# Patient Record
Sex: FEMALE | Race: WHITE | NOT HISPANIC OR LATINO | Employment: OTHER | URBAN - METROPOLITAN AREA
[De-identification: names, ages, dates, MRNs, and addresses within clinical notes are randomized per-mention and may not be internally consistent; named-entity substitution may affect disease eponyms.]

---

## 2017-02-06 ENCOUNTER — HOSPITAL ENCOUNTER (OUTPATIENT)
Dept: OTHER | Facility: HOSPITAL | Age: 67
Discharge: HOME | End: 2017-02-06
Attending: PHYSICIAN ASSISTANT | Admitting: PHYSICIAN ASSISTANT

## 2017-02-06 LAB
ANION GAP SERPL CALC-SCNC: 11.3 MMOL/L (ref 10–20)
BUN SERPL-MCNC: 39 MG/DL (ref 8–20)
BUN/CREAT SERPL: 12.2 (ref 5.4–26.2)
CALCIUM SERPL-MCNC: 7.9 MG/DL (ref 8.9–10.3)
CHLORIDE SERPL-SCNC: 111 MMOL/L (ref 101–111)
CONV CO2: 26 MMOL/L (ref 22–32)
CREAT 24H UR-MCNC: 116.9 MG/DL
CREAT UR-MCNC: 3.2 MG/DL (ref 0.4–1)
ERYTHROCYTE [DISTWIDTH] IN BLOOD BY AUTOMATED COUNT: 16.1 % (ref 11.5–14.5)
GLUCOSE SERPL-MCNC: 174 MG/DL (ref 65–99)
HCT VFR BLD AUTO: 29 % (ref 35–49)
HGB BLD-MCNC: 9.4 G/DL (ref 12–15)
MCH RBC QN AUTO: 29.8 PG (ref 26–32)
MCHC RBC AUTO-ENTMCNC: 32.6 G/DL (ref 32–36)
MCV RBC AUTO: 91.3 FL (ref 80–94)
PHOSPHATE SERPL-MCNC: 5.3 MG/DL (ref 2.4–4.7)
PLATELET # BLD AUTO: 357 10*3/UL (ref 150–450)
PMV BLD AUTO: 7.5 FL (ref 7.4–10.4)
POTASSIUM SERPL-SCNC: 4.3 MMOL/L (ref 3.6–5.1)
PROT UR-MCNC: ABNORMAL MG/DL
PTH-INTACT SERPL-MCNC: 257 PG/ML (ref 11–72)
RBC # BLD AUTO: 3.17 10*6/UL (ref 4–5.4)
SODIUM SERPL-SCNC: 144 MMOL/L (ref 136–144)
WBC # BLD AUTO: 9.8 10*3/UL (ref 4.5–11.5)

## 2017-03-10 ENCOUNTER — HOSPITAL ENCOUNTER (OUTPATIENT)
Dept: CARDIOLOGY | Facility: HOSPITAL | Age: 67
Discharge: HOME | End: 2017-03-10
Attending: SURGERY | Admitting: SURGERY

## 2017-04-03 ENCOUNTER — HOSPITAL ENCOUNTER (OUTPATIENT)
Dept: LAB | Facility: HOSPITAL | Age: 67
Discharge: HOME | End: 2017-04-03
Attending: INTERNAL MEDICINE | Admitting: INTERNAL MEDICINE

## 2017-04-03 LAB
ANION GAP SERPL CALC-SCNC: 14.9 MMOL/L (ref 10–20)
BASOPHILS # BLD AUTO: 0.1 10*3/UL (ref 0–0.2)
BASOPHILS NFR BLD AUTO: 1 % (ref 0–2)
BUN SERPL-MCNC: 37 MG/DL (ref 8–20)
BUN/CREAT SERPL: 10 (ref 5.4–26.2)
CALCIUM SERPL-MCNC: 8.6 MG/DL (ref 8.9–10.3)
CHLORIDE SERPL-SCNC: 106 MMOL/L (ref 101–111)
CONV CO2: 23 MMOL/L (ref 22–32)
CREAT UR-MCNC: 3.7 MG/DL (ref 0.4–1)
DIFFERENTIAL METHOD BLD: (no result)
EOSINOPHIL # BLD AUTO: 0.3 10*3/UL (ref 0–0.3)
EOSINOPHIL # BLD AUTO: 3 % (ref 0–3)
ERYTHROCYTE [DISTWIDTH] IN BLOOD BY AUTOMATED COUNT: 15.6 % (ref 11.5–14.5)
GLUCOSE SERPL-MCNC: 310 MG/DL (ref 65–99)
HCT VFR BLD AUTO: 31.9 % (ref 35–49)
HGB BLD-MCNC: 10.4 G/DL (ref 12–15)
IRON SATN MFR SERPL: 16 % (ref 15–50)
IRON SERPL-MCNC: 38 UG/DL (ref 28–170)
LYMPHOCYTES # BLD AUTO: 1.5 10*3/UL (ref 0.8–4.8)
LYMPHOCYTES NFR BLD AUTO: 15 % (ref 18–42)
MCH RBC QN AUTO: 29.5 PG (ref 26–32)
MCHC RBC AUTO-ENTMCNC: 32.6 G/DL (ref 32–36)
MCV RBC AUTO: 90.7 FL (ref 80–94)
MONOCYTES # BLD AUTO: 0.6 10*3/UL (ref 0.1–1.3)
MONOCYTES NFR BLD AUTO: 6 % (ref 2–11)
NEUTROPHILS # BLD AUTO: 7.4 10*3/UL (ref 2.3–8.6)
NEUTROPHILS NFR BLD AUTO: 75 % (ref 50–75)
NRBC BLD AUTO-RTO: 0 /100{WBCS}
NRBC/RBC NFR BLD MANUAL: 0 10*3/UL
PLATELET # BLD AUTO: 355 10*3/UL (ref 150–450)
PMV BLD AUTO: 8 FL (ref 7.4–10.4)
POTASSIUM SERPL-SCNC: 3.9 MMOL/L (ref 3.6–5.1)
RBC # BLD AUTO: 3.52 10*6/UL (ref 4–5.4)
SODIUM SERPL-SCNC: 140 MMOL/L (ref 136–144)
TIBC SERPL-MCNC: 232 UG/DL (ref 228–428)
WBC # BLD AUTO: 9.9 10*3/UL (ref 4.5–11.5)

## 2017-04-24 ENCOUNTER — INPATIENT HOSPITAL (AMBULATORY)
Dept: URBAN - METROPOLITAN AREA HOSPITAL 84 | Facility: HOSPITAL | Age: 67
End: 2017-04-24
Payer: COMMERCIAL

## 2017-04-24 DIAGNOSIS — R19.7 DIARRHEA, UNSPECIFIED: ICD-10-CM

## 2017-04-24 DIAGNOSIS — R11.2 NAUSEA WITH VOMITING, UNSPECIFIED: ICD-10-CM

## 2017-04-24 DIAGNOSIS — K22.70 BARRETT'S ESOPHAGUS WITHOUT DYSPLASIA: ICD-10-CM

## 2017-04-24 PROCEDURE — 99222 1ST HOSP IP/OBS MODERATE 55: CPT | Performed by: NURSE PRACTITIONER

## 2017-04-25 PROCEDURE — 99231 SBSQ HOSP IP/OBS SF/LOW 25: CPT | Performed by: NURSE PRACTITIONER

## 2017-04-29 ENCOUNTER — INPATIENT HOSPITAL (AMBULATORY)
Dept: URBAN - METROPOLITAN AREA HOSPITAL 84 | Facility: HOSPITAL | Age: 67
End: 2017-04-29
Payer: COMMERCIAL

## 2017-04-29 DIAGNOSIS — R11.2 NAUSEA WITH VOMITING, UNSPECIFIED: ICD-10-CM

## 2017-04-29 DIAGNOSIS — R19.7 DIARRHEA, UNSPECIFIED: ICD-10-CM

## 2017-04-29 PROCEDURE — 99222 1ST HOSP IP/OBS MODERATE 55: CPT | Performed by: INTERNAL MEDICINE

## 2017-04-30 ENCOUNTER — INPATIENT HOSPITAL (AMBULATORY)
Dept: URBAN - METROPOLITAN AREA HOSPITAL 84 | Facility: HOSPITAL | Age: 67
End: 2017-04-30
Payer: COMMERCIAL

## 2017-04-30 DIAGNOSIS — R11.2 NAUSEA WITH VOMITING, UNSPECIFIED: ICD-10-CM

## 2017-04-30 DIAGNOSIS — R19.7 DIARRHEA, UNSPECIFIED: ICD-10-CM

## 2017-04-30 PROCEDURE — 99231 SBSQ HOSP IP/OBS SF/LOW 25: CPT | Performed by: INTERNAL MEDICINE

## 2017-05-01 ENCOUNTER — INPATIENT HOSPITAL (AMBULATORY)
Dept: URBAN - METROPOLITAN AREA HOSPITAL 84 | Facility: HOSPITAL | Age: 67
End: 2017-05-01
Payer: COMMERCIAL

## 2017-05-01 DIAGNOSIS — R19.7 DIARRHEA, UNSPECIFIED: ICD-10-CM

## 2017-05-01 DIAGNOSIS — R11.2 NAUSEA WITH VOMITING, UNSPECIFIED: ICD-10-CM

## 2017-05-01 PROCEDURE — 99231 SBSQ HOSP IP/OBS SF/LOW 25: CPT | Performed by: NURSE PRACTITIONER

## 2017-05-02 ENCOUNTER — INPATIENT HOSPITAL (AMBULATORY)
Dept: URBAN - METROPOLITAN AREA HOSPITAL 84 | Facility: HOSPITAL | Age: 67
End: 2017-05-02
Payer: COMMERCIAL

## 2017-05-02 DIAGNOSIS — R19.7 DIARRHEA, UNSPECIFIED: ICD-10-CM

## 2017-05-02 DIAGNOSIS — R11.2 NAUSEA WITH VOMITING, UNSPECIFIED: ICD-10-CM

## 2017-05-02 PROCEDURE — 99231 SBSQ HOSP IP/OBS SF/LOW 25: CPT | Performed by: INTERNAL MEDICINE

## 2017-05-05 ENCOUNTER — INPATIENT HOSPITAL (AMBULATORY)
Dept: URBAN - METROPOLITAN AREA HOSPITAL 84 | Facility: HOSPITAL | Age: 67
End: 2017-05-05
Payer: COMMERCIAL

## 2017-05-05 DIAGNOSIS — K64.1 SECOND DEGREE HEMORRHOIDS: ICD-10-CM

## 2017-05-05 DIAGNOSIS — K62.5 HEMORRHAGE OF ANUS AND RECTUM: ICD-10-CM

## 2017-05-05 DIAGNOSIS — K63.5 POLYP OF COLON: ICD-10-CM

## 2017-05-05 DIAGNOSIS — R19.7 DIARRHEA, UNSPECIFIED: ICD-10-CM

## 2017-05-05 PROCEDURE — 45380 COLONOSCOPY AND BIOPSY: CPT | Performed by: INTERNAL MEDICINE

## 2017-05-06 ENCOUNTER — INPATIENT HOSPITAL (AMBULATORY)
Dept: URBAN - METROPOLITAN AREA HOSPITAL 84 | Facility: HOSPITAL | Age: 67
End: 2017-05-06
Payer: COMMERCIAL

## 2017-05-06 DIAGNOSIS — R19.7 DIARRHEA, UNSPECIFIED: ICD-10-CM

## 2017-05-06 DIAGNOSIS — K62.5 HEMORRHAGE OF ANUS AND RECTUM: ICD-10-CM

## 2017-05-06 DIAGNOSIS — K64.1 SECOND DEGREE HEMORRHOIDS: ICD-10-CM

## 2017-05-06 PROCEDURE — 99232 SBSQ HOSP IP/OBS MODERATE 35: CPT | Performed by: INTERNAL MEDICINE

## 2017-05-07 PROCEDURE — 99231 SBSQ HOSP IP/OBS SF/LOW 25: CPT | Performed by: INTERNAL MEDICINE

## 2017-05-08 ENCOUNTER — HOSPITAL ENCOUNTER (OUTPATIENT)
Dept: PERIOP | Facility: HOSPITAL | Age: 67
Setting detail: HOSPITAL OUTPATIENT SURGERY
End: 2017-05-08
Attending: SURGERY | Admitting: SURGERY

## 2017-05-15 ENCOUNTER — INPATIENT HOSPITAL (AMBULATORY)
Dept: URBAN - METROPOLITAN AREA HOSPITAL 84 | Facility: HOSPITAL | Age: 67
End: 2017-05-15
Payer: COMMERCIAL

## 2017-05-15 DIAGNOSIS — R93.3 ABNORMAL FINDINGS ON DIAGNOSTIC IMAGING OF OTHER PARTS OF DI: ICD-10-CM

## 2017-05-15 DIAGNOSIS — K62.5 HEMORRHAGE OF ANUS AND RECTUM: ICD-10-CM

## 2017-05-15 DIAGNOSIS — R19.7 DIARRHEA, UNSPECIFIED: ICD-10-CM

## 2017-05-15 DIAGNOSIS — R10.9 UNSPECIFIED ABDOMINAL PAIN: ICD-10-CM

## 2017-05-15 DIAGNOSIS — R11.2 NAUSEA WITH VOMITING, UNSPECIFIED: ICD-10-CM

## 2017-05-15 PROCEDURE — 99222 1ST HOSP IP/OBS MODERATE 55: CPT | Performed by: NURSE PRACTITIONER

## 2017-05-16 ENCOUNTER — INPATIENT HOSPITAL (AMBULATORY)
Dept: URBAN - METROPOLITAN AREA HOSPITAL 84 | Facility: HOSPITAL | Age: 67
End: 2017-05-16
Payer: COMMERCIAL

## 2017-05-16 DIAGNOSIS — R13.10 DYSPHAGIA, UNSPECIFIED: ICD-10-CM

## 2017-05-16 DIAGNOSIS — K20.9 ESOPHAGITIS, UNSPECIFIED: ICD-10-CM

## 2017-05-16 DIAGNOSIS — K29.90 GASTRODUODENITIS, UNSPECIFIED, WITHOUT BLEEDING: ICD-10-CM

## 2017-05-16 DIAGNOSIS — R93.3 ABNORMAL FINDINGS ON DIAGNOSTIC IMAGING OF OTHER PARTS OF DI: ICD-10-CM

## 2017-05-16 DIAGNOSIS — K22.10 ULCER OF ESOPHAGUS WITHOUT BLEEDING: ICD-10-CM

## 2017-05-16 PROCEDURE — 43239 EGD BIOPSY SINGLE/MULTIPLE: CPT | Performed by: INTERNAL MEDICINE

## 2017-05-17 ENCOUNTER — INPATIENT HOSPITAL (AMBULATORY)
Dept: URBAN - METROPOLITAN AREA HOSPITAL 84 | Facility: HOSPITAL | Age: 67
End: 2017-05-17
Payer: COMMERCIAL

## 2017-05-17 DIAGNOSIS — K29.90 GASTRODUODENITIS, UNSPECIFIED, WITHOUT BLEEDING: ICD-10-CM

## 2017-05-17 DIAGNOSIS — R93.3 ABNORMAL FINDINGS ON DIAGNOSTIC IMAGING OF OTHER PARTS OF DI: ICD-10-CM

## 2017-05-17 DIAGNOSIS — K20.9 ESOPHAGITIS, UNSPECIFIED: ICD-10-CM

## 2017-05-17 DIAGNOSIS — R13.10 DYSPHAGIA, UNSPECIFIED: ICD-10-CM

## 2017-05-17 DIAGNOSIS — K22.10 ULCER OF ESOPHAGUS WITHOUT BLEEDING: ICD-10-CM

## 2017-05-17 PROCEDURE — 99231 SBSQ HOSP IP/OBS SF/LOW 25: CPT | Performed by: NURSE PRACTITIONER

## 2017-06-06 ENCOUNTER — INPATIENT HOSPITAL (AMBULATORY)
Dept: URBAN - METROPOLITAN AREA HOSPITAL 84 | Facility: HOSPITAL | Age: 67
End: 2017-06-06
Payer: COMMERCIAL

## 2017-06-06 DIAGNOSIS — R10.9 UNSPECIFIED ABDOMINAL PAIN: ICD-10-CM

## 2017-06-06 DIAGNOSIS — R11.2 NAUSEA WITH VOMITING, UNSPECIFIED: ICD-10-CM

## 2017-06-06 DIAGNOSIS — R19.7 DIARRHEA, UNSPECIFIED: ICD-10-CM

## 2017-06-06 PROCEDURE — 99222 1ST HOSP IP/OBS MODERATE 55: CPT | Performed by: NURSE PRACTITIONER

## 2017-06-20 ENCOUNTER — INPATIENT HOSPITAL (AMBULATORY)
Dept: URBAN - METROPOLITAN AREA HOSPITAL 84 | Facility: HOSPITAL | Age: 67
End: 2017-06-20
Payer: COMMERCIAL

## 2017-06-20 DIAGNOSIS — R93.3 ABNORMAL FINDINGS ON DIAGNOSTIC IMAGING OF OTHER PARTS OF DI: ICD-10-CM

## 2017-06-20 DIAGNOSIS — K22.10 ULCER OF ESOPHAGUS WITHOUT BLEEDING: ICD-10-CM

## 2017-06-20 DIAGNOSIS — K29.80 DUODENITIS WITHOUT BLEEDING: ICD-10-CM

## 2017-06-20 DIAGNOSIS — K20.9 ESOPHAGITIS, UNSPECIFIED: ICD-10-CM

## 2017-06-20 DIAGNOSIS — R11.2 NAUSEA WITH VOMITING, UNSPECIFIED: ICD-10-CM

## 2017-06-20 PROCEDURE — 43239 EGD BIOPSY SINGLE/MULTIPLE: CPT | Performed by: INTERNAL MEDICINE

## 2017-06-21 ENCOUNTER — INPATIENT HOSPITAL (AMBULATORY)
Dept: URBAN - METROPOLITAN AREA HOSPITAL 84 | Facility: HOSPITAL | Age: 67
End: 2017-06-21
Payer: COMMERCIAL

## 2017-06-21 DIAGNOSIS — R93.3 ABNORMAL FINDINGS ON DIAGNOSTIC IMAGING OF OTHER PARTS OF DI: ICD-10-CM

## 2017-06-21 DIAGNOSIS — K22.10 ULCER OF ESOPHAGUS WITHOUT BLEEDING: ICD-10-CM

## 2017-06-21 DIAGNOSIS — R11.2 NAUSEA WITH VOMITING, UNSPECIFIED: ICD-10-CM

## 2017-06-21 DIAGNOSIS — K20.9 ESOPHAGITIS, UNSPECIFIED: ICD-10-CM

## 2017-06-21 DIAGNOSIS — K29.80 DUODENITIS WITHOUT BLEEDING: ICD-10-CM

## 2017-06-21 PROCEDURE — 99231 SBSQ HOSP IP/OBS SF/LOW 25: CPT | Performed by: NURSE PRACTITIONER

## 2017-06-29 ENCOUNTER — INPATIENT HOSPITAL (AMBULATORY)
Dept: URBAN - METROPOLITAN AREA HOSPITAL 84 | Facility: HOSPITAL | Age: 67
End: 2017-06-29
Payer: COMMERCIAL

## 2017-06-29 DIAGNOSIS — K20.8 OTHER ESOPHAGITIS: ICD-10-CM

## 2017-06-29 DIAGNOSIS — R11.2 NAUSEA WITH VOMITING, UNSPECIFIED: ICD-10-CM

## 2017-06-29 DIAGNOSIS — D64.89 OTHER SPECIFIED ANEMIAS: ICD-10-CM

## 2017-06-29 DIAGNOSIS — R19.7 DIARRHEA, UNSPECIFIED: ICD-10-CM

## 2017-06-29 DIAGNOSIS — R10.12 LEFT UPPER QUADRANT PAIN: ICD-10-CM

## 2017-06-29 DIAGNOSIS — D72.829 ELEVATED WHITE BLOOD CELL COUNT, UNSPECIFIED: ICD-10-CM

## 2017-06-29 DIAGNOSIS — R10.11 RIGHT UPPER QUADRANT PAIN: ICD-10-CM

## 2017-06-29 PROCEDURE — 99232 SBSQ HOSP IP/OBS MODERATE 35: CPT | Performed by: NURSE PRACTITIONER

## 2017-06-30 ENCOUNTER — INPATIENT HOSPITAL (AMBULATORY)
Dept: URBAN - METROPOLITAN AREA HOSPITAL 77 | Facility: HOSPITAL | Age: 67
End: 2017-06-30
Payer: COMMERCIAL

## 2017-06-30 DIAGNOSIS — R11.2 NAUSEA WITH VOMITING, UNSPECIFIED: ICD-10-CM

## 2017-06-30 DIAGNOSIS — K31.89 OTHER DISEASES OF STOMACH AND DUODENUM: ICD-10-CM

## 2017-06-30 DIAGNOSIS — K29.50 UNSPECIFIED CHRONIC GASTRITIS WITHOUT BLEEDING: ICD-10-CM

## 2017-06-30 DIAGNOSIS — R79.0 ABNORMAL LEVEL OF BLOOD MINERAL: ICD-10-CM

## 2017-06-30 DIAGNOSIS — K31.7 POLYP OF STOMACH AND DUODENUM: ICD-10-CM

## 2017-06-30 DIAGNOSIS — R19.7 DIARRHEA, UNSPECIFIED: ICD-10-CM

## 2017-06-30 PROCEDURE — 43259 EGD US EXAM DUODENUM/JEJUNUM: CPT | Mod: 59 | Performed by: INTERNAL MEDICINE

## 2017-06-30 PROCEDURE — 43251 EGD REMOVE LESION SNARE: CPT | Performed by: INTERNAL MEDICINE

## 2017-07-06 ENCOUNTER — HOSPITAL ENCOUNTER (OUTPATIENT)
Dept: LAB | Facility: HOSPITAL | Age: 67
Setting detail: SPECIMEN
Discharge: HOME | End: 2017-07-06
Attending: FAMILY MEDICINE | Admitting: FAMILY MEDICINE

## 2017-07-27 ENCOUNTER — HOSPITAL ENCOUNTER (OUTPATIENT)
Dept: PREOP | Facility: HOSPITAL | Age: 67
Setting detail: HOSPITAL OUTPATIENT SURGERY
Discharge: HOME | End: 2017-07-27
Attending: SURGERY | Admitting: SURGERY

## 2017-07-27 LAB
ANION GAP SERPL CALC-SCNC: 12.9 MMOL/L (ref 10–20)
BASOPHILS # BLD AUTO: 0 10*3/UL (ref 0–0.2)
BASOPHILS NFR BLD AUTO: 1 % (ref 0–2)
BUN SERPL-MCNC: 14 MG/DL (ref 8–20)
BUN/CREAT SERPL: 3.7 (ref 5.4–26.2)
CALCIUM SERPL-MCNC: 9.1 MG/DL (ref 8.9–10.3)
CHLORIDE SERPL-SCNC: 104 MMOL/L (ref 101–111)
CONV CO2: 26 MMOL/L (ref 22–32)
CREAT UR-MCNC: 3.8 MG/DL (ref 0.4–1)
DIFFERENTIAL METHOD BLD: (no result)
EOSINOPHIL # BLD AUTO: 0.2 10*3/UL (ref 0–0.3)
EOSINOPHIL # BLD AUTO: 4 % (ref 0–3)
ERYTHROCYTE [DISTWIDTH] IN BLOOD BY AUTOMATED COUNT: 14.4 % (ref 11.5–14.5)
GLUCOSE BLD-MCNC: 89 MG/DL (ref 70–105)
GLUCOSE SERPL-MCNC: 95 MG/DL (ref 65–99)
HCT VFR BLD AUTO: 24.8 % (ref 35–49)
HGB BLD-MCNC: (no result) G/DL (ref 12–15)
LYMPHOCYTES # BLD AUTO: 1.6 10*3/UL (ref 0.8–4.8)
LYMPHOCYTES NFR BLD AUTO: 24 % (ref 18–42)
MCH RBC QN AUTO: 30.5 PG (ref 26–32)
MCHC RBC AUTO-ENTMCNC: 33.1 G/DL (ref 32–36)
MCV RBC AUTO: 92 FL (ref 80–94)
MONOCYTES # BLD AUTO: 0.6 10*3/UL (ref 0.1–1.3)
MONOCYTES NFR BLD AUTO: 9 % (ref 2–11)
NEUTROPHILS # BLD AUTO: 4.1 10*3/UL (ref 2.3–8.6)
NEUTROPHILS NFR BLD AUTO: 62 % (ref 50–75)
NRBC BLD AUTO-RTO: 0 /100{WBCS}
NRBC/RBC NFR BLD MANUAL: 0 10*3/UL
PLATELET # BLD AUTO: 335 10*3/UL (ref 150–450)
PMV BLD AUTO: 7 FL (ref 7.4–10.4)
POTASSIUM SERPL-SCNC: 3.9 MMOL/L (ref 3.6–5.1)
RBC # BLD AUTO: 2.69 10*6/UL (ref 4–5.4)
SODIUM SERPL-SCNC: 139 MMOL/L (ref 136–144)
WBC # BLD AUTO: 6.5 10*3/UL (ref 4.5–11.5)

## 2017-09-05 ENCOUNTER — HOSPITAL ENCOUNTER (OUTPATIENT)
Dept: OTHER | Facility: HOSPITAL | Age: 67
Discharge: HOME | End: 2017-09-05
Attending: INTERNAL MEDICINE | Admitting: INTERNAL MEDICINE

## 2017-11-16 ENCOUNTER — HOSPITAL ENCOUNTER (OUTPATIENT)
Dept: LAB | Facility: HOSPITAL | Age: 67
Discharge: HOME | End: 2017-11-16
Attending: FAMILY MEDICINE | Admitting: FAMILY MEDICINE

## 2017-11-16 LAB
CHOLEST SERPL-MCNC: 185 MG/DL
CHOLEST/HDLC SERPL: 2.9 {RATIO}
CONV LDL CHOLESTEROL DIRECT: 98 MG/DL (ref 0–100)
HDLC SERPL-MCNC: 63 MG/DL
LDLC/HDLC SERPL: 1.6 {RATIO}
LIPID INTERPRETATION: ABNORMAL
TRIGL SERPL-MCNC: 129 MG/DL
VLDLC SERPL CALC-MCNC: 23.6 MG/DL

## 2018-03-20 ENCOUNTER — HOSPITAL ENCOUNTER (OUTPATIENT)
Dept: CT IMAGING | Facility: HOSPITAL | Age: 68
Discharge: HOME | End: 2018-03-20
Attending: SURGERY | Admitting: SURGERY

## 2018-07-10 ENCOUNTER — HOSPITAL ENCOUNTER (OUTPATIENT)
Dept: OTHER | Facility: HOSPITAL | Age: 68
Discharge: HOME | End: 2018-07-10
Attending: FAMILY MEDICINE | Admitting: FAMILY MEDICINE

## 2018-07-10 LAB
CCP IGG ANTIBODIES: <0.4 U/ML
CHOLEST SERPL-MCNC: 194 MG/DL
CHOLEST/HDLC SERPL: 4 {RATIO}
CONV LDL CHOLESTEROL DIRECT: 108 MG/DL (ref 0–100)
HDLC SERPL-MCNC: 49 MG/DL
LDLC/HDLC SERPL: 2.2 {RATIO}
LIPID INTERPRETATION: ABNORMAL
TRIGL SERPL-MCNC: 185 MG/DL
URATE SERPL-MCNC: 4.7 MG/DL (ref 2.6–8)
VLDLC SERPL CALC-MCNC: 36.7 MG/DL

## 2018-07-11 LAB — CHROMATIN AB SERPL-ACNC: <20 [IU]/ML (ref 0–20)

## 2018-08-07 ENCOUNTER — HOSPITAL ENCOUNTER (OUTPATIENT)
Dept: CARDIOLOGY | Facility: HOSPITAL | Age: 68
Discharge: HOME | End: 2018-08-07
Attending: FAMILY MEDICINE | Admitting: FAMILY MEDICINE

## 2018-08-16 ENCOUNTER — HOSPITAL ENCOUNTER (OUTPATIENT)
Dept: MAMMOGRAPHY | Facility: HOSPITAL | Age: 68
Discharge: HOME | End: 2018-08-16
Attending: FAMILY MEDICINE | Admitting: FAMILY MEDICINE

## 2018-09-06 ENCOUNTER — HOSPITAL ENCOUNTER (OUTPATIENT)
Dept: OTHER | Facility: HOSPITAL | Age: 68
Discharge: HOME | End: 2018-09-06
Attending: FAMILY MEDICINE | Admitting: FAMILY MEDICINE

## 2018-09-18 ENCOUNTER — HOSPITAL ENCOUNTER (OUTPATIENT)
Dept: CT IMAGING | Facility: HOSPITAL | Age: 68
Discharge: HOME | End: 2018-09-18
Attending: SURGERY | Admitting: SURGERY

## 2018-10-18 ENCOUNTER — HOSPITAL ENCOUNTER (OUTPATIENT)
Dept: INTERVENTIONAL RADIOLOGY/VASCULAR | Facility: HOSPITAL | Age: 68
Discharge: HOME | End: 2018-10-18
Attending: INTERNAL MEDICINE | Admitting: INTERNAL MEDICINE

## 2019-01-03 ENCOUNTER — HOSPITAL ENCOUNTER (OUTPATIENT)
Dept: CARDIOLOGY | Facility: HOSPITAL | Age: 69
Discharge: HOME | End: 2019-01-03
Attending: PHYSICIAN ASSISTANT | Admitting: PHYSICIAN ASSISTANT

## 2019-01-22 ENCOUNTER — HOSPITAL ENCOUNTER (OUTPATIENT)
Dept: LAB | Facility: HOSPITAL | Age: 69
Discharge: HOME | End: 2019-01-22
Attending: SURGERY | Admitting: SURGERY

## 2019-01-22 LAB
ANION GAP SERPL CALC-SCNC: 16.6 MMOL/L (ref 10–20)
BUN SERPL-MCNC: 15 MG/DL (ref 8–20)
BUN/CREAT SERPL: 3.9 (ref 5.4–26.2)
CALCIUM SERPL-MCNC: 8.6 MG/DL (ref 8.9–10.3)
CHLORIDE SERPL-SCNC: 102 MMOL/L (ref 101–111)
CONV CO2: 25 MMOL/L (ref 22–32)
CREAT UR-MCNC: 3.8 MG/DL (ref 0.4–1)
GLUCOSE SERPL-MCNC: 132 MG/DL (ref 65–99)
INR PPP: 1
POTASSIUM SERPL-SCNC: 3.6 MMOL/L (ref 3.6–5.1)
PROTHROMBIN TIME: 10.3 SEC (ref 9.6–11.7)
SODIUM SERPL-SCNC: 140 MMOL/L (ref 136–144)

## 2019-02-19 ENCOUNTER — HOSPITAL ENCOUNTER (OUTPATIENT)
Dept: OTHER | Facility: HOSPITAL | Age: 69
Discharge: HOME | End: 2019-02-19
Attending: SURGERY | Admitting: SURGERY

## 2019-02-27 ENCOUNTER — OFFICE (AMBULATORY)
Dept: URBAN - METROPOLITAN AREA CLINIC 64 | Facility: CLINIC | Age: 69
End: 2019-02-27
Payer: COMMERCIAL

## 2019-02-27 VITALS
SYSTOLIC BLOOD PRESSURE: 160 MMHG | HEART RATE: 91 BPM | DIASTOLIC BLOOD PRESSURE: 86 MMHG | WEIGHT: 160 LBS | HEIGHT: 63 IN

## 2019-02-27 DIAGNOSIS — R10.84 GENERALIZED ABDOMINAL PAIN: ICD-10-CM

## 2019-02-27 DIAGNOSIS — R93.3 ABNORMAL FINDINGS ON DIAGNOSTIC IMAGING OF OTHER PARTS OF DI: ICD-10-CM

## 2019-02-27 PROCEDURE — 99214 OFFICE O/P EST MOD 30 MIN: CPT | Performed by: INTERNAL MEDICINE

## 2019-02-28 ENCOUNTER — ON CAMPUS - OUTPATIENT (AMBULATORY)
Dept: URBAN - METROPOLITAN AREA HOSPITAL 85 | Facility: HOSPITAL | Age: 69
End: 2019-02-28
Payer: COMMERCIAL

## 2019-02-28 DIAGNOSIS — R10.13 EPIGASTRIC PAIN: ICD-10-CM

## 2019-02-28 DIAGNOSIS — Z90.3 ACQUIRED ABSENCE OF STOMACH [PART OF]: ICD-10-CM

## 2019-02-28 DIAGNOSIS — R93.3 ABNORMAL FINDINGS ON DIAGNOSTIC IMAGING OF OTHER PARTS OF DI: ICD-10-CM

## 2019-02-28 DIAGNOSIS — R11.2 NAUSEA WITH VOMITING, UNSPECIFIED: ICD-10-CM

## 2019-02-28 DIAGNOSIS — K28.9 GASTROJEJUNAL ULCER, UNSPECIFIED AS ACUTE OR CHRONIC, WITHOU: ICD-10-CM

## 2019-02-28 PROCEDURE — 43239 EGD BIOPSY SINGLE/MULTIPLE: CPT | Performed by: INTERNAL MEDICINE

## 2019-03-14 ENCOUNTER — HOSPITAL ENCOUNTER (OUTPATIENT)
Dept: LAB | Facility: HOSPITAL | Age: 69
Discharge: HOME | End: 2019-03-14
Attending: FAMILY MEDICINE | Admitting: FAMILY MEDICINE

## 2019-03-14 LAB
ALBUMIN SERPL-MCNC: 3.1 G/DL (ref 3.5–4.8)
ALBUMIN/GLOB SERPL: 0.8 {RATIO} (ref 1–1.7)
ALP SERPL-CCNC: 110 IU/L (ref 32–91)
ALT SERPL-CCNC: 14 IU/L (ref 14–54)
ANION GAP SERPL CALC-SCNC: 14.4 MMOL/L (ref 10–20)
AST SERPL-CCNC: 21 IU/L (ref 15–41)
BILIRUB SERPL-MCNC: 0.4 MG/DL (ref 0.3–1.2)
BUN SERPL-MCNC: 12 MG/DL (ref 8–20)
BUN/CREAT SERPL: 4 (ref 5.4–26.2)
CALCIUM SERPL-MCNC: 8.8 MG/DL (ref 8.9–10.3)
CHLORIDE SERPL-SCNC: 101 MMOL/L (ref 101–111)
CHOLEST SERPL-MCNC: 220 MG/DL
CHOLEST/HDLC SERPL: 3.5 {RATIO}
CONV CO2: 28 MMOL/L (ref 22–32)
CONV LDL CHOLESTEROL DIRECT: 129 MG/DL (ref 0–100)
CONV TOTAL PROTEIN: 6.8 G/DL (ref 6.1–7.9)
CREAT UR-MCNC: 3 MG/DL (ref 0.4–1)
GLOBULIN UR ELPH-MCNC: 3.7 G/DL (ref 2.5–3.8)
GLUCOSE SERPL-MCNC: 114 MG/DL (ref 65–99)
HDLC SERPL-MCNC: 63 MG/DL
LDLC/HDLC SERPL: 2 {RATIO}
LIPID INTERPRETATION: ABNORMAL
POTASSIUM SERPL-SCNC: 3.4 MMOL/L (ref 3.6–5.1)
SODIUM SERPL-SCNC: 140 MMOL/L (ref 136–144)
TRIGL SERPL-MCNC: 169 MG/DL
VLDLC SERPL CALC-MCNC: 28.6 MG/DL

## 2019-03-28 ENCOUNTER — OFFICE (AMBULATORY)
Dept: URBAN - METROPOLITAN AREA CLINIC 64 | Facility: CLINIC | Age: 69
End: 2019-03-28
Payer: COMMERCIAL

## 2019-03-28 VITALS
SYSTOLIC BLOOD PRESSURE: 128 MMHG | WEIGHT: 165 LBS | HEIGHT: 63 IN | DIASTOLIC BLOOD PRESSURE: 80 MMHG | HEART RATE: 73 BPM

## 2019-03-28 DIAGNOSIS — K25.9 GASTRIC ULCER, UNSPECIFIED AS ACUTE OR CHRONIC, WITHOUT HEMO: ICD-10-CM

## 2019-03-28 DIAGNOSIS — R10.13 EPIGASTRIC PAIN: ICD-10-CM

## 2019-03-28 PROCEDURE — 99213 OFFICE O/P EST LOW 20 MIN: CPT | Performed by: INTERNAL MEDICINE

## 2019-06-04 ENCOUNTER — OFFICE (AMBULATORY)
Dept: URBAN - METROPOLITAN AREA CLINIC 64 | Facility: CLINIC | Age: 69
End: 2019-06-04
Payer: COMMERCIAL

## 2019-06-04 VITALS
WEIGHT: 167 LBS | HEART RATE: 90 BPM | SYSTOLIC BLOOD PRESSURE: 151 MMHG | DIASTOLIC BLOOD PRESSURE: 71 MMHG | HEIGHT: 63 IN

## 2019-06-04 DIAGNOSIS — K43.2 INCISIONAL HERNIA WITHOUT OBSTRUCTION OR GANGRENE: ICD-10-CM

## 2019-06-04 DIAGNOSIS — R19.7 DIARRHEA, UNSPECIFIED: ICD-10-CM

## 2019-06-04 DIAGNOSIS — K25.9 GASTRIC ULCER, UNSPECIFIED AS ACUTE OR CHRONIC, WITHOUT HEMO: ICD-10-CM

## 2019-06-04 PROCEDURE — 99214 OFFICE O/P EST MOD 30 MIN: CPT | Performed by: INTERNAL MEDICINE

## 2019-06-04 RX ORDER — PANTOPRAZOLE SODIUM 40 MG/1
40 TABLET, DELAYED RELEASE ORAL
Qty: 90 | Refills: 3 | Status: ACTIVE

## 2019-06-04 RX ORDER — SUCRALFATE 1 G/1
TABLET ORAL
Qty: 90 | Refills: 0 | Status: COMPLETED
End: 2019-06-04

## 2019-06-11 ENCOUNTER — HOSPITAL ENCOUNTER (OUTPATIENT)
Dept: OTHER | Facility: HOSPITAL | Age: 69
Discharge: HOME | End: 2019-06-11
Attending: SURGERY | Admitting: SURGERY

## 2019-06-14 ENCOUNTER — HOSPITAL ENCOUNTER (OUTPATIENT)
Dept: PREOP | Facility: HOSPITAL | Age: 69
Setting detail: HOSPITAL OUTPATIENT SURGERY
Discharge: HOME | End: 2019-06-14
Attending: SURGERY | Admitting: SURGERY

## 2019-06-14 LAB
ANION GAP SERPL CALC-SCNC: 23 MMOL/L (ref 10–20)
BASOPHILS # BLD AUTO: 0.1 10*3/UL (ref 0–0.2)
BASOPHILS NFR BLD AUTO: 2 % (ref 0–2)
BUN SERPL-MCNC: 43 MG/DL (ref 8–20)
BUN/CREAT SERPL: 6.6 (ref 5.4–26.2)
CALCIUM SERPL-MCNC: 9.2 MG/DL (ref 8.9–10.3)
CHLORIDE SERPL-SCNC: 100 MMOL/L (ref 101–111)
CONV CO2: 20 MMOL/L (ref 22–32)
CREAT UR-MCNC: 6.5 MG/DL (ref 0.4–1)
DIFFERENTIAL METHOD BLD: (no result)
EOSINOPHIL # BLD AUTO: 0.4 10*3/UL (ref 0–0.3)
EOSINOPHIL # BLD AUTO: 4 % (ref 0–3)
ERYTHROCYTE [DISTWIDTH] IN BLOOD BY AUTOMATED COUNT: 16.3 % (ref 11.5–14.5)
GLUCOSE BLD-MCNC: 102 MG/DL (ref 70–105)
GLUCOSE BLD-MCNC: 82 MG/DL (ref 70–105)
GLUCOSE BLD-MCNC: 84 MG/DL (ref 70–105)
GLUCOSE BLD-MCNC: 88 MG/DL (ref 70–105)
GLUCOSE SERPL-MCNC: 106 MG/DL (ref 65–99)
HCT VFR BLD AUTO: 36.4 % (ref 35–49)
HGB BLD-MCNC: 12.2 G/DL (ref 12–15)
LYMPHOCYTES # BLD AUTO: 2.4 10*3/UL (ref 0.8–4.8)
LYMPHOCYTES NFR BLD AUTO: 24 % (ref 18–42)
MCH RBC QN AUTO: 31.5 PG (ref 26–32)
MCHC RBC AUTO-ENTMCNC: 33.6 G/DL (ref 32–36)
MCV RBC AUTO: 93.7 FL (ref 80–94)
MONOCYTES # BLD AUTO: 0.7 10*3/UL (ref 0.1–1.3)
MONOCYTES NFR BLD AUTO: 7 % (ref 2–11)
NEUTROPHILS # BLD AUTO: 6.3 10*3/UL (ref 2.3–8.6)
NEUTROPHILS NFR BLD AUTO: 63 % (ref 50–75)
NRBC BLD AUTO-RTO: 0 /100{WBCS}
NRBC/RBC NFR BLD MANUAL: 0 10*3/UL
PLATELET # BLD AUTO: 395 10*3/UL (ref 150–450)
PMV BLD AUTO: 7.7 FL (ref 7.4–10.4)
POTASSIUM SERPL-SCNC: 5.2 MMOL/L (ref 3.6–5.1)
POTASSIUM SERPL-SCNC: 6.1 MMOL/L (ref 3.6–5.1)
RBC # BLD AUTO: 3.89 10*6/UL (ref 4–5.4)
SODIUM SERPL-SCNC: 137 MMOL/L (ref 136–144)
WBC # BLD AUTO: 10 10*3/UL (ref 4.5–11.5)

## 2019-07-02 ENCOUNTER — OFFICE VISIT (OUTPATIENT)
Dept: SURGERY | Facility: CLINIC | Age: 69
End: 2019-07-02

## 2019-07-02 ENCOUNTER — HOSPITAL ENCOUNTER (OUTPATIENT)
Dept: VASCULAR SURGERY | Facility: HOSPITAL | Age: 69
Discharge: HOME | End: 2019-07-02

## 2019-07-02 VITALS
HEART RATE: 102 BPM | OXYGEN SATURATION: 96 % | SYSTOLIC BLOOD PRESSURE: 143 MMHG | HEIGHT: 63 IN | DIASTOLIC BLOOD PRESSURE: 75 MMHG | WEIGHT: 165 LBS | TEMPERATURE: 98 F | BODY MASS INDEX: 29.23 KG/M2

## 2019-07-02 DIAGNOSIS — K43.9 VENTRAL HERNIA WITHOUT OBSTRUCTION OR GANGRENE: Primary | ICD-10-CM

## 2019-07-02 PROBLEM — J44.1 ACUTE EXACERBATION OF CHRONIC OBSTRUCTIVE PULMONARY DISEASE: Status: ACTIVE | Noted: 2019-02-05

## 2019-07-02 PROBLEM — N18.6 ESRD (END STAGE RENAL DISEASE): Status: ACTIVE | Noted: 2017-08-01

## 2019-07-02 PROBLEM — C7A.8 NEUROENDOCRINE CARCINOMA (HCC): Status: ACTIVE | Noted: 2017-08-15

## 2019-07-02 PROBLEM — D3A.8 BENIGN NEUROENDOCRINE TUMOR: Status: ACTIVE | Noted: 2017-08-01

## 2019-07-02 PROBLEM — M25.9 DISORDER OF WRIST JOINT: Status: ACTIVE | Noted: 2018-07-05

## 2019-07-02 PROBLEM — I65.29 CAROTID ARTERY STENOSIS: Status: ACTIVE | Noted: 2018-07-05

## 2019-07-02 PROBLEM — I95.1 ORTHOSTATIC HYPOTENSION: Status: ACTIVE | Noted: 2017-11-07

## 2019-07-02 PROBLEM — G47.00 INSOMNIA: Status: ACTIVE | Noted: 2017-11-07

## 2019-07-02 PROCEDURE — 99204 OFFICE O/P NEW MOD 45 MIN: CPT | Performed by: SURGERY

## 2019-07-02 RX ORDER — EZETIMIBE 10 MG/1
TABLET ORAL EVERY 24 HOURS
COMMUNITY
Start: 2019-03-15 | End: 2019-07-11

## 2019-07-02 RX ORDER — LANCETS 33 GAUGE
EACH MISCELLANEOUS
Refills: 6 | COMMUNITY
Start: 2019-04-08 | End: 2019-07-27

## 2019-07-02 RX ORDER — HYDROCODONE BITARTRATE AND ACETAMINOPHEN 5; 325 MG/1; MG/1
TABLET ORAL
Refills: 0 | COMMUNITY
Start: 2019-06-14 | End: 2019-07-27

## 2019-07-02 RX ORDER — PANTOPRAZOLE SODIUM 40 MG/1
40 TABLET, DELAYED RELEASE ORAL 2 TIMES DAILY
Refills: 3 | COMMUNITY
Start: 2019-05-01 | End: 2019-07-11

## 2019-07-02 RX ORDER — SODIUM BICARBONATE 650 MG/1
TABLET ORAL
Refills: 1 | COMMUNITY
Start: 2019-03-29 | End: 2019-07-11

## 2019-07-02 RX ORDER — BLOOD-GLUCOSE METER
EACH MISCELLANEOUS
COMMUNITY
Start: 2018-01-02 | End: 2019-07-27

## 2019-07-02 RX ORDER — LANOLIN ALCOHOL/MO/W.PET/CERES
2000 CREAM (GRAM) TOPICAL 3 TIMES WEEKLY
COMMUNITY
End: 2022-05-17

## 2019-07-02 RX ORDER — LANCETS 33 GAUGE
EACH MISCELLANEOUS
COMMUNITY
Start: 2018-12-14 | End: 2019-07-27

## 2019-07-02 RX ORDER — NEPHROCAP 1 MG
CAP ORAL
Refills: 5 | COMMUNITY
Start: 2019-06-22 | End: 2019-07-22 | Stop reason: SDUPTHER

## 2019-07-02 RX ORDER — SIMVASTATIN 20 MG
20 TABLET ORAL NIGHTLY
COMMUNITY
Start: 2017-10-19 | End: 2019-12-15 | Stop reason: SDUPTHER

## 2019-07-02 RX ORDER — CALCITRIOL 0.5 UG/1
0.5 CAPSULE, LIQUID FILLED ORAL DAILY
COMMUNITY
End: 2019-07-11

## 2019-07-02 RX ORDER — SUCRALFATE 1 G/1
TABLET ORAL
COMMUNITY
Start: 2019-06-26 | End: 2019-07-11

## 2019-07-02 NOTE — PROGRESS NOTES
"Jenny Lobato is a 68 y.o. female.   Chief Complaint   Patient presents with   • Hernia     new patient referral from [facility]     /75   Pulse 102   Temp 98 °F (36.7 °C) (Oral)   Ht 160 cm (62.99\")   Wt 74.8 kg (165 lb)   SpO2 96%   BMI 29.24 kg/m²     HISTORY OF PRESENT ILLNESS:  68-year-old lady was referred to me for evaluation of a right mid abdomen ventral hernia.  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  She has end-stage renal disease and is currently being worked up for a kidney transportation.  She tells me this is going to perform by Dr. Zechariah Wilson and she is scheduled for living donor transportation this month.  She had been followed by  enterology for epigastric abdominal pain where endoscopy has been straight with some ulceration just distal to her gastroduodenal anastomosis.  She says the back in February she was having a lot of pain she had several coughing fits which caused a new bulge in her right mid abdomen.  It is moderately stenotic and hurts when the bulges out it is reducible with palpation.  She has a concern that is going to affect her ability to have her living donor transplantation,  so she has been referred to me for evaluation.  Today she denies any significant nausea vomiting fevers chills or severe palpation or diarrhea.  She has about 3 bowel movements in the morning and then goes the rest the day without any bowel movements.       Outpatient Encounter Medications as of 7/2/2019   Medication Sig Dispense Refill   • albuterol (PROVENTIL) (5 MG/ML) 0.5% nebulizer solution Inhale 2.5 mg 5 (Five) Times a Day.     • Blood Glucose Monitoring Suppl (ONE TOUCH ULTRA 2) w/Device kit ONETOUCH ULTRA 2 w/Device KIT     • ezetimibe (ZETIA) 10 MG tablet Daily.     • glucose blood (ONE TOUCH ULTRA TEST) test strip ONETOUCH ULTRA BLUE STRP     • ONETOUCH DELICA " LANCETS 33G misc ONETOUCH DELICA LANCETS 33G     • simvastatin (ZOCOR) 20 MG tablet Daily.     • B Complex-C-Folic Acid (VIRT-CAPS) 1 MG capsule TK ONE C PO QD  5   • calcitriol (ROCALTROL) 0.5 MCG capsule Take 0.5 mcg by mouth Daily.     • HYDROcodone-acetaminophen (NORCO) 5-325 MG per tablet TK 1 T PO Q 4 H PRN P  0   • ONE TOUCH ULTRA TEST test strip USE TO TEST BLOOD SUGAR BID UTD  6   • ONETOUCH DELICA LANCETS 33G misc TEST ONCE D  6   • pantoprazole (PROTONIX) 40 MG EC tablet Take 40 mg by mouth 2 (Two) Times a Day.  3   • sodium bicarbonate 650 MG tablet TK 2 TS PO TID  1   • SODIUM BICARBONATE PO Take 650 mg by mouth 3 (Three) Times a Day.     • sucralfate (CARAFATE) 1 g tablet      • vitamin B-12 (CYANOCOBALAMIN) 1000 MCG tablet Take 1,000 mcg by mouth 3 (Three) Times a Week.       No facility-administered encounter medications on file as of 7/2/2019.          The following portions of the patient's history were reviewed and updated as appropriate: allergies, current medications, past family history, past medical history, past social history, past surgical history and problem list.    Review of Systems   Complete review systems is obtained positive for right lower quadrant, pain with remainder is negative    Objective   Physical Exam   Constitutional: She is oriented to person, place, and time. She appears well-developed and well-nourished.   HENT:   Head: Normocephalic and atraumatic.   Eyes: Conjunctivae and EOM are normal.   Neck: No tracheal deviation present.   Cardiovascular: Normal rate and regular rhythm.   Pulmonary/Chest: Effort normal and breath sounds normal. No stridor.   Abdominal: Soft. She exhibits no distension. There is no tenderness. There is no rebound and no guarding.   In the right mid abdomen there is an approximately 1 to 7 m hernia defect which bulges with Valsalva and is reducible with palpation there is no overlying skin changes.  Seems to be separate from the right subcostal  incision is likely at a laparoscopic port site.    Musculoskeletal: She exhibits no edema or tenderness.   Lymphadenopathy:     She has no cervical adenopathy.   Neurological: She is oriented to person, place, and time. No cranial nerve deficit.   Skin: Skin is warm and dry.   Psychiatric: She has a normal mood and affect. Her behavior is normal.     I have reviewed her CT images from february    Assessment/Plan   Sherry was seen today for hernia.    Diagnoses and all orders for this visit:    Ventral hernia without obstruction or gangrene      I have given her transplant surgeon a call to discuss this case.  For now we will leave this hernia since it is mildly somatic and reducible.  Dr. Wilson will see her prior to her operation for transplantion, and will discuss the risk benefits of primary repair at the time of the operation.  If she had obstructive symptoms he recommended proceeding with a primary repair alone to avoid placement of mesh which would capitate her presentation.  For now we will have her follow-up with me as needed if any need for a definitive lap scopic repair with mesh after her transplantation.     Nilay Stevens MD  7/2/2019  11:45 AM

## 2019-07-05 ENCOUNTER — TRANSCRIBE ORDERS (OUTPATIENT)
Dept: ADMINISTRATIVE | Facility: HOSPITAL | Age: 69
End: 2019-07-05

## 2019-07-08 ENCOUNTER — TRANSCRIBE ORDERS (OUTPATIENT)
Dept: ADMINISTRATIVE | Facility: HOSPITAL | Age: 69
End: 2019-07-08

## 2019-07-08 DIAGNOSIS — Z99.2 CHRONIC KIDNEY DISEASE REQUIRING CHRONIC DIALYSIS (HCC): Primary | ICD-10-CM

## 2019-07-08 DIAGNOSIS — N18.6 END STAGE RENAL DISEASE (HCC): ICD-10-CM

## 2019-07-08 DIAGNOSIS — N18.6 CHRONIC KIDNEY DISEASE REQUIRING CHRONIC DIALYSIS (HCC): Primary | ICD-10-CM

## 2019-07-11 ENCOUNTER — OFFICE VISIT (OUTPATIENT)
Dept: FAMILY MEDICINE CLINIC | Facility: CLINIC | Age: 69
End: 2019-07-11

## 2019-07-11 VITALS
SYSTOLIC BLOOD PRESSURE: 138 MMHG | BODY MASS INDEX: 30.02 KG/M2 | DIASTOLIC BLOOD PRESSURE: 71 MMHG | RESPIRATION RATE: 12 BRPM | WEIGHT: 169.4 LBS | HEART RATE: 114 BPM

## 2019-07-11 DIAGNOSIS — E78.00 PURE HYPERCHOLESTEROLEMIA: Primary | ICD-10-CM

## 2019-07-11 DIAGNOSIS — H00.012 HORDEOLUM EXTERNUM OF RIGHT LOWER EYELID: ICD-10-CM

## 2019-07-11 DIAGNOSIS — E11.9 TYPE 2 DIABETES MELLITUS WITHOUT COMPLICATION, WITHOUT LONG-TERM CURRENT USE OF INSULIN (HCC): ICD-10-CM

## 2019-07-11 DIAGNOSIS — Z12.39 SCREENING FOR BREAST CANCER: ICD-10-CM

## 2019-07-11 DIAGNOSIS — I10 ESSENTIAL HYPERTENSION: ICD-10-CM

## 2019-07-11 DIAGNOSIS — E55.9 VITAMIN D DEFICIENCY: ICD-10-CM

## 2019-07-11 PROCEDURE — 99213 OFFICE O/P EST LOW 20 MIN: CPT | Performed by: FAMILY MEDICINE

## 2019-07-11 RX ORDER — PANTOPRAZOLE SODIUM 40 MG/1
40 TABLET, DELAYED RELEASE ORAL DAILY
COMMUNITY
End: 2020-05-05

## 2019-07-11 RX ORDER — DOXYCYCLINE HYCLATE 100 MG/1
100 TABLET, DELAYED RELEASE ORAL 2 TIMES DAILY
Qty: 20 TABLET | Refills: 0 | Status: SHIPPED | OUTPATIENT
Start: 2019-07-11 | End: 2019-07-27

## 2019-07-11 NOTE — PROGRESS NOTES
Rooming Tab(CC,VS,Pt Hx,Fall Screen)  Chief Complaint   Patient presents with   • Hypertension   • Hyperlipidemia       Subjective 68-year-old here for follow-up of her diabetes.  Her blood sugars she checks daily runs from 89-1 16.  She has no low sugars.  She tries to eat a low-carb diet.  She is not exercising.  She does check her feet daily.  She sees a podiatrist as well.  She has had no complaints of chest pain dizziness or syncope.  Patient has a history of carotid artery disease and her last carotid Doppler in January of this year showed 50 to 74% on the left.  Patient has a hernia in her right groin which she is going to have surgically repaired when she has her renal transplant.  Patient has a stye in her right eye it is been going on since last Friday.  It is getting bigger and hurting.  Patient has hyperlipidemia and she never did start Zetia because she did not know she was supposed to take.    I have reviewed and updated her medications, medical history and problem list during today's office visit.     Patient Care Team:  Jordan Calixto MD as PCP - General  Jordan Calixto MD as PCP - Claims Attributed  Jordan Calixto MD as PCP - Family Medicine    Problem List Tab  Medications Tab  Synopsis Tab  Chart Review Tab  Care Everywhere Tab  Immunizations Tab  Patient History Tab    Social History     Tobacco Use   • Smoking status: Former Smoker   • Smokeless tobacco: Never Used   Substance Use Topics   • Alcohol use: Not on file       Review of Systems   Constitutional: Positive for fatigue. Negative for chills and fever.   HENT:        Stye in her right lower lid   Eyes: Positive for pain.   Respiratory: Negative for chest tightness and shortness of breath.    Cardiovascular: Negative for chest pain.   Gastrointestinal: Negative for abdominal pain and blood in stool.   Genitourinary: Negative for dysuria and hematuria.   Musculoskeletal: Positive for arthralgias, back pain and joint swelling.    Neurological: Negative for dizziness and syncope.   Psychiatric/Behavioral: Negative for depressed mood.       Objective     Rooming Tab(CC,VS,Pt Hx,Fall Screen)  /71 (BP Location: Left arm, Patient Position: Sitting, Cuff Size: Adult)   Pulse 114   Resp 12   Wt 76.8 kg (169 lb 6.4 oz)   BMI 30.02 kg/m²     Body mass index is 30.02 kg/m².    Physical Exam   Constitutional: She is oriented to person, place, and time. She appears well-developed and well-nourished. No distress.   Eyes: Lids are normal.   She has a large stye on her lower lid that is tender   Neck: Trachea normal and normal range of motion. Neck supple. Carotid bruit is not present. No thyroid mass and no thyromegaly present.   Cardiovascular: Normal rate, regular rhythm, normal heart sounds and intact distal pulses.   Pulmonary/Chest: Effort normal and breath sounds normal.   Abdominal: Soft. Bowel sounds are normal. There is no tenderness.   Neurological: She is alert and oriented to person, place, and time.   Skin: Skin is warm and dry.   Psychiatric: She has a normal mood and affect. Her speech is normal and behavior is normal. She is attentive.   Nursing note and vitals reviewed.    Incision & Drainage  Date/Time: 7/11/2019 8:47 PM  Performed by: Jordan Calixto MD  Authorized by: Jordan Calixto MD   Type: abscess  Location: Lower eyelid.  Anesthesia: local infiltration    Anesthesia:  Local Anesthetic: lidocaine 2% without epinephrine  Anesthetic total (ml): 0.5 mL.    Sedation:  Patient sedated: no    Scalpel size: 11  Incision type: single straight  Complexity: simple  Drainage: purulent  Drainage amount: moderate           Statin Choice Calculator  Data Reviewed:               Lab Results   Component Value Date    BUN 43 (H) 06/14/2019    CREATININE 6.5 (H) 06/14/2019     06/14/2019    K 5.2 (H) 06/14/2019     (L) 06/14/2019    CALCIUM 9.2 06/14/2019    WBC 10.0 06/14/2019    RBC 3.89 (L) 06/14/2019    HCT 36.4  06/14/2019    MCV 93.7 06/14/2019    MCH 31.5 06/14/2019      Assessment/Plan   Order Review Tab  Health Maintenance Tab  Patient Plan/Order Tab  Diagnoses and all orders for this visit:    1. Pure hypercholesterolemia (Primary)  Assessment & Plan:  Lipid abnormalities are no change  Nutritional counseling was provided.  Lipids will be reassessed Next scheduled appointment.  She did not get her Zetia filled    Orders:  -     Lipid Panel; Future    2. Vitamin D deficiency  Assessment & Plan:  Improved    Orders:  -     Vitamin D 25 Hydroxy; Future    3. Type 2 diabetes mellitus without complication, without long-term current use of insulin (CMS/[facility])  Assessment & Plan:  Diabetes is improving with lifestyle modifications.   Continue current treatment regimen.  Reminded to bring in blood sugar diary at next visit.  Dietary recommendations for ADA diet.  Regular aerobic exercise.  Discussed ways to avoid symptomatic hypoglycemia.  Discussed foot care.  Diabetes will be reassessed Next scheduled.    Orders:  -     Hemoglobin A1c; Future    4. Hordeolum externum of right lower eyelid  Assessment & Plan:  Discussed with patient options on her stye.  Needs to be lanced and drained.  1/2 cc of 2% lidocaine, I&D under sterile procedure with significant amount of purulence.  Doxycycline 100 mg twice daily for 10 days  Saline drops washout blood purulence  Ophthalmologist if persist    Orders:  -     Incision & Drainage    5. Essential hypertension  Assessment & Plan:  Hypertension is improving with treatment.  Continue current treatment regimen.  Weight loss.  Regular aerobic exercise.  Continue current medications.  Blood pressure will be reassessed at the next regular appointment.      6. Screening for breast cancer  -     Mammo Screening Digital Tomosynthesis Bilateral With CAD; Future    Other orders  -     doxycycline (DORYX) 100 MG enteric coated tablet; Take 1 tablet by mouth 2 (Two) Times a Day.  Dispense: 20 tablet;  Refill: 0  Patient was given instructions and counseling regarding her condition or for health maintenance advice. Please see specific information pulled into the AVS by anjelica Garg Tab  Return in about 4 months (around 11/11/2019) for Annual physical.

## 2019-07-11 NOTE — PATIENT INSTRUCTIONS
Fall Prevention in the Home, Adult  Falls can cause injuries. They can happen to people of all ages. There are many things you can do to make your home safe and to help prevent falls. Ask for help when making these changes, if needed.  What actions can I take to prevent falls?  General Instructions  · Use good lighting in all rooms. Replace any light bulbs that burn out.  · Turn on the lights when you go into a dark area. Use night-lights.  · Keep items that you use often in easy-to-reach places. Lower the shelves around your home if necessary.  · Set up your furniture so you have a clear path. Avoid moving your furniture around.  · Do not have throw rugs and other things on the floor that can make you trip.  · Avoid walking on wet floors.  · If any of your floors are uneven, fix them.  · Add color or contrast paint or tape to clearly miles and help you see:  ? Any grab bars or handrails.  ? First and last steps of stairways.  ? Where the edge of each step is.  · If you use a stepladder:  ? Make sure that it is fully opened. Do not climb a closed stepladder.  ? Make sure that both sides of the stepladder are locked into place.  ? Ask someone to hold the stepladder for you while you use it.  · If there are any pets around you, be aware of where they are.  What can I do in the bathroom?  · Keep the floor dry. Clean up any water that spills onto the floor as soon as it happens.  · Remove soap buildup in the tub or shower regularly.  · Use non-skid mats or decals on the floor of the tub or shower.  · Attach bath mats securely with double-sided, non-slip rug tape.  · If you need to sit down in the shower, use a plastic, non-slip stool.  · Install grab bars by the toilet and in the tub and shower. Do not use towel bars as grab bars.  What can I do in the bedroom?  · Make sure that you have a light by your bed that is easy to reach.  · Do not use any sheets or blankets that are too big for your bed. They should not hang  down onto the floor.  · Have a firm chair that has side arms. You can use this for support while you get dressed.  What can I do in the kitchen?  · Clean up any spills right away.  · If you need to reach something above you, use a strong step stool that has a grab bar.  · Keep electrical cords out of the way.  · Do not use floor polish or wax that makes floors slippery. If you must use wax, use non-skid floor wax.  What can I do with my stairs?  · Do not leave any items on the stairs.  · Make sure that you have a light switch at the top of the stairs and the bottom of the stairs. If you do not have them, ask someone to add them for you.  · Make sure that there are handrails on both sides of the stairs, and use them. Fix handrails that are broken or loose. Make sure that handrails are as long as the stairways.  · Install non-slip stair treads on all stairs in your home.  · Avoid having throw rugs at the top or bottom of the stairs. If you do have throw rugs, attach them to the floor with carpet tape.  · Choose a carpet that does not hide the edge of the steps on the stairway.  · Check any carpeting to make sure that it is firmly attached to the stairs. Fix any carpet that is loose or worn.  What can I do on the outside of my home?  · Use bright outdoor lighting.  · Regularly fix the edges of walkways and driveways and fix any cracks.  · Remove anything that might make you trip as you walk through a door, such as a raised step or threshold.  · Trim any bushes or trees on the path to your home.  · Regularly check to see if handrails are loose or broken. Make sure that both sides of any steps have handrails.  · Install guardrails along the edges of any raised decks and porches.  · Clear walking paths of anything that might make someone trip, such as tools or rocks.  · Have any leaves, snow, or ice cleared regularly.  · Use sand or salt on walking paths during winter.  · Clean up any spills in your garage right away.  This includes grease or oil spills.  What other actions can I take?  · Wear shoes that:  ? Have a low heel. Do not wear high heels.  ? Have rubber bottoms.  ? Are comfortable and fit you well.  ? Are closed at the toe. Do not wear open-toe sandals.  · Use tools that help you move around (mobility aids) if they are needed. These include:  ? Canes.  ? Walkers.  ? Scooters.  ? Crutches.  · Review your medicines with your doctor. Some medicines can make you feel dizzy. This can increase your chance of falling.  Ask your doctor what other things you can do to help prevent falls.  Where to find more information  · Centers for Disease Control and Prevention, [place]: https://cdc.gov  · National [place] on Aging: https://hd7iqis.dick.nih.gov  Contact a doctor if:  · You are afraid of falling at home.  · You feel weak, drowsy, or dizzy at home.  · You fall at home.  Summary  · There are many simple things that you can do to make your home safe and to help prevent falls.  · Ways to make your home safe include removing tripping hazards and installing grab bars in the bathroom.  · Ask for help when making these changes in your home.  This information is not intended to replace advice given to you by your health care provider. Make sure you discuss any questions you have with your health care provider.  Document Released: 10/14/2010 Document Revised: 08/02/2018 Document Reviewed: 08/02/2018  [employer] Interactive Patient Education © 2019 [employer]    Exercising to Stay Healthy  Exercising regularly is important. It has many health benefits, such as:  · Improving your overall fitness, flexibility, and endurance.  · Increasing your bone density.  · Helping with weight control.  · Decreasing your body fat.  · Increasing your muscle strength.  · Reducing stress and tension.  · Improving your overall health.    In order to become healthy and stay healthy, it is recommended that you do moderate-intensity and vigorous-intensity  exercise. You can tell that you are exercising at a moderate intensity if you have a higher heart rate and faster breathing, but you are still able to hold a conversation. You can tell that you are exercising at a vigorous intensity if you are breathing much harder and faster and cannot hold a conversation while exercising.  How often should I exercise?  Choose an activity that you enjoy and set realistic goals. Your health care provider can help you to make an activity plan that works for you. Exercise regularly as directed by your health care provider. This may include:  · Doing resistance training twice each week, such as:  ? Push-ups.  ? Sit-ups.  ? Lifting weights.  ? Using resistance bands.  · Doing a given intensity of exercise for a given amount of time. Choose from these options:  ? 150 minutes of moderate-intensity exercise every week.  ? 75 minutes of vigorous-intensity exercise every week.  ? A mix of moderate-intensity and vigorous-intensity exercise every week.    Children, pregnant women, people who are out of shape, people who are overweight, and older adults may need to consult a health care provider for individual recommendations. If you have any sort of medical condition, be sure to consult your health care provider before starting a new exercise program.  What are some exercise ideas?  Some moderate-intensity exercise ideas include:  · Walking at a rate of 1 mile in 15 minutes.  · Biking.  · Hiking.  · Golfing.  · Dancing.    Some vigorous-intensity exercise ideas include:  · Walking at a rate of at least 4.5 miles per hour.  · Jogging or running at a rate of 5 miles per hour.  · Biking at a rate of at least 10 miles per hour.  · Lap swimming.  · Roller-skating or in-line skating.  · Cross-country skiing.  · Vigorous competitive sports, such as football, basketball, and soccer.  · Jumping rope.  · Aerobic dancing.    What are some everyday activities that can help me to get exercise?  · Yard  work, such as:  ? Pushing a .  ? Raking and bagging leaves.  · Washing and waxing your car.  · Pushing a stroller.  · Shoveling snow.  · Gardening.  · Washing windows or floors.  How can I be more active in my day-to-day activities?  · Use the stairs instead of the elevator.  · Take a walk during your lunch break.  · If you drive, park your car farther away from work or school.  · If you take public transportation, get off one stop early and walk the rest of the way.  · Make all of your phone calls while standing up and walking around.  · Get up, stretch, and walk around every 30 minutes throughout the day.  What guidelines should I follow while exercising?  · Do not exercise so much that you hurt yourself, feel dizzy, or get very short of breath.  · Consult your health care provider before starting a new exercise program.  · Wear comfortable clothes and shoes with good support.  · Drink plenty of water while you exercise to prevent dehydration or heat stroke. Body water is lost during exercise and must be replaced.  · Work out until you breathe faster and your heart beats faster.  This information is not intended to replace advice given to you by your health care provider. Make sure you discuss any questions you have with your health care provider.  Document Released: 01/20/2012 Document Revised: 05/25/2017 Document Reviewed: 05/21/2015  [employer] Interactive Patient Education © 2018 [employer]      Diabetes Mellitus and Nutrition, Adult  When you have diabetes (diabetes mellitus), it is very important to have healthy eating habits because your blood sugar (glucose) levels are greatly affected by what you eat and drink. Eating healthy foods in the appropriate amounts, at about the same times every day, can help you:  · Control your blood glucose.  · Lower your risk of heart disease.  · Improve your blood pressure.  · Reach or maintain a healthy weight.    Every person with diabetes is different, and  each person has different needs for a meal plan. Your health care provider may recommend that you work with a diet and nutrition specialist (dietitian) to make a meal plan that is best for you. Your meal plan may vary depending on factors such as:  · The calories you need.  · The medicines you take.  · Your weight.  · Your blood glucose, blood pressure, and cholesterol levels.  · Your activity level.  · Other health conditions you have, such as heart or kidney disease.    How do carbohydrates affect me?  Carbohydrates, also called carbs, affect your blood glucose level more than any other type of food. Eating carbs naturally raises the amount of glucose in your blood. Carb counting is a method for keeping track of how many carbs you eat. Counting carbs is important to keep your blood glucose at a healthy level, especially if you use insulin or take certain oral diabetes medicines.  It is important to know how many carbs you can safely have in each meal. This is different for every person. Your dietitian can help you calculate how many carbs you should have at each meal and for each snack.  Foods that contain carbs include:  · Bread, cereal, rice, pasta, and crackers.  · Potatoes and corn.  · Peas, beans, and lentils.  · Milk and yogurt.  · Fruit and juice.  · Desserts, such as cakes, cookies, ice cream, and candy.    How does alcohol affect me?  Alcohol can cause a sudden decrease in blood glucose (hypoglycemia), especially if you use insulin or take certain oral diabetes medicines. Hypoglycemia can be a life-threatening condition. Symptoms of hypoglycemia (sleepiness, dizziness, and confusion) are similar to symptoms of having too much alcohol.  If your health care provider says that alcohol is safe for you, follow these guidelines:  · Limit alcohol intake to no more than 1 drink per day for nonpregnant women and 2 drinks per day for men. One drink equals 12 oz of beer, 5 oz of wine, or 1½ oz of hard liquor.  · Do  "not drink on an empty stomach.  · Keep yourself hydrated with water, diet soda, or unsweetened iced tea.  · Keep in mind that regular soda, juice, and other mixers may contain a lot of sugar and must be counted as carbs.    What are tips for following this plan?  Reading food labels  · Start by checking the serving size on the \"Nutrition Facts\" label of packaged foods and drinks. The amount of calories, carbs, fats, and other nutrients listed on the label is based on one serving of the item. Many items contain more than one serving per package.  · Check the total grams (g) of carbs in one serving. You can calculate the number of servings of carbs in one serving by dividing the total carbs by 15. For example, if a food has 30 g of total carbs, it would be equal to 2 servings of carbs.  · Check the number of grams (g) of saturated and trans fats in one serving. Choose foods that have low or no amount of these fats.  · Check the number of milligrams (mg) of salt (sodium) in one serving. Most people should limit total sodium intake to less than 2,300 mg per day.  · Always check the nutrition information of foods labeled as \"low-fat\" or \"nonfat\". These foods may be higher in added sugar or refined carbs and should be avoided.  · Talk to your dietitian to identify your daily goals for nutrients listed on the label.  Shopping    · Avoid buying canned, premade, or processed foods. These foods tend to be high in fat, sodium, and added sugar.  · Shop around the outside edge of the grocery store. This includes fresh fruits and vegetables, bulk grains, fresh meats, and fresh dairy.  Cooking  · Use low-heat cooking methods, such as baking, instead of high-heat cooking methods like deep frying.  · Cook using healthy oils, such as olive, canola, or sunflower oil.  · Avoid cooking with butter, cream, or high-fat meats.  Meal planning  · Eat meals and snacks regularly, preferably at the same times every day. Avoid going long periods " of time without eating.  · Eat foods high in fiber, such as fresh fruits, vegetables, beans, and whole grains. Talk to your dietitian about how many servings of carbs you can eat at each meal.  · Eat 4-6 ounces (oz) of lean protein each day, such as lean meat, chicken, fish, eggs, or tofu. One oz of lean protein is equal to:  ? 1 oz of meat, chicken, or fish.  ? 1 egg.  ? ¼ cup of tofu.  · Eat some foods each day that contain healthy fats, such as avocado, nuts, seeds, and fish.  Lifestyle  · Check your blood glucose regularly.  · Exercise regularly as told by your health care provider. This may include:  ? 150 minutes of moderate-intensity or vigorous-intensity exercise each week. This could be brisk walking, biking, or water aerobics.  ? Stretching and doing strength exercises, such as yoga or weightlifting, at least 2 times a week.  · Take medicines as told by your health care provider.  · Do not use any products that contain nicotine or tobacco, such as cigarettes and e-cigarettes. If you need help quitting, ask your health care provider.  · Work with a counselor or diabetes educator to identify strategies to manage stress and any emotional and social challenges.  Questions to ask a health care provider  · Do I need to meet with a diabetes educator?  · Do I need to meet with a dietitian?  · What number can I call if I have questions?  · When are the best times to check my blood glucose?  Where to find more information:  · American Diabetes Association: diabetes.org  · Academy of Nutrition and Dietetics: www.eatright.org  · National [place] of Diabetes and Digestive and Kidney Diseases (NIH): www.niddk.nih.gov  Summary  · A healthy meal plan will help you control your blood glucose and maintain a healthy lifestyle.  · Working with a diet and nutrition specialist (dietitian) can help you make a meal plan that is best for you.  · Keep in mind that carbohydrates (carbs) and alcohol have immediate effects on your  blood glucose levels. It is important to count carbs and to use alcohol carefully.  This information is not intended to replace advice given to you by your health care provider. Make sure you discuss any questions you have with your health care provider.  Document Released: 09/14/2006 Document Revised: 07/18/2018 Document Reviewed: 01/22/2018  [employer] Interactive Patient Education © 2019 [employer]

## 2019-07-12 NOTE — ASSESSMENT & PLAN NOTE
Lipid abnormalities are no change  Nutritional counseling was provided.  Lipids will be reassessed Next scheduled appointment.  She did not get her Zetia filled

## 2019-07-12 NOTE — ASSESSMENT & PLAN NOTE
Discussed with patient options on her stye.  Needs to be lanced and drained.  1/2 cc of 2% lidocaine, I&D under sterile procedure with significant amount of purulence.  Doxycycline 100 mg twice daily for 10 days  Saline drops washout blood purulence  Ophthalmologist if persist

## 2019-07-12 NOTE — ASSESSMENT & PLAN NOTE
Diabetes is improving with lifestyle modifications.   Continue current treatment regimen.  Reminded to bring in blood sugar diary at next visit.  Dietary recommendations for ADA diet.  Regular aerobic exercise.  Discussed ways to avoid symptomatic hypoglycemia.  Discussed foot care.  Diabetes will be reassessed Next scheduled.

## 2019-07-16 ENCOUNTER — LAB (OUTPATIENT)
Dept: LAB | Facility: HOSPITAL | Age: 69
End: 2019-07-16

## 2019-07-16 DIAGNOSIS — E78.00 PURE HYPERCHOLESTEROLEMIA: ICD-10-CM

## 2019-07-16 DIAGNOSIS — E55.9 VITAMIN D DEFICIENCY: ICD-10-CM

## 2019-07-16 DIAGNOSIS — E11.9 TYPE 2 DIABETES MELLITUS WITHOUT COMPLICATION, WITHOUT LONG-TERM CURRENT USE OF INSULIN (HCC): ICD-10-CM

## 2019-07-16 LAB
25(OH)D3 SERPL-MCNC: 29.8 NG/ML (ref 30–100)
ARTICHOKE IGE QN: 102 MG/DL (ref 0–100)
CHOLEST SERPL-MCNC: 212 MG/DL
HBA1C MFR BLD: 7.1 % (ref 3.5–5.6)
HDLC SERPL QL: 4.33
HDLC SERPL-MCNC: 49 MG/DL
LDLC/HDLC SERPL: 2.16 {RATIO}
TRIGL SERPL-MCNC: 287 MG/DL
VLDLC SERPL-MCNC: 57.4 MG/DL

## 2019-07-16 PROCEDURE — 36415 COLL VENOUS BLD VENIPUNCTURE: CPT

## 2019-07-16 PROCEDURE — 82306 VITAMIN D 25 HYDROXY: CPT

## 2019-07-16 PROCEDURE — 80061 LIPID PANEL: CPT

## 2019-07-16 PROCEDURE — 83036 HEMOGLOBIN GLYCOSYLATED A1C: CPT

## 2019-07-18 ENCOUNTER — HOSPITAL ENCOUNTER (OUTPATIENT)
Dept: MAMMOGRAPHY | Facility: HOSPITAL | Age: 69
Discharge: HOME | End: 2019-07-18
Admitting: FAMILY MEDICINE

## 2019-07-18 DIAGNOSIS — Z12.39 SCREENING FOR BREAST CANCER: ICD-10-CM

## 2019-07-18 PROCEDURE — 77067 SCR MAMMO BI INCL CAD: CPT

## 2019-07-18 PROCEDURE — 77063 BREAST TOMOSYNTHESIS BI: CPT

## 2019-07-19 ENCOUNTER — APPOINTMENT (OUTPATIENT)
Dept: MAMMOGRAPHY | Facility: HOSPITAL | Age: 69
End: 2019-07-19

## 2019-07-22 RX ORDER — NEPHROCAP 1 MG
CAP ORAL
Qty: 100 CAPSULE | Refills: 0 | Status: SHIPPED | OUTPATIENT
Start: 2019-07-22 | End: 2019-07-27

## 2019-07-27 ENCOUNTER — APPOINTMENT (OUTPATIENT)
Dept: CT IMAGING | Facility: HOSPITAL | Age: 69
End: 2019-07-27

## 2019-07-27 ENCOUNTER — HOSPITAL ENCOUNTER (INPATIENT)
Facility: HOSPITAL | Age: 69
LOS: 1 days | Discharge: HOME | End: 2019-07-28
Attending: EMERGENCY MEDICINE | Admitting: INTERNAL MEDICINE

## 2019-07-27 DIAGNOSIS — N18.9 CHRONIC RENAL FAILURE, UNSPECIFIED CKD STAGE: ICD-10-CM

## 2019-07-27 DIAGNOSIS — N30.00 ACUTE CYSTITIS WITHOUT HEMATURIA: ICD-10-CM

## 2019-07-27 DIAGNOSIS — R10.84 GENERALIZED ABDOMINAL PAIN: Primary | ICD-10-CM

## 2019-07-27 LAB
ANION GAP SERPL CALCULATED.3IONS-SCNC: 20.4 MMOL/L (ref 5–15)
BACTERIA UR QL AUTO: ABNORMAL /HPF
BASOPHILS # BLD AUTO: 0.1 10*3/MM3 (ref 0–0.2)
BASOPHILS NFR BLD AUTO: 1.2 % (ref 0–1.5)
BILIRUB UR QL STRIP: NEGATIVE
BUN BLD-MCNC: 25 MG/DL (ref 8–20)
BUN/CREAT SERPL: 4.8 (ref 5.4–26.2)
CALCIUM SPEC-SCNC: 9 MG/DL (ref 8.9–10.3)
CHLORIDE SERPL-SCNC: 103 MMOL/L (ref 101–111)
CLARITY UR: ABNORMAL
CO2 SERPL-SCNC: 21 MMOL/L (ref 22–32)
COLOR UR: YELLOW
CREAT BLD-MCNC: 5.2 MG/DL (ref 0.4–1)
DEPRECATED RDW RBC AUTO: 58.2 FL (ref 37–54)
EOSINOPHIL # BLD AUTO: 0.3 10*3/MM3 (ref 0–0.4)
EOSINOPHIL NFR BLD AUTO: 2.2 % (ref 0.3–6.2)
ERYTHROCYTE [DISTWIDTH] IN BLOOD BY AUTOMATED COUNT: 17.1 % (ref 12.3–15.4)
GFR SERPL CREATININE-BSD FRML MDRD: 8 ML/MIN/1.73
GFR SERPL CREATININE-BSD FRML MDRD: ABNORMAL ML/MIN/1.73
GLUCOSE BLD-MCNC: 116 MG/DL (ref 65–99)
GLUCOSE BLDC GLUCOMTR-MCNC: 98 MG/DL (ref 70–105)
GLUCOSE UR STRIP-MCNC: NEGATIVE MG/DL
HCT VFR BLD AUTO: 29.9 % (ref 34–46.6)
HGB BLD-MCNC: 10 G/DL (ref 12–15.9)
HGB UR QL STRIP.AUTO: ABNORMAL
HYALINE CASTS UR QL AUTO: ABNORMAL /LPF
KETONES UR QL STRIP: ABNORMAL
LEUKOCYTE ESTERASE UR QL STRIP.AUTO: ABNORMAL
LIPASE SERPL-CCNC: 38 U/L (ref 22–51)
LYMPHOCYTES # BLD AUTO: 2.2 10*3/MM3 (ref 0.7–3.1)
LYMPHOCYTES NFR BLD AUTO: 18.6 % (ref 19.6–45.3)
MCH RBC QN AUTO: 32.1 PG (ref 26.6–33)
MCHC RBC AUTO-ENTMCNC: 33.3 G/DL (ref 31.5–35.7)
MCV RBC AUTO: 96.5 FL (ref 79–97)
MONOCYTES # BLD AUTO: 0.8 10*3/MM3 (ref 0.1–0.9)
MONOCYTES NFR BLD AUTO: 7 % (ref 5–12)
NEUTROPHILS # BLD AUTO: 8.2 10*3/MM3 (ref 1.7–7)
NEUTROPHILS NFR BLD AUTO: 71 % (ref 42.7–76)
NITRITE UR QL STRIP: NEGATIVE
NRBC BLD AUTO-RTO: 0.1 /100 WBC (ref 0–0.2)
PH UR STRIP.AUTO: 6.5 [PH] (ref 5–8)
PLATELET # BLD AUTO: 347 10*3/MM3 (ref 140–450)
PMV BLD AUTO: 7.4 FL (ref 6–12)
POTASSIUM BLD-SCNC: 4.4 MMOL/L (ref 3.6–5.1)
PROT UR QL STRIP: ABNORMAL
RBC # BLD AUTO: 3.1 10*6/MM3 (ref 3.77–5.28)
RBC # UR: ABNORMAL /HPF
REF LAB TEST METHOD: ABNORMAL
SODIUM BLD-SCNC: 140 MMOL/L (ref 136–144)
SP GR UR STRIP: 1.01 (ref 1–1.03)
SQUAMOUS #/AREA URNS HPF: ABNORMAL /HPF
UROBILINOGEN UR QL STRIP: ABNORMAL
WBC NRBC COR # BLD: 11.6 10*3/MM3 (ref 3.4–10.8)
WBC UR QL AUTO: ABNORMAL /HPF

## 2019-07-27 PROCEDURE — 87077 CULTURE AEROBIC IDENTIFY: CPT | Performed by: PHYSICIAN ASSISTANT

## 2019-07-27 PROCEDURE — 87086 URINE CULTURE/COLONY COUNT: CPT | Performed by: PHYSICIAN ASSISTANT

## 2019-07-27 PROCEDURE — 83690 ASSAY OF LIPASE: CPT | Performed by: PHYSICIAN ASSISTANT

## 2019-07-27 PROCEDURE — 99222 1ST HOSP IP/OBS MODERATE 55: CPT | Performed by: PHYSICIAN ASSISTANT

## 2019-07-27 PROCEDURE — 81001 URINALYSIS AUTO W/SCOPE: CPT | Performed by: PHYSICIAN ASSISTANT

## 2019-07-27 PROCEDURE — 25010000002 PROMETHAZINE PER 50 MG: Performed by: PHYSICIAN ASSISTANT

## 2019-07-27 PROCEDURE — 80048 BASIC METABOLIC PNL TOTAL CA: CPT | Performed by: PHYSICIAN ASSISTANT

## 2019-07-27 PROCEDURE — 25010000002 CEFTRIAXONE PER 250 MG: Performed by: EMERGENCY MEDICINE

## 2019-07-27 PROCEDURE — 74176 CT ABD & PELVIS W/O CONTRAST: CPT

## 2019-07-27 PROCEDURE — 82962 GLUCOSE BLOOD TEST: CPT

## 2019-07-27 PROCEDURE — 25010000002 MORPHINE PER 10 MG: Performed by: EMERGENCY MEDICINE

## 2019-07-27 PROCEDURE — 99284 EMERGENCY DEPT VISIT MOD MDM: CPT

## 2019-07-27 PROCEDURE — 87186 SC STD MICRODIL/AGAR DIL: CPT | Performed by: PHYSICIAN ASSISTANT

## 2019-07-27 PROCEDURE — 85025 COMPLETE CBC W/AUTO DIFF WBC: CPT | Performed by: PHYSICIAN ASSISTANT

## 2019-07-27 RX ORDER — ACETAMINOPHEN 650 MG/1
650 SUPPOSITORY RECTAL EVERY 4 HOURS PRN
Status: DISCONTINUED | OUTPATIENT
Start: 2019-07-27 | End: 2019-07-28 | Stop reason: HOSPADM

## 2019-07-27 RX ORDER — SODIUM CHLORIDE 0.9 % (FLUSH) 0.9 %
3-10 SYRINGE (ML) INJECTION AS NEEDED
Status: DISCONTINUED | OUTPATIENT
Start: 2019-07-27 | End: 2019-07-28 | Stop reason: HOSPADM

## 2019-07-27 RX ORDER — CALCIUM ACETATE 667 MG/1
2001 CAPSULE ORAL 3 TIMES DAILY
Status: DISCONTINUED | OUTPATIENT
Start: 2019-07-27 | End: 2019-07-28 | Stop reason: HOSPADM

## 2019-07-27 RX ORDER — MULTIPLE VITAMINS W/ MINERALS TAB 9MG-400MCG
1 TAB ORAL DAILY
COMMUNITY
End: 2019-09-30

## 2019-07-27 RX ORDER — ONDANSETRON 4 MG/1
4 TABLET, FILM COATED ORAL EVERY 6 HOURS PRN
Status: DISCONTINUED | OUTPATIENT
Start: 2019-07-27 | End: 2019-07-28 | Stop reason: HOSPADM

## 2019-07-27 RX ORDER — SODIUM CHLORIDE 9 MG/ML
100 INJECTION, SOLUTION INTRAVENOUS CONTINUOUS
Status: DISCONTINUED | OUTPATIENT
Start: 2019-07-27 | End: 2019-07-28 | Stop reason: HOSPADM

## 2019-07-27 RX ORDER — ONDANSETRON 2 MG/ML
4 INJECTION INTRAMUSCULAR; INTRAVENOUS EVERY 6 HOURS PRN
Status: DISCONTINUED | OUTPATIENT
Start: 2019-07-27 | End: 2019-07-28 | Stop reason: HOSPADM

## 2019-07-27 RX ORDER — MORPHINE SULFATE 4 MG/ML
4 INJECTION, SOLUTION INTRAMUSCULAR; INTRAVENOUS ONCE
Status: COMPLETED | OUTPATIENT
Start: 2019-07-27 | End: 2019-07-27

## 2019-07-27 RX ORDER — CHOLECALCIFEROL (VITAMIN D3) 125 MCG
5 CAPSULE ORAL NIGHTLY PRN
Status: DISCONTINUED | OUTPATIENT
Start: 2019-07-27 | End: 2019-07-28 | Stop reason: HOSPADM

## 2019-07-27 RX ORDER — ATORVASTATIN CALCIUM 10 MG/1
10 TABLET, FILM COATED ORAL DAILY
Status: DISCONTINUED | OUTPATIENT
Start: 2019-07-28 | End: 2019-07-28 | Stop reason: HOSPADM

## 2019-07-27 RX ORDER — SODIUM BICARBONATE 650 MG/1
1300 TABLET ORAL 3 TIMES DAILY
Status: DISCONTINUED | OUTPATIENT
Start: 2019-07-27 | End: 2019-07-28 | Stop reason: HOSPADM

## 2019-07-27 RX ORDER — SODIUM CHLORIDE 0.9 % (FLUSH) 0.9 %
3 SYRINGE (ML) INJECTION EVERY 12 HOURS SCHEDULED
Status: DISCONTINUED | OUTPATIENT
Start: 2019-07-27 | End: 2019-07-28 | Stop reason: HOSPADM

## 2019-07-27 RX ORDER — BISACODYL 10 MG
10 SUPPOSITORY, RECTAL RECTAL DAILY PRN
Status: DISCONTINUED | OUTPATIENT
Start: 2019-07-27 | End: 2019-07-28 | Stop reason: HOSPADM

## 2019-07-27 RX ORDER — SODIUM CHLORIDE 0.9 % (FLUSH) 0.9 %
10 SYRINGE (ML) INJECTION AS NEEDED
Status: DISCONTINUED | OUTPATIENT
Start: 2019-07-27 | End: 2019-07-28 | Stop reason: HOSPADM

## 2019-07-27 RX ORDER — ALUMINA, MAGNESIA, AND SIMETHICONE 2400; 2400; 240 MG/30ML; MG/30ML; MG/30ML
15 SUSPENSION ORAL EVERY 6 HOURS PRN
Status: DISCONTINUED | OUTPATIENT
Start: 2019-07-27 | End: 2019-07-28 | Stop reason: HOSPADM

## 2019-07-27 RX ORDER — ACETAMINOPHEN 325 MG/1
650 TABLET ORAL EVERY 4 HOURS PRN
Status: DISCONTINUED | OUTPATIENT
Start: 2019-07-27 | End: 2019-07-28 | Stop reason: HOSPADM

## 2019-07-27 RX ORDER — MULTIPLE VITAMINS W/ MINERALS TAB 2148-113
1 TAB ORAL DAILY
Status: DISCONTINUED | OUTPATIENT
Start: 2019-07-28 | End: 2019-07-28 | Stop reason: HOSPADM

## 2019-07-27 RX ORDER — DOCUSATE SODIUM 100 MG/1
100 CAPSULE, LIQUID FILLED ORAL 2 TIMES DAILY PRN
Status: DISCONTINUED | OUTPATIENT
Start: 2019-07-27 | End: 2019-07-28 | Stop reason: HOSPADM

## 2019-07-27 RX ORDER — PANTOPRAZOLE SODIUM 40 MG/1
40 TABLET, DELAYED RELEASE ORAL
Status: DISCONTINUED | OUTPATIENT
Start: 2019-07-28 | End: 2019-07-28 | Stop reason: HOSPADM

## 2019-07-27 RX ORDER — CALCIUM CARBONATE 200(500)MG
2 TABLET,CHEWABLE ORAL 2 TIMES DAILY PRN
Status: DISCONTINUED | OUTPATIENT
Start: 2019-07-27 | End: 2019-07-28 | Stop reason: HOSPADM

## 2019-07-27 RX ADMIN — MORPHINE SULFATE 4 MG: 4 INJECTION INTRAVENOUS at 16:06

## 2019-07-27 RX ADMIN — SODIUM CHLORIDE 1000 ML: 900 INJECTION, SOLUTION INTRAVENOUS at 16:08

## 2019-07-27 RX ADMIN — SODIUM CHLORIDE 100 ML/HR: 900 INJECTION, SOLUTION INTRAVENOUS at 22:16

## 2019-07-27 RX ADMIN — CEFTRIAXONE SODIUM 2 G: 10 INJECTION, POWDER, FOR SOLUTION INTRAVENOUS at 19:04

## 2019-07-27 RX ADMIN — PROMETHAZINE HYDROCHLORIDE 12.5 MG: 25 INJECTION INTRAMUSCULAR; INTRAVENOUS at 16:06

## 2019-07-27 RX ADMIN — CALCIUM ACETATE 2001 MG: 667 CAPSULE ORAL at 21:47

## 2019-07-27 RX ADMIN — Medication 3 ML: at 21:48

## 2019-07-27 RX ADMIN — SODIUM BICARBONATE 1300 MG: 650 TABLET ORAL at 21:47

## 2019-07-28 VITALS
OXYGEN SATURATION: 100 % | HEIGHT: 60 IN | RESPIRATION RATE: 16 BRPM | HEART RATE: 83 BPM | SYSTOLIC BLOOD PRESSURE: 153 MMHG | BODY MASS INDEX: 33.33 KG/M2 | TEMPERATURE: 97.2 F | WEIGHT: 169.75 LBS | DIASTOLIC BLOOD PRESSURE: 67 MMHG

## 2019-07-28 LAB
ANION GAP SERPL CALCULATED.3IONS-SCNC: 15.1 MMOL/L (ref 5–15)
BASOPHILS # BLD AUTO: 0.1 10*3/MM3 (ref 0–0.2)
BASOPHILS NFR BLD AUTO: 0.9 % (ref 0–1.5)
BUN BLD-MCNC: 24 MG/DL (ref 8–20)
BUN/CREAT SERPL: 5 (ref 5.4–26.2)
CALCIUM SPEC-SCNC: 7.7 MG/DL (ref 8.9–10.3)
CHLORIDE SERPL-SCNC: 108 MMOL/L (ref 101–111)
CO2 SERPL-SCNC: 22 MMOL/L (ref 22–32)
CREAT BLD-MCNC: 4.8 MG/DL (ref 0.4–1)
DEPRECATED RDW RBC AUTO: 60.8 FL (ref 37–54)
EOSINOPHIL # BLD AUTO: 0.2 10*3/MM3 (ref 0–0.4)
EOSINOPHIL NFR BLD AUTO: 3.2 % (ref 0.3–6.2)
ERYTHROCYTE [DISTWIDTH] IN BLOOD BY AUTOMATED COUNT: 17.4 % (ref 12.3–15.4)
GFR SERPL CREATININE-BSD FRML MDRD: 9 ML/MIN/1.73
GFR SERPL CREATININE-BSD FRML MDRD: ABNORMAL ML/MIN/1.73
GLUCOSE BLD-MCNC: 92 MG/DL (ref 65–99)
GLUCOSE BLDC GLUCOMTR-MCNC: 101 MG/DL (ref 70–105)
GLUCOSE BLDC GLUCOMTR-MCNC: 178 MG/DL (ref 70–105)
HCT VFR BLD AUTO: 26.6 % (ref 34–46.6)
HGB BLD-MCNC: 8.9 G/DL (ref 12–15.9)
LYMPHOCYTES # BLD AUTO: 1.6 10*3/MM3 (ref 0.7–3.1)
LYMPHOCYTES NFR BLD AUTO: 21.3 % (ref 19.6–45.3)
MCH RBC QN AUTO: 32.9 PG (ref 26.6–33)
MCHC RBC AUTO-ENTMCNC: 33.5 G/DL (ref 31.5–35.7)
MCV RBC AUTO: 98.3 FL (ref 79–97)
MONOCYTES # BLD AUTO: 0.5 10*3/MM3 (ref 0.1–0.9)
MONOCYTES NFR BLD AUTO: 7.3 % (ref 5–12)
NEUTROPHILS # BLD AUTO: 5.1 10*3/MM3 (ref 1.7–7)
NEUTROPHILS NFR BLD AUTO: 67.3 % (ref 42.7–76)
NRBC BLD AUTO-RTO: 0.1 /100 WBC (ref 0–0.2)
PLATELET # BLD AUTO: 293 10*3/MM3 (ref 140–450)
PMV BLD AUTO: 7.1 FL (ref 6–12)
POTASSIUM BLD-SCNC: 4.1 MMOL/L (ref 3.6–5.1)
RBC # BLD AUTO: 2.71 10*6/MM3 (ref 3.77–5.28)
SODIUM BLD-SCNC: 141 MMOL/L (ref 136–144)
WBC NRBC COR # BLD: 7.5 10*3/MM3 (ref 3.4–10.8)

## 2019-07-28 PROCEDURE — 82962 GLUCOSE BLOOD TEST: CPT

## 2019-07-28 PROCEDURE — 85025 COMPLETE CBC W/AUTO DIFF WBC: CPT | Performed by: PHYSICIAN ASSISTANT

## 2019-07-28 PROCEDURE — 80048 BASIC METABOLIC PNL TOTAL CA: CPT | Performed by: PHYSICIAN ASSISTANT

## 2019-07-28 PROCEDURE — 99239 HOSP IP/OBS DSCHRG MGMT >30: CPT | Performed by: INTERNAL MEDICINE

## 2019-07-28 RX ORDER — NICOTINE POLACRILEX 4 MG
15 LOZENGE BUCCAL
Status: DISCONTINUED | OUTPATIENT
Start: 2019-07-28 | End: 2019-07-28 | Stop reason: HOSPADM

## 2019-07-28 RX ORDER — HYDRALAZINE HYDROCHLORIDE 20 MG/ML
10 INJECTION INTRAMUSCULAR; INTRAVENOUS EVERY 6 HOURS PRN
Status: DISCONTINUED | OUTPATIENT
Start: 2019-07-28 | End: 2019-07-28 | Stop reason: HOSPADM

## 2019-07-28 RX ORDER — DEXTROSE MONOHYDRATE 25 G/50ML
25 INJECTION, SOLUTION INTRAVENOUS
Status: DISCONTINUED | OUTPATIENT
Start: 2019-07-28 | End: 2019-07-28 | Stop reason: HOSPADM

## 2019-07-28 RX ORDER — CEPHALEXIN 250 MG/1
250 CAPSULE ORAL 2 TIMES DAILY
Qty: 14 CAPSULE | Refills: 0 | Status: SHIPPED | OUTPATIENT
Start: 2019-07-28 | End: 2019-09-30

## 2019-07-28 RX ADMIN — Medication 10 ML: at 08:17

## 2019-07-28 RX ADMIN — MULTIPLE VITAMINS W/ MINERALS TAB 1 TABLET: TAB at 08:17

## 2019-07-28 RX ADMIN — CALCIUM ACETATE 2001 MG: 667 CAPSULE ORAL at 08:16

## 2019-07-28 RX ADMIN — SODIUM BICARBONATE 1300 MG: 650 TABLET ORAL at 08:17

## 2019-07-28 RX ADMIN — SODIUM CHLORIDE 100 ML/HR: 900 INJECTION, SOLUTION INTRAVENOUS at 08:28

## 2019-07-28 RX ADMIN — PANTOPRAZOLE SODIUM 40 MG: 40 TABLET, DELAYED RELEASE ORAL at 08:16

## 2019-07-29 ENCOUNTER — READMISSION MANAGEMENT (OUTPATIENT)
Dept: CALL CENTER | Facility: HOSPITAL | Age: 69
End: 2019-07-29

## 2019-07-29 LAB — BACTERIA SPEC AEROBE CULT: ABNORMAL

## 2019-07-30 NOTE — OUTREACH NOTE
Prep Survey      Responses   Facility patient discharged from?  [facility]   Is patient eligible?  Yes   Discharge diagnosis  Generalized abd pain   Does the patient have one of the following disease processes/diagnoses(primary or secondary)?  Other   Does the patient have Home health ordered?  No   Is there a DME ordered?  No   Comments regarding appointments  Dialysis patient   Prep survey completed?  Yes          Lubna Ballesteros RN

## 2019-07-31 ENCOUNTER — READMISSION MANAGEMENT (OUTPATIENT)
Dept: CALL CENTER | Facility: HOSPITAL | Age: 69
End: 2019-07-31

## 2019-07-31 NOTE — OUTREACH NOTE
Medical Week 1 Survey      Responses   Facility patient discharged from?  [facility]   Does the patient have one of the following disease processes/diagnoses(primary or secondary)?  Other   Is there a successful TCM telephone encounter documented?  No   Week 1 attempt successful?  Yes   Call start time  1523   Call end time  1525   Discharge diagnosis  Generalized abd pain   Meds reviewed with patient/caregiver?  Yes   Is the patient having any side effects they believe may be caused by any medication additions or changes?  No   Does the patient have all medications ordered at discharge?  Yes   Is the patient taking all medications as directed (includes completed medication regime)?  Yes   Comments regarding appointments  Dialysis patient   Does the patient have a primary care provider?   Yes   Does the patient have an appointment with their PCP within 7 days of discharge?  Greater than 7 days   What is preventing the patient from scheduling follow up appointments within 7 days of discharge?  Earlier appointment not available   Has home health visited the patient within 72 hours of discharge?  N/A   Did the patient receive a copy of their discharge instructions?  Yes   What is the patient's perception of their health status since discharge?  Improving   Is the patient/caregiver able to teach back signs and symptoms related to disease process for when to call PCP?  Yes   Is the patient/caregiver able to teach back signs and symptoms related to disease process for when to call 911?  Yes   Is the patient/caregiver able to teach back the hierarchy of who to call/visit for symptoms/problems? PCP, Specialist, Home health nurse, Urgent Care, ED, 911  Yes   Week 1 call completed?  Yes   Wrap up additional comments  No questions or needs at this time.  Patient states she is doing well          Tawanna Lara LPN

## 2019-08-05 RX ORDER — SODIUM BICARBONATE 650 MG/1
TABLET ORAL
Qty: 180 TABLET | Refills: 0 | Status: SHIPPED | OUTPATIENT
Start: 2019-08-05 | End: 2019-09-30

## 2019-08-07 ENCOUNTER — READMISSION MANAGEMENT (OUTPATIENT)
Dept: CALL CENTER | Facility: HOSPITAL | Age: 69
End: 2019-08-07

## 2019-08-07 NOTE — OUTREACH NOTE
Medical Week 2 Survey      Responses   Facility patient discharged from?  [facility]   Does the patient have one of the following disease processes/diagnoses(primary or secondary)?  Other   Week 2 attempt successful?  No   Rescheduled  Revoked   Revoke  Decline to participate [No answer/no voicemail]          Tawanna Lara LPN

## 2019-08-15 ENCOUNTER — OFFICE VISIT (OUTPATIENT)
Dept: SURGERY | Facility: CLINIC | Age: 69
End: 2019-08-15

## 2019-08-15 VITALS
OXYGEN SATURATION: 97 % | BODY MASS INDEX: 31.62 KG/M2 | DIASTOLIC BLOOD PRESSURE: 78 MMHG | TEMPERATURE: 97.7 F | HEART RATE: 101 BPM | SYSTOLIC BLOOD PRESSURE: 151 MMHG | HEIGHT: 62 IN | WEIGHT: 171.8 LBS

## 2019-08-15 DIAGNOSIS — K43.9 VENTRAL HERNIA WITHOUT OBSTRUCTION OR GANGRENE: Primary | ICD-10-CM

## 2019-08-15 PROCEDURE — 99214 OFFICE O/P EST MOD 30 MIN: CPT | Performed by: SURGERY

## 2019-08-15 RX ORDER — SODIUM CHLORIDE 9 MG/ML
50 INJECTION, SOLUTION INTRAVENOUS CONTINUOUS
Status: CANCELLED | OUTPATIENT
Start: 2019-08-15

## 2019-08-15 NOTE — PROGRESS NOTES
"Jenny Lobato is a 69 y.o. female.   Chief Complaint   Patient presents with   • Follow-up     Hernia     /78 (BP Location: Left arm, Patient Position: Sitting, Cuff Size: Adult)   Pulse 101   Temp 97.7 °F (36.5 °C) (Oral)   Ht 157.5 cm (62\")   Wt 77.9 kg (171 lb 12.8 oz)   SpO2 97%   BMI 31.42 kg/m²     HISTORY OF PRESENT ILLNESS:  69 yof who I previously met for a symptomatic right lower quadrant hernia. This was a previous nephrectomy site. She has ERSD on dialysis and was being worked up for a living donor transplant. The plan at the last visit after discussion with the transplant team was for repair at the time of her implantation. Unfortunately she was not able to have the transplant due to donor issues. She says the right lower quadrant hernia causes her intermittent sharp pain and nausea. She denies significant swelling or skin changes at the location. It is reducible but does an pain to reduce it. She is still hoping to undergo transplant in the future and continues to undergo the workup.       Outpatient Encounter Medications as of 8/15/2019   Medication Sig Dispense Refill   • calcium acetate (PHOSLO) 667 MG capsule Take 2,001 mg by mouth 3 (Three) Times a Day.     • cephalexin (KEFLEX) 250 MG capsule Take 1 capsule by mouth 2 (Two) Times a Day. 14 capsule 0   • Multiple Vitamins-Minerals (MULTIVITAMIN WITH MINERALS) tablet tablet Take 1 tablet by mouth Daily.     • pantoprazole (PROTONIX) 40 MG EC tablet Take 40 mg by mouth 2 (Two) Times a Day.     • simvastatin (ZOCOR) 20 MG tablet Take 20 mg by mouth Every Night.     • sodium bicarbonate 650 MG tablet TAKE 2 TABLETS BY MOUTH THREE TIMES DAILY 180 tablet 0   • SODIUM BICARBONATE PO Take 1,300 mg by mouth 3 (Three) Times a Day.     • vitamin B-12 (CYANOCOBALAMIN) 1000 MCG tablet Take 1,000 mcg by mouth 3 (Three) Times a Week. Mon, Wed, Fri       No facility-administered encounter medications on file as of 8/15/2019.  "         The following portions of the patient's history were reviewed and updated as appropriate: allergies, current medications, past family history, past medical history, past social history, past surgical history and problem list.    Review of Systems   Constitutional: Negative for chills.   Respiratory: Negative for chest tightness and shortness of breath.    Cardiovascular: Negative for chest pain and leg swelling.   Gastrointestinal: Positive for abdominal distention, abdominal pain and nausea. Negative for constipation, diarrhea and vomiting.       Objective   Physical Exam   Constitutional: She appears well-developed and well-nourished.   HENT:   Head: Normocephalic and atraumatic.   Eyes: Conjunctivae and EOM are normal.   Cardiovascular:   Mild tachycardia   Pulmonary/Chest: Effort normal. No stridor. No respiratory distress.   Abdominal: Soft.   Healed subcostal incision and right lower quadrant incisions. There is a reducible incisional hernia in the right lower quadrant without skin changes.    Musculoskeletal: She exhibits no edema or deformity.   Neurological: She is alert. No cranial nerve deficit.   Skin: Skin is warm and dry.   Psychiatric: She has a normal mood and affect. Her behavior is normal.         Assessment/Plan   Sherry was seen today for follow-up.    Diagnoses and all orders for this visit:    Ventral hernia without obstruction or gangrene  -     Case Request; Standing  -     ceFAZolin (ANCEF) 2 g in sodium chloride 0.9 % 100 mL IVPB  -     sodium chloride 0.9 % infusion  -     Case Request    Other orders  -     Follow Anesthesia Guidelines / Standing Orders; Future  -     Provide NPO Instructions to Patient; Future  -     Follow Anesthesia Guidelines / Standing Orders; Standing  -     Verify NPO Status; Standing  -     Obtain Informed Consent; Standing  -     SCD (Sequential Compression Device) - Place on Patient in Pre-Op; Standing  -     Have Patient Void Prior to Procedure;  Standing  -     Instructions on Coughing, Deep Breathing & Incentive Spirometry; Standing  -     Oxygen Therapy- Nasal Cannula; Titrate for SPO2: 90% - 95%; Standing  -     Notify Physician - Standard; Standing  -     Verify/Perform Chlorhexidine Skin Prep; Standing  -     Chlorhexidine Skin Prep; Future    I let her know that she was previously discussed with the transplant team. At that conversation they recommended repair at the time of her transplant, or if she developed worsening symptoms before transplant, to have the repair performed without mesh. I have drawn a diagram and discussed the risks and benefits of incisional hernia. Based on tranplants input and the discussion, I have offered her an open primary suture repair. I talked about the risks, benefits, expected recovery and that she will have a higher risk of recurrence without mesh. She understands and is willing to proceed. Will need to coordinate with dialysis.     Nilay Stevens MD  8/15/2019  1:57 PM

## 2019-08-20 ENCOUNTER — APPOINTMENT (OUTPATIENT)
Dept: PREADMISSION TESTING | Facility: HOSPITAL | Age: 69
End: 2019-08-20

## 2019-08-20 VITALS
HEART RATE: 97 BPM | SYSTOLIC BLOOD PRESSURE: 136 MMHG | DIASTOLIC BLOOD PRESSURE: 74 MMHG | WEIGHT: 169.75 LBS | OXYGEN SATURATION: 98 % | BODY MASS INDEX: 33.33 KG/M2 | HEIGHT: 60 IN

## 2019-08-21 ENCOUNTER — ANESTHESIA EVENT (OUTPATIENT)
Dept: PERIOP | Facility: HOSPITAL | Age: 69
End: 2019-08-21

## 2019-08-22 ENCOUNTER — ANESTHESIA (OUTPATIENT)
Dept: PERIOP | Facility: HOSPITAL | Age: 69
End: 2019-08-22

## 2019-08-22 ENCOUNTER — TRANSCRIBE ORDERS (OUTPATIENT)
Dept: ADMINISTRATIVE | Facility: HOSPITAL | Age: 69
End: 2019-08-22

## 2019-08-22 DIAGNOSIS — C7A.8 NEURO-ENDOCRINE CARCINOMA (HCC): Primary | ICD-10-CM

## 2019-08-24 ENCOUNTER — HOSPITAL ENCOUNTER (OUTPATIENT)
Dept: CT IMAGING | Facility: HOSPITAL | Age: 69
Discharge: HOME | End: 2019-08-24
Admitting: SURGERY

## 2019-08-24 DIAGNOSIS — C7A.8 NEURO-ENDOCRINE CARCINOMA (HCC): ICD-10-CM

## 2019-08-24 PROCEDURE — 74176 CT ABD & PELVIS W/O CONTRAST: CPT

## 2019-08-28 ENCOUNTER — HOSPITAL ENCOUNTER (OUTPATIENT)
Facility: HOSPITAL | Age: 69
Discharge: HOME | End: 2019-08-29
Attending: SURGERY | Admitting: SURGERY

## 2019-08-28 DIAGNOSIS — K43.9 VENTRAL HERNIA WITHOUT OBSTRUCTION OR GANGRENE: ICD-10-CM

## 2019-08-28 LAB
ANION GAP SERPL CALCULATED.3IONS-SCNC: 20.8 MMOL/L (ref 5–15)
BUN BLD-MCNC: 33 MG/DL (ref 8–20)
BUN/CREAT SERPL: 5.2 (ref 5.4–26.2)
CALCIUM SPEC-SCNC: 8.8 MG/DL (ref 8.9–10.3)
CHLORIDE SERPL-SCNC: 102 MMOL/L (ref 101–111)
CO2 SERPL-SCNC: 23 MMOL/L (ref 22–32)
CREAT BLD-MCNC: 6.3 MG/DL (ref 0.4–1)
DEPRECATED RDW RBC AUTO: 59.5 FL (ref 37–54)
ERYTHROCYTE [DISTWIDTH] IN BLOOD BY AUTOMATED COUNT: 17.2 % (ref 12.3–15.4)
GFR SERPL CREATININE-BSD FRML MDRD: 7 ML/MIN/1.73
GFR SERPL CREATININE-BSD FRML MDRD: ABNORMAL ML/MIN/{1.73_M2}
GLUCOSE BLD-MCNC: 122 MG/DL (ref 65–99)
GLUCOSE BLDC GLUCOMTR-MCNC: 108 MG/DL (ref 70–105)
GLUCOSE BLDC GLUCOMTR-MCNC: 136 MG/DL (ref 70–105)
GLUCOSE BLDC GLUCOMTR-MCNC: 335 MG/DL (ref 70–105)
GLUCOSE BLDC GLUCOMTR-MCNC: 355 MG/DL (ref 70–105)
HCT VFR BLD AUTO: 32.8 % (ref 34–46.6)
HGB BLD-MCNC: 10.9 G/DL (ref 12–15.9)
MCH RBC QN AUTO: 33.5 PG (ref 26.6–33)
MCHC RBC AUTO-ENTMCNC: 33.3 G/DL (ref 31.5–35.7)
MCV RBC AUTO: 100.6 FL (ref 79–97)
PLATELET # BLD AUTO: 364 10*3/MM3 (ref 140–450)
PMV BLD AUTO: 7.8 FL (ref 6–12)
POTASSIUM BLD-SCNC: 4.8 MMOL/L (ref 3.6–5.1)
RBC # BLD AUTO: 3.25 10*6/MM3 (ref 3.77–5.28)
SODIUM BLD-SCNC: 141 MMOL/L (ref 136–144)
WBC NRBC COR # BLD: 10.7 10*3/MM3 (ref 3.4–10.8)

## 2019-08-28 PROCEDURE — A9270 NON-COVERED ITEM OR SERVICE: HCPCS | Performed by: SURGERY

## 2019-08-28 PROCEDURE — G0378 HOSPITAL OBSERVATION PER HR: HCPCS

## 2019-08-28 PROCEDURE — 25010000002 ONDANSETRON PER 1 MG: Performed by: ANESTHESIOLOGIST ASSISTANT

## 2019-08-28 PROCEDURE — 25010000002 FENTANYL CITRATE (PF) 100 MCG/2ML SOLUTION: Performed by: ANESTHESIOLOGIST ASSISTANT

## 2019-08-28 PROCEDURE — 49560 PR REPAIR INCISIONAL HERNIA,REDUCIBLE: CPT | Performed by: NURSE PRACTITIONER

## 2019-08-28 PROCEDURE — 25010000002 DEXAMETHASONE PER 1 MG: Performed by: ANESTHESIOLOGIST ASSISTANT

## 2019-08-28 PROCEDURE — 80048 BASIC METABOLIC PNL TOTAL CA: CPT | Performed by: SURGERY

## 2019-08-28 PROCEDURE — 63710000001 OXYCODONE 5 MG TABLET: Performed by: SURGERY

## 2019-08-28 PROCEDURE — 49560 PR REPAIR INCISIONAL HERNIA,REDUCIBLE: CPT | Performed by: SURGERY

## 2019-08-28 PROCEDURE — 63710000001 CALCIUM ACETATE 667 MG CAPSULE: Performed by: SURGERY

## 2019-08-28 PROCEDURE — 63710000001 ACETAMINOPHEN 500 MG TABLET: Performed by: SURGERY

## 2019-08-28 PROCEDURE — 85027 COMPLETE CBC AUTOMATED: CPT | Performed by: SURGERY

## 2019-08-28 PROCEDURE — 82962 GLUCOSE BLOOD TEST: CPT

## 2019-08-28 PROCEDURE — 63710000001 DOCUSATE SODIUM 100 MG CAPSULE: Performed by: SURGERY

## 2019-08-28 PROCEDURE — 63710000001 PANTOPRAZOLE 40 MG TABLET DELAYED-RELEASE: Performed by: SURGERY

## 2019-08-28 PROCEDURE — 63710000001 SODIUM BICARBONATE 650 MG TABLET: Performed by: SURGERY

## 2019-08-28 PROCEDURE — 25010000002 PROPOFOL 10 MG/ML EMULSION: Performed by: ANESTHESIOLOGIST ASSISTANT

## 2019-08-28 PROCEDURE — 63710000001 INSULIN LISPRO (HUMAN) PER 5 UNITS: Performed by: SURGERY

## 2019-08-28 RX ORDER — GLYCOPYRROLATE 0.2 MG/ML
INJECTION INTRAMUSCULAR; INTRAVENOUS AS NEEDED
Status: DISCONTINUED | OUTPATIENT
Start: 2019-08-28 | End: 2019-08-28 | Stop reason: SURG

## 2019-08-28 RX ORDER — PROMETHAZINE HYDROCHLORIDE 25 MG/1
25 SUPPOSITORY RECTAL ONCE AS NEEDED
Status: DISCONTINUED | OUTPATIENT
Start: 2019-08-28 | End: 2019-08-28 | Stop reason: HOSPADM

## 2019-08-28 RX ORDER — PHENYLEPHRINE HCL IN 0.9% NACL 0.5 MG/5ML
SYRINGE (ML) INTRAVENOUS AS NEEDED
Status: DISCONTINUED | OUTPATIENT
Start: 2019-08-28 | End: 2019-08-28 | Stop reason: SURG

## 2019-08-28 RX ORDER — MEPERIDINE HYDROCHLORIDE 25 MG/ML
12.5 INJECTION INTRAMUSCULAR; INTRAVENOUS; SUBCUTANEOUS
Status: DISCONTINUED | OUTPATIENT
Start: 2019-08-28 | End: 2019-08-28 | Stop reason: HOSPADM

## 2019-08-28 RX ORDER — SODIUM CHLORIDE 9 MG/ML
9 INJECTION, SOLUTION INTRAVENOUS CONTINUOUS PRN
Status: DISCONTINUED | OUTPATIENT
Start: 2019-08-28 | End: 2019-08-28 | Stop reason: HOSPADM

## 2019-08-28 RX ORDER — PROMETHAZINE HYDROCHLORIDE 25 MG/ML
6.25 INJECTION, SOLUTION INTRAMUSCULAR; INTRAVENOUS ONCE AS NEEDED
Status: DISCONTINUED | OUTPATIENT
Start: 2019-08-28 | End: 2019-08-28 | Stop reason: HOSPADM

## 2019-08-28 RX ORDER — DEXTROSE MONOHYDRATE 25 G/50ML
25 INJECTION, SOLUTION INTRAVENOUS
Status: DISCONTINUED | OUTPATIENT
Start: 2019-08-28 | End: 2019-08-29 | Stop reason: HOSPADM

## 2019-08-28 RX ORDER — SODIUM CHLORIDE 0.9 % (FLUSH) 0.9 %
3 SYRINGE (ML) INJECTION EVERY 12 HOURS SCHEDULED
Status: DISCONTINUED | OUTPATIENT
Start: 2019-08-28 | End: 2019-08-28 | Stop reason: HOSPADM

## 2019-08-28 RX ORDER — ONDANSETRON 2 MG/ML
4 INJECTION INTRAMUSCULAR; INTRAVENOUS ONCE AS NEEDED
Status: DISCONTINUED | OUTPATIENT
Start: 2019-08-28 | End: 2019-08-28 | Stop reason: HOSPADM

## 2019-08-28 RX ORDER — MORPHINE SULFATE 4 MG/ML
2 INJECTION, SOLUTION INTRAMUSCULAR; INTRAVENOUS EVERY 4 HOURS PRN
Status: DISCONTINUED | OUTPATIENT
Start: 2019-08-28 | End: 2019-08-29 | Stop reason: HOSPADM

## 2019-08-28 RX ORDER — OXYCODONE HYDROCHLORIDE 5 MG/1
10 TABLET ORAL EVERY 4 HOURS PRN
Status: DISCONTINUED | OUTPATIENT
Start: 2019-08-28 | End: 2019-08-28 | Stop reason: HOSPADM

## 2019-08-28 RX ORDER — MORPHINE SULFATE 4 MG/ML
2 INJECTION, SOLUTION INTRAMUSCULAR; INTRAVENOUS
Status: DISCONTINUED | OUTPATIENT
Start: 2019-08-28 | End: 2019-08-28 | Stop reason: HOSPADM

## 2019-08-28 RX ORDER — PROMETHAZINE HYDROCHLORIDE 25 MG/1
25 TABLET ORAL ONCE AS NEEDED
Status: DISCONTINUED | OUTPATIENT
Start: 2019-08-28 | End: 2019-08-28 | Stop reason: HOSPADM

## 2019-08-28 RX ORDER — SODIUM CHLORIDE 0.9 % (FLUSH) 0.9 %
3-10 SYRINGE (ML) INJECTION AS NEEDED
Status: DISCONTINUED | OUTPATIENT
Start: 2019-08-28 | End: 2019-08-28 | Stop reason: HOSPADM

## 2019-08-28 RX ORDER — CALCIUM ACETATE 667 MG/1
1334 CAPSULE ORAL 3 TIMES DAILY
Status: DISCONTINUED | OUTPATIENT
Start: 2019-08-28 | End: 2019-08-29 | Stop reason: HOSPADM

## 2019-08-28 RX ORDER — EPHEDRINE SULFATE 50 MG/ML
5 INJECTION, SOLUTION INTRAVENOUS ONCE AS NEEDED
Status: DISCONTINUED | OUTPATIENT
Start: 2019-08-28 | End: 2019-08-28 | Stop reason: HOSPADM

## 2019-08-28 RX ORDER — ACETAMINOPHEN 500 MG
1000 TABLET ORAL EVERY 8 HOURS
Status: DISCONTINUED | OUTPATIENT
Start: 2019-08-28 | End: 2019-08-29 | Stop reason: HOSPADM

## 2019-08-28 RX ORDER — ROCURONIUM BROMIDE 10 MG/ML
INJECTION, SOLUTION INTRAVENOUS AS NEEDED
Status: DISCONTINUED | OUTPATIENT
Start: 2019-08-28 | End: 2019-08-28 | Stop reason: SURG

## 2019-08-28 RX ORDER — LABETALOL HYDROCHLORIDE 5 MG/ML
5 INJECTION, SOLUTION INTRAVENOUS
Status: DISCONTINUED | OUTPATIENT
Start: 2019-08-28 | End: 2019-08-28 | Stop reason: HOSPADM

## 2019-08-28 RX ORDER — HYDROCODONE BITARTRATE AND ACETAMINOPHEN 7.5; 325 MG/1; MG/1
1 TABLET ORAL ONCE AS NEEDED
Status: DISCONTINUED | OUTPATIENT
Start: 2019-08-28 | End: 2019-08-28 | Stop reason: HOSPADM

## 2019-08-28 RX ORDER — HYDROMORPHONE HCL 110MG/55ML
0.5 PATIENT CONTROLLED ANALGESIA SYRINGE INTRAVENOUS
Status: DISCONTINUED | OUTPATIENT
Start: 2019-08-28 | End: 2019-08-28 | Stop reason: HOSPADM

## 2019-08-28 RX ORDER — SODIUM CHLORIDE 9 MG/ML
50 INJECTION, SOLUTION INTRAVENOUS CONTINUOUS
Status: DISCONTINUED | OUTPATIENT
Start: 2019-08-28 | End: 2019-08-28

## 2019-08-28 RX ORDER — FENTANYL CITRATE 50 UG/ML
INJECTION, SOLUTION INTRAMUSCULAR; INTRAVENOUS AS NEEDED
Status: DISCONTINUED | OUTPATIENT
Start: 2019-08-28 | End: 2019-08-28 | Stop reason: SURG

## 2019-08-28 RX ORDER — SODIUM CHLORIDE, SODIUM LACTATE, POTASSIUM CHLORIDE, CALCIUM CHLORIDE 600; 310; 30; 20 MG/100ML; MG/100ML; MG/100ML; MG/100ML
9 INJECTION, SOLUTION INTRAVENOUS CONTINUOUS PRN
Status: DISCONTINUED | OUTPATIENT
Start: 2019-08-28 | End: 2019-08-28 | Stop reason: HOSPADM

## 2019-08-28 RX ORDER — DEXAMETHASONE SODIUM PHOSPHATE 4 MG/ML
INJECTION, SOLUTION INTRA-ARTICULAR; INTRALESIONAL; INTRAMUSCULAR; INTRAVENOUS; SOFT TISSUE AS NEEDED
Status: DISCONTINUED | OUTPATIENT
Start: 2019-08-28 | End: 2019-08-28 | Stop reason: SURG

## 2019-08-28 RX ORDER — DOCUSATE SODIUM 100 MG/1
100 CAPSULE, LIQUID FILLED ORAL 2 TIMES DAILY
Status: DISCONTINUED | OUTPATIENT
Start: 2019-08-28 | End: 2019-08-29 | Stop reason: HOSPADM

## 2019-08-28 RX ORDER — ONDANSETRON 2 MG/ML
4 INJECTION INTRAMUSCULAR; INTRAVENOUS EVERY 6 HOURS PRN
Status: DISCONTINUED | OUTPATIENT
Start: 2019-08-28 | End: 2019-08-29 | Stop reason: HOSPADM

## 2019-08-28 RX ORDER — PANTOPRAZOLE SODIUM 40 MG/1
40 TABLET, DELAYED RELEASE ORAL 2 TIMES DAILY
Status: DISCONTINUED | OUTPATIENT
Start: 2019-08-28 | End: 2019-08-29 | Stop reason: HOSPADM

## 2019-08-28 RX ORDER — ONDANSETRON 4 MG/1
4 TABLET, FILM COATED ORAL EVERY 6 HOURS PRN
Status: DISCONTINUED | OUTPATIENT
Start: 2019-08-28 | End: 2019-08-29 | Stop reason: HOSPADM

## 2019-08-28 RX ORDER — PROPOFOL 10 MG/ML
VIAL (ML) INTRAVENOUS AS NEEDED
Status: DISCONTINUED | OUTPATIENT
Start: 2019-08-28 | End: 2019-08-28 | Stop reason: SURG

## 2019-08-28 RX ORDER — ONDANSETRON 2 MG/ML
INJECTION INTRAMUSCULAR; INTRAVENOUS AS NEEDED
Status: DISCONTINUED | OUTPATIENT
Start: 2019-08-28 | End: 2019-08-28 | Stop reason: SURG

## 2019-08-28 RX ORDER — DIPHENHYDRAMINE HYDROCHLORIDE 50 MG/ML
12.5 INJECTION INTRAMUSCULAR; INTRAVENOUS
Status: DISCONTINUED | OUTPATIENT
Start: 2019-08-28 | End: 2019-08-28 | Stop reason: HOSPADM

## 2019-08-28 RX ORDER — NICOTINE POLACRILEX 4 MG
15 LOZENGE BUCCAL
Status: DISCONTINUED | OUTPATIENT
Start: 2019-08-28 | End: 2019-08-29 | Stop reason: HOSPADM

## 2019-08-28 RX ORDER — BUPIVACAINE HYDROCHLORIDE AND EPINEPHRINE 2.5; 5 MG/ML; UG/ML
INJECTION, SOLUTION EPIDURAL; INFILTRATION; INTRACAUDAL; PERINEURAL AS NEEDED
Status: DISCONTINUED | OUTPATIENT
Start: 2019-08-28 | End: 2019-08-28 | Stop reason: HOSPADM

## 2019-08-28 RX ORDER — SODIUM BICARBONATE 650 MG/1
1300 TABLET ORAL 3 TIMES DAILY
Status: DISCONTINUED | OUTPATIENT
Start: 2019-08-28 | End: 2019-08-29 | Stop reason: HOSPADM

## 2019-08-28 RX ORDER — OXYCODONE HYDROCHLORIDE 5 MG/1
5 TABLET ORAL EVERY 4 HOURS PRN
Status: DISCONTINUED | OUTPATIENT
Start: 2019-08-28 | End: 2019-08-29 | Stop reason: HOSPADM

## 2019-08-28 RX ADMIN — ONDANSETRON 4 MG: 2 INJECTION INTRAMUSCULAR; INTRAVENOUS at 10:51

## 2019-08-28 RX ADMIN — OXYCODONE HYDROCHLORIDE 5 MG: 5 TABLET ORAL at 20:22

## 2019-08-28 RX ADMIN — CEFAZOLIN SODIUM 2 G: 1 INJECTION, POWDER, FOR SOLUTION INTRAMUSCULAR; INTRAVENOUS at 10:28

## 2019-08-28 RX ADMIN — FENTANYL CITRATE 50 MCG: 50 INJECTION, SOLUTION INTRAMUSCULAR; INTRAVENOUS at 10:51

## 2019-08-28 RX ADMIN — PANTOPRAZOLE SODIUM 40 MG: 40 TABLET, DELAYED RELEASE ORAL at 20:22

## 2019-08-28 RX ADMIN — SODIUM CHLORIDE 9 ML/HR: 9 INJECTION, SOLUTION INTRAVENOUS at 07:04

## 2019-08-28 RX ADMIN — INSULIN LISPRO 8 UNITS: 100 INJECTION, SOLUTION INTRAVENOUS; SUBCUTANEOUS at 21:47

## 2019-08-28 RX ADMIN — GLYCOPYRROLATE 0.2 MG: 0.2 INJECTION, SOLUTION INTRAMUSCULAR; INTRAVENOUS at 10:20

## 2019-08-28 RX ADMIN — PHENYLEPHRINE HYDROCHLORIDE 200 MCG: 10 INJECTION INTRAVENOUS at 10:40

## 2019-08-28 RX ADMIN — DOCUSATE SODIUM 100 MG: 100 CAPSULE, LIQUID FILLED ORAL at 20:22

## 2019-08-28 RX ADMIN — CALCIUM ACETATE 1334 MG: 667 CAPSULE ORAL at 20:22

## 2019-08-28 RX ADMIN — ROCURONIUM BROMIDE 50 MG: 10 INJECTION, SOLUTION INTRAVENOUS at 10:20

## 2019-08-28 RX ADMIN — SODIUM BICARBONATE 650 MG TABLET 1300 MG: at 20:22

## 2019-08-28 RX ADMIN — DEXAMETHASONE SODIUM PHOSPHATE 4 MG: 4 INJECTION, SOLUTION INTRAMUSCULAR; INTRAVENOUS at 10:43

## 2019-08-28 RX ADMIN — PROPOFOL 120 MG: 10 INJECTION, EMULSION INTRAVENOUS at 10:20

## 2019-08-28 RX ADMIN — FENTANYL CITRATE 50 MCG: 50 INJECTION, SOLUTION INTRAMUSCULAR; INTRAVENOUS at 10:18

## 2019-08-28 RX ADMIN — PHENYLEPHRINE HYDROCHLORIDE 100 MCG: 10 INJECTION INTRAVENOUS at 10:46

## 2019-08-28 RX ADMIN — ACETAMINOPHEN 1000 MG: 500 TABLET, FILM COATED ORAL at 23:51

## 2019-08-28 RX ADMIN — SUGAMMADEX 200 MG: 100 INJECTION, SOLUTION INTRAVENOUS at 11:16

## 2019-08-28 NOTE — ANESTHESIA PROCEDURE NOTES
Airway  Urgency: elective    Airway not difficult    General Information and Staff    Patient location during procedure: OR  Anesthesiologist: Delmar Garcia MD  CRNA: Elmo Cam AA    Indications and Patient Condition  Indications for airway management: airway protection    Preoxygenated: yes  MILS maintained throughout  Mask difficulty assessment: 2 - vent by mask + OA or adjuvant +/- NMBA    Final Airway Details  Final airway type: endotracheal airway      Successful airway: ETT  Cuffed: yes   Successful intubation technique: direct laryngoscopy  Endotracheal tube insertion site: oral  Blade: Lisa  Blade size: 3  ETT size (mm): 7.0  Cormack-Lehane Classification: grade I - full view of glottis  Placement verified by: chest auscultation   Measured from: lips  ETT to lips (cm): 21  Number of attempts at approach: 1

## 2019-08-28 NOTE — ANESTHESIA POSTPROCEDURE EVALUATION
Patient: Sherry Lobato    Procedure Summary     Date:  08/28/19 Room / Location:  [facility] OR 09 / [facility] MAIN OR    Anesthesia Start:  1015 Anesthesia Stop:  1127    Procedure:  VENTRAL/INCISIONAL HERNIA REPAIR (Right Abdomen) Diagnosis:       Ventral hernia without obstruction or gangrene      (Ventral hernia without obstruction or gangrene [K43.9])    Surgeon:  Nilay Stevens MD Provider:  Delmar Garcia MD    Anesthesia Type:  general ASA Status:  4          Anesthesia Type: general  Last vitals  BP   125/44 (08/28/19 1221)   Temp   97.5 °F (36.4 °C) (08/28/19 1127)   Pulse   83 (08/28/19 1239)   Resp   14 (08/28/19 1239)     SpO2   97 % (08/28/19 1221)     Post Anesthesia Care and Evaluation    Patient location during evaluation: PACU  Patient participation: complete - patient participated  Level of consciousness: awake  Pain scale: See nurse's notes for pain score.  Pain management: adequate  Airway patency: patent  Anesthetic complications: No anesthetic complications  PONV Status: none  Cardiovascular status: acceptable  Respiratory status: acceptable  Hydration status: acceptable    Comments: Patient seen and examined postoperatively; vital signs stable; SpO2 greater than or equal to 90%; cardiopulmonary status stable; nausea/vomiting adequately controlled; pain adequately controlled; no apparent anesthesia complications; patient discharged from anesthesia care when discharge criteria were met

## 2019-08-28 NOTE — ANESTHESIA POSTPROCEDURE EVALUATION
Patient: Sherry Lobato    Procedure Summary     Date:  08/28/19 Room / Location:  [facility] OR 09 / [facility] MAIN OR    Anesthesia Start:  1015 Anesthesia Stop:  1127    Procedure:  VENTRAL/INCISIONAL HERNIA REPAIR (Right Abdomen) Diagnosis:       Ventral hernia without obstruction or gangrene      (Ventral hernia without obstruction or gangrene [K43.9])    Surgeon:  Nilay Stevens MD Provider:  Delmar Garcia MD    Anesthesia Type:  general ASA Status:  4          Anesthesia Type: general  Last vitals  BP   164/65 (08/28/19 0648)   Temp   97.4 °F (36.3 °C) (08/28/19 0648)   Pulse   79 (08/28/19 0648)   Resp   10 (08/28/19 0648)     SpO2   99 % (08/28/19 0648)     Post Anesthesia Care and Evaluation    Patient location during evaluation: PACU  Patient participation: complete - patient participated  Level of consciousness: lethargic  Pain score: 0  Pain management: adequate  Airway patency: patent  Anesthetic complications: No anesthetic complications  PONV Status: none  Cardiovascular status: acceptable  Respiratory status: acceptable  Hydration status: acceptable

## 2019-08-28 NOTE — ANESTHESIA PREPROCEDURE EVALUATION
Anesthesia Evaluation     Patient summary reviewed and Nursing notes reviewed   NPO Solid Status: > 8 hours  NPO Liquid Status: > 8 hours           Airway   Mallampati: II  TM distance: >3 FB  Neck ROM: full  No difficulty expected  Dental      Pulmonary - normal exam   (+) COPD, sleep apnea,   Cardiovascular - normal exam    (+) hypertension, hyperlipidemia,       Neuro/Psych  GI/Hepatic/Renal/Endo    (+)   renal disease dialysis, diabetes mellitus well controlled,     Musculoskeletal     Abdominal   (+) obese,    Substance History      OB/GYN          Other                        Anesthesia Plan    ASA 4     general     intravenous induction   Anesthetic plan, all risks, benefits, and alternatives have been provided, discussed and informed consent has been obtained with: patient.

## 2019-08-29 VITALS
HEART RATE: 92 BPM | WEIGHT: 170.64 LBS | DIASTOLIC BLOOD PRESSURE: 63 MMHG | BODY MASS INDEX: 33.5 KG/M2 | SYSTOLIC BLOOD PRESSURE: 161 MMHG | TEMPERATURE: 97.5 F | OXYGEN SATURATION: 100 % | HEIGHT: 60 IN | RESPIRATION RATE: 16 BRPM

## 2019-08-29 LAB
ALBUMIN SERPL-MCNC: 3.2 G/DL (ref 3.5–4.8)
ANION GAP SERPL CALCULATED.3IONS-SCNC: 24.3 MMOL/L (ref 5–15)
BUN BLD-MCNC: 43 MG/DL (ref 8–20)
BUN/CREAT SERPL: 6.1 (ref 5.4–26.2)
CALCIUM SPEC-SCNC: 8.6 MG/DL (ref 8.9–10.3)
CHLORIDE SERPL-SCNC: 102 MMOL/L (ref 101–111)
CO2 SERPL-SCNC: 18 MMOL/L (ref 22–32)
CREAT BLD-MCNC: 7 MG/DL (ref 0.4–1)
DEPRECATED RDW RBC AUTO: 63.4 FL (ref 37–54)
ERYTHROCYTE [DISTWIDTH] IN BLOOD BY AUTOMATED COUNT: 17.9 % (ref 12.3–15.4)
GFR SERPL CREATININE-BSD FRML MDRD: 6 ML/MIN/1.73
GFR SERPL CREATININE-BSD FRML MDRD: ABNORMAL ML/MIN/{1.73_M2}
GLUCOSE BLD-MCNC: 171 MG/DL (ref 65–99)
GLUCOSE BLDC GLUCOMTR-MCNC: 143 MG/DL (ref 70–105)
HCT VFR BLD AUTO: 29.7 % (ref 34–46.6)
HGB BLD-MCNC: 9.8 G/DL (ref 12–15.9)
MCH RBC QN AUTO: 33.5 PG (ref 26.6–33)
MCHC RBC AUTO-ENTMCNC: 33 G/DL (ref 31.5–35.7)
MCV RBC AUTO: 101.7 FL (ref 79–97)
PHOSPHATE SERPL-MCNC: 5.9 MG/DL (ref 2.4–4.7)
PLATELET # BLD AUTO: 309 10*3/MM3 (ref 140–450)
PMV BLD AUTO: 7.2 FL (ref 6–12)
POTASSIUM BLD-SCNC: 5.3 MMOL/L (ref 3.6–5.1)
RBC # BLD AUTO: 2.92 10*6/MM3 (ref 3.77–5.28)
SODIUM BLD-SCNC: 139 MMOL/L (ref 136–144)
WBC NRBC COR # BLD: 13.3 10*3/MM3 (ref 3.4–10.8)

## 2019-08-29 PROCEDURE — A9270 NON-COVERED ITEM OR SERVICE: HCPCS | Performed by: SURGERY

## 2019-08-29 PROCEDURE — 82962 GLUCOSE BLOOD TEST: CPT

## 2019-08-29 PROCEDURE — 80069 RENAL FUNCTION PANEL: CPT | Performed by: INTERNAL MEDICINE

## 2019-08-29 PROCEDURE — 63710000001 OXYCODONE 5 MG TABLET: Performed by: SURGERY

## 2019-08-29 PROCEDURE — 99024 POSTOP FOLLOW-UP VISIT: CPT | Performed by: SURGERY

## 2019-08-29 PROCEDURE — 63710000001 ONDANSETRON PER 8 MG: Performed by: SURGERY

## 2019-08-29 PROCEDURE — G0378 HOSPITAL OBSERVATION PER HR: HCPCS

## 2019-08-29 PROCEDURE — G0257 UNSCHED DIALYSIS ESRD PT HOS: HCPCS

## 2019-08-29 PROCEDURE — 85027 COMPLETE CBC AUTOMATED: CPT | Performed by: INTERNAL MEDICINE

## 2019-08-29 RX ORDER — OXYCODONE HYDROCHLORIDE 5 MG/1
5 TABLET ORAL EVERY 4 HOURS PRN
Qty: 10 TABLET | Refills: 0 | Status: SHIPPED | OUTPATIENT
Start: 2019-08-29 | End: 2019-09-07

## 2019-08-29 RX ADMIN — OXYCODONE HYDROCHLORIDE 5 MG: 5 TABLET ORAL at 11:36

## 2019-08-29 RX ADMIN — OXYCODONE HYDROCHLORIDE 5 MG: 5 TABLET ORAL at 05:52

## 2019-08-29 RX ADMIN — ONDANSETRON 4 MG: 4 TABLET, FILM COATED ORAL at 11:38

## 2019-08-30 ENCOUNTER — READMISSION MANAGEMENT (OUTPATIENT)
Dept: CALL CENTER | Facility: HOSPITAL | Age: 69
End: 2019-08-30

## 2019-08-30 NOTE — OUTREACH NOTE
Prep Survey      Responses   Facility patient discharged from?  [facility]   Is patient eligible?  Yes   Discharge diagnosis  VENTRAL HERNIA WITHOUT OBSTRUCTION   Does the patient have one of the following disease processes/diagnoses(primary or secondary)?  General Surgery   Does the patient have Home health ordered?  No   Is there a DME ordered?  No   Medication alerts for this patient  OXYCODONE   General alerts for this patient  DIALYSIS PATIENT   Prep survey completed?  Yes          Trinidad Lund LPN

## 2019-09-04 ENCOUNTER — READMISSION MANAGEMENT (OUTPATIENT)
Dept: CALL CENTER | Facility: HOSPITAL | Age: 69
End: 2019-09-04

## 2019-09-04 NOTE — OUTREACH NOTE
General Surgery Week 1 Survey      Responses   Facility patient discharged from?  [facility]   Does the patient have one of the following disease processes/diagnoses(primary or secondary)?  General Surgery   Is there a successful TCM telephone encounter documented?  No   Week 1 attempt successful?  No   Revoke  Decline to participate [Left message patient currently at dialysis]          Tawanna Lara LPN

## 2019-09-10 ENCOUNTER — OFFICE VISIT (OUTPATIENT)
Dept: SURGERY | Facility: CLINIC | Age: 69
End: 2019-09-10

## 2019-09-10 VITALS
BODY MASS INDEX: 33.34 KG/M2 | HEIGHT: 60 IN | TEMPERATURE: 98.4 F | SYSTOLIC BLOOD PRESSURE: 155 MMHG | DIASTOLIC BLOOD PRESSURE: 70 MMHG | WEIGHT: 169.8 LBS | OXYGEN SATURATION: 94 % | HEART RATE: 106 BPM

## 2019-09-10 DIAGNOSIS — K43.9 VENTRAL HERNIA WITHOUT OBSTRUCTION OR GANGRENE: Primary | ICD-10-CM

## 2019-09-10 PROCEDURE — 99024 POSTOP FOLLOW-UP VISIT: CPT | Performed by: SURGERY

## 2019-09-11 PROBLEM — K43.9 VENTRAL HERNIA WITHOUT OBSTRUCTION OR GANGRENE: Status: RESOLVED | Noted: 2019-08-15 | Resolved: 2019-09-11

## 2019-09-11 NOTE — PROGRESS NOTES
"Subjective   Sherry Lobato is a 69 y.o. female.   Patient is about 2 weeks status post open incisional hernia repair of her right abdominal incisional hernia.  This was completed as a primary repair only.  She says that she has had minimal abdominal pain but has had some abdominal fullness associated with some nausea.  She has been fairly constipated since her operation and has been taking some laxatives intermittently with some relief.  She denies any incisional issues.  She has been tolerating dialysis fine.    Objective   /70 (BP Location: Right arm, Patient Position: Sitting, Cuff Size: Adult)   Pulse 106   Temp 98.4 °F (36.9 °C) (Oral)   Ht 152.4 cm (60\")   Wt 77 kg (169 lb 12.8 oz)   SpO2 94%   BMI 33.16 kg/m²   Physical Exam   Constitutional: She appears well-developed and well-nourished.   HENT:   Head: Normocephalic and atraumatic.   Abdominal:   Soft nontender mild distention incision healing without any erythema there is mild appropriate induration there is no evidence of infection or hernia.   Skin: Skin is warm and dry.   Psychiatric: She has a normal mood and affect. Her behavior is normal.       Assessment/Plan   Sherry was seen today for post-op hernia.    Diagnoses and all orders for this visit:    Ventral hernia without obstruction or gangrene    About 2 weeks status post open primary repair of an incisional abdominal hernia.  She seems to be doing reasonably well and is going to take stool softeners as needed in the future to help with bowel function.  She does not perform any strenuous activity so her lifting restrictions are minimal.  She is to follow-up with me if she has any recurrent or ongoing issues in the near future.  For now she can follow-up as needed.    Nilay Stevens MD  9/11/2019  11:39 AM    "

## 2019-09-30 ENCOUNTER — HOSPITAL ENCOUNTER (INPATIENT)
Facility: HOSPITAL | Age: 69
LOS: 1 days | Discharge: HOME | End: 2019-10-02
Attending: FAMILY MEDICINE | Admitting: FAMILY MEDICINE

## 2019-09-30 ENCOUNTER — APPOINTMENT (OUTPATIENT)
Dept: CT IMAGING | Facility: HOSPITAL | Age: 69
End: 2019-09-30

## 2019-09-30 DIAGNOSIS — L03.311 ABDOMINAL WALL CELLULITIS: Primary | ICD-10-CM

## 2019-09-30 DIAGNOSIS — K43.9 ABDOMINAL WALL HERNIA: ICD-10-CM

## 2019-09-30 DIAGNOSIS — N18.6 ESRD (END STAGE RENAL DISEASE) (HCC): ICD-10-CM

## 2019-09-30 DIAGNOSIS — R10.31 RIGHT LOWER QUADRANT ABDOMINAL PAIN: ICD-10-CM

## 2019-09-30 PROBLEM — K43.2 INCISIONAL HERNIA OF ANTERIOR ABDOMINAL WALL WITHOUT OBSTRUCTION OR GANGRENE: Status: ACTIVE | Noted: 2019-09-30

## 2019-09-30 PROBLEM — E66.9 OBESITY (BMI 30-39.9): Chronic | Status: ACTIVE | Noted: 2019-09-30

## 2019-09-30 LAB
ALBUMIN SERPL-MCNC: 3.5 G/DL (ref 3.5–4.8)
ALBUMIN/GLOB SERPL: 1 G/DL (ref 1–1.7)
ALP SERPL-CCNC: 70 U/L (ref 32–91)
ALT SERPL W P-5'-P-CCNC: 19 U/L (ref 14–54)
ANION GAP SERPL CALCULATED.3IONS-SCNC: 15 MMOL/L (ref 5–15)
ANION GAP SERPL CALCULATED.3IONS-SCNC: 17.5 MMOL/L (ref 5–15)
AST SERPL-CCNC: 26 U/L (ref 15–41)
BASOPHILS # BLD AUTO: 0.1 10*3/MM3 (ref 0–0.2)
BASOPHILS NFR BLD AUTO: 1.3 % (ref 0–1.5)
BILIRUB SERPL-MCNC: 0.5 MG/DL (ref 0.3–1.2)
BUN BLD-MCNC: 18 MG/DL (ref 8–20)
BUN BLD-MCNC: 30 MG/DL (ref 8–20)
BUN/CREAT SERPL: 4.6 (ref 5.4–26.2)
BUN/CREAT SERPL: 5.2 (ref 5.4–26.2)
CALCIUM SPEC-SCNC: 8.6 MG/DL (ref 8.9–10.3)
CALCIUM SPEC-SCNC: 8.7 MG/DL (ref 8.9–10.3)
CHLORIDE SERPL-SCNC: 101 MMOL/L (ref 101–111)
CHLORIDE SERPL-SCNC: 105 MMOL/L (ref 101–111)
CO2 SERPL-SCNC: 23 MMOL/L (ref 22–32)
CO2 SERPL-SCNC: 24 MMOL/L (ref 22–32)
CREAT BLD-MCNC: 3.9 MG/DL (ref 0.4–1)
CREAT BLD-MCNC: 5.8 MG/DL (ref 0.4–1)
D-LACTATE SERPL-SCNC: 1.2 MMOL/L (ref 0.5–2)
DEPRECATED RDW RBC AUTO: 60.8 FL (ref 37–54)
EOSINOPHIL # BLD AUTO: 0.5 10*3/MM3 (ref 0–0.4)
EOSINOPHIL NFR BLD AUTO: 5.3 % (ref 0.3–6.2)
ERYTHROCYTE [DISTWIDTH] IN BLOOD BY AUTOMATED COUNT: 17.1 % (ref 12.3–15.4)
GFR SERPL CREATININE-BSD FRML MDRD: 11 ML/MIN/1.73
GFR SERPL CREATININE-BSD FRML MDRD: 7 ML/MIN/1.73
GFR SERPL CREATININE-BSD FRML MDRD: ABNORMAL ML/MIN/{1.73_M2}
GFR SERPL CREATININE-BSD FRML MDRD: ABNORMAL ML/MIN/{1.73_M2}
GLOBULIN UR ELPH-MCNC: 3.5 GM/DL (ref 2.5–3.8)
GLUCOSE BLD-MCNC: 114 MG/DL (ref 65–99)
GLUCOSE BLD-MCNC: 92 MG/DL (ref 65–99)
GLUCOSE BLDC GLUCOMTR-MCNC: 153 MG/DL (ref 70–105)
GLUCOSE BLDC GLUCOMTR-MCNC: 88 MG/DL (ref 70–105)
HBA1C MFR BLD: 6.5 % (ref 3.5–5.6)
HCT VFR BLD AUTO: 33.8 % (ref 34–46.6)
HGB BLD-MCNC: 11.2 G/DL (ref 12–15.9)
HOLD SPECIMEN: NORMAL
LYMPHOCYTES # BLD AUTO: 1.5 10*3/MM3 (ref 0.7–3.1)
LYMPHOCYTES NFR BLD AUTO: 15 % (ref 19.6–45.3)
MCH RBC QN AUTO: 33.3 PG (ref 26.6–33)
MCHC RBC AUTO-ENTMCNC: 33.1 G/DL (ref 31.5–35.7)
MCV RBC AUTO: 100.5 FL (ref 79–97)
MONOCYTES # BLD AUTO: 0.6 10*3/MM3 (ref 0.1–0.9)
MONOCYTES NFR BLD AUTO: 6.1 % (ref 5–12)
NEUTROPHILS # BLD AUTO: 7 10*3/MM3 (ref 1.7–7)
NEUTROPHILS NFR BLD AUTO: 72.3 % (ref 42.7–76)
NRBC BLD AUTO-RTO: 0 /100 WBC (ref 0–0.2)
PLATELET # BLD AUTO: 344 10*3/MM3 (ref 140–450)
PMV BLD AUTO: 8.3 FL (ref 6–12)
POTASSIUM BLD-SCNC: 4 MMOL/L (ref 3.6–5.1)
POTASSIUM BLD-SCNC: 4.5 MMOL/L (ref 3.6–5.1)
PROT SERPL-MCNC: 7 G/DL (ref 6.1–7.9)
RBC # BLD AUTO: 3.37 10*6/MM3 (ref 3.77–5.28)
SODIUM BLD-SCNC: 136 MMOL/L (ref 136–144)
SODIUM BLD-SCNC: 141 MMOL/L (ref 136–144)
WBC NRBC COR # BLD: 9.7 10*3/MM3 (ref 3.4–10.8)
WHOLE BLOOD HOLD SPECIMEN: NORMAL

## 2019-09-30 PROCEDURE — 36415 COLL VENOUS BLD VENIPUNCTURE: CPT

## 2019-09-30 PROCEDURE — 0 IOPAMIDOL PER 1 ML: Performed by: NURSE PRACTITIONER

## 2019-09-30 PROCEDURE — G0378 HOSPITAL OBSERVATION PER HR: HCPCS

## 2019-09-30 PROCEDURE — 74177 CT ABD & PELVIS W/CONTRAST: CPT

## 2019-09-30 PROCEDURE — 25010000002 CEFAZOLIN PER 500 MG: Performed by: SURGERY

## 2019-09-30 PROCEDURE — 80053 COMPREHEN METABOLIC PANEL: CPT | Performed by: NURSE PRACTITIONER

## 2019-09-30 PROCEDURE — 99222 1ST HOSP IP/OBS MODERATE 55: CPT | Performed by: FAMILY MEDICINE

## 2019-09-30 PROCEDURE — 83605 ASSAY OF LACTIC ACID: CPT

## 2019-09-30 PROCEDURE — 99222 1ST HOSP IP/OBS MODERATE 55: CPT | Performed by: SURGERY

## 2019-09-30 PROCEDURE — 82962 GLUCOSE BLOOD TEST: CPT

## 2019-09-30 PROCEDURE — 87040 BLOOD CULTURE FOR BACTERIA: CPT | Performed by: NURSE PRACTITIONER

## 2019-09-30 PROCEDURE — 85025 COMPLETE CBC W/AUTO DIFF WBC: CPT | Performed by: NURSE PRACTITIONER

## 2019-09-30 PROCEDURE — 5A1D70Z PERFORMANCE OF URINARY FILTRATION, INTERMITTENT, LESS THAN 6 HOURS PER DAY: ICD-10-PCS | Performed by: FAMILY MEDICINE

## 2019-09-30 PROCEDURE — 83036 HEMOGLOBIN GLYCOSYLATED A1C: CPT | Performed by: NURSE PRACTITIONER

## 2019-09-30 PROCEDURE — 99283 EMERGENCY DEPT VISIT LOW MDM: CPT

## 2019-09-30 RX ORDER — ACETAMINOPHEN 160 MG/5ML
650 SOLUTION ORAL EVERY 4 HOURS PRN
Status: DISCONTINUED | OUTPATIENT
Start: 2019-09-30 | End: 2019-10-01

## 2019-09-30 RX ORDER — DEXTROSE MONOHYDRATE 25 G/50ML
25 INJECTION, SOLUTION INTRAVENOUS
Status: DISCONTINUED | OUTPATIENT
Start: 2019-09-30 | End: 2019-10-02 | Stop reason: HOSPADM

## 2019-09-30 RX ORDER — ONDANSETRON 2 MG/ML
4 INJECTION INTRAMUSCULAR; INTRAVENOUS EVERY 6 HOURS PRN
Status: DISCONTINUED | OUTPATIENT
Start: 2019-09-30 | End: 2019-10-02 | Stop reason: HOSPADM

## 2019-09-30 RX ORDER — LOSARTAN POTASSIUM 50 MG/1
50 TABLET ORAL DAILY
Status: DISCONTINUED | OUTPATIENT
Start: 2019-09-30 | End: 2019-10-02 | Stop reason: HOSPADM

## 2019-09-30 RX ORDER — SODIUM CHLORIDE 0.9 % (FLUSH) 0.9 %
10 SYRINGE (ML) INJECTION AS NEEDED
Status: DISCONTINUED | OUTPATIENT
Start: 2019-09-30 | End: 2019-10-02 | Stop reason: HOSPADM

## 2019-09-30 RX ORDER — LANOLIN ALCOHOL/MO/W.PET/CERES
1000 CREAM (GRAM) TOPICAL 3 TIMES WEEKLY
Status: DISCONTINUED | OUTPATIENT
Start: 2019-09-30 | End: 2019-10-02 | Stop reason: HOSPADM

## 2019-09-30 RX ORDER — SIMVASTATIN 20 MG
20 TABLET ORAL NIGHTLY
Status: DISCONTINUED | OUTPATIENT
Start: 2019-09-30 | End: 2019-10-02 | Stop reason: HOSPADM

## 2019-09-30 RX ORDER — DOCUSATE SODIUM 100 MG/1
100 CAPSULE, LIQUID FILLED ORAL 2 TIMES DAILY
Status: DISCONTINUED | OUTPATIENT
Start: 2019-09-30 | End: 2019-10-02 | Stop reason: HOSPADM

## 2019-09-30 RX ORDER — NITROGLYCERIN 0.4 MG/1
0.4 TABLET SUBLINGUAL
Status: DISCONTINUED | OUTPATIENT
Start: 2019-09-30 | End: 2019-10-02 | Stop reason: HOSPADM

## 2019-09-30 RX ORDER — SODIUM CHLORIDE 0.9 % (FLUSH) 0.9 %
10 SYRINGE (ML) INJECTION AS NEEDED
Status: DISCONTINUED | OUTPATIENT
Start: 2019-09-30 | End: 2019-10-01

## 2019-09-30 RX ORDER — SODIUM BICARBONATE 650 MG/1
1300 TABLET ORAL 3 TIMES DAILY
Status: DISCONTINUED | OUTPATIENT
Start: 2019-09-30 | End: 2019-10-02 | Stop reason: HOSPADM

## 2019-09-30 RX ORDER — ONDANSETRON 4 MG/1
4 TABLET, FILM COATED ORAL EVERY 6 HOURS PRN
Status: DISCONTINUED | OUTPATIENT
Start: 2019-09-30 | End: 2019-10-02 | Stop reason: HOSPADM

## 2019-09-30 RX ORDER — ACETAMINOPHEN 325 MG/1
650 TABLET ORAL EVERY 4 HOURS PRN
Status: DISCONTINUED | OUTPATIENT
Start: 2019-09-30 | End: 2019-10-01

## 2019-09-30 RX ORDER — PANTOPRAZOLE SODIUM 40 MG/1
40 TABLET, DELAYED RELEASE ORAL
Status: DISCONTINUED | OUTPATIENT
Start: 2019-10-01 | End: 2019-10-02 | Stop reason: HOSPADM

## 2019-09-30 RX ORDER — ACETAMINOPHEN 650 MG/1
650 SUPPOSITORY RECTAL EVERY 4 HOURS PRN
Status: DISCONTINUED | OUTPATIENT
Start: 2019-09-30 | End: 2019-10-01

## 2019-09-30 RX ORDER — SODIUM BICARBONATE 650 MG/1
1300 TABLET ORAL 3 TIMES DAILY
COMMUNITY
End: 2020-03-24 | Stop reason: SDUPTHER

## 2019-09-30 RX ORDER — BISACODYL 10 MG
10 SUPPOSITORY, RECTAL RECTAL DAILY PRN
Status: DISCONTINUED | OUTPATIENT
Start: 2019-09-30 | End: 2019-10-02 | Stop reason: HOSPADM

## 2019-09-30 RX ORDER — LOSARTAN POTASSIUM 50 MG/1
50 TABLET ORAL DAILY
COMMUNITY
End: 2020-03-24

## 2019-09-30 RX ORDER — NICOTINE POLACRILEX 4 MG
15 LOZENGE BUCCAL
Status: DISCONTINUED | OUTPATIENT
Start: 2019-09-30 | End: 2019-10-02 | Stop reason: HOSPADM

## 2019-09-30 RX ORDER — SODIUM CHLORIDE 0.9 % (FLUSH) 0.9 %
10 SYRINGE (ML) INJECTION EVERY 12 HOURS SCHEDULED
Status: DISCONTINUED | OUTPATIENT
Start: 2019-09-30 | End: 2019-10-02 | Stop reason: HOSPADM

## 2019-09-30 RX ORDER — CALCIUM ACETATE 667 MG/1
1334 CAPSULE ORAL 3 TIMES DAILY
Status: DISCONTINUED | OUTPATIENT
Start: 2019-09-30 | End: 2019-10-02 | Stop reason: HOSPADM

## 2019-09-30 RX ORDER — CHOLECALCIFEROL (VITAMIN D3) 125 MCG
5 CAPSULE ORAL NIGHTLY PRN
Status: DISCONTINUED | OUTPATIENT
Start: 2019-09-30 | End: 2019-10-02 | Stop reason: HOSPADM

## 2019-09-30 RX ORDER — B,C/FOLIC/ZINC/SELENOMETH/D3/E 0.8-12.5MG
1 TABLET ORAL DAILY
COMMUNITY

## 2019-09-30 RX ADMIN — MELATONIN TAB 5 MG 5 MG: 5 TAB at 20:15

## 2019-09-30 RX ADMIN — CEFAZOLIN SODIUM 2 G: 10 INJECTION, POWDER, FOR SOLUTION INTRAVENOUS at 21:00

## 2019-09-30 RX ADMIN — CALCIUM ACETATE 1334 MG: 667 CAPSULE ORAL at 20:14

## 2019-09-30 RX ADMIN — CYANOCOBALAMIN TAB 1000 MCG 1000 MCG: 1000 TAB at 20:14

## 2019-09-30 RX ADMIN — Medication 10 ML: at 20:15

## 2019-09-30 RX ADMIN — SODIUM BICARBONATE 650 MG TABLET 1300 MG: at 20:14

## 2019-09-30 RX ADMIN — LOSARTAN POTASSIUM 50 MG: 50 TABLET ORAL at 20:14

## 2019-09-30 RX ADMIN — DOCUSATE SODIUM 100 MG: 100 CAPSULE, LIQUID FILLED ORAL at 21:00

## 2019-09-30 RX ADMIN — IOPAMIDOL 100 ML: 755 INJECTION, SOLUTION INTRAVENOUS at 09:54

## 2019-10-01 ENCOUNTER — ANESTHESIA (OUTPATIENT)
Dept: PERIOP | Facility: HOSPITAL | Age: 69
End: 2019-10-01

## 2019-10-01 ENCOUNTER — ANESTHESIA EVENT (OUTPATIENT)
Dept: PERIOP | Facility: HOSPITAL | Age: 69
End: 2019-10-01

## 2019-10-01 LAB
ABO GROUP BLD: NORMAL
ANION GAP SERPL CALCULATED.3IONS-SCNC: 15 MMOL/L (ref 5–15)
APTT PPP: 26.5 SECONDS (ref 24–31)
BASOPHILS # BLD AUTO: 0.1 10*3/MM3 (ref 0–0.2)
BASOPHILS NFR BLD AUTO: 0.9 % (ref 0–1.5)
BLD GP AB SCN SERPL QL: NEGATIVE
BUN BLD-MCNC: 22 MG/DL (ref 8–20)
BUN/CREAT SERPL: 4.9 (ref 5.4–26.2)
CALCIUM SPEC-SCNC: 8.5 MG/DL (ref 8.9–10.3)
CHLORIDE SERPL-SCNC: 103 MMOL/L (ref 101–111)
CO2 SERPL-SCNC: 24 MMOL/L (ref 22–32)
CREAT BLD-MCNC: 4.5 MG/DL (ref 0.4–1)
DEPRECATED RDW RBC AUTO: 62.1 FL (ref 37–54)
EOSINOPHIL # BLD AUTO: 0.2 10*3/MM3 (ref 0–0.4)
EOSINOPHIL NFR BLD AUTO: 2.3 % (ref 0.3–6.2)
ERYTHROCYTE [DISTWIDTH] IN BLOOD BY AUTOMATED COUNT: 17.5 % (ref 12.3–15.4)
GFR SERPL CREATININE-BSD FRML MDRD: 10 ML/MIN/1.73
GFR SERPL CREATININE-BSD FRML MDRD: ABNORMAL ML/MIN/{1.73_M2}
GLUCOSE BLD-MCNC: 144 MG/DL (ref 65–99)
GLUCOSE BLDC GLUCOMTR-MCNC: 105 MG/DL (ref 70–105)
GLUCOSE BLDC GLUCOMTR-MCNC: 133 MG/DL (ref 70–105)
GLUCOSE BLDC GLUCOMTR-MCNC: 138 MG/DL (ref 70–105)
GLUCOSE BLDC GLUCOMTR-MCNC: 150 MG/DL (ref 70–105)
GLUCOSE BLDC GLUCOMTR-MCNC: 177 MG/DL (ref 70–105)
HCT VFR BLD AUTO: 33.2 % (ref 34–46.6)
HGB BLD-MCNC: 11.1 G/DL (ref 12–15.9)
INR PPP: 0.95 (ref 0.9–1.1)
LYMPHOCYTES # BLD AUTO: 1.1 10*3/MM3 (ref 0.7–3.1)
LYMPHOCYTES NFR BLD AUTO: 10.9 % (ref 19.6–45.3)
MCH RBC QN AUTO: 34 PG (ref 26.6–33)
MCHC RBC AUTO-ENTMCNC: 33.5 G/DL (ref 31.5–35.7)
MCV RBC AUTO: 101.4 FL (ref 79–97)
MONOCYTES # BLD AUTO: 0.5 10*3/MM3 (ref 0.1–0.9)
MONOCYTES NFR BLD AUTO: 4.8 % (ref 5–12)
NEUTROPHILS # BLD AUTO: 8.1 10*3/MM3 (ref 1.7–7)
NEUTROPHILS NFR BLD AUTO: 81.1 % (ref 42.7–76)
NRBC BLD AUTO-RTO: 0.1 /100 WBC (ref 0–0.2)
PLATELET # BLD AUTO: 302 10*3/MM3 (ref 140–450)
PMV BLD AUTO: 7.8 FL (ref 6–12)
POTASSIUM BLD-SCNC: 5 MMOL/L (ref 3.6–5.1)
PROTHROMBIN TIME: 10 SECONDS (ref 9.6–11.7)
RBC # BLD AUTO: 3.28 10*6/MM3 (ref 3.77–5.28)
RH BLD: NEGATIVE
SODIUM BLD-SCNC: 137 MMOL/L (ref 136–144)
T&S EXPIRATION DATE: NORMAL
WBC NRBC COR # BLD: 10 10*3/MM3 (ref 3.4–10.8)

## 2019-10-01 PROCEDURE — 86901 BLOOD TYPING SEROLOGIC RH(D): CPT | Performed by: SURGERY

## 2019-10-01 PROCEDURE — 86900 BLOOD TYPING SEROLOGIC ABO: CPT

## 2019-10-01 PROCEDURE — 85610 PROTHROMBIN TIME: CPT | Performed by: SURGERY

## 2019-10-01 PROCEDURE — 0WQF0ZZ REPAIR ABDOMINAL WALL, OPEN APPROACH: ICD-10-PCS | Performed by: SURGERY

## 2019-10-01 PROCEDURE — 85730 THROMBOPLASTIN TIME PARTIAL: CPT | Performed by: SURGERY

## 2019-10-01 PROCEDURE — 49565 PR REPAIR RECURR INCIS HERNIA,REDUC: CPT | Performed by: NURSE PRACTITIONER

## 2019-10-01 PROCEDURE — 25010000002 HYDROMORPHONE PER 4 MG: Performed by: ANESTHESIOLOGIST ASSISTANT

## 2019-10-01 PROCEDURE — 99024 POSTOP FOLLOW-UP VISIT: CPT | Performed by: SURGERY

## 2019-10-01 PROCEDURE — 82962 GLUCOSE BLOOD TEST: CPT

## 2019-10-01 PROCEDURE — 86900 BLOOD TYPING SEROLOGIC ABO: CPT | Performed by: SURGERY

## 2019-10-01 PROCEDURE — 25010000002 ONDANSETRON PER 1 MG: Performed by: NURSE PRACTITIONER

## 2019-10-01 PROCEDURE — 25010000002 MORPHINE PER 10 MG: Performed by: SURGERY

## 2019-10-01 PROCEDURE — 93005 ELECTROCARDIOGRAM TRACING: CPT | Performed by: SURGERY

## 2019-10-01 PROCEDURE — 25010000002 FENTANYL CITRATE (PF) 100 MCG/2ML SOLUTION: Performed by: ANESTHESIOLOGIST ASSISTANT

## 2019-10-01 PROCEDURE — 86850 RBC ANTIBODY SCREEN: CPT | Performed by: SURGERY

## 2019-10-01 PROCEDURE — 85025 COMPLETE CBC W/AUTO DIFF WBC: CPT | Performed by: NURSE PRACTITIONER

## 2019-10-01 PROCEDURE — 49565 PR REPAIR RECURR INCIS HERNIA,REDUC: CPT | Performed by: SURGERY

## 2019-10-01 PROCEDURE — 87081 CULTURE SCREEN ONLY: CPT | Performed by: FAMILY MEDICINE

## 2019-10-01 PROCEDURE — 0DN80ZZ RELEASE SMALL INTESTINE, OPEN APPROACH: ICD-10-PCS | Performed by: SURGERY

## 2019-10-01 PROCEDURE — 25010000002 PROPOFOL 10 MG/ML EMULSION: Performed by: ANESTHESIOLOGIST ASSISTANT

## 2019-10-01 PROCEDURE — 86901 BLOOD TYPING SEROLOGIC RH(D): CPT

## 2019-10-01 PROCEDURE — 99232 SBSQ HOSP IP/OBS MODERATE 35: CPT | Performed by: FAMILY MEDICINE

## 2019-10-01 PROCEDURE — 25010000002 DEXAMETHASONE PER 1 MG: Performed by: ANESTHESIOLOGIST ASSISTANT

## 2019-10-01 PROCEDURE — 80048 BASIC METABOLIC PNL TOTAL CA: CPT | Performed by: NURSE PRACTITIONER

## 2019-10-01 RX ORDER — GLYCOPYRROLATE 1 MG/5 ML
SYRINGE (ML) INTRAVENOUS AS NEEDED
Status: DISCONTINUED | OUTPATIENT
Start: 2019-10-01 | End: 2019-10-01 | Stop reason: SURG

## 2019-10-01 RX ORDER — DEXAMETHASONE SODIUM PHOSPHATE 4 MG/ML
INJECTION, SOLUTION INTRA-ARTICULAR; INTRALESIONAL; INTRAMUSCULAR; INTRAVENOUS; SOFT TISSUE AS NEEDED
Status: DISCONTINUED | OUTPATIENT
Start: 2019-10-01 | End: 2019-10-01 | Stop reason: SURG

## 2019-10-01 RX ORDER — PHENYLEPHRINE HCL IN 0.9% NACL 0.5 MG/5ML
SYRINGE (ML) INTRAVENOUS AS NEEDED
Status: DISCONTINUED | OUTPATIENT
Start: 2019-10-01 | End: 2019-10-01 | Stop reason: SURG

## 2019-10-01 RX ORDER — PROMETHAZINE HYDROCHLORIDE 25 MG/ML
6.25 INJECTION, SOLUTION INTRAMUSCULAR; INTRAVENOUS ONCE AS NEEDED
Status: DISCONTINUED | OUTPATIENT
Start: 2019-10-01 | End: 2019-10-01 | Stop reason: HOSPADM

## 2019-10-01 RX ORDER — HYDROMORPHONE HCL 110MG/55ML
0.5 PATIENT CONTROLLED ANALGESIA SYRINGE INTRAVENOUS
Status: DISCONTINUED | OUTPATIENT
Start: 2019-10-01 | End: 2019-10-01 | Stop reason: HOSPADM

## 2019-10-01 RX ORDER — PROMETHAZINE HYDROCHLORIDE 25 MG/1
25 TABLET ORAL ONCE AS NEEDED
Status: DISCONTINUED | OUTPATIENT
Start: 2019-10-01 | End: 2019-10-01 | Stop reason: HOSPADM

## 2019-10-01 RX ORDER — BUPIVACAINE HYDROCHLORIDE AND EPINEPHRINE 2.5; 5 MG/ML; UG/ML
INJECTION, SOLUTION EPIDURAL; INFILTRATION; INTRACAUDAL; PERINEURAL AS NEEDED
Status: DISCONTINUED | OUTPATIENT
Start: 2019-10-01 | End: 2019-10-01 | Stop reason: HOSPADM

## 2019-10-01 RX ORDER — LABETALOL HYDROCHLORIDE 5 MG/ML
5 INJECTION, SOLUTION INTRAVENOUS
Status: DISCONTINUED | OUTPATIENT
Start: 2019-10-01 | End: 2019-10-01 | Stop reason: HOSPADM

## 2019-10-01 RX ORDER — MORPHINE SULFATE 4 MG/ML
2 INJECTION, SOLUTION INTRAMUSCULAR; INTRAVENOUS
Status: DISCONTINUED | OUTPATIENT
Start: 2019-10-01 | End: 2019-10-02 | Stop reason: HOSPADM

## 2019-10-01 RX ORDER — FENTANYL CITRATE 50 UG/ML
50 INJECTION, SOLUTION INTRAMUSCULAR; INTRAVENOUS
Status: DISCONTINUED | OUTPATIENT
Start: 2019-10-01 | End: 2019-10-01 | Stop reason: HOSPADM

## 2019-10-01 RX ORDER — ROCURONIUM BROMIDE 10 MG/ML
INJECTION, SOLUTION INTRAVENOUS AS NEEDED
Status: DISCONTINUED | OUTPATIENT
Start: 2019-10-01 | End: 2019-10-01 | Stop reason: SURG

## 2019-10-01 RX ORDER — NEOSTIGMINE METHYLSULFATE 5 MG/5 ML
SYRINGE (ML) INTRAVENOUS AS NEEDED
Status: DISCONTINUED | OUTPATIENT
Start: 2019-10-01 | End: 2019-10-01 | Stop reason: SURG

## 2019-10-01 RX ORDER — ONDANSETRON 2 MG/ML
4 INJECTION INTRAMUSCULAR; INTRAVENOUS ONCE AS NEEDED
Status: DISCONTINUED | OUTPATIENT
Start: 2019-10-01 | End: 2019-10-01 | Stop reason: HOSPADM

## 2019-10-01 RX ORDER — HYDROCODONE BITARTRATE AND ACETAMINOPHEN 5; 325 MG/1; MG/1
1 TABLET ORAL EVERY 4 HOURS PRN
Status: DISCONTINUED | OUTPATIENT
Start: 2019-10-01 | End: 2019-10-02 | Stop reason: HOSPADM

## 2019-10-01 RX ORDER — FENTANYL CITRATE 50 UG/ML
INJECTION, SOLUTION INTRAMUSCULAR; INTRAVENOUS AS NEEDED
Status: DISCONTINUED | OUTPATIENT
Start: 2019-10-01 | End: 2019-10-01 | Stop reason: SURG

## 2019-10-01 RX ORDER — PROMETHAZINE HYDROCHLORIDE 25 MG/1
25 SUPPOSITORY RECTAL ONCE AS NEEDED
Status: DISCONTINUED | OUTPATIENT
Start: 2019-10-01 | End: 2019-10-01 | Stop reason: HOSPADM

## 2019-10-01 RX ORDER — LIDOCAINE HYDROCHLORIDE 20 MG/ML
INJECTION, SOLUTION EPIDURAL; INFILTRATION; INTRACAUDAL; PERINEURAL AS NEEDED
Status: DISCONTINUED | OUTPATIENT
Start: 2019-10-01 | End: 2019-10-01 | Stop reason: SURG

## 2019-10-01 RX ORDER — HYDRALAZINE HYDROCHLORIDE 20 MG/ML
5 INJECTION INTRAMUSCULAR; INTRAVENOUS
Status: DISCONTINUED | OUTPATIENT
Start: 2019-10-01 | End: 2019-10-01 | Stop reason: HOSPADM

## 2019-10-01 RX ORDER — PROPOFOL 10 MG/ML
VIAL (ML) INTRAVENOUS AS NEEDED
Status: DISCONTINUED | OUTPATIENT
Start: 2019-10-01 | End: 2019-10-01 | Stop reason: SURG

## 2019-10-01 RX ORDER — SODIUM CHLORIDE 9 MG/ML
INJECTION, SOLUTION INTRAVENOUS CONTINUOUS PRN
Status: DISCONTINUED | OUTPATIENT
Start: 2019-10-01 | End: 2019-10-01 | Stop reason: SURG

## 2019-10-01 RX ADMIN — LOSARTAN POTASSIUM 50 MG: 50 TABLET ORAL at 08:00

## 2019-10-01 RX ADMIN — Medication 10 ML: at 20:49

## 2019-10-01 RX ADMIN — PROPOFOL 160 MG: 10 INJECTION, EMULSION INTRAVENOUS at 13:29

## 2019-10-01 RX ADMIN — FENTANYL CITRATE 50 MCG: 50 INJECTION, SOLUTION INTRAMUSCULAR; INTRAVENOUS at 14:18

## 2019-10-01 RX ADMIN — CEFAZOLIN SODIUM 2 G: 1 INJECTION, POWDER, FOR SOLUTION INTRAMUSCULAR; INTRAVENOUS at 13:33

## 2019-10-01 RX ADMIN — PHENYLEPHRINE HYDROCHLORIDE 150 MCG: 10 INJECTION INTRAVENOUS at 14:44

## 2019-10-01 RX ADMIN — ONDANSETRON 4 MG: 2 INJECTION INTRAMUSCULAR; INTRAVENOUS at 03:20

## 2019-10-01 RX ADMIN — PHENYLEPHRINE HYDROCHLORIDE 200 MCG: 10 INJECTION INTRAVENOUS at 14:38

## 2019-10-01 RX ADMIN — DEXAMETHASONE SODIUM PHOSPHATE 4 MG: 4 INJECTION, SOLUTION INTRAMUSCULAR; INTRAVENOUS at 13:33

## 2019-10-01 RX ADMIN — HYDROMORPHONE HYDROCHLORIDE 0.5 MG: 2 INJECTION, SOLUTION INTRAMUSCULAR; INTRAVENOUS; SUBCUTANEOUS at 15:20

## 2019-10-01 RX ADMIN — Medication 10 ML: at 08:02

## 2019-10-01 RX ADMIN — CALCIUM ACETATE 1334 MG: 667 CAPSULE ORAL at 20:49

## 2019-10-01 RX ADMIN — SODIUM BICARBONATE 650 MG TABLET 1300 MG: at 17:36

## 2019-10-01 RX ADMIN — PHENYLEPHRINE HYDROCHLORIDE 100 MCG: 10 INJECTION INTRAVENOUS at 13:27

## 2019-10-01 RX ADMIN — DOCUSATE SODIUM 100 MG: 100 CAPSULE, LIQUID FILLED ORAL at 08:00

## 2019-10-01 RX ADMIN — CALCIUM ACETATE 1334 MG: 667 CAPSULE ORAL at 08:00

## 2019-10-01 RX ADMIN — SODIUM BICARBONATE 650 MG TABLET 1300 MG: at 20:49

## 2019-10-01 RX ADMIN — SODIUM CHLORIDE: 0.9 INJECTION, SOLUTION INTRAVENOUS at 13:24

## 2019-10-01 RX ADMIN — MORPHINE SULFATE 2 MG: 4 INJECTION INTRAVENOUS at 16:29

## 2019-10-01 RX ADMIN — PANTOPRAZOLE SODIUM 40 MG: 40 TABLET, DELAYED RELEASE ORAL at 03:20

## 2019-10-01 RX ADMIN — Medication 0.6 MG: at 14:45

## 2019-10-01 RX ADMIN — LIDOCAINE HYDROCHLORIDE 80 MG: 20 INJECTION, SOLUTION EPIDURAL; INFILTRATION; INTRACAUDAL; PERINEURAL at 13:29

## 2019-10-01 RX ADMIN — SODIUM BICARBONATE 650 MG TABLET 1300 MG: at 08:00

## 2019-10-01 RX ADMIN — PHENYLEPHRINE HYDROCHLORIDE 100 MCG: 10 INJECTION INTRAVENOUS at 13:50

## 2019-10-01 RX ADMIN — Medication 3 MG: at 14:45

## 2019-10-01 RX ADMIN — PHENYLEPHRINE HYDROCHLORIDE 200 MCG: 10 INJECTION INTRAVENOUS at 14:32

## 2019-10-01 RX ADMIN — HYDROCODONE BITARTRATE AND ACETAMINOPHEN 1 TABLET: 5; 325 TABLET ORAL at 20:49

## 2019-10-01 RX ADMIN — Medication 20 MG: at 21:05

## 2019-10-01 RX ADMIN — PHENYLEPHRINE HYDROCHLORIDE 100 MCG: 10 INJECTION INTRAVENOUS at 14:07

## 2019-10-01 RX ADMIN — CALCIUM ACETATE 1334 MG: 667 CAPSULE ORAL at 17:36

## 2019-10-01 RX ADMIN — DOCUSATE SODIUM 100 MG: 100 CAPSULE, LIQUID FILLED ORAL at 20:49

## 2019-10-01 RX ADMIN — PHENYLEPHRINE HYDROCHLORIDE 200 MCG: 10 INJECTION INTRAVENOUS at 14:49

## 2019-10-01 RX ADMIN — ROCURONIUM BROMIDE 50 MG: 10 INJECTION, SOLUTION INTRAVENOUS at 13:30

## 2019-10-01 RX ADMIN — ONDANSETRON 4 MG: 2 INJECTION INTRAMUSCULAR; INTRAVENOUS at 14:52

## 2019-10-01 RX ADMIN — PHENYLEPHRINE HYDROCHLORIDE 150 MCG: 10 INJECTION INTRAVENOUS at 14:25

## 2019-10-01 NOTE — ANESTHESIA POSTPROCEDURE EVALUATION
Patient: Sherry Lobato    Procedure Summary     Date:  10/01/19 Room / Location:  [facility] OR 06 / [facility] MAIN OR    Anesthesia Start:  1324 Anesthesia Stop:  1506    Procedure:  OPEN VENTRAL/INCISIONAL HERNIA REPAIR (N/A Abdomen) Diagnosis:       Abdominal wall hernia      (Abdominal wall hernia [K43.9])    Surgeon:  Nilay Stevens MD Provider:  Jan Marinelli MD    Anesthesia Type:  general ASA Status:  4          Anesthesia Type: general  Last vitals  BP   153/68 (10/01/19 1631)   Temp   97.3 °F (36.3 °C) (10/01/19 1631)   Pulse   95 (10/01/19 1631)   Resp   18 (10/01/19 1631)     SpO2   91 % (10/01/19 1631)     Post Anesthesia Care and Evaluation    Patient location during evaluation: PACU  Patient participation: complete - patient participated  Level of consciousness: awake  Pain scale: See nurse's notes for pain score.  Pain management: adequate  Airway patency: patent  Anesthetic complications: No anesthetic complications  PONV Status: none  Cardiovascular status: acceptable  Respiratory status: acceptable  Hydration status: acceptable    Comments: Patient seen and examined postoperatively; vital signs stable; SpO2 greater than or equal to 90%; cardiopulmonary status stable; nausea/vomiting adequately controlled; pain adequately controlled; no apparent anesthesia complications; patient discharged from anesthesia care when discharge criteria were met

## 2019-10-01 NOTE — ANESTHESIA PROCEDURE NOTES
Airway  Date/Time: 10/1/2019 1:31 PM    General Information and Staff    Patient location during procedure: OR    Indications and Patient Condition  Indications for airway management: airway protection    Preoxygenated: yes  Mask difficulty assessment: 2 - vent by mask + OA or adjuvant +/- NMBA    Final Airway Details  Final airway type: endotracheal airway      Successful airway: ETT  Cuffed: yes   Successful intubation technique: direct laryngoscopy  Endotracheal tube insertion site: oral  Blade: Lisa  Blade size: 3  ETT size (mm): 7.0  Cormack-Lehane Classification: grade I - full view of glottis  Placement verified by: chest auscultation, capnometry and palpation of cuff   Measured from: gums  ETT/EBT to gums (cm): 20  Number of attempts at approach: 1  Assessment: lips, teeth, and gum same as pre-op and atraumatic intubation

## 2019-10-01 NOTE — ANESTHESIA PREPROCEDURE EVALUATION
Anesthesia Evaluation     Patient summary reviewed and Nursing notes reviewed   no history of anesthetic complications:  NPO Solid Status: > 8 hours  NPO Liquid Status: > 8 hours           Airway   Mallampati: II  TM distance: >3 FB  Neck ROM: full  No difficulty expected  Dental - normal exam   (+) edentulous    Pulmonary    (+) a smoker Former, COPD, sleep apnea,   Cardiovascular     (+) hypertension, hyperlipidemia,  carotid artery disease      Neuro/Psych  GI/Hepatic/Renal/Endo    (+) obesity,  GERD, PUD,  renal disease ESRD and dialysis, diabetes mellitus,     Musculoskeletal     Abdominal    Substance History      OB/GYN          Other   (+) arthritis   history of cancer    ROS/Med Hx Other: Anemia, B12 deficiency, Lucas's, neuroendrocrine tumor, gout, insomnia, nephotic syndrome, h/o gastric cancer, peritoneal dialysis, h/o uterine cancer, DDD, h/o renal cancer                Anesthesia Plan    ASA 4     general   (Patient identified; pre-operative vital signs, all relevant labs/studies, complete medical/surgical/anesthetic history, full medication list, full allergy list, and NPO status obtained/reviewed; physical assessment performed; anesthetic options, side effects, potential complications, risks, and benefits discussed; questions answered; written anesthesia consent obtained; patient cleared for procedure; anesthesia machine and equipment checked and functioning)  intravenous induction   Anesthetic plan, all risks, benefits, and alternatives have been provided, discussed and informed consent has been obtained with: patient.    Plan discussed with CAA.

## 2019-10-02 VITALS
HEIGHT: 60 IN | RESPIRATION RATE: 17 BRPM | TEMPERATURE: 98.1 F | WEIGHT: 168.6 LBS | SYSTOLIC BLOOD PRESSURE: 147 MMHG | OXYGEN SATURATION: 95 % | BODY MASS INDEX: 33.1 KG/M2 | DIASTOLIC BLOOD PRESSURE: 55 MMHG | HEART RATE: 77 BPM

## 2019-10-02 LAB
ANION GAP SERPL CALCULATED.3IONS-SCNC: 21.4 MMOL/L (ref 5–15)
BUN BLD-MCNC: 31 MG/DL (ref 8–20)
BUN/CREAT SERPL: 5.3 (ref 5.4–26.2)
CALCIUM SPEC-SCNC: 9.5 MG/DL (ref 8.9–10.3)
CHLORIDE SERPL-SCNC: 101 MMOL/L (ref 101–111)
CO2 SERPL-SCNC: 22 MMOL/L (ref 22–32)
CREAT BLD-MCNC: 5.8 MG/DL (ref 0.4–1)
DEPRECATED RDW RBC AUTO: 62.6 FL (ref 37–54)
ERYTHROCYTE [DISTWIDTH] IN BLOOD BY AUTOMATED COUNT: 17.3 % (ref 12.3–15.4)
GFR SERPL CREATININE-BSD FRML MDRD: 7 ML/MIN/1.73
GFR SERPL CREATININE-BSD FRML MDRD: ABNORMAL ML/MIN/{1.73_M2}
GLUCOSE BLD-MCNC: 97 MG/DL (ref 65–99)
GLUCOSE BLDC GLUCOMTR-MCNC: 104 MG/DL (ref 70–105)
GLUCOSE BLDC GLUCOMTR-MCNC: 88 MG/DL (ref 70–105)
HCT VFR BLD AUTO: 35.6 % (ref 34–46.6)
HGB BLD-MCNC: 11.3 G/DL (ref 12–15.9)
MCH RBC QN AUTO: 32.5 PG (ref 26.6–33)
MCHC RBC AUTO-ENTMCNC: 31.8 G/DL (ref 31.5–35.7)
MCV RBC AUTO: 102.1 FL (ref 79–97)
MRSA SPEC QL CULT: NORMAL
PLATELET # BLD AUTO: 331 10*3/MM3 (ref 140–450)
PMV BLD AUTO: 8.1 FL (ref 6–12)
POTASSIUM BLD-SCNC: 4.9 MMOL/L (ref 3.6–5.1)
POTASSIUM BLD-SCNC: 6.4 MMOL/L (ref 3.6–5.1)
POTASSIUM BLD-SCNC: 6.4 MMOL/L (ref 3.6–5.1)
RBC # BLD AUTO: 3.48 10*6/MM3 (ref 3.77–5.28)
SODIUM BLD-SCNC: 138 MMOL/L (ref 136–144)
WBC NRBC COR # BLD: 13.5 10*3/MM3 (ref 3.4–10.8)

## 2019-10-02 PROCEDURE — 84132 ASSAY OF SERUM POTASSIUM: CPT | Performed by: INTERNAL MEDICINE

## 2019-10-02 PROCEDURE — 99238 HOSP IP/OBS DSCHRG MGMT 30/<: CPT | Performed by: FAMILY MEDICINE

## 2019-10-02 PROCEDURE — 99024 POSTOP FOLLOW-UP VISIT: CPT | Performed by: SURGERY

## 2019-10-02 PROCEDURE — 85027 COMPLETE CBC AUTOMATED: CPT | Performed by: INTERNAL MEDICINE

## 2019-10-02 PROCEDURE — 80048 BASIC METABOLIC PNL TOTAL CA: CPT | Performed by: NURSE PRACTITIONER

## 2019-10-02 PROCEDURE — 84132 ASSAY OF SERUM POTASSIUM: CPT | Performed by: FAMILY MEDICINE

## 2019-10-02 PROCEDURE — 82962 GLUCOSE BLOOD TEST: CPT

## 2019-10-02 PROCEDURE — 5A1D70Z PERFORMANCE OF URINARY FILTRATION, INTERMITTENT, LESS THAN 6 HOURS PER DAY: ICD-10-PCS | Performed by: FAMILY MEDICINE

## 2019-10-02 RX ORDER — HYDROCODONE BITARTRATE AND ACETAMINOPHEN 5; 325 MG/1; MG/1
1 TABLET ORAL EVERY 4 HOURS PRN
Qty: 15 TABLET | Refills: 0 | Status: SHIPPED | OUTPATIENT
Start: 2019-10-02 | End: 2019-10-11

## 2019-10-02 RX ORDER — POLYETHYLENE GLYCOL 3350 17 G/17G
17 POWDER, FOR SOLUTION ORAL DAILY
Qty: 30 PACKET | Refills: 0 | Status: SHIPPED | OUTPATIENT
Start: 2019-10-02 | End: 2020-12-27

## 2019-10-02 RX ADMIN — Medication 10 ML: at 08:57

## 2019-10-02 RX ADMIN — PANTOPRAZOLE SODIUM 40 MG: 40 TABLET, DELAYED RELEASE ORAL at 07:47

## 2019-10-02 RX ADMIN — POLYETHYLENE GLYCOL 3350 17 G: 17 POWDER, FOR SOLUTION ORAL at 08:57

## 2019-10-02 RX ADMIN — HYDROCODONE BITARTRATE AND ACETAMINOPHEN 1 TABLET: 5; 325 TABLET ORAL at 08:57

## 2019-10-02 RX ADMIN — HYDROCODONE BITARTRATE AND ACETAMINOPHEN 1 TABLET: 5; 325 TABLET ORAL at 17:15

## 2019-10-03 ENCOUNTER — READMISSION MANAGEMENT (OUTPATIENT)
Dept: CALL CENTER | Facility: HOSPITAL | Age: 69
End: 2019-10-03

## 2019-10-04 ENCOUNTER — READMISSION MANAGEMENT (OUTPATIENT)
Dept: CALL CENTER | Facility: HOSPITAL | Age: 69
End: 2019-10-04

## 2019-10-04 NOTE — OUTREACH NOTE
General Surgery Week 1 Survey      Responses   Facility patient discharged from?  [facility]   Does the patient have one of the following disease processes/diagnoses(primary or secondary)?  General Surgery   Is there a successful TCM telephone encounter documented?  No   Week 1 attempt successful?  Yes   Call start time  1052   Call end time  1100   General alerts for this patient  DIALYSIS PATIENT   Discharge diagnosis  Open ventral/incisional hernia repair   Medication alerts for this patient  OXYCODONE   Meds reviewed with patient/caregiver?  Yes   Is the patient having any side effects they believe may be caused by any medication additions or changes?  No   Does the patient have all medications related to this admission filled (includes all antibiotics, pain medications, etc.)  Yes   Is the patient taking all medications as directed (includes completed medication regime)?  Yes   Does the patient have a follow up appointment scheduled with their surgeon?  Yes   Has the patient kept scheduled appointments due by today?  N/A   Has home health visited the patient within 72 hours of discharge?  N/A   Did the patient receive a copy of their discharge instructions?  Yes   Nursing interventions  Reviewed instructions with patient   What is the patient's perception of their health status since discharge?  Improving   Is the patient /caregiver able to teach back basic post-op care?  Lifting as instructed by MD in discharge instructions, No tub bath, swimming, or hot tub until instructed by MD, Take showers only when approved by MD-sponge bathe until then, Drive as instructed by MD in discharge instructions, Practice 'cough and deep breath'   Is the patient/caregiver able to teach back signs and symptoms of incisional infection?  Fever, Pus or odor from incision, Incisional warmth, Increased drainage or bleeding, Increased redness, swelling or pain at the incisonal site   Is the patient/caregiver able to teach back steps to  recovery at home?  Set small, achievable goals for return to baseline health, Rest and rebuild strength, gradually increase activity, Practice good oral hygiene, Eat a well-balance diet, Make a list of questions for surgeon's appointment   Is the patient/caregiver able to teach back the hierarchy of who to call/visit for symptoms/problems? PCP, Specialist, Home health nurse, Urgent Care, ED, 911  Yes   Additional teach back comments  States she has some pain but is doing well.   Week 1 call completed?  Yes   Wrap up additional comments  No questions or needs at this time          Tawanna Lara LPN

## 2019-10-04 NOTE — OUTREACH NOTE
Prep Survey      Responses   Facility patient discharged from?  [facility]   Is patient eligible?  Yes   Discharge diagnosis  Open ventral/incisional hernia repair   Does the patient have one of the following disease processes/diagnoses(primary or secondary)?  General Surgery   Does the patient have Home health ordered?  No   Is there a DME ordered?  No   Prep survey completed?  Yes          Lubna Ballesteros RN

## 2019-10-05 LAB
BACTERIA SPEC AEROBE CULT: NORMAL
BACTERIA SPEC AEROBE CULT: NORMAL

## 2019-10-07 ENCOUNTER — TELEPHONE (OUTPATIENT)
Dept: SURGERY | Facility: CLINIC | Age: 69
End: 2019-10-07

## 2019-10-07 NOTE — TELEPHONE ENCOUNTER
Pt calling stating that she was told to come in this week to have staples removed since Dr. Stevens would be on vacation next week. Pt had an Open incisional hernia repair and lysis of adhesions on 10/1/19,     Do you want the patient to come in this week, or wait until you return to the office. Please advise?

## 2019-10-09 NOTE — TELEPHONE ENCOUNTER
Per Dr. Stevens , pt is to have staples removed once he returns to the office. If she has any PO complications she is to see  this week. Pt voiced understanding .

## 2019-10-10 PROCEDURE — 93010 ELECTROCARDIOGRAM REPORT: CPT | Performed by: INTERNAL MEDICINE

## 2019-10-11 ENCOUNTER — READMISSION MANAGEMENT (OUTPATIENT)
Dept: CALL CENTER | Facility: HOSPITAL | Age: 69
End: 2019-10-11

## 2019-10-11 NOTE — OUTREACH NOTE
General Surgery Week 2 Survey      Responses   Facility patient discharged from?  [facility]   Does the patient have one of the following disease processes/diagnoses(primary or secondary)?  General Surgery   Week 2 attempt successful?  No   Rescheduled  Revoked   Revoke  Decline to participate [NO ANSWER, LEFT VM]          Trinidad Lund LPN

## 2019-10-15 ENCOUNTER — APPOINTMENT (OUTPATIENT)
Dept: CARDIOLOGY | Facility: HOSPITAL | Age: 69
End: 2019-10-15

## 2019-10-15 ENCOUNTER — APPOINTMENT (OUTPATIENT)
Dept: VASCULAR SURGERY | Facility: HOSPITAL | Age: 69
End: 2019-10-15

## 2019-10-22 ENCOUNTER — OFFICE VISIT (OUTPATIENT)
Dept: SURGERY | Facility: CLINIC | Age: 69
End: 2019-10-22

## 2019-10-22 VITALS
WEIGHT: 174 LBS | BODY MASS INDEX: 34.16 KG/M2 | OXYGEN SATURATION: 99 % | HEIGHT: 60 IN | HEART RATE: 89 BPM | SYSTOLIC BLOOD PRESSURE: 166 MMHG | TEMPERATURE: 98 F | DIASTOLIC BLOOD PRESSURE: 72 MMHG

## 2019-10-22 DIAGNOSIS — K43.9 VENTRAL HERNIA WITHOUT OBSTRUCTION OR GANGRENE: Primary | ICD-10-CM

## 2019-10-22 PROCEDURE — 99024 POSTOP FOLLOW-UP VISIT: CPT | Performed by: SURGERY

## 2019-10-22 RX ORDER — LIDOCAINE AND PRILOCAINE 25; 25 MG/G; MG/G
1 CREAM TOPICAL DAILY PRN
Refills: 4 | COMMUNITY
Start: 2019-09-27

## 2019-10-22 NOTE — PROGRESS NOTES
"Subjective   Sherry Lobato is a 69 y.o. female.   3 weeks out from open incisional hernia repair, appendectomy she had an intermediate recurrence with a primary probing repair about a month prior, is here for follow-up.  She says she is done very well in her recovery this time she had minimal pain no nausea or vomiting her bowels are moving and she has had no significant incisional bulging.  She has had some irritation from staples out and that is doing well.    Objective   /72   Pulse 89   Temp 98 °F (36.7 °C) (Oral)   Ht 152.4 cm (60\")   Wt 78.9 kg (174 lb)   SpO2 99%   BMI 33.98 kg/m²   Physical Exam   Constitutional: She appears well-developed and well-nourished.   HENT:   Head: Normocephalic and atraumatic.   Abdominal:   Soft nondistended nontender palpation the right lower quadrant incisions healing without erythema or drainage.  Staples are in place.   Skin: Skin is warm and dry.       Assessment/Plan   Sherry was seen today for post-op.    Diagnoses and all orders for this visit:    Ventral hernia without obstruction or gangrene    Status post ventral hernia repair with suture only.  So far things are looking good no evidence of recurrence.  We will have her follow-up in 1 month.  Continue with her lifting restrictions.     Nilay Stevens MD  10/22/2019  9:30 AM    "

## 2019-11-25 RX ORDER — NEPHROCAP 1 MG
CAP ORAL
Qty: 100 CAPSULE | Refills: 0 | Status: SHIPPED | OUTPATIENT
Start: 2019-11-25 | End: 2020-03-17

## 2019-11-25 RX ORDER — SODIUM BICARBONATE 650 MG/1
TABLET ORAL
Qty: 180 TABLET | Refills: 0 | Status: SHIPPED | OUTPATIENT
Start: 2019-11-25 | End: 2020-03-17

## 2019-11-26 ENCOUNTER — OFFICE VISIT (OUTPATIENT)
Dept: SURGERY | Facility: CLINIC | Age: 69
End: 2019-11-26

## 2019-11-26 VITALS
HEIGHT: 60 IN | TEMPERATURE: 97.7 F | OXYGEN SATURATION: 99 % | SYSTOLIC BLOOD PRESSURE: 170 MMHG | BODY MASS INDEX: 33.18 KG/M2 | WEIGHT: 169 LBS | HEART RATE: 79 BPM | DIASTOLIC BLOOD PRESSURE: 65 MMHG

## 2019-11-26 DIAGNOSIS — K43.9 VENTRAL HERNIA WITHOUT OBSTRUCTION OR GANGRENE: Primary | ICD-10-CM

## 2019-11-26 PROBLEM — Z96.1 PSEUDOPHAKIA OF BOTH EYES: Status: ACTIVE | Noted: 2019-11-26

## 2019-11-26 PROCEDURE — 99024 POSTOP FOLLOW-UP VISIT: CPT | Performed by: SURGERY

## 2019-11-26 RX ORDER — LANCETS 33 GAUGE
EACH MISCELLANEOUS
Refills: 6 | COMMUNITY
Start: 2019-11-02 | End: 2020-02-13

## 2019-11-26 NOTE — PROGRESS NOTES
"Subjective   Sherry Lobato is a 69 y.o. female.   6-year-old lady who is about 6 weeks out from a open incisional hernia repair in the right mid abdomen.  Her original operation was complicated by early recurrence for which she required a reoperation about a month after her suture repair alone.  Her second repair was done in a similar fashion with multiple interrupted Ethibond sutures.  Once again mesh was avoided due to her hopes to have a kidney transplant.  Since I last saw her she has done very well.  She says she is having no issues with nausea vomiting constipation or abdominal pain along the incision.  She denies any abdominal bulging.    Objective   /65   Pulse 79   Temp 97.7 °F (36.5 °C) (Oral)   Ht 152.4 cm (60\")   Wt 76.7 kg (169 lb)   SpO2 99%   BMI 33.01 kg/m²   Physical Exam   Constitutional: She appears well-developed and well-nourished.   HENT:   Head: Normocephalic and atraumatic.   Eyes: Conjunctivae are normal. No scleral icterus.   Pulmonary/Chest: Effort normal. No respiratory distress.   Abdominal:   Abdomen is soft and obese is nontender to palpation the right mid abdominal incision is healed well without any evidence of infection.  There is no palpable evidence of hernia.   Neurological: She is alert. No cranial nerve deficit.   Skin: Skin is warm and dry.   Psychiatric: She has a normal mood and affect. Her behavior is normal.       Assessment/Plan   Sherry was seen today for post-op.    Diagnoses and all orders for this visit:    Ventral hernia without obstruction or gangrene    About 6 weeks out from open ventral hernia repair.  We will have her follow-up with me as needed.  She has ongoing hopes to have her kidney transplantation but is starting to get discouraged by the waiting process.    Nilay Stevens MD  11/26/2019  8:50 AM    "

## 2019-12-16 RX ORDER — SIMVASTATIN 20 MG
TABLET ORAL
Qty: 90 TABLET | Refills: 0 | Status: SHIPPED | OUTPATIENT
Start: 2019-12-16 | End: 2020-03-17

## 2020-02-13 RX ORDER — LANCETS 33 GAUGE
EACH MISCELLANEOUS
Qty: 100 EACH | Refills: 11 | Status: SHIPPED | OUTPATIENT
Start: 2020-02-13 | End: 2021-05-06 | Stop reason: SDUPTHER

## 2020-03-17 RX ORDER — SODIUM BICARBONATE 650 MG/1
TABLET ORAL
Qty: 180 TABLET | Refills: 0 | Status: SHIPPED | OUTPATIENT
Start: 2020-03-17 | End: 2020-06-15

## 2020-03-17 RX ORDER — SIMVASTATIN 20 MG
TABLET ORAL
Qty: 90 TABLET | Refills: 0 | Status: SHIPPED | OUTPATIENT
Start: 2020-03-17 | End: 2020-03-30

## 2020-03-17 RX ORDER — NEPHROCAP 1 MG
CAP ORAL
Qty: 100 CAPSULE | Refills: 0 | Status: SHIPPED | OUTPATIENT
Start: 2020-03-17 | End: 2020-03-24

## 2020-03-24 ENCOUNTER — OFFICE VISIT (OUTPATIENT)
Dept: FAMILY MEDICINE CLINIC | Facility: CLINIC | Age: 70
End: 2020-03-24

## 2020-03-24 VITALS
BODY MASS INDEX: 32.46 KG/M2 | WEIGHT: 166.2 LBS | RESPIRATION RATE: 12 BRPM | SYSTOLIC BLOOD PRESSURE: 167 MMHG | HEART RATE: 105 BPM | DIASTOLIC BLOOD PRESSURE: 80 MMHG

## 2020-03-24 DIAGNOSIS — E78.00 PURE HYPERCHOLESTEROLEMIA: Chronic | ICD-10-CM

## 2020-03-24 DIAGNOSIS — E11.9 TYPE 2 DIABETES MELLITUS WITHOUT COMPLICATION, WITHOUT LONG-TERM CURRENT USE OF INSULIN (HCC): Primary | Chronic | ICD-10-CM

## 2020-03-24 DIAGNOSIS — E55.9 VITAMIN D DEFICIENCY: ICD-10-CM

## 2020-03-24 DIAGNOSIS — I10 ESSENTIAL HYPERTENSION: Chronic | ICD-10-CM

## 2020-03-24 DIAGNOSIS — E11.42 DIABETIC PERIPHERAL NEUROPATHY (HCC): ICD-10-CM

## 2020-03-24 PROCEDURE — 99214 OFFICE O/P EST MOD 30 MIN: CPT | Performed by: FAMILY MEDICINE

## 2020-03-24 RX ORDER — AMLODIPINE BESYLATE 5 MG/1
TABLET ORAL DAILY
COMMUNITY
Start: 2020-03-18 | End: 2020-12-27

## 2020-03-24 RX ORDER — GABAPENTIN 100 MG/1
100 CAPSULE ORAL NIGHTLY
Qty: 30 CAPSULE | Refills: 5 | Status: SHIPPED | OUTPATIENT
Start: 2020-03-24 | End: 2020-03-30

## 2020-03-24 RX ORDER — LOSARTAN POTASSIUM 100 MG/1
100 TABLET ORAL DAILY
Qty: 90 TABLET | Refills: 3 | COMMUNITY
Start: 2020-03-24 | End: 2021-07-25

## 2020-03-24 NOTE — ASSESSMENT & PLAN NOTE
Worsening  We will start gabapentin 100 mg nightly.  She can call me in 2 weeks to increase the dose if not helping

## 2020-03-24 NOTE — ASSESSMENT & PLAN NOTE
Lipid abnormalities are improving with treatment.  Nutritional counseling was provided. and Pharmacotherapy as ordered.  Lipids will be reassessed 4 months.

## 2020-03-24 NOTE — ASSESSMENT & PLAN NOTE
Hypertension is worsening.  Dietary sodium restriction.  Regular aerobic exercise.  Medication changes per orders.  Blood pressure will be reassessed 4 months.    Norvasc 5 mg daily, may consider increasing the dose if her blood pressure remains high

## 2020-03-24 NOTE — PROGRESS NOTES
Rooming Tab(CC,VS,Pt Hx,Fall Screen)  Chief Complaint   Patient presents with   • Diabetes   • Hypertension       Subjective Patient is here for follow-up of her diabetes.  Her blood sugar averages about 130.  On occasion will be a little bit higher when she is not eating what she is supposed to.  She does not exercise.  She checks her feet daily and she is having burning in her feet especially at night that is bothering her quite a bit.  It causes her a lot of pain.  She sees an ophthalmologist yearly.  She has no low sugars.  Patient has hypertension her blood pressures been high more recently.  Sometimes systolic it will get to a 200, 2 days ago Dr. Buckley put her on Norvasc 5 mg daily after he had already increased her losartan to 100 mg daily from 50 mg daily.  She denies any dizziness syncope or chest pain.  She is not complaining of a lot of headaches.  Patient has hyperlipidemia and we need to recheck her cholesterol.  Patient has vitamin D deficiency and is due for a vitamin D level.    I have reviewed and updated her medications, medical history and problem list during today's office visit.     Patient Care Team:  Jordan Calixto MD as PCP - General  Jordan Calixto MD as PCP - Family Medicine  [facility] [place], Franki RICH MD as PCP - Claims Attributed    Problem List Tab  Medications Tab  Synopsis Tab  Chart Review Tab  Care Everywhere Tab  Immunizations Tab  Patient History Tab    Social History     Tobacco Use   • Smoking status: Former Smoker     Packs/day: 0.50     Years: 67.00     Pack years: 33.50     Types: Cigarettes   • Smokeless tobacco: Never Used   Substance Use Topics   • Alcohol use: No     Frequency: Never       Review of Systems   Constitutional: Positive for fatigue. Negative for chills and fever.   HENT: Negative for nosebleeds.    Eyes: Negative for double vision.   Respiratory: Negative for chest tightness and shortness of breath.    Cardiovascular: Negative for chest pain and palpitations.    Gastrointestinal: Negative for blood in stool.   Genitourinary: Negative for dysuria and hematuria.   Neurological: Negative for dizziness and syncope.   Psychiatric/Behavioral: Negative for depressed mood.       Objective     Rooming Tab(CC,VS,Pt Hx,Fall Screen)  /80 (BP Location: Left arm, Patient Position: Sitting, Cuff Size: Adult)   Pulse 105   Resp 12   Wt 75.4 kg (166 lb 3.2 oz)   BMI 32.46 kg/m²     Body mass index is 32.46 kg/m².    Physical Exam   Constitutional: She is oriented to person, place, and time. She appears well-developed and well-nourished. No distress.   HENT:   Head: Normocephalic and atraumatic.   Nose: Nose normal.   Mouth/Throat: Oropharynx is clear and moist.   Eyes: Pupils are equal, round, and reactive to light. Conjunctivae, EOM and lids are normal.   Neck: Trachea normal and normal range of motion. Neck supple. No JVD present. Carotid bruit is not present. No thyroid mass and no thyromegaly present.   No carotid bruits   Cardiovascular: Normal rate, regular rhythm, normal heart sounds and intact distal pulses.   Pulmonary/Chest: Effort normal and breath sounds normal.   Musculoskeletal:   No c/c/e  Sensation is intact, DP pulses palpable, no diabetic foot ulcers or skin breakdown   Neurological: She is alert and oriented to person, place, and time. No cranial nerve deficit.   Skin: Skin is warm and dry.   Psychiatric: She has a normal mood and affect. Her speech is normal and behavior is normal. She is attentive.   Nursing note and vitals reviewed.       Statin Choice Calculator  Data Reviewed:                   Assessment/Plan   Order Review Tab  Health Maintenance Tab  Patient Plan/Order Tab  Diagnoses and all orders for this visit:    1. Type 2 diabetes mellitus without complication, without long-term current use of insulin (CMS/[facility]) (Primary)  Assessment & Plan:  Diabetes is improving with treatment.   Continue current treatment regimen.  Reminded to bring in blood  sugar diary at next visit.  Dietary recommendations for ADA diet.  Regular aerobic exercise.  Discussed ways to avoid symptomatic hypoglycemia.  Discussed foot care.  Diabetes will be reassessed 4 months.    Orders:  -     Comprehensive Metabolic Panel; Future  -     Hemoglobin A1c; Future    2. Essential hypertension  Assessment & Plan:  Hypertension is worsening.  Dietary sodium restriction.  Regular aerobic exercise.  Medication changes per orders.  Blood pressure will be reassessed 4 months.    Norvasc 5 mg daily, may consider increasing the dose if her blood pressure remains high      3. Pure hypercholesterolemia  Assessment & Plan:  Lipid abnormalities are improving with treatment.  Nutritional counseling was provided. and Pharmacotherapy as ordered.  Lipids will be reassessed 4 months.    Orders:  -     Lipid Panel; Future    4. Vitamin D deficiency  Assessment & Plan:  Improved, recheck vitamin D level    Orders:  -     Vitamin D 25 Hydroxy; Future    5. Diabetic peripheral neuropathy (CMS/HCC)  Assessment & Plan:  Worsening  We will start gabapentin 100 mg nightly.  She can call me in 2 weeks to increase the dose if not helping      Other orders  -     losartan (COZAAR) 100 MG tablet; Take 1 tablet by mouth Daily.  Dispense: 90 tablet; Refill: 3  -     gabapentin (NEURONTIN) 100 MG capsule; Take 1 capsule by mouth Every Night.  Dispense: 30 capsule; Refill: 5      Wrapup Tab  Return in about 4 months (around 7/24/2020) for Medicare Wellness.     Stop smoking    Advance Care Planning   ACP discussion was held with the patient during this visit. Patient has an advance directive (not in EMR), copy requested. Patient does not have an advance directive, information provided.

## 2020-03-24 NOTE — ASSESSMENT & PLAN NOTE
Diabetes is improving with treatment.   Continue current treatment regimen.  Reminded to bring in blood sugar diary at next visit.  Dietary recommendations for ADA diet.  Regular aerobic exercise.  Discussed ways to avoid symptomatic hypoglycemia.  Discussed foot care.  Diabetes will be reassessed 4 months.

## 2020-03-28 ENCOUNTER — LAB (OUTPATIENT)
Dept: LAB | Facility: HOSPITAL | Age: 70
End: 2020-03-28

## 2020-03-28 DIAGNOSIS — E55.9 VITAMIN D DEFICIENCY: ICD-10-CM

## 2020-03-28 DIAGNOSIS — E78.00 PURE HYPERCHOLESTEROLEMIA: Chronic | ICD-10-CM

## 2020-03-28 DIAGNOSIS — E11.9 TYPE 2 DIABETES MELLITUS WITHOUT COMPLICATION, WITHOUT LONG-TERM CURRENT USE OF INSULIN (HCC): Chronic | ICD-10-CM

## 2020-03-28 LAB
25(OH)D3 SERPL-MCNC: 44.9 NG/ML (ref 30–100)
ALBUMIN SERPL-MCNC: 4.4 G/DL (ref 3.5–5.2)
ALBUMIN/GLOB SERPL: 1.2 G/DL
ALP SERPL-CCNC: 109 U/L (ref 39–117)
ALT SERPL W P-5'-P-CCNC: 26 U/L (ref 1–33)
ANION GAP SERPL CALCULATED.3IONS-SCNC: 16 MMOL/L (ref 5–15)
AST SERPL-CCNC: 29 U/L (ref 1–32)
BILIRUB SERPL-MCNC: 0.3 MG/DL (ref 0.2–1.2)
BUN BLD-MCNC: 18 MG/DL (ref 8–23)
BUN/CREAT SERPL: 4.9 (ref 7–25)
CALCIUM SPEC-SCNC: 9.7 MG/DL (ref 8.6–10.5)
CHLORIDE SERPL-SCNC: 96 MMOL/L (ref 98–107)
CHOLEST SERPL-MCNC: 174 MG/DL (ref 0–200)
CO2 SERPL-SCNC: 29 MMOL/L (ref 22–29)
CREAT BLD-MCNC: 3.66 MG/DL (ref 0.57–1)
GFR SERPL CREATININE-BSD FRML MDRD: 12 ML/MIN/1.73
GFR SERPL CREATININE-BSD FRML MDRD: ABNORMAL ML/MIN/{1.73_M2}
GLOBULIN UR ELPH-MCNC: 3.7 GM/DL
GLUCOSE BLD-MCNC: 87 MG/DL (ref 65–99)
HDLC SERPL-MCNC: 50 MG/DL (ref 40–60)
LDLC SERPL CALC-MCNC: 92 MG/DL (ref 0–100)
LDLC/HDLC SERPL: 1.85 {RATIO}
POTASSIUM BLD-SCNC: 4.2 MMOL/L (ref 3.5–5.2)
PROT SERPL-MCNC: 8.1 G/DL (ref 6–8.5)
SODIUM BLD-SCNC: 141 MMOL/L (ref 136–145)
TRIGL SERPL-MCNC: 158 MG/DL (ref 0–150)
VLDLC SERPL-MCNC: 31.6 MG/DL (ref 5–40)

## 2020-03-28 PROCEDURE — 83036 HEMOGLOBIN GLYCOSYLATED A1C: CPT

## 2020-03-28 PROCEDURE — 80061 LIPID PANEL: CPT

## 2020-03-28 PROCEDURE — 36415 COLL VENOUS BLD VENIPUNCTURE: CPT

## 2020-03-28 PROCEDURE — 82306 VITAMIN D 25 HYDROXY: CPT

## 2020-03-28 PROCEDURE — 80053 COMPREHEN METABOLIC PANEL: CPT

## 2020-03-30 LAB — HBA1C MFR BLD: 6.2 % (ref 3.5–5.6)

## 2020-03-30 RX ORDER — SIMVASTATIN 40 MG
40 TABLET ORAL NIGHTLY
Qty: 90 TABLET | Refills: 3 | Status: SHIPPED | OUTPATIENT
Start: 2020-03-30 | End: 2021-05-06 | Stop reason: SDUPTHER

## 2020-03-30 RX ORDER — AMITRIPTYLINE HYDROCHLORIDE 10 MG/1
10 TABLET, FILM COATED ORAL NIGHTLY
Qty: 30 TABLET | Refills: 5 | Status: SHIPPED | OUTPATIENT
Start: 2020-03-30 | End: 2020-12-27

## 2020-05-05 RX ORDER — PANTOPRAZOLE SODIUM 40 MG/1
TABLET, DELAYED RELEASE ORAL
Qty: 180 TABLET | Refills: 2 | Status: SHIPPED | OUTPATIENT
Start: 2020-05-05 | End: 2023-01-26

## 2020-06-15 RX ORDER — NEPHROCAP 1 MG
CAP ORAL
Qty: 100 CAPSULE | Refills: 0 | OUTPATIENT
Start: 2020-06-15

## 2020-06-15 RX ORDER — SODIUM BICARBONATE 650 MG/1
TABLET ORAL
Qty: 180 TABLET | Refills: 0 | Status: SHIPPED | OUTPATIENT
Start: 2020-06-15 | End: 2020-09-16

## 2020-06-22 RX ORDER — NEPHROCAP 1 MG
CAP ORAL
Qty: 100 CAPSULE | Refills: 0 | Status: SHIPPED | OUTPATIENT
Start: 2020-06-22 | End: 2020-10-05

## 2020-07-24 ENCOUNTER — OFFICE VISIT (OUTPATIENT)
Dept: FAMILY MEDICINE CLINIC | Facility: CLINIC | Age: 70
End: 2020-07-24

## 2020-07-24 VITALS
RESPIRATION RATE: 8 BRPM | SYSTOLIC BLOOD PRESSURE: 137 MMHG | TEMPERATURE: 98 F | WEIGHT: 160 LBS | HEIGHT: 60 IN | DIASTOLIC BLOOD PRESSURE: 55 MMHG | BODY MASS INDEX: 31.41 KG/M2 | HEART RATE: 95 BPM | OXYGEN SATURATION: 96 %

## 2020-07-24 DIAGNOSIS — I10 ESSENTIAL HYPERTENSION: Primary | Chronic | ICD-10-CM

## 2020-07-24 DIAGNOSIS — Z60.9 HIGH RISK SOCIAL SITUATION: ICD-10-CM

## 2020-07-24 DIAGNOSIS — Z72.0 TOBACCO ABUSE: ICD-10-CM

## 2020-07-24 DIAGNOSIS — E11.42 DIABETIC PERIPHERAL NEUROPATHY (HCC): ICD-10-CM

## 2020-07-24 DIAGNOSIS — E78.00 PURE HYPERCHOLESTEROLEMIA: Chronic | ICD-10-CM

## 2020-07-24 DIAGNOSIS — E11.9 TYPE 2 DIABETES MELLITUS WITHOUT COMPLICATION, WITHOUT LONG-TERM CURRENT USE OF INSULIN (HCC): Chronic | ICD-10-CM

## 2020-07-24 PROCEDURE — 99213 OFFICE O/P EST LOW 20 MIN: CPT | Performed by: FAMILY MEDICINE

## 2020-07-24 RX ORDER — GABAPENTIN 300 MG/1
300 CAPSULE ORAL NIGHTLY
Qty: 30 CAPSULE | Refills: 11 | Status: SHIPPED | OUTPATIENT
Start: 2020-07-24 | End: 2020-12-27

## 2020-07-24 SDOH — SOCIAL STABILITY - SOCIAL INSECURITY: PROBLEM RELATED TO SOCIAL ENVIRONMENT, UNSPECIFIED: Z60.9

## 2020-07-24 NOTE — PATIENT INSTRUCTIONS
Please check with your insurance and get the new shingrix vaccine series to prevent shingles.  Please check with insurance and get your adacel Tdap immunization    Exercising to Stay Healthy  To become healthy and stay healthy, it is recommended that you do moderate-intensity and vigorous-intensity exercise. You can tell that you are exercising at a moderate intensity if your heart starts beating faster and you start breathing faster but can still hold a conversation. You can tell that you are exercising at a vigorous intensity if you are breathing much harder and faster and cannot hold a conversation while exercising.  Exercising regularly is important. It has many health benefits, such as:  · Improving overall fitness, flexibility, and endurance.  · Increasing bone density.  · Helping with weight control.  · Decreasing body fat.  · Increasing muscle strength.  · Reducing stress and tension.  · Improving overall health.  How often should I exercise?  Choose an activity that you enjoy, and set realistic goals. Your health care provider can help you make an activity plan that works for you.  Exercise regularly as told by your health care provider. This may include:  · Doing strength training two times a week, such as:  ? Lifting weights.  ? Using resistance bands.  ? Push-ups.  ? Sit-ups.  ? Yoga.  · Doing a certain intensity of exercise for a given amount of time. Choose from these options:  ? A total of 150 minutes of moderate-intensity exercise every week.  ? A total of 75 minutes of vigorous-intensity exercise every week.  ? A mix of moderate-intensity and vigorous-intensity exercise every week.  Children, pregnant women, people who have not exercised regularly, people who are overweight, and older adults may need to talk with a health care provider about what activities are safe to do. If you have a medical condition, be sure to talk with your health care provider before you start a new exercise program.  What  are some exercise ideas?  Moderate-intensity exercise ideas include:  · Walking 1 mile (1.6 km) in about 15 minutes.  · Biking.  · Hiking.  · Golfing.  · Dancing.  · Water aerobics.  Vigorous-intensity exercise ideas include:  · Walking 4.5 miles (7.2 km) or more in about 1 hour.  · Jogging or running 5 miles (8 km) in about 1 hour.  · Biking 10 miles (16.1 km) or more in about 1 hour.  · Lap swimming.  · Roller-skating or in-line skating.  · Cross-country skiing.  · Vigorous competitive sports, such as football, basketball, and soccer.  · Jumping rope.  · Aerobic dancing.  What are some everyday activities that can help me to get exercise?  · Yard work, such as:  ? Pushing a .  ? Raking and bagging leaves.  · Washing your car.  · Pushing a stroller.  · Shoveling snow.  · Gardening.  · Washing windows or floors.  How can I be more active in my day-to-day activities?  · Use stairs instead of an elevator.  · Take a walk during your lunch break.  · If you drive, park your car farther away from your work or school.  · If you take public transportation, get off one stop early and walk the rest of the way.  · Stand up or walk around during all of your indoor phone calls.  · Get up, stretch, and walk around every 30 minutes throughout the day.  · Enjoy exercise with a friend. Support to continue exercising will help you keep a regular routine of activity.  What guidelines can I follow while exercising?  · Before you start a new exercise program, talk with your health care provider.  · Do not exercise so much that you hurt yourself, feel dizzy, or get very short of breath.  · Wear comfortable clothes and wear shoes with good support.  · Drink plenty of water while you exercise to prevent dehydration or heat stroke.  · Work out until your breathing and your heartbeat get faster.  Where to find more information  · [place] Department of Health and Human Services: www.hhs.gov  · Centers for Disease Control and Prevention  (CDC): www.cdc.gov  Summary  · Exercising regularly is important. It will improve your overall fitness, flexibility, and endurance.  · Regular exercise also will improve your overall health. It can help you control your weight, reduce stress, and improve your bone density.  · Do not exercise so much that you hurt yourself, feel dizzy, or get very short of breath.  · Before you start a new exercise program, talk with your health care provider.  This information is not intended to replace advice given to you by your health care provider. Make sure you discuss any questions you have with your health care provider.  Document Released: 01/20/2012 Document Revised: 11/30/2018 Document Reviewed: 11/08/2018  [employer] Patient Education © 2020 [employer]      Smoking Tobacco Information, Adult  Smoking tobacco can be harmful to your health. Tobacco contains a poisonous (toxic), colorless chemical called nicotine. Nicotine is addictive. It changes the brain and can make it hard to stop smoking. Tobacco also has other toxic chemicals that can hurt your body and raise your risk of many cancers.  How can smoking tobacco affect me?  Smoking tobacco puts you at risk for:  · Cancer. Smoking is most commonly associated with lung cancer, but can also lead to cancer in other parts of the body.  · Chronic obstructive pulmonary disease (COPD). This is a long-term lung condition that makes it hard to breathe. It also gets worse over time.  · High blood pressure (hypertension), heart disease, stroke, or heart attack.  · Lung infections, such as pneumonia.  · Cataracts. This is when the lenses in the eyes become clouded.  · Digestive problems. This may include peptic ulcers, heartburn, and gastroesophageal reflux disease (GERD).  · Oral health problems, such as gum disease and tooth loss.  · Loss of taste and smell.  Smoking can affect your appearance by causing:  · Wrinkles.  · Yellow or stained teeth, fingers, and fingernails.  Smoking  tobacco can also affect your social life, because:  · It may be challenging to find places to smoke when away from home. Many workplaces, restaurants, hotels, and public places are tobacco-free.  · Smoking is expensive. This is due to the cost of tobacco and the long-term costs of treating health problems from smoking.  · Secondhand smoke may affect those around you. Secondhand smoke can cause lung cancer, breathing problems, and heart disease. Children of smokers have a higher risk for:  ? Sudden infant death syndrome (SIDS).  ? Ear infections.  ? Lung infections.  If you currently smoke tobacco, quitting now can help you:  · Lead a longer and healthier life.  · Look, smell, breathe, and feel better over time.  · Save money.  · Protect others from the harms of secondhand smoke.  What actions can I take to prevent health problems?  Quit smoking    · Do not start smoking. Quit if you already do.  · Make a plan to quit smoking and commit to it. Look for programs to help you and ask your health care provider for recommendations and ideas.  · Set a date and write down all the reasons you want to quit.  · Let your friends and family know you are quitting so they can help and support you. Consider finding friends who also want to quit. It can be easier to quit with someone else, so that you can support each other.  · Talk with your health care provider about using nicotine replacement medicines to help you quit, such as gum, lozenges, patches, sprays, or pills.  · Do not replace cigarette smoking with electronic cigarettes, which are commonly called e-cigarettes. The safety of e-cigarettes is not known, and some may contain harmful chemicals.  · If you try to quit but return to smoking, stay positive. It is common to slip up when you first quit, so take it one day at a time.  · Be prepared for cravings. When you feel the urge to smoke, chew gum or suck on hard candy.  Lifestyle  · Stay busy and take care of your  body.  · Drink enough fluid to keep your urine pale yellow.  · Get plenty of exercise and eat a healthy diet. This can help prevent weight gain after quitting.  · Monitor your eating habits. Quitting smoking can cause you to have a larger appetite than when you smoke.  · Find ways to relax. Go out with friends or family to a movie or a restaurant where people do not smoke.  · Ask your health care provider about having regular tests (screenings) to check for cancer. This may include blood tests, imaging tests, and other tests.  · Find ways to manage your stress, such as meditation, yoga, or exercise.  Where to find support  To get support to quit smoking, consider:  · Asking your health care provider for more information and resources.  · Taking classes to learn more about quitting smoking.  · Looking for local organizations that offer resources about quitting smoking.  · Joining a support group for people who want to quit smoking in your local community.  · Calling the smokefree.gov counselor helpline: 1-800-Quit-Now (1-832.100.6392)  Where to find more information  You may find more information about quitting smoking from:  · HelpGuide.org: www.helpguide.org  · Smokefree.gov: smokefree.gov  · American Lung Association: www.lung.org  Contact a health care provider if you:  · Have problems breathing.  · Notice that your lips, nose, or fingers turn blue.  · Have chest pain.  · Are coughing up blood.  · Feel faint or you pass out.  · Have other health changes that cause you to worry.  Summary  · Smoking tobacco can negatively affect your health, the health of those around you, your finances, and your social life.  · Do not start smoking. Quit if you already do. If you need help quitting, ask your health care provider.  · Think about joining a support group for people who want to quit smoking in your local community. There are many effective programs that will help you to quit this behavior.  This information is not  intended to replace advice given to you by your health care provider. Make sure you discuss any questions you have with your health care provider.  Document Released: 01/02/2018 Document Revised: 02/06/2019 Document Reviewed: 01/02/2018  [employer] Patient Education © 2020 [employer]

## 2020-07-24 NOTE — ASSESSMENT & PLAN NOTE
I recommend stop smoking to the detrimental effects on her health.  She needs a chest x-ray but she refuses to get 1.

## 2020-07-24 NOTE — PROGRESS NOTES
Rooming Tab(CC,VS,Pt Hx,Fall Screen)  Chief Complaint   Patient presents with   • Med Refill     4 month       Subjective 70-year-old here for follow-up of her diabetes.  The patient's blood sugars are running in the normal range, .  She is had no low sugars.  She is on no medications for diabetes anymore.  Her appetite is been poor and she is lost weight since she has been on hemodialysis.  She sees the ophthalmologist frequently and checks her feet daily.  Her legs burn and her feet burn and gabapentin did not help so she just stopped it.  She stopped it 100 mg 3 times a day and did not call like she was supposed to to increase the dose over time.  Patient has hypertension as well and her blood pressure at home was normal.  Her blood pressure at dialysis sometimes is high but once she completes it is back to normal.  She has no dizziness or syncope and she has some whitecoat hypertension.  Patient has hyperlipidemia and we increased her Zocor last time from 20 to 40 mg and she is tolerating this without difficulty.    I have reviewed and updated her medications, medical history and problem list during today's office visit.     Patient Care Team:  Jordan Calixto MD as PCP - General  Jordan Calixto MD as PCP - Family Medicine  [facility] [place] [facility] - Claims Attributed    Problem List Tab  Medications Tab  Synopsis Tab  Chart Review Tab  Care Everywhere Tab  Immunizations Tab  Patient History Tab    Social History     Tobacco Use   • Smoking status: Former Smoker     Packs/day: 0.50     Years: 67.00     Pack years: 33.50     Types: Cigarettes   • Smokeless tobacco: Never Used   Substance Use Topics   • Alcohol use: No     Frequency: Never       Review of Systems   Constitutional: Negative for chills, fatigue and fever.   HENT: Negative for nosebleeds.    Eyes: Negative for double vision.   Respiratory: Negative for chest tightness and shortness of breath.    Cardiovascular: Negative for chest  "pain and palpitations.   Gastrointestinal: Negative for blood in stool.   Genitourinary: Negative for dysuria and hematuria.   Musculoskeletal:        Burning pain in her legs and feet   Neurological: Negative for dizziness and syncope.   Psychiatric/Behavioral: Negative for depressed mood.       Objective     Rooming Tab(CC,VS,Pt Hx,Fall Screen)  /55   Pulse 95   Temp 98 °F (36.7 °C) (Temporal)   Resp 8   Ht 152.4 cm (60\")   Wt 72.6 kg (160 lb)   SpO2 96%   BMI 31.25 kg/m²     Body mass index is 31.25 kg/m².    Physical Exam   Constitutional: She is oriented to person, place, and time. She appears well-developed and well-nourished. No distress.   HENT:   Head: Normocephalic and atraumatic.   Nose: Nose normal.   Mouth/Throat: Oropharynx is clear and moist.   Eyes: Pupils are equal, round, and reactive to light. Conjunctivae, EOM and lids are normal.   Neck: Trachea normal and normal range of motion. Neck supple. No JVD present. Carotid bruit is not present. No thyroid mass and no thyromegaly present.   No carotid bruits   Cardiovascular: Normal rate, regular rhythm, normal heart sounds and intact distal pulses.   Pulmonary/Chest: Effort normal and breath sounds normal.   Musculoskeletal:   No c/c/e sensation is intact, no diabetic foot ulcers or skin breakdown   Neurological: She is alert and oriented to person, place, and time. No cranial nerve deficit.   Skin: Skin is warm and dry.   Psychiatric: She has a normal mood and affect. Her speech is normal and behavior is normal. She is attentive.   Nursing note and vitals reviewed.       Statin Choice Calculator  Data Reviewed:                   Assessment/Plan   Order Review Tab  Health Maintenance Tab  Patient Plan/Order Tab  Diagnoses and all orders for this visit:    1. Essential hypertension (Primary)  Assessment & Plan:  Hypertension is improving with treatment.  Continue current treatment regimen.  Dietary sodium restriction.  Regular aerobic " exercise.  Stop smoking.  Continue current medications.  Blood pressure will be reassessed at the next regular appointment.  Patient has some whitecoat hypertension, I want her to continue to monitor blood pressure at home      2. Pure hypercholesterolemia  Assessment & Plan:  Lipid abnormalities are improving with treatment.  Pharmacotherapy as ordered.  Lipids will be reassessed 4 months.    Orders:  -     Comprehensive Metabolic Panel  -     Lipid Panel    3. Diabetic peripheral neuropathy (CMS/HCC)  Assessment & Plan:  Persistent.  I recommend she start gabapentin 300 mg 3 times daily.  If the medicine is not working do not stop it but call and we will increase the medication.  Follow-up at her next scheduled appointment    Orders:  -     Hemoglobin A1c    4. Type 2 diabetes mellitus without complication, without long-term current use of insulin (CMS/HCC)  Assessment & Plan:  Diabetes is improving with lifestyle modifications.   Reminded to bring in blood sugar diary at next visit.  Dietary recommendations for ADA diet.  Regular aerobic exercise.  Discussed foot care.  Diabetes will be reassessed 4 months.      5. High risk social situation  -     Hepatitis C Antibody    6. Tobacco abuse  Assessment & Plan:  I recommend stop smoking to the detrimental effects on her health.  She needs a chest x-ray but she refuses to get 1.      Other orders  -     gabapentin (NEURONTIN) 300 MG capsule; Take 1 capsule by mouth Every Night.  Dispense: 30 capsule; Refill: 11      Wrapup Tab  Return in about 4 months (around 11/24/2020) for Medicare Wellness.

## 2020-07-24 NOTE — ASSESSMENT & PLAN NOTE
Hypertension is improving with treatment.  Continue current treatment regimen.  Dietary sodium restriction.  Regular aerobic exercise.  Stop smoking.  Continue current medications.  Blood pressure will be reassessed at the next regular appointment.  Patient has some whitecoat hypertension, I want her to continue to monitor blood pressure at home

## 2020-07-24 NOTE — ASSESSMENT & PLAN NOTE
Persistent.  I recommend she start gabapentin 300 mg 3 times daily.  If the medicine is not working do not stop it but call and we will increase the medication.  Follow-up at her next scheduled appointment

## 2020-08-21 ENCOUNTER — TRANSCRIBE ORDERS (OUTPATIENT)
Dept: ADMINISTRATIVE | Facility: HOSPITAL | Age: 70
End: 2020-08-21

## 2020-08-21 DIAGNOSIS — C7A.8 MALIGNANT NEUROENDOCRINE NEOPLASM (HCC): Primary | ICD-10-CM

## 2020-09-03 ENCOUNTER — HOSPITAL ENCOUNTER (OUTPATIENT)
Dept: CT IMAGING | Facility: HOSPITAL | Age: 70
Discharge: HOME | End: 2020-09-03
Admitting: SURGERY

## 2020-09-03 DIAGNOSIS — C7A.8 MALIGNANT NEUROENDOCRINE NEOPLASM (HCC): ICD-10-CM

## 2020-09-03 PROCEDURE — 74176 CT ABD & PELVIS W/O CONTRAST: CPT

## 2020-09-16 RX ORDER — SODIUM BICARBONATE 650 MG/1
TABLET ORAL
Qty: 180 TABLET | Refills: 0 | Status: SHIPPED | OUTPATIENT
Start: 2020-09-16 | End: 2020-12-11

## 2020-10-05 RX ORDER — NEPHROCAP 1 MG
CAP ORAL
Qty: 100 CAPSULE | Refills: 0 | Status: SHIPPED | OUTPATIENT
Start: 2020-10-05 | End: 2021-01-25

## 2020-11-05 ENCOUNTER — OFFICE VISIT (OUTPATIENT)
Dept: FAMILY MEDICINE CLINIC | Facility: CLINIC | Age: 70
End: 2020-11-05

## 2020-11-05 VITALS
HEIGHT: 60 IN | RESPIRATION RATE: 18 BRPM | DIASTOLIC BLOOD PRESSURE: 68 MMHG | WEIGHT: 156 LBS | HEART RATE: 93 BPM | TEMPERATURE: 97.1 F | BODY MASS INDEX: 30.63 KG/M2 | SYSTOLIC BLOOD PRESSURE: 140 MMHG | OXYGEN SATURATION: 93 %

## 2020-11-05 DIAGNOSIS — Z00.00 MEDICARE ANNUAL WELLNESS VISIT, SUBSEQUENT: Primary | ICD-10-CM

## 2020-11-05 DIAGNOSIS — E78.00 PURE HYPERCHOLESTEROLEMIA: Chronic | ICD-10-CM

## 2020-11-05 DIAGNOSIS — E11.9 TYPE 2 DIABETES MELLITUS WITHOUT COMPLICATION, WITHOUT LONG-TERM CURRENT USE OF INSULIN (HCC): Chronic | ICD-10-CM

## 2020-11-05 DIAGNOSIS — Z12.31 BREAST CANCER SCREENING BY MAMMOGRAM: ICD-10-CM

## 2020-11-05 DIAGNOSIS — I65.23 BILATERAL CAROTID ARTERY STENOSIS: ICD-10-CM

## 2020-11-05 PROCEDURE — G0439 PPPS, SUBSEQ VISIT: HCPCS | Performed by: FAMILY MEDICINE

## 2020-11-05 RX ORDER — AMLODIPINE BESYLATE 10 MG/1
10 TABLET ORAL DAILY
COMMUNITY
Start: 2020-11-01 | End: 2021-07-27 | Stop reason: HOSPADM

## 2020-11-05 NOTE — PROGRESS NOTES
The ABCs of the Annual Wellness Visit  Subsequent Medicare Wellness Visit    Chief Complaint   Patient presents with   • Medicare Wellness-subsequent       Subjective   History of Present Illness:  Sherry Lobato is a 70 y.o. female who presents for a Subsequent Medicare Wellness Visit.  Patient does not exercise.  She has been eating relatively poorly.  He does not really eat a whole lot because she is not hungry.  She eats 1 meal a day.  She has no associated nausea or vomiting.  She denies any significant fast food or soft drinks.  She is going to hemodialysis 3 times a week.  She denies any chest pain or syncope or dizziness or shortness of breath.  She still smokes and smokes about half a pack a day now is smoked for 55 years.  She denies any issues with her bowel or bladder function.  Denies any blood in her stool or urine.  She is supposed to have a catheter at cessation later this month.  She is on the transplant list.  Her memory is good she has no issues with taking care of her own transportation ADLs and financials.    HEALTH RISK ASSESSMENT    Recent Hospitalizations:  No hospitalization(s) within the last year.    Current Medical Providers:  Patient Care Team:  Jordan Calixto MD as PCP - General  Jordan Calixto MD as PCP - Family Medicine    Smoking Status:  Social History     Tobacco Use   Smoking Status Current Every Day Smoker   • Packs/day: 0.50   • Years: 57.00   • Pack years: 28.50   • Types: Cigarettes   Smokeless Tobacco Never Used   Tobacco Comment    quit smoking       Alcohol Consumption:  Social History     Substance and Sexual Activity   Alcohol Use No   • Frequency: Never       Depression Screen:   PHQ-2/PHQ-9 Depression Screening 11/5/2020   Little interest or pleasure in doing things 0   Feeling down, depressed, or hopeless 0   Total Score 0       Fall Risk Screen:  STEADI Fall Risk Assessment was completed, and patient is at LOW risk for falls.Assessment completed  on:11/5/2020    Health Habits and Functional and Cognitive Screening:  Functional & Cognitive Status 11/5/2020   Do you have difficulty preparing food and eating? No   Do you have difficulty bathing yourself, getting dressed or grooming yourself? No   Do you have difficulty using the toilet? No   Do you have difficulty moving around from place to place? No   Do you have trouble with steps or getting out of a bed or a chair? No   Current Diet Unhealthy Diet   Dental Exam Up to date   Eye Exam Up to date   Do you need help using the phone?  No   Are you deaf or do you have serious difficulty hearing?  No   Do you need help with transportation? No   Do you need help shopping? No   Do you need help preparing meals?  No   Do you need help with housework?  No   Do you need help with laundry? No   Do you need help taking your medications? No   Do you need help managing money? No   Do you ever drive or ride in a car without wearing a seat belt? No   Have you felt unusual stress, anger or loneliness in the last month? No   Who do you live with? Other   If you need help, do you have trouble finding someone available to you? No   Do you have difficulty concentrating, remembering or making decisions? No         Does the patient have evidence of cognitive impairment? No    Asprin use counseling:Does not need ASA (and currently is not on it)    Age-appropriate Screening Schedule:  Refer to the list below for future screening recommendations based on patient's age, sex and/or medical conditions. Orders for these recommended tests are listed in the plan section. The patient has been provided with a written plan.    Health Maintenance   Topic Date Due   • TDAP/TD VACCINES (1 - Tdap) 07/22/1968   • HEMOGLOBIN A1C  09/28/2020   • ZOSTER VACCINE (1 of 2) 08/02/2021 (Originally 7/22/1999)   • DIABETIC EYE EXAM  01/09/2021   • LIPID PANEL  03/28/2021   • MAMMOGRAM  07/18/2021   • DIABETIC FOOT EXAM  07/24/2021   • COLONOSCOPY  05/05/2027    • INFLUENZA VACCINE  Completed   • URINE MICROALBUMIN  Discontinued          The following portions of the patient's history were reviewed and updated as appropriate: allergies, current medications, past family history, past medical history, past social history, past surgical history and problem list.    Outpatient Medications Prior to Visit   Medication Sig Dispense Refill   • amitriptyline (ELAVIL) 10 MG tablet Take 1 tablet by mouth Every Night. 30 tablet 5   • amLODIPine (NORVASC) 10 MG tablet TK 1 T PO DAILY     • B Complex-C-Folic Acid (Virt-Caps) 1 MG capsule TAKE 1 CAPSULE BY MOUTH EVERY  capsule 0   • calcium acetate (PHOSLO) 667 MG capsule Take 1,334 mg by mouth 3 (Three) Times a Day.     • glucose blood (ONE TOUCH ULTRA TEST) test strip DX E11.9 100 each 8   • lidocaine-prilocaine (EMLA) 2.5-2.5 % cream APPLY SMALL AMOUNT TO ACCESS SITE (AVF) 1 TO 2 HOURS BEFORE DIALYSIS. COVER WITH OCCLUSIVE DRESSING (SARAN WRAP)  4   • losartan (COZAAR) 100 MG tablet Take 1 tablet by mouth Daily. 90 tablet 3   • Multiple Vitamins-Minerals (RENAPLEX-D) tablet Take 1 tablet by mouth Daily.     • ONETOUCH DELICA LANCETS 33G misc Test one time daily   DX: E11.9 100 each 11   • pantoprazole (PROTONIX) 40 MG EC tablet TAKE 1 TABLET BY MOUTH TWICE DAILY 180 tablet 2   • simvastatin (Zocor) 40 MG tablet Take 1 tablet by mouth Every Night. 90 tablet 3   • sodium bicarbonate 650 MG tablet TAKE 2 TABLETS BY MOUTH THREE TIMES DAILY 180 tablet 0   • vitamin B-12 (CYANOCOBALAMIN) 1000 MCG tablet Take 1,000 mcg by mouth 3 (Three) Times a Week. Mon, Wed, Fri     • amLODIPine (NORVASC) 5 MG tablet Take  by mouth Daily.     • gabapentin (NEURONTIN) 300 MG capsule Take 1 capsule by mouth Every Night. 30 capsule 11   • polyethylene glycol (MIRALAX) packet Take 17 g by mouth Daily. 30 packet 0     No facility-administered medications prior to visit.        Patient Active Problem List   Diagnosis   • Anemia   • Absolute anemia   •  Acute exacerbation of chronic obstructive pulmonary disease (CMS/HCC)   • B12 deficiency   • Barretts esophagus   • Gastroesophageal reflux disease   • Benign neuroendocrine tumor   • Stage 4 chronic kidney disease (CMS/HCC)   • Carcinoma, renal cell (CMS/HCC)   • ESRD (end stage renal disease) (CMS/HCC)   • Clostridium difficile colitis   • Degeneration of intervertebral disc of lumbar region   • Type 2 diabetes mellitus, without long-term current use of insulin (CMS/HCC)   • Disorder of wrist joint   • Diverticulitis of colon   • Gout   • Hyperlipidemia   • Hypertension   • Insomnia   • Nephrotic syndrome   • Neuroendocrine carcinoma (CMS/HCC)   • Orthostatic hypotension   • Sleep apnea, obstructive   • Carotid artery stenosis   • Vitamin D deficiency   • Hordeolum externum of right lower eyelid   • Right lower quadrant abdominal pain   • Incisional hernia of anterior abdominal wall without obstruction or gangrene   • Obesity (BMI 30-39.9)   • Abdominal wall hernia   • Pseudophakia of both eyes   • Diabetic peripheral neuropathy (CMS/HCC)   • Tobacco abuse   • Medicare annual wellness visit, subsequent       Advanced Care Planning:  ACP discussion was held with the patient during this visit. Patient does not have an advance directive, information provided.    Review of Systems   Constitutional: Negative for chills and fever.   HENT: Negative for congestion and nosebleeds.    Eyes: Negative for photophobia and discharge.   Respiratory: Negative for chest tightness and shortness of breath.    Cardiovascular: Negative for chest pain and palpitations.   Gastrointestinal: Negative for abdominal pain and blood in stool.   Endocrine: Negative for polydipsia and polyuria.   Genitourinary: Negative for dysuria and hematuria.   Musculoskeletal: Positive for arthralgias and back pain.   Skin: Negative for rash and wound.   Neurological: Negative for dizziness and syncope.   Hematological: Negative for adenopathy. Does not  "bruise/bleed easily.   Psychiatric/Behavioral: Negative for confusion, dysphoric mood and self-injury. The patient is not nervous/anxious.        Compared to one year ago, the patient feels her physical health is the same.  Compared to one year ago, the patient feels her mental health is the same.    Reviewed chart for potential of high risk medication in the elderly: yes  Reviewed chart for potential of harmful drug interactions in the elderly:yes    Objective         Vitals:    11/05/20 0910   BP: 140/68   BP Location: Left arm   Pulse: 93   Resp: 18   Temp: 97.1 °F (36.2 °C)   SpO2: 93%   Weight: 70.8 kg (156 lb)   Height: 152.4 cm (60\")       Body mass index is 30.47 kg/m².  Discussed the patient's BMI with her. The BMI is in the acceptable range.    Physical Exam  Vitals signs and nursing note reviewed.   Constitutional:       General: She is not in acute distress.     Appearance: Normal appearance. She is well-developed.      Comments: Pleasant female no acute distress   HENT:      Head: Normocephalic and atraumatic.      Right Ear: Tympanic membrane and ear canal normal.      Left Ear: Tympanic membrane and ear canal normal.      Nose: Nose normal.      Mouth/Throat:      Mouth: Mucous membranes are moist.      Pharynx: Oropharynx is clear.   Eyes:      General: Lids are normal.      Extraocular Movements: Extraocular movements intact.      Conjunctiva/sclera: Conjunctivae normal.      Pupils: Pupils are equal, round, and reactive to light.   Neck:      Musculoskeletal: Normal range of motion and neck supple.      Thyroid: No thyroid mass or thyromegaly.      Vascular: No carotid bruit or JVD.      Trachea: Trachea normal.      Comments: No carotid bruits  Cardiovascular:      Rate and Rhythm: Normal rate and regular rhythm.      Pulses: Normal pulses.      Heart sounds: Normal heart sounds.   Pulmonary:      Effort: Pulmonary effort is normal.      Breath sounds: Normal breath sounds.   Abdominal:      " General: Abdomen is flat. Bowel sounds are normal. There is no distension.      Palpations: Abdomen is soft. There is no mass.      Tenderness: There is no abdominal tenderness.   Musculoskeletal:      Comments: No c/c/e  DP pulses are palpable.  No diabetic foot ulcers or skin breakdown.  Sensation is intact   Skin:     General: Skin is warm and dry.   Neurological:      General: No focal deficit present.      Mental Status: She is alert and oriented to person, place, and time.      Cranial Nerves: No cranial nerve deficit.   Psychiatric:         Attention and Perception: She is attentive.         Mood and Affect: Mood normal.         Speech: Speech normal.         Behavior: Behavior normal.         Thought Content: Thought content normal.         Judgment: Judgment normal.         Lab Results   Component Value Date    TRIG 150 11/07/2020    HDL 51 11/07/2020    LDL 94 11/07/2020    VLDL 26 11/07/2020        Assessment/Plan   Medicare Risks and Personalized Health Plan  CMS Preventative Services Quick Reference  Abdominal Aortic Aneurysm Screening  Advance Directive Discussion  Breast Cancer/Mammogram Screening  Cardiovascular risk  Chronic Pain   Colon Cancer Screening  Dementia/Memory   Depression/Dysphoria  Diabetic Lab Screening   Fall Risk  Glaucoma Risk  Hearing Problem  Immunizations Discussed/Encouraged (specific immunizations; adacel Tdap, Influenza, Pneumococcal 23, Prevnar and Shingrix )  Inadequate Social Support, Isolation, Loneliness, Lack of Transportation, Financial Difficulties, or Caregiver Stress   Inactivity/Sedentary  Lung Cancer Risk  Osteoprorosis Risk    The above risks/problems have been discussed with the patient.  Pertinent information has been shared with the patient in the After Visit Summary.  Follow up plans and orders are seen below in the Assessment/Plan Section.    Diagnoses and all orders for this visit:    1. Medicare annual wellness visit, subsequent (Primary)  Assessment &  Plan:  I recommend the patient try to get some type of aerobic exercise for cardiovascular health.  Weightbearing exercise would be beneficial for bone health.  Nutritious diet high in fiber and vegetable, low in carbohydrate, eating leaner cuts of meat higher amounts fish.  Immunizations discussed with patient.      2. Breast cancer screening by mammogram  -     Mammo Screening Digital Tomosynthesis Bilateral With CAD; Future    3. Pure hypercholesterolemia  -     Comprehensive Metabolic Panel; Future  -     Lipid Panel; Future    4. Type 2 diabetes mellitus without complication, without long-term current use of insulin (CMS/[facility])  -     Hemoglobin A1c; Future    5. Bilateral carotid artery stenosis  Assessment & Plan:  Patient she thinks she will have this for her work-up of her transplant.  She is going to call me if she is not getting this and we will need to order this.  She is overdue to have done    Orders:  -     Duplex Carotid Ultrasound CAR; Future    Other orders  -     Fluticasone-Umeclidin-Vilant 100-62.5-25 MCG/INH aerosol powder ; Inhale 1 puff Daily.  Dispense: 1 each; Refill: 11    Follow Up:  Return in about 6 months (around 5/5/2021) for Recheck.     An After Visit Summary and PPPS were given to the patient.

## 2020-11-07 ENCOUNTER — LAB (OUTPATIENT)
Dept: LAB | Facility: HOSPITAL | Age: 70
End: 2020-11-07

## 2020-11-07 DIAGNOSIS — E78.00 PURE HYPERCHOLESTEROLEMIA: Chronic | ICD-10-CM

## 2020-11-07 DIAGNOSIS — E11.9 TYPE 2 DIABETES MELLITUS WITHOUT COMPLICATION, WITHOUT LONG-TERM CURRENT USE OF INSULIN (HCC): Chronic | ICD-10-CM

## 2020-11-07 PROBLEM — Z00.00 MEDICARE ANNUAL WELLNESS VISIT, SUBSEQUENT: Status: ACTIVE | Noted: 2020-11-07

## 2020-11-07 LAB
ALBUMIN SERPL-MCNC: 4.2 G/DL (ref 3.5–5.2)
ALBUMIN/GLOB SERPL: 1.3 G/DL
ALP SERPL-CCNC: 68 U/L (ref 39–117)
ALT SERPL W P-5'-P-CCNC: 20 U/L (ref 1–33)
ANION GAP SERPL CALCULATED.3IONS-SCNC: 14.3 MMOL/L (ref 5–15)
AST SERPL-CCNC: 34 U/L (ref 1–32)
BILIRUB SERPL-MCNC: 0.2 MG/DL (ref 0–1.2)
BUN SERPL-MCNC: 27 MG/DL (ref 8–23)
BUN/CREAT SERPL: 5.4 (ref 7–25)
CALCIUM SPEC-SCNC: 9.4 MG/DL (ref 8.6–10.5)
CHLORIDE SERPL-SCNC: 97 MMOL/L (ref 98–107)
CHOLEST SERPL-MCNC: 171 MG/DL (ref 0–200)
CO2 SERPL-SCNC: 26.7 MMOL/L (ref 22–29)
CREAT SERPL-MCNC: 4.97 MG/DL (ref 0.57–1)
GFR SERPL CREATININE-BSD FRML MDRD: 9 ML/MIN/1.73
GFR SERPL CREATININE-BSD FRML MDRD: ABNORMAL ML/MIN/{1.73_M2}
GLOBULIN UR ELPH-MCNC: 3.3 GM/DL
GLUCOSE SERPL-MCNC: 86 MG/DL (ref 65–99)
HBA1C MFR BLD: 5.5 % (ref 3.5–5.6)
HCV AB SER DONR QL: NORMAL
HDLC SERPL-MCNC: 51 MG/DL (ref 40–60)
LDLC SERPL CALC-MCNC: 94 MG/DL (ref 0–100)
LDLC/HDLC SERPL: 1.76 {RATIO}
POTASSIUM SERPL-SCNC: 4.9 MMOL/L (ref 3.5–5.2)
PROT SERPL-MCNC: 7.5 G/DL (ref 6–8.5)
SODIUM SERPL-SCNC: 138 MMOL/L (ref 136–145)
TRIGL SERPL-MCNC: 150 MG/DL (ref 0–150)
VLDLC SERPL-MCNC: 26 MG/DL (ref 5–40)

## 2020-11-07 PROCEDURE — 83036 HEMOGLOBIN GLYCOSYLATED A1C: CPT | Performed by: FAMILY MEDICINE

## 2020-11-07 PROCEDURE — 80061 LIPID PANEL: CPT | Performed by: FAMILY MEDICINE

## 2020-11-07 PROCEDURE — 86803 HEPATITIS C AB TEST: CPT | Performed by: FAMILY MEDICINE

## 2020-11-07 PROCEDURE — 80053 COMPREHEN METABOLIC PANEL: CPT | Performed by: FAMILY MEDICINE

## 2020-11-07 NOTE — ASSESSMENT & PLAN NOTE
I recommend the patient try to get some type of aerobic exercise for cardiovascular health.  Weightbearing exercise would be beneficial for bone health.  Nutritious diet high in fiber and vegetable, low in carbohydrate, eating leaner cuts of meat higher amounts fish.  Immunizations discussed with patient.

## 2020-11-07 NOTE — ASSESSMENT & PLAN NOTE
Patient she thinks she will have this for her work-up of her transplant.  She is going to call me if she is not getting this and we will need to order this.  She is overdue to have done

## 2020-11-20 ENCOUNTER — APPOINTMENT (OUTPATIENT)
Dept: CARDIOLOGY | Facility: HOSPITAL | Age: 70
End: 2020-11-20

## 2020-11-20 ENCOUNTER — APPOINTMENT (OUTPATIENT)
Dept: MAMMOGRAPHY | Facility: HOSPITAL | Age: 70
End: 2020-11-20

## 2020-11-21 ENCOUNTER — LAB (OUTPATIENT)
Dept: LAB | Facility: HOSPITAL | Age: 70
End: 2020-11-21

## 2020-11-21 PROCEDURE — C9803 HOPD COVID-19 SPEC COLLECT: HCPCS

## 2020-11-21 PROCEDURE — U0004 COV-19 TEST NON-CDC HGH THRU: HCPCS

## 2020-11-23 LAB — SARS-COV-2 RNA RESP QL NAA+PROBE: NOT DETECTED

## 2020-11-24 ENCOUNTER — HOSPITAL ENCOUNTER (OUTPATIENT)
Facility: HOSPITAL | Age: 70
Setting detail: HOSPITAL OUTPATIENT SURGERY
Discharge: HOME | End: 2020-11-24
Attending: INTERNAL MEDICINE | Admitting: INTERNAL MEDICINE

## 2020-11-24 ENCOUNTER — ANESTHESIA (OUTPATIENT)
Dept: GASTROENTEROLOGY | Facility: HOSPITAL | Age: 70
End: 2020-11-24

## 2020-11-24 ENCOUNTER — ANESTHESIA EVENT (OUTPATIENT)
Dept: GASTROENTEROLOGY | Facility: HOSPITAL | Age: 70
End: 2020-11-24

## 2020-11-24 ENCOUNTER — ON CAMPUS - OUTPATIENT (AMBULATORY)
Dept: URBAN - METROPOLITAN AREA HOSPITAL 85 | Facility: HOSPITAL | Age: 70
End: 2020-11-24
Payer: COMMERCIAL

## 2020-11-24 VITALS
DIASTOLIC BLOOD PRESSURE: 43 MMHG | OXYGEN SATURATION: 94 % | BODY MASS INDEX: 30.56 KG/M2 | WEIGHT: 155.65 LBS | TEMPERATURE: 97.7 F | RESPIRATION RATE: 17 BRPM | HEART RATE: 69 BPM | SYSTOLIC BLOOD PRESSURE: 107 MMHG | HEIGHT: 60 IN

## 2020-11-24 DIAGNOSIS — D12.2 BENIGN NEOPLASM OF ASCENDING COLON: ICD-10-CM

## 2020-11-24 DIAGNOSIS — D12.5 BENIGN NEOPLASM OF SIGMOID COLON: ICD-10-CM

## 2020-11-24 DIAGNOSIS — D12.3 BENIGN NEOPLASM OF TRANSVERSE COLON: ICD-10-CM

## 2020-11-24 DIAGNOSIS — K57.30 DIVERTICULOSIS OF LARGE INTESTINE WITHOUT PERFORATION OR ABS: ICD-10-CM

## 2020-11-24 DIAGNOSIS — Z86.010 PERSONAL HISTORY OF COLONIC POLYPS: ICD-10-CM

## 2020-11-24 DIAGNOSIS — D12.4 BENIGN NEOPLASM OF DESCENDING COLON: ICD-10-CM

## 2020-11-24 LAB — GLUCOSE BLDC GLUCOMTR-MCNC: 84 MG/DL (ref 70–105)

## 2020-11-24 PROCEDURE — 45385 COLONOSCOPY W/LESION REMOVAL: CPT | Mod: PT | Performed by: INTERNAL MEDICINE

## 2020-11-24 PROCEDURE — 88305 TISSUE EXAM BY PATHOLOGIST: CPT | Performed by: INTERNAL MEDICINE

## 2020-11-24 PROCEDURE — 82962 GLUCOSE BLOOD TEST: CPT

## 2020-11-24 PROCEDURE — 25010000002 PROPOFOL 10 MG/ML EMULSION: Performed by: ANESTHESIOLOGY

## 2020-11-24 RX ORDER — SODIUM CHLORIDE 0.9 % (FLUSH) 0.9 %
10 SYRINGE (ML) INJECTION AS NEEDED
Status: DISCONTINUED | OUTPATIENT
Start: 2020-11-24 | End: 2020-11-24 | Stop reason: HOSPADM

## 2020-11-24 RX ORDER — MIDAZOLAM HYDROCHLORIDE 1 MG/ML
1 INJECTION INTRAMUSCULAR; INTRAVENOUS
Status: DISCONTINUED | OUTPATIENT
Start: 2020-11-24 | End: 2020-11-24 | Stop reason: HOSPADM

## 2020-11-24 RX ORDER — PROPOFOL 10 MG/ML
VIAL (ML) INTRAVENOUS AS NEEDED
Status: DISCONTINUED | OUTPATIENT
Start: 2020-11-24 | End: 2020-11-24 | Stop reason: SURG

## 2020-11-24 RX ORDER — SODIUM CHLORIDE 9 MG/ML
9 INJECTION, SOLUTION INTRAVENOUS CONTINUOUS PRN
Status: DISCONTINUED | OUTPATIENT
Start: 2020-11-24 | End: 2020-11-24 | Stop reason: HOSPADM

## 2020-11-24 RX ORDER — MAGNESIUM CARB/ALUMINUM HYDROX 105-160MG
296 TABLET,CHEWABLE ORAL ONCE
Status: DISCONTINUED | OUTPATIENT
Start: 2020-11-24 | End: 2020-11-24 | Stop reason: HOSPADM

## 2020-11-24 RX ORDER — SODIUM CHLORIDE 0.9 % (FLUSH) 0.9 %
3 SYRINGE (ML) INJECTION EVERY 12 HOURS SCHEDULED
Status: DISCONTINUED | OUTPATIENT
Start: 2020-11-24 | End: 2020-11-24 | Stop reason: HOSPADM

## 2020-11-24 RX ORDER — SODIUM CHLORIDE 0.9 % (FLUSH) 0.9 %
3-10 SYRINGE (ML) INJECTION AS NEEDED
Status: DISCONTINUED | OUTPATIENT
Start: 2020-11-24 | End: 2020-11-24 | Stop reason: HOSPADM

## 2020-11-24 RX ORDER — SODIUM CHLORIDE 0.9 % (FLUSH) 0.9 %
10 SYRINGE (ML) INJECTION EVERY 12 HOURS SCHEDULED
Status: DISCONTINUED | OUTPATIENT
Start: 2020-11-24 | End: 2020-11-24 | Stop reason: HOSPADM

## 2020-11-24 RX ORDER — LIDOCAINE HYDROCHLORIDE 10 MG/ML
INJECTION, SOLUTION EPIDURAL; INFILTRATION; INTRACAUDAL; PERINEURAL AS NEEDED
Status: DISCONTINUED | OUTPATIENT
Start: 2020-11-24 | End: 2020-11-24 | Stop reason: SURG

## 2020-11-24 RX ORDER — ONDANSETRON 2 MG/ML
4 INJECTION INTRAMUSCULAR; INTRAVENOUS ONCE AS NEEDED
Status: DISCONTINUED | OUTPATIENT
Start: 2020-11-24 | End: 2020-11-24 | Stop reason: HOSPADM

## 2020-11-24 RX ADMIN — PROPOFOL 50 MG: 10 INJECTION, EMULSION INTRAVENOUS at 10:50

## 2020-11-24 RX ADMIN — PROPOFOL 50 MG: 10 INJECTION, EMULSION INTRAVENOUS at 10:45

## 2020-11-24 RX ADMIN — LIDOCAINE HYDROCHLORIDE 50 MG: 10 INJECTION, SOLUTION EPIDURAL; INFILTRATION; INTRACAUDAL; PERINEURAL at 10:37

## 2020-11-24 RX ADMIN — PROPOFOL 50 MG: 10 INJECTION, EMULSION INTRAVENOUS at 10:42

## 2020-11-24 RX ADMIN — PROPOFOL 150 MG: 10 INJECTION, EMULSION INTRAVENOUS at 10:37

## 2020-11-24 RX ADMIN — PROPOFOL 50 MG: 10 INJECTION, EMULSION INTRAVENOUS at 10:41

## 2020-11-24 RX ADMIN — SODIUM CHLORIDE 9 ML/HR: 9 INJECTION, SOLUTION INTRAVENOUS at 09:57

## 2020-11-24 NOTE — H&P
GI CONSULT  NOTE:    Referring Provider:    Jordan Calixto MD  [unfilled]    Chief complaint: <principal problem not specified>    Subjective .  Personal history of colon polyps    History of present illness:      Sherry Lobato is a 70 y.o. female who presents today for Procedure(s):  COLONOSCOPY for the indications listed below.     The updated Patient Profile was reviewed prior to the procedure, in conjunction with the Physical Exam, including medical conditions, surgical procedures, medications, allergies, family history and social history.     Pre-operatively, I reviewed the indication(s) for the procedure, the risks of the procedure [including but not limited to: unexpected bleeding possibly requiring hospitalization and/or unplanned repeat procedures, perforation possibly requiring surgical treatment, missed lesions and complications of sedation/MAC (also explained by anesthesia staff)].     I have evaluated the patient for risks associated with the planned anesthesia and the procedure to be performed and find the patient an acceptable candidate for IV sedation.    Multiple opportunities were provided for any questions or concerns, and all questions were answered satisfactorily before any anesthesia was administered. We will proceed with the planned procedure.    Past Medical History:  Past Medical History:   Diagnosis Date   • Lucas's esophagus    • C. difficile colitis    • Carotid artery disease (CMS/HCC)     -50-74% left, 16-49% right (1/19)   • DM2 (diabetes mellitus, type 2) (CMS/[facility])    • ESRD (end stage renal disease) (CMS/[facility])     on HD   • Gastric ulcer     at anastomatic site   • Gastroparesis    • Gout    • Hemodialysis patient (CMS/[facility])    • Hernia, incisional     abdomen   • History of colonoscopy     UTD   • History of degenerative disc disease    • Hyperlipidemia    • Hypertension    • Microscopic colitis    • Nephrotic syndrome    • Neuroendocrine tumor     of stomach   • FABY  (obstructive sleep apnea)     not treating   • Osteoarthritis    • Osteoarthritis, knee     Left   • Proteinuria    • Renal cell cancer, right (CMS/HCC)    • Rib pain on right side     chronic   • Rotator cuff tear     bilateral   • Uterine cancer (CMS/HCC)    • Vitamin B 12 deficiency    • Vitamin D deficiency        Past Surgical History:  Past Surgical History:   Procedure Laterality Date   • APPENDECTOMY     • ARTERIOVENOUS FISTULA Right    • ARTERIOVENOUS FISTULA REPAIR      Clotted off and insert graft   • BREAST BIOPSY      right nipple 1981   • GASTRIC RESECTION      cancer   • HYSTERECTOMY     • LAPAROSCOPIC CHOLECYSTECTOMY     • NEPHRECTOMY      right kidney removed    • REDUCTION MAMMAPLASTY     • TUMOR REMOVAL      Multiple removed from abdominal    • VENTRAL/INCISIONAL HERNIA REPAIR Right 8/28/2019    Procedure: VENTRAL/INCISIONAL HERNIA REPAIR;  Surgeon: Nilay Stevens MD;  Location: [facility] MAIN OR;  Service: General   • VENTRAL/INCISIONAL HERNIA REPAIR N/A 10/1/2019    Procedure: OPEN VENTRAL/INCISIONAL HERNIA REPAIR;  Surgeon: Nilay Stevens MD;  Location: [facility] MAIN OR;  Service: General       Social History:  Social History     Tobacco Use   • Smoking status: Current Every Day Smoker     Packs/day: 0.50     Years: 57.00     Pack years: 28.50     Types: Cigarettes   • Smokeless tobacco: Never Used   • Tobacco comment: quit smoking   Substance Use Topics   • Alcohol use: No     Frequency: Never   • Drug use: No       Family History:  Family History   Problem Relation Age of Onset   • Heart disease Father        Medications:  No medications prior to admission.       Scheduled Meds:  Continuous Infusions:No current facility-administered medications for this encounter.     PRN Meds:.    ALLERGIES:  Atorvastatin calcium and Niacin    ROS:  The following systems were reviewed and negative;   Constitution:  No fevers, chills, no unintentional weight loss  Skin: no rash, no jaundice  Eyes:  No blurry  "vision, no eye pain  HENT:  No change in hearing or smell  Resp:  No dyspnea or cough  CV:  No chest pain or palpitations  :  No dysuria, hematuria  Musculoskeletal:  No leg cramps or arthralgias  Neuro:  No tremor, no numbness  Psych:  No depression or confsuion    Objective     Vital Signs:   Vitals:    11/17/20 1447   Weight: 69.9 kg (154 lb)   Height: 152.4 cm (60\")       Physical Exam:       General Appearance:    Awake and alert, in no acute distress   Head:    Normocephalic, without obvious abnormality, atraumatic   Throat:   No oral lesions, no thrush, oral mucosa moist   Lungs:     respirations regular, even and unlabored   Skin:   No rash, no jaundice       Results Review:  Lab Results (last 24 hours)     ** No results found for the last 24 hours. **          Imaging Results (Last 24 Hours)     ** No results found for the last 24 hours. **           I reviewed the patient's labs and imaging.    ASSESSMENT AND PLAN:  Personal history of colon polyps    Active Problems:    * No active hospital problems. *       Procedure(s):  COLONOSCOPY      I discussed the patients findings and my recommendations with the patient.    Jeffery White MD  11/24/20  08:28 EST  "

## 2020-11-24 NOTE — ANESTHESIA POSTPROCEDURE EVALUATION
Patient: Sherry Lobato    Procedure Summary     Date: 11/24/20 Room / Location: [facility] ENDOSCOPY 1 / [facility] ENDOSCOPY    Anesthesia Start: 1030 Anesthesia Stop: 1104    Procedure: COLONOSCOPY with polypectomy x 7 (N/A ) Diagnosis:       Personal history of colonic polyps      (3 YEAR RECALL)    Surgeon: Jeffery White MD Provider: Estefany Flowers DO    Anesthesia Type: MAC ASA Status: 4          Anesthesia Type: MAC    Vitals  Vitals Value Taken Time   BP 82/55 11/24/20 1102   Temp     Pulse 74 11/24/20 1104   Resp     SpO2 98 % 11/24/20 1104   Vitals shown include unvalidated device data.        Post Anesthesia Care and Evaluation    Patient location during evaluation: PACU  Patient participation: complete - patient participated  Level of consciousness: awake  Pain score: 0  Pain management: adequate  Airway patency: patent  Anesthetic complications: No anesthetic complications  PONV Status: none  Cardiovascular status: acceptable  Respiratory status: acceptable  Hydration status: acceptable

## 2020-11-24 NOTE — OP NOTE
COLONOSCOPY Procedure Report    Patient Name:  Sherry Lobato  YOB: 1950    Date of Surgery:  11/24/2020     Pre-Op Diagnosis:  3 YEAR RECALL       Post-Op Diagnosis Codes:     * Personal history of colonic polyps [Z86.010]   1 ascending, 3 transverse, one descending and 2 sigmoid polyps removed via hot and cold snare polypectomy the largest was 8 mm  Moderate left-sided diverticulosis      Procedure/CPT® Codes:      Procedure(s):  COLONOSCOPY with polypectomy x 7    Staff:  Surgeon(s):  Jeffery White MD      Anesthesia: Monitored Anesthesia Care    Description of Procedure:  A description of the procedure as well as risks, benefits and alternative methods were explained to the patient who voiced understanding and signed the corresponding consent form. A physical exam was performed and vital signs were monitored throughout the procedure.    A rectal exam was performed which was normal. An Olympus colonoscope was placed into the rectum and proceeded under direct visualization through the colon until the cecum and appendiceal orifice were identified. Careful visualization occurred upon slow withdraw of the scope. The scope was then retroflexed and the distal rectum was visualized. The quality of the prep was good. The procedure was not difficult and there were no immediate complications.    Specimen:        See Below    Estimated blood loss: none    Complications:  None    Findings:  1 ascending, 3 transverse, one descending and 2 sigmoid polyps removed via hot and cold snare polypectomy the largest was 8 mm  Moderate left-sided diverticulosis      Impression:  Personal history of colon polyps with 7 polyps removed today    Recommendations:  Follow-up biopsy results  Repeat colonoscopy in 3 years      Jeffery White MD     Date: 11/24/2020    Time: 11:00 EST

## 2020-11-24 NOTE — ANESTHESIA PREPROCEDURE EVALUATION
Anesthesia Evaluation     Patient summary reviewed and Nursing notes reviewed   NPO Solid Status: > 6 hours  NPO Liquid Status: > 2 hours           Airway   Mallampati: III  TM distance: <3 FB  Neck ROM: full  Possible difficult intubation  Dental    (+) edentulous    Pulmonary    (+) a smoker Current Smoked day of surgery, COPD, sleep apnea, decreased breath sounds,     PE comment: Cough noted  Cardiovascular - normal exam    ECG reviewed    (+) hypertension 2 medications or greater, hyperlipidemia,  carotid artery disease    ROS comment: 10/1/19 EKG SR, 79. Low voltage, no change,    Neuro/Psych  (+) numbness,     GI/Hepatic/Renal/Endo    (+) obesity,  GERD, PUD,  renal disease ESRD and dialysis, diabetes mellitus type 2,     Musculoskeletal     Abdominal   (+) obese,    Substance History      OB/GYN          Other   arthritis, blood dyscrasia anemia,   history of cancer                  Anesthesia Plan    ASA 4     MAC     intravenous induction     Anesthetic plan, all risks, benefits, and alternatives have been provided, discussed and informed consent has been obtained with: patient.

## 2020-11-25 LAB
LAB AP CASE REPORT: NORMAL
PATH REPORT.FINAL DX SPEC: NORMAL
PATH REPORT.GROSS SPEC: NORMAL

## 2020-12-03 ENCOUNTER — HOSPITAL ENCOUNTER (OUTPATIENT)
Dept: MAMMOGRAPHY | Facility: HOSPITAL | Age: 70
Discharge: HOME | End: 2020-12-03

## 2020-12-03 ENCOUNTER — HOSPITAL ENCOUNTER (OUTPATIENT)
Dept: CARDIOLOGY | Facility: HOSPITAL | Age: 70
Discharge: HOME | End: 2020-12-03

## 2020-12-03 DIAGNOSIS — I65.23 BILATERAL CAROTID ARTERY STENOSIS: ICD-10-CM

## 2020-12-03 DIAGNOSIS — Z12.31 BREAST CANCER SCREENING BY MAMMOGRAM: ICD-10-CM

## 2020-12-03 LAB
BH CV XLRA MEAS LEFT DIST CCA EDV: -25.9 CM/SEC
BH CV XLRA MEAS LEFT DIST CCA PSV: -132 CM/SEC
BH CV XLRA MEAS LEFT DIST ICA EDV: -22 CM/SEC
BH CV XLRA MEAS LEFT DIST ICA PSV: -76.8 CM/SEC
BH CV XLRA MEAS LEFT ICA/CCA RATIO: 3.1
BH CV XLRA MEAS LEFT PROX CCA EDV: 18 CM/SEC
BH CV XLRA MEAS LEFT PROX CCA PSV: 107 CM/SEC
BH CV XLRA MEAS LEFT PROX ECA PSV: -377 CM/SEC
BH CV XLRA MEAS LEFT PROX ICA EDV: 63.7 CM/SEC
BH CV XLRA MEAS LEFT PROX ICA PSV: 406 CM/SEC
BH CV XLRA MEAS LEFT PROX SCLA PSV: -125 CM/SEC
BH CV XLRA MEAS LEFT VERTEBRAL A PSV: -59 CM/SEC
BH CV XLRA MEAS RIGHT DIST CCA EDV: -18.3 CM/SEC
BH CV XLRA MEAS RIGHT DIST CCA PSV: -89 CM/SEC
BH CV XLRA MEAS RIGHT DIST ICA EDV: -18.7 CM/SEC
BH CV XLRA MEAS RIGHT DIST ICA PSV: -99.9 CM/SEC
BH CV XLRA MEAS RIGHT ICA/CCA RATIO: 0.53
BH CV XLRA MEAS RIGHT PROX CCA EDV: 25.6 CM/SEC
BH CV XLRA MEAS RIGHT PROX CCA PSV: 215 CM/SEC
BH CV XLRA MEAS RIGHT PROX ECA PSV: -191 CM/SEC
BH CV XLRA MEAS RIGHT PROX ICA EDV: 28.8 CM/SEC
BH CV XLRA MEAS RIGHT PROX ICA PSV: 115 CM/SEC
BH CV XLRA MEAS RIGHT PROX SCLA PSV: -199 CM/SEC
BH CV XLRA MEAS RIGHT VERTEBRAL A PSV: -37.9 CM/SEC

## 2020-12-03 PROCEDURE — 77067 SCR MAMMO BI INCL CAD: CPT

## 2020-12-03 PROCEDURE — 93880 EXTRACRANIAL BILAT STUDY: CPT

## 2020-12-03 PROCEDURE — 77063 BREAST TOMOSYNTHESIS BI: CPT

## 2020-12-08 ENCOUNTER — TELEPHONE (OUTPATIENT)
Dept: FAMILY MEDICINE CLINIC | Facility: CLINIC | Age: 70
End: 2020-12-08

## 2020-12-08 NOTE — TELEPHONE ENCOUNTER
Fax mammogram and carotid to the [facility] transplant number I listed earlier

## 2020-12-11 RX ORDER — SODIUM BICARBONATE 650 MG/1
TABLET ORAL
Qty: 180 TABLET | Refills: 0 | Status: SHIPPED | OUTPATIENT
Start: 2020-12-11 | End: 2021-03-26 | Stop reason: SDUPTHER

## 2020-12-26 ENCOUNTER — APPOINTMENT (OUTPATIENT)
Dept: GENERAL RADIOLOGY | Facility: HOSPITAL | Age: 70
End: 2020-12-26

## 2020-12-26 ENCOUNTER — HOSPITAL ENCOUNTER (INPATIENT)
Facility: HOSPITAL | Age: 70
LOS: 3 days | Discharge: HOME | End: 2020-12-31
Attending: EMERGENCY MEDICINE | Admitting: INTERNAL MEDICINE

## 2020-12-26 DIAGNOSIS — N18.6 ESRD (END STAGE RENAL DISEASE) (HCC): Chronic | ICD-10-CM

## 2020-12-26 DIAGNOSIS — R06.00 DYSPNEA, UNSPECIFIED TYPE: Primary | ICD-10-CM

## 2020-12-26 DIAGNOSIS — I77.0 A-V FISTULA (HCC): ICD-10-CM

## 2020-12-26 DIAGNOSIS — N18.9 CHRONIC RENAL FAILURE, UNSPECIFIED CKD STAGE: ICD-10-CM

## 2020-12-26 LAB
ANION GAP SERPL CALCULATED.3IONS-SCNC: 16 MMOL/L (ref 5–15)
BASOPHILS # BLD AUTO: 0.2 10*3/MM3 (ref 0–0.2)
BASOPHILS NFR BLD AUTO: 1.5 % (ref 0–1.5)
BUN SERPL-MCNC: 70 MG/DL (ref 8–23)
BUN/CREAT SERPL: 8.2 (ref 7–25)
CALCIUM SPEC-SCNC: 8.5 MG/DL (ref 8.6–10.5)
CHLORIDE SERPL-SCNC: 103 MMOL/L (ref 98–107)
CO2 SERPL-SCNC: 21 MMOL/L (ref 22–29)
CREAT SERPL-MCNC: 8.52 MG/DL (ref 0.57–1)
DEPRECATED RDW RBC AUTO: 63.4 FL (ref 37–54)
EOSINOPHIL # BLD AUTO: 0.5 10*3/MM3 (ref 0–0.4)
EOSINOPHIL NFR BLD AUTO: 3.8 % (ref 0.3–6.2)
ERYTHROCYTE [DISTWIDTH] IN BLOOD BY AUTOMATED COUNT: 19.7 % (ref 12.3–15.4)
GFR SERPL CREATININE-BSD FRML MDRD: 5 ML/MIN/1.73
GFR SERPL CREATININE-BSD FRML MDRD: ABNORMAL ML/MIN/{1.73_M2}
GLUCOSE SERPL-MCNC: 101 MG/DL (ref 65–99)
HCT VFR BLD AUTO: 35 % (ref 34–46.6)
HGB BLD-MCNC: 11.3 G/DL (ref 12–15.9)
LYMPHOCYTES # BLD AUTO: 2 10*3/MM3 (ref 0.7–3.1)
LYMPHOCYTES NFR BLD AUTO: 15.1 % (ref 19.6–45.3)
MCH RBC QN AUTO: 29.7 PG (ref 26.6–33)
MCHC RBC AUTO-ENTMCNC: 32.4 G/DL (ref 31.5–35.7)
MCV RBC AUTO: 91.4 FL (ref 79–97)
MONOCYTES # BLD AUTO: 0.8 10*3/MM3 (ref 0.1–0.9)
MONOCYTES NFR BLD AUTO: 6.1 % (ref 5–12)
NEUTROPHILS NFR BLD AUTO: 73.5 % (ref 42.7–76)
NEUTROPHILS NFR BLD AUTO: 9.6 10*3/MM3 (ref 1.7–7)
NRBC BLD AUTO-RTO: 0.2 /100 WBC (ref 0–0.2)
PLATELET # BLD AUTO: 482 10*3/MM3 (ref 140–450)
PMV BLD AUTO: 7.3 FL (ref 6–12)
POTASSIUM SERPL-SCNC: 5.1 MMOL/L (ref 3.5–5.2)
RBC # BLD AUTO: 3.83 10*6/MM3 (ref 3.77–5.28)
SARS-COV-2 RNA PNL SPEC NAA+PROBE: NORMAL
SODIUM SERPL-SCNC: 140 MMOL/L (ref 136–145)
WBC # BLD AUTO: 13.1 10*3/MM3 (ref 3.4–10.8)
WHOLE BLOOD HOLD SPECIMEN: NORMAL

## 2020-12-26 PROCEDURE — 71045 X-RAY EXAM CHEST 1 VIEW: CPT

## 2020-12-26 PROCEDURE — 87635 SARS-COV-2 COVID-19 AMP PRB: CPT | Performed by: EMERGENCY MEDICINE

## 2020-12-26 PROCEDURE — 84484 ASSAY OF TROPONIN QUANT: CPT | Performed by: PHYSICIAN ASSISTANT

## 2020-12-26 PROCEDURE — 80048 BASIC METABOLIC PNL TOTAL CA: CPT | Performed by: EMERGENCY MEDICINE

## 2020-12-26 PROCEDURE — 99222 1ST HOSP IP/OBS MODERATE 55: CPT | Performed by: PHYSICIAN ASSISTANT

## 2020-12-26 PROCEDURE — 93005 ELECTROCARDIOGRAM TRACING: CPT

## 2020-12-26 PROCEDURE — 85025 COMPLETE CBC W/AUTO DIFF WBC: CPT | Performed by: EMERGENCY MEDICINE

## 2020-12-26 PROCEDURE — 99284 EMERGENCY DEPT VISIT MOD MDM: CPT

## 2020-12-26 PROCEDURE — 93005 ELECTROCARDIOGRAM TRACING: CPT | Performed by: EMERGENCY MEDICINE

## 2020-12-26 RX ORDER — SODIUM CHLORIDE 0.9 % (FLUSH) 0.9 %
10 SYRINGE (ML) INJECTION AS NEEDED
Status: DISCONTINUED | OUTPATIENT
Start: 2020-12-26 | End: 2020-12-31 | Stop reason: HOSPADM

## 2020-12-27 PROBLEM — I65.22 CAROTID STENOSIS, ASYMPTOMATIC, LEFT: Status: ACTIVE | Noted: 2018-07-05

## 2020-12-27 PROBLEM — D50.9 IRON DEFICIENCY ANEMIA: Status: ACTIVE | Noted: 2017-05-03

## 2020-12-27 PROBLEM — R06.02 SHORTNESS OF BREATH: Status: ACTIVE | Noted: 2020-12-27

## 2020-12-27 PROBLEM — D63.1 ANEMIA IN CHRONIC KIDNEY DISEASE: Status: ACTIVE | Noted: 2017-05-03

## 2020-12-27 PROBLEM — Z72.0 TOBACCO ABUSE: Chronic | Status: ACTIVE | Noted: 2020-12-27

## 2020-12-27 PROBLEM — R06.00 DYSPNEA: Status: ACTIVE | Noted: 2020-12-27

## 2020-12-27 PROBLEM — D68.9 COAGULATION DEFECT, UNSPECIFIED: Status: ACTIVE | Noted: 2017-05-03

## 2020-12-27 PROBLEM — N25.81 SECONDARY HYPERPARATHYROIDISM OF RENAL ORIGIN (HCC): Status: ACTIVE | Noted: 2017-05-03

## 2020-12-27 PROBLEM — D75.839 THROMBOCYTOSIS: Status: ACTIVE | Noted: 2020-12-27

## 2020-12-27 PROBLEM — N18.9 ANEMIA IN CHRONIC KIDNEY DISEASE: Status: ACTIVE | Noted: 2017-05-03

## 2020-12-27 PROBLEM — T82.868A AV GRAFT THROMBOSIS, INITIAL ENCOUNTER: Status: ACTIVE | Noted: 2020-12-27

## 2020-12-27 LAB
ANION GAP SERPL CALCULATED.3IONS-SCNC: 17 MMOL/L (ref 5–15)
BASOPHILS # BLD AUTO: 0.1 10*3/MM3 (ref 0–0.2)
BASOPHILS NFR BLD AUTO: 1 % (ref 0–1.5)
BUN SERPL-MCNC: 75 MG/DL (ref 8–23)
BUN/CREAT SERPL: 8.7 (ref 7–25)
CALCIUM SPEC-SCNC: 8.5 MG/DL (ref 8.6–10.5)
CHLORIDE SERPL-SCNC: 102 MMOL/L (ref 98–107)
CO2 SERPL-SCNC: 20 MMOL/L (ref 22–29)
CREAT SERPL-MCNC: 8.63 MG/DL (ref 0.57–1)
DEPRECATED RDW RBC AUTO: 63 FL (ref 37–54)
EOSINOPHIL # BLD AUTO: 0.4 10*3/MM3 (ref 0–0.4)
EOSINOPHIL NFR BLD AUTO: 3.6 % (ref 0.3–6.2)
ERYTHROCYTE [DISTWIDTH] IN BLOOD BY AUTOMATED COUNT: 19.5 % (ref 12.3–15.4)
GFR SERPL CREATININE-BSD FRML MDRD: 5 ML/MIN/1.73
GFR SERPL CREATININE-BSD FRML MDRD: ABNORMAL ML/MIN/{1.73_M2}
GLUCOSE SERPL-MCNC: 173 MG/DL (ref 65–99)
HCT VFR BLD AUTO: 34.1 % (ref 34–46.6)
HGB BLD-MCNC: 11.2 G/DL (ref 12–15.9)
LYMPHOCYTES # BLD AUTO: 1.4 10*3/MM3 (ref 0.7–3.1)
LYMPHOCYTES NFR BLD AUTO: 11.6 % (ref 19.6–45.3)
MCH RBC QN AUTO: 30.3 PG (ref 26.6–33)
MCHC RBC AUTO-ENTMCNC: 32.7 G/DL (ref 31.5–35.7)
MCV RBC AUTO: 92.5 FL (ref 79–97)
MONOCYTES # BLD AUTO: 0.7 10*3/MM3 (ref 0.1–0.9)
MONOCYTES NFR BLD AUTO: 6 % (ref 5–12)
NEUTROPHILS NFR BLD AUTO: 77.8 % (ref 42.7–76)
NEUTROPHILS NFR BLD AUTO: 9.2 10*3/MM3 (ref 1.7–7)
NRBC BLD AUTO-RTO: 0 /100 WBC (ref 0–0.2)
PLATELET # BLD AUTO: 466 10*3/MM3 (ref 140–450)
PMV BLD AUTO: 7.3 FL (ref 6–12)
POTASSIUM SERPL-SCNC: 5 MMOL/L (ref 3.5–5.2)
POTASSIUM SERPL-SCNC: 5.5 MMOL/L (ref 3.5–5.2)
RBC # BLD AUTO: 3.68 10*6/MM3 (ref 3.77–5.28)
SODIUM SERPL-SCNC: 139 MMOL/L (ref 136–145)
TROPONIN T SERPL-MCNC: <0.01 NG/ML (ref 0–0.03)
WBC # BLD AUTO: 11.8 10*3/MM3 (ref 3.4–10.8)

## 2020-12-27 PROCEDURE — 99232 SBSQ HOSP IP/OBS MODERATE 35: CPT | Performed by: INTERNAL MEDICINE

## 2020-12-27 PROCEDURE — G0378 HOSPITAL OBSERVATION PER HR: HCPCS

## 2020-12-27 PROCEDURE — 03C73ZZ EXTIRPATION OF MATTER FROM RIGHT BRACHIAL ARTERY, PERCUTANEOUS APPROACH: ICD-10-PCS | Performed by: RADIOLOGY

## 2020-12-27 PROCEDURE — 84132 ASSAY OF SERUM POTASSIUM: CPT | Performed by: INTERNAL MEDICINE

## 2020-12-27 PROCEDURE — 80048 BASIC METABOLIC PNL TOTAL CA: CPT | Performed by: PHYSICIAN ASSISTANT

## 2020-12-27 PROCEDURE — B5131ZA FLUOROSCOPY OF RIGHT JUGULAR VEINS USING LOW OSMOLAR CONTRAST, GUIDANCE: ICD-10-PCS | Performed by: RADIOLOGY

## 2020-12-27 PROCEDURE — 85025 COMPLETE CBC W/AUTO DIFF WBC: CPT | Performed by: PHYSICIAN ASSISTANT

## 2020-12-27 RX ORDER — ALUMINA, MAGNESIA, AND SIMETHICONE 2400; 2400; 240 MG/30ML; MG/30ML; MG/30ML
15 SUSPENSION ORAL EVERY 6 HOURS PRN
Status: DISCONTINUED | OUTPATIENT
Start: 2020-12-27 | End: 2020-12-31 | Stop reason: HOSPADM

## 2020-12-27 RX ORDER — SODIUM BICARBONATE 650 MG/1
1300 TABLET ORAL 3 TIMES DAILY
Status: DISCONTINUED | OUTPATIENT
Start: 2020-12-27 | End: 2020-12-31 | Stop reason: HOSPADM

## 2020-12-27 RX ORDER — MULTIPLE VITAMINS W/ MINERALS TAB 9MG-400MCG
1 TAB ORAL DAILY
Status: DISCONTINUED | OUTPATIENT
Start: 2020-12-27 | End: 2020-12-31 | Stop reason: HOSPADM

## 2020-12-27 RX ORDER — BENZONATATE 100 MG/1
200 CAPSULE ORAL 3 TIMES DAILY PRN
Status: DISCONTINUED | OUTPATIENT
Start: 2020-12-27 | End: 2020-12-31 | Stop reason: HOSPADM

## 2020-12-27 RX ORDER — CHOLECALCIFEROL (VITAMIN D3) 125 MCG
5 CAPSULE ORAL NIGHTLY PRN
Status: DISCONTINUED | OUTPATIENT
Start: 2020-12-27 | End: 2020-12-31 | Stop reason: HOSPADM

## 2020-12-27 RX ORDER — ONDANSETRON 2 MG/ML
4 INJECTION INTRAMUSCULAR; INTRAVENOUS EVERY 6 HOURS PRN
Status: DISCONTINUED | OUTPATIENT
Start: 2020-12-27 | End: 2020-12-31 | Stop reason: HOSPADM

## 2020-12-27 RX ORDER — SIMVASTATIN 40 MG
40 TABLET ORAL NIGHTLY
Status: DISCONTINUED | OUTPATIENT
Start: 2020-12-27 | End: 2020-12-31 | Stop reason: HOSPADM

## 2020-12-27 RX ORDER — PANTOPRAZOLE SODIUM 40 MG/1
40 TABLET, DELAYED RELEASE ORAL 2 TIMES DAILY
Status: DISCONTINUED | OUTPATIENT
Start: 2020-12-27 | End: 2020-12-31 | Stop reason: HOSPADM

## 2020-12-27 RX ORDER — AMLODIPINE BESYLATE 5 MG/1
10 TABLET ORAL DAILY
Status: DISCONTINUED | OUTPATIENT
Start: 2020-12-27 | End: 2020-12-31 | Stop reason: HOSPADM

## 2020-12-27 RX ORDER — ATORVASTATIN CALCIUM 20 MG/1
20 TABLET, FILM COATED ORAL DAILY
Status: DISCONTINUED | OUTPATIENT
Start: 2020-12-27 | End: 2020-12-27

## 2020-12-27 RX ORDER — CALCIUM ACETATE 667 MG/1
2001 CAPSULE ORAL 3 TIMES DAILY
Status: DISCONTINUED | OUTPATIENT
Start: 2020-12-27 | End: 2020-12-31 | Stop reason: HOSPADM

## 2020-12-27 RX ORDER — SODIUM CHLORIDE 0.9 % (FLUSH) 0.9 %
10 SYRINGE (ML) INJECTION AS NEEDED
Status: DISCONTINUED | OUTPATIENT
Start: 2020-12-27 | End: 2020-12-31 | Stop reason: HOSPADM

## 2020-12-27 RX ORDER — LIDOCAINE AND PRILOCAINE 25; 25 MG/G; MG/G
CREAM TOPICAL AS NEEDED
Status: DISCONTINUED | OUTPATIENT
Start: 2020-12-27 | End: 2020-12-31 | Stop reason: HOSPADM

## 2020-12-27 RX ORDER — ACETAMINOPHEN 160 MG/5ML
650 SOLUTION ORAL EVERY 4 HOURS PRN
Status: DISCONTINUED | OUTPATIENT
Start: 2020-12-27 | End: 2020-12-31 | Stop reason: HOSPADM

## 2020-12-27 RX ORDER — NITROGLYCERIN 0.4 MG/1
0.4 TABLET SUBLINGUAL
Status: DISCONTINUED | OUTPATIENT
Start: 2020-12-27 | End: 2020-12-31 | Stop reason: HOSPADM

## 2020-12-27 RX ORDER — ACETAMINOPHEN 325 MG/1
650 TABLET ORAL EVERY 4 HOURS PRN
Status: DISCONTINUED | OUTPATIENT
Start: 2020-12-27 | End: 2020-12-31 | Stop reason: HOSPADM

## 2020-12-27 RX ORDER — SODIUM CHLORIDE 0.9 % (FLUSH) 0.9 %
10 SYRINGE (ML) INJECTION EVERY 12 HOURS SCHEDULED
Status: DISCONTINUED | OUTPATIENT
Start: 2020-12-27 | End: 2020-12-31 | Stop reason: HOSPADM

## 2020-12-27 RX ORDER — NICOTINE 21 MG/24HR
1 PATCH, TRANSDERMAL 24 HOURS TRANSDERMAL
Status: DISCONTINUED | OUTPATIENT
Start: 2020-12-27 | End: 2020-12-31 | Stop reason: HOSPADM

## 2020-12-27 RX ORDER — ONDANSETRON 4 MG/1
4 TABLET, FILM COATED ORAL EVERY 6 HOURS PRN
Status: DISCONTINUED | OUTPATIENT
Start: 2020-12-27 | End: 2020-12-31 | Stop reason: HOSPADM

## 2020-12-27 RX ORDER — NEPHROCAP 1 MG
1 CAP ORAL DAILY
Status: DISCONTINUED | OUTPATIENT
Start: 2020-12-27 | End: 2020-12-31 | Stop reason: HOSPADM

## 2020-12-27 RX ORDER — SEVELAMER CARBONATE 800 MG/1
1600 TABLET, FILM COATED ORAL
Status: ON HOLD | COMMUNITY
End: 2021-10-11

## 2020-12-27 RX ORDER — LOSARTAN POTASSIUM 50 MG/1
100 TABLET ORAL DAILY
Status: DISCONTINUED | OUTPATIENT
Start: 2020-12-27 | End: 2020-12-31 | Stop reason: HOSPADM

## 2020-12-27 RX ORDER — ETHYL ALCOHOL 100 %
LIQUID (ML) MISCELLANEOUS 3 TIMES WEEKLY
COMMUNITY
End: 2021-07-25

## 2020-12-27 RX ORDER — BUMETANIDE 0.25 MG/ML
4 INJECTION INTRAMUSCULAR; INTRAVENOUS ONCE
Status: COMPLETED | OUTPATIENT
Start: 2020-12-27 | End: 2020-12-27

## 2020-12-27 RX ORDER — MAGNESIUM SULFATE 1 G/100ML
1 INJECTION INTRAVENOUS AS NEEDED
Status: DISCONTINUED | OUTPATIENT
Start: 2020-12-27 | End: 2020-12-31 | Stop reason: HOSPADM

## 2020-12-27 RX ORDER — ACETAMINOPHEN 650 MG/1
650 SUPPOSITORY RECTAL EVERY 4 HOURS PRN
Status: DISCONTINUED | OUTPATIENT
Start: 2020-12-27 | End: 2020-12-31 | Stop reason: HOSPADM

## 2020-12-27 RX ORDER — SEVELAMER CARBONATE 800 MG/1
1600 TABLET, FILM COATED ORAL
Status: DISCONTINUED | OUTPATIENT
Start: 2020-12-27 | End: 2020-12-31 | Stop reason: HOSPADM

## 2020-12-27 RX ORDER — SODIUM POLYSTYRENE SULFONATE 15 G/60ML
30 SUSPENSION ORAL; RECTAL ONCE
Status: COMPLETED | OUTPATIENT
Start: 2020-12-27 | End: 2020-12-27

## 2020-12-27 RX ORDER — LANOLIN ALCOHOL/MO/W.PET/CERES
2000 CREAM (GRAM) TOPICAL 3 TIMES WEEKLY
Status: DISCONTINUED | OUTPATIENT
Start: 2020-12-28 | End: 2020-12-31 | Stop reason: HOSPADM

## 2020-12-27 RX ORDER — MAGNESIUM SULFATE HEPTAHYDRATE 40 MG/ML
2 INJECTION, SOLUTION INTRAVENOUS AS NEEDED
Status: DISCONTINUED | OUTPATIENT
Start: 2020-12-27 | End: 2020-12-31 | Stop reason: HOSPADM

## 2020-12-27 RX ADMIN — MULTIPLE VITAMINS W/ MINERALS TAB 1 TABLET: TAB at 09:30

## 2020-12-27 RX ADMIN — BENZONATATE 200 MG: 100 CAPSULE ORAL at 03:57

## 2020-12-27 RX ADMIN — BENZONATATE 200 MG: 100 CAPSULE ORAL at 22:04

## 2020-12-27 RX ADMIN — CALCIUM ACETATE 2001 MG: 667 CAPSULE ORAL at 16:59

## 2020-12-27 RX ADMIN — SEVELAMER CARBONATE 1600 MG: 800 TABLET, FILM COATED ORAL at 09:30

## 2020-12-27 RX ADMIN — SIMVASTATIN 40 MG: 40 TABLET, FILM COATED ORAL at 22:52

## 2020-12-27 RX ADMIN — PANTOPRAZOLE SODIUM 40 MG: 40 TABLET, DELAYED RELEASE ORAL at 09:30

## 2020-12-27 RX ADMIN — CALCIUM ACETATE 2001 MG: 667 CAPSULE ORAL at 22:04

## 2020-12-27 RX ADMIN — SODIUM POLYSTYRENE SULFONATE 30 G: 15 SUSPENSION ORAL; RECTAL at 11:25

## 2020-12-27 RX ADMIN — CALCIUM ACETATE 2001 MG: 667 CAPSULE ORAL at 09:29

## 2020-12-27 RX ADMIN — NEPHROCAP 1 MG: 1 CAP ORAL at 09:30

## 2020-12-27 RX ADMIN — SODIUM BICARBONATE 650 MG TABLET 1300 MG: at 09:30

## 2020-12-27 RX ADMIN — Medication 10 ML: at 09:30

## 2020-12-27 RX ADMIN — SODIUM BICARBONATE 650 MG TABLET 1300 MG: at 16:59

## 2020-12-27 RX ADMIN — LOSARTAN POTASSIUM 100 MG: 50 TABLET, FILM COATED ORAL at 09:30

## 2020-12-27 RX ADMIN — SODIUM BICARBONATE 650 MG TABLET 1300 MG: at 22:04

## 2020-12-27 RX ADMIN — AMLODIPINE BESYLATE 10 MG: 5 TABLET ORAL at 09:33

## 2020-12-27 RX ADMIN — Medication 10 ML: at 22:04

## 2020-12-27 RX ADMIN — PANTOPRAZOLE SODIUM 40 MG: 40 TABLET, DELAYED RELEASE ORAL at 22:05

## 2020-12-27 RX ADMIN — SEVELAMER CARBONATE 1600 MG: 800 TABLET, FILM COATED ORAL at 17:02

## 2020-12-27 RX ADMIN — BUMETANIDE 4 MG: 0.25 INJECTION, SOLUTION INTRAMUSCULAR; INTRAVENOUS at 11:25

## 2020-12-27 RX ADMIN — SEVELAMER CARBONATE 1600 MG: 800 TABLET, FILM COATED ORAL at 11:24

## 2020-12-28 ENCOUNTER — APPOINTMENT (OUTPATIENT)
Dept: INTERVENTIONAL RADIOLOGY/VASCULAR | Facility: HOSPITAL | Age: 70
End: 2020-12-28

## 2020-12-28 LAB
ALBUMIN SERPL-MCNC: 3.6 G/DL (ref 3.5–5.2)
ANION GAP SERPL CALCULATED.3IONS-SCNC: 16 MMOL/L (ref 5–15)
APTT PPP: 28.2 SECONDS (ref 24–31)
BASOPHILS # BLD AUTO: 0.1 10*3/MM3 (ref 0–0.2)
BASOPHILS NFR BLD AUTO: 0.6 % (ref 0–1.5)
BUN SERPL-MCNC: 84 MG/DL (ref 8–23)
BUN/CREAT SERPL: 9 (ref 7–25)
CALCIUM SPEC-SCNC: 9.4 MG/DL (ref 8.6–10.5)
CHLORIDE SERPL-SCNC: 103 MMOL/L (ref 98–107)
CO2 SERPL-SCNC: 23 MMOL/L (ref 22–29)
CREAT SERPL-MCNC: 9.37 MG/DL (ref 0.57–1)
DEPRECATED RDW RBC AUTO: 63 FL (ref 37–54)
EOSINOPHIL # BLD AUTO: 0.3 10*3/MM3 (ref 0–0.4)
EOSINOPHIL NFR BLD AUTO: 2.7 % (ref 0.3–6.2)
ERYTHROCYTE [DISTWIDTH] IN BLOOD BY AUTOMATED COUNT: 19.5 % (ref 12.3–15.4)
GFR SERPL CREATININE-BSD FRML MDRD: 4 ML/MIN/1.73
GFR SERPL CREATININE-BSD FRML MDRD: ABNORMAL ML/MIN/{1.73_M2}
GLUCOSE BLDC GLUCOMTR-MCNC: 81 MG/DL (ref 70–105)
GLUCOSE SERPL-MCNC: 85 MG/DL (ref 65–99)
HBV SURFACE AG SERPL QL IA: NORMAL
HCT VFR BLD AUTO: 36.2 % (ref 34–46.6)
HGB BLD-MCNC: 11.8 G/DL (ref 12–15.9)
INR PPP: 0.95 (ref 0.93–1.1)
LYMPHOCYTES # BLD AUTO: 1.1 10*3/MM3 (ref 0.7–3.1)
LYMPHOCYTES NFR BLD AUTO: 9.3 % (ref 19.6–45.3)
MAGNESIUM SERPL-MCNC: 2.2 MG/DL (ref 1.6–2.4)
MCH RBC QN AUTO: 30.2 PG (ref 26.6–33)
MCHC RBC AUTO-ENTMCNC: 32.6 G/DL (ref 31.5–35.7)
MCV RBC AUTO: 92.6 FL (ref 79–97)
MONOCYTES # BLD AUTO: 0.7 10*3/MM3 (ref 0.1–0.9)
MONOCYTES NFR BLD AUTO: 5.6 % (ref 5–12)
NEUTROPHILS NFR BLD AUTO: 10 10*3/MM3 (ref 1.7–7)
NEUTROPHILS NFR BLD AUTO: 81.8 % (ref 42.7–76)
NRBC BLD AUTO-RTO: 0.1 /100 WBC (ref 0–0.2)
PHOSPHATE SERPL-MCNC: 8.4 MG/DL (ref 2.5–4.5)
PLATELET # BLD AUTO: 485 10*3/MM3 (ref 140–450)
PMV BLD AUTO: 7.3 FL (ref 6–12)
POTASSIUM SERPL-SCNC: 5.4 MMOL/L (ref 3.5–5.2)
PROTHROMBIN TIME: 10.5 SECONDS (ref 9.6–11.7)
QT INTERVAL: 391 MS
RBC # BLD AUTO: 3.91 10*6/MM3 (ref 3.77–5.28)
SODIUM SERPL-SCNC: 142 MMOL/L (ref 136–145)
WBC # BLD AUTO: 12.3 10*3/MM3 (ref 3.4–10.8)

## 2020-12-28 PROCEDURE — 83735 ASSAY OF MAGNESIUM: CPT | Performed by: PHYSICIAN ASSISTANT

## 2020-12-28 PROCEDURE — 25010000002 MIDAZOLAM PER 1 MG: Performed by: RADIOLOGY

## 2020-12-28 PROCEDURE — 80069 RENAL FUNCTION PANEL: CPT | Performed by: INTERNAL MEDICINE

## 2020-12-28 PROCEDURE — C1769 GUIDE WIRE: HCPCS

## 2020-12-28 PROCEDURE — 82962 GLUCOSE BLOOD TEST: CPT

## 2020-12-28 PROCEDURE — 0 IOPAMIDOL PER 1 ML: Performed by: INTERNAL MEDICINE

## 2020-12-28 PROCEDURE — C1887 CATHETER, GUIDING: HCPCS

## 2020-12-28 PROCEDURE — 25010000003 LIDOCAINE 1 % SOLUTION: Performed by: RADIOLOGY

## 2020-12-28 PROCEDURE — C1894 INTRO/SHEATH, NON-LASER: HCPCS

## 2020-12-28 PROCEDURE — 99232 SBSQ HOSP IP/OBS MODERATE 35: CPT | Performed by: INTERNAL MEDICINE

## 2020-12-28 PROCEDURE — 85610 PROTHROMBIN TIME: CPT | Performed by: RADIOLOGY

## 2020-12-28 PROCEDURE — 85025 COMPLETE CBC W/AUTO DIFF WBC: CPT | Performed by: INTERNAL MEDICINE

## 2020-12-28 PROCEDURE — 85730 THROMBOPLASTIN TIME PARTIAL: CPT | Performed by: RADIOLOGY

## 2020-12-28 PROCEDURE — C1725 CATH, TRANSLUMIN NON-LASER: HCPCS

## 2020-12-28 PROCEDURE — 86706 HEP B SURFACE ANTIBODY: CPT | Performed by: INTERNAL MEDICINE

## 2020-12-28 PROCEDURE — 25010000002 ONDANSETRON PER 1 MG: Performed by: RADIOLOGY

## 2020-12-28 PROCEDURE — 25010000002 FENTANYL CITRATE (PF) 100 MCG/2ML SOLUTION: Performed by: RADIOLOGY

## 2020-12-28 PROCEDURE — 87340 HEPATITIS B SURFACE AG IA: CPT | Performed by: INTERNAL MEDICINE

## 2020-12-28 PROCEDURE — 99153 MOD SED SAME PHYS/QHP EA: CPT

## 2020-12-28 PROCEDURE — 99152 MOD SED SAME PHYS/QHP 5/>YRS: CPT

## 2020-12-28 PROCEDURE — C1757 CATH, THROMBECTOMY/EMBOLECT: HCPCS

## 2020-12-28 PROCEDURE — 25010000003 CEFAZOLIN PER 500 MG: Performed by: RADIOLOGY

## 2020-12-28 PROCEDURE — 25010000002 ALTEPLASE PER 1 MG: Performed by: RADIOLOGY

## 2020-12-28 RX ORDER — ONDANSETRON 2 MG/ML
INJECTION INTRAMUSCULAR; INTRAVENOUS
Status: COMPLETED | OUTPATIENT
Start: 2020-12-28 | End: 2020-12-28

## 2020-12-28 RX ORDER — LIDOCAINE HYDROCHLORIDE 10 MG/ML
INJECTION, SOLUTION INFILTRATION; PERINEURAL
Status: COMPLETED | OUTPATIENT
Start: 2020-12-28 | End: 2020-12-28

## 2020-12-28 RX ORDER — MIDAZOLAM HYDROCHLORIDE 1 MG/ML
INJECTION INTRAMUSCULAR; INTRAVENOUS
Status: COMPLETED | OUTPATIENT
Start: 2020-12-28 | End: 2020-12-28

## 2020-12-28 RX ORDER — FENTANYL CITRATE 50 UG/ML
INJECTION, SOLUTION INTRAMUSCULAR; INTRAVENOUS
Status: COMPLETED | OUTPATIENT
Start: 2020-12-28 | End: 2020-12-28

## 2020-12-28 RX ADMIN — ONDANSETRON 4 MG: 2 INJECTION INTRAMUSCULAR; INTRAVENOUS at 10:28

## 2020-12-28 RX ADMIN — LOSARTAN POTASSIUM 100 MG: 50 TABLET, FILM COATED ORAL at 08:18

## 2020-12-28 RX ADMIN — AMLODIPINE BESYLATE 10 MG: 5 TABLET ORAL at 08:18

## 2020-12-28 RX ADMIN — CEFAZOLIN 2 G: 1 INJECTION, POWDER, FOR SOLUTION INTRAMUSCULAR; INTRAVENOUS at 10:06

## 2020-12-28 RX ADMIN — Medication 10 ML: at 21:10

## 2020-12-28 RX ADMIN — Medication 10 ML: at 08:21

## 2020-12-28 RX ADMIN — LIDOCAINE HYDROCHLORIDE 12 ML: 10 INJECTION, SOLUTION INFILTRATION; PERINEURAL at 10:39

## 2020-12-28 RX ADMIN — SEVELAMER CARBONATE 1600 MG: 800 TABLET, FILM COATED ORAL at 17:09

## 2020-12-28 RX ADMIN — FENTANYL CITRATE 100 MCG: 50 INJECTION, SOLUTION INTRAMUSCULAR; INTRAVENOUS at 10:30

## 2020-12-28 RX ADMIN — METOPROLOL TARTRATE 12.5 MG: 25 TABLET, FILM COATED ORAL at 21:16

## 2020-12-28 RX ADMIN — ACETAMINOPHEN 650 MG: 325 TABLET ORAL at 21:33

## 2020-12-28 RX ADMIN — CALCIUM ACETATE 2001 MG: 667 CAPSULE ORAL at 21:17

## 2020-12-28 RX ADMIN — LIDOCAINE HYDROCHLORIDE 8 ML: 10 INJECTION, SOLUTION INFILTRATION; PERINEURAL at 10:52

## 2020-12-28 RX ADMIN — SODIUM BICARBONATE 650 MG TABLET 1300 MG: at 15:07

## 2020-12-28 RX ADMIN — PANTOPRAZOLE SODIUM 40 MG: 40 TABLET, DELAYED RELEASE ORAL at 21:17

## 2020-12-28 RX ADMIN — CALCIUM ACETATE 2001 MG: 667 CAPSULE ORAL at 15:07

## 2020-12-28 RX ADMIN — MIDAZOLAM 0.5 MG: 1 INJECTION INTRAMUSCULAR; INTRAVENOUS at 10:52

## 2020-12-28 RX ADMIN — FENTANYL CITRATE 50 MCG: 50 INJECTION, SOLUTION INTRAMUSCULAR; INTRAVENOUS at 11:17

## 2020-12-28 RX ADMIN — FENTANYL CITRATE 50 MCG: 50 INJECTION, SOLUTION INTRAMUSCULAR; INTRAVENOUS at 11:34

## 2020-12-28 RX ADMIN — METOPROLOL TARTRATE 12.5 MG: 25 TABLET, FILM COATED ORAL at 12:18

## 2020-12-28 RX ADMIN — FENTANYL CITRATE 50 MCG: 50 INJECTION, SOLUTION INTRAMUSCULAR; INTRAVENOUS at 11:42

## 2020-12-28 RX ADMIN — CYANOCOBALAMIN TAB 1000 MCG 2000 MCG: 1000 TAB at 08:18

## 2020-12-28 RX ADMIN — SODIUM BICARBONATE 650 MG TABLET 1300 MG: at 21:16

## 2020-12-28 RX ADMIN — MIDAZOLAM 1 MG: 1 INJECTION INTRAMUSCULAR; INTRAVENOUS at 10:30

## 2020-12-28 RX ADMIN — IOPAMIDOL 45 ML: 755 INJECTION, SOLUTION INTRAVENOUS at 11:58

## 2020-12-28 RX ADMIN — ALTEPLASE 4 MG: KIT at 10:53

## 2020-12-28 RX ADMIN — MIDAZOLAM 0.5 MG: 1 INJECTION INTRAMUSCULAR; INTRAVENOUS at 11:17

## 2020-12-29 ENCOUNTER — APPOINTMENT (OUTPATIENT)
Dept: CARDIOLOGY | Facility: HOSPITAL | Age: 70
End: 2020-12-29

## 2020-12-29 ENCOUNTER — ANESTHESIA EVENT (OUTPATIENT)
Dept: PERIOP | Facility: HOSPITAL | Age: 70
End: 2020-12-29

## 2020-12-29 ENCOUNTER — APPOINTMENT (OUTPATIENT)
Dept: INTERVENTIONAL RADIOLOGY/VASCULAR | Facility: HOSPITAL | Age: 70
End: 2020-12-29

## 2020-12-29 LAB
ABO GROUP BLD: NORMAL
ALBUMIN SERPL-MCNC: 3.6 G/DL (ref 3.5–5.2)
ANION GAP SERPL CALCULATED.3IONS-SCNC: 19 MMOL/L (ref 5–15)
BH CV VAS MEAS BASILIC ANTECUBITAL FOSSA LEFT: 0.22 CM
BH CV VAS MEAS BASILIC FOREARM LEFT - DIST: 0.18 CM
BH CV VAS MEAS BASILIC FOREARM LEFT - MID: 0.15 CM
BH CV VAS MEAS BASILIC FOREARM LEFT - PROX: 0.25 CM
BH CV VAS MEAS BASILIC UPPER ARM LEFT - DIST: 0.24 CM
BH CV VAS MEAS BASILIC UPPER ARM LEFT - MID: 0.38 CM
BH CV VAS MEAS BASILIC UPPER ARM LEFT - PROX: 0.63 CM
BH CV VAS MEAS CEPHALIC ANTECUBITAL FOSSA LEFT: 0.19 CM
BH CV VAS MEAS CEPHALIC FOREARM LEFT - DIST: 0.18 CM
BH CV VAS MEAS CEPHALIC FOREARM LEFT - MID: 0.12 CM
BH CV VAS MEAS CEPHALIC FOREARM LEFT - PROX: 0.12 CM
BH CV VAS MEAS CEPHALIC UPPER ARM LEFT - DIST: 0.11 CM
BH CV VAS MEAS CEPHALIC UPPER ARM LEFT - MID: 0.12 CM
BH CV VAS MEAS CEPHALIC UPPER ARM LEFT - PROX: 0.25 CM
BH CV VAS MEAS RADIAL UPPER ARM LEFT - DIST: 0.16 CM
BH CV VAS MEAS RADIAL UPPER ARM LEFT - MID: 0.15 CM
BH CV VAS MEAS RADIAL UPPER ARM LEFT - PROX: 0.16 CM
BLD GP AB SCN SERPL QL: NEGATIVE
BUN SERPL-MCNC: 85 MG/DL (ref 8–23)
BUN/CREAT SERPL: 8.2 (ref 7–25)
CALCIUM SPEC-SCNC: 8.9 MG/DL (ref 8.6–10.5)
CHLORIDE SERPL-SCNC: 101 MMOL/L (ref 98–107)
CO2 SERPL-SCNC: 20 MMOL/L (ref 22–29)
CREAT SERPL-MCNC: 10.36 MG/DL (ref 0.57–1)
DEPRECATED RDW RBC AUTO: 63.4 FL (ref 37–54)
ERYTHROCYTE [DISTWIDTH] IN BLOOD BY AUTOMATED COUNT: 19.6 % (ref 12.3–15.4)
GFR SERPL CREATININE-BSD FRML MDRD: 4 ML/MIN/1.73
GFR SERPL CREATININE-BSD FRML MDRD: ABNORMAL ML/MIN/{1.73_M2}
GLUCOSE SERPL-MCNC: 75 MG/DL (ref 65–99)
HBV SURFACE AB SER RIA-ACNC: REACTIVE
HCT VFR BLD AUTO: 32.5 % (ref 34–46.6)
HGB BLD-MCNC: 10.7 G/DL (ref 12–15.9)
MAGNESIUM SERPL-MCNC: 2 MG/DL (ref 1.6–2.4)
MCH RBC QN AUTO: 30.3 PG (ref 26.6–33)
MCHC RBC AUTO-ENTMCNC: 33.1 G/DL (ref 31.5–35.7)
MCV RBC AUTO: 91.5 FL (ref 79–97)
PHOSPHATE SERPL-MCNC: 8.2 MG/DL (ref 2.5–4.5)
PLATELET # BLD AUTO: 451 10*3/MM3 (ref 140–450)
PMV BLD AUTO: 6.9 FL (ref 6–12)
POTASSIUM SERPL-SCNC: 5.4 MMOL/L (ref 3.5–5.2)
RBC # BLD AUTO: 3.54 10*6/MM3 (ref 3.77–5.28)
RH BLD: NEGATIVE
SODIUM SERPL-SCNC: 140 MMOL/L (ref 136–145)
T&S EXPIRATION DATE: NORMAL
WBC # BLD AUTO: 11.8 10*3/MM3 (ref 3.4–10.8)

## 2020-12-29 PROCEDURE — 93971 EXTREMITY STUDY: CPT

## 2020-12-29 PROCEDURE — 25010000003 CEFAZOLIN PER 500 MG: Performed by: RADIOLOGY

## 2020-12-29 PROCEDURE — 83735 ASSAY OF MAGNESIUM: CPT | Performed by: PHYSICIAN ASSISTANT

## 2020-12-29 PROCEDURE — 5A1D70Z PERFORMANCE OF URINARY FILTRATION, INTERMITTENT, LESS THAN 6 HOURS PER DAY: ICD-10-PCS | Performed by: INTERNAL MEDICINE

## 2020-12-29 PROCEDURE — C1750 CATH, HEMODIALYSIS,LONG-TERM: HCPCS

## 2020-12-29 PROCEDURE — 25010000002 HEPARIN (PORCINE) PER 1000 UNITS: Performed by: INTERNAL MEDICINE

## 2020-12-29 PROCEDURE — 80069 RENAL FUNCTION PANEL: CPT | Performed by: INTERNAL MEDICINE

## 2020-12-29 PROCEDURE — 85027 COMPLETE CBC AUTOMATED: CPT | Performed by: INTERNAL MEDICINE

## 2020-12-29 PROCEDURE — 25010000002 MIDAZOLAM PER 1 MG: Performed by: RADIOLOGY

## 2020-12-29 PROCEDURE — 02HV33Z INSERTION OF INFUSION DEVICE INTO SUPERIOR VENA CAVA, PERCUTANEOUS APPROACH: ICD-10-PCS | Performed by: RADIOLOGY

## 2020-12-29 PROCEDURE — 25010000002 HEPARIN (PORCINE) PER 1000 UNITS: Performed by: RADIOLOGY

## 2020-12-29 PROCEDURE — 76937 US GUIDE VASCULAR ACCESS: CPT

## 2020-12-29 PROCEDURE — 25010000002 FENTANYL CITRATE (PF) 100 MCG/2ML SOLUTION: Performed by: RADIOLOGY

## 2020-12-29 PROCEDURE — 86900 BLOOD TYPING SEROLOGIC ABO: CPT | Performed by: SURGERY

## 2020-12-29 PROCEDURE — 25010000003 LIDOCAINE 1 % SOLUTION: Performed by: RADIOLOGY

## 2020-12-29 PROCEDURE — C1894 INTRO/SHEATH, NON-LASER: HCPCS

## 2020-12-29 PROCEDURE — 77001 FLUOROGUIDE FOR VEIN DEVICE: CPT

## 2020-12-29 PROCEDURE — 86901 BLOOD TYPING SEROLOGIC RH(D): CPT | Performed by: SURGERY

## 2020-12-29 PROCEDURE — 36558 INSERT TUNNELED CV CATH: CPT

## 2020-12-29 PROCEDURE — 0JH63XZ INSERTION OF TUNNELED VASCULAR ACCESS DEVICE INTO CHEST SUBCUTANEOUS TISSUE AND FASCIA, PERCUTANEOUS APPROACH: ICD-10-PCS | Performed by: RADIOLOGY

## 2020-12-29 PROCEDURE — 99152 MOD SED SAME PHYS/QHP 5/>YRS: CPT

## 2020-12-29 PROCEDURE — 99232 SBSQ HOSP IP/OBS MODERATE 35: CPT | Performed by: INTERNAL MEDICINE

## 2020-12-29 PROCEDURE — 86850 RBC ANTIBODY SCREEN: CPT | Performed by: SURGERY

## 2020-12-29 RX ORDER — HEPARIN SODIUM 1000 [USP'U]/ML
INJECTION, SOLUTION INTRAVENOUS; SUBCUTANEOUS
Status: COMPLETED | OUTPATIENT
Start: 2020-12-29 | End: 2020-12-29

## 2020-12-29 RX ORDER — LIDOCAINE HYDROCHLORIDE 10 MG/ML
INJECTION, SOLUTION INFILTRATION; PERINEURAL
Status: COMPLETED | OUTPATIENT
Start: 2020-12-29 | End: 2020-12-29

## 2020-12-29 RX ORDER — HYDROCODONE BITARTRATE AND ACETAMINOPHEN 5; 325 MG/1; MG/1
1 TABLET ORAL EVERY 6 HOURS PRN
Status: DISCONTINUED | OUTPATIENT
Start: 2020-12-29 | End: 2020-12-31 | Stop reason: HOSPADM

## 2020-12-29 RX ORDER — MIDAZOLAM HYDROCHLORIDE 1 MG/ML
INJECTION INTRAMUSCULAR; INTRAVENOUS
Status: COMPLETED | OUTPATIENT
Start: 2020-12-29 | End: 2020-12-29

## 2020-12-29 RX ORDER — HEPARIN SODIUM 1000 [USP'U]/ML
5000 INJECTION, SOLUTION INTRAVENOUS; SUBCUTANEOUS AS NEEDED
Status: DISCONTINUED | OUTPATIENT
Start: 2020-12-29 | End: 2020-12-31 | Stop reason: HOSPADM

## 2020-12-29 RX ORDER — FENTANYL CITRATE 50 UG/ML
INJECTION, SOLUTION INTRAMUSCULAR; INTRAVENOUS
Status: COMPLETED | OUTPATIENT
Start: 2020-12-29 | End: 2020-12-29

## 2020-12-29 RX ADMIN — SODIUM BICARBONATE 650 MG TABLET 1300 MG: at 15:42

## 2020-12-29 RX ADMIN — AMLODIPINE BESYLATE 10 MG: 5 TABLET ORAL at 08:25

## 2020-12-29 RX ADMIN — METOPROLOL TARTRATE 12.5 MG: 25 TABLET, FILM COATED ORAL at 21:37

## 2020-12-29 RX ADMIN — HEPARIN SODIUM 5000 UNITS: 1000 INJECTION, SOLUTION INTRAVENOUS; SUBCUTANEOUS at 14:55

## 2020-12-29 RX ADMIN — MIDAZOLAM 0.5 MG: 1 INJECTION INTRAMUSCULAR; INTRAVENOUS at 09:46

## 2020-12-29 RX ADMIN — PANTOPRAZOLE SODIUM 40 MG: 40 TABLET, DELAYED RELEASE ORAL at 21:37

## 2020-12-29 RX ADMIN — SEVELAMER CARBONATE 1600 MG: 800 TABLET, FILM COATED ORAL at 11:43

## 2020-12-29 RX ADMIN — MIDAZOLAM 0.5 MG: 1 INJECTION INTRAMUSCULAR; INTRAVENOUS at 09:42

## 2020-12-29 RX ADMIN — FENTANYL CITRATE 50 MCG: 50 INJECTION, SOLUTION INTRAMUSCULAR; INTRAVENOUS at 09:42

## 2020-12-29 RX ADMIN — SEVELAMER CARBONATE 1600 MG: 800 TABLET, FILM COATED ORAL at 17:24

## 2020-12-29 RX ADMIN — LIDOCAINE HYDROCHLORIDE 2 ML: 10 INJECTION, SOLUTION INFILTRATION; PERINEURAL at 10:00

## 2020-12-29 RX ADMIN — Medication 10 ML: at 08:27

## 2020-12-29 RX ADMIN — HYDROCODONE BITARTRATE AND ACETAMINOPHEN 1 TABLET: 5; 325 TABLET ORAL at 21:36

## 2020-12-29 RX ADMIN — CALCIUM ACETATE 2001 MG: 667 CAPSULE ORAL at 21:38

## 2020-12-29 RX ADMIN — SODIUM BICARBONATE 650 MG TABLET 1300 MG: at 21:36

## 2020-12-29 RX ADMIN — CEFAZOLIN 2 G: 1 INJECTION, POWDER, FOR SOLUTION INTRAMUSCULAR; INTRAVENOUS at 09:39

## 2020-12-29 RX ADMIN — HYDROCODONE BITARTRATE AND ACETAMINOPHEN 1 TABLET: 5; 325 TABLET ORAL at 15:42

## 2020-12-29 RX ADMIN — CALCIUM ACETATE 2001 MG: 667 CAPSULE ORAL at 15:42

## 2020-12-29 RX ADMIN — HEPARIN SODIUM 4100 UNITS: 1000 INJECTION, SOLUTION INTRAVENOUS; SUBCUTANEOUS at 10:15

## 2020-12-29 RX ADMIN — FENTANYL CITRATE 50 MCG: 50 INJECTION, SOLUTION INTRAMUSCULAR; INTRAVENOUS at 09:46

## 2020-12-29 RX ADMIN — LIDOCAINE HYDROCHLORIDE 4 ML: 10 INJECTION, SOLUTION INFILTRATION; PERINEURAL at 09:52

## 2020-12-29 RX ADMIN — LOSARTAN POTASSIUM 100 MG: 50 TABLET, FILM COATED ORAL at 08:25

## 2020-12-29 RX ADMIN — Medication 10 ML: at 21:35

## 2020-12-29 RX ADMIN — LIDOCAINE HYDROCHLORIDE 7 ML: 10 INJECTION, SOLUTION INFILTRATION; PERINEURAL at 09:48

## 2020-12-29 RX ADMIN — MIDAZOLAM 1 MG: 1 INJECTION INTRAMUSCULAR; INTRAVENOUS at 09:50

## 2020-12-29 RX ADMIN — METOPROLOL TARTRATE 12.5 MG: 25 TABLET, FILM COATED ORAL at 08:25

## 2020-12-30 ENCOUNTER — APPOINTMENT (OUTPATIENT)
Dept: CT IMAGING | Facility: HOSPITAL | Age: 70
End: 2020-12-30

## 2020-12-30 ENCOUNTER — ANESTHESIA (OUTPATIENT)
Dept: PERIOP | Facility: HOSPITAL | Age: 70
End: 2020-12-30

## 2020-12-30 LAB
ANION GAP SERPL CALCULATED.3IONS-SCNC: 16 MMOL/L (ref 5–15)
BUN SERPL-MCNC: 50 MG/DL (ref 8–23)
BUN/CREAT SERPL: 6.5 (ref 7–25)
CALCIUM SPEC-SCNC: 9.4 MG/DL (ref 8.6–10.5)
CHLORIDE SERPL-SCNC: 100 MMOL/L (ref 98–107)
CO2 SERPL-SCNC: 23 MMOL/L (ref 22–29)
CREAT SERPL-MCNC: 7.64 MG/DL (ref 0.57–1)
DEPRECATED RDW RBC AUTO: 61.7 FL (ref 37–54)
ERYTHROCYTE [DISTWIDTH] IN BLOOD BY AUTOMATED COUNT: 19.2 % (ref 12.3–15.4)
GFR SERPL CREATININE-BSD FRML MDRD: 5 ML/MIN/1.73
GFR SERPL CREATININE-BSD FRML MDRD: ABNORMAL ML/MIN/{1.73_M2}
GLUCOSE BLDC GLUCOMTR-MCNC: 66 MG/DL (ref 70–105)
GLUCOSE SERPL-MCNC: 84 MG/DL (ref 65–99)
HCT VFR BLD AUTO: 33.6 % (ref 34–46.6)
HGB BLD-MCNC: 10.9 G/DL (ref 12–15.9)
MAGNESIUM SERPL-MCNC: 1.7 MG/DL (ref 1.6–2.4)
MCH RBC QN AUTO: 30.1 PG (ref 26.6–33)
MCHC RBC AUTO-ENTMCNC: 32.5 G/DL (ref 31.5–35.7)
MCV RBC AUTO: 92.7 FL (ref 79–97)
PLATELET # BLD AUTO: 400 10*3/MM3 (ref 140–450)
PMV BLD AUTO: 6.9 FL (ref 6–12)
POTASSIUM SERPL-SCNC: 5.2 MMOL/L (ref 3.5–5.2)
RBC # BLD AUTO: 3.63 10*6/MM3 (ref 3.77–5.28)
SODIUM SERPL-SCNC: 139 MMOL/L (ref 136–145)
WBC # BLD AUTO: 10.4 10*3/MM3 (ref 3.4–10.8)

## 2020-12-30 PROCEDURE — 03180ZD BYPASS LEFT BRACHIAL ARTERY TO UPPER ARM VEIN, OPEN APPROACH: ICD-10-PCS | Performed by: SURGERY

## 2020-12-30 PROCEDURE — 25010000002 MEPIVACAINE PF 1 % SOLUTION: Performed by: ANESTHESIOLOGY

## 2020-12-30 PROCEDURE — 70498 CT ANGIOGRAPHY NECK: CPT

## 2020-12-30 PROCEDURE — 25010000002 HEPARIN (PORCINE) PER 1000 UNITS: Performed by: SURGERY

## 2020-12-30 PROCEDURE — 25010000002 PROTAMINE SULFATE PER 10 MG: Performed by: ANESTHESIOLOGY

## 2020-12-30 PROCEDURE — 85027 COMPLETE CBC AUTOMATED: CPT | Performed by: INTERNAL MEDICINE

## 2020-12-30 PROCEDURE — 0 IOPAMIDOL PER 1 ML: Performed by: INTERNAL MEDICINE

## 2020-12-30 PROCEDURE — 25010000003 CEFAZOLIN PER 500 MG: Performed by: SURGERY

## 2020-12-30 PROCEDURE — 76942 ECHO GUIDE FOR BIOPSY: CPT | Performed by: SURGERY

## 2020-12-30 PROCEDURE — 82962 GLUCOSE BLOOD TEST: CPT

## 2020-12-30 PROCEDURE — 25010000002 HEPARIN (PORCINE) PER 1000 UNITS: Performed by: ANESTHESIOLOGY

## 2020-12-30 PROCEDURE — 25010000002 FENTANYL CITRATE (PF) 100 MCG/2ML SOLUTION: Performed by: ANESTHESIOLOGY

## 2020-12-30 PROCEDURE — 99232 SBSQ HOSP IP/OBS MODERATE 35: CPT | Performed by: INTERNAL MEDICINE

## 2020-12-30 PROCEDURE — 25010000002 ROPIVACAINE PER 1 MG: Performed by: ANESTHESIOLOGY

## 2020-12-30 PROCEDURE — 83735 ASSAY OF MAGNESIUM: CPT | Performed by: PHYSICIAN ASSISTANT

## 2020-12-30 PROCEDURE — 25010000002 PROPOFOL 10 MG/ML EMULSION: Performed by: ANESTHESIOLOGY

## 2020-12-30 PROCEDURE — 80048 BASIC METABOLIC PNL TOTAL CA: CPT | Performed by: INTERNAL MEDICINE

## 2020-12-30 DEVICE — LIGACLIP MCA MULTIPLE CLIP APPLIERS, 20 SMALL CLIPS
Type: IMPLANTABLE DEVICE | Site: ARM | Status: FUNCTIONAL
Brand: LIGACLIP

## 2020-12-30 RX ORDER — PHENYLEPHRINE HCL IN 0.9% NACL 0.5 MG/5ML
SYRINGE (ML) INTRAVENOUS AS NEEDED
Status: DISCONTINUED | OUTPATIENT
Start: 2020-12-30 | End: 2020-12-30 | Stop reason: SURG

## 2020-12-30 RX ORDER — HYDROMORPHONE HCL 110MG/55ML
0.5 PATIENT CONTROLLED ANALGESIA SYRINGE INTRAVENOUS EVERY 4 HOURS PRN
Status: DISCONTINUED | OUTPATIENT
Start: 2020-12-30 | End: 2020-12-31 | Stop reason: HOSPADM

## 2020-12-30 RX ORDER — HEPARIN SODIUM 1000 [USP'U]/ML
INJECTION, SOLUTION INTRAVENOUS; SUBCUTANEOUS AS NEEDED
Status: DISCONTINUED | OUTPATIENT
Start: 2020-12-30 | End: 2020-12-30 | Stop reason: SURG

## 2020-12-30 RX ORDER — SODIUM CHLORIDE 9 MG/ML
1000 INJECTION, SOLUTION INTRAVENOUS ONCE
Status: DISCONTINUED | OUTPATIENT
Start: 2020-12-30 | End: 2020-12-30 | Stop reason: HOSPADM

## 2020-12-30 RX ORDER — FENTANYL CITRATE 50 UG/ML
25 INJECTION, SOLUTION INTRAMUSCULAR; INTRAVENOUS
Status: DISCONTINUED | OUTPATIENT
Start: 2020-12-30 | End: 2020-12-30

## 2020-12-30 RX ORDER — PROTAMINE SULFATE 10 MG/ML
INJECTION, SOLUTION INTRAVENOUS AS NEEDED
Status: DISCONTINUED | OUTPATIENT
Start: 2020-12-30 | End: 2020-12-30 | Stop reason: SURG

## 2020-12-30 RX ORDER — ONDANSETRON 2 MG/ML
4 INJECTION INTRAMUSCULAR; INTRAVENOUS ONCE AS NEEDED
Status: DISCONTINUED | OUTPATIENT
Start: 2020-12-30 | End: 2020-12-30

## 2020-12-30 RX ORDER — SODIUM CHLORIDE 9 MG/ML
9 INJECTION, SOLUTION INTRAVENOUS CONTINUOUS PRN
Status: DISCONTINUED | OUTPATIENT
Start: 2020-12-30 | End: 2020-12-31 | Stop reason: HOSPADM

## 2020-12-30 RX ORDER — SODIUM CHLORIDE 0.9 % (FLUSH) 0.9 %
10 SYRINGE (ML) INJECTION EVERY 12 HOURS SCHEDULED
Status: DISCONTINUED | OUTPATIENT
Start: 2020-12-30 | End: 2020-12-30 | Stop reason: HOSPADM

## 2020-12-30 RX ORDER — FENTANYL CITRATE 50 UG/ML
INJECTION, SOLUTION INTRAMUSCULAR; INTRAVENOUS AS NEEDED
Status: DISCONTINUED | OUTPATIENT
Start: 2020-12-30 | End: 2020-12-30 | Stop reason: SURG

## 2020-12-30 RX ORDER — SODIUM CHLORIDE 0.9 % (FLUSH) 0.9 %
10 SYRINGE (ML) INJECTION AS NEEDED
Status: DISCONTINUED | OUTPATIENT
Start: 2020-12-30 | End: 2020-12-30 | Stop reason: HOSPADM

## 2020-12-30 RX ORDER — ROPIVACAINE HYDROCHLORIDE 5 MG/ML
INJECTION, SOLUTION EPIDURAL; INFILTRATION; PERINEURAL
Status: COMPLETED | OUTPATIENT
Start: 2020-12-30 | End: 2020-12-30

## 2020-12-30 RX ORDER — LIDOCAINE HYDROCHLORIDE 10 MG/ML
0.5 INJECTION, SOLUTION INFILTRATION; PERINEURAL ONCE AS NEEDED
Status: DISCONTINUED | OUTPATIENT
Start: 2020-12-30 | End: 2020-12-30 | Stop reason: HOSPADM

## 2020-12-30 RX ADMIN — FENTANYL CITRATE 50 MCG: 50 INJECTION, SOLUTION INTRAMUSCULAR; INTRAVENOUS at 09:28

## 2020-12-30 RX ADMIN — HYDROCODONE BITARTRATE AND ACETAMINOPHEN 1 TABLET: 5; 325 TABLET ORAL at 21:44

## 2020-12-30 RX ADMIN — FENTANYL CITRATE 50 MCG: 50 INJECTION, SOLUTION INTRAMUSCULAR; INTRAVENOUS at 08:03

## 2020-12-30 RX ADMIN — SEVELAMER CARBONATE 1600 MG: 800 TABLET, FILM COATED ORAL at 11:14

## 2020-12-30 RX ADMIN — IOPAMIDOL 100 ML: 755 INJECTION, SOLUTION INTRAVENOUS at 16:20

## 2020-12-30 RX ADMIN — METOPROLOL TARTRATE 12.5 MG: 25 TABLET, FILM COATED ORAL at 21:45

## 2020-12-30 RX ADMIN — PANTOPRAZOLE SODIUM 40 MG: 40 TABLET, DELAYED RELEASE ORAL at 21:46

## 2020-12-30 RX ADMIN — HEPARIN SODIUM 5000 UNITS: 1000 INJECTION INTRAVENOUS; SUBCUTANEOUS at 08:46

## 2020-12-30 RX ADMIN — CALCIUM ACETATE 2001 MG: 667 CAPSULE ORAL at 15:44

## 2020-12-30 RX ADMIN — PROPOFOL 70 MCG/KG/MIN: 10 INJECTION, EMULSION INTRAVENOUS at 07:46

## 2020-12-30 RX ADMIN — HYDROCODONE BITARTRATE AND ACETAMINOPHEN 1 TABLET: 5; 325 TABLET ORAL at 15:44

## 2020-12-30 RX ADMIN — MEPIVACAINE HYDROCHLORIDE 15 ML: 10 INJECTION, SOLUTION EPIDURAL; INFILTRATION at 07:37

## 2020-12-30 RX ADMIN — MULTIPLE VITAMINS W/ MINERALS TAB 1 TABLET: TAB at 11:14

## 2020-12-30 RX ADMIN — LOSARTAN POTASSIUM 100 MG: 50 TABLET, FILM COATED ORAL at 11:15

## 2020-12-30 RX ADMIN — SEVELAMER CARBONATE 1600 MG: 800 TABLET, FILM COATED ORAL at 17:14

## 2020-12-30 RX ADMIN — PROTAMINE SULFATE 40 MG: 10 INJECTION, SOLUTION INTRAVENOUS at 09:21

## 2020-12-30 RX ADMIN — CALCIUM ACETATE 2001 MG: 667 CAPSULE ORAL at 21:46

## 2020-12-30 RX ADMIN — PHENYLEPHRINE HYDROCHLORIDE 100 MCG: 10 INJECTION INTRAVENOUS at 08:39

## 2020-12-30 RX ADMIN — CEFAZOLIN SODIUM 2 G: 1 INJECTION, POWDER, FOR SOLUTION INTRAMUSCULAR; INTRAVENOUS at 08:05

## 2020-12-30 RX ADMIN — PHENYLEPHRINE HYDROCHLORIDE 100 MCG: 10 INJECTION INTRAVENOUS at 08:31

## 2020-12-30 RX ADMIN — METOPROLOL TARTRATE 12.5 MG: 25 TABLET, FILM COATED ORAL at 11:13

## 2020-12-30 RX ADMIN — Medication 10 ML: at 11:15

## 2020-12-30 RX ADMIN — CYANOCOBALAMIN TAB 1000 MCG 2000 MCG: 1000 TAB at 11:12

## 2020-12-30 RX ADMIN — Medication 10 ML: at 21:44

## 2020-12-30 RX ADMIN — SODIUM BICARBONATE 650 MG TABLET 1300 MG: at 15:44

## 2020-12-30 RX ADMIN — AMLODIPINE BESYLATE 10 MG: 5 TABLET ORAL at 11:14

## 2020-12-30 RX ADMIN — SODIUM BICARBONATE 650 MG TABLET 1300 MG: at 21:45

## 2020-12-30 RX ADMIN — ROPIVACAINE HYDROCHLORIDE 15 ML: 5 INJECTION, SOLUTION EPIDURAL; INFILTRATION; PERINEURAL at 07:37

## 2020-12-30 RX ADMIN — PHENYLEPHRINE HYDROCHLORIDE 100 MCG: 10 INJECTION INTRAVENOUS at 08:22

## 2020-12-30 RX ADMIN — NEPHROCAP 1 MG: 1 CAP ORAL at 11:12

## 2020-12-30 NOTE — ANESTHESIA POSTPROCEDURE EVALUATION
Patient: Sherry Lobato    Procedure Summary     Date: 12/30/20 Room / Location: [facility] OR 09 / [facility] MAIN OR    Anesthesia Start: 0743 Anesthesia Stop: 0944    Procedure: ARTERIOVENOUS FISTULA FORMATION (Left Arm Upper) Diagnosis:       ESRD (end stage renal disease) (CMS/[facility])      (ESRD (end stage renal disease) (CMS/[facility]) [N18.6])    Surgeon: Jan Caicedo MD Provider: Al Hu MD    Anesthesia Type: regional, MAC ASA Status: 4          Anesthesia Type: regional, MAC    Vitals  Vitals Value Taken Time   /58 12/30/20 1027   Temp 97.1 °F (36.2 °C) 12/30/20 0944   Pulse 77 12/30/20 1031   Resp 12 12/30/20 1025   SpO2 96 % 12/30/20 1030   Vitals shown include unvalidated device data.        Post Anesthesia Care and Evaluation    Patient location during evaluation: PACU  Patient participation: complete - patient participated  Level of consciousness: awake  Pain scale: See nurse's notes for pain score.  Pain management: adequate  Airway patency: patent  Anesthetic complications: No anesthetic complications  PONV Status: none  Cardiovascular status: acceptable  Respiratory status: acceptable  Hydration status: acceptable    Comments: Patient seen and examined postoperatively; vital signs stable; SpO2 greater than or equal to 90%; cardiopulmonary status stable; nausea/vomiting adequately controlled; pain adequately controlled; no apparent anesthesia complications; patient discharged from anesthesia care when discharge criteria were met

## 2020-12-30 NOTE — ANESTHESIA PROCEDURE NOTES
Peripheral Block    Pre-sedation assessment completed: 12/30/2020 7:00 AM    Patient reassessed immediately prior to procedure    Patient location during procedure: pre-op  Reason for block: procedure for pain, at surgeon's request, post-op pain management and primary anesthetic  Performed by  Anesthesiologist: Jan Marinelli MD  Preanesthetic Checklist  Completed: patient identified, site marked, surgical consent, pre-op evaluation, timeout performed, IV checked, risks and benefits discussed and monitors and equipment checked  Prep:  Pt Position: sitting  Sterile barriers:cap, gloves, mask and washed/disinfected hands  Prep: ChloraPrep  Patient monitoring: blood pressure monitoring, continuous pulse oximetry and EKG  Procedure  Sedation:no  Performed under: local infiltration  Guidance:ultrasound guided and landmark technique  ULTRASOUND INTERPRETATION.  Using ultrasound guidance a 22 G gauge needle was placed in close proximity to the brachial plexus nerve, at which point, under ultrasound guidance anesthetic was injected in the area of the nerve and spread of the anesthesia was seen on ultrasound in close proximity thereto.  There were no abnormalities seen on ultrasound; a digital image was taken; and the patient tolerated the procedure with no complications. Images:still images obtained, printed/placed on chart    Laterality:left  Block Type:supraclavicular  Injection Technique:single-shot  Needle Type:echogenic  Needle Gauge:22 G  Resistance on Injection: less than 15 psi    Medications Used: mepivacaine PF (CARBOCAINE) 1 % injection, 15 mL  ropivacaine (NAROPIN) 0.5 % injection, 15 mL  Med admintered at 12/30/2020 7:37 AM      Post Assessment  Injection Assessment: negative aspiration for heme, no paresthesia on injection and incremental injection  Patient Tolerance:comfortable throughout block  Complications:no  Additional Notes  Pre-procedure:  Peripheral nerve block performed preoperatively prior to  the start of anesthesia time at the request of the patient and the surgeon as the primary intraoperative anesthetic as well as for postoperative management of acute surgical pain (regional anesthesia is the primary intraoperative anesthetic); patient identified; pre-procedure vital signs, all relevant labs/studies, complete medical/surgical/anesthetic history, full medication list, full allergy list, and NPO status obtained/reviewed; physical assessment performed; anesthetic options, side effects, potential complications, risks, and benefits discussed; questions answered; patient wishes to proceed with the procedure; written anesthesia procedure consent obtained; patient cleared for procedure; time out performed; IV access in situ    Procedure:  ASA monitor placed; supplemental oxygen provided; patient positioned; hand hygiene performed; sterile technique maintained throughout the procedure; sterile prep applied; insertion site determined by anatomical landmarks, palpation, and ultrasound imaging; live ultrasound guidance throughout the procedure; target nerves/landmarks identified on live ultrasound; skin and subcutaneous tissues numbed by injection of 1% lidocaine; 2 inch 22G [employer] Ultra 360 Insulated Echogenic Needle used; realtime needle advancement and placement near the target nerves witnessed on live ultrasound; negative aspiration prior to injection; correct needle placement confirmed on live ultrasound by local anesthetic spread around the target nerves; local anesthetic mixture injected with negative aspiration prior to each injection and after each 1-5 mL injected; needle withdrawn; dressing applied; ultrasound image printed and placed in the patient's permanent medical record    Post-procedure:  Peripheral nerve block placed successfully; good block; no apparent complications; minimal estimated blood loss; vital signs stable throughout; see nurse's notes for vitals; transported to the operating room,  sedation provided, and surgery started

## 2020-12-30 NOTE — ANESTHESIA PREPROCEDURE EVALUATION
Anesthesia Evaluation     Patient summary reviewed and Nursing notes reviewed   NPO Solid Status: > 8 hours  NPO Liquid Status: > 8 hours           Airway   Mallampati: II  TM distance: >3 FB  Neck ROM: full  No difficulty expected  Dental    (+) edentulous    Pulmonary    (+) COPD, sleep apnea,   Cardiovascular     (+) hypertension, hyperlipidemia,  carotid artery disease      Neuro/Psych  (+) numbness,     GI/Hepatic/Renal/Endo    (+) obesity,  GERD, PUD,  renal disease, diabetes mellitus,     Musculoskeletal     Abdominal    Substance History      OB/GYN          Other      history of cancer                  Anesthesia Plan    ASA 4     regional and MAC     intravenous induction     Anesthetic plan, all risks, benefits, and alternatives have been provided, discussed and informed consent has been obtained with: patient.

## 2020-12-31 ENCOUNTER — READMISSION MANAGEMENT (OUTPATIENT)
Dept: CALL CENTER | Facility: HOSPITAL | Age: 70
End: 2020-12-31

## 2020-12-31 VITALS
SYSTOLIC BLOOD PRESSURE: 134 MMHG | TEMPERATURE: 98.4 F | BODY MASS INDEX: 30.9 KG/M2 | WEIGHT: 157.41 LBS | HEIGHT: 60 IN | OXYGEN SATURATION: 97 % | RESPIRATION RATE: 18 BRPM | HEART RATE: 76 BPM | DIASTOLIC BLOOD PRESSURE: 90 MMHG

## 2020-12-31 LAB
ALBUMIN SERPL-MCNC: 3.5 G/DL (ref 3.5–5.2)
ANION GAP SERPL CALCULATED.3IONS-SCNC: 18 MMOL/L (ref 5–15)
BUN SERPL-MCNC: 60 MG/DL (ref 8–23)
BUN/CREAT SERPL: 6.5 (ref 7–25)
CALCIUM SPEC-SCNC: 8.9 MG/DL (ref 8.6–10.5)
CHLORIDE SERPL-SCNC: 97 MMOL/L (ref 98–107)
CO2 SERPL-SCNC: 21 MMOL/L (ref 22–29)
CREAT SERPL-MCNC: 9.28 MG/DL (ref 0.57–1)
DEPRECATED RDW RBC AUTO: 61.3 FL (ref 37–54)
ERYTHROCYTE [DISTWIDTH] IN BLOOD BY AUTOMATED COUNT: 19.2 % (ref 12.3–15.4)
GFR SERPL CREATININE-BSD FRML MDRD: 4 ML/MIN/1.73
GFR SERPL CREATININE-BSD FRML MDRD: ABNORMAL ML/MIN/{1.73_M2}
GLUCOSE SERPL-MCNC: 194 MG/DL (ref 65–99)
HCT VFR BLD AUTO: 29.8 % (ref 34–46.6)
HGB BLD-MCNC: 10 G/DL (ref 12–15.9)
MAGNESIUM SERPL-MCNC: 1.6 MG/DL (ref 1.6–2.4)
MCH RBC QN AUTO: 30.7 PG (ref 26.6–33)
MCHC RBC AUTO-ENTMCNC: 33.5 G/DL (ref 31.5–35.7)
MCV RBC AUTO: 91.6 FL (ref 79–97)
PHOSPHATE SERPL-MCNC: 6.8 MG/DL (ref 2.5–4.5)
PLATELET # BLD AUTO: 375 10*3/MM3 (ref 140–450)
PMV BLD AUTO: 7.3 FL (ref 6–12)
POTASSIUM SERPL-SCNC: 4.8 MMOL/L (ref 3.5–5.2)
RBC # BLD AUTO: 3.25 10*6/MM3 (ref 3.77–5.28)
SODIUM SERPL-SCNC: 136 MMOL/L (ref 136–145)
WBC # BLD AUTO: 9.2 10*3/MM3 (ref 3.4–10.8)

## 2020-12-31 PROCEDURE — 99239 HOSP IP/OBS DSCHRG MGMT >30: CPT | Performed by: INTERNAL MEDICINE

## 2020-12-31 PROCEDURE — 80069 RENAL FUNCTION PANEL: CPT | Performed by: SURGERY

## 2020-12-31 PROCEDURE — 83735 ASSAY OF MAGNESIUM: CPT | Performed by: SURGERY

## 2020-12-31 PROCEDURE — 25010000003 CEFAZOLIN PER 500 MG: Performed by: SURGERY

## 2020-12-31 PROCEDURE — 85027 COMPLETE CBC AUTOMATED: CPT | Performed by: SURGERY

## 2020-12-31 RX ORDER — HYDROCODONE BITARTRATE AND ACETAMINOPHEN 5; 325 MG/1; MG/1
1 TABLET ORAL EVERY 6 HOURS PRN
Qty: 15 TABLET | Refills: 0 | Status: SHIPPED | OUTPATIENT
Start: 2020-12-31 | End: 2021-01-05

## 2020-12-31 RX ORDER — METOPROLOL SUCCINATE 25 MG/1
25 TABLET, EXTENDED RELEASE ORAL DAILY
Qty: 30 TABLET | Refills: 0 | Status: SHIPPED | OUTPATIENT
Start: 2020-12-31 | End: 2021-07-25

## 2020-12-31 RX ORDER — ASPIRIN 81 MG/1
81 TABLET ORAL 3 TIMES WEEKLY
Start: 2021-01-01 | End: 2022-05-17

## 2020-12-31 RX ADMIN — METOPROLOL TARTRATE 12.5 MG: 25 TABLET, FILM COATED ORAL at 08:04

## 2020-12-31 RX ADMIN — SEVELAMER CARBONATE 1600 MG: 800 TABLET, FILM COATED ORAL at 12:39

## 2020-12-31 RX ADMIN — CEFAZOLIN 1 G: 1 INJECTION, POWDER, FOR SOLUTION INTRAMUSCULAR; INTRAVENOUS at 06:08

## 2020-12-31 RX ADMIN — PANTOPRAZOLE SODIUM 40 MG: 40 TABLET, DELAYED RELEASE ORAL at 08:01

## 2020-12-31 RX ADMIN — HYDROCODONE BITARTRATE AND ACETAMINOPHEN 1 TABLET: 5; 325 TABLET ORAL at 14:02

## 2020-12-31 RX ADMIN — NEPHROCAP 1 MG: 1 CAP ORAL at 08:01

## 2020-12-31 RX ADMIN — SODIUM BICARBONATE 650 MG TABLET 1300 MG: at 08:01

## 2020-12-31 RX ADMIN — AMLODIPINE BESYLATE 10 MG: 5 TABLET ORAL at 08:03

## 2020-12-31 RX ADMIN — LOSARTAN POTASSIUM 100 MG: 50 TABLET, FILM COATED ORAL at 08:04

## 2020-12-31 RX ADMIN — MULTIPLE VITAMINS W/ MINERALS TAB 1 TABLET: TAB at 08:01

## 2020-12-31 RX ADMIN — SEVELAMER CARBONATE 1600 MG: 800 TABLET, FILM COATED ORAL at 08:01

## 2020-12-31 RX ADMIN — Medication 10 ML: at 08:03

## 2020-12-31 RX ADMIN — ACETAMINOPHEN 650 MG: 325 TABLET ORAL at 09:22

## 2020-12-31 RX ADMIN — CALCIUM ACETATE 2001 MG: 667 CAPSULE ORAL at 08:01

## 2021-01-01 NOTE — PROGRESS NOTES
Discharge Planning Assessment   [facility]     Patient Name: Sherry Lobato  MRN: [id number]  Today's Date: 1/1/2021    Admit Date: 12/26/2020          Plan    Final Discharge Disposition Code  01 - home or self-care    Final Note  retun home   .    Carol naegele rn  Case management  Office number [phone]  Cell phone [phone]

## 2021-01-04 ENCOUNTER — TRANSITIONAL CARE MANAGEMENT TELEPHONE ENCOUNTER (OUTPATIENT)
Dept: CALL CENTER | Facility: HOSPITAL | Age: 71
End: 2021-01-04

## 2021-01-04 NOTE — OUTREACH NOTE
Call Center TCM Note      Responses   [facility] patient discharged from?  [facility]   Does the patient have one of the following disease processes/diagnoses(primary or secondary)?  General Surgery   TCM attempt successful?  Yes   Call start time  1253   Call end time  1254   Discharge diagnosis  ARTERIOVENOUS FISTULA/SHUNT SURGERY   Meds reviewed with patient/caregiver?  Yes   Is the patient having any side effects they believe may be caused by any medication additions or changes?  No   Does the patient have all medications related to this admission filled (includes all antibiotics, pain medications, etc.)  Yes   Is the patient taking all medications as directed (includes completed medication regime)?  Yes   Does the patient have a follow up appointment scheduled with their surgeon?  No   What is preventing the patient from scheduling follow up appointments?  Waiting on return call   Nursing Interventions  Advised patient to make appointment   Has the patient kept scheduled appointments due by today?  N/A   Psychosocial issues?  No   Did the patient receive a copy of their discharge instructions?  Yes   Nursing interventions  Reviewed instructions with patient   What is the patient's perception of their health status since discharge?  Improving   Nursing interventions  Nurse provided patient education   Is the patient /caregiver able to teach back basic post-op care?  Continue use of incentive spirometry at least 1 week post discharge, Practice 'cough and deep breath', Lifting as instructed by MD in discharge instructions, Do not remove steri-strips, Keep incision areas clean,dry and protected, No tub bath, swimming, or hot tub until instructed by MD, Take showers only when approved by MD-sponge bathe until then, Drive as instructed by MD in discharge instructions   Is the patient/caregiver able to teach back signs and symptoms of incisional infection?  Fever   Is the patient/caregiver able to teach back steps to  recovery at home?  Set small, achievable goals for return to baseline health, Rest and rebuild strength, gradually increase activity   Is the patient/caregiver able to teach back the hierarchy of who to call/visit for symptoms/problems? PCP, Specialist, Home health nurse, Urgent Care, ED, 911  Yes   TCM call completed?  Yes   Wrap up additional comments  States she is doing well, she is monitoring her incision sites closely, she will call to make her follow up appts, no questions or concerns at this time.          Nenita Wilson RN    1/4/2021, 12:55 EST

## 2021-01-11 ENCOUNTER — READMISSION MANAGEMENT (OUTPATIENT)
Dept: CALL CENTER | Facility: HOSPITAL | Age: 71
End: 2021-01-11

## 2021-01-11 NOTE — OUTREACH NOTE
General Surgery Week 2 Survey      Responses   [facility] patient discharged from?  [facility]   Does the patient have one of the following disease processes/diagnoses(primary or secondary)?  General Surgery   Week 2 attempt successful?  Yes   Call start time  1001   Call end time  1004   Discharge diagnosis  ARTERIOVENOUS FISTULA/SHUNT SURGERY   Meds reviewed with patient/caregiver?  Yes   Is the patient taking all medications as directed (includes completed medication regime)?  Yes   Has the patient kept scheduled appointments due by today?  Yes   Psychosocial issues?  No   What is the patient's perception of their health status since discharge?  Improving   Week 2 call completed?  Yes   Wrap up additional comments  Pt sutures to be removed next week, incision looking good. Pt has no questions at this time.          Jami Branham RN

## 2021-01-19 ENCOUNTER — APPOINTMENT (OUTPATIENT)
Dept: VASCULAR SURGERY | Facility: HOSPITAL | Age: 71
End: 2021-01-19

## 2021-01-19 ENCOUNTER — READMISSION MANAGEMENT (OUTPATIENT)
Dept: CALL CENTER | Facility: HOSPITAL | Age: 71
End: 2021-01-19

## 2021-01-19 PROCEDURE — G0463 HOSPITAL OUTPT CLINIC VISIT: HCPCS

## 2021-01-19 NOTE — OUTREACH NOTE
General Surgery Week 3 Survey      Responses   [facility] patient discharged from?  [facility]   Does the patient have one of the following disease processes/diagnoses(primary or secondary)?  General Surgery   Week 3 attempt successful?  Yes   Call start time  1152   Call end time  1156   Discharge diagnosis  ARTERIOVENOUS FISTULA/SHUNT SURGERY   Meds reviewed with patient/caregiver?  Yes   Is the patient taking all medications as directed (includes completed medication regime)?  Yes   Does the patient have a follow up appointment scheduled with their surgeon?  Yes [1/19/21 and again on 2/2/21]   Has the patient kept scheduled appointments due by today?  Yes   What is the patient's perception of their health status since discharge?  Improving   Nursing interventions  Nurse provided patient education   Is the patient/caregiver able to teach back signs and symptoms of incisional infection?  Fever, Pus or odor from incision   Is the patient/caregiver able to teach back steps to recovery at home?  Rest and rebuild strength, gradually increase activity, Eat a well-balance diet   If the patient is a current smoker, are they able to teach back resources for cessation?  [id number], Smoking cessation medications   Week 3 call completed?  Yes   Revoked  No further contact(revokes)-requires comment   Is the patient interested in additional calls from an ambulatory ?  NOTE:  applies to high risk patients requiring additional follow-up.  No   Graduated/Revoked comments  Pt reports a call next week is not necessary.           Lina Bosch RN

## 2021-01-25 RX ORDER — NEPHROCAP 1 MG
CAP ORAL
Qty: 100 CAPSULE | Refills: 0 | Status: SHIPPED | OUTPATIENT
Start: 2021-01-25 | End: 2021-01-26

## 2021-01-26 RX ORDER — NEPHROCAP 1 MG
CAP ORAL
Qty: 100 CAPSULE | Refills: 0 | Status: SHIPPED | OUTPATIENT
Start: 2021-01-26 | End: 2021-07-25

## 2021-02-02 ENCOUNTER — APPOINTMENT (OUTPATIENT)
Dept: VASCULAR SURGERY | Facility: HOSPITAL | Age: 71
End: 2021-02-02

## 2021-02-02 PROCEDURE — G0463 HOSPITAL OUTPT CLINIC VISIT: HCPCS

## 2021-02-09 ENCOUNTER — APPOINTMENT (OUTPATIENT)
Dept: VASCULAR SURGERY | Facility: HOSPITAL | Age: 71
End: 2021-02-09

## 2021-02-09 PROCEDURE — G0463 HOSPITAL OUTPT CLINIC VISIT: HCPCS

## 2021-02-15 ENCOUNTER — TRANSCRIBE ORDERS (OUTPATIENT)
Dept: ADMINISTRATIVE | Facility: HOSPITAL | Age: 71
End: 2021-02-15

## 2021-02-15 DIAGNOSIS — Z99.2 ENCOUNTER FOR EXTRACORPOREAL DIALYSIS (HCC): Primary | ICD-10-CM

## 2021-02-15 DIAGNOSIS — N18.6 END STAGE RENAL DISEASE (HCC): ICD-10-CM

## 2021-03-11 ENCOUNTER — APPOINTMENT (OUTPATIENT)
Dept: VASCULAR SURGERY | Facility: HOSPITAL | Age: 71
End: 2021-03-11

## 2021-03-11 ENCOUNTER — HOSPITAL ENCOUNTER (OUTPATIENT)
Dept: CARDIOLOGY | Facility: HOSPITAL | Age: 71
Discharge: HOME | End: 2021-03-11

## 2021-03-11 DIAGNOSIS — Z99.2 ENCOUNTER FOR EXTRACORPOREAL DIALYSIS (HCC): ICD-10-CM

## 2021-03-11 DIAGNOSIS — N18.6 END STAGE RENAL DISEASE (HCC): ICD-10-CM

## 2021-03-11 LAB
BH CV VAS DIALYSIS ARTERIAL ANASTOMOSIS EDV: 248 CM/SEC
BH CV VAS DIALYSIS ARTERIAL ANASTOMOSIS PSV: 411 CM/SEC
BH CV VAS DIALYSIS CONDUIT DIST DEPTH: 0.46 CM
BH CV VAS DIALYSIS CONDUIT DIST DIAMETER: 0.67 CM
BH CV VAS DIALYSIS CONDUIT DIST EDV: 41 CM/SEC
BH CV VAS DIALYSIS CONDUIT DIST FLOW VOL: 581 ML/MIN
BH CV VAS DIALYSIS CONDUIT DIST PSV: 82 CM/SEC
BH CV VAS DIALYSIS CONDUIT MID DEPTH: 0.57 CM
BH CV VAS DIALYSIS CONDUIT MID DIAMETER: 0.47 CM
BH CV VAS DIALYSIS CONDUIT MID EDV: 151 CM/SEC
BH CV VAS DIALYSIS CONDUIT MID FLOW VOL: 898 ML/MIN
BH CV VAS DIALYSIS CONDUIT MID PSV: 298 CM/SEC
BH CV VAS DIALYSIS CONDUIT MID/DIST DEPTH: 0.29 CM
BH CV VAS DIALYSIS CONDUIT MID/DIST DIAMETER: 0.57 CM
BH CV VAS DIALYSIS CONDUIT MID/DIST EDV: 54 CM/SEC
BH CV VAS DIALYSIS CONDUIT MID/DIST FLOW VOL: 554 ML/MIN
BH CV VAS DIALYSIS CONDUIT MID/DIST PSV: 112 CM/SEC
BH CV VAS DIALYSIS CONDUIT PROX DEPTH: 0.4 CM
BH CV VAS DIALYSIS CONDUIT PROX DIAMETER: 0.48 CM
BH CV VAS DIALYSIS CONDUIT PROX EDV: 194 CM/SEC
BH CV VAS DIALYSIS CONDUIT PROX FLOW VOL: 849 ML/MIN
BH CV VAS DIALYSIS CONDUIT PROX PSV: 327 CM/SEC
BH CV VAS DIALYSIS PRE-INFLOW BRACHIAL DIAMETER: 0.43 CM
BH CV VAS DIALYSIS PRE-INFLOW BRACHIAL EDV: 71 CM/SEC
BH CV VAS DIALYSIS PRE-INFLOW BRACHIAL FLOW VOL: 614 ML/MIN
BH CV VAS DIALYSIS PRE-INFLOW BRACHIAL PSV: 180 CM/SEC
BH CV VAS DIALYSIS VENOUS OUTFLOW AXILLARY DIAMETER: 0.71 CM
BH CV VAS DIALYSIS VENOUS OUTFLOW AXILLARY EDV: 46 CM/SEC
BH CV VAS DIALYSIS VENOUS OUTFLOW AXILLARY PSV: 92 CM/SEC
BH CV VAS DIALYSIS VENOUS OUTFLOW SUBCLAVIAN DIAMETER: 1.11 CM

## 2021-03-11 PROCEDURE — 93990 DOPPLER FLOW TESTING: CPT

## 2021-03-11 PROCEDURE — G0463 HOSPITAL OUTPT CLINIC VISIT: HCPCS

## 2021-03-19 ENCOUNTER — TRANSCRIBE ORDERS (OUTPATIENT)
Dept: ADMINISTRATIVE | Facility: HOSPITAL | Age: 71
End: 2021-03-19

## 2021-03-19 DIAGNOSIS — Z99.2 ENCOUNTER FOR EXTRACORPOREAL DIALYSIS (HCC): Primary | ICD-10-CM

## 2021-03-19 DIAGNOSIS — N18.6 END STAGE RENAL DISEASE (HCC): ICD-10-CM

## 2021-03-27 RX ORDER — SODIUM BICARBONATE 650 MG/1
1300 TABLET ORAL 3 TIMES DAILY
Qty: 180 TABLET | Refills: 0 | Status: SHIPPED | OUTPATIENT
Start: 2021-03-27

## 2021-04-06 ENCOUNTER — HOSPITAL ENCOUNTER (OUTPATIENT)
Dept: GENERAL RADIOLOGY | Facility: HOSPITAL | Age: 71
Discharge: HOME | End: 2021-04-06

## 2021-04-06 ENCOUNTER — TRANSCRIBE ORDERS (OUTPATIENT)
Dept: ADMINISTRATIVE | Facility: HOSPITAL | Age: 71
End: 2021-04-06
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
## 2021-04-06 PROCEDURE — 73060 X-RAY EXAM OF HUMERUS: CPT

## 2021-04-06 PROCEDURE — 73070 X-RAY EXAM OF ELBOW: CPT

## 2021-04-06 PROCEDURE — 73030 X-RAY EXAM OF SHOULDER: CPT

## 2021-04-07 ENCOUNTER — HOSPITAL ENCOUNTER (EMERGENCY)
Facility: HOSPITAL | Age: 71
Discharge: HOME | End: 2021-04-07
Admitting: EMERGENCY MEDICINE

## 2021-04-07 VITALS
HEART RATE: 88 BPM | SYSTOLIC BLOOD PRESSURE: 138 MMHG | DIASTOLIC BLOOD PRESSURE: 71 MMHG | OXYGEN SATURATION: 97 % | WEIGHT: 165.34 LBS | BODY MASS INDEX: 32.46 KG/M2 | TEMPERATURE: 98.6 F | RESPIRATION RATE: 17 BRPM | HEIGHT: 60 IN

## 2021-04-07 DIAGNOSIS — S52.125A CLOSED NONDISPLACED FRACTURE OF HEAD OF LEFT RADIUS, INITIAL ENCOUNTER: Primary | ICD-10-CM

## 2021-04-07 PROCEDURE — 99283 EMERGENCY DEPT VISIT LOW MDM: CPT

## 2021-04-07 NOTE — DISCHARGE INSTRUCTIONS
Please follow-up with orthopedics, call tomorrow to schedule an appointment for follow-up in 7 to 10 days  Please help with your primary care provider, call for appointment  Keep your sling on until follow-up  Return to ed for new or worsening symptoms

## 2021-04-07 NOTE — ED PROVIDER NOTES
"Subjective   Patient is a 70-year-old female presents emergency department with a complaint of an elbow fracture on her x-ray.  Patient reports she fell last Tuesday, and has been having elbow pain since that time.  She had x-rays ordered per Dr. Buckley.  She does not have any further complaints today, with exception of pain in her left elbow.    Onset: \"last Tuesday\"  Location:left elbow  Duration:consistent*  Aggravating/Alleviating Factors:worse with movement  Radiation: none              Review of Systems   Constitutional: Negative for fever.   Musculoskeletal:        Left elbow pain   Skin: Negative for color change, rash and wound.   Neurological: Negative for headaches.       Past Medical History:   Diagnosis Date   • Lucas's esophagus    • C. difficile colitis    • Carotid artery disease (CMS/[facility])     -50-74% left, 16-49% right (1/19)   • DM2 (diabetes mellitus, type 2) (CMS/[facility])    • ESRD (end stage renal disease) (CMS/[facility])     on HD   • Gastric ulcer     at anastomatic site   • Gastroparesis    • Gout    • Hemodialysis patient (CMS/[facility])    • Hernia, incisional     abdomen   • History of colonoscopy     UTD   • History of degenerative disc disease    • Hyperlipidemia    • Hypertension    • Microscopic colitis    • Nephrotic syndrome    • Neuroendocrine tumor     of stomach   • FABY (obstructive sleep apnea)     not treating   • Osteoarthritis    • Osteoarthritis, knee     Left   • Proteinuria    • Renal cell cancer, right (CMS/[facility])    • Rib pain on right side     chronic   • Rotator cuff tear     bilateral   • Uterine cancer (CMS/[facility])    • Vitamin B 12 deficiency    • Vitamin D deficiency        Allergies   Allergen Reactions   • Atorvastatin Calcium Unknown (See Comments)   • Niacin Unknown (See Comments)     \"FEELS LIKE SHE IS ON FIRE\"         Past Surgical History:   Procedure Laterality Date   • APPENDECTOMY     • ARTERIOVENOUS FISTULA Right    • ARTERIOVENOUS FISTULA REPAIR      Clotted off and insert " graft   • ARTERIOVENOUS FISTULA/SHUNT SURGERY Left 12/30/2020    Procedure: ARTERIOVENOUS FISTULA FORMATION;  Surgeon: Jan Caicedo MD;  Location: [facility] MAIN OR;  Service: Vascular;  Laterality: Left;   • BREAST BIOPSY      right nipple 1981   • COLONOSCOPY N/A 11/24/2020    Procedure: COLONOSCOPY with polypectomy x 7;  Surgeon: Jeffery White MD;  Location: [facility] ENDOSCOPY;  Service: Gastroenterology;  Laterality: N/A;  post op: polyps, diverticulosis, hemorrhoids   • GASTRIC RESECTION      cancer   • HYSTERECTOMY     • LAPAROSCOPIC CHOLECYSTECTOMY     • NEPHRECTOMY      right kidney removed    • REDUCTION MAMMAPLASTY     • TUMOR REMOVAL      Multiple removed from abdominal    • VENTRAL/INCISIONAL HERNIA REPAIR Right 8/28/2019    Procedure: VENTRAL/INCISIONAL HERNIA REPAIR;  Surgeon: Nilay Stevens MD;  Location: [facility] MAIN OR;  Service: General   • VENTRAL/INCISIONAL HERNIA REPAIR N/A 10/1/2019    Procedure: OPEN VENTRAL/INCISIONAL HERNIA REPAIR;  Surgeon: Nilay Stevens MD;  Location: [facility] MAIN OR;  Service: General       Family History   Problem Relation Age of Onset   • Heart disease Father        Social History     Socioeconomic History   • Marital status: Single     Spouse name: Not on file   • Number of children: Not on file   • Years of education: Not on file   • Highest education level: Not on file   Tobacco Use   • Smoking status: Current Every Day Smoker     Packs/day: 0.50     Years: 57.00     Pack years: 28.50     Types: Cigarettes   • Smokeless tobacco: Never Used   • Tobacco comment: quit smoking   Substance and Sexual Activity   • Alcohol use: No   • Drug use: No   • Sexual activity: Not Currently       No current facility-administered medications for this encounter.    Current Outpatient Medications:   •  amLODIPine (NORVASC) 10 MG tablet, Take 10 mg by mouth Daily., Disp: , Rfl:   •  aspirin (aspirin) 81 MG EC tablet, Take 1 tablet by mouth 3 (Three) Times a Week., Disp: , Rfl:    •  B Complex-C-Folic Acid (Virt-Caps) 1 MG capsule, TAKE 1 CAPSULE BY MOUTH EVERY DAY, Disp: 100 capsule, Rfl: 0  •  calcium acetate (PHOSLO) 667 MG capsule, Take 2,001 mg by mouth 3 (Three) Times a Day., Disp: , Rfl:   •  Ethyl Alcohol 100 % solution, 3 (Three) Times a Week., Disp: , Rfl:   •  glucose blood (ONE TOUCH ULTRA TEST) test strip, DX E11.9, Disp: 100 each, Rfl: 8  •  lidocaine-prilocaine (EMLA) 2.5-2.5 % cream, APPLY SMALL AMOUNT TO ACCESS SITE (AVF) 1 TO 2 HOURS BEFORE DIALYSIS. COVER WITH OCCLUSIVE DRESSING (SARAN WRAP), Disp: , Rfl: 4  •  losartan (COZAAR) 100 MG tablet, Take 1 tablet by mouth Daily., Disp: 90 tablet, Rfl: 3  •  metoprolol succinate XL (Toprol XL) 25 MG 24 hr tablet, Take 1 tablet by mouth Daily for 30 days., Disp: 30 tablet, Rfl: 0  •  Multiple Vitamins-Minerals (RENAPLEX-D) tablet, Take 1 tablet by mouth Daily., Disp: , Rfl:   •  ONETOUCH DELICA LANCETS 33G misc, Test one time daily   DX: E11.9, Disp: 100 each, Rfl: 11  •  pantoprazole (PROTONIX) 40 MG EC tablet, TAKE 1 TABLET BY MOUTH TWICE DAILY, Disp: 180 tablet, Rfl: 2  •  sevelamer (RENVELA) 800 MG tablet, Take 1,600 mg by mouth 3 (Three) Times a Day With Meals., Disp: , Rfl:   •  simvastatin (Zocor) 40 MG tablet, Take 1 tablet by mouth Every Night., Disp: 90 tablet, Rfl: 3  •  sodium bicarbonate 650 MG tablet, Take 2 tablets by mouth 3 (Three) Times a Day., Disp: 180 tablet, Rfl: 0  •  vitamin B-12 (CYANOCOBALAMIN) 1000 MCG tablet, Take 2,000 mcg by mouth 3 (Three) Times a Week. Mon, Wed, Fri, Disp: , Rfl:       Objective   Physical Exam  Vitals and nursing note reviewed.   Constitutional:       General: She is not in acute distress.     Appearance: Normal appearance. She is not ill-appearing, toxic-appearing or diaphoretic.      Comments: Resting in chair, NAD   HENT:      Head: Normocephalic and atraumatic.   Cardiovascular:      Heart sounds: Normal heart sounds. No murmur heard.   No friction rub. No gallop.   "  Pulmonary:      Breath sounds: Normal breath sounds.   Musculoskeletal:      Left elbow: No swelling, deformity, effusion or lacerations. Normal range of motion. Tenderness present.      Comments: Cap refill brisk  Distal pulses intact   Skin:     General: Skin is warm and dry.      Capillary Refill: Capillary refill takes less than 2 seconds.      Comments: brusing noted to left medial upper arm.    Neurological:      Mental Status: She is alert and oriented to person, place, and time.         Procedures           ED Course  /73   Pulse 91   Temp 98.9 °F (37.2 °C) (Oral)   Resp 18   Ht 152.4 cm (60\")   Wt 75 kg (165 lb 5.5 oz)   SpO2 98%   BMI 32.29 kg/m²   Labs Reviewed - No data to display  Medications - No data to display  XR Shoulder 2+ View Left    Result Date: 4/6/2021  No acute osseous abnormality. Mild to moderate acromioclavicular and glenohumeral osteoarthritic changes are present. Post surgical changes are seen related to previous rotator cuff repair.  Electronically Signed By-Aruna Fernandez MD On:4/6/2021 8:25 AM This report was finalized on [id number] by  Aruna Fernandez MD.    XR Humerus Left    Result Date: 4/6/2021  No acute osseous abnormality.  Electronically Signed By-Aruna Fernandez MD On:4/6/2021 8:26 AM This report was finalized on [id number] by  Aruna Fernandez MD.    XR Elbow 2 View Left    Result Date: 4/6/2021  Subtle nondisplaced fracture of the radial head.  Electronically Signed By-Aruna Fernandez MD On:4/6/2021 8:25 AM This report was finalized on [id number] by  Aruna Fernandez MD.          Appropriate PPE was worn during the duration of the care for this patient while in the emergency department per [facility] guidelines                                     MDM  Number of Diagnoses or Management Options  Closed nondisplaced fracture of head of left radius, initial encounter  Diagnosis management comments: Differentials: Contusion, strain, fracture  This list is not " all inclusive and does not constitute the entireity of considered causes.     Labs reviewed by me and significant for the following: Not warranted    Imaging, Interpreted per radiologist, independently viewed by myself: Reviewed patient's x-rays were previously ordered.    Treatment and Medication this encounter:     Patient was brought back to the emergency department room for evaluation and  placed on appropriate monitoring. Vital signs have  been reviewed. Patient is afebrile.  She was in emergency department with complaint of left elbow pain, she had had outpatient x-ray studies completed, these are available within our system.  Please see above.  She is a subtle nondisplaced fracture of the radial head.  Patient has no other complaints today.  Orthopedics was consulted, patient will be splinted per recommendation, and follow-up.    Plan and Disposition: I spoke with the patient at the bedside regarding their plan of care, discharge instruction, home care, prescriptions, and importance follow-up.  We discussed test results at the bedside.  Patient was made aware of indications to return to the emergency department.  Patient agrees with the current plan of care for discharge, verbalized understanding of all instructions    Pt is aware that discharge does not mean that nothing is wrong but it indicates no emergency is present and they must continue care with follow-up as given below or physician of their choice           Amount and/or Complexity of Data Reviewed  Discuss the patient with other providers: yes (Orthopedics)  Independent visualization of images, tracings, or specimens: yes        Final diagnoses:   Closed nondisplaced fracture of head of left radius, initial encounter       ED Disposition  ED Disposition     ED Disposition Condition Comment    Discharge Stable           [facility] EMERGENCY DEPARTMENT  [facility] [address] [phone]    As needed, If symptoms  Alen Cooper MD  [address] [place] [address]  [phone]    Schedule an appointment as soon as possible for a visit   Call to schedule your follow up appointment for 7-10 days         Medication List      No changes were made to your prescriptions during this visit.          Jennifer Matias, APRN  04/07/21 1454

## 2021-04-13 ENCOUNTER — OFFICE VISIT (OUTPATIENT)
Dept: ORTHOPEDIC SURGERY | Facility: CLINIC | Age: 71
End: 2021-04-13

## 2021-04-13 VITALS
HEIGHT: 60 IN | BODY MASS INDEX: 32.98 KG/M2 | DIASTOLIC BLOOD PRESSURE: 78 MMHG | SYSTOLIC BLOOD PRESSURE: 169 MMHG | HEART RATE: 97 BPM | WEIGHT: 168 LBS

## 2021-04-13 DIAGNOSIS — S52.125A CLOSED NONDISPLACED FRACTURE OF HEAD OF LEFT RADIUS, INITIAL ENCOUNTER: Primary | ICD-10-CM

## 2021-04-13 PROCEDURE — 24650 CLTX RDL HEAD/NCK FX WO MNPJ: CPT | Performed by: FAMILY MEDICINE

## 2021-04-13 PROCEDURE — 99203 OFFICE O/P NEW LOW 30 MIN: CPT | Performed by: FAMILY MEDICINE

## 2021-04-13 NOTE — PROGRESS NOTES
Primary Care Sports Medicine Office Visit Note     Patient ID: Sherry Lobato is a 70 y.o. female.    Chief Complaint:  Chief Complaint   Patient presents with   • Left Elbow - Pain     Fell off the porch X2 weeks ago.      HPI:    Ms. Sherry Lobato is a 70 y.o. female who presents to the clinic today for L elbow evaluation. She has known CKD-V s/p R nephrectomy due to RCC. She currently dialyzes from tunnel cath, but has recently had AV fistula formation in this LUE. Has not matured yet.     She states that the missed the last step on her porch, and fell on L outstretched arm. She was seen and evaluated in th ER 2 weeks ago.  At that time she was told she has a fracture of her radial head.  She denies any severe or significant pain about the elbow currently.  She has full range of motion without any complaint today.  Elbow is relatively asymptomatic.  Very mild sharp pain/tenderness with extremes of pronation supination only.    Past Medical History:   Diagnosis Date   • Lucas's esophagus    • C. difficile colitis    • Carotid artery disease (CMS/[facility])     -50-74% left, 16-49% right (1/19)   • DM2 (diabetes mellitus, type 2) (CMS/HCC)    • ESRD (end stage renal disease) (CMS/[facility])     on HD   • Gastric ulcer     at anastomatic site   • Gastroparesis    • Gout    • Hemodialysis patient (CMS/[facility])    • Hernia, incisional     abdomen   • History of colonoscopy     UTD   • History of degenerative disc disease    • Hyperlipidemia    • Hypertension    • Microscopic colitis    • Nephrotic syndrome    • Neuroendocrine tumor     of stomach   • FABY (obstructive sleep apnea)     not treating   • Osteoarthritis    • Osteoarthritis, knee     Left   • Proteinuria    • Renal cell cancer, right (CMS/HCC)    • Rib pain on right side     chronic   • Rotator cuff tear     bilateral   • Uterine cancer (CMS/HCC)    • Vitamin B 12 deficiency    • Vitamin D deficiency        Past Surgical History:   Procedure Laterality Date   •  "APPENDECTOMY     • ARTERIOVENOUS FISTULA Right    • ARTERIOVENOUS FISTULA REPAIR      Clotted off and insert graft   • ARTERIOVENOUS FISTULA/SHUNT SURGERY Left 12/30/2020    Procedure: ARTERIOVENOUS FISTULA FORMATION;  Surgeon: Jan Caicedo MD;  Location: [facility] MAIN OR;  Service: Vascular;  Laterality: Left;   • BREAST BIOPSY      right nipple 1981   • COLONOSCOPY N/A 11/24/2020    Procedure: COLONOSCOPY with polypectomy x 7;  Surgeon: Jeffery White MD;  Location: [facility] ENDOSCOPY;  Service: Gastroenterology;  Laterality: N/A;  post op: polyps, diverticulosis, hemorrhoids   • GASTRIC RESECTION      cancer   • HYSTERECTOMY     • LAPAROSCOPIC CHOLECYSTECTOMY     • NEPHRECTOMY      right kidney removed    • REDUCTION MAMMAPLASTY     • TUMOR REMOVAL      Multiple removed from abdominal    • VENTRAL/INCISIONAL HERNIA REPAIR Right 8/28/2019    Procedure: VENTRAL/INCISIONAL HERNIA REPAIR;  Surgeon: Nilay Stevens MD;  Location: [facility] MAIN OR;  Service: General   • VENTRAL/INCISIONAL HERNIA REPAIR N/A 10/1/2019    Procedure: OPEN VENTRAL/INCISIONAL HERNIA REPAIR;  Surgeon: Nilay Stevens MD;  Location: [facility] MAIN OR;  Service: General       Family History   Problem Relation Age of Onset   • Heart disease Father      Social History     Occupational History   • Not on file   Tobacco Use   • Smoking status: Current Every Day Smoker     Packs/day: 0.50     Years: 57.00     Pack years: 28.50     Types: Cigarettes   • Smokeless tobacco: Never Used   • Tobacco comment: quit smoking   Substance and Sexual Activity   • Alcohol use: No   • Drug use: No   • Sexual activity: Not Currently      Review of Systems   Constitutional: Negative for activity change and fever.   Musculoskeletal: Positive for arthralgias.   Skin: Negative for color change and rash.   Neurological: Negative for weakness.       Objective:    /78   Pulse 97   Ht 152.4 cm (60\")   Wt 76.2 kg (168 lb)   BMI 32.81 kg/m²     Physical " "Examination:  Physical Exam  Vitals and nursing note reviewed.   Constitutional:       General: She is not in acute distress.     Appearance: She is well-developed. She is not diaphoretic.   HENT:      Head: Normocephalic and atraumatic.   Eyes:      Conjunctiva/sclera: Conjunctivae normal.   Pulmonary:      Effort: Pulmonary effort is normal. No respiratory distress.   Skin:     General: Skin is warm.      Capillary Refill: Capillary refill takes less than 2 seconds.   Neurological:      Mental Status: She is alert.       Left Elbow Exam     Tenderness   The patient is experiencing tenderness in the radial head.     Range of Motion   Extension: normal   Flexion: normal   Pronation: normal   Supination: normal     Muscle Strength   Pronation:  5/5     Other   Erythema: absent  Sensation: normal  Pulse: present    Comments:  Palpable thrill to the medial aspect of the upper arm        Imaging and other tests:  2 view XR left elbow dated 4/6/2021 reviewed today.  I agree there is an intrarticular nondisplaced vertical fracture lateral aspect of radial head neck.    Assessment and Plan:    1. Closed nondisplaced fracture of head of left radius, initial encounter    I discussed fracture type and treatment options with the patient today.  Nondisplaced fracture, I believe is amenable to immobilization only.  The patient has been intermittently wearing sling for the past 2 weeks since injury.  I recommend she continue sling, but start physical therapy for range of motion of elbow.  The patient would like to attempt this at home first.  She was given range of motion passive exercises today.  Continue sling for 2 weeks, attempt gentle range of motion activity for 2 weeks RTC to 4 weeks for repeat imaging and further evaluation.    Jah CALDERON \"Chance\" Herman TRUJILLO DO, CAQSM  04/13/21  14:23 EDT    Disclaimer: Please note that areas of this note were completed with computer voice recognition software.  Quite often unanticipated " grammatical, syntax, homophones, and other interpretive errors are inadvertently transcribed by the computer software. Please excuse any errors that have escaped final proofreading.

## 2021-05-06 ENCOUNTER — OFFICE VISIT (OUTPATIENT)
Dept: FAMILY MEDICINE CLINIC | Facility: CLINIC | Age: 71
End: 2021-05-06

## 2021-05-06 VITALS
SYSTOLIC BLOOD PRESSURE: 146 MMHG | OXYGEN SATURATION: 98 % | HEIGHT: 60 IN | RESPIRATION RATE: 16 BRPM | BODY MASS INDEX: 32.47 KG/M2 | TEMPERATURE: 96.9 F | WEIGHT: 165.38 LBS | DIASTOLIC BLOOD PRESSURE: 84 MMHG | HEART RATE: 95 BPM

## 2021-05-06 DIAGNOSIS — I10 HYPERTENSION, UNSPECIFIED TYPE: Primary | ICD-10-CM

## 2021-05-06 DIAGNOSIS — S39.92XA INJURY OF COCCYX, INITIAL ENCOUNTER: ICD-10-CM

## 2021-05-06 PROBLEM — E83.30 DISORDER OF PHOSPHORUS METABOLISM, UNSPECIFIED: Status: ACTIVE | Noted: 2020-11-23

## 2021-05-06 PROBLEM — T82.868S: Status: ACTIVE | Noted: 2020-12-26

## 2021-05-06 PROCEDURE — 99213 OFFICE O/P EST LOW 20 MIN: CPT | Performed by: NURSE PRACTITIONER

## 2021-05-06 RX ORDER — SIMVASTATIN 40 MG
40 TABLET ORAL NIGHTLY
Qty: 90 TABLET | Refills: 3 | Status: CANCELLED | OUTPATIENT
Start: 2021-05-06

## 2021-05-06 RX ORDER — SIMVASTATIN 40 MG
40 TABLET ORAL NIGHTLY
Qty: 90 TABLET | Refills: 3 | Status: SHIPPED | OUTPATIENT
Start: 2021-05-06 | End: 2021-07-28

## 2021-05-06 RX ORDER — LANCETS 33 GAUGE
EACH MISCELLANEOUS
Qty: 100 EACH | Refills: 11 | Status: SHIPPED | OUTPATIENT
Start: 2021-05-06 | End: 2021-07-25

## 2021-05-06 RX ORDER — LANCETS 33 GAUGE
EACH MISCELLANEOUS
Qty: 100 EACH | Refills: 11 | Status: CANCELLED | OUTPATIENT
Start: 2021-05-06

## 2021-05-06 NOTE — PROGRESS NOTES
"Chief Complaint  Hypertension  Pain in tail bone  Subjective          Sherry Lobato presents to [facility]  History of Present Illness  Here today for routine follow up HTN, DM  Denies any c/o chest pain, soa, edema, ha, dizziness, weakness  bp is fair today, slightly elevated, she reports checking it regularly and is typically in normal/controlled range  Is past due for diabetic eye exam, uses outside provider, advised to schedule  Has b/l carotid bruits in process of being evaluated and treated  Currently on HD three times weekly  Has c/o tailbone pain, recurrent sore - aggravated by prolonged recumbent position during HD - tells me that she had them discontinue the dialysis yesterday an hour early as she could not sit there any longer    Reviewed last labs - prefers to postpone labs today    Objective   Vital Signs:   /84 (BP Location: Right arm, Patient Position: Sitting, Cuff Size: Adult)   Pulse 95   Temp 96.9 °F (36.1 °C) (Skin)   Resp 16   Ht 152.4 cm (60\")   Wt 75 kg (165 lb 6 oz)   SpO2 98%   BMI 32.30 kg/m²     Physical Exam  Constitutional:       Appearance: Normal appearance. She is well-developed and well-groomed.   HENT:      Head: Normocephalic.   Neck:      Vascular: Carotid bruit present.   Cardiovascular:      Rate and Rhythm: Normal rate and regular rhythm.      Heart sounds: Normal heart sounds.   Pulmonary:      Effort: Pulmonary effort is normal.      Breath sounds: Normal breath sounds.   Abdominal:      General: Bowel sounds are normal.      Palpations: Abdomen is soft.   Musculoskeletal:         General: Normal range of motion.      Cervical back: Normal range of motion and neck supple.      Right lower leg: No edema.      Left lower leg: No edema.        Legs:    Lymphadenopathy:      Cervical: Cervical adenopathy present.   Skin:     General: Skin is warm and dry.             Comments: Small, healing 3mm scab at base of tailbone  No " erythema or edema   Neurological:      Mental Status: She is alert.   Psychiatric:         Mood and Affect: Mood normal.        Result Review :     CMP    CMP 12/29/20 12/30/20 12/31/20   Glucose 75 84 194 (A)   BUN 85 (A) 50 (A) 60 (A)   Creatinine 10.36 (A) 7.64 (A) 9.28 (A)   eGFR Non African Am 4 (A) 5 (A) 4 (A)   eGFR African Am      Sodium 140 139 136   Potassium 5.4 (A) 5.2 4.8   Chloride 101 100 97 (A)   Calcium 8.9 9.4 8.9   Albumin 3.60  3.50   (A) Abnormal value       Comments are available for some flowsheets but are not being displayed.           Most Recent A1C    HGBA1C Most Recent 11/7/20   Hemoglobin A1C 5.5                     Assessment and Plan    Diagnoses and all orders for this visit:    1. Hypertension, unspecified type (Primary)    Other orders  -     DEXA Bone Density Axial        Follow Up   Return in about 6 months (around 11/6/2021), or if symptoms worsen or fail to improve.  Patient was given instructions and counseling regarding her condition or for health maintenance advice. Please see specific information pulled into the AVS if appropriate.

## 2021-05-11 ENCOUNTER — OFFICE VISIT (OUTPATIENT)
Dept: ORTHOPEDIC SURGERY | Facility: CLINIC | Age: 71
End: 2021-05-11

## 2021-05-11 VITALS — BODY MASS INDEX: 32.39 KG/M2 | HEIGHT: 60 IN | WEIGHT: 165 LBS

## 2021-05-11 DIAGNOSIS — S52.125S CLOSED NONDISPLACED FRACTURE OF HEAD OF LEFT RADIUS, SEQUELA: Primary | ICD-10-CM

## 2021-05-11 PROCEDURE — 99024 POSTOP FOLLOW-UP VISIT: CPT | Performed by: FAMILY MEDICINE

## 2021-05-11 NOTE — PROGRESS NOTES
Primary Care Sports Medicine Office Visit Note     Patient ID: Sherry Lobato is a 70 y.o. female.    Chief Complaint:  Chief Complaint   Patient presents with   • Left Elbow - Follow-up     Closed nondisplaced fracture of head of left radius     HPI:    Ms. Sherry Lobato is a 70 y.o. female who returns to the clinic today for 6-week follow-up evaluation of nondisplaced left radial head fracture.  The patient states she has full strength and range of motion about the elbow, has been performing light weighted elbow flexion extension exercises at home on her own.  She denies any further pain to the elbow.  Doing incredibly well today without complaint.    Past Medical History:   Diagnosis Date   • Lucas's esophagus    • C. difficile colitis    • Carotid artery disease (CMS/[facility])     -50-74% left, 16-49% right (1/19)   • DM2 (diabetes mellitus, type 2) (CMS/[facility])    • ESRD (end stage renal disease) (CMS/[facility])     on HD   • Gastric ulcer     at anastomatic site   • Gastroparesis    • Gout    • Hemodialysis patient (CMS/[facility])    • Hernia, incisional     abdomen   • History of colonoscopy     UTD   • History of degenerative disc disease    • Hyperlipidemia    • Hypertension    • Microscopic colitis    • Nephrotic syndrome    • Neuroendocrine tumor     of stomach   • FABY (obstructive sleep apnea)     not treating   • Osteoarthritis    • Osteoarthritis, knee     Left   • Proteinuria    • Renal cell cancer, right (CMS/[facility])    • Rib pain on right side     chronic   • Rotator cuff tear     bilateral   • Uterine cancer (CMS/[facility])    • Vitamin B 12 deficiency    • Vitamin D deficiency        Past Surgical History:   Procedure Laterality Date   • APPENDECTOMY     • ARTERIOVENOUS FISTULA Right    • ARTERIOVENOUS FISTULA REPAIR      Clotted off and insert graft   • ARTERIOVENOUS FISTULA/SHUNT SURGERY Left 12/30/2020    Procedure: ARTERIOVENOUS FISTULA FORMATION;  Surgeon: Jan Caicedo MD;  Location: [facility] OR;   "Service: Vascular;  Laterality: Left;   • BREAST BIOPSY      right nipple 1981   • COLONOSCOPY N/A 11/24/2020    Procedure: COLONOSCOPY with polypectomy x 7;  Surgeon: Jeffery White MD;  Location: [facility] ENDOSCOPY;  Service: Gastroenterology;  Laterality: N/A;  post op: polyps, diverticulosis, hemorrhoids   • GASTRIC RESECTION      cancer   • HYSTERECTOMY     • LAPAROSCOPIC CHOLECYSTECTOMY     • NEPHRECTOMY      right kidney removed    • REDUCTION MAMMAPLASTY     • TUMOR REMOVAL      Multiple removed from abdominal    • VENTRAL/INCISIONAL HERNIA REPAIR Right 8/28/2019    Procedure: VENTRAL/INCISIONAL HERNIA REPAIR;  Surgeon: Nilay Stevens MD;  Location: [facility] MAIN OR;  Service: General   • VENTRAL/INCISIONAL HERNIA REPAIR N/A 10/1/2019    Procedure: OPEN VENTRAL/INCISIONAL HERNIA REPAIR;  Surgeon: Nilay Stevens MD;  Location: [facility] MAIN OR;  Service: General       Family History   Problem Relation Age of Onset   • Heart disease Father      Social History     Occupational History   • Not on file   Tobacco Use   • Smoking status: Current Every Day Smoker     Packs/day: 0.25     Years: 57.00     Pack years: 14.25     Types: Cigarettes   • Smokeless tobacco: Never Used   • Tobacco comment: quit smoking   Vaping Use   • Vaping Use: Never used   Substance and Sexual Activity   • Alcohol use: No   • Drug use: No   • Sexual activity: Not Currently      Review of Systems   Constitutional: Negative for activity change and fever.   Musculoskeletal: Positive for arthralgias.   Skin: Negative for color change and rash.   Neurological: Negative for weakness.     Objective:    Ht 152.4 cm (60\")   Wt 74.8 kg (165 lb)   BMI 32.22 kg/m²     Physical Examination:  Physical Exam  Vitals and nursing note reviewed.   Constitutional:       General: She is not in acute distress.     Appearance: She is well-developed. She is not diaphoretic.   HENT:      Head: Normocephalic and atraumatic.   Eyes:      Conjunctiva/sclera: " "Conjunctivae normal.   Pulmonary:      Effort: Pulmonary effort is normal. No respiratory distress.   Skin:     General: Skin is warm.      Capillary Refill: Capillary refill takes less than 2 seconds.   Neurological:      Mental Status: She is alert.       Left Elbow Exam     Tenderness   The patient is experiencing no tenderness.     Range of Motion   Extension: normal   Flexion: normal   Pronation: normal   Supination: normal     Muscle Strength   Pronation:  5/5   Supination:  5/5     Other   Erythema: absent  Sensation: normal  Pulse: present        Imaging and other tests:  2 view XR left elbow today shows considerable hazy callus formation about previously noted nondisplaced longitudinal lateral radial head fracture.    Assessment and Plan:    1. Closed nondisplaced fracture of head of left radius, sequela  - XR Elbow 2 View Left    Considerable amount of healing on XR examination today.  Patient is doing well with full strength and range of motion.  Continue use ad arnoldo.  RTC as needed.    Jah CALDERON \"Chance\" Herman TRUJILLO DO, CAQSM  05/11/21  08:28 EDT    Disclaimer: Please note that areas of this note were completed with computer voice recognition software.  Quite often unanticipated grammatical, syntax, homophones, and other interpretive errors are inadvertently transcribed by the computer software. Please excuse any errors that have escaped final proofreading.  "

## 2021-05-18 ENCOUNTER — HOSPITAL ENCOUNTER (OUTPATIENT)
Dept: INTERVENTIONAL RADIOLOGY/VASCULAR | Facility: HOSPITAL | Age: 71
Discharge: HOME | End: 2021-05-18
Admitting: RADIOLOGY

## 2021-05-18 DIAGNOSIS — N18.6 END STAGE RENAL DISEASE ON DIALYSIS (HCC): ICD-10-CM

## 2021-05-18 DIAGNOSIS — Z99.2 END STAGE RENAL DISEASE ON DIALYSIS (HCC): ICD-10-CM

## 2021-06-08 ENCOUNTER — OFFICE (AMBULATORY)
Dept: URBAN - METROPOLITAN AREA CLINIC 64 | Facility: CLINIC | Age: 71
End: 2021-06-08

## 2021-06-08 VITALS
WEIGHT: 164 LBS | DIASTOLIC BLOOD PRESSURE: 59 MMHG | SYSTOLIC BLOOD PRESSURE: 122 MMHG | HEART RATE: 98 BPM | HEIGHT: 63 IN

## 2021-06-08 DIAGNOSIS — R15.2 FECAL URGENCY: ICD-10-CM

## 2021-06-08 DIAGNOSIS — R10.84 GENERALIZED ABDOMINAL PAIN: ICD-10-CM

## 2021-06-08 DIAGNOSIS — K62.5 HEMORRHAGE OF ANUS AND RECTUM: ICD-10-CM

## 2021-06-08 DIAGNOSIS — R19.7 DIARRHEA, UNSPECIFIED: ICD-10-CM

## 2021-06-08 PROCEDURE — 99214 OFFICE O/P EST MOD 30 MIN: CPT | Performed by: NURSE PRACTITIONER

## 2021-06-08 RX ORDER — DICYCLOMINE HYDROCHLORIDE 10 MG/1
30 CAPSULE ORAL
Qty: 90 | Refills: 11 | Status: COMPLETED
Start: 2021-06-08 | End: 2023-11-06

## 2021-06-09 ENCOUNTER — OFFICE (AMBULATORY)
Dept: URBAN - METROPOLITAN AREA LAB 2 | Facility: LAB | Age: 71
End: 2021-06-09
Payer: MEDICARE

## 2021-06-09 DIAGNOSIS — A04.7 ENTEROCOLITIS DUE TO CLOSTRIDIUM DIFFICILE: ICD-10-CM

## 2021-06-09 PROCEDURE — 87507 IADNA-DNA/RNA PROBE TQ 12-25: CPT | Performed by: NURSE PRACTITIONER

## 2021-06-10 ENCOUNTER — APPOINTMENT (OUTPATIENT)
Dept: VASCULAR SURGERY | Facility: HOSPITAL | Age: 71
End: 2021-06-10

## 2021-06-10 ENCOUNTER — HOSPITAL ENCOUNTER (OUTPATIENT)
Dept: CARDIOLOGY | Facility: HOSPITAL | Age: 71
Discharge: HOME | End: 2021-06-10

## 2021-06-10 DIAGNOSIS — Z99.2 ENCOUNTER FOR EXTRACORPOREAL DIALYSIS (HCC): ICD-10-CM

## 2021-06-10 DIAGNOSIS — N18.6 END STAGE RENAL DISEASE (HCC): ICD-10-CM

## 2021-06-10 LAB
BH CV VAS DIALYSIS ARTERIAL ANASTOMOSIS EDV: 173 CM/SEC
BH CV VAS DIALYSIS ARTERIAL ANASTOMOSIS PSV: 381 CM/SEC
BH CV VAS DIALYSIS CONDUIT DIST DEPTH: 0.4 CM
BH CV VAS DIALYSIS CONDUIT DIST DIAMETER: 0.74 CM
BH CV VAS DIALYSIS CONDUIT DIST EDV: 69 CM/SEC
BH CV VAS DIALYSIS CONDUIT DIST FLOW VOL: 811 ML/MIN
BH CV VAS DIALYSIS CONDUIT DIST PSV: 144 CM/SEC
BH CV VAS DIALYSIS CONDUIT MID DEPTH: 0.62 CM
BH CV VAS DIALYSIS CONDUIT MID DIAMETER: 0.63 CM
BH CV VAS DIALYSIS CONDUIT MID EDV: 174 CM/SEC
BH CV VAS DIALYSIS CONDUIT MID FLOW VOL: 1221 ML/MIN
BH CV VAS DIALYSIS CONDUIT MID PSV: 410 CM/SEC
BH CV VAS DIALYSIS CONDUIT PROX DEPTH: 0.89 CM
BH CV VAS DIALYSIS CONDUIT PROX DIAMETER: 0.56 CM
BH CV VAS DIALYSIS CONDUIT PROX EDV: 228 CM/SEC
BH CV VAS DIALYSIS CONDUIT PROX FLOW VOL: 1363 ML/MIN
BH CV VAS DIALYSIS CONDUIT PROX PSV: 406 CM/SEC
BH CV VAS DIALYSIS PRE-INFLOW BRACHIAL DIAMETER: 0.46 CM
BH CV VAS DIALYSIS PRE-INFLOW BRACHIAL EDV: 78 CM/SEC
BH CV VAS DIALYSIS PRE-INFLOW BRACHIAL FLOW VOL: 631 ML/MIN
BH CV VAS DIALYSIS PRE-INFLOW BRACHIAL PSV: 164 CM/SEC
BH CV VAS DIALYSIS VENOUS OUTFLOW AXILLARY DIAMETER: 1.11 CM
BH CV VAS DIALYSIS VENOUS OUTFLOW AXILLARY EDV: 45 CM/SEC
BH CV VAS DIALYSIS VENOUS OUTFLOW AXILLARY PSV: 86 CM/SEC
BH CV VAS DIALYSIS VENOUS OUTFLOW SUBCLAVIAN DIAMETER: 0.88 CM
BH CV VAS DIALYSIS VENOUS OUTFLOW SUBCLAVIAN EDV: 21 CM/SEC
BH CV VAS DIALYSIS VENOUS OUTFLOW SUBCLAVIAN PSV: 65 CM/SEC
MAXIMAL PREDICTED HEART RATE: 149 BPM
STRESS TARGET HR: 127 BPM

## 2021-06-10 PROCEDURE — G0463 HOSPITAL OUTPT CLINIC VISIT: HCPCS

## 2021-06-10 PROCEDURE — 87324 CLOSTRIDIUM AG IA: CPT | Mod: 59 | Performed by: NURSE PRACTITIONER

## 2021-06-10 PROCEDURE — 93990 DOPPLER FLOW TESTING: CPT

## 2021-06-10 PROCEDURE — 87449 NOS EACH ORGANISM AG IA: CPT | Performed by: NURSE PRACTITIONER

## 2021-06-22 ENCOUNTER — HOSPITAL ENCOUNTER (EMERGENCY)
Facility: HOSPITAL | Age: 71
Discharge: HOME | End: 2021-06-23
Attending: EMERGENCY MEDICINE | Admitting: EMERGENCY MEDICINE

## 2021-06-22 ENCOUNTER — APPOINTMENT (OUTPATIENT)
Dept: CT IMAGING | Facility: HOSPITAL | Age: 71
End: 2021-06-22

## 2021-06-22 VITALS
HEART RATE: 70 BPM | DIASTOLIC BLOOD PRESSURE: 78 MMHG | BODY MASS INDEX: 32.72 KG/M2 | HEIGHT: 60 IN | TEMPERATURE: 98.5 F | SYSTOLIC BLOOD PRESSURE: 170 MMHG | WEIGHT: 166.67 LBS | RESPIRATION RATE: 18 BRPM | OXYGEN SATURATION: 93 %

## 2021-06-22 DIAGNOSIS — M16.11 PRIMARY OSTEOARTHRITIS OF RIGHT HIP: ICD-10-CM

## 2021-06-22 DIAGNOSIS — M47.26 OSTEOARTHRITIS OF SPINE WITH RADICULOPATHY, LUMBAR REGION: ICD-10-CM

## 2021-06-22 DIAGNOSIS — M16.12 PRIMARY OSTEOARTHRITIS OF LEFT HIP: ICD-10-CM

## 2021-06-22 DIAGNOSIS — M53.3 SACRAL PAIN: Primary | ICD-10-CM

## 2021-06-22 PROCEDURE — 96372 THER/PROPH/DIAG INJ SC/IM: CPT

## 2021-06-22 PROCEDURE — 25010000002 ONDANSETRON PER 1 MG: Performed by: EMERGENCY MEDICINE

## 2021-06-22 PROCEDURE — 99283 EMERGENCY DEPT VISIT LOW MDM: CPT

## 2021-06-22 PROCEDURE — 72192 CT PELVIS W/O DYE: CPT

## 2021-06-22 PROCEDURE — 25010000002 HYDROMORPHONE PER 4 MG: Performed by: EMERGENCY MEDICINE

## 2021-06-22 RX ORDER — HYDROMORPHONE HCL 110MG/55ML
1 PATIENT CONTROLLED ANALGESIA SYRINGE INTRAVENOUS ONCE
Status: COMPLETED | OUTPATIENT
Start: 2021-06-22 | End: 2021-06-22

## 2021-06-22 RX ORDER — ONDANSETRON 2 MG/ML
4 INJECTION INTRAMUSCULAR; INTRAVENOUS ONCE
Status: COMPLETED | OUTPATIENT
Start: 2021-06-22 | End: 2021-06-22

## 2021-06-22 RX ADMIN — HYDROMORPHONE HYDROCHLORIDE 1 MG: 2 INJECTION, SOLUTION INTRAMUSCULAR; INTRAVENOUS; SUBCUTANEOUS at 22:37

## 2021-06-22 RX ADMIN — ONDANSETRON 4 MG: 2 INJECTION INTRAMUSCULAR; INTRAVENOUS at 22:37

## 2021-06-23 RX ORDER — HYDROCODONE BITARTRATE AND ACETAMINOPHEN 10; 325 MG/1; MG/1
TABLET ORAL
Qty: 12 TABLET | Refills: 0 | Status: SHIPPED | OUTPATIENT
Start: 2021-06-23 | End: 2021-07-25

## 2021-06-23 RX ORDER — PREDNISONE 20 MG/1
20 TABLET ORAL DAILY
Qty: 5 TABLET | Refills: 0 | Status: SHIPPED | OUTPATIENT
Start: 2021-06-23 | End: 2021-07-25

## 2021-06-23 NOTE — DISCHARGE INSTRUCTIONS
Medication as directed  Follow-up with your primary care provider  Can also try Tylenol to help mild discomfort

## 2021-06-23 NOTE — ED PROVIDER NOTES
Subjective   70-year-old female complaining of severe sacral pain.  The patient reports no pain with defecation and denies tenesmus.  The patient reports that the pain is worse before dialysis and she has difficulty sitting in the chair for the entire dialysis.  Secondary to the pain.  She states she occasionally has radicular sciatic type pain bilaterally.  She reports no fever chills denies change in bowel or bladder habits no history of acute impact trauma to the area but states that she has sat down hard several times in the last couple of months.  She states she has tried different pads and wedges without improvement, including the use of a ring pillow.          Review of Systems   Eyes: Negative for visual disturbance.   Respiratory: Negative for wheezing.    Cardiovascular: Negative for palpitations.   Gastrointestinal: Positive for rectal pain. Negative for nausea and vomiting.   Neurological: Negative for numbness.   All other systems reviewed and are negative.      Past Medical History:   Diagnosis Date   • Lucas's esophagus    • C. difficile colitis    • Carotid artery disease (CMS/[facility])     -50-74% left, 16-49% right (1/19)   • DM2 (diabetes mellitus, type 2) (CMS/[facility])    • ESRD (end stage renal disease) (CMS/[facility])     on HD   • Gastric ulcer     at anastomatic site   • Gastroparesis    • Gout    • Hemodialysis patient (CMS/[facility])    • Hernia, incisional     abdomen   • History of colonoscopy     UTD   • History of degenerative disc disease    • Hyperlipidemia    • Hypertension    • Microscopic colitis    • Nephrotic syndrome    • Neuroendocrine tumor     of stomach   • FABY (obstructive sleep apnea)     not treating   • Osteoarthritis    • Osteoarthritis, knee     Left   • Proteinuria    • Renal cell cancer, right (CMS/[facility])    • Rib pain on right side     chronic   • Rotator cuff tear     bilateral   • Uterine cancer (CMS/[facility])    • Vitamin B 12 deficiency    • Vitamin D deficiency        Allergies  "  Allergen Reactions   • Atorvastatin Calcium Unknown (See Comments)   • Niacin Unknown (See Comments)     \"FEELS LIKE SHE IS ON FIRE\"         Past Surgical History:   Procedure Laterality Date   • APPENDECTOMY     • ARTERIOVENOUS FISTULA Right    • ARTERIOVENOUS FISTULA REPAIR      Clotted off and insert graft   • ARTERIOVENOUS FISTULA/SHUNT SURGERY Left 12/30/2020    Procedure: ARTERIOVENOUS FISTULA FORMATION;  Surgeon: Jan Caicedo MD;  Location: [facility] MAIN OR;  Service: Vascular;  Laterality: Left;   • BREAST BIOPSY      right nipple 1981   • COLONOSCOPY N/A 11/24/2020    Procedure: COLONOSCOPY with polypectomy x 7;  Surgeon: Jeffery White MD;  Location: [facility] ENDOSCOPY;  Service: Gastroenterology;  Laterality: N/A;  post op: polyps, diverticulosis, hemorrhoids   • GASTRIC RESECTION      cancer   • HYSTERECTOMY     • LAPAROSCOPIC CHOLECYSTECTOMY     • NEPHRECTOMY      right kidney removed    • REDUCTION MAMMAPLASTY     • TUMOR REMOVAL      Multiple removed from abdominal    • VENTRAL/INCISIONAL HERNIA REPAIR Right 8/28/2019    Procedure: VENTRAL/INCISIONAL HERNIA REPAIR;  Surgeon: Nilay Stevens MD;  Location: [facility] MAIN OR;  Service: General   • VENTRAL/INCISIONAL HERNIA REPAIR N/A 10/1/2019    Procedure: OPEN VENTRAL/INCISIONAL HERNIA REPAIR;  Surgeon: Nilay Stevens MD;  Location: [facility] MAIN OR;  Service: General       Family History   Problem Relation Age of Onset   • Heart disease Father        Social History     Socioeconomic History   • Marital status: Single     Spouse name: Not on file   • Number of children: Not on file   • Years of education: Not on file   • Highest education level: Not on file   Tobacco Use   • Smoking status: Current Every Day Smoker     Packs/day: 0.25     Years: 57.00     Pack years: 14.25     Types: Cigarettes   • Smokeless tobacco: Never Used   • Tobacco comment: quit smoking   Vaping Use   • Vaping Use: Never used   Substance and Sexual Activity   • Alcohol " use: No   • Drug use: No   • Sexual activity: Not Currently           Objective   Physical Exam  Alert [place] Coma Scale 15   HEENT: Pupils equal and reactive to light. Conjunctivae are not injected. normal tympanic membranes. Oropharynx and nares are normal.   Neck: Supple. Midline trachea. No JVD. No goiter.   Chest: Clear and equal breath sounds bilaterally regular rate and rhythm without murmur or rub.   Abdomen: Positive bowel sounds nontender nondistended. No rebound or peritoneal signs. No CVA tenderness.  The patient has a topical pain patch in the sacrum and coccygeal area.  There is no evidence of pilonidal abscess or cyst formation.  There is no point tenderness or area of induration.  There is no evidence of cellulitis.  There is no perianal abscess.  There is no discrete SI joint discomfort  Extremities no clubbing cyanosis or edema motor sensory exam is normal the full range of motion is intact   skin: Warm and dry, no rashes or petechia.   Lymphatic: No regional lymphadenopathy. No calf pain, swelling or Sergei's sign    Procedures           ED Course  ED Course as of Jun 23 0058   Tue Jun 22, 2021 2210 Patient's ER stay was delayed by 2 hours and 30 minutes prior to provider being able to access the patient    [TH]      ED Course User Index  [TH] Jan Pulliam MD                                   Labs Reviewed - No data to display  Medications   HYDROmorphone (DILAUDID) injection 1 mg (1 mg Intramuscular Given 6/22/21 2237)   ondansetron (ZOFRAN) injection 4 mg (4 mg Intramuscular Given 6/22/21 2237)     CT scan read off-site shows no acute fracture or dislocation.  There is no destructive osseous lesion or metastasis identified.  The patient has prominent degenerative joint disease osteoarthritic changes of both hips facet arthropathy was noted in the included lower lumbar spine        MDM  Number of Diagnoses or Management Options     Amount and/or Complexity of Data Reviewed  Tests in the  radiology section of CPT®: reviewed and ordered  Independent visualization of images, tracings, or specimens: yes    Risk of Complications, Morbidity, and/or Mortality  Presenting problems: high  Diagnostic procedures: high  Management options: high  General comments: The patient will be started on prednisone 20 mg daily.  The patient also be given a prescription for hydrocodone 10 mg, with recommendation to use prior to dialysis as needed for discomfort due to the importance of completing dialysis therapy.  Patient is encouraged to follow the primary care provider.  The patient was stable at discharge and vocalized understanding of discharge instructions and warnings        Final diagnoses:   Sacral pain   Primary osteoarthritis of right hip   Primary osteoarthritis of left hip   Osteoarthritis of spine with radiculopathy, lumbar region       ED Disposition  ED Disposition     ED Disposition Condition Comment    Discharge Stable           No follow-up provider specified.       Medication List      New Prescriptions    HYDROcodone-acetaminophen  MG per tablet  Commonly known as: NORCO  One half or 1 p.o. every 6 hours as needed pain     predniSONE 20 MG tablet  Commonly known as: DELTASONE  Take 1 tablet by mouth Daily.           Where to Get Your Medications      These medications were sent to [id number] - [facility] [place] - [address] AT [facility] - [phone]  - [phone]   [address], [place] [address]    Phone: [phone]   · HYDROcodone-acetaminophen  MG per tablet  · predniSONE 20 MG tablet          Jan Pulliam MD  06/23/21 0055

## 2021-07-20 ENCOUNTER — OFFICE (AMBULATORY)
Dept: URBAN - METROPOLITAN AREA CLINIC 64 | Facility: CLINIC | Age: 71
End: 2021-07-20

## 2021-07-20 VITALS
WEIGHT: 164 LBS | SYSTOLIC BLOOD PRESSURE: 147 MMHG | HEIGHT: 63 IN | DIASTOLIC BLOOD PRESSURE: 55 MMHG | HEART RATE: 96 BPM

## 2021-07-20 DIAGNOSIS — A04.7 ENTEROCOLITIS DUE TO CLOSTRIDIUM DIFFICILE: ICD-10-CM

## 2021-07-20 DIAGNOSIS — K62.5 HEMORRHAGE OF ANUS AND RECTUM: ICD-10-CM

## 2021-07-20 PROCEDURE — 99213 OFFICE O/P EST LOW 20 MIN: CPT | Performed by: NURSE PRACTITIONER

## 2021-07-25 ENCOUNTER — APPOINTMENT (OUTPATIENT)
Dept: GENERAL RADIOLOGY | Facility: HOSPITAL | Age: 71
End: 2021-07-25

## 2021-07-25 ENCOUNTER — HOSPITAL ENCOUNTER (INPATIENT)
Facility: HOSPITAL | Age: 71
LOS: 1 days | Discharge: HOME | End: 2021-07-27
Attending: EMERGENCY MEDICINE | Admitting: INTERNAL MEDICINE

## 2021-07-25 DIAGNOSIS — J44.1 COPD WITH ACUTE EXACERBATION (HCC): ICD-10-CM

## 2021-07-25 DIAGNOSIS — I50.9 CONGESTIVE HEART FAILURE, UNSPECIFIED HF CHRONICITY, UNSPECIFIED HEART FAILURE TYPE (HCC): Primary | ICD-10-CM

## 2021-07-25 DIAGNOSIS — N18.6 ESRD (END STAGE RENAL DISEASE) (HCC): ICD-10-CM

## 2021-07-25 DIAGNOSIS — J44.1 ACUTE EXACERBATION OF CHRONIC OBSTRUCTIVE PULMONARY DISEASE (HCC): ICD-10-CM

## 2021-07-25 DIAGNOSIS — R09.02 HYPOXEMIA: ICD-10-CM

## 2021-07-25 LAB
ALBUMIN SERPL-MCNC: 3.5 G/DL (ref 3.5–5.2)
ALBUMIN/GLOB SERPL: 1 G/DL
ALP SERPL-CCNC: 73 U/L (ref 39–117)
ALT SERPL W P-5'-P-CCNC: 8 U/L (ref 1–33)
ANION GAP SERPL CALCULATED.3IONS-SCNC: 16 MMOL/L (ref 5–15)
APTT PPP: 28.2 SECONDS (ref 24–31)
AST SERPL-CCNC: 13 U/L (ref 1–32)
BASOPHILS # BLD AUTO: 0.2 10*3/MM3 (ref 0–0.2)
BASOPHILS NFR BLD AUTO: 1.2 % (ref 0–1.5)
BILIRUB SERPL-MCNC: 0.2 MG/DL (ref 0–1.2)
BUN SERPL-MCNC: 35 MG/DL (ref 8–23)
BUN/CREAT SERPL: 4.3 (ref 7–25)
CALCIUM SPEC-SCNC: 8.6 MG/DL (ref 8.6–10.5)
CHLORIDE SERPL-SCNC: 102 MMOL/L (ref 98–107)
CO2 SERPL-SCNC: 22 MMOL/L (ref 22–29)
CREAT SERPL-MCNC: 8.13 MG/DL (ref 0.57–1)
DEPRECATED RDW RBC AUTO: 60.8 FL (ref 37–54)
EOSINOPHIL # BLD AUTO: 0.3 10*3/MM3 (ref 0–0.4)
EOSINOPHIL NFR BLD AUTO: 2.3 % (ref 0.3–6.2)
ERYTHROCYTE [DISTWIDTH] IN BLOOD BY AUTOMATED COUNT: 18.4 % (ref 12.3–15.4)
GFR SERPL CREATININE-BSD FRML MDRD: 5 ML/MIN/1.73
GFR SERPL CREATININE-BSD FRML MDRD: ABNORMAL ML/MIN/{1.73_M2}
GLOBULIN UR ELPH-MCNC: 3.4 GM/DL
GLUCOSE SERPL-MCNC: 140 MG/DL (ref 65–99)
HCT VFR BLD AUTO: 30.3 % (ref 34–46.6)
HGB BLD-MCNC: 10 G/DL (ref 12–15.9)
INR PPP: 0.95 (ref 0.93–1.1)
LYMPHOCYTES # BLD AUTO: 1.3 10*3/MM3 (ref 0.7–3.1)
LYMPHOCYTES NFR BLD AUTO: 10.1 % (ref 19.6–45.3)
MCH RBC QN AUTO: 30.9 PG (ref 26.6–33)
MCHC RBC AUTO-ENTMCNC: 33 G/DL (ref 31.5–35.7)
MCV RBC AUTO: 93.7 FL (ref 79–97)
MONOCYTES # BLD AUTO: 0.9 10*3/MM3 (ref 0.1–0.9)
MONOCYTES NFR BLD AUTO: 7.1 % (ref 5–12)
NEUTROPHILS NFR BLD AUTO: 10.3 10*3/MM3 (ref 1.7–7)
NEUTROPHILS NFR BLD AUTO: 79.3 % (ref 42.7–76)
NRBC BLD AUTO-RTO: 0.1 /100 WBC (ref 0–0.2)
PLATELET # BLD AUTO: 499 10*3/MM3 (ref 140–450)
PMV BLD AUTO: 6.7 FL (ref 6–12)
POTASSIUM SERPL-SCNC: 4.4 MMOL/L (ref 3.5–5.2)
PROT SERPL-MCNC: 6.9 G/DL (ref 6–8.5)
PROTHROMBIN TIME: 10.6 SECONDS (ref 9.6–11.7)
RBC # BLD AUTO: 3.23 10*6/MM3 (ref 3.77–5.28)
SARS-COV-2 RNA PNL SPEC NAA+PROBE: NOT DETECTED
SODIUM SERPL-SCNC: 140 MMOL/L (ref 136–145)
TROPONIN T SERPL-MCNC: 0.03 NG/ML (ref 0–0.03)
WBC # BLD AUTO: 13 10*3/MM3 (ref 3.4–10.8)

## 2021-07-25 PROCEDURE — 85025 COMPLETE CBC W/AUTO DIFF WBC: CPT | Performed by: EMERGENCY MEDICINE

## 2021-07-25 PROCEDURE — 84484 ASSAY OF TROPONIN QUANT: CPT | Performed by: EMERGENCY MEDICINE

## 2021-07-25 PROCEDURE — U0005 INFEC AGEN DETEC AMPLI PROBE: HCPCS | Performed by: EMERGENCY MEDICINE

## 2021-07-25 PROCEDURE — U0003 INFECTIOUS AGENT DETECTION BY NUCLEIC ACID (DNA OR RNA); SEVERE ACUTE RESPIRATORY SYNDROME CORONAVIRUS 2 (SARS-COV-2) (CORONAVIRUS DISEASE [COVID-19]), AMPLIFIED PROBE TECHNIQUE, MAKING USE OF HIGH THROUGHPUT TECHNOLOGIES AS DESCRIBED BY CMS-2020-01-R: HCPCS | Performed by: EMERGENCY MEDICINE

## 2021-07-25 PROCEDURE — 85730 THROMBOPLASTIN TIME PARTIAL: CPT | Performed by: EMERGENCY MEDICINE

## 2021-07-25 PROCEDURE — 85610 PROTHROMBIN TIME: CPT | Performed by: EMERGENCY MEDICINE

## 2021-07-25 PROCEDURE — G0378 HOSPITAL OBSERVATION PER HR: HCPCS

## 2021-07-25 PROCEDURE — 93005 ELECTROCARDIOGRAM TRACING: CPT | Performed by: EMERGENCY MEDICINE

## 2021-07-25 PROCEDURE — 80053 COMPREHEN METABOLIC PANEL: CPT | Performed by: EMERGENCY MEDICINE

## 2021-07-25 PROCEDURE — 93005 ELECTROCARDIOGRAM TRACING: CPT

## 2021-07-25 PROCEDURE — 71045 X-RAY EXAM CHEST 1 VIEW: CPT

## 2021-07-25 PROCEDURE — 99285 EMERGENCY DEPT VISIT HI MDM: CPT

## 2021-07-25 RX ORDER — DICYCLOMINE HYDROCHLORIDE 10 MG/1
10 CAPSULE ORAL 3 TIMES DAILY PRN
Status: ON HOLD | COMMUNITY
End: 2021-10-11

## 2021-07-25 RX ORDER — SODIUM CHLORIDE 0.9 % (FLUSH) 0.9 %
10 SYRINGE (ML) INJECTION AS NEEDED
Status: DISCONTINUED | OUTPATIENT
Start: 2021-07-25 | End: 2021-07-27 | Stop reason: HOSPADM

## 2021-07-25 RX ORDER — LOSARTAN POTASSIUM 100 MG/1
100 TABLET ORAL DAILY
COMMUNITY
End: 2021-07-27 | Stop reason: HOSPADM

## 2021-07-25 RX ORDER — TRAZODONE HYDROCHLORIDE 50 MG/1
50 TABLET ORAL NIGHTLY PRN
COMMUNITY
End: 2021-10-05

## 2021-07-25 RX ORDER — BENZONATATE 100 MG/1
100 CAPSULE ORAL 3 TIMES DAILY PRN
Status: DISCONTINUED | OUTPATIENT
Start: 2021-07-25 | End: 2021-07-27 | Stop reason: HOSPADM

## 2021-07-25 RX ORDER — ASPIRIN 325 MG
325 TABLET ORAL ONCE
Status: COMPLETED | OUTPATIENT
Start: 2021-07-25 | End: 2021-07-25

## 2021-07-25 RX ADMIN — ASPIRIN 325 MG ORAL TABLET 325 MG: 325 PILL ORAL at 18:01

## 2021-07-25 RX ADMIN — BENZONATATE 100 MG: 100 CAPSULE ORAL at 21:48

## 2021-07-26 ENCOUNTER — APPOINTMENT (OUTPATIENT)
Dept: CT IMAGING | Facility: HOSPITAL | Age: 71
End: 2021-07-26

## 2021-07-26 ENCOUNTER — APPOINTMENT (OUTPATIENT)
Dept: CARDIOLOGY | Facility: HOSPITAL | Age: 71
End: 2021-07-26

## 2021-07-26 LAB
BH CV ECHO MEAS - ACS: 1.8 CM
BH CV ECHO MEAS - AO MAX PG (FULL): 3.8 MMHG
BH CV ECHO MEAS - AO MAX PG: 6.3 MMHG
BH CV ECHO MEAS - AO MEAN PG (FULL): 2.1 MMHG
BH CV ECHO MEAS - AO MEAN PG: 3.5 MMHG
BH CV ECHO MEAS - AO ROOT AREA (BSA CORRECTED): 1.8
BH CV ECHO MEAS - AO ROOT AREA: 7.5 CM^2
BH CV ECHO MEAS - AO ROOT DIAM: 3.1 CM
BH CV ECHO MEAS - AO V2 MAX: 125.5 CM/SEC
BH CV ECHO MEAS - AO V2 MEAN: 89.7 CM/SEC
BH CV ECHO MEAS - AO V2 VTI: 26.7 CM
BH CV ECHO MEAS - ASC AORTA: 2.5 CM
BH CV ECHO MEAS - AVA(I,A): 1.7 CM^2
BH CV ECHO MEAS - AVA(I,D): 1.7 CM^2
BH CV ECHO MEAS - AVA(V,A): 1.8 CM^2
BH CV ECHO MEAS - AVA(V,D): 1.8 CM^2
BH CV ECHO MEAS - BSA(HAYCOCK): 1.8 M^2
BH CV ECHO MEAS - BSA: 1.7 M^2
BH CV ECHO MEAS - BZI_BMI: 33 KILOGRAMS/M^2
BH CV ECHO MEAS - BZI_METRIC_HEIGHT: 152.4 CM
BH CV ECHO MEAS - BZI_METRIC_WEIGHT: 76.7 KG
BH CV ECHO MEAS - EDV(CUBED): 164.4 ML
BH CV ECHO MEAS - EDV(MOD-SP4): 85.6 ML
BH CV ECHO MEAS - EDV(TEICH): 146.1 ML
BH CV ECHO MEAS - EF(CUBED): 65.6 %
BH CV ECHO MEAS - EF(MOD-BP): 51 %
BH CV ECHO MEAS - EF(MOD-SP4): 51.4 %
BH CV ECHO MEAS - EF(TEICH): 56.6 %
BH CV ECHO MEAS - ESV(CUBED): 56.5 ML
BH CV ECHO MEAS - ESV(MOD-SP4): 41.6 ML
BH CV ECHO MEAS - ESV(TEICH): 63.4 ML
BH CV ECHO MEAS - FS: 30 %
BH CV ECHO MEAS - IVS/LVPW: 1.4
BH CV ECHO MEAS - IVSD: 1.4 CM
BH CV ECHO MEAS - LA DIMENSION(2D): 4 CM
BH CV ECHO MEAS - LV DIASTOLIC VOL/BSA (35-75): 49.3 ML/M^2
BH CV ECHO MEAS - LV MASS(C)D: 288.7 GRAMS
BH CV ECHO MEAS - LV MASS(C)DI: 166.2 GRAMS/M^2
BH CV ECHO MEAS - LV MAX PG: 2.5 MMHG
BH CV ECHO MEAS - LV MEAN PG: 1.5 MMHG
BH CV ECHO MEAS - LV SYSTOLIC VOL/BSA (12-30): 24 ML/M^2
BH CV ECHO MEAS - LV V1 MAX: 79.4 CM/SEC
BH CV ECHO MEAS - LV V1 MEAN: 57 CM/SEC
BH CV ECHO MEAS - LV V1 VTI: 16.6 CM
BH CV ECHO MEAS - LVIDD: 5.5 CM
BH CV ECHO MEAS - LVIDS: 3.8 CM
BH CV ECHO MEAS - LVOT AREA: 2.8 CM^2
BH CV ECHO MEAS - LVOT DIAM: 1.9 CM
BH CV ECHO MEAS - LVPWD: 1.1 CM
BH CV ECHO MEAS - MR MAX PG: 59 MMHG
BH CV ECHO MEAS - MR MAX VEL: 379.3 CM/SEC
BH CV ECHO MEAS - MV A MAX VEL: 101.4 CM/SEC
BH CV ECHO MEAS - MV DEC SLOPE: 727.9 CM/SEC^2
BH CV ECHO MEAS - MV DEC TIME: 0.18 SEC
BH CV ECHO MEAS - MV E MAX VEL: 127.7 CM/SEC
BH CV ECHO MEAS - MV E/A: 1.3
BH CV ECHO MEAS - MV MAX PG: 7.2 MMHG
BH CV ECHO MEAS - MV MEAN PG: 3.6 MMHG
BH CV ECHO MEAS - MV V2 MAX: 134.1 CM/SEC
BH CV ECHO MEAS - MV V2 MEAN: 87.6 CM/SEC
BH CV ECHO MEAS - MV V2 VTI: 30.9 CM
BH CV ECHO MEAS - MVA(VTI): 1.5 CM^2
BH CV ECHO MEAS - PA MAX PG (FULL): 1.3 MMHG
BH CV ECHO MEAS - PA MAX PG: 5 MMHG
BH CV ECHO MEAS - PA MEAN PG (FULL): 0.44 MMHG
BH CV ECHO MEAS - PA MEAN PG: 2.5 MMHG
BH CV ECHO MEAS - PA V2 MAX: 112.2 CM/SEC
BH CV ECHO MEAS - PA V2 MEAN: 75.5 CM/SEC
BH CV ECHO MEAS - PA V2 VTI: 24.6 CM
BH CV ECHO MEAS - PI MAX PG: 5.5 MMHG
BH CV ECHO MEAS - PI MAX VEL: 117.6 CM/SEC
BH CV ECHO MEAS - RAP SYSTOLE: 8 MMHG
BH CV ECHO MEAS - RV MAX PG: 3.8 MMHG
BH CV ECHO MEAS - RV MEAN PG: 2.1 MMHG
BH CV ECHO MEAS - RV V1 MAX: 96.9 CM/SEC
BH CV ECHO MEAS - RV V1 MEAN: 68 CM/SEC
BH CV ECHO MEAS - RV V1 VTI: 24.7 CM
BH CV ECHO MEAS - RVDD: 2.4 CM
BH CV ECHO MEAS - SI(AO): 115.8 ML/M^2
BH CV ECHO MEAS - SI(CUBED): 62.1 ML/M^2
BH CV ECHO MEAS - SI(LVOT): 26.6 ML/M^2
BH CV ECHO MEAS - SI(MOD-SP4): 25.3 ML/M^2
BH CV ECHO MEAS - SI(TEICH): 47.6 ML/M^2
BH CV ECHO MEAS - SV(AO): 201.2 ML
BH CV ECHO MEAS - SV(CUBED): 107.9 ML
BH CV ECHO MEAS - SV(LVOT): 46.2 ML
BH CV ECHO MEAS - SV(MOD-SP4): 44 ML
BH CV ECHO MEAS - SV(TEICH): 82.7 ML
PHOSPHATE SERPL-MCNC: 4.5 MG/DL (ref 2.5–4.5)
QT INTERVAL: 356 MS

## 2021-07-26 PROCEDURE — 93306 TTE W/DOPPLER COMPLETE: CPT | Performed by: INTERNAL MEDICINE

## 2021-07-26 PROCEDURE — 94640 AIRWAY INHALATION TREATMENT: CPT

## 2021-07-26 PROCEDURE — 94799 UNLISTED PULMONARY SVC/PX: CPT

## 2021-07-26 PROCEDURE — 93306 TTE W/DOPPLER COMPLETE: CPT

## 2021-07-26 PROCEDURE — 84100 ASSAY OF PHOSPHORUS: CPT | Performed by: INTERNAL MEDICINE

## 2021-07-26 PROCEDURE — 71250 CT THORAX DX C-: CPT

## 2021-07-26 PROCEDURE — 25010000002 ENOXAPARIN PER 10 MG: Performed by: INTERNAL MEDICINE

## 2021-07-26 PROCEDURE — 25010000002 METHYLPREDNISOLONE PER 125 MG: Performed by: INTERNAL MEDICINE

## 2021-07-26 RX ORDER — METHYLPREDNISOLONE SODIUM SUCCINATE 125 MG/2ML
80 INJECTION, POWDER, LYOPHILIZED, FOR SOLUTION INTRAMUSCULAR; INTRAVENOUS DAILY
Status: DISCONTINUED | OUTPATIENT
Start: 2021-07-26 | End: 2021-07-27 | Stop reason: HOSPADM

## 2021-07-26 RX ORDER — GUAIFENESIN AND CODEINE PHOSPHATE 100; 10 MG/5ML; MG/5ML
5 SOLUTION ORAL EVERY 4 HOURS PRN
Status: DISCONTINUED | OUTPATIENT
Start: 2021-07-26 | End: 2021-07-27 | Stop reason: HOSPADM

## 2021-07-26 RX ORDER — DICYCLOMINE HYDROCHLORIDE 10 MG/1
10 CAPSULE ORAL 3 TIMES DAILY PRN
Status: DISCONTINUED | OUTPATIENT
Start: 2021-07-26 | End: 2021-07-27 | Stop reason: HOSPADM

## 2021-07-26 RX ORDER — LANOLIN ALCOHOL/MO/W.PET/CERES
2000 CREAM (GRAM) TOPICAL 3 TIMES WEEKLY
Status: DISCONTINUED | OUTPATIENT
Start: 2021-07-26 | End: 2021-07-27 | Stop reason: HOSPADM

## 2021-07-26 RX ORDER — SODIUM BICARBONATE 650 MG/1
1300 TABLET ORAL 3 TIMES DAILY
Status: DISCONTINUED | OUTPATIENT
Start: 2021-07-26 | End: 2021-07-27 | Stop reason: HOSPADM

## 2021-07-26 RX ORDER — TRAZODONE HYDROCHLORIDE 50 MG/1
50 TABLET ORAL NIGHTLY PRN
Status: DISCONTINUED | OUTPATIENT
Start: 2021-07-26 | End: 2021-07-27 | Stop reason: HOSPADM

## 2021-07-26 RX ORDER — PANTOPRAZOLE SODIUM 40 MG/1
40 TABLET, DELAYED RELEASE ORAL 2 TIMES DAILY
Status: DISCONTINUED | OUTPATIENT
Start: 2021-07-26 | End: 2021-07-27 | Stop reason: HOSPADM

## 2021-07-26 RX ORDER — ASPIRIN 81 MG/1
81 TABLET ORAL 3 TIMES WEEKLY
Status: DISCONTINUED | OUTPATIENT
Start: 2021-07-26 | End: 2021-07-27 | Stop reason: HOSPADM

## 2021-07-26 RX ORDER — BUDESONIDE AND FORMOTEROL FUMARATE DIHYDRATE 160; 4.5 UG/1; UG/1
2 AEROSOL RESPIRATORY (INHALATION)
Status: DISCONTINUED | OUTPATIENT
Start: 2021-07-26 | End: 2021-07-27 | Stop reason: HOSPADM

## 2021-07-26 RX ORDER — ONDANSETRON 2 MG/ML
4 INJECTION INTRAMUSCULAR; INTRAVENOUS EVERY 6 HOURS PRN
Status: DISCONTINUED | OUTPATIENT
Start: 2021-07-26 | End: 2021-07-27 | Stop reason: HOSPADM

## 2021-07-26 RX ORDER — ALBUMIN (HUMAN) 12.5 G/50ML
12.5 SOLUTION INTRAVENOUS AS NEEDED
Status: DISCONTINUED | OUTPATIENT
Start: 2021-07-26 | End: 2021-07-27 | Stop reason: HOSPADM

## 2021-07-26 RX ORDER — LIDOCAINE AND PRILOCAINE 25; 25 MG/G; MG/G
1 CREAM TOPICAL DAILY PRN
Status: DISCONTINUED | OUTPATIENT
Start: 2021-07-26 | End: 2021-07-27 | Stop reason: HOSPADM

## 2021-07-26 RX ORDER — CALCIUM ACETATE 667 MG/1
2001 CAPSULE ORAL 3 TIMES DAILY
Status: DISCONTINUED | OUTPATIENT
Start: 2021-07-26 | End: 2021-07-27 | Stop reason: HOSPADM

## 2021-07-26 RX ORDER — ACETAMINOPHEN 325 MG/1
650 TABLET ORAL EVERY 4 HOURS PRN
Status: DISCONTINUED | OUTPATIENT
Start: 2021-07-26 | End: 2021-07-27 | Stop reason: HOSPADM

## 2021-07-26 RX ORDER — SODIUM CHLORIDE 0.9 % (FLUSH) 0.9 %
3 SYRINGE (ML) INJECTION EVERY 12 HOURS SCHEDULED
Status: DISCONTINUED | OUTPATIENT
Start: 2021-07-26 | End: 2021-07-27 | Stop reason: HOSPADM

## 2021-07-26 RX ORDER — SODIUM CHLORIDE 0.9 % (FLUSH) 0.9 %
3-10 SYRINGE (ML) INJECTION AS NEEDED
Status: DISCONTINUED | OUTPATIENT
Start: 2021-07-26 | End: 2021-07-27 | Stop reason: HOSPADM

## 2021-07-26 RX ORDER — IPRATROPIUM BROMIDE AND ALBUTEROL SULFATE 2.5; .5 MG/3ML; MG/3ML
3 SOLUTION RESPIRATORY (INHALATION)
Status: DISCONTINUED | OUTPATIENT
Start: 2021-07-26 | End: 2021-07-27 | Stop reason: HOSPADM

## 2021-07-26 RX ORDER — SEVELAMER CARBONATE 800 MG/1
1600 TABLET, FILM COATED ORAL
Status: DISCONTINUED | OUTPATIENT
Start: 2021-07-26 | End: 2021-07-27 | Stop reason: HOSPADM

## 2021-07-26 RX ADMIN — CALCIUM ACETATE 2001 MG: 667 CAPSULE ORAL at 20:19

## 2021-07-26 RX ADMIN — CALCIUM ACETATE 2001 MG: 667 CAPSULE ORAL at 15:48

## 2021-07-26 RX ADMIN — Medication 3 ML: at 20:21

## 2021-07-26 RX ADMIN — ENOXAPARIN SODIUM 30 MG: 30 INJECTION SUBCUTANEOUS at 15:48

## 2021-07-26 RX ADMIN — GUAIFENESIN AND CODEINE PHOSPHATE 5 ML: 10; 100 LIQUID ORAL at 20:19

## 2021-07-26 RX ADMIN — PANTOPRAZOLE SODIUM 40 MG: 40 TABLET, DELAYED RELEASE ORAL at 20:19

## 2021-07-26 RX ADMIN — IPRATROPIUM BROMIDE AND ALBUTEROL SULFATE 3 ML: 2.5; .5 SOLUTION RESPIRATORY (INHALATION) at 08:54

## 2021-07-26 RX ADMIN — TRAZODONE HYDROCHLORIDE 50 MG: 50 TABLET ORAL at 21:52

## 2021-07-26 RX ADMIN — ASPIRIN 81 MG: 81 TABLET, COATED ORAL at 10:40

## 2021-07-26 RX ADMIN — CYANOCOBALAMIN TAB 1000 MCG 2000 MCG: 1000 TAB at 10:40

## 2021-07-26 RX ADMIN — SODIUM BICARBONATE 650 MG TABLET 1300 MG: at 20:19

## 2021-07-26 RX ADMIN — SODIUM BICARBONATE 650 MG TABLET 1300 MG: at 10:40

## 2021-07-26 RX ADMIN — BENZONATATE 100 MG: 100 CAPSULE ORAL at 08:44

## 2021-07-26 RX ADMIN — SODIUM BICARBONATE 650 MG TABLET 1300 MG: at 15:47

## 2021-07-26 RX ADMIN — IPRATROPIUM BROMIDE AND ALBUTEROL SULFATE 3 ML: 2.5; .5 SOLUTION RESPIRATORY (INHALATION) at 18:45

## 2021-07-26 RX ADMIN — BUDESONIDE AND FORMOTEROL FUMARATE DIHYDRATE 2 PUFF: 160; 4.5 AEROSOL RESPIRATORY (INHALATION) at 18:40

## 2021-07-26 RX ADMIN — PANTOPRAZOLE SODIUM 40 MG: 40 TABLET, DELAYED RELEASE ORAL at 10:40

## 2021-07-26 RX ADMIN — BENZONATATE 100 MG: 100 CAPSULE ORAL at 21:52

## 2021-07-26 RX ADMIN — Medication 3 ML: at 10:41

## 2021-07-26 RX ADMIN — SEVELAMER CARBONATE 1600 MG: 800 TABLET, FILM COATED ORAL at 17:27

## 2021-07-26 RX ADMIN — METHYLPREDNISOLONE SODIUM SUCCINATE 80 MG: 125 INJECTION, POWDER, FOR SOLUTION INTRAMUSCULAR; INTRAVENOUS at 10:40

## 2021-07-27 VITALS
OXYGEN SATURATION: 99 % | RESPIRATION RATE: 18 BRPM | SYSTOLIC BLOOD PRESSURE: 120 MMHG | HEIGHT: 60 IN | BODY MASS INDEX: 33.11 KG/M2 | DIASTOLIC BLOOD PRESSURE: 72 MMHG | WEIGHT: 168.65 LBS | TEMPERATURE: 98.1 F | HEART RATE: 86 BPM

## 2021-07-27 PROBLEM — R09.02 HYPOXEMIA: Status: ACTIVE | Noted: 2021-07-27

## 2021-07-27 PROBLEM — N18.6 ESRD (END STAGE RENAL DISEASE): Status: ACTIVE | Noted: 2021-07-27

## 2021-07-27 LAB
ALBUMIN SERPL-MCNC: 3.7 G/DL (ref 3.5–5.2)
ANION GAP SERPL CALCULATED.3IONS-SCNC: 13 MMOL/L (ref 5–15)
BUN SERPL-MCNC: 27 MG/DL (ref 8–23)
BUN/CREAT SERPL: 4.7 (ref 7–25)
CALCIUM SPEC-SCNC: 9.2 MG/DL (ref 8.6–10.5)
CHLORIDE SERPL-SCNC: 97 MMOL/L (ref 98–107)
CO2 SERPL-SCNC: 27 MMOL/L (ref 22–29)
CREAT SERPL-MCNC: 5.76 MG/DL (ref 0.57–1)
DEPRECATED RDW RBC AUTO: 61.7 FL (ref 37–54)
ERYTHROCYTE [DISTWIDTH] IN BLOOD BY AUTOMATED COUNT: 18.7 % (ref 12.3–15.4)
GFR SERPL CREATININE-BSD FRML MDRD: 7 ML/MIN/1.73
GFR SERPL CREATININE-BSD FRML MDRD: ABNORMAL ML/MIN/{1.73_M2}
GLUCOSE SERPL-MCNC: 137 MG/DL (ref 65–99)
HCT VFR BLD AUTO: 31 % (ref 34–46.6)
HGB BLD-MCNC: 10.1 G/DL (ref 12–15.9)
MCH RBC QN AUTO: 30.7 PG (ref 26.6–33)
MCHC RBC AUTO-ENTMCNC: 32.6 G/DL (ref 31.5–35.7)
MCV RBC AUTO: 94.3 FL (ref 79–97)
PHOSPHATE SERPL-MCNC: 4.3 MG/DL (ref 2.5–4.5)
PLATELET # BLD AUTO: 530 10*3/MM3 (ref 140–450)
PMV BLD AUTO: 7.3 FL (ref 6–12)
POTASSIUM SERPL-SCNC: 4.8 MMOL/L (ref 3.5–5.2)
RBC # BLD AUTO: 3.29 10*6/MM3 (ref 3.77–5.28)
SODIUM SERPL-SCNC: 137 MMOL/L (ref 136–145)
WBC # BLD AUTO: 13.9 10*3/MM3 (ref 3.4–10.8)

## 2021-07-27 PROCEDURE — 25010000002 METHYLPREDNISOLONE PER 125 MG: Performed by: INTERNAL MEDICINE

## 2021-07-27 PROCEDURE — 85027 COMPLETE CBC AUTOMATED: CPT | Performed by: INTERNAL MEDICINE

## 2021-07-27 PROCEDURE — 80069 RENAL FUNCTION PANEL: CPT | Performed by: INTERNAL MEDICINE

## 2021-07-27 PROCEDURE — 94618 PULMONARY STRESS TESTING: CPT

## 2021-07-27 PROCEDURE — 94799 UNLISTED PULMONARY SVC/PX: CPT

## 2021-07-27 RX ORDER — IPRATROPIUM BROMIDE AND ALBUTEROL SULFATE 2.5; .5 MG/3ML; MG/3ML
3 SOLUTION RESPIRATORY (INHALATION) EVERY 4 HOURS PRN
Qty: 270 ML | Refills: 1 | Status: ON HOLD | OUTPATIENT
Start: 2021-07-27 | End: 2021-07-29 | Stop reason: SDUPTHER

## 2021-07-27 RX ORDER — ALBUTEROL SULFATE 90 UG/1
2 AEROSOL, METERED RESPIRATORY (INHALATION) EVERY 4 HOURS PRN
Qty: 17 G | Refills: 1 | Status: ON HOLD | OUTPATIENT
Start: 2021-07-27 | End: 2022-09-27

## 2021-07-27 RX ORDER — PREDNISONE 10 MG/1
TABLET ORAL
Qty: 40 TABLET | Refills: 0 | Status: ON HOLD | OUTPATIENT
Start: 2021-07-27 | End: 2021-10-11

## 2021-07-27 RX ADMIN — IPRATROPIUM BROMIDE AND ALBUTEROL SULFATE 3 ML: 2.5; .5 SOLUTION RESPIRATORY (INHALATION) at 07:02

## 2021-07-27 RX ADMIN — METHYLPREDNISOLONE SODIUM SUCCINATE 80 MG: 125 INJECTION, POWDER, FOR SOLUTION INTRAMUSCULAR; INTRAVENOUS at 08:51

## 2021-07-27 RX ADMIN — IPRATROPIUM BROMIDE AND ALBUTEROL SULFATE 3 ML: 2.5; .5 SOLUTION RESPIRATORY (INHALATION) at 10:59

## 2021-07-27 RX ADMIN — BUDESONIDE AND FORMOTEROL FUMARATE DIHYDRATE 2 PUFF: 160; 4.5 AEROSOL RESPIRATORY (INHALATION) at 07:03

## 2021-07-27 RX ADMIN — BENZONATATE 100 MG: 100 CAPSULE ORAL at 08:51

## 2021-07-27 RX ADMIN — GUAIFENESIN AND CODEINE PHOSPHATE 5 ML: 10; 100 LIQUID ORAL at 14:18

## 2021-07-27 RX ADMIN — SODIUM BICARBONATE 650 MG TABLET 1300 MG: at 08:50

## 2021-07-27 RX ADMIN — GUAIFENESIN AND CODEINE PHOSPHATE 5 ML: 10; 100 LIQUID ORAL at 08:51

## 2021-07-27 RX ADMIN — SEVELAMER CARBONATE 1600 MG: 800 TABLET, FILM COATED ORAL at 08:51

## 2021-07-27 RX ADMIN — Medication 3 ML: at 08:51

## 2021-07-27 RX ADMIN — SEVELAMER CARBONATE 1600 MG: 800 TABLET, FILM COATED ORAL at 11:23

## 2021-07-27 RX ADMIN — PANTOPRAZOLE SODIUM 40 MG: 40 TABLET, DELAYED RELEASE ORAL at 08:51

## 2021-07-28 ENCOUNTER — READMISSION MANAGEMENT (OUTPATIENT)
Dept: CALL CENTER | Facility: HOSPITAL | Age: 71
End: 2021-07-28

## 2021-07-28 ENCOUNTER — APPOINTMENT (OUTPATIENT)
Dept: GENERAL RADIOLOGY | Facility: HOSPITAL | Age: 71
End: 2021-07-28

## 2021-07-28 ENCOUNTER — HOSPITAL ENCOUNTER (OUTPATIENT)
Facility: HOSPITAL | Age: 71
Setting detail: OBSERVATION
Discharge: HOME | End: 2021-07-29
Attending: EMERGENCY MEDICINE | Admitting: INTERNAL MEDICINE

## 2021-07-28 DIAGNOSIS — R06.00 DYSPNEA, UNSPECIFIED TYPE: Primary | ICD-10-CM

## 2021-07-28 DIAGNOSIS — J44.1 COPD EXACERBATION (HCC): ICD-10-CM

## 2021-07-28 LAB
ALBUMIN SERPL-MCNC: 3.8 G/DL (ref 3.5–5.2)
ALBUMIN/GLOB SERPL: 1.2 G/DL
ALP SERPL-CCNC: 82 U/L (ref 39–117)
ALT SERPL W P-5'-P-CCNC: 20 U/L (ref 1–33)
ANION GAP SERPL CALCULATED.3IONS-SCNC: 15 MMOL/L (ref 5–15)
AST SERPL-CCNC: 29 U/L (ref 1–32)
BILIRUB SERPL-MCNC: 0.2 MG/DL (ref 0–1.2)
BUN SERPL-MCNC: 54 MG/DL (ref 8–23)
BUN/CREAT SERPL: 7.6 (ref 7–25)
CALCIUM SPEC-SCNC: 8.8 MG/DL (ref 8.6–10.5)
CHLORIDE SERPL-SCNC: 98 MMOL/L (ref 98–107)
CO2 SERPL-SCNC: 23 MMOL/L (ref 22–29)
CREAT SERPL-MCNC: 7.06 MG/DL (ref 0.57–1)
D-LACTATE SERPL-SCNC: 0.9 MMOL/L (ref 0.5–2)
DEPRECATED RDW RBC AUTO: 65.6 FL (ref 37–54)
ERYTHROCYTE [DISTWIDTH] IN BLOOD BY AUTOMATED COUNT: 19.7 % (ref 12.3–15.4)
GFR SERPL CREATININE-BSD FRML MDRD: 6 ML/MIN/1.73
GFR SERPL CREATININE-BSD FRML MDRD: ABNORMAL ML/MIN/{1.73_M2}
GLOBULIN UR ELPH-MCNC: 3.1 GM/DL
GLUCOSE SERPL-MCNC: 163 MG/DL (ref 65–99)
HCT VFR BLD AUTO: 30.7 % (ref 34–46.6)
HGB BLD-MCNC: 10 G/DL (ref 12–15.9)
HOLD SPECIMEN: NORMAL
HOLD SPECIMEN: NORMAL
LARGE PLATELETS: ABNORMAL
LYMPHOCYTES # BLD MANUAL: 0.3 10*3/MM3 (ref 0.7–3.1)
LYMPHOCYTES NFR BLD MANUAL: 1 % (ref 5–12)
LYMPHOCYTES NFR BLD MANUAL: 2 % (ref 19.6–45.3)
MCH RBC QN AUTO: 31 PG (ref 26.6–33)
MCHC RBC AUTO-ENTMCNC: 32.4 G/DL (ref 31.5–35.7)
MCV RBC AUTO: 95.5 FL (ref 79–97)
MONOCYTES # BLD AUTO: 0.15 10*3/MM3 (ref 0.1–0.9)
MYELOCYTES NFR BLD MANUAL: 1 % (ref 0–0)
NEUTROPHILS # BLD AUTO: 14.4 10*3/MM3 (ref 1.7–7)
NEUTROPHILS NFR BLD MANUAL: 96 % (ref 42.7–76)
PLATELET # BLD AUTO: 561 10*3/MM3 (ref 140–450)
PMV BLD AUTO: 7 FL (ref 6–12)
POTASSIUM SERPL-SCNC: 5.5 MMOL/L (ref 3.5–5.2)
PROT SERPL-MCNC: 6.9 G/DL (ref 6–8.5)
RBC # BLD AUTO: 3.21 10*6/MM3 (ref 3.77–5.28)
RBC MORPH BLD: NORMAL
SARS-COV-2 RNA PNL SPEC NAA+PROBE: NOT DETECTED
SCAN SLIDE: NORMAL
SMALL PLATELETS BLD QL SMEAR: ABNORMAL
SODIUM SERPL-SCNC: 136 MMOL/L (ref 136–145)
WBC # BLD AUTO: 15 10*3/MM3 (ref 3.4–10.8)
WBC MORPH BLD: NORMAL
WHOLE BLOOD HOLD SPECIMEN: NORMAL

## 2021-07-28 PROCEDURE — 85025 COMPLETE CBC W/AUTO DIFF WBC: CPT | Performed by: EMERGENCY MEDICINE

## 2021-07-28 PROCEDURE — 80053 COMPREHEN METABOLIC PANEL: CPT | Performed by: EMERGENCY MEDICINE

## 2021-07-28 PROCEDURE — 25010000002 METHYLPREDNISOLONE PER 125 MG: Performed by: INTERNAL MEDICINE

## 2021-07-28 PROCEDURE — 94664 DEMO&/EVAL PT USE INHALER: CPT

## 2021-07-28 PROCEDURE — G0378 HOSPITAL OBSERVATION PER HR: HCPCS

## 2021-07-28 PROCEDURE — 85007 BL SMEAR W/DIFF WBC COUNT: CPT | Performed by: EMERGENCY MEDICINE

## 2021-07-28 PROCEDURE — 99284 EMERGENCY DEPT VISIT MOD MDM: CPT

## 2021-07-28 PROCEDURE — 94640 AIRWAY INHALATION TREATMENT: CPT

## 2021-07-28 PROCEDURE — 83605 ASSAY OF LACTIC ACID: CPT

## 2021-07-28 PROCEDURE — 93005 ELECTROCARDIOGRAM TRACING: CPT | Performed by: EMERGENCY MEDICINE

## 2021-07-28 PROCEDURE — 87635 SARS-COV-2 COVID-19 AMP PRB: CPT | Performed by: EMERGENCY MEDICINE

## 2021-07-28 PROCEDURE — 93005 ELECTROCARDIOGRAM TRACING: CPT

## 2021-07-28 PROCEDURE — C9803 HOPD COVID-19 SPEC COLLECT: HCPCS

## 2021-07-28 PROCEDURE — 94799 UNLISTED PULMONARY SVC/PX: CPT

## 2021-07-28 PROCEDURE — 96374 THER/PROPH/DIAG INJ IV PUSH: CPT

## 2021-07-28 PROCEDURE — 71045 X-RAY EXAM CHEST 1 VIEW: CPT

## 2021-07-28 PROCEDURE — 87040 BLOOD CULTURE FOR BACTERIA: CPT | Performed by: EMERGENCY MEDICINE

## 2021-07-28 RX ORDER — METHYLPREDNISOLONE SODIUM SUCCINATE 125 MG/2ML
60 INJECTION, POWDER, LYOPHILIZED, FOR SOLUTION INTRAMUSCULAR; INTRAVENOUS ONCE
Status: COMPLETED | OUTPATIENT
Start: 2021-07-28 | End: 2021-07-28

## 2021-07-28 RX ORDER — SODIUM CHLORIDE 0.9 % (FLUSH) 0.9 %
3-10 SYRINGE (ML) INJECTION AS NEEDED
Status: DISCONTINUED | OUTPATIENT
Start: 2021-07-28 | End: 2021-07-29 | Stop reason: HOSPADM

## 2021-07-28 RX ORDER — BUDESONIDE AND FORMOTEROL FUMARATE DIHYDRATE 80; 4.5 UG/1; UG/1
2 AEROSOL RESPIRATORY (INHALATION)
Refills: 1 | Status: DISCONTINUED | OUTPATIENT
Start: 2021-07-28 | End: 2021-07-29 | Stop reason: HOSPADM

## 2021-07-28 RX ORDER — ONDANSETRON 2 MG/ML
4 INJECTION INTRAMUSCULAR; INTRAVENOUS EVERY 6 HOURS PRN
Status: DISCONTINUED | OUTPATIENT
Start: 2021-07-28 | End: 2021-07-29 | Stop reason: HOSPADM

## 2021-07-28 RX ORDER — SIMVASTATIN 40 MG
40 TABLET ORAL NIGHTLY
COMMUNITY

## 2021-07-28 RX ORDER — CALCIUM ACETATE 667 MG/1
2001 CAPSULE ORAL 3 TIMES DAILY
Status: DISCONTINUED | OUTPATIENT
Start: 2021-07-28 | End: 2021-07-29 | Stop reason: HOSPADM

## 2021-07-28 RX ORDER — IPRATROPIUM BROMIDE AND ALBUTEROL SULFATE 2.5; .5 MG/3ML; MG/3ML
3 SOLUTION RESPIRATORY (INHALATION) ONCE
Status: COMPLETED | OUTPATIENT
Start: 2021-07-28 | End: 2021-07-28

## 2021-07-28 RX ORDER — SODIUM CHLORIDE 0.9 % (FLUSH) 0.9 %
3 SYRINGE (ML) INJECTION EVERY 12 HOURS SCHEDULED
Status: DISCONTINUED | OUTPATIENT
Start: 2021-07-28 | End: 2021-07-29 | Stop reason: HOSPADM

## 2021-07-28 RX ORDER — ACETAMINOPHEN 325 MG/1
650 TABLET ORAL EVERY 4 HOURS PRN
Status: DISCONTINUED | OUTPATIENT
Start: 2021-07-28 | End: 2021-07-29 | Stop reason: HOSPADM

## 2021-07-28 RX ORDER — SEVELAMER CARBONATE 800 MG/1
1600 TABLET, FILM COATED ORAL
Status: DISCONTINUED | OUTPATIENT
Start: 2021-07-28 | End: 2021-07-29 | Stop reason: HOSPADM

## 2021-07-28 RX ORDER — PANTOPRAZOLE SODIUM 40 MG/1
40 TABLET, DELAYED RELEASE ORAL 2 TIMES DAILY
Status: DISCONTINUED | OUTPATIENT
Start: 2021-07-28 | End: 2021-07-29 | Stop reason: HOSPADM

## 2021-07-28 RX ORDER — SODIUM CHLORIDE 0.9 % (FLUSH) 0.9 %
10 SYRINGE (ML) INJECTION AS NEEDED
Status: DISCONTINUED | OUTPATIENT
Start: 2021-07-28 | End: 2021-07-29 | Stop reason: HOSPADM

## 2021-07-28 RX ORDER — ATORVASTATIN CALCIUM 20 MG/1
20 TABLET, FILM COATED ORAL DAILY
Status: DISCONTINUED | OUTPATIENT
Start: 2021-07-28 | End: 2021-07-28

## 2021-07-28 RX ORDER — ASPIRIN 81 MG/1
81 TABLET ORAL 3 TIMES WEEKLY
Status: DISCONTINUED | OUTPATIENT
Start: 2021-07-30 | End: 2021-07-29 | Stop reason: HOSPADM

## 2021-07-28 RX ORDER — LIDOCAINE AND PRILOCAINE 25; 25 MG/G; MG/G
1 CREAM TOPICAL DAILY PRN
Status: DISCONTINUED | OUTPATIENT
Start: 2021-07-28 | End: 2021-07-29 | Stop reason: HOSPADM

## 2021-07-28 RX ORDER — DICYCLOMINE HYDROCHLORIDE 10 MG/1
10 CAPSULE ORAL 3 TIMES DAILY PRN
Status: DISCONTINUED | OUTPATIENT
Start: 2021-07-28 | End: 2021-07-29 | Stop reason: HOSPADM

## 2021-07-28 RX ORDER — IPRATROPIUM BROMIDE AND ALBUTEROL SULFATE 2.5; .5 MG/3ML; MG/3ML
3 SOLUTION RESPIRATORY (INHALATION) EVERY 4 HOURS PRN
Status: DISCONTINUED | OUTPATIENT
Start: 2021-07-28 | End: 2021-07-29 | Stop reason: HOSPADM

## 2021-07-28 RX ORDER — CHOLECALCIFEROL (VITAMIN D3) 125 MCG
5 CAPSULE ORAL NIGHTLY PRN
Status: DISCONTINUED | OUTPATIENT
Start: 2021-07-28 | End: 2021-07-29 | Stop reason: HOSPADM

## 2021-07-28 RX ORDER — TRAZODONE HYDROCHLORIDE 50 MG/1
50 TABLET ORAL NIGHTLY PRN
Status: DISCONTINUED | OUTPATIENT
Start: 2021-07-28 | End: 2021-07-29 | Stop reason: HOSPADM

## 2021-07-28 RX ORDER — SODIUM BICARBONATE 650 MG/1
1300 TABLET ORAL 3 TIMES DAILY
Status: DISCONTINUED | OUTPATIENT
Start: 2021-07-28 | End: 2021-07-29 | Stop reason: HOSPADM

## 2021-07-28 RX ADMIN — TRAZODONE HYDROCHLORIDE 50 MG: 50 TABLET ORAL at 23:31

## 2021-07-28 RX ADMIN — SODIUM BICARBONATE 650 MG TABLET 1300 MG: at 21:07

## 2021-07-28 RX ADMIN — SEVELAMER CARBONATE 1600 MG: 800 TABLET, FILM COATED ORAL at 18:18

## 2021-07-28 RX ADMIN — CALCIUM ACETATE 2001 MG: 667 CAPSULE ORAL at 21:06

## 2021-07-28 RX ADMIN — IPRATROPIUM BROMIDE AND ALBUTEROL SULFATE 3 ML: 2.5; .5 SOLUTION RESPIRATORY (INHALATION) at 20:57

## 2021-07-28 RX ADMIN — IPRATROPIUM BROMIDE AND ALBUTEROL SULFATE 3 ML: 2.5; .5 SOLUTION RESPIRATORY (INHALATION) at 11:09

## 2021-07-28 RX ADMIN — Medication 3 ML: at 21:11

## 2021-07-28 RX ADMIN — PANTOPRAZOLE SODIUM 40 MG: 40 TABLET, DELAYED RELEASE ORAL at 21:06

## 2021-07-28 RX ADMIN — IPRATROPIUM BROMIDE AND ALBUTEROL SULFATE 3 ML: 2.5; .5 SOLUTION RESPIRATORY (INHALATION) at 23:00

## 2021-07-28 RX ADMIN — METHYLPREDNISOLONE SODIUM SUCCINATE 60 MG: 125 INJECTION, POWDER, FOR SOLUTION INTRAMUSCULAR; INTRAVENOUS at 18:18

## 2021-07-28 NOTE — OUTREACH NOTE
Prep Survey      Responses   [facility]?  [facility]   Is LACE score < 7 ?  No   Emergency Room discharge w/ pulse ox?  No   Eligibility  Readm Mgmt   Discharge diagnosis  Acute exacerbation of chronic obstructive pulmonary disease    Does the patient have one of the following disease processes/diagnoses(primary or secondary)?  COPD/Pneumonia   Does the patient have Home health ordered?  No   Is there a DME ordered?  No   Prep survey completed?  Yes          Heena Mancuso RN

## 2021-07-29 VITALS
BODY MASS INDEX: 33.2 KG/M2 | TEMPERATURE: 97.6 F | OXYGEN SATURATION: 95 % | HEIGHT: 60 IN | SYSTOLIC BLOOD PRESSURE: 137 MMHG | WEIGHT: 169.09 LBS | DIASTOLIC BLOOD PRESSURE: 58 MMHG | RESPIRATION RATE: 16 BRPM | HEART RATE: 72 BPM

## 2021-07-29 PROBLEM — J44.1 COPD EXACERBATION: Status: ACTIVE | Noted: 2021-07-29

## 2021-07-29 LAB
ALBUMIN SERPL-MCNC: 3.8 G/DL (ref 3.5–5.2)
ANION GAP SERPL CALCULATED.3IONS-SCNC: 20 MMOL/L (ref 5–15)
BUN SERPL-MCNC: 64 MG/DL (ref 8–23)
BUN/CREAT SERPL: 7.6 (ref 7–25)
CALCIUM SPEC-SCNC: 9 MG/DL (ref 8.6–10.5)
CHLORIDE SERPL-SCNC: 93 MMOL/L (ref 98–107)
CO2 SERPL-SCNC: 20 MMOL/L (ref 22–29)
CREAT SERPL-MCNC: 8.41 MG/DL (ref 0.57–1)
DEPRECATED RDW RBC AUTO: 66.9 FL (ref 37–54)
ERYTHROCYTE [DISTWIDTH] IN BLOOD BY AUTOMATED COUNT: 20.1 % (ref 12.3–15.4)
GFR SERPL CREATININE-BSD FRML MDRD: 5 ML/MIN/1.73
GFR SERPL CREATININE-BSD FRML MDRD: ABNORMAL ML/MIN/{1.73_M2}
GLUCOSE SERPL-MCNC: 216 MG/DL (ref 65–99)
HCT VFR BLD AUTO: 31.7 % (ref 34–46.6)
HGB BLD-MCNC: 10.4 G/DL (ref 12–15.9)
MCH RBC QN AUTO: 31.2 PG (ref 26.6–33)
MCHC RBC AUTO-ENTMCNC: 32.8 G/DL (ref 31.5–35.7)
MCV RBC AUTO: 95.3 FL (ref 79–97)
PHOSPHATE SERPL-MCNC: 4.8 MG/DL (ref 2.5–4.5)
PLATELET # BLD AUTO: 544 10*3/MM3 (ref 140–450)
PMV BLD AUTO: 7.4 FL (ref 6–12)
POTASSIUM SERPL-SCNC: 5.4 MMOL/L (ref 3.5–5.2)
QT INTERVAL: 374 MS
RBC # BLD AUTO: 3.32 10*6/MM3 (ref 3.77–5.28)
SODIUM SERPL-SCNC: 133 MMOL/L (ref 136–145)
WBC # BLD AUTO: 14.2 10*3/MM3 (ref 3.4–10.8)

## 2021-07-29 PROCEDURE — 94799 UNLISTED PULMONARY SVC/PX: CPT

## 2021-07-29 PROCEDURE — 25010000002 ONDANSETRON PER 1 MG: Performed by: INTERNAL MEDICINE

## 2021-07-29 PROCEDURE — 94640 AIRWAY INHALATION TREATMENT: CPT

## 2021-07-29 PROCEDURE — G0378 HOSPITAL OBSERVATION PER HR: HCPCS

## 2021-07-29 PROCEDURE — 85027 COMPLETE CBC AUTOMATED: CPT | Performed by: INTERNAL MEDICINE

## 2021-07-29 PROCEDURE — 63710000001 PREDNISONE PER 1 MG: Performed by: INTERNAL MEDICINE

## 2021-07-29 PROCEDURE — 96375 TX/PRO/DX INJ NEW DRUG ADDON: CPT

## 2021-07-29 PROCEDURE — 80069 RENAL FUNCTION PANEL: CPT | Performed by: INTERNAL MEDICINE

## 2021-07-29 PROCEDURE — G0257 UNSCHED DIALYSIS ESRD PT HOS: HCPCS

## 2021-07-29 RX ORDER — PREDNISONE 20 MG/1
40 TABLET ORAL ONCE
Status: COMPLETED | OUTPATIENT
Start: 2021-07-29 | End: 2021-07-29

## 2021-07-29 RX ORDER — IPRATROPIUM BROMIDE AND ALBUTEROL SULFATE 2.5; .5 MG/3ML; MG/3ML
3 SOLUTION RESPIRATORY (INHALATION) 4 TIMES DAILY
Qty: 360 ML | Refills: 1 | Status: ON HOLD | OUTPATIENT
Start: 2021-07-29 | End: 2022-09-27

## 2021-07-29 RX ORDER — PREDNISONE 20 MG/1
40 TABLET ORAL ONCE
Status: CANCELLED | OUTPATIENT
Start: 2021-07-29

## 2021-07-29 RX ORDER — ACETAMINOPHEN 325 MG/1
650 TABLET ORAL EVERY 4 HOURS PRN
Start: 2021-07-29 | End: 2021-10-05

## 2021-07-29 RX ADMIN — SODIUM BICARBONATE 650 MG TABLET 1300 MG: at 15:31

## 2021-07-29 RX ADMIN — PREDNISONE 40 MG: 20 TABLET ORAL at 15:31

## 2021-07-29 RX ADMIN — SODIUM BICARBONATE 650 MG TABLET 1300 MG: at 08:19

## 2021-07-29 RX ADMIN — BUDESONIDE AND FORMOTEROL FUMARATE DIHYDRATE 2 PUFF: 80; 4.5 AEROSOL RESPIRATORY (INHALATION) at 07:23

## 2021-07-29 RX ADMIN — SEVELAMER CARBONATE 1600 MG: 800 TABLET, FILM COATED ORAL at 08:19

## 2021-07-29 RX ADMIN — IPRATROPIUM BROMIDE AND ALBUTEROL SULFATE 3 ML: 2.5; .5 SOLUTION RESPIRATORY (INHALATION) at 03:41

## 2021-07-29 RX ADMIN — PANTOPRAZOLE SODIUM 40 MG: 40 TABLET, DELAYED RELEASE ORAL at 08:19

## 2021-07-29 RX ADMIN — SEVELAMER CARBONATE 1600 MG: 800 TABLET, FILM COATED ORAL at 15:30

## 2021-07-29 RX ADMIN — ONDANSETRON 4 MG: 2 INJECTION INTRAMUSCULAR; INTRAVENOUS at 10:49

## 2021-07-29 RX ADMIN — Medication 3 ML: at 08:21

## 2021-07-29 NOTE — OUTREACH NOTE
COPD/PN Week 1 Survey      Responses   [facility] patient discharged from?  [facility]   Does the patient have one of the following disease processes/diagnoses(primary or secondary)?  COPD/Pneumonia   Week 1 attempt successful?  No   Revoke  Readmitted          Caridad Merlos RN

## 2021-07-30 ENCOUNTER — READMISSION MANAGEMENT (OUTPATIENT)
Dept: CALL CENTER | Facility: HOSPITAL | Age: 71
End: 2021-07-30

## 2021-07-30 NOTE — OUTREACH NOTE
Prep Survey      Responses   [facility] patient discharged [facility]?  [facility]   Is LACE score < 7 ?  No   Emergency Room discharge w/ pulse ox?  No   Eligibility  Readm Mgmt   Discharge diagnosis  COPD exacerbation    Does the patient have one of the following disease processes/diagnoses(primary or secondary)?  COPD/Pneumonia   Does the patient have Home health ordered?  No   Is there a DME ordered?  Yes   What DME was ordered?  Nebulizer per [facility]    General alerts for this patient  HD pt    Prep survey completed?  Yes          Earnestine Szymanski RN

## 2021-08-02 LAB
BACTERIA SPEC AEROBE CULT: NORMAL
BACTERIA SPEC AEROBE CULT: NORMAL

## 2021-08-04 ENCOUNTER — READMISSION MANAGEMENT (OUTPATIENT)
Dept: CALL CENTER | Facility: HOSPITAL | Age: 71
End: 2021-08-04

## 2021-08-04 NOTE — OUTREACH NOTE
COPD/PN Week 1 Survey      Responses   [facility] patient discharged from?  [facility]   Does the patient have one of the following disease processes/diagnoses(primary or secondary)?  COPD/Pneumonia   Was the primary reason for admission:  COPD exacerbation   Week 1 attempt successful?  Yes   Call start time  1524   Rescheduled  Rescheduled-pt requested [at HD on MWF]   Call end time  1524   General alerts for this patient  HD pt           Karolyn Johansen RN

## 2021-08-09 ENCOUNTER — READMISSION MANAGEMENT (OUTPATIENT)
Dept: CALL CENTER | Facility: HOSPITAL | Age: 71
End: 2021-08-09

## 2021-08-09 NOTE — OUTREACH NOTE
COPD/PN Week 1 Survey      Responses   [facility] patient discharged from?  [facility]   Does the patient have one of the following disease processes/diagnoses(primary or secondary)?  COPD/Pneumonia   Was the primary reason for admission:  COPD exacerbation   Week 1 attempt successful?  Yes   Call start time  0722   Call end time  0729   Meds reviewed with patient/caregiver?  Yes   Is the patient having any side effects they believe may be caused by any medication additions or changes?  No   Does the patient have all medications ordered at discharge?  Yes   Comments  may be having a follow up in 2 weeks was told by provider if doing well , keep regular appointment as scheduled   Has home health visited the patient within 72 hours of discharge?  N/A   Pulse Ox monitoring  Intermittent   O2 Sat comments  sat was 93% on room air   Did the patient receive a copy of their discharge instructions?  No   Nursing interventions  Reviewed instructions with patient   What is the patient's perception of their health status since discharge?  Improving   Are the patient's immunizations up to date?   Yes   Is the patient able to teach back COPD zones?  Yes   Nursing interventions  Education provided on various zones   Patient reports what zone on this call?  Yellow Zone   Green Zone  Reports doing well, Breathing without shortness of breath, Usual activity and exercise level, Appetite is good   Green Zone interventions:  Take daily medications, Do not smoke   Yellow interventions  Continue to use daily medications, Use quick relief inhaler as ordered, Call provider immediatly if symptoms do not improve [not using the inhalers  patient feels she is between the green and the yellow]   Week 1 call completed?  Yes   Wrap up additional comments  improving and taking neb tx as ordered, not sleeping well          Akanksha Montoya RN

## 2021-08-17 ENCOUNTER — TRANSCRIBE ORDERS (OUTPATIENT)
Dept: ADMINISTRATIVE | Facility: HOSPITAL | Age: 71
End: 2021-08-17

## 2021-08-17 ENCOUNTER — READMISSION MANAGEMENT (OUTPATIENT)
Dept: CALL CENTER | Facility: HOSPITAL | Age: 71
End: 2021-08-17

## 2021-08-17 DIAGNOSIS — C7A.8 NEURO-ENDOCRINE CARCINOMA (HCC): Primary | ICD-10-CM

## 2021-08-17 NOTE — OUTREACH NOTE
COPD/PN Week 2 Survey      Responses   [facility] patient discharged from?  [facility]   Does the patient have one of the following disease processes/diagnoses(primary or secondary)?  COPD/Pneumonia   Was the primary reason for admission:  COPD exacerbation   Week 2 attempt successful?  Yes   Call start time  1134   Call end time  1142   General alerts for this patient  HD pt    Discharge diagnosis  COPD exacerbation    Medication alerts for this patient  Pt reports that she does not take Trazodone any longer.    Meds reviewed with patient/caregiver?  Yes   Prescription comments  She is doing Duo-nebs BID not as prescribed q6hrs.   Is the patient taking all medications as directed (includes completed medication regime)?  Yes   Does the patient have a primary care provider?   Yes   Does the patient have an appointment with their PCP or specialist within 7 days of discharge?  Greater than 7 days   Comments regarding PCP  9-14 is her PCP f/u appt   What is preventing the patient from scheduling follow up appointments within 7 days of discharge?  Earlier appointment not available   Nursing Interventions  Verified appointment date/time/provider   Has the patient kept scheduled appointments due by today?  N/A   What DME was ordered?  Nebulizer per [facility]    Pulse Ox monitoring  Intermittent   Pulse Ox device source  Patient   O2 Sat comments  sat was 93% on room air   O2 Sat: education provided  Sat levels   Psychosocial issues?  No   Comments  Pt reports that she has issues sleeping. She only sleeps a couple of hrs at a time then can't go back to sleep. It does not matter if she is in bed or recliner, it is same outcome. She reports that she has tried Melatonin and trazodone previously but neither helped. She plans to discuss at MD f/u. We talked about whether any of her night time meds could be interrrupting sleep.    What is the patient's perception of their health status since discharge?  Improving   Is the  patient/caregiver able to teach back the hierarchy of who to call/visit for symptoms/problems? PCP, Specialist, Home health nurse, Urgent Care, ED, 911  Yes   Patient reports what zone on this call?  Yellow Zone   Yellow Zone  Increased shortness of air, Unable to complete daily activities, Increased or thicker phlegm/mucus, Poor sleep and symptoms work patient up   Yellow interventions  Use quick relief inhaler as ordered, Continue to use daily medications, Do not smoke, Get plenty of rest   Week 2 call completed?  Yes          Karolyn Johansen, RN

## 2021-08-25 ENCOUNTER — READMISSION MANAGEMENT (OUTPATIENT)
Dept: CALL CENTER | Facility: HOSPITAL | Age: 71
End: 2021-08-25

## 2021-08-25 NOTE — OUTREACH NOTE
COPD/PN Week 3 Survey      Responses   [facility] patient discharged from?  [facility]   Does the patient have one of the following disease processes/diagnoses(primary or secondary)?  COPD/Pneumonia   Was the primary reason for admission:  COPD exacerbation   Week 3 attempt successful?  Yes   Call start time  1628   Call end time  1631   General alerts for this patient  HD pt    Discharge diagnosis  COPD exacerbation    Is patient permission given to speak with other caregiver?  No   Meds reviewed with patient/caregiver?  Yes   Does the patient have all medications ordered at discharge?  Yes   Is the patient taking all medications as directed (includes completed medication regime)?  Yes   Does the patient have a primary care provider?   Yes   Comments regarding PCP  9-14 is her PCP f/u appt   Has the patient kept scheduled appointments due by today?  N/A   Has home health visited the patient within 72 hours of discharge?  N/A   What DME was ordered?  Nebulizer per [facility]    Has all DME been delivered?  Yes   DME comments  Patient states that she is down to needing nebulizer treatment once a day.    Pulse Ox monitoring  Intermittent   Pulse Ox device source  Patient   O2 Sat comments  reports sat 93% on room air   O2 Sat: education provided  Sat levels, Monitoring frequency, When to seek care   Psychosocial issues?  No   What is the patient's perception of their health status since discharge?  Improving   Is the patient/caregiver able to teach back the hierarchy of who to call/visit for symptoms/problems? PCP, Specialist, Home health nurse, Urgent Care, ED, 911  Yes   Is the patient able to teach back COPD zones?  Yes   Patient reports what zone on this call?  Green Zone   Green Zone  Reports doing well, Breathing without shortness of breath, Usual activity and exercise level   Green Zone interventions:  Take daily medications   Week 3 call completed?  Yes          Lisa Arshad RN

## 2021-08-31 ENCOUNTER — HOSPITAL ENCOUNTER (OUTPATIENT)
Dept: CT IMAGING | Facility: HOSPITAL | Age: 71
Discharge: HOME | End: 2021-08-31
Admitting: SURGERY

## 2021-08-31 DIAGNOSIS — C7A.8 NEURO-ENDOCRINE CARCINOMA (HCC): ICD-10-CM

## 2021-08-31 PROCEDURE — 74176 CT ABD & PELVIS W/O CONTRAST: CPT

## 2021-09-06 ENCOUNTER — READMISSION MANAGEMENT (OUTPATIENT)
Dept: CALL CENTER | Facility: HOSPITAL | Age: 71
End: 2021-09-06

## 2021-09-06 NOTE — OUTREACH NOTE
COPD/PN Week 4 Survey      Responses   [facility] [place] patient discharged from?  [facility]   Does the patient have one of the following disease processes/diagnoses(primary or secondary)?  COPD/Pneumonia   Was the primary reason for admission:  COPD exacerbation   Week 4 attempt successful?  Yes   Call start time  1716   Call end time  1718   General alerts for this patient  HD pt    Discharge diagnosis  COPD exacerbation    Is patient permission given to speak with other caregiver?  No   Person spoke with today (if not patient) and relationship  Patient   Meds reviewed with patient/caregiver?  Yes   Is the patient having any side effects they believe may be caused by any medication additions or changes?  No   Is the patient taking all medications as directed (includes completed medication regime)?  Yes   Is the patient still receiving Home Health Services?  N/A   Pulse Ox monitoring  Intermittent   Pulse Ox device source  Patient   O2 Sat comments  reports sat 97% on room air   O2 Sat: education provided  Sat levels, Monitoring frequency, When to seek care   Psychosocial issues?  No   What is the patient's perception of their health status since discharge?  Improving   If the patient is a current smoker, are they able to teach back resources for cessation?  [id number]   Is the patient/caregiver able to teach back the hierarchy of who to call/visit for symptoms/problems? PCP, Specialist, Home health nurse, Urgent Care, ED, 911  Yes   Is the patient able to teach back COPD zones?  Yes   Patient reports what zone on this call?  Green Zone   Green Zone  Reports doing well, Breathing without shortness of breath, Appetite is good   Green Zone interventions:  Take daily medications   Week 4 call completed?  Yes   Would the patient like one additional call?  No   Graduated  Yes   Is the patient interested in additional calls from an ambulatory ?  NOTE:  applies to high risk patients requiring additional  follow-up.  No   Did the patient feel the follow up calls were helpful during their recovery period?  Yes   Was the number of calls appropriate?  Yes   Wrap up additional comments  pt reports she is doing well.  she denies needs at this time.           Lubna Ambrocio RN

## 2021-09-20 ENCOUNTER — TRANSCRIBE ORDERS (OUTPATIENT)
Dept: ADMINISTRATIVE | Facility: HOSPITAL | Age: 71
End: 2021-09-20

## 2021-09-20 ENCOUNTER — LAB (OUTPATIENT)
Dept: LAB | Facility: HOSPITAL | Age: 71
End: 2021-09-20

## 2021-09-20 DIAGNOSIS — Z01.818 PRE-OP TESTING: Primary | ICD-10-CM

## 2021-09-20 DIAGNOSIS — Z01.818 PRE-OP TESTING: ICD-10-CM

## 2021-09-20 LAB — SARS-COV-2 ORF1AB RESP QL NAA+PROBE: NOT DETECTED

## 2021-09-20 PROCEDURE — U0005 INFEC AGEN DETEC AMPLI PROBE: HCPCS

## 2021-09-20 PROCEDURE — U0004 COV-19 TEST NON-CDC HGH THRU: HCPCS

## 2021-09-20 PROCEDURE — C9803 HOPD COVID-19 SPEC COLLECT: HCPCS

## 2021-10-05 ENCOUNTER — OFFICE VISIT (OUTPATIENT)
Dept: SURGERY | Facility: CLINIC | Age: 71
End: 2021-10-05

## 2021-10-05 VITALS
SYSTOLIC BLOOD PRESSURE: 154 MMHG | BODY MASS INDEX: 31.49 KG/M2 | DIASTOLIC BLOOD PRESSURE: 79 MMHG | OXYGEN SATURATION: 97 % | TEMPERATURE: 98.7 F | RESPIRATION RATE: 18 BRPM | WEIGHT: 160.4 LBS | HEART RATE: 89 BPM | HEIGHT: 60 IN

## 2021-10-05 DIAGNOSIS — K43.9 VENTRAL HERNIA WITHOUT OBSTRUCTION OR GANGRENE: Primary | ICD-10-CM

## 2021-10-05 PROCEDURE — 99214 OFFICE O/P EST MOD 30 MIN: CPT | Performed by: SURGERY

## 2021-10-05 RX ORDER — LOSARTAN POTASSIUM 100 MG/1
100 TABLET ORAL DAILY
COMMUNITY
Start: 2021-04-26

## 2021-10-05 RX ORDER — SODIUM CHLORIDE 9 MG/ML
100 INJECTION, SOLUTION INTRAVENOUS CONTINUOUS
Status: CANCELLED | OUTPATIENT
Start: 2021-10-05

## 2021-10-05 RX ORDER — AMLODIPINE BESYLATE 10 MG/1
10 TABLET ORAL
COMMUNITY
Start: 2021-08-12 | End: 2022-05-17

## 2021-10-05 NOTE — PROGRESS NOTES
"Jenny Lobato is a 71 y.o. female.   Chief Complaint   Patient presents with   • Follow-up     /79 (BP Location: Right arm, Patient Position: Sitting, Cuff Size: Adult)   Pulse 89   Temp 98.7 °F (37.1 °C) (Infrared)   Resp 18   Ht 152.4 cm (60\")   Wt 72.8 kg (160 lb 6.4 oz)   SpO2 97%   BMI 31.33 kg/m²     HISTORY OF PRESENT ILLNESS:  71-year-old lady who a couple years ago underwent an open ventral hernia repair on the right flank which is related to a previous cancer operation who over the last couple years has started developed some worsening symptoms.  She has COPD exacerbation requiring inpatient admission while she was coughing a lot she started noticing worsening pain along her right abdominal incision.  She notices a small lump approximately the size of a walnut that causes sharp persistent discomfort.  It is alleviated whenever she lays back and worse when she is sitting up.  Does not cause any significant nausea or vomiting she has not had any significant constipation obstipation or diarrhea that are new but she has had some black-colored stools.  No medication changes recently.  She was seen in the emergency department just about a month ago and a CT scan did not comment on there being a hernia here but it does look like there is some laxity in that area and possibly even a true hernia containing omentum.  She is still on the transplant list and think she is getting closer to transplantation she recent had a cardiac catheterization and still requests holding off on mesh if I am going to repair this again for her because of the transplant surgery.      Outpatient Encounter Medications as of 10/5/2021   Medication Sig Dispense Refill   • albuterol sulfate HFA (Proventil HFA) 108 (90 Base) MCG/ACT inhaler Inhale 2 puffs Every 4 (Four) Hours As Needed for Wheezing. 17 g 1   • amLODIPine (NORVASC) 10 MG tablet 10 mg.     • aspirin (aspirin) 81 MG EC tablet Take 1 tablet by mouth 3 " (Three) Times a Week.     • calcium acetate (PHOSLO) 667 MG capsule Take 2,001 mg by mouth 3 (Three) Times a Day.     • dicyclomine (BENTYL) 10 MG capsule Take 10 mg by mouth 3 (Three) Times a Day As Needed.     • Fluticasone Furoate-Vilanterol (Breo Ellipta) 100-25 MCG/INH inhaler Inhale 1 puff Daily. 1 each 1   • ipratropium-albuterol (DUO-NEB) 0.5-2.5 mg/3 ml nebulizer Take 3 mL by nebulization 4 (Four) Times a Day. 360 mL 1   • lidocaine-prilocaine (EMLA) 2.5-2.5 % cream Apply 1 application topically to the appropriate area as directed Daily As Needed. Apply to access site 1 to 2 hours before dialysis  4   • losartan (COZAAR) 100 MG tablet Take 100 mg by mouth Daily.     • Multiple Vitamins-Minerals (RENAPLEX-D) tablet Take 1 tablet by mouth Daily.     • pantoprazole (PROTONIX) 40 MG EC tablet TAKE 1 TABLET BY MOUTH TWICE DAILY 180 tablet 2   • predniSONE (DELTASONE) 10 MG tablet 4 po q day x 4, 3 po q day x 4, 2 po q day x 4, 1 po q day x 4 40 tablet 0   • sevelamer (RENVELA) 800 MG tablet Take 1,600 mg by mouth 3 (Three) Times a Day With Meals.     • simvastatin (ZOCOR) 40 MG tablet Take 40 mg by mouth Every Night.     • sodium bicarbonate 650 MG tablet Take 2 tablets by mouth 3 (Three) Times a Day. 180 tablet 0   • vitamin B-12 (CYANOCOBALAMIN) 1000 MCG tablet Take 2,000 mcg by mouth 3 (Three) Times a Week. Mon, Wed, Fri     • [DISCONTINUED] acetaminophen (TYLENOL) 325 MG tablet Take 2 tablets by mouth Every 4 (Four) Hours As Needed for Mild Pain .     • [DISCONTINUED] traZODone (DESYREL) 50 MG tablet Take 50 mg by mouth At Night As Needed for Sleep.       No facility-administered encounter medications on file as of 10/5/2021.         The following portions of the patient's history were reviewed and updated as appropriate: allergies, current medications, past family history, past medical history, past social history, past surgical history and problem list.    Review of Systems  A complete review of systems  been obtained and is negative.  Objective   Physical Exam  Constitutional:       Appearance: Normal appearance. She is well-developed.   HENT:      Head: Normocephalic and atraumatic.   Eyes:      General: No scleral icterus.     Conjunctiva/sclera: Conjunctivae normal.   Neck:      Trachea: No tracheal deviation.   Cardiovascular:      Rate and Rhythm: Normal rate.      Pulses: Normal pulses.   Pulmonary:      Effort: Pulmonary effort is normal. No respiratory distress.   Abdominal:      General: There is no distension.      Palpations: Abdomen is soft.      Comments: Along the lateral aspect of the right abdominal incision there is an approximately 1 cm hernia defect with reducible soft hernia contents with mild tenderness to palpation no overlying skin changes   Musculoskeletal:         General: No swelling or tenderness.      Cervical back: Neck supple. No tenderness. No muscular tenderness.      Comments: Fistulas in the bilateral upper extremities the left warm with a palpable thrill   Skin:     General: Skin is warm and dry.   Neurological:      General: No focal deficit present.      Mental Status: She is alert. Mental status is at baseline.   Psychiatric:         Mood and Affect: Mood normal.         Behavior: Behavior normal.           Assessment/Plan   Diagnoses and all orders for this visit:    1. Ventral hernia without obstruction or gangrene (Primary)  -     Case Request; Standing  -     sodium chloride 0.9 % infusion  -     ceFOXitin (MEFOXIN) 2 g in sodium chloride 0.9 % 100 mL IVPB  -     Case Request    Other orders  -     Follow Anesthesia Guidelines / Standing Orders; Future  -     Provide NPO Instructions to Patient; Future  -     Chlorhexidine Skin Prep; Future  -     Follow Anesthesia Guidelines / Standing Orders; Standing  -     Verify NPO Status; Standing  -     Obtain Informed Consent; Standing  -     SCD (Sequential Compression Device) - Place on Patient in Pre-Op; Standing  -     Clip  Operative Site; Standing  -     Have Patient Void Prior to Procedure; Standing  -     Instructions on Coughing, Deep Breathing & Incentive Spirometry; Standing  -     Notify Physician - Standard; Standing    Have counseled her about nonoperative management watching and waiting and going the emergency department if she has progression of her symptoms.  I also counseled her about the potential of intervening now as this is small and may require just a simple closure either laparoscopic with some suture passers were open.  I have recommended mesh placement to help prevent this from continuing to recur but she has decided that she still wants to avoid having mesh placed for this hernia even though it is likely to recur down the road because she does not want to allow for transplantation on this side of her abdominal wall without causing difficulty for the transplant surgeons.  I am agreeable to proceeding with diagnostic laparoscopy and primary closure of the small right-sided abdominal wall hernia.  We talked about the anticipated recovery all the risks of surgery clean bleeding infection incidental injury recurrence issues with the ventilator regards to her COPD blood clots and embolism and having to coordinate around her dialysis.  She understands the risks of this operation and does wish to proceed.    This is a chronic progressive problem and I counseled her about the risks of major abdominal surgery with significant risk factors for comorbidity.    Nilay Stevens MD  10/5/2021  9:13 AM EDT    This note was created using Dragon Voice Recognition software.

## 2021-10-08 ENCOUNTER — LAB (OUTPATIENT)
Dept: LAB | Facility: HOSPITAL | Age: 71
End: 2021-10-08

## 2021-10-08 ENCOUNTER — HOSPITAL ENCOUNTER (OUTPATIENT)
Dept: CARDIOLOGY | Facility: HOSPITAL | Age: 71
Discharge: HOME | End: 2021-10-08

## 2021-10-08 ENCOUNTER — HOSPITAL ENCOUNTER (OUTPATIENT)
Dept: GENERAL RADIOLOGY | Facility: HOSPITAL | Age: 71
Discharge: HOME | End: 2021-10-08

## 2021-10-08 LAB
ANION GAP SERPL CALCULATED.3IONS-SCNC: 17.9 MMOL/L (ref 5–15)
BUN SERPL-MCNC: 43 MG/DL (ref 8–23)
BUN/CREAT SERPL: 6.8 (ref 7–25)
CALCIUM SPEC-SCNC: 9.3 MG/DL (ref 8.6–10.5)
CHLORIDE SERPL-SCNC: 96 MMOL/L (ref 98–107)
CO2 SERPL-SCNC: 19.1 MMOL/L (ref 22–29)
CREAT SERPL-MCNC: 6.33 MG/DL (ref 0.57–1)
DEPRECATED RDW RBC AUTO: 53.2 FL (ref 37–54)
ERYTHROCYTE [DISTWIDTH] IN BLOOD BY AUTOMATED COUNT: 15.5 % (ref 12.3–15.4)
GFR SERPL CREATININE-BSD FRML MDRD: 6 ML/MIN/1.73
GFR SERPL CREATININE-BSD FRML MDRD: ABNORMAL ML/MIN/{1.73_M2}
GLUCOSE SERPL-MCNC: 107 MG/DL (ref 65–99)
HCT VFR BLD AUTO: 32.6 % (ref 34–46.6)
HGB BLD-MCNC: 10.6 G/DL (ref 12–15.9)
MCH RBC QN AUTO: 30.4 PG (ref 26.6–33)
MCHC RBC AUTO-ENTMCNC: 32.5 G/DL (ref 31.5–35.7)
MCV RBC AUTO: 93.4 FL (ref 79–97)
PLATELET # BLD AUTO: 448 10*3/MM3 (ref 140–450)
PMV BLD AUTO: 10.4 FL (ref 6–12)
POTASSIUM SERPL-SCNC: 4.8 MMOL/L (ref 3.5–5.2)
RBC # BLD AUTO: 3.49 10*6/MM3 (ref 3.77–5.28)
SARS-COV-2 ORF1AB RESP QL NAA+PROBE: NOT DETECTED
SODIUM SERPL-SCNC: 133 MMOL/L (ref 136–145)
WBC # BLD AUTO: 10.48 10*3/MM3 (ref 3.4–10.8)

## 2021-10-08 PROCEDURE — C9803 HOPD COVID-19 SPEC COLLECT: HCPCS

## 2021-10-08 PROCEDURE — 85027 COMPLETE CBC AUTOMATED: CPT

## 2021-10-08 PROCEDURE — 93010 ELECTROCARDIOGRAM REPORT: CPT | Performed by: INTERNAL MEDICINE

## 2021-10-08 PROCEDURE — 80048 BASIC METABOLIC PNL TOTAL CA: CPT

## 2021-10-08 PROCEDURE — U0004 COV-19 TEST NON-CDC HGH THRU: HCPCS

## 2021-10-08 PROCEDURE — 93005 ELECTROCARDIOGRAM TRACING: CPT | Performed by: SURGERY

## 2021-10-08 PROCEDURE — U0005 INFEC AGEN DETEC AMPLI PROBE: HCPCS

## 2021-10-08 PROCEDURE — 71046 X-RAY EXAM CHEST 2 VIEWS: CPT

## 2021-10-11 ENCOUNTER — ANESTHESIA (OUTPATIENT)
Dept: PERIOP | Facility: HOSPITAL | Age: 71
End: 2021-10-11

## 2021-10-11 ENCOUNTER — HOSPITAL ENCOUNTER (OUTPATIENT)
Facility: HOSPITAL | Age: 71
Setting detail: HOSPITAL OUTPATIENT SURGERY
Discharge: HOME | End: 2021-10-11
Attending: SURGERY | Admitting: SURGERY

## 2021-10-11 ENCOUNTER — ANESTHESIA EVENT (OUTPATIENT)
Dept: PERIOP | Facility: HOSPITAL | Age: 71
End: 2021-10-11

## 2021-10-11 VITALS
WEIGHT: 158.07 LBS | HEART RATE: 73 BPM | TEMPERATURE: 97.1 F | BODY MASS INDEX: 31.03 KG/M2 | OXYGEN SATURATION: 100 % | DIASTOLIC BLOOD PRESSURE: 88 MMHG | SYSTOLIC BLOOD PRESSURE: 135 MMHG | HEIGHT: 60 IN | RESPIRATION RATE: 14 BRPM

## 2021-10-11 DIAGNOSIS — K43.9 VENTRAL HERNIA WITHOUT OBSTRUCTION OR GANGRENE: ICD-10-CM

## 2021-10-11 LAB
GLUCOSE BLDC GLUCOMTR-MCNC: 129 MG/DL (ref 70–105)
POTASSIUM SERPL-SCNC: 4.5 MMOL/L (ref 3.5–5.2)

## 2021-10-11 PROCEDURE — 25010000002 CEFOXITIN PER 1 G: Performed by: SURGERY

## 2021-10-11 PROCEDURE — 82962 GLUCOSE BLOOD TEST: CPT

## 2021-10-11 PROCEDURE — 25010000002 NEOSTIGMINE 5 MG/5ML SOLUTION: Performed by: NURSE ANESTHETIST, CERTIFIED REGISTERED

## 2021-10-11 PROCEDURE — 49654 PR LAP, INCISIONAL HERNIA REPAIR,REDUCIBLE: CPT | Performed by: SURGERY

## 2021-10-11 PROCEDURE — 84132 ASSAY OF SERUM POTASSIUM: CPT | Performed by: SURGERY

## 2021-10-11 PROCEDURE — 25010000002 FENTANYL CITRATE (PF) 100 MCG/2ML SOLUTION: Performed by: NURSE ANESTHETIST, CERTIFIED REGISTERED

## 2021-10-11 PROCEDURE — 25010000002 DEXAMETHASONE PER 1 MG: Performed by: NURSE ANESTHETIST, CERTIFIED REGISTERED

## 2021-10-11 PROCEDURE — 25010000002 ONDANSETRON PER 1 MG: Performed by: NURSE ANESTHETIST, CERTIFIED REGISTERED

## 2021-10-11 PROCEDURE — 25010000002 HYDROMORPHONE PER 4 MG: Performed by: NURSE ANESTHETIST, CERTIFIED REGISTERED

## 2021-10-11 PROCEDURE — 25010000002 PROPOFOL 10 MG/ML EMULSION: Performed by: NURSE ANESTHETIST, CERTIFIED REGISTERED

## 2021-10-11 PROCEDURE — 49654 PR LAP, INCISIONAL HERNIA REPAIR,REDUCIBLE: CPT | Performed by: NURSE PRACTITIONER

## 2021-10-11 RX ORDER — LIDOCAINE HYDROCHLORIDE AND EPINEPHRINE 10; 10 MG/ML; UG/ML
INJECTION, SOLUTION INFILTRATION; PERINEURAL AS NEEDED
Status: DISCONTINUED | OUTPATIENT
Start: 2021-10-11 | End: 2021-10-11 | Stop reason: HOSPADM

## 2021-10-11 RX ORDER — PROPOFOL 10 MG/ML
VIAL (ML) INTRAVENOUS AS NEEDED
Status: DISCONTINUED | OUTPATIENT
Start: 2021-10-11 | End: 2021-10-11 | Stop reason: SURG

## 2021-10-11 RX ORDER — LIDOCAINE HYDROCHLORIDE 10 MG/ML
0.5 INJECTION, SOLUTION INFILTRATION; PERINEURAL ONCE AS NEEDED
Status: DISCONTINUED | OUTPATIENT
Start: 2021-10-11 | End: 2021-10-11 | Stop reason: HOSPADM

## 2021-10-11 RX ORDER — SODIUM CHLORIDE 0.9 % (FLUSH) 0.9 %
10 SYRINGE (ML) INJECTION AS NEEDED
Status: DISCONTINUED | OUTPATIENT
Start: 2021-10-11 | End: 2021-10-11 | Stop reason: HOSPADM

## 2021-10-11 RX ORDER — ROCURONIUM BROMIDE 10 MG/ML
INJECTION, SOLUTION INTRAVENOUS AS NEEDED
Status: DISCONTINUED | OUTPATIENT
Start: 2021-10-11 | End: 2021-10-11 | Stop reason: SURG

## 2021-10-11 RX ORDER — HYDRALAZINE HYDROCHLORIDE 20 MG/ML
5 INJECTION INTRAMUSCULAR; INTRAVENOUS
Status: DISCONTINUED | OUTPATIENT
Start: 2021-10-11 | End: 2021-10-11 | Stop reason: HOSPADM

## 2021-10-11 RX ORDER — ONDANSETRON 2 MG/ML
4 INJECTION INTRAMUSCULAR; INTRAVENOUS ONCE AS NEEDED
Status: DISCONTINUED | OUTPATIENT
Start: 2021-10-11 | End: 2021-10-11 | Stop reason: HOSPADM

## 2021-10-11 RX ORDER — LABETALOL HYDROCHLORIDE 5 MG/ML
5 INJECTION, SOLUTION INTRAVENOUS
Status: DISCONTINUED | OUTPATIENT
Start: 2021-10-11 | End: 2021-10-11 | Stop reason: HOSPADM

## 2021-10-11 RX ORDER — GLYCOPYRROLATE 1 MG/5 ML
SYRINGE (ML) INTRAVENOUS AS NEEDED
Status: DISCONTINUED | OUTPATIENT
Start: 2021-10-11 | End: 2021-10-11 | Stop reason: SURG

## 2021-10-11 RX ORDER — HYDROCODONE BITARTRATE AND ACETAMINOPHEN 5; 325 MG/1; MG/1
1 TABLET ORAL EVERY 4 HOURS PRN
Qty: 10 TABLET | Refills: 0 | Status: SHIPPED | OUTPATIENT
Start: 2021-10-11 | End: 2021-11-11

## 2021-10-11 RX ORDER — HYDROCODONE BITARTRATE AND ACETAMINOPHEN 5; 325 MG/1; MG/1
1 TABLET ORAL ONCE AS NEEDED
Status: COMPLETED | OUTPATIENT
Start: 2021-10-11 | End: 2021-10-11

## 2021-10-11 RX ORDER — LIDOCAINE HYDROCHLORIDE 20 MG/ML
INJECTION, SOLUTION EPIDURAL; INFILTRATION; INTRACAUDAL; PERINEURAL AS NEEDED
Status: DISCONTINUED | OUTPATIENT
Start: 2021-10-11 | End: 2021-10-11 | Stop reason: SURG

## 2021-10-11 RX ORDER — PROMETHAZINE HYDROCHLORIDE 25 MG/1
25 TABLET ORAL ONCE AS NEEDED
Status: DISCONTINUED | OUTPATIENT
Start: 2021-10-11 | End: 2021-10-11 | Stop reason: HOSPADM

## 2021-10-11 RX ORDER — NEOSTIGMINE METHYLSULFATE 5 MG/5 ML
SYRINGE (ML) INTRAVENOUS AS NEEDED
Status: DISCONTINUED | OUTPATIENT
Start: 2021-10-11 | End: 2021-10-11 | Stop reason: SURG

## 2021-10-11 RX ORDER — FENTANYL CITRATE 50 UG/ML
100 INJECTION, SOLUTION INTRAMUSCULAR; INTRAVENOUS
Status: DISCONTINUED | OUTPATIENT
Start: 2021-10-11 | End: 2021-10-11 | Stop reason: HOSPADM

## 2021-10-11 RX ORDER — ONDANSETRON 2 MG/ML
INJECTION INTRAMUSCULAR; INTRAVENOUS AS NEEDED
Status: DISCONTINUED | OUTPATIENT
Start: 2021-10-11 | End: 2021-10-11 | Stop reason: SURG

## 2021-10-11 RX ORDER — HYDROMORPHONE HCL 110MG/55ML
0.5 PATIENT CONTROLLED ANALGESIA SYRINGE INTRAVENOUS
Status: DISCONTINUED | OUTPATIENT
Start: 2021-10-11 | End: 2021-10-11 | Stop reason: HOSPADM

## 2021-10-11 RX ORDER — FENTANYL CITRATE 50 UG/ML
INJECTION, SOLUTION INTRAMUSCULAR; INTRAVENOUS AS NEEDED
Status: DISCONTINUED | OUTPATIENT
Start: 2021-10-11 | End: 2021-10-11 | Stop reason: SURG

## 2021-10-11 RX ORDER — SODIUM CHLORIDE 9 MG/ML
20 INJECTION, SOLUTION INTRAVENOUS CONTINUOUS
Status: DISCONTINUED | OUTPATIENT
Start: 2021-10-11 | End: 2021-10-11 | Stop reason: HOSPADM

## 2021-10-11 RX ORDER — PROMETHAZINE HYDROCHLORIDE 25 MG/1
25 SUPPOSITORY RECTAL ONCE AS NEEDED
Status: DISCONTINUED | OUTPATIENT
Start: 2021-10-11 | End: 2021-10-11 | Stop reason: HOSPADM

## 2021-10-11 RX ORDER — DEXAMETHASONE SODIUM PHOSPHATE 4 MG/ML
INJECTION, SOLUTION INTRA-ARTICULAR; INTRALESIONAL; INTRAMUSCULAR; INTRAVENOUS; SOFT TISSUE AS NEEDED
Status: DISCONTINUED | OUTPATIENT
Start: 2021-10-11 | End: 2021-10-11 | Stop reason: SURG

## 2021-10-11 RX ORDER — SODIUM CHLORIDE 9 MG/ML
100 INJECTION, SOLUTION INTRAVENOUS CONTINUOUS
Status: DISCONTINUED | OUTPATIENT
Start: 2021-10-11 | End: 2021-10-11 | Stop reason: HOSPADM

## 2021-10-11 RX ORDER — NALOXONE HCL 0.4 MG/ML
0.4 VIAL (ML) INJECTION AS NEEDED
Status: DISCONTINUED | OUTPATIENT
Start: 2021-10-11 | End: 2021-10-11 | Stop reason: HOSPADM

## 2021-10-11 RX ADMIN — SODIUM CHLORIDE 20 ML/HR: 9 INJECTION, SOLUTION INTRAVENOUS at 06:28

## 2021-10-11 RX ADMIN — FENTANYL CITRATE 50 MCG: 50 INJECTION, SOLUTION INTRAMUSCULAR; INTRAVENOUS at 07:26

## 2021-10-11 RX ADMIN — FENTANYL CITRATE 50 MCG: 50 INJECTION, SOLUTION INTRAMUSCULAR; INTRAVENOUS at 08:28

## 2021-10-11 RX ADMIN — Medication 4 MG: at 08:27

## 2021-10-11 RX ADMIN — DEXAMETHASONE SODIUM PHOSPHATE 4 MG: 4 INJECTION, SOLUTION INTRAMUSCULAR; INTRAVENOUS at 07:31

## 2021-10-11 RX ADMIN — HYDROMORPHONE HYDROCHLORIDE 0.5 MG: 2 INJECTION, SOLUTION INTRAMUSCULAR; INTRAVENOUS; SUBCUTANEOUS at 09:32

## 2021-10-11 RX ADMIN — SODIUM CHLORIDE: 0.9 INJECTION, SOLUTION INTRAVENOUS at 07:18

## 2021-10-11 RX ADMIN — PROPOFOL 150 MG: 10 INJECTION, EMULSION INTRAVENOUS at 07:26

## 2021-10-11 RX ADMIN — Medication 0.8 MG: at 08:27

## 2021-10-11 RX ADMIN — LIDOCAINE HYDROCHLORIDE 50 MG: 20 INJECTION, SOLUTION EPIDURAL; INFILTRATION; INTRACAUDAL; PERINEURAL at 07:26

## 2021-10-11 RX ADMIN — CEFOXITIN SODIUM 2 G: 2 POWDER, FOR SOLUTION INTRAVENOUS at 07:28

## 2021-10-11 RX ADMIN — HYDROCODONE BITARTRATE AND ACETAMINOPHEN 1 TABLET: 5; 325 TABLET ORAL at 09:52

## 2021-10-11 RX ADMIN — ONDANSETRON 4 MG: 2 INJECTION INTRAMUSCULAR; INTRAVENOUS at 08:27

## 2021-10-11 RX ADMIN — ROCURONIUM BROMIDE 50 MG: 10 INJECTION INTRAVENOUS at 07:26

## 2021-10-11 RX ADMIN — HYDROMORPHONE HYDROCHLORIDE 1 MG: 2 INJECTION, SOLUTION INTRAMUSCULAR; INTRAVENOUS; SUBCUTANEOUS at 09:17

## 2021-10-11 RX ADMIN — HYDROMORPHONE HYDROCHLORIDE 0.5 MG: 2 INJECTION, SOLUTION INTRAMUSCULAR; INTRAVENOUS; SUBCUTANEOUS at 09:00

## 2021-10-11 NOTE — ANESTHESIA PREPROCEDURE EVALUATION
Anesthesia Evaluation     Patient summary reviewed   NPO Solid Status: > 8 hours  NPO Liquid Status: > 8 hours           Airway   Mallampati: II  TM distance: >3 FB  Neck ROM: full  No difficulty expected  Dental - normal exam   (+) edentulous    Pulmonary - normal exam   (+) COPD, shortness of breath, sleep apnea,   Cardiovascular - normal exam  Exercise tolerance: poor (<4 METS)    (+) hypertension, CHF , hyperlipidemia,  carotid artery disease      Neuro/Psych  (+) numbness,     GI/Hepatic/Renal/Endo    (+) obesity,  PUD,  renal disease dialysis and ESRD, diabetes mellitus type 2,     Musculoskeletal     Abdominal  - normal exam    Bowel sounds: normal.   Substance History      OB/GYN          Other   arthritis,                      Anesthesia Plan    ASA 4     general     intravenous induction     Anesthetic plan, all risks, benefits, and alternatives have been provided, discussed and informed consent has been obtained with: patient.

## 2021-10-11 NOTE — OP NOTE
Operative Report:    Patient Name:  Sherry Lobato  YOB: 1950    Date of Surgery:  10/11/2021     Indications: 71-year-old lady with a history of end-stage renal disease who is eagerly waiting for transplantation who had a right sided incisional hernia that I repaired a couple years ago who developed some new abdominal pain upper abdominal fullness and bloating and on physical exam she did seem to have a soft approximately quarter sized hernia at the lateral aspect of the previous repair.  CT scan did show bowel attached to this area so I counseled her about the risk and benefits of diagnostic laparoscopy and closure of the hernia.  At our counseling I did recommend that she have mesh placed to help prevent this from continue to recur but she still feels very strongly that she would like to avoid mesh thinking that it will complicate her possibility for transplantation in the future.  I conceded and is agreeable to another primary repair.    Pre-op Diagnosis:   Ventral hernia without obstruction or gangrene [K43.9]       Post-Op Diagnosis Codes:     * Ventral hernia without obstruction or gangrene [K43.9]    Procedure/CPT® Codes:      Procedure(s):  VENTRAL/INCISIONAL HERNIA REPAIR LAPAROSCOPIC    Staff:  Surgeon(s):  Nilay Stevens MD    Circulator: Cheyanne Galloway RN  Scrub Person: Kenyetta Grigsby  Assistant: Minnie Barger APRN  was responsible for performing the following activities: Closing and Held/Positioned Camera and their skilled assistance was necessary for the success of this case.        Anesthesia: General    Estimated Blood Loss: 10 mL    Implants:    Nothing was implanted during the procedure    Specimen:          None        Findings: Approximately a quarter sized hernia at the lateral aspect of the previous hernia repair containing a adhesed loop of small bowel which appeared nonobstructed and viable.    Complications: None    Description of Procedure: Patient was taken to  the operating placed in the operative table in supine vision or general anesthesia.  Her abdomen was prepped and draped normal sterile fashion.  Timeout was performed identifying correct patient side and site of operation.  I began the operation by entering the abdomen through an infraumbilical skin incision the fascia was incised the peritoneum was entered bluntly Mcgregor trocar placed abdomen insufflated camera to reduce there is noted some injury to the underlying bowel.  On brief inspection of the abdominal cavity she had dense adhesions in the right midabdomen and upper abdomen from her previous surgeries.  I placed a midline lower abdominal 5 mm port and a left upper quadrant 5 mm port under laparoscopic guidance.  I then began sharp dissection of the adhesions and performed about a 20-minute adhesiolysis.  There is no evidence of injury to the bowel during this process.  Once the abdominal wall was completely clear around the site of the hernia I took a couple pictures to document the size and location.  I then made a small stab incision in the skin in place transfascial 0 Ethibond sutures with the suture passer to close the defect primarily.  Approximately 4 were used in this area.  I then took a final picture to document the complete closure of this incision.  The dissection planes were inspected the bowel looked okay.  The ports were serially removed the abdominal wall was desufflated the infraumbilical incision site was closed with 0 Vicryl suture the skin with 4-0 Vicryl suture and skin affix was placed on her wounds.      Nilay Stevens MD     Date: 10/11/2021  Time: 08:59 EDT    This note was created using Dragon Voice Recognition software.

## 2021-10-11 NOTE — ANESTHESIA POSTPROCEDURE EVALUATION
Patient: Sherry Lobato    Procedure Summary     Date: 10/11/21 Room / Location: [facility] OR 06 / [facility] MAIN OR    Anesthesia Start: 0718 Anesthesia Stop: 0848    Procedure: VENTRAL/INCISIONAL HERNIA REPAIR LAPAROSCOPIC (N/A Abdomen) Diagnosis:       Ventral hernia without obstruction or gangrene      (Ventral hernia without obstruction or gangrene [K43.9])    Surgeons: Nilay Stevens MD Provider: Artemio Mcdowell MD    Anesthesia Type: general ASA Status: 4          Anesthesia Type: general    Vitals  Vitals Value Taken Time   /84 10/11/21 0957   Temp 97.9 °F (36.6 °C) 10/11/21 0957   Pulse 75 10/11/21 0958   Resp 11 10/11/21 0957   SpO2 97 % 10/11/21 0958   Vitals shown include unvalidated device data.        Post Anesthesia Care and Evaluation    Patient location during evaluation: PACU  Patient participation: complete - patient participated  Level of consciousness: awake  Pain scale: See nurse's notes for pain score.  Pain management: adequate  Airway patency: patent  Anesthetic complications: No anesthetic complications  PONV Status: none  Cardiovascular status: acceptable  Respiratory status: acceptable  Hydration status: acceptable    Comments: Patient seen and examined postoperatively; vital signs stable; SpO2 greater than or equal to 90%; cardiopulmonary status stable; nausea/vomiting adequately controlled; pain adequately controlled; no apparent anesthesia complications; patient discharged from anesthesia care when discharge criteria were met

## 2021-10-11 NOTE — ANESTHESIA PROCEDURE NOTES
Airway  Urgency: elective    Date/Time: 10/11/2021 7:29 AM  Airway not difficult    General Information and Staff    Patient location during procedure: OR  Anesthesiologist: Artemio Mcdowell MD  CRNA: Viry Phillips CRNA    Indications and Patient Condition  Indications for airway management: airway protection    Preoxygenated: yes  MILS maintained throughout  Mask difficulty assessment: 2 - vent by mask + OA or adjuvant +/- NMBA    Final Airway Details  Final airway type: endotracheal airway      Successful airway: ETT  Cuffed: yes   Successful intubation technique: direct laryngoscopy  Facilitating devices/methods: intubating stylet  Endotracheal tube insertion site: oral  Blade: Lisa  Blade size: 3  ETT size (mm): 7.0  Cormack-Lehane Classification: grade I - full view of glottis  Placement verified by: chest auscultation and capnometry   Inital cuff pressure (cm H2O): 20  Measured from: lips  Number of attempts at approach: 1  Assessment: lips, teeth, and gum same as pre-op and atraumatic intubation    Additional Comments  Vane WANG performed intubation

## 2021-10-13 ENCOUNTER — TELEPHONE (OUTPATIENT)
Dept: SURGERY | Facility: CLINIC | Age: 71
End: 2021-10-13

## 2021-10-13 NOTE — TELEPHONE ENCOUNTER
PO FU Call- spoke with pts . Stated pt was at dialysis. States shes been hurting really bad. Informed him to have her take her pain meds and in between if she notices her pain coming back to alternate between tylenol and ibuprofen. Also informed him to have pt use ice packs to help as well. Pt already had 2 wk po appt made. Will norm then.

## 2021-10-14 LAB — QT INTERVAL: 356 MS

## 2021-11-11 ENCOUNTER — OFFICE VISIT (OUTPATIENT)
Dept: SURGERY | Facility: CLINIC | Age: 71
End: 2021-11-11

## 2021-11-11 VITALS
DIASTOLIC BLOOD PRESSURE: 74 MMHG | HEART RATE: 104 BPM | RESPIRATION RATE: 18 BRPM | HEIGHT: 60 IN | OXYGEN SATURATION: 98 % | TEMPERATURE: 99.3 F | BODY MASS INDEX: 31.41 KG/M2 | WEIGHT: 160 LBS | SYSTOLIC BLOOD PRESSURE: 147 MMHG

## 2021-11-11 DIAGNOSIS — K43.9 VENTRAL HERNIA WITHOUT OBSTRUCTION OR GANGRENE: Primary | ICD-10-CM

## 2021-11-11 PROCEDURE — 99024 POSTOP FOLLOW-UP VISIT: CPT | Performed by: SURGERY

## 2021-11-11 RX ORDER — SEVELAMER CARBONATE 800 MG/1
TABLET, FILM COATED ORAL
COMMUNITY
Start: 2021-10-27 | End: 2023-01-31 | Stop reason: HOSPADM

## 2021-11-11 NOTE — PROGRESS NOTES
"Subjective   Sherry Lobato is a 71 y.o. female.   About 1 month out from diagnostic laparoscopy lyse of adhesions and repair of a incisional hernia.  In general had quite a bit of constipation after the operation went about 10 or 11 days without bowel function ultimately magnesium citrate was able to open her up.  Since that time she is still having bowel function about every day but she is not having the production she was before.  The diarrhea she had previously has resolved now she is doing more with constipation.  There is no description of nausea or vomiting.  She still has some abdominal fullness around her umbilicus and tightness.    Objective   /74 (BP Location: Right arm, Patient Position: Sitting, Cuff Size: Adult)   Pulse 104   Temp 99.3 °F (37.4 °C) (Infrared)   Resp 18   Ht 152.4 cm (60\")   Wt 72.6 kg (160 lb)   SpO2 98%   BMI 31.25 kg/m²   Physical Exam  On exam her abdomen is soft and nondistended her incisions are healing well without erythema or drainage there is mild general tenderness to palpation without peritonitis  Assessment/Plan   Diagnoses and all orders for this visit:    1. Ventral hernia without obstruction or gangrene (Primary)    About a month out from diagnostic laparoscopy and primary repair of recurrent right abdominal wall hernia.  Still having some abdominal symptoms that have not been alleviated by the operation.  I talked her about a couple options today the next step I believe would be repeat imaging to evaluate the bowel.  The alternative would be to give this more time to see if it will continue to resolve on its own.  After our discussion we are can hold off for about another month and talk again about symptoms at that time.  If she has a severe flareup of symptoms in the interim we will go ahead and order her CT scan.      Nilay Stevens MD  11/11/2021  2:51 PM EST    This note was created using Dragon Voice Recognition software.  "

## 2021-12-14 ENCOUNTER — OFFICE VISIT (OUTPATIENT)
Dept: SURGERY | Facility: CLINIC | Age: 71
End: 2021-12-14

## 2021-12-14 VITALS
HEIGHT: 60 IN | WEIGHT: 160.2 LBS | TEMPERATURE: 98.6 F | RESPIRATION RATE: 18 BRPM | DIASTOLIC BLOOD PRESSURE: 76 MMHG | BODY MASS INDEX: 31.45 KG/M2 | SYSTOLIC BLOOD PRESSURE: 165 MMHG | HEART RATE: 93 BPM | OXYGEN SATURATION: 98 %

## 2021-12-14 DIAGNOSIS — K43.9 VENTRAL HERNIA WITHOUT OBSTRUCTION OR GANGRENE: Primary | ICD-10-CM

## 2021-12-14 PROCEDURE — 99024 POSTOP FOLLOW-UP VISIT: CPT | Performed by: SURGERY

## 2021-12-14 NOTE — PROGRESS NOTES
"Subjective   Sherry Lobato is a 71 y.o. female.   Second postop visit after laparoscopic lyse of adhesions and repair of incisional hernia with primary closure only at her request.  Since last time I saw her she is doing so much better.  Her abdominal pain is gone.  She says that she has a couple bowel movements in the morning and then is good to go for the rest the day.  She is tolerating a diet she is not having any nausea or vomiting.    Objective   /76 (BP Location: Right arm, Patient Position: Sitting, Cuff Size: Adult)   Pulse 93   Temp 98.6 °F (37 °C) (Infrared)   Resp 18   Ht 152.4 cm (60\")   Wt 72.7 kg (160 lb 3.2 oz)   SpO2 98%   BMI 31.29 kg/m²   Physical Exam  On exam her abdomen is soft nondistended nontender to palpation the right lower quadrant incision feels intact without any definitive evidence of hernia.    Assessment/Plan   Diagnoses and all orders for this visit:    1. Ventral hernia without obstruction or gangrene (Primary)    About 6 to 8 weeks out from laparoscopic lyse of adhesions and ventral hernia repair.  In general she is recovering well.  For now we will have her follow-up as needed.  Activities without restriction.    Nilay Stevens MD  12/14/2021  8:24 AM EST    This note was created using Dragon Voice Recognition software.  "

## 2022-02-21 ENCOUNTER — TRANSCRIBE ORDERS (OUTPATIENT)
Dept: ADMINISTRATIVE | Facility: HOSPITAL | Age: 72
End: 2022-02-21

## 2022-02-21 DIAGNOSIS — N18.6 END STAGE RENAL DISEASE: ICD-10-CM

## 2022-02-21 DIAGNOSIS — Z99.2 ENCOUNTER FOR EXTRACORPOREAL DIALYSIS: Primary | ICD-10-CM

## 2022-02-24 ENCOUNTER — HOSPITAL ENCOUNTER (OUTPATIENT)
Dept: CARDIOLOGY | Facility: HOSPITAL | Age: 72
Discharge: HOME | End: 2022-02-24
Admitting: PHYSICIAN ASSISTANT

## 2022-02-24 DIAGNOSIS — N18.6 END STAGE RENAL DISEASE: ICD-10-CM

## 2022-02-24 DIAGNOSIS — Z99.2 ENCOUNTER FOR EXTRACORPOREAL DIALYSIS: ICD-10-CM

## 2022-02-24 PROCEDURE — 93990 DOPPLER FLOW TESTING: CPT

## 2022-02-25 LAB
BH CV VAS DIALYSIS ARTERIAL ANASTOMOSIS DIAMETER: 0.46 CM
BH CV VAS DIALYSIS ARTERIAL ANASTOMOSIS EDV: 89 CM/SEC
BH CV VAS DIALYSIS ARTERIAL ANASTOMOSIS PSV: 195 CM/SEC
BH CV VAS DIALYSIS CONDUIT DIST DEPTH: 0.6 CM
BH CV VAS DIALYSIS CONDUIT DIST DIAMETER: 0.73 CM
BH CV VAS DIALYSIS CONDUIT DIST EDV: 22 CM/SEC
BH CV VAS DIALYSIS CONDUIT DIST FLOW VOL: 520 ML/MIN
BH CV VAS DIALYSIS CONDUIT DIST PSV: 53 CM/SEC
BH CV VAS DIALYSIS CONDUIT MID DEPTH: 0.32 CM
BH CV VAS DIALYSIS CONDUIT MID DIAMETER: 0.96 CM
BH CV VAS DIALYSIS CONDUIT MID EDV: 53 CM/SEC
BH CV VAS DIALYSIS CONDUIT MID FLOW VOL: 896 ML/MIN
BH CV VAS DIALYSIS CONDUIT MID PSV: 114 CM/SEC
BH CV VAS DIALYSIS CONDUIT PROX DEPTH: 0.48 CM
BH CV VAS DIALYSIS CONDUIT PROX DIAMETER: 0.36 CM
BH CV VAS DIALYSIS CONDUIT PROX EDV: 282 CM/SEC
BH CV VAS DIALYSIS CONDUIT PROX FLOW VOL: 1254 ML/MIN
BH CV VAS DIALYSIS CONDUIT PROX PSV: 552 CM/SEC
BH CV VAS DIALYSIS CONDUIT PROX/MID DEPTH: 0.62 CM
BH CV VAS DIALYSIS CONDUIT PROX/MID DIAMETER: 0.5 CM
BH CV VAS DIALYSIS CONDUIT PROX/MID EDV: 208 CM/SEC
BH CV VAS DIALYSIS CONDUIT PROX/MID FLOW VOL: 1252 ML/MIN
BH CV VAS DIALYSIS CONDUIT PROX/MID PSV: 386 CM/SEC
BH CV VAS DIALYSIS PRE-INFLOW BRACHIAL DIAMETER: 0.45 CM
BH CV VAS DIALYSIS PRE-INFLOW BRACHIAL EDV: 81 CM/SEC
BH CV VAS DIALYSIS PRE-INFLOW BRACHIAL FLOW VOL: 724 ML/MIN
BH CV VAS DIALYSIS PRE-INFLOW BRACHIAL PSV: 203 CM/SEC
BH CV VAS DIALYSIS VENOUS OUTFLOW AXILLARY DIAMETER: 0.92 CM
BH CV VAS DIALYSIS VENOUS OUTFLOW AXILLARY EDV: 34 CM/SEC
BH CV VAS DIALYSIS VENOUS OUTFLOW AXILLARY PSV: 64 CM/SEC
BH CV VAS DIALYSIS VENOUS OUTFLOW SUBCLAVIAN DIAMETER: 0.94 CM
BH CV VAS DIALYSIS VENOUS OUTFLOW SUBCLAVIAN EDV: 19 CM/SEC
BH CV VAS DIALYSIS VENOUS OUTFLOW SUBCLAVIAN PSV: 57 CM/SEC
MAXIMAL PREDICTED HEART RATE: 148 BPM
STRESS TARGET HR: 126 BPM

## 2022-03-01 ENCOUNTER — APPOINTMENT (OUTPATIENT)
Dept: VASCULAR SURGERY | Facility: HOSPITAL | Age: 72
End: 2022-03-01

## 2022-03-01 PROCEDURE — G0463 HOSPITAL OUTPT CLINIC VISIT: HCPCS

## 2022-03-02 ENCOUNTER — TRANSCRIBE ORDERS (OUTPATIENT)
Dept: ADMINISTRATIVE | Facility: HOSPITAL | Age: 72
End: 2022-03-02

## 2022-03-02 DIAGNOSIS — Z99.2 ENCOUNTER FOR EXTRACORPOREAL DIALYSIS: Primary | ICD-10-CM

## 2022-03-02 DIAGNOSIS — N18.6 END STAGE RENAL DISEASE: ICD-10-CM

## 2022-03-11 ENCOUNTER — APPOINTMENT (OUTPATIENT)
Dept: CT IMAGING | Facility: HOSPITAL | Age: 72
End: 2022-03-11

## 2022-03-11 ENCOUNTER — HOSPITAL ENCOUNTER (EMERGENCY)
Facility: HOSPITAL | Age: 72
Discharge: HOME | End: 2022-03-11
Attending: EMERGENCY MEDICINE | Admitting: EMERGENCY MEDICINE

## 2022-03-11 ENCOUNTER — HOSPITAL ENCOUNTER (OUTPATIENT)
Facility: HOSPITAL | Age: 72
Discharge: HOME | End: 2022-03-12
Attending: EMERGENCY MEDICINE | Admitting: INTERNAL MEDICINE

## 2022-03-11 VITALS
OXYGEN SATURATION: 95 % | SYSTOLIC BLOOD PRESSURE: 134 MMHG | BODY MASS INDEX: 31.55 KG/M2 | HEIGHT: 60 IN | DIASTOLIC BLOOD PRESSURE: 71 MMHG | WEIGHT: 160.72 LBS | HEART RATE: 87 BPM | TEMPERATURE: 98.3 F | RESPIRATION RATE: 16 BRPM

## 2022-03-11 DIAGNOSIS — N18.4 STAGE 4 CHRONIC KIDNEY DISEASE: ICD-10-CM

## 2022-03-11 DIAGNOSIS — R93.3 ABNORMAL CT SCAN, GASTROINTESTINAL TRACT: ICD-10-CM

## 2022-03-11 DIAGNOSIS — R19.7 DIARRHEA, UNSPECIFIED TYPE: Primary | ICD-10-CM

## 2022-03-11 DIAGNOSIS — Z99.2 END STAGE RENAL DISEASE ON DIALYSIS: ICD-10-CM

## 2022-03-11 DIAGNOSIS — K52.9 COLITIS: Primary | ICD-10-CM

## 2022-03-11 DIAGNOSIS — E87.5 HYPERKALEMIA: ICD-10-CM

## 2022-03-11 DIAGNOSIS — K29.01 ACUTE GASTRITIS WITH HEMORRHAGE, UNSPECIFIED GASTRITIS TYPE: ICD-10-CM

## 2022-03-11 DIAGNOSIS — R10.84 GENERALIZED ABDOMINAL PAIN: ICD-10-CM

## 2022-03-11 DIAGNOSIS — N18.6 END STAGE RENAL DISEASE ON DIALYSIS: ICD-10-CM

## 2022-03-11 LAB
ADV 40+41 DNA STL QL NAA+NON-PROBE: NOT DETECTED
ALBUMIN SERPL-MCNC: 3.8 G/DL (ref 3.5–5.2)
ALBUMIN SERPL-MCNC: 3.9 G/DL (ref 3.5–5.2)
ALBUMIN/GLOB SERPL: 1.2 G/DL
ALBUMIN/GLOB SERPL: 1.2 G/DL
ALP SERPL-CCNC: 92 U/L (ref 39–117)
ALP SERPL-CCNC: 96 U/L (ref 39–117)
ALT SERPL W P-5'-P-CCNC: 10 U/L (ref 1–33)
ALT SERPL W P-5'-P-CCNC: 11 U/L (ref 1–33)
ANION GAP SERPL CALCULATED.3IONS-SCNC: 16 MMOL/L (ref 5–15)
ANION GAP SERPL CALCULATED.3IONS-SCNC: 17 MMOL/L (ref 5–15)
AST SERPL-CCNC: 15 U/L (ref 1–32)
AST SERPL-CCNC: 15 U/L (ref 1–32)
ASTRO TYP 1-8 RNA STL QL NAA+NON-PROBE: NOT DETECTED
BASOPHILS # BLD AUTO: 0.1 10*3/MM3 (ref 0–0.2)
BASOPHILS # BLD AUTO: 0.1 10*3/MM3 (ref 0–0.2)
BASOPHILS NFR BLD AUTO: 0.8 % (ref 0–1.5)
BASOPHILS NFR BLD AUTO: 1 % (ref 0–1.5)
BILIRUB SERPL-MCNC: 0.2 MG/DL (ref 0–1.2)
BILIRUB SERPL-MCNC: 0.2 MG/DL (ref 0–1.2)
BUN SERPL-MCNC: 17 MG/DL (ref 8–23)
BUN SERPL-MCNC: 38 MG/DL (ref 8–23)
BUN/CREAT SERPL: 4.1 (ref 7–25)
BUN/CREAT SERPL: 5.2 (ref 7–25)
C CAYETANENSIS DNA STL QL NAA+NON-PROBE: NOT DETECTED
C COLI+JEJ+UPSA DNA STL QL NAA+NON-PROBE: NOT DETECTED
C DIFF GDH STL QL: NEGATIVE
CALCIUM SPEC-SCNC: 9 MG/DL (ref 8.6–10.5)
CALCIUM SPEC-SCNC: 9.1 MG/DL (ref 8.6–10.5)
CHLORIDE SERPL-SCNC: 100 MMOL/L (ref 98–107)
CHLORIDE SERPL-SCNC: 102 MMOL/L (ref 98–107)
CO2 SERPL-SCNC: 20 MMOL/L (ref 22–29)
CO2 SERPL-SCNC: 22 MMOL/L (ref 22–29)
CREAT SERPL-MCNC: 4.1 MG/DL (ref 0.57–1)
CREAT SERPL-MCNC: 7.35 MG/DL (ref 0.57–1)
CRYPTOSP DNA STL QL NAA+NON-PROBE: NOT DETECTED
DEPRECATED RDW RBC AUTO: 55.1 FL (ref 37–54)
DEPRECATED RDW RBC AUTO: 56.4 FL (ref 37–54)
E HISTOLYT DNA STL QL NAA+NON-PROBE: NOT DETECTED
EAEC PAA PLAS AGGR+AATA ST NAA+NON-PRB: NOT DETECTED
EC STX1+STX2 GENES STL QL NAA+NON-PROBE: NOT DETECTED
EGFRCR SERPLBLD CKD-EPI 2021: 11 ML/MIN/1.73
EGFRCR SERPLBLD CKD-EPI 2021: 5.5 ML/MIN/1.73
EOSINOPHIL # BLD AUTO: 0.1 10*3/MM3 (ref 0–0.4)
EOSINOPHIL # BLD AUTO: 0.2 10*3/MM3 (ref 0–0.4)
EOSINOPHIL NFR BLD AUTO: 1 % (ref 0.3–6.2)
EOSINOPHIL NFR BLD AUTO: 1.9 % (ref 0.3–6.2)
EPEC EAE GENE STL QL NAA+NON-PROBE: NOT DETECTED
ERYTHROCYTE [DISTWIDTH] IN BLOOD BY AUTOMATED COUNT: 16.7 % (ref 12.3–15.4)
ERYTHROCYTE [DISTWIDTH] IN BLOOD BY AUTOMATED COUNT: 17.1 % (ref 12.3–15.4)
ETEC LTA+ST1A+ST1B TOX ST NAA+NON-PROBE: NOT DETECTED
G LAMBLIA DNA STL QL NAA+NON-PROBE: NOT DETECTED
GLOBULIN UR ELPH-MCNC: 3.1 GM/DL
GLOBULIN UR ELPH-MCNC: 3.2 GM/DL
GLUCOSE SERPL-MCNC: 108 MG/DL (ref 65–99)
GLUCOSE SERPL-MCNC: 113 MG/DL (ref 65–99)
HCT VFR BLD AUTO: 32.7 % (ref 34–46.6)
HCT VFR BLD AUTO: 33.6 % (ref 34–46.6)
HGB BLD-MCNC: 11 G/DL (ref 12–15.9)
HGB BLD-MCNC: 11.2 G/DL (ref 12–15.9)
LIPASE SERPL-CCNC: 37 U/L (ref 13–60)
LYMPHOCYTES # BLD AUTO: 0.8 10*3/MM3 (ref 0.7–3.1)
LYMPHOCYTES # BLD AUTO: 1 10*3/MM3 (ref 0.7–3.1)
LYMPHOCYTES NFR BLD AUTO: 6 % (ref 19.6–45.3)
LYMPHOCYTES NFR BLD AUTO: 8.5 % (ref 19.6–45.3)
MCH RBC QN AUTO: 30.9 PG (ref 26.6–33)
MCH RBC QN AUTO: 31.5 PG (ref 26.6–33)
MCHC RBC AUTO-ENTMCNC: 33.3 G/DL (ref 31.5–35.7)
MCHC RBC AUTO-ENTMCNC: 33.7 G/DL (ref 31.5–35.7)
MCV RBC AUTO: 92.8 FL (ref 79–97)
MCV RBC AUTO: 93.7 FL (ref 79–97)
MONOCYTES # BLD AUTO: 0.6 10*3/MM3 (ref 0.1–0.9)
MONOCYTES # BLD AUTO: 0.9 10*3/MM3 (ref 0.1–0.9)
MONOCYTES NFR BLD AUTO: 5.7 % (ref 5–12)
MONOCYTES NFR BLD AUTO: 6.2 % (ref 5–12)
NEUTROPHILS NFR BLD AUTO: 12.1 10*3/MM3 (ref 1.7–7)
NEUTROPHILS NFR BLD AUTO: 82.9 % (ref 42.7–76)
NEUTROPHILS NFR BLD AUTO: 86 % (ref 42.7–76)
NEUTROPHILS NFR BLD AUTO: 9.4 10*3/MM3 (ref 1.7–7)
NOROVIRUS GI+II RNA STL QL NAA+NON-PROBE: NOT DETECTED
NRBC BLD AUTO-RTO: 0 /100 WBC (ref 0–0.2)
NRBC BLD AUTO-RTO: 0.1 /100 WBC (ref 0–0.2)
P SHIGELLOIDES DNA STL QL NAA+NON-PROBE: NOT DETECTED
PLATELET # BLD AUTO: 403 10*3/MM3 (ref 140–450)
PLATELET # BLD AUTO: 445 10*3/MM3 (ref 140–450)
PMV BLD AUTO: 6.9 FL (ref 6–12)
PMV BLD AUTO: 7.1 FL (ref 6–12)
POTASSIUM SERPL-SCNC: 4.1 MMOL/L (ref 3.5–5.2)
POTASSIUM SERPL-SCNC: 6.1 MMOL/L (ref 3.5–5.2)
PROT SERPL-MCNC: 6.9 G/DL (ref 6–8.5)
PROT SERPL-MCNC: 7.1 G/DL (ref 6–8.5)
RBC # BLD AUTO: 3.49 10*6/MM3 (ref 3.77–5.28)
RBC # BLD AUTO: 3.62 10*6/MM3 (ref 3.77–5.28)
RVA RNA STL QL NAA+NON-PROBE: NOT DETECTED
S ENT+BONG DNA STL QL NAA+NON-PROBE: NOT DETECTED
SAPO I+II+IV+V RNA STL QL NAA+NON-PROBE: NOT DETECTED
SARS-COV-2 RNA PNL SPEC NAA+PROBE: NOT DETECTED
SHIGELLA SP+EIEC IPAH ST NAA+NON-PROBE: NOT DETECTED
SODIUM SERPL-SCNC: 138 MMOL/L (ref 136–145)
SODIUM SERPL-SCNC: 139 MMOL/L (ref 136–145)
V CHOL+PARA+VUL DNA STL QL NAA+NON-PROBE: NOT DETECTED
V CHOLERAE DNA STL QL NAA+NON-PROBE: NOT DETECTED
WBC NRBC COR # BLD: 11.4 10*3/MM3 (ref 3.4–10.8)
WBC NRBC COR # BLD: 14.1 10*3/MM3 (ref 3.4–10.8)
Y ENTEROCOL DNA STL QL NAA+NON-PROBE: NOT DETECTED

## 2022-03-11 PROCEDURE — 25010000002 CALCIUM GLUCONATE-NACL 1-0.675 GM/50ML-% SOLUTION: Performed by: EMERGENCY MEDICINE

## 2022-03-11 PROCEDURE — 25010000002 HYDROMORPHONE 1 MG/ML SOLUTION: Performed by: EMERGENCY MEDICINE

## 2022-03-11 PROCEDURE — 96375 TX/PRO/DX INJ NEW DRUG ADDON: CPT

## 2022-03-11 PROCEDURE — 25010000002 ONDANSETRON PER 1 MG: Performed by: EMERGENCY MEDICINE

## 2022-03-11 PROCEDURE — 0097U HC BIOFIRE FILMARRAY GI PANEL: CPT | Performed by: EMERGENCY MEDICINE

## 2022-03-11 PROCEDURE — 25010000002 AMPICILLIN-SULBACTAM PER 1.5 G: Performed by: EMERGENCY MEDICINE

## 2022-03-11 PROCEDURE — 80053 COMPREHEN METABOLIC PANEL: CPT | Performed by: EMERGENCY MEDICINE

## 2022-03-11 PROCEDURE — 96365 THER/PROPH/DIAG IV INF INIT: CPT

## 2022-03-11 PROCEDURE — G0378 HOSPITAL OBSERVATION PER HR: HCPCS

## 2022-03-11 PROCEDURE — 83690 ASSAY OF LIPASE: CPT | Performed by: EMERGENCY MEDICINE

## 2022-03-11 PROCEDURE — 96374 THER/PROPH/DIAG INJ IV PUSH: CPT

## 2022-03-11 PROCEDURE — 99284 EMERGENCY DEPT VISIT MOD MDM: CPT

## 2022-03-11 PROCEDURE — A9270 NON-COVERED ITEM OR SERVICE: HCPCS | Performed by: EMERGENCY MEDICINE

## 2022-03-11 PROCEDURE — 87449 NOS EACH ORGANISM AG IA: CPT | Performed by: EMERGENCY MEDICINE

## 2022-03-11 PROCEDURE — 93005 ELECTROCARDIOGRAM TRACING: CPT | Performed by: EMERGENCY MEDICINE

## 2022-03-11 PROCEDURE — 63710000001 LIDOCAINE-PRILOCAINE 2.5-2.5 % CREAM 5 G TUBE: Performed by: EMERGENCY MEDICINE

## 2022-03-11 PROCEDURE — C9803 HOPD COVID-19 SPEC COLLECT: HCPCS

## 2022-03-11 PROCEDURE — 63710000001 SODIUM ZIRCONIUM CYCLOSILICATE 10 G PACK: Performed by: EMERGENCY MEDICINE

## 2022-03-11 PROCEDURE — 87324 CLOSTRIDIUM AG IA: CPT | Performed by: EMERGENCY MEDICINE

## 2022-03-11 PROCEDURE — 85025 COMPLETE CBC W/AUTO DIFF WBC: CPT | Performed by: EMERGENCY MEDICINE

## 2022-03-11 PROCEDURE — 87635 SARS-COV-2 COVID-19 AMP PRB: CPT | Performed by: EMERGENCY MEDICINE

## 2022-03-11 PROCEDURE — 99283 EMERGENCY DEPT VISIT LOW MDM: CPT

## 2022-03-11 PROCEDURE — 74176 CT ABD & PELVIS W/O CONTRAST: CPT

## 2022-03-11 RX ORDER — ONDANSETRON 2 MG/ML
4 INJECTION INTRAMUSCULAR; INTRAVENOUS EVERY 6 HOURS PRN
Status: DISCONTINUED | OUTPATIENT
Start: 2022-03-11 | End: 2022-03-12 | Stop reason: HOSPADM

## 2022-03-11 RX ORDER — LIDOCAINE AND PRILOCAINE 25; 25 MG/G; MG/G
1 CREAM TOPICAL ONCE
Status: COMPLETED | OUTPATIENT
Start: 2022-03-11 | End: 2022-03-11

## 2022-03-11 RX ORDER — PANTOPRAZOLE SODIUM 40 MG/10ML
40 INJECTION, POWDER, LYOPHILIZED, FOR SOLUTION INTRAVENOUS ONCE
Status: COMPLETED | OUTPATIENT
Start: 2022-03-11 | End: 2022-03-11

## 2022-03-11 RX ORDER — MULTIPLE VITAMINS W/ MINERALS TAB 9MG-400MCG
1 TAB ORAL DAILY
Status: DISCONTINUED | OUTPATIENT
Start: 2022-03-12 | End: 2022-03-12 | Stop reason: HOSPADM

## 2022-03-11 RX ORDER — CHOLECALCIFEROL (VITAMIN D3) 125 MCG
5 CAPSULE ORAL NIGHTLY PRN
Status: DISCONTINUED | OUTPATIENT
Start: 2022-03-11 | End: 2022-03-12 | Stop reason: HOSPADM

## 2022-03-11 RX ORDER — LANOLIN ALCOHOL/MO/W.PET/CERES
2000 CREAM (GRAM) TOPICAL 3 TIMES WEEKLY
Status: DISCONTINUED | OUTPATIENT
Start: 2022-03-14 | End: 2022-03-12 | Stop reason: HOSPADM

## 2022-03-11 RX ORDER — LOSARTAN POTASSIUM 50 MG/1
100 TABLET ORAL DAILY
Status: DISCONTINUED | OUTPATIENT
Start: 2022-03-12 | End: 2022-03-12 | Stop reason: HOSPADM

## 2022-03-11 RX ORDER — MAGNESIUM SULFATE HEPTAHYDRATE 40 MG/ML
2 INJECTION, SOLUTION INTRAVENOUS AS NEEDED
Status: DISCONTINUED | OUTPATIENT
Start: 2022-03-11 | End: 2022-03-12 | Stop reason: HOSPADM

## 2022-03-11 RX ORDER — SODIUM CHLORIDE 9 MG/ML
100 INJECTION, SOLUTION INTRAVENOUS CONTINUOUS
Status: DISCONTINUED | OUTPATIENT
Start: 2022-03-11 | End: 2022-03-12 | Stop reason: HOSPADM

## 2022-03-11 RX ORDER — ONDANSETRON 2 MG/ML
8 INJECTION INTRAMUSCULAR; INTRAVENOUS ONCE
Status: COMPLETED | OUTPATIENT
Start: 2022-03-11 | End: 2022-03-11

## 2022-03-11 RX ORDER — CALCIUM GLUCONATE 20 MG/ML
1 INJECTION, SOLUTION INTRAVENOUS ONCE
Status: COMPLETED | OUTPATIENT
Start: 2022-03-11 | End: 2022-03-11

## 2022-03-11 RX ORDER — IPRATROPIUM BROMIDE AND ALBUTEROL SULFATE 2.5; .5 MG/3ML; MG/3ML
3 SOLUTION RESPIRATORY (INHALATION) EVERY 4 HOURS PRN
Status: DISCONTINUED | OUTPATIENT
Start: 2022-03-11 | End: 2022-03-12 | Stop reason: HOSPADM

## 2022-03-11 RX ORDER — ACETAMINOPHEN 325 MG/1
650 TABLET ORAL EVERY 6 HOURS PRN
Status: DISCONTINUED | OUTPATIENT
Start: 2022-03-11 | End: 2022-03-12 | Stop reason: HOSPADM

## 2022-03-11 RX ORDER — CALCIUM ACETATE 667 MG/1
2001 CAPSULE ORAL 2 TIMES DAILY WITH MEALS
Status: DISCONTINUED | OUTPATIENT
Start: 2022-03-12 | End: 2022-03-12 | Stop reason: HOSPADM

## 2022-03-11 RX ORDER — SODIUM BICARBONATE 650 MG/1
1300 TABLET ORAL 3 TIMES DAILY
Status: DISCONTINUED | OUTPATIENT
Start: 2022-03-12 | End: 2022-03-12 | Stop reason: HOSPADM

## 2022-03-11 RX ORDER — SODIUM CHLORIDE 9 MG/ML
50 INJECTION, SOLUTION INTRAVENOUS CONTINUOUS
Status: DISCONTINUED | OUTPATIENT
Start: 2022-03-11 | End: 2022-03-12 | Stop reason: HOSPADM

## 2022-03-11 RX ORDER — AMLODIPINE BESYLATE 5 MG/1
10 TABLET ORAL ONCE
Status: COMPLETED | OUTPATIENT
Start: 2022-03-12 | End: 2022-03-12

## 2022-03-11 RX ORDER — HYDROMORPHONE HCL 110MG/55ML
0.5 PATIENT CONTROLLED ANALGESIA SYRINGE INTRAVENOUS
Status: DISCONTINUED | OUTPATIENT
Start: 2022-03-11 | End: 2022-03-12 | Stop reason: HOSPADM

## 2022-03-11 RX ORDER — BUDESONIDE AND FORMOTEROL FUMARATE DIHYDRATE 80; 4.5 UG/1; UG/1
2 AEROSOL RESPIRATORY (INHALATION)
Refills: 1 | Status: DISCONTINUED | OUTPATIENT
Start: 2022-03-11 | End: 2022-03-12 | Stop reason: HOSPADM

## 2022-03-11 RX ORDER — DIPHENOXYLATE HYDROCHLORIDE AND ATROPINE SULFATE 2.5; .025 MG/1; MG/1
1 TABLET ORAL 4 TIMES DAILY PRN
Status: DISCONTINUED | OUTPATIENT
Start: 2022-03-11 | End: 2022-03-12 | Stop reason: HOSPADM

## 2022-03-11 RX ORDER — AMLODIPINE BESYLATE 5 MG/1
10 TABLET ORAL
Status: DISCONTINUED | OUTPATIENT
Start: 2022-03-12 | End: 2022-03-12 | Stop reason: HOSPADM

## 2022-03-11 RX ORDER — HYDRALAZINE HYDROCHLORIDE 20 MG/ML
20 INJECTION INTRAMUSCULAR; INTRAVENOUS EVERY 6 HOURS PRN
Status: DISCONTINUED | OUTPATIENT
Start: 2022-03-11 | End: 2022-03-12 | Stop reason: HOSPADM

## 2022-03-11 RX ORDER — ALBUTEROL SULFATE 2.5 MG/3ML
2.5 SOLUTION RESPIRATORY (INHALATION) EVERY 4 HOURS PRN
Status: DISCONTINUED | OUTPATIENT
Start: 2022-03-11 | End: 2022-03-12 | Stop reason: HOSPADM

## 2022-03-11 RX ORDER — SEVELAMER CARBONATE 800 MG/1
1600 TABLET, FILM COATED ORAL
Status: DISCONTINUED | OUTPATIENT
Start: 2022-03-12 | End: 2022-03-12 | Stop reason: HOSPADM

## 2022-03-11 RX ORDER — MAGNESIUM SULFATE 1 G/100ML
1 INJECTION INTRAVENOUS AS NEEDED
Status: DISCONTINUED | OUTPATIENT
Start: 2022-03-11 | End: 2022-03-12 | Stop reason: HOSPADM

## 2022-03-11 RX ORDER — PANTOPRAZOLE SODIUM 40 MG/10ML
40 INJECTION, POWDER, LYOPHILIZED, FOR SOLUTION INTRAVENOUS
Status: DISCONTINUED | OUTPATIENT
Start: 2022-03-12 | End: 2022-03-12 | Stop reason: HOSPADM

## 2022-03-11 RX ORDER — ONDANSETRON 2 MG/ML
4 INJECTION INTRAMUSCULAR; INTRAVENOUS ONCE
Status: COMPLETED | OUTPATIENT
Start: 2022-03-11 | End: 2022-03-11

## 2022-03-11 RX ORDER — LOSARTAN POTASSIUM 50 MG/1
100 TABLET ORAL ONCE
Status: COMPLETED | OUTPATIENT
Start: 2022-03-12 | End: 2022-03-12

## 2022-03-11 RX ADMIN — SODIUM ZIRCONIUM CYCLOSILICATE 10 G: 10 POWDER, FOR SUSPENSION ORAL at 09:23

## 2022-03-11 RX ADMIN — FAMOTIDINE 20 MG: 10 INJECTION INTRAVENOUS at 17:25

## 2022-03-11 RX ADMIN — HYDROMORPHONE HYDROCHLORIDE 0.5 MG: 1 INJECTION, SOLUTION INTRAMUSCULAR; INTRAVENOUS; SUBCUTANEOUS at 17:25

## 2022-03-11 RX ADMIN — HYDROMORPHONE HYDROCHLORIDE 0.25 MG: 1 INJECTION, SOLUTION INTRAMUSCULAR; INTRAVENOUS; SUBCUTANEOUS at 08:26

## 2022-03-11 RX ADMIN — SODIUM CHLORIDE, POTASSIUM CHLORIDE, SODIUM LACTATE AND CALCIUM CHLORIDE 500 ML: 600; 310; 30; 20 INJECTION, SOLUTION INTRAVENOUS at 08:01

## 2022-03-11 RX ADMIN — ONDANSETRON 4 MG: 2 INJECTION INTRAMUSCULAR; INTRAVENOUS at 08:26

## 2022-03-11 RX ADMIN — PANTOPRAZOLE SODIUM 40 MG: 40 INJECTION, POWDER, LYOPHILIZED, FOR SOLUTION INTRAVENOUS at 22:01

## 2022-03-11 RX ADMIN — AMPICILLIN SODIUM AND SULBACTAM SODIUM 3 G: 2; 1 INJECTION, POWDER, FOR SOLUTION INTRAMUSCULAR; INTRAVENOUS at 23:36

## 2022-03-11 RX ADMIN — LIDOCAINE AND PRILOCAINE 1 APPLICATION: 25; 25 CREAM TOPICAL at 10:17

## 2022-03-11 RX ADMIN — CALCIUM GLUCONATE 1 G: 20 INJECTION, SOLUTION INTRAVENOUS at 09:23

## 2022-03-11 RX ADMIN — SODIUM CHLORIDE 100 ML/HR: 9 INJECTION, SOLUTION INTRAVENOUS at 23:41

## 2022-03-11 RX ADMIN — SODIUM CHLORIDE 250 ML: 9 INJECTION, SOLUTION INTRAVENOUS at 17:36

## 2022-03-11 RX ADMIN — ONDANSETRON 8 MG: 2 INJECTION INTRAMUSCULAR; INTRAVENOUS at 17:36

## 2022-03-11 NOTE — DISCHARGE INSTRUCTIONS
Small frequent sips of liquids today.  Avoid antimotility agents such as Lomotil pending stool study results.  Follow-up with dialysis this morning as scheduled.  Return new or worsening symptoms.

## 2022-03-11 NOTE — ED PROVIDER NOTES
Subjective   History of Present Illness  Context: 71-year-old female presents with diarrhea.  She states that started yesterday but became much more severe last night around 1130 she estimates 15-20 stools she states she is seeing minimal amount of some bright red blood.  She reports her weight had been 74.2kg earlier in the week 72.9 today.  She has had no fever she reported her maximal temperature 2 days ago was 99.3.  She has had some cough and shortness of breath but states this is baseline.  She has had nausea but no vomiting.  She complains of some abdominal pain crampy in nature.  She has end-stage renal disease scheduled for dialysis today she reports she just makes minimal urine.  She has had no recent antibiotic exposure but has had C. difficile in the past.  She reports last episode was last year in the fall.  Location: Abdomen  Quality: Cramping and diarrhea  Duration: Onset yesterday  Timing: Constant  Severity: Cramping moderate diarrhea severe   Modifying factors: End-stage renal disease history of C. difficile in the past unrelated to antibiotic exposures  Associated signs and symptoms: Nausea, weight loss    Review of Systems   Constitutional: Positive for unexpected weight change. Negative for fever.   HENT: Negative for congestion and sore throat.    Eyes: Positive for visual disturbance. Negative for pain.        No new visual complaints   Respiratory: Positive for cough and shortness of breath.         No new cough or shortness of breath   Cardiovascular: Negative for chest pain and leg swelling.   Gastrointestinal: Positive for abdominal pain, diarrhea and nausea. Negative for vomiting.   Endocrine:        Reports since weight loss she has been taken off all of her diabetic medications   Genitourinary: Negative for dysuria.        Patient reports only makes minimal urine end-stage renal disease   Musculoskeletal: Negative for back pain.   Skin: Negative for rash.   Neurological: Negative for  "headaches.   Psychiatric/Behavioral: Negative for confusion.       Past Medical History:   Diagnosis Date   • Lucas's esophagus    • C. difficile colitis    • Carotid artery disease (HCC)     -50-74% left, 16-49% right (1/19)   • DM2 (diabetes mellitus, type 2) (HCC)    • ESRD (end stage renal disease) (HCC)     on HD   • Gastric ulcer     at anastomatic site   • Gastroparesis    • Gout    • Hemodialysis patient (HCC)    • Hernia, incisional     abdomen   • History of colonoscopy     UTD   • History of degenerative disc disease    • Hyperlipidemia    • Hypertension    • Microscopic colitis    • Nephrotic syndrome    • Neuroendocrine tumor     of stomach   • FABY (obstructive sleep apnea)     not treating   • Osteoarthritis    • Osteoarthritis, knee     Left   • Proteinuria    • Renal cell cancer, right (HCC)    • Rib pain on right side     chronic   • Rotator cuff tear     bilateral   • Uterine cancer (HCC)    • Vitamin B 12 deficiency    • Vitamin D deficiency        Allergies   Allergen Reactions   • Atorvastatin Calcium Unknown (See Comments)   • Gabapentin Dizziness     Unable to walk   • Niacin Unknown (See Comments)     \"FEELS LIKE SHE IS ON FIRE\"         Past Surgical History:   Procedure Laterality Date   • APPENDECTOMY     • ARTERIOVENOUS FISTULA Right    • ARTERIOVENOUS FISTULA REPAIR      Clotted off and insert graft   • ARTERIOVENOUS FISTULA/SHUNT SURGERY Left 12/30/2020    Procedure: ARTERIOVENOUS FISTULA FORMATION;  Surgeon: Jan Caicedo MD;  Location: [facility] MAIN OR;  Service: Vascular;  Laterality: Left;   • BREAST BIOPSY      right nipple 1981   • COLONOSCOPY N/A 11/24/2020    Procedure: COLONOSCOPY with polypectomy x 7;  Surgeon: Jeffery White MD;  Location: [facility] ENDOSCOPY;  Service: Gastroenterology;  Laterality: N/A;  post op: polyps, diverticulosis, hemorrhoids   • GASTRIC RESECTION      cancer   • HYSTERECTOMY     • LAPAROSCOPIC CHOLECYSTECTOMY     • NEPHRECTOMY      right kidney " removed    • REDUCTION MAMMAPLASTY     • TUMOR REMOVAL      Multiple removed from abdominal    • VENTRAL/INCISIONAL HERNIA REPAIR Right 2019    Procedure: VENTRAL/INCISIONAL HERNIA REPAIR;  Surgeon: Nilay Stevens MD;  Location: [facility] MAIN OR;  Service: General   • VENTRAL/INCISIONAL HERNIA REPAIR N/A 10/1/2019    Procedure: OPEN VENTRAL/INCISIONAL HERNIA REPAIR;  Surgeon: Nilay Stevens MD;  Location: [facility] MAIN OR;  Service: General   • VENTRAL/INCISIONAL HERNIA REPAIR N/A 10/11/2021    Procedure: VENTRAL/INCISIONAL HERNIA REPAIR LAPAROSCOPIC;  Surgeon: Nilay Stevens MD;  Location: [facility] MAIN OR;  Service: General;  Laterality: N/A;       Family History   Problem Relation Age of Onset   • Heart disease Father        Social History     Socioeconomic History   • Marital status: Single   Tobacco Use   • Smoking status: Former Smoker     Packs/day: 0.25     Years: 57.00     Pack years: 14.25     Types: Cigarettes     Quit date: 2021     Years since quittin.8   • Smokeless tobacco: Never Used   • Tobacco comment: quit smoking 2 months ago   Vaping Use   • Vaping Use: Former   Substance and Sexual Activity   • Alcohol use: No   • Drug use: No   • Sexual activity: Not Currently     Prior to Admission medications    Medication Sig Start Date End Date Taking? Authorizing Provider   albuterol sulfate HFA (Proventil HFA) 108 (90 Base) MCG/ACT inhaler Inhale 2 puffs Every 4 (Four) Hours As Needed for Wheezing. 21   Angelia Taylor MD   amLODIPine (NORVASC) 10 MG tablet 10 mg. 21   ProviderDanica MD   aspirin (aspirin) 81 MG EC tablet Take 1 tablet by mouth 3 (Three) Times a Week. 21   Meliton Wills MD   calcium acetate (PHOSLO) 667 MG capsule Take 2,001 mg by mouth 2 (Two) Times a Day. With meals    ProviderDanica MD   Fluticasone Furoate-Vilanterol (Breo Ellipta) 100-25 MCG/INH inhaler Inhale 1 puff Daily. 21   Angelia Taylor MD   ipratropium-albuterol  (DUO-NEB) 0.5-2.5 mg/3 ml nebulizer Take 3 mL by nebulization 4 (Four) Times a Day. 7/29/21   Angelia Taylor MD   lidocaine-prilocaine (EMLA) 2.5-2.5 % cream Apply 1 application topically to the appropriate area as directed Daily As Needed. Apply to access site 1 to 2 hours before dialysis 9/27/19   Danica Murphy MD   losartan (COZAAR) 100 MG tablet Take 100 mg by mouth Daily. 4/26/21   Danica Murphy MD   Multiple Vitamins-Minerals (RENAPLEX-D) tablet Take 1 tablet by mouth Daily.    Danica Murphy MD   pantoprazole (PROTONIX) 40 MG EC tablet TAKE 1 TABLET BY MOUTH TWICE DAILY 5/5/20   Jordan Calixto MD   sevelamer (RENVELA) 800 MG tablet TAKE 2 TABLETS BY MOUTH THREE TIMES DAILY WITH MEALS 10/27/21   Danica Murphy MD   simvastatin (ZOCOR) 40 MG tablet Take 40 mg by mouth Every Night.    Danica Murphy MD   sodium bicarbonate 650 MG tablet Take 2 tablets by mouth 3 (Three) Times a Day. 3/27/21   Jessica Grimes MD   vitamin B-12 (CYANOCOBALAMIN) 1000 MCG tablet Take 2,000 mcg by mouth 3 (Three) Times a Week. Mon, Wed, Fri    Danica Murphy MD           Objective   Physical Exam  71-year-old female awake alert.  Generally well-developed well-nourished.  Pupils equal react light.  Oropharynx dry but clear neck supple chest clear equal breath sounds.  Cardiovascular regular rate and rhythm.  Abdomen soft positive bowel sounds without localized tenderness mass rebound or guarding peer extremities without tenderness or edema.  Skin without rash noted.  Neurologic exam without focal findings noted.  Psychiatric evaluation cooperative  Procedures  Patient did have diarrheal stool which was sent for stool studies.  This was noted to be heme negative         ED Course      Results for orders placed or performed during the hospital encounter of 03/11/22   Comprehensive Metabolic Panel    Specimen: Blood   Result Value Ref Range    Glucose 108 (H) 65 - 99 mg/dL    BUN 38 (H)  8 - 23 mg/dL    Creatinine 7.35 (H) 0.57 - 1.00 mg/dL    Sodium 139 136 - 145 mmol/L    Potassium 6.1 (C) 3.5 - 5.2 mmol/L    Chloride 102 98 - 107 mmol/L    CO2 20.0 (L) 22.0 - 29.0 mmol/L    Calcium 9.0 8.6 - 10.5 mg/dL    Total Protein 7.1 6.0 - 8.5 g/dL    Albumin 3.90 3.50 - 5.20 g/dL    ALT (SGPT) 10 1 - 33 U/L    AST (SGOT) 15 1 - 32 U/L    Alkaline Phosphatase 96 39 - 117 U/L    Total Bilirubin 0.2 0.0 - 1.2 mg/dL    Globulin 3.2 gm/dL    A/G Ratio 1.2 g/dL    BUN/Creatinine Ratio 5.2 (L) 7.0 - 25.0    Anion Gap 17.0 (H) 5.0 - 15.0 mmol/L    eGFR 5.5 (L) >60.0 mL/min/1.73   CBC Auto Differential    Specimen: Blood   Result Value Ref Range    WBC 11.40 (H) 3.40 - 10.80 10*3/mm3    RBC 3.62 (L) 3.77 - 5.28 10*6/mm3    Hemoglobin 11.2 (L) 12.0 - 15.9 g/dL    Hematocrit 33.6 (L) 34.0 - 46.6 %    MCV 92.8 79.0 - 97.0 fL    MCH 30.9 26.6 - 33.0 pg    MCHC 33.3 31.5 - 35.7 g/dL    RDW 16.7 (H) 12.3 - 15.4 %    RDW-SD 55.1 (H) 37.0 - 54.0 fl    MPV 6.9 6.0 - 12.0 fL    Platelets 445 140 - 450 10*3/mm3    Neutrophil % 82.9 (H) 42.7 - 76.0 %    Lymphocyte % 8.5 (L) 19.6 - 45.3 %    Monocyte % 5.7 5.0 - 12.0 %    Eosinophil % 1.9 0.3 - 6.2 %    Basophil % 1.0 0.0 - 1.5 %    Neutrophils, Absolute 9.40 (H) 1.70 - 7.00 10*3/mm3    Lymphocytes, Absolute 1.00 0.70 - 3.10 10*3/mm3    Monocytes, Absolute 0.60 0.10 - 0.90 10*3/mm3    Eosinophils, Absolute 0.20 0.00 - 0.40 10*3/mm3    Basophils, Absolute 0.10 0.00 - 0.20 10*3/mm3    nRBC 0.1 0.0 - 0.2 /100 WBC   ECG 12 Lead   Result Value Ref Range    QT Interval 392 ms     No radiology results for the last day  Medications   sodium zirconium cyclosilicate (LOKELMA) pack 10 g (has no administration in time range)   calcium gluconate 1g/50ml 0.675% NaCl IV SOLN (has no administration in time range)   lactated ringers bolus 500 mL (0 mL Intravenous Stopped 3/11/22 0842)   HYDROmorphone (DILAUDID) injection 0.25 mg (0.25 mg Intravenous Given 3/11/22 0826)   ondansetron (ZOFRAN)  "injection 4 mg (4 mg Intravenous Given 3/11/22 0826)     /68 (BP Location: Right arm, Patient Position: Lying)   Pulse 83   Temp 98.3 °F (36.8 °C) (Oral)   Resp 18   Ht 152.4 cm (60\")   Wt 72.9 kg (160 lb 11.5 oz)   SpO2 95%   BMI 31.39 kg/m²                                              [facility]  Chart review: Patient had ventral hernia repair in October of last year  Comorbidity: As per past history  Differential: Dehydration, electrolyte abnormality, infectious diarrhea, C. difficile  My EKG interpretation:   Lab: White count 11.4 with hemoglobin 11.2 platelet count 445 82 segs no bands.  Comprehensive metabolic panel glucose 108 BUN 38 creatinine 7.35 potassium 6.1 CO2 20  Radiology:   Discussion/treatment: Patient had IV placed.  Was given 500 cc lactated Ringer's.  After patient's labs returned EKG was obtained.  She is in sinus rhythm LVH with repolarization abnormality.  T wave is more prominent V2 than previous although diffuse repolarization abnormality has resolved.   Rate is 87.  Patient was discussed with Dr. Buckley.  She will receive Lokelma and was given dose of calcium gluconate.  She had received a dose of Dilaudid and Zofran for pain and nausea.  She will be discharged to dialysis for dialysis this a.m.  She was advised small frequent sips of fluids for her diarrhea.  Will avoid antimotility agents pending stool study results.  Patient was evaluated using appropriate PPE      Final diagnoses:   Diarrhea, unspecified type   Hyperkalemia   End stage renal disease on dialysis (HCC)   Generalized abdominal pain       ED Disposition  ED Disposition     None          No follow-up provider specified.       Medication List      No changes were made to your prescriptions during this visit.          Jeffery Woodward MD  03/11/22 0921       Jeffery Woodward MD  03/11/22 0918    "

## 2022-03-12 ENCOUNTER — READMISSION MANAGEMENT (OUTPATIENT)
Dept: CALL CENTER | Facility: HOSPITAL | Age: 72
End: 2022-03-12

## 2022-03-12 ENCOUNTER — ON CAMPUS - OUTPATIENT (AMBULATORY)
Dept: URBAN - METROPOLITAN AREA HOSPITAL 85 | Facility: HOSPITAL | Age: 72
End: 2022-03-12

## 2022-03-12 ENCOUNTER — ANESTHESIA EVENT (OUTPATIENT)
Dept: GASTROENTEROLOGY | Facility: HOSPITAL | Age: 72
End: 2022-03-12

## 2022-03-12 ENCOUNTER — ANESTHESIA (OUTPATIENT)
Dept: GASTROENTEROLOGY | Facility: HOSPITAL | Age: 72
End: 2022-03-12

## 2022-03-12 VITALS
HEIGHT: 60 IN | SYSTOLIC BLOOD PRESSURE: 159 MMHG | OXYGEN SATURATION: 92 % | WEIGHT: 166.23 LBS | HEART RATE: 89 BPM | DIASTOLIC BLOOD PRESSURE: 76 MMHG | TEMPERATURE: 97.6 F | RESPIRATION RATE: 14 BRPM | BODY MASS INDEX: 32.64 KG/M2

## 2022-03-12 DIAGNOSIS — Z98.890 OTHER SPECIFIED POSTPROCEDURAL STATES: ICD-10-CM

## 2022-03-12 DIAGNOSIS — R93.3 ABNORMAL FINDINGS ON DIAGNOSTIC IMAGING OF OTHER PARTS OF DI: ICD-10-CM

## 2022-03-12 DIAGNOSIS — K29.80 DUODENITIS WITHOUT BLEEDING: ICD-10-CM

## 2022-03-12 DIAGNOSIS — K26.9 DUODENAL ULCER, UNSPECIFIED AS ACUTE OR CHRONIC, WITHOUT HEM: ICD-10-CM

## 2022-03-12 LAB
ALBUMIN SERPL-MCNC: 3.4 G/DL (ref 3.5–5.2)
ALBUMIN/GLOB SERPL: 1.2 G/DL
ALP SERPL-CCNC: 84 U/L (ref 39–117)
ALT SERPL W P-5'-P-CCNC: 9 U/L (ref 1–33)
ANION GAP SERPL CALCULATED.3IONS-SCNC: 13 MMOL/L (ref 5–15)
AST SERPL-CCNC: 14 U/L (ref 1–32)
BASOPHILS # BLD AUTO: 0.1 10*3/MM3 (ref 0–0.2)
BASOPHILS NFR BLD AUTO: 0.7 % (ref 0–1.5)
BILIRUB SERPL-MCNC: 0.2 MG/DL (ref 0–1.2)
BUN SERPL-MCNC: 22 MG/DL (ref 8–23)
BUN/CREAT SERPL: 4.8 (ref 7–25)
CALCIUM SPEC-SCNC: 8.5 MG/DL (ref 8.6–10.5)
CHLORIDE SERPL-SCNC: 101 MMOL/L (ref 98–107)
CO2 SERPL-SCNC: 24 MMOL/L (ref 22–29)
CREAT SERPL-MCNC: 4.63 MG/DL (ref 0.57–1)
DEPRECATED RDW RBC AUTO: 55.1 FL (ref 37–54)
EGFRCR SERPLBLD CKD-EPI 2021: 9.5 ML/MIN/1.73
EOSINOPHIL # BLD AUTO: 0.2 10*3/MM3 (ref 0–0.4)
EOSINOPHIL NFR BLD AUTO: 1.3 % (ref 0.3–6.2)
ERYTHROCYTE [DISTWIDTH] IN BLOOD BY AUTOMATED COUNT: 16.9 % (ref 12.3–15.4)
GLOBULIN UR ELPH-MCNC: 2.8 GM/DL
GLUCOSE SERPL-MCNC: 80 MG/DL (ref 65–99)
HCT VFR BLD AUTO: 29.7 % (ref 34–46.6)
HGB BLD-MCNC: 10.2 G/DL (ref 12–15.9)
LYMPHOCYTES # BLD AUTO: 1.7 10*3/MM3 (ref 0.7–3.1)
LYMPHOCYTES NFR BLD AUTO: 14 % (ref 19.6–45.3)
MAGNESIUM SERPL-MCNC: 1.8 MG/DL (ref 1.6–2.4)
MCH RBC QN AUTO: 31.8 PG (ref 26.6–33)
MCHC RBC AUTO-ENTMCNC: 34.3 G/DL (ref 31.5–35.7)
MCV RBC AUTO: 92.8 FL (ref 79–97)
MONOCYTES # BLD AUTO: 1 10*3/MM3 (ref 0.1–0.9)
MONOCYTES NFR BLD AUTO: 8.6 % (ref 5–12)
NEUTROPHILS NFR BLD AUTO: 75.4 % (ref 42.7–76)
NEUTROPHILS NFR BLD AUTO: 9.1 10*3/MM3 (ref 1.7–7)
NRBC BLD AUTO-RTO: 0.1 /100 WBC (ref 0–0.2)
PLATELET # BLD AUTO: 381 10*3/MM3 (ref 140–450)
PMV BLD AUTO: 7.3 FL (ref 6–12)
POTASSIUM SERPL-SCNC: 4.4 MMOL/L (ref 3.5–5.2)
PROT SERPL-MCNC: 6.2 G/DL (ref 6–8.5)
RBC # BLD AUTO: 3.2 10*6/MM3 (ref 3.77–5.28)
SODIUM SERPL-SCNC: 138 MMOL/L (ref 136–145)
WBC NRBC COR # BLD: 12 10*3/MM3 (ref 3.4–10.8)

## 2022-03-12 PROCEDURE — A9270 NON-COVERED ITEM OR SERVICE: HCPCS | Performed by: NURSE PRACTITIONER

## 2022-03-12 PROCEDURE — 63710000001 LOSARTAN 50 MG TABLET: Performed by: NURSE PRACTITIONER

## 2022-03-12 PROCEDURE — 63710000001 LOSARTAN 25 MG TABLET: Performed by: NURSE PRACTITIONER

## 2022-03-12 PROCEDURE — 83735 ASSAY OF MAGNESIUM: CPT | Performed by: NURSE PRACTITIONER

## 2022-03-12 PROCEDURE — 80053 COMPREHEN METABOLIC PANEL: CPT | Performed by: EMERGENCY MEDICINE

## 2022-03-12 PROCEDURE — 94799 UNLISTED PULMONARY SVC/PX: CPT

## 2022-03-12 PROCEDURE — 63710000001 SODIUM BICARBONATE 650 MG TABLET: Performed by: NURSE PRACTITIONER

## 2022-03-12 PROCEDURE — 96376 TX/PRO/DX INJ SAME DRUG ADON: CPT

## 2022-03-12 PROCEDURE — 63710000001 LACTOBACILLUS ACIDOPHILUS CAPSULE: Performed by: NURSE PRACTITIONER

## 2022-03-12 PROCEDURE — 63710000001 AMLODIPINE 5 MG TABLET: Performed by: NURSE PRACTITIONER

## 2022-03-12 PROCEDURE — 63710000001 CHOLESTYRAMINE LIGHT 4 G PACK: Performed by: NURSE PRACTITIONER

## 2022-03-12 PROCEDURE — G0378 HOSPITAL OBSERVATION PER HR: HCPCS

## 2022-03-12 PROCEDURE — 43239 EGD BIOPSY SINGLE/MULTIPLE: CPT | Performed by: INTERNAL MEDICINE

## 2022-03-12 PROCEDURE — 63710000001 MULTIVITAMIN WITH MINERALS TABLET: Performed by: NURSE PRACTITIONER

## 2022-03-12 PROCEDURE — 88305 TISSUE EXAM BY PATHOLOGIST: CPT | Performed by: INTERNAL MEDICINE

## 2022-03-12 PROCEDURE — 63710000001 LORAZEPAM 1 MG TABLET: Performed by: NURSE PRACTITIONER

## 2022-03-12 PROCEDURE — 25010000002 PROPOFOL 200 MG/20ML EMULSION: Performed by: ANESTHESIOLOGIST ASSISTANT

## 2022-03-12 PROCEDURE — 80053 COMPREHEN METABOLIC PANEL: CPT | Performed by: NURSE PRACTITIONER

## 2022-03-12 PROCEDURE — 63710000001 SACCHAROMYCES BOULARDII 250 MG CAPSULE: Performed by: NURSE PRACTITIONER

## 2022-03-12 PROCEDURE — 85025 COMPLETE CBC W/AUTO DIFF WBC: CPT | Performed by: NURSE PRACTITIONER

## 2022-03-12 PROCEDURE — 63710000001 SEVELAMER 800 MG TABLET: Performed by: NURSE PRACTITIONER

## 2022-03-12 PROCEDURE — 63710000001 SUCRALFATE 1 G TABLET: Performed by: NURSE PRACTITIONER

## 2022-03-12 PROCEDURE — 63710000001 CALCIUM ACETATE 667 MG CAPSULE: Performed by: NURSE PRACTITIONER

## 2022-03-12 RX ORDER — L.ACID,PARA/B.BIFIDUM/S.THERM 8B CELL
1 CAPSULE ORAL DAILY
Status: DISCONTINUED | OUTPATIENT
Start: 2022-03-12 | End: 2022-03-12 | Stop reason: HOSPADM

## 2022-03-12 RX ORDER — SUCRALFATE 1 G/1
1 TABLET ORAL
Status: DISCONTINUED | OUTPATIENT
Start: 2022-03-12 | End: 2022-03-12 | Stop reason: HOSPADM

## 2022-03-12 RX ORDER — GLYCOPYRROLATE 0.2 MG/ML
INJECTION INTRAMUSCULAR; INTRAVENOUS AS NEEDED
Status: DISCONTINUED | OUTPATIENT
Start: 2022-03-12 | End: 2022-03-12 | Stop reason: SURG

## 2022-03-12 RX ORDER — CHOLESTYRAMINE LIGHT 4 G/5.7G
1 POWDER, FOR SUSPENSION ORAL EVERY 12 HOURS SCHEDULED
Status: DISCONTINUED | OUTPATIENT
Start: 2022-03-12 | End: 2022-03-12 | Stop reason: HOSPADM

## 2022-03-12 RX ORDER — PROPOFOL 10 MG/ML
INJECTION, EMULSION INTRAVENOUS AS NEEDED
Status: DISCONTINUED | OUTPATIENT
Start: 2022-03-12 | End: 2022-03-12 | Stop reason: SURG

## 2022-03-12 RX ORDER — SACCHAROMYCES BOULARDII 250 MG
500 CAPSULE ORAL 2 TIMES DAILY
Status: DISCONTINUED | OUTPATIENT
Start: 2022-03-12 | End: 2022-03-12 | Stop reason: HOSPADM

## 2022-03-12 RX ORDER — SODIUM CHLORIDE 9 MG/ML
INJECTION, SOLUTION INTRAVENOUS CONTINUOUS PRN
Status: DISCONTINUED | OUTPATIENT
Start: 2022-03-12 | End: 2022-03-12 | Stop reason: SURG

## 2022-03-12 RX ORDER — LIDOCAINE HYDROCHLORIDE 20 MG/ML
INJECTION, SOLUTION EPIDURAL; INFILTRATION; INTRACAUDAL; PERINEURAL AS NEEDED
Status: DISCONTINUED | OUTPATIENT
Start: 2022-03-12 | End: 2022-03-12 | Stop reason: SURG

## 2022-03-12 RX ORDER — CHOLESTYRAMINE LIGHT 4 G/5.7G
1 POWDER, FOR SUSPENSION ORAL EVERY 12 HOURS SCHEDULED
Qty: 60 PACKET | Refills: 2 | Status: ON HOLD | OUTPATIENT
Start: 2022-03-12 | End: 2022-09-27

## 2022-03-12 RX ORDER — LORAZEPAM 1 MG/1
1 TABLET ORAL ONCE
Status: COMPLETED | OUTPATIENT
Start: 2022-03-12 | End: 2022-03-12

## 2022-03-12 RX ORDER — L.ACID,PARA/B.BIFIDUM/S.THERM 8B CELL
1 CAPSULE ORAL DAILY
Qty: 30 CAPSULE | Refills: 2 | Status: SHIPPED | OUTPATIENT
Start: 2022-03-13 | End: 2022-04-12

## 2022-03-12 RX ORDER — SUCRALFATE 1 G/1
1 TABLET ORAL
Qty: 120 TABLET | Refills: 2 | Status: SHIPPED | OUTPATIENT
Start: 2022-03-12 | End: 2022-04-11

## 2022-03-12 RX ADMIN — Medication 1 CAPSULE: at 13:56

## 2022-03-12 RX ADMIN — LIDOCAINE HYDROCHLORIDE 80 MG: 20 INJECTION, SOLUTION EPIDURAL; INFILTRATION; INTRACAUDAL; PERINEURAL at 12:05

## 2022-03-12 RX ADMIN — GLYCOPYRROLATE 0.2 MG: 0.2 INJECTION INTRAMUSCULAR; INTRAVENOUS at 12:05

## 2022-03-12 RX ADMIN — LOSARTAN POTASSIUM 100 MG: 50 TABLET, FILM COATED ORAL at 00:09

## 2022-03-12 RX ADMIN — SEVELAMER CARBONATE 1600 MG: 800 TABLET, FILM COATED ORAL at 13:56

## 2022-03-12 RX ADMIN — ALBUTEROL SULFATE 2.5 MG: 2.5 SOLUTION RESPIRATORY (INHALATION) at 12:41

## 2022-03-12 RX ADMIN — CALCIUM ACETATE 2001 MG: 667 CAPSULE ORAL at 08:48

## 2022-03-12 RX ADMIN — Medication 500 MG: at 13:56

## 2022-03-12 RX ADMIN — SODIUM BICARBONATE 650 MG TABLET 1300 MG: at 08:49

## 2022-03-12 RX ADMIN — LORAZEPAM 1 MG: 1 TABLET ORAL at 00:41

## 2022-03-12 RX ADMIN — SODIUM CHLORIDE: 0.9 INJECTION, SOLUTION INTRAVENOUS at 12:03

## 2022-03-12 RX ADMIN — SUCRALFATE 1 G: 1 TABLET ORAL at 13:56

## 2022-03-12 RX ADMIN — SEVELAMER CARBONATE 1600 MG: 800 TABLET, FILM COATED ORAL at 08:49

## 2022-03-12 RX ADMIN — PROPOFOL 80 MG: 10 INJECTION, EMULSION INTRAVENOUS at 12:05

## 2022-03-12 RX ADMIN — AMLODIPINE BESYLATE 10 MG: 5 TABLET ORAL at 08:49

## 2022-03-12 RX ADMIN — AMLODIPINE BESYLATE 10 MG: 5 TABLET ORAL at 00:09

## 2022-03-12 RX ADMIN — Medication 1 TABLET: at 08:49

## 2022-03-12 RX ADMIN — PANTOPRAZOLE SODIUM 40 MG: 40 INJECTION, POWDER, LYOPHILIZED, FOR SOLUTION INTRAVENOUS at 08:49

## 2022-03-12 RX ADMIN — LOSARTAN POTASSIUM 100 MG: 25 TABLET, FILM COATED ORAL at 08:49

## 2022-03-12 RX ADMIN — CHOLESTYRAMINE 4 G: 4 POWDER, FOR SUSPENSION ORAL at 13:56

## 2022-03-12 NOTE — CONSULTS
[facility] Consult Note      Patient Name: Sherry Lobato  : 1950  MRN: [id number]  Primary Care Physician:  Jin Malcolm PA-C  Referring Physician: Jin Malcolm PA-C  Date of admission: 3/11/2022    Subjective     Reason for Consult:  ESRD     HPI:   Sherry Lobato is a 71 y.o. female patient 60-year-old female patient known to have history of end-stage renal disease on hemodialysis on maintenance hemodialysis on  under the care of Dr. Buckley, history of hypertension, history of type 2 diabetes mellitus, history of microscopic colitis who presented yesterday with diffuse abdominal pain and diarrhea.  Patient noted she still having diarrhea before dialysis.  She underwent her regular hemodialysis session yesterday with no reported problems.   She presented yesterday to the ER where CT scan of the abdomen noted the presence of solitary left kidney had mild thickening of the stomach wall near the anastomosis of previous Whipple procedure.  The patient underwent EGD today with biopsies.  Currently she is doing better.  Denies any nausea no vomiting.  No fever and diarrhea resolved.      Review of Systems:   14 point review of systems is otherwise negative except for mentioned above on HPI    Personal History     Past Medical History:   Diagnosis Date   • Lucas's esophagus    • C. difficile colitis    • Carotid artery disease (HCC)     -50-74% left, 16-49% right ()   • DM2 (diabetes mellitus, type 2) (HCC)    • ESRD (end stage renal disease) (HCC)     on HD   • Gastric ulcer     at anastomatic site   • Gastroparesis    • Gout    • Hemodialysis patient ([facility])    • Hernia, incisional     abdomen   • History of colonoscopy     UTD   • History of degenerative disc disease    • Hyperlipidemia    • Hypertension    • Microscopic colitis    • Nephrotic syndrome    • Neuroendocrine tumor     of stomach   • FABY (obstructive sleep apnea)     not  treating   • Osteoarthritis    • Osteoarthritis, knee     Left   • Proteinuria    • Renal cell cancer, right (HCC)    • Rib pain on right side     chronic   • Rotator cuff tear     bilateral   • Uterine cancer (HCC)    • Vitamin B 12 deficiency    • Vitamin D deficiency        Past Surgical History:   Procedure Laterality Date   • APPENDECTOMY     • ARTERIOVENOUS FISTULA Right    • ARTERIOVENOUS FISTULA REPAIR      Clotted off and insert graft   • ARTERIOVENOUS FISTULA/SHUNT SURGERY Left 12/30/2020    Procedure: ARTERIOVENOUS FISTULA FORMATION;  Surgeon: Jan Caicedo MD;  Location: [facility] MAIN OR;  Service: Vascular;  Laterality: Left;   • BREAST BIOPSY      right nipple 1981   • COLONOSCOPY N/A 11/24/2020    Procedure: COLONOSCOPY with polypectomy x 7;  Surgeon: Jeffery White MD;  Location: [facility] ENDOSCOPY;  Service: Gastroenterology;  Laterality: N/A;  post op: polyps, diverticulosis, hemorrhoids   • GASTRIC RESECTION      cancer   • HYSTERECTOMY     • LAPAROSCOPIC CHOLECYSTECTOMY     • NEPHRECTOMY      right kidney removed    • REDUCTION MAMMAPLASTY     • TUMOR REMOVAL      Multiple removed from abdominal    • VENTRAL/INCISIONAL HERNIA REPAIR Right 8/28/2019    Procedure: VENTRAL/INCISIONAL HERNIA REPAIR;  Surgeon: Nilay Stevens MD;  Location: [facility] MAIN OR;  Service: General   • VENTRAL/INCISIONAL HERNIA REPAIR N/A 10/1/2019    Procedure: OPEN VENTRAL/INCISIONAL HERNIA REPAIR;  Surgeon: Nilay Stevens MD;  Location: [facility] MAIN OR;  Service: General   • VENTRAL/INCISIONAL HERNIA REPAIR N/A 10/11/2021    Procedure: VENTRAL/INCISIONAL HERNIA REPAIR LAPAROSCOPIC;  Surgeon: Nilay Stevens MD;  Location: [facility] MAIN OR;  Service: General;  Laterality: N/A;       Family History: family history includes Heart disease in her father.    Social History:  reports that she quit smoking about 10 months ago. Her smoking use included cigarettes. She has a 14.25 pack-year smoking history. She has never used  smokeless tobacco. She reports that she does not drink alcohol and does not use drugs.    Home Medications:  Prior to Admission medications    Medication Sig Start Date End Date Taking? Authorizing Provider   amLODIPine (NORVASC) 10 MG tablet 10 mg. 8/12/21  Yes Danica Murphy MD   calcium acetate (PHOSLO) 667 MG capsule Take 2,001 mg by mouth 2 (Two) Times a Day. With meals   Yes Danica Murphy MD   lidocaine-prilocaine (EMLA) 2.5-2.5 % cream Apply 1 application topically to the appropriate area as directed Daily As Needed. Apply to access site 1 to 2 hours before dialysis 9/27/19  Yes Danica Murphy MD   losartan (COZAAR) 100 MG tablet Take 100 mg by mouth Daily. 4/26/21  Yes Danica Murphy MD   Multiple Vitamins-Minerals (RENAPLEX-D) tablet Take 1 tablet by mouth Daily.   Yes Danica Murphy MD   pantoprazole (PROTONIX) 40 MG EC tablet TAKE 1 TABLET BY MOUTH TWICE DAILY 5/5/20  Yes Jordan Calixto MD   sevelamer (RENVELA) 800 MG tablet TAKE 2 TABLETS BY MOUTH THREE TIMES DAILY WITH MEALS 10/27/21  Yes Danica Murphy MD   simvastatin (ZOCOR) 40 MG tablet Take 40 mg by mouth Every Night.   Yes Danica Murphy MD   sodium bicarbonate 650 MG tablet Take 2 tablets by mouth 3 (Three) Times a Day. 3/27/21  Yes Jessica Grimes MD   vitamin B-12 (CYANOCOBALAMIN) 1000 MCG tablet Take 2,000 mcg by mouth 3 (Three) Times a Week. Mon, Wed, Fri   Yes Danica Murphy MD   albuterol sulfate HFA (Proventil HFA) 108 (90 Base) MCG/ACT inhaler Inhale 2 puffs Every 4 (Four) Hours As Needed for Wheezing. 7/27/21   Angelia Taylor MD   aspirin (aspirin) 81 MG EC tablet Take 1 tablet by mouth 3 (Three) Times a Week. 1/1/21   Meliton Wills MD   Fluticasone Furoate-Vilanterol (Breo Ellipta) 100-25 MCG/INH inhaler Inhale 1 puff Daily. 7/27/21   Angelia Taylor MD   ipratropium-albuterol (DUO-NEB) 0.5-2.5 mg/3 ml nebulizer Take 3 mL by nebulization 4 (Four) Times a  "Day. 7/29/21   Angelia Taylor MD       Allergies:  Allergies   Allergen Reactions   • Atorvastatin Calcium Unknown (See Comments)   • Gabapentin Dizziness     Unable to walk   • Niacin Unknown (See Comments)     \"FEELS LIKE SHE IS ON FIRE\"         Objective     Vitals:   Temp:  [97.6 °F (36.4 °C)-98.7 °F (37.1 °C)] 97.6 °F (36.4 °C)  Heart Rate:  [] 89  Resp:  [14-18] 14  BP: ()/() 159/76    Intake/Output Summary (Last 24 hours) at 3/12/2022 1445  Last data filed at 3/12/2022 1212  Gross per 24 hour   Intake 2170 ml   Output --   Net 2170 ml       Physical Exam:   Constitutional: Awake, alert, no acute distress.  HEENT: Sclera anicteric, no conjunctival injection  Neck: Supple, no thyromegaly, no lymphadenopathy, trachea at midline, no JVD  Respiratory: Clear to auscultation bilaterally, nonlabored respiration  Cardiovascular: RRR, no murmurs, no rubs or gallops, no carotid bruit  Gastrointestinal: Positive bowel sounds, abdomen is soft, nontender and nondistended  : No palpable bladder  Musculoskeletal: No edema, no clubbing or cyanosis  Psychiatric: Appropriate affect, cooperative  Neurologic: Oriented x3, moving all extremities, normal speech and mental status  Skin: Warm and dry   Right upper extremity AV graft nonfunctional.  Left upper extremity AV fistula with good thrill    Scheduled Meds:     amLODIPine, 10 mg, Oral, Q24H  budesonide-formoterol, 2 puff, Inhalation, BID - RT  calcium acetate, 2,001 mg, Oral, BID With Meals  cholestyramine light, 1 packet, Oral, Q12H  lactobacillus acidophilus, 1 capsule, Oral, Daily  losartan, 100 mg, Oral, Daily  multivitamin with minerals, 1 tablet, Oral, Daily  pantoprazole, 40 mg, Intravenous, BID AC  saccharomyces boulardii, 500 mg, Oral, BID  sevelamer, 1,600 mg, Oral, TID With Meals  sodium bicarbonate, 1,300 mg, Oral, TID  sucralfate, 1 g, Oral, 4x Daily AC & at Bedtime  [START ON 3/14/2022] vitamin B-12, 2,000 mcg, Oral, Once per day on Mon Wed " Fri      IV Meds:   sodium chloride, 50 mL/hr  sodium chloride, 100 mL/hr, Last Rate: 100 mL/hr (03/11/22 2341)        Results Reviewed:   I have personally reviewed the results from the time of this admission to 3/12/2022 14:45 EST     Lab Results   Component Value Date    GLUCOSE 80 03/12/2022    CALCIUM 8.5 (L) 03/12/2022     03/12/2022    K 4.4 03/12/2022    CO2 24.0 03/12/2022     03/12/2022    BUN 22 03/12/2022    CREATININE 4.63 (H) 03/12/2022    EGFRIFAFRI  10/08/2021      Comment:      <15 Indicative of kidney failure.    EGFRIFNONA 6 (L) 10/08/2021    BCR 4.8 (L) 03/12/2022    ANIONGAP 13.0 03/12/2022      Lab Results   Component Value Date    MG 1.8 03/12/2022    PHOS 4.8 (H) 07/29/2021    ALBUMIN 3.40 (L) 03/12/2022           Assessment / Plan     ASSESSMENT:   1.  End-stage renal disease on maintenance hemodialysis on Monday Wednesday Friday.  Last dialyzed yesterday with no reported problems.  No indication for dialysis today.  Electrolyte and volume status are acceptable.  2.  Hypertension with CKD: Blood pressure was mildly soft earlier today much better now.  3.  Diabetes mellitus type 2 with CKD.  4.  Anemia of CKD.  Hemoglobin of 10.2 at goal.  5.  Diarrhea likely secondary to colitis.  Stool studies are negative.  Followed by GI status post EGD.  6.  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  7.  History of right renal cell carcinoma status post nephrectomy.  8.  Hyperkalemia:  resolved    Plan:  No indication for dialysis the time being.  Electrolytes and volume status are acceptable.  We will continue dialysis per schedule.  Continue surveillance labs.  No objection to discharge from nephrology standpoint and to continue dialysis at her dialysis unit on Monday Wednesday Friday per schedule    Thank you for involving us in the care of Sherry Lobato.  Please feel free to call with any questions.    Marcial Kumar MD  03/12/22  14:45 EST    Nephrology  [facility]  [phone]      Much of this encounter note is an electronic transcription/translation of spoken language to printed text. The electronic translation of spoken language may permit erroneous, or at times, nonsensical words or phrases to be inadvertently transcribed; Although I have reviewed the note for such errors, some may still exist.

## 2022-03-12 NOTE — SIGNIFICANT NOTE
Patient has a boil that recently popped at times she does have yellow drainage. Bandage on it from the patient.  She advises she gets them all of the time usually on her abdomin area.

## 2022-03-12 NOTE — ANESTHESIA POSTPROCEDURE EVALUATION
Patient: Sherry Lobato    Procedure Summary     Date: 03/12/22 Room / Location: [facility] ENDOSCOPY 1 / [facility] ENDOSCOPY    Anesthesia Start: 1204 Anesthesia Stop: 1221    Procedure: ESOPHAGOGASTRODUODENOSCOPY (N/A ) Diagnosis:       Abnormal CT scan, gastrointestinal tract      (Abnormal CT scan, gastrointestinal tract [R93.3])    Surgeons: Javan Augustin MD Provider: Jan Marinelli MD    Anesthesia Type: MAC ASA Status: 4          Anesthesia Type: MAC    Vitals  Vitals Value Taken Time   BP 86/44 03/12/22 1249   Temp     Pulse 103 03/12/22 1251   Resp 14 03/12/22 1220   SpO2 91 % 03/12/22 1251   Vitals shown include unvalidated device data.        Post Anesthesia Care and Evaluation    Patient location during evaluation: PACU  Patient participation: complete - patient participated  Level of consciousness: awake  Pain scale: See nurse's notes for pain score.  Pain management: adequate  Airway patency: patent  Anesthetic complications: No anesthetic complications  PONV Status: none  Cardiovascular status: acceptable  Respiratory status: acceptable  Hydration status: acceptable    Comments: Patient seen and examined postoperatively; vital signs stable; SpO2 greater than or equal to 90%; cardiopulmonary status stable; nausea/vomiting adequately controlled; pain adequately controlled; no apparent anesthesia complications; patient discharged from anesthesia care when discharge criteria were met

## 2022-03-12 NOTE — CONSULTS
GI CONSULT  NOTE:    Referring Provider:    Dr Jackson    Chief complaint:   Diarrhea     Subjective    Diarrhea    History of present illness:    Patient is a 71-year-old female with a history of C. 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  Patient's initial visit was in the morning and she wanted to make it to dialysis so she was discharged.  Patient stool studies were negative for C. difficile and GI stool panel as well as Covid were negative.  Patient returned at about 7:00 in the evening with abdominal discomfort and persistent diarrhea with streaks of red.  Patient was admitted into the hospital with acute gastritis and colitis.  Patient states her abdominal pain is from the umbilicus above and was a constant ache.  Patient had nausea without vomiting.  Patient states she has been taking her pantoprazole every day at home.  She is not take any NSAIDs or aspirin.  Patient states her diarrhea started out at black then went coffee grounds and is now brown.  States she may have had a streak of blood.  Patient states she has not had any diarrhea since midnight.  Her weight is stable for the last 4 years.  Patient sodium and potassium are normal with a creatinine of 4.63.  LFTs are normal.  Lipase is normal at 37, magnesium 1.8.  WBCs is 12, hemoglobin is 10.2 which appears to be around her baseline, MCV is 92.8, platelets are 381.  CT showed abnormally thickened anastomosis similar to prior exams in 2019.  She does have a history of anastomotic ulcers in 2019.  Patient denies being on any antibiotics recently.    Endo History:  11/2020 colonoscopy by Dr. White - polyps (TA), moderate diverticulosis  2/2019 EGD by Dr. White - evidence of prior  partial gastrectomy, and large gastric fold, 2 anastomotic ulcers which appear chronic but nonbleeding with biopsy showing acute and chronic inflamed duodenal type mucosa and negative for sprue    Past Medical History:  Past Medical History:   Diagnosis Date   • Lucas's esophagus    • C. difficile colitis    • Carotid artery disease (HCC)     -50-74% left, 16-49% right (1/19)   • DM2 (diabetes mellitus, type 2) (HCC)    • ESRD (end stage renal disease) (HCC)     on HD   • Gastric ulcer     at anastomatic site   • Gastroparesis    • Gout    • Hemodialysis patient (HCC)    • Hernia, incisional     abdomen   • History of colonoscopy     UTD   • History of degenerative disc disease    • Hyperlipidemia    • Hypertension    • Microscopic colitis    • Nephrotic syndrome    • Neuroendocrine tumor     of stomach   • FABY (obstructive sleep apnea)     not treating   • Osteoarthritis    • Osteoarthritis, knee     Left   • Proteinuria    • Renal cell cancer, right (HCC)    • Rib pain on right side     chronic   • Rotator cuff tear     bilateral   • Uterine cancer (HCC)    • Vitamin B 12 deficiency    • Vitamin D deficiency        Past Surgical History:  Past Surgical History:   Procedure Laterality Date   • APPENDECTOMY     • ARTERIOVENOUS FISTULA Right    • ARTERIOVENOUS FISTULA REPAIR      Clotted off and insert graft   • ARTERIOVENOUS FISTULA/SHUNT SURGERY Left 12/30/2020    Procedure: ARTERIOVENOUS FISTULA FORMATION;  Surgeon: Jan Caicedo MD;  Location: [facility] MAIN OR;  Service: Vascular;  Laterality: Left;   • BREAST BIOPSY      right nipple 1981   • COLONOSCOPY N/A 11/24/2020    Procedure: COLONOSCOPY with polypectomy x 7;  Surgeon: Jeffery White MD;  Location: [facility] ENDOSCOPY;  Service: Gastroenterology;  Laterality: N/A;  post op: polyps, diverticulosis, hemorrhoids   • GASTRIC RESECTION      cancer   • HYSTERECTOMY     • LAPAROSCOPIC CHOLECYSTECTOMY     • NEPHRECTOMY      right kidney removed    •  REDUCTION MAMMAPLASTY     • TUMOR REMOVAL      Multiple removed from abdominal    • VENTRAL/INCISIONAL HERNIA REPAIR Right 2019    Procedure: VENTRAL/INCISIONAL HERNIA REPAIR;  Surgeon: Nilay Stevens MD;  Location: [facility] MAIN OR;  Service: General   • VENTRAL/INCISIONAL HERNIA REPAIR N/A 10/1/2019    Procedure: OPEN VENTRAL/INCISIONAL HERNIA REPAIR;  Surgeon: Nilay Stevens MD;  Location: [facility] MAIN OR;  Service: General   • VENTRAL/INCISIONAL HERNIA REPAIR N/A 10/11/2021    Procedure: VENTRAL/INCISIONAL HERNIA REPAIR LAPAROSCOPIC;  Surgeon: Nilay Stevens MD;  Location: [facility] MAIN OR;  Service: General;  Laterality: N/A;       Social History:  Social History     Tobacco Use   • Smoking status: Former Smoker     Packs/day: 0.25     Years: 57.00     Pack years: 14.25     Types: Cigarettes     Quit date: 2021     Years since quittin.8   • Smokeless tobacco: Never Used   • Tobacco comment: quit smoking 2 months ago   Vaping Use   • Vaping Use: Former   Substance Use Topics   • Alcohol use: No   • Drug use: No       Family History:  Family History   Problem Relation Age of Onset   • Heart disease Father        Medications:  Medications Prior to Admission   Medication Sig Dispense Refill Last Dose   • amLODIPine (NORVASC) 10 MG tablet 10 mg.   3/10/2022 at Unknown time   • calcium acetate (PHOSLO) 667 MG capsule Take 2,001 mg by mouth 2 (Two) Times a Day. With meals   3/10/2022 at Unknown time   • lidocaine-prilocaine (EMLA) 2.5-2.5 % cream Apply 1 application topically to the appropriate area as directed Daily As Needed. Apply to access site 1 to 2 hours before dialysis  4 3/11/2022 at Unknown time   • losartan (COZAAR) 100 MG tablet Take 100 mg by mouth Daily.   3/10/2022 at Unknown time   • Multiple Vitamins-Minerals (RENAPLEX-D) tablet Take 1 tablet by mouth Daily.   3/10/2022 at Unknown time   • pantoprazole (PROTONIX) 40 MG EC tablet TAKE 1 TABLET BY MOUTH TWICE DAILY 180 tablet 2 3/10/2022  at Unknown time   • sevelamer (RENVELA) 800 MG tablet TAKE 2 TABLETS BY MOUTH THREE TIMES DAILY WITH MEALS   3/10/2022 at Unknown time   • simvastatin (ZOCOR) 40 MG tablet Take 40 mg by mouth Every Night.   3/10/2022 at Unknown time   • sodium bicarbonate 650 MG tablet Take 2 tablets by mouth 3 (Three) Times a Day. 180 tablet 0 3/10/2022 at Unknown time   • vitamin B-12 (CYANOCOBALAMIN) 1000 MCG tablet Take 2,000 mcg by mouth 3 (Three) Times a Week. Mon, Wed, Fri   3/10/2022 at Unknown time   • albuterol sulfate HFA (Proventil HFA) 108 (90 Base) MCG/ACT inhaler Inhale 2 puffs Every 4 (Four) Hours As Needed for Wheezing. 17 g 1 Unknown at Unknown time   • aspirin (aspirin) 81 MG EC tablet Take 1 tablet by mouth 3 (Three) Times a Week.   Unknown at Unknown time   • Fluticasone Furoate-Vilanterol (Breo Ellipta) 100-25 MCG/INH inhaler Inhale 1 puff Daily. 1 each 1 Unknown at Unknown time   • ipratropium-albuterol (DUO-NEB) 0.5-2.5 mg/3 ml nebulizer Take 3 mL by nebulization 4 (Four) Times a Day. 360 mL 1 Unknown at Unknown time       Scheduled Meds:amLODIPine, 10 mg, Oral, Q24H  budesonide-formoterol, 2 puff, Inhalation, BID - RT  calcium acetate, 2,001 mg, Oral, BID With Meals  losartan, 100 mg, Oral, Daily  multivitamin with minerals, 1 tablet, Oral, Daily  pantoprazole, 40 mg, Intravenous, BID AC  sevelamer, 1,600 mg, Oral, TID With Meals  sodium bicarbonate, 1,300 mg, Oral, TID  [START ON 3/14/2022] vitamin B-12, 2,000 mcg, Oral, Once per day on Mon Wed Fri      Continuous Infusions:sodium chloride, 50 mL/hr  sodium chloride, 100 mL/hr, Last Rate: 100 mL/hr (03/11/22 6613)      PRN Meds:.•  acetaminophen  •  albuterol  •  diphenoxylate-atropine  •  hydrALAZINE  •  HYDROmorphone  •  ipratropium-albuterol  •  magnesium sulfate **OR** magnesium sulfate in D5W 1g/100mL (PREMIX)  •  melatonin  •  ondansetron    ALLERGIES:  Atorvastatin calcium, Gabapentin, and Niacin    ROS:  Review of Systems   Gastrointestinal:  "Positive for abdominal pain, blood in stool, diarrhea and nausea. Negative for constipation, rectal pain and vomiting.       The following systems were reviewed and negative;   Constitution:  No fevers, chills, no unintentional weight loss  Skin: no rash, no jaundice  Eyes:  No blurry vision, no eye pain  HENT:  No change in hearing or smell  Resp:  No dyspnea or cough  CV:  No chest pain or palpitations  :  No dysuria, hematuria  Musculoskeletal:  No leg cramps or arthralgias  Neuro:  No tremor, no numbness  Psych:  No depression or confusion    Objective  Resting comfortably sitting on side of bed.  Room 247.  No family present.    Vital Signs:   Vitals:    03/11/22 2044 03/12/22 0007 03/12/22 0347 03/12/22 0752   BP: 161/69 177/72 150/79    BP Location: Right arm Right arm Right arm    Patient Position: Lying  Sitting    Pulse: 85  89    Resp: 18  18    Temp: 98.2 °F (36.8 °C)  98 °F (36.7 °C)    TempSrc: Oral  Oral    SpO2: 94%  94% 95%   Weight: 74.5 kg (164 lb 3.9 oz)  75.4 kg (166 lb 3.6 oz)    Height: 152.4 cm (60\")          Physical Exam:   General Appearance:    Awake and alert, in no acute distress   Head:    Normocephalic, without obvious abnormality, atraumatic   Eyes:            Conjunctivae normal, anicteric sclerae, pupils equal   Ears:    Ears appear intact with no abnormalities noted   Throat:   No oral lesions, no thrush, oral mucosa moist   Neck:   Supple, no JVD   Lungs:     Clear to auscultation bilaterally, respirations regular, even and unlabored    Heart:    Regular rhythm and normal rate, normal S1 and S2, no            Murmur appreciated   Chest Wall:    No abnormalities observed   Abdomen:     Normal bowel sounds, soft, nontender, no rebound or guarding, nondistended, no hepatosplenomegaly   Rectal:     Deferred   Extremities:   Moves all extremities, no edema, no cyanosis   Pulses:   Pulses palpable and equal bilaterally   Skin:   No rash, no jaundice, normal palpation   Lymph nodes:   " No cervical, supraclavicular or submandibular palpable adenopathy   Neurologic:   Cranial nerves 2 - 12 grossly intact, no asterixis       Results Review:   I reviewed the patient's labs and imaging.  Lab Results (last 24 hours)     Procedure Component Value Units Date/Time    Comprehensive Metabolic Panel [[id number]]  (Abnormal) Collected: 03/12/22 0151    Specimen: Blood Updated: 03/12/22 0227     Glucose 80 mg/dL      BUN 22 mg/dL      Creatinine 4.63 mg/dL      Sodium 138 mmol/L      Potassium 4.4 mmol/L      Chloride 101 mmol/L      CO2 24.0 mmol/L      Calcium 8.5 mg/dL      Total Protein 6.2 g/dL      Albumin 3.40 g/dL      ALT (SGPT) 9 U/L      AST (SGOT) 14 U/L      Alkaline Phosphatase 84 U/L      Total Bilirubin 0.2 mg/dL      Globulin 2.8 gm/dL      A/G Ratio 1.2 g/dL      BUN/Creatinine Ratio 4.8     Anion Gap 13.0 mmol/L      eGFR 9.5 mL/min/1.73      Comment: <15 Indicative of kidney failure       Narrative:      GFR Normal >60  Chronic Kidney Disease <60  Kidney Failure <15      Magnesium [[id number]]  (Normal) Collected: 03/12/22 0151    Specimen: Blood Updated: 03/12/22 0227     Magnesium 1.8 mg/dL     CBC & Differential [[id number]]  (Abnormal) Collected: 03/12/22 0151    Specimen: Blood Updated: 03/12/22 0211    Narrative:      The following orders were created for panel order CBC & Differential.  Procedure                               Abnormality         Status                     ---------                               -----------         ------                     CBC Auto Differential[[id number]]        Abnormal            Final result                 Please view results for these tests on the individual orders.    CBC Auto Differential [[id number]]  (Abnormal) Collected: 03/12/22 0151    Specimen: Blood Updated: 03/12/22 0211     WBC 12.00 10*3/mm3      RBC 3.20 10*6/mm3      Hemoglobin 10.2 g/dL      Hematocrit 29.7 %      MCV 92.8 fL      MCH 31.8 pg      MCHC 34.3 g/dL      RDW 16.9 %       RDW-SD 55.1 fl      MPV 7.3 fL      Platelets 381 10*3/mm3      Neutrophil % 75.4 %      Lymphocyte % 14.0 %      Monocyte % 8.6 %      Eosinophil % 1.3 %      Basophil % 0.7 %      Neutrophils, Absolute 9.10 10*3/mm3      Lymphocytes, Absolute 1.70 10*3/mm3      Monocytes, Absolute 1.00 10*3/mm3      Eosinophils, Absolute 0.20 10*3/mm3      Basophils, Absolute 0.10 10*3/mm3      nRBC 0.1 /100 WBC     COVID PRE-OP / PRE-PROCEDURE SCREENING ORDER (NO ISOLATION) - Swab, Nasopharynx [[id number]]  (Normal) Collected: 03/11/22 1954    Specimen: Swab from Nasopharynx Updated: 03/11/22 2018    Narrative:      The following orders were created for panel order COVID PRE-OP / PRE-PROCEDURE SCREENING ORDER (NO ISOLATION) - Swab, Nasopharynx.  Procedure                               Abnormality         Status                     ---------                               -----------         ------                     COVID-19,CEPHEID/LATONIA,CO...[[id number]]  Normal              Final result                 Please view results for these tests on the individual orders.    COVID-19,CEPHEID/LATONIA,COR/MERCEDES/PAD/[facility] IN-HOUSE(OR EMERGENT/ADD-ON),NP SWAB IN TRANSPORT MEDIA 3-4 HR TAT, RT-PCR - Swab, Nasopharynx [[id number]]  (Normal) Collected: 03/11/22 1954    Specimen: Swab from Nasopharynx Updated: 03/11/22 2018     COVID19 Not Detected    Narrative:      Fact sheet for providers: https://www.fda.gov/media/360043/download     Fact sheet for patients: https://www.fda.gov/media/030683/download  Fact sheet for providers: https://www.fda.gov/media/232368/download    Fact sheet for patients: https://www.fda.gov/media/478808/download    Test performed by PCR.    Comprehensive Metabolic Panel [[id number]]  (Abnormal) Collected: 03/11/22 1813    Specimen: Blood Updated: 03/11/22 1855     Glucose 113 mg/dL      BUN 17 mg/dL      Creatinine 4.10 mg/dL      Sodium 138 mmol/L      Potassium 4.1 mmol/L      Chloride 100 mmol/L      CO2 22.0 mmol/L       Calcium 9.1 mg/dL      Total Protein 6.9 g/dL      Albumin 3.80 g/dL      ALT (SGPT) 11 U/L      AST (SGOT) 15 U/L      Alkaline Phosphatase 92 U/L      Total Bilirubin 0.2 mg/dL      Globulin 3.1 gm/dL      A/G Ratio 1.2 g/dL      BUN/Creatinine Ratio 4.1     Anion Gap 16.0 mmol/L      eGFR 11.0 mL/min/1.73      Comment: <15 Indicative of kidney failure       Narrative:      GFR Normal >60  Chronic Kidney Disease <60  Kidney Failure <15      Lipase [[id number]]  (Normal) Collected: 03/11/22 1813    Specimen: Blood Updated: 03/11/22 1844     Lipase 37 U/L     CBC & Differential [[id number]]  (Abnormal) Collected: 03/11/22 1813    Specimen: Blood Updated: 03/11/22 1819    Narrative:      The following orders were created for panel order CBC & Differential.  Procedure                               Abnormality         Status                     ---------                               -----------         ------                     CBC Auto Differential[[id number]]        Abnormal            Final result                 Please view results for these tests on the individual orders.    CBC Auto Differential [[id number]]  (Abnormal) Collected: 03/11/22 1813    Specimen: Blood Updated: 03/11/22 1819     WBC 14.10 10*3/mm3      RBC 3.49 10*6/mm3      Hemoglobin 11.0 g/dL      Hematocrit 32.7 %      MCV 93.7 fL      MCH 31.5 pg      MCHC 33.7 g/dL      RDW 17.1 %      RDW-SD 56.4 fl      MPV 7.1 fL      Platelets 403 10*3/mm3      Neutrophil % 86.0 %      Lymphocyte % 6.0 %      Monocyte % 6.2 %      Eosinophil % 1.0 %      Basophil % 0.8 %      Neutrophils, Absolute 12.10 10*3/mm3      Lymphocytes, Absolute 0.80 10*3/mm3      Monocytes, Absolute 0.90 10*3/mm3      Eosinophils, Absolute 0.10 10*3/mm3      Basophils, Absolute 0.10 10*3/mm3      nRBC 0.0 /100 WBC           Imaging Results (Last 24 Hours)     Procedure Component Value Units Date/Time    CT Abdomen Pelvis Without Contrast [[id number]] Collected: 03/11/22 1263      Updated: 03/11/22 1811    Narrative:      CT ABDOMEN PELVIS WO CONTRAST-     Date of Exam: 3/11/2022 5:45 PM     Indication: Periumbilical abdominal pain history of renal failure.   History of primary neuroendocrine cancer     Comparison: 08/31/2021, 09/30/2019     Technique: Contiguous axial CT images were obtained from the lung bases  to the pubic symphysis without contrast. Sagittal and coronal  reconstructions were performed.  Automated exposure control and  iterative reconstruction methods were used.     FINDINGS:  There is stable groundglass opacities in the lower lobes. Mild  cardiomegaly.      No abnormality seen in the liver. The gallbladder has been removed.  There our bilateral adrenal lesions, the left measuring 3.5 cm and the  right 2.5 cm. These have negative Hounsfield readings and are not  significantly changed; appearance could be due to hyperplasia or  adenomas.. No abnormality is seen in the spleen or pancreas. There is  heavy atherosclerotic vascular calcification.     No right kidney is seen. The left kidney has marked cortical thinning.  There is a 12 mm hypodense lower pole lesion which is compatible with a  cyst. There is mild dilatation of the upper pole collecting system.  Appearance is similar to prior. There is no ureteral stone identified.  The bladder is contracted.     There appears to been resection of the distal stomach. There is a  anastomosis between the lower margin of the greater curvature and a  small bowel loop. The wall of the stomach near the anastomosis appears  thickened, similar to the prior's. Small bowel is nondistended. There is  fluid in the small bowel. The appendix is not delineated. There is fluid  throughout the colon and in the rectum. There is diverticulosis of the  sigmoid colon.     No free fluid or free air is identified. Uterus is absent. Ovaries are  present. There may be poorly defined cystic change in the left ovary,  similar to prior. No adenopathy is  seen.     Bone window show degenerative changes in the spine.       Impression:      1. There appears to have been resection of the distal stomach. The wall  of the stomach is abnormally thickened near the anastomosis, similar to  priors. Clinical correlation recommended.  2. There is a solitary left kidney. There is marked cortical thinning of  the left kidney, unchanged.  3. There is fluid throughout the colon suggesting diarrhea.  4. Also noted: Prior cholecystectomy, atherosclerosis, sigmoid  diverticulosis with no CT evidence of diverticulitis, and additional  findings as reported above.     Electronically Signed By-Jami Rosario MD On:3/11/2022 6:09 PM  This report was finalized on [id number] by  Jami Rosario MD.             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 difficile 6/20/2021  Upper abdominal pain consider gastritis, gastric ulcer, colitis  Abnormal CT showing wall thickening of the stomach near anastomosis similar to 2019  Diabetes  CAD  History of right renal cell carcinoma status post nephrectomy  Cholecystectomy    PLAN:  We will plan EGD to evaluate abnormal CT findings that she may have another ulcer or recurrence of carcinoid tumor.  Continue PPI, consider carafate.   Would recommend starting probiotic.  She has not had any diarrhea today so we will hold on any cholestyramine.   Replace electrolytes as needed    I discussed the patient's findings and my recommendations with the patient.  Cindy Gates, APRN  03/12/22  09:14 EST    Time:

## 2022-03-12 NOTE — ED PROVIDER NOTES
Subjective   69-year-old female seen here earlier in the day for diarrhea.  The patient was noted to have negative C. difficile and stool PCR testing at that time.  The patient was discharged and went to dialysis but returns with continued upper abdominal discomfort and persistent diarrhea.  She states that it has had some intermittent streaks of red.  She reports no melena.  She states she has not had any hematemesis or vomiting.  She reports no definite fever or chills.  She states she is now weak and dizzy with position change          Review of Systems   Constitutional: Positive for chills, fatigue and fever.   HENT: Negative for sore throat and trouble swallowing.    Eyes: Negative for pain and discharge.   Respiratory: Negative for cough, chest tightness and shortness of breath.    Gastrointestinal: Positive for abdominal pain, blood in stool, diarrhea, nausea and rectal pain. Negative for anal bleeding, constipation and vomiting.   Genitourinary: Negative for flank pain.   Hematological: Does not bruise/bleed easily.       Past Medical History:   Diagnosis Date   • Lucas's esophagus    • C. difficile colitis    • Carotid artery disease ([facility])     -50-74% left, 16-49% right (1/19)   • DM2 (diabetes mellitus, type 2) ([facility])    • ESRD (end stage renal disease) ([facility])     on HD   • Gastric ulcer     at anastomatic site   • Gastroparesis    • Gout    • Hemodialysis patient ([facility])    • Hernia, incisional     abdomen   • History of colonoscopy     UTD   • History of degenerative disc disease    • Hyperlipidemia    • Hypertension    • Microscopic colitis    • Nephrotic syndrome    • Neuroendocrine tumor     of stomach   • FABY (obstructive sleep apnea)     not treating   • Osteoarthritis    • Osteoarthritis, knee     Left   • Proteinuria    • Renal cell cancer, right ([facility])    • Rib pain on right side     chronic   • Rotator cuff tear     bilateral   • Uterine cancer (HCC)    • Vitamin B 12 deficiency    • Vitamin D  "deficiency        Allergies   Allergen Reactions   • Atorvastatin Calcium Unknown (See Comments)   • Gabapentin Dizziness     Unable to walk   • Niacin Unknown (See Comments)     \"FEELS LIKE SHE IS ON FIRE\"         Past Surgical History:   Procedure Laterality Date   • APPENDECTOMY     • ARTERIOVENOUS FISTULA Right    • ARTERIOVENOUS FISTULA REPAIR      Clotted off and insert graft   • ARTERIOVENOUS FISTULA/SHUNT SURGERY Left 12/30/2020    Procedure: ARTERIOVENOUS FISTULA FORMATION;  Surgeon: Jan Caicedo MD;  Location: [facility] MAIN OR;  Service: Vascular;  Laterality: Left;   • BREAST BIOPSY      right nipple 1981   • COLONOSCOPY N/A 11/24/2020    Procedure: COLONOSCOPY with polypectomy x 7;  Surgeon: Jeffery White MD;  Location: [facility] ENDOSCOPY;  Service: Gastroenterology;  Laterality: N/A;  post op: polyps, diverticulosis, hemorrhoids   • GASTRIC RESECTION      cancer   • HYSTERECTOMY     • LAPAROSCOPIC CHOLECYSTECTOMY     • NEPHRECTOMY      right kidney removed    • REDUCTION MAMMAPLASTY     • TUMOR REMOVAL      Multiple removed from abdominal    • VENTRAL/INCISIONAL HERNIA REPAIR Right 8/28/2019    Procedure: VENTRAL/INCISIONAL HERNIA REPAIR;  Surgeon: Nilay Stevens MD;  Location: [facility] MAIN OR;  Service: General   • VENTRAL/INCISIONAL HERNIA REPAIR N/A 10/1/2019    Procedure: OPEN VENTRAL/INCISIONAL HERNIA REPAIR;  Surgeon: Nilay Stevens MD;  Location: [facility] MAIN OR;  Service: General   • VENTRAL/INCISIONAL HERNIA REPAIR N/A 10/11/2021    Procedure: VENTRAL/INCISIONAL HERNIA REPAIR LAPAROSCOPIC;  Surgeon: Nilay Stevens MD;  Location: [facility] MAIN OR;  Service: General;  Laterality: N/A;       Family History   Problem Relation Age of Onset   • Heart disease Father        Social History     Socioeconomic History   • Marital status: Single   Tobacco Use   • Smoking status: Former Smoker     Packs/day: 0.25     Years: 57.00     Pack years: 14.25     Types: Cigarettes     Quit date: 5/1/2021    "  Years since quittin.8   • Smokeless tobacco: Never Used   • Tobacco comment: quit smoking 2 months ago   Vaping Use   • Vaping Use: Former   Substance and Sexual Activity   • Alcohol use: No   • Drug use: No   • Sexual activity: Not Currently     Ports no recent unusual food water travel or activity      Objective   Physical Exam  Alert [place] Coma Scale 15 nontoxic in appearance   HEENT: Pupils equal and reactive to light. Conjunctivae are not injected. normal tympanic membranes. Oropharynx and nares are normal.   Neck: Supple. Midline trachea. No JVD. No goiter.   Chest: Clear and equal breath sounds bilaterally regular rate and rhythm without murmur or rub.   Abdomen: Positive bowel sounds Fuhs periumbilical tenderness there is more than standard in the left lower quadrant no reverse Rovsing test is noted nondistended. No rebound or peritoneal signs. No CVA tenderness.   Extremities no clubbing cyanosis or edema motor sensory exam is normal the full range of motion is intact   skin: Warm and dry, no rashes or petechia.   Lymphatic: No regional lymphadenopathy. No calf pain, swelling or Sergei's sign    Procedures           ED Course           Labs Reviewed   COMPREHENSIVE METABOLIC PANEL - Abnormal; Notable for the following components:       Result Value    Glucose 113 (*)     Creatinine 4.10 (*)     BUN/Creatinine Ratio 4.1 (*)     Anion Gap 16.0 (*)     eGFR 11.0 (*)     All other components within normal limits    Narrative:     GFR Normal >60  Chronic Kidney Disease <60  Kidney Failure <15     CBC WITH AUTO DIFFERENTIAL - Abnormal; Notable for the following components:    WBC 14.10 (*)     RBC 3.49 (*)     Hemoglobin 11.0 (*)     Hematocrit 32.7 (*)     RDW 17.1 (*)     RDW-SD 56.4 (*)     Neutrophil % 86.0 (*)     Lymphocyte % 6.0 (*)     Neutrophils, Absolute 12.10 (*)     All other components within normal limits   LIPASE - Normal   CBC AND DIFFERENTIAL    Narrative:     The following orders were  created for panel order CBC & Differential.  Procedure                               Abnormality         Status                     ---------                               -----------         ------                     CBC Auto Differential[[id number]]        Abnormal            Final result                 Please view results for these tests on the individual orders.     Medications   ampicillin-sulbactam (UNASYN) 3 g in sodium chloride 0.9 % 100 mL IVPB-MBP (has no administration in time range)   sodium chloride 0.9 % infusion (has no administration in time range)   pantoprazole (PROTONIX) injection 40 mg (has no administration in time range)   famotidine (PEPCID) injection 20 mg (20 mg Intravenous Given 3/11/22 1725)   ondansetron (ZOFRAN) injection 8 mg (8 mg Intravenous Given 3/11/22 1736)   HYDROmorphone (DILAUDID) injection 0.5 mg (0.5 mg Intravenous Given 3/11/22 1725)   sodium chloride 0.9 % bolus 250 mL (0 mL Intravenous Stopped 3/11/22 1900)     CT Abdomen Pelvis Without Contrast    Result Date: 3/11/2022  1. There appears to have been resection of the distal stomach. The wall of the stomach is abnormally thickened near the anastomosis, similar to priors. Clinical correlation recommended. 2. There is a solitary left kidney. There is marked cortical thinning of the left kidney, unchanged. 3. There is fluid throughout the colon suggesting diarrhea. 4. Also noted: Prior cholecystectomy, atherosclerosis, sigmoid diverticulosis with no CT evidence of diverticulitis, and additional findings as reported above.  Electronically Signed By-Jami Rosario MD On:3/11/2022 6:09 PM This report was finalized on [id number] by  Jami Rosario MD.                                          MDM  Number of Diagnoses or Management Options  Risk of Complications, Morbidity, and/or Mortality  Presenting problems: high  Diagnostic procedures: high  Management options: high  General comments: Patient will be started on low-dose IV  saline.  The patient be started on IV Unasyn.  The patient will also be given Protonix.  The case was discussed with on-call primary care, and the patient may benefit from GI consultation.  The patient was agreeable to this plan of treatment        Final diagnoses:   Colitis   Acute gastritis with hemorrhage, unspecified gastritis type   Stage 4 chronic kidney disease (HCC)       ED Disposition  ED Disposition     ED Disposition   Decision to Admit    Condition   --    Comment   Level of Care: Telemetry [5]   Diagnosis: Colitis [[id number]]   Admitting Physician: JULIEN DUFFY [[id number]]   Attending Physician: JULIEN DUFFY [[id number]]               No follow-up provider specified.       Medication List      No changes were made to your prescriptions during this visit.          Jan Pulliam MD  03/11/22 1921

## 2022-03-12 NOTE — H&P
Patient Care Team:  Jin Malcolm PA-C as PCP - General (Physician Assistant)    Chief complaint abdominal pain and diarrhea    Subjective   .    69-year-old female seen in the ER earlier in the day for diarrhea.  The patient was noted to have negative C. difficile and stool PCR testing at that time.  The patient was discharged and went to dialysis but returned last night with continued upper abdominal discomfort and persistent diarrhea.  She states that it has had some intermittent streaks of red.  She reports no melena.  She states she has not had any hematemesis or vomiting.  She reports no definite fever or chills.  She states she is now weak and dizzy with position change.   Pt with h/o C. 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  CT scan in the ER shows that the wall of the stomach is abnormally thick near the anastomosis.  She was given abx, IVF and meds for nausea and pain.  She was admitted for treatment and evaluation.  Today is feeling some better.      Review of Systems   Constitutional: Positive for activity change, appetite change and fatigue. Negative for fever.   HENT: Negative.    Gastrointestinal: Positive for abdominal pain, blood in stool, diarrhea and nausea.   Musculoskeletal: Positive for arthralgias.   Neurological: Positive for dizziness and weakness.          History  Past Medical History:   Diagnosis Date   • Lucas's esophagus    • C. difficile colitis    • Carotid artery disease (HCC)     -50-74% left, 16-49% right (1/19)   • DM2 (diabetes mellitus, type 2) (HCC)    • ESRD (end stage renal disease) (HCC)     on HD   • Gastric ulcer     at anastomatic site   • Gastroparesis    • Gout    • Hemodialysis patient (HCC)    • Hernia, incisional      abdomen   • History of colonoscopy     UTD   • History of degenerative disc disease    • Hyperlipidemia    • Hypertension    • Microscopic colitis    • Nephrotic syndrome    • Neuroendocrine tumor     of stomach   • FABY (obstructive sleep apnea)     not treating   • Osteoarthritis    • Osteoarthritis, knee     Left   • Proteinuria    • Renal cell cancer, right (HCC)    • Rib pain on right side     chronic   • Rotator cuff tear     bilateral   • Uterine cancer (HCC)    • Vitamin B 12 deficiency    • Vitamin D deficiency      Past Surgical History:   Procedure Laterality Date   • APPENDECTOMY     • ARTERIOVENOUS FISTULA Right    • ARTERIOVENOUS FISTULA REPAIR      Clotted off and insert graft   • ARTERIOVENOUS FISTULA/SHUNT SURGERY Left 12/30/2020    Procedure: ARTERIOVENOUS FISTULA FORMATION;  Surgeon: Jan Caicedo MD;  Location: [facility] MAIN OR;  Service: Vascular;  Laterality: Left;   • BREAST BIOPSY      right nipple 1981   • COLONOSCOPY N/A 11/24/2020    Procedure: COLONOSCOPY with polypectomy x 7;  Surgeon: Jeffery White MD;  Location: [facility] ENDOSCOPY;  Service: Gastroenterology;  Laterality: N/A;  post op: polyps, diverticulosis, hemorrhoids   • GASTRIC RESECTION      cancer   • HYSTERECTOMY     • LAPAROSCOPIC CHOLECYSTECTOMY     • NEPHRECTOMY      right kidney removed    • REDUCTION MAMMAPLASTY     • TUMOR REMOVAL      Multiple removed from abdominal    • VENTRAL/INCISIONAL HERNIA REPAIR Right 8/28/2019    Procedure: VENTRAL/INCISIONAL HERNIA REPAIR;  Surgeon: Nilay Stevens MD;  Location: [facility] MAIN OR;  Service: General   • VENTRAL/INCISIONAL HERNIA REPAIR N/A 10/1/2019    Procedure: OPEN VENTRAL/INCISIONAL HERNIA REPAIR;  Surgeon: Nilay Stevens MD;  Location: [facility] MAIN OR;  Service: General   • VENTRAL/INCISIONAL HERNIA REPAIR N/A 10/11/2021    Procedure: VENTRAL/INCISIONAL HERNIA REPAIR LAPAROSCOPIC;  Surgeon: Nilay Stevens MD;  Location: [facility] MAIN OR;  Service: General;   Laterality: N/A;     Family History   Problem Relation Age of Onset   • Heart disease Father      Social History     Tobacco Use   • Smoking status: Former Smoker     Packs/day: 0.25     Years: 57.00     Pack years: 14.25     Types: Cigarettes     Quit date: 2021     Years since quittin.8   • Smokeless tobacco: Never Used   • Tobacco comment: quit smoking 2 months ago   Vaping Use   • Vaping Use: Former   Substance Use Topics   • Alcohol use: No   • Drug use: No     Medications Prior to Admission   Medication Sig Dispense Refill Last Dose   • amLODIPine (NORVASC) 10 MG tablet 10 mg.   3/10/2022 at Unknown time   • calcium acetate (PHOSLO) 667 MG capsule Take 2,001 mg by mouth 2 (Two) Times a Day. With meals   3/10/2022 at Unknown time   • lidocaine-prilocaine (EMLA) 2.5-2.5 % cream Apply 1 application topically to the appropriate area as directed Daily As Needed. Apply to access site 1 to 2 hours before dialysis  4 3/11/2022 at Unknown time   • losartan (COZAAR) 100 MG tablet Take 100 mg by mouth Daily.   3/10/2022 at Unknown time   • Multiple Vitamins-Minerals (RENAPLEX-D) tablet Take 1 tablet by mouth Daily.   3/10/2022 at Unknown time   • pantoprazole (PROTONIX) 40 MG EC tablet TAKE 1 TABLET BY MOUTH TWICE DAILY 180 tablet 2 3/10/2022 at Unknown time   • sevelamer (RENVELA) 800 MG tablet TAKE 2 TABLETS BY MOUTH THREE TIMES DAILY WITH MEALS   3/10/2022 at Unknown time   • simvastatin (ZOCOR) 40 MG tablet Take 40 mg by mouth Every Night.   3/10/2022 at Unknown time   • sodium bicarbonate 650 MG tablet Take 2 tablets by mouth 3 (Three) Times a Day. 180 tablet 0 3/10/2022 at Unknown time   • vitamin B-12 (CYANOCOBALAMIN) 1000 MCG tablet Take 2,000 mcg by mouth 3 (Three) Times a Week. Mon, Wed, Fri   3/10/2022 at Unknown time   • albuterol sulfate HFA (Proventil HFA) 108 (90 Base) MCG/ACT inhaler Inhale 2 puffs Every 4 (Four) Hours As Needed for Wheezing. 17 g 1 Unknown at Unknown time   • aspirin (aspirin) 81  MG EC tablet Take 1 tablet by mouth 3 (Three) Times a Week.   Unknown at Unknown time   • Fluticasone Furoate-Vilanterol (Breo Ellipta) 100-25 MCG/INH inhaler Inhale 1 puff Daily. 1 each 1 Unknown at Unknown time   • ipratropium-albuterol (DUO-NEB) 0.5-2.5 mg/3 ml nebulizer Take 3 mL by nebulization 4 (Four) Times a Day. 360 mL 1 Unknown at Unknown time     Allergies:  Atorvastatin calcium, Gabapentin, and Niacin    Objective     Vital Signs  Temp:  [98 °F (36.7 °C)-98.7 °F (37.1 °C)] 98 °F (36.7 °C)  Heart Rate:  [] 89  Resp:  [16-18] 18  BP: (114-183)/() 150/79     Physical Exam:      General Appearance:    Alert, cooperative, in no acute distress   Head:    Normocephalic, without obvious abnormality, atraumatic   Eyes:            Lids and lashes normal, conjunctivae and sclerae normal, no   icterus, no pallor, corneas clear, PERRLA   Ears:    Ears appear intact with no abnormalities noted   Throat:   No oral lesions, no thrush, oral mucosa moist   Neck:   No adenopathy, supple, trachea midline, no thyromegaly, no   carotid bruit, no JVD   Lungs:     Clear to auscultation,respirations regular, even and                  unlabored    Heart:    Regular rhythm and normal rate, normal S1 and S2, no            murmur, no gallop, no rub, no click   Chest Wall:    No abnormalities observed   Abdomen:     Normal bowel sounds, no masses, no organomegaly, soft        non-tender, non-distended, no guarding, no rebound                tenderness   Extremities:   Moves all extremities well, no edema, no cyanosis, no             redness   Pulses:   Pulses palpable and equal bilaterally   Skin:   No bleeding, bruising or rash   Lymph nodes:   No palpable adenopathy   Neurologic:   Cranial nerves 2 - 12 grossly intact, sensation intact, DTR       present and equal bilaterally       Results Review:     Imaging Results (Last 24 Hours)     Procedure Component Value Units Date/Time    CT Abdomen Pelvis Without Contrast  [[id number]] Collected: 03/11/22 1755     Updated: 03/11/22 1811    Narrative:      CT ABDOMEN PELVIS WO CONTRAST-     Date of Exam: 3/11/2022 5:45 PM     Indication: Periumbilical abdominal pain history of renal failure.   History of primary neuroendocrine cancer     Comparison: 08/31/2021, 09/30/2019     Technique: Contiguous axial CT images were obtained from the lung bases  to the pubic symphysis without contrast. Sagittal and coronal  reconstructions were performed.  Automated exposure control and  iterative reconstruction methods were used.     FINDINGS:  There is stable groundglass opacities in the lower lobes. Mild  cardiomegaly.      No abnormality seen in the liver. The gallbladder has been removed.  There our bilateral adrenal lesions, the left measuring 3.5 cm and the  right 2.5 cm. These have negative Hounsfield readings and are not  significantly changed; appearance could be due to hyperplasia or  adenomas.. No abnormality is seen in the spleen or pancreas. There is  heavy atherosclerotic vascular calcification.     No right kidney is seen. The left kidney has marked cortical thinning.  There is a 12 mm hypodense lower pole lesion which is compatible with a  cyst. There is mild dilatation of the upper pole collecting system.  Appearance is similar to prior. There is no ureteral stone identified.  The bladder is contracted.     There appears to been resection of the distal stomach. There is a  anastomosis between the lower margin of the greater curvature and a  small bowel loop. The wall of the stomach near the anastomosis appears  thickened, similar to the prior's. Small bowel is nondistended. There is  fluid in the small bowel. The appendix is not delineated. There is fluid  throughout the colon and in the rectum. There is diverticulosis of the  sigmoid colon.     No free fluid or free air is identified. Uterus is absent. Ovaries are  present. There may be poorly defined cystic change in the left  ovary,  similar to prior. No adenopathy is seen.     Bone window show degenerative changes in the spine.       Impression:      1. There appears to have been resection of the distal stomach. The wall  of the stomach is abnormally thickened near the anastomosis, similar to  priors. Clinical correlation recommended.  2. There is a solitary left kidney. There is marked cortical thinning of  the left kidney, unchanged.  3. There is fluid throughout the colon suggesting diarrhea.  4. Also noted: Prior cholecystectomy, atherosclerosis, sigmoid  diverticulosis with no CT evidence of diverticulitis, and additional  findings as reported above.     Electronically Signed By-Jami Rosario MD On:3/11/2022 6:09 PM  This report was finalized on [id number] by  Jami Rosario MD.           Lab Results (last 24 hours)     Procedure Component Value Units Date/Time    Comprehensive Metabolic Panel [[id number]]  (Abnormal) Collected: 03/12/22 0151    Specimen: Blood Updated: 03/12/22 0227     Glucose 80 mg/dL      BUN 22 mg/dL      Creatinine 4.63 mg/dL      Sodium 138 mmol/L      Potassium 4.4 mmol/L      Chloride 101 mmol/L      CO2 24.0 mmol/L      Calcium 8.5 mg/dL      Total Protein 6.2 g/dL      Albumin 3.40 g/dL      ALT (SGPT) 9 U/L      AST (SGOT) 14 U/L      Alkaline Phosphatase 84 U/L      Total Bilirubin 0.2 mg/dL      Globulin 2.8 gm/dL      A/G Ratio 1.2 g/dL      BUN/Creatinine Ratio 4.8     Anion Gap 13.0 mmol/L      eGFR 9.5 mL/min/1.73      Comment: <15 Indicative of kidney failure       Narrative:      GFR Normal >60  Chronic Kidney Disease <60  Kidney Failure <15      Magnesium [[id number]]  (Normal) Collected: 03/12/22 0151    Specimen: Blood Updated: 03/12/22 0227     Magnesium 1.8 mg/dL     CBC & Differential [[id number]]  (Abnormal) Collected: 03/12/22 0151    Specimen: Blood Updated: 03/12/22 0211    Narrative:      The following orders were created for panel order CBC & Differential.  Procedure                                Abnormality         Status                     ---------                               -----------         ------                     CBC Auto Differential[[id number]]        Abnormal            Final result                 Please view results for these tests on the individual orders.    CBC Auto Differential [[id number]]  (Abnormal) Collected: 03/12/22 0151    Specimen: Blood Updated: 03/12/22 0211     WBC 12.00 10*3/mm3      RBC 3.20 10*6/mm3      Hemoglobin 10.2 g/dL      Hematocrit 29.7 %      MCV 92.8 fL      MCH 31.8 pg      MCHC 34.3 g/dL      RDW 16.9 %      RDW-SD 55.1 fl      MPV 7.3 fL      Platelets 381 10*3/mm3      Neutrophil % 75.4 %      Lymphocyte % 14.0 %      Monocyte % 8.6 %      Eosinophil % 1.3 %      Basophil % 0.7 %      Neutrophils, Absolute 9.10 10*3/mm3      Lymphocytes, Absolute 1.70 10*3/mm3      Monocytes, Absolute 1.00 10*3/mm3      Eosinophils, Absolute 0.20 10*3/mm3      Basophils, Absolute 0.10 10*3/mm3      nRBC 0.1 /100 WBC     COVID PRE-OP / PRE-PROCEDURE SCREENING ORDER (NO ISOLATION) - Swab, Nasopharynx [[id number]]  (Normal) Collected: 03/11/22 1954    Specimen: Swab from Nasopharynx Updated: 03/11/22 2018    Narrative:      The following orders were created for panel order COVID PRE-OP / PRE-PROCEDURE SCREENING ORDER (NO ISOLATION) - Swab, Nasopharynx.  Procedure                               Abnormality         Status                     ---------                               -----------         ------                     COVID-19,CEPHEID/LATONIA,CO...[[id number]]  Normal              Final result                 Please view results for these tests on the individual orders.    COVID-19,CEPHEID/LATONIA,COR/[facility] IN-HOUSE(OR EMERGENT/ADD-ON),NP SWAB IN TRANSPORT MEDIA 3-4 HR TAT, RT-PCR - Swab, Nasopharynx [[id number]]  (Normal) Collected: 03/11/22 1954    Specimen: Swab from Nasopharynx Updated: 03/11/22 2018     COVID19 Not Detected    Narrative:      Fact sheet for  providers: https://www.fda.gov/media/898880/download     Fact sheet for patients: https://www.fda.gov/media/249216/download  Fact sheet for providers: https://www.fda.gov/media/989223/download    Fact sheet for patients: https://www.fda.gov/media/859866/download    Test performed by PCR.    Comprehensive Metabolic Panel [[id number]]  (Abnormal) Collected: 03/11/22 1813    Specimen: Blood Updated: 03/11/22 1855     Glucose 113 mg/dL      BUN 17 mg/dL      Creatinine 4.10 mg/dL      Sodium 138 mmol/L      Potassium 4.1 mmol/L      Chloride 100 mmol/L      CO2 22.0 mmol/L      Calcium 9.1 mg/dL      Total Protein 6.9 g/dL      Albumin 3.80 g/dL      ALT (SGPT) 11 U/L      AST (SGOT) 15 U/L      Alkaline Phosphatase 92 U/L      Total Bilirubin 0.2 mg/dL      Globulin 3.1 gm/dL      A/G Ratio 1.2 g/dL      BUN/Creatinine Ratio 4.1     Anion Gap 16.0 mmol/L      eGFR 11.0 mL/min/1.73      Comment: <15 Indicative of kidney failure       Narrative:      GFR Normal >60  Chronic Kidney Disease <60  Kidney Failure <15      Lipase [[id number]]  (Normal) Collected: 03/11/22 1813    Specimen: Blood Updated: 03/11/22 1844     Lipase 37 U/L     CBC & Differential [[id number]]  (Abnormal) Collected: 03/11/22 1813    Specimen: Blood Updated: 03/11/22 1819    Narrative:      The following orders were created for panel order CBC & Differential.  Procedure                               Abnormality         Status                     ---------                               -----------         ------                     CBC Auto Differential[[id number]]        Abnormal            Final result                 Please view results for these tests on the individual orders.    CBC Auto Differential [[id number]]  (Abnormal) Collected: 03/11/22 1813    Specimen: Blood Updated: 03/11/22 1819     WBC 14.10 10*3/mm3      RBC 3.49 10*6/mm3      Hemoglobin 11.0 g/dL      Hematocrit 32.7 %      MCV 93.7 fL      MCH 31.5 pg      MCHC 33.7 g/dL      RDW 17.1  %      RDW-SD 56.4 fl      MPV 7.1 fL      Platelets 403 10*3/mm3      Neutrophil % 86.0 %      Lymphocyte % 6.0 %      Monocyte % 6.2 %      Eosinophil % 1.0 %      Basophil % 0.8 %      Neutrophils, Absolute 12.10 10*3/mm3      Lymphocytes, Absolute 0.80 10*3/mm3      Monocytes, Absolute 0.90 10*3/mm3      Eosinophils, Absolute 0.10 10*3/mm3      Basophils, Absolute 0.10 10*3/mm3      nRBC 0.0 /100 WBC            I reviewed the patient's new clinical results.    Assessment/Plan       Colitis  -  Negative stool studies.  Admit for further evaluation.  IVF.  meds for nausea and pain.  Consult GI.    Upper abd pain - continue PPI.  ? Gastritis  ESRD - consult renal.  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     Kelli Henderson MD  03/12/22  09:21 EST

## 2022-03-12 NOTE — ANESTHESIA PREPROCEDURE EVALUATION
Anesthesia Evaluation     Patient summary reviewed and Nursing notes reviewed   no history of anesthetic complications:  NPO Solid Status: > 8 hours  NPO Liquid Status: > 8 hours           Airway   Dental      Pulmonary    (+) a smoker Former, COPD, shortness of breath, sleep apnea,   Cardiovascular     ECG reviewed  PT is on anticoagulation therapy    (+) hypertension, valvular problems/murmurs MR, CHF , hyperlipidemia,  carotid artery disease      Neuro/Psych  (+) numbness,    GI/Hepatic/Renal/Endo    (+) obesity,  GERD, PUD,  renal disease ESRD and dialysis, diabetes mellitus,     Musculoskeletal     (+) chronic pain,   Abdominal    Substance History      OB/GYN          Other   arthritis, blood dyscrasia anemia,   history of cancer    ROS/Med Hx Other: COPD exacerbation, uterine cancer, renal cell carcinoma, gastric neuroendrocrine tumor, neuropathy, nephrotic syndrome, proteinuria, carotid stenosis, DDD, gout, colitis, C. Diff, low vit D and B12, Lucas's esophagus, RCT, gastroparesis, rib pain, incisional hernia, insomnia, diverticulitis, orthostatic hypotension, thrombocytosis, disorder of phosphorus metabolism, coagulation defect, hyperparathyroidism, hypoxemia    Echo  Normal LV size and contractility EF of 60%  Normal RV size  Normal atrial size  Aortic valve, mitral valve, tricuspid valve appears structurally normal, mild mitral regurgitation  seen.  No pericardial effusion seen.  Proximal aorta appears normal in size.      PSH  APPENDECTOMY LAPAROSCOPIC CHOLECYSTECTOMY  TUMOR REMOVAL BREAST BIOPSY  REDUCTION MAMMAPLASTY HYSTERECTOMY  ARTERIOVENOUS FISTULA ARTERIOVENOUS FISTULA REPAIR  GASTRIC RESECTION VENTRAL/INCISIONAL HERNIA REPAIR  VENTRAL/INCISIONAL HERNIA REPAIR NEPHRECTOMY  COLONOSCOPY ARTERIOVENOUS FISTULA/SHUNT SURGERY  VENTRAL/INCISIONAL HERNIA REPAIR                 Anesthesia Plan    ASA 4     MAC   (Patient identified; pre-operative vital signs, all relevant labs/studies, complete  medical/surgical/anesthetic history, full medication list, full allergy list, and NPO status obtained/reviewed; physical assessment performed; anesthetic options, side effects, potential complications, risks, and benefits discussed; questions answered; written anesthesia consent obtained; patient cleared for procedure; anesthesia machine and equipment checked and functioning)    Anesthetic plan, all risks, benefits, and alternatives have been provided, discussed and informed consent has been obtained with: patient.    Plan discussed with CRNA and CAA.        CODE STATUS:    Code Status (Patient has no pulse and is not breathing): CPR (Attempt to Resuscitate)  Medical Interventions (Patient has pulse or is breathing): Full Support

## 2022-03-12 NOTE — OP NOTE
ESOPHAGOGASTRODUODENOSCOPY Procedure Report    Patient Name:  Sherry Lobato  YOB: 1950    Date of Surgery:  3/12/2022     Pre-Op Diagnosis:  Abnormal CT scan, gastrointestinal tract [R93.3]  Carcinoid tumor (past Whipple resection)       Post-Op Diagnosis Codes:     * Abnormal CT scan, gastrointestinal tract [R93.3]  Bilroth II anastamosis with exploration of both EFFERENT and AFFERENT limbs showing NO recurrence of Carcinoid tumor (past Whipple resection)  Bile gastritis caused thickening of her anastamosis--biopsied for path  Normal esophagus, stomach, and jejunum otherwise, besides post-Whipple, BII anastamosis, anatomy    Procedure/CPT® Codes:      Procedure(s):  ESOPHAGOGASTRODUODENOSCOPY with biopsies    Staff:  Surgeon(s):  Javan Augustin MD         Anesthesia: General    Implants:    Nothing was implanted during the procedure    Specimen:        See Below    Complications:  NONE    EBL = NONE    Description of Procedure:  Informed consent was obtained for the procedure, including sedation.  Risks of perforation, hemorrhage, adverse drug reaction and aspiration were discussed.  The patient was brought into the endoscopy suite. Continuous cardiopulmonary monitoring was performed. The patient was placed in the left lateral decubitus position.  The bite block was inserted into the patient's mouth. After adequate sedation was attained, the Olympus gastroscope was inserted into the patient's mouth and advanced to the second portion of the duodenum without difficulty.  Circumferential examination was performed. A retroflex exam was performed in the patient's stomach.  On completion of the exam, the bowel was decompressed, the scope was removed from the patient, the patient tolerated the procedure well, there were no immediate post-operative complications.        * Abnormal CT scan, gastrointestinal tract [R93.3]  Bilroth II anastamosis with exploration of both EFFERENT and AFFERENT limbs  showing NO recurrence of Carcinoid tumor (past Whipple resection)  Bile gastritis caused thickening of her anastamosis--biopsied for path  Normal esophagus, stomach, and jejunum otherwise, besides post-Whipple, BII anastamosis, anatomy      Impression:     * Abnormal CT scan, gastrointestinal tract [R93.3]  Bilroth II anastamosis with exploration of both EFFERENT and AFFERENT limbs showing NO recurrence of Carcinoid tumor (past Whipple resection)  Bile gastritis caused thickening of her anastamosis--biopsied for path  Normal esophagus, stomach, and jejunum otherwise, besides post-Whipple, BII anastamosis, anatomy    Recommendations:  Carafate, PPI for now  Advance diet  Colestid for bile acid diarrhea    Aliyah MD     Date: 3/12/2022  Time: 12:10 EST

## 2022-03-12 NOTE — PLAN OF CARE
Goal Outcome Evaluation:  Plan of Care Reviewed With: patient        Progress: no change  Outcome Evaluation: Pt has had no complaints of abdominal pain, ambulated in room. Pt had dialysis yesterday, usually has it on M-W-F, nephro consulted, will cont to monitor.

## 2022-03-13 NOTE — OUTREACH NOTE
Prep Survey    Flowsheet Row Responses   [facility] facility patient discharged from? [facility]   Is LACE score < 7 ? No   Emergency Room discharge w/ pulse ox? No   Eligibility Readm Mgmt   Discharge diagnosis   Colitis   Does the patient have one of the following disease processes/diagnoses(primary or secondary)? Other   Does the patient have Home health ordered? No   Is there a DME ordered? No   Prep survey completed? Yes          CHUCHO GRAJEDA - Registered Nurse

## 2022-03-14 NOTE — CASE MANAGEMENT/SOCIAL WORK
Case Management Discharge Note      Final Note: Discharged prior to CM assessment         Selected Continued Care - Discharged on 3/12/2022 Admission date: 3/11/2022 - Discharge disposition: Home or Self Care     Final Discharge Disposition Code: 01 - home or self-care

## 2022-03-15 LAB
LAB AP CASE REPORT: NORMAL
PATH REPORT.FINAL DX SPEC: NORMAL
PATH REPORT.GROSS SPEC: NORMAL

## 2022-03-16 ENCOUNTER — READMISSION MANAGEMENT (OUTPATIENT)
Dept: CALL CENTER | Facility: HOSPITAL | Age: 72
End: 2022-03-16

## 2022-03-16 LAB — QT INTERVAL: 392 MS

## 2022-03-16 NOTE — DISCHARGE SUMMARY
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          Hospital Course  69-year-old female seen in the ER earlier on the day of admission for diarrhea.  The patient was noted to have negative C. difficile and stool PCR testing at that time.  The patient was discharged and went to dialysis but returned later that night with continued upper abdominal discomfort and persistent diarrhea.  She states that it has had some intermittent streaks of red.  She reports no melena.  She states she has not had any hematemesis or vomiting.  She reports no definite fever or chills.  she had become weak and dizzy with position change.  Pt with h/o C. 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  CT scan in the ER shows that the wall of the stomach is abnormally thick near the anastomosis.  She was given abx, IVF and meds for nausea and pain.  She was admitted for treatment and evaluation. After treatment, she began to feel better.  She was seen by GI and had an EGD on 3/12/22.  There was no evidence of recurrence of the carcinoid tumor.  She does have bile gastritis.  She was started on carafate in addition to her PPI.  Her diet was advance and she tolerated that without issue.  She was ready for discharge the following day.  She will  "have close GI f/u.      Procedures Performed    Procedure(s):  ESOPHAGOGASTRODUODENOSCOPY with biopsy x 1 area  -------------------       Consults:   Consults     Date and Time Order Name Status Description    3/11/2022  8:19 PM Inpatient Nephrology Consult Completed           Pertinent Test Results:    Lab Results (most recent)     Procedure Component Value Units Date/Time    Tissue Pathology Exam [[id number]] Collected: 03/12/22 1212    Specimen: Tissue from Small Intestine, Duodenum Updated: 03/15/22 1526     Case Report --     Surgical Pathology Report                         Case: [id number]                                  Authorizing Provider:  Javan Augustin MD       Collected:           03/12/2022 12:12 PM          Ordering Location:     [facility]  Received:            03/14/2022 09:37 AM                                 SUITES                                                                       Pathologist:           Tesfaye Briseno MD                                                             Specimen:    Small Intestine, Duodenum                                                                   Final Diagnosis --     Duodenum, biopsy:    Acute duodenitis with focal ulceration and reactive villi    Negative for celiac disease    No residual carcinoid tumor identified    JPR/tkd        Gross Description --     1. Small Intestine, Duodenum.  Received in a single container of formalin labeled \"Duodenum\" are a few tan irregular soft tissue fragments, none greater than 3 mm or so in maximum dimension. They are filtered and entirely submitted.      [facility]         Comprehensive Metabolic Panel [[id number]]  (Abnormal) Collected: 03/12/22 0151    Specimen: Blood Updated: 03/12/22 0227     Glucose 80 mg/dL      BUN 22 mg/dL      Creatinine 4.63 mg/dL      Sodium 138 mmol/L      Potassium 4.4 mmol/L      Chloride 101 mmol/L      CO2 24.0 mmol/L      Calcium 8.5 mg/dL      Total Protein 6.2 g/dL     "  Albumin 3.40 g/dL      ALT (SGPT) 9 U/L      AST (SGOT) 14 U/L      Alkaline Phosphatase 84 U/L      Total Bilirubin 0.2 mg/dL      Globulin 2.8 gm/dL      A/G Ratio 1.2 g/dL      BUN/Creatinine Ratio 4.8     Anion Gap 13.0 mmol/L      eGFR 9.5 mL/min/1.73      Comment: <15 Indicative of kidney failure       Narrative:      GFR Normal >60  Chronic Kidney Disease <60  Kidney Failure <15      Magnesium [[id number]]  (Normal) Collected: 03/12/22 0151    Specimen: Blood Updated: 03/12/22 0227     Magnesium 1.8 mg/dL     CBC & Differential [[id number]]  (Abnormal) Collected: 03/12/22 0151    Specimen: Blood Updated: 03/12/22 0211    Narrative:      The following orders were created for panel order CBC & Differential.  Procedure                               Abnormality         Status                     ---------                               -----------         ------                     CBC Auto Differential[[id number]]        Abnormal            Final result                 Please view results for these tests on the individual orders.    CBC Auto Differential [[id number]]  (Abnormal) Collected: 03/12/22 0151    Specimen: Blood Updated: 03/12/22 0211     WBC 12.00 10*3/mm3      RBC 3.20 10*6/mm3      Hemoglobin 10.2 g/dL      Hematocrit 29.7 %      MCV 92.8 fL      MCH 31.8 pg      MCHC 34.3 g/dL      RDW 16.9 %      RDW-SD 55.1 fl      MPV 7.3 fL      Platelets 381 10*3/mm3      Neutrophil % 75.4 %      Lymphocyte % 14.0 %      Monocyte % 8.6 %      Eosinophil % 1.3 %      Basophil % 0.7 %      Neutrophils, Absolute 9.10 10*3/mm3      Lymphocytes, Absolute 1.70 10*3/mm3      Monocytes, Absolute 1.00 10*3/mm3      Eosinophils, Absolute 0.20 10*3/mm3      Basophils, Absolute 0.10 10*3/mm3      nRBC 0.1 /100 WBC     COVID PRE-OP / PRE-PROCEDURE SCREENING ORDER (NO ISOLATION) - Swab, Nasopharynx [[id number]]  (Normal) Collected: 03/11/22 1954    Specimen: Swab from Nasopharynx Updated: 03/11/22 2018    Narrative:      The  following orders were created for panel order COVID PRE-OP / PRE-PROCEDURE SCREENING ORDER (NO ISOLATION) - Swab, Nasopharynx.  Procedure                               Abnormality         Status                     ---------                               -----------         ------                     COVID-19,CEPHEID/LATONIA,CO...[[id number]]  Normal              Final result                 Please view results for these tests on the individual orders.    COVID-19,CEPHEID/LATONIA,COR/[facility] IN-HOUSE(OR EMERGENT/ADD-ON),NP SWAB IN TRANSPORT MEDIA 3-4 HR TAT, RT-PCR - Swab, Nasopharynx [[id number]]  (Normal) Collected: 03/11/22 1954    Specimen: Swab from Nasopharynx Updated: 03/11/22 2018     COVID19 Not Detected    Narrative:      Fact sheet for providers: https://www.fda.gov/media/305841/download     Fact sheet for patients: https://www.fda.gov/media/474230/download  Fact sheet for providers: https://www.fda.gov/media/592282/download    Fact sheet for patients: https://www.fda.gov/media/705810/download    Test performed by PCR.    Comprehensive Metabolic Panel [[id number]]  (Abnormal) Collected: 03/11/22 1813    Specimen: Blood Updated: 03/11/22 1855     Glucose 113 mg/dL      BUN 17 mg/dL      Creatinine 4.10 mg/dL      Sodium 138 mmol/L      Potassium 4.1 mmol/L      Chloride 100 mmol/L      CO2 22.0 mmol/L      Calcium 9.1 mg/dL      Total Protein 6.9 g/dL      Albumin 3.80 g/dL      ALT (SGPT) 11 U/L      AST (SGOT) 15 U/L      Alkaline Phosphatase 92 U/L      Total Bilirubin 0.2 mg/dL      Globulin 3.1 gm/dL      A/G Ratio 1.2 g/dL      BUN/Creatinine Ratio 4.1     Anion Gap 16.0 mmol/L      eGFR 11.0 mL/min/1.73      Comment: <15 Indicative of kidney failure       Narrative:      GFR Normal >60  Chronic Kidney Disease <60  Kidney Failure <15      Lipase [[id number]]  (Normal) Collected: 03/11/22 1813    Specimen: Blood Updated: 03/11/22 1844     Lipase 37 U/L     CBC & Differential [[id number]]  (Abnormal)  Collected: 03/11/22 1813    Specimen: Blood Updated: 03/11/22 1819    Narrative:      The following orders were created for panel order CBC & Differential.  Procedure                               Abnormality         Status                     ---------                               -----------         ------                     CBC Auto Differential[[id number]]        Abnormal            Final result                 Please view results for these tests on the individual orders.    CBC Auto Differential [[id number]]  (Abnormal) Collected: 03/11/22 1813    Specimen: Blood Updated: 03/11/22 1819     WBC 14.10 10*3/mm3      RBC 3.49 10*6/mm3      Hemoglobin 11.0 g/dL      Hematocrit 32.7 %      MCV 93.7 fL      MCH 31.5 pg      MCHC 33.7 g/dL      RDW 17.1 %      RDW-SD 56.4 fl      MPV 7.1 fL      Platelets 403 10*3/mm3      Neutrophil % 86.0 %      Lymphocyte % 6.0 %      Monocyte % 6.2 %      Eosinophil % 1.0 %      Basophil % 0.8 %      Neutrophils, Absolute 12.10 10*3/mm3      Lymphocytes, Absolute 0.80 10*3/mm3      Monocytes, Absolute 0.90 10*3/mm3      Eosinophils, Absolute 0.10 10*3/mm3      Basophils, Absolute 0.10 10*3/mm3      nRBC 0.0 /100 WBC            Results for orders placed during the hospital encounter of 07/25/21    Adult Transthoracic Echo Complete W/ Cont if Necessary Per Protocol    Interpretation Summary  Normal LV size and contractility EF of 60%  Normal RV size  Normal atrial size  Aortic valve, mitral valve, tricuspid valve appears structurally normal, mild mitral regurgitation  seen.  No pericardial effusion seen.  Proximal aorta appears normal in size.              Condition on Discharge:  good    Vital Signs       Physical Exam:     General Appearance:    Alert, cooperative, in no acute distress   Head:    Normocephalic, without obvious abnormality, atraumatic   Eyes:            Lids and lashes normal, conjunctivae and sclerae normal, no   icterus, no pallor, corneas clear, PERRLA   Ears:     Ears appear intact with no abnormalities noted   Throat:   No oral lesions, no thrush, oral mucosa moist   Neck:   No adenopathy, supple, trachea midline, no thyromegaly, no   carotid bruit, no JVD   Lungs:     Clear to auscultation,respirations regular, even and                  unlabored    Heart:    Regular rhythm and normal rate, normal S1 and S2, no            murmur, no gallop, no rub, no click   Chest Wall:    No abnormalities observed   Abdomen:     Normal bowel sounds, no masses, no organomegaly, soft        non-tender, non-distended, no guarding, no rebound                tenderness   Extremities:   Moves all extremities well, no edema, no cyanosis, no             redness   Pulses:   Pulses palpable and equal bilaterally   Skin:   No bleeding, bruising or rash   Lymph nodes:   No palpable adenopathy   Neurologic:   Cranial nerves 2 - 12 grossly intact, sensation intact, DTR       present and equal bilaterally       Discharge Disposition  Home or Self Care    Discharge Medications     Discharge Medications      New Medications      Instructions Start Date   cholestyramine light 4 g packet   4 g, Oral, Every 12 Hours Scheduled      lactobacillus acidophilus capsule capsule   1 capsule, Oral, Daily      sucralfate 1 g tablet  Commonly known as: CARAFATE   1 g, Oral, 4 Times Daily Before Meals & Nightly         Continue These Medications      Instructions Start Date   albuterol sulfate  (90 Base) MCG/ACT inhaler  Commonly known as: Proventil HFA   2 puffs, Inhalation, Every 4 Hours PRN      amLODIPine 10 MG tablet  Commonly known as: NORVASC   10 mg      aspirin 81 MG EC tablet   81 mg, Oral, 3 Times Weekly      Breo Ellipta 100-25 MCG/INH inhaler  Generic drug: Fluticasone Furoate-Vilanterol   1 puff, Inhalation, Daily      calcium acetate 667 MG capsule  Commonly known as: PHOSLO   2,001 mg, Oral, 2 Times Daily, With meals      ipratropium-albuterol 0.5-2.5 mg/3 ml nebulizer  Commonly known as:  DUO-NEB   3 mL, Nebulization, 4 Times Daily      lidocaine-prilocaine 2.5-2.5 % cream  Commonly known as: EMLA   1 application, Topical, Daily PRN, Apply to access site 1 to 2 hours before dialysis      losartan 100 MG tablet  Commonly known as: COZAAR   100 mg, Oral, Daily      pantoprazole 40 MG EC tablet  Commonly known as: PROTONIX   TAKE 1 TABLET BY MOUTH TWICE DAILY      RenaPlex-D tablet tablet  Generic drug: multivitamin with minerals   1 tablet, Oral, Daily      sevelamer 800 MG tablet  Commonly known as: RENVELA   TAKE 2 TABLETS BY MOUTH THREE TIMES DAILY WITH MEALS      simvastatin 40 MG tablet  Commonly known as: ZOCOR   40 mg, Oral, Nightly      sodium bicarbonate 650 MG tablet   1,300 mg, Oral, 3 Times Daily      vitamin B-12 1000 MCG tablet  Commonly known as: CYANOCOBALAMIN   2,000 mcg, Oral, 3 Times Weekly, Mon, Wed, Fri             Discharge Diet: per GI    Activity at Discharge:     Follow-up Appointments  Future Appointments   Date Time Provider Department Center   9/13/2022  9:15 AM [facility]  [facility]   9/13/2022 10:00 AM ROOM 3,  [facility]  [facility] None     Additional Instructions for the Follow-ups that You Need to Schedule     Discharge Follow-up with PCP   As directed       Currently Documented PCP:    Jin Malcolm PA-C    PCP Phone Number:    [phone]     Follow Up Details: 2-3 weeks         Discharge Follow-up with Specialty: GI per instructions   As directed      Specialty: GI per instructions               Test Results Pending at Discharge       Kelli Henderson MD  03/16/22  13:52 EDT

## 2022-03-16 NOTE — OUTREACH NOTE
Medical Week 1 Survey    Flowsheet Row Responses   [facility] facility patient discharged from? [facility]   Does the patient have one of the following disease processes/diagnoses(primary or secondary)? Other   Week 1 attempt successful? Yes   Call start time 1449   Rescheduled Rescheduled-pt requested  [call back tomorrow on cell, currently at HD]   Call end time 1450          TOMÁS NARVAEZ - Registered Nurse

## 2022-03-23 ENCOUNTER — READMISSION MANAGEMENT (OUTPATIENT)
Dept: CALL CENTER | Facility: HOSPITAL | Age: 72
End: 2022-03-23

## 2022-03-23 NOTE — OUTREACH NOTE
Medical Week 1 Survey    Flowsheet Row Responses   [facility] patient discharged from? [facility]   Does the patient have one of the following disease processes/diagnoses(primary or secondary)? Other   Week 1 attempt successful? Yes   Call start time 1113   Call end time 1113   Discharge diagnosis   Colitis   Person spoke with today (if not patient) and relationship Ari   Meds reviewed with patient/caregiver? Yes   Is the patient having any side effects they believe may be caused by any medication additions or changes? No   Does the patient have all medications ordered at discharge? Yes   Is the patient taking all medications as directed (includes completed medication regime)? Yes   What is the patient's perception of their health status since discharge? Improving   Week 1 call completed? Yes   Wrap up additional comments Brief call pt just left fr dialysis. Boyfriend stated pt is doing well.           LORRI MORALES - Registered Nurse

## 2022-04-04 ENCOUNTER — READMISSION MANAGEMENT (OUTPATIENT)
Dept: CALL CENTER | Facility: HOSPITAL | Age: 72
End: 2022-04-04

## 2022-04-04 NOTE — OUTREACH NOTE
Medical Week 3 Survey    Flowsheet Row Responses   [facility] patient discharged from? [facility]   Does the patient have one of the following disease processes/diagnoses(primary or secondary)? Other   Week 3 attempt successful? Yes   Call start time 1144   Call end time 1152   General alerts for this patient HD patient went today   Discharge diagnosis   Colitis   Meds reviewed with patient/caregiver? Yes   Is the patient taking all medications as directed (includes completed medication regime)? Yes   Does the patient have a primary care provider?  Yes   Does the patient have an appointment with their PCP within 7 days of discharge? No   Nursing Interventions Advised patient to make appointment   Has the patient kept scheduled appointments due by today? N/A   Has home health visited the patient within 72 hours of discharge? N/A   Comments Having diarrhea. Dialysis nurse advised [facility].   Did the patient receive a copy of their discharge instructions? Yes   Nursing interventions Reviewed instructions with patient   What is the patient's perception of their health status since discharge? Worsening  [Having diarrhea since yesterday. Advised to call MD]   Is the patient/caregiver able to teach back signs and symptoms related to disease process for when to call PCP? Yes   Week 3 Call Completed? Yes          ELIZABETH MOORE - Registered Nurse

## 2022-04-13 ENCOUNTER — READMISSION MANAGEMENT (OUTPATIENT)
Dept: CALL CENTER | Facility: HOSPITAL | Age: 72
End: 2022-04-13

## 2022-04-13 NOTE — OUTREACH NOTE
"Medical Week 4 Survey    Flowsheet Row Responses   [facility] patient discharged from? [facility]   Does the patient have one of the following disease processes/diagnoses(primary or secondary)? Other   Week 4 attempt successful? Yes   Call start time 1400   Call end time 1402   Discharge diagnosis   Colitis   Meds reviewed with patient/caregiver? Yes   Is the patient taking all medications as directed (includes completed medication regime)? Yes   Medication comments Had some meds dc d by PCP.    Has the patient kept scheduled appointments due by today? Yes   Is the patient still receiving Home Health Services? N/A   Psychosocial issues? No   What is the patient's perception of their health status since discharge? Improving   Is the patient/caregiver able to teach back signs and symptoms related to disease process for when to call PCP? Yes   Is the patient/caregiver able to teach back signs and symptoms related to disease process for when to call 911? Yes   Is the patient/caregiver able to teach back the hierarchy of who to call/visit for symptoms/problems? PCP, Specialist, Home health nurse, Urgent Care, ED, 911 Yes   Week 4 Call Completed? Yes   Wrap up additional comments Brief call as Pt is at Dialysis. She reports that her \"belly\" is doing alot better. Pt reports that he PCP discontinued some of her meds. No needs at this time.LAKISHA - Registered Nurse  "

## 2022-05-05 ENCOUNTER — OFFICE (AMBULATORY)
Dept: URBAN - METROPOLITAN AREA CLINIC 64 | Facility: CLINIC | Age: 72
End: 2022-05-05

## 2022-05-05 VITALS
SYSTOLIC BLOOD PRESSURE: 147 MMHG | HEIGHT: 63 IN | WEIGHT: 162 LBS | DIASTOLIC BLOOD PRESSURE: 67 MMHG | HEART RATE: 100 BPM

## 2022-05-05 DIAGNOSIS — K42.9 UMBILICAL HERNIA WITHOUT OBSTRUCTION OR GANGRENE: ICD-10-CM

## 2022-05-05 PROCEDURE — 99214 OFFICE O/P EST MOD 30 MIN: CPT | Performed by: NURSE PRACTITIONER

## 2022-05-17 ENCOUNTER — OFFICE VISIT (OUTPATIENT)
Dept: SURGERY | Facility: CLINIC | Age: 72
End: 2022-05-17

## 2022-05-17 VITALS
HEIGHT: 60 IN | TEMPERATURE: 98.4 F | DIASTOLIC BLOOD PRESSURE: 74 MMHG | BODY MASS INDEX: 31.8 KG/M2 | OXYGEN SATURATION: 96 % | SYSTOLIC BLOOD PRESSURE: 156 MMHG | WEIGHT: 162 LBS | RESPIRATION RATE: 18 BRPM | HEART RATE: 91 BPM

## 2022-05-17 DIAGNOSIS — K43.2 INCISIONAL HERNIA, WITHOUT OBSTRUCTION OR GANGRENE: Primary | ICD-10-CM

## 2022-05-17 PROCEDURE — 99213 OFFICE O/P EST LOW 20 MIN: CPT | Performed by: SURGERY

## 2022-05-17 NOTE — PROGRESS NOTES
"Subjective   Sherry Lobato is a 71 y.o. female.   Chief Complaint   Patient presents with   • Follow-up     Fu PO Ventral/Inc Hernia 10/11/21     /74 (BP Location: Right arm, Patient Position: Sitting, Cuff Size: Adult)   Pulse 91   Temp 98.4 °F (36.9 °C) (Infrared)   Resp 18   Ht 152.4 cm (60\")   Wt 73.5 kg (162 lb)   SpO2 96%   BMI 31.64 kg/m²     HISTORY OF PRESENT ILLNESS:  71-year-old lady who I have known from multiple hernia repairs having had a complicated surgical history including a gastrectomy and a nephrectomy.  She has developed a new hernia that is periumbilical from the previous incision incision.  She says initially it caused some moderate discomfort back in March but since that time she has not had any pain.  She can feel a bulge that is reducible she is tolerating a diet she is having regular bowel function she wanted to come in to get checked out to talk about the risk and benefits of having a repair now versus down the road.      Outpatient Encounter Medications as of 5/17/2022   Medication Sig Dispense Refill   • albuterol sulfate HFA (Proventil HFA) 108 (90 Base) MCG/ACT inhaler Inhale 2 puffs Every 4 (Four) Hours As Needed for Wheezing. 17 g 1   • calcium acetate (PHOSLO) 667 MG capsule Take 2,001 mg by mouth 2 (Two) Times a Day. With meals     • ipratropium-albuterol (DUO-NEB) 0.5-2.5 mg/3 ml nebulizer Take 3 mL by nebulization 4 (Four) Times a Day. 360 mL 1   • lidocaine-prilocaine (EMLA) 2.5-2.5 % cream Apply 1 application topically to the appropriate area as directed Daily As Needed. Apply to access site 1 to 2 hours before dialysis  4   • losartan (COZAAR) 100 MG tablet Take 100 mg by mouth Daily.     • Methoxy PEG-Epoetin Beta (MIRCERA IJ) 30 mcg Every 14 (Fourteen) Days.     • Multiple Vitamins-Minerals (RENAPLEX-D) tablet Take 1 tablet by mouth Daily.     • pantoprazole (PROTONIX) 40 MG EC tablet TAKE 1 TABLET BY MOUTH TWICE DAILY 180 tablet 2   • sevelamer (RENVELA) " 800 MG tablet TAKE 2 TABLETS BY MOUTH THREE TIMES DAILY WITH MEALS     • simvastatin (ZOCOR) 40 MG tablet Take 40 mg by mouth Every Night.     • sodium bicarbonate 650 MG tablet Take 2 tablets by mouth 3 (Three) Times a Day. 180 tablet 0   • cholestyramine light 4 g packet Take 1 packet by mouth Every 12 (Twelve) Hours for 30 days. 60 packet 2   • [DISCONTINUED] amLODIPine (NORVASC) 10 MG tablet 10 mg.     • [DISCONTINUED] aspirin (aspirin) 81 MG EC tablet Take 1 tablet by mouth 3 (Three) Times a Week.     • [DISCONTINUED] Fluticasone Furoate-Vilanterol (Breo Ellipta) 100-25 MCG/INH inhaler Inhale 1 puff Daily. 1 each 1   • [DISCONTINUED] vitamin B-12 (CYANOCOBALAMIN) 1000 MCG tablet Take 2,000 mcg by mouth 3 (Three) Times a Week. Mon, Wed, Fri       No facility-administered encounter medications on file as of 5/17/2022.         The following portions of the patient's history were reviewed and updated as appropriate: allergies, current medications, past family history, past medical history, past social history, past surgical history and problem list.    Review of Systems  No significant fevers or chills nausea vomiting changes in bowel function severe constipation or diarrhea  Objective     Physical Exam  Constitutional:       Appearance: Normal appearance.   HENT:      Head: Normocephalic and atraumatic.   Cardiovascular:      Rate and Rhythm: Normal rate.   Pulmonary:      Effort: Pulmonary effort is normal. No respiratory distress.   Abdominal:      Comments: Soft minimally tender to palpation nondistended around her umbilicus there is a reducible hernia I estimate about a 2 cm hernia defect no overlying skin changes   Skin:     General: Skin is warm and dry.   Neurological:      Mental Status: She is alert.           Assessment & Plan   Diagnoses and all orders for this visit:    1. Incisional hernia, without obstruction or gangrene (Primary)    71-year-old lady end-stage renal disease on dialysis multiple  abdominal incisions have had several hernia repairs in the past.  We have been trying to avoid mesh for her right-sided abdominal incisions due to potential for transplant someday but she now says she is no longer trying to get a kidney transplant.  Her right side abdominal wall feels good but now she has a periumbilical incisional hernia.  We talked about the natural history of this hernia and the risk benefits of hernia surgery she knows the risk that she has been through many times before.  I think in this location I probably do a open repair with mesh.  After talking about the risk and benefits of the surgery we will get a hold off since this is minimally symptomatic she is to call with any recurrent symptoms she knows to go the emergency department with symptoms of incarceration or strangulation of bowel.    Nilay Stevens MD  5/17/2022  9:09 AM EDT    This note was created using Dragon Voice Recognition software.

## 2022-07-22 ENCOUNTER — HOSPITAL ENCOUNTER (EMERGENCY)
Facility: HOSPITAL | Age: 72
Discharge: HOME | End: 2022-07-22
Attending: EMERGENCY MEDICINE | Admitting: EMERGENCY MEDICINE

## 2022-07-22 VITALS
TEMPERATURE: 97.5 F | DIASTOLIC BLOOD PRESSURE: 108 MMHG | BODY MASS INDEX: 32.16 KG/M2 | SYSTOLIC BLOOD PRESSURE: 127 MMHG | HEART RATE: 81 BPM | WEIGHT: 163.8 LBS | OXYGEN SATURATION: 92 % | HEIGHT: 60 IN | RESPIRATION RATE: 16 BRPM

## 2022-07-22 DIAGNOSIS — M79.602 LEFT ARM PAIN: Primary | ICD-10-CM

## 2022-07-22 LAB
ANION GAP SERPL CALCULATED.3IONS-SCNC: 14 MMOL/L (ref 5–15)
BUN SERPL-MCNC: 21 MG/DL (ref 8–23)
BUN/CREAT SERPL: 4.8 (ref 7–25)
CALCIUM SPEC-SCNC: 8.9 MG/DL (ref 8.6–10.5)
CHLORIDE SERPL-SCNC: 97 MMOL/L (ref 98–107)
CO2 SERPL-SCNC: 26 MMOL/L (ref 22–29)
CREAT SERPL-MCNC: 4.37 MG/DL (ref 0.57–1)
EGFRCR SERPLBLD CKD-EPI 2021: 10.2 ML/MIN/1.73
GLUCOSE SERPL-MCNC: 145 MG/DL (ref 65–99)
POTASSIUM SERPL-SCNC: 4 MMOL/L (ref 3.5–5.2)
SODIUM SERPL-SCNC: 137 MMOL/L (ref 136–145)

## 2022-07-22 PROCEDURE — 36415 COLL VENOUS BLD VENIPUNCTURE: CPT

## 2022-07-22 PROCEDURE — 80048 BASIC METABOLIC PNL TOTAL CA: CPT | Performed by: EMERGENCY MEDICINE

## 2022-07-22 PROCEDURE — 99282 EMERGENCY DEPT VISIT SF MDM: CPT

## 2022-07-22 NOTE — ED PROVIDER NOTES
"Subjective   Patient is a 71-year-old female who complains of mild discomfort and tingling in her left arm.  She states she has an AV fistula left upper arm and receives dialysis.  1 week ago she had to have her fistula recannulized at an outlying hospital which she states was successful.  She did receive dialysis 3 days this week.  She states the numbness and tingling and discomfort in her arm started after procedure 1 week ago.          Review of Systems  Negative for chest pain shortness of breath weakness fever or other complaint  Past Medical History:   Diagnosis Date   • Lucas's esophagus    • C. difficile colitis    • Carotid artery disease (HCC)     -50-74% left, 16-49% right (1/19)   • DM2 (diabetes mellitus, type 2) ([facility])    • ESRD (end stage renal disease) ([facility])     on HD   • Gastric ulcer     at anastomatic site   • Gastroparesis    • Gout    • Hemodialysis patient ([facility])    • Hernia, incisional     abdomen   • History of colonoscopy     UTD   • History of degenerative disc disease    • Hyperlipidemia    • Hypertension    • Microscopic colitis    • Nephrotic syndrome    • Neuroendocrine tumor     of stomach   • FABY (obstructive sleep apnea)     not treating   • Osteoarthritis    • Osteoarthritis, knee     Left   • Proteinuria    • Renal cell cancer, right (HCC)    • Rib pain on right side     chronic   • Rotator cuff tear     bilateral   • Uterine cancer (HCC)    • Vitamin B 12 deficiency    • Vitamin D deficiency        Allergies   Allergen Reactions   • Atorvastatin Calcium Unknown (See Comments)   • Gabapentin Dizziness     Unable to walk   • Niacin Unknown (See Comments)     \"FEELS LIKE SHE IS ON FIRE\"         Past Surgical History:   Procedure Laterality Date   • APPENDECTOMY     • ARTERIOVENOUS FISTULA Right    • ARTERIOVENOUS FISTULA REPAIR      Clotted off and insert graft   • ARTERIOVENOUS FISTULA/SHUNT SURGERY Left 12/30/2020    Procedure: ARTERIOVENOUS FISTULA FORMATION;  Surgeon: Sascha" Jan MOORE MD;  Location: [facility] MAIN OR;  Service: Vascular;  Laterality: Left;   • BREAST BIOPSY      right nipple 1981   • COLONOSCOPY N/A 2020    Procedure: COLONOSCOPY with polypectomy x 7;  Surgeon: Jeffery White MD;  Location: [facility] ENDOSCOPY;  Service: Gastroenterology;  Laterality: N/A;  post op: polyps, diverticulosis, hemorrhoids   • ENDOSCOPY N/A 3/12/2022    Procedure: ESOPHAGOGASTRODUODENOSCOPY with biopsy x 1 area;  Surgeon: Javan Augustin MD;  Location: [facility] ENDOSCOPY;  Service: Gastroenterology;  Laterality: N/A;  post op: anastamosis   • GASTRIC RESECTION      cancer   • HYSTERECTOMY     • LAPAROSCOPIC CHOLECYSTECTOMY     • NEPHRECTOMY      right kidney removed    • REDUCTION MAMMAPLASTY     • TUMOR REMOVAL      Multiple removed from abdominal    • VENTRAL/INCISIONAL HERNIA REPAIR Right 2019    Procedure: VENTRAL/INCISIONAL HERNIA REPAIR;  Surgeon: Nilay Stevens MD;  Location: [facility] MAIN OR;  Service: General   • VENTRAL/INCISIONAL HERNIA REPAIR N/A 10/1/2019    Procedure: OPEN VENTRAL/INCISIONAL HERNIA REPAIR;  Surgeon: Nilay Stevens MD;  Location: [facility] MAIN OR;  Service: General   • VENTRAL/INCISIONAL HERNIA REPAIR N/A 10/11/2021    Procedure: VENTRAL/INCISIONAL HERNIA REPAIR LAPAROSCOPIC;  Surgeon: Nilay Stevens MD;  Location: [facility] MAIN OR;  Service: General;  Laterality: N/A;       Family History   Problem Relation Age of Onset   • Heart disease Father        Social History     Socioeconomic History   • Marital status: Single   Tobacco Use   • Smoking status: Former Smoker     Packs/day: 0.25     Years: 57.00     Pack years: 14.25     Types: Cigarettes     Quit date: 2021     Years since quittin.2   • Smokeless tobacco: Never Used   • Tobacco comment: quit smoking 2 months ago   Vaping Use   • Vaping Use: Former   Substance and Sexual Activity   • Alcohol use: No   • Drug use: No   • Sexual activity: Not Currently           Objective   Physical  Exam  Lungs are clear.  Heart has a regular rhythm without murmur.  EXTR exam shows the patient have an AV fistula in the left upper arm.  There is a palpable thrill.  She does have a left radial pulse which is palpable.  She does have mildly slower cap refill on the left versus the right but she states the coloration appears to be her baseline.  Procedures           ED Course      Results for orders placed or performed during the hospital encounter of 07/22/22   Basic Metabolic Panel    Specimen: Blood   Result Value Ref Range    Glucose 145 (H) 65 - 99 mg/dL    BUN 21 8 - 23 mg/dL    Creatinine 4.37 (H) 0.57 - 1.00 mg/dL    Sodium 137 136 - 145 mmol/L    Potassium 4.0 3.5 - 5.2 mmol/L    Chloride 97 (L) 98 - 107 mmol/L    CO2 26.0 22.0 - 29.0 mmol/L    Calcium 8.9 8.6 - 10.5 mg/dL    BUN/Creatinine Ratio 4.8 (L) 7.0 - 25.0    Anion Gap 14.0 5.0 - 15.0 mmol/L    eGFR 10.2 (L) >60.0 mL/min/1.73                                          MDM  Number of Diagnoses or Management Options  Diagnosis management comments: Patient's metabolic panel is at baseline.  She does have palpable pulses in her left arm.  Patient will be discharged.  [facility]       Amount and/or Complexity of Data Reviewed  Clinical lab tests: reviewed    Risk of Complications, Morbidity, and/or Mortality  Presenting problems: moderate  Diagnostic procedures: moderate  Management options: moderate    Patient Progress  Patient progress: stable      Final diagnoses:   Left arm pain       ED Disposition  ED Disposition     ED Disposition   Discharge    Condition   Stable    Comment   --             Jin Malcolm PA-C  [address] [facility] [place] [address] [phone]      As needed         Medication List      No changes were made to your prescriptions during this visit.          Volodymyr Ricks MD  07/22/22 1733       Volodymyr Ricks MD  07/22/22 1737

## 2022-07-22 NOTE — ED NOTES
Pt reports her 'access flow was cleaned up' last Thursday. Pt reports since then her left arm has been cold and periodically her left hand will clench up and go numb. Pt reports while in dialysis today she talked with the doctors that cleaned out her access flow and they told her 'go to the er now'

## 2022-08-04 ENCOUNTER — TRANSCRIBE ORDERS (OUTPATIENT)
Dept: ADMINISTRATIVE | Facility: HOSPITAL | Age: 72
End: 2022-08-04

## 2022-08-04 DIAGNOSIS — C7A.8 NEURO-ENDOCRINE CARCINOMA: Primary | ICD-10-CM

## 2022-08-24 ENCOUNTER — TRANSCRIBE ORDERS (OUTPATIENT)
Dept: ADMINISTRATIVE | Facility: HOSPITAL | Age: 72
End: 2022-08-24

## 2022-08-24 DIAGNOSIS — C7A.8 NEURO-ENDOCRINE CARCINOMA: Primary | ICD-10-CM

## 2022-08-30 ENCOUNTER — APPOINTMENT (OUTPATIENT)
Dept: CT IMAGING | Facility: HOSPITAL | Age: 72
End: 2022-08-30

## 2022-08-30 ENCOUNTER — HOSPITAL ENCOUNTER (EMERGENCY)
Facility: HOSPITAL | Age: 72
Discharge: LEFT BEFORE DISCHARGE | End: 2022-08-30
Attending: EMERGENCY MEDICINE | Admitting: EMERGENCY MEDICINE

## 2022-08-30 VITALS
SYSTOLIC BLOOD PRESSURE: 186 MMHG | HEART RATE: 98 BPM | BODY MASS INDEX: 32.68 KG/M2 | DIASTOLIC BLOOD PRESSURE: 82 MMHG | WEIGHT: 166.45 LBS | OXYGEN SATURATION: 98 % | TEMPERATURE: 98.9 F | RESPIRATION RATE: 16 BRPM | HEIGHT: 60 IN

## 2022-08-30 PROCEDURE — 99211 OFF/OP EST MAY X REQ PHY/QHP: CPT

## 2022-09-13 ENCOUNTER — HOSPITAL ENCOUNTER (OUTPATIENT)
Dept: CARDIOLOGY | Facility: HOSPITAL | Age: 72
Discharge: HOME | End: 2022-09-13

## 2022-09-13 ENCOUNTER — APPOINTMENT (OUTPATIENT)
Dept: VASCULAR SURGERY | Facility: HOSPITAL | Age: 72
End: 2022-09-13

## 2022-09-13 DIAGNOSIS — Z99.2 ENCOUNTER FOR EXTRACORPOREAL DIALYSIS: ICD-10-CM

## 2022-09-13 DIAGNOSIS — N18.6 END STAGE RENAL DISEASE: ICD-10-CM

## 2022-09-13 LAB
BH CV VAS DIALYSIS ARTERIAL ANASTOMOSIS EDV: 91 CM/SEC
BH CV VAS DIALYSIS ARTERIAL ANASTOMOSIS PSV: 180 CM/SEC
BH CV VAS DIALYSIS CONDUIT DIST DEPTH: 0.53 CM
BH CV VAS DIALYSIS CONDUIT DIST DIAMETER: 0.66 CM
BH CV VAS DIALYSIS CONDUIT DIST EDV: 159 CM/SEC
BH CV VAS DIALYSIS CONDUIT DIST FLOW VOL: 321 ML/MIN
BH CV VAS DIALYSIS CONDUIT DIST PSV: 287 CM/SEC
BH CV VAS DIALYSIS CONDUIT MID DEPTH: 0.27 CM
BH CV VAS DIALYSIS CONDUIT MID DIAMETER: 0.82 CM
BH CV VAS DIALYSIS CONDUIT MID EDV: 177 CM/SEC
BH CV VAS DIALYSIS CONDUIT MID FLOW VOL: 724 ML/MIN
BH CV VAS DIALYSIS CONDUIT MID PSV: 389 CM/SEC
BH CV VAS DIALYSIS CONDUIT PROX DEPTH: 0.32 CM
BH CV VAS DIALYSIS CONDUIT PROX DIAMETER: 0.69 CM
BH CV VAS DIALYSIS CONDUIT PROX EDV: 65 CM/SEC
BH CV VAS DIALYSIS CONDUIT PROX FLOW VOL: 725 ML/MIN
BH CV VAS DIALYSIS CONDUIT PROX PSV: 154 CM/SEC
BH CV VAS DIALYSIS CONDUIT PROX/MID DEPTH: 0.66 CM
BH CV VAS DIALYSIS CONDUIT PROX/MID DIAMETER: 0.22 CM
BH CV VAS DIALYSIS CONDUIT PROX/MID EDV: 296 CM/SEC
BH CV VAS DIALYSIS CONDUIT PROX/MID FLOW VOL: 135 ML/MIN
BH CV VAS DIALYSIS CONDUIT PROX/MID PSV: 626 CM/SEC
BH CV VAS DIALYSIS PRE-INFLOW BRACHIAL DIAMETER: 0.56 CM
BH CV VAS DIALYSIS PRE-INFLOW BRACHIAL EDV: 114 CM/SEC
BH CV VAS DIALYSIS PRE-INFLOW BRACHIAL FLOW VOL: 405 ML/MIN
BH CV VAS DIALYSIS PRE-INFLOW BRACHIAL PSV: 287 CM/SEC
BH CV VAS DIALYSIS VENOUS OUTFLOW AXILLARY EDV: 60 CM/SEC
BH CV VAS DIALYSIS VENOUS OUTFLOW AXILLARY PSV: 112 CM/SEC
MAXIMAL PREDICTED HEART RATE: 147 BPM
STRESS TARGET HR: 125 BPM

## 2022-09-13 PROCEDURE — 93990 DOPPLER FLOW TESTING: CPT

## 2022-09-13 PROCEDURE — G0463 HOSPITAL OUTPT CLINIC VISIT: HCPCS

## 2022-09-15 ENCOUNTER — TRANSCRIBE ORDERS (OUTPATIENT)
Dept: ADMINISTRATIVE | Facility: HOSPITAL | Age: 72
End: 2022-09-15

## 2022-09-15 DIAGNOSIS — I65.23 BILATERAL CAROTID ARTERY OCCLUSION: Primary | ICD-10-CM

## 2022-09-27 ENCOUNTER — HOSPITAL ENCOUNTER (OUTPATIENT)
Facility: HOSPITAL | Age: 72
Setting detail: HOSPITAL OUTPATIENT SURGERY
Discharge: HOME | End: 2022-09-27
Attending: SURGERY | Admitting: SURGERY

## 2022-09-27 VITALS
DIASTOLIC BLOOD PRESSURE: 81 MMHG | OXYGEN SATURATION: 97 % | SYSTOLIC BLOOD PRESSURE: 119 MMHG | HEART RATE: 81 BPM | TEMPERATURE: 99.1 F

## 2022-09-27 PROCEDURE — C1894 INTRO/SHEATH, NON-LASER: HCPCS | Performed by: SURGERY

## 2022-09-27 PROCEDURE — 0 IOPAMIDOL PER 1 ML: Performed by: SURGERY

## 2022-09-27 RX ORDER — LIDOCAINE HYDROCHLORIDE 20 MG/ML
INJECTION, SOLUTION INFILTRATION; PERINEURAL AS NEEDED
Status: DISCONTINUED | OUTPATIENT
Start: 2022-09-27 | End: 2022-09-27 | Stop reason: HOSPADM

## 2022-09-27 RX ORDER — AMLODIPINE BESYLATE 10 MG/1
10 TABLET ORAL DAILY
COMMUNITY

## 2022-09-27 NOTE — H&P
Patient Care Team:  Jin Malcolm PA-C as PCP - General (Physician Assistant)    Location: [facility]    Chief complaint left arm pain and trouble with use of fistula with dialysis    Subjective     Patient is a 72 y.o. female presents with left upper extremity brachial artery basilic vein fistula created 12/30/2020.  Patient had multiple previous revisions and failed right upper extremity arteriovenous graft created in 2019.  July 2022 [facility] performed fistulogram and angioplasty of proximal mid stenosis with a 6 mm balloon.  I reviewed report of that procedure.  She had duplex scan showing stenosis of the proximal midportion of the fistula which has recurred.  Patient has had recurrent problems with use of fistula with dialysis.  Plan fistulogram and revision today.  Risks of procedure discussed and agrees to proceed.     Patient with end-stage renal disease on hemodialysis Monday Wednesday Friday with diabetes mellitus.    Review of Systems   Pertinent items are noted in HPI, all other systems reviewed and negative    Past Medical History:   Diagnosis Date   • Lucas's esophagus    • C. difficile colitis    • Carotid artery disease ([facility])     -50-74% left, 16-49% right (1/19)   • DM2 (diabetes mellitus, type 2) ([facility])    • ESRD (end stage renal disease) ([facility])     on HD   • Gastric ulcer     at anastomatic site   • Gastroparesis    • Gout    • Hemodialysis patient ([facility])    • Hernia, incisional     abdomen   • History of colonoscopy     UTD   • History of degenerative disc disease    • Hyperlipidemia    • Hypertension    • Microscopic colitis    • Nephrotic syndrome    • Neuroendocrine tumor     of stomach   • FABY (obstructive sleep apnea)     not treating   • Osteoarthritis    • Osteoarthritis, knee     Left   • Proteinuria    • Renal cell cancer, right ([facility])    • Rib pain on right side     chronic   • Rotator cuff tear     bilateral   • Uterine cancer ([facility])    • Vitamin B 12 deficiency    •  Vitamin D deficiency      Past Surgical History:   Procedure Laterality Date   • APPENDECTOMY     • ARTERIOVENOUS FISTULA Right    • ARTERIOVENOUS FISTULA REPAIR      Clotted off and insert graft   • ARTERIOVENOUS FISTULA/SHUNT SURGERY Left 12/30/2020    Procedure: ARTERIOVENOUS FISTULA FORMATION;  Surgeon: Jan Caicedo MD;  Location: [facility] MAIN OR;  Service: Vascular;  Laterality: Left;   • BREAST BIOPSY      right nipple 1981   • COLONOSCOPY N/A 11/24/2020    Procedure: COLONOSCOPY with polypectomy x 7;  Surgeon: Jeffery White MD;  Location: [facility] ENDOSCOPY;  Service: Gastroenterology;  Laterality: N/A;  post op: polyps, diverticulosis, hemorrhoids   • ENDOSCOPY N/A 3/12/2022    Procedure: ESOPHAGOGASTRODUODENOSCOPY with biopsy x 1 area;  Surgeon: Javan Augustin MD;  Location: [facility] ENDOSCOPY;  Service: Gastroenterology;  Laterality: N/A;  post op: anastamosis   • GASTRIC RESECTION      cancer   • HYSTERECTOMY     • LAPAROSCOPIC CHOLECYSTECTOMY     • NEPHRECTOMY      right kidney removed    • REDUCTION MAMMAPLASTY     • TUMOR REMOVAL      Multiple removed from abdominal    • VENTRAL/INCISIONAL HERNIA REPAIR Right 8/28/2019    Procedure: VENTRAL/INCISIONAL HERNIA REPAIR;  Surgeon: Nilay Stevens MD;  Location: [facility] MAIN OR;  Service: General   • VENTRAL/INCISIONAL HERNIA REPAIR N/A 10/1/2019    Procedure: OPEN VENTRAL/INCISIONAL HERNIA REPAIR;  Surgeon: Nilay Stevens MD;  Location: [facility] MAIN OR;  Service: General   • VENTRAL/INCISIONAL HERNIA REPAIR N/A 10/11/2021    Procedure: VENTRAL/INCISIONAL HERNIA REPAIR LAPAROSCOPIC;  Surgeon: Nilay Stevens MD;  Location: [facility] MAIN OR;  Service: General;  Laterality: N/A;     Family History   Problem Relation Age of Onset   • Heart disease Father      Social History     Tobacco Use   • Smoking status: Former Smoker     Packs/day: 0.25     Years: 57.00     Pack years: 14.25     Types: Cigarettes     Quit date: 5/1/2021     Years since quitting:  1.4   • Smokeless tobacco: Never Used   • Tobacco comment: quit smoking 2 months ago   Vaping Use   • Vaping Use: Former   Substance Use Topics   • Alcohol use: No   • Drug use: No     No medications prior to admission.     Allergies:  Atorvastatin calcium, Gabapentin, and Niacin    Objective     Vital Signs            Physical Exam:      General Appearance:    Alert, cooperative, in no acute distress   Head:    Normocephalic, without obvious abnormality, atraumatic   Eyes:            Lids and lashes normal, conjunctivae and sclerae normal, no   icterus, no pallor, corneas clear, PERRLA   Ears:    Ears appear intact with no abnormalities noted   Throat:   No oral lesions, no thrush, oral mucosa moist   Neck:   No adenopathy, supple, trachea midline, no thyromegaly, no     carotid bruit, no JVD   Back:     No kyphosis present, no scoliosis present, no skin lesions,       erythema or scars, no tenderness to percussion or                   palpation,   range of motion normal   Lungs:     Clear to auscultation,respirations regular, even and                   unlabored    Heart:    Regular rhythm and normal rate, normal S1 and S2, no            murmur, no gallop, no rub, no click   Breast Exam:    Deferred   Abdomen:     Normal bowel sounds, no masses, no organomegaly, soft        non-tender, non-distended, no guarding, no rebound                 tenderness   Genitalia:    Deferred   Extremities:   Moves all extremities well, no edema, no cyanosis, no              redness   Pulses:   Pulses palpable and equal bilaterally, thrill left arm fistula   Skin:   No bleeding, bruising or rash   Lymph nodes:   No palpable adenopathy   Neurologic:   Cranial nerves 2 - 12 grossly intact, sensation intact, DTR        present and equal bilaterally     Results Review:    I reviewed the patient's new clinical results.    Assessment & Plan       * No active hospital problems. *      Left arm fistulogram with revision    I discussed the  patients findings and my recommendations with patient and nursing staff.     Jan Caicedo MD  09/27/22  08:35 EDT    Time: 20 minutes

## 2022-10-26 ENCOUNTER — HOSPITAL ENCOUNTER (OUTPATIENT)
Facility: HOSPITAL | Age: 72
Setting detail: OBSERVATION
Discharge: HOME | End: 2022-10-28
Attending: EMERGENCY MEDICINE | Admitting: FAMILY MEDICINE

## 2022-10-26 ENCOUNTER — APPOINTMENT (OUTPATIENT)
Dept: GENERAL RADIOLOGY | Facility: HOSPITAL | Age: 72
End: 2022-10-26

## 2022-10-26 DIAGNOSIS — R05.1 ACUTE COUGH: ICD-10-CM

## 2022-10-26 DIAGNOSIS — J18.9 PNEUMONIA OF BOTH LUNGS DUE TO INFECTIOUS ORGANISM, UNSPECIFIED PART OF LUNG: Primary | ICD-10-CM

## 2022-10-26 LAB
ALBUMIN SERPL-MCNC: 3.9 G/DL (ref 3.5–5.2)
ALBUMIN/GLOB SERPL: 1.1 G/DL
ALP SERPL-CCNC: 119 U/L (ref 39–117)
ALT SERPL W P-5'-P-CCNC: 11 U/L (ref 1–33)
ANION GAP SERPL CALCULATED.3IONS-SCNC: 20 MMOL/L (ref 5–15)
ANISOCYTOSIS BLD QL: NORMAL
AST SERPL-CCNC: 16 U/L (ref 1–32)
BASOPHILS # BLD AUTO: 0.2 10*3/MM3 (ref 0–0.2)
BASOPHILS NFR BLD AUTO: 1.7 % (ref 0–1.5)
BILIRUB SERPL-MCNC: 0.2 MG/DL (ref 0–1.2)
BUN SERPL-MCNC: 18 MG/DL (ref 8–23)
BUN/CREAT SERPL: 4 (ref 7–25)
CALCIUM SPEC-SCNC: 9.2 MG/DL (ref 8.6–10.5)
CHLORIDE SERPL-SCNC: 95 MMOL/L (ref 98–107)
CO2 SERPL-SCNC: 24 MMOL/L (ref 22–29)
CREAT SERPL-MCNC: 4.54 MG/DL (ref 0.57–1)
DEPRECATED RDW RBC AUTO: 55.1 FL (ref 37–54)
EGFRCR SERPLBLD CKD-EPI 2021: 9.7 ML/MIN/1.73
EOSINOPHIL # BLD AUTO: 0.4 10*3/MM3 (ref 0–0.4)
EOSINOPHIL NFR BLD AUTO: 3 % (ref 0.3–6.2)
ERYTHROCYTE [DISTWIDTH] IN BLOOD BY AUTOMATED COUNT: 16.7 % (ref 12.3–15.4)
GLOBULIN UR ELPH-MCNC: 3.6 GM/DL
GLUCOSE SERPL-MCNC: 91 MG/DL (ref 65–99)
HCT VFR BLD AUTO: 29.5 % (ref 34–46.6)
HGB BLD-MCNC: 9.6 G/DL (ref 12–15.9)
LARGE PLATELETS: NORMAL
LYMPHOCYTES # BLD AUTO: 1.5 10*3/MM3 (ref 0.7–3.1)
LYMPHOCYTES NFR BLD AUTO: 12.4 % (ref 19.6–45.3)
MCH RBC QN AUTO: 30.4 PG (ref 26.6–33)
MCHC RBC AUTO-ENTMCNC: 32.4 G/DL (ref 31.5–35.7)
MCV RBC AUTO: 93.7 FL (ref 79–97)
MONOCYTES # BLD AUTO: 0.8 10*3/MM3 (ref 0.1–0.9)
MONOCYTES NFR BLD AUTO: 6.3 % (ref 5–12)
NEUTROPHILS NFR BLD AUTO: 76.6 % (ref 42.7–76)
NEUTROPHILS NFR BLD AUTO: 9.5 10*3/MM3 (ref 1.7–7)
NRBC BLD AUTO-RTO: 0.1 /100 WBC (ref 0–0.2)
NT-PROBNP SERPL-MCNC: ABNORMAL PG/ML (ref 0–900)
PLATELET # BLD AUTO: 445 10*3/MM3 (ref 140–450)
PMV BLD AUTO: 7.7 FL (ref 6–12)
POLYCHROMASIA BLD QL SMEAR: NORMAL
POTASSIUM SERPL-SCNC: 3.8 MMOL/L (ref 3.5–5.2)
PROT SERPL-MCNC: 7.5 G/DL (ref 6–8.5)
RBC # BLD AUTO: 3.15 10*6/MM3 (ref 3.77–5.28)
SODIUM SERPL-SCNC: 139 MMOL/L (ref 136–145)
TROPONIN T SERPL-MCNC: 0.03 NG/ML (ref 0–0.03)
WBC MORPH BLD: NORMAL
WBC NRBC COR # BLD: 12.3 10*3/MM3 (ref 3.4–10.8)

## 2022-10-26 PROCEDURE — C9803 HOPD COVID-19 SPEC COLLECT: HCPCS | Performed by: PHYSICIAN ASSISTANT

## 2022-10-26 PROCEDURE — 83880 ASSAY OF NATRIURETIC PEPTIDE: CPT | Performed by: PHYSICIAN ASSISTANT

## 2022-10-26 PROCEDURE — 94640 AIRWAY INHALATION TREATMENT: CPT

## 2022-10-26 PROCEDURE — 0202U NFCT DS 22 TRGT SARS-COV-2: CPT | Performed by: PHYSICIAN ASSISTANT

## 2022-10-26 PROCEDURE — 84484 ASSAY OF TROPONIN QUANT: CPT | Performed by: PHYSICIAN ASSISTANT

## 2022-10-26 PROCEDURE — 80053 COMPREHEN METABOLIC PANEL: CPT | Performed by: PHYSICIAN ASSISTANT

## 2022-10-26 PROCEDURE — 99285 EMERGENCY DEPT VISIT HI MDM: CPT

## 2022-10-26 PROCEDURE — 85025 COMPLETE CBC W/AUTO DIFF WBC: CPT | Performed by: PHYSICIAN ASSISTANT

## 2022-10-26 PROCEDURE — 83605 ASSAY OF LACTIC ACID: CPT | Performed by: PHYSICIAN ASSISTANT

## 2022-10-26 PROCEDURE — 94761 N-INVAS EAR/PLS OXIMETRY MLT: CPT

## 2022-10-26 PROCEDURE — 87040 BLOOD CULTURE FOR BACTERIA: CPT | Performed by: PHYSICIAN ASSISTANT

## 2022-10-26 PROCEDURE — 94799 UNLISTED PULMONARY SVC/PX: CPT

## 2022-10-26 PROCEDURE — 93005 ELECTROCARDIOGRAM TRACING: CPT

## 2022-10-26 PROCEDURE — 85007 BL SMEAR W/DIFF WBC COUNT: CPT | Performed by: PHYSICIAN ASSISTANT

## 2022-10-26 PROCEDURE — 25010000002 METHYLPREDNISOLONE PER 125 MG: Performed by: PHYSICIAN ASSISTANT

## 2022-10-26 PROCEDURE — 93005 ELECTROCARDIOGRAM TRACING: CPT | Performed by: EMERGENCY MEDICINE

## 2022-10-26 PROCEDURE — 96375 TX/PRO/DX INJ NEW DRUG ADDON: CPT

## 2022-10-26 PROCEDURE — 71045 X-RAY EXAM CHEST 1 VIEW: CPT

## 2022-10-26 RX ORDER — ALBUTEROL SULFATE 90 UG/1
2 AEROSOL, METERED RESPIRATORY (INHALATION) ONCE
Status: COMPLETED | OUTPATIENT
Start: 2022-10-26 | End: 2022-10-26

## 2022-10-26 RX ORDER — SODIUM CHLORIDE 0.9 % (FLUSH) 0.9 %
10 SYRINGE (ML) INJECTION AS NEEDED
Status: DISCONTINUED | OUTPATIENT
Start: 2022-10-26 | End: 2022-10-28 | Stop reason: HOSPADM

## 2022-10-26 RX ORDER — METHYLPREDNISOLONE SODIUM SUCCINATE 125 MG/2ML
125 INJECTION, POWDER, LYOPHILIZED, FOR SOLUTION INTRAMUSCULAR; INTRAVENOUS ONCE
Status: COMPLETED | OUTPATIENT
Start: 2022-10-26 | End: 2022-10-26

## 2022-10-26 RX ADMIN — METHYLPREDNISOLONE SODIUM SUCCINATE 125 MG: 125 INJECTION, POWDER, FOR SOLUTION INTRAMUSCULAR; INTRAVENOUS at 21:53

## 2022-10-26 RX ADMIN — ALBUTEROL SULFATE 2 PUFF: 108 INHALANT RESPIRATORY (INHALATION) at 21:35

## 2022-10-27 PROBLEM — R05.1 ACUTE COUGH: Status: ACTIVE | Noted: 2022-10-27

## 2022-10-27 PROBLEM — J18.9 PNEUMONIA OF BOTH LUNGS DUE TO INFECTIOUS ORGANISM, UNSPECIFIED PART OF LUNG: Status: ACTIVE | Noted: 2022-10-27

## 2022-10-27 PROBLEM — J18.9 PNEUMONIA OF BOTH LUNGS DUE TO INFECTIOUS ORGANISM: Status: ACTIVE | Noted: 2022-10-27

## 2022-10-27 LAB
ANION GAP SERPL CALCULATED.3IONS-SCNC: 19 MMOL/L (ref 5–15)
BASOPHILS # BLD AUTO: 0 10*3/MM3 (ref 0–0.2)
BASOPHILS NFR BLD AUTO: 0.3 % (ref 0–1.5)
BUN SERPL-MCNC: 23 MG/DL (ref 8–23)
BUN/CREAT SERPL: 4.4 (ref 7–25)
CALCIUM SPEC-SCNC: 9.2 MG/DL (ref 8.6–10.5)
CHLORIDE SERPL-SCNC: 95 MMOL/L (ref 98–107)
CO2 SERPL-SCNC: 24 MMOL/L (ref 22–29)
CREAT SERPL-MCNC: 5.21 MG/DL (ref 0.57–1)
D-LACTATE SERPL-SCNC: 1 MMOL/L (ref 0.5–2)
DEPRECATED RDW RBC AUTO: 55.6 FL (ref 37–54)
EGFRCR SERPLBLD CKD-EPI 2021: 8.2 ML/MIN/1.73
EOSINOPHIL # BLD AUTO: 0 10*3/MM3 (ref 0–0.4)
EOSINOPHIL NFR BLD AUTO: 0 % (ref 0.3–6.2)
ERYTHROCYTE [DISTWIDTH] IN BLOOD BY AUTOMATED COUNT: 16.9 % (ref 12.3–15.4)
GLUCOSE SERPL-MCNC: 239 MG/DL (ref 65–99)
HCT VFR BLD AUTO: 29.5 % (ref 34–46.6)
HGB BLD-MCNC: 9.6 G/DL (ref 12–15.9)
IRON 24H UR-MRATE: 32 MCG/DL (ref 37–145)
IRON SATN MFR SERPL: 15 % (ref 20–50)
LYMPHOCYTES # BLD AUTO: 0.4 10*3/MM3 (ref 0.7–3.1)
LYMPHOCYTES NFR BLD AUTO: 4 % (ref 19.6–45.3)
MCH RBC QN AUTO: 30.8 PG (ref 26.6–33)
MCHC RBC AUTO-ENTMCNC: 32.5 G/DL (ref 31.5–35.7)
MCV RBC AUTO: 94.7 FL (ref 79–97)
MONOCYTES # BLD AUTO: 0.1 10*3/MM3 (ref 0.1–0.9)
MONOCYTES NFR BLD AUTO: 0.8 % (ref 5–12)
NEUTROPHILS NFR BLD AUTO: 8.6 10*3/MM3 (ref 1.7–7)
NEUTROPHILS NFR BLD AUTO: 94.9 % (ref 42.7–76)
NRBC BLD AUTO-RTO: 0.1 /100 WBC (ref 0–0.2)
PLATELET # BLD AUTO: 486 10*3/MM3 (ref 140–450)
PMV BLD AUTO: 7.6 FL (ref 6–12)
POTASSIUM SERPL-SCNC: 4.6 MMOL/L (ref 3.5–5.2)
RBC # BLD AUTO: 3.11 10*6/MM3 (ref 3.77–5.28)
SODIUM SERPL-SCNC: 138 MMOL/L (ref 136–145)
TIBC SERPL-MCNC: 210 MCG/DL (ref 298–536)
TRANSFERRIN SERPL-MCNC: 141 MG/DL (ref 200–360)
WBC NRBC COR # BLD: 9.1 10*3/MM3 (ref 3.4–10.8)

## 2022-10-27 PROCEDURE — 94642 AEROSOL INHALATION TREATMENT: CPT

## 2022-10-27 PROCEDURE — 25010000002 AZITHROMYCIN PER 500 MG: Performed by: PHYSICIAN ASSISTANT

## 2022-10-27 PROCEDURE — 85025 COMPLETE CBC W/AUTO DIFF WBC: CPT

## 2022-10-27 PROCEDURE — 80048 BASIC METABOLIC PNL TOTAL CA: CPT

## 2022-10-27 PROCEDURE — 96376 TX/PRO/DX INJ SAME DRUG ADON: CPT

## 2022-10-27 PROCEDURE — 36415 COLL VENOUS BLD VENIPUNCTURE: CPT

## 2022-10-27 PROCEDURE — 96365 THER/PROPH/DIAG IV INF INIT: CPT

## 2022-10-27 PROCEDURE — 96375 TX/PRO/DX INJ NEW DRUG ADDON: CPT

## 2022-10-27 PROCEDURE — 84466 ASSAY OF TRANSFERRIN: CPT | Performed by: INTERNAL MEDICINE

## 2022-10-27 PROCEDURE — 25010000002 FUROSEMIDE PER 20 MG

## 2022-10-27 PROCEDURE — 94799 UNLISTED PULMONARY SVC/PX: CPT

## 2022-10-27 PROCEDURE — 83540 ASSAY OF IRON: CPT | Performed by: INTERNAL MEDICINE

## 2022-10-27 PROCEDURE — G0378 HOSPITAL OBSERVATION PER HR: HCPCS

## 2022-10-27 PROCEDURE — G0257 UNSCHED DIALYSIS ESRD PT HOS: HCPCS

## 2022-10-27 PROCEDURE — 96372 THER/PROPH/DIAG INJ SC/IM: CPT

## 2022-10-27 PROCEDURE — 25010000002 HEPARIN (PORCINE) PER 1000 UNITS: Performed by: FAMILY MEDICINE

## 2022-10-27 PROCEDURE — 25010000002 METHYLPREDNISOLONE PER 125 MG

## 2022-10-27 PROCEDURE — 94761 N-INVAS EAR/PLS OXIMETRY MLT: CPT

## 2022-10-27 PROCEDURE — 25010000002 CEFTRIAXONE PER 250 MG: Performed by: PHYSICIAN ASSISTANT

## 2022-10-27 RX ORDER — TRAZODONE HYDROCHLORIDE 50 MG/1
50 TABLET ORAL NIGHTLY
Status: DISCONTINUED | OUTPATIENT
Start: 2022-10-27 | End: 2022-10-28 | Stop reason: HOSPADM

## 2022-10-27 RX ORDER — SODIUM BICARBONATE 650 MG/1
1300 TABLET ORAL 3 TIMES DAILY
Status: DISCONTINUED | OUTPATIENT
Start: 2022-10-27 | End: 2022-10-28 | Stop reason: HOSPADM

## 2022-10-27 RX ORDER — NITROGLYCERIN 0.4 MG/1
0.4 TABLET SUBLINGUAL
Status: DISCONTINUED | OUTPATIENT
Start: 2022-10-27 | End: 2022-10-28 | Stop reason: HOSPADM

## 2022-10-27 RX ORDER — HEPARIN SODIUM 5000 [USP'U]/ML
5000 INJECTION, SOLUTION INTRAVENOUS; SUBCUTANEOUS EVERY 8 HOURS SCHEDULED
Status: DISCONTINUED | OUTPATIENT
Start: 2022-10-27 | End: 2022-10-28

## 2022-10-27 RX ORDER — METHYLPREDNISOLONE SODIUM SUCCINATE 125 MG/2ML
125 INJECTION, POWDER, LYOPHILIZED, FOR SOLUTION INTRAMUSCULAR; INTRAVENOUS EVERY 12 HOURS
Status: DISCONTINUED | OUTPATIENT
Start: 2022-10-27 | End: 2022-10-28

## 2022-10-27 RX ORDER — SODIUM CHLORIDE 0.9 % (FLUSH) 0.9 %
3-10 SYRINGE (ML) INJECTION AS NEEDED
Status: DISCONTINUED | OUTPATIENT
Start: 2022-10-27 | End: 2022-10-28 | Stop reason: HOSPADM

## 2022-10-27 RX ORDER — SODIUM CHLORIDE 0.9 % (FLUSH) 0.9 %
3 SYRINGE (ML) INJECTION EVERY 12 HOURS SCHEDULED
Status: DISCONTINUED | OUTPATIENT
Start: 2022-10-27 | End: 2022-10-28 | Stop reason: HOSPADM

## 2022-10-27 RX ORDER — PANTOPRAZOLE SODIUM 40 MG/1
40 TABLET, DELAYED RELEASE ORAL 2 TIMES DAILY
Status: DISCONTINUED | OUTPATIENT
Start: 2022-10-27 | End: 2022-10-28 | Stop reason: HOSPADM

## 2022-10-27 RX ORDER — BENZONATATE 100 MG/1
200 CAPSULE ORAL 3 TIMES DAILY PRN
Status: DISCONTINUED | OUTPATIENT
Start: 2022-10-27 | End: 2022-10-28 | Stop reason: HOSPADM

## 2022-10-27 RX ORDER — AMLODIPINE BESYLATE 5 MG/1
10 TABLET ORAL DAILY
Status: DISCONTINUED | OUTPATIENT
Start: 2022-10-27 | End: 2022-10-28

## 2022-10-27 RX ORDER — LOSARTAN POTASSIUM 50 MG/1
100 TABLET ORAL DAILY
Status: DISCONTINUED | OUTPATIENT
Start: 2022-10-27 | End: 2022-10-28 | Stop reason: HOSPADM

## 2022-10-27 RX ORDER — ONDANSETRON 2 MG/ML
4 INJECTION INTRAMUSCULAR; INTRAVENOUS EVERY 6 HOURS PRN
Status: DISCONTINUED | OUTPATIENT
Start: 2022-10-27 | End: 2022-10-28 | Stop reason: HOSPADM

## 2022-10-27 RX ORDER — IPRATROPIUM BROMIDE AND ALBUTEROL SULFATE 2.5; .5 MG/3ML; MG/3ML
3 SOLUTION RESPIRATORY (INHALATION)
Status: DISCONTINUED | OUTPATIENT
Start: 2022-10-27 | End: 2022-10-28 | Stop reason: HOSPADM

## 2022-10-27 RX ORDER — CALCIUM ACETATE 667 MG/1
1334 CAPSULE ORAL 2 TIMES DAILY
Status: DISCONTINUED | OUTPATIENT
Start: 2022-10-27 | End: 2022-10-28 | Stop reason: HOSPADM

## 2022-10-27 RX ORDER — ONDANSETRON 4 MG/1
4 TABLET, FILM COATED ORAL EVERY 6 HOURS PRN
Status: DISCONTINUED | OUTPATIENT
Start: 2022-10-27 | End: 2022-10-28 | Stop reason: HOSPADM

## 2022-10-27 RX ORDER — ATORVASTATIN CALCIUM 20 MG/1
20 TABLET, FILM COATED ORAL DAILY
Status: DISCONTINUED | OUTPATIENT
Start: 2022-10-27 | End: 2022-10-27 | Stop reason: ALTCHOICE

## 2022-10-27 RX ORDER — ALBUTEROL SULFATE 2.5 MG/3ML
2.5 SOLUTION RESPIRATORY (INHALATION) EVERY 6 HOURS PRN
Status: DISCONTINUED | OUTPATIENT
Start: 2022-10-27 | End: 2022-10-28 | Stop reason: HOSPADM

## 2022-10-27 RX ORDER — SEVELAMER CARBONATE 800 MG/1
1600 TABLET, FILM COATED ORAL
Status: DISCONTINUED | OUTPATIENT
Start: 2022-10-27 | End: 2022-10-27

## 2022-10-27 RX ORDER — SEVELAMER CARBONATE 800 MG/1
1600 TABLET, FILM COATED ORAL
Status: DISCONTINUED | OUTPATIENT
Start: 2022-10-27 | End: 2022-10-28

## 2022-10-27 RX ORDER — FUROSEMIDE 10 MG/ML
20 INJECTION INTRAMUSCULAR; INTRAVENOUS EVERY 12 HOURS
Status: DISCONTINUED | OUTPATIENT
Start: 2022-10-27 | End: 2022-10-28

## 2022-10-27 RX ORDER — SIMVASTATIN 10 MG
40 TABLET ORAL NIGHTLY
Status: DISCONTINUED | OUTPATIENT
Start: 2022-10-27 | End: 2022-10-28 | Stop reason: HOSPADM

## 2022-10-27 RX ADMIN — FUROSEMIDE 20 MG: 10 INJECTION, SOLUTION INTRAMUSCULAR; INTRAVENOUS at 16:59

## 2022-10-27 RX ADMIN — FUROSEMIDE 20 MG: 10 INJECTION, SOLUTION INTRAMUSCULAR; INTRAVENOUS at 05:07

## 2022-10-27 RX ADMIN — IPRATROPIUM BROMIDE AND ALBUTEROL SULFATE 3 ML: .5; 3 SOLUTION RESPIRATORY (INHALATION) at 18:21

## 2022-10-27 RX ADMIN — HEPARIN SODIUM 5000 UNITS: 5000 INJECTION INTRAVENOUS; SUBCUTANEOUS at 16:59

## 2022-10-27 RX ADMIN — CEFTRIAXONE 2 G: 2 INJECTION, POWDER, FOR SOLUTION INTRAMUSCULAR; INTRAVENOUS at 00:07

## 2022-10-27 RX ADMIN — SEVELAMER CARBONATE 1600 MG: 800 TABLET, FILM COATED ORAL at 09:34

## 2022-10-27 RX ADMIN — LOSARTAN POTASSIUM 100 MG: 50 TABLET, FILM COATED ORAL at 09:34

## 2022-10-27 RX ADMIN — AMLODIPINE BESYLATE 10 MG: 5 TABLET ORAL at 09:34

## 2022-10-27 RX ADMIN — SODIUM BICARBONATE 650 MG TABLET 1300 MG: at 09:34

## 2022-10-27 RX ADMIN — IPRATROPIUM BROMIDE AND ALBUTEROL SULFATE 3 ML: .5; 3 SOLUTION RESPIRATORY (INHALATION) at 13:10

## 2022-10-27 RX ADMIN — AZITHROMYCIN 500 MG: 500 INJECTION, POWDER, LYOPHILIZED, FOR SOLUTION INTRAVENOUS at 00:09

## 2022-10-27 RX ADMIN — SEVELAMER CARBONATE 1600 MG: 800 TABLET, FILM COATED ORAL at 16:59

## 2022-10-27 RX ADMIN — TRAZODONE HYDROCHLORIDE 50 MG: 50 TABLET ORAL at 21:51

## 2022-10-27 RX ADMIN — METHYLPREDNISOLONE SODIUM SUCCINATE 125 MG: 125 INJECTION, POWDER, FOR SOLUTION INTRAMUSCULAR; INTRAVENOUS at 09:34

## 2022-10-27 RX ADMIN — Medication 3 ML: at 09:34

## 2022-10-27 RX ADMIN — METHYLPREDNISOLONE SODIUM SUCCINATE 125 MG: 125 INJECTION, POWDER, FOR SOLUTION INTRAMUSCULAR; INTRAVENOUS at 21:44

## 2022-10-27 RX ADMIN — SODIUM BICARBONATE 650 MG TABLET 1300 MG: at 17:14

## 2022-10-27 RX ADMIN — BENZONATATE 200 MG: 100 CAPSULE ORAL at 12:53

## 2022-10-27 RX ADMIN — PANTOPRAZOLE SODIUM 40 MG: 40 TABLET, DELAYED RELEASE ORAL at 09:34

## 2022-10-27 RX ADMIN — Medication 3 ML: at 21:51

## 2022-10-27 RX ADMIN — PANTOPRAZOLE SODIUM 40 MG: 40 TABLET, DELAYED RELEASE ORAL at 21:44

## 2022-10-28 ENCOUNTER — READMISSION MANAGEMENT (OUTPATIENT)
Dept: CALL CENTER | Facility: HOSPITAL | Age: 72
End: 2022-10-28

## 2022-10-28 VITALS
HEART RATE: 83 BPM | SYSTOLIC BLOOD PRESSURE: 119 MMHG | WEIGHT: 159.17 LBS | HEIGHT: 60 IN | RESPIRATION RATE: 18 BRPM | OXYGEN SATURATION: 100 % | DIASTOLIC BLOOD PRESSURE: 86 MMHG | TEMPERATURE: 97.6 F | BODY MASS INDEX: 31.25 KG/M2

## 2022-10-28 LAB
ALBUMIN SERPL-MCNC: 4 G/DL (ref 3.5–5.2)
ANION GAP SERPL CALCULATED.3IONS-SCNC: 19 MMOL/L (ref 5–15)
BASOPHILS # BLD AUTO: 0 10*3/MM3 (ref 0–0.2)
BASOPHILS NFR BLD AUTO: 0.2 % (ref 0–1.5)
BUN SERPL-MCNC: 42 MG/DL (ref 8–23)
BUN/CREAT SERPL: 5.8 (ref 7–25)
CALCIUM SPEC-SCNC: 8.9 MG/DL (ref 8.6–10.5)
CHLORIDE SERPL-SCNC: 95 MMOL/L (ref 98–107)
CO2 SERPL-SCNC: 23 MMOL/L (ref 22–29)
CREAT SERPL-MCNC: 7.23 MG/DL (ref 0.57–1)
DEPRECATED RDW RBC AUTO: 57.3 FL (ref 37–54)
EGFRCR SERPLBLD CKD-EPI 2021: 5.6 ML/MIN/1.73
EOSINOPHIL # BLD AUTO: 0 10*3/MM3 (ref 0–0.4)
EOSINOPHIL NFR BLD AUTO: 0 % (ref 0.3–6.2)
ERYTHROCYTE [DISTWIDTH] IN BLOOD BY AUTOMATED COUNT: 16.6 % (ref 12.3–15.4)
GLUCOSE SERPL-MCNC: 260 MG/DL (ref 65–99)
HCT VFR BLD AUTO: 30 % (ref 34–46.6)
HGB BLD-MCNC: 10.2 G/DL (ref 12–15.9)
LYMPHOCYTES # BLD AUTO: 0.4 10*3/MM3 (ref 0.7–3.1)
LYMPHOCYTES NFR BLD AUTO: 4.2 % (ref 19.6–45.3)
MCH RBC QN AUTO: 31.5 PG (ref 26.6–33)
MCHC RBC AUTO-ENTMCNC: 34 G/DL (ref 31.5–35.7)
MCV RBC AUTO: 92.6 FL (ref 79–97)
MONOCYTES # BLD AUTO: 0.2 10*3/MM3 (ref 0.1–0.9)
MONOCYTES NFR BLD AUTO: 1.7 % (ref 5–12)
NEUTROPHILS NFR BLD AUTO: 9.4 10*3/MM3 (ref 1.7–7)
NEUTROPHILS NFR BLD AUTO: 93.9 % (ref 42.7–76)
NRBC BLD AUTO-RTO: 0 /100 WBC (ref 0–0.2)
PHOSPHATE SERPL-MCNC: 6.2 MG/DL (ref 2.5–4.5)
PLATELET # BLD AUTO: 465 10*3/MM3 (ref 140–450)
PMV BLD AUTO: 7.6 FL (ref 6–12)
POTASSIUM SERPL-SCNC: 5.3 MMOL/L (ref 3.5–5.2)
QT INTERVAL: 383 MS
RBC # BLD AUTO: 3.24 10*6/MM3 (ref 3.77–5.28)
SODIUM SERPL-SCNC: 137 MMOL/L (ref 136–145)
WBC NRBC COR # BLD: 10 10*3/MM3 (ref 3.4–10.8)

## 2022-10-28 PROCEDURE — 25010000002 METHYLPREDNISOLONE PER 125 MG

## 2022-10-28 PROCEDURE — 96372 THER/PROPH/DIAG INJ SC/IM: CPT

## 2022-10-28 PROCEDURE — 96376 TX/PRO/DX INJ SAME DRUG ADON: CPT

## 2022-10-28 PROCEDURE — 25010000002 CEFTRIAXONE PER 250 MG

## 2022-10-28 PROCEDURE — G0378 HOSPITAL OBSERVATION PER HR: HCPCS

## 2022-10-28 PROCEDURE — 94799 UNLISTED PULMONARY SVC/PX: CPT

## 2022-10-28 PROCEDURE — G0257 UNSCHED DIALYSIS ESRD PT HOS: HCPCS

## 2022-10-28 PROCEDURE — 25010000002 AZITHROMYCIN PER 500 MG

## 2022-10-28 PROCEDURE — 80069 RENAL FUNCTION PANEL: CPT | Performed by: INTERNAL MEDICINE

## 2022-10-28 PROCEDURE — 85025 COMPLETE CBC W/AUTO DIFF WBC: CPT

## 2022-10-28 PROCEDURE — 25010000002 HEPARIN (PORCINE) PER 1000 UNITS: Performed by: FAMILY MEDICINE

## 2022-10-28 PROCEDURE — 94761 N-INVAS EAR/PLS OXIMETRY MLT: CPT

## 2022-10-28 PROCEDURE — 25010000002 FUROSEMIDE PER 20 MG

## 2022-10-28 RX ORDER — PREDNISONE 20 MG/1
20 TABLET ORAL
Status: DISCONTINUED | OUTPATIENT
Start: 2022-10-29 | End: 2022-10-28 | Stop reason: HOSPADM

## 2022-10-28 RX ORDER — SEVELAMER CARBONATE 800 MG/1
2400 TABLET, FILM COATED ORAL
Status: DISCONTINUED | OUTPATIENT
Start: 2022-10-28 | End: 2022-10-28 | Stop reason: HOSPADM

## 2022-10-28 RX ORDER — DOXYCYCLINE 100 MG/1
100 TABLET ORAL EVERY 12 HOURS SCHEDULED
Status: DISCONTINUED | OUTPATIENT
Start: 2022-10-28 | End: 2022-10-28 | Stop reason: HOSPADM

## 2022-10-28 RX ORDER — PREDNISONE 20 MG/1
10 TABLET ORAL DAILY
Qty: 8 TABLET | Refills: 0 | Status: ON HOLD | OUTPATIENT
Start: 2022-10-28 | End: 2023-01-23

## 2022-10-28 RX ORDER — TRAZODONE HYDROCHLORIDE 50 MG/1
50 TABLET ORAL NIGHTLY
Qty: 30 TABLET | Refills: 0 | Status: SHIPPED | OUTPATIENT
Start: 2022-10-28 | End: 2023-01-12

## 2022-10-28 RX ORDER — DOXYCYCLINE 100 MG/1
100 TABLET ORAL EVERY 12 HOURS SCHEDULED
Qty: 14 TABLET | Refills: 0 | Status: SHIPPED | OUTPATIENT
Start: 2022-10-28 | End: 2022-11-04

## 2022-10-28 RX ADMIN — HEPARIN SODIUM 5000 UNITS: 5000 INJECTION INTRAVENOUS; SUBCUTANEOUS at 05:44

## 2022-10-28 RX ADMIN — SEVELAMER CARBONATE 1600 MG: 800 TABLET, FILM COATED ORAL at 05:44

## 2022-10-28 RX ADMIN — CEFTRIAXONE 2 G: 2 INJECTION, POWDER, FOR SOLUTION INTRAMUSCULAR; INTRAVENOUS at 03:30

## 2022-10-28 RX ADMIN — SEVELAMER CARBONATE 2400 MG: 800 TABLET, FILM COATED ORAL at 11:21

## 2022-10-28 RX ADMIN — METHYLPREDNISOLONE SODIUM SUCCINATE 125 MG: 125 INJECTION, POWDER, FOR SOLUTION INTRAMUSCULAR; INTRAVENOUS at 11:21

## 2022-10-28 RX ADMIN — IPRATROPIUM BROMIDE AND ALBUTEROL SULFATE 3 ML: .5; 3 SOLUTION RESPIRATORY (INHALATION) at 12:27

## 2022-10-28 RX ADMIN — IPRATROPIUM BROMIDE AND ALBUTEROL SULFATE 3 ML: .5; 3 SOLUTION RESPIRATORY (INHALATION) at 07:40

## 2022-10-28 RX ADMIN — CALCIUM ACETATE 1334 MG: 667 CAPSULE ORAL at 05:57

## 2022-10-28 RX ADMIN — FUROSEMIDE 20 MG: 10 INJECTION, SOLUTION INTRAMUSCULAR; INTRAVENOUS at 05:44

## 2022-10-28 RX ADMIN — LOSARTAN POTASSIUM 100 MG: 50 TABLET, FILM COATED ORAL at 11:22

## 2022-10-28 RX ADMIN — AZITHROMYCIN DIHYDRATE 500 MG: 500 INJECTION, POWDER, LYOPHILIZED, FOR SOLUTION INTRAVENOUS at 01:54

## 2022-10-29 NOTE — OUTREACH NOTE
Prep Survey    Flowsheet Row Responses   [facility] facility patient discharged from? [facility]   Is LACE score < 7 ? No   Emergency Room discharge w/ pulse ox? No   Eligibility Readm Mgmt   Discharge diagnosis Pneumonia of both lungs   Does the patient have one of the following disease processes/diagnoses(primary or secondary)? Pneumonia   Does the patient have Home health ordered? No   Is there a DME ordered? No   Prep survey completed? Yes          FINA MEDINA - Registered Nurse

## 2022-11-01 ENCOUNTER — READMISSION MANAGEMENT (OUTPATIENT)
Dept: CALL CENTER | Facility: HOSPITAL | Age: 72
End: 2022-11-01

## 2022-11-01 LAB
BACTERIA SPEC AEROBE CULT: NORMAL
BACTERIA SPEC AEROBE CULT: NORMAL

## 2022-11-01 NOTE — OUTREACH NOTE
COPD/PN Week 1 Survey    Flowsheet Row Responses   [facility] patient discharged from? [facility]   Does the patient have one of the following disease processes/diagnoses(primary or secondary)? Pneumonia   Week 1 attempt successful? Yes   Call start time 1442   Call end time 1449   Discharge diagnosis Pneumonia of both lungs   Medication alerts for this patient Pt reports that she was told to bring her Simvastatin 40mg daily to hosp, the staff took it and pt was not given her meds upon DC.    Meds reviewed with patient/caregiver? Yes   Is the patient having any side effects they believe may be caused by any medication additions or changes? No   Does the patient have all medications ordered at discharge? Yes   Is the patient taking all medications as directed (includes completed medication regime)? No   What is preventing the patient from taking all medications as directed? Other   Nursing Interventions Advised patient to call provider   Medication comments Pt needs her Simvastatin 40mg.   Does the patient have a primary care provider?  Yes   Comments regarding PCP Nov 10 f/u with PCP.   Has the patient kept scheduled appointments due by today? N/A   DME comments using home nebs   Pulse Ox monitoring Intermittent   Pulse Ox device source Patient   O2 Sat comments RA 98%   O2 Sat: education provided Sat levels, Monitoring frequency, When to seek care   Psychosocial issues? No   Comments Dry cough, weakness remains, lightheaded when standing, on fluid restriction r/t HD she reports.    Did the patient receive a copy of their discharge instructions? Yes   Nursing interventions Reviewed instructions with patient   What is the patient's perception of their health status since discharge? Same   Is the patient/caregiver able to teach back the hierarchy of who to call/visit for symptoms/problems? PCP, Specialist, Home health nurse, Urgent Care, ED, 911 Yes   Is the patient/caregiver able to teach back signs and symptoms of  worsening condition: Fever/chills, Shortness of breath, Chest pain   Is the patient/caregiver able to teach back importance of completing antibiotic course of treatment? Yes   Week 1 call completed? Yes          TOMÁS NARVAEZ - Registered Nurse

## 2022-11-09 ENCOUNTER — READMISSION MANAGEMENT (OUTPATIENT)
Dept: CALL CENTER | Facility: HOSPITAL | Age: 72
End: 2022-11-09

## 2022-11-09 NOTE — OUTREACH NOTE
COPD/PN Week 2 Survey    Flowsheet Row Responses   [facility] patient discharged from? [facility]   Does the patient have one of the following disease processes/diagnoses(primary or secondary)? Pneumonia   Week 2 attempt successful? Yes   Call start time 1204   Call end time 1210   Discharge diagnosis Pneumonia of both lungs   Person spoke with today (if not patient) and relationship Spouse   Meds reviewed with patient/caregiver? Yes   Is the patient having any side effects they believe may be caused by any medication additions or changes? No   Does the patient have all medications ordered at discharge? Yes   Is the patient taking all medications as directed (includes completed medication regime)? Yes   Does the patient have a primary care provider?  Yes   Does the patient have an appointment with their PCP or specialist within 7 days of discharge? Greater than 7 days   Comments regarding PCP 11/10/22   Nursing Interventions Verified appointment date/time/provider   Has the patient kept scheduled appointments due by today? N/A   Pulse Ox monitoring Intermittent   Pulse Ox device source Patient   O2 Sat comments Pt at dialysis   Psychosocial issues? No   What is the patient's perception of their health status since discharge? Improving   Nursing interventions Educated on importance of maintaining up to date immunizations as advised by provider   Is the patient/caregiver able to teach back signs and symptoms of worsening condition: Fever/chills, Shortness of breath, Chest pain   Is the patient/caregiver able to teach back importance of completing antibiotic course of treatment? Yes   Week 2 call completed? Yes   Wrap up additional comments Ernestine Chapman, states pt is at dialysis. Ari does not feel pt is getting much better, and shares pt is SOB. Ari verified PCP fu appt tomorrow, 11/10/22.          NEFTALI MORALES - Registered Nurse

## 2022-11-15 ENCOUNTER — TRANSCRIBE ORDERS (OUTPATIENT)
Dept: ADMINISTRATIVE | Facility: HOSPITAL | Age: 72
End: 2022-11-15

## 2022-11-15 DIAGNOSIS — R91.1 LUNG NODULE: Primary | ICD-10-CM

## 2022-11-23 ENCOUNTER — READMISSION MANAGEMENT (OUTPATIENT)
Dept: CALL CENTER | Facility: HOSPITAL | Age: 72
End: 2022-11-23

## 2022-11-23 NOTE — OUTREACH NOTE
COPD/PN Week 4 Survey    Flowsheet Row Responses   [facility] patient discharged from? [facility]   Does the patient have one of the following disease processes/diagnoses(primary or secondary)? Pneumonia   Week 4 attempt successful? Yes   Call start time 1415   Call end time 1418   Discharge diagnosis Pneumonia of both lungs   Meds reviewed with patient/caregiver? Yes   Is the patient taking all medications as directed (includes completed medication regime)? Yes   Has the patient kept scheduled appointments due by today? Yes  [Dialysis today (11-23-22)]   Comments PCP 11-10-22 (repeat CXR was performed)    Is the patient still receiving Home Health Services? N/A   Pulse Ox monitoring Intermittent   Pulse Ox device source Patient   O2 Sat comments 93-98% RA    O2 Sat: education provided Sat levels, Monitoring frequency, When to seek care   Psychosocial issues? No   What is the patient's perception of their health status since discharge? Improving   If the patient is a current smoker, are they able to teach back resources for cessation? [id number]   Is the patient/caregiver able to teach back the hierarchy of who to call/visit for symptoms/problems? PCP, Specialist, Home health nurse, Urgent Care, ED, 911 Yes   Week 4 call completed? Yes   Would the patient like one additional call? No   Graduated Yes   Was the number of calls appropriate? Yes   Wrap up additional comments Pt attended her FU apt-had dialysis today (11/23/22)          MARY ANNE HINDS - Registered Nurse

## 2022-12-01 ENCOUNTER — HOSPITAL ENCOUNTER (OUTPATIENT)
Dept: CT IMAGING | Facility: HOSPITAL | Age: 72
End: 2022-12-01

## 2022-12-06 ENCOUNTER — HOSPITAL ENCOUNTER (OUTPATIENT)
Dept: CARDIOLOGY | Facility: HOSPITAL | Age: 72
Discharge: HOME | End: 2022-12-06

## 2022-12-06 ENCOUNTER — APPOINTMENT (OUTPATIENT)
Dept: VASCULAR SURGERY | Facility: HOSPITAL | Age: 72
End: 2022-12-06

## 2022-12-06 ENCOUNTER — TRANSCRIBE ORDERS (OUTPATIENT)
Dept: ADMINISTRATIVE | Facility: HOSPITAL | Age: 72
End: 2022-12-06

## 2022-12-06 DIAGNOSIS — I65.23 BILATERAL CAROTID ARTERY OCCLUSION: ICD-10-CM

## 2022-12-06 DIAGNOSIS — Z99.2 ENCOUNTER FOR EXTRACORPOREAL DIALYSIS: ICD-10-CM

## 2022-12-06 DIAGNOSIS — N18.6 END STAGE RENAL DISEASE: Primary | ICD-10-CM

## 2022-12-06 LAB
BH CV XLRA MEAS LEFT DIST CCA EDV: -14.1 CM/SEC
BH CV XLRA MEAS LEFT DIST CCA PSV: -87 CM/SEC
BH CV XLRA MEAS LEFT DIST ICA EDV: -24.3 CM/SEC
BH CV XLRA MEAS LEFT DIST ICA PSV: -109 CM/SEC
BH CV XLRA MEAS LEFT ICA/CCA RATIO: -3.3
BH CV XLRA MEAS LEFT MID ICA EDV: 46.1 CM/SEC
BH CV XLRA MEAS LEFT MID ICA PSV: 320 CM/SEC
BH CV XLRA MEAS LEFT PROX CCA EDV: 25.2 CM/SEC
BH CV XLRA MEAS LEFT PROX CCA PSV: 144 CM/SEC
BH CV XLRA MEAS LEFT PROX ECA PSV: -386 CM/SEC
BH CV XLRA MEAS LEFT PROX ICA EDV: -65.9 CM/SEC
BH CV XLRA MEAS LEFT PROX ICA PSV: -482 CM/SEC
BH CV XLRA MEAS LEFT PROX SCLA PSV: 353 CM/SEC
BH CV XLRA MEAS LEFT VERTEBRAL A PSV: -76.3 CM/SEC
BH CV XLRA MEAS RIGHT DIST CCA EDV: -15.4 CM/SEC
BH CV XLRA MEAS RIGHT DIST CCA PSV: -110 CM/SEC
BH CV XLRA MEAS RIGHT DIST ICA EDV: -24.5 CM/SEC
BH CV XLRA MEAS RIGHT DIST ICA PSV: -94.8 CM/SEC
BH CV XLRA MEAS RIGHT ICA/CCA RATIO: -0.54
BH CV XLRA MEAS RIGHT PROX CCA EDV: 22 CM/SEC
BH CV XLRA MEAS RIGHT PROX CCA PSV: 278 CM/SEC
BH CV XLRA MEAS RIGHT PROX ECA PSV: -184 CM/SEC
BH CV XLRA MEAS RIGHT PROX ICA EDV: -36.2 CM/SEC
BH CV XLRA MEAS RIGHT PROX ICA PSV: -151 CM/SEC
BH CV XLRA MEAS RIGHT PROX SCLA PSV: 168 CM/SEC
BH CV XLRA MEAS RIGHT VERTEBRAL A PSV: -46.1 CM/SEC
MAXIMAL PREDICTED HEART RATE: 147 BPM
RIGHT ARM BP: NORMAL MMHG
STRESS TARGET HR: 125 BPM

## 2022-12-06 PROCEDURE — 93880 EXTRACRANIAL BILAT STUDY: CPT

## 2022-12-06 PROCEDURE — G0463 HOSPITAL OUTPT CLINIC VISIT: HCPCS

## 2022-12-20 ENCOUNTER — HOSPITAL ENCOUNTER (OUTPATIENT)
Dept: CT IMAGING | Facility: HOSPITAL | Age: 72
Discharge: HOME | End: 2022-12-20
Admitting: NURSE PRACTITIONER

## 2022-12-20 DIAGNOSIS — R91.1 LUNG NODULE: ICD-10-CM

## 2022-12-20 PROCEDURE — 71250 CT THORAX DX C-: CPT

## 2023-01-12 ENCOUNTER — OFFICE VISIT (OUTPATIENT)
Dept: SURGERY | Facility: CLINIC | Age: 73
End: 2023-01-12
Payer: MEDICARE

## 2023-01-12 VITALS
HEART RATE: 76 BPM | WEIGHT: 163 LBS | DIASTOLIC BLOOD PRESSURE: 75 MMHG | RESPIRATION RATE: 18 BRPM | TEMPERATURE: 98.6 F | HEIGHT: 60 IN | BODY MASS INDEX: 32 KG/M2 | SYSTOLIC BLOOD PRESSURE: 158 MMHG | OXYGEN SATURATION: 94 %

## 2023-01-12 DIAGNOSIS — K43.2 INCISIONAL HERNIA, WITHOUT OBSTRUCTION OR GANGRENE: Primary | ICD-10-CM

## 2023-01-12 PROCEDURE — 99214 OFFICE O/P EST MOD 30 MIN: CPT | Performed by: SURGERY

## 2023-01-12 RX ORDER — SODIUM CHLORIDE 0.9 % (FLUSH) 0.9 %
3 SYRINGE (ML) INJECTION EVERY 12 HOURS SCHEDULED
Status: CANCELLED | OUTPATIENT
Start: 2023-01-12

## 2023-01-12 RX ORDER — CARVEDILOL 3.12 MG/1
3.12 TABLET ORAL 2 TIMES DAILY
Status: ON HOLD | COMMUNITY
Start: 2022-12-20 | End: 2023-01-23 | Stop reason: ALTCHOICE

## 2023-01-12 RX ORDER — SODIUM CHLORIDE 9 MG/ML
40 INJECTION, SOLUTION INTRAVENOUS AS NEEDED
Status: CANCELLED | OUTPATIENT
Start: 2023-01-12

## 2023-01-12 RX ORDER — SODIUM CHLORIDE 9 MG/ML
100 INJECTION, SOLUTION INTRAVENOUS CONTINUOUS
Status: CANCELLED | OUTPATIENT
Start: 2023-01-12

## 2023-01-12 RX ORDER — TEMAZEPAM 30 MG/1
30 CAPSULE ORAL NIGHTLY
COMMUNITY
Start: 2023-01-10

## 2023-01-12 RX ORDER — HYDROCODONE BITARTRATE AND ACETAMINOPHEN 5; 325 MG/1; MG/1
1 TABLET ORAL EVERY 8 HOURS PRN
COMMUNITY
Start: 2023-01-10 | End: 2023-02-14

## 2023-01-12 RX ORDER — SODIUM CHLORIDE 0.9 % (FLUSH) 0.9 %
3-10 SYRINGE (ML) INJECTION AS NEEDED
Status: CANCELLED | OUTPATIENT
Start: 2023-01-12

## 2023-01-12 NOTE — PROGRESS NOTES
"Subjective   Sherry Lobato is a 72 y.o. female.   Chief Complaint   Patient presents with   • Hernia     FU Inc Hernia      /75 (BP Location: Right arm, Patient Position: Sitting, Cuff Size: Adult)   Pulse 76   Temp 98.6 °F (37 °C) (Infrared)   Resp 18   Ht 152.4 cm (60\")   Wt 73.9 kg (163 lb)   SpO2 94%   BMI 31.83 kg/m²     HISTORY OF PRESENT ILLNESS:  72-year-old lady who I been following for multiple abdominal wall hernias had previously fixed a right mid abdominal hernia with sutures alone secondary to her desire to have transplantation and avoid mesh on the side. she ultimately does not think that she will continue to try to get a kidney transplant.  I saw her about 6 or 7 months ago for an incisional hernia around her umbilicus.  This is probably the location of a port placement from a laparoscopic repair.  Over the last several months this is gotten much larger is causing sharp discomfort worse with activity comes and goes contributes to some intermittent constipation.  Since it has gotten larger and become more symptomatic she would like to have this repaired.  She was recently hospitalized for pneumonia she says that she is no longer on oxygen at home      Outpatient Encounter Medications as of 1/12/2023   Medication Sig Dispense Refill   • amLODIPine (NORVASC) 10 MG tablet Take 10 mg by mouth Daily.     • calcium acetate (PHOSLO) 667 MG capsule Take 2 capsules by mouth 2 (Two) Times a Day. With meals     • carvedilol (COREG) 3.125 MG tablet Take 3.125 mg by mouth 2 (Two) Times a Day.     • HYDROcodone-acetaminophen (NORCO) 5-325 MG per tablet Take 1 tablet by mouth Every 8 (Eight) Hours As Needed. for pain     • lidocaine-prilocaine (EMLA) 2.5-2.5 % cream Apply 1 application topically to the appropriate area as directed Daily As Needed. Apply to access site 1 to 2 hours before dialysis  4   • losartan (COZAAR) 100 MG tablet Take 100 mg by mouth Daily.     • Methoxy PEG-Epoetin Beta " (MIRCERA IJ) 30 mcg Every 14 (Fourteen) Days.     • Multiple Vitamins-Minerals (RENAPLEX-D) tablet Take 1 tablet by mouth Daily.     • pantoprazole (PROTONIX) 40 MG EC tablet TAKE 1 TABLET BY MOUTH TWICE DAILY 180 tablet 2   • predniSONE (DELTASONE) 20 MG tablet Take 0.5 tablets by mouth Daily. 20 mg x 2 days  10 mg x 2 day 8 tablet 0   • sevelamer (RENVELA) 800 MG tablet TAKE 2 TABLETS BY MOUTH THREE TIMES DAILY WITH MEALS     • simvastatin (ZOCOR) 40 MG tablet Take 40 mg by mouth Every Night.     • sodium bicarbonate 650 MG tablet Take 2 tablets by mouth 3 (Three) Times a Day. 180 tablet 0   • temazepam (RESTORIL) 30 MG capsule Take 30 mg by mouth At Night As Needed.     • [DISCONTINUED] traZODone (DESYREL) 50 MG tablet Take 1 tablet by mouth Every Night. 30 tablet 0     No facility-administered encounter medications on file as of 1/12/2023.         The following portions of the patient's history were reviewed and updated as appropriate: allergies, current medications, past family history, past medical history, past social history, past surgical history and problem list.    Review of Systems  Denies any fevers chills chest pain or severe decompensation of shortness of breath  Objective     Physical Exam  Constitutional:       Appearance: Normal appearance.   HENT:      Head: Normocephalic and atraumatic.   Cardiovascular:      Rate and Rhythm: Normal rate.   Pulmonary:      Effort: Pulmonary effort is normal. No respiratory distress.   Abdominal:      Comments: Abdomen is soft minimally tender to palpation the right side abdominal wall feels okay the subcostal and the mid abdominal incisions are without definitive evidence of a large hernia.  Around an incision at the umbilicus there is a reducible approximately 2 cm hernia.  There is no overlying skin changes.  She is symptomatic in this location   Musculoskeletal:         General: No swelling or tenderness.   Skin:     General: Skin is warm and dry.    Neurological:      General: No focal deficit present.      Mental Status: She is alert. Mental status is at baseline.   Psychiatric:         Mood and Affect: Mood normal.         Behavior: Behavior normal.           Assessment & Plan   Diagnoses and all orders for this visit:    1. Incisional hernia, without obstruction or gangrene (Primary)  -     Case Request; Standing  -     sodium chloride 0.9 % infusion  -     sodium chloride 0.9 % flush 3 mL  -     sodium chloride 0.9 % flush 3-10 mL  -     sodium chloride 0.9 % infusion 40 mL  -     ceFAZolin (ANCEF) 2 g in sodium chloride 0.9 % 100 mL IVPB  -     Case Request    Other orders  -     Follow Anesthesia Guidelines / Protocol; Future  -     Obtain Informed Consent; Future  -     Provide NPO Instructions to Patient; Future  -     Chlorhexidine Skin Prep; Future  -     Follow Anesthesia Guidelines / Protocol; Standing  -     Verify / Perform Chlorhexidine Skin Prep; Standing  -     Instructions on Coughing, Deep Breathing & Incentive Spirometry; Standing  -     Notify Physician - Standard; Standing  -     Insert Peripheral IV; Standing  -     Saline Lock & Maintain IV Access; Standing    I have counseled her about the risks and benefits of an open incisional hernia repair with mesh.  Talked with steps of the operation anticipated.  Possible complications.  Talked about the natural history of these hernias and observation and only attempting a repair if it is causing incarceration or strangulation contents.  After discussing the risk of this operation she does understand and wishes to proceed.    This is a chronic problem with progression of symptoms and counseling about major abdominal surgery with risk factors for morbidity including her COPD and renal disease    Nilay Stevens MD  1/12/2023  12:42 PM EST    This note was created using Dragon Voice Recognition software.

## 2023-01-13 RX ORDER — ASPIRIN 81 MG/1
81 TABLET ORAL DAILY
COMMUNITY

## 2023-01-17 ENCOUNTER — HOSPITAL ENCOUNTER (OUTPATIENT)
Dept: CARDIOLOGY | Facility: HOSPITAL | Age: 73
Discharge: HOME | End: 2023-01-17
Admitting: SURGERY
Payer: MEDICARE

## 2023-01-17 PROCEDURE — 93005 ELECTROCARDIOGRAM TRACING: CPT | Performed by: SURGERY

## 2023-01-17 PROCEDURE — 93010 ELECTROCARDIOGRAM REPORT: CPT | Performed by: INTERNAL MEDICINE

## 2023-01-21 LAB — QT INTERVAL: 390 MS

## 2023-01-23 ENCOUNTER — ANESTHESIA (OUTPATIENT)
Dept: PERIOP | Facility: HOSPITAL | Age: 73
End: 2023-01-23
Payer: MEDICARE

## 2023-01-23 ENCOUNTER — ANESTHESIA EVENT (OUTPATIENT)
Dept: PERIOP | Facility: HOSPITAL | Age: 73
End: 2023-01-23
Payer: MEDICARE

## 2023-01-23 ENCOUNTER — HOSPITAL ENCOUNTER (OUTPATIENT)
Facility: HOSPITAL | Age: 73
Discharge: HOME | End: 2023-01-25
Attending: SURGERY | Admitting: SURGERY
Payer: MEDICARE

## 2023-01-23 DIAGNOSIS — K43.2 INCISIONAL HERNIA, WITHOUT OBSTRUCTION OR GANGRENE: ICD-10-CM

## 2023-01-23 LAB
ANION GAP SERPL CALCULATED.3IONS-SCNC: 19 MMOL/L (ref 5–15)
BUN SERPL-MCNC: 57 MG/DL (ref 8–23)
BUN/CREAT SERPL: 7.5 (ref 7–25)
CALCIUM SPEC-SCNC: 8.2 MG/DL (ref 8.6–10.5)
CHLORIDE SERPL-SCNC: 99 MMOL/L (ref 98–107)
CO2 SERPL-SCNC: 23 MMOL/L (ref 22–29)
CREAT SERPL-MCNC: 7.55 MG/DL (ref 0.57–1)
DEPRECATED RDW RBC AUTO: 56 FL (ref 37–54)
EGFRCR SERPLBLD CKD-EPI 2021: 5.3 ML/MIN/1.73
ERYTHROCYTE [DISTWIDTH] IN BLOOD BY AUTOMATED COUNT: 17.1 % (ref 12.3–15.4)
GLUCOSE BLDC GLUCOMTR-MCNC: 80 MG/DL (ref 70–105)
GLUCOSE SERPL-MCNC: 77 MG/DL (ref 65–99)
HCT VFR BLD AUTO: 34 % (ref 34–46.6)
HGB BLD-MCNC: 10.8 G/DL (ref 12–15.9)
MCH RBC QN AUTO: 29.9 PG (ref 26.6–33)
MCHC RBC AUTO-ENTMCNC: 31.9 G/DL (ref 31.5–35.7)
MCV RBC AUTO: 93.6 FL (ref 79–97)
PLATELET # BLD AUTO: 487 10*3/MM3 (ref 140–450)
PMV BLD AUTO: 7.1 FL (ref 6–12)
POTASSIUM SERPL-SCNC: 4.8 MMOL/L (ref 3.5–5.2)
RBC # BLD AUTO: 3.63 10*6/MM3 (ref 3.77–5.28)
SODIUM SERPL-SCNC: 141 MMOL/L (ref 136–145)
WBC NRBC COR # BLD: 9.4 10*3/MM3 (ref 3.4–10.8)

## 2023-01-23 PROCEDURE — 94664 DEMO&/EVAL PT USE INHALER: CPT

## 2023-01-23 PROCEDURE — 25010000002 CEFAZOLIN PER 500 MG: Performed by: SURGERY

## 2023-01-23 PROCEDURE — 63710000001 TEMAZEPAM 15 MG CAPSULE: Performed by: SURGERY

## 2023-01-23 PROCEDURE — 25010000002 FENTANYL CITRATE (PF) 50 MCG/ML SOLUTION

## 2023-01-23 PROCEDURE — C1781 MESH (IMPLANTABLE): HCPCS | Performed by: SURGERY

## 2023-01-23 PROCEDURE — 82962 GLUCOSE BLOOD TEST: CPT

## 2023-01-23 PROCEDURE — G0378 HOSPITAL OBSERVATION PER HR: HCPCS

## 2023-01-23 PROCEDURE — 25010000002 PROPOFOL 10 MG/ML EMULSION

## 2023-01-23 PROCEDURE — 94640 AIRWAY INHALATION TREATMENT: CPT

## 2023-01-23 PROCEDURE — 25010000002 DEXAMETHASONE PER 1 MG

## 2023-01-23 PROCEDURE — 25010000002 HYDROMORPHONE 1 MG/ML SOLUTION

## 2023-01-23 PROCEDURE — 94799 UNLISTED PULMONARY SVC/PX: CPT

## 2023-01-23 PROCEDURE — 25010000002 ONDANSETRON PER 1 MG

## 2023-01-23 PROCEDURE — A9270 NON-COVERED ITEM OR SERVICE: HCPCS | Performed by: SURGERY

## 2023-01-23 PROCEDURE — 63710000001 PANTOPRAZOLE 40 MG TABLET DELAYED-RELEASE: Performed by: SURGERY

## 2023-01-23 PROCEDURE — 94761 N-INVAS EAR/PLS OXIMETRY MLT: CPT

## 2023-01-23 PROCEDURE — 49615 RPR AA HRN RCR 3-10 RDC: CPT | Performed by: SURGERY

## 2023-01-23 PROCEDURE — 63710000001 SODIUM BICARBONATE 650 MG TABLET: Performed by: SURGERY

## 2023-01-23 PROCEDURE — 25010000002 ROPIVACAINE PER 1 MG: Performed by: ANESTHESIOLOGY

## 2023-01-23 PROCEDURE — 85027 COMPLETE CBC AUTOMATED: CPT | Performed by: SURGERY

## 2023-01-23 PROCEDURE — 63710000001 OXYCODONE 5 MG TABLET: Performed by: SURGERY

## 2023-01-23 PROCEDURE — 80048 BASIC METABOLIC PNL TOTAL CA: CPT | Performed by: SURGERY

## 2023-01-23 DEVICE — VENTRIO ST HERNIA PATCH
Type: IMPLANTABLE DEVICE | Site: ABDOMEN | Status: FUNCTIONAL
Brand: VENTRIO ST HERNIA PATCH

## 2023-01-23 RX ORDER — OXYCODONE HYDROCHLORIDE 5 MG/1
10 TABLET ORAL EVERY 4 HOURS PRN
Status: DISCONTINUED | OUTPATIENT
Start: 2023-01-23 | End: 2023-01-25 | Stop reason: HOSPADM

## 2023-01-23 RX ORDER — FENTANYL CITRATE 50 UG/ML
25 INJECTION, SOLUTION INTRAMUSCULAR; INTRAVENOUS
Status: DISCONTINUED | OUTPATIENT
Start: 2023-01-23 | End: 2023-01-23 | Stop reason: HOSPADM

## 2023-01-23 RX ORDER — FENTANYL CITRATE 50 UG/ML
INJECTION, SOLUTION INTRAMUSCULAR; INTRAVENOUS AS NEEDED
Status: DISCONTINUED | OUTPATIENT
Start: 2023-01-23 | End: 2023-01-23

## 2023-01-23 RX ORDER — GLYCOPYRROLATE 0.2 MG/ML
INJECTION INTRAMUSCULAR; INTRAVENOUS AS NEEDED
Status: DISCONTINUED | OUTPATIENT
Start: 2023-01-23 | End: 2023-01-23 | Stop reason: SURG

## 2023-01-23 RX ORDER — ROCURONIUM BROMIDE 10 MG/ML
INJECTION, SOLUTION INTRAVENOUS AS NEEDED
Status: DISCONTINUED | OUTPATIENT
Start: 2023-01-23 | End: 2023-01-23 | Stop reason: SURG

## 2023-01-23 RX ORDER — FENTANYL CITRATE 50 UG/ML
50 INJECTION, SOLUTION INTRAMUSCULAR; INTRAVENOUS
Status: DISCONTINUED | OUTPATIENT
Start: 2023-01-23 | End: 2023-01-23 | Stop reason: HOSPADM

## 2023-01-23 RX ORDER — IPRATROPIUM BROMIDE AND ALBUTEROL SULFATE 2.5; .5 MG/3ML; MG/3ML
3 SOLUTION RESPIRATORY (INHALATION) ONCE
Status: DISCONTINUED | OUTPATIENT
Start: 2023-01-23 | End: 2023-01-23 | Stop reason: HOSPADM

## 2023-01-23 RX ORDER — SODIUM CHLORIDE 0.9 % (FLUSH) 0.9 %
10 SYRINGE (ML) INJECTION AS NEEDED
Status: DISCONTINUED | OUTPATIENT
Start: 2023-01-23 | End: 2023-01-23 | Stop reason: HOSPADM

## 2023-01-23 RX ORDER — LIDOCAINE HYDROCHLORIDE 10 MG/ML
0.5 INJECTION, SOLUTION INFILTRATION; PERINEURAL ONCE AS NEEDED
Status: DISCONTINUED | OUTPATIENT
Start: 2023-01-23 | End: 2023-01-23 | Stop reason: HOSPADM

## 2023-01-23 RX ORDER — DIPHENHYDRAMINE HYDROCHLORIDE 50 MG/ML
12.5 INJECTION INTRAMUSCULAR; INTRAVENOUS ONCE AS NEEDED
Status: DISCONTINUED | OUTPATIENT
Start: 2023-01-23 | End: 2023-01-23 | Stop reason: HOSPADM

## 2023-01-23 RX ORDER — SODIUM CHLORIDE 0.9 % (FLUSH) 0.9 %
3-10 SYRINGE (ML) INJECTION AS NEEDED
Status: DISCONTINUED | OUTPATIENT
Start: 2023-01-23 | End: 2023-01-23 | Stop reason: HOSPADM

## 2023-01-23 RX ORDER — PROPOFOL 10 MG/ML
VIAL (ML) INTRAVENOUS AS NEEDED
Status: DISCONTINUED | OUTPATIENT
Start: 2023-01-23 | End: 2023-01-23 | Stop reason: SURG

## 2023-01-23 RX ORDER — LIDOCAINE HYDROCHLORIDE 20 MG/ML
INJECTION, SOLUTION EPIDURAL; INFILTRATION; INTRACAUDAL; PERINEURAL AS NEEDED
Status: DISCONTINUED | OUTPATIENT
Start: 2023-01-23 | End: 2023-01-23 | Stop reason: SURG

## 2023-01-23 RX ORDER — HYDROCODONE BITARTRATE AND ACETAMINOPHEN 5; 325 MG/1; MG/1
1 TABLET ORAL EVERY 4 HOURS PRN
Qty: 15 TABLET | Refills: 0 | Status: SHIPPED | OUTPATIENT
Start: 2023-01-23 | End: 2023-01-25 | Stop reason: HOSPADM

## 2023-01-23 RX ORDER — BUPIVACAINE HYDROCHLORIDE AND EPINEPHRINE 5; 5 MG/ML; UG/ML
INJECTION, SOLUTION EPIDURAL; INTRACAUDAL; PERINEURAL AS NEEDED
Status: DISCONTINUED | OUTPATIENT
Start: 2023-01-23 | End: 2023-01-23 | Stop reason: HOSPADM

## 2023-01-23 RX ORDER — TEMAZEPAM 15 MG/1
30 CAPSULE ORAL NIGHTLY
Status: DISCONTINUED | OUTPATIENT
Start: 2023-01-23 | End: 2023-01-25 | Stop reason: HOSPADM

## 2023-01-23 RX ORDER — IPRATROPIUM BROMIDE AND ALBUTEROL SULFATE 2.5; .5 MG/3ML; MG/3ML
3 SOLUTION RESPIRATORY (INHALATION) ONCE
Status: COMPLETED | OUTPATIENT
Start: 2023-01-23 | End: 2023-01-23

## 2023-01-23 RX ORDER — NEOSTIGMINE METHYLSULFATE 5 MG/5 ML
SYRINGE (ML) INTRAVENOUS AS NEEDED
Status: DISCONTINUED | OUTPATIENT
Start: 2023-01-23 | End: 2023-01-23 | Stop reason: SURG

## 2023-01-23 RX ORDER — ONDANSETRON 2 MG/ML
4 INJECTION INTRAMUSCULAR; INTRAVENOUS ONCE AS NEEDED
Status: DISCONTINUED | OUTPATIENT
Start: 2023-01-23 | End: 2023-01-23 | Stop reason: HOSPADM

## 2023-01-23 RX ORDER — DEXAMETHASONE SODIUM PHOSPHATE 4 MG/ML
INJECTION, SOLUTION INTRA-ARTICULAR; INTRALESIONAL; INTRAMUSCULAR; INTRAVENOUS; SOFT TISSUE AS NEEDED
Status: DISCONTINUED | OUTPATIENT
Start: 2023-01-23 | End: 2023-01-23 | Stop reason: SURG

## 2023-01-23 RX ORDER — SODIUM CHLORIDE 9 MG/ML
40 INJECTION, SOLUTION INTRAVENOUS AS NEEDED
Status: DISCONTINUED | OUTPATIENT
Start: 2023-01-23 | End: 2023-01-23 | Stop reason: HOSPADM

## 2023-01-23 RX ORDER — LABETALOL HYDROCHLORIDE 5 MG/ML
5 INJECTION, SOLUTION INTRAVENOUS
Status: DISCONTINUED | OUTPATIENT
Start: 2023-01-23 | End: 2023-01-23 | Stop reason: HOSPADM

## 2023-01-23 RX ORDER — SODIUM BICARBONATE 650 MG/1
1300 TABLET ORAL 3 TIMES DAILY
Status: DISCONTINUED | OUTPATIENT
Start: 2023-01-23 | End: 2023-01-25 | Stop reason: HOSPADM

## 2023-01-23 RX ORDER — SODIUM CHLORIDE 0.9 % (FLUSH) 0.9 %
3 SYRINGE (ML) INJECTION EVERY 12 HOURS SCHEDULED
Status: DISCONTINUED | OUTPATIENT
Start: 2023-01-23 | End: 2023-01-23 | Stop reason: HOSPADM

## 2023-01-23 RX ORDER — ROPIVACAINE HYDROCHLORIDE 5 MG/ML
INJECTION, SOLUTION EPIDURAL; INFILTRATION; PERINEURAL
Status: DISCONTINUED | OUTPATIENT
Start: 2023-01-23 | End: 2023-01-23 | Stop reason: SURG

## 2023-01-23 RX ORDER — FLUMAZENIL 0.1 MG/ML
0.1 INJECTION INTRAVENOUS AS NEEDED
Status: DISCONTINUED | OUTPATIENT
Start: 2023-01-23 | End: 2023-01-23 | Stop reason: HOSPADM

## 2023-01-23 RX ORDER — AMLODIPINE BESYLATE 5 MG/1
10 TABLET ORAL DAILY
Status: DISCONTINUED | OUTPATIENT
Start: 2023-01-24 | End: 2023-01-25 | Stop reason: HOSPADM

## 2023-01-23 RX ORDER — HYDRALAZINE HYDROCHLORIDE 20 MG/ML
5 INJECTION INTRAMUSCULAR; INTRAVENOUS
Status: DISCONTINUED | OUTPATIENT
Start: 2023-01-23 | End: 2023-01-23 | Stop reason: HOSPADM

## 2023-01-23 RX ORDER — ACETAMINOPHEN 500 MG
1000 TABLET ORAL EVERY 8 HOURS
Status: DISCONTINUED | OUTPATIENT
Start: 2023-01-23 | End: 2023-01-24

## 2023-01-23 RX ORDER — ASPIRIN 81 MG/1
81 TABLET ORAL DAILY
Status: DISCONTINUED | OUTPATIENT
Start: 2023-01-24 | End: 2023-01-25 | Stop reason: HOSPADM

## 2023-01-23 RX ORDER — PANTOPRAZOLE SODIUM 40 MG/1
40 TABLET, DELAYED RELEASE ORAL 2 TIMES DAILY
Status: DISCONTINUED | OUTPATIENT
Start: 2023-01-23 | End: 2023-01-25 | Stop reason: HOSPADM

## 2023-01-23 RX ORDER — IPRATROPIUM BROMIDE AND ALBUTEROL SULFATE 2.5; .5 MG/3ML; MG/3ML
3 SOLUTION RESPIRATORY (INHALATION) ONCE AS NEEDED
Status: COMPLETED | OUTPATIENT
Start: 2023-01-23 | End: 2023-01-23

## 2023-01-23 RX ORDER — ATORVASTATIN CALCIUM 20 MG/1
20 TABLET, FILM COATED ORAL DAILY
Status: DISCONTINUED | OUTPATIENT
Start: 2023-01-24 | End: 2023-01-24

## 2023-01-23 RX ORDER — PROCHLORPERAZINE EDISYLATE 5 MG/ML
10 INJECTION INTRAMUSCULAR; INTRAVENOUS ONCE AS NEEDED
Status: DISCONTINUED | OUTPATIENT
Start: 2023-01-23 | End: 2023-01-23 | Stop reason: HOSPADM

## 2023-01-23 RX ORDER — LOSARTAN POTASSIUM 50 MG/1
100 TABLET ORAL DAILY
Status: DISCONTINUED | OUTPATIENT
Start: 2023-01-24 | End: 2023-01-25 | Stop reason: HOSPADM

## 2023-01-23 RX ORDER — FENTANYL CITRATE 50 UG/ML
INJECTION, SOLUTION INTRAMUSCULAR; INTRAVENOUS AS NEEDED
Status: DISCONTINUED | OUTPATIENT
Start: 2023-01-23 | End: 2023-01-23 | Stop reason: SURG

## 2023-01-23 RX ORDER — HYDROCODONE BITARTRATE AND ACETAMINOPHEN 7.5; 325 MG/1; MG/1
1 TABLET ORAL ONCE AS NEEDED
Status: COMPLETED | OUTPATIENT
Start: 2023-01-23 | End: 2023-01-23

## 2023-01-23 RX ORDER — SODIUM CHLORIDE 9 MG/ML
100 INJECTION, SOLUTION INTRAVENOUS CONTINUOUS
Status: DISCONTINUED | OUTPATIENT
Start: 2023-01-23 | End: 2023-01-24

## 2023-01-23 RX ORDER — LIDOCAINE 40 MG/G
CREAM TOPICAL AS NEEDED
Status: DISCONTINUED | OUTPATIENT
Start: 2023-01-23 | End: 2023-01-25 | Stop reason: HOSPADM

## 2023-01-23 RX ORDER — SODIUM CHLORIDE 9 MG/ML
20 INJECTION, SOLUTION INTRAVENOUS ONCE
Status: COMPLETED | OUTPATIENT
Start: 2023-01-23 | End: 2023-01-23

## 2023-01-23 RX ORDER — BUDESONIDE AND FORMOTEROL FUMARATE DIHYDRATE 80; 4.5 UG/1; UG/1
2 AEROSOL RESPIRATORY (INHALATION)
Status: DISCONTINUED | OUTPATIENT
Start: 2023-01-23 | End: 2023-01-25 | Stop reason: HOSPADM

## 2023-01-23 RX ORDER — FENTANYL CITRATE 50 UG/ML
INJECTION, SOLUTION INTRAMUSCULAR; INTRAVENOUS
Status: DISCONTINUED | OUTPATIENT
Start: 2023-01-23 | End: 2023-01-23 | Stop reason: SURG

## 2023-01-23 RX ORDER — SEVELAMER CARBONATE 800 MG/1
800 TABLET, FILM COATED ORAL
Status: DISCONTINUED | OUTPATIENT
Start: 2023-01-24 | End: 2023-01-25 | Stop reason: HOSPADM

## 2023-01-23 RX ORDER — ONDANSETRON 2 MG/ML
INJECTION INTRAMUSCULAR; INTRAVENOUS AS NEEDED
Status: DISCONTINUED | OUTPATIENT
Start: 2023-01-23 | End: 2023-01-23 | Stop reason: SURG

## 2023-01-23 RX ORDER — CALCIUM ACETATE 667 MG/1
1334 CAPSULE ORAL 2 TIMES DAILY
Status: DISCONTINUED | OUTPATIENT
Start: 2023-01-23 | End: 2023-01-25 | Stop reason: HOSPADM

## 2023-01-23 RX ORDER — NALOXONE HCL 0.4 MG/ML
0.4 VIAL (ML) INJECTION AS NEEDED
Status: DISCONTINUED | OUTPATIENT
Start: 2023-01-23 | End: 2023-01-23 | Stop reason: HOSPADM

## 2023-01-23 RX ADMIN — PROPOFOL 30 MG: 10 INJECTION, EMULSION INTRAVENOUS at 14:16

## 2023-01-23 RX ADMIN — HYDROMORPHONE HYDROCHLORIDE 0.5 MG: 1 INJECTION, SOLUTION INTRAMUSCULAR; INTRAVENOUS; SUBCUTANEOUS at 16:11

## 2023-01-23 RX ADMIN — IPRATROPIUM BROMIDE AND ALBUTEROL SULFATE 3 ML: .5; 3 SOLUTION RESPIRATORY (INHALATION) at 12:58

## 2023-01-23 RX ADMIN — HYDROCODONE BITARTRATE AND ACETAMINOPHEN 1 TABLET: 7.5; 325 TABLET ORAL at 15:04

## 2023-01-23 RX ADMIN — PANTOPRAZOLE SODIUM 40 MG: 40 TABLET, DELAYED RELEASE ORAL at 22:25

## 2023-01-23 RX ADMIN — SODIUM CHLORIDE 20 ML/HR: 9 INJECTION, SOLUTION INTRAVENOUS at 12:38

## 2023-01-23 RX ADMIN — DEXAMETHASONE SODIUM PHOSPHATE 4 MG: 4 INJECTION, SOLUTION INTRAMUSCULAR; INTRAVENOUS at 13:50

## 2023-01-23 RX ADMIN — FENTANYL CITRATE 50 MCG: 50 INJECTION, SOLUTION INTRAMUSCULAR; INTRAVENOUS at 16:52

## 2023-01-23 RX ADMIN — HYDROMORPHONE HYDROCHLORIDE 0.5 MG: 1 INJECTION, SOLUTION INTRAMUSCULAR; INTRAVENOUS; SUBCUTANEOUS at 15:03

## 2023-01-23 RX ADMIN — FENTANYL CITRATE 50 MCG: 50 INJECTION, SOLUTION INTRAMUSCULAR; INTRAVENOUS at 14:22

## 2023-01-23 RX ADMIN — PROPOFOL 150 MG: 10 INJECTION, EMULSION INTRAVENOUS at 13:23

## 2023-01-23 RX ADMIN — CEFAZOLIN 2 G: 2 INJECTION, POWDER, FOR SOLUTION INTRAMUSCULAR; INTRAVENOUS at 13:27

## 2023-01-23 RX ADMIN — FENTANYL CITRATE 50 MCG: 50 INJECTION, SOLUTION INTRAMUSCULAR; INTRAVENOUS at 15:36

## 2023-01-23 RX ADMIN — FENTANYL CITRATE 50 MCG: 50 INJECTION, SOLUTION INTRAMUSCULAR; INTRAVENOUS at 17:03

## 2023-01-23 RX ADMIN — TEMAZEPAM 30 MG: 15 CAPSULE ORAL at 22:25

## 2023-01-23 RX ADMIN — IPRATROPIUM BROMIDE AND ALBUTEROL SULFATE 3 ML: .5; 2.5 SOLUTION RESPIRATORY (INHALATION) at 16:28

## 2023-01-23 RX ADMIN — FENTANYL CITRATE 50 MCG: 50 INJECTION, SOLUTION INTRAMUSCULAR; INTRAVENOUS at 14:46

## 2023-01-23 RX ADMIN — ROPIVACAINE HYDROCHLORIDE 15 ML: 5 INJECTION EPIDURAL; INFILTRATION; PERINEURAL at 17:10

## 2023-01-23 RX ADMIN — SODIUM CHLORIDE: 0.9 INJECTION, SOLUTION INTRAVENOUS at 13:12

## 2023-01-23 RX ADMIN — ONDANSETRON 4 MG: 2 INJECTION INTRAMUSCULAR; INTRAVENOUS at 14:07

## 2023-01-23 RX ADMIN — SODIUM BICARBONATE 1300 MG: 650 TABLET ORAL at 22:25

## 2023-01-23 RX ADMIN — LIDOCAINE HYDROCHLORIDE 100 MG: 20 INJECTION, SOLUTION EPIDURAL; INFILTRATION; INTRACAUDAL; PERINEURAL at 13:23

## 2023-01-23 RX ADMIN — PROPOFOL 50 MG: 10 INJECTION, EMULSION INTRAVENOUS at 14:12

## 2023-01-23 RX ADMIN — GLYCOPYRROLATE 0.6 MCG: 0.2 INJECTION, SOLUTION INTRAMUSCULAR; INTRAVENOUS at 14:12

## 2023-01-23 RX ADMIN — Medication 4 MG: at 14:12

## 2023-01-23 RX ADMIN — OXYCODONE HYDROCHLORIDE 10 MG: 5 TABLET ORAL at 22:25

## 2023-01-23 RX ADMIN — ROCURONIUM BROMIDE 40 MG: 50 INJECTION INTRAVENOUS at 13:24

## 2023-01-23 NOTE — ANESTHESIA PROCEDURE NOTES
Peripheral Block    Pre-sedation assessment completed: 1/23/2023 5:03 PM    Patient reassessed immediately prior to procedure    Patient location during procedure: post-op  Start time: 1/23/2023 5:03 PM  Stop time: 1/23/2023 5:10 PM  Reason for block: at surgeon's request and post-op pain management  Performed by  Anesthesiologist: Al Hu MD  Preanesthetic Checklist  Completed: patient identified, IV checked, site marked, risks and benefits discussed, surgical consent, monitors and equipment checked, pre-op evaluation and timeout performed  Prep:  Pt Position: supine  Sterile barriers:cap, gloves, mask and washed/disinfected hands  Prep: ChloraPrep  Patient monitoring: blood pressure monitoring, continuous pulse oximetry and EKG  Procedure    Sedation: yes  Performed under: local infiltration  Guidance:ultrasound guided    ULTRASOUND INTERPRETATION.  Using ultrasound guidance a 21 G gauge needle was placed in close proximity to the nerve, at which point, under ultrasound guidance anesthetic was injected in the area of the nerve and spread of the anesthesia was seen on ultrasound in close proximity thereto.  There were no abnormalities seen on ultrasound; a digital image was taken; and the patient tolerated the procedure with no complications. Images:still images obtained, printed/placed on chart    Laterality:left  Block Type:TAP  Injection Technique:single-shot  Needle Type:echogenic    Sedation medications used: fentaNYL citrate (PF) (SUBLIMAZE) injection - Intravenous   50 mcg - 1/23/2023 5:03:00 PM  Medications Used: ropivacaine (NAROPIN) 0.5 % injection - Injection   15 mL - 1/23/2023 5:10:00 PM      Post Assessment  Injection Assessment: negative aspiration for heme, no paresthesia on injection and incremental injection  Patient Tolerance:comfortable throughout block  Complications:no  Additional Notes  Called by pacu RN for uncontrollable pain post VH repair.  Pt c/o of left sided abd pain.  Dr Rodney peng  with post op TAP block.  Pt consented.  Fent 50 mcg IV pulled and given by PACU RN prior to block.    15 ml 0.5% ropi diluted to 30 ml 0.25% ropi.  Total 30 ml injected for Left TAP block

## 2023-01-23 NOTE — ANESTHESIA PREPROCEDURE EVALUATION
Anesthesia Evaluation     Patient summary reviewed and Nursing notes reviewed   NPO Solid Status: > 8 hours  NPO Liquid Status: > 8 hours           Airway   Mallampati: II  TM distance: >3 FB  Neck ROM: full  No difficulty expected  Dental    (+) edentulous    Pulmonary    (+) COPD, sleep apnea,   Cardiovascular     (+) hypertension, CHF , hyperlipidemia,  carotid artery disease      Neuro/Psych  (+) CVA, numbness,    GI/Hepatic/Renal/Endo    (+) obesity,  GERD, PUD,  renal disease, diabetes mellitus,     Musculoskeletal     Abdominal    Substance History      OB/GYN          Other   arthritis,    history of cancer    ROS/Med Hx Other: Normal LV size and contractility EF of 60%  Normal RV size  Normal atrial size  Aortic valve, mitral valve, tricuspid valve appears structurally normal, mild mitral regurgitation  seen.  No pericardial effusion seen.  Proximal aorta appears normal in size.                  Anesthesia Plan    ASA 4     general     intravenous induction     Anesthetic plan, risks, benefits, and alternatives have been provided, discussed and informed consent has been obtained with: patient.    Plan discussed with CAA and CRNA.        CODE STATUS:

## 2023-01-23 NOTE — ANESTHESIA POSTPROCEDURE EVALUATION
Patient: Sherry Lobato    Procedure Summary     Date: 01/23/23 Room / Location: [facility] OR 04 / [facility] MAIN OR    Anesthesia Start: 1312 Anesthesia Stop: 1441    Procedure: VENTRAL/INCISIONAL HERNIA REPAIR with MESH (Abdomen) Diagnosis:       Incisional hernia, without obstruction or gangrene      (Incisional hernia, without obstruction or gangrene [K43.2])    Surgeons: Nilay Stevens MD Provider: Al Hu MD    Anesthesia Type: general ASA Status: 4          Anesthesia Type: general    Vitals  Vitals Value Taken Time   /57 01/23/23 1550   Temp 97 °F (36.1 °C) 01/23/23 1441   Pulse 70 01/23/23 1550   Resp 10 01/23/23 1526   SpO2 99 % 01/23/23 1550   Vitals shown include unvalidated device data.        Post Anesthesia Care and Evaluation    Patient location during evaluation: PACU  Patient participation: complete - patient participated  Level of consciousness: awake  Pain scale: See nurse's notes for pain score.  Pain management: adequate    Airway patency: patent  Anesthetic complications: No anesthetic complications  PONV Status: none  Cardiovascular status: acceptable  Respiratory status: acceptable  Hydration status: acceptable    Comments: Patient seen and examined postoperatively; vital signs stable; SpO2 greater than or equal to 90%; cardiopulmonary status stable; nausea/vomiting adequately controlled; pain adequately controlled; no apparent anesthesia complications; patient discharged from anesthesia care when discharge criteria were met

## 2023-01-23 NOTE — ANESTHESIA PROCEDURE NOTES
Airway  Urgency: elective    Date/Time: 1/23/2023 1:25 PM  End Time:1/23/2023 1:26 PM  Airway not difficult    General Information and Staff    Patient location during procedure: OR  Anesthesiologist: Al Hu MD  CRNA/CAA: Cassidy Samuels CAA    Indications and Patient Condition  Indications for airway management: airway protection    Preoxygenated: yes  MILS maintained throughout  Mask difficulty assessment: 1 - vent by mask    Final Airway Details  Final airway type: endotracheal airway      Successful airway: ETT  Cuffed: yes   Successful intubation technique: direct laryngoscopy  Facilitating devices/methods: intubating stylet  Endotracheal tube insertion site: oral  Blade: Benitez  Blade size: 2  ETT size (mm): 7.0  Cormack-Lehane Classification: grade I - full view of glottis  Placement verified by: chest auscultation and capnometry   Measured from: teeth  ETT/EBT  to teeth (cm): 20  Number of attempts at approach: 1  Assessment: lips, teeth, and gum same as pre-op and atraumatic intubation

## 2023-01-23 NOTE — ADDENDUM NOTE
Addendum  created 01/23/23 1747 by Al Hu MD    Child order released for a procedure order, Clinical Note Signed, Diagnosis association updated, Intraprocedure Blocks edited, Intraprocedure Event edited, SmartForm saved

## 2023-01-24 LAB
ANION GAP SERPL CALCULATED.3IONS-SCNC: 21 MMOL/L (ref 5–15)
BASOPHILS # BLD AUTO: 0.1 10*3/MM3 (ref 0–0.2)
BASOPHILS NFR BLD AUTO: 0.6 % (ref 0–1.5)
BUN SERPL-MCNC: 68 MG/DL (ref 8–23)
BUN/CREAT SERPL: 8.2 (ref 7–25)
CALCIUM SPEC-SCNC: 8.2 MG/DL (ref 8.6–10.5)
CHLORIDE SERPL-SCNC: 95 MMOL/L (ref 98–107)
CO2 SERPL-SCNC: 22 MMOL/L (ref 22–29)
CREAT SERPL-MCNC: 8.32 MG/DL (ref 0.57–1)
DEPRECATED RDW RBC AUTO: 60.4 FL (ref 37–54)
EGFRCR SERPLBLD CKD-EPI 2021: 4.7 ML/MIN/1.73
EOSINOPHIL # BLD AUTO: 0 10*3/MM3 (ref 0–0.4)
EOSINOPHIL NFR BLD AUTO: 0.1 % (ref 0.3–6.2)
ERYTHROCYTE [DISTWIDTH] IN BLOOD BY AUTOMATED COUNT: 17.5 % (ref 12.3–15.4)
GLUCOSE SERPL-MCNC: 95 MG/DL (ref 65–99)
HBV SURFACE AB SER RIA-ACNC: REACTIVE
HBV SURFACE AG SERPL QL IA: NORMAL
HCT VFR BLD AUTO: 33.3 % (ref 34–46.6)
HGB BLD-MCNC: 11 G/DL (ref 12–15.9)
LYMPHOCYTES # BLD AUTO: 0.6 10*3/MM3 (ref 0.7–3.1)
LYMPHOCYTES NFR BLD AUTO: 4.8 % (ref 19.6–45.3)
MCH RBC QN AUTO: 30.6 PG (ref 26.6–33)
MCHC RBC AUTO-ENTMCNC: 33 G/DL (ref 31.5–35.7)
MCV RBC AUTO: 92.8 FL (ref 79–97)
MONOCYTES # BLD AUTO: 0.8 10*3/MM3 (ref 0.1–0.9)
MONOCYTES NFR BLD AUTO: 6.6 % (ref 5–12)
NEUTROPHILS NFR BLD AUTO: 11.1 10*3/MM3 (ref 1.7–7)
NEUTROPHILS NFR BLD AUTO: 87.9 % (ref 42.7–76)
NRBC BLD AUTO-RTO: 0.1 /100 WBC (ref 0–0.2)
PLATELET # BLD AUTO: 507 10*3/MM3 (ref 140–450)
PMV BLD AUTO: 7.5 FL (ref 6–12)
POTASSIUM SERPL-SCNC: 3.7 MMOL/L (ref 3.5–5.2)
POTASSIUM SERPL-SCNC: 6.5 MMOL/L (ref 3.5–5.2)
RBC # BLD AUTO: 3.59 10*6/MM3 (ref 3.77–5.28)
SODIUM SERPL-SCNC: 138 MMOL/L (ref 136–145)
WBC NRBC COR # BLD: 12.6 10*3/MM3 (ref 3.4–10.8)

## 2023-01-24 PROCEDURE — 63710000001 BUDESONIDE-FORMOTEROL 80-4.5 MCG/ACT AEROSOL 6.9 G INHALER: Performed by: SURGERY

## 2023-01-24 PROCEDURE — G0378 HOSPITAL OBSERVATION PER HR: HCPCS

## 2023-01-24 PROCEDURE — 25010000002 HYDROMORPHONE 1 MG/ML SOLUTION: Performed by: SURGERY

## 2023-01-24 PROCEDURE — 87340 HEPATITIS B SURFACE AG IA: CPT | Performed by: INTERNAL MEDICINE

## 2023-01-24 PROCEDURE — 63710000001 SODIUM BICARBONATE 650 MG TABLET: Performed by: SURGERY

## 2023-01-24 PROCEDURE — 63710000001 LOSARTAN 50 MG TABLET: Performed by: SURGERY

## 2023-01-24 PROCEDURE — A9270 NON-COVERED ITEM OR SERVICE: HCPCS | Performed by: SURGERY

## 2023-01-24 PROCEDURE — 84132 ASSAY OF SERUM POTASSIUM: CPT | Performed by: INTERNAL MEDICINE

## 2023-01-24 PROCEDURE — 85025 COMPLETE CBC W/AUTO DIFF WBC: CPT | Performed by: SURGERY

## 2023-01-24 PROCEDURE — 99231 SBSQ HOSP IP/OBS SF/LOW 25: CPT | Performed by: SURGERY

## 2023-01-24 PROCEDURE — 86706 HEP B SURFACE ANTIBODY: CPT | Performed by: INTERNAL MEDICINE

## 2023-01-24 PROCEDURE — 94799 UNLISTED PULMONARY SVC/PX: CPT

## 2023-01-24 PROCEDURE — 63710000001 ROSUVASTATIN 10 MG TABLET: Performed by: SURGERY

## 2023-01-24 PROCEDURE — G0257 UNSCHED DIALYSIS ESRD PT HOS: HCPCS

## 2023-01-24 PROCEDURE — 63710000001 AMLODIPINE 5 MG TABLET: Performed by: SURGERY

## 2023-01-24 PROCEDURE — 63710000001 OXYCODONE 5 MG TABLET: Performed by: SURGERY

## 2023-01-24 PROCEDURE — 63710000001 ASPIRIN 81 MG TABLET DELAYED-RELEASE: Performed by: SURGERY

## 2023-01-24 PROCEDURE — 63710000001 SEVELAMER 800 MG TABLET: Performed by: SURGERY

## 2023-01-24 PROCEDURE — 63710000001 CALCIUM ACETATE 667 MG CAPSULE: Performed by: SURGERY

## 2023-01-24 PROCEDURE — 94761 N-INVAS EAR/PLS OXIMETRY MLT: CPT

## 2023-01-24 PROCEDURE — 80048 BASIC METABOLIC PNL TOTAL CA: CPT | Performed by: SURGERY

## 2023-01-24 PROCEDURE — 63710000001 PANTOPRAZOLE 40 MG TABLET DELAYED-RELEASE: Performed by: SURGERY

## 2023-01-24 PROCEDURE — 63710000001 TEMAZEPAM 15 MG CAPSULE: Performed by: SURGERY

## 2023-01-24 RX ORDER — ROSUVASTATIN CALCIUM 10 MG/1
10 TABLET, COATED ORAL NIGHTLY
Status: DISCONTINUED | OUTPATIENT
Start: 2023-01-24 | End: 2023-01-25 | Stop reason: HOSPADM

## 2023-01-24 RX ORDER — BENZONATATE 100 MG/1
100 CAPSULE ORAL 3 TIMES DAILY PRN
Status: DISCONTINUED | OUTPATIENT
Start: 2023-01-24 | End: 2023-01-25 | Stop reason: HOSPADM

## 2023-01-24 RX ADMIN — OXYCODONE HYDROCHLORIDE 10 MG: 5 TABLET ORAL at 13:34

## 2023-01-24 RX ADMIN — BUDESONIDE AND FORMOTEROL FUMARATE DIHYDRATE 2 PUFF: 80; 4.5 AEROSOL RESPIRATORY (INHALATION) at 08:00

## 2023-01-24 RX ADMIN — TEMAZEPAM 30 MG: 15 CAPSULE ORAL at 21:15

## 2023-01-24 RX ADMIN — PANTOPRAZOLE SODIUM 40 MG: 40 TABLET, DELAYED RELEASE ORAL at 09:16

## 2023-01-24 RX ADMIN — SODIUM BICARBONATE 1300 MG: 650 TABLET ORAL at 21:15

## 2023-01-24 RX ADMIN — HYDROMORPHONE HYDROCHLORIDE 0.5 MG: 1 INJECTION, SOLUTION INTRAMUSCULAR; INTRAVENOUS; SUBCUTANEOUS at 15:04

## 2023-01-24 RX ADMIN — CALCIUM ACETATE 1334 MG: 667 CAPSULE ORAL at 09:15

## 2023-01-24 RX ADMIN — AMLODIPINE BESYLATE 10 MG: 5 TABLET ORAL at 09:16

## 2023-01-24 RX ADMIN — SODIUM BICARBONATE 1300 MG: 650 TABLET ORAL at 09:16

## 2023-01-24 RX ADMIN — SEVELAMER CARBONATE 800 MG: 800 TABLET, FILM COATED ORAL at 17:02

## 2023-01-24 RX ADMIN — LOSARTAN POTASSIUM 100 MG: 50 TABLET, FILM COATED ORAL at 09:16

## 2023-01-24 RX ADMIN — CALCIUM ACETATE 1334 MG: 667 CAPSULE ORAL at 21:15

## 2023-01-24 RX ADMIN — PANTOPRAZOLE SODIUM 40 MG: 40 TABLET, DELAYED RELEASE ORAL at 21:16

## 2023-01-24 RX ADMIN — SEVELAMER CARBONATE 800 MG: 800 TABLET, FILM COATED ORAL at 09:15

## 2023-01-24 RX ADMIN — ROSUVASTATIN 10 MG: 10 TABLET, FILM COATED ORAL at 21:16

## 2023-01-24 RX ADMIN — ASPIRIN 81 MG: 81 TABLET, COATED ORAL at 09:16

## 2023-01-24 RX ADMIN — OXYCODONE HYDROCHLORIDE 10 MG: 5 TABLET ORAL at 07:15

## 2023-01-24 RX ADMIN — SODIUM BICARBONATE 1300 MG: 650 TABLET ORAL at 17:02

## 2023-01-24 RX ADMIN — OXYCODONE HYDROCHLORIDE 10 MG: 5 TABLET ORAL at 21:18

## 2023-01-24 RX ADMIN — BUDESONIDE AND FORMOTEROL FUMARATE DIHYDRATE 2 PUFF: 80; 4.5 AEROSOL RESPIRATORY (INHALATION) at 19:00

## 2023-01-25 ENCOUNTER — READMISSION MANAGEMENT (OUTPATIENT)
Dept: CALL CENTER | Facility: HOSPITAL | Age: 73
End: 2023-01-25
Payer: MEDICARE

## 2023-01-25 VITALS
WEIGHT: 164 LBS | OXYGEN SATURATION: 94 % | SYSTOLIC BLOOD PRESSURE: 186 MMHG | HEIGHT: 60 IN | BODY MASS INDEX: 32.2 KG/M2 | TEMPERATURE: 98.4 F | DIASTOLIC BLOOD PRESSURE: 99 MMHG | HEART RATE: 70 BPM | RESPIRATION RATE: 20 BRPM

## 2023-01-25 PROBLEM — K43.2 INCISIONAL HERNIA, WITHOUT OBSTRUCTION OR GANGRENE: Status: RESOLVED | Noted: 2023-01-23 | Resolved: 2023-01-25

## 2023-01-25 LAB
ALBUMIN SERPL-MCNC: 4 G/DL (ref 3.5–5.2)
ANION GAP SERPL CALCULATED.3IONS-SCNC: 17 MMOL/L (ref 5–15)
BUN SERPL-MCNC: 38 MG/DL (ref 8–23)
BUN/CREAT SERPL: 6.1 (ref 7–25)
CALCIUM SPEC-SCNC: 8.8 MG/DL (ref 8.6–10.5)
CHLORIDE SERPL-SCNC: 96 MMOL/L (ref 98–107)
CO2 SERPL-SCNC: 23 MMOL/L (ref 22–29)
CREAT SERPL-MCNC: 6.23 MG/DL (ref 0.57–1)
DEPRECATED RDW RBC AUTO: 58.6 FL (ref 37–54)
EGFRCR SERPLBLD CKD-EPI 2021: 6.6 ML/MIN/1.73
ERYTHROCYTE [DISTWIDTH] IN BLOOD BY AUTOMATED COUNT: 17.6 % (ref 12.3–15.4)
GLUCOSE SERPL-MCNC: 80 MG/DL (ref 65–99)
HCT VFR BLD AUTO: 33.8 % (ref 34–46.6)
HGB BLD-MCNC: 10.8 G/DL (ref 12–15.9)
MCH RBC QN AUTO: 30.1 PG (ref 26.6–33)
MCHC RBC AUTO-ENTMCNC: 32 G/DL (ref 31.5–35.7)
MCV RBC AUTO: 94.1 FL (ref 79–97)
PHOSPHATE SERPL-MCNC: 7.2 MG/DL (ref 2.5–4.5)
PLATELET # BLD AUTO: 444 10*3/MM3 (ref 140–450)
PMV BLD AUTO: 7.5 FL (ref 6–12)
POTASSIUM SERPL-SCNC: 5 MMOL/L (ref 3.5–5.2)
RBC # BLD AUTO: 3.59 10*6/MM3 (ref 3.77–5.28)
SODIUM SERPL-SCNC: 136 MMOL/L (ref 136–145)
WBC NRBC COR # BLD: 10.3 10*3/MM3 (ref 3.4–10.8)

## 2023-01-25 PROCEDURE — 63710000001 SEVELAMER 800 MG TABLET: Performed by: SURGERY

## 2023-01-25 PROCEDURE — 94761 N-INVAS EAR/PLS OXIMETRY MLT: CPT

## 2023-01-25 PROCEDURE — A9270 NON-COVERED ITEM OR SERVICE: HCPCS | Performed by: SURGERY

## 2023-01-25 PROCEDURE — 85027 COMPLETE CBC AUTOMATED: CPT | Performed by: INTERNAL MEDICINE

## 2023-01-25 PROCEDURE — 63710000001 OXYCODONE 5 MG TABLET: Performed by: SURGERY

## 2023-01-25 PROCEDURE — G0378 HOSPITAL OBSERVATION PER HR: HCPCS

## 2023-01-25 PROCEDURE — 94799 UNLISTED PULMONARY SVC/PX: CPT

## 2023-01-25 PROCEDURE — 63710000001 LOSARTAN 50 MG TABLET: Performed by: SURGERY

## 2023-01-25 PROCEDURE — 63710000001 ASPIRIN 81 MG TABLET DELAYED-RELEASE: Performed by: SURGERY

## 2023-01-25 PROCEDURE — 63710000001 CALCIUM ACETATE 667 MG CAPSULE: Performed by: SURGERY

## 2023-01-25 PROCEDURE — 63710000001 PANTOPRAZOLE 40 MG TABLET DELAYED-RELEASE: Performed by: SURGERY

## 2023-01-25 PROCEDURE — 63710000001 SODIUM BICARBONATE 650 MG TABLET: Performed by: SURGERY

## 2023-01-25 PROCEDURE — 80069 RENAL FUNCTION PANEL: CPT | Performed by: INTERNAL MEDICINE

## 2023-01-25 PROCEDURE — 94664 DEMO&/EVAL PT USE INHALER: CPT

## 2023-01-25 PROCEDURE — 99238 HOSP IP/OBS DSCHRG MGMT 30/<: CPT | Performed by: SURGERY

## 2023-01-25 PROCEDURE — 63710000001 AMLODIPINE 5 MG TABLET: Performed by: SURGERY

## 2023-01-25 RX ORDER — OXYCODONE HYDROCHLORIDE 10 MG/1
10 TABLET ORAL EVERY 4 HOURS PRN
Qty: 10 TABLET | Refills: 0 | Status: SHIPPED | OUTPATIENT
Start: 2023-01-25 | End: 2023-01-31 | Stop reason: HOSPADM

## 2023-01-25 RX ADMIN — ASPIRIN 81 MG: 81 TABLET, COATED ORAL at 08:20

## 2023-01-25 RX ADMIN — PANTOPRAZOLE SODIUM 40 MG: 40 TABLET, DELAYED RELEASE ORAL at 08:20

## 2023-01-25 RX ADMIN — LOSARTAN POTASSIUM 100 MG: 50 TABLET, FILM COATED ORAL at 08:21

## 2023-01-25 RX ADMIN — CALCIUM ACETATE 1334 MG: 667 CAPSULE ORAL at 08:20

## 2023-01-25 RX ADMIN — BUDESONIDE AND FORMOTEROL FUMARATE DIHYDRATE 2 PUFF: 80; 4.5 AEROSOL RESPIRATORY (INHALATION) at 08:15

## 2023-01-25 RX ADMIN — SODIUM BICARBONATE 1300 MG: 650 TABLET ORAL at 08:20

## 2023-01-25 RX ADMIN — SEVELAMER CARBONATE 800 MG: 800 TABLET, FILM COATED ORAL at 08:20

## 2023-01-25 RX ADMIN — OXYCODONE HYDROCHLORIDE 10 MG: 5 TABLET ORAL at 08:20

## 2023-01-25 RX ADMIN — AMLODIPINE BESYLATE 10 MG: 5 TABLET ORAL at 08:20

## 2023-01-26 ENCOUNTER — HOSPITAL ENCOUNTER (INPATIENT)
Facility: HOSPITAL | Age: 73
LOS: 3 days | Discharge: HOME | DRG: 177 | End: 2023-01-31
Attending: EMERGENCY MEDICINE | Admitting: INTERNAL MEDICINE
Payer: MEDICARE

## 2023-01-26 ENCOUNTER — APPOINTMENT (OUTPATIENT)
Dept: GENERAL RADIOLOGY | Facility: HOSPITAL | Age: 73
DRG: 177 | End: 2023-01-26
Payer: MEDICARE

## 2023-01-26 ENCOUNTER — APPOINTMENT (OUTPATIENT)
Dept: CT IMAGING | Facility: HOSPITAL | Age: 73
DRG: 177 | End: 2023-01-26
Payer: MEDICARE

## 2023-01-26 ENCOUNTER — APPOINTMENT (OUTPATIENT)
Dept: CARDIOLOGY | Facility: HOSPITAL | Age: 73
DRG: 177 | End: 2023-01-26
Payer: MEDICARE

## 2023-01-26 ENCOUNTER — READMISSION MANAGEMENT (OUTPATIENT)
Dept: CALL CENTER | Facility: HOSPITAL | Age: 73
End: 2023-01-26
Payer: MEDICARE

## 2023-01-26 DIAGNOSIS — R41.82 ALTERED MENTAL STATUS, UNSPECIFIED ALTERED MENTAL STATUS TYPE: Primary | ICD-10-CM

## 2023-01-26 DIAGNOSIS — R60.0 LOWER EXTREMITY EDEMA: ICD-10-CM

## 2023-01-26 DIAGNOSIS — J96.00 ACUTE RESPIRATORY FAILURE, UNSPECIFIED WHETHER WITH HYPOXIA OR HYPERCAPNIA: ICD-10-CM

## 2023-01-26 DIAGNOSIS — R10.9 ABDOMINAL PAIN, UNSPECIFIED ABDOMINAL LOCATION: ICD-10-CM

## 2023-01-26 LAB
ALBUMIN SERPL-MCNC: 3.8 G/DL (ref 3.5–5.2)
ALBUMIN/GLOB SERPL: 1.2 G/DL
ALP SERPL-CCNC: 114 U/L (ref 39–117)
ALT SERPL W P-5'-P-CCNC: <5 U/L (ref 1–33)
ANION GAP SERPL CALCULATED.3IONS-SCNC: 15 MMOL/L (ref 5–15)
APTT PPP: 30.8 SECONDS (ref 61–76.5)
ARTERIAL PATENCY WRIST A: POSITIVE
AST SERPL-CCNC: 16 U/L (ref 1–32)
ATMOSPHERIC PRESS: ABNORMAL MM[HG]
BASE EXCESS BLDA CALC-SCNC: -1.1 MMOL/L (ref 0–3)
BASOPHILS # BLD AUTO: 0.1 10*3/MM3 (ref 0–0.2)
BASOPHILS NFR BLD AUTO: 0.5 % (ref 0–1.5)
BDY SITE: ABNORMAL
BH CV LOWER VASCULAR LEFT COMMON FEMORAL AUGMENT: NORMAL
BH CV LOWER VASCULAR LEFT COMMON FEMORAL COMPETENT: NORMAL
BH CV LOWER VASCULAR LEFT COMMON FEMORAL COMPRESS: NORMAL
BH CV LOWER VASCULAR LEFT COMMON FEMORAL PHASIC: NORMAL
BH CV LOWER VASCULAR LEFT COMMON FEMORAL SPONT: NORMAL
BH CV LOWER VASCULAR LEFT DISTAL FEMORAL COMPRESS: NORMAL
BH CV LOWER VASCULAR LEFT GASTRONEMIUS COMPRESS: NORMAL
BH CV LOWER VASCULAR LEFT GREATER SAPH AK COMPRESS: NORMAL
BH CV LOWER VASCULAR LEFT GREATER SAPH BK COMPRESS: NORMAL
BH CV LOWER VASCULAR LEFT LESSER SAPH COMPRESS: NORMAL
BH CV LOWER VASCULAR LEFT MID FEMORAL AUGMENT: NORMAL
BH CV LOWER VASCULAR LEFT MID FEMORAL COMPETENT: NORMAL
BH CV LOWER VASCULAR LEFT MID FEMORAL COMPRESS: NORMAL
BH CV LOWER VASCULAR LEFT MID FEMORAL PHASIC: NORMAL
BH CV LOWER VASCULAR LEFT MID FEMORAL SPONT: NORMAL
BH CV LOWER VASCULAR LEFT PERONEAL COMPRESS: NORMAL
BH CV LOWER VASCULAR LEFT POPLITEAL AUGMENT: NORMAL
BH CV LOWER VASCULAR LEFT POPLITEAL COMPETENT: NORMAL
BH CV LOWER VASCULAR LEFT POPLITEAL COMPRESS: NORMAL
BH CV LOWER VASCULAR LEFT POPLITEAL PHASIC: NORMAL
BH CV LOWER VASCULAR LEFT POPLITEAL SPONT: NORMAL
BH CV LOWER VASCULAR LEFT POSTERIOR TIBIAL COMPRESS: NORMAL
BH CV LOWER VASCULAR LEFT PROXIMAL FEMORAL COMPRESS: NORMAL
BH CV LOWER VASCULAR LEFT SAPHENOFEMORAL JUNCTION COMPRESS: NORMAL
BH CV LOWER VASCULAR RIGHT COMMON FEMORAL AUGMENT: NORMAL
BH CV LOWER VASCULAR RIGHT COMMON FEMORAL COMPETENT: NORMAL
BH CV LOWER VASCULAR RIGHT COMMON FEMORAL COMPRESS: NORMAL
BH CV LOWER VASCULAR RIGHT COMMON FEMORAL PHASIC: NORMAL
BH CV LOWER VASCULAR RIGHT COMMON FEMORAL SPONT: NORMAL
BH CV LOWER VASCULAR RIGHT DISTAL FEMORAL COMPRESS: NORMAL
BH CV LOWER VASCULAR RIGHT GASTRONEMIUS COMPRESS: NORMAL
BH CV LOWER VASCULAR RIGHT GREATER SAPH AK COMPRESS: NORMAL
BH CV LOWER VASCULAR RIGHT GREATER SAPH BK COMPRESS: NORMAL
BH CV LOWER VASCULAR RIGHT LESSER SAPH COMPRESS: NORMAL
BH CV LOWER VASCULAR RIGHT MID FEMORAL AUGMENT: NORMAL
BH CV LOWER VASCULAR RIGHT MID FEMORAL COMPETENT: NORMAL
BH CV LOWER VASCULAR RIGHT MID FEMORAL COMPRESS: NORMAL
BH CV LOWER VASCULAR RIGHT MID FEMORAL PHASIC: NORMAL
BH CV LOWER VASCULAR RIGHT MID FEMORAL SPONT: NORMAL
BH CV LOWER VASCULAR RIGHT PERONEAL COMPRESS: NORMAL
BH CV LOWER VASCULAR RIGHT POPLITEAL AUGMENT: NORMAL
BH CV LOWER VASCULAR RIGHT POPLITEAL COMPETENT: NORMAL
BH CV LOWER VASCULAR RIGHT POPLITEAL COMPRESS: NORMAL
BH CV LOWER VASCULAR RIGHT POPLITEAL PHASIC: NORMAL
BH CV LOWER VASCULAR RIGHT POPLITEAL SPONT: NORMAL
BH CV LOWER VASCULAR RIGHT POSTERIOR TIBIAL COMPRESS: NORMAL
BH CV LOWER VASCULAR RIGHT PROXIMAL FEMORAL COMPRESS: NORMAL
BH CV LOWER VASCULAR RIGHT SAPHENOFEMORAL JUNCTION COMPRESS: NORMAL
BH CV VAS POP FLUID COLLECTED: 1
BH CV VAS PRELIMINARY FINDINGS SCRIPTING: 1
BILIRUB SERPL-MCNC: 0.3 MG/DL (ref 0–1.2)
BUN SERPL-MCNC: 54 MG/DL (ref 8–23)
BUN/CREAT SERPL: 6.1 (ref 7–25)
CALCIUM SPEC-SCNC: 8.2 MG/DL (ref 8.6–10.5)
CHLORIDE SERPL-SCNC: 100 MMOL/L (ref 98–107)
CO2 BLDA-SCNC: 26.8 MMOL/L (ref 22–29)
CO2 SERPL-SCNC: 25 MMOL/L (ref 22–29)
CREAT SERPL-MCNC: 8.81 MG/DL (ref 0.57–1)
DEPRECATED RDW RBC AUTO: 58.6 FL (ref 37–54)
EGFRCR SERPLBLD CKD-EPI 2021: 4.4 ML/MIN/1.73
EOSINOPHIL # BLD AUTO: 0.5 10*3/MM3 (ref 0–0.4)
EOSINOPHIL NFR BLD AUTO: 4.3 % (ref 0.3–6.2)
ERYTHROCYTE [DISTWIDTH] IN BLOOD BY AUTOMATED COUNT: 17.1 % (ref 12.3–15.4)
GLOBULIN UR ELPH-MCNC: 3.2 GM/DL
GLUCOSE SERPL-MCNC: 79 MG/DL (ref 65–99)
HCO3 BLDA-SCNC: 25.3 MMOL/L (ref 21–28)
HCT VFR BLD AUTO: 29 % (ref 34–46.6)
HEMODILUTION: NO
HGB BLD-MCNC: 9.5 G/DL (ref 12–15.9)
INHALED O2 CONCENTRATION: 40 %
INR PPP: 1.05 (ref 0.93–1.1)
LIPASE SERPL-CCNC: 10 U/L (ref 13–60)
LYMPHOCYTES # BLD AUTO: 1.1 10*3/MM3 (ref 0.7–3.1)
LYMPHOCYTES NFR BLD AUTO: 9.7 % (ref 19.6–45.3)
MAXIMAL PREDICTED HEART RATE: 147 BPM
MCH RBC QN AUTO: 30 PG (ref 26.6–33)
MCHC RBC AUTO-ENTMCNC: 32.7 G/DL (ref 31.5–35.7)
MCV RBC AUTO: 92 FL (ref 79–97)
MODALITY: ABNORMAL
MONOCYTES # BLD AUTO: 1 10*3/MM3 (ref 0.1–0.9)
MONOCYTES NFR BLD AUTO: 9.1 % (ref 5–12)
NEUTROPHILS NFR BLD AUTO: 76.4 % (ref 42.7–76)
NEUTROPHILS NFR BLD AUTO: 8.5 10*3/MM3 (ref 1.7–7)
NRBC BLD AUTO-RTO: 0 /100 WBC (ref 0–0.2)
NT-PROBNP SERPL-MCNC: ABNORMAL PG/ML (ref 0–900)
PCO2 BLDA: 49.2 MM HG (ref 35–48)
PH BLDA: 7.32 PH UNITS (ref 7.35–7.45)
PLATELET # BLD AUTO: 427 10*3/MM3 (ref 140–450)
PMV BLD AUTO: 7.2 FL (ref 6–12)
PO2 BLDA: 83.4 MM HG (ref 83–108)
POTASSIUM SERPL-SCNC: 5.9 MMOL/L (ref 3.5–5.2)
PROT SERPL-MCNC: 7 G/DL (ref 6–8.5)
PROTHROMBIN TIME: 10.8 SECONDS (ref 9.6–11.7)
RBC # BLD AUTO: 3.15 10*6/MM3 (ref 3.77–5.28)
SAO2 % BLDCOA: 95.1 % (ref 94–98)
SODIUM SERPL-SCNC: 140 MMOL/L (ref 136–145)
STRESS TARGET HR: 125 BPM
TROPONIN T SERPL-MCNC: 0.03 NG/ML (ref 0–0.03)
WBC NRBC COR # BLD: 11.1 10*3/MM3 (ref 3.4–10.8)

## 2023-01-26 PROCEDURE — 82803 BLOOD GASES ANY COMBINATION: CPT

## 2023-01-26 PROCEDURE — 99285 EMERGENCY DEPT VISIT HI MDM: CPT

## 2023-01-26 PROCEDURE — 83880 ASSAY OF NATRIURETIC PEPTIDE: CPT | Performed by: EMERGENCY MEDICINE

## 2023-01-26 PROCEDURE — 25010000002 NALOXONE PER 1 MG: Performed by: EMERGENCY MEDICINE

## 2023-01-26 PROCEDURE — G0378 HOSPITAL OBSERVATION PER HR: HCPCS

## 2023-01-26 PROCEDURE — 93970 EXTREMITY STUDY: CPT

## 2023-01-26 PROCEDURE — 83690 ASSAY OF LIPASE: CPT | Performed by: EMERGENCY MEDICINE

## 2023-01-26 PROCEDURE — 94660 CPAP INITIATION&MGMT: CPT

## 2023-01-26 PROCEDURE — 80053 COMPREHEN METABOLIC PANEL: CPT | Performed by: EMERGENCY MEDICINE

## 2023-01-26 PROCEDURE — 94799 UNLISTED PULMONARY SVC/PX: CPT

## 2023-01-26 PROCEDURE — 36600 WITHDRAWAL OF ARTERIAL BLOOD: CPT

## 2023-01-26 PROCEDURE — 74176 CT ABD & PELVIS W/O CONTRAST: CPT

## 2023-01-26 PROCEDURE — 25010000002 KETOROLAC TROMETHAMINE PER 15 MG: Performed by: EMERGENCY MEDICINE

## 2023-01-26 PROCEDURE — 84484 ASSAY OF TROPONIN QUANT: CPT | Performed by: EMERGENCY MEDICINE

## 2023-01-26 PROCEDURE — 71045 X-RAY EXAM CHEST 1 VIEW: CPT

## 2023-01-26 PROCEDURE — 85025 COMPLETE CBC W/AUTO DIFF WBC: CPT | Performed by: EMERGENCY MEDICINE

## 2023-01-26 PROCEDURE — 85610 PROTHROMBIN TIME: CPT | Performed by: EMERGENCY MEDICINE

## 2023-01-26 PROCEDURE — 85730 THROMBOPLASTIN TIME PARTIAL: CPT | Performed by: EMERGENCY MEDICINE

## 2023-01-26 PROCEDURE — 70450 CT HEAD/BRAIN W/O DYE: CPT

## 2023-01-26 PROCEDURE — 93005 ELECTROCARDIOGRAM TRACING: CPT | Performed by: EMERGENCY MEDICINE

## 2023-01-26 RX ORDER — SODIUM CHLORIDE 0.9 % (FLUSH) 0.9 %
10 SYRINGE (ML) INJECTION AS NEEDED
Status: DISCONTINUED | OUTPATIENT
Start: 2023-01-26 | End: 2023-01-31 | Stop reason: HOSPADM

## 2023-01-26 RX ORDER — CARVEDILOL 3.12 MG/1
3.12 TABLET ORAL 2 TIMES DAILY WITH MEALS
COMMUNITY
End: 2023-01-26

## 2023-01-26 RX ORDER — ONDANSETRON 2 MG/ML
4 INJECTION INTRAMUSCULAR; INTRAVENOUS ONCE
Status: DISCONTINUED | OUTPATIENT
Start: 2023-01-26 | End: 2023-01-31 | Stop reason: HOSPADM

## 2023-01-26 RX ORDER — KETOROLAC TROMETHAMINE 15 MG/ML
15 INJECTION, SOLUTION INTRAMUSCULAR; INTRAVENOUS ONCE
Status: COMPLETED | OUTPATIENT
Start: 2023-01-26 | End: 2023-01-26

## 2023-01-26 RX ORDER — NALOXONE HCL 0.4 MG/ML
0.4 VIAL (ML) INJECTION ONCE
Status: COMPLETED | OUTPATIENT
Start: 2023-01-26 | End: 2023-01-26

## 2023-01-26 RX ADMIN — NALOXONE HYDROCHLORIDE 0.4 MG: 0.4 INJECTION, SOLUTION INTRAMUSCULAR; INTRAVENOUS; SUBCUTANEOUS at 20:39

## 2023-01-26 RX ADMIN — KETOROLAC TROMETHAMINE 15 MG: 15 INJECTION, SOLUTION INTRAMUSCULAR; INTRAVENOUS at 21:20

## 2023-01-26 NOTE — OUTREACH NOTE
Prep Survey    Flowsheet Row Responses   [facility]? [facility]   Is LACE score < 7 ? No   Eligibility Readm Mgmt   Discharge diagnosis Incisional hernia of anterior abdominal wall without obstruction or gangrene   Does the patient have one of the following disease processes/diagnoses(primary or secondary)? Other   Does the patient have Home health ordered? No   Is there a DME ordered? No   Prep survey completed? Yes          VONDA NARVAEZ - Registered Nurse

## 2023-01-27 LAB
ANION GAP SERPL CALCULATED.3IONS-SCNC: 17 MMOL/L (ref 5–15)
B PARAPERT DNA SPEC QL NAA+PROBE: NOT DETECTED
B PERT DNA SPEC QL NAA+PROBE: NOT DETECTED
BASOPHILS # BLD AUTO: 0 10*3/MM3 (ref 0–0.2)
BASOPHILS NFR BLD AUTO: 0.4 % (ref 0–1.5)
BUN SERPL-MCNC: 34 MG/DL (ref 8–23)
BUN/CREAT SERPL: 5.2 (ref 7–25)
C PNEUM DNA NPH QL NAA+NON-PROBE: NOT DETECTED
CALCIUM SPEC-SCNC: 8.6 MG/DL (ref 8.6–10.5)
CHLORIDE SERPL-SCNC: 97 MMOL/L (ref 98–107)
CO2 SERPL-SCNC: 23 MMOL/L (ref 22–29)
CREAT SERPL-MCNC: 6.49 MG/DL (ref 0.57–1)
DEPRECATED RDW RBC AUTO: 59.1 FL (ref 37–54)
EGFRCR SERPLBLD CKD-EPI 2021: 6.3 ML/MIN/1.73
EOSINOPHIL # BLD AUTO: 0.6 10*3/MM3 (ref 0–0.4)
EOSINOPHIL NFR BLD AUTO: 5.4 % (ref 0.3–6.2)
ERYTHROCYTE [DISTWIDTH] IN BLOOD BY AUTOMATED COUNT: 17.1 % (ref 12.3–15.4)
FLUAV SUBTYP SPEC NAA+PROBE: NOT DETECTED
FLUBV RNA ISLT QL NAA+PROBE: NOT DETECTED
GLUCOSE BLDC GLUCOMTR-MCNC: 111 MG/DL (ref 70–105)
GLUCOSE SERPL-MCNC: 52 MG/DL (ref 65–99)
HADV DNA SPEC NAA+PROBE: NOT DETECTED
HCOV 229E RNA SPEC QL NAA+PROBE: NOT DETECTED
HCOV HKU1 RNA SPEC QL NAA+PROBE: NOT DETECTED
HCOV NL63 RNA SPEC QL NAA+PROBE: NOT DETECTED
HCOV OC43 RNA SPEC QL NAA+PROBE: NOT DETECTED
HCT VFR BLD AUTO: 28.2 % (ref 34–46.6)
HGB BLD-MCNC: 9.2 G/DL (ref 12–15.9)
HMPV RNA NPH QL NAA+NON-PROBE: NOT DETECTED
HPIV1 RNA ISLT QL NAA+PROBE: NOT DETECTED
HPIV2 RNA SPEC QL NAA+PROBE: NOT DETECTED
HPIV3 RNA NPH QL NAA+PROBE: NOT DETECTED
HPIV4 P GENE NPH QL NAA+PROBE: NOT DETECTED
LYMPHOCYTES # BLD AUTO: 0.8 10*3/MM3 (ref 0.7–3.1)
LYMPHOCYTES NFR BLD AUTO: 6.9 % (ref 19.6–45.3)
M PNEUMO IGG SER IA-ACNC: NOT DETECTED
MAGNESIUM SERPL-MCNC: 1.9 MG/DL (ref 1.6–2.4)
MCH RBC QN AUTO: 30.2 PG (ref 26.6–33)
MCHC RBC AUTO-ENTMCNC: 32.6 G/DL (ref 31.5–35.7)
MCV RBC AUTO: 92.6 FL (ref 79–97)
MONOCYTES # BLD AUTO: 1.1 10*3/MM3 (ref 0.1–0.9)
MONOCYTES NFR BLD AUTO: 9 % (ref 5–12)
NEUTROPHILS NFR BLD AUTO: 78.3 % (ref 42.7–76)
NEUTROPHILS NFR BLD AUTO: 9.3 10*3/MM3 (ref 1.7–7)
NRBC BLD AUTO-RTO: 0 /100 WBC (ref 0–0.2)
PHOSPHATE SERPL-MCNC: 6.5 MG/DL (ref 2.5–4.5)
PLATELET # BLD AUTO: 444 10*3/MM3 (ref 140–450)
PMV BLD AUTO: 7.6 FL (ref 6–12)
POTASSIUM SERPL-SCNC: 4.8 MMOL/L (ref 3.5–5.2)
RBC # BLD AUTO: 3.05 10*6/MM3 (ref 3.77–5.28)
RHINOVIRUS RNA SPEC NAA+PROBE: NOT DETECTED
RSV RNA NPH QL NAA+NON-PROBE: NOT DETECTED
SARS-COV-2 RNA NPH QL NAA+NON-PROBE: DETECTED
SODIUM SERPL-SCNC: 137 MMOL/L (ref 136–145)
WBC NRBC COR # BLD: 11.9 10*3/MM3 (ref 3.4–10.8)

## 2023-01-27 PROCEDURE — 84100 ASSAY OF PHOSPHORUS: CPT

## 2023-01-27 PROCEDURE — 80048 BASIC METABOLIC PNL TOTAL CA: CPT

## 2023-01-27 PROCEDURE — 0202U NFCT DS 22 TRGT SARS-COV-2: CPT | Performed by: INTERNAL MEDICINE

## 2023-01-27 PROCEDURE — 36415 COLL VENOUS BLD VENIPUNCTURE: CPT

## 2023-01-27 PROCEDURE — 85025 COMPLETE CBC W/AUTO DIFF WBC: CPT

## 2023-01-27 PROCEDURE — 5A1D70Z PERFORMANCE OF URINARY FILTRATION, INTERMITTENT, LESS THAN 6 HOURS PER DAY: ICD-10-PCS | Performed by: INTERNAL MEDICINE

## 2023-01-27 PROCEDURE — 83735 ASSAY OF MAGNESIUM: CPT

## 2023-01-27 PROCEDURE — 82962 GLUCOSE BLOOD TEST: CPT

## 2023-01-27 PROCEDURE — G0378 HOSPITAL OBSERVATION PER HR: HCPCS

## 2023-01-27 RX ORDER — ROSUVASTATIN CALCIUM 10 MG/1
10 TABLET, COATED ORAL NIGHTLY
Status: DISCONTINUED | OUTPATIENT
Start: 2023-01-27 | End: 2023-01-31 | Stop reason: HOSPADM

## 2023-01-27 RX ORDER — TEMAZEPAM 15 MG/1
30 CAPSULE ORAL NIGHTLY
Status: DISCONTINUED | OUTPATIENT
Start: 2023-01-27 | End: 2023-01-28

## 2023-01-27 RX ORDER — GUAIFENESIN 600 MG/1
600 TABLET, EXTENDED RELEASE ORAL EVERY 12 HOURS SCHEDULED
Status: DISCONTINUED | OUTPATIENT
Start: 2023-01-27 | End: 2023-01-31 | Stop reason: HOSPADM

## 2023-01-27 RX ORDER — OXYCODONE HYDROCHLORIDE 5 MG/1
10 TABLET ORAL EVERY 4 HOURS PRN
Status: DISCONTINUED | OUTPATIENT
Start: 2023-01-27 | End: 2023-01-28

## 2023-01-27 RX ORDER — NITROGLYCERIN 0.4 MG/1
0.4 TABLET SUBLINGUAL
Status: DISCONTINUED | OUTPATIENT
Start: 2023-01-27 | End: 2023-01-31 | Stop reason: HOSPADM

## 2023-01-27 RX ORDER — BUDESONIDE AND FORMOTEROL FUMARATE DIHYDRATE 160; 4.5 UG/1; UG/1
2 AEROSOL RESPIRATORY (INHALATION)
Status: DISCONTINUED | OUTPATIENT
Start: 2023-01-27 | End: 2023-01-31 | Stop reason: HOSPADM

## 2023-01-27 RX ORDER — AMLODIPINE BESYLATE 5 MG/1
10 TABLET ORAL DAILY
Status: DISCONTINUED | OUTPATIENT
Start: 2023-01-27 | End: 2023-01-31 | Stop reason: HOSPADM

## 2023-01-27 RX ORDER — SODIUM BICARBONATE 650 MG/1
1300 TABLET ORAL 3 TIMES DAILY
Status: DISCONTINUED | OUTPATIENT
Start: 2023-01-27 | End: 2023-01-31 | Stop reason: HOSPADM

## 2023-01-27 RX ORDER — ASPIRIN 81 MG/1
81 TABLET ORAL DAILY
Status: DISCONTINUED | OUTPATIENT
Start: 2023-01-27 | End: 2023-01-31 | Stop reason: HOSPADM

## 2023-01-27 RX ORDER — SODIUM CHLORIDE 0.9 % (FLUSH) 0.9 %
3-10 SYRINGE (ML) INJECTION AS NEEDED
Status: DISCONTINUED | OUTPATIENT
Start: 2023-01-27 | End: 2023-01-31 | Stop reason: HOSPADM

## 2023-01-27 RX ORDER — SODIUM CHLORIDE 0.9 % (FLUSH) 0.9 %
3 SYRINGE (ML) INJECTION EVERY 12 HOURS SCHEDULED
Status: DISCONTINUED | OUTPATIENT
Start: 2023-01-27 | End: 2023-01-31 | Stop reason: HOSPADM

## 2023-01-27 RX ORDER — ALBUTEROL SULFATE 90 UG/1
2 AEROSOL, METERED RESPIRATORY (INHALATION)
Status: DISCONTINUED | OUTPATIENT
Start: 2023-01-27 | End: 2023-01-31 | Stop reason: HOSPADM

## 2023-01-27 RX ORDER — HYDROCODONE BITARTRATE AND ACETAMINOPHEN 5; 325 MG/1; MG/1
1 TABLET ORAL EVERY 8 HOURS PRN
Status: DISCONTINUED | OUTPATIENT
Start: 2023-01-27 | End: 2023-01-28

## 2023-01-27 RX ORDER — ONDANSETRON 2 MG/ML
4 INJECTION INTRAMUSCULAR; INTRAVENOUS EVERY 6 HOURS PRN
Status: DISCONTINUED | OUTPATIENT
Start: 2023-01-27 | End: 2023-01-31 | Stop reason: HOSPADM

## 2023-01-27 RX ORDER — LOSARTAN POTASSIUM 50 MG/1
100 TABLET ORAL DAILY
Status: DISCONTINUED | OUTPATIENT
Start: 2023-01-27 | End: 2023-01-31 | Stop reason: HOSPADM

## 2023-01-27 RX ORDER — SEVELAMER CARBONATE 800 MG/1
800 TABLET, FILM COATED ORAL
Status: DISCONTINUED | OUTPATIENT
Start: 2023-01-27 | End: 2023-01-31 | Stop reason: HOSPADM

## 2023-01-27 RX ORDER — SODIUM CHLORIDE 9 MG/ML
40 INJECTION, SOLUTION INTRAVENOUS AS NEEDED
Status: DISCONTINUED | OUTPATIENT
Start: 2023-01-27 | End: 2023-01-31 | Stop reason: HOSPADM

## 2023-01-27 RX ORDER — ATORVASTATIN CALCIUM 20 MG/1
20 TABLET, FILM COATED ORAL DAILY
Status: DISCONTINUED | OUTPATIENT
Start: 2023-01-27 | End: 2023-01-27

## 2023-01-27 RX ADMIN — SODIUM BICARBONATE 650 MG TABLET 1300 MG: at 20:40

## 2023-01-27 RX ADMIN — HYDROCODONE BITARTRATE AND ACETAMINOPHEN 1 TABLET: 5; 325 TABLET ORAL at 14:30

## 2023-01-27 RX ADMIN — TEMAZEPAM 30 MG: 15 CAPSULE ORAL at 20:39

## 2023-01-27 RX ADMIN — ROSUVASTATIN 10 MG: 10 TABLET, FILM COATED ORAL at 20:39

## 2023-01-27 RX ADMIN — OXYCODONE HYDROCHLORIDE 10 MG: 5 TABLET ORAL at 22:10

## 2023-01-27 RX ADMIN — GUAIFENESIN 600 MG: 600 TABLET, EXTENDED RELEASE ORAL at 20:39

## 2023-01-27 RX ADMIN — Medication 3 ML: at 10:02

## 2023-01-27 RX ADMIN — ASPIRIN 81 MG: 81 TABLET, COATED ORAL at 10:02

## 2023-01-27 RX ADMIN — Medication 3 ML: at 20:41

## 2023-01-27 RX ADMIN — SODIUM BICARBONATE 650 MG TABLET 1300 MG: at 10:02

## 2023-01-27 RX ADMIN — SEVELAMER CARBONATE 800 MG: 800 TABLET, FILM COATED ORAL at 10:02

## 2023-01-27 NOTE — OUTREACH NOTE
Medical Week 1 Survey    Flowsheet Row Responses   [facility] facility patient discharged from? [facility]   Does the patient have one of the following disease processes/diagnoses(primary or secondary)? Other   Week 1 attempt successful? No   Unsuccessful attempts Attempt 1   Revoke Readmitted          JEANINE NAM - Registered Nurse

## 2023-01-28 ENCOUNTER — APPOINTMENT (OUTPATIENT)
Dept: GENERAL RADIOLOGY | Facility: HOSPITAL | Age: 73
DRG: 177 | End: 2023-01-28
Payer: MEDICARE

## 2023-01-28 LAB
ALBUMIN SERPL-MCNC: 3.2 G/DL (ref 3.5–5.2)
ALBUMIN/GLOB SERPL: 0.9 G/DL
ALP SERPL-CCNC: 105 U/L (ref 39–117)
ALT SERPL W P-5'-P-CCNC: <5 U/L (ref 1–33)
ANION GAP SERPL CALCULATED.3IONS-SCNC: 16 MMOL/L (ref 5–15)
ARTERIAL PATENCY WRIST A: POSITIVE
AST SERPL-CCNC: 20 U/L (ref 1–32)
ATMOSPHERIC PRESS: ABNORMAL MM[HG]
BASE EXCESS BLDA CALC-SCNC: -1.4 MMOL/L (ref 0–3)
BASOPHILS # BLD AUTO: 0.1 10*3/MM3 (ref 0–0.2)
BASOPHILS # BLD AUTO: 0.1 10*3/MM3 (ref 0–0.2)
BASOPHILS NFR BLD AUTO: 0.7 % (ref 0–1.5)
BASOPHILS NFR BLD AUTO: 1.1 % (ref 0–1.5)
BDY SITE: ABNORMAL
BILIRUB SERPL-MCNC: 0.2 MG/DL (ref 0–1.2)
BUN SERPL-MCNC: 45 MG/DL (ref 8–23)
BUN/CREAT SERPL: 6.1 (ref 7–25)
CALCIUM SPEC-SCNC: 8.5 MG/DL (ref 8.6–10.5)
CHLORIDE SERPL-SCNC: 96 MMOL/L (ref 98–107)
CO2 BLDA-SCNC: 28.9 MMOL/L (ref 22–29)
CO2 SERPL-SCNC: 23 MMOL/L (ref 22–29)
CREAT SERPL-MCNC: 7.36 MG/DL (ref 0.57–1)
D-LACTATE SERPL-SCNC: 0.9 MMOL/L (ref 0.5–2)
DEPRECATED RDW RBC AUTO: 56.4 FL (ref 37–54)
DEPRECATED RDW RBC AUTO: 57.3 FL (ref 37–54)
EGFRCR SERPLBLD CKD-EPI 2021: 5.4 ML/MIN/1.73
EOSINOPHIL # BLD AUTO: 0.3 10*3/MM3 (ref 0–0.4)
EOSINOPHIL # BLD AUTO: 0.6 10*3/MM3 (ref 0–0.4)
EOSINOPHIL NFR BLD AUTO: 1.7 % (ref 0.3–6.2)
EOSINOPHIL NFR BLD AUTO: 4.3 % (ref 0.3–6.2)
ERYTHROCYTE [DISTWIDTH] IN BLOOD BY AUTOMATED COUNT: 16.6 % (ref 12.3–15.4)
ERYTHROCYTE [DISTWIDTH] IN BLOOD BY AUTOMATED COUNT: 17.1 % (ref 12.3–15.4)
GLOBULIN UR ELPH-MCNC: 3.7 GM/DL
GLUCOSE BLDC GLUCOMTR-MCNC: 123 MG/DL (ref 70–105)
GLUCOSE BLDC GLUCOMTR-MCNC: 132 MG/DL (ref 70–105)
GLUCOSE BLDC GLUCOMTR-MCNC: 135 MG/DL (ref 70–105)
GLUCOSE BLDC GLUCOMTR-MCNC: 138 MG/DL (ref 70–105)
GLUCOSE BLDC GLUCOMTR-MCNC: 139 MG/DL (ref 70–105)
GLUCOSE BLDC GLUCOMTR-MCNC: 53 MG/DL (ref 70–105)
GLUCOSE BLDC GLUCOMTR-MCNC: 68 MG/DL (ref 70–105)
GLUCOSE BLDC GLUCOMTR-MCNC: 77 MG/DL (ref 70–105)
GLUCOSE BLDC GLUCOMTR-MCNC: 83 MG/DL (ref 70–105)
GLUCOSE SERPL-MCNC: 158 MG/DL (ref 65–99)
HCO3 BLDA-SCNC: 26.9 MMOL/L (ref 21–28)
HCT VFR BLD AUTO: 28.4 % (ref 34–46.6)
HCT VFR BLD AUTO: 33.1 % (ref 34–46.6)
HEMODILUTION: NO
HGB BLD-MCNC: 10.2 G/DL (ref 12–15.9)
HGB BLD-MCNC: 9.3 G/DL (ref 12–15.9)
INHALED O2 CONCENTRATION: 44 %
LYMPHOCYTES # BLD AUTO: 0.5 10*3/MM3 (ref 0.7–3.1)
LYMPHOCYTES # BLD AUTO: 1 10*3/MM3 (ref 0.7–3.1)
LYMPHOCYTES NFR BLD AUTO: 3.7 % (ref 19.6–45.3)
LYMPHOCYTES NFR BLD AUTO: 7.3 % (ref 19.6–45.3)
MAGNESIUM SERPL-MCNC: 2.2 MG/DL (ref 1.6–2.4)
MCH RBC QN AUTO: 29.6 PG (ref 26.6–33)
MCH RBC QN AUTO: 30 PG (ref 26.6–33)
MCHC RBC AUTO-ENTMCNC: 31 G/DL (ref 31.5–35.7)
MCHC RBC AUTO-ENTMCNC: 32.5 G/DL (ref 31.5–35.7)
MCV RBC AUTO: 92.2 FL (ref 79–97)
MCV RBC AUTO: 95.4 FL (ref 79–97)
MODALITY: ABNORMAL
MONOCYTES # BLD AUTO: 1 10*3/MM3 (ref 0.1–0.9)
MONOCYTES # BLD AUTO: 1 10*3/MM3 (ref 0.1–0.9)
MONOCYTES NFR BLD AUTO: 7 % (ref 5–12)
MONOCYTES NFR BLD AUTO: 7 % (ref 5–12)
NEUTROPHILS NFR BLD AUTO: 10.9 10*3/MM3 (ref 1.7–7)
NEUTROPHILS NFR BLD AUTO: 12.7 10*3/MM3 (ref 1.7–7)
NEUTROPHILS NFR BLD AUTO: 80.3 % (ref 42.7–76)
NEUTROPHILS NFR BLD AUTO: 86.9 % (ref 42.7–76)
NRBC BLD AUTO-RTO: 0 /100 WBC (ref 0–0.2)
NRBC BLD AUTO-RTO: 0.1 /100 WBC (ref 0–0.2)
PCO2 BLDA: 64.2 MM HG (ref 35–48)
PH BLDA: 7.23 PH UNITS (ref 7.35–7.45)
PLATELET # BLD AUTO: 426 10*3/MM3 (ref 140–450)
PLATELET # BLD AUTO: 483 10*3/MM3 (ref 140–450)
PMV BLD AUTO: 7.7 FL (ref 6–12)
PMV BLD AUTO: 8 FL (ref 6–12)
PO2 BLDA: 73.9 MM HG (ref 83–108)
POTASSIUM SERPL-SCNC: 6.4 MMOL/L (ref 3.5–5.2)
PROT SERPL-MCNC: 6.9 G/DL (ref 6–8.5)
RBC # BLD AUTO: 3.09 10*6/MM3 (ref 3.77–5.28)
RBC # BLD AUTO: 3.46 10*6/MM3 (ref 3.77–5.28)
SAO2 % BLDCOA: 91 % (ref 94–98)
SODIUM SERPL-SCNC: 135 MMOL/L (ref 136–145)
WBC NRBC COR # BLD: 13.6 10*3/MM3 (ref 3.4–10.8)
WBC NRBC COR # BLD: 14.7 10*3/MM3 (ref 3.4–10.8)

## 2023-01-28 PROCEDURE — 94660 CPAP INITIATION&MGMT: CPT

## 2023-01-28 PROCEDURE — 94664 DEMO&/EVAL PT USE INHALER: CPT

## 2023-01-28 PROCEDURE — 82803 BLOOD GASES ANY COMBINATION: CPT

## 2023-01-28 PROCEDURE — 25010000002 DEXAMETHASONE PER 1 MG: Performed by: NURSE PRACTITIONER

## 2023-01-28 PROCEDURE — 85025 COMPLETE CBC W/AUTO DIFF WBC: CPT

## 2023-01-28 PROCEDURE — P9041 ALBUMIN (HUMAN),5%, 50ML: HCPCS | Performed by: NURSE PRACTITIONER

## 2023-01-28 PROCEDURE — 82962 GLUCOSE BLOOD TEST: CPT

## 2023-01-28 PROCEDURE — 94640 AIRWAY INHALATION TREATMENT: CPT

## 2023-01-28 PROCEDURE — 94761 N-INVAS EAR/PLS OXIMETRY MLT: CPT

## 2023-01-28 PROCEDURE — 94799 UNLISTED PULMONARY SVC/PX: CPT

## 2023-01-28 PROCEDURE — 5A09457 ASSISTANCE WITH RESPIRATORY VENTILATION, 24-96 CONSECUTIVE HOURS, CONTINUOUS POSITIVE AIRWAY PRESSURE: ICD-10-PCS | Performed by: INTERNAL MEDICINE

## 2023-01-28 PROCEDURE — 36600 WITHDRAWAL OF ARTERIAL BLOOD: CPT

## 2023-01-28 PROCEDURE — 25010000002 NALOXONE PER 1 MG: Performed by: FAMILY MEDICINE

## 2023-01-28 PROCEDURE — 25010000002 ALBUMIN HUMAN 5% PER 50 ML: Performed by: NURSE PRACTITIONER

## 2023-01-28 PROCEDURE — XW033E5 INTRODUCTION OF REMDESIVIR ANTI-INFECTIVE INTO PERIPHERAL VEIN, PERCUTANEOUS APPROACH, NEW TECHNOLOGY GROUP 5: ICD-10-PCS | Performed by: INTERNAL MEDICINE

## 2023-01-28 PROCEDURE — 25010000002 REMDESIVIR 100 MG RECONSTITUTED SOLUTION: Performed by: NURSE PRACTITIONER

## 2023-01-28 PROCEDURE — 71045 X-RAY EXAM CHEST 1 VIEW: CPT

## 2023-01-28 PROCEDURE — 83605 ASSAY OF LACTIC ACID: CPT | Performed by: NURSE PRACTITIONER

## 2023-01-28 PROCEDURE — 25010000002 HEPARIN (PORCINE) PER 1000 UNITS: Performed by: NURSE PRACTITIONER

## 2023-01-28 PROCEDURE — 85025 COMPLETE CBC W/AUTO DIFF WBC: CPT | Performed by: NURSE PRACTITIONER

## 2023-01-28 PROCEDURE — 63710000001 INSULIN REGULAR HUMAN PER 5 UNITS: Performed by: INTERNAL MEDICINE

## 2023-01-28 PROCEDURE — 80053 COMPREHEN METABOLIC PANEL: CPT | Performed by: NURSE PRACTITIONER

## 2023-01-28 PROCEDURE — 83735 ASSAY OF MAGNESIUM: CPT

## 2023-01-28 PROCEDURE — 25010000002 CALCIUM GLUCONATE-NACL 1-0.675 GM/50ML-% SOLUTION: Performed by: INTERNAL MEDICINE

## 2023-01-28 RX ORDER — NALOXONE HCL 0.4 MG/ML
0.4 VIAL (ML) INJECTION
Status: DISCONTINUED | OUTPATIENT
Start: 2023-01-28 | End: 2023-01-31 | Stop reason: HOSPADM

## 2023-01-28 RX ORDER — CALCIUM GLUCONATE 20 MG/ML
1 INJECTION, SOLUTION INTRAVENOUS ONCE
Status: COMPLETED | OUTPATIENT
Start: 2023-01-28 | End: 2023-01-28

## 2023-01-28 RX ORDER — ALBUMIN, HUMAN INJ 5% 5 %
500 SOLUTION INTRAVENOUS ONCE
Status: COMPLETED | OUTPATIENT
Start: 2023-01-28 | End: 2023-01-28

## 2023-01-28 RX ORDER — DEXTROSE MONOHYDRATE 25 G/50ML
50 INJECTION, SOLUTION INTRAVENOUS ONCE
Status: COMPLETED | OUTPATIENT
Start: 2023-01-28 | End: 2023-01-28

## 2023-01-28 RX ORDER — INSULIN LISPRO 100 [IU]/ML
2-7 INJECTION, SOLUTION INTRAVENOUS; SUBCUTANEOUS EVERY 6 HOURS SCHEDULED
Status: DISCONTINUED | OUTPATIENT
Start: 2023-01-28 | End: 2023-01-30

## 2023-01-28 RX ORDER — DEXAMETHASONE SODIUM PHOSPHATE 4 MG/ML
6 INJECTION, SOLUTION INTRA-ARTICULAR; INTRALESIONAL; INTRAMUSCULAR; INTRAVENOUS; SOFT TISSUE DAILY
Status: DISCONTINUED | OUTPATIENT
Start: 2023-01-28 | End: 2023-01-30

## 2023-01-28 RX ORDER — ALBUMIN (HUMAN) 12.5 G/50ML
12.5 SOLUTION INTRAVENOUS ONCE
Status: DISCONTINUED | OUTPATIENT
Start: 2023-01-28 | End: 2023-01-28

## 2023-01-28 RX ORDER — ALBUMIN (HUMAN) 12.5 G/50ML
12.5 SOLUTION INTRAVENOUS AS NEEDED
Status: DISCONTINUED | OUTPATIENT
Start: 2023-01-28 | End: 2023-01-31 | Stop reason: HOSPADM

## 2023-01-28 RX ORDER — OLANZAPINE 10 MG/2ML
1 INJECTION, POWDER, LYOPHILIZED, FOR SOLUTION INTRAMUSCULAR
Status: DISCONTINUED | OUTPATIENT
Start: 2023-01-28 | End: 2023-01-31 | Stop reason: HOSPADM

## 2023-01-28 RX ORDER — HEPARIN SODIUM 5000 [USP'U]/ML
5000 INJECTION, SOLUTION INTRAVENOUS; SUBCUTANEOUS EVERY 8 HOURS SCHEDULED
Status: DISCONTINUED | OUTPATIENT
Start: 2023-01-28 | End: 2023-01-31 | Stop reason: HOSPADM

## 2023-01-28 RX ORDER — DEXTROSE MONOHYDRATE 25 G/50ML
25 INJECTION, SOLUTION INTRAVENOUS
Status: DISCONTINUED | OUTPATIENT
Start: 2023-01-28 | End: 2023-01-31 | Stop reason: HOSPADM

## 2023-01-28 RX ORDER — FAMOTIDINE 20 MG/1
20 TABLET, FILM COATED ORAL DAILY
Status: DISCONTINUED | OUTPATIENT
Start: 2023-01-28 | End: 2023-01-30

## 2023-01-28 RX ORDER — NICOTINE POLACRILEX 4 MG
15 LOZENGE BUCCAL
Status: DISCONTINUED | OUTPATIENT
Start: 2023-01-28 | End: 2023-01-31 | Stop reason: HOSPADM

## 2023-01-28 RX ORDER — DEXTROSE MONOHYDRATE 25 G/50ML
INJECTION, SOLUTION INTRAVENOUS
Status: COMPLETED
Start: 2023-01-28 | End: 2023-01-28

## 2023-01-28 RX ORDER — DEXTROSE MONOHYDRATE 25 G/50ML
50 INJECTION, SOLUTION INTRAVENOUS
Status: DISCONTINUED | OUTPATIENT
Start: 2023-01-28 | End: 2023-01-31 | Stop reason: HOSPADM

## 2023-01-28 RX ADMIN — HEPARIN SODIUM 5000 UNITS: 5000 INJECTION INTRAVENOUS; SUBCUTANEOUS at 21:17

## 2023-01-28 RX ADMIN — NALOXONE HYDROCHLORIDE 0.4 MG: 0.4 INJECTION, SOLUTION INTRAMUSCULAR; INTRAVENOUS; SUBCUTANEOUS at 13:40

## 2023-01-28 RX ADMIN — ALBUTEROL SULFATE 2 PUFF: 108 INHALANT RESPIRATORY (INHALATION) at 15:31

## 2023-01-28 RX ADMIN — CALCIUM GLUCONATE 1 G: 20 INJECTION, SOLUTION INTRAVENOUS at 16:04

## 2023-01-28 RX ADMIN — BUDESONIDE AND FORMOTEROL FUMARATE DIHYDRATE 2 PUFF: 160; 4.5 AEROSOL RESPIRATORY (INHALATION) at 06:08

## 2023-01-28 RX ADMIN — INSULIN HUMAN 10 UNITS: 100 INJECTION, SOLUTION PARENTERAL at 16:05

## 2023-01-28 RX ADMIN — ALBUTEROL SULFATE 2 PUFF: 108 INHALANT RESPIRATORY (INHALATION) at 20:10

## 2023-01-28 RX ADMIN — DEXTROSE MONOHYDRATE 50 ML: 25 INJECTION, SOLUTION INTRAVENOUS at 03:36

## 2023-01-28 RX ADMIN — Medication 3 ML: at 09:00

## 2023-01-28 RX ADMIN — ALBUTEROL SULFATE 2 PUFF: 108 INHALANT RESPIRATORY (INHALATION) at 06:05

## 2023-01-28 RX ADMIN — BUDESONIDE AND FORMOTEROL FUMARATE DIHYDRATE 2 PUFF: 160; 4.5 AEROSOL RESPIRATORY (INHALATION) at 20:15

## 2023-01-28 RX ADMIN — DEXTROSE MONOHYDRATE 50 ML: 25 INJECTION, SOLUTION INTRAVENOUS at 16:04

## 2023-01-28 RX ADMIN — DEXAMETHASONE SODIUM PHOSPHATE 6 MG: 4 INJECTION, SOLUTION INTRAMUSCULAR; INTRAVENOUS at 17:49

## 2023-01-28 RX ADMIN — Medication 3 ML: at 21:26

## 2023-01-28 RX ADMIN — REMDESIVIR 200 MG: 100 INJECTION, POWDER, LYOPHILIZED, FOR SOLUTION INTRAVENOUS at 17:49

## 2023-01-28 RX ADMIN — DEXTROSE MONOHYDRATE 50 ML: 25 INJECTION, SOLUTION INTRAVENOUS at 14:20

## 2023-01-28 RX ADMIN — ALBUMIN (HUMAN) 500 ML: 12.5 INJECTION, SOLUTION INTRAVENOUS at 17:49

## 2023-01-29 ENCOUNTER — APPOINTMENT (OUTPATIENT)
Dept: GENERAL RADIOLOGY | Facility: HOSPITAL | Age: 73
DRG: 177 | End: 2023-01-29
Payer: MEDICARE

## 2023-01-29 LAB
ALBUMIN SERPL-MCNC: 4 G/DL (ref 3.5–5.2)
ALBUMIN/GLOB SERPL: 1.3 G/DL
ALP SERPL-CCNC: 97 U/L (ref 39–117)
ALT SERPL W P-5'-P-CCNC: <5 U/L (ref 1–33)
ANION GAP SERPL CALCULATED.3IONS-SCNC: 17 MMOL/L (ref 5–15)
ARTERIAL PATENCY WRIST A: ABNORMAL
AST SERPL-CCNC: 16 U/L (ref 1–32)
ATMOSPHERIC PRESS: ABNORMAL MM[HG]
BASE EXCESS BLDA CALC-SCNC: -1.7 MMOL/L (ref 0–3)
BASOPHILS # BLD AUTO: 0.1 10*3/MM3 (ref 0–0.2)
BASOPHILS NFR BLD AUTO: 0.7 % (ref 0–1.5)
BDY SITE: ABNORMAL
BILIRUB CONJ SERPL-MCNC: <0.2 MG/DL (ref 0–0.3)
BILIRUB SERPL-MCNC: 0.2 MG/DL (ref 0–1.2)
BUN SERPL-MCNC: 29 MG/DL (ref 8–23)
BUN/CREAT SERPL: 6 (ref 7–25)
CALCIUM SPEC-SCNC: 9.7 MG/DL (ref 8.6–10.5)
CHLORIDE SERPL-SCNC: 98 MMOL/L (ref 98–107)
CO2 BLDA-SCNC: 25.1 MMOL/L (ref 22–29)
CO2 SERPL-SCNC: 24 MMOL/L (ref 22–29)
CREAT SERPL-MCNC: 4.8 MG/DL (ref 0.57–1)
DEPRECATED RDW RBC AUTO: 56.4 FL (ref 37–54)
EGFRCR SERPLBLD CKD-EPI 2021: 9.1 ML/MIN/1.73
EOSINOPHIL # BLD AUTO: 0 10*3/MM3 (ref 0–0.4)
EOSINOPHIL NFR BLD AUTO: 0.1 % (ref 0.3–6.2)
ERYTHROCYTE [DISTWIDTH] IN BLOOD BY AUTOMATED COUNT: 16.9 % (ref 12.3–15.4)
GLOBULIN UR ELPH-MCNC: 3.1 GM/DL
GLUCOSE BLDC GLUCOMTR-MCNC: 107 MG/DL (ref 70–105)
GLUCOSE BLDC GLUCOMTR-MCNC: 120 MG/DL (ref 70–105)
GLUCOSE BLDC GLUCOMTR-MCNC: 147 MG/DL (ref 70–105)
GLUCOSE BLDC GLUCOMTR-MCNC: 169 MG/DL (ref 70–105)
GLUCOSE SERPL-MCNC: 90 MG/DL (ref 65–99)
HCO3 BLDA-SCNC: 23.8 MMOL/L (ref 21–28)
HCT VFR BLD AUTO: 32.2 % (ref 34–46.6)
HEMODILUTION: NO
HGB BLD-MCNC: 10.2 G/DL (ref 12–15.9)
INHALED O2 CONCENTRATION: 40 %
LYMPHOCYTES # BLD AUTO: 0.4 10*3/MM3 (ref 0.7–3.1)
LYMPHOCYTES NFR BLD AUTO: 3.1 % (ref 19.6–45.3)
MAGNESIUM SERPL-MCNC: 2.2 MG/DL (ref 1.6–2.4)
MCH RBC QN AUTO: 29.5 PG (ref 26.6–33)
MCHC RBC AUTO-ENTMCNC: 31.5 G/DL (ref 31.5–35.7)
MCV RBC AUTO: 93.8 FL (ref 79–97)
MODALITY: ABNORMAL
MONOCYTES # BLD AUTO: 0.3 10*3/MM3 (ref 0.1–0.9)
MONOCYTES NFR BLD AUTO: 2.6 % (ref 5–12)
NEUTROPHILS NFR BLD AUTO: 11.8 10*3/MM3 (ref 1.7–7)
NEUTROPHILS NFR BLD AUTO: 93.5 % (ref 42.7–76)
NRBC BLD AUTO-RTO: 0 /100 WBC (ref 0–0.2)
PCO2 BLDA: 42.6 MM HG (ref 35–48)
PH BLDA: 7.36 PH UNITS (ref 7.35–7.45)
PHOSPHATE SERPL-MCNC: 5.6 MG/DL (ref 2.5–4.5)
PLATELET # BLD AUTO: 532 10*3/MM3 (ref 140–450)
PMV BLD AUTO: 7.8 FL (ref 6–12)
PO2 BLDA: 84.2 MM HG (ref 83–108)
POTASSIUM SERPL-SCNC: 4.4 MMOL/L (ref 3.5–5.2)
PROT SERPL-MCNC: 7.1 G/DL (ref 6–8.5)
QT INTERVAL: 346 MS
RBC # BLD AUTO: 3.44 10*6/MM3 (ref 3.77–5.28)
SAO2 % BLDCOA: 95.8 % (ref 94–98)
SODIUM SERPL-SCNC: 139 MMOL/L (ref 136–145)
WBC NRBC COR # BLD: 12.6 10*3/MM3 (ref 3.4–10.8)

## 2023-01-29 PROCEDURE — 94664 DEMO&/EVAL PT USE INHALER: CPT

## 2023-01-29 PROCEDURE — 99222 1ST HOSP IP/OBS MODERATE 55: CPT

## 2023-01-29 PROCEDURE — 94799 UNLISTED PULMONARY SVC/PX: CPT

## 2023-01-29 PROCEDURE — 36415 COLL VENOUS BLD VENIPUNCTURE: CPT | Performed by: NURSE PRACTITIONER

## 2023-01-29 PROCEDURE — 25010000002 HEPARIN (PORCINE) PER 1000 UNITS: Performed by: NURSE PRACTITIONER

## 2023-01-29 PROCEDURE — 80053 COMPREHEN METABOLIC PANEL: CPT | Performed by: INTERNAL MEDICINE

## 2023-01-29 PROCEDURE — 84100 ASSAY OF PHOSPHORUS: CPT

## 2023-01-29 PROCEDURE — 85025 COMPLETE CBC W/AUTO DIFF WBC: CPT

## 2023-01-29 PROCEDURE — 36600 WITHDRAWAL OF ARTERIAL BLOOD: CPT

## 2023-01-29 PROCEDURE — 82248 BILIRUBIN DIRECT: CPT | Performed by: NURSE PRACTITIONER

## 2023-01-29 PROCEDURE — 25010000002 REMDESIVIR 100 MG/20ML SOLUTION 1 EACH VIAL: Performed by: NURSE PRACTITIONER

## 2023-01-29 PROCEDURE — 82962 GLUCOSE BLOOD TEST: CPT

## 2023-01-29 PROCEDURE — 63710000001 INSULIN LISPRO (HUMAN) PER 5 UNITS: Performed by: NURSE PRACTITIONER

## 2023-01-29 PROCEDURE — 94660 CPAP INITIATION&MGMT: CPT

## 2023-01-29 PROCEDURE — 25010000002 HALOPERIDOL LACTATE PER 5 MG: Performed by: STUDENT IN AN ORGANIZED HEALTH CARE EDUCATION/TRAINING PROGRAM

## 2023-01-29 PROCEDURE — 25010000002 DEXAMETHASONE PER 1 MG: Performed by: NURSE PRACTITIONER

## 2023-01-29 PROCEDURE — 83735 ASSAY OF MAGNESIUM: CPT

## 2023-01-29 PROCEDURE — 82803 BLOOD GASES ANY COMBINATION: CPT

## 2023-01-29 PROCEDURE — 74018 RADEX ABDOMEN 1 VIEW: CPT

## 2023-01-29 RX ORDER — OLANZAPINE 10 MG/2ML
2.5 INJECTION, POWDER, LYOPHILIZED, FOR SOLUTION INTRAMUSCULAR ONCE
Status: COMPLETED | OUTPATIENT
Start: 2023-01-29 | End: 2023-01-29

## 2023-01-29 RX ORDER — DEXMEDETOMIDINE HYDROCHLORIDE 4 UG/ML
.2-1.5 INJECTION, SOLUTION INTRAVENOUS
Status: DISCONTINUED | OUTPATIENT
Start: 2023-01-29 | End: 2023-01-29

## 2023-01-29 RX ORDER — RISPERIDONE 0.25 MG/1
0.25 TABLET ORAL 2 TIMES DAILY
Status: DISCONTINUED | OUTPATIENT
Start: 2023-01-29 | End: 2023-01-31 | Stop reason: HOSPADM

## 2023-01-29 RX ORDER — HALOPERIDOL 5 MG/ML
2.5 INJECTION INTRAMUSCULAR ONCE
Status: COMPLETED | OUTPATIENT
Start: 2023-01-29 | End: 2023-01-29

## 2023-01-29 RX ADMIN — RISPERIDONE 0.25 MG: 0.25 TABLET ORAL at 20:05

## 2023-01-29 RX ADMIN — LOSARTAN POTASSIUM 100 MG: 50 TABLET, FILM COATED ORAL at 09:39

## 2023-01-29 RX ADMIN — Medication 3 ML: at 20:05

## 2023-01-29 RX ADMIN — SEVELAMER CARBONATE 800 MG: 800 TABLET, FILM COATED ORAL at 17:07

## 2023-01-29 RX ADMIN — ALBUTEROL SULFATE 2 PUFF: 108 INHALANT RESPIRATORY (INHALATION) at 14:37

## 2023-01-29 RX ADMIN — ASPIRIN 81 MG: 81 TABLET, COATED ORAL at 09:38

## 2023-01-29 RX ADMIN — ROSUVASTATIN 10 MG: 10 TABLET, FILM COATED ORAL at 20:05

## 2023-01-29 RX ADMIN — ALBUTEROL SULFATE 2 PUFF: 108 INHALANT RESPIRATORY (INHALATION) at 11:00

## 2023-01-29 RX ADMIN — ALBUTEROL SULFATE 2 PUFF: 108 INHALANT RESPIRATORY (INHALATION) at 19:03

## 2023-01-29 RX ADMIN — SEVELAMER CARBONATE 800 MG: 800 TABLET, FILM COATED ORAL at 11:59

## 2023-01-29 RX ADMIN — FAMOTIDINE 20 MG: 20 TABLET, FILM COATED ORAL at 09:36

## 2023-01-29 RX ADMIN — HEPARIN SODIUM 5000 UNITS: 5000 INJECTION INTRAVENOUS; SUBCUTANEOUS at 15:23

## 2023-01-29 RX ADMIN — DEXAMETHASONE SODIUM PHOSPHATE 6 MG: 4 INJECTION, SOLUTION INTRAMUSCULAR; INTRAVENOUS at 09:39

## 2023-01-29 RX ADMIN — BUDESONIDE AND FORMOTEROL FUMARATE DIHYDRATE 2 PUFF: 160; 4.5 AEROSOL RESPIRATORY (INHALATION) at 19:08

## 2023-01-29 RX ADMIN — SODIUM BICARBONATE 650 MG TABLET 1300 MG: at 15:23

## 2023-01-29 RX ADMIN — GUAIFENESIN 600 MG: 600 TABLET, EXTENDED RELEASE ORAL at 09:50

## 2023-01-29 RX ADMIN — BUDESONIDE AND FORMOTEROL FUMARATE DIHYDRATE 2 PUFF: 160; 4.5 AEROSOL RESPIRATORY (INHALATION) at 07:00

## 2023-01-29 RX ADMIN — SODIUM BICARBONATE 650 MG TABLET 1300 MG: at 20:05

## 2023-01-29 RX ADMIN — ALBUTEROL SULFATE 2 PUFF: 108 INHALANT RESPIRATORY (INHALATION) at 07:00

## 2023-01-29 RX ADMIN — AMLODIPINE BESYLATE 10 MG: 5 TABLET ORAL at 09:39

## 2023-01-29 RX ADMIN — SEVELAMER CARBONATE 800 MG: 800 TABLET, FILM COATED ORAL at 09:50

## 2023-01-29 RX ADMIN — WATER 2.5 MG: 1 INJECTION INTRAMUSCULAR; INTRAVENOUS; SUBCUTANEOUS at 01:38

## 2023-01-29 RX ADMIN — SODIUM BICARBONATE 650 MG TABLET 1300 MG: at 09:39

## 2023-01-29 RX ADMIN — HEPARIN SODIUM 5000 UNITS: 5000 INJECTION INTRAVENOUS; SUBCUTANEOUS at 21:05

## 2023-01-29 RX ADMIN — REMDESIVIR 100 MG: 100 INJECTION, POWDER, LYOPHILIZED, FOR SOLUTION INTRAVENOUS at 17:07

## 2023-01-29 RX ADMIN — HALOPERIDOL LACTATE 2.5 MG: 5 INJECTION, SOLUTION INTRAMUSCULAR at 00:33

## 2023-01-29 RX ADMIN — Medication 3 ML: at 09:41

## 2023-01-29 RX ADMIN — HEPARIN SODIUM 5000 UNITS: 5000 INJECTION INTRAVENOUS; SUBCUTANEOUS at 05:44

## 2023-01-29 RX ADMIN — GUAIFENESIN 600 MG: 600 TABLET, EXTENDED RELEASE ORAL at 20:05

## 2023-01-29 RX ADMIN — INSULIN LISPRO 2 UNITS: 100 INJECTION, SOLUTION INTRAVENOUS; SUBCUTANEOUS at 17:13

## 2023-01-30 LAB
ALBUMIN SERPL-MCNC: 4.1 G/DL (ref 3.5–5.2)
ALP SERPL-CCNC: 97 U/L (ref 39–117)
ALT SERPL W P-5'-P-CCNC: <5 U/L (ref 1–33)
ANION GAP SERPL CALCULATED.3IONS-SCNC: 16 MMOL/L (ref 5–15)
AST SERPL-CCNC: 13 U/L (ref 1–32)
BASOPHILS # BLD AUTO: 0.1 10*3/MM3 (ref 0–0.2)
BASOPHILS NFR BLD AUTO: 1 % (ref 0–1.5)
BILIRUB CONJ SERPL-MCNC: <0.2 MG/DL (ref 0–0.3)
BILIRUB INDIRECT SERPL-MCNC: NORMAL MG/DL
BILIRUB SERPL-MCNC: 0.2 MG/DL (ref 0–1.2)
BUN SERPL-MCNC: 28 MG/DL (ref 8–23)
BUN/CREAT SERPL: 6.8 (ref 7–25)
CALCIUM SPEC-SCNC: 9.1 MG/DL (ref 8.6–10.5)
CHLORIDE SERPL-SCNC: 96 MMOL/L (ref 98–107)
CO2 SERPL-SCNC: 26 MMOL/L (ref 22–29)
CREAT SERPL-MCNC: 4.13 MG/DL (ref 0.57–1)
DEPRECATED RDW RBC AUTO: 56.9 FL (ref 37–54)
EGFRCR SERPLBLD CKD-EPI 2021: 10.9 ML/MIN/1.73
EOSINOPHIL # BLD AUTO: 0.2 10*3/MM3 (ref 0–0.4)
EOSINOPHIL NFR BLD AUTO: 1.7 % (ref 0.3–6.2)
ERYTHROCYTE [DISTWIDTH] IN BLOOD BY AUTOMATED COUNT: 17.1 % (ref 12.3–15.4)
GLUCOSE BLDC GLUCOMTR-MCNC: 100 MG/DL (ref 70–105)
GLUCOSE BLDC GLUCOMTR-MCNC: 147 MG/DL (ref 70–105)
GLUCOSE BLDC GLUCOMTR-MCNC: 151 MG/DL (ref 70–105)
GLUCOSE BLDC GLUCOMTR-MCNC: 221 MG/DL (ref 70–105)
GLUCOSE SERPL-MCNC: 115 MG/DL (ref 65–99)
HCT VFR BLD AUTO: 31.9 % (ref 34–46.6)
HGB BLD-MCNC: 10.2 G/DL (ref 12–15.9)
LYMPHOCYTES # BLD AUTO: 1 10*3/MM3 (ref 0.7–3.1)
LYMPHOCYTES NFR BLD AUTO: 9.4 % (ref 19.6–45.3)
MAGNESIUM SERPL-MCNC: 2 MG/DL (ref 1.6–2.4)
MCH RBC QN AUTO: 30 PG (ref 26.6–33)
MCHC RBC AUTO-ENTMCNC: 32 G/DL (ref 31.5–35.7)
MCV RBC AUTO: 93.9 FL (ref 79–97)
MONOCYTES # BLD AUTO: 0.9 10*3/MM3 (ref 0.1–0.9)
MONOCYTES NFR BLD AUTO: 8.8 % (ref 5–12)
NEUTROPHILS NFR BLD AUTO: 79.1 % (ref 42.7–76)
NEUTROPHILS NFR BLD AUTO: 8.1 10*3/MM3 (ref 1.7–7)
NRBC BLD AUTO-RTO: 0.1 /100 WBC (ref 0–0.2)
PHOSPHATE SERPL-MCNC: 4.8 MG/DL (ref 2.5–4.5)
PLATELET # BLD AUTO: 509 10*3/MM3 (ref 140–450)
PMV BLD AUTO: 7.9 FL (ref 6–12)
POTASSIUM SERPL-SCNC: 3.9 MMOL/L (ref 3.5–5.2)
PROT SERPL-MCNC: 7.8 G/DL (ref 6–8.5)
RBC # BLD AUTO: 3.4 10*6/MM3 (ref 3.77–5.28)
SODIUM SERPL-SCNC: 138 MMOL/L (ref 136–145)
WBC NRBC COR # BLD: 10.2 10*3/MM3 (ref 3.4–10.8)

## 2023-01-30 PROCEDURE — 85025 COMPLETE CBC W/AUTO DIFF WBC: CPT

## 2023-01-30 PROCEDURE — 94664 DEMO&/EVAL PT USE INHALER: CPT

## 2023-01-30 PROCEDURE — 80048 BASIC METABOLIC PNL TOTAL CA: CPT

## 2023-01-30 PROCEDURE — 25010000002 HEPARIN (PORCINE) PER 1000 UNITS: Performed by: NURSE PRACTITIONER

## 2023-01-30 PROCEDURE — 84100 ASSAY OF PHOSPHORUS: CPT

## 2023-01-30 PROCEDURE — 25010000002 REMDESIVIR 100 MG/20ML SOLUTION 1 EACH VIAL: Performed by: NURSE PRACTITIONER

## 2023-01-30 PROCEDURE — 94761 N-INVAS EAR/PLS OXIMETRY MLT: CPT

## 2023-01-30 PROCEDURE — 63710000001 INSULIN LISPRO (HUMAN) PER 5 UNITS: Performed by: INTERNAL MEDICINE

## 2023-01-30 PROCEDURE — 83735 ASSAY OF MAGNESIUM: CPT

## 2023-01-30 PROCEDURE — 82962 GLUCOSE BLOOD TEST: CPT

## 2023-01-30 PROCEDURE — 97162 PT EVAL MOD COMPLEX 30 MIN: CPT | Performed by: PHYSICAL THERAPIST

## 2023-01-30 PROCEDURE — 94799 UNLISTED PULMONARY SVC/PX: CPT

## 2023-01-30 PROCEDURE — 25010000002 DEXAMETHASONE PER 1 MG: Performed by: NURSE PRACTITIONER

## 2023-01-30 PROCEDURE — 80076 HEPATIC FUNCTION PANEL: CPT | Performed by: NURSE PRACTITIONER

## 2023-01-30 RX ORDER — DEXAMETHASONE 4 MG/1
4 TABLET ORAL
Status: DISCONTINUED | OUTPATIENT
Start: 2023-01-31 | End: 2023-01-31 | Stop reason: HOSPADM

## 2023-01-30 RX ORDER — INSULIN LISPRO 100 [IU]/ML
2-7 INJECTION, SOLUTION INTRAVENOUS; SUBCUTANEOUS
Status: DISCONTINUED | OUTPATIENT
Start: 2023-01-30 | End: 2023-01-30

## 2023-01-30 RX ORDER — FAMOTIDINE 20 MG/1
20 TABLET, FILM COATED ORAL EVERY OTHER DAY
Status: DISCONTINUED | OUTPATIENT
Start: 2023-02-01 | End: 2023-01-31 | Stop reason: HOSPADM

## 2023-01-30 RX ORDER — INSULIN LISPRO 100 [IU]/ML
2-7 INJECTION, SOLUTION INTRAVENOUS; SUBCUTANEOUS
Status: DISCONTINUED | OUTPATIENT
Start: 2023-01-30 | End: 2023-01-31 | Stop reason: HOSPADM

## 2023-01-30 RX ADMIN — SEVELAMER CARBONATE 800 MG: 800 TABLET, FILM COATED ORAL at 08:51

## 2023-01-30 RX ADMIN — ALBUTEROL SULFATE 2 PUFF: 108 INHALANT RESPIRATORY (INHALATION) at 08:18

## 2023-01-30 RX ADMIN — BUDESONIDE AND FORMOTEROL FUMARATE DIHYDRATE 2 PUFF: 160; 4.5 AEROSOL RESPIRATORY (INHALATION) at 18:25

## 2023-01-30 RX ADMIN — SODIUM BICARBONATE 650 MG TABLET 1300 MG: at 15:04

## 2023-01-30 RX ADMIN — HEPARIN SODIUM 5000 UNITS: 5000 INJECTION INTRAVENOUS; SUBCUTANEOUS at 07:00

## 2023-01-30 RX ADMIN — SEVELAMER CARBONATE 800 MG: 800 TABLET, FILM COATED ORAL at 11:50

## 2023-01-30 RX ADMIN — BUDESONIDE AND FORMOTEROL FUMARATE DIHYDRATE 2 PUFF: 160; 4.5 AEROSOL RESPIRATORY (INHALATION) at 08:20

## 2023-01-30 RX ADMIN — INSULIN LISPRO 3 UNITS: 100 INJECTION, SOLUTION INTRAVENOUS; SUBCUTANEOUS at 11:50

## 2023-01-30 RX ADMIN — SODIUM BICARBONATE 650 MG TABLET 1300 MG: at 08:51

## 2023-01-30 RX ADMIN — Medication 3 ML: at 20:27

## 2023-01-30 RX ADMIN — LOSARTAN POTASSIUM 100 MG: 50 TABLET, FILM COATED ORAL at 08:51

## 2023-01-30 RX ADMIN — ROSUVASTATIN 10 MG: 10 TABLET, FILM COATED ORAL at 20:27

## 2023-01-30 RX ADMIN — RISPERIDONE 0.25 MG: 0.25 TABLET ORAL at 20:27

## 2023-01-30 RX ADMIN — FAMOTIDINE 20 MG: 20 TABLET, FILM COATED ORAL at 08:51

## 2023-01-30 RX ADMIN — HEPARIN SODIUM 5000 UNITS: 5000 INJECTION INTRAVENOUS; SUBCUTANEOUS at 21:40

## 2023-01-30 RX ADMIN — ALBUTEROL SULFATE 2 PUFF: 108 INHALANT RESPIRATORY (INHALATION) at 11:05

## 2023-01-30 RX ADMIN — ASPIRIN 81 MG: 81 TABLET, COATED ORAL at 08:51

## 2023-01-30 RX ADMIN — GUAIFENESIN 600 MG: 600 TABLET, EXTENDED RELEASE ORAL at 20:27

## 2023-01-30 RX ADMIN — DEXAMETHASONE SODIUM PHOSPHATE 6 MG: 4 INJECTION, SOLUTION INTRAMUSCULAR; INTRAVENOUS at 08:52

## 2023-01-30 RX ADMIN — AMLODIPINE BESYLATE 10 MG: 5 TABLET ORAL at 08:51

## 2023-01-30 RX ADMIN — SODIUM BICARBONATE 650 MG TABLET 1300 MG: at 20:27

## 2023-01-30 RX ADMIN — SEVELAMER CARBONATE 800 MG: 800 TABLET, FILM COATED ORAL at 18:11

## 2023-01-30 RX ADMIN — Medication 3 ML: at 08:52

## 2023-01-30 RX ADMIN — RISPERIDONE 0.25 MG: 0.25 TABLET ORAL at 08:51

## 2023-01-30 RX ADMIN — HEPARIN SODIUM 5000 UNITS: 5000 INJECTION INTRAVENOUS; SUBCUTANEOUS at 15:04

## 2023-01-30 RX ADMIN — GUAIFENESIN 600 MG: 600 TABLET, EXTENDED RELEASE ORAL at 08:51

## 2023-01-30 RX ADMIN — ALBUTEROL SULFATE 2 PUFF: 108 INHALANT RESPIRATORY (INHALATION) at 18:20

## 2023-01-31 ENCOUNTER — READMISSION MANAGEMENT (OUTPATIENT)
Dept: CALL CENTER | Facility: HOSPITAL | Age: 73
End: 2023-01-31
Payer: MEDICARE

## 2023-01-31 VITALS
HEART RATE: 88 BPM | SYSTOLIC BLOOD PRESSURE: 144 MMHG | WEIGHT: 161.82 LBS | BODY MASS INDEX: 31.77 KG/M2 | DIASTOLIC BLOOD PRESSURE: 53 MMHG | TEMPERATURE: 98.4 F | OXYGEN SATURATION: 95 % | RESPIRATION RATE: 18 BRPM | HEIGHT: 60 IN

## 2023-01-31 PROBLEM — D89.831 CYTOKINE RELEASE SYNDROME, GRADE 1: Status: ACTIVE | Noted: 2023-01-31

## 2023-01-31 LAB
ALBUMIN SERPL-MCNC: 3.9 G/DL (ref 3.5–5.2)
ALP SERPL-CCNC: 98 U/L (ref 39–117)
ALT SERPL W P-5'-P-CCNC: <5 U/L (ref 1–33)
ANION GAP SERPL CALCULATED.3IONS-SCNC: 17 MMOL/L (ref 5–15)
AST SERPL-CCNC: 12 U/L (ref 1–32)
BASOPHILS # BLD AUTO: 0.1 10*3/MM3 (ref 0–0.2)
BASOPHILS NFR BLD AUTO: 1 % (ref 0–1.5)
BILIRUB CONJ SERPL-MCNC: <0.2 MG/DL (ref 0–0.3)
BILIRUB INDIRECT SERPL-MCNC: NORMAL MG/DL
BILIRUB SERPL-MCNC: 0.2 MG/DL (ref 0–1.2)
BUN SERPL-MCNC: 47 MG/DL (ref 8–23)
BUN/CREAT SERPL: 8.9 (ref 7–25)
CALCIUM SPEC-SCNC: 9.2 MG/DL (ref 8.6–10.5)
CHLORIDE SERPL-SCNC: 98 MMOL/L (ref 98–107)
CO2 SERPL-SCNC: 25 MMOL/L (ref 22–29)
CREAT SERPL-MCNC: 5.26 MG/DL (ref 0.57–1)
DEPRECATED RDW RBC AUTO: 57.3 FL (ref 37–54)
EGFRCR SERPLBLD CKD-EPI 2021: 8.1 ML/MIN/1.73
EOSINOPHIL # BLD AUTO: 0.1 10*3/MM3 (ref 0–0.4)
EOSINOPHIL NFR BLD AUTO: 1.1 % (ref 0.3–6.2)
ERYTHROCYTE [DISTWIDTH] IN BLOOD BY AUTOMATED COUNT: 17 % (ref 12.3–15.4)
GLUCOSE BLDC GLUCOMTR-MCNC: 181 MG/DL (ref 70–105)
GLUCOSE BLDC GLUCOMTR-MCNC: 78 MG/DL (ref 70–105)
GLUCOSE SERPL-MCNC: 113 MG/DL (ref 65–99)
HCT VFR BLD AUTO: 30.2 % (ref 34–46.6)
HGB BLD-MCNC: 10.6 G/DL (ref 12–15.9)
LYMPHOCYTES # BLD AUTO: 0.9 10*3/MM3 (ref 0.7–3.1)
LYMPHOCYTES NFR BLD AUTO: 11 % (ref 19.6–45.3)
MAGNESIUM SERPL-MCNC: 2.1 MG/DL (ref 1.6–2.4)
MCH RBC QN AUTO: 31.7 PG (ref 26.6–33)
MCHC RBC AUTO-ENTMCNC: 35 G/DL (ref 31.5–35.7)
MCV RBC AUTO: 90.7 FL (ref 79–97)
MONOCYTES # BLD AUTO: 0.9 10*3/MM3 (ref 0.1–0.9)
MONOCYTES NFR BLD AUTO: 10.3 % (ref 5–12)
NEUTROPHILS NFR BLD AUTO: 6.5 10*3/MM3 (ref 1.7–7)
NEUTROPHILS NFR BLD AUTO: 76.6 % (ref 42.7–76)
NRBC BLD AUTO-RTO: 0 /100 WBC (ref 0–0.2)
PHOSPHATE SERPL-MCNC: 5.5 MG/DL (ref 2.5–4.5)
PLATELET # BLD AUTO: 480 10*3/MM3 (ref 140–450)
PMV BLD AUTO: 7.9 FL (ref 6–12)
POTASSIUM SERPL-SCNC: 4.3 MMOL/L (ref 3.5–5.2)
PROT SERPL-MCNC: 6.9 G/DL (ref 6–8.5)
RBC # BLD AUTO: 3.33 10*6/MM3 (ref 3.77–5.28)
SODIUM SERPL-SCNC: 140 MMOL/L (ref 136–145)
WBC NRBC COR # BLD: 8.4 10*3/MM3 (ref 3.4–10.8)

## 2023-01-31 PROCEDURE — 80048 BASIC METABOLIC PNL TOTAL CA: CPT

## 2023-01-31 PROCEDURE — 63710000001 DEXAMETHASONE PER 0.25 MG: Performed by: INTERNAL MEDICINE

## 2023-01-31 PROCEDURE — 83735 ASSAY OF MAGNESIUM: CPT

## 2023-01-31 PROCEDURE — 94799 UNLISTED PULMONARY SVC/PX: CPT

## 2023-01-31 PROCEDURE — 63710000001 INSULIN LISPRO (HUMAN) PER 5 UNITS: Performed by: INTERNAL MEDICINE

## 2023-01-31 PROCEDURE — 36415 COLL VENOUS BLD VENIPUNCTURE: CPT

## 2023-01-31 PROCEDURE — 84100 ASSAY OF PHOSPHORUS: CPT

## 2023-01-31 PROCEDURE — 82962 GLUCOSE BLOOD TEST: CPT

## 2023-01-31 PROCEDURE — 85025 COMPLETE CBC W/AUTO DIFF WBC: CPT

## 2023-01-31 PROCEDURE — 80076 HEPATIC FUNCTION PANEL: CPT | Performed by: NURSE PRACTITIONER

## 2023-01-31 PROCEDURE — 25010000002 REMDESIVIR 100 MG/20ML SOLUTION 1 EACH VIAL: Performed by: NURSE PRACTITIONER

## 2023-01-31 PROCEDURE — 94664 DEMO&/EVAL PT USE INHALER: CPT

## 2023-01-31 PROCEDURE — 25010000002 HEPARIN (PORCINE) PER 1000 UNITS: Performed by: NURSE PRACTITIONER

## 2023-01-31 RX ORDER — SEVELAMER CARBONATE 800 MG/1
800 TABLET, FILM COATED ORAL
Qty: 90 TABLET | Refills: 2 | Status: SHIPPED | OUTPATIENT
Start: 2023-01-31

## 2023-01-31 RX ORDER — DEXAMETHASONE 4 MG/1
2 TABLET ORAL
Qty: 3 TABLET | Refills: 0 | Status: SHIPPED | OUTPATIENT
Start: 2023-02-01

## 2023-01-31 RX ADMIN — SODIUM BICARBONATE 650 MG TABLET 1300 MG: at 08:52

## 2023-01-31 RX ADMIN — HEPARIN SODIUM 5000 UNITS: 5000 INJECTION INTRAVENOUS; SUBCUTANEOUS at 13:13

## 2023-01-31 RX ADMIN — SEVELAMER CARBONATE 800 MG: 800 TABLET, FILM COATED ORAL at 08:53

## 2023-01-31 RX ADMIN — GUAIFENESIN 600 MG: 600 TABLET, EXTENDED RELEASE ORAL at 08:53

## 2023-01-31 RX ADMIN — BUDESONIDE AND FORMOTEROL FUMARATE DIHYDRATE 2 PUFF: 160; 4.5 AEROSOL RESPIRATORY (INHALATION) at 06:47

## 2023-01-31 RX ADMIN — SEVELAMER CARBONATE 800 MG: 800 TABLET, FILM COATED ORAL at 13:13

## 2023-01-31 RX ADMIN — ALBUTEROL SULFATE 2 PUFF: 108 INHALANT RESPIRATORY (INHALATION) at 06:47

## 2023-01-31 RX ADMIN — REMDESIVIR 100 MG: 100 INJECTION, POWDER, LYOPHILIZED, FOR SOLUTION INTRAVENOUS at 08:51

## 2023-01-31 RX ADMIN — INSULIN LISPRO 2 UNITS: 100 INJECTION, SOLUTION INTRAVENOUS; SUBCUTANEOUS at 13:13

## 2023-01-31 RX ADMIN — Medication 3 ML: at 08:54

## 2023-01-31 RX ADMIN — ALBUTEROL SULFATE 2 PUFF: 108 INHALANT RESPIRATORY (INHALATION) at 11:03

## 2023-01-31 RX ADMIN — ASPIRIN 81 MG: 81 TABLET, COATED ORAL at 08:53

## 2023-01-31 RX ADMIN — LOSARTAN POTASSIUM 100 MG: 50 TABLET, FILM COATED ORAL at 08:52

## 2023-01-31 RX ADMIN — RISPERIDONE 0.25 MG: 0.25 TABLET ORAL at 08:53

## 2023-01-31 RX ADMIN — ALBUTEROL SULFATE 2 PUFF: 108 INHALANT RESPIRATORY (INHALATION) at 15:13

## 2023-01-31 RX ADMIN — DEXAMETHASONE 4 MG: 4 TABLET ORAL at 08:54

## 2023-01-31 RX ADMIN — AMLODIPINE BESYLATE 10 MG: 5 TABLET ORAL at 08:53

## 2023-02-01 NOTE — OUTREACH NOTE
Prep Survey    Flowsheet Row Responses   [facility] patient discharged from? [facility]   Is LACE score < 7 ? No   Eligibility Readm Mgmt   Discharge diagnosis COVID   Does the patient have one of the following disease processes/diagnoses(primary or secondary)? Other   Does the patient have Home health ordered? No   Is there a DME ordered? No   Prep survey completed? Yes          VETO MORALES - Registered Nurse

## 2023-02-02 NOTE — PROGRESS NOTES
"Enter Query Response Below      Query Response:     Pt had Metabolic encephalopathy related to COVID-19 infection.         If applicable, please update the problem list.      Patient: Sherry Lobato        : 1950  Account: [id number]           Admit Date: 2023        How to Respond to this query:       a. Click New Note     b. Answer query within the yellow box.                c. Update the Problem List, if applicable.      If you have any questions about this query contact me at: [phone]    Dr. Taylor:     72-year-old female presented with abdominal pain, loss of appetite, and generalized weakness, and admitted with altered mental status, acute respiratory failure, and found to be COVID-19 positive.  The ED Provider note states, \"However her last normal would have been before bed last night.  There was 2 missing oxycodone 10 mg.  Potentially she accidentally took too much.  She states she does not remember that.  She has no memory of the morning\".   The note also includes that she was sleepy and lethargic.  On 23, per Adult PCS Body System flowsheet, her [place] Coma Scale score was 14, opens eyes to speech, obeys commands, and oriented.   On 23 her GCS score was 15.  The Hospitalist progress note on 23 states, \"Metabolic encephalopathy -resolving as COVID improves\".  The discharge summary states, \"Family reported that the patient had seemed normal the day before however on the morning of presentation she was difficult to arouse from sleep and then slept most of the day.  The patient does take oxycodone and the count was off by 2 therefore she potentially took an increased dose\".  She was treated during her hospital stay with oxygen, IV Narcan, IV Haldol, IV remdesivir, IV and oral Decadron.       After study, can the patient's condition be further clarified as:     -Poisoning/overdose secondary to medication taken not as prescribed with toxic encephalopathy   -Metabolic " encephalopathy related to COVID-19 infection  -Poisoning/overdose secondary to medication taken not as prescribed with toxic encephalopathy   and metabolic encephalopathy related to COVID-19 infection  -Other-specify_____________________________  -Unable to determine     By submitting this query, we are merely seeking further clarification of documentation to accurately reflect all conditions that you are monitoring, evaluating, treating or that extend the hospitalization or utilize additional resources of care. Please utilize your independent clinical judgment when addressing the question(s) above.     This query and your response, once completed, will be entered into the legal medical record.    Sincerely,  Tere Mosquera RN,BSN  [email or web address]  Clinical Documentation Integrity Program

## 2023-02-10 ENCOUNTER — READMISSION MANAGEMENT (OUTPATIENT)
Dept: CALL CENTER | Facility: HOSPITAL | Age: 73
End: 2023-02-10
Payer: MEDICARE

## 2023-02-10 NOTE — OUTREACH NOTE
Medical Week 2 Survey    Flowsheet Row Responses   [facility] patient discharged from? [facility]   Does the patient have one of the following disease processes/diagnoses(primary or secondary)? Other   Week 2 attempt successful? Yes   Call start time 1059   Call end time 1101   Meds reviewed with patient/caregiver? Yes   Is the patient having any side effects they believe may be caused by any medication additions or changes? No   Does the patient have all medications ordered at discharge? Yes   Is the patient taking all medications as directed (includes completed medication regime)? Yes   Does the patient have a primary care provider?  Yes   Does the patient have an appointment with their PCP within 7 days of discharge? Greater than 7 days   What is preventing the patient from scheduling follow up appointments within 7 days of discharge? Earlier appointment not available   Nursing Interventions Verified appointment date/time/provider   Has the patient kept scheduled appointments due by today? N/A   Has home health visited the patient within 72 hours of discharge? N/A   Psychosocial issues? No   Did the patient receive a copy of their discharge instructions? Yes   Nursing interventions Reviewed instructions with patient   What is the patient's perception of their health status since discharge? Improving   Is the patient/caregiver able to teach back signs and symptoms related to disease process for when to call PCP? Yes   Is the patient/caregiver able to teach back signs and symptoms related to disease process for when to call 911? Yes   Is the patient/caregiver able to teach back the hierarchy of who to call/visit for symptoms/problems? PCP, Specialist, Home health nurse, Urgent Care, ED, 911 Yes   If the patient is a current smoker, are they able to teach back resources for cessation? Not a smoker   Week 2 Call Completed? Yes          Trinidad MORALES - Licensed Nurse

## 2023-02-14 ENCOUNTER — OFFICE VISIT (OUTPATIENT)
Dept: SURGERY | Facility: CLINIC | Age: 73
End: 2023-02-14
Payer: MEDICARE

## 2023-02-14 VITALS
TEMPERATURE: 98.2 F | HEIGHT: 60 IN | OXYGEN SATURATION: 97 % | SYSTOLIC BLOOD PRESSURE: 148 MMHG | WEIGHT: 156.2 LBS | RESPIRATION RATE: 18 BRPM | DIASTOLIC BLOOD PRESSURE: 73 MMHG | HEART RATE: 84 BPM | BODY MASS INDEX: 30.67 KG/M2

## 2023-02-14 DIAGNOSIS — K43.2 INCISIONAL HERNIA, WITHOUT OBSTRUCTION OR GANGRENE: Primary | ICD-10-CM

## 2023-02-14 PROCEDURE — 99213 OFFICE O/P EST LOW 20 MIN: CPT | Performed by: SURGERY

## 2023-02-15 NOTE — PROGRESS NOTES
"Subjective   Sherry Lobato is a 72 y.o. female.   A couple weeks out from an open incisional hernia repair.  Says that she is finally got home she had to be readmitted to the hospital with altered mental status but then has been discharged.  She says she is kind of weak but in general gradually improving.  She says her abdomen has been doing pretty well no severe nausea vomiting pain.  Her bowels are moving better.  Objective   /73 (BP Location: Right arm, Patient Position: Sitting, Cuff Size: Adult)   Pulse 84   Temp 98.2 °F (36.8 °C) (Infrared)   Resp 18   Ht 152.4 cm (60\")   Wt 70.9 kg (156 lb 3.2 oz)   SpO2 97%   BMI 30.51 kg/m²   Physical Exam  Constitutional:       Appearance: Normal appearance.   HENT:      Head: Normocephalic and atraumatic.   Cardiovascular:      Rate and Rhythm: Normal rate.   Pulmonary:      Effort: Pulmonary effort is normal. No respiratory distress.   Abdominal:      Comments: Abdomen is soft mild tenderness to palpation along the incision there is appropriate induration no erythema or drainage   Skin:     General: Skin is warm and dry.   Neurological:      General: No focal deficit present.      Mental Status: She is alert. Mental status is at baseline.         Assessment & Plan   Diagnoses and all orders for this visit:    1. Incisional hernia, without obstruction or gangrene (Primary)    Status post open incisional hernia repair with mesh.  Was fairly difficult recovery for her.  She seems to be healing okay.  Continue lifting restrictions okay for diet as tolerated laxatives as needed.  Follow-up in about 1 month for second postop check.    Nilay Stevens MD  2/15/2023  11:41 AM EST    This note was created using Dragon Voice Recognition software.  "

## 2023-02-23 ENCOUNTER — READMISSION MANAGEMENT (OUTPATIENT)
Dept: CALL CENTER | Facility: HOSPITAL | Age: 73
End: 2023-02-23
Payer: MEDICARE

## 2023-02-23 NOTE — OUTREACH NOTE
Medical Week 4 Survey    Flowsheet Row Responses   [facility] patient discharged from? [facility]   Does the patient have one of the following disease processes/diagnoses(primary or secondary)? Other   Week 4 attempt successful? Yes   Call start time 1513   Call end time 1514   Discharge diagnosis COVID   Meds reviewed with patient/caregiver? Yes   Is the patient having any side effects they believe may be caused by any medication additions or changes? No   Is the patient taking all medications as directed (includes completed medication regime)? Yes   Has the patient kept scheduled appointments due by today? Yes   Is the patient still receiving Home Health Services? N/A   Psychosocial issues? No   What is the patient's perception of their health status since discharge? Improving   Is the patient/caregiver able to teach back the hierarchy of who to call/visit for symptoms/problems? PCP, Specialist, Home health nurse, Urgent Care, ED, 911 Yes   Week 4 Call Completed? Yes   Would the patient like one additional call? No   Graduated Yes   Is the patient interested in additional calls from an ambulatory ?  NOTE:  applies to high risk patients requiring additional follow-up. No          Lisa MORALES - Registered Nurse

## 2023-03-08 ENCOUNTER — HOSPITAL ENCOUNTER (EMERGENCY)
Facility: HOSPITAL | Age: 73
Discharge: HOME | End: 2023-03-08
Attending: EMERGENCY MEDICINE | Admitting: EMERGENCY MEDICINE
Payer: MEDICARE

## 2023-03-08 VITALS
HEART RATE: 86 BPM | TEMPERATURE: 97.9 F | WEIGHT: 160.94 LBS | OXYGEN SATURATION: 98 % | SYSTOLIC BLOOD PRESSURE: 163 MMHG | BODY MASS INDEX: 31.6 KG/M2 | HEIGHT: 60 IN | DIASTOLIC BLOOD PRESSURE: 74 MMHG | RESPIRATION RATE: 18 BRPM

## 2023-03-08 DIAGNOSIS — L03.116 LEFT LEG CELLULITIS: Primary | ICD-10-CM

## 2023-03-08 DIAGNOSIS — M79.605 LEFT LEG PAIN: ICD-10-CM

## 2023-03-08 PROCEDURE — 99282 EMERGENCY DEPT VISIT SF MDM: CPT

## 2023-03-08 RX ORDER — CEPHALEXIN 500 MG/1
500 CAPSULE ORAL 2 TIMES DAILY
Qty: 20 CAPSULE | Refills: 0 | Status: SHIPPED | OUTPATIENT
Start: 2023-03-08 | End: 2023-03-18

## 2023-03-08 RX ORDER — SACCHAROMYCES BOULARDII 250 MG
250 CAPSULE ORAL 2 TIMES DAILY
Qty: 60 CAPSULE | Refills: 0 | Status: SHIPPED | OUTPATIENT
Start: 2023-03-08 | End: 2023-04-07

## 2023-03-08 NOTE — ED PROVIDER NOTES
Subjective   History of Present Illness  Chief Complaint: Left calf abscess    Patient is a 72-year-old female who presents to the ED reporting of a suspected insect bite on her left calf that she noticed Saturday morning.  Patient states she began having severe itching and pain upon awaking Saturday morning.  She denies any injury to the area or feeling an insect bite.  Patient admits to increased swelling and warmth of the area.  Patient went to urgent care on Sunday and was told it was likely a insect/spider bite and was given Mupirocin and hydrocortisone creams.  She states the pain is since increased. Patient describes the pain as constant and rates it 5/10 when seated which increases to 10/10 when walking and radiates down to her ankle.  Patient denies any fever, chills, shortness of breath, numbness, weakness.  There has been no drainage or bleeding to the area.  Patient states that she is anxious about taking antibiotics due to previous C. difficile infections.    PCP: Willian Malcolm    History provided by:  Patient      Review of Systems   Constitutional: Negative for chills and fever.   Respiratory: Negative for cough, chest tightness and shortness of breath.    Cardiovascular: Negative for chest pain.   Gastrointestinal: Negative.    Genitourinary: Negative.    Musculoskeletal: Positive for myalgias.   Skin: Positive for color change and wound. Negative for pallor.   Neurological: Negative for weakness, light-headedness, numbness and headaches.   All other systems reviewed and are negative.      Medical History  DM2 (diabetes mellitus, type 2) ([facility]) History of colonoscopy   History of degenerative disc disease Renal cell cancer, right (HCC)   Proteinuria Nephrotic syndrome   Hypertension Gastroparesis   Rotator cuff tear Lucas's esophagus   Vitamin D deficiency Gout   Rib pain on right side Hyperlipidemia   Vitamin B 12 deficiency FABY (obstructive sleep apnea)   Uterine cancer (HCC) Microscopic colitis  "  Osteoarthritis Osteoarthritis, knee   C. difficile colitis Neuroendocrine tumor   ESRD (end stage renal disease) (HCC) Carotid artery disease (HCC)   Gastric ulcer Hernia, incisional   Hemodialysis patient (HCC) Stroke (HCC)   COPD (chronic obstructive pulmonary disease) (HCC) Pedal edema   GERD (gastroesophageal reflux disease)          Allergies   Allergen Reactions   • Atorvastatin Calcium Other (See Comments)     \"felt like I was on fire\"   • Gabapentin Dizziness     says was unable to walk   • Niacin Other (See Comments)     \"FEELS LIKE SHE IS ON FIRE\"         Past Surgical History:   Procedure Laterality Date   • APPENDECTOMY     • ARTERIOVENOUS FISTULA Right    • ARTERIOVENOUS FISTULA REPAIR      Clotted off and insert graft   • ARTERIOVENOUS FISTULA/SHUNT SURGERY Left 12/30/2020    Procedure: ARTERIOVENOUS FISTULA FORMATION;  Surgeon: Jan Caicedo MD;  Location: [facility] MAIN OR;  Service: Vascular;  Laterality: Left;   • BREAST BIOPSY      right nipple 1981   • COLONOSCOPY N/A 11/24/2020    Procedure: COLONOSCOPY with polypectomy x 7;  Surgeon: Jeffery White MD;  Location: [facility] ENDOSCOPY;  Service: Gastroenterology;  Laterality: N/A;  post op: polyps, diverticulosis, hemorrhoids   • ENDOSCOPY N/A 3/12/2022    Procedure: ESOPHAGOGASTRODUODENOSCOPY with biopsy x 1 area;  Surgeon: Javan Augustin MD;  Location: [facility] ENDOSCOPY;  Service: Gastroenterology;  Laterality: N/A;  post op: anastamosis   • GASTRIC RESECTION      cancer   • HYSTERECTOMY     • LAPAROSCOPIC CHOLECYSTECTOMY     • NEPHRECTOMY      right kidney removed    • REDUCTION MAMMAPLASTY     • TUMOR REMOVAL      Multiple removed from abdominal    • VENTRAL/INCISIONAL HERNIA REPAIR Right 8/28/2019    Procedure: VENTRAL/INCISIONAL HERNIA REPAIR;  Surgeon: Nilay Stevens MD;  Location: [facility] MAIN OR;  Service: General   • VENTRAL/INCISIONAL HERNIA REPAIR N/A 10/1/2019    Procedure: OPEN VENTRAL/INCISIONAL HERNIA REPAIR;  Surgeon: " Nilay Stevens MD;  Location: [facility] MAIN OR;  Service: General   • VENTRAL/INCISIONAL HERNIA REPAIR N/A 10/11/2021    Procedure: VENTRAL/INCISIONAL HERNIA REPAIR LAPAROSCOPIC;  Surgeon: Nilay Stevens MD;  Location: [facility] MAIN OR;  Service: General;  Laterality: N/A;   • VENTRAL/INCISIONAL HERNIA REPAIR N/A 2023    Procedure: VENTRAL/INCISIONAL HERNIA REPAIR with MESH;  Surgeon: Nilay Stevens MD;  Location: [facility] MAIN OR;  Service: General;  Laterality: N/A;       Family History   Problem Relation Age of Onset   • Heart disease Father        Social History     Socioeconomic History   • Marital status: Single   Tobacco Use   • Smoking status: Former     Packs/day: 0.25     Years: 57.00     Pack years: 14.25     Types: Cigarettes     Quit date: 2021     Years since quittin.8     Passive exposure: Past   • Smokeless tobacco: Never   • Tobacco comments:     quit smoking 2 months ago   Vaping Use   • Vaping Use: Former   Substance and Sexual Activity   • Alcohol use: No   • Drug use: No   • Sexual activity: Not Currently           Objective   Physical Exam  Vitals and nursing note reviewed.   Constitutional:       Appearance: Normal appearance. She is well-developed and normal weight. She is not ill-appearing or toxic-appearing.   HENT:      Head: Normocephalic and atraumatic.      Nose: Nose normal.   Eyes:      Extraocular Movements: Extraocular movements intact.      Pupils: Pupils are equal, round, and reactive to light.   Cardiovascular:      Rate and Rhythm: Normal rate and regular rhythm.      Heart sounds: Normal heart sounds.   Pulmonary:      Effort: Pulmonary effort is normal. No respiratory distress.      Breath sounds: Normal breath sounds. No wheezing.   Abdominal:      General: Bowel sounds are normal. There is no distension.      Palpations: Abdomen is soft.      Tenderness: There is no abdominal tenderness. There is no right CVA tenderness or left CVA tenderness.   Skin:      "General: Skin is warm and dry.      Capillary Refill: Capillary refill takes less than 2 seconds.      Findings: Erythema present. No rash.      Comments: 5 x 5 cm area of erythema, warmth with central eschar.  There is no fluctuance or suggestion of abscess.  No drainage.  This is localized to the calf.  There is no streaking of skin or lymphangitis   Neurological:      General: No focal deficit present.      Mental Status: She is alert and oriented to person, place, and time.      Cranial Nerves: No cranial nerve deficit.      Motor: No weakness.   Psychiatric:         Mood and Affect: Mood normal.         Behavior: Behavior normal.         Procedures           ED Course    /74 (BP Location: Left arm, Patient Position: Sitting)   Pulse 86   Temp 97.9 °F (36.6 °C) (Oral)   Resp 18   Ht 152.4 cm (60\")   Wt 73 kg (160 lb 15 oz)   SpO2 98%   BMI 31.43 kg/m²   Labs Reviewed - No data to display  Medications - No data to display  No radiology results for the last day                                         Medical Decision Making  Differential Dx (Includes but not limited to): Abscess, insect bite, cellulitis  Medical Records Reviewed: Patient's last admission 1/26/2023 for lower extremity edema, abdominal pain, altered mental status.  Patient required AVAPS and ultimately was weaned off oxygen prior to discharge  Labs: N/A  Imaging: N/A  Telemetry: N/A  Testing considered but not ordered: X-ray, no fall or trauma  Nature of Complaint: Acute  Admission vs Discharge: Discharge  Discussion: While in the ED appropriate PPE was worn during exam and throughout all encounters with the patient.  Patient had the above evaluation.  She is afebrile, nontoxic appearance and in no acute distress.  There is no central fluctuance or drainage to suggest abscess at this time.  Physical exam consistent with likely cellulitis.  Patient be started on renal dosed Keflex for 10 days.  She is nervous about taking antibiotics due " to recent C. difficile infection.  She will also be started on probiotic.  Patient is agreeable with this plan.  Patient is felt to be stable for discharge I see no indication for admission at this time.    Discharge plan and instructions were discussed with the patient who verbalized understanding and is in agreement with the plan, all questions were answered at this time.  Patient is aware of signs symptoms that would require immediate return to the emergency room.  Patient understands importance of following up with primary care provider for further evaluation and worsening concerns as well as blood pressure recheck in the next 4 weeks.    Patient was discharged in improved stable condition with an upright steady gait.    Patient is aware that discharge does not mean that nothing is wrong but indicates no emergencies present and they must continue care with follow-up as given below or physician of her choice.    Left leg cellulitis: acute illness or injury  Left leg pain: acute illness or injury  Amount and/or Complexity of Data Reviewed  External Data Reviewed: notes.      Risk  OTC drugs.  Prescription drug management.          Final diagnoses:   Left leg cellulitis   Left leg pain       ED Disposition  ED Disposition     ED Disposition   Discharge    Condition   Stable    Comment   --             Jin Malcolm PA-C  [address] [facility] [place] [address] [phone]    Schedule an appointment as soon as possible for a visit in 2 days  As needed, If symptoms worsen         Medication List      New Prescriptions    cephalexin 500 MG capsule  Commonly known as: KEFLEX  Take 1 capsule by mouth 2 (Two) Times a Day for 10 days.     saccharomyces boulardii 250 MG capsule  Commonly known as: FLORASTOR  Take 1 capsule by mouth 2 (Two) Times a Day for 30 days.           Where to Get Your Medications      These medications were sent to [id number] - [place], [place] - [address] AT  [facility] - [phone]  - [phone] FX  [address] [facility], [place] [address]    Phone: [phone]   · cephalexin 500 MG capsule  · saccharomyces boulardii 250 MG capsule          Lisa Malcolm PA  03/08/23 [id number]

## 2023-03-08 NOTE — DISCHARGE INSTRUCTIONS
Take Keflex antibiotics as prescribed  Take probiotic as prescribed    Keep the area clean, wash with soap and water at least once per day.    You may continue to put the mupirocin ointment on top, you do not need the hydrocortisone cream    Follow-up with your primary care provider for recheck  Return to the ER for new or worsening symptoms

## 2023-03-14 ENCOUNTER — OFFICE VISIT (OUTPATIENT)
Dept: SURGERY | Facility: CLINIC | Age: 73
End: 2023-03-14
Payer: MEDICARE

## 2023-03-14 VITALS
RESPIRATION RATE: 18 BRPM | BODY MASS INDEX: 31.33 KG/M2 | HEIGHT: 60 IN | WEIGHT: 159.6 LBS | DIASTOLIC BLOOD PRESSURE: 90 MMHG | TEMPERATURE: 98 F | OXYGEN SATURATION: 99 % | HEART RATE: 94 BPM | SYSTOLIC BLOOD PRESSURE: 123 MMHG

## 2023-03-14 DIAGNOSIS — K43.2 INCISIONAL HERNIA, WITHOUT OBSTRUCTION OR GANGRENE: Primary | ICD-10-CM

## 2023-03-14 PROCEDURE — 99213 OFFICE O/P EST LOW 20 MIN: CPT | Performed by: SURGERY

## 2023-03-14 RX ORDER — PANTOPRAZOLE SODIUM 40 MG/1
1 TABLET, DELAYED RELEASE ORAL EVERY 12 HOURS SCHEDULED
COMMUNITY
Start: 2023-03-09

## 2023-03-14 RX ORDER — HYDROCORTISONE 10 MG/G
CREAM TOPICAL
COMMUNITY
Start: 2023-03-06

## 2023-03-14 NOTE — PROGRESS NOTES
"Subjective   Sherry Lobato is a 72 y.o. female.   About 6 weeks out from an open incisional hernia repair.  In general doing okay but she says that she has had some weeping and nonhealing of the incision.  She has had to place a Band-Aid over it daily.  Over the last several days it has gradually stopped.  She says that she developed a soft tissue infection on her leg and she was given antibiotics for that in the last week.  That is gradually resolving.  Tolerating a diet having bowel function.    Objective   /90 (BP Location: Right arm, Patient Position: Sitting, Cuff Size: Adult)   Pulse 94   Temp 98 °F (36.7 °C) (Infrared)   Resp 18   Ht 152.4 cm (60\")   Wt 72.4 kg (159 lb 9.6 oz)   SpO2 99%   BMI 31.17 kg/m²   Physical Exam  On exam the abdomen is soft nondistended minimally tender to palpation healing induration around the incision there is some minor hyperemia there is no fluctuance or erythema there is no significant drainage from the incision present.  Assessment & Plan   Diagnoses and all orders for this visit:    1. Incisional hernia, without obstruction or gangrene (Primary)    About 6 weeks out from open incisional hernia repair.  Gradually increase activity as tolerated.  Diet as tolerated.  I do want to check her wound again in about a month right now it does seem like it is pretty much healed up but with the report of the drainage would want to have a least 1 more check to make sure this is healed up.    Nilay Stevens MD  3/14/2023  9:21 AM EDT    This note was created using Dragon Voice Recognition software.  "

## 2023-04-25 ENCOUNTER — OFFICE VISIT (OUTPATIENT)
Dept: SURGERY | Facility: CLINIC | Age: 73
End: 2023-04-25
Payer: MEDICARE

## 2023-04-25 VITALS
DIASTOLIC BLOOD PRESSURE: 71 MMHG | WEIGHT: 157.6 LBS | HEIGHT: 60 IN | TEMPERATURE: 98 F | RESPIRATION RATE: 18 BRPM | BODY MASS INDEX: 30.94 KG/M2 | OXYGEN SATURATION: 97 % | SYSTOLIC BLOOD PRESSURE: 179 MMHG | HEART RATE: 88 BPM

## 2023-04-25 DIAGNOSIS — K43.2 INCISIONAL HERNIA, WITHOUT OBSTRUCTION OR GANGRENE: Primary | ICD-10-CM

## 2023-04-25 PROCEDURE — 99213 OFFICE O/P EST LOW 20 MIN: CPT | Performed by: SURGERY

## 2023-04-25 PROCEDURE — 1160F RVW MEDS BY RX/DR IN RCRD: CPT | Performed by: SURGERY

## 2023-04-25 PROCEDURE — 1159F MED LIST DOCD IN RCRD: CPT | Performed by: SURGERY

## 2023-04-25 PROCEDURE — 3078F DIAST BP <80 MM HG: CPT | Performed by: SURGERY

## 2023-04-25 PROCEDURE — 3077F SYST BP >= 140 MM HG: CPT | Performed by: SURGERY

## 2023-04-25 NOTE — PROGRESS NOTES
"Subjective   Sherry Lobato is a 72 y.o. female.   72-year-old lady was now several months out from open ventral hernia repair.  She is doing quite well.  Says her abdominal pain is basically gone no nausea or vomiting having bowel function that is regular for her.  Denies any issues with her incision has not had any more drainage just since prior to her last visit.  No significant abdominal pain  Objective   /71 (BP Location: Right arm, Patient Position: Sitting, Cuff Size: Adult)   Pulse 88   Temp 98 °F (36.7 °C) (Infrared)   Resp 18   Ht 152.4 cm (60\")   Wt 71.5 kg (157 lb 9.6 oz)   SpO2 97%   BMI 30.78 kg/m²   Physical Exam  On exam abdomen is soft appropriately tender to palpation induration at the repair site the incision is healing with some hyperemia there is no evidence of recurrent hernia or infection.  Assessment & Plan   Diagnoses and all orders for this visit:    1. Incisional hernia, without obstruction or gangrene (Primary)    Status post open incisional hernia repair with mesh.  From an abdominal wall standpoint she seems to be doing okay.  She certainly has many challenges that she is facing with her end-stage renal disease.  Mainly her appetite she says she does not have an appetite and only eats about 1 meal a day.  For now from my standpoint for an abdominal wall hernia repair is okay for her to follow-up as needed.  We will be available as needed in the near future to address any new issues that develop.    Nilay Stevens MD  4/25/2023  9:19 AM EDT    This note was created using Dragon Voice Recognition software.  "

## 2023-05-11 ENCOUNTER — HOSPITAL ENCOUNTER (OUTPATIENT)
Dept: INTERVENTIONAL RADIOLOGY/VASCULAR | Facility: HOSPITAL | Age: 73
Discharge: HOME | End: 2023-05-11
Payer: MEDICARE

## 2023-05-11 VITALS
WEIGHT: 156.53 LBS | RESPIRATION RATE: 13 BRPM | HEIGHT: 60 IN | DIASTOLIC BLOOD PRESSURE: 43 MMHG | HEART RATE: 78 BPM | SYSTOLIC BLOOD PRESSURE: 129 MMHG | OXYGEN SATURATION: 95 % | BODY MASS INDEX: 30.73 KG/M2

## 2023-05-11 DIAGNOSIS — Z99.2 ESRD (END STAGE RENAL DISEASE) ON DIALYSIS: ICD-10-CM

## 2023-05-11 DIAGNOSIS — N18.6 ESRD (END STAGE RENAL DISEASE) ON DIALYSIS: ICD-10-CM

## 2023-05-11 PROCEDURE — C1894 INTRO/SHEATH, NON-LASER: HCPCS

## 2023-05-11 PROCEDURE — 25010000002 FENTANYL CITRATE (PF) 50 MCG/ML SOLUTION: Performed by: RADIOLOGY

## 2023-05-11 PROCEDURE — 0 LIDOCAINE 1 % SOLUTION: Performed by: RADIOLOGY

## 2023-05-11 PROCEDURE — C1725 CATH, TRANSLUMIN NON-LASER: HCPCS

## 2023-05-11 PROCEDURE — C1769 GUIDE WIRE: HCPCS

## 2023-05-11 RX ORDER — LIDOCAINE HYDROCHLORIDE 10 MG/ML
INJECTION, SOLUTION INFILTRATION; PERINEURAL AS NEEDED
Status: COMPLETED | OUTPATIENT
Start: 2023-05-11 | End: 2023-05-11

## 2023-05-11 RX ORDER — FENTANYL CITRATE 50 UG/ML
INJECTION, SOLUTION INTRAMUSCULAR; INTRAVENOUS AS NEEDED
Status: COMPLETED | OUTPATIENT
Start: 2023-05-11 | End: 2023-05-11

## 2023-05-11 RX ADMIN — FENTANYL CITRATE 50 MCG: 50 INJECTION, SOLUTION INTRAMUSCULAR; INTRAVENOUS at 14:07

## 2023-05-11 RX ADMIN — LIDOCAINE HYDROCHLORIDE 3 ML: 10 INJECTION, SOLUTION INFILTRATION; PERINEURAL at 13:58

## 2023-05-11 NOTE — H&P
[facility]   Interventional Radiology H&P    Patient Name: Sherry Lobato  : 1950  MRN: [id number]  Primary Care Physician:  Jin Malcolm PA-C  Referring Physician: Faustino Buckley MD  Date of admission: 2023    Subjective   Subjective     HPI:  Sherry Lobato is a 72 y.o. female here with fistula dysfunction.    Review of Systems:   Constitutional no fever,  no weight loss       Otolaryngeal no difficulty swallowing   Cardiovascular no chest pain   Pulmonary no cough, no sputum production   Gastrointestinal no constipation, no diarrhea                         Personal History       Past Medical/Surgical History:     Past Surgical History:   Procedure Laterality Date   • APPENDECTOMY     • ARTERIOVENOUS FISTULA Right    • ARTERIOVENOUS FISTULA REPAIR      Clotted off and insert graft   • ARTERIOVENOUS FISTULA/SHUNT SURGERY Left 2020    Procedure: ARTERIOVENOUS FISTULA FORMATION;  Surgeon: Jan Caicedo MD;  Location: [facility] MAIN OR;  Service: Vascular;  Laterality: Left;   • BREAST BIOPSY      right nipple 1981   • COLONOSCOPY N/A 2020    Procedure: COLONOSCOPY with polypectomy x 7;  Surgeon: eJffery White MD;  Location: [facility] ENDOSCOPY;  Service: Gastroenterology;  Laterality: N/A;  post op: polyps, diverticulosis, hemorrhoids   • ENDOSCOPY N/A 3/12/2022    Procedure: ESOPHAGOGASTRODUODENOSCOPY with biopsy x 1 area;  Surgeon: Javan Augustin MD;  Location: [facility] ENDOSCOPY;  Service: Gastroenterology;  Laterality: N/A;  post op: anastamosis   • GASTRIC RESECTION      cancer   • HYSTERECTOMY     • LAPAROSCOPIC CHOLECYSTECTOMY     • NEPHRECTOMY      right kidney removed    • REDUCTION MAMMAPLASTY     • TUMOR REMOVAL      Multiple removed from abdominal    • VENTRAL/INCISIONAL HERNIA REPAIR Right 2019    Procedure: VENTRAL/INCISIONAL HERNIA REPAIR;  Surgeon: Nilay Stevens MD;  Location: [facility] MAIN OR;  Service: General   • VENTRAL/INCISIONAL HERNIA REPAIR N/A  "10/1/2019    Procedure: OPEN VENTRAL/INCISIONAL HERNIA REPAIR;  Surgeon: Nilay Stevens MD;  Location: [facility] MAIN OR;  Service: General   • VENTRAL/INCISIONAL HERNIA REPAIR N/A 10/11/2021    Procedure: VENTRAL/INCISIONAL HERNIA REPAIR LAPAROSCOPIC;  Surgeon: Nilay Stevens MD;  Location: [facility] MAIN OR;  Service: General;  Laterality: N/A;   • VENTRAL/INCISIONAL HERNIA REPAIR N/A 1/23/2023    Procedure: VENTRAL/INCISIONAL HERNIA REPAIR with MESH;  Surgeon: Nilay Stevens MD;  Location: [facility] MAIN OR;  Service: General;  Laterality: N/A;       Social History:  reports that she quit smoking about 2 years ago. Her smoking use included cigarettes. She has a 14.25 pack-year smoking history. She has been exposed to tobacco smoke. She has never used smokeless tobacco. She reports that she does not drink alcohol and does not use drugs.    Medications:  (Not in a hospital admission)    Current medications:  iopamidol, 50 mL, Intravenous, Once in imaging      Current IV drips:       Allergies:  Allergies   Allergen Reactions   • Atorvastatin Calcium Other (See Comments)     \"felt like I was on fire\"   • Gabapentin Dizziness     says was unable to walk   • Niacin Other (See Comments)     \"FEELS LIKE SHE IS ON FIRE\"         Objective    Objective     Vitals:          Physical Exam:   Constitutional: Awake, alert, No acute distress    Respiratory: Clear to auscultation bilaterally, nonlabored respirations    Cardiovascular: RRR, no murmurs, rubs, or gallops, palpable pedal pulses bilaterally   Gastrointestinal: Positive bowel sounds, soft, nontender, nondistended        ASA SCALE ASSESSMENT:  2-Mild to moderate systemic disease, medically well controlled, with no functional limitation    MALLAMPATI CLASSIFICATION:  2-Able to visualize the soft palate, fauces, uvula. The anterior & posterior tonsilar pillars are hidden by the tongue.       Result Review        Result Review:     No results found for: NA    No results " found for: K    No results found for: CL    No results found for: PLASMABICARB    No results found for: BUN    No results found for: CREATININE    No results found for: CALCIUM        No components found for: GLUCOSE.*                 Assessment / Plan     Assesment:   fistula dysfunction      Plan:   fistulagram    The risks and benefits of the procedure were discussed with the patient.    Electronically signed by Donnie Ham MD, 05/11/23, 1:45 PM EDT.

## 2023-05-25 ENCOUNTER — OFFICE (AMBULATORY)
Dept: URBAN - METROPOLITAN AREA CLINIC 64 | Facility: CLINIC | Age: 73
End: 2023-05-25

## 2023-05-25 VITALS
SYSTOLIC BLOOD PRESSURE: 128 MMHG | DIASTOLIC BLOOD PRESSURE: 66 MMHG | HEART RATE: 82 BPM | WEIGHT: 158 LBS | HEIGHT: 63 IN

## 2023-05-25 DIAGNOSIS — K62.5 HEMORRHAGE OF ANUS AND RECTUM: ICD-10-CM

## 2023-05-25 DIAGNOSIS — N18.9 CHRONIC KIDNEY DISEASE, UNSPECIFIED: ICD-10-CM

## 2023-05-25 DIAGNOSIS — R19.7 DIARRHEA, UNSPECIFIED: ICD-10-CM

## 2023-05-25 DIAGNOSIS — D3A.8 OTHER BENIGN NEUROENDOCRINE TUMORS: ICD-10-CM

## 2023-05-25 DIAGNOSIS — D12.9 BENIGN NEOPLASM OF ANUS AND ANAL CANAL: ICD-10-CM

## 2023-05-25 PROCEDURE — 99214 OFFICE O/P EST MOD 30 MIN: CPT | Performed by: NURSE PRACTITIONER

## 2023-05-26 RX ORDER — CARVEDILOL 3.12 MG/1
3.12 TABLET ORAL 2 TIMES DAILY WITH MEALS
COMMUNITY

## 2023-06-01 ENCOUNTER — ON CAMPUS - OUTPATIENT (AMBULATORY)
Dept: URBAN - METROPOLITAN AREA HOSPITAL 85 | Facility: HOSPITAL | Age: 73
End: 2023-06-01

## 2023-06-01 ENCOUNTER — ANESTHESIA (OUTPATIENT)
Dept: GASTROENTEROLOGY | Facility: HOSPITAL | Age: 73
End: 2023-06-01
Payer: MEDICARE

## 2023-06-01 ENCOUNTER — HOSPITAL ENCOUNTER (OUTPATIENT)
Facility: HOSPITAL | Age: 73
Setting detail: HOSPITAL OUTPATIENT SURGERY
Discharge: HOME | End: 2023-06-01
Attending: INTERNAL MEDICINE
Payer: MEDICARE

## 2023-06-01 ENCOUNTER — ANESTHESIA EVENT (OUTPATIENT)
Dept: GASTROENTEROLOGY | Facility: HOSPITAL | Age: 73
End: 2023-06-01
Payer: MEDICARE

## 2023-06-01 VITALS
TEMPERATURE: 98.4 F | WEIGHT: 153.66 LBS | DIASTOLIC BLOOD PRESSURE: 61 MMHG | RESPIRATION RATE: 16 BRPM | HEIGHT: 60 IN | SYSTOLIC BLOOD PRESSURE: 115 MMHG | HEART RATE: 74 BPM | OXYGEN SATURATION: 99 % | BODY MASS INDEX: 30.17 KG/M2

## 2023-06-01 DIAGNOSIS — K64.1 SECOND DEGREE HEMORRHOIDS: ICD-10-CM

## 2023-06-01 DIAGNOSIS — D12.4 BENIGN NEOPLASM OF DESCENDING COLON: ICD-10-CM

## 2023-06-01 DIAGNOSIS — K62.1 COLORECTAL POLYPS: ICD-10-CM

## 2023-06-01 DIAGNOSIS — D3A.8 NEUROENDOCRINE TUMOR: ICD-10-CM

## 2023-06-01 DIAGNOSIS — K62.5 HEMORRHAGE OF ANUS AND RECTUM: ICD-10-CM

## 2023-06-01 DIAGNOSIS — N18.9 CHRONIC KIDNEY DISEASE: ICD-10-CM

## 2023-06-01 DIAGNOSIS — D12.5 BENIGN NEOPLASM OF SIGMOID COLON: ICD-10-CM

## 2023-06-01 DIAGNOSIS — K63.5 COLORECTAL POLYPS: ICD-10-CM

## 2023-06-01 DIAGNOSIS — K62.5 RECTAL BLEEDING: ICD-10-CM

## 2023-06-01 DIAGNOSIS — K57.30 DIVERTICULOSIS OF LARGE INTESTINE WITHOUT PERFORATION OR ABS: ICD-10-CM

## 2023-06-01 DIAGNOSIS — K52.9 NONINFECTIVE GASTROENTERITIS AND COLITIS, UNSPECIFIED: ICD-10-CM

## 2023-06-01 DIAGNOSIS — R19.7 DIARRHEA: ICD-10-CM

## 2023-06-01 LAB — GLUCOSE BLDC GLUCOMTR-MCNC: 91 MG/DL (ref 70–105)

## 2023-06-01 PROCEDURE — 82948 REAGENT STRIP/BLOOD GLUCOSE: CPT

## 2023-06-01 PROCEDURE — 88305 TISSUE EXAM BY PATHOLOGIST: CPT | Performed by: INTERNAL MEDICINE

## 2023-06-01 PROCEDURE — 45380 COLONOSCOPY AND BIOPSY: CPT | Mod: 59 | Performed by: INTERNAL MEDICINE

## 2023-06-01 PROCEDURE — 45385 COLONOSCOPY W/LESION REMOVAL: CPT | Performed by: INTERNAL MEDICINE

## 2023-06-01 PROCEDURE — 25010000002 PROPOFOL 200 MG/20ML EMULSION: Performed by: NURSE ANESTHETIST, CERTIFIED REGISTERED

## 2023-06-01 PROCEDURE — C1769 GUIDE WIRE: HCPCS | Performed by: INTERNAL MEDICINE

## 2023-06-01 RX ORDER — LIDOCAINE HYDROCHLORIDE 20 MG/ML
INJECTION, SOLUTION EPIDURAL; INFILTRATION; INTRACAUDAL; PERINEURAL AS NEEDED
Status: DISCONTINUED | OUTPATIENT
Start: 2023-06-01 | End: 2023-06-01 | Stop reason: SURG

## 2023-06-01 RX ORDER — SODIUM CHLORIDE 0.9 % (FLUSH) 0.9 %
3 SYRINGE (ML) INJECTION EVERY 12 HOURS SCHEDULED
Status: DISCONTINUED | OUTPATIENT
Start: 2023-06-01 | End: 2023-06-01 | Stop reason: HOSPADM

## 2023-06-01 RX ORDER — PROPOFOL 10 MG/ML
INJECTION, EMULSION INTRAVENOUS AS NEEDED
Status: DISCONTINUED | OUTPATIENT
Start: 2023-06-01 | End: 2023-06-01 | Stop reason: SURG

## 2023-06-01 RX ORDER — SODIUM CHLORIDE 9 MG/ML
INJECTION, SOLUTION INTRAVENOUS CONTINUOUS PRN
Status: DISCONTINUED | OUTPATIENT
Start: 2023-06-01 | End: 2023-06-01 | Stop reason: SURG

## 2023-06-01 RX ORDER — SODIUM CHLORIDE 9 MG/ML
40 INJECTION, SOLUTION INTRAVENOUS AS NEEDED
Status: DISCONTINUED | OUTPATIENT
Start: 2023-06-01 | End: 2023-06-01 | Stop reason: HOSPADM

## 2023-06-01 RX ORDER — SODIUM CHLORIDE 0.9 % (FLUSH) 0.9 %
3-10 SYRINGE (ML) INJECTION AS NEEDED
Status: DISCONTINUED | OUTPATIENT
Start: 2023-06-01 | End: 2023-06-01 | Stop reason: HOSPADM

## 2023-06-01 RX ORDER — ONDANSETRON 2 MG/ML
4 INJECTION INTRAMUSCULAR; INTRAVENOUS ONCE AS NEEDED
Status: DISCONTINUED | OUTPATIENT
Start: 2023-06-01 | End: 2023-06-01 | Stop reason: HOSPADM

## 2023-06-01 RX ADMIN — SODIUM CHLORIDE: 0.9 INJECTION, SOLUTION INTRAVENOUS at 11:59

## 2023-06-01 RX ADMIN — PROPOFOL 50 MG: 10 INJECTION, EMULSION INTRAVENOUS at 12:10

## 2023-06-01 RX ADMIN — PROPOFOL 50 MG: 10 INJECTION, EMULSION INTRAVENOUS at 12:06

## 2023-06-01 RX ADMIN — LIDOCAINE HYDROCHLORIDE 50 MG: 20 INJECTION, SOLUTION EPIDURAL; INFILTRATION; INTRACAUDAL; PERINEURAL at 12:05

## 2023-06-01 RX ADMIN — PROPOFOL 50 MG: 10 INJECTION, EMULSION INTRAVENOUS at 12:23

## 2023-06-01 RX ADMIN — PROPOFOL 25 MG: 10 INJECTION, EMULSION INTRAVENOUS at 12:14

## 2023-06-01 NOTE — DISCHARGE INSTRUCTIONS
A responsible adult should stay with you and you should rest quietly for the rest of the day.    Do not drink alcohol, drive operate any heavy machinery or power tools or make any legal/important decisions for the next 24 hours.    Progress your diet as tolerated.  If you begin to experience severe pain, increased shortness of breath, racing heartbeat or a fever above 101F, seek immediate medical attention.     Follow up with MD as instructed.  Call office for results in 3 to 5 days if needed.    Findings:  5 mm descending colon and 3 sigmoid colon polyps measuring 4 to 6 mm removed via cold snare polypectomy  Mild to moderate left-sided diverticulosis with medium openings  Normal colonic mucosa to the terminal ileum status post random biopsies  Grade 2 internal and external hemorrhoids     Impression:  Personal history of colon polyps with 4 polyps removed  Diarrhea with normal mucosa status post random biopsies  Rectal bleeding likely from internal and external hemorrhoids     Recommendations:  Follow-up biopsy results  No recall on colonoscopy  Consider hemorrhoid banding if bleeding persists  Trial of colestipol for diarrhea        Jeffery White MD      Date: 6/1/2023    Time: 12:31 EDT

## 2023-06-01 NOTE — OP NOTE
COLONOSCOPY Procedure Report    Patient Name:  Sherry Lboato  YOB: 1950    Date of Surgery:  6/1/2023     Pre-Op Diagnosis:  BRIGHT RED RECTAL BLEEDING       Post-Op Diagnosis Codes:     * Rectal bleeding [K62.5]     * Neuroendocrine tumor [D3A.8]     * Diarrhea [R19.7]     * Colorectal polyps [K63.5, K62.1]     * Chronic kidney disease [N18.9]  5 mm descending colon and 3 sigmoid colon polyps measuring 4 to 6 mm removed via cold snare polypectomy  Mild to moderate left-sided diverticulosis with medium openings  Normal colonic mucosa to the terminal ileum status post random biopsies  Grade 2 internal and external hemorrhoids    Procedure/CPT® Codes:      Procedure(s):  COLONOSCOPY with polypectomy x 3 biopsy x 1    Staff:  Surgeon(s):  Jeffery White MD      Anesthesia: Monitored Anesthesia Care    Description of Procedure:  A description of the procedure as well as risks, benefits and alternative methods were explained to the patient who voiced understanding and signed the corresponding consent form. A physical exam was performed and vital signs were monitored throughout the procedure.    A rectal exam was performed which was normal. An Olympus colonoscope was placed into the rectum and proceeded under direct visualization through the colon until the cecum and appendiceal orifice were identified. Careful visualization occurred upon slow withdraw of the scope. The scope was then retroflexed and the distal rectum was visualized. The quality of the prep was good. The procedure was not difficult and there were no immediate complications.    Specimen:        See Below    Estimated blood loss: none    Complications:  None    Findings:  5 mm descending colon and 3 sigmoid colon polyps measuring 4 to 6 mm removed via cold snare polypectomy  Mild to moderate left-sided diverticulosis with medium openings  Normal colonic mucosa to the terminal ileum status post random biopsies  Grade 2 internal and  external hemorrhoids    Impression:  Personal history of colon polyps with 4 polyps removed  Diarrhea with normal mucosa status post random biopsies  Rectal bleeding likely from internal and external hemorrhoids    Recommendations:  Follow-up biopsy results  No recall on colonoscopy  Consider hemorrhoid banding if bleeding persists  Trial of colestipol for diarrhea      Jeffery White MD     Date: 6/1/2023    Time: 12:31 EDT

## 2023-06-01 NOTE — ANESTHESIA PREPROCEDURE EVALUATION
Anesthesia Evaluation     Patient summary reviewed and Nursing notes reviewed   NPO Solid Status: > 8 hours  NPO Liquid Status: > 8 hours           Airway   Mallampati: II  TM distance: >3 FB  Neck ROM: full  No difficulty expected  Dental - normal exam     Pulmonary    (+) COPD, shortness of breath, sleep apnea,   Cardiovascular     (+) hypertension, CHF , hyperlipidemia,  carotid artery disease      Neuro/Psych  (+) CVA,    GI/Hepatic/Renal/Endo    (+) obesity,  GERD, PUD,  renal disease, diabetes mellitus,     Musculoskeletal     Abdominal    Substance History      OB/GYN          Other   arthritis,    history of cancer                    Anesthesia Plan    ASA 4     general     intravenous induction     Anesthetic plan, risks, benefits, and alternatives have been provided, discussed and informed consent has been obtained with: patient.    Plan discussed with CRNA.        CODE STATUS:

## 2023-06-01 NOTE — H&P
GI CONSULT  NOTE:    Referring Provider:    Jin Malcolm PA-C  [unfilled]    Chief complaint: <principal problem not specified>    Subjective .  Personal history of colon polyps    History of present illness:      Sherry Lobato is a 72 y.o. female who presents today for Procedure(s):  COLONOSCOPY for the indications listed below.     The updated Patient Profile was reviewed prior to the procedure, in conjunction with the Physical Exam, including medical conditions, surgical procedures, medications, allergies, family history and social history.     Pre-operatively, I reviewed the indication(s) for the procedure, the risks of the procedure [including but not limited to: unexpected bleeding possibly requiring hospitalization and/or unplanned repeat procedures, perforation possibly requiring surgical treatment, missed lesions and complications of sedation/MAC (also explained by anesthesia staff)].     I have evaluated the patient for risks associated with the planned anesthesia and the procedure to be performed and find the patient an acceptable candidate for IV sedation.    Multiple opportunities were provided for any questions or concerns, and all questions were answered satisfactorily before any anesthesia was administered. We will proceed with the planned procedure.    Past Medical History:      Past Surgical History:  Past Surgical History:   Procedure Laterality Date   • APPENDECTOMY     • ARTERIOVENOUS FISTULA Right    • ARTERIOVENOUS FISTULA REPAIR      Clotted off and insert graft   • ARTERIOVENOUS FISTULA/SHUNT SURGERY Left 12/30/2020    Procedure: ARTERIOVENOUS FISTULA FORMATION;  Surgeon: Jan Caicedo MD;  Location: [facility] MAIN OR;  Service: Vascular;  Laterality: Left;   • BREAST BIOPSY      right nipple 1981   • COLONOSCOPY N/A 11/24/2020    Procedure: COLONOSCOPY with polypectomy x 7;  Surgeon: Jeffery White MD;  Location: [facility] ENDOSCOPY;  Service: Gastroenterology;   Laterality: N/A;  post op: polyps, diverticulosis, hemorrhoids   • ENDOSCOPY N/A 2022    Procedure: ESOPHAGOGASTRODUODENOSCOPY with biopsy x 1 area;  Surgeon: Javan Augustin MD;  Location: [facility] ENDOSCOPY;  Service: Gastroenterology;  Laterality: N/A;  post op: anastamosis   • GASTRIC RESECTION      cancer   • HYSTERECTOMY     • LAPAROSCOPIC CHOLECYSTECTOMY     • NEPHRECTOMY      right kidney removed    • REDUCTION MAMMAPLASTY     • TUMOR REMOVAL      boils   • VENTRAL/INCISIONAL HERNIA REPAIR Right 2019    Procedure: VENTRAL/INCISIONAL HERNIA REPAIR;  Surgeon: Nilay Stevens MD;  Location: [facility] MAIN OR;  Service: General   • VENTRAL/INCISIONAL HERNIA REPAIR N/A 10/01/2019    Procedure: OPEN VENTRAL/INCISIONAL HERNIA REPAIR;  Surgeon: Nilay Stevens MD;  Location: [facility] MAIN OR;  Service: General   • VENTRAL/INCISIONAL HERNIA REPAIR N/A 10/11/2021    Procedure: VENTRAL/INCISIONAL HERNIA REPAIR LAPAROSCOPIC;  Surgeon: Nilay Stevens MD;  Location: [facility] MAIN OR;  Service: General;  Laterality: N/A;   • VENTRAL/INCISIONAL HERNIA REPAIR N/A 2023    Procedure: VENTRAL/INCISIONAL HERNIA REPAIR with MESH;  Surgeon: Nilay Stevens MD;  Location: [facility] MAIN OR;  Service: General;  Laterality: N/A;       Social History:  Social History     Tobacco Use   • Smoking status: Former     Packs/day: 0.25     Years: 57.00     Pack years: 14.25     Types: Cigarettes     Quit date: 2021     Years since quittin.0     Passive exposure: Past   • Smokeless tobacco: Never   • Tobacco comments:     quit smoking 2 months ago   Vaping Use   • Vaping Use: Former   Substance Use Topics   • Alcohol use: No   • Drug use: No       Family History:  Family History   Problem Relation Age of Onset   • Heart disease Father        Medications:  Medications Prior to Admission   Medication Sig Dispense Refill Last Dose   • albuterol (PROVENTIL) (5 MG/ML) 0.5% nebulizer solution Take 2.5 mg by nebulization As  Needed for Wheezing.      • amLODIPine (NORVASC) 10 MG tablet Take 1 tablet by mouth Daily.      • carvedilol (COREG) 3.125 MG tablet Take 1 tablet by mouth 2 (Two) Times a Day With Meals.      • lidocaine-prilocaine (EMLA) 2.5-2.5 % cream Apply 1 application topically to the appropriate area as directed Daily As Needed. Apply to access site 1 to 2 hours before dialysis  4    • losartan (COZAAR) 100 MG tablet Take 1 tablet by mouth Daily.      • Multiple Vitamins-Minerals (RENAPLEX-D) tablet Take 1 tablet by mouth Daily.      • pantoprazole (PROTONIX) 40 MG EC tablet Take 1 tablet by mouth Daily. Additional dose if needed      • sevelamer (RENVELA) 800 MG tablet Take 1 tablet by mouth 3 (Three) Times a Day With Meals. (Patient taking differently: Take 3 tablets by mouth 3 (Three) Times a Day With Meals. Only with meals) 90 tablet 2    • simvastatin (ZOCOR) 40 MG tablet Take 1 tablet by mouth Every Night.      • sodium bicarbonate 650 MG tablet Take 2 tablets by mouth 3 (Three) Times a Day. (Patient taking differently: Take 2 tablets by mouth 2 (Two) Times a Day.) 180 tablet 0    • temazepam (RESTORIL) 30 MG capsule Take 1 capsule by mouth Every Night.      • Ala-Sam 1 % cream APPLY TOPICALLY TO THE AFFECTED AREA EVERY 12 DAYS      • aspirin 81 MG EC tablet Take 1 tablet by mouth Daily. Currently not taking due to bleeding      • dexamethasone (DECADRON) 4 MG tablet Take 0.5 tablets by mouth Daily With Breakfast. 3 tablet 0    • mupirocin (BACTROBAN) 2 % ointment APPLY SMALL AMOUNT TOPICALLY TO THE AFFECTED AREA THREE TIMES DAILY          Scheduled Meds:  Continuous Infusions:No current facility-administered medications for this encounter.    PRN Meds:.    ALLERGIES:  Atorvastatin calcium, Gabapentin, and Niacin    ROS:  The following systems were reviewed and negative;   Constitution:  No fevers, chills, no unintentional weight loss  Skin: no rash, no jaundice  Eyes:  No blurry vision, no eye pain  HENT:  No change  "in hearing or smell  Resp:  No dyspnea or cough  CV:  No chest pain or palpitations  :  No dysuria, hematuria  Musculoskeletal:  No leg cramps or arthralgias  Neuro:  No tremor, no numbness  Psych:  No depression or confsuion    Objective     Vital Signs:   Vitals:    05/26/23 1420   Weight: 71 kg (156 lb 8.4 oz)   Height: 152.4 cm (60\")       Physical Exam:       General Appearance:    Awake and alert, in no acute distress   Head:    Normocephalic, without obvious abnormality, atraumatic   Throat:   No oral lesions, no thrush, oral mucosa moist   Lungs:     respirations regular, even and unlabored   Skin:   No rash, no jaundice       Results Review:  Lab Results (last 24 hours)     Procedure Component Value Units Date/Time    POC Glucose Once [[id number]]  (Normal) Collected: 06/01/23 1022    Specimen: Blood Updated: 06/01/23 1024     Glucose 91 mg/dL      Comment: Serial Number: [id number]:  [id number]             Imaging Results (Last 24 Hours)     ** No results found for the last 24 hours. **           I reviewed the patient's labs and imaging.    ASSESSMENT AND PLAN:  Personal history of colon polyps    Active Problems:    * No active hospital problems. *       Procedure(s):  COLONOSCOPY      I discussed the patients findings and my recommendations with the patient.    Jeffery White MD  06/01/23  10:54 EDT  "

## 2023-06-01 NOTE — ANESTHESIA POSTPROCEDURE EVALUATION
Patient: Sherry Lobato    Procedure Summary     Date: 06/01/23 Room / Location: [facility] ENDOSCOPY 4 / [facility] ENDOSCOPY    Anesthesia Start: 1159 Anesthesia Stop: 1231    Procedure: COLONOSCOPY with polypectomy x 3 biopsy x 1 Diagnosis:       Rectal bleeding      Neuroendocrine tumor      Diarrhea      Colorectal polyps      Chronic kidney disease      (BRIGHT RED RECTAL BLEEDING)    Surgeons: Jeffery White MD Provider: Al Hu MD    Anesthesia Type: general ASA Status: 4          Anesthesia Type: general    Vitals  Vitals Value Taken Time   /76 06/01/23 1342   Temp     Pulse 71 06/01/23 1343   Resp 17 06/01/23 1254   SpO2 99 % 06/01/23 1343   Vitals shown include unvalidated device data.        Post Anesthesia Care and Evaluation    Patient location during evaluation: PACU  Patient participation: complete - patient participated  Level of consciousness: awake  Pain scale: See nurse's notes for pain score.  Pain management: adequate    Airway patency: patent  Anesthetic complications: No anesthetic complications  PONV Status: none  Cardiovascular status: acceptable  Respiratory status: acceptable and spontaneous ventilation  Hydration status: acceptable    Comments: Patient seen and examined postoperatively; vital signs stable; SpO2 greater than or equal to 90%; cardiopulmonary status stable; nausea/vomiting adequately controlled; pain adequately controlled; no apparent anesthesia complications; patient discharged from anesthesia care when discharge criteria were met

## 2023-06-02 LAB
LAB AP CASE REPORT: NORMAL
PATH REPORT.FINAL DX SPEC: NORMAL
PATH REPORT.GROSS SPEC: NORMAL

## 2023-08-01 ENCOUNTER — TRANSCRIBE ORDERS (OUTPATIENT)
Dept: ADMINISTRATIVE | Facility: HOSPITAL | Age: 73
End: 2023-08-01
Payer: MEDICARE

## 2023-08-01 DIAGNOSIS — R91.1 LUNG NODULE: Primary | ICD-10-CM

## 2023-08-15 ENCOUNTER — HOSPITAL ENCOUNTER (OUTPATIENT)
Dept: CT IMAGING | Facility: HOSPITAL | Age: 73
Discharge: HOME | End: 2023-08-15
Admitting: PHYSICIAN ASSISTANT
Payer: MEDICARE

## 2023-08-15 DIAGNOSIS — R91.1 LUNG NODULE: ICD-10-CM

## 2023-08-15 PROCEDURE — 71250 CT THORAX DX C-: CPT

## 2023-10-03 ENCOUNTER — APPOINTMENT (OUTPATIENT)
Dept: CT IMAGING | Facility: HOSPITAL | Age: 73
End: 2023-10-03
Payer: MEDICARE

## 2023-10-03 ENCOUNTER — HOSPITAL ENCOUNTER (OUTPATIENT)
Facility: HOSPITAL | Age: 73
Setting detail: OBSERVATION
Discharge: HOME | End: 2023-10-04
Attending: EMERGENCY MEDICINE | Admitting: INTERNAL MEDICINE
Payer: MEDICARE

## 2023-10-03 DIAGNOSIS — R10.84 GENERALIZED ABDOMINAL PAIN: Primary | ICD-10-CM

## 2023-10-03 DIAGNOSIS — E87.5 HYPERKALEMIA: ICD-10-CM

## 2023-10-03 LAB
ALBUMIN SERPL-MCNC: 4.1 G/DL (ref 3.5–5.2)
ALBUMIN/GLOB SERPL: 1.2 G/DL
ALP SERPL-CCNC: 111 U/L (ref 39–117)
ALT SERPL W P-5'-P-CCNC: 14 U/L (ref 1–33)
ANION GAP SERPL CALCULATED.3IONS-SCNC: 20 MMOL/L (ref 5–15)
AST SERPL-CCNC: 19 U/L (ref 1–32)
BASOPHILS # BLD AUTO: 0.1 10*3/MM3 (ref 0–0.2)
BASOPHILS NFR BLD AUTO: 0.6 % (ref 0–1.5)
BILIRUB SERPL-MCNC: 0.3 MG/DL (ref 0–1.2)
BUN SERPL-MCNC: 49 MG/DL (ref 8–23)
BUN/CREAT SERPL: 7.8 (ref 7–25)
CALCIUM SPEC-SCNC: 8.3 MG/DL (ref 8.6–10.5)
CHLORIDE SERPL-SCNC: 96 MMOL/L (ref 98–107)
CO2 SERPL-SCNC: 23 MMOL/L (ref 22–29)
CREAT SERPL-MCNC: 6.29 MG/DL (ref 0.57–1)
DEPRECATED RDW RBC AUTO: 59.5 FL (ref 37–54)
EGFRCR SERPLBLD CKD-EPI 2021: 6.5 ML/MIN/1.73
EOSINOPHIL # BLD AUTO: 0.1 10*3/MM3 (ref 0–0.4)
EOSINOPHIL NFR BLD AUTO: 0.8 % (ref 0.3–6.2)
ERYTHROCYTE [DISTWIDTH] IN BLOOD BY AUTOMATED COUNT: 18.6 % (ref 12.3–15.4)
GLOBULIN UR ELPH-MCNC: 3.5 GM/DL
GLUCOSE SERPL-MCNC: 85 MG/DL (ref 65–99)
HCT VFR BLD AUTO: 37.1 % (ref 34–46.6)
HGB BLD-MCNC: 12 G/DL (ref 12–15.9)
LIPASE SERPL-CCNC: 31 U/L (ref 13–60)
LYMPHOCYTES # BLD AUTO: 0.7 10*3/MM3 (ref 0.7–3.1)
LYMPHOCYTES NFR BLD AUTO: 5.2 % (ref 19.6–45.3)
MCH RBC QN AUTO: 29.6 PG (ref 26.6–33)
MCHC RBC AUTO-ENTMCNC: 32.4 G/DL (ref 31.5–35.7)
MCV RBC AUTO: 91.5 FL (ref 79–97)
MONOCYTES # BLD AUTO: 0.6 10*3/MM3 (ref 0.1–0.9)
MONOCYTES NFR BLD AUTO: 4.8 % (ref 5–12)
NEUTROPHILS NFR BLD AUTO: 11.1 10*3/MM3 (ref 1.7–7)
NEUTROPHILS NFR BLD AUTO: 88.6 % (ref 42.7–76)
NRBC BLD AUTO-RTO: 0 /100 WBC (ref 0–0.2)
PLATELET # BLD AUTO: 498 10*3/MM3 (ref 140–450)
PMV BLD AUTO: 7.4 FL (ref 6–12)
POTASSIUM SERPL-SCNC: 6.2 MMOL/L (ref 3.5–5.2)
PROT SERPL-MCNC: 7.6 G/DL (ref 6–8.5)
RBC # BLD AUTO: 4.06 10*6/MM3 (ref 3.77–5.28)
SODIUM SERPL-SCNC: 139 MMOL/L (ref 136–145)
WBC NRBC COR # BLD: 12.5 10*3/MM3 (ref 3.4–10.8)

## 2023-10-03 PROCEDURE — 63710000001 INSULIN REGULAR HUMAN PER 5 UNITS: Performed by: EMERGENCY MEDICINE

## 2023-10-03 PROCEDURE — G0378 HOSPITAL OBSERVATION PER HR: HCPCS

## 2023-10-03 PROCEDURE — 25010000002 MORPHINE PER 10 MG: Performed by: EMERGENCY MEDICINE

## 2023-10-03 PROCEDURE — 74176 CT ABD & PELVIS W/O CONTRAST: CPT

## 2023-10-03 PROCEDURE — 93005 ELECTROCARDIOGRAM TRACING: CPT | Performed by: EMERGENCY MEDICINE

## 2023-10-03 PROCEDURE — 25010000002 ONDANSETRON PER 1 MG: Performed by: EMERGENCY MEDICINE

## 2023-10-03 PROCEDURE — 96376 TX/PRO/DX INJ SAME DRUG ADON: CPT

## 2023-10-03 PROCEDURE — 96374 THER/PROPH/DIAG INJ IV PUSH: CPT

## 2023-10-03 PROCEDURE — 99284 EMERGENCY DEPT VISIT MOD MDM: CPT

## 2023-10-03 PROCEDURE — 96375 TX/PRO/DX INJ NEW DRUG ADDON: CPT

## 2023-10-03 PROCEDURE — 83690 ASSAY OF LIPASE: CPT | Performed by: EMERGENCY MEDICINE

## 2023-10-03 PROCEDURE — 80053 COMPREHEN METABOLIC PANEL: CPT | Performed by: EMERGENCY MEDICINE

## 2023-10-03 PROCEDURE — 25010000002 CALCIUM GLUCONATE-NACL 1-0.675 GM/50ML-% SOLUTION: Performed by: EMERGENCY MEDICINE

## 2023-10-03 PROCEDURE — 85025 COMPLETE CBC W/AUTO DIFF WBC: CPT | Performed by: EMERGENCY MEDICINE

## 2023-10-03 PROCEDURE — 36415 COLL VENOUS BLD VENIPUNCTURE: CPT

## 2023-10-03 RX ORDER — SODIUM CHLORIDE 0.9 % (FLUSH) 0.9 %
10 SYRINGE (ML) INJECTION AS NEEDED
Status: DISCONTINUED | OUTPATIENT
Start: 2023-10-03 | End: 2023-10-04 | Stop reason: HOSPADM

## 2023-10-03 RX ORDER — ONDANSETRON 4 MG/1
4 TABLET, FILM COATED ORAL EVERY 6 HOURS PRN
Status: DISCONTINUED | OUTPATIENT
Start: 2023-10-03 | End: 2023-10-04 | Stop reason: HOSPADM

## 2023-10-03 RX ORDER — HYDRALAZINE HYDROCHLORIDE 20 MG/ML
20 INJECTION INTRAMUSCULAR; INTRAVENOUS EVERY 6 HOURS PRN
Status: DISCONTINUED | OUTPATIENT
Start: 2023-10-03 | End: 2023-10-04 | Stop reason: HOSPADM

## 2023-10-03 RX ORDER — ONDANSETRON 2 MG/ML
4 INJECTION INTRAMUSCULAR; INTRAVENOUS EVERY 6 HOURS PRN
Status: DISCONTINUED | OUTPATIENT
Start: 2023-10-03 | End: 2023-10-04 | Stop reason: HOSPADM

## 2023-10-03 RX ORDER — BISACODYL 5 MG/1
5 TABLET, DELAYED RELEASE ORAL DAILY PRN
Status: DISCONTINUED | OUTPATIENT
Start: 2023-10-03 | End: 2023-10-04 | Stop reason: HOSPADM

## 2023-10-03 RX ORDER — DEXTROSE MONOHYDRATE 25 G/50ML
25 INJECTION, SOLUTION INTRAVENOUS ONCE
Status: COMPLETED | OUTPATIENT
Start: 2023-10-03 | End: 2023-10-03

## 2023-10-03 RX ORDER — SODIUM BICARBONATE 650 MG/1
1300 TABLET ORAL 3 TIMES DAILY
Status: DISCONTINUED | OUTPATIENT
Start: 2023-10-03 | End: 2023-10-04 | Stop reason: HOSPADM

## 2023-10-03 RX ORDER — SODIUM CHLORIDE 9 MG/ML
40 INJECTION, SOLUTION INTRAVENOUS AS NEEDED
Status: DISCONTINUED | OUTPATIENT
Start: 2023-10-03 | End: 2023-10-04 | Stop reason: HOSPADM

## 2023-10-03 RX ORDER — ALBUTEROL SULFATE 90 UG/1
2 AEROSOL, METERED RESPIRATORY (INHALATION) EVERY 4 HOURS PRN
COMMUNITY

## 2023-10-03 RX ORDER — ACETAMINOPHEN 325 MG/1
650 TABLET ORAL EVERY 4 HOURS PRN
Status: DISCONTINUED | OUTPATIENT
Start: 2023-10-03 | End: 2023-10-04 | Stop reason: HOSPADM

## 2023-10-03 RX ORDER — AMOXICILLIN 250 MG
2 CAPSULE ORAL 2 TIMES DAILY
Status: DISCONTINUED | OUTPATIENT
Start: 2023-10-03 | End: 2023-10-04 | Stop reason: HOSPADM

## 2023-10-03 RX ORDER — BUDESONIDE AND FORMOTEROL FUMARATE DIHYDRATE 160; 4.5 UG/1; UG/1
2 AEROSOL RESPIRATORY (INHALATION)
COMMUNITY

## 2023-10-03 RX ORDER — CALCIUM GLUCONATE 20 MG/ML
1000 INJECTION, SOLUTION INTRAVENOUS ONCE
Status: COMPLETED | OUTPATIENT
Start: 2023-10-03 | End: 2023-10-03

## 2023-10-03 RX ORDER — CARVEDILOL 3.12 MG/1
3.12 TABLET ORAL 2 TIMES DAILY WITH MEALS
Status: DISCONTINUED | OUTPATIENT
Start: 2023-10-03 | End: 2023-10-04 | Stop reason: HOSPADM

## 2023-10-03 RX ORDER — MORPHINE SULFATE 2 MG/ML
2 INJECTION, SOLUTION INTRAMUSCULAR; INTRAVENOUS EVERY 4 HOURS PRN
Status: DISCONTINUED | OUTPATIENT
Start: 2023-10-03 | End: 2023-10-04 | Stop reason: HOSPADM

## 2023-10-03 RX ORDER — ONDANSETRON 2 MG/ML
4 INJECTION INTRAMUSCULAR; INTRAVENOUS ONCE
Status: COMPLETED | OUTPATIENT
Start: 2023-10-03 | End: 2023-10-03

## 2023-10-03 RX ORDER — LOSARTAN POTASSIUM 50 MG/1
100 TABLET ORAL DAILY
Status: DISCONTINUED | OUTPATIENT
Start: 2023-10-04 | End: 2023-10-04

## 2023-10-03 RX ORDER — SODIUM BICARBONATE 650 MG/1
1300 TABLET ORAL 3 TIMES DAILY
COMMUNITY
End: 2023-10-04 | Stop reason: HOSPADM

## 2023-10-03 RX ORDER — PANTOPRAZOLE SODIUM 40 MG/1
40 TABLET, DELAYED RELEASE ORAL DAILY
Status: DISCONTINUED | OUTPATIENT
Start: 2023-10-04 | End: 2023-10-04 | Stop reason: HOSPADM

## 2023-10-03 RX ORDER — MORPHINE SULFATE 2 MG/ML
2 INJECTION, SOLUTION INTRAMUSCULAR; INTRAVENOUS ONCE
Status: COMPLETED | OUTPATIENT
Start: 2023-10-03 | End: 2023-10-03

## 2023-10-03 RX ORDER — BISACODYL 10 MG
10 SUPPOSITORY, RECTAL RECTAL DAILY PRN
Status: DISCONTINUED | OUTPATIENT
Start: 2023-10-03 | End: 2023-10-04 | Stop reason: HOSPADM

## 2023-10-03 RX ORDER — OXYCODONE HYDROCHLORIDE 5 MG/1
5 TABLET ORAL EVERY 6 HOURS PRN
Status: DISCONTINUED | OUTPATIENT
Start: 2023-10-03 | End: 2023-10-04 | Stop reason: HOSPADM

## 2023-10-03 RX ORDER — POLYETHYLENE GLYCOL 3350 17 G/17G
17 POWDER, FOR SOLUTION ORAL DAILY PRN
Status: DISCONTINUED | OUTPATIENT
Start: 2023-10-03 | End: 2023-10-04 | Stop reason: HOSPADM

## 2023-10-03 RX ORDER — SEVELAMER HYDROCHLORIDE 800 MG/1
2400 TABLET, FILM COATED ORAL
COMMUNITY
End: 2023-10-08

## 2023-10-03 RX ORDER — TEMAZEPAM 15 MG/1
30 CAPSULE ORAL NIGHTLY
Status: DISCONTINUED | OUTPATIENT
Start: 2023-10-03 | End: 2023-10-04 | Stop reason: HOSPADM

## 2023-10-03 RX ORDER — SEVELAMER CARBONATE 800 MG/1
2400 TABLET, FILM COATED ORAL
Status: DISCONTINUED | OUTPATIENT
Start: 2023-10-04 | End: 2023-10-04 | Stop reason: HOSPADM

## 2023-10-03 RX ORDER — MELATONIN
1000 DAILY
COMMUNITY

## 2023-10-03 RX ORDER — SODIUM CHLORIDE 0.9 % (FLUSH) 0.9 %
10 SYRINGE (ML) INJECTION EVERY 12 HOURS SCHEDULED
Status: DISCONTINUED | OUTPATIENT
Start: 2023-10-03 | End: 2023-10-04 | Stop reason: HOSPADM

## 2023-10-03 RX ADMIN — TEMAZEPAM 30 MG: 15 CAPSULE ORAL at 23:22

## 2023-10-03 RX ADMIN — ONDANSETRON 4 MG: 2 INJECTION INTRAMUSCULAR; INTRAVENOUS at 12:17

## 2023-10-03 RX ADMIN — SODIUM BICARBONATE 50 MEQ: 84 INJECTION INTRAVENOUS at 17:38

## 2023-10-03 RX ADMIN — CARVEDILOL 3.12 MG: 3.12 TABLET, FILM COATED ORAL at 23:22

## 2023-10-03 RX ADMIN — INSULIN HUMAN 5 UNITS: 100 INJECTION, SOLUTION PARENTERAL at 17:38

## 2023-10-03 RX ADMIN — MORPHINE SULFATE 2 MG: 2 INJECTION, SOLUTION INTRAMUSCULAR; INTRAVENOUS at 13:57

## 2023-10-03 RX ADMIN — SODIUM BICARBONATE 1300 MG: 650 TABLET ORAL at 23:23

## 2023-10-03 RX ADMIN — DEXTROSE MONOHYDRATE 25 G: 25 INJECTION, SOLUTION INTRAVENOUS at 17:38

## 2023-10-03 RX ADMIN — Medication 10 ML: at 23:21

## 2023-10-03 RX ADMIN — MORPHINE SULFATE 2 MG: 2 INJECTION, SOLUTION INTRAMUSCULAR; INTRAVENOUS at 12:17

## 2023-10-03 RX ADMIN — CALCIUM GLUCONATE 1000 MG: 20 INJECTION, SOLUTION INTRAVENOUS at 17:38

## 2023-10-03 NOTE — ED NOTES
Pt called this nurse in to the room to ask if she could leave. This RN explained to the patient that she is being admitted and wont be discharged today, and that her potassium is high and needs to be treated. This patient did not know she was being admitted and is not agreeable, but does not want to leave against medical advice.

## 2023-10-03 NOTE — H&P
Patient Care Team:  Jin Malcolm PA-C as PCP - General (Physician Assistant)  Nilay Stevens MD as Consulting Physician (General Surgery)    Chief complaint Abdomen pain    Subjective     Patient is a 73 y.o. female who presents with Patient is a 73 y.o. female who presents with past medical history of end-stage renal disease on hemodialysis, GERD, diabetes type 2, hyperlipidemia, hypertension, neuropathy and FABY.  Patient presents emerged part with complaints of neck pain that is feeling better think she just slept the wrong way and abdominal pain severe that started this morning.  She has thus thrown up in the ER.  She did have routine hemodialysis yesterday however labs indicate a potassium of 6.2.  Discussed with nephrology we will treat hyperkalemia by protocol and she will have dialysis tomorrow most likely home after that. CT abdomen was unremarkable with mild leukocytosis.       Review of Systems   Constitutional:  Positive for activity change.   HENT: Negative.     Respiratory: Negative.     Cardiovascular: Negative.    Gastrointestinal:  Positive for abdominal pain and nausea.   Genitourinary: Negative.    Musculoskeletal: Negative.    Neurological: Negative.    Psychiatric/Behavioral: Negative.          History  Past Medical History:   Diagnosis Date    Lucas's esophagus     C. difficile colitis     Carotid artery disease     -50-74% left, 16-49% right (1/19)    COPD (chronic obstructive pulmonary disease)     DM2 (diabetes mellitus, type 2)     ESRD (end stage renal disease)     on HD    Gastric ulcer     at anastomatic site    Gastroparesis     unknown    GERD (gastroesophageal reflux disease)     Gout     not current    Hemodialysis patient     mwf    Hernia, incisional     abdomen    History of colonoscopy     UTD    History of degenerative disc disease     Hyperlipidemia     Hypertension     Microscopic colitis     Nephrotic syndrome     Neuroendocrine tumor     of stomach    FABY  (obstructive sleep apnea)     not treating    Pedal edema     ressolved    Proteinuria     Renal cell cancer, right     Rib pain on right side     chronic    Rotator cuff tear     bilateral    Stomach cancer 2016    Stroke     Uterine cancer     Vitamin B 12 deficiency     Vitamin D deficiency      Past Surgical History:   Procedure Laterality Date    APPENDECTOMY      ARTERIOVENOUS FISTULA Right     ARTERIOVENOUS FISTULA REPAIR      Clotted off and insert graft    ARTERIOVENOUS FISTULA/SHUNT SURGERY Left 12/30/2020    Procedure: ARTERIOVENOUS FISTULA FORMATION;  Surgeon: Jan Caicedo MD;  Location: [facility] MAIN OR;  Service: Vascular;  Laterality: Left;    BREAST BIOPSY      right nipple 1981    COLONOSCOPY N/A 11/24/2020    Procedure: COLONOSCOPY with polypectomy x 7;  Surgeon: Jeffery White MD;  Location: [facility] ENDOSCOPY;  Service: Gastroenterology;  Laterality: N/A;  post op: polyps, diverticulosis, hemorrhoids    COLONOSCOPY N/A 6/1/2023    Procedure: COLONOSCOPY with polypectomy x 3 biopsy x 1;  Surgeon: Jeffery White MD;  Location: [facility] ENDOSCOPY;  Service: Gastroenterology;  Laterality: N/A;  diverticulosis polyps    ENDOSCOPY N/A 03/12/2022    Procedure: ESOPHAGOGASTRODUODENOSCOPY with biopsy x 1 area;  Surgeon: Javan Augustin MD;  Location: [facility] ENDOSCOPY;  Service: Gastroenterology;  Laterality: N/A;  post op: anastamosis    GASTRIC RESECTION      cancer    HYSTERECTOMY      LAPAROSCOPIC CHOLECYSTECTOMY      NEPHRECTOMY      right kidney removed     REDUCTION MAMMAPLASTY      TUMOR REMOVAL      boils    VENTRAL/INCISIONAL HERNIA REPAIR Right 08/28/2019    Procedure: VENTRAL/INCISIONAL HERNIA REPAIR;  Surgeon: Nilay Stevens MD;  Location: [facility] MAIN OR;  Service: General    VENTRAL/INCISIONAL HERNIA REPAIR N/A 10/01/2019    Procedure: OPEN VENTRAL/INCISIONAL HERNIA REPAIR;  Surgeon: Nilay Stevens MD;  Location: [facility] MAIN OR;  Service: General    VENTRAL/INCISIONAL HERNIA  REPAIR N/A 10/11/2021    Procedure: VENTRAL/INCISIONAL HERNIA REPAIR LAPAROSCOPIC;  Surgeon: Nilay Stevens MD;  Location: [facility] MAIN OR;  Service: General;  Laterality: N/A;    VENTRAL/INCISIONAL HERNIA REPAIR N/A 2023    Procedure: VENTRAL/INCISIONAL HERNIA REPAIR with MESH;  Surgeon: Nilay Stevens MD;  Location: [facility] MAIN OR;  Service: General;  Laterality: N/A;     Family History   Problem Relation Age of Onset    Heart disease Father      Social History     Tobacco Use    Smoking status: Former     Packs/day: 0.25     Years: 57.00     Pack years: 14.25     Types: Cigarettes     Quit date: 2021     Years since quittin.4     Passive exposure: Past    Smokeless tobacco: Never    Tobacco comments:     quit smoking 2 months ago   Vaping Use    Vaping Use: Former   Substance Use Topics    Alcohol use: No    Drug use: No     (Not in a hospital admission)    Allergies:  Atorvastatin calcium, Gabapentin, and Niacin    Objective     Vital Signs  Temp:  [98.9 °F (37.2 °C)] 98.9 °F (37.2 °C)  Heart Rate:  [77] 77  Resp:  [22] 22  BP: (158)/(71) 158/71       Physical Exam  Vitals reviewed.   Constitutional:       Appearance: She is not ill-appearing.   HENT:      Head: Normocephalic and atraumatic.      Right Ear: External ear normal.      Left Ear: External ear normal.      Nose: Nose normal.      Mouth/Throat:      Mouth: Mucous membranes are moist.   Eyes:      General:         Right eye: No discharge.         Left eye: No discharge.   Cardiovascular:      Rate and Rhythm: Normal rate and regular rhythm.      Pulses: Normal pulses.      Heart sounds: Normal heart sounds.   Pulmonary:      Effort: Pulmonary effort is normal.      Breath sounds: Normal breath sounds.   Abdominal:      General: Bowel sounds are normal.      Palpations: Abdomen is soft.   Musculoskeletal:         General: Normal range of motion.      Cervical back: Normal range of motion.   Skin:     General: Skin is warm and dry.    Neurological:      Mental Status: She is alert and oriented to person, place, and time.   Psychiatric:         Behavior: Behavior normal.        Results Review:     Imaging Results (Last 24 Hours)       Procedure Component Value Units Date/Time    CT Abdomen Pelvis Without Contrast [[id number]] Collected: 10/03/23 1205     Updated: 10/03/23 1212    Narrative:      CT ABDOMEN PELVIS WO CONTRAST    Date of Exam: 10/3/2023 11:57 AM EDT    Indication: Abdominal pain, acute, nonlocalized.    Comparison: 1/26/2023.    Technique: Axial CT images were obtained of the abdomen and pelvis without the administration of contrast. Sagittal and coronal reconstructions were performed.  Automated exposure control and iterative reconstruction methods were used.      Findings:  Postsurgical changes of distal gastrectomy with jejunostomy again identified. There is no large or small bowel dilatation. The uterus is absent. The bladder is collapsed. There is diverticulosis without acute diverticulitis. There is surgical sutures at   the tip of the cecum. There is a hernia of the right lower quadrant abdominal wall containing nonobstructed small bowel. The right kidney is absent. The left kidney is severely atrophic. There is no left hydronephrosis. There is a stable left adrenal   nodule. There are no right adrenal masses. The liver, spleen, and pancreas appear overall normal in size. The gallbladder is absent. The abdominal aorta is heavily calcified and is normal in size. There is no retroperitoneal or iliac chain adenopathy.   There is no ascites. Previously seen anterior wall fluid collections have resolved.      Impression:      Impression:  Chronic findings. No acute process.            Electronically Signed: Lina Drake MD    10/3/2023 12:10 PM EDT    Workstation ID: [id number]             Lab Results (last 24 hours)       Procedure Component Value Units Date/Time    Comprehensive Metabolic Panel [[id number]]  (Abnormal)  Collected: 10/03/23 1331    Specimen: Blood from Arm, Right Updated: 10/03/23 1402     Glucose 85 mg/dL      BUN 49 mg/dL      Creatinine 6.29 mg/dL      Sodium 139 mmol/L      Potassium 6.2 mmol/L      Chloride 96 mmol/L      CO2 23.0 mmol/L      Calcium 8.3 mg/dL      Total Protein 7.6 g/dL      Albumin 4.1 g/dL      ALT (SGPT) 14 U/L      AST (SGOT) 19 U/L      Alkaline Phosphatase 111 U/L      Total Bilirubin 0.3 mg/dL      Globulin 3.5 gm/dL      A/G Ratio 1.2 g/dL      BUN/Creatinine Ratio 7.8     Anion Gap 20.0 mmol/L      eGFR 6.5 mL/min/1.73      Comment: <15 Indicative of kidney failure       Narrative:      GFR Normal >60  Chronic Kidney Disease <60  Kidney Failure <15    The GFR formula is only valid for adults with stable renal function between ages 18 and 70.    Lipase [[id number]]  (Normal) Collected: 10/03/23 1128    Specimen: Blood Updated: 10/03/23 1200     Lipase 31 U/L     CBC & Differential [[id number]]  (Abnormal) Collected: 10/03/23 1128    Specimen: Blood Updated: 10/03/23 1136    Narrative:      The following orders were created for panel order CBC & Differential.  Procedure                               Abnormality         Status                     ---------                               -----------         ------                     CBC Auto Differential[[id number]]        Abnormal            Final result                 Please view results for these tests on the individual orders.    CBC Auto Differential [[id number]]  (Abnormal) Collected: 10/03/23 1128    Specimen: Blood Updated: 10/03/23 1136     WBC 12.50 10*3/mm3      RBC 4.06 10*6/mm3      Hemoglobin 12.0 g/dL      Hematocrit 37.1 %      MCV 91.5 fL      MCH 29.6 pg      MCHC 32.4 g/dL      RDW 18.6 %      RDW-SD 59.5 fl      MPV 7.4 fL      Platelets 498 10*3/mm3      Neutrophil % 88.6 %      Lymphocyte % 5.2 %      Monocyte % 4.8 %      Eosinophil % 0.8 %      Basophil % 0.6 %      Neutrophils, Absolute 11.10 10*3/mm3       Lymphocytes, Absolute 0.70 10*3/mm3      Monocytes, Absolute 0.60 10*3/mm3      Eosinophils, Absolute 0.10 10*3/mm3      Basophils, Absolute 0.10 10*3/mm3      nRBC 0.0 /100 WBC              I reviewed the patient's new clinical results.    Assessment & Plan     Hyperkalemia  ESRD    Nausea/vomiting  -supportive care  -GI panel    Continue home medications except for supplements    DVT prophylaxis:scd  GI prophylaxis:protonix         I discussed the patient's findings and my recommendations with patient.     Cindy Santiago, APRN  10/03/23  15:45 EDT

## 2023-10-03 NOTE — ED PROVIDER NOTES
"Subjective   History of Present Illness  Patient is a 73-year-old female complaint abdominal pain for the past 24 hours.  Denies fever vomit diarrhea dysuria or other associated complaints      Review of Systems    Past Medical History:   Diagnosis Date    Lucas's esophagus     C. difficile colitis     Carotid artery disease     -50-74% left, 16-49% right (1/19)    COPD (chronic obstructive pulmonary disease)     DM2 (diabetes mellitus, type 2)     ESRD (end stage renal disease)     on HD    Gastric ulcer     at anastomatic site    Gastroparesis     unknown    GERD (gastroesophageal reflux disease)     Gout     not current    Hemodialysis patient     mwf    Hernia, incisional     abdomen    History of colonoscopy     UTD    History of degenerative disc disease     Hyperlipidemia     Hypertension     Microscopic colitis     Nephrotic syndrome     Neuroendocrine tumor     of stomach    FABY (obstructive sleep apnea)     not treating    Pedal edema     ressolved    Proteinuria     Renal cell cancer, right     Rib pain on right side     chronic    Rotator cuff tear     bilateral    Stomach cancer 2016    Stroke     Uterine cancer     Vitamin B 12 deficiency     Vitamin D deficiency        Allergies   Allergen Reactions    Atorvastatin Calcium Other (See Comments)     \"felt like I was on fire\"    Gabapentin Dizziness     says was unable to walk    Niacin Other (See Comments)     \"FEELS LIKE SHE IS ON FIRE\"         Past Surgical History:   Procedure Laterality Date    APPENDECTOMY      ARTERIOVENOUS FISTULA Right     ARTERIOVENOUS FISTULA REPAIR      Clotted off and insert graft    ARTERIOVENOUS FISTULA/SHUNT SURGERY Left 12/30/2020    Procedure: ARTERIOVENOUS FISTULA FORMATION;  Surgeon: Jan Caicedo MD;  Location: [facility] OR;  Service: Vascular;  Laterality: Left;    BREAST BIOPSY      right nipple 1981    COLONOSCOPY N/A 11/24/2020    Procedure: COLONOSCOPY with polypectomy x 7;  Surgeon: Jeffery White, " MD;  Location: [facility] ENDOSCOPY;  Service: Gastroenterology;  Laterality: N/A;  post op: polyps, diverticulosis, hemorrhoids    COLONOSCOPY N/A 2023    Procedure: COLONOSCOPY with polypectomy x 3 biopsy x 1;  Surgeon: Jeffery White MD;  Location: [facility] ENDOSCOPY;  Service: Gastroenterology;  Laterality: N/A;  diverticulosis polyps    ENDOSCOPY N/A 2022    Procedure: ESOPHAGOGASTRODUODENOSCOPY with biopsy x 1 area;  Surgeon: Javan Augustin MD;  Location: [facility] ENDOSCOPY;  Service: Gastroenterology;  Laterality: N/A;  post op: anastamosis    GASTRIC RESECTION      cancer    HYSTERECTOMY      LAPAROSCOPIC CHOLECYSTECTOMY      NEPHRECTOMY      right kidney removed     REDUCTION MAMMAPLASTY      TUMOR REMOVAL      boils    VENTRAL/INCISIONAL HERNIA REPAIR Right 2019    Procedure: VENTRAL/INCISIONAL HERNIA REPAIR;  Surgeon: Nilay Stevens MD;  Location: [facility] MAIN OR;  Service: General    VENTRAL/INCISIONAL HERNIA REPAIR N/A 10/01/2019    Procedure: OPEN VENTRAL/INCISIONAL HERNIA REPAIR;  Surgeon: Nilay Stevens MD;  Location: [facility] MAIN OR;  Service: General    VENTRAL/INCISIONAL HERNIA REPAIR N/A 10/11/2021    Procedure: VENTRAL/INCISIONAL HERNIA REPAIR LAPAROSCOPIC;  Surgeon: Nilay Stevens MD;  Location: [facility] MAIN OR;  Service: General;  Laterality: N/A;    VENTRAL/INCISIONAL HERNIA REPAIR N/A 2023    Procedure: VENTRAL/INCISIONAL HERNIA REPAIR with MESH;  Surgeon: Nilay Stevens MD;  Location: [facility] MAIN OR;  Service: General;  Laterality: N/A;       Family History   Problem Relation Age of Onset    Heart disease Father        Social History     Socioeconomic History    Marital status: Single   Tobacco Use    Smoking status: Former     Packs/day: 0.25     Years: 57.00     Additional pack years: 0.00     Total pack years: 14.25     Types: Cigarettes     Quit date: 2021     Years since quittin.4     Passive exposure: Past    Smokeless tobacco: Never    Tobacco  comments:     quit smoking 2 months ago   Vaping Use    Vaping Use: Former   Substance and Sexual Activity    Alcohol use: No    Drug use: No    Sexual activity: Defer           Objective   Physical Exam  Neck has no adenopathy JVD or bruits.  Lungs are clear.  Heart has regular rate rhythm without murmur rub or gallop.  Chest is nontender.  Abdomen is soft with mild diffuse tenderness.  Patient with normal bowel sounds without rebound or guarding.  Back has no CVA tenderness.  Procedures           ED Course      Results for orders placed or performed during the hospital encounter of 10/03/23   Comprehensive Metabolic Panel    Specimen: Arm, Right; Blood   Result Value Ref Range    Glucose 85 65 - 99 mg/dL    BUN 49 (H) 8 - 23 mg/dL    Creatinine 6.29 (H) 0.57 - 1.00 mg/dL    Sodium 139 136 - 145 mmol/L    Potassium 6.2 (C) 3.5 - 5.2 mmol/L    Chloride 96 (L) 98 - 107 mmol/L    CO2 23.0 22.0 - 29.0 mmol/L    Calcium 8.3 (L) 8.6 - 10.5 mg/dL    Total Protein 7.6 6.0 - 8.5 g/dL    Albumin 4.1 3.5 - 5.2 g/dL    ALT (SGPT) 14 1 - 33 U/L    AST (SGOT) 19 1 - 32 U/L    Alkaline Phosphatase 111 39 - 117 U/L    Total Bilirubin 0.3 0.0 - 1.2 mg/dL    Globulin 3.5 gm/dL    A/G Ratio 1.2 g/dL    BUN/Creatinine Ratio 7.8 7.0 - 25.0    Anion Gap 20.0 (H) 5.0 - 15.0 mmol/L    eGFR 6.5 (L) >60.0 mL/min/1.73   Lipase    Specimen: Blood   Result Value Ref Range    Lipase 31 13 - 60 U/L   CBC Auto Differential    Specimen: Blood   Result Value Ref Range    WBC 12.50 (H) 3.40 - 10.80 10*3/mm3    RBC 4.06 3.77 - 5.28 10*6/mm3    Hemoglobin 12.0 12.0 - 15.9 g/dL    Hematocrit 37.1 34.0 - 46.6 %    MCV 91.5 79.0 - 97.0 fL    MCH 29.6 26.6 - 33.0 pg    MCHC 32.4 31.5 - 35.7 g/dL    RDW 18.6 (H) 12.3 - 15.4 %    RDW-SD 59.5 (H) 37.0 - 54.0 fl    MPV 7.4 6.0 - 12.0 fL    Platelets 498 (H) 140 - 450 10*3/mm3    Neutrophil % 88.6 (H) 42.7 - 76.0 %    Lymphocyte % 5.2 (L) 19.6 - 45.3 %    Monocyte % 4.8 (L) 5.0 - 12.0 %    Eosinophil % 0.8 0.3  - 6.2 %    Basophil % 0.6 0.0 - 1.5 %    Neutrophils, Absolute 11.10 (H) 1.70 - 7.00 10*3/mm3    Lymphocytes, Absolute 0.70 0.70 - 3.10 10*3/mm3    Monocytes, Absolute 0.60 0.10 - 0.90 10*3/mm3    Eosinophils, Absolute 0.10 0.00 - 0.40 10*3/mm3    Basophils, Absolute 0.10 0.00 - 0.20 10*3/mm3    nRBC 0.0 0.0 - 0.2 /100 WBC   Basic Metabolic Panel    Specimen: Blood   Result Value Ref Range    Glucose 84 65 - 99 mg/dL    BUN 56 (H) 8 - 23 mg/dL    Creatinine 6.99 (H) 0.57 - 1.00 mg/dL    Sodium 140 136 - 145 mmol/L    Potassium 4.9 3.5 - 5.2 mmol/L    Chloride 97 (L) 98 - 107 mmol/L    CO2 26.0 22.0 - 29.0 mmol/L    Calcium 7.9 (L) 8.6 - 10.5 mg/dL    BUN/Creatinine Ratio 8.0 7.0 - 25.0    Anion Gap 17.0 (H) 5.0 - 15.0 mmol/L    eGFR 5.7 (L) >60.0 mL/min/1.73   Magnesium    Specimen: Blood   Result Value Ref Range    Magnesium 1.7 1.6 - 2.4 mg/dL   ECG 12 Lead Electrolyte Imbalance   Result Value Ref Range    QT Interval 387 ms    QTC Interval 467 ms     No radiology results for the last day                                       Medical Decision Making  My interpretation patient CT scan abdomen pelvis without contrast shows no obstructive change perforation or other acute abnormality.  Patient does have mild leukocytosis with no left shift.  Metabolic panel shows her BUN and creatinine at baseline with elevated potassium at 6.2.  Patient was given the above medications for the hyperkalemia.  I did speak to the patient's nephrologist as well as family physician.  Patient be admitted for further nephrologic evaluation.    Problems Addressed:  Generalized abdominal pain: complicated acute illness or injury  Hyperkalemia: complicated acute illness or injury    Amount and/or Complexity of Data Reviewed  Labs: ordered. Decision-making details documented in ED Course.  Radiology: ordered and independent interpretation performed.  ECG/medicine tests: ordered and independent interpretation performed.    Risk  OTC  drugs.  Prescription drug management.  Decision regarding hospitalization.        Final diagnoses:   Generalized abdominal pain   Hyperkalemia       ED Disposition  ED Disposition       ED Disposition   Decision to Admit    Condition   --    Comment   Level of Care: Med/Surg [1]   Admitting Physician: AMY GALINDO [[id number]]                 Jin Malcolm PA-C  [id number] [facility]  [place] [id number] [phone]      Keep scheduled appointment with [facility].  Call with any problems.         Medication List        Changed      sevelamer 800 MG tablet  Commonly known as: RENVELA  Take 3 tablets by mouth 3 (Three) Times a Day With Meals.  What changed: how much to take     sodium bicarbonate 650 MG tablet  Take 2 tablets by mouth 2 (Two) Times a Day.  What changed:   when to take this  Another medication with the same name was removed. Continue taking this medication, and follow the directions you see here.            Stop      losartan 100 MG tablet  Commonly known as: COZAAR     metoprolol tartrate 25 MG tablet  Commonly known as: LOPRESSOR               Where to Get Your Medications        Information about where to get these medications is not yet available    Ask your nurse or doctor about these medications  sevelamer 800 MG tablet  sodium bicarbonate 650 MG tablet            Volodymyr Ricks MD  10/08/23 0549

## 2023-10-04 VITALS
OXYGEN SATURATION: 96 % | BODY MASS INDEX: 30.9 KG/M2 | SYSTOLIC BLOOD PRESSURE: 122 MMHG | TEMPERATURE: 98.4 F | WEIGHT: 157.4 LBS | HEIGHT: 60 IN | DIASTOLIC BLOOD PRESSURE: 83 MMHG | HEART RATE: 87 BPM | RESPIRATION RATE: 14 BRPM

## 2023-10-04 LAB
ANION GAP SERPL CALCULATED.3IONS-SCNC: 17 MMOL/L (ref 5–15)
BUN SERPL-MCNC: 56 MG/DL (ref 8–23)
BUN/CREAT SERPL: 8 (ref 7–25)
CALCIUM SPEC-SCNC: 7.9 MG/DL (ref 8.6–10.5)
CHLORIDE SERPL-SCNC: 97 MMOL/L (ref 98–107)
CO2 SERPL-SCNC: 26 MMOL/L (ref 22–29)
CREAT SERPL-MCNC: 6.99 MG/DL (ref 0.57–1)
EGFRCR SERPLBLD CKD-EPI 2021: 5.7 ML/MIN/1.73
GLUCOSE SERPL-MCNC: 84 MG/DL (ref 65–99)
MAGNESIUM SERPL-MCNC: 1.7 MG/DL (ref 1.6–2.4)
POTASSIUM SERPL-SCNC: 4.9 MMOL/L (ref 3.5–5.2)
QT INTERVAL: 387 MS
QTC INTERVAL: 467 MS
SODIUM SERPL-SCNC: 140 MMOL/L (ref 136–145)

## 2023-10-04 PROCEDURE — 96376 TX/PRO/DX INJ SAME DRUG ADON: CPT

## 2023-10-04 PROCEDURE — 83735 ASSAY OF MAGNESIUM: CPT | Performed by: NURSE PRACTITIONER

## 2023-10-04 PROCEDURE — G0378 HOSPITAL OBSERVATION PER HR: HCPCS

## 2023-10-04 PROCEDURE — 25010000002 ONDANSETRON PER 1 MG: Performed by: NURSE PRACTITIONER

## 2023-10-04 PROCEDURE — 36415 COLL VENOUS BLD VENIPUNCTURE: CPT | Performed by: NURSE PRACTITIONER

## 2023-10-04 PROCEDURE — 25010000002 ALBUMIN HUMAN 25% PER 50 ML

## 2023-10-04 PROCEDURE — P9047 ALBUMIN (HUMAN), 25%, 50ML: HCPCS

## 2023-10-04 PROCEDURE — 80048 BASIC METABOLIC PNL TOTAL CA: CPT | Performed by: NURSE PRACTITIONER

## 2023-10-04 RX ORDER — SODIUM BICARBONATE 650 MG/1
1300 TABLET ORAL 2 TIMES DAILY
Start: 2023-10-04

## 2023-10-04 RX ORDER — ALBUMIN (HUMAN) 12.5 G/50ML
25 SOLUTION INTRAVENOUS ONCE
Status: COMPLETED | OUTPATIENT
Start: 2023-10-04 | End: 2023-10-04

## 2023-10-04 RX ORDER — SEVELAMER CARBONATE 800 MG/1
2400 TABLET, FILM COATED ORAL
Start: 2023-10-04

## 2023-10-04 RX ADMIN — SEVELAMER CARBONATE 2400 MG: 800 TABLET, FILM COATED ORAL at 08:26

## 2023-10-04 RX ADMIN — DOCUSATE SODIUM 50MG AND SENNOSIDES 8.6MG 2 TABLET: 8.6; 5 TABLET, FILM COATED ORAL at 08:26

## 2023-10-04 RX ADMIN — ALBUMIN (HUMAN) 25 G: 0.25 INJECTION, SOLUTION INTRAVENOUS at 02:09

## 2023-10-04 RX ADMIN — CARVEDILOL 3.12 MG: 3.12 TABLET, FILM COATED ORAL at 08:26

## 2023-10-04 RX ADMIN — SODIUM BICARBONATE 1300 MG: 650 TABLET ORAL at 08:26

## 2023-10-04 RX ADMIN — ONDANSETRON 4 MG: 2 INJECTION INTRAMUSCULAR; INTRAVENOUS at 08:26

## 2023-10-04 RX ADMIN — PANTOPRAZOLE SODIUM 40 MG: 40 TABLET, DELAYED RELEASE ORAL at 08:26

## 2023-10-04 RX ADMIN — Medication 10 ML: at 08:27

## 2023-10-04 NOTE — PROGRESS NOTES
"   LOS: 0 days    Patient Care Team:  Jin Malcolm PA-C as PCP - General (Physician Assistant)  Nilay Stevens MD as Consulting Physician (General Surgery)    Chief Complaint:    Chief Complaint   Patient presents with    Abdominal Pain     Nausea, abd pain, diarrhea, pain behind both ears, has been sick for a week     Follow UP ESRD  Subjective     Interval History:   Patient with ESRD came in with abdominal pain. She is feeling better this am and will be discharged.  Additionally she has recurrent hyperkalemia so will discontinue arb.    Review of Systems:   As noted above    Objective     Vital Signs  Temp:  [97.4 °F (36.3 °C)-98.9 °F (37.2 °C)] 97.5 °F (36.4 °C)  Heart Rate:  [77-94] 79  Resp:  [14-22] 14  BP: ()/(50-71) 104/65    Flowsheet Rows      Flowsheet Row First Filed Value   Admission Height 152.4 cm (60\") Documented at 10/03/2023 1036   Admission Weight 71 kg (156 lb 8.4 oz) Documented at 10/03/2023 1036            No intake/output data recorded.  No intake/output data recorded.  No intake or output data in the 24 hours ending 10/04/23 0653    Physical Exam:  General Appearance: alert, oriented x 3, no acute distress,   Skin: warm and dry  HEENT: pupils round and reactive to light, oral mucosa normal, nonicteric sclera  Neck: supple, no JVD, trachea midline  Lungs: CTA, unlabored breathing effort  Heart: RRR, normal S1 and S2, no S3, no rub  Abdomen: soft, nontender, normoactive bowels  : no palpable bladder,  Extremities: no edema, cyanosis or clubbing  Neuro: normal speech and mental status       Results Review:    Results from last 7 days   Lab Units 10/04/23  0040 10/03/23  1331   SODIUM mmol/L 140 139   POTASSIUM mmol/L 4.9 6.2*   CHLORIDE mmol/L 97* 96*   CO2 mmol/L 26.0 23.0   BUN mg/dL 56* 49*   CREATININE mg/dL 6.99* 6.29*   CALCIUM mg/dL 7.9* 8.3*   BILIRUBIN mg/dL  --  0.3   ALK PHOS U/L  --  111   ALT (SGPT) U/L  --  14   AST (SGOT) U/L  --  19   GLUCOSE mg/dL 84 85 "       Estimated Creatinine Clearance: 6.2 mL/min (A) (by C-G formula based on SCr of 6.99 mg/dL (H)).    Results from last 7 days   Lab Units 10/04/23  0040   MAGNESIUM mg/dL 1.7             Results from last 7 days   Lab Units 10/03/23  1128   WBC 10*3/mm3 12.50*   HEMOGLOBIN g/dL 12.0   PLATELETS 10*3/mm3 498*               Imaging Results (Last 24 Hours)       Procedure Component Value Units Date/Time    CT Abdomen Pelvis Without Contrast [[id number]] Collected: 10/03/23 1205     Updated: 10/03/23 1212    Narrative:      CT ABDOMEN PELVIS WO CONTRAST    Date of Exam: 10/3/2023 11:57 AM EDT    Indication: Abdominal pain, acute, nonlocalized.    Comparison: 1/26/2023.    Technique: Axial CT images were obtained of the abdomen and pelvis without the administration of contrast. Sagittal and coronal reconstructions were performed.  Automated exposure control and iterative reconstruction methods were used.      Findings:  Postsurgical changes of distal gastrectomy with jejunostomy again identified. There is no large or small bowel dilatation. The uterus is absent. The bladder is collapsed. There is diverticulosis without acute diverticulitis. There is surgical sutures at   the tip of the cecum. There is a hernia of the right lower quadrant abdominal wall containing nonobstructed small bowel. The right kidney is absent. The left kidney is severely atrophic. There is no left hydronephrosis. There is a stable left adrenal   nodule. There are no right adrenal masses. The liver, spleen, and pancreas appear overall normal in size. The gallbladder is absent. The abdominal aorta is heavily calcified and is normal in size. There is no retroperitoneal or iliac chain adenopathy.   There is no ascites. Previously seen anterior wall fluid collections have resolved.      Impression:      Impression:  Chronic findings. No acute process.            Electronically Signed: Lina Drake MD    10/3/2023 12:10 PM EDT    Workstation ID:  [id number]          carvedilol, 3.125 mg, Oral, BID With Meals  pantoprazole, 40 mg, Oral, Daily  senna-docusate sodium, 2 tablet, Oral, BID  sevelamer, 2,400 mg, Oral, TID With Meals  sodium bicarbonate, 1,300 mg, Oral, TID  sodium chloride, 10 mL, Intravenous, Q12H  temazepam, 30 mg, Oral, Nightly           Medication Review:   Current Facility-Administered Medications   Medication Dose Route Frequency Provider Last Rate Last Admin    acetaminophen (TYLENOL) tablet 650 mg  650 mg Oral Q4H PRN Cindy Santiago APRN        albuterol (PROVENTIL) nebulizer solution 0.5% 2.5 mg/0.5mL  2.5 mg Nebulization PRN Cindy Santiago APRN        sennosides-docusate (PERICOLACE) 8.6-50 MG per tablet 2 tablet  2 tablet Oral BID Cindy Santiago APRN        And    polyethylene glycol (MIRALAX) packet 17 g  17 g Oral Daily PRN Cindy Santiago APRN        And    bisacodyl (DULCOLAX) EC tablet 5 mg  5 mg Oral Daily PRN Cindy Santiago APRN        And    bisacodyl (DULCOLAX) suppository 10 mg  10 mg Rectal Daily PRN Cindy Santiago APRN        carvedilol (COREG) tablet 3.125 mg  3.125 mg Oral BID With Meals Cindy Santiago APRN   3.125 mg at 10/03/23 2322    hydrALAZINE (APRESOLINE) injection 20 mg  20 mg Intravenous Q6H PRN Cindy Santiago APRN        morphine injection 2 mg  2 mg Intravenous Q4H PRN Angelia Taylor MD        ondansetron (ZOFRAN) tablet 4 mg  4 mg Oral Q6H PRN Cindy Santiago APRN        Or    ondansetron (ZOFRAN) injection 4 mg  4 mg Intravenous Q6H PRN Cindy Santiago APRN        oxyCODONE (ROXICODONE) immediate release tablet 5 mg  5 mg Oral Q6H PRN Angelia Taylor MD        pantoprazole (PROTONIX) EC tablet 40 mg  40 mg Oral Daily Cindy Santiago APRN        sevelamer (RENVELA) tablet 2,400 mg  2,400 mg Oral TID With Meals Cindy Santiago APRN        sodium bicarbonate tablet 1,300 mg  1,300 mg Oral TID Cindy Santiago APRN   1,300 mg at 10/03/23 2329    sodium chloride 0.9 % flush 10 mL  10 mL Intravenous PRN  Volodymyr Ricks MD        sodium chloride 0.9 % flush 10 mL  10 mL Intravenous Q12H [place], Cindy M, APRN   10 mL at 10/03/23 2321    sodium chloride 0.9 % flush 10 mL  10 mL Intravenous PRN [place], Cindy M, APRN        sodium chloride 0.9 % infusion 40 mL  40 mL Intravenous PRN [place], Cindy M, APRN        temazepam (RESTORIL) capsule 30 mg  30 mg Oral Nightly [place], Cindy M, APRN   30 mg at 10/03/23 2322       Assessment & Plan         Hyperkalemia     ESRD  Hyperkalemia- better but recurrent so will stop arb.      Plan:  HD today at her home clinic, they are expecting her.  Stop losartan    Janina Martinez MD  10/04/23  06:53 EDT

## 2023-10-04 NOTE — PLAN OF CARE
Pt is a 72 yo female, full code, new admit to unit from home, admitting dx of hyperkalemia, potassium 6.4 on admission, pt is a current dialysis patient on M-W-F.  Upon arrival to unit, pt AAO x4, pt denies any reports or complaints of chest pain, difficulty breathing, shortness of breath, or cough and monitoring for such.  No s/s of edema or swelling to bilateral lower extremities noted.   Pt did not have any episodes of nausea, vomiting, or diarrhea this shift,  pt denies any complaints of abdominal pain or discomfort.  Pt's potassium at end of shift at 4.9, hyperkalemia protocol in ER successful.  Pt is due to have dialysis today, nephrology consult called in, awaiting dialysis orders.  Pt a possible discharge after dialysis.  Pt resting well in bed, no further acute issues, will continue to monitor and observe.     Goal Outcome Evaluation:  Plan of Care Reviewed With: patient        Progress: improving      Problem: Adult Inpatient Plan of Care  Goal: Plan of Care Review  Outcome: Ongoing, Progressing  Flowsheets (Taken 10/4/2023 0533)  Progress: improving  Plan of Care Reviewed With: patient     Problem: Diabetes Comorbidity  Goal: Blood Glucose Level Within Targeted Range  Outcome: Ongoing, Progressing     Problem: Hypertension Comorbidity  Goal: Blood Pressure in Desired Range  Outcome: Ongoing, Progressing     Problem: Electrolyte Imbalance  Goal: Electrolyte Balance  Outcome: Ongoing, Progressing

## 2023-10-04 NOTE — CASE MANAGEMENT/SOCIAL WORK
Continued Stay Note   [place]     Patient Name: Sherry Lobato  MRN: [id number]  Today's Date: 10/4/2023    Admit Date: 10/3/2023    Plan: Discharged before CM assessment.   Discharge Plan       Row Name 10/04/23 1214       Plan    Plan Discharged before CM assessment.    Plan Comments CM attempted to do assessment but the patient was already discharged.               Expected Discharge Date and Time       Expected Discharge Date Expected Discharge Time    Oct 4, 2023           Phone communication or documentation only - no physical contact with patient or family.     Julee Sterling RN

## 2023-10-04 NOTE — DISCHARGE SUMMARY
Date of Discharge:  10/4/2023    Discharge Diagnosis:   **Hyperkalemia [E87.5]   ESRD (end stage renal disease) [N18.6]   B12 deficiency [E53.8]   Gastroesophageal reflux disease [K21.9]   Hypertension [I10]   Hyperlipidemia [E78.5]   Anemia [D64.9]       Presenting Problem/History of Present Illness  Active Hospital Problems    Diagnosis  POA    **Hyperkalemia [E87.5]  Yes    ESRD (end stage renal disease) [N18.6]  Yes    B12 deficiency [E53.8]  Yes    Gastroesophageal reflux disease [K21.9]  Yes    Hypertension [I10]  Yes    Hyperlipidemia [E78.5]  Yes    Anemia [D64.9]  Yes      Resolved Hospital Problems   No resolved problems to display.          Hospital Course  Patient is a 73 y.o. female with end-stage renal disease on dialysis who presented with abdominal cramping and diarrhea.  CT scan of the abdomen was unremarkable.  Symptoms resolved within a few hours of presentation and she was able to tolerate a regular diet.  She was kept overnight because of hyperkalemia on admission.  She was treated medically with IV calcium, glucose and insulin.  Potassium came down to 4.9.  Losartan was discontinued as she had low-normal blood pressure and hyperkalemia.  The morning after admission she felt well and was tolerating regular diet.  She was quite eager to leave the hospital and get to her regularly scheduled dialysis session this morning.  She has follow-up scheduled in our office next week.  She has no concerns or complaints at the time of discharge.    Procedures Performed         Consults:   Consults       Date and Time Order Name Status Description    10/4/2023  6:32 AM Inpatient Nephrology Consult      10/4/2023 12:15 AM Inpatient Nephrology Consult              Pertinent Test Results:    Lab Results (most recent)       Procedure Component Value Units Date/Time    Basic Metabolic Panel [[id number]]  (Abnormal) Collected: 10/04/23 0040    Specimen: Blood Updated: 10/04/23 0201     Glucose 84 mg/dL      BUN 56  mg/dL      Creatinine 6.99 mg/dL      Sodium 140 mmol/L      Potassium 4.9 mmol/L      Chloride 97 mmol/L      CO2 26.0 mmol/L      Calcium 7.9 mg/dL      BUN/Creatinine Ratio 8.0     Anion Gap 17.0 mmol/L      eGFR 5.7 mL/min/1.73      Comment: <15 Indicative of kidney failure       Narrative:      GFR Normal >60  Chronic Kidney Disease <60  Kidney Failure <15    The GFR formula is only valid for adults with stable renal function between ages 18 and 70.    Magnesium [[id number]]  (Normal) Collected: 10/04/23 0040    Specimen: Blood Updated: 10/04/23 0201     Magnesium 1.7 mg/dL     Comprehensive Metabolic Panel [[id number]]  (Abnormal) Collected: 10/03/23 1331    Specimen: Blood from Arm, Right Updated: 10/03/23 1402     Glucose 85 mg/dL      BUN 49 mg/dL      Creatinine 6.29 mg/dL      Sodium 139 mmol/L      Potassium 6.2 mmol/L      Chloride 96 mmol/L      CO2 23.0 mmol/L      Calcium 8.3 mg/dL      Total Protein 7.6 g/dL      Albumin 4.1 g/dL      ALT (SGPT) 14 U/L      AST (SGOT) 19 U/L      Alkaline Phosphatase 111 U/L      Total Bilirubin 0.3 mg/dL      Globulin 3.5 gm/dL      A/G Ratio 1.2 g/dL      BUN/Creatinine Ratio 7.8     Anion Gap 20.0 mmol/L      eGFR 6.5 mL/min/1.73      Comment: <15 Indicative of kidney failure       Narrative:      GFR Normal >60  Chronic Kidney Disease <60  Kidney Failure <15    The GFR formula is only valid for adults with stable renal function between ages 18 and 70.    Lipase [[id number]]  (Normal) Collected: 10/03/23 1128    Specimen: Blood Updated: 10/03/23 1200     Lipase 31 U/L     CBC & Differential [[id number]]  (Abnormal) Collected: 10/03/23 1128    Specimen: Blood Updated: 10/03/23 1136    Narrative:      The following orders were created for panel order CBC & Differential.  Procedure                               Abnormality         Status                     ---------                               -----------         ------                     CBC Auto  Differential[[id number]]        Abnormal            Final result                 Please view results for these tests on the individual orders.    CBC Auto Differential [[id number]]  (Abnormal) Collected: 10/03/23 1128    Specimen: Blood Updated: 10/03/23 1136     WBC 12.50 10*3/mm3      RBC 4.06 10*6/mm3      Hemoglobin 12.0 g/dL      Hematocrit 37.1 %      MCV 91.5 fL      MCH 29.6 pg      MCHC 32.4 g/dL      RDW 18.6 %      RDW-SD 59.5 fl      MPV 7.4 fL      Platelets 498 10*3/mm3      Neutrophil % 88.6 %      Lymphocyte % 5.2 %      Monocyte % 4.8 %      Eosinophil % 0.8 %      Basophil % 0.6 %      Neutrophils, Absolute 11.10 10*3/mm3      Lymphocytes, Absolute 0.70 10*3/mm3      Monocytes, Absolute 0.60 10*3/mm3      Eosinophils, Absolute 0.10 10*3/mm3      Basophils, Absolute 0.10 10*3/mm3      nRBC 0.0 /100 WBC              Results for orders placed during the hospital encounter of 07/25/21    Adult Transthoracic Echo Complete W/ Cont if Necessary Per Protocol    Interpretation Summary  Normal LV size and contractility EF of 60%  Normal RV size  Normal atrial size  Aortic valve, mitral valve, tricuspid valve appears structurally normal, mild mitral regurgitation  seen.  No pericardial effusion seen.  Proximal aorta appears normal in size.              Condition on Discharge: Improved, stable    Vital Signs  Temp:  [97.4 °F (36.3 °C)-98.9 °F (37.2 °C)] 98.4 °F (36.9 °C)  Heart Rate:  [77-94] 87  Resp:  [14-22] 14  BP: ()/(50-83) 122/83    Physical Exam:     General Appearance:    Alert, cooperative, in no acute distress   Head:    Normocephalic, without obvious abnormality, atraumatic   Eyes:            Lids and lashes normal, conjunctivae and sclerae normal, no   icterus, no pallor, corneas clear, PERRLA   Ears:    Ears appear intact with no abnormalities noted   Throat:   No oral lesions, no thrush, oral mucosa moist   Neck:   No adenopathy, supple, trachea midline, no thyromegaly, no   carotid bruit,  no JVD   Lungs:     Clear to auscultation,respirations regular, even and                  unlabored    Heart:    Regular rhythm and normal rate, normal S1 and S2, no            murmur, no gallop, no rub, no click   Chest Wall:    No abnormalities observed   Abdomen:     Normal bowel sounds, no masses, no organomegaly, soft        non-tender, non-distended, no guarding, no rebound                tenderness   Extremities:   Moves all extremities well, no edema, no cyanosis, no             redness   Pulses:   Pulses palpable and equal bilaterally   Skin:   No bleeding, bruising or rash   Lymph nodes:   No palpable adenopathy   Neurologic:   Cranial nerves 2 - 12 grossly intact, sensation intact, DTR       present and equal bilaterally       Discharge Disposition  Home or Self Care    Discharge Medications     Discharge Medications        Changes to Medications        Instructions Start Date   sevelamer 800 MG tablet  Commonly known as: RENVELA  What changed: how much to take   2,400 mg, Oral, 3 Times Daily With Meals      sodium bicarbonate 650 MG tablet  What changed:   when to take this  Another medication with the same name was removed. Continue taking this medication, and follow the directions you see here.   1,300 mg, Oral, 2 Times Daily             Continue These Medications        Instructions Start Date   albuterol sulfate  (90 Base) MCG/ACT inhaler  Commonly known as: PROVENTIL HFA;VENTOLIN HFA;PROAIR HFA   2 puffs, Inhalation, Every 4 Hours PRN      amLODIPine 10 MG tablet  Commonly known as: NORVASC   10 mg, Oral, Daily      budesonide-formoterol 160-4.5 MCG/ACT inhaler  Commonly known as: SYMBICORT   2 puffs, Inhalation, 2 Times Daily - RT      carvedilol 3.125 MG tablet  Commonly known as: COREG   3.125 mg, Oral, 2 Times Daily With Meals      cholecalciferol 25 MCG (1000 UT) tablet  Commonly known as: VITAMIN D3   1,000 Units, Oral, Daily      lidocaine-prilocaine 2.5-2.5 % cream  Commonly known as:  EMLA   1 application , Topical, Daily PRN, Apply to access site 1 to 2 hours before dialysis      pantoprazole 40 MG EC tablet  Commonly known as: PROTONIX   1 tablet, Oral, Daily, Additional dose if needed      RenaPlex-D tablet tablet  Generic drug: multivitamin with minerals   1 tablet, Oral, Daily      sevelamer 800 MG tablet  Commonly known as: RENAGEL   2,400 mg, Oral, 3 Times Daily With Meals      simvastatin 40 MG tablet  Commonly known as: ZOCOR   40 mg, Oral, Nightly      temazepam 30 MG capsule  Commonly known as: RESTORIL   30 mg, Oral, Nightly             Stop These Medications      losartan 100 MG tablet  Commonly known as: COZAAR     metoprolol tartrate 25 MG tablet  Commonly known as: LOPRESSOR              Discharge Diet: Low potassium    Activity at Discharge: No restrictions    Follow-up Appointments  Future Appointments   Date Time Provider Department Center   10/10/2023  9:00 AM [facility]   1/2/2024 10:00 AM [facility]/CLINIC  [facility]   1/2/2024 10:45 AM [facility]  [facility]   1/2/2024 11:30 AM ROOM 1,  [facility]  [facility] None     Additional Instructions for the Follow-ups that You Need to Schedule       Discharge Follow-up with PCP   As directed       Currently Documented PCP:    Jin aMlcolm PA-C    PCP Phone Number:    [phone]     Follow Up Details: Keep scheduled appointment with [facility].  Call with any problems.                Test Results Pending at Discharge       Angelia Taylor MD  10/04/23  10:04 EDT    Time: Discharge 25 min

## 2023-10-04 NOTE — PLAN OF CARE
Goal Outcome Evaluation:  Plan of Care Reviewed With: patient        Progress: improving  Outcome Evaluation: Pt discharging, to go to her dialysis clinic, neph agreeable

## 2023-10-04 NOTE — CASE MANAGEMENT/SOCIAL WORK
Case Management Discharge Note      Final Note: Home.         Transportation Services  Private: Car    Final Discharge Disposition Code: 01 - home or self-care

## 2023-10-05 ENCOUNTER — READMISSION MANAGEMENT (OUTPATIENT)
Dept: CALL CENTER | Facility: HOSPITAL | Age: 73
End: 2023-10-05
Payer: MEDICARE

## 2023-10-05 NOTE — OUTREACH NOTE
Prep Survey      Flowsheet Row Responses   [facility] discharged from? [facility]   Is LACE score < 7 ? No   Eligibility Readm Mgmt   Discharge diagnosis hyperkalemia   Does the patient have one of the following disease processes/diagnoses(primary or secondary)? Other   Does the patient have Home health ordered? No   Is there a DME ordered? No   Comments regarding appointments listed on AVS   Medication alerts for this patient renvela sodium bicarb   Prep survey completed? Yes            Sandra MORALES - Registered Nurse

## 2023-10-08 ENCOUNTER — HOSPITAL ENCOUNTER (OUTPATIENT)
Facility: HOSPITAL | Age: 73
Setting detail: OBSERVATION
Discharge: HOME | End: 2023-10-09
Attending: INTERNAL MEDICINE | Admitting: INTERNAL MEDICINE
Payer: MEDICARE

## 2023-10-08 ENCOUNTER — READMISSION MANAGEMENT (OUTPATIENT)
Dept: CALL CENTER | Facility: HOSPITAL | Age: 73
End: 2023-10-08
Payer: MEDICARE

## 2023-10-08 DIAGNOSIS — R10.13 EPIGASTRIC PAIN: ICD-10-CM

## 2023-10-08 DIAGNOSIS — R11.2 INTRACTABLE NAUSEA AND VOMITING: Primary | ICD-10-CM

## 2023-10-08 LAB
ALBUMIN SERPL-MCNC: 4 G/DL (ref 3.5–5.2)
ALBUMIN/GLOB SERPL: 1.1 G/DL
ALP SERPL-CCNC: 105 U/L (ref 39–117)
ALT SERPL W P-5'-P-CCNC: 12 U/L (ref 1–33)
AMYLASE SERPL-CCNC: 49 U/L (ref 28–100)
ANION GAP SERPL CALCULATED.3IONS-SCNC: 21 MMOL/L (ref 5–15)
AST SERPL-CCNC: 15 U/L (ref 1–32)
BASOPHILS # BLD AUTO: 0.1 10*3/MM3 (ref 0–0.2)
BASOPHILS NFR BLD AUTO: 0.6 % (ref 0–1.5)
BILIRUB SERPL-MCNC: 0.3 MG/DL (ref 0–1.2)
BUN SERPL-MCNC: 48 MG/DL (ref 8–23)
BUN/CREAT SERPL: 8 (ref 7–25)
CALCIUM SPEC-SCNC: 9.3 MG/DL (ref 8.6–10.5)
CHLORIDE SERPL-SCNC: 92 MMOL/L (ref 98–107)
CO2 SERPL-SCNC: 22 MMOL/L (ref 22–29)
CREAT SERPL-MCNC: 6.02 MG/DL (ref 0.57–1)
D-LACTATE SERPL-SCNC: 0.7 MMOL/L (ref 0.5–2)
DEPRECATED RDW RBC AUTO: 62.6 FL (ref 37–54)
EGFRCR SERPLBLD CKD-EPI 2021: 6.9 ML/MIN/1.73
EOSINOPHIL # BLD AUTO: 0.1 10*3/MM3 (ref 0–0.4)
EOSINOPHIL NFR BLD AUTO: 0.4 % (ref 0.3–6.2)
ERYTHROCYTE [DISTWIDTH] IN BLOOD BY AUTOMATED COUNT: 18.5 % (ref 12.3–15.4)
GLOBULIN UR ELPH-MCNC: 3.7 GM/DL
GLUCOSE SERPL-MCNC: 121 MG/DL (ref 65–99)
HCT VFR BLD AUTO: 40.8 % (ref 34–46.6)
HGB BLD-MCNC: 13.3 G/DL (ref 12–15.9)
LIPASE SERPL-CCNC: 70 U/L (ref 13–60)
LYMPHOCYTES # BLD AUTO: 0.9 10*3/MM3 (ref 0.7–3.1)
LYMPHOCYTES NFR BLD AUTO: 7.1 % (ref 19.6–45.3)
MCH RBC QN AUTO: 29.6 PG (ref 26.6–33)
MCHC RBC AUTO-ENTMCNC: 32.6 G/DL (ref 31.5–35.7)
MCV RBC AUTO: 90.9 FL (ref 79–97)
MONOCYTES # BLD AUTO: 0.7 10*3/MM3 (ref 0.1–0.9)
MONOCYTES NFR BLD AUTO: 5.2 % (ref 5–12)
NEUTROPHILS NFR BLD AUTO: 11.3 10*3/MM3 (ref 1.7–7)
NEUTROPHILS NFR BLD AUTO: 86.7 % (ref 42.7–76)
NRBC BLD AUTO-RTO: 0 /100 WBC (ref 0–0.2)
PLATELET # BLD AUTO: 586 10*3/MM3 (ref 140–450)
PMV BLD AUTO: 7.5 FL (ref 6–12)
POTASSIUM SERPL-SCNC: 5.4 MMOL/L (ref 3.5–5.2)
PROT SERPL-MCNC: 7.7 G/DL (ref 6–8.5)
RBC # BLD AUTO: 4.48 10*6/MM3 (ref 3.77–5.28)
SODIUM SERPL-SCNC: 135 MMOL/L (ref 136–145)
WBC NRBC COR # BLD: 13.1 10*3/MM3 (ref 3.4–10.8)

## 2023-10-08 PROCEDURE — 94761 N-INVAS EAR/PLS OXIMETRY MLT: CPT

## 2023-10-08 PROCEDURE — 83605 ASSAY OF LACTIC ACID: CPT | Performed by: INTERNAL MEDICINE

## 2023-10-08 PROCEDURE — 85025 COMPLETE CBC W/AUTO DIFF WBC: CPT | Performed by: PHYSICIAN ASSISTANT

## 2023-10-08 PROCEDURE — 80053 COMPREHEN METABOLIC PANEL: CPT | Performed by: PHYSICIAN ASSISTANT

## 2023-10-08 PROCEDURE — 82150 ASSAY OF AMYLASE: CPT | Performed by: INTERNAL MEDICINE

## 2023-10-08 PROCEDURE — 25010000002 MORPHINE PER 10 MG: Performed by: PHYSICIAN ASSISTANT

## 2023-10-08 PROCEDURE — 94640 AIRWAY INHALATION TREATMENT: CPT

## 2023-10-08 PROCEDURE — 25010000002 HYDROMORPHONE 1 MG/ML SOLUTION: Performed by: PHYSICIAN ASSISTANT

## 2023-10-08 PROCEDURE — G0378 HOSPITAL OBSERVATION PER HR: HCPCS

## 2023-10-08 PROCEDURE — 96374 THER/PROPH/DIAG INJ IV PUSH: CPT

## 2023-10-08 PROCEDURE — 96376 TX/PRO/DX INJ SAME DRUG ADON: CPT

## 2023-10-08 PROCEDURE — 25010000002 ONDANSETRON PER 1 MG: Performed by: PHYSICIAN ASSISTANT

## 2023-10-08 PROCEDURE — 83690 ASSAY OF LIPASE: CPT | Performed by: INTERNAL MEDICINE

## 2023-10-08 PROCEDURE — 36415 COLL VENOUS BLD VENIPUNCTURE: CPT | Performed by: INTERNAL MEDICINE

## 2023-10-08 PROCEDURE — 83690 ASSAY OF LIPASE: CPT | Performed by: PHYSICIAN ASSISTANT

## 2023-10-08 PROCEDURE — 85025 COMPLETE CBC W/AUTO DIFF WBC: CPT | Performed by: INTERNAL MEDICINE

## 2023-10-08 PROCEDURE — 96375 TX/PRO/DX INJ NEW DRUG ADDON: CPT

## 2023-10-08 PROCEDURE — 25010000002 PROCHLORPERAZINE 10 MG/2ML SOLUTION: Performed by: PHYSICIAN ASSISTANT

## 2023-10-08 PROCEDURE — 94799 UNLISTED PULMONARY SVC/PX: CPT

## 2023-10-08 PROCEDURE — 99284 EMERGENCY DEPT VISIT MOD MDM: CPT

## 2023-10-08 RX ORDER — BISACODYL 5 MG/1
5 TABLET, DELAYED RELEASE ORAL DAILY PRN
Status: DISCONTINUED | OUTPATIENT
Start: 2023-10-08 | End: 2023-10-09 | Stop reason: HOSPADM

## 2023-10-08 RX ORDER — BISACODYL 10 MG
10 SUPPOSITORY, RECTAL RECTAL DAILY PRN
Status: DISCONTINUED | OUTPATIENT
Start: 2023-10-08 | End: 2023-10-09 | Stop reason: HOSPADM

## 2023-10-08 RX ORDER — TEMAZEPAM 15 MG/1
30 CAPSULE ORAL NIGHTLY
Status: DISCONTINUED | OUTPATIENT
Start: 2023-10-08 | End: 2023-10-09 | Stop reason: HOSPADM

## 2023-10-08 RX ORDER — SODIUM CHLORIDE 9 MG/ML
40 INJECTION, SOLUTION INTRAVENOUS AS NEEDED
Status: DISCONTINUED | OUTPATIENT
Start: 2023-10-08 | End: 2023-10-09 | Stop reason: HOSPADM

## 2023-10-08 RX ORDER — POLYETHYLENE GLYCOL 3350 17 G/17G
17 POWDER, FOR SOLUTION ORAL DAILY PRN
Status: DISCONTINUED | OUTPATIENT
Start: 2023-10-08 | End: 2023-10-09 | Stop reason: HOSPADM

## 2023-10-08 RX ORDER — PANTOPRAZOLE SODIUM 40 MG/10ML
40 INJECTION, POWDER, LYOPHILIZED, FOR SOLUTION INTRAVENOUS EVERY 12 HOURS SCHEDULED
Status: DISCONTINUED | OUTPATIENT
Start: 2023-10-08 | End: 2023-10-09 | Stop reason: HOSPADM

## 2023-10-08 RX ORDER — SODIUM CHLORIDE 0.9 % (FLUSH) 0.9 %
10 SYRINGE (ML) INJECTION AS NEEDED
Status: DISCONTINUED | OUTPATIENT
Start: 2023-10-08 | End: 2023-10-09 | Stop reason: HOSPADM

## 2023-10-08 RX ORDER — MORPHINE SULFATE 2 MG/ML
2 INJECTION, SOLUTION INTRAMUSCULAR; INTRAVENOUS
Status: DISCONTINUED | OUTPATIENT
Start: 2023-10-08 | End: 2023-10-09 | Stop reason: HOSPADM

## 2023-10-08 RX ORDER — SODIUM BICARBONATE 650 MG/1
1300 TABLET ORAL 2 TIMES DAILY
Status: DISCONTINUED | OUTPATIENT
Start: 2023-10-08 | End: 2023-10-09 | Stop reason: HOSPADM

## 2023-10-08 RX ORDER — ONDANSETRON 2 MG/ML
8 INJECTION INTRAMUSCULAR; INTRAVENOUS ONCE
Status: COMPLETED | OUTPATIENT
Start: 2023-10-08 | End: 2023-10-08

## 2023-10-08 RX ORDER — SEVELAMER CARBONATE 800 MG/1
2400 TABLET, FILM COATED ORAL
Status: DISCONTINUED | OUTPATIENT
Start: 2023-10-08 | End: 2023-10-09 | Stop reason: HOSPADM

## 2023-10-08 RX ORDER — NITROGLYCERIN 0.4 MG/1
0.4 TABLET SUBLINGUAL
Status: DISCONTINUED | OUTPATIENT
Start: 2023-10-08 | End: 2023-10-09 | Stop reason: HOSPADM

## 2023-10-08 RX ORDER — ONDANSETRON 2 MG/ML
4 INJECTION INTRAMUSCULAR; INTRAVENOUS EVERY 6 HOURS PRN
Status: DISCONTINUED | OUTPATIENT
Start: 2023-10-08 | End: 2023-10-09 | Stop reason: SDUPTHER

## 2023-10-08 RX ORDER — AMOXICILLIN 250 MG
2 CAPSULE ORAL 2 TIMES DAILY
Status: DISCONTINUED | OUTPATIENT
Start: 2023-10-08 | End: 2023-10-09 | Stop reason: HOSPADM

## 2023-10-08 RX ORDER — MULTIPLE VITAMINS W/ MINERALS TAB 9MG-400MCG
1 TAB ORAL DAILY
Status: DISCONTINUED | OUTPATIENT
Start: 2023-10-08 | End: 2023-10-09 | Stop reason: HOSPADM

## 2023-10-08 RX ORDER — SODIUM CHLORIDE 0.9 % (FLUSH) 0.9 %
10 SYRINGE (ML) INJECTION EVERY 12 HOURS SCHEDULED
Status: DISCONTINUED | OUTPATIENT
Start: 2023-10-08 | End: 2023-10-09 | Stop reason: HOSPADM

## 2023-10-08 RX ORDER — BUDESONIDE AND FORMOTEROL FUMARATE DIHYDRATE 160; 4.5 UG/1; UG/1
2 AEROSOL RESPIRATORY (INHALATION)
Status: DISCONTINUED | OUTPATIENT
Start: 2023-10-08 | End: 2023-10-09 | Stop reason: HOSPADM

## 2023-10-08 RX ORDER — PROCHLORPERAZINE EDISYLATE 5 MG/ML
5 INJECTION INTRAMUSCULAR; INTRAVENOUS ONCE
Status: COMPLETED | OUTPATIENT
Start: 2023-10-08 | End: 2023-10-08

## 2023-10-08 RX ORDER — AMLODIPINE BESYLATE 5 MG/1
10 TABLET ORAL DAILY
Status: DISCONTINUED | OUTPATIENT
Start: 2023-10-08 | End: 2023-10-09 | Stop reason: HOSPADM

## 2023-10-08 RX ORDER — CARVEDILOL 3.12 MG/1
3.12 TABLET ORAL 2 TIMES DAILY WITH MEALS
Status: DISCONTINUED | OUTPATIENT
Start: 2023-10-08 | End: 2023-10-09 | Stop reason: HOSPADM

## 2023-10-08 RX ORDER — MORPHINE SULFATE 2 MG/ML
2 INJECTION, SOLUTION INTRAMUSCULAR; INTRAVENOUS ONCE
Status: COMPLETED | OUTPATIENT
Start: 2023-10-08 | End: 2023-10-08

## 2023-10-08 RX ADMIN — Medication 10 ML: at 21:24

## 2023-10-08 RX ADMIN — CARVEDILOL 3.12 MG: 3.12 TABLET, FILM COATED ORAL at 11:55

## 2023-10-08 RX ADMIN — Medication 10 ML: at 12:00

## 2023-10-08 RX ADMIN — SEVELAMER CARBONATE 2400 MG: 800 TABLET, FILM COATED ORAL at 11:55

## 2023-10-08 RX ADMIN — MULTIPLE VITAMINS W/ MINERALS TAB 1 TABLET: TAB at 11:55

## 2023-10-08 RX ADMIN — BUDESONIDE AND FORMOTEROL FUMARATE DIHYDRATE 2 PUFF: 160; 4.5 AEROSOL RESPIRATORY (INHALATION) at 19:55

## 2023-10-08 RX ADMIN — HYDROMORPHONE HYDROCHLORIDE 0.25 MG: 1 INJECTION, SOLUTION INTRAMUSCULAR; INTRAVENOUS; SUBCUTANEOUS at 09:23

## 2023-10-08 RX ADMIN — TEMAZEPAM 30 MG: 15 CAPSULE ORAL at 21:22

## 2023-10-08 RX ADMIN — ONDANSETRON 8 MG: 2 INJECTION INTRAMUSCULAR; INTRAVENOUS at 07:04

## 2023-10-08 RX ADMIN — SODIUM BICARBONATE 1300 MG: 650 TABLET ORAL at 21:22

## 2023-10-08 RX ADMIN — PANTOPRAZOLE SODIUM 40 MG: 40 INJECTION, POWDER, LYOPHILIZED, FOR SOLUTION INTRAVENOUS at 11:55

## 2023-10-08 RX ADMIN — PROCHLORPERAZINE EDISYLATE 5 MG: 5 INJECTION INTRAMUSCULAR; INTRAVENOUS at 09:23

## 2023-10-08 RX ADMIN — MORPHINE SULFATE 2 MG: 2 INJECTION, SOLUTION INTRAMUSCULAR; INTRAVENOUS at 07:16

## 2023-10-08 RX ADMIN — SODIUM BICARBONATE 1300 MG: 650 TABLET ORAL at 11:55

## 2023-10-08 RX ADMIN — AMLODIPINE BESYLATE 10 MG: 5 TABLET ORAL at 11:55

## 2023-10-08 RX ADMIN — CARVEDILOL 3.12 MG: 3.12 TABLET, FILM COATED ORAL at 18:35

## 2023-10-08 RX ADMIN — PANTOPRAZOLE SODIUM 40 MG: 40 INJECTION, POWDER, LYOPHILIZED, FOR SOLUTION INTRAVENOUS at 21:30

## 2023-10-08 RX ADMIN — SEVELAMER CARBONATE 2400 MG: 800 TABLET, FILM COATED ORAL at 18:35

## 2023-10-08 NOTE — ED NOTES
Pt here with abdominal pain, nausea, vomiting, diarrhea. Pt was seen here 3 days ago for the same symptoms.

## 2023-10-08 NOTE — H&P
Patient Care Team:  Jin Malcolm PA-C as PCP - General (Physician Assistant)  Nilay Stevens MD as Consulting Physician (General Surgery)    Chief complaint abdominal pain    Subjective     Patient is a 73 y.o. female with history of end-stage renal disease on dialysis and previous gastric carcinoma (status post partial gastrectomy with jejunostomy) who presents with recurrent episodes of epigastric abdominal pain, nausea and vomiting.  She was hospitalized last week for similar symptoms.  Work-up at that time was unremarkable with exception of mild leukocytosis.  Symptoms resolved with supportive care within hours of her admission she requested discharge home.  She did well for 2 days but then developed recurrent pain with nausea and vomiting.  There has been no fever or chills, no ill contacts, no new medications or unusual foods.  She denies any change in her bowel habits.  Last dialysis treatment was Friday.  Last EGD was 18 months ago.  Last colonoscopy was earlier this year.  She has a suspicious lung nodule and is scheduled for outpatient biopsy through interventional radiology this week.    Review of Systems       History  Past Medical History:   Diagnosis Date    Lucas's esophagus     C. difficile colitis     Carotid artery disease     -50-74% left, 16-49% right (1/19)    COPD (chronic obstructive pulmonary disease)     DM2 (diabetes mellitus, type 2)     ESRD (end stage renal disease)     on HD    Gastric ulcer     at anastomatic site    Gastroparesis     unknown    GERD (gastroesophageal reflux disease)     Gout     not current    Hemodialysis patient     mwf    Hernia, incisional     abdomen    History of colonoscopy     UTD    History of degenerative disc disease     Hyperlipidemia     Hypertension     Microscopic colitis     Nephrotic syndrome     Neuroendocrine tumor     of stomach    FABY (obstructive sleep apnea)     not treating    Pedal edema     ressolved    Proteinuria     Renal  cell cancer, right     Rib pain on right side     chronic    Rotator cuff tear     bilateral    Stomach cancer 2016    Stroke     Uterine cancer     Vitamin B 12 deficiency     Vitamin D deficiency      Past Surgical History:   Procedure Laterality Date    APPENDECTOMY      ARTERIOVENOUS FISTULA Right     ARTERIOVENOUS FISTULA REPAIR      Clotted off and insert graft    ARTERIOVENOUS FISTULA/SHUNT SURGERY Left 12/30/2020    Procedure: ARTERIOVENOUS FISTULA FORMATION;  Surgeon: Jan Caicedo MD;  Location: [facility] MAIN OR;  Service: Vascular;  Laterality: Left;    BREAST BIOPSY      right nipple 1981    COLONOSCOPY N/A 11/24/2020    Procedure: COLONOSCOPY with polypectomy x 7;  Surgeon: Jeffery White MD;  Location: [facility] ENDOSCOPY;  Service: Gastroenterology;  Laterality: N/A;  post op: polyps, diverticulosis, hemorrhoids    COLONOSCOPY N/A 6/1/2023    Procedure: COLONOSCOPY with polypectomy x 3 biopsy x 1;  Surgeon: Jeffery White MD;  Location: [facility] ENDOSCOPY;  Service: Gastroenterology;  Laterality: N/A;  diverticulosis polyps    ENDOSCOPY N/A 03/12/2022    Procedure: ESOPHAGOGASTRODUODENOSCOPY with biopsy x 1 area;  Surgeon: Javan Augustin MD;  Location: [facility] ENDOSCOPY;  Service: Gastroenterology;  Laterality: N/A;  post op: anastamosis    GASTRIC RESECTION      cancer    HYSTERECTOMY      LAPAROSCOPIC CHOLECYSTECTOMY      NEPHRECTOMY      right kidney removed     REDUCTION MAMMAPLASTY      TUMOR REMOVAL      boils    VENTRAL/INCISIONAL HERNIA REPAIR Right 08/28/2019    Procedure: VENTRAL/INCISIONAL HERNIA REPAIR;  Surgeon: Nilay Stevens MD;  Location: [facility] MAIN OR;  Service: General    VENTRAL/INCISIONAL HERNIA REPAIR N/A 10/01/2019    Procedure: OPEN VENTRAL/INCISIONAL HERNIA REPAIR;  Surgeon: Nilay Stevens MD;  Location: [facility] MAIN OR;  Service: General    VENTRAL/INCISIONAL HERNIA REPAIR N/A 10/11/2021    Procedure: VENTRAL/INCISIONAL HERNIA REPAIR LAPAROSCOPIC;  Surgeon: Rodney  Nilay NARVAEZ MD;  Location: [facility] MAIN OR;  Service: General;  Laterality: N/A;    VENTRAL/INCISIONAL HERNIA REPAIR N/A 2023    Procedure: VENTRAL/INCISIONAL HERNIA REPAIR with MESH;  Surgeon: iNlay Stevens MD;  Location: [facility] MAIN OR;  Service: General;  Laterality: N/A;     Family History   Problem Relation Age of Onset    Heart disease Father      Social History     Tobacco Use    Smoking status: Former     Packs/day: 0.25     Years: 57.00     Additional pack years: 0.00     Total pack years: 14.25     Types: Cigarettes     Quit date: 2021     Years since quittin.4     Passive exposure: Past    Smokeless tobacco: Never    Tobacco comments:     quit smoking 2 months ago   Vaping Use    Vaping Use: Former   Substance Use Topics    Alcohol use: No    Drug use: No     (Not in a hospital admission)    Allergies:  Atorvastatin calcium, Gabapentin, and Niacin    Objective     Vital Signs  Temp:  [98.1 øF (36.7 øC)] 98.1 øF (36.7 øC)  Heart Rate:  [83-86] 83  Resp:  [20] 20  BP: (152-183)/(60-97) 152/60     Physical Exam:      General Appearance:    Alert, cooperative, in no acute distress   Head:    Normocephalic, without obvious abnormality, atraumatic   Eyes:            Lids and lashes normal, conjunctivae and sclerae normal, no   icterus, no pallor, corneas clear, PERRLA   Ears:    Ears appear intact with no abnormalities noted   Throat:   No oral lesions, no thrush, oral mucosa moist   Neck:   No adenopathy, supple, trachea midline, no thyromegaly, no   carotid bruit, no JVD   Lungs:     Clear to auscultation,respirations regular, even and                  unlabored    Heart:    Regular rhythm and normal rate, normal S1 and S2, no            murmur, no gallop, no rub, no click   Chest Wall:  Tunneled dialysis catheter   Abdomen:   Epigastric tenderness without rebound or guarding   Extremities:   Moves all extremities well, no edema, no cyanosis, no             redness   Pulses:   Pulses palpable  and equal bilaterally   Skin:   No bleeding, bruising or rash   Lymph nodes:   No palpable adenopathy   Neurologic:   Cranial nerves 2 - 12 grossly intact, sensation intact, DTR       present and equal bilaterally       Results Review:     Imaging Results (Last 24 Hours)       ** No results found for the last 24 hours. **             Lab Results (last 24 hours)       Procedure Component Value Units Date/Time    Amylase [[id number]]  (Normal) Collected: 10/08/23 0748    Specimen: Blood Updated: 10/08/23 1058     Amylase 49 U/L     Comprehensive Metabolic Panel [[id number]]  (Abnormal) Collected: 10/08/23 0748    Specimen: Blood Updated: 10/08/23 0841     Glucose 121 mg/dL      BUN 48 mg/dL      Creatinine 6.02 mg/dL      Sodium 135 mmol/L      Potassium 5.4 mmol/L      Chloride 92 mmol/L      CO2 22.0 mmol/L      Calcium 9.3 mg/dL      Total Protein 7.7 g/dL      Albumin 4.0 g/dL      ALT (SGPT) 12 U/L      AST (SGOT) 15 U/L      Alkaline Phosphatase 105 U/L      Total Bilirubin 0.3 mg/dL      Globulin 3.7 gm/dL      A/G Ratio 1.1 g/dL      BUN/Creatinine Ratio 8.0     Anion Gap 21.0 mmol/L      eGFR 6.9 mL/min/1.73      Comment: <15 Indicative of kidney failure       Narrative:      GFR Normal >60  Chronic Kidney Disease <60  Kidney Failure <15    The GFR formula is only valid for adults with stable renal function between ages 18 and 70.    Lipase [[id number]]  (Abnormal) Collected: 10/08/23 0651    Specimen: Blood Updated: 10/08/23 0731     Lipase 70 U/L     CBC & Differential [[id number]]  (Abnormal) Collected: 10/08/23 0651    Specimen: Blood Updated: 10/08/23 0656    Narrative:      The following orders were created for panel order CBC & Differential.  Procedure                               Abnormality         Status                     ---------                               -----------         ------                     CBC Auto Differential[[id number]]        Abnormal            Final result                  Please view results for these tests on the individual orders.    CBC Auto Differential [[id number]]  (Abnormal) Collected: 10/08/23 0651    Specimen: Blood Updated: 10/08/23 0656     WBC 13.10 10*3/mm3      RBC 4.48 10*6/mm3      Hemoglobin 13.3 g/dL      Hematocrit 40.8 %      MCV 90.9 fL      MCH 29.6 pg      MCHC 32.6 g/dL      RDW 18.5 %      RDW-SD 62.6 fl      MPV 7.5 fL      Platelets 586 10*3/mm3      Neutrophil % 86.7 %      Lymphocyte % 7.1 %      Monocyte % 5.2 %      Eosinophil % 0.4 %      Basophil % 0.6 %      Neutrophils, Absolute 11.30 10*3/mm3      Lymphocytes, Absolute 0.90 10*3/mm3      Monocytes, Absolute 0.70 10*3/mm3      Eosinophils, Absolute 0.10 10*3/mm3      Basophils, Absolute 0.10 10*3/mm3      nRBC 0.0 /100 WBC              I reviewed the patient's new clinical results.    Assessment & Plan       Epigastric pain  -Differential is broad, including pancreatitis, gastritis, peptic ulcer disease, ischemic colitis.  Checking lactic acid level.  Lipase is slightly elevated, which is a change from last week.  Amylase is normal.  I did not repeat imaging that was done on 10 3 and was normal.  She does not have any NSAID use.  Consulting GI to consider further work-up.  He may need EGD to rule out gastritis, ulcer disease, etc. continue proton pump inhibitor.  I will keep her n.p.o. until GI has had a chance to see her.    End-stage renal disease-resume regular dialysis schedule; continue sodium bicarb  Secondary hyperparathyroidism-Renvela  Lung nodule-biopsy scheduled this week  Essential hypertension-amlodipine, carvedilol  COPD-Symbicort  Insomnia-temazepam    SCDs for DVT prophylaxis  PPI for stress ulcer prophylaxis    I discussed the patient's findings and my recommendations with patient.     Angelia Taylor MD  10/08/23  11:21 EDT

## 2023-10-08 NOTE — Clinical Note
Level of Care: Med/Surg [1]   Admitting Physician: AMY GALINDO [[id number]]   Attending Physician: AMY GALINDO [[id number]]

## 2023-10-08 NOTE — ED NOTES
Nursing report ED to floor  Sherry Lobato  73 y.o.  female    HPI:   Chief Complaint   Patient presents with    Abdominal Pain       Admitting doctor:   Angelia Taylor MD    Admitting diagnosis:   The primary encounter diagnosis was Intractable nausea and vomiting. A diagnosis of Epigastric pain was also pertinent to this visit.    Code status:   Current Code Status       Date Active Code Status Order ID Comments User Context       Prior            Allergies:   Atorvastatin calcium, Gabapentin, and Niacin    Isolation:  No active isolations     Fall Risk:  Fall Risk Assessment was completed, and patient is at moderate risk for falls.   Predictive Model Details         45 (Low) Factor Value    Calculated 10/8/2023 13:09 Age 73    Risk of Fall Model Musculoskeletal Assessment WDL     Number of Distinct Medication Classes administered 10     Active Peripheral IV Present     Respiratory Rate 20     Skin Assessment WDL     Magnesium not on file     Financial Class Medicare     Diastolic BP 66     Drug Use No     Tobacco Use Quit     Chloride 92 mmol/L     [place] Scale not on file     Creatinine 6.02 mg/dL     Number of administrations of Analgesic Narcotics 2     Peripheral Vascular Assessment WDL     Number of administrations of Ulcer Drugs 1     Gastrointestinal Assessment WDL     Albumin 4 g/dL     Cardiac Assessment WDL     Calcium 9.3 mg/dL     Days after Admission 0.275     Potassium 5.4 mmol/L     Total Bilirubin 0.3 mg/dL     ALT 12 U/L         Weight:       10/08/23  0633   Weight: 71 kg (156 lb 8.4 oz)       Intake and Output  No intake or output data in the 24 hours ending 10/08/23 1310    Diet:   Dietary Orders (From admission, onward)       Start     Ordered    10/08/23 1108  NPO Diet NPO Type: Sips with Meds, Ice Chips  Diet Effective Now        Question Answer Comment   NPO Type Sips with Meds    NPO Type Ice Chips        10/08/23 1107                     Most recent vitals:   Vitals:    10/08/23 0731  10/08/23 0732 10/08/23 1031 10/08/23 1149   BP: 167/67  152/60 132/66   BP Location:       Patient Position:       Pulse: 86  83 87   Resp:       Temp:       TempSrc:       SpO2:  90% 93% 91%   Weight:       Height:           Active LDAs/IV Access:   Lines, Drains & Airways       Active LDAs       Name Placement date Placement time Site Days    Peripheral IV 10/08/23 0704 Anterior;Right Forearm 10/08/23  0704  Forearm  less than 1                    Skin Condition:   Skin Assessments (last day)       None             Labs (abnormal labs have a star):   Labs Reviewed   COMPREHENSIVE METABOLIC PANEL - Abnormal; Notable for the following components:       Result Value    Glucose 121 (*)     BUN 48 (*)     Creatinine 6.02 (*)     Sodium 135 (*)     Potassium 5.4 (*)     Chloride 92 (*)     Anion Gap 21.0 (*)     eGFR 6.9 (*)     All other components within normal limits    Narrative:     GFR Normal >60  Chronic Kidney Disease <60  Kidney Failure <15    The GFR formula is only valid for adults with stable renal function between ages 18 and 70.   LIPASE - Abnormal; Notable for the following components:    Lipase 70 (*)     All other components within normal limits   CBC WITH AUTO DIFFERENTIAL - Abnormal; Notable for the following components:    WBC 13.10 (*)     RDW 18.5 (*)     RDW-SD 62.6 (*)     Platelets 586 (*)     Neutrophil % 86.7 (*)     Lymphocyte % 7.1 (*)     Neutrophils, Absolute 11.30 (*)     All other components within normal limits   AMYLASE - Normal   LACTIC ACID, PLASMA - Normal   CBC AND DIFFERENTIAL    Narrative:     The following orders were created for panel order CBC & Differential.  Procedure                               Abnormality         Status                     ---------                               -----------         ------                     CBC Auto Differential[[id number]]        Abnormal            Final result                 Please view results for these tests on the individual  orders.       LOC: Person, Place, Time, and Situation    Telemetry:  Med/Surg    Cardiac Monitoring Ordered: yes    EKG:   No orders to display       Medications Given in the ED:   Medications   sodium chloride 0.9 % flush 10 mL (has no administration in time range)   amLODIPine (NORVASC) tablet 10 mg (10 mg Oral Given 10/8/23 1155)   budesonide-formoterol (SYMBICORT) 160-4.5 MCG/ACT inhaler 2 puff (2 puffs Inhalation Not Given 10/8/23 1129)   carvedilol (COREG) tablet 3.125 mg (3.125 mg Oral Given 10/8/23 1155)   multivitamin with minerals 1 tablet (1 tablet Oral Given 10/8/23 1155)   sevelamer (RENVELA) tablet 2,400 mg (2,400 mg Oral Given 10/8/23 1155)   sodium bicarbonate tablet 1,300 mg (1,300 mg Oral Given 10/8/23 1155)   temazepam (RESTORIL) capsule 30 mg (has no administration in time range)   sodium chloride 0.9 % flush 10 mL (10 mL Intravenous Given 10/8/23 1200)   sodium chloride 0.9 % flush 10 mL (has no administration in time range)   sodium chloride 0.9 % infusion 40 mL (has no administration in time range)   sennosides-docusate (PERICOLACE) 8.6-50 MG per tablet 2 tablet (2 tablets Oral Not Given 10/8/23 1155)     And   polyethylene glycol (MIRALAX) packet 17 g (has no administration in time range)     And   bisacodyl (DULCOLAX) EC tablet 5 mg (has no administration in time range)     And   bisacodyl (DULCOLAX) suppository 10 mg (has no administration in time range)   nitroglycerin (NITROSTAT) SL tablet 0.4 mg (has no administration in time range)   ondansetron (ZOFRAN) injection 4 mg (has no administration in time range)   pantoprazole (PROTONIX) injection 40 mg (40 mg Intravenous Given 10/8/23 1155)   morphine injection 2 mg (has no administration in time range)   ondansetron (ZOFRAN) injection 8 mg (8 mg Intravenous Given 10/8/23 0704)   morphine injection 2 mg (2 mg Intravenous Given 10/8/23 0716)   prochlorperazine (COMPAZINE) injection 5 mg (5 mg Intravenous Given 10/8/23 2497)   HYDROmorphone  (DILAUDID) injection 0.25 mg (0.25 mg Intravenous Given 10/8/23 0923)       Imaging results:  No radiology results for the last day    Social issues:   Social History     Socioeconomic History    Marital status: Single   Tobacco Use    Smoking status: Former     Packs/day: 0.25     Years: 57.00     Additional pack years: 0.00     Total pack years: 14.25     Types: Cigarettes     Quit date: 2021     Years since quittin.4     Passive exposure: Past    Smokeless tobacco: Never    Tobacco comments:     quit smoking 2 months ago   Vaping Use    Vaping Use: Former   Substance and Sexual Activity    Alcohol use: No    Drug use: No    Sexual activity: Defer       NIH Stroke Scale:  Interval: (not recorded)  1a. Level of Consciousness: (not recorded)  1b. LOC Questions: (not recorded)  1c. LOC Commands: (not recorded)  2. Best Gaze: (not recorded)  3. Visual: (not recorded)  4. Facial Palsy: (not recorded)  5a. Motor Arm, Left: (not recorded)  5b. Motor Arm, Right: (not recorded)  6a. Motor Leg, Left: (not recorded)  6b. Motor Leg, Right: (not recorded)  7. Limb Ataxia: (not recorded)  8. Sensory: (not recorded)  9. Best Language: (not recorded)  10. Dysarthria: (not recorded)  11. Extinction and Inattention (formerly Neglect): (not recorded)    Total (NIH Stroke Scale): (not recorded)     Additional notable assessment information:     Nursing report ED to floor:      Charlotte Tadeo RN   10/08/23 13:10 EDT

## 2023-10-08 NOTE — PLAN OF CARE
Goal Outcome Evaluation:      Pt new admit from ER, plan of care initiated, oriented to unit, lore light within reach.

## 2023-10-08 NOTE — ED PROVIDER NOTES
Subjective   History of Present Illness  Patient is a 73-year-old female with past medical history significant for end-stage renal disease who presents with intractable nausea vomiting that started around 1 AM.  She was unable to tolerate any p.o. this morning does not have antiemetics at home.  Of note she was just discharged for this over none 4 days ago.  She had a fairly benign work-up other than hyperkalemia and mild leukocytosis.  Her CT abdomen pelvis was unremarkable.  She is feeling better at time of discharge and wanted to go home.        Review of Systems   Constitutional:  Negative for chills, diaphoresis, fatigue and fever.   HENT:  Negative for congestion, ear pain, sinus pressure, sore throat, trouble swallowing and voice change.    Respiratory:  Negative for cough.    Cardiovascular:  Negative for chest pain and palpitations.   Gastrointestinal:  Positive for abdominal pain, nausea and vomiting. Negative for abdominal distention.   Genitourinary: Negative.    Musculoskeletal: Negative.    Skin: Negative.    Neurological: Negative.    Psychiatric/Behavioral: Negative.         Past Medical History:   Diagnosis Date    Lucas's esophagus     C. difficile colitis     Carotid artery disease     -50-74% left, 16-49% right (1/19)    COPD (chronic obstructive pulmonary disease)     DM2 (diabetes mellitus, type 2)     ESRD (end stage renal disease)     on HD    Gastric ulcer     at anastomatic site    Gastroparesis     unknown    GERD (gastroesophageal reflux disease)     Gout     not current    Hemodialysis patient     [facility]    Hernia, incisional     abdomen    History of colonoscopy     UTD    History of degenerative disc disease     Hyperlipidemia     Hypertension     Microscopic colitis     Nephrotic syndrome     Neuroendocrine tumor     of stomach    FABY (obstructive sleep apnea)     not treating    Pedal edema     ressolved    Proteinuria     Renal cell cancer, right     Rib pain on right side      "chronic    Rotator cuff tear     bilateral    Stomach cancer 2016    Stroke     Uterine cancer     Vitamin B 12 deficiency     Vitamin D deficiency        Allergies   Allergen Reactions    Atorvastatin Calcium Other (See Comments)     \"felt like I was on fire\"    Gabapentin Dizziness     says was unable to walk    Niacin Other (See Comments)     \"FEELS LIKE SHE IS ON FIRE\"         Past Surgical History:   Procedure Laterality Date    APPENDECTOMY      ARTERIOVENOUS FISTULA Right     ARTERIOVENOUS FISTULA REPAIR      Clotted off and insert graft    ARTERIOVENOUS FISTULA/SHUNT SURGERY Left 12/30/2020    Procedure: ARTERIOVENOUS FISTULA FORMATION;  Surgeon: Jan Caicedo MD;  Location: [facility] MAIN OR;  Service: Vascular;  Laterality: Left;    BREAST BIOPSY      right nipple 1981    COLONOSCOPY N/A 11/24/2020    Procedure: COLONOSCOPY with polypectomy x 7;  Surgeon: Jeffery White MD;  Location: [facility] ENDOSCOPY;  Service: Gastroenterology;  Laterality: N/A;  post op: polyps, diverticulosis, hemorrhoids    COLONOSCOPY N/A 6/1/2023    Procedure: COLONOSCOPY with polypectomy x 3 biopsy x 1;  Surgeon: Jeffery White MD;  Location: [facility] ENDOSCOPY;  Service: Gastroenterology;  Laterality: N/A;  diverticulosis polyps    ENDOSCOPY N/A 03/12/2022    Procedure: ESOPHAGOGASTRODUODENOSCOPY with biopsy x 1 area;  Surgeon: Javan Augustin MD;  Location: [facility] ENDOSCOPY;  Service: Gastroenterology;  Laterality: N/A;  post op: anastamosis    GASTRIC RESECTION      cancer    HYSTERECTOMY      LAPAROSCOPIC CHOLECYSTECTOMY      NEPHRECTOMY      right kidney removed     REDUCTION MAMMAPLASTY      TUMOR REMOVAL      boils    VENTRAL/INCISIONAL HERNIA REPAIR Right 08/28/2019    Procedure: VENTRAL/INCISIONAL HERNIA REPAIR;  Surgeon: Nilay Stevens MD;  Location: [facility] MAIN OR;  Service: General    VENTRAL/INCISIONAL HERNIA REPAIR N/A 10/01/2019    Procedure: OPEN VENTRAL/INCISIONAL HERNIA REPAIR;  Surgeon: Nilay Stevens, " MD;  Location: [facility] MAIN OR;  Service: General    VENTRAL/INCISIONAL HERNIA REPAIR N/A 10/11/2021    Procedure: VENTRAL/INCISIONAL HERNIA REPAIR LAPAROSCOPIC;  Surgeon: Nilay Stevens MD;  Location: [facility] MAIN OR;  Service: General;  Laterality: N/A;    VENTRAL/INCISIONAL HERNIA REPAIR N/A 2023    Procedure: VENTRAL/INCISIONAL HERNIA REPAIR with MESH;  Surgeon: Nilay Stevens MD;  Location: [facility] MAIN OR;  Service: General;  Laterality: N/A;       Family History   Problem Relation Age of Onset    Heart disease Father        Social History     Socioeconomic History    Marital status: Single   Tobacco Use    Smoking status: Former     Packs/day: 0.25     Years: 57.00     Additional pack years: 0.00     Total pack years: 14.25     Types: Cigarettes     Quit date: 2021     Years since quittin.4     Passive exposure: Past    Smokeless tobacco: Never    Tobacco comments:     quit smoking 2 months ago   Vaping Use    Vaping Use: Former   Substance and Sexual Activity    Alcohol use: No    Drug use: No    Sexual activity: Defer           Objective   Physical Exam  Vitals and nursing note reviewed.   Constitutional:       Appearance: She is well-developed. She is ill-appearing.   HENT:      Head: Normocephalic and atraumatic.      Nose: Nose normal.   Eyes:      Pupils: Pupils are equal, round, and reactive to light.   Pulmonary:      Effort: Pulmonary effort is normal.   Abdominal:      General: Abdomen is protuberant. There is no distension or abdominal bruit. There are no signs of injury.      Palpations: Abdomen is soft.      Tenderness: There is generalized abdominal tenderness and tenderness in the epigastric area.   Musculoskeletal:         General: Normal range of motion.      Cervical back: Normal range of motion.   Skin:     General: Skin is warm and dry.   Neurological:      General: No focal deficit present.      Mental Status: She is alert and oriented to person, place, and time.  "  Psychiatric:         Mood and Affect: Mood normal.         Behavior: Behavior normal.         Thought Content: Thought content normal.         Judgment: Judgment normal.         Procedures           ED Course                                           Medical Decision Making  Appropriate PPE worn during exam.    /67   Pulse 86   Temp 98.1 øF (36.7 øC) (Oral)   Resp 20   Ht 152.4 cm (60\")   Wt 71 kg (156 lb 8.4 oz)   SpO2 90%   BMI 30.57 kg/mý    Chart review:    Co-morbidities --  has a past medical history of Lucas's esophagus, C. difficile colitis, Carotid artery disease, COPD (chronic obstructive pulmonary disease), DM2 (diabetes mellitus, type 2), ESRD (end stage renal disease), Gastric ulcer, Gastroparesis, GERD (gastroesophageal reflux disease), Gout, Hemodialysis patient, Hernia, incisional, History of colonoscopy, History of degenerative disc disease, Hyperlipidemia, Hypertension, Microscopic colitis, Nephrotic syndrome, Neuroendocrine tumor, FABY (obstructive sleep apnea), Pedal edema, Proteinuria, Renal cell cancer, right, Rib pain on right side, Rotator cuff tear, Stomach cancer (2016), Stroke, Uterine cancer, Vitamin B 12 deficiency, and Vitamin D deficiency.  Lab interpretation --  Labs viewed by me significant for the following:   Radiology interpretation --  X-rays reviewed by me and interpreted by radiologist:  No radiology results for the last day    Plan --patient presents with intractable nausea vomiting she was given antiemetics and was still complaining of pain so she was given pain medication and admitted to her family medicine doctor.  She had repeat of symptoms before her admittance.  She was stable and in agreement with plan of note patient was recently admitted for the same had a fairly extensive work-up including CT abdomen pelvis which was fairly unremarkable.  I did not repeat the imaging today.  Her blood work changes only showed mild increase in her WBC and her lipase " was slightly elevated    I discussed the findings and recommendations with the patient who voices understanding. Stable while in the ER.     Note Disclaimer: At [facility], we believe that sharing information builds trust and better relationships. You are receiving this note because you are receiving care at [facility] or recently visited. It is possible you will see health information before a provider has talked with you about it. This kind of information can be easy to misunderstand. To help you fully understand what it means for your health, we urge you to discuss this note with your provider.        Problems Addressed:  Epigastric pain: complicated acute illness or injury  Intractable nausea and vomiting: complicated acute illness or injury    Amount and/or Complexity of Data Reviewed  Labs: ordered.    Risk  Prescription drug management.  Decision regarding hospitalization.        Final diagnoses:   Intractable nausea and vomiting   Epigastric pain       ED Disposition  ED Disposition       ED Disposition   Decision to Admit    Condition   --    Comment   Level of Care: Med/Surg [1]   Admitting Physician: AMY GALINDO [[id number]]   Attending Physician: AMY GALINDO [[id number]]                 No follow-up provider specified.       Medication List      No changes were made to your prescriptions during this visit.            Lisa Encarnacion PA-C  10/08/23 0945

## 2023-10-09 ENCOUNTER — ANESTHESIA (OUTPATIENT)
Dept: GASTROENTEROLOGY | Facility: HOSPITAL | Age: 73
End: 2023-10-09
Payer: MEDICARE

## 2023-10-09 ENCOUNTER — ON CAMPUS - OUTPATIENT (AMBULATORY)
Dept: URBAN - METROPOLITAN AREA HOSPITAL 85 | Facility: HOSPITAL | Age: 73
End: 2023-10-09

## 2023-10-09 ENCOUNTER — ANESTHESIA EVENT (OUTPATIENT)
Dept: GASTROENTEROLOGY | Facility: HOSPITAL | Age: 73
End: 2023-10-09
Payer: MEDICARE

## 2023-10-09 ENCOUNTER — READMISSION MANAGEMENT (OUTPATIENT)
Dept: CALL CENTER | Facility: HOSPITAL | Age: 73
End: 2023-10-09
Payer: MEDICARE

## 2023-10-09 VITALS
DIASTOLIC BLOOD PRESSURE: 47 MMHG | HEIGHT: 60 IN | OXYGEN SATURATION: 90 % | TEMPERATURE: 98.2 F | BODY MASS INDEX: 30.73 KG/M2 | WEIGHT: 156.53 LBS | RESPIRATION RATE: 14 BRPM | SYSTOLIC BLOOD PRESSURE: 97 MMHG | HEART RATE: 78 BPM

## 2023-10-09 DIAGNOSIS — K22.70 BARRETT'S ESOPHAGUS WITHOUT DYSPLASIA: ICD-10-CM

## 2023-10-09 DIAGNOSIS — Z90.3 ACQUIRED ABSENCE OF STOMACH [PART OF]: ICD-10-CM

## 2023-10-09 DIAGNOSIS — D64.9 ANEMIA, UNSPECIFIED: ICD-10-CM

## 2023-10-09 DIAGNOSIS — K52.9 NONINFECTIVE GASTROENTERITIS AND COLITIS, UNSPECIFIED: ICD-10-CM

## 2023-10-09 DIAGNOSIS — Z90.49 ACQUIRED ABSENCE OF OTHER SPECIFIED PARTS OF DIGESTIVE TRACT: ICD-10-CM

## 2023-10-09 DIAGNOSIS — R11.2 NAUSEA WITH VOMITING, UNSPECIFIED: ICD-10-CM

## 2023-10-09 DIAGNOSIS — R10.13 EPIGASTRIC PAIN: ICD-10-CM

## 2023-10-09 DIAGNOSIS — K29.80 DUODENITIS WITHOUT BLEEDING: ICD-10-CM

## 2023-10-09 DIAGNOSIS — K26.9 DUODENAL ULCER, UNSPECIFIED AS ACUTE OR CHRONIC, WITHOUT HEM: ICD-10-CM

## 2023-10-09 LAB
ANION GAP SERPL CALCULATED.3IONS-SCNC: 17 MMOL/L (ref 5–15)
BASOPHILS # BLD AUTO: 0.1 10*3/MM3 (ref 0–0.2)
BASOPHILS NFR BLD AUTO: 0.9 % (ref 0–1.5)
BUN SERPL-MCNC: 56 MG/DL (ref 8–23)
BUN/CREAT SERPL: 7.8 (ref 7–25)
CALCIUM SPEC-SCNC: 8.7 MG/DL (ref 8.6–10.5)
CHLORIDE SERPL-SCNC: 92 MMOL/L (ref 98–107)
CO2 SERPL-SCNC: 26 MMOL/L (ref 22–29)
CREAT SERPL-MCNC: 7.14 MG/DL (ref 0.57–1)
DEPRECATED RDW RBC AUTO: 58.2 FL (ref 37–54)
EGFRCR SERPLBLD CKD-EPI 2021: 5.6 ML/MIN/1.73
EOSINOPHIL # BLD AUTO: 0.3 10*3/MM3 (ref 0–0.4)
EOSINOPHIL NFR BLD AUTO: 2.4 % (ref 0.3–6.2)
ERYTHROCYTE [DISTWIDTH] IN BLOOD BY AUTOMATED COUNT: 18.3 % (ref 12.3–15.4)
GLUCOSE SERPL-MCNC: 92 MG/DL (ref 65–99)
HCT VFR BLD AUTO: 34.4 % (ref 34–46.6)
HGB BLD-MCNC: 11.2 G/DL (ref 12–15.9)
LIPASE SERPL-CCNC: 18 U/L (ref 13–60)
LYMPHOCYTES # BLD AUTO: 1.5 10*3/MM3 (ref 0.7–3.1)
LYMPHOCYTES NFR BLD AUTO: 13.9 % (ref 19.6–45.3)
MCH RBC QN AUTO: 29.9 PG (ref 26.6–33)
MCHC RBC AUTO-ENTMCNC: 32.5 G/DL (ref 31.5–35.7)
MCV RBC AUTO: 91.8 FL (ref 79–97)
MONOCYTES # BLD AUTO: 1 10*3/MM3 (ref 0.1–0.9)
MONOCYTES NFR BLD AUTO: 9 % (ref 5–12)
NEUTROPHILS NFR BLD AUTO: 7.8 10*3/MM3 (ref 1.7–7)
NEUTROPHILS NFR BLD AUTO: 73.8 % (ref 42.7–76)
NRBC BLD AUTO-RTO: 0 /100 WBC (ref 0–0.2)
PLATELET # BLD AUTO: 513 10*3/MM3 (ref 140–450)
PMV BLD AUTO: 7.9 FL (ref 6–12)
POTASSIUM SERPL-SCNC: 4.9 MMOL/L (ref 3.5–5.2)
RBC # BLD AUTO: 3.75 10*6/MM3 (ref 3.77–5.28)
SODIUM SERPL-SCNC: 135 MMOL/L (ref 136–145)
WBC NRBC COR # BLD: 10.6 10*3/MM3 (ref 3.4–10.8)
WHOLE BLOOD HOLD SPECIMEN: NORMAL

## 2023-10-09 PROCEDURE — G0378 HOSPITAL OBSERVATION PER HR: HCPCS

## 2023-10-09 PROCEDURE — 25810000003 SODIUM CHLORIDE 0.9 % SOLUTION

## 2023-10-09 PROCEDURE — 99222 1ST HOSP IP/OBS MODERATE 55: CPT | Mod: 25,FS | Performed by: NURSE PRACTITIONER

## 2023-10-09 PROCEDURE — 94799 UNLISTED PULMONARY SVC/PX: CPT

## 2023-10-09 PROCEDURE — 86316 IMMUNOASSAY TUMOR OTHER: CPT | Performed by: NURSE PRACTITIONER

## 2023-10-09 PROCEDURE — 84260 ASSAY OF SEROTONIN: CPT | Performed by: NURSE PRACTITIONER

## 2023-10-09 PROCEDURE — 84586 ASSAY OF VIP: CPT | Performed by: NURSE PRACTITIONER

## 2023-10-09 PROCEDURE — 25010000002 HEPARIN (PORCINE) PER 1000 UNITS: Performed by: INTERNAL MEDICINE

## 2023-10-09 PROCEDURE — 99222 1ST HOSP IP/OBS MODERATE 55: CPT | Mod: FS,25 | Performed by: NURSE PRACTITIONER

## 2023-10-09 PROCEDURE — 25010000002 PROPOFOL 200 MG/20ML EMULSION

## 2023-10-09 PROCEDURE — 96375 TX/PRO/DX INJ NEW DRUG ADDON: CPT

## 2023-10-09 PROCEDURE — 43239 EGD BIOPSY SINGLE/MULTIPLE: CPT | Performed by: INTERNAL MEDICINE

## 2023-10-09 PROCEDURE — 88305 TISSUE EXAM BY PATHOLOGIST: CPT | Performed by: INTERNAL MEDICINE

## 2023-10-09 PROCEDURE — 82941 ASSAY OF GASTRIN: CPT | Performed by: NURSE PRACTITIONER

## 2023-10-09 PROCEDURE — 88342 IMHCHEM/IMCYTCHM 1ST ANTB: CPT | Performed by: INTERNAL MEDICINE

## 2023-10-09 PROCEDURE — G0257 UNSCHED DIALYSIS ESRD PT HOS: HCPCS

## 2023-10-09 RX ORDER — HEPARIN SODIUM 1000 [USP'U]/ML
2000 INJECTION, SOLUTION INTRAVENOUS; SUBCUTANEOUS ONCE
Status: COMPLETED | OUTPATIENT
Start: 2023-10-09 | End: 2023-10-09

## 2023-10-09 RX ORDER — SUCRALFATE 1 G/1
1 TABLET ORAL 4 TIMES DAILY
Qty: 120 TABLET | Refills: 1 | Status: SHIPPED | OUTPATIENT
Start: 2023-10-09

## 2023-10-09 RX ORDER — LIDOCAINE HYDROCHLORIDE 20 MG/ML
INJECTION, SOLUTION EPIDURAL; INFILTRATION; INTRACAUDAL; PERINEURAL AS NEEDED
Status: DISCONTINUED | OUTPATIENT
Start: 2023-10-09 | End: 2023-10-09 | Stop reason: SURG

## 2023-10-09 RX ORDER — ONDANSETRON 4 MG/1
4 TABLET, FILM COATED ORAL EVERY 6 HOURS PRN
Status: DISCONTINUED | OUTPATIENT
Start: 2023-10-09 | End: 2023-10-09 | Stop reason: HOSPADM

## 2023-10-09 RX ORDER — ONDANSETRON 2 MG/ML
4 INJECTION INTRAMUSCULAR; INTRAVENOUS EVERY 6 HOURS PRN
Status: DISCONTINUED | OUTPATIENT
Start: 2023-10-09 | End: 2023-10-09 | Stop reason: HOSPADM

## 2023-10-09 RX ORDER — SODIUM CHLORIDE 9 MG/ML
INJECTION, SOLUTION INTRAVENOUS CONTINUOUS PRN
Status: DISCONTINUED | OUTPATIENT
Start: 2023-10-09 | End: 2023-10-09 | Stop reason: SURG

## 2023-10-09 RX ORDER — PROPOFOL 10 MG/ML
INJECTION, EMULSION INTRAVENOUS AS NEEDED
Status: DISCONTINUED | OUTPATIENT
Start: 2023-10-09 | End: 2023-10-09 | Stop reason: SURG

## 2023-10-09 RX ADMIN — LIDOCAINE HYDROCHLORIDE 60 MG: 20 INJECTION, SOLUTION EPIDURAL; INFILTRATION; INTRACAUDAL; PERINEURAL at 16:07

## 2023-10-09 RX ADMIN — PROPOFOL 20 MG: 10 INJECTION, EMULSION INTRAVENOUS at 16:14

## 2023-10-09 RX ADMIN — BUDESONIDE AND FORMOTEROL FUMARATE DIHYDRATE 2 PUFF: 160; 4.5 AEROSOL RESPIRATORY (INHALATION) at 07:06

## 2023-10-09 RX ADMIN — HEPARIN SODIUM 2000 UNITS: 1000 INJECTION INTRAVENOUS; SUBCUTANEOUS at 07:36

## 2023-10-09 RX ADMIN — Medication 10 ML: at 12:03

## 2023-10-09 RX ADMIN — SODIUM CHLORIDE: 9 INJECTION, SOLUTION INTRAVENOUS at 16:04

## 2023-10-09 RX ADMIN — PROPOFOL 30 MG: 10 INJECTION, EMULSION INTRAVENOUS at 16:12

## 2023-10-09 RX ADMIN — PROPOFOL 50 MG: 10 INJECTION, EMULSION INTRAVENOUS at 16:08

## 2023-10-09 NOTE — OUTREACH NOTE
Medical Week 1 Survey      Flowsheet Row Responses   [facility] facility patient discharged from? [facility]   Does the patient have one of the following disease processes/diagnoses(primary or secondary)? Other   Week 1 attempt successful? No   Unsuccessful attempts Attempt 1   Revoke Readmitted            CHUCHO GRAJEDA - Registered Nurse

## 2023-10-09 NOTE — ANESTHESIA POSTPROCEDURE EVALUATION
Patient: Sherry Lobato    Procedure Summary       Date: 10/09/23 Room / Location: [facility] ENDOSCOPY 1 / [facility] ENDOSCOPY    Anesthesia Start: 1604 Anesthesia Stop: 1620    Procedure: ESOPHAGOGASTRODUODENOSCOPY Diagnosis:       Epigastric pain      (Epigastric pain [R10.13])    Surgeons: Ace Campbell MD Provider: Cholo Sullivan MD    Anesthesia Type: general ASA Status: 4            Anesthesia Type: general    Vitals  Vitals Value Taken Time   BP 92/65 10/09/23 1638   Temp     Pulse 73 10/09/23 1637   Resp 14 10/09/23 1635   SpO2 88 % 10/09/23 1637   Vitals shown include unfiled device data.        Post Anesthesia Care and Evaluation    Patient location during evaluation: PACU  Patient participation: complete - patient participated  Level of consciousness: awake  Pain scale: See nurse's notes for pain score.  Pain management: adequate    Airway patency: patent  Anesthetic complications: No anesthetic complications  PONV Status: none  Cardiovascular status: acceptable  Respiratory status: acceptable and spontaneous ventilation  Hydration status: acceptable    Comments: Patient seen and examined postoperatively; vital signs stable; SpO2 greater than or equal to 90%; cardiopulmonary status stable; nausea/vomiting adequately controlled; pain adequately controlled; no apparent anesthesia complications; patient discharged from anesthesia care when discharge criteria were met

## 2023-10-09 NOTE — OP NOTE
ESOPHAGOGASTRODUODENOSCOPY Procedure Report    Patient Name:  Sherry Lobato  YOB: 1950    Date of Surgery:  10/9/2023     Pre-Op Diagnosis:  Epigastric pain [R10.13]       Postop diagnosis:  1.  Duodenal ulcers  2.  Billroth II anatomy  3.  Irregular Z-line    Procedure/CPTr Codes:      Procedure(s):  ESOPHAGOGASTRODUODENOSCOPY with biopsy    Staff:  Surgeon(s):  Ace Campbell MD      Anesthesia: Monitored Anesthesia Care    Description of Procedure:  A description of the procedure as well as risks, benefits and alternative methods were explained to the patient who voiced understanding and signed the corresponding consent form. A physical exam was performed and vital signs were monitored throughout the procedure.    An upper GI endoscope was placed into the mouth and proceeded through the esophagus, stomach and second portion of the duodenum without difficulty. The scope was then retroflexed and the fundus was visualized. The procedure was not difficult and there were no immediate complications.  There was no blood loss.    Impression:  1.  Irregular Z-line at the GE junction suggestive of chronic acid reflux, cold forcep biopsies were taken to rule out Lucas's  2.  Normal gastric mucosa entire stomach, cold forcep biopsies were taken to rule out H. pylori  3.  Billroth II anatomy with distal gastrectomy present.  4.  Numerous duodenal ulcers in both limbs of the duodenum.  These were clean-based and superficial but they did extend 20 cm into the duodenum.  Cold forcep biopsies of the duodenum were taken    Recommendations:  P.o. twice daily PPI  Carafate 4 times a day  Pain is likely secondary to numerous duodenal ulcers  Follow-up biopsy results and treat H. pylori if positive      Ace Campbell MD     Date: 10/9/2023    Time: 16:17 EDT

## 2023-10-09 NOTE — PLAN OF CARE
Goal Outcome Evaluation:                        Patient continues to not complain of nausea/vomiting or having diarrhea, usually has dialysis today, orders in, makes needs known

## 2023-10-09 NOTE — DISCHARGE SUMMARY
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          Hospital Course    Patient is a 73 y.o. female presented with past medical history of end-stage renal disease on hemodialysis, GERD, diabetes type 2, hyperlipidemia, hypertension, neuropathy and FABY. She presents with persistent abdomen pain, nausea, and vomiting.. She was admitted last week but symptoms resolved with supportive care. She will have EGD today with  Irregular Z-line at the GE junction suggestive of chronic acid reflux, cold forcep biopsies were taken to rule out Lucas's,normal gastric mucosa entire stomach, cold forcep biopsies were taken to rule out H. Pylori and numerous duodenal ulcers in both limbs of the duodenum.  These were clean-based and superficial but they did extend 20 cm into the duodenum.  Cold forcep biopsies of the duodenum were taken. GI recommendations; PPI bid; carafate qid with pain likely secondary to numerous duodenal ulcers. Follow-up biopsy results and treat H. pylori if positive. She has follow up scheduled with pcp.     Procedures Performed    Procedure(s):  ESOPHAGOGASTRODUODENOSCOPY  -------------------  10/09 1559 [facility] ENDOSCOPY    Consults:   Consults       Date and Time Order Name Status Description    10/8/2023 10:59 AM Inpatient Nephrology Consult Completed     10/8/2023 10:16 AM Inpatient Gastroenterology Consult Completed             Pertinent Test Results:    Lab Results (most recent)       Procedure Component Value Units Date/Time    Basic  Metabolic Panel [[id number]]  (Abnormal) Collected: 10/08/23 2229    Specimen: Blood Updated: 10/09/23 0006     Glucose 92 mg/dL      BUN 56 mg/dL      Creatinine 7.14 mg/dL      Sodium 135 mmol/L      Potassium 4.9 mmol/L      Comment: Slight hemolysis detected by analyzer. Results may be affected.        Chloride 92 mmol/L      CO2 26.0 mmol/L      Calcium 8.7 mg/dL      BUN/Creatinine Ratio 7.8     Anion Gap 17.0 mmol/L      eGFR 5.6 mL/min/1.73      Comment: <15 Indicative of kidney failure       Narrative:      GFR Normal >60  Chronic Kidney Disease <60  Kidney Failure <15    The GFR formula is only valid for adults with stable renal function between ages 18 and 70.    Lipase [[id number]]  (Normal) Collected: 10/08/23 2229    Specimen: Blood Updated: 10/09/23 0006     Lipase 18 U/L     CBC & Differential [[id number]]  (Abnormal) Collected: 10/08/23 2229    Specimen: Blood Updated: 10/09/23 0003    Narrative:      The following orders were created for panel order CBC & Differential.  Procedure                               Abnormality         Status                     ---------                               -----------         ------                     CBC Auto Differential[[id number]]        Abnormal            Final result                 Please view results for these tests on the individual orders.    CBC Auto Differential [[id number]]  (Abnormal) Collected: 10/08/23 2229    Specimen: Blood Updated: 10/09/23 0003     WBC 10.60 10*3/mm3      RBC 3.75 10*6/mm3      Hemoglobin 11.2 g/dL      Hematocrit 34.4 %      MCV 91.8 fL      MCH 29.9 pg      MCHC 32.5 g/dL      RDW 18.3 %      RDW-SD 58.2 fl      MPV 7.9 fL      Platelets 513 10*3/mm3      Neutrophil % 73.8 %      Lymphocyte % 13.9 %      Monocyte % 9.0 %      Eosinophil % 2.4 %      Basophil % 0.9 %      Neutrophils, Absolute 7.80 10*3/mm3      Lymphocytes, Absolute 1.50 10*3/mm3      Monocytes, Absolute 1.00 10*3/mm3      Eosinophils, Absolute 0.30 10*3/mm3       Basophils, Absolute 0.10 10*3/mm3      nRBC 0.0 /100 WBC     Lactic Acid, Plasma [[id number]]  (Normal) Collected: 10/08/23 1200    Specimen: Blood Updated: 10/08/23 1251     Lactate 0.7 mmol/L     Amylase [[id number]]  (Normal) Collected: 10/08/23 0748    Specimen: Blood Updated: 10/08/23 1058     Amylase 49 U/L     Comprehensive Metabolic Panel [[id number]]  (Abnormal) Collected: 10/08/23 0748    Specimen: Blood Updated: 10/08/23 0841     Glucose 121 mg/dL      BUN 48 mg/dL      Creatinine 6.02 mg/dL      Sodium 135 mmol/L      Potassium 5.4 mmol/L      Chloride 92 mmol/L      CO2 22.0 mmol/L      Calcium 9.3 mg/dL      Total Protein 7.7 g/dL      Albumin 4.0 g/dL      ALT (SGPT) 12 U/L      AST (SGOT) 15 U/L      Alkaline Phosphatase 105 U/L      Total Bilirubin 0.3 mg/dL      Globulin 3.7 gm/dL      A/G Ratio 1.1 g/dL      BUN/Creatinine Ratio 8.0     Anion Gap 21.0 mmol/L      eGFR 6.9 mL/min/1.73      Comment: <15 Indicative of kidney failure       Narrative:      GFR Normal >60  Chronic Kidney Disease <60  Kidney Failure <15    The GFR formula is only valid for adults with stable renal function between ages 18 and 70.    Lipase [[id number]]  (Abnormal) Collected: 10/08/23 0651    Specimen: Blood Updated: 10/08/23 0731     Lipase 70 U/L     CBC & Differential [[id number]]  (Abnormal) Collected: 10/08/23 0651    Specimen: Blood Updated: 10/08/23 0656    Narrative:      The following orders were created for panel order CBC & Differential.  Procedure                               Abnormality         Status                     ---------                               -----------         ------                     CBC Auto Differential[[id number]]        Abnormal            Final result                 Please view results for these tests on the individual orders.    CBC Auto Differential [[id number]]  (Abnormal) Collected: 10/08/23 0651    Specimen: Blood Updated: 10/08/23 0656     WBC 13.10 10*3/mm3      RBC 4.48  10*6/mm3      Hemoglobin 13.3 g/dL      Hematocrit 40.8 %      MCV 90.9 fL      MCH 29.6 pg      MCHC 32.6 g/dL      RDW 18.5 %      RDW-SD 62.6 fl      MPV 7.5 fL      Platelets 586 10*3/mm3      Neutrophil % 86.7 %      Lymphocyte % 7.1 %      Monocyte % 5.2 %      Eosinophil % 0.4 %      Basophil % 0.6 %      Neutrophils, Absolute 11.30 10*3/mm3      Lymphocytes, Absolute 0.90 10*3/mm3      Monocytes, Absolute 0.70 10*3/mm3      Eosinophils, Absolute 0.10 10*3/mm3      Basophils, Absolute 0.10 10*3/mm3      nRBC 0.0 /100 WBC              Results for orders placed during the hospital encounter of 07/25/21    Adult Transthoracic Echo Complete W/ Cont if Necessary Per Protocol    Interpretation Summary  Normal LV size and contractility EF of 60%  Normal RV size  Normal atrial size  Aortic valve, mitral valve, tricuspid valve appears structurally normal, mild mitral regurgitation  seen.  No pericardial effusion seen.  Proximal aorta appears normal in size.           Condition on Discharge:  stable    Vital Signs  Temp:  [97.9 øF (36.6 øC)-98.5 øF (36.9 øC)] 98.2 øF (36.8 øC)  Heart Rate:  [67-81] 78  Resp:  [14-22] 14  BP: ()/(32-91) 97/47      Physical Exam  Vitals reviewed.   Constitutional:       Appearance: She is obese. She is not ill-appearing.   HENT:      Head: Normocephalic and atraumatic.      Right Ear: External ear normal.      Left Ear: External ear normal.      Nose: Nose normal.      Mouth/Throat:      Mouth: Mucous membranes are moist.   Eyes:      General:         Right eye: No discharge.         Left eye: No discharge.   Cardiovascular:      Rate and Rhythm: Normal rate and regular rhythm.   Pulmonary:      Effort: Pulmonary effort is normal.      Breath sounds: Normal breath sounds.   Abdominal:      General: Bowel sounds are normal.      Palpations: Abdomen is soft.   Musculoskeletal:         General: Normal range of motion.      Cervical back: Normal range of motion.   Skin:     General:  Skin is warm.   Neurological:      Mental Status: She is alert and oriented to person, place, and time.   Psychiatric:         Behavior: Behavior normal.              Discharge Disposition  Home or Self Care    Discharge Medications     Discharge Medications        New Medications        Instructions Start Date   sucralfate 1 g tablet  Commonly known as: Carafate   1 g, Oral, 4 Times Daily             Continue These Medications        Instructions Start Date   albuterol sulfate  (90 Base) MCG/ACT inhaler  Commonly known as: PROVENTIL HFA;VENTOLIN HFA;PROAIR HFA   2 puffs, Inhalation, Every 4 Hours PRN      amLODIPine 10 MG tablet  Commonly known as: NORVASC   10 mg, Oral, Daily      budesonide-formoterol 160-4.5 MCG/ACT inhaler  Commonly known as: SYMBICORT   2 puffs, Inhalation, 2 Times Daily - RT      carvedilol 3.125 MG tablet  Commonly known as: COREG   3.125 mg, Oral, 2 Times Daily With Meals      cholecalciferol 25 MCG (1000 UT) tablet  Commonly known as: VITAMIN D3   1,000 Units, Oral, Daily      lidocaine-prilocaine 2.5-2.5 % cream  Commonly known as: EMLA   1 application , Topical, Daily PRN, Apply to access site 1 to 2 hours before dialysis      pantoprazole 40 MG EC tablet  Commonly known as: PROTONIX   1 tablet, Oral, 2 Times Daily      RenaPlex-D tablet tablet  Generic drug: multivitamin with minerals   1 tablet, Oral, Daily      sevelamer 800 MG tablet  Commonly known as: RENVELA   2,400 mg, Oral, 3 Times Daily With Meals      simvastatin 40 MG tablet  Commonly known as: ZOCOR   40 mg, Oral, Nightly      sodium bicarbonate 650 MG tablet   1,300 mg, Oral, 2 Times Daily      temazepam 30 MG capsule  Commonly known as: RESTORIL   30 mg, Oral, Nightly               Discharge Diet:   Diet Instructions       Diet: Cardiac Diets, Renal Diets; Healthy Heart (2-3 Na+); Regular Texture (IDDSI 7); Thin (IDDSI 0); Low Sodium (2-3g)      Discharge Diet:  Cardiac Diets  Renal Diets       Cardiac Diet: Healthy  Heart (2-3 Na+)    Texture: Regular Texture (IDDSI 7)    Fluid Consistency: Thin (IDDSI 0)    Renal Diet: Low Sodium (2-3g)            Activity at Discharge:   Activity Instructions       Activity as Tolerated              Follow-up Appointments  Future Appointments   Date Time Provider Department Center   10/10/2023  9:00 AM [facility]   1/2/2024 10:00 AM [facility]  [facility]   1/2/2024 10:45 AM [facility]   1/2/2024 11:30 AM ROOM 1,  [facility]  [facility] None     Additional Instructions for the Follow-ups that You Need to Schedule       Discharge Follow-up with PCP   As directed       Currently Documented PCP:    Jin Malcolm PA-C    PCP Phone Number:    [phone]     Follow Up Details: Keep current appointment                Test Results Pending at Discharge  Pending Labs       Order Current Status    Tissue Pathology Exam Collected (10/09/23 1616)    Chromogranin A In process    Gastrin In process    Serotonin Serum In process    Vasoactive Intestinal Peptide (VIP) In process             MAEGAN Jordan  10/09/23  16:47 EDT    Time: Discharge 25 min

## 2023-10-09 NOTE — CONSULTS
[facility] CONSULT NOTE    Referring Provider: Dr. SHORTY Taylor  Reason for Consultation: ESRD    Chief complaint . Abdominal pain    History of present illness:  patient is 73 years old female with history of ESRD, hypertension, diabetes, anemia, hyperphosphatemia, neuroendocrine tumor of the duodenum, cholecystectomy, ventral hernia repair, admitted to the hospital with a recurrent abdominal pain with nausea and vomiting.  Patient had this symptom last week but resolved but symptoms started back on Sunday morning.  Patient was seen during dialysis this morning.  Her symptoms have already improved.denies any fever, chest pain, shortness of breath, expectoration, hematuria or dysuria    History  Past Medical History:   Diagnosis Date    Lucas's esophagus     C. difficile colitis     Carotid artery disease     -50-74% left, 16-49% right (1/19)    COPD (chronic obstructive pulmonary disease)     DM2 (diabetes mellitus, type 2)     ESRD (end stage renal disease)     on HD    Gastric ulcer     at anastomatic site    Gastroparesis     unknown    GERD (gastroesophageal reflux disease)     Gout     not current    Hemodialysis patient     mwf    Hernia, incisional     abdomen    History of colonoscopy     UTD    History of degenerative disc disease     Hyperlipidemia     Hypertension     Microscopic colitis     Nephrotic syndrome     Neuroendocrine tumor     of stomach    FABY (obstructive sleep apnea)     not treating    Pedal edema     ressolved    Proteinuria     Renal cell cancer, right     Rib pain on right side     chronic    Rotator cuff tear     bilateral    Stomach cancer 2016    Stroke     Uterine cancer     Vitamin B 12 deficiency     Vitamin D deficiency      Past Surgical History:   Procedure Laterality Date    APPENDECTOMY      ARTERIOVENOUS FISTULA Right     ARTERIOVENOUS FISTULA REPAIR      Clotted off and insert graft    ARTERIOVENOUS FISTULA/SHUNT SURGERY Left 12/30/2020    Procedure: ARTERIOVENOUS  FISTULA FORMATION;  Surgeon: Jan Caicedo MD;  Location: [facility] MAIN OR;  Service: Vascular;  Laterality: Left;    BREAST BIOPSY      right nipple 1981    COLONOSCOPY N/A 11/24/2020    Procedure: COLONOSCOPY with polypectomy x 7;  Surgeon: Jeffery White MD;  Location: [facility] ENDOSCOPY;  Service: Gastroenterology;  Laterality: N/A;  post op: polyps, diverticulosis, hemorrhoids    COLONOSCOPY N/A 6/1/2023    Procedure: COLONOSCOPY with polypectomy x 3 biopsy x 1;  Surgeon: Jeffery White MD;  Location: [facility] ENDOSCOPY;  Service: Gastroenterology;  Laterality: N/A;  diverticulosis polyps    ENDOSCOPY N/A 03/12/2022    Procedure: ESOPHAGOGASTRODUODENOSCOPY with biopsy x 1 area;  Surgeon: Javan Augustin MD;  Location: [facility] ENDOSCOPY;  Service: Gastroenterology;  Laterality: N/A;  post op: anastamosis    GASTRIC RESECTION      cancer    HYSTERECTOMY      LAPAROSCOPIC CHOLECYSTECTOMY      NEPHRECTOMY      right kidney removed     REDUCTION MAMMAPLASTY      TUMOR REMOVAL      boils    VENTRAL/INCISIONAL HERNIA REPAIR Right 08/28/2019    Procedure: VENTRAL/INCISIONAL HERNIA REPAIR;  Surgeon: Nilay Stevens MD;  Location: [facility] MAIN OR;  Service: General    VENTRAL/INCISIONAL HERNIA REPAIR N/A 10/01/2019    Procedure: OPEN VENTRAL/INCISIONAL HERNIA REPAIR;  Surgeon: Nilay Stevens MD;  Location: [facility] MAIN OR;  Service: General    VENTRAL/INCISIONAL HERNIA REPAIR N/A 10/11/2021    Procedure: VENTRAL/INCISIONAL HERNIA REPAIR LAPAROSCOPIC;  Surgeon: Nilay Stevens MD;  Location: [facility] MAIN OR;  Service: General;  Laterality: N/A;    VENTRAL/INCISIONAL HERNIA REPAIR N/A 01/23/2023    Procedure: VENTRAL/INCISIONAL HERNIA REPAIR with MESH;  Surgeon: Nilay Stevens MD;  Location: [facility] MAIN OR;  Service: General;  Laterality: N/A;     Social History     Tobacco Use    Smoking status: Former     Packs/day: 0.25     Years: 57.00     Additional pack years: 0.00     Total pack years: 14.25     Types:  Cigarettes     Quit date: 2021     Years since quittin.4     Passive exposure: Past    Smokeless tobacco: Never    Tobacco comments:     quit smoking 2 months ago   Vaping Use    Vaping Use: Former   Substance Use Topics    Alcohol use: No    Drug use: No     Family History   Problem Relation Age of Onset    Heart disease Father        Review of Systems  ROS  As per HPI  Objective     Vital Signs  Temp:  [97.9 øF (36.6 øC)-98.1 øF (36.7 øC)] 98 øF (36.7 øC)  Heart Rate:  [67-87] 70  Resp:  [16-23] 18  BP: (103-152)/(60-91) 123/74    No intake/output data recorded.  I/O last 3 completed shifts:  In: 480 [P.O.:480]  Out: -     Physical Exam:  Physical Exam    General Appearance: alert, oriented x 3, no acute distress   Skin: warm and dry  HEENT: oral mucosa normal, nonicteric sclera  Neck: supple, no JVD  Lungs: CTA  Heart: RRR, normal S1 and S2  Abdomen: soft, nontender, nondistended  : no palpable bladder  Extremities: no edema, cyanosis or clubbing  Neuro: normal speech and mental status     Results Review:   I reviewed the patient's new clinical results.    Lab Results   Component Value Date    CALCIUM 8.7 10/08/2023    PHOS 5.5 (H) 2023     Results from last 7 days   Lab Units 10/08/23  2229 10/08/23  0748 10/08/23  0651 10/04/23  0040 10/03/23  1331 10/03/23  1331 10/03/23  1128   MAGNESIUM mg/dL  --   --   --  1.7  --   --   --    SODIUM mmol/L 135* 135*  --  140   < > 139  --    POTASSIUM mmol/L 4.9 5.4*  --  4.9   < > 6.2*  --    CHLORIDE mmol/L 92* 92*  --  97*   < > 96*  --    CO2 mmol/L 26.0 22.0  --  26.0   < > 23.0  --    BUN mg/dL 56* 48*  --  56*   < > 49*  --    CREATININE mg/dL 7.14* 6.02*  --  6.99*   < > 6.29*  --    GLUCOSE mg/dL 92 121*  --  84   < > 85  --    CALCIUM mg/dL 8.7 9.3  --  7.9*   < > 8.3*  --    WBC 10*3/mm3 10.60  --  13.10*  --   --   --  12.50*   HEMOGLOBIN g/dL 11.2*  --  13.3  --   --   --  12.0   PLATELETS 10*3/mm3 513*  --  586*  --   --   --  498*   ALT  (SGPT) U/L  --  12  --   --   --  14  --    AST (SGOT) U/L  --  15  --   --   --  19  --     < > = values in this interval not displayed.     Lab Results   Component Value Date    TROPONINT 0.032 (C) 01/26/2023     Estimated Creatinine Clearance: 6.1 mL/min (A) (by C-G formula based on SCr of 7.14 mg/dL (H)).  Lab Results   Component Value Date    URICACID 4.7 07/10/2018       Brief Urine Lab Results       None            Prior to Admission medications    Medication Sig Start Date End Date Taking? Authorizing Provider   albuterol sulfate  (90 Base) MCG/ACT inhaler Inhale 2 puffs Every 4 (Four) Hours As Needed for Wheezing.   Yes Danica Murphy MD   amLODIPine (NORVASC) 10 MG tablet Take 1 tablet by mouth Daily.   Yes Danica Murphy MD   Cholecalciferol 25 MCG (1000 UT) tablet Take 1 tablet by mouth Daily.   Yes Danica Murphy MD   lidocaine-prilocaine (EMLA) 2.5-2.5 % cream Apply 1 application  topically to the appropriate area as directed Daily As Needed. Apply to access site 1 to 2 hours before dialysis 9/27/19  Yes Danica Murphy MD   Multiple Vitamins-Minerals (RENAPLEX-D) tablet Take 1 tablet by mouth Daily.   Yes Danica Murphy MD   pantoprazole (PROTONIX) 40 MG EC tablet Take 1 tablet by mouth 2 (Two) Times a Day. 3/9/23  Yes Danica Murphy MD   sevelamer (RENVELA) 800 MG tablet Take 3 tablets by mouth 3 (Three) Times a Day With Meals. 10/4/23  Yes Angelia Taylor MD   simvastatin (ZOCOR) 40 MG tablet Take 1 tablet by mouth Every Night.   Yes Danica Murphy MD   sodium bicarbonate 650 MG tablet Take 2 tablets by mouth 2 (Two) Times a Day. 10/4/23  Yes Angelia Taylor MD   temazepam (RESTORIL) 30 MG capsule Take 1 capsule by mouth Every Night. 1/10/23  Yes Danica Murphy MD   budesonide-formoterol (SYMBICORT) 160-4.5 MCG/ACT inhaler Inhale 2 puffs 2 (Two) Times a Day.    Danica Murphy MD   carvedilol (COREG) 3.125 MG tablet Take 1 tablet by mouth  2 (Two) Times a Day With Meals.    Provider, MD Danica       amLODIPine, 10 mg, Oral, Daily  budesonide-formoterol, 2 puff, Inhalation, BID - RT  carvedilol, 3.125 mg, Oral, BID With Meals  multivitamin with minerals, 1 tablet, Oral, Daily  pantoprazole, 40 mg, Intravenous, Q12H  senna-docusate sodium, 2 tablet, Oral, BID  sevelamer, 2,400 mg, Oral, TID With Meals  sodium bicarbonate, 1,300 mg, Oral, BID  sodium chloride, 10 mL, Intravenous, Q12H  temazepam, 30 mg, Oral, Nightly           Assessment & Plan       End-stage renal disease.  Patient gets dialysis Monday Wednesday and Friday as outpatient.  Seen during dialysis this morning.  Electrolytes, volume status okay  Hypertension with ESRD.  Blood pressure has been fairly well controlled  Abdominal pain.  With nausea and vomiting.  CT scan was negative for any acute change.  GI evaluating and planning for EGD.patient has history of neuroendocrine tumor  Anemia with ESRD.  Hemoglobin acceptable    Plan:  Continue hemodialysis Monday Wednesday and Friday  GI evaluating    I discussed the patients findings and my recommendations with patient and family    Faustino Buckley MD  10/09/23  10:22 EDT

## 2023-10-09 NOTE — PLAN OF CARE
Goal Outcome Evaluation:                      Patient discharging home with daughter and was educated about NPO at midnight for lung bx in AM. Patient education given on calling GI office for f/u appt

## 2023-10-09 NOTE — CASE MANAGEMENT/SOCIAL WORK
Discharge Planning Assessment  [facility]     Patient Name: Sherry Lobato  MRN: [id number]  Today's Date: 10/9/2023    Admit Date: 10/8/2023    Plan: DC PLAN: Routine home. Current HD at [facility].       Discharge Needs Assessment       Row Name 10/09/23 1345       Living Environment    People in Home spouse    Current Living Arrangements home    Potentially Unsafe Housing Conditions none    In the past 12 months has the electric, gas, oil, or water company threatened to shut off services in your home? No    Primary Care Provided by self    Quality of Family Relationships helpful;involved    Able to Return to Prior Arrangements yes       Resource/Environmental Concerns    Resource/Environmental Concerns none       Food Insecurity    Within the past 12 months, you worried that your food would run out before you got the money to buy more. Never true    Within the past 12 months, the food you bought just didn't last and you didn't have money to get more. Never true       Transition Planning    Patient/Family Anticipates Transition to home with family    Patient/Family Anticipated Services at Transition none    Transportation Anticipated family or friend will provide;car, drives self       Discharge Needs Assessment    Equipment Currently Used at Home walker, rolling;wheelchair;commode                   Discharge Plan       Row Name 10/09/23 1346       Plan    Plan DC PLAN: Routine home. Current HD at [facility].    Patient/Family in Agreement with Plan yes    Plan Comments Met with patient at bedside, from routine home with spouse. Independent with ADL's has cane and walker but does not use. PCP is [facility] and pharmacy is [facility]. Able to afford medications and denies any transportation issues, still drives. Denies any concerns about return home. Current outpatient HD patient at [facility] on [address] monday, wednesday, friday./                  Continued Care and Services - Admitted Since 10/8/2023     Coordination has not been started for this encounter.       Expected Discharge Date and Time       Expected Discharge Date Expected Discharge Time    Oct 10, 2023            Demographic Summary       Row Name 10/09/23 1344       General Information    Admission Type observation    Arrived From emergency department    Referral Source admission list    Reason for Consult discharge planning    Preferred Language English       Contact Information    Permission Granted to Share Info With     Contact Information Obtained for                    Functional Status       Row Name 10/09/23 1344       Functional Status    Usual Activity Tolerance moderate    Current Activity Tolerance moderate       Assessment of Health Literacy    How often do you have someone help you read hospital materials? Sometimes    How often do you have problems learning about your medical condition because of difficulty understanding written information? Sometimes    How often do you have a problem understanding what is told to you about your medical condition? Sometimes    How confident are you filling out medical forms by yourself? Somewhat    Health Literacy Moderate       Functional Status, IADL    Medications independent    Meal Preparation independent    Housekeeping independent    Laundry independent    Shopping independent       Mental Status    General Appearance WDL WDL                   Patient Forms       Row Name 10/09/23 1342       Patient Forms    Important Message from Medicare (IMM) Delivered  Green 10/8/23 per registration    Delivered to Patient    Method of delivery In person                  Rolanda Vieira RN

## 2023-10-09 NOTE — CONSULTS
"GI CONSULT  NOTE:    Referring Provider: Dr. Taylor    Chief complaint: Nausea with vomiting    Subjective . \"I was having pain\"    History of present illness: Sherry Lobato is a 73 y.o. 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 difficile and anemia.     She presents with recurrent epigastric pain with associated nausea with vomiting.  She was admitted last week in the hospital with similar complaints that resolved with supportive care.  She then had recurrence on Sunday morning without obvious trigger.  She has been having abdominal cramping intermittently for a while.  She does report chronic diarrhea with multiple bowel movements in the morning without melena or rectal bleeding.  No unintentional weight loss but reports minimal appetite.  She is feeling significantly better today.    She has a lung nodule pending biopsy tomorrow.    6/2023 colonoscopy by Dr. White -moderate diverticula, polyps (TA), hemorrhoids, random colon biopsy showing mild nonspecific chronic colitis  3/12/2022 EGD (Dr. Augustin) Billroth II anastomosis with exploration of both efferent & afferent limbs showing no recurrence of carcinoid tumor status post Whipple resection. Bile gastritis. Duodenal biopsy showed acute duodenitis with focal ulceration and reactive villi. Negative celiac disease. No residual carcinoid tumor identified.  11/2020 Colonoscopy  (Dr. White) - polyps (TA), moderate diverticulosis. RECALL 2023 2/2019 EGD by Dr. White - evidence of prior partial gastrectomy, and large gastric fold, 2 anastomotic ulcers which appear chronic but nonbleeding with biopsy showing acute and chronic inflamed duodenal type mucosa and negative for sprue    Past Medical History:  Past Medical History:   Diagnosis Date    Lucas's esophagus     C. difficile colitis     Carotid artery " disease     -50-74% left, 16-49% right (1/19)    COPD (chronic obstructive pulmonary disease)     DM2 (diabetes mellitus, type 2)     ESRD (end stage renal disease)     on HD    Gastric ulcer     at anastomatic site    Gastroparesis     unknown    GERD (gastroesophageal reflux disease)     Gout     not current    Hemodialysis patient     [facility]wf    Hernia, incisional     abdomen    History of colonoscopy     UTD    History of degenerative disc disease     Hyperlipidemia     Hypertension     Microscopic colitis     Nephrotic syndrome     Neuroendocrine tumor     of stomach    FABY (obstructive sleep apnea)     not treating    Pedal edema     ressolved    Proteinuria     Renal cell cancer, right     Rib pain on right side     chronic    Rotator cuff tear     bilateral    Stomach cancer 2016    Stroke     Uterine cancer     Vitamin B 12 deficiency     Vitamin D deficiency        Past Surgical History:  Past Surgical History:   Procedure Laterality Date    APPENDECTOMY      ARTERIOVENOUS FISTULA Right     ARTERIOVENOUS FISTULA REPAIR      Clotted off and insert graft    ARTERIOVENOUS FISTULA/SHUNT SURGERY Left 12/30/2020    Procedure: ARTERIOVENOUS FISTULA FORMATION;  Surgeon: Jan Caicedo MD;  Location: [facility] MAIN OR;  Service: Vascular;  Laterality: Left;    BREAST BIOPSY      right nipple 1981    COLONOSCOPY N/A 11/24/2020    Procedure: COLONOSCOPY with polypectomy x 7;  Surgeon: Jeffery White MD;  Location: [facility] ENDOSCOPY;  Service: Gastroenterology;  Laterality: N/A;  post op: polyps, diverticulosis, hemorrhoids    COLONOSCOPY N/A 6/1/2023    Procedure: COLONOSCOPY with polypectomy x 3 biopsy x 1;  Surgeon: Jeffery White MD;  Location: [facility] ENDOSCOPY;  Service: Gastroenterology;  Laterality: N/A;  diverticulosis polyps    ENDOSCOPY N/A 03/12/2022    Procedure: ESOPHAGOGASTRODUODENOSCOPY with biopsy x 1 area;  Surgeon: Javan Augustin MD;  Location: [facility] ENDOSCOPY;  Service: Gastroenterology;   Laterality: N/A;  post op: anastamosis    GASTRIC RESECTION      cancer    HYSTERECTOMY      LAPAROSCOPIC CHOLECYSTECTOMY      NEPHRECTOMY      right kidney removed     REDUCTION MAMMAPLASTY      TUMOR REMOVAL      boils    VENTRAL/INCISIONAL HERNIA REPAIR Right 2019    Procedure: VENTRAL/INCISIONAL HERNIA REPAIR;  Surgeon: Nilay Stevens MD;  Location: [facility] MAIN OR;  Service: General    VENTRAL/INCISIONAL HERNIA REPAIR N/A 10/01/2019    Procedure: OPEN VENTRAL/INCISIONAL HERNIA REPAIR;  Surgeon: Nilay Stevens MD;  Location: [facility] MAIN OR;  Service: General    VENTRAL/INCISIONAL HERNIA REPAIR N/A 10/11/2021    Procedure: VENTRAL/INCISIONAL HERNIA REPAIR LAPAROSCOPIC;  Surgeon: Nilay Stevens MD;  Location: [facility] MAIN OR;  Service: General;  Laterality: N/A;    VENTRAL/INCISIONAL HERNIA REPAIR N/A 2023    Procedure: VENTRAL/INCISIONAL HERNIA REPAIR with MESH;  Surgeon: Nilay Stevens MD;  Location: [facility] MAIN OR;  Service: General;  Laterality: N/A;       Social History:  Social History     Tobacco Use    Smoking status: Former     Packs/day: 0.25     Years: 57.00     Additional pack years: 0.00     Total pack years: 14.25     Types: Cigarettes     Quit date: 2021     Years since quittin.4     Passive exposure: Past    Smokeless tobacco: Never    Tobacco comments:     quit smoking 2 months ago   Vaping Use    Vaping Use: Former   Substance Use Topics    Alcohol use: No    Drug use: No       Family History:  Family History   Problem Relation Age of Onset    Heart disease Father        Medications:  Medications Prior to Admission   Medication Sig Dispense Refill Last Dose    albuterol sulfate  (90 Base) MCG/ACT inhaler Inhale 2 puffs Every 4 (Four) Hours As Needed for Wheezing.       amLODIPine (NORVASC) 10 MG tablet Take 1 tablet by mouth Daily.       Cholecalciferol 25 MCG (1000 UT) tablet Take 1 tablet by mouth Daily.       lidocaine-prilocaine (EMLA) 2.5-2.5 % cream Apply 1  application  topically to the appropriate area as directed Daily As Needed. Apply to access site 1 to 2 hours before dialysis  4     Multiple Vitamins-Minerals (RENAPLEX-D) tablet Take 1 tablet by mouth Daily.       pantoprazole (PROTONIX) 40 MG EC tablet Take 1 tablet by mouth 2 (Two) Times a Day.       sevelamer (RENVELA) 800 MG tablet Take 3 tablets by mouth 3 (Three) Times a Day With Meals.       simvastatin (ZOCOR) 40 MG tablet Take 1 tablet by mouth Every Night.       sodium bicarbonate 650 MG tablet Take 2 tablets by mouth 2 (Two) Times a Day.       temazepam (RESTORIL) 30 MG capsule Take 1 capsule by mouth Every Night.       budesonide-formoterol (SYMBICORT) 160-4.5 MCG/ACT inhaler Inhale 2 puffs 2 (Two) Times a Day.       carvedilol (COREG) 3.125 MG tablet Take 1 tablet by mouth 2 (Two) Times a Day With Meals.          Scheduled Meds:amLODIPine, 10 mg, Oral, Daily  budesonide-formoterol, 2 puff, Inhalation, BID - RT  carvedilol, 3.125 mg, Oral, BID With Meals  multivitamin with minerals, 1 tablet, Oral, Daily  pantoprazole, 40 mg, Intravenous, Q12H  senna-docusate sodium, 2 tablet, Oral, BID  sevelamer, 2,400 mg, Oral, TID With Meals  sodium bicarbonate, 1,300 mg, Oral, BID  sodium chloride, 10 mL, Intravenous, Q12H  temazepam, 30 mg, Oral, Nightly      Continuous Infusions:   PRN Meds:.  senna-docusate sodium **AND** polyethylene glycol **AND** bisacodyl **AND** bisacodyl    Morphine    nitroglycerin    ondansetron    [COMPLETED] Insert Peripheral IV **AND** sodium chloride    sodium chloride    sodium chloride    ALLERGIES:  Atorvastatin calcium, Gabapentin, and Niacin    ROS:  The following systems were reviewed   Constitution:  No fevers, chills, no unintentional weight loss  Skin: no rash, no jaundice  Eyes:  No blurry vision, no eye pain  HENT:  No change in hearing or smell  Resp:  No dyspnea or cough  CV:  No chest pain or palpitations  :  No dysuria, hematuria  Musculoskeletal:  No leg cramps or  arthralgias  Neuro:  No tremor, no numbness  Psych:  No depression or confusion    Objective resting in bed in dialysis, chronically ill-appearing but no acute distress, no family present    Vital Signs:   Vitals:    10/09/23 0100 10/09/23 0611 10/09/23 0706 10/09/23 0709   BP: 108/69 123/74     BP Location: Right leg Right leg     Patient Position: Lying      Pulse: 70 70 67 70   Resp: 18 16 18 18   Temp: 97.9 øF (36.6 øC) 98 øF (36.7 øC)     TempSrc: Oral Oral     SpO2: 94% 95%     Weight:       Height:         Physical Exam:     General Appearance:    Awake and alert, in no acute distress   Head:    Normocephalic, without obvious abnormality, atraumatic   Throat:   No oral lesions, no thrush, oral mucosa moist   Lungs:     Respirations regular, even and unlabored   Chest Wall:    No abnormalities observed   Abdomen:     nondistended   Rectal:     Deferred   Extremities:   Moves all extremities, no cyanosis       Skin:   No rash, no jaundice, normal palpation       Neurologic:   Cranial nerves 2 - 12 grossly intact       Results Review:   I reviewed the patient's labs and imaging.  CBC    Results from last 7 days   Lab Units 10/08/23  2229 10/08/23  0651 10/03/23  1128   WBC 10*3/mm3 10.60 13.10* 12.50*   HEMOGLOBIN g/dL 11.2* 13.3 12.0   PLATELETS 10*3/mm3 513* 586* 498*     CMP   Results from last 7 days   Lab Units 10/08/23  2229 10/08/23  0748 10/08/23  0651 10/04/23  0040 10/03/23  1331 10/03/23  1128   SODIUM mmol/L 135* 135*  --  140 139  --    POTASSIUM mmol/L 4.9 5.4*  --  4.9 6.2*  --    CHLORIDE mmol/L 92* 92*  --  97* 96*  --    CO2 mmol/L 26.0 22.0  --  26.0 23.0  --    BUN mg/dL 56* 48*  --  56* 49*  --    CREATININE mg/dL 7.14* 6.02*  --  6.99* 6.29*  --    GLUCOSE mg/dL 92 121*  --  84 85  --    ALBUMIN g/dL  --  4.0  --   --  4.1  --    BILIRUBIN mg/dL  --  0.3  --   --  0.3  --    ALK PHOS U/L  --  105  --   --  111  --    AST (SGOT) U/L  --  15  --   --  19  --    ALT (SGPT) U/L  --  12  --   --  " 14  --    MAGNESIUM mg/dL  --   --   --  1.7  --   --    AMYLASE U/L  --  49  --   --   --   --    LIPASE U/L 18  --  70*  --   --  31     Cr Clearance Estimated Creatinine Clearance: 6.1 mL/min (A) (by C-G formula based on SCr of 7.14 mg/dL (H)).  Coag     HbA1C   Lab Results   Component Value Date    HGBA1C 5.5 11/07/2020    HGBA1C 6.2 (H) 03/28/2020    HGBA1C 6.5 (H) 09/30/2019     Blood Glucose No results found for: \"POCGLU\"  Infection     UA      Radiology(recent) No radiology results for the last day       ASSESSMENT:  Recurrent epigastric pain with nausea and vomiting  Chronic diarrhea  History of T3 N1 M1 neuroendocrine tumor of the duodenum s/p resection  End-stage renal disease on dialysis  Mild normocytic anemia  FABY  DMII  History of cholecystectomy and ventral hernia repair    PLAN:  Patient is a 73-year-old female who presents with recurrent epigastric pain with nausea and vomiting.  Symptoms have again resolved with supportive care but etiology is unclear.  Noncontrasted CT without acute changes, LFTs normal.  She is currently getting dialysis.  Secondary to recurrence of symptoms, we will plan EGD evaluation today to rule out anastomotic stricture or ulcer from previous surgeries.  We will check serotonin, chromogranin A, VIP and gastrin.  We will also check fecal calprotectin and pancreatic elastase for further evaluation of her chronic diarrhea.  Random colon biopsies in June showed mild nonspecific chronic colitis.  Continue supportive care and further recommendations to follow lab work-up and EGD findings.    I discussed the patients findings and my recommendations with the patient.    We appreciate the referral    Electronically signed by MAEGAN Toure, 10/09/23, 9:39 AM EDT.               "

## 2023-10-09 NOTE — ANESTHESIA PREPROCEDURE EVALUATION
Anesthesia Evaluation     Patient summary reviewed and Nursing notes reviewed   NPO Solid Status: > 8 hours  NPO Liquid Status: > 8 hours           Airway   Mallampati: II  TM distance: >3 FB  Neck ROM: full  No difficulty expected  Dental - normal exam     Pulmonary    (+) COPD,shortness of breath, sleep apnea  Cardiovascular     (+) hypertension, CHF , hyperlipidemia,  carotid artery disease      Neuro/Psych  (+) CVA, numbness  GI/Hepatic/Renal/Endo    (+) morbid obesity, GERD, PUD, renal disease ESRD and dialysis, diabetes mellitus    Musculoskeletal     Abdominal    Substance History      OB/GYN          Other   arthritis,   history of cancer                Anesthesia Plan    ASA 4     general     intravenous induction     Anesthetic plan, risks, benefits, and alternatives have been provided, discussed and informed consent has been obtained with: patient.    Plan discussed with CRNA.    CODE STATUS:    Code Status (Patient has no pulse and is not breathing): CPR (Attempt to Resuscitate)  Medical Interventions (Patient has pulse or is breathing): Full Support

## 2023-10-10 ENCOUNTER — HOSPITAL ENCOUNTER (OUTPATIENT)
Dept: CT IMAGING | Facility: HOSPITAL | Age: 73
Discharge: HOME | End: 2023-10-10
Payer: MEDICARE

## 2023-10-10 ENCOUNTER — HOSPITAL ENCOUNTER (OUTPATIENT)
Dept: GENERAL RADIOLOGY | Facility: HOSPITAL | Age: 73
Discharge: HOME | End: 2023-10-10
Payer: MEDICARE

## 2023-10-10 VITALS
DIASTOLIC BLOOD PRESSURE: 67 MMHG | BODY MASS INDEX: 30.63 KG/M2 | TEMPERATURE: 98.3 F | HEIGHT: 60 IN | SYSTOLIC BLOOD PRESSURE: 105 MMHG | RESPIRATION RATE: 12 BRPM | OXYGEN SATURATION: 88 % | WEIGHT: 156 LBS | HEART RATE: 77 BPM

## 2023-10-10 DIAGNOSIS — R91.8 MASS OF UPPER LOBE OF RIGHT LUNG: ICD-10-CM

## 2023-10-10 DIAGNOSIS — C7A.8 PRIMARY MALIGNANT NEUROENDOCRINE TUMOR OF DUODENUM: ICD-10-CM

## 2023-10-10 LAB
APTT PPP: 30.8 SECONDS (ref 24–31)
BASOPHILS # BLD AUTO: 0.1 10*3/MM3 (ref 0–0.2)
BASOPHILS NFR BLD AUTO: 0.8 % (ref 0–1.5)
DEPRECATED RDW RBC AUTO: 59.1 FL (ref 37–54)
EOSINOPHIL # BLD AUTO: 0.4 10*3/MM3 (ref 0–0.4)
EOSINOPHIL NFR BLD AUTO: 3.4 % (ref 0.3–6.2)
ERYTHROCYTE [DISTWIDTH] IN BLOOD BY AUTOMATED COUNT: 18.3 % (ref 12.3–15.4)
HCT VFR BLD AUTO: 40.7 % (ref 34–46.6)
HGB BLD-MCNC: 13 G/DL (ref 12–15.9)
INR PPP: 1.06 (ref 0.93–1.1)
LYMPHOCYTES # BLD AUTO: 1.1 10*3/MM3 (ref 0.7–3.1)
LYMPHOCYTES NFR BLD AUTO: 10.9 % (ref 19.6–45.3)
MCH RBC QN AUTO: 29.7 PG (ref 26.6–33)
MCHC RBC AUTO-ENTMCNC: 32.1 G/DL (ref 31.5–35.7)
MCV RBC AUTO: 92.5 FL (ref 79–97)
MONOCYTES # BLD AUTO: 0.8 10*3/MM3 (ref 0.1–0.9)
MONOCYTES NFR BLD AUTO: 8.1 % (ref 5–12)
NEUTROPHILS NFR BLD AUTO: 7.8 10*3/MM3 (ref 1.7–7)
NEUTROPHILS NFR BLD AUTO: 76.8 % (ref 42.7–76)
NRBC BLD AUTO-RTO: 0.1 /100 WBC (ref 0–0.2)
PLATELET # BLD AUTO: 589 10*3/MM3 (ref 140–450)
PMV BLD AUTO: 7.3 FL (ref 6–12)
PROTHROMBIN TIME: 11.5 SECONDS (ref 9.6–11.7)
RBC # BLD AUTO: 4.4 10*6/MM3 (ref 3.77–5.28)
WBC NRBC COR # BLD: 10.2 10*3/MM3 (ref 3.4–10.8)

## 2023-10-10 PROCEDURE — 25010000002 ONDANSETRON PER 1 MG: Performed by: RADIOLOGY

## 2023-10-10 PROCEDURE — 25810000003 SODIUM CHLORIDE 0.9 % SOLUTION: Performed by: RADIOLOGY

## 2023-10-10 PROCEDURE — 99152 MOD SED SAME PHYS/QHP 5/>YRS: CPT

## 2023-10-10 PROCEDURE — 85610 PROTHROMBIN TIME: CPT | Performed by: RADIOLOGY

## 2023-10-10 PROCEDURE — 88305 TISSUE EXAM BY PATHOLOGIST: CPT | Performed by: INTERNAL MEDICINE

## 2023-10-10 PROCEDURE — 71045 X-RAY EXAM CHEST 1 VIEW: CPT

## 2023-10-10 PROCEDURE — 0 LIDOCAINE 1 % SOLUTION: Performed by: RADIOLOGY

## 2023-10-10 PROCEDURE — 85025 COMPLETE CBC W/AUTO DIFF WBC: CPT | Performed by: RADIOLOGY

## 2023-10-10 PROCEDURE — 88334 PATH CONSLTJ SURG CYTO XM EA: CPT | Performed by: INTERNAL MEDICINE

## 2023-10-10 PROCEDURE — 88333 PATH CONSLTJ SURG CYTO XM 1: CPT | Performed by: INTERNAL MEDICINE

## 2023-10-10 PROCEDURE — 25010000002 MIDAZOLAM PER 1 MG: Performed by: RADIOLOGY

## 2023-10-10 PROCEDURE — 85730 THROMBOPLASTIN TIME PARTIAL: CPT | Performed by: RADIOLOGY

## 2023-10-10 PROCEDURE — 25010000002 FENTANYL CITRATE (PF) 50 MCG/ML SOLUTION: Performed by: RADIOLOGY

## 2023-10-10 RX ORDER — FENTANYL CITRATE 50 UG/ML
INJECTION, SOLUTION INTRAMUSCULAR; INTRAVENOUS AS NEEDED
Status: COMPLETED | OUTPATIENT
Start: 2023-10-10 | End: 2023-10-10

## 2023-10-10 RX ORDER — SODIUM CHLORIDE 9 MG/ML
75 INJECTION, SOLUTION INTRAVENOUS CONTINUOUS
Status: DISCONTINUED | OUTPATIENT
Start: 2023-10-10 | End: 2023-10-11 | Stop reason: HOSPADM

## 2023-10-10 RX ORDER — SODIUM CHLORIDE 0.9 % (FLUSH) 0.9 %
10 SYRINGE (ML) INJECTION AS NEEDED
Status: DISCONTINUED | OUTPATIENT
Start: 2023-10-10 | End: 2023-10-11 | Stop reason: HOSPADM

## 2023-10-10 RX ORDER — SODIUM CHLORIDE 0.9 % (FLUSH) 0.9 %
10 SYRINGE (ML) INJECTION EVERY 12 HOURS SCHEDULED
Status: DISCONTINUED | OUTPATIENT
Start: 2023-10-10 | End: 2023-10-11 | Stop reason: HOSPADM

## 2023-10-10 RX ORDER — ONDANSETRON 2 MG/ML
INJECTION INTRAMUSCULAR; INTRAVENOUS AS NEEDED
Status: COMPLETED | OUTPATIENT
Start: 2023-10-10 | End: 2023-10-10

## 2023-10-10 RX ORDER — MIDAZOLAM HYDROCHLORIDE 1 MG/ML
INJECTION INTRAMUSCULAR; INTRAVENOUS AS NEEDED
Status: COMPLETED | OUTPATIENT
Start: 2023-10-10 | End: 2023-10-10

## 2023-10-10 RX ORDER — ACETAMINOPHEN 325 MG/1
650 TABLET ORAL EVERY 4 HOURS PRN
Status: DISCONTINUED | OUTPATIENT
Start: 2023-10-10 | End: 2023-10-11 | Stop reason: HOSPADM

## 2023-10-10 RX ORDER — LIDOCAINE HYDROCHLORIDE 10 MG/ML
INJECTION, SOLUTION INFILTRATION; PERINEURAL AS NEEDED
Status: COMPLETED | OUTPATIENT
Start: 2023-10-10 | End: 2023-10-10

## 2023-10-10 RX ADMIN — FENTANYL CITRATE 50 MCG: 50 INJECTION, SOLUTION INTRAMUSCULAR; INTRAVENOUS at 08:57

## 2023-10-10 RX ADMIN — MIDAZOLAM 1 MG: 1 INJECTION INTRAMUSCULAR; INTRAVENOUS at 08:43

## 2023-10-10 RX ADMIN — LIDOCAINE HYDROCHLORIDE 10 ML: 10 INJECTION, SOLUTION INFILTRATION; PERINEURAL at 08:54

## 2023-10-10 RX ADMIN — FENTANYL CITRATE 50 MCG: 50 INJECTION, SOLUTION INTRAMUSCULAR; INTRAVENOUS at 08:42

## 2023-10-10 RX ADMIN — ONDANSETRON 4 MG: 2 INJECTION INTRAMUSCULAR; INTRAVENOUS at 08:25

## 2023-10-10 RX ADMIN — SODIUM CHLORIDE 75 ML/HR: 9 INJECTION, SOLUTION INTRAVENOUS at 07:58

## 2023-10-10 RX ADMIN — MIDAZOLAM 1 MG: 1 INJECTION INTRAMUSCULAR; INTRAVENOUS at 08:48

## 2023-10-10 RX ADMIN — FENTANYL CITRATE 50 MCG: 50 INJECTION, SOLUTION INTRAMUSCULAR; INTRAVENOUS at 08:45

## 2023-10-10 NOTE — DISCHARGE INSTRUCTIONS
A responsible adult should stay with you and you should rest quietly for the rest of the day. Do not drink alcohol, drive or cook for 24 hours following your procedure.  Progress your diet as tolerated.  Resume your usual medications including aspirin.  When you remove your dressing in 48 hours, a small amount of blood is to be expected. Do not be alarmed.  If you feel it is bleeding excessively apply pressure and proceed to the Emergency room.  Do not shower, bath, or get your dressing wet at all for 48 hours.  You may shower after the dressing is removed. No lifting more that 10 pounds for 48 hours.  If severe pain, increased shortness of air or racing heartbeat occur, seek immediate medical attention.  Follow up with Dr. Solis for results.

## 2023-10-10 NOTE — OUTREACH NOTE
Prep Survey      Flowsheet Row Responses   [facility] facility patient discharged from? [facility]   Is LACE score < 7 ? No   Eligibility Readm Mgmt   Discharge diagnosis Epigastric pain   Does the patient have one of the following disease processes/diagnoses(primary or secondary)? Other   Does the patient have Home health ordered? No   Is there a DME ordered? No   Prep survey completed? Yes            Destiny OATES - Registered Nurse

## 2023-10-10 NOTE — PRE-PROCEDURE NOTE
.  [facility]   Interventional Radiology H&P    Patient Name: Sherry Lobato  : 1950  MRN: [id number]  Primary Care Physician:  Jin Malcolm PA-C  Referring Physician: Wiliam Solis MD  Date of admission: 10/10/2023    Subjective   Subjective     HPI:  Sherry Lobato is a 73 y.o. female with duodenal neuroendocrine tumor and RUL mass    Review of Systems:   Constitutional no fever,  no weight loss       Otolaryngeal no difficulty swallowing   Cardiovascular no chest pain   Pulmonary no cough, no sputum production   Gastrointestinal no constipation, no diarrhea                         Personal History       Past Medical/Surgical History:   Past Medical History:   Diagnosis Date    Lucas's esophagus     C. difficile colitis     Carotid artery disease     -50-74% left, 16-49% right ()    COPD (chronic obstructive pulmonary disease)     DM2 (diabetes mellitus, type 2)     ESRD (end stage renal disease)     on HD    Gastric ulcer     at anastomatic site    Gastroparesis     unknown    GERD (gastroesophageal reflux disease)     Gout     not current    Hemodialysis patient     mwf    Hernia, incisional     abdomen    History of colonoscopy     UTD    History of degenerative disc disease     Hyperlipidemia     Hypertension     Microscopic colitis     Nephrotic syndrome     Neuroendocrine tumor     of stomach    FABY (obstructive sleep apnea)     not treating    Pedal edema     ressolved    Proteinuria     Renal cell cancer, right     Rib pain on right side     chronic    Rotator cuff tear     bilateral    Stomach cancer 2016    Stroke     Uterine cancer     Vitamin B 12 deficiency     Vitamin D deficiency      Past Surgical History:   Procedure Laterality Date    APPENDECTOMY      ARTERIOVENOUS FISTULA Right     ARTERIOVENOUS FISTULA REPAIR      Clotted off and insert graft    ARTERIOVENOUS FISTULA/SHUNT SURGERY Left 2020    Procedure: ARTERIOVENOUS FISTULA FORMATION;  Surgeon:  Jan Caicedo MD;  Location: [facility] MAIN OR;  Service: Vascular;  Laterality: Left;    BREAST BIOPSY      right nipple 1981    COLONOSCOPY N/A 11/24/2020    Procedure: COLONOSCOPY with polypectomy x 7;  Surgeon: Jeffery White MD;  Location: [facility] ENDOSCOPY;  Service: Gastroenterology;  Laterality: N/A;  post op: polyps, diverticulosis, hemorrhoids    COLONOSCOPY N/A 6/1/2023    Procedure: COLONOSCOPY with polypectomy x 3 biopsy x 1;  Surgeon: Jeffery White MD;  Location: [facility] ENDOSCOPY;  Service: Gastroenterology;  Laterality: N/A;  diverticulosis polyps    ENDOSCOPY N/A 03/12/2022    Procedure: ESOPHAGOGASTRODUODENOSCOPY with biopsy x 1 area;  Surgeon: Javan Augustin MD;  Location: [facility] ENDOSCOPY;  Service: Gastroenterology;  Laterality: N/A;  post op: anastamosis    GASTRIC RESECTION      cancer    HYSTERECTOMY      LAPAROSCOPIC CHOLECYSTECTOMY      NEPHRECTOMY      right kidney removed     REDUCTION MAMMAPLASTY      TUMOR REMOVAL      boils    VENTRAL/INCISIONAL HERNIA REPAIR Right 08/28/2019    Procedure: VENTRAL/INCISIONAL HERNIA REPAIR;  Surgeon: Nilay Stevens MD;  Location: [facility] MAIN OR;  Service: General    VENTRAL/INCISIONAL HERNIA REPAIR N/A 10/01/2019    Procedure: OPEN VENTRAL/INCISIONAL HERNIA REPAIR;  Surgeon: Nilay Stevens MD;  Location: [facility] MAIN OR;  Service: General    VENTRAL/INCISIONAL HERNIA REPAIR N/A 10/11/2021    Procedure: VENTRAL/INCISIONAL HERNIA REPAIR LAPAROSCOPIC;  Surgeon: Nilay Stevens MD;  Location: [facility] MAIN OR;  Service: General;  Laterality: N/A;    VENTRAL/INCISIONAL HERNIA REPAIR N/A 01/23/2023    Procedure: VENTRAL/INCISIONAL HERNIA REPAIR with MESH;  Surgeon: Nilay Stevens MD;  Location: [facility] MAIN OR;  Service: General;  Laterality: N/A;       Social History:  reports that she quit smoking about 2 years ago. Her smoking use included cigarettes. She has a 14.25 pack-year smoking history. She has been exposed to tobacco smoke. She has  "never used smokeless tobacco. She reports that she does not drink alcohol and does not use drugs.    Medications:  (Not in a hospital admission)    Current medications:  sodium chloride, 10 mL, Intravenous, Q12H      Current IV drips:  sodium chloride, 75 mL/hr, Last Rate: 75 mL/hr (10/10/23 8491)        Allergies:  Allergies   Allergen Reactions    Atorvastatin Calcium Other (See Comments)     \"felt like I was on fire\"    Gabapentin Dizziness     says was unable to walk    Niacin Other (See Comments)     \"FEELS LIKE SHE IS ON FIRE\"         Objective    Objective     Vitals:   Temp:  [98.3 øF (36.8 øC)] 98.3 øF (36.8 øC)  Heart Rate:  [75-83] 75  Resp:  [13-17] 13  BP: (100-107)/(62-69) 100/69  Flow (L/min):  [2] 2      Physical Exam:   Constitutional: Awake, alert, No acute distress    Respiratory: Clear to auscultation bilaterally, nonlabored respirations    Cardiovascular: RRR, no murmurs, rubs, or gallops, palpable pedal pulses bilaterally   Gastrointestinal: Positive bowel sounds, soft, nontender, nondistended        ASA SCALE ASSESSMENT:  2-Mild to moderate systemic disease, medically well controlled, with no functional limitation    MALLAMPATI CLASSIFICATION:  2-Able to visualize the soft palate, fauces, uvula. The anterior & posterior tonsilar pillars are hidden by the tongue.       Result Review        Result Review:     Sodium   Date Value Ref Range Status   10/08/2023 135 (L) 136 - 145 mmol/L Final   10/08/2023 135 (L) 136 - 145 mmol/L Final       Potassium   Date Value Ref Range Status   10/08/2023 4.9 3.5 - 5.2 mmol/L Final     Comment:     Slight hemolysis detected by analyzer. Results may be affected.   10/08/2023 5.4 (H) 3.5 - 5.2 mmol/L Final       Chloride   Date Value Ref Range Status   10/08/2023 92 (L) 98 - 107 mmol/L Final   10/08/2023 92 (L) 98 - 107 mmol/L Final       No results found for: \"PLASMABICARB\"    BUN   Date Value Ref Range Status   10/08/2023 56 (H) 8 - 23 mg/dL Final   10/08/2023 " "48 (H) 8 - 23 mg/dL Final       Creatinine   Date Value Ref Range Status   10/08/2023 7.14 (H) 0.57 - 1.00 mg/dL Final   10/08/2023 6.02 (H) 0.57 - 1.00 mg/dL Final       Calcium   Date Value Ref Range Status   10/08/2023 8.7 8.6 - 10.5 mg/dL Final   10/08/2023 9.3 8.6 - 10.5 mg/dL Final           No components found for: \"GLUCOSE.*\"  Results from last 7 days   Lab Units 10/10/23  0737   WBC 10*3/mm3 10.20   HEMOGLOBIN g/dL 13.0   HEMATOCRIT % 40.7   PLATELETS 10*3/mm3 589*      Results from last 7 days   Lab Units 10/10/23  0737   INR  1.06           Assessment / Plan     Assesment:   RUL mass      Plan:   Lung biopsy    The risks and benefits of the procedure were discussed with the patient.    Electronically signed by Matteo Lr III, MD, 10/10/23, 8:43 AM EDT.   "

## 2023-10-10 NOTE — NURSING NOTE
Pt discharged tper wheelchair to s/o. Verbal and written understanding of discharge instructions received.  Pt placed in passenger seat of car.

## 2023-10-11 LAB
CGA SERPL-MCNC: 2252 NG/ML (ref 0–101.8)
GASTRIN SERPL-MCNC: 189 PG/ML (ref 0–115)
LAB AP CASE REPORT: NORMAL
LAB AP DIAGNOSIS COMMENT: NORMAL
Lab: NORMAL
PATH REPORT.FINAL DX SPEC: NORMAL
PATH REPORT.GROSS SPEC: NORMAL

## 2023-10-12 ENCOUNTER — READMISSION MANAGEMENT (OUTPATIENT)
Dept: CALL CENTER | Facility: HOSPITAL | Age: 73
End: 2023-10-12
Payer: MEDICARE

## 2023-10-12 LAB — SEROTONIN SER-MCNC: 320 NG/ML (ref 8–217)

## 2023-10-12 NOTE — OUTREACH NOTE
Medical Week 1 Survey      Flowsheet Row Responses   [facility] patient discharged from? [facility]   Does the patient have one of the following disease processes/diagnoses(primary or secondary)? Other   Week 1 attempt successful? Yes   Call start time 1537   Call end time 1541   Discharge diagnosis Epigastric pain   Meds reviewed with patient/caregiver? Yes   Is the patient having any side effects they believe may be caused by any medication additions or changes? No   Does the patient have all medications ordered at discharge? Yes   Is the patient taking all medications as directed (includes completed medication regime)? Yes   Does the patient have a primary care provider?  Yes   Does the patient have an appointment with their PCP within 7 days of discharge? No   What is preventing the patient from scheduling follow up appointments within 7 days of discharge? Haven't had time, Waiting on return call   Nursing Interventions Verified appointment date/time/provider   Has the patient kept scheduled appointments due by today? N/A   Has home health visited the patient within 72 hours of discharge? N/A   Psychosocial issues? No   Did the patient receive a copy of their discharge instructions? Yes   Nursing interventions Reviewed instructions with patient   What is the patient's perception of their health status since discharge? Improving   Is the patient/caregiver able to teach back signs and symptoms related to disease process for when to call PCP? Yes   Is the patient/caregiver able to teach back signs and symptoms related to disease process for when to call 911? Yes   Is the patient/caregiver able to teach back the hierarchy of who to call/visit for symptoms/problems? PCP, Specialist, Home health nurse, Urgent Care, ED, 911 Yes   Week 1 call completed? Yes   Call end time 1541            Nikki MORALES - Registered Nurse

## 2023-10-13 ENCOUNTER — TRANSCRIBE ORDERS (OUTPATIENT)
Dept: ADMINISTRATIVE | Facility: HOSPITAL | Age: 73
End: 2023-10-13
Payer: MEDICARE

## 2023-10-13 DIAGNOSIS — C34.11 MALIGNANT NEOPLASM OF UPPER LOBE OF RIGHT LUNG: Primary | ICD-10-CM

## 2023-10-13 LAB
LAB AP CASE REPORT: NORMAL
LAB AP DIAGNOSIS COMMENT: NORMAL
PATH REPORT.ADDENDUM SPEC: NORMAL
PATH REPORT.FINAL DX SPEC: NORMAL
PATH REPORT.GROSS SPEC: NORMAL

## 2023-10-14 LAB — VIP SERPL-MCNC: <16.8 PG/ML (ref 0–58.8)

## 2023-10-19 ENCOUNTER — HOSPITAL ENCOUNTER (OUTPATIENT)
Dept: PET IMAGING | Facility: HOSPITAL | Age: 73
Discharge: HOME | End: 2023-10-19
Payer: MEDICARE

## 2023-10-19 ENCOUNTER — HOSPITAL ENCOUNTER (OUTPATIENT)
Dept: RESPIRATORY THERAPY | Facility: HOSPITAL | Age: 73
Discharge: HOME | End: 2023-10-19
Payer: MEDICARE

## 2023-10-19 VITALS — RESPIRATION RATE: 12 BRPM | OXYGEN SATURATION: 97 % | HEART RATE: 92 BPM

## 2023-10-19 DIAGNOSIS — J44.1 COPD EXACERBATION: ICD-10-CM

## 2023-10-19 DIAGNOSIS — C34.11 MALIGNANT NEOPLASM OF UPPER LOBE OF RIGHT LUNG: ICD-10-CM

## 2023-10-19 LAB — GLUCOSE BLDC GLUCOMTR-MCNC: 81 MG/DL (ref 70–105)

## 2023-10-19 PROCEDURE — 94729 DIFFUSING CAPACITY: CPT

## 2023-10-19 PROCEDURE — 94760 N-INVAS EAR/PLS OXIMETRY 1: CPT

## 2023-10-19 PROCEDURE — 63710000001 ALBUTEROL SULFATE HFA 108 (90 BASE) MCG/ACT AEROSOL SOLUTION 6.7 G INHALER: Performed by: SURGERY

## 2023-10-19 PROCEDURE — 0 FLUDEOXYGLUCOSE F18 SOLUTION: Performed by: NURSE PRACTITIONER

## 2023-10-19 PROCEDURE — A9270 NON-COVERED ITEM OR SERVICE: HCPCS | Performed by: SURGERY

## 2023-10-19 PROCEDURE — 94727 GAS DIL/WSHOT DETER LNG VOL: CPT

## 2023-10-19 PROCEDURE — A9552 F18 FDG: HCPCS | Performed by: NURSE PRACTITIONER

## 2023-10-19 PROCEDURE — 94060 EVALUATION OF WHEEZING: CPT

## 2023-10-19 PROCEDURE — 94664 DEMO&/EVAL PT USE INHALER: CPT

## 2023-10-19 PROCEDURE — 94799 UNLISTED PULMONARY SVC/PX: CPT

## 2023-10-19 PROCEDURE — 82948 REAGENT STRIP/BLOOD GLUCOSE: CPT

## 2023-10-19 PROCEDURE — 78815 PET IMAGE W/CT SKULL-THIGH: CPT

## 2023-10-19 RX ORDER — ALBUTEROL SULFATE 90 UG/1
2 AEROSOL, METERED RESPIRATORY (INHALATION) ONCE
Status: COMPLETED | OUTPATIENT
Start: 2023-10-19 | End: 2023-10-19

## 2023-10-19 RX ADMIN — ALBUTEROL SULFATE 2 PUFF: 108 AEROSOL, METERED RESPIRATORY (INHALATION) at 15:56

## 2023-10-19 RX ADMIN — FLUDEOXYGLUCOSE F18 1 DOSE: 300 INJECTION INTRAVENOUS at 10:30

## 2023-10-24 ENCOUNTER — PATIENT OUTREACH (OUTPATIENT)
Dept: ONCOLOGY | Facility: CLINIC | Age: 73
End: 2023-10-24
Payer: MEDICARE

## 2023-10-24 ENCOUNTER — READMISSION MANAGEMENT (OUTPATIENT)
Dept: CALL CENTER | Facility: HOSPITAL | Age: 73
End: 2023-10-24
Payer: MEDICARE

## 2023-10-24 ENCOUNTER — OFFICE VISIT (OUTPATIENT)
Dept: SURGERY | Facility: CLINIC | Age: 73
End: 2023-10-24
Payer: MEDICARE

## 2023-10-24 VITALS
HEART RATE: 91 BPM | WEIGHT: 157.8 LBS | DIASTOLIC BLOOD PRESSURE: 69 MMHG | HEIGHT: 60 IN | TEMPERATURE: 98.2 F | SYSTOLIC BLOOD PRESSURE: 150 MMHG | OXYGEN SATURATION: 98 % | BODY MASS INDEX: 30.98 KG/M2

## 2023-10-24 DIAGNOSIS — J44.1 COPD EXACERBATION: Primary | ICD-10-CM

## 2023-10-24 PROCEDURE — 3077F SYST BP >= 140 MM HG: CPT | Performed by: SURGERY

## 2023-10-24 PROCEDURE — 99205 OFFICE O/P NEW HI 60 MIN: CPT | Performed by: SURGERY

## 2023-10-24 PROCEDURE — 3078F DIAST BP <80 MM HG: CPT | Performed by: SURGERY

## 2023-10-24 NOTE — PROGRESS NOTES
THORACIC SURGERY CLINIC CONSULT NOTE    REASON FOR CONSULT: Stage I right upper lobe well-differentiated adenocarcinoma    REFERRING PROVIDER: Jin Malcolm PA-C     Subjective   HISTORY OF PRESENTING ILLNESS:   Sherry Lobato is a 73 y.o. female who has significant medical history of Lucas's esophagus, C. 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 Further work-up revealed lung cancer.     CT chest without contrast on 8/15/2023 at [facility] showed significant interval increase in size of RUL nodule (now 1.6 x 1.5 cm, previously 5 mm on 12/20/2022), interval development of smaller peripheral subcentimeter nodules concerning for pulmonary metastasis, emphysema.     CT-guided RUL mass biopsy on 10/10/2023 at [facility] IR revealed well differentiated adenocarcinoma with focal mucinous features, no definite invasion identified.     PET/CT on 10/19/2023 at [facility] showed low to indeterminate level FDG accumulation just above the mediastinal blood pool of RUL nodule, 1.4 cm hypermetabolic nodule or lymph node at the inferior margin of the left parotid gland (salivary malignancy cannot be excluded -recommend tissue sampling), numerous small noncalcified nodules present in both lungs worrisome for malignancy but do not demonstrate FDG uptake as they are below the threshold for PET resolution, no convincing evidence of primary malignancy or metastatic disease in the abdomen, pelvis, or skeleton.     She has had cancer in her uterus, kidney, stomach, and in other areas of her body. She believed she had cancer in her chest prior to the PET scan. She had a CT scan at the hospital that indicated a pulmonary nodule, and she was told to follow up in 6 months. At the 6-month follow up, her nodule had increased in  size. She denies dyspnea. She uses inhalers twice daily. She has an unproductive cough daily. She denies using extra oxygen at nighttime or using a BiPap or CPAP machine. The patient believes she would be able to walk a block without shortness of breath, but she has knee problems that limit her walking ability. Dr. Willoughby is scheduling a biopsy for the nodule on her parotid gland found on the PET scan.     She smoked cigarettes for 60 years and quit smoking 5 years ago. She smoked approximately 1 pack daily and decreased to 5 cigarettes daily.    ROS:  Positive: abdominal pain, unproductive cough   Negative: dyspnea    Past Medical History:   Diagnosis Date    Lucas's esophagus     C. difficile colitis     Carotid artery disease     -50-74% left, 16-49% right (1/19)    COPD (chronic obstructive pulmonary disease)     DM2 (diabetes mellitus, type 2)     ESRD (end stage renal disease)     on HD    Gastric ulcer     at anastomatic site    Gastroparesis     unknown    GERD (gastroesophageal reflux disease)     Gout     not current    Hemodialysis patient     mwf    Hernia, incisional     abdomen    History of colonoscopy     UTD    History of degenerative disc disease     Hyperlipidemia     Hypertension     Microscopic colitis     Nephrotic syndrome     Neuroendocrine tumor     of stomach    FABY (obstructive sleep apnea)     not treating    Pedal edema     ressolved    Proteinuria     Renal cell cancer, right     Rib pain on right side     chronic    Rotator cuff tear     bilateral    Stomach cancer 2016    Stroke     Uterine cancer     Vitamin B 12 deficiency     Vitamin D deficiency        Past Surgical History:   Procedure Laterality Date    APPENDECTOMY      ARTERIOVENOUS FISTULA Right     ARTERIOVENOUS FISTULA REPAIR      Clotted off and insert graft    ARTERIOVENOUS FISTULA/SHUNT SURGERY Left 12/30/2020    Procedure: ARTERIOVENOUS FISTULA FORMATION;  Surgeon: Jan Caicedo MD;  Location: [facility] OR;   Service: Vascular;  Laterality: Left;    BREAST BIOPSY      right nipple 1981    COLONOSCOPY N/A 11/24/2020    Procedure: COLONOSCOPY with polypectomy x 7;  Surgeon: Jeffery White MD;  Location: [facility] ENDOSCOPY;  Service: Gastroenterology;  Laterality: N/A;  post op: polyps, diverticulosis, hemorrhoids    COLONOSCOPY N/A 6/1/2023    Procedure: COLONOSCOPY with polypectomy x 3 biopsy x 1;  Surgeon: Jeffery White MD;  Location: [facility] ENDOSCOPY;  Service: Gastroenterology;  Laterality: N/A;  diverticulosis polyps    ENDOSCOPY N/A 03/12/2022    Procedure: ESOPHAGOGASTRODUODENOSCOPY with biopsy x 1 area;  Surgeon: Javan Augutsin MD;  Location: [facility] ENDOSCOPY;  Service: Gastroenterology;  Laterality: N/A;  post op: anastamosis    ENDOSCOPY N/A 10/9/2023    Procedure: ESOPHAGOGASTRODUODENOSCOPY with biopsy x3 areas;  Surgeon: Ace Campbell MD;  Location: [facility] ENDOSCOPY;  Service: Gastroenterology;  Laterality: N/A;  duodenal ulcers;irregular z line    GASTRIC RESECTION      cancer    HYSTERECTOMY      LAPAROSCOPIC CHOLECYSTECTOMY      NEPHRECTOMY      right kidney removed     REDUCTION MAMMAPLASTY      TUMOR REMOVAL      boils    VENTRAL/INCISIONAL HERNIA REPAIR Right 08/28/2019    Procedure: VENTRAL/INCISIONAL HERNIA REPAIR;  Surgeon: Nilay Stevens MD;  Location: [facility] MAIN OR;  Service: General    VENTRAL/INCISIONAL HERNIA REPAIR N/A 10/01/2019    Procedure: OPEN VENTRAL/INCISIONAL HERNIA REPAIR;  Surgeon: Nilay Stevens MD;  Location: [facility] MAIN OR;  Service: General    VENTRAL/INCISIONAL HERNIA REPAIR N/A 10/11/2021    Procedure: VENTRAL/INCISIONAL HERNIA REPAIR LAPAROSCOPIC;  Surgeon: Nilay Stevens MD;  Location: [facility] MAIN OR;  Service: General;  Laterality: N/A;    VENTRAL/INCISIONAL HERNIA REPAIR N/A 01/23/2023    Procedure: VENTRAL/INCISIONAL HERNIA REPAIR with MESH;  Surgeon: Nilay Stevens MD;  Location: [facility] MAIN OR;  Service: General;  Laterality: N/A;       Family History    Problem Relation Age of Onset    Heart disease Father        Social History     Socioeconomic History    Marital status: Single   Tobacco Use    Smoking status: Former     Packs/day: 0.25     Years: 57.00     Additional pack years: 0.00     Total pack years: 14.25     Types: Cigarettes     Quit date: 2021     Years since quittin.5     Passive exposure: Past    Smokeless tobacco: Never    Tobacco comments:     quit smoking 2 months ago   Vaping Use    Vaping Use: Never used   Substance and Sexual Activity    Alcohol use: No    Drug use: No    Sexual activity: Defer         Current Outpatient Medications:     albuterol sulfate  (90 Base) MCG/ACT inhaler, Inhale 2 puffs Every 4 (Four) Hours As Needed for Wheezing., Disp: , Rfl:     amLODIPine (NORVASC) 10 MG tablet, Take 1 tablet by mouth Daily., Disp: , Rfl:     budesonide-formoterol (SYMBICORT) 160-4.5 MCG/ACT inhaler, Inhale 2 puffs 2 (Two) Times a Day., Disp: , Rfl:     carvedilol (COREG) 3.125 MG tablet, Take 1 tablet by mouth 2 (Two) Times a Day With Meals., Disp: , Rfl:     Cholecalciferol 25 MCG (1000 UT) tablet, Take 1 tablet by mouth Daily., Disp: , Rfl:     lidocaine-prilocaine (EMLA) 2.5-2.5 % cream, Apply 1 application  topically to the appropriate area as directed Daily As Needed. Apply to access site 1 to 2 hours before dialysis, Disp: , Rfl: 4    Multiple Vitamins-Minerals (RENAPLEX-D) tablet, Take 1 tablet by mouth Daily., Disp: , Rfl:     pantoprazole (PROTONIX) 40 MG EC tablet, Take 1 tablet by mouth 2 (Two) Times a Day., Disp: , Rfl:     sevelamer (RENVELA) 800 MG tablet, Take 3 tablets by mouth 3 (Three) Times a Day With Meals., Disp: , Rfl:     simvastatin (ZOCOR) 40 MG tablet, Take 1 tablet by mouth Every Night., Disp: , Rfl:     sodium bicarbonate 650 MG tablet, Take 2 tablets by mouth 2 (Two) Times a Day., Disp: , Rfl:     sucralfate (Carafate) 1 g tablet, Take 1 tablet by mouth 4 (Four) Times a Day., Disp: 120 tablet, Rfl: 1    " temazepam (RESTORIL) 30 MG capsule, Take 1 capsule by mouth Every Night., Disp: , Rfl:      Allergies   Allergen Reactions    Atorvastatin Calcium Other (See Comments)     \"felt like I was on fire\"    Gabapentin Dizziness     says was unable to walk    Niacin Other (See Comments)     \"FEELS LIKE SHE IS ON FIRE\"               Objective    OBJECTIVE:     VITAL SIGNS:  /69 (BP Location: Right arm, Patient Position: Sitting, Cuff Size: Adult)   Pulse 91   Temp 98.2 °F (36.8 °C) (Infrared)   Ht 152.4 cm (60\")   Wt 71.6 kg (157 lb 12.8 oz)   SpO2 98%   BMI 30.82 kg/m²     PHYSICAL EXAM:  No acute distress.  Alert, oriented x4.  Rate rhythm regular.  Non-labored breathing.  Abdomen soft, nondistended.  Moving all 4 extremities, no focal deficit.  Extremities warm.    LAB RESULTS:  I have reviewed all the available laboratory results in the chart.    RESULTS REVIEW:  I have reviewed the patient's all relevant laboratory and imaging findings.   CT Chest Without Contrast Diagnostic (08/15/2023 12:54)  CT Needle Biopsy Lung (10/10/2023 09:14)  She has a pulmonary nodule that is 1.8 cm and was confirmed as malignant by a biopsy.     NM PET/CT Skull Base to Mid Thigh (10/19/2023 12:05)  The pulmonary nodule appears slightly active. A nodule was found on the parotid gland.      ASSESSMENT & PLAN:  Sherry Lobato is a 73 y.o. female with significant medical conditions as mentioned above presented to my clinic.    Diagnosis: Stage I right upper lobe well-differentiated adenocarcinoma    The treatment for early stage lung cancer with surgical resection.  Due to her medical history, I do not believe she will do well with surgery due to the risk of complications. She was recommended for the alternative treatment, which is radiation therapy. She will be referred to Dr. Merlos, radiation oncologist for SBRT.    I discussed the patients findings and my recommendations with the patient. The patient was given adequate time to " ask questions and all questions were answered to patient satisfaction. Thank you for this consult and allowing us to participate in the care of your patient.      Stephen Perea MD  Thoracic Surgeon  [facility] and [facility]        Dictated utilizing Dragon dictation    I spent 60 minutes caring for Sherry on this date of service. This time includes time spent by me in the following activities:preparing for the visit, reviewing tests, obtaining and/or reviewing a separately obtained history, performing a medically appropriate examination and/or evaluation , counseling and educating the patient/family/caregiver, ordering medications, tests, or procedures, referring and communicating with other health care professionals , documenting information in the medical record, independently interpreting results and communicating that information with the patient/family/caregiver, and care coordination and more than half the time was spent in direct face to face evaluation and decision making.       Transcribed from ambient dictation for Stephen Perea MD by Swetha Wilson.  10/24/23   13:20 EDT    Patient or patient representative verbalized consent to the visit recording.  I have personally performed the services described in this document as transcribed by the above individual, and it is both accurate and complete.

## 2023-10-24 NOTE — SIGNIFICANT NOTE
I accompanied patient to Dr. Perea's office visit    Dr. Perea reviewed medical history, scan images and pathology. Patient has dialysis MWF. After reviewed medical history and PFTs, patient is not a surgical candidate. Patient scheduled with Dr. Merlos 10/26 at 315. All questions answered and patient has my direct number.

## 2023-10-24 NOTE — OUTREACH NOTE
Medical Week 3 Survey      Flowsheet Row Responses   [facility] facility patient discharged from? [facility]   Does the patient have one of the following disease processes/diagnoses(primary or secondary)? Other   Week 3 attempt successful? No   Unsuccessful attempts Attempt 1            Tawanna MEDINA - Licensed Nurse

## 2023-10-25 NOTE — PROGRESS NOTES
RADIATION THERAPY CONSULT NOTE    NAME: Sherry Lobato  YOB: 1950  MRN #: [id number]  DATE OF SERVICE: 10/26/2023  REFERRING PROVIDER: Jin Malcolm PA-C [address] [place] [address]  PRIMARY CARE PROVIDER: Jin Malcolm PA-C    DIAGNOSIS:  RUL NSCLC, well differentiated adenocarcinoma with focal mucinous features  [id number], Stage IA2  Encounter Diagnosis   Name Primary?    Adenocarcinoma of upper lobe of right lung Yes     REASON FOR CONSULTATION/CHIEF COMPLAINT:  Lung cancer  I was asked to see the patient at the request of the referring provider noted below for advice and recommendations regarding this diagnosis and the role of radiation therapy.                              REQUESTING PHYSICIAN:    Jin Malcolm Pa-c [address] [facility] [place] [address]    RECORDS OBTAINED:  Records of the patients history including those obtained from the referring provider were reviewed and summarized in detail.    HISTORY OF PRESENT ILLNESS:  Sherry Lobato is a 73 y.o. female with a PMH of Lucas's esophagus, C. 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 Further work-up revealed lung cancer.    CT chest without contrast on 8/15/2023 at [facility] showed significant interval increase in size of RUL nodule (now 1.6 x 1.5 cm, previously 5 mm on 12/20/2022), interval development of smaller peripheral subcentimeter nodules concerning for pulmonary metastasis, emphysema.    CT-guided RUL mass biopsy on 10/10/2023 at [facility] IR revealed well differentiated adenocarcinoma with focal mucinous features, no definite invasion identified.    PET/CT on 10/19/2023 at [facility] showed low to indeterminate level FDG accumulation just above the mediastinal blood pool of RUL  nodule, 1.4 cm hypermetabolic nodule or lymph node at the inferior margin of the left parotid gland (salivary malignancy cannot be excluded -recommend tissue sampling), numerous small noncalcified nodules present in both lungs worrisome for malignancy but do not demonstrate FDG uptake as they are below the threshold for PET resolution, no convincing evidence of primary malignancy or metastatic disease in the abdomen, pelvis, or skeleton.     She was seen for initial consultation by Dr. Stephen Perae on 10/24/2023. She was not deemed to be a candidate for surgery due to her multiple comorbidities and prior/current cancer history. Therefore, she was referred to our clinic for consideration of radiation therapy in the setting.    US-guided FNA of left parotid gland scheduled 11/30/2023 at [facility].    The following portions of the patient's history were reviewed and updated as appropriate: allergies, current medications, past family history, past medical history, past social history, past surgical history and problem list. Reviewed with the patient and remain unchanged.    PAST MEDICAL HISTORY:  she has a past medical history of Lucas's esophagus, C. difficile colitis, Carotid artery disease, COPD (chronic obstructive pulmonary disease), DM2 (diabetes mellitus, type 2), ESRD (end stage renal disease), Gastric ulcer, Gastroparesis, GERD (gastroesophageal reflux disease), Gout, Hemodialysis patient, Hernia, incisional, History of colonoscopy, History of degenerative disc disease, Hyperlipidemia, Hypertension, Microscopic colitis, Nephrotic syndrome, Neuroendocrine tumor, FABY (obstructive sleep apnea), Pedal edema, Proteinuria, Renal cell cancer, right, Rib pain on right side, Rotator cuff tear, Stomach cancer (2016), Stroke, Uterine cancer, Vitamin B 12 deficiency, and Vitamin D deficiency.    MEDICATIONS:    Current Outpatient Medications:     albuterol sulfate  (90 Base) MCG/ACT inhaler, Inhale 2 puffs Every 4 (Four)  "Hours As Needed for Wheezing., Disp: , Rfl:     amLODIPine (NORVASC) 10 MG tablet, Take 1 tablet by mouth Daily., Disp: , Rfl:     budesonide-formoterol (SYMBICORT) 160-4.5 MCG/ACT inhaler, Inhale 2 puffs 2 (Two) Times a Day., Disp: , Rfl:     carvedilol (COREG) 3.125 MG tablet, Take 1 tablet by mouth 2 (Two) Times a Day With Meals., Disp: , Rfl:     Cholecalciferol 25 MCG (1000 UT) tablet, Take 1 tablet by mouth Daily., Disp: , Rfl:     lidocaine-prilocaine (EMLA) 2.5-2.5 % cream, Apply 1 application  topically to the appropriate area as directed Daily As Needed. Apply to access site 1 to 2 hours before dialysis, Disp: , Rfl: 4    Multiple Vitamins-Minerals (RENAPLEX-D) tablet, Take 1 tablet by mouth Daily., Disp: , Rfl:     pantoprazole (PROTONIX) 40 MG EC tablet, Take 1 tablet by mouth 2 (Two) Times a Day., Disp: , Rfl:     sevelamer (RENVELA) 800 MG tablet, Take 3 tablets by mouth 3 (Three) Times a Day With Meals., Disp: , Rfl:     simvastatin (ZOCOR) 40 MG tablet, Take 1 tablet by mouth Every Night., Disp: , Rfl:     sodium bicarbonate 650 MG tablet, Take 2 tablets by mouth 2 (Two) Times a Day., Disp: , Rfl:     sucralfate (Carafate) 1 g tablet, Take 1 tablet by mouth 4 (Four) Times a Day., Disp: 120 tablet, Rfl: 1    temazepam (RESTORIL) 30 MG capsule, Take 1 capsule by mouth Every Night., Disp: , Rfl:     ALLERGIES:    Allergies   Allergen Reactions    Atorvastatin Calcium Other (See Comments)     \"felt like I was on fire\"    Gabapentin Dizziness     says was unable to walk    Niacin Other (See Comments)     \"FEELS LIKE SHE IS ON FIRE\"       PAST SURGICAL HISTORY:  she has a past surgical history that includes Appendectomy; Laparoscopic cholecystectomy; Tumor removal; Breast biopsy; Reduction mammaplasty; Hysterectomy; AV fistula placement (Right); AV fistula repair; Gastric Resection; ventral/incisional hernia repair (Right, 08/28/2019); ventral/incisional hernia repair (N/A, 10/01/2019); Nephrectomy; " Colonoscopy (N/A, 11/24/2020); arteriovenous fistula/shunt surgery (Left, 12/30/2020); ventral/incisional hernia repair (N/A, 10/11/2021); Esophagogastroduodenoscopy (N/A, 03/12/2022); ventral/incisional hernia repair (N/A, 01/23/2023); Colonoscopy (N/A, 6/1/2023); and Esophagogastroduodenoscopy (N/A, 10/9/2023).    PREVIOUS RADIOTHERAPY OR CHEMOTHERAPY:  No prior lung XRT    FAMILY HISTORY:  herfamily history includes Heart disease in her father.    SOCIAL HISTORY:  she reports that she quit smoking about 2 years ago. Her smoking use included cigarettes. She has a 14.25 pack-year smoking history. She has been exposed to tobacco smoke. She has never used smokeless tobacco. She reports that she does not drink alcohol and does not use drugs.    PAIN AND PAIN MANAGEMENT:  no pain.  NUTRITIONAL STATUS:  no issues  KPS:  70  PHQ-9 Total Score: distress screening tool completed.    REVIEW OF SYSTEMS:   Review of Systems   General: No fevers, chills, weight change, or drenching night sweats. Skin: No rashes or jaundice.  HEENT: No change in vision or hearing, no headaches.  Neck: No dysphagia or masses.  Heme/Lymph: No easy bruising or bleeding.  Respiratory System: No shortness of breath or cough.  Cardiovascular: No chest pain, palpitations, or dyspnea on exertion.  - Pacemaker. GI: No nausea, vomiting, diarrhea, melena, or hematochezia.  : No dysuria or hematuria.  Endocrine: No heat or cold intolerance. Musculoskeletal: No myalgias or arthralgias.  Neuro: No weakness, numbness, syncope, or seizures. Psych: No mood changes or depression. Ext: Denies swelling.      Objective   VITAL SIGNS:  Vitals:    10/26/23 1512   BP: 147/62   Pulse: 70   Resp: 18   Temp: 98.3 °F (36.8 °C)   SpO2: 98%       PHYSICAL EXAM:  GENERAL:  No apparent distress. Sitting comfortably in room.    HEENT:  Normocephalic, atraumatic. Pupils are equal, round, reactive to light. Sclera anicteric. Conjunctiva not injected. Oropharynx without  erythema, ulcerations or thrush.   NECK:  Supple with no masses.  LYMPHATIC:  No cervical, supraclavicular or axillary adenopathy appreciated bilaterally.   CARDIOVASCULAR:  S1 & S2 detected; no murmurs, rubs or gallops.  CHEST:  Clear to auscultation bilaterally; no wheezes, crackles or rubs. Work of breathing normal.  ABDOMEN:  Bowel sounds present. Abdomen is soft, nontender, nondistended.   MUSCULOSKELETAL:  No tenderness to palpation along the spine or scapulae. Normal range of motion.  EXTREMITIES:  No clubbing, cyanosis, edema.  SKIN:  No erythema, rashes, ulcerations noted.   NEUROLOGIC:  Cranial nerves II-XII grossly intact bilaterally. No focal neurologic deficits.  PSYCHIATRIC:  Alert, aware, and appropriate.      PERTINENT IMAGING/PATHOLOGY/LABS (Medical Decision Making):      COORDINATION OF CARE:  A copy of this note is sent to the referring provider.    PATHOLOGY (Reviewed): as noted above    IMAGING (Reviewed): as noted above    LABS (Reviewed):  HEMATOLOGY:  WBC   Date Value Ref Range Status   10/10/2023 10.20 3.40 - 10.80 10*3/mm3 Final   07/14/2022 9.33 4.5 - 11.0 10*3/uL Final     RBC   Date Value Ref Range Status   10/10/2023 4.40 3.77 - 5.28 10*6/mm3 Final   07/14/2022 3.72 (L) 4.0 - 5.2 10*6/uL Final     Hemoglobin   Date Value Ref Range Status   10/10/2023 13.0 12.0 - 15.9 g/dL Final   05/12/2023 10.3 (L) 11.2 - 15.7 Gram/dL Final   07/14/2022 11.2 (L) 12.0 - 16.0 g/dL Final     Hematocrit   Date Value Ref Range Status   10/10/2023 40.7 34.0 - 46.6 % Final   07/14/2022 35.8 (L) 36.0 - 46.0 % Final     Platelets   Date Value Ref Range Status   10/10/2023 589 (H) 140 - 450 10*3/mm3 Final   07/14/2022 423 140 - 440 10*3/uL Final     CHEMISTRY:  Lab Results   Component Value Date    GLUCOSE 92 10/08/2023    BUN 56 (H) 10/08/2023    CREATININE 7.14 (H) 10/08/2023    EGFRIFNONA 6 (L) 10/08/2021    EGFRIFAFRI  10/08/2021      Comment:      <15 Indicative of kidney failure.    BCR 7.8 10/08/2023    K  4.9 10/08/2023    CO2 26.0 10/08/2023    CALCIUM 8.7 10/08/2023    ALBUMIN 4.0 10/08/2023    LABIL2 0.8 (L) 03/14/2019    AST 15 10/08/2023    ALT 12 10/08/2023     Assessment & Plan   ASSESSMENT AND PLAN:    1. Adenocarcinoma of upper lobe of right lung       -Newly diagnosed stage 1A2 peripheral RUL Adenocarcinoma  -saw Dr. Perea and not surgical candidate.  He has recommended SBRT  -HD on MWF and patient would only want SBRT on T/TH.  -Smaller bilateral lung nodules are non-specific and non-AVID.    She has personal history of uterus, kidney, stomach and this will need to be monitored closely for the other small nodules in the brain.    SBRT was discussed and recommended.   I discussed 50 Gy in 5 fractions  4D CT sim is the next step.  This assessment comes from my review of the imaging, pathology, physician notes and other pertinent information as mentioned.        TIME SPENT WITH PATIENT: I spent 45 minutes caring for Sherry on this date of service. This time includes time spent by me in the following activities: preparing for the visit, reviewing tests, obtaining and/or reviewing a separately obtained history, performing a medically appropriate examination and/or evaluation, counseling and educating the patient/family/caregiver, documenting information in the medical record, independently interpreting results and communicating that information with the patient/family/caregiver, and care coordination             CC: Jin Malcolm PA-C Crase, Joshua Ryan, PA-C Nabeel Gul, MD    Approved by:     Matteo Merlos MD

## 2023-10-26 ENCOUNTER — CONSULT (OUTPATIENT)
Dept: RADIATION ONCOLOGY | Facility: HOSPITAL | Age: 73
End: 2023-10-26
Payer: MEDICARE

## 2023-10-26 ENCOUNTER — READMISSION MANAGEMENT (OUTPATIENT)
Dept: CALL CENTER | Facility: HOSPITAL | Age: 73
End: 2023-10-26
Payer: MEDICARE

## 2023-10-26 ENCOUNTER — PATIENT OUTREACH (OUTPATIENT)
Dept: ONCOLOGY | Facility: CLINIC | Age: 73
End: 2023-10-26
Payer: MEDICARE

## 2023-10-26 VITALS
DIASTOLIC BLOOD PRESSURE: 62 MMHG | BODY MASS INDEX: 31.02 KG/M2 | HEART RATE: 70 BPM | TEMPERATURE: 98.3 F | RESPIRATION RATE: 18 BRPM | WEIGHT: 158 LBS | SYSTOLIC BLOOD PRESSURE: 147 MMHG | HEIGHT: 60 IN | OXYGEN SATURATION: 98 %

## 2023-10-26 DIAGNOSIS — C34.11 ADENOCARCINOMA OF UPPER LOBE OF RIGHT LUNG: Primary | ICD-10-CM

## 2023-10-26 PROCEDURE — G0463 HOSPITAL OUTPT CLINIC VISIT: HCPCS | Performed by: RADIOLOGY

## 2023-10-26 NOTE — SIGNIFICANT NOTE
I accompanied patient to Dr. Merlos's office visit.     Dr. Merlos reviewed medical history, scan images and plan for radiation. He discussed what to expect and plan for treatment with 5 radiation treatments. Patient gets dialysis MWF and doesn't want to get treatment those days. CT simulation scheduled for 10/31 at 130. All questions answered and patient has my direct number for any questions or concerns.

## 2023-10-26 NOTE — OUTREACH NOTE
Medical Week 3 Survey      Flowsheet Row Responses   [facility] facility patient discharged from? [facility]   Does the patient have one of the following disease processes/diagnoses(primary or secondary)? Other   Week 3 attempt successful? No   Unsuccessful attempts Attempt 2            Trinidad MORALES - Licensed Nurse

## 2023-11-01 ENCOUNTER — HOSPITAL ENCOUNTER (OUTPATIENT)
Dept: RADIATION ONCOLOGY | Facility: HOSPITAL | Age: 73
Setting detail: RADIATION/ONCOLOGY SERIES
End: 2023-11-01
Payer: MEDICARE

## 2023-11-02 ENCOUNTER — HOSPITAL ENCOUNTER (OUTPATIENT)
Dept: RADIATION ONCOLOGY | Facility: HOSPITAL | Age: 73
Discharge: HOME | End: 2023-11-02

## 2023-11-02 PROCEDURE — 77334 RADIATION TREATMENT AID(S): CPT | Performed by: RADIOLOGY

## 2023-11-02 PROCEDURE — 77263 THER RADIOLOGY TX PLNG CPLX: CPT | Performed by: RADIOLOGY

## 2023-11-06 ENCOUNTER — OFFICE (AMBULATORY)
Dept: URBAN - METROPOLITAN AREA CLINIC 64 | Facility: CLINIC | Age: 73
End: 2023-11-06

## 2023-11-06 VITALS
SYSTOLIC BLOOD PRESSURE: 132 MMHG | HEART RATE: 99 BPM | DIASTOLIC BLOOD PRESSURE: 63 MMHG | WEIGHT: 156 LBS | HEIGHT: 63 IN

## 2023-11-06 DIAGNOSIS — D3A.8 OTHER BENIGN NEUROENDOCRINE TUMORS: ICD-10-CM

## 2023-11-06 DIAGNOSIS — R19.7 DIARRHEA, UNSPECIFIED: ICD-10-CM

## 2023-11-06 DIAGNOSIS — R10.13 EPIGASTRIC PAIN: ICD-10-CM

## 2023-11-06 PROCEDURE — 99214 OFFICE O/P EST MOD 30 MIN: CPT | Performed by: INTERNAL MEDICINE

## 2023-11-08 PROCEDURE — 77338 DESIGN MLC DEVICE FOR IMRT: CPT | Performed by: RADIOLOGY

## 2023-11-08 PROCEDURE — 77301 RADIOTHERAPY DOSE PLAN IMRT: CPT | Performed by: RADIOLOGY

## 2023-11-08 PROCEDURE — 77293 RESPIRATOR MOTION MGMT SIMUL: CPT | Performed by: RADIOLOGY

## 2023-11-08 PROCEDURE — 77300 RADIATION THERAPY DOSE PLAN: CPT | Performed by: RADIOLOGY

## 2023-11-09 ENCOUNTER — APPOINTMENT (OUTPATIENT)
Dept: CT IMAGING | Facility: HOSPITAL | Age: 73
DRG: 388 | End: 2023-11-09
Payer: MEDICARE

## 2023-11-09 ENCOUNTER — APPOINTMENT (OUTPATIENT)
Dept: GENERAL RADIOLOGY | Facility: HOSPITAL | Age: 73
DRG: 388 | End: 2023-11-09
Payer: MEDICARE

## 2023-11-09 ENCOUNTER — HOSPITAL ENCOUNTER (INPATIENT)
Facility: HOSPITAL | Age: 73
LOS: 3 days | Discharge: HOME HEALTH | DRG: 388 | End: 2023-11-12
Attending: EMERGENCY MEDICINE | Admitting: FAMILY MEDICINE
Payer: MEDICARE

## 2023-11-09 DIAGNOSIS — R10.31 RIGHT LOWER QUADRANT PAIN: ICD-10-CM

## 2023-11-09 DIAGNOSIS — N18.6 CHRONIC KIDNEY DISEASE ON CHRONIC DIALYSIS: ICD-10-CM

## 2023-11-09 DIAGNOSIS — K56.609 SMALL BOWEL OBSTRUCTION: Primary | ICD-10-CM

## 2023-11-09 DIAGNOSIS — C34.90 MALIGNANT NEOPLASM OF LUNG, UNSPECIFIED LATERALITY, UNSPECIFIED PART OF LUNG: ICD-10-CM

## 2023-11-09 DIAGNOSIS — Z99.2 CHRONIC KIDNEY DISEASE ON CHRONIC DIALYSIS: ICD-10-CM

## 2023-11-09 LAB
ABO GROUP BLD: NORMAL
ALBUMIN SERPL-MCNC: 4.3 G/DL (ref 3.5–5.2)
ALBUMIN/GLOB SERPL: 1.2 G/DL
ALP SERPL-CCNC: 144 U/L (ref 39–117)
ALT SERPL W P-5'-P-CCNC: 12 U/L (ref 1–33)
ANION GAP SERPL CALCULATED.3IONS-SCNC: 19 MMOL/L (ref 5–15)
AST SERPL-CCNC: 21 U/L (ref 1–32)
BASOPHILS # BLD AUTO: 0.1 10*3/MM3 (ref 0–0.2)
BASOPHILS NFR BLD AUTO: 0.3 % (ref 0–1.5)
BILIRUB SERPL-MCNC: 0.3 MG/DL (ref 0–1.2)
BLD GP AB SCN SERPL QL: NEGATIVE
BUN SERPL-MCNC: 32 MG/DL (ref 8–23)
BUN/CREAT SERPL: 5.1 (ref 7–25)
CALCIUM SPEC-SCNC: 9.5 MG/DL (ref 8.6–10.5)
CHLORIDE SERPL-SCNC: 94 MMOL/L (ref 98–107)
CO2 SERPL-SCNC: 24 MMOL/L (ref 22–29)
CREAT SERPL-MCNC: 6.3 MG/DL (ref 0.57–1)
DEPRECATED RDW RBC AUTO: 62.1 FL (ref 37–54)
EGFRCR SERPLBLD CKD-EPI 2021: 6.5 ML/MIN/1.73
EOSINOPHIL # BLD AUTO: 0.3 10*3/MM3 (ref 0–0.4)
EOSINOPHIL NFR BLD AUTO: 2.2 % (ref 0.3–6.2)
ERYTHROCYTE [DISTWIDTH] IN BLOOD BY AUTOMATED COUNT: 19.8 % (ref 12.3–15.4)
GLOBULIN UR ELPH-MCNC: 3.5 GM/DL
GLUCOSE SERPL-MCNC: 101 MG/DL (ref 65–99)
HCT VFR BLD AUTO: 34.8 % (ref 34–46.6)
HGB BLD-MCNC: 11.5 G/DL (ref 12–15.9)
LIPASE SERPL-CCNC: 26 U/L (ref 13–60)
LYMPHOCYTES # BLD AUTO: 0.9 10*3/MM3 (ref 0.7–3.1)
LYMPHOCYTES NFR BLD AUTO: 6.1 % (ref 19.6–45.3)
MAGNESIUM SERPL-MCNC: 1.9 MG/DL (ref 1.6–2.4)
MCH RBC QN AUTO: 29.8 PG (ref 26.6–33)
MCHC RBC AUTO-ENTMCNC: 32.9 G/DL (ref 31.5–35.7)
MCV RBC AUTO: 90.4 FL (ref 79–97)
MONOCYTES # BLD AUTO: 0.9 10*3/MM3 (ref 0.1–0.9)
MONOCYTES NFR BLD AUTO: 5.8 % (ref 5–12)
NEUTROPHILS NFR BLD AUTO: 13.2 10*3/MM3 (ref 1.7–7)
NEUTROPHILS NFR BLD AUTO: 85.6 % (ref 42.7–76)
NRBC BLD AUTO-RTO: 0 /100 WBC (ref 0–0.2)
PLATELET # BLD AUTO: 473 10*3/MM3 (ref 140–450)
PMV BLD AUTO: 7.3 FL (ref 6–12)
POTASSIUM SERPL-SCNC: 4.3 MMOL/L (ref 3.5–5.2)
PROT SERPL-MCNC: 7.8 G/DL (ref 6–8.5)
RBC # BLD AUTO: 3.85 10*6/MM3 (ref 3.77–5.28)
RH BLD: NEGATIVE
SODIUM SERPL-SCNC: 137 MMOL/L (ref 136–145)
T&S EXPIRATION DATE: NORMAL
WBC NRBC COR # BLD: 15.5 10*3/MM3 (ref 3.4–10.8)

## 2023-11-09 PROCEDURE — 94640 AIRWAY INHALATION TREATMENT: CPT

## 2023-11-09 PROCEDURE — 25010000002 HYDROMORPHONE 1 MG/ML SOLUTION: Performed by: INTERNAL MEDICINE

## 2023-11-09 PROCEDURE — 80053 COMPREHEN METABOLIC PANEL: CPT | Performed by: INTERNAL MEDICINE

## 2023-11-09 PROCEDURE — 74018 RADEX ABDOMEN 1 VIEW: CPT

## 2023-11-09 PROCEDURE — 94799 UNLISTED PULMONARY SVC/PX: CPT

## 2023-11-09 PROCEDURE — 86901 BLOOD TYPING SEROLOGIC RH(D): CPT | Performed by: INTERNAL MEDICINE

## 2023-11-09 PROCEDURE — 25810000003 SODIUM CHLORIDE 0.9 % SOLUTION: Performed by: INTERNAL MEDICINE

## 2023-11-09 PROCEDURE — 85025 COMPLETE CBC W/AUTO DIFF WBC: CPT | Performed by: NURSE PRACTITIONER

## 2023-11-09 PROCEDURE — 25010000002 HYDROMORPHONE 1 MG/ML SOLUTION: Performed by: EMERGENCY MEDICINE

## 2023-11-09 PROCEDURE — 86900 BLOOD TYPING SEROLOGIC ABO: CPT | Performed by: INTERNAL MEDICINE

## 2023-11-09 PROCEDURE — G0378 HOSPITAL OBSERVATION PER HR: HCPCS

## 2023-11-09 PROCEDURE — 83735 ASSAY OF MAGNESIUM: CPT | Performed by: INTERNAL MEDICINE

## 2023-11-09 PROCEDURE — 99285 EMERGENCY DEPT VISIT HI MDM: CPT

## 2023-11-09 PROCEDURE — 83690 ASSAY OF LIPASE: CPT | Performed by: INTERNAL MEDICINE

## 2023-11-09 PROCEDURE — 86850 RBC ANTIBODY SCREEN: CPT | Performed by: INTERNAL MEDICINE

## 2023-11-09 PROCEDURE — 74176 CT ABD & PELVIS W/O CONTRAST: CPT

## 2023-11-09 PROCEDURE — 25010000002 ONDANSETRON PER 1 MG: Performed by: INTERNAL MEDICINE

## 2023-11-09 RX ORDER — PANTOPRAZOLE SODIUM 40 MG/10ML
40 INJECTION, POWDER, LYOPHILIZED, FOR SOLUTION INTRAVENOUS ONCE
Status: COMPLETED | OUTPATIENT
Start: 2023-11-09 | End: 2023-11-09

## 2023-11-09 RX ORDER — ONDANSETRON 2 MG/ML
4 INJECTION INTRAMUSCULAR; INTRAVENOUS ONCE
Status: COMPLETED | OUTPATIENT
Start: 2023-11-09 | End: 2023-11-09

## 2023-11-09 RX ORDER — NITROGLYCERIN 0.4 MG/1
0.4 TABLET SUBLINGUAL
Status: DISCONTINUED | OUTPATIENT
Start: 2023-11-09 | End: 2023-11-12 | Stop reason: HOSPADM

## 2023-11-09 RX ORDER — SODIUM CHLORIDE 0.9 % (FLUSH) 0.9 %
10 SYRINGE (ML) INJECTION EVERY 12 HOURS SCHEDULED
Status: DISCONTINUED | OUTPATIENT
Start: 2023-11-09 | End: 2023-11-12 | Stop reason: HOSPADM

## 2023-11-09 RX ORDER — ONDANSETRON 2 MG/ML
4 INJECTION INTRAMUSCULAR; INTRAVENOUS EVERY 6 HOURS PRN
Status: DISCONTINUED | OUTPATIENT
Start: 2023-11-09 | End: 2023-11-10

## 2023-11-09 RX ORDER — SODIUM CHLORIDE 0.9 % (FLUSH) 0.9 %
10 SYRINGE (ML) INJECTION AS NEEDED
Status: DISCONTINUED | OUTPATIENT
Start: 2023-11-09 | End: 2023-11-12 | Stop reason: HOSPADM

## 2023-11-09 RX ORDER — SODIUM CHLORIDE 9 MG/ML
40 INJECTION, SOLUTION INTRAVENOUS AS NEEDED
Status: DISCONTINUED | OUTPATIENT
Start: 2023-11-09 | End: 2023-11-12 | Stop reason: HOSPADM

## 2023-11-09 RX ORDER — BUDESONIDE AND FORMOTEROL FUMARATE DIHYDRATE 160; 4.5 UG/1; UG/1
2 AEROSOL RESPIRATORY (INHALATION)
Status: DISCONTINUED | OUTPATIENT
Start: 2023-11-09 | End: 2023-11-12 | Stop reason: HOSPADM

## 2023-11-09 RX ADMIN — Medication 10 ML: at 20:50

## 2023-11-09 RX ADMIN — HYDROMORPHONE HYDROCHLORIDE 0.5 MG: 1 INJECTION, SOLUTION INTRAMUSCULAR; INTRAVENOUS; SUBCUTANEOUS at 20:56

## 2023-11-09 RX ADMIN — PANTOPRAZOLE SODIUM 40 MG: 40 INJECTION, POWDER, LYOPHILIZED, FOR SOLUTION INTRAVENOUS at 20:54

## 2023-11-09 RX ADMIN — ONDANSETRON 4 MG: 2 INJECTION INTRAMUSCULAR; INTRAVENOUS at 20:50

## 2023-11-09 RX ADMIN — SODIUM CHLORIDE 500 ML: 9 INJECTION, SOLUTION INTRAVENOUS at 20:49

## 2023-11-09 RX ADMIN — BUDESONIDE AND FORMOTEROL FUMARATE DIHYDRATE 2 PUFF: 160; 4.5 AEROSOL RESPIRATORY (INHALATION) at 20:41

## 2023-11-09 RX ADMIN — HYDROMORPHONE HYDROCHLORIDE 0.5 MG: 1 INJECTION, SOLUTION INTRAMUSCULAR; INTRAVENOUS; SUBCUTANEOUS at 22:27

## 2023-11-09 NOTE — ED PROVIDER NOTES
Subjective   Provider in Triage Note  Abd pain since oct, significantly worse today. Nausea without vomiting. Loose stools. Radiation for lung ca.   MWF dialysis, hasn't missed any treatments.  Anuric     Chart review: 10/19:pet scan- IMPRESSION  1. 1.6 m right upper lobe lung nodule corresponds to the patient's pathology proven malignancy. It demonstrates low to intermediate level FDG accumulation, at or just above the mediastinal blood pool, likely related to its pathology showed mucinous   characteristics.  2. A 1.4 cm hypermetabolic nodule or lymph node is seen at the inferior margin of the left parotid gland. Salivary malignancy cannot be excluded. Consider ultrasound-guided needle biopsy.  3.. Numerous small noncalcified nodules are present in both lungs, worrisome for malignancy. These do not demonstrate FDG uptake, but are below the threshold for PET resolution.  4. No convincing evidence of primary malignancy or metastatic disease in the abdomen, pelvis or skeleton.    Pcp: lena Merlos- rad onc for lung  [place]  Nilay brown- hernias      Due to significant overcrowding in the emergency department patient was initially seen and evaluated in triage.  Provider in triage recommended patient placement in the treatment area to initiate therapy and movement to an ER bed as soon as possible.   Orders placed; medications will be deferred to main provider per protocol.        History of Present Illness  Pit note reviewed    73-year-old female reports abdominal pain mostly epigastrium for the last 6 to 8 weeks.  She reports increasing pain in the right lower quadrant over the last 24 hours.  She reports that she has had multiple abdominal surgeries in the past.  The patient has had dialysis as directed.  She reports that she had some melena last month but has not had a bowel movement today and does not think she has passed gas recently.  She reports that her abdomen feels somewhat distended to her and she  "has not had much appetite in the last 24 hours, although she states that with a history of gastroparesis she intermittently has poor appetite.  She is unaware of any recent weight loss      Review of Systems   Constitutional:  Negative for unexpected weight change.   Gastrointestinal:  Positive for abdominal distention, abdominal pain, nausea and vomiting. Negative for anal bleeding, blood in stool, constipation and rectal pain.   Genitourinary:  Positive for decreased urine volume. Negative for flank pain and vaginal bleeding.       Past Medical History:   Diagnosis Date    Lucas's esophagus     C. difficile colitis     Carotid artery disease     -50-74% left, 16-49% right (1/19)    COPD (chronic obstructive pulmonary disease)     DM2 (diabetes mellitus, type 2)     ESRD (end stage renal disease)     on HD    Gastric ulcer     at anastomatic site    Gastroparesis     unknown    GERD (gastroesophageal reflux disease)     Gout     not current    Hemodialysis patient     mwf    Hernia, incisional     abdomen    History of colonoscopy     UTD    History of degenerative disc disease     Hyperlipidemia     Hypertension     Microscopic colitis     Nephrotic syndrome     Neuroendocrine tumor     of stomach    FABY (obstructive sleep apnea)     not treating    Pedal edema     ressolved    Proteinuria     Renal cell cancer, right     Rib pain on right side     chronic    Rotator cuff tear     bilateral    Stomach cancer 2016    Stroke     Uterine cancer     Vitamin B 12 deficiency     Vitamin D deficiency        Allergies   Allergen Reactions    Atorvastatin Calcium Other (See Comments)     \"felt like I was on fire\"    Gabapentin Dizziness     says was unable to walk    Niacin Other (See Comments)     \"FEELS LIKE SHE IS ON FIRE\"         Past Surgical History:   Procedure Laterality Date    APPENDECTOMY      ARTERIOVENOUS FISTULA Right     ARTERIOVENOUS FISTULA REPAIR      Clotted off and insert graft    ARTERIOVENOUS " FISTULA/SHUNT SURGERY Left 12/30/2020    Procedure: ARTERIOVENOUS FISTULA FORMATION;  Surgeon: Jan Caicedo MD;  Location: [facility] MAIN OR;  Service: Vascular;  Laterality: Left;    BREAST BIOPSY      right nipple 1981    COLONOSCOPY N/A 11/24/2020    Procedure: COLONOSCOPY with polypectomy x 7;  Surgeon: Jeffery White MD;  Location: [facility] ENDOSCOPY;  Service: Gastroenterology;  Laterality: N/A;  post op: polyps, diverticulosis, hemorrhoids    COLONOSCOPY N/A 6/1/2023    Procedure: COLONOSCOPY with polypectomy x 3 biopsy x 1;  Surgeon: Jeffery White MD;  Location: [facility] ENDOSCOPY;  Service: Gastroenterology;  Laterality: N/A;  diverticulosis polyps    ENDOSCOPY N/A 03/12/2022    Procedure: ESOPHAGOGASTRODUODENOSCOPY with biopsy x 1 area;  Surgeon: Javan Augustin MD;  Location: [facility] ENDOSCOPY;  Service: Gastroenterology;  Laterality: N/A;  post op: anastamosis    ENDOSCOPY N/A 10/9/2023    Procedure: ESOPHAGOGASTRODUODENOSCOPY with biopsy x3 areas;  Surgeon: Ace Campbell MD;  Location: [facility] ENDOSCOPY;  Service: Gastroenterology;  Laterality: N/A;  duodenal ulcers;irregular z line    GASTRIC RESECTION      cancer    HYSTERECTOMY      LAPAROSCOPIC CHOLECYSTECTOMY      NEPHRECTOMY      right kidney removed     REDUCTION MAMMAPLASTY      TUMOR REMOVAL      boils    VENTRAL/INCISIONAL HERNIA REPAIR Right 08/28/2019    Procedure: VENTRAL/INCISIONAL HERNIA REPAIR;  Surgeon: Nilay Stevens MD;  Location: [facility] MAIN OR;  Service: General    VENTRAL/INCISIONAL HERNIA REPAIR N/A 10/01/2019    Procedure: OPEN VENTRAL/INCISIONAL HERNIA REPAIR;  Surgeon: Nilay Stevens MD;  Location: [facility] MAIN OR;  Service: General    VENTRAL/INCISIONAL HERNIA REPAIR N/A 10/11/2021    Procedure: VENTRAL/INCISIONAL HERNIA REPAIR LAPAROSCOPIC;  Surgeon: Nilay Stevens MD;  Location: [facility] MAIN OR;  Service: General;  Laterality: N/A;    VENTRAL/INCISIONAL HERNIA REPAIR N/A 01/23/2023    Procedure:  VENTRAL/INCISIONAL HERNIA REPAIR with MESH;  Surgeon: Nilay Stevens MD;  Location: [facility] MAIN OR;  Service: General;  Laterality: N/A;       Family History   Problem Relation Age of Onset    Heart disease Father        Social History     Socioeconomic History    Marital status: Single   Tobacco Use    Smoking status: Former     Packs/day: 0.25     Years: 57.00     Additional pack years: 0.00     Total pack years: 14.25     Types: Cigarettes     Quit date: 2021     Years since quittin.5     Passive exposure: Past    Smokeless tobacco: Never    Tobacco comments:     quit smoking 2 months ago   Vaping Use    Vaping Use: Never used   Substance and Sexual Activity    Alcohol use: No    Drug use: No    Sexual activity: Defer           Objective   Physical Exam  Alert Fouzia Coma Scale 15   HEENT: Pupils equal and reactive to light. Conjunctivae are not injected. Normal tympanic membranes. Oropharynx and nares are normal.   Neck: Supple. Midline trachea. No JVD. No goiter.   Chest: Clear and equal breath sounds bilaterally, regular rate and rhythm without murmur or rub.   Abdomen: Positive bowel sounds, nontender, nondistended. No rebound or peritoneal signs. No CVA tenderness.   Extremities no clubbing. cyanosis or edema. Motor sensory exam is normal. The full range of motion is intact   Skin: Warm and dry, no rashes or petechia.   Lymphatic: No regional lymphadenopathy. No calf pain, swelling or Homans sign    Procedures           ED Course  ED Course as of 23u  Due to significant overcrowding in the emergency department patient was evaluated by myself in a hallway bed.  This examination might be limited by privacy concerns, noise, exposure issues, and the patient not wearing a hospital gown.  Explained to the patient our limitations and our overcrowding.  They were in agreement to continue the exam and treatment at this time. [TH]    CT performed and results obtained  before any blood or urine had been collected [TH]      ED Course User Index  [TH] Jan Pulliam MD           Labs Reviewed   CBC WITH AUTO DIFFERENTIAL - Abnormal; Notable for the following components:       Result Value    WBC 15.50 (*)     Hemoglobin 11.5 (*)     RDW 19.8 (*)     RDW-SD 62.1 (*)     Platelets 473 (*)     Neutrophil % 85.6 (*)     Lymphocyte % 6.1 (*)     Neutrophils, Absolute 13.20 (*)     All other components within normal limits   COMPREHENSIVE METABOLIC PANEL   LIPASE   URINALYSIS W/ MICROSCOPIC IF INDICATED (NO CULTURE)   MAGNESIUM   TYPE AND SCREEN   CBC AND DIFFERENTIAL    Narrative:     The following orders were created for panel order CBC & Differential.  Procedure                               Abnormality         Status                     ---------                               -----------         ------                     CBC Auto Differential[[id number]]        Abnormal            Final result                 Please view results for these tests on the individual orders.     Medications   sodium chloride 0.9 % bolus 500 mL (500 mL Intravenous New Bag 11/9/23 2049)   HYDROmorphone (DILAUDID) injection 0.5 mg (has no administration in time range)   budesonide-formoterol (SYMBICORT) 160-4.5 MCG/ACT inhaler 2 puff (2 puffs Inhalation Given 11/9/23 2041)   sodium chloride 0.9 % flush 10 mL (10 mL Intravenous Given 11/9/23 2050)   sodium chloride 0.9 % flush 10 mL (has no administration in time range)   sodium chloride 0.9 % infusion 40 mL (has no administration in time range)   nitroglycerin (NITROSTAT) SL tablet 0.4 mg (has no administration in time range)   ondansetron (ZOFRAN) injection 4 mg (has no administration in time range)   pantoprazole (PROTONIX) injection 40 mg (40 mg Intravenous Given 11/9/23 2054)   ondansetron (ZOFRAN) injection 4 mg (4 mg Intravenous Given 11/9/23 2050)     CT Abdomen Pelvis Without Contrast    Result Date: 11/9/2023  1.Fluid-filled distended small bowel  loops consistent with small bowel obstruction. A transition point right lower quadrant. Mild stranding the mesentery right lower quadrant. 2.Distended fluid-filled stomach. Surgical changes distal stomach. 3.Status post cholecystectomy. Stable bile duct dilatation. Correlate bilirubin. 4.Diverticulosis without diverticulitis. 5.Cardiomegaly. 6.Status post hysterectomy. 7.Status post right nephrectomy. Atrophic left kidney. Electronically Signed: Jackeline Carney MD  11/9/2023 8:05 PM EST  Workstation ID: [id number]                                   Medical Decision Making  's was discussed with primary care provider on-call.  At time dictation we are waiting to hear from surgery.  The patient was given hydromorphone for pain and will have NG tube placement.  She will be cautiously hydrated.  She was agreeable to this plan of treatment    Amount and/or Complexity of Data Reviewed  Independent Historian:      Details: Daughter  External Data Reviewed: notes.  Labs: ordered. Decision-making details documented in ED Course.  Radiology: ordered and independent interpretation performed.    Risk  Prescription drug management.  Parenteral controlled substances.  Decision regarding hospitalization.        Final diagnoses:   None       ED Disposition  ED Disposition       None            No follow-up provider specified.       Medication List      No changes were made to your prescriptions during this visit.

## 2023-11-09 NOTE — Clinical Note
Level of Care: Telemetry [5]   Diagnosis: SBO (small bowel obstruction) [811869]   Admitting Physician: MIHAI OVALLE [[id number]]   Attending Physician: MIHAI OVALLE [[id number]]

## 2023-11-10 ENCOUNTER — APPOINTMENT (OUTPATIENT)
Dept: GENERAL RADIOLOGY | Facility: HOSPITAL | Age: 73
DRG: 388 | End: 2023-11-10
Payer: MEDICARE

## 2023-11-10 LAB
ALBUMIN SERPL-MCNC: 3.8 G/DL (ref 3.5–5.2)
ANION GAP SERPL CALCULATED.3IONS-SCNC: 14 MMOL/L (ref 5–15)
ANION GAP SERPL CALCULATED.3IONS-SCNC: 14 MMOL/L (ref 5–15)
ANISOCYTOSIS BLD QL: NORMAL
BASOPHILS # BLD AUTO: 0.1 10*3/MM3 (ref 0–0.2)
BASOPHILS # BLD AUTO: 0.1 10*3/MM3 (ref 0–0.2)
BASOPHILS NFR BLD AUTO: 0.7 % (ref 0–1.5)
BASOPHILS NFR BLD AUTO: 0.7 % (ref 0–1.5)
BUN SERPL-MCNC: 15 MG/DL (ref 8–23)
BUN SERPL-MCNC: 17 MG/DL (ref 8–23)
BUN/CREAT SERPL: 3.6 (ref 7–25)
BUN/CREAT SERPL: 3.8 (ref 7–25)
CALCIUM SPEC-SCNC: 10 MG/DL (ref 8.6–10.5)
CALCIUM SPEC-SCNC: 9.4 MG/DL (ref 8.6–10.5)
CHLORIDE SERPL-SCNC: 98 MMOL/L (ref 98–107)
CHLORIDE SERPL-SCNC: 98 MMOL/L (ref 98–107)
CO2 SERPL-SCNC: 24 MMOL/L (ref 22–29)
CO2 SERPL-SCNC: 25 MMOL/L (ref 22–29)
CREAT SERPL-MCNC: 3.96 MG/DL (ref 0.57–1)
CREAT SERPL-MCNC: 4.72 MG/DL (ref 0.57–1)
DEPRECATED RDW RBC AUTO: 64.3 FL (ref 37–54)
DEPRECATED RDW RBC AUTO: 66.1 FL (ref 37–54)
EGFRCR SERPLBLD CKD-EPI 2021: 11.4 ML/MIN/1.73
EGFRCR SERPLBLD CKD-EPI 2021: 9.2 ML/MIN/1.73
EOSINOPHIL # BLD AUTO: 0.4 10*3/MM3 (ref 0–0.4)
EOSINOPHIL # BLD AUTO: 0.4 10*3/MM3 (ref 0–0.4)
EOSINOPHIL NFR BLD AUTO: 3 % (ref 0.3–6.2)
EOSINOPHIL NFR BLD AUTO: 3.5 % (ref 0.3–6.2)
ERYTHROCYTE [DISTWIDTH] IN BLOOD BY AUTOMATED COUNT: 19.9 % (ref 12.3–15.4)
ERYTHROCYTE [DISTWIDTH] IN BLOOD BY AUTOMATED COUNT: 20 % (ref 12.3–15.4)
GLUCOSE SERPL-MCNC: 69 MG/DL (ref 65–99)
GLUCOSE SERPL-MCNC: 81 MG/DL (ref 65–99)
HCT VFR BLD AUTO: 35.6 % (ref 34–46.6)
HCT VFR BLD AUTO: 39.7 % (ref 34–46.6)
HGB BLD-MCNC: 11.8 G/DL (ref 12–15.9)
HGB BLD-MCNC: 12.9 G/DL (ref 12–15.9)
LYMPHOCYTES # BLD AUTO: 1.1 10*3/MM3 (ref 0.7–3.1)
LYMPHOCYTES # BLD AUTO: 1.3 10*3/MM3 (ref 0.7–3.1)
LYMPHOCYTES NFR BLD AUTO: 10.5 % (ref 19.6–45.3)
LYMPHOCYTES NFR BLD AUTO: 9.9 % (ref 19.6–45.3)
MAGNESIUM SERPL-MCNC: 1.9 MG/DL (ref 1.6–2.4)
MAGNESIUM SERPL-MCNC: 1.9 MG/DL (ref 1.6–2.4)
MCH RBC QN AUTO: 29.6 PG (ref 26.6–33)
MCH RBC QN AUTO: 30.1 PG (ref 26.6–33)
MCHC RBC AUTO-ENTMCNC: 32.5 G/DL (ref 31.5–35.7)
MCHC RBC AUTO-ENTMCNC: 33.1 G/DL (ref 31.5–35.7)
MCV RBC AUTO: 89.4 FL (ref 79–97)
MCV RBC AUTO: 92.4 FL (ref 79–97)
MONOCYTES # BLD AUTO: 0.9 10*3/MM3 (ref 0.1–0.9)
MONOCYTES # BLD AUTO: 0.9 10*3/MM3 (ref 0.1–0.9)
MONOCYTES NFR BLD AUTO: 7.5 % (ref 5–12)
MONOCYTES NFR BLD AUTO: 8.1 % (ref 5–12)
NEUTROPHILS NFR BLD AUTO: 77.8 % (ref 42.7–76)
NEUTROPHILS NFR BLD AUTO: 78.3 % (ref 42.7–76)
NEUTROPHILS NFR BLD AUTO: 8.3 10*3/MM3 (ref 1.7–7)
NEUTROPHILS NFR BLD AUTO: 9.7 10*3/MM3 (ref 1.7–7)
NRBC BLD AUTO-RTO: 0.1 /100 WBC (ref 0–0.2)
NRBC BLD AUTO-RTO: 0.1 /100 WBC (ref 0–0.2)
OVALOCYTES BLD QL SMEAR: NORMAL
PHOSPHATE SERPL-MCNC: 2.5 MG/DL (ref 2.5–4.5)
PHOSPHATE SERPL-MCNC: 3.9 MG/DL (ref 2.5–4.5)
PLAT MORPH BLD: NORMAL
PLATELET # BLD AUTO: 349 10*3/MM3 (ref 140–450)
PLATELET # BLD AUTO: 418 10*3/MM3 (ref 140–450)
PMV BLD AUTO: 7.2 FL (ref 6–12)
PMV BLD AUTO: 7.2 FL (ref 6–12)
POLYCHROMASIA BLD QL SMEAR: NORMAL
POTASSIUM SERPL-SCNC: 4.1 MMOL/L (ref 3.5–5.2)
POTASSIUM SERPL-SCNC: 4.9 MMOL/L (ref 3.5–5.2)
RBC # BLD AUTO: 3.98 10*6/MM3 (ref 3.77–5.28)
RBC # BLD AUTO: 4.3 10*6/MM3 (ref 3.77–5.28)
SODIUM SERPL-SCNC: 136 MMOL/L (ref 136–145)
SODIUM SERPL-SCNC: 137 MMOL/L (ref 136–145)
WBC MORPH BLD: NORMAL
WBC NRBC COR # BLD: 10.6 10*3/MM3 (ref 3.4–10.8)
WBC NRBC COR # BLD: 12.4 10*3/MM3 (ref 3.4–10.8)

## 2023-11-10 PROCEDURE — 94799 UNLISTED PULMONARY SVC/PX: CPT

## 2023-11-10 PROCEDURE — 99222 1ST HOSP IP/OBS MODERATE 55: CPT | Performed by: SURGERY

## 2023-11-10 PROCEDURE — 25510000001 IOPAMIDOL PER 1 ML: Performed by: FAMILY MEDICINE

## 2023-11-10 PROCEDURE — 74250 X-RAY XM SM INT 1CNTRST STD: CPT

## 2023-11-10 PROCEDURE — 5A1D70Z PERFORMANCE OF URINARY FILTRATION, INTERMITTENT, LESS THAN 6 HOURS PER DAY: ICD-10-PCS | Performed by: INTERNAL MEDICINE

## 2023-11-10 PROCEDURE — 36415 COLL VENOUS BLD VENIPUNCTURE: CPT

## 2023-11-10 PROCEDURE — 85025 COMPLETE CBC W/AUTO DIFF WBC: CPT

## 2023-11-10 PROCEDURE — 83735 ASSAY OF MAGNESIUM: CPT

## 2023-11-10 PROCEDURE — 25010000002 HYDROMORPHONE 1 MG/ML SOLUTION

## 2023-11-10 PROCEDURE — 84100 ASSAY OF PHOSPHORUS: CPT

## 2023-11-10 PROCEDURE — 80069 RENAL FUNCTION PANEL: CPT | Performed by: INTERNAL MEDICINE

## 2023-11-10 PROCEDURE — 80048 BASIC METABOLIC PNL TOTAL CA: CPT

## 2023-11-10 PROCEDURE — 85007 BL SMEAR W/DIFF WBC COUNT: CPT

## 2023-11-10 RX ORDER — SODIUM CHLORIDE 0.9 % (FLUSH) 0.9 %
10 SYRINGE (ML) INJECTION EVERY 12 HOURS SCHEDULED
Status: DISCONTINUED | OUTPATIENT
Start: 2023-11-10 | End: 2023-11-12 | Stop reason: HOSPADM

## 2023-11-10 RX ORDER — BISACODYL 5 MG/1
5 TABLET, DELAYED RELEASE ORAL DAILY PRN
Status: DISCONTINUED | OUTPATIENT
Start: 2023-11-10 | End: 2023-11-12 | Stop reason: HOSPADM

## 2023-11-10 RX ORDER — HYDRALAZINE HYDROCHLORIDE 20 MG/ML
10 INJECTION INTRAMUSCULAR; INTRAVENOUS EVERY 8 HOURS PRN
Status: DISCONTINUED | OUTPATIENT
Start: 2023-11-10 | End: 2023-11-12 | Stop reason: HOSPADM

## 2023-11-10 RX ORDER — ONDANSETRON 2 MG/ML
4 INJECTION INTRAMUSCULAR; INTRAVENOUS EVERY 6 HOURS PRN
Status: DISCONTINUED | OUTPATIENT
Start: 2023-11-10 | End: 2023-11-12 | Stop reason: HOSPADM

## 2023-11-10 RX ORDER — PANTOPRAZOLE SODIUM 40 MG/10ML
40 INJECTION, POWDER, LYOPHILIZED, FOR SOLUTION INTRAVENOUS
Status: DISCONTINUED | OUTPATIENT
Start: 2023-11-10 | End: 2023-11-12 | Stop reason: HOSPADM

## 2023-11-10 RX ORDER — CALCIUM ACETATE 667 MG/1
667 CAPSULE ORAL 3 TIMES DAILY
COMMUNITY

## 2023-11-10 RX ORDER — SODIUM CHLORIDE 0.9 % (FLUSH) 0.9 %
10 SYRINGE (ML) INJECTION AS NEEDED
Status: DISCONTINUED | OUTPATIENT
Start: 2023-11-10 | End: 2023-11-12 | Stop reason: HOSPADM

## 2023-11-10 RX ORDER — BISACODYL 10 MG
10 SUPPOSITORY, RECTAL RECTAL DAILY PRN
Status: DISCONTINUED | OUTPATIENT
Start: 2023-11-10 | End: 2023-11-12 | Stop reason: HOSPADM

## 2023-11-10 RX ORDER — POLYETHYLENE GLYCOL 3350 17 G/17G
17 POWDER, FOR SOLUTION ORAL DAILY PRN
Status: DISCONTINUED | OUTPATIENT
Start: 2023-11-10 | End: 2023-11-12 | Stop reason: HOSPADM

## 2023-11-10 RX ORDER — SODIUM CHLORIDE 9 MG/ML
40 INJECTION, SOLUTION INTRAVENOUS AS NEEDED
Status: DISCONTINUED | OUTPATIENT
Start: 2023-11-10 | End: 2023-11-12 | Stop reason: HOSPADM

## 2023-11-10 RX ORDER — AMOXICILLIN 250 MG
2 CAPSULE ORAL 2 TIMES DAILY
Status: DISCONTINUED | OUTPATIENT
Start: 2023-11-10 | End: 2023-11-12 | Stop reason: HOSPADM

## 2023-11-10 RX ADMIN — Medication 10 ML: at 08:30

## 2023-11-10 RX ADMIN — HYDROMORPHONE HYDROCHLORIDE 0.5 MG: 1 INJECTION, SOLUTION INTRAMUSCULAR; INTRAVENOUS; SUBCUTANEOUS at 10:01

## 2023-11-10 RX ADMIN — HYDROMORPHONE HYDROCHLORIDE 0.5 MG: 1 INJECTION, SOLUTION INTRAMUSCULAR; INTRAVENOUS; SUBCUTANEOUS at 04:21

## 2023-11-10 RX ADMIN — IOPAMIDOL 200 ML: 755 INJECTION, SOLUTION INTRAVENOUS at 10:34

## 2023-11-10 RX ADMIN — BUDESONIDE AND FORMOTEROL FUMARATE DIHYDRATE 2 PUFF: 160; 4.5 AEROSOL RESPIRATORY (INHALATION) at 19:08

## 2023-11-10 RX ADMIN — PANTOPRAZOLE SODIUM 40 MG: 40 INJECTION, POWDER, LYOPHILIZED, FOR SOLUTION INTRAVENOUS at 08:30

## 2023-11-10 NOTE — H&P
Patient Care Team:  Jin Malcolm PA-C as PCP - General (Physician Assistant)  Nilay Stevens MD as Consulting Physician (General Surgery)  Stephen Perea MD as Surgeon (Thoracic Surgery)  Makayla Navarro RN as Nurse Navigator  Matteo Merlos MD as Consulting Physician (Radiation Oncology)    Chief complaint abdominal pain    Subjective     Patient is a 73 y.o. female with significant medical history listed below presented to the ER today with complaints of worsening abdominal pain over the last 6-8  weeks that has now become severe in her RLQ. She complains of nausea, abdominal distention, decreased appetite, and loose stools. She denies any fever, chills, vomiting, chest pain, or shortness of breath. She is a dialysis MWF, and has not missed any treatments. She is also currently undergoing radiation for lung cancer.   In the ER BUN 32, creatinine 6.3, wbc 15.5,   CT A/P showed small bowel obstruction, NG tube was placed and surgery consulted.       Chart review:  10/19 PET scan impression  1. 1.6 m right upper lobe lung nodule corresponds to the patient's pathology proven malignancy. It demonstrates low to intermediate level FDG accumulation, at or just above the mediastinal blood pool, likely related to its pathology showed mucinous   characteristics.  2. A 1.4 cm hypermetabolic nodule or lymph node is seen at the inferior margin of the left parotid gland. Salivary malignancy cannot be excluded. Consider ultrasound-guided needle biopsy.  3.. Numerous small noncalcified nodules are present in both lungs, worrisome for malignancy. These do not demonstrate FDG uptake, but are below the threshold for PET resolution.  4. No convincing evidence of primary malignancy or metastatic disease in the abdomen, pelvis or skeleton.      [facility]ley- nephrology  Gaurang- rad onc for lung  Chowhan- oncology  Nilay stevens- hernias          Onset of symptoms was 6-8 weeks    Review of Systems   Constitutional:  Positive for appetite  change.   HENT: Negative.     Eyes: Negative.    Respiratory: Negative.     Cardiovascular: Negative.    Gastrointestinal:  Positive for abdominal distention, abdominal pain, diarrhea and nausea.   Endocrine: Negative.    Genitourinary: Negative.    Musculoskeletal: Negative.    Skin: Negative.    Neurological: Negative.    Psychiatric/Behavioral: Negative.            History  Past Medical History:   Diagnosis Date    Lucas's esophagus     C. difficile colitis     Carotid artery disease     -50-74% left, 16-49% right (1/19)    COPD (chronic obstructive pulmonary disease)     DM2 (diabetes mellitus, type 2)     ESRD (end stage renal disease)     on HD    Gastric ulcer     at anastomatic site    Gastroparesis     unknown    GERD (gastroesophageal reflux disease)     Gout     not current    Hemodialysis patient     mwf    Hernia, incisional     abdomen    History of colonoscopy     UTD    History of degenerative disc disease     Hyperlipidemia     Hypertension     Medicare annual wellness visit, subsequent 11/07/2020    Microscopic colitis     Nephrotic syndrome     Neuroendocrine tumor     of stomach    FABY (obstructive sleep apnea)     not treating    Pedal edema     ressolved    Proteinuria     Renal cell cancer, right     Rib pain on right side     chronic    Rotator cuff tear     bilateral    Stomach cancer 2016    Stroke     Uterine cancer     Vitamin B 12 deficiency     Vitamin D deficiency      Past Surgical History:   Procedure Laterality Date    APPENDECTOMY      ARTERIOVENOUS FISTULA Right     ARTERIOVENOUS FISTULA REPAIR      Clotted off and insert graft    ARTERIOVENOUS FISTULA/SHUNT SURGERY Left 12/30/2020    Procedure: ARTERIOVENOUS FISTULA FORMATION;  Surgeon: Jan Caicedo MD;  Location: [facility] MAIN OR;  Service: Vascular;  Laterality: Left;    BREAST BIOPSY      right nipple 1981    COLONOSCOPY N/A 11/24/2020    Procedure: COLONOSCOPY with polypectomy x 7;  Surgeon: Jeffery White MD;   Location: [facility] ENDOSCOPY;  Service: Gastroenterology;  Laterality: N/A;  post op: polyps, diverticulosis, hemorrhoids    COLONOSCOPY N/A 6/1/2023    Procedure: COLONOSCOPY with polypectomy x 3 biopsy x 1;  Surgeon: Jeffery White MD;  Location: [facility] ENDOSCOPY;  Service: Gastroenterology;  Laterality: N/A;  diverticulosis polyps    ENDOSCOPY N/A 03/12/2022    Procedure: ESOPHAGOGASTRODUODENOSCOPY with biopsy x 1 area;  Surgeon: Javan Augustin MD;  Location: [facility] ENDOSCOPY;  Service: Gastroenterology;  Laterality: N/A;  post op: anastamosis    ENDOSCOPY N/A 10/9/2023    Procedure: ESOPHAGOGASTRODUODENOSCOPY with biopsy x3 areas;  Surgeon: Ace Campbell MD;  Location: [facility] ENDOSCOPY;  Service: Gastroenterology;  Laterality: N/A;  duodenal ulcers;irregular z line    GASTRIC RESECTION      cancer    HYSTERECTOMY      LAPAROSCOPIC CHOLECYSTECTOMY      NEPHRECTOMY      right kidney removed     REDUCTION MAMMAPLASTY      TUMOR REMOVAL      boils    VENTRAL/INCISIONAL HERNIA REPAIR Right 08/28/2019    Procedure: VENTRAL/INCISIONAL HERNIA REPAIR;  Surgeon: Nilay Setvens MD;  Location: [facility] MAIN OR;  Service: General    VENTRAL/INCISIONAL HERNIA REPAIR N/A 10/01/2019    Procedure: OPEN VENTRAL/INCISIONAL HERNIA REPAIR;  Surgeon: Nilay Stevens MD;  Location: [facility] MAIN OR;  Service: General    VENTRAL/INCISIONAL HERNIA REPAIR N/A 10/11/2021    Procedure: VENTRAL/INCISIONAL HERNIA REPAIR LAPAROSCOPIC;  Surgeon: Nilay Stevens MD;  Location: [facility] MAIN OR;  Service: General;  Laterality: N/A;    VENTRAL/INCISIONAL HERNIA REPAIR N/A 01/23/2023    Procedure: VENTRAL/INCISIONAL HERNIA REPAIR with MESH;  Surgeon: Nilay Stevens MD;  Location: [facility] MAIN OR;  Service: General;  Laterality: N/A;     Family History   Problem Relation Age of Onset    Heart disease Father      Social History     Tobacco Use    Smoking status: Former     Packs/day: 0.25     Years: 57.00     Additional pack years: 0.00      Total pack years: 14.25     Types: Cigarettes     Quit date: 2021     Years since quittin.5     Passive exposure: Past    Smokeless tobacco: Never    Tobacco comments:     quit smoking 2 months ago   Vaping Use    Vaping Use: Never used   Substance Use Topics    Alcohol use: No    Drug use: No     (Not in a hospital admission)    Allergies:  Atorvastatin calcium, Gabapentin, and Niacin    Objective     Vital Signs  Temp:  [97.5 °F (36.4 °C)] 97.5 °F (36.4 °C)  Heart Rate:  [82-96] 83  Resp:  [22] 22  BP: (121-164)/(48-86) 134/69     Physical Exam:      General Appearance:    Alert, cooperative, in no acute distress   Head:    Normocephalic, without obvious abnormality, atraumatic   Eyes:            Lids and lashes normal, conjunctivae and sclerae normal, no   icterus, no pallor, corneas clear, PERRLA   Ears:    Ears appear intact with no abnormalities noted   Throat:   No oral lesions, no thrush, oral mucosa moist   Neck:   No adenopathy, supple, trachea midline, no thyromegaly, no   carotid bruit, no JVD   Lungs:     Clear to auscultation,respirations regular, even and                  unlabored    Heart:    Regular rhythm and normal rate, normal S1 and S2, no            murmur, no gallop, no rub, no click   Chest Wall:    No abnormalities observed   Abdomen:     Normal bowel sounds, no masses, no organomegaly, soft      tenderness present, non-distended, no guarding, no rebound                tenderness   Extremities:   Moves all extremities well, no edema, no cyanosis, no             redness   Pulses:   Pulses palpable and equal bilaterally   Skin:   No bleeding, bruising or rash   Lymph nodes:   No palpable adenopathy   Neurologic:   No focal deficits ntoed       Results Review:     Imaging Results (Last 24 Hours)       Procedure Component Value Units Date/Time    XR Abdomen KUB [[id number]] Collected: 23     Updated: 23    Narrative:      XR ABDOMEN KUB    Date of Exam: 2023  10:12 PM EST    Indication: Confirmation of NG tube placement    Comparison: CT abdomen pelvis 11/9/2023    Findings:  Nasogastric tube is past GE junction. It is curled in the stomach with the tip terminating at the fundus. Gas-filled loops of small and large bowel are seen.      Impression:      Impression:  Nasogastric tube past GE junction. Tip is terminating at the fundus.      Electronically Signed: Jackeline Carney MD    11/9/2023 10:27 PM EST    Workstation ID: [id number]    CT Abdomen Pelvis Without Contrast [[id number]] Collected: 11/09/23 1957     Updated: 11/09/23 2007    Narrative:      CT ABDOMEN PELVIS WO CONTRAST    Date of Exam: 11/9/2023 7:47 PM EST    Indication: abd pain. Hernia. Epigastric pain. Nausea. Patient on dialysis. Patient has lung cancer.    Comparison: Whole-body PET/CT 10/19/2023, CT abdomen pelvis 10/3/2023    Technique: Axial CT images were obtained of the abdomen and pelvis without the administration of contrast. Sagittal and coronal reconstructions were performed.  Automated exposure control and iterative reconstruction methods were used.      Findings:  Lower Thorax: Lung bases are clear. Cardiomegaly.    Peritoneum: No free air or free fluid.     Appendix: Appendix is well seen and is normal.    Kidneys: No hydronephrosis. No renal calculi. No focal renal lesions.    Ureters: No obstructing calculi or mass.    Urinary bladder: Urinary bladder has normal appearance on CT given degree of distention.    Liver: No focal hepatic lesions. Normal size liver.    Gallbladder and bile ducts: Status post cholecystectomy. Hepatic bile duct is dilated. This is unchanged. Spleen: Spleen is normal size.  No focal splenic lesions.    Adrenal glands: Low-density thickening adrenal glands. Hyperplasia versus adenomas.      Pancreas: No focal masses.  No pancreatic duct dilation. No surrounding inflammation.    Abdominal aorta and Vascular Structures: No aneurysmal dilation. Severe atherosclerotic  disease.    Stomach and Bowel: Dilated fluid-filled loops of small bowel. Suggestion point right lower quadrant. Small bowel measures up to 3.5 cm. No definite pneumatosis. No perforation. Mild fat stranding in the right lower quadrant mesentery. No portal venous   gas. Distended fluid-filled stomach. Sutures at the distal stomach with anastomosis. Ligament of Treitz has anatomic position. Diverticulosis sigmoid colon. No findings of active inflammation.    Reproductive Organs: Status post hysterectomy.  Lymph nodes: No pathologically enlarged lymph nodes.    Soft tissues: Unremarkable.    Osseous structures: No aggressive focal lytic or sclerotic osseous lesions.    Evaluation of bowel and solid organs is limited without contrast administration.      Impression:      1.Fluid-filled distended small bowel loops consistent with small bowel obstruction. A transition point right lower quadrant. Mild stranding the mesentery right lower quadrant.  2.Distended fluid-filled stomach. Surgical changes distal stomach.  3.Status post cholecystectomy. Stable bile duct dilatation. Correlate bilirubin.  4.Diverticulosis without diverticulitis.  5.Cardiomegaly.  6.Status post hysterectomy.  7.Status post right nephrectomy. Atrophic left kidney.      Electronically Signed: Jackeline Carney MD    11/9/2023 8:05 PM EST    Workstation ID: [id number]             Lab Results (last 24 hours)       Procedure Component Value Units Date/Time    Comprehensive Metabolic Panel [[id number]]  (Abnormal) Collected: 11/09/23 2044    Specimen: Blood Updated: 11/09/23 2120     Glucose 101 mg/dL      BUN 32 mg/dL      Creatinine 6.30 mg/dL      Sodium 137 mmol/L      Potassium 4.3 mmol/L      Comment: Slight hemolysis detected by analyzer. Result may be falsely elevated.        Chloride 94 mmol/L      CO2 24.0 mmol/L      Calcium 9.5 mg/dL      Total Protein 7.8 g/dL      Albumin 4.3 g/dL      ALT (SGPT) 12 U/L      AST (SGOT) 21 U/L      Comment: Slight  hemolysis detected by analyzer. Result may be falsely elevated.        Alkaline Phosphatase 144 U/L      Total Bilirubin 0.3 mg/dL      Globulin 3.5 gm/dL      A/G Ratio 1.2 g/dL      BUN/Creatinine Ratio 5.1     Anion Gap 19.0 mmol/L      eGFR 6.5 mL/min/1.73      Comment: <15 Indicative of kidney failure       Narrative:      GFR Normal >60  Chronic Kidney Disease <60  Kidney Failure <15    The GFR formula is only valid for adults with stable renal function between ages 18 and 70.    Magnesium [[id number]]  (Normal) Collected: 11/09/23 2044    Specimen: Blood Updated: 11/09/23 2120     Magnesium 1.9 mg/dL     Lipase [[id number]]  (Normal) Collected: 11/09/23 2044    Specimen: Blood Updated: 11/09/23 2118     Lipase 26 U/L     CBC & Differential [[id number]]  (Abnormal) Collected: 11/09/23 2044    Specimen: Blood Updated: 11/09/23 2052    Narrative:      The following orders were created for panel order CBC & Differential.  Procedure                               Abnormality         Status                     ---------                               -----------         ------                     CBC Auto Differential[[id number]]        Abnormal            Final result                 Please view results for these tests on the individual orders.    CBC Auto Differential [[id number]]  (Abnormal) Collected: 11/09/23 2044    Specimen: Blood Updated: 11/09/23 2052     WBC 15.50 10*3/mm3      RBC 3.85 10*6/mm3      Hemoglobin 11.5 g/dL      Hematocrit 34.8 %      MCV 90.4 fL      MCH 29.8 pg      MCHC 32.9 g/dL      RDW 19.8 %      RDW-SD 62.1 fl      MPV 7.3 fL      Platelets 473 10*3/mm3      Neutrophil % 85.6 %      Lymphocyte % 6.1 %      Monocyte % 5.8 %      Eosinophil % 2.2 %      Basophil % 0.3 %      Neutrophils, Absolute 13.20 10*3/mm3      Lymphocytes, Absolute 0.90 10*3/mm3      Monocytes, Absolute 0.90 10*3/mm3      Eosinophils, Absolute 0.30 10*3/mm3      Basophils, Absolute 0.10 10*3/mm3      nRBC 0.0 /100 WBC               I reviewed the patient's new clinical results.    Assessment & Plan       Small bowel obstruction    SBO (small bowel obstruction)    -NG tube inserted  -keep NPO  -pain control  -surgery consulted  -nephrology consulted for inpatient dialysis management, MWF      DVT prophylaxis- SCD's  GI prophylaxis- protonix      I discussed the patient's findings and my recommendations with patient.     Stephanie Corbett, APRN  11/10/23  06:24 EST

## 2023-11-10 NOTE — CONSULTS
[facility] CONSULT NOTE    Referring Provider: Angelia Taylor MD   Reason for Consultation: ESRD    Chief complaint .  Abdominal pain, vomiting    History of present illness: Patient is 73 years old female with history of ESRD, hypertension, diabetes, secondary hyperparathyroidism, hyperphosphatemia, noncompliance with diet, previous abdominal surgeries, presented to the hospital with abdominal pain with nausea and diarrhea.  Patient did not have any fever, cough, expectoration, hemoptysis or hematemesis.  CT scan showed evidence of small bowel obstruction.  NG tube was placed and pressure has been consulted.  Patient gets dialysis Monday Wednesday and Friday as outpatient    History  Past Medical History:   Diagnosis Date    Lucas's esophagus     C. difficile colitis     Carotid artery disease     -50-74% left, 16-49% right (1/19)    COPD (chronic obstructive pulmonary disease)     DM2 (diabetes mellitus, type 2)     ESRD (end stage renal disease)     on HD    Gastric ulcer     at anastomatic site    Gastroparesis     unknown    GERD (gastroesophageal reflux disease)     Gout     not current    Hemodialysis patient     mwf    Hernia, incisional     abdomen    History of colonoscopy     UTD    History of degenerative disc disease     Hyperlipidemia     Hypertension     Medicare annual wellness visit, subsequent 11/07/2020    Microscopic colitis     Nephrotic syndrome     Neuroendocrine tumor     of stomach    FABY (obstructive sleep apnea)     not treating    Pedal edema     ressolved    Proteinuria     Renal cell cancer, right     Rib pain on right side     chronic    Rotator cuff tear     bilateral    Stomach cancer 2016    Stroke     Uterine cancer     Vitamin B 12 deficiency     Vitamin D deficiency      Past Surgical History:   Procedure Laterality Date    APPENDECTOMY      ARTERIOVENOUS FISTULA Right     ARTERIOVENOUS FISTULA REPAIR      Clotted off and insert graft    ARTERIOVENOUS FISTULA/SHUNT SURGERY  Left 12/30/2020    Procedure: ARTERIOVENOUS FISTULA FORMATION;  Surgeon: Jan Caicedo MD;  Location: [facility] MAIN OR;  Service: Vascular;  Laterality: Left;    BREAST BIOPSY      right nipple 1981    COLONOSCOPY N/A 11/24/2020    Procedure: COLONOSCOPY with polypectomy x 7;  Surgeon: Jeffery White MD;  Location: [facility] ENDOSCOPY;  Service: Gastroenterology;  Laterality: N/A;  post op: polyps, diverticulosis, hemorrhoids    COLONOSCOPY N/A 6/1/2023    Procedure: COLONOSCOPY with polypectomy x 3 biopsy x 1;  Surgeon: Jeffery White MD;  Location: [facility] ENDOSCOPY;  Service: Gastroenterology;  Laterality: N/A;  diverticulosis polyps    ENDOSCOPY N/A 03/12/2022    Procedure: ESOPHAGOGASTRODUODENOSCOPY with biopsy x 1 area;  Surgeon: Javan Augustin MD;  Location: [facility] ENDOSCOPY;  Service: Gastroenterology;  Laterality: N/A;  post op: anastamosis    ENDOSCOPY N/A 10/9/2023    Procedure: ESOPHAGOGASTRODUODENOSCOPY with biopsy x3 areas;  Surgeon: Ace Campbell MD;  Location: [facility] ENDOSCOPY;  Service: Gastroenterology;  Laterality: N/A;  duodenal ulcers;irregular z line    GASTRIC RESECTION      cancer    HYSTERECTOMY      LAPAROSCOPIC CHOLECYSTECTOMY      NEPHRECTOMY      right kidney removed     REDUCTION MAMMAPLASTY      TUMOR REMOVAL      boils    VENTRAL/INCISIONAL HERNIA REPAIR Right 08/28/2019    Procedure: VENTRAL/INCISIONAL HERNIA REPAIR;  Surgeon: Nilay Stevens MD;  Location: [facility] MAIN OR;  Service: General    VENTRAL/INCISIONAL HERNIA REPAIR N/A 10/01/2019    Procedure: OPEN VENTRAL/INCISIONAL HERNIA REPAIR;  Surgeon: Nilay Stevens MD;  Location: [facility] MAIN OR;  Service: General    VENTRAL/INCISIONAL HERNIA REPAIR N/A 10/11/2021    Procedure: VENTRAL/INCISIONAL HERNIA REPAIR LAPAROSCOPIC;  Surgeon: Nilay Stevens MD;  Location: [facility] MAIN OR;  Service: General;  Laterality: N/A;    VENTRAL/INCISIONAL HERNIA REPAIR N/A 01/23/2023    Procedure: VENTRAL/INCISIONAL HERNIA REPAIR with  MESH;  Surgeon: Nilay Stevens MD;  Location: [facility] MAIN OR;  Service: General;  Laterality: N/A;     Social History     Tobacco Use    Smoking status: Former     Packs/day: 0.25     Years: 57.00     Additional pack years: 0.00     Total pack years: 14.25     Types: Cigarettes     Quit date: 2021     Years since quittin.5     Passive exposure: Past    Smokeless tobacco: Never    Tobacco comments:     quit smoking 2 months ago   Vaping Use    Vaping Use: Never used   Substance Use Topics    Alcohol use: No    Drug use: No     Family History   Problem Relation Age of Onset    Heart disease Father        Review of Systems  ROS  As per HPI  Objective     Vital Signs  Temp:  [97.5 °F (36.4 °C)] 97.5 °F (36.4 °C)  Heart Rate:  [82-96] 83  Resp:  [16-22] 16  BP: (121-164)/(48-86) 148/72    No intake/output data recorded.  I/O last 3 completed shifts:  In: 500 [IV Piggyback:500]  Out: -     Physical Exam:  Physical Exam    General Appearance: Chronically ill-appearing  Skin: warm and dry  HEENT: oral mucosa normal, nonicteric sclera  Neck: supple, no JVD  Lungs: CTA  Heart: RRR, normal S1 and S2  Abdomen: Soft, diminished bowel sounds, mild tenderness  : no palpable bladder  Extremities: no edema, cyanosis or clubbing  Neuro: normal speech and mental status     Results Review:   I reviewed the patient's new clinical results.    Lab Results   Component Value Date    CALCIUM 9.5 2023    PHOS 5.5 (H) 2023     Results from last 7 days   Lab Units 23  2044   MAGNESIUM mg/dL 1.9   SODIUM mmol/L 137   POTASSIUM mmol/L 4.3   CHLORIDE mmol/L 94*   CO2 mmol/L 24.0   BUN mg/dL 32*   CREATININE mg/dL 6.30*   GLUCOSE mg/dL 101*   CALCIUM mg/dL 9.5   WBC 10*3/mm3 15.50*   HEMOGLOBIN g/dL 11.5*   PLATELETS 10*3/mm3 473*   ALT (SGPT) U/L 12   AST (SGOT) U/L 21     Lab Results   Component Value Date    TROPONINT 0.032 (C) 2023     Estimated Creatinine Clearance: 6.9 mL/min (A) (by C-G formula based on  SCr of 6.3 mg/dL (H)).  Lab Results   Component Value Date    URICACID 4.7 07/10/2018       Brief Urine Lab Results       None            Prior to Admission medications    Medication Sig Start Date End Date Taking? Authorizing Provider   albuterol sulfate  (90 Base) MCG/ACT inhaler Inhale 2 puffs Every 4 (Four) Hours As Needed for Wheezing.   Yes Danica Murphy MD   amLODIPine (NORVASC) 10 MG tablet Take 1 tablet by mouth Daily.   Yes Danica Murphy MD   budesonide-formoterol (SYMBICORT) 160-4.5 MCG/ACT inhaler Inhale 2 puffs 2 (Two) Times a Day.   Yes Danica Murphy MD   calcium acetate (PHOS BINDER,) 667 MG capsule capsule Take 1 capsule by mouth 3 (Three) Times a Day.   Yes Danica Murphy MD   carvedilol (COREG) 3.125 MG tablet Take 1 tablet by mouth 2 (Two) Times a Day With Meals.   Yes Danica Murphy MD   Cholecalciferol 25 MCG (1000 UT) tablet Take 1 tablet by mouth Daily.   Yes Danica Murphy MD   lidocaine-prilocaine (EMLA) 2.5-2.5 % cream Apply 1 application  topically to the appropriate area as directed Daily As Needed. Apply to access site 1 to 2 hours before dialysis 9/27/19  Yes Danica Murphy MD   Multiple Vitamins-Minerals (RENAPLEX-D) tablet Take 1 tablet by mouth Daily.   Yes Danica Murphy MD   pantoprazole (PROTONIX) 40 MG EC tablet Take 1 tablet by mouth 2 (Two) Times a Day. 3/9/23  Yes Danica Murphy MD   sevelamer (RENVELA) 800 MG tablet Take 3 tablets by mouth 3 (Three) Times a Day With Meals. 10/4/23  Yes Angelia Taylor MD   simvastatin (ZOCOR) 40 MG tablet Take 1 tablet by mouth Every Night.   Yes Danica Murphy MD   sodium bicarbonate 650 MG tablet Take 2 tablets by mouth 2 (Two) Times a Day. 10/4/23  Yes Angelia Taylor MD   sucralfate (Carafate) 1 g tablet Take 1 tablet by mouth 4 (Four) Times a Day. 10/9/23  Yes Cindy Santiago APRN   temazepam (RESTORIL) 30 MG capsule Take 1 capsule by mouth Every Night. 1/10/23   Yes Provider, MD Danica       budesonide-formoterol, 2 puff, Inhalation, BID - RT  pantoprazole, 40 mg, Intravenous, Q AM  senna-docusate sodium, 2 tablet, Oral, BID  sodium chloride, 10 mL, Intravenous, Q12H  sodium chloride, 10 mL, Intravenous, Q12H           Assessment & Plan       End-stage renal disease.  Patient gets dialysis Monday Wednesday and Friday as outpatient.  Admitted with small bowel obstruction.  Electrolytes, volume status okay  Hypertension with ESRD.  Blood pressure okay.  Off antihypertensives at this time  Anemia with ESRD.  Hemoglobin acceptable  Small bowel obstruction.  Patient presented with abdominal pain, nausea and vomiting.  Patient has previous history of abdominal surgeries.    Recs:  I will provide hemodialysis today and continue Monday Wednesday Friday  Patient is n.p.o. and NG tube in place.  Off antihypertensives.  Add IV hydralazine as needed  Patient to be evaluated by general surgery    I discussed the patients findings and my recommendations with patient and nursing staff    Faustino Buckley MD  11/10/23  07:50 EST

## 2023-11-10 NOTE — CASE MANAGEMENT/SOCIAL WORK
Discharge Planning Assessment   Derick     Patient Name: hSerry Lobato  MRN: [id number]  Today's Date: 11/10/2023    Admit Date: 11/9/2023    Plan: Needs case managed.   Discharge Needs Assessment    No documentation.                  Discharge Plan       Row Name 11/10/23 1343       Plan    Plan Needs case managed.    Plan Comments Pt out of room and not available to case manage.                   Patient Forms       Row Name 11/10/23 1348       Patient Forms    Important Message from Medicare ([facility]) --  MORTON 11/9/23 per registration    Patient Observation Letter Delivered  MORTON 11/9/23 per registration                    Dionna Farooq RN, [facility]  Office: [phone]  Fax: [phone]  [email or web address]      Phone communication or documentation only - no physical contact with patient or family.  Dionna Farooq RN

## 2023-11-10 NOTE — CONSULTS
General Surgery Consult Note      Name: Sherry Lobato ADMIT: 2023   : 1950  PCP: Jin Malcolm PA-C    MRN: [id number] LOS: 1 days   AGE/SEX: 73 y.o. female  ROOM:    [facility]      Patient Care Team:  iJn Malcolm PA-C as PCP - General (Physician Assistant)  Nilay Stevens MD as Consulting Physician (General Surgery)  Stephen Perea MD as Surgeon (Thoracic Surgery)  Makayla Navarro RN as Nurse Navigator  Matteo Merlos MD as Consulting Physician (Radiation Oncology)  Chief Complaint   Patient presents with    Abdominal Pain       Subjective   73-year-old lady who I have known over the last couple years with chronic abdominal pain who is helped on couple of occasions for incisional hernias.  Repair of the right lower quadrant hernia couple of times primarily due to the possibility for kidney transplantation a couple years ago and part of doing that repair was a laparoscopic incision around her bellybutton she developed a umbilical hernia that I repaired over a mesh.  In that operation I did note that she had significant intra-abdominal adhesions.  She has had chronic abdominal pain getting worse over the last several weeks but had an acute flareup more recently.  He says that the pain is most focal in the right mid abdomen.  Nausea and she had a some vomiting a couple of days ago.  She continues to pass stool her last bowel movement was this morning.  She denies it being black or bloody.  In the right lower quadrant she says that she does have some bulging around her previous incisional hernia site has always been quite soft and reducible.  On arrival she went through CT imaging demonstrating dilated loops of small bowel concerning for a small bowel obstruction.  Nasogastric tube placed last night there has been no output she is retching and gagging on the tube and miserable from the tube when I check on her this morning.    Past Medical History:   Diagnosis Date    Lucas's  esophagus     C. difficile colitis     Carotid artery disease     -50-74% left, 16-49% right (1/19)    COPD (chronic obstructive pulmonary disease)     DM2 (diabetes mellitus, type 2)     ESRD (end stage renal disease)     on HD    Gastric ulcer     at anastomatic site    Gastroparesis     unknown    GERD (gastroesophageal reflux disease)     Gout     not current    Hemodialysis patient     mwf    Hernia, incisional     abdomen    History of colonoscopy     UTD    History of degenerative disc disease     Hyperlipidemia     Hypertension     Medicare annual wellness visit, subsequent 11/07/2020    Microscopic colitis     Nephrotic syndrome     Neuroendocrine tumor     of stomach    FABY (obstructive sleep apnea)     not treating    Pedal edema     ressolved    Proteinuria     Renal cell cancer, right     Rib pain on right side     chronic    Rotator cuff tear     bilateral    Stomach cancer 2016    Stroke     Uterine cancer     Vitamin B 12 deficiency     Vitamin D deficiency      Past Surgical History:   Procedure Laterality Date    APPENDECTOMY      ARTERIOVENOUS FISTULA Right     ARTERIOVENOUS FISTULA REPAIR      Clotted off and insert graft    ARTERIOVENOUS FISTULA/SHUNT SURGERY Left 12/30/2020    Procedure: ARTERIOVENOUS FISTULA FORMATION;  Surgeon: Jan Caicedo MD;  Location: [facility] MAIN OR;  Service: Vascular;  Laterality: Left;    BREAST BIOPSY      right nipple 1981    COLONOSCOPY N/A 11/24/2020    Procedure: COLONOSCOPY with polypectomy x 7;  Surgeon: Jeffery White MD;  Location: [facility] ENDOSCOPY;  Service: Gastroenterology;  Laterality: N/A;  post op: polyps, diverticulosis, hemorrhoids    COLONOSCOPY N/A 6/1/2023    Procedure: COLONOSCOPY with polypectomy x 3 biopsy x 1;  Surgeon: Jeffery White MD;  Location: [facility] ENDOSCOPY;  Service: Gastroenterology;  Laterality: N/A;  diverticulosis polyps    ENDOSCOPY N/A 03/12/2022    Procedure: ESOPHAGOGASTRODUODENOSCOPY with biopsy x 1 area;  Surgeon:  Javan Augustin MD;  Location: [facility] ENDOSCOPY;  Service: Gastroenterology;  Laterality: N/A;  post op: anastamosis    ENDOSCOPY N/A 10/9/2023    Procedure: ESOPHAGOGASTRODUODENOSCOPY with biopsy x3 areas;  Surgeon: Ace Campbell MD;  Location: [facility] ENDOSCOPY;  Service: Gastroenterology;  Laterality: N/A;  duodenal ulcers;irregular z line    GASTRIC RESECTION      cancer    HYSTERECTOMY      LAPAROSCOPIC CHOLECYSTECTOMY      NEPHRECTOMY      right kidney removed     REDUCTION MAMMAPLASTY      TUMOR REMOVAL      boils    VENTRAL/INCISIONAL HERNIA REPAIR Right 2019    Procedure: VENTRAL/INCISIONAL HERNIA REPAIR;  Surgeon: Nilay Stevens MD;  Location: [facility] MAIN OR;  Service: General    VENTRAL/INCISIONAL HERNIA REPAIR N/A 10/01/2019    Procedure: OPEN VENTRAL/INCISIONAL HERNIA REPAIR;  Surgeon: Nilay Stevens MD;  Location: [facility] MAIN OR;  Service: General    VENTRAL/INCISIONAL HERNIA REPAIR N/A 10/11/2021    Procedure: VENTRAL/INCISIONAL HERNIA REPAIR LAPAROSCOPIC;  Surgeon: Nilay Stevens MD;  Location: [facility] MAIN OR;  Service: General;  Laterality: N/A;    VENTRAL/INCISIONAL HERNIA REPAIR N/A 2023    Procedure: VENTRAL/INCISIONAL HERNIA REPAIR with MESH;  Surgeon: Nilay Stevens MD;  Location: [facility] MAIN OR;  Service: General;  Laterality: N/A;     Family History   Problem Relation Age of Onset    Heart disease Father        Social History     Tobacco Use    Smoking status: Former     Packs/day: 0.25     Years: 57.00     Additional pack years: 0.00     Total pack years: 14.25     Types: Cigarettes     Quit date: 2021     Years since quittin.5     Passive exposure: Past    Smokeless tobacco: Never    Tobacco comments:     quit smoking 2 months ago   Vaping Use    Vaping Use: Never used   Substance Use Topics    Alcohol use: No    Drug use: No     (Not in a hospital admission)    budesonide-formoterol, 2 puff, Inhalation, BID - RT  pantoprazole, 40 mg, Intravenous, Q  "AM  senna-docusate sodium, 2 tablet, Oral, BID  sodium chloride, 10 mL, Intravenous, Q12H  sodium chloride, 10 mL, Intravenous, Q12H           senna-docusate sodium **AND** polyethylene glycol **AND** bisacodyl **AND** bisacodyl    HYDROmorphone    nitroglycerin    ondansetron    sodium chloride    sodium chloride    sodium chloride    sodium chloride  Atorvastatin calcium, Gabapentin, and Niacin    Review of Systems   Constitutional:  Negative for chills and fever.   HENT:  Positive for sore throat.    Respiratory:  Negative for cough and shortness of breath.    Gastrointestinal:  Positive for abdominal pain, nausea and vomiting. Negative for abdominal distention, constipation and diarrhea.        Objective     Vital Signs and Labs:  Vital Signs Patient Vitals for the past 24 hrs:   BP Temp Temp src Pulse Resp SpO2 Height Weight   11/10/23 0721 148/72 97.5 °F (36.4 °C) Oral 83 16 98 % -- --   11/10/23 0400 134/69 -- -- 83 -- 97 % -- --   11/10/23 0330 128/64 -- -- 84 -- 91 % -- --   11/10/23 0300 127/64 -- -- 84 -- 96 % -- --   11/10/23 0230 138/64 -- -- 85 -- 91 % -- --   11/10/23 0200 130/70 -- -- 91 -- 90 % -- --   11/10/23 0130 136/67 -- -- 93 -- (!) 88 % -- --   11/10/23 0100 141/77 -- -- 91 -- 90 % -- --   11/10/23 0030 126/75 -- -- 83 -- 95 % -- --   11/09/23 2331 142/85 -- -- 84 -- 94 % -- --   11/09/23 2303 131/73 -- -- 86 -- 100 % -- --   11/09/23 2233 164/86 -- -- 92 -- 92 % -- --   11/09/23 2229 -- -- -- 85 -- 100 % -- --   11/09/23 2202 147/84 -- -- 86 -- 100 % -- --   11/09/23 2131 121/63 -- -- 86 -- 92 % -- --   11/09/23 2101 121/68 -- -- 87 -- 98 % -- --   11/09/23 2045 -- -- -- 84 22 97 % -- --   11/09/23 2041 -- -- -- 84 22 99 % -- --   11/09/23 2031 144/48 -- -- 82 -- 95 % -- --   11/09/23 2013 139/75 -- -- 84 -- 92 % -- --   11/09/23 1840 160/78 -- -- -- -- -- -- --   11/09/23 1838 -- 97.5 °F (36.4 °C) Oral 96 22 97 % 152.4 cm (60\") 72 kg (158 lb 11.7 oz)       Physical Exam:  Physical " Exam  Constitutional:       General: She is in acute distress.      Appearance: She is not ill-appearing.      Comments: Tremendously bothered by the nasogastric tube with some watering eyes and gagging   Eyes:      General: No scleral icterus.     Conjunctiva/sclera: Conjunctivae normal.   Cardiovascular:      Rate and Rhythm: Normal rate.   Pulmonary:      Effort: Pulmonary effort is normal. No respiratory distress.   Abdominal:      General: There is no distension.      Palpations: Abdomen is soft.      Comments: Abdomen is soft mildly tender to palpation she does have some bulging at her right lower quadrant incision that is immediately reducible with gentle manipulation mild tenderness to palpation no overlying skin changes here.   Skin:     General: Skin is warm and dry.   Neurological:      General: No focal deficit present.      Mental Status: Mental status is at baseline.   Psychiatric:         Mood and Affect: Mood normal.         Behavior: Behavior normal.         CBC    Results from last 7 days   Lab Units 11/09/23 2044   WBC 10*3/mm3 15.50*   HEMOGLOBIN g/dL 11.5*   PLATELETS 10*3/mm3 473*     CMP   Results from last 7 days   Lab Units 11/09/23 2044   SODIUM mmol/L 137   POTASSIUM mmol/L 4.3   CHLORIDE mmol/L 94*   CO2 mmol/L 24.0   BUN mg/dL 32*   CREATININE mg/dL 6.30*   GLUCOSE mg/dL 101*   ALBUMIN g/dL 4.3   BILIRUBIN mg/dL 0.3   ALK PHOS U/L 144*   AST (SGOT) U/L 21   ALT (SGPT) U/L 12   LIPASE U/L 26     Radiology(recent) XR Abdomen KUB    Result Date: 11/9/2023  Impression: Nasogastric tube past GE junction. Tip is terminating at the fundus. Electronically Signed: Jackeline Carney MD  11/9/2023 10:27 PM EST  Workstation ID: [id number]    CT Abdomen Pelvis Without Contrast    Result Date: 11/9/2023  1.Fluid-filled distended small bowel loops consistent with small bowel obstruction. A transition point right lower quadrant. Mild stranding the mesentery right lower quadrant. 2.Distended fluid-filled  stomach. Surgical changes distal stomach. 3.Status post cholecystectomy. Stable bile duct dilatation. Correlate bilirubin. 4.Diverticulosis without diverticulitis. 5.Cardiomegaly. 6.Status post hysterectomy. 7.Status post right nephrectomy. Atrophic left kidney. Electronically Signed: Jackeline Carney MD  11/9/2023 8:05 PM EST  Workstation ID: [id number]     I reviewed the patient's new clinical results.  I reviewed the patient's new imaging results and agree with the interpretation.    Assessment & Plan       Small bowel obstruction    SBO (small bowel obstruction)      73 y.o. female admitted with nausea and vomiting abdominal pain possible bowel obstruction on imaging.    I have looked back at previous hospital stays including her recent EGD demonstrating severe duodenal ulceration.  I think that is contributing some to her upper abdominal pain.  The right lower quadrant bowel movement could be related to her abdominal wall and recurrent hernia.  There is some bulging when she coughs but is immediately reducible with minimal manipulation.    I talked her about some options today.  I think that we will go ahead and get a small bowel follow-through to evaluate for adhesive bowel obstruction.  If that is negative and she has a reducible hernia could consider inpatient repair of the hernia early next week versus outpatient management.  She says that that she was recently diagnosed with adenocarcinoma of the lung and is currently undergoing radiation treatment for that and also is concerned about a spot in her left neck that is going to biopsy towards the end of the month.     is of her severe discomfort from the nasogastric tube and having bowel function presently I think that it is okay to go ahead and remove her nasogastric tube while we await the small bowel follow-through and try to get that done today versus tomorrow.  continue n.p.o. until then but okay for sips with meds.      This note was created using Dragon Voice  Recognition software.    Nilay Stevens MD  11/10/23  08:41 EST

## 2023-11-10 NOTE — CASE MANAGEMENT/SOCIAL WORK
Discharge Planning Assessment   Derick     Patient Name: Sherry Lobato  MRN: [id number]  Today's Date: 11/10/2023    Admit Date: 11/9/2023    Plan: Home with significant other. Hemodialysis at [facility] on Mon/Wed/Fri   Discharge Needs Assessment       Row Name 11/10/23 6789       Living Environment    People in Home significant other    Name(s) of People in Home Ari    Current Living Arrangements home    Potentially Unsafe Housing Conditions none    Primary Care Provided by self    Provides Primary Care For no one    Family Caregiver if Needed significant other    Family Caregiver Names Ari    Quality of Family Relationships supportive;helpful    Able to Return to Prior Arrangements yes       Resource/Environmental Concerns    Resource/Environmental Concerns none    Transportation Concerns none       Transition Planning    Patient/Family Anticipates Transition to home with family    Patient/Family Anticipated Services at Transition none    Transportation Anticipated family or friend will provide       Discharge Needs Assessment    Current Outpatient/Agency/Support Group outpatient hemodialysis    Equipment Currently Used at Home cane, quad;wheelchair;commode;walker, rolling    Concerns to be Addressed denies needs/concerns at this time    Anticipated Changes Related to Illness none    Equipment Needed After Discharge none    Outpatient/Agency/Support Group Needs outpatient hemodialysis    Current Discharge Risk chronically ill                   Discharge Plan       Row Name 11/10/23 9123       Plan    Plan Home with significant other. Hemodialysis at [facility] on Mon/Wed/Fri    Plan Comments Pt reports she lives with significant other Ari and is IADLs.She states she has dme for use if needed, but hasn't needed it. She confirms pcp and pharmacy and denies difficulty obtaining medications or transportation.She reports Ari will provide transportation at [place]. She drives self to and from dialysis at  [facility] on Mon/Wed/Fri. She intends to return home with Ari at [place] and anabel [place] needs. [place] Barriers: IV pain meds, IV pepcid                  Continued Care and Services - Admitted Since 11/9/2023    Coordination has not been started for this encounter.          Demographic Summary       Row Name 11/10/23 1633       General Information    Admission Type observation    Arrived From home    Required Notices Provided Observation Status Notice    Referral Source admission list    Reason for Consult discharge planning    Preferred Language English       Contact Information    Permission Granted to Share Info With                    Functional Status       Row Name 11/10/23 1633       Functional Status    Usual Activity Tolerance moderate    Current Activity Tolerance moderate       Functional Status, IADL    Medications independent    Meal Preparation assistive person    Housekeeping assistive person    Laundry assistive person    Shopping assistive person                       Patient Forms       Row Name 11/10/23 1644       Patient Forms    Important Message from Medicare ([facility]) Delivered  IMM 11/10/23 per registration      Row Name 11/10/23 1348       Patient Forms    Important Message from Medicare ([facility]) --  MORTON 11/9/23 per registration    Patient Observation Letter Delivered  MORTON 11/9/23 per registration                  Dionna Farooq RN, [facility]  Office: [phone]  Fax: [phone]  [email or web address]      I met with patient in room wearing PPE: mask and glasses     Maintained distance greater than six feet and spent </=15 minutes in the room    Dionna Farooq RN

## 2023-11-10 NOTE — PROGRESS NOTES
LOS: 1 day   Patient Care Team:  Jin Malcolm PA-C as PCP - General (Physician Assistant)  Nilay Stevens MD as Consulting Physician (General Surgery)  Stephen Perea MD as Surgeon (Thoracic Surgery)  Makayla Navarro RN as Nurse Navigator  Matteo Merlos MD as Consulting Physician (Radiation Oncology)    Subjective     Interval History: in HD      Review of Systems   Unable to perform ROS: Other           Objective     Vital Signs  Temp:  [97.5 °F (36.4 °C)] 97.5 °F (36.4 °C)  Heart Rate:  [82-96] 83  Resp:  [12-22] 12  BP: (121-164)/(48-86) 148/72           Results Review:    Lab Results (last 24 hours)       Procedure Component Value Units Date/Time    Comprehensive Metabolic Panel [[id number]]  (Abnormal) Collected: 11/09/23 2044    Specimen: Blood Updated: 11/09/23 2120     Glucose 101 mg/dL      BUN 32 mg/dL      Creatinine 6.30 mg/dL      Sodium 137 mmol/L      Potassium 4.3 mmol/L      Comment: Slight hemolysis detected by analyzer. Result may be falsely elevated.        Chloride 94 mmol/L      CO2 24.0 mmol/L      Calcium 9.5 mg/dL      Total Protein 7.8 g/dL      Albumin 4.3 g/dL      ALT (SGPT) 12 U/L      AST (SGOT) 21 U/L      Comment: Slight hemolysis detected by analyzer. Result may be falsely elevated.        Alkaline Phosphatase 144 U/L      Total Bilirubin 0.3 mg/dL      Globulin 3.5 gm/dL      A/G Ratio 1.2 g/dL      BUN/Creatinine Ratio 5.1     Anion Gap 19.0 mmol/L      eGFR 6.5 mL/min/1.73      Comment: <15 Indicative of kidney failure       Narrative:      GFR Normal >60  Chronic Kidney Disease <60  Kidney Failure <15    The GFR formula is only valid for adults with stable renal function between ages 18 and 70.    Magnesium [[id number]]  (Normal) Collected: 11/09/23 2044    Specimen: Blood Updated: 11/09/23 2120     Magnesium 1.9 mg/dL     Lipase [[id number]]  (Normal) Collected: 11/09/23 2044    Specimen: Blood Updated: 11/09/23 2118     Lipase 26 U/L     CBC & Differential [[id number]]   (Abnormal) Collected: 11/09/23 2044    Specimen: Blood Updated: 11/09/23 2052    Narrative:      The following orders were created for panel order CBC & Differential.  Procedure                               Abnormality         Status                     ---------                               -----------         ------                     CBC Auto Differential[[id number]]        Abnormal            Final result                 Please view results for these tests on the individual orders.    CBC Auto Differential [[id number]]  (Abnormal) Collected: 11/09/23 2044    Specimen: Blood Updated: 11/09/23 2052     WBC 15.50 10*3/mm3      RBC 3.85 10*6/mm3      Hemoglobin 11.5 g/dL      Hematocrit 34.8 %      MCV 90.4 fL      MCH 29.8 pg      MCHC 32.9 g/dL      RDW 19.8 %      RDW-SD 62.1 fl      MPV 7.3 fL      Platelets 473 10*3/mm3      Neutrophil % 85.6 %      Lymphocyte % 6.1 %      Monocyte % 5.8 %      Eosinophil % 2.2 %      Basophil % 0.3 %      Neutrophils, Absolute 13.20 10*3/mm3      Lymphocytes, Absolute 0.90 10*3/mm3      Monocytes, Absolute 0.90 10*3/mm3      Eosinophils, Absolute 0.30 10*3/mm3      Basophils, Absolute 0.10 10*3/mm3      nRBC 0.0 /100 WBC              Imaging Results (Last 24 Hours)       Procedure Component Value Units Date/Time    FL Small Bowel Follow Through Single-Contrast [[id number]] Collected: 11/10/23 1254     Updated: 11/10/23 1300    Narrative:      FL SMALL BOWEL FOLLOW THROUGH SINGLE-CONTRAST    Date of Exam: 11/10/2023 9:10 AM EST    Indication: possible small bowel obstruction.    Comparison: CT abdomen pelvis 11/9/2023    Technique:   image of the abdomen was obtained. Patient ingested medium density barium for single contrast imaging of the small bowel.  Examination was recorded with multiple large field of view radiographs and spot fluoroscopic images.    Fluoroscopic Time: 0.35 minutes    Number of Images: 5    Findings:  film of the abdomen demonstrates nonspecific  but nonobstructive bowel gas pattern with mild air in large and small bowel, and mild stool in the colon. Lung bases appear free of consolidation. Multiple surgical clips are seen within the   abdomen, right of midline. Surgical chain sutures are seen in the left upper quadrant of the abdomen. Advanced lower lumbar facet arthropathy is noted.      Following the ingestion of water soluble enteric contrast, KUB images were obtained at 0 minutes, 20 minutes, 40 minutes, 1 hour 15 minutes. Stomach appears normal in position and contour. Surgical changes of distal gastrectomy with gastroenteric   anastomosis are noted, with expected postoperative lack of normal appearance of the duodenal sweep.    There is mild generalized small bowel dilation bilaterally in the abdomen, up to 3.6 cm in the right lower quadrant. There are clustered small bowel loops within the right lower quadrant the abdomen which appear smaller in caliber relatively   decompressed, suggesting a transition zone in the right lower quadrant or distal ileum. Contrast does reach the ascending and transverse colon at 1 hour and 15 minutes, without evidence of high-grade or complete bowel obstruction.          Impression:      Impression:    1. Findings suggest the presence of partial small bowel obstruction. Transition zone of relatively decompressed small bowel in the right lower quadrant of the abdomen, likely at the ileum. Some the upstream small bowel loops are mild to moderately   dilated.  2. There is no evidence of a complete or high-grade obstruction. Contrast reaches the colon within 1 hour and 15 minutes    Electronically Signed: Florinda Davidson MD    11/10/2023 12:58 PM EST    Workstation ID: [id number]    XR Abdomen KUB [[id number]] Collected: 11/09/23 2225     Updated: 11/09/23 2229    Narrative:      XR ABDOMEN KUB    Date of Exam: 11/9/2023 10:12 PM EST    Indication: Confirmation of NG tube placement    Comparison: CT abdomen pelvis  11/9/2023    Findings:  Nasogastric tube is past GE junction. It is curled in the stomach with the tip terminating at the fundus. Gas-filled loops of small and large bowel are seen.      Impression:      Impression:  Nasogastric tube past GE junction. Tip is terminating at the fundus.      Electronically Signed: Jackeline Carney MD    11/9/2023 10:27 PM EST    Workstation ID: [id number]    CT Abdomen Pelvis Without Contrast [[id number]] Collected: 11/09/23 1957     Updated: 11/09/23 2007    Narrative:      CT ABDOMEN PELVIS WO CONTRAST    Date of Exam: 11/9/2023 7:47 PM EST    Indication: abd pain. Hernia. Epigastric pain. Nausea. Patient on dialysis. Patient has lung cancer.    Comparison: Whole-body PET/CT 10/19/2023, CT abdomen pelvis 10/3/2023    Technique: Axial CT images were obtained of the abdomen and pelvis without the administration of contrast. Sagittal and coronal reconstructions were performed.  Automated exposure control and iterative reconstruction methods were used.      Findings:  Lower Thorax: Lung bases are clear. Cardiomegaly.    Peritoneum: No free air or free fluid.     Appendix: Appendix is well seen and is normal.    Kidneys: No hydronephrosis. No renal calculi. No focal renal lesions.    Ureters: No obstructing calculi or mass.    Urinary bladder: Urinary bladder has normal appearance on CT given degree of distention.    Liver: No focal hepatic lesions. Normal size liver.    Gallbladder and bile ducts: Status post cholecystectomy. Hepatic bile duct is dilated. This is unchanged. Spleen: Spleen is normal size.  No focal splenic lesions.    Adrenal glands: Low-density thickening adrenal glands. Hyperplasia versus adenomas.      Pancreas: No focal masses.  No pancreatic duct dilation. No surrounding inflammation.    Abdominal aorta and Vascular Structures: No aneurysmal dilation. Severe atherosclerotic disease.    Stomach and Bowel: Dilated fluid-filled loops of small bowel. Suggestion point right  lower quadrant. Small bowel measures up to 3.5 cm. No definite pneumatosis. No perforation. Mild fat stranding in the right lower quadrant mesentery. No portal venous   gas. Distended fluid-filled stomach. Sutures at the distal stomach with anastomosis. Ligament of Treitz has anatomic position. Diverticulosis sigmoid colon. No findings of active inflammation.    Reproductive Organs: Status post hysterectomy.  Lymph nodes: No pathologically enlarged lymph nodes.    Soft tissues: Unremarkable.    Osseous structures: No aggressive focal lytic or sclerotic osseous lesions.    Evaluation of bowel and solid organs is limited without contrast administration.      Impression:      1.Fluid-filled distended small bowel loops consistent with small bowel obstruction. A transition point right lower quadrant. Mild stranding the mesentery right lower quadrant.  2.Distended fluid-filled stomach. Surgical changes distal stomach.  3.Status post cholecystectomy. Stable bile duct dilatation. Correlate bilirubin.  4.Diverticulosis without diverticulitis.  5.Cardiomegaly.  6.Status post hysterectomy.  7.Status post right nephrectomy. Atrophic left kidney.      Electronically Signed: Jackeline Carney MD    11/9/2023 8:05 PM EST    Workstation ID: [id number]                 I reviewed the patient's new clinical results.    Medication Review:   Scheduled Meds:budesonide-formoterol, 2 puff, Inhalation, BID - RT  pantoprazole, 40 mg, Intravenous, Q AM  senna-docusate sodium, 2 tablet, Oral, BID  sodium chloride, 10 mL, Intravenous, Q12H  sodium chloride, 10 mL, Intravenous, Q12H      Continuous Infusions:   PRN Meds:.  senna-docusate sodium **AND** polyethylene glycol **AND** bisacodyl **AND** bisacodyl    HYDROmorphone    nitroglycerin    ondansetron    sodium chloride    sodium chloride    sodium chloride    sodium chloride     Assessment & Plan       Small bowel obstruction    SBO (small bowel obstruction)  - NGT d/c'd  - NPO except sips with  meds  - small bowel follow through  - surgery following  - prn pain/ nausea meds    ESRD  - nephrology following  - HD MWF    Adenocarcinoma of the lung  - radiation tx  - Dr Merlos and Dr. Solis    DM2 with ESRD  COPD  GERD  HTN with ESRD  HLD  FABY  Anemia with ESRD    DVT prophylaxis- SCD's  GI prophylaxis- protonix    Plan for disposition:TBD    Kelli Henderson MD  11/10/23  13:45 EST

## 2023-11-10 NOTE — ED NOTES
Pt's NG tube draining yellow fluid very slowly to intermittent suction. Pt's NG tube irrigated, flow improved somewhat. Pt reports moderate RLQ abdominal pain improving with pain medicine, nausea improved. Pt has call light within reach, pt denies additional needs. Pt about to be moved to RM [address] [facility]

## 2023-11-10 NOTE — PLAN OF CARE
Problem: Pain (Intestinal Obstruction)  Goal: Acceptable Pain Control  Intervention: Monitor and Manage Pain  Recent Flowsheet Documentation  Taken 11/10/2023 0015 by Di Harrison RN  Diversional Activities: television     Problem: Device-Related Complication Risk (Hemodialysis)  Goal: Safe, Effective Therapy Delivery  Intervention: Optimize Device Care and Function  Recent Flowsheet Documentation  Taken 11/10/2023 0015 by Di Harrison, RN  Medication Review/Management: medications reviewed   Goal Outcome Evaluation:

## 2023-11-11 PROBLEM — K43.2 VENTRAL INCISIONAL HERNIA: Status: ACTIVE | Noted: 2019-09-30

## 2023-11-11 PROBLEM — J44.9 CHRONIC OBSTRUCTIVE PULMONARY DISEASE: Status: ACTIVE | Noted: 2019-02-05

## 2023-11-11 PROBLEM — K42.9 UMBILICAL HERNIA: Status: ACTIVE | Noted: 2023-11-11

## 2023-11-11 PROBLEM — M10.9 ACUTE GOUT: Status: ACTIVE | Noted: 2023-11-11

## 2023-11-11 PROBLEM — E83.30 DISORDER OF PHOSPHORUS METABOLISM: Status: ACTIVE | Noted: 2020-11-23

## 2023-11-11 PROBLEM — N18.6 END-STAGE RENAL DISEASE: Status: ACTIVE | Noted: 2017-08-01

## 2023-11-11 PROBLEM — R10.31 RIGHT LOWER QUADRANT PAIN: Status: ACTIVE | Noted: 2019-07-27

## 2023-11-11 PROBLEM — K43.9 HERNIA OF ABDOMINAL WALL: Status: ACTIVE | Noted: 2019-09-30

## 2023-11-11 PROBLEM — J43.9 EMPHYSEMA LUNG: Status: ACTIVE | Noted: 2023-11-11

## 2023-11-11 PROBLEM — Z72.0 TOBACCO USER: Status: ACTIVE | Noted: 2020-07-24

## 2023-11-11 PROBLEM — I77.0 ARTERIOVENOUS FISTULA: Status: ACTIVE | Noted: 2023-11-11

## 2023-11-11 PROCEDURE — 25010000002 HYDRALAZINE PER 20 MG: Performed by: FAMILY MEDICINE

## 2023-11-11 PROCEDURE — 94664 DEMO&/EVAL PT USE INHALER: CPT

## 2023-11-11 PROCEDURE — 94799 UNLISTED PULMONARY SVC/PX: CPT

## 2023-11-11 PROCEDURE — 99232 SBSQ HOSP IP/OBS MODERATE 35: CPT | Performed by: STUDENT IN AN ORGANIZED HEALTH CARE EDUCATION/TRAINING PROGRAM

## 2023-11-11 RX ADMIN — Medication 10 ML: at 00:36

## 2023-11-11 RX ADMIN — Medication 10 ML: at 22:57

## 2023-11-11 RX ADMIN — PANTOPRAZOLE SODIUM 40 MG: 40 INJECTION, POWDER, LYOPHILIZED, FOR SOLUTION INTRAVENOUS at 06:32

## 2023-11-11 RX ADMIN — HYDRALAZINE HYDROCHLORIDE 10 MG: 20 INJECTION INTRAMUSCULAR; INTRAVENOUS at 00:30

## 2023-11-11 RX ADMIN — BUDESONIDE AND FORMOTEROL FUMARATE DIHYDRATE 2 PUFF: 160; 4.5 AEROSOL RESPIRATORY (INHALATION) at 07:09

## 2023-11-11 RX ADMIN — Medication 10 ML: at 22:58

## 2023-11-11 RX ADMIN — BUDESONIDE AND FORMOTEROL FUMARATE DIHYDRATE 2 PUFF: 160; 4.5 AEROSOL RESPIRATORY (INHALATION) at 19:42

## 2023-11-11 NOTE — PLAN OF CARE
Goal Outcome Evaluation:               Pt is AOX4 able to make needs known. Pt is NPO as of midnight. Pt has dialysis MWF. Pt has a LT side AV fistula with a thrill and bruit. Room near nurses station, no c/o of pain. Call light within reach .

## 2023-11-11 NOTE — PLAN OF CARE
Goal Outcome Evaluation:    A&ox4. Call light in reach. Able to communicate needs. Pain controlled. Plan of care ongoing.     Problem: Fluid Deficit (Intestinal Obstruction)  Goal: Fluid Balance  Outcome: Ongoing, Progressing     Problem: Infection (Intestinal Obstruction)  Goal: Absence of Infection Signs and Symptoms  Outcome: Ongoing, Progressing     Problem: Nausea and Vomiting (Intestinal Obstruction)  Goal: Nausea and Vomiting Relief  Outcome: Ongoing, Progressing     Problem: Pain (Intestinal Obstruction)  Goal: Acceptable Pain Control  Outcome: Ongoing, Progressing     Problem: Skin Injury Risk Increased  Goal: Skin Health and Integrity  Outcome: Ongoing, Progressing     Problem: Device-Related Complication Risk (Hemodialysis)  Goal: Safe, Effective Therapy Delivery  Outcome: Ongoing, Progressing     Problem: Hemodynamic Instability (Hemodialysis)  Goal: Effective Tissue Perfusion  Outcome: Ongoing, Progressing     Problem: Infection (Hemodialysis)  Goal: Absence of Infection Signs and Symptoms  Outcome: Ongoing, Progressing     Problem: Adjustment to Illness (Sepsis/Septic Shock)  Goal: Optimal Coping  Outcome: Ongoing, Progressing     Problem: Bleeding (Sepsis/Septic Shock)  Goal: Absence of Bleeding  Outcome: Ongoing, Progressing     Problem: Glycemic Control Impaired (Sepsis/Septic Shock)  Goal: Blood Glucose Level Within Desired Range  Outcome: Ongoing, Progressing     Problem: Infection Progression (Sepsis/Septic Shock)  Goal: Absence of Infection Signs and Symptoms  Outcome: Ongoing, Progressing     Problem: Nutrition Impaired (Sepsis/Septic Shock)  Goal: Optimal Nutrition Intake  Outcome: Ongoing, Progressing     Problem: Fall Injury Risk  Goal: Absence of Fall and Fall-Related Injury  Outcome: Ongoing, Progressing  Intervention: Promote Injury-Free Environment  Description: Provide a safe, barrier-free environment that encourages independent activity.  Keep care area uncluttered and  well-lighted.  Determine need for increased observation or monitoring.  Avoid use of devices that minimize mobility, such as restraints or indwelling urinary catheter.  Recent Flowsheet Documentation  Taken 11/11/2023 1600 by Genevieve Castillo LPN  Safety Promotion/Fall Prevention: safety round/check completed  Taken 11/11/2023 1400 by Genevieve Castillo LPN  Safety Promotion/Fall Prevention: safety round/check completed

## 2023-11-11 NOTE — PROGRESS NOTES
[facility] Progress Note      Patient Name: Sherry Lobato  : 1950  MRN: [id number]  Primary Care Physician:  Jin Malcolm PA-C  Date of admission: 2023    Subjective     Interval History:   The patient was seen and examined today for follow-up on ESRD. Doing well overall   Afebrile       Review of Systems:   As noted above    Objective     Vitals:   Temp:  [97.9 °F (36.6 °C)-98.2 °F (36.8 °C)] 98.2 °F (36.8 °C)  Heart Rate:  [76-93] 84  Resp:  [14-21] 14  BP: (114-162)/(53-80) 146/78    Intake/Output Summary (Last 24 hours) at 2023 1535  Last data filed at 2023 1534  Gross per 24 hour   Intake 480 ml   Output --   Net 480 ml       Physical Exam:    General Appearance: alert, oriented x 3, no acute distress   Skin: warm and dry  HEENT: oral mucosa normal, nonicteric sclera  Neck: supple, no JVD  Lungs: CTA  Heart: RRR, normal S1 and S2  Abdomen: soft, nontender, nondistended  : no palpable bladder  Extremities: no edema, cyanosis or clubbing  Neuro: normal speech and mental status     Scheduled Meds:     budesonide-formoterol, 2 puff, Inhalation, BID - RT  pantoprazole, 40 mg, Intravenous, Q AM  senna-docusate sodium, 2 tablet, Oral, BID  sodium chloride, 10 mL, Intravenous, Q12H  sodium chloride, 10 mL, Intravenous, Q12H      IV Meds:        Results Reviewed:   I have personally reviewed the results from the time of this admission to 2023 15:35 EST     Results from last 7 days   Lab Units 11/10/23  2229 11/10/23  1615 23  2044   SODIUM mmol/L 136 137 137   POTASSIUM mmol/L 4.9 4.1 4.3   CHLORIDE mmol/L 98 98 94*   CO2 mmol/L 24.0 25.0 24.0   BUN mg/dL 17 15 32*   CREATININE mg/dL 4.72* 3.96* 6.30*   CALCIUM mg/dL 9.4 10.0 9.5   BILIRUBIN mg/dL  --   --  0.3   ALK PHOS U/L  --   --  144*   ALT (SGPT) U/L  --   --  12   AST (SGOT) U/L  --   --  21   GLUCOSE mg/dL 81 69 101*       Estimated Creatinine Clearance: 9.3 mL/min (A) (by C-G  formula based on SCr of 4.72 mg/dL (H)).    Results from last 7 days   Lab Units 11/10/23  2229 11/10/23  1615 11/09/23  2044   MAGNESIUM mg/dL 1.9 1.9 1.9   PHOSPHORUS mg/dL 3.9 2.5  --              Results from last 7 days   Lab Units 11/10/23  2229 11/10/23  1615 11/09/23  2044   WBC 10*3/mm3 10.60 12.40* 15.50*   HEMOGLOBIN g/dL 11.8* 12.9 11.5*   PLATELETS 10*3/mm3 349 418 473*             Assessment / Plan     ASSESSMENT:  End-stage renal disease.  Patient gets dialysis Monday Wednesday and Friday as outpatient.  Admitted with small bowel obstruction.  Electrolytes, volume status okay  Hypertension with ESRD.  Blood pressure okay.  Off antihypertensives at this time  Anemia with ESRD.  Hemoglobin acceptable  Small bowel obstruction.  Patient presented with abdominal pain, nausea and vomiting.  Patient has previous history of abdominal surgeries.        PLAN:  The patient dialyzed yesterday with no reported problems.  Next dialysis scheduled for Monday  Labs in a.m.      Thank you for involving us in the care of Sherry Lobato.  Please feel free to call with any questions.    Marcial Kumar MD  11/11/23  15:35 [facility]    [facility]  [phone]    Parts of this note may be an electronic transcription/translation of spoken language to printed text using the Dragon dictation system.

## 2023-11-11 NOTE — PROGRESS NOTES
LOS: 2 days   Patient Care Team:  Jin Malcolm PA-C as PCP - General (Physician Assistant)  Nilay Stevens MD as Consulting Physician (General Surgery)  Stephen Perea MD as Surgeon (Thoracic Surgery)  Makayla Navarro, RN as Nurse Navigator  Matteo Merlos MD as Consulting Physician (Radiation Oncology)    Subjective:  Improved overall    Objective:   Loose stools noted//afebrile      Review of Systems:   Review of Systems   Constitutional:  Positive for activity change.   Respiratory: Negative.     Cardiovascular: Negative.    Gastrointestinal:  Positive for abdominal pain. Negative for abdominal distention.   Neurological:  Positive for weakness.           Vital Signs  Temp:  [97.5 °F (36.4 °C)-98.2 °F (36.8 °C)] 98.1 °F (36.7 °C)  Heart Rate:  [60-93] 93  Resp:  [12-21] 16  BP: (114-162)/(53-80) 162/80    Physical Exam:  Physical Exam  Vitals and nursing note reviewed.   Constitutional:       Appearance: Normal appearance.   Cardiovascular:      Rate and Rhythm: Normal rate.      Heart sounds: Normal heart sounds.   Pulmonary:      Breath sounds: Normal breath sounds.   Skin:     General: Skin is warm.   Neurological:      General: No focal deficit present.      Mental Status: She is alert.          Radiology:  FL Small Bowel Follow Through Single-Contrast    Result Date: 11/10/2023  Impression: 1. Findings suggest the presence of partial small bowel obstruction. Transition zone of relatively decompressed small bowel in the right lower quadrant of the abdomen, likely at the ileum. Some the upstream small bowel loops are mild to moderately dilated. 2. There is no evidence of a complete or high-grade obstruction. Contrast reaches the colon within 1 hour and 15 minutes Electronically Signed: Florinda Davidson MD  11/10/2023 12:58 PM EST  Workstation ID: [id number]    XR Abdomen KUB    Result Date: 11/9/2023  Impression: Nasogastric tube past GE junction. Tip is terminating at the fundus. Electronically Signed:  Jackeline Carney MD  11/9/2023 10:27 PM EST  Workstation ID: [id number]    CT Abdomen Pelvis Without Contrast    Result Date: 11/9/2023  1.Fluid-filled distended small bowel loops consistent with small bowel obstruction. A transition point right lower quadrant. Mild stranding the mesentery right lower quadrant. 2.Distended fluid-filled stomach. Surgical changes distal stomach. 3.Status post cholecystectomy. Stable bile duct dilatation. Correlate bilirubin. 4.Diverticulosis without diverticulitis. 5.Cardiomegaly. 6.Status post hysterectomy. 7.Status post right nephrectomy. Atrophic left kidney. Electronically Signed: Jackeline Carney MD  11/9/2023 8:05 PM EST  Workstation ID: [id number]        Results Review:     I reviewed the patient's new clinical results.  I reviewed the patient's new imaging results and agree with the interpretation.    Medication Review:   Scheduled Meds:budesonide-formoterol, 2 puff, Inhalation, BID - RT  pantoprazole, 40 mg, Intravenous, Q AM  senna-docusate sodium, 2 tablet, Oral, BID  sodium chloride, 10 mL, Intravenous, Q12H  sodium chloride, 10 mL, Intravenous, Q12H      Continuous Infusions:   PRN Meds:.  senna-docusate sodium **AND** polyethylene glycol **AND** bisacodyl **AND** bisacodyl    hydrALAZINE    HYDROmorphone    nitroglycerin    ondansetron    sodium chloride    sodium chloride    sodium chloride    sodium chloride    Labs:    CBC    Results from last 7 days   Lab Units 11/10/23  2229 11/10/23  1615 11/09/23  2044   WBC 10*3/mm3 10.60 12.40* 15.50*   HEMOGLOBIN g/dL 11.8* 12.9 11.5*   PLATELETS 10*3/mm3 349 418 473*     BMP   Results from last 7 days   Lab Units 11/10/23  2229 11/10/23  1615 11/09/23  2044   SODIUM mmol/L 136 137 137   POTASSIUM mmol/L 4.9 4.1 4.3   CHLORIDE mmol/L 98 98 94*   CO2 mmol/L 24.0 25.0 24.0   BUN mg/dL 17 15 32*   CREATININE mg/dL 4.72* 3.96* 6.30*   GLUCOSE mg/dL 81 69 101*   MAGNESIUM mg/dL 1.9 1.9 1.9   PHOSPHORUS mg/dL 3.9 2.5  --      Cr Clearance  "Estimated Creatinine Clearance: 9.3 mL/min (A) (by C-G formula based on SCr of 4.72 mg/dL (H)).  Coag     HbA1C   Lab Results   Component Value Date    HGBA1C 5.5 11/07/2020    HGBA1C 6.2 (H) 03/28/2020    HGBA1C 6.5 (H) 09/30/2019     Blood Glucose No results found for: \"POCGLU\"  Infection     CMP   Results from last 7 days   Lab Units 11/10/23  2229 11/10/23  1615 11/09/23  2044   SODIUM mmol/L 136 137 137   POTASSIUM mmol/L 4.9 4.1 4.3   CHLORIDE mmol/L 98 98 94*   CO2 mmol/L 24.0 25.0 24.0   BUN mg/dL 17 15 32*   CREATININE mg/dL 4.72* 3.96* 6.30*   GLUCOSE mg/dL 81 69 101*   ALBUMIN g/dL 3.8  --  4.3   BILIRUBIN mg/dL  --   --  0.3   ALK PHOS U/L  --   --  144*   AST (SGOT) U/L  --   --  21   ALT (SGPT) U/L  --   --  12   LIPASE U/L  --   --  26     UA      Radiology(recent) FL Small Bowel Follow Through Single-Contrast    Result Date: 11/10/2023  Impression: 1. Findings suggest the presence of partial small bowel obstruction. Transition zone of relatively decompressed small bowel in the right lower quadrant of the abdomen, likely at the ileum. Some the upstream small bowel loops are mild to moderately dilated. 2. There is no evidence of a complete or high-grade obstruction. Contrast reaches the colon within 1 hour and 15 minutes Electronically Signed: Florinda Davidson MD  11/10/2023 12:58 PM EST  Workstation ID: [id number]    XR Abdomen KUB    Result Date: 11/9/2023  Impression: Nasogastric tube past GE junction. Tip is terminating at the fundus. Electronically Signed: Jackeline Carney MD  11/9/2023 10:27 PM EST  Workstation ID: [id number]    CT Abdomen Pelvis Without Contrast    Result Date: 11/9/2023  1.Fluid-filled distended small bowel loops consistent with small bowel obstruction. A transition point right lower quadrant. Mild stranding the mesentery right lower quadrant. 2.Distended fluid-filled stomach. Surgical changes distal stomach. 3.Status post cholecystectomy. Stable bile duct dilatation. Correlate bilirubin. " 4.Diverticulosis without diverticulitis. 5.Cardiomegaly. 6.Status post hysterectomy. 7.Status post right nephrectomy. Atrophic left kidney. Electronically Signed: Jackeline Carney MD  11/9/2023 8:05 PM EST  Workstation ID: [id number]    Assessment:    Acute abdominal pain  Acute partial small bowel obstruction  ESRD  Hypertension with ESRD  Anemia of ESRD  Diabetes mellitus type 2 with renal manifestations  Diabetes mellitus with neuropathic manifestations  Panlobular COPD with emphysema  Mucopurulent chronic bronchitis  Hypoventilation apnea syndrome with FABY  Exogenous obesity  B12 deficiency  Gastroesophageal reflux disease with esophagitis  Lucas's esophageal change  Secondary hyperaldosteronism  Secondary hyperparathyroidism  Nicotine dependency with nicotine use disorder  Renal cell carcinoma history  Vitamin D deficiency  IBAN  Hyperuricemia    Plan:  Continue bowel rest//encourage ambulation        Rey Jackson MD  11/11/23  10:35 EST

## 2023-11-11 NOTE — PROGRESS NOTES
General Surgery Consult Note      Name: Sherry Lobato ADMIT: 2023   : 1950  PCP: Jin Malcolm PA-C    MRN: [id number] LOS: 2 days   AGE/SEX: 73 y.o. female  ROOM: [address] [place] [address]      Patient Care Team:  Jin Malcolm PA-C as PCP - General (Physician Assistant)  Nilay Stevens MD as Consulting Physician (General Surgery)  Stephen Perea MD as Surgeon (Thoracic Surgery)  Makayla Navarro RN as Nurse Navigator  Matteo Merlos MD as Consulting Physician (Radiation Oncology)  Chief Complaint   Patient presents with    Abdominal Pain       Subjective   Vital signs stable, afebrile.  Doing okay this morning, tolerating liquid diet denies nausea and vomiting.  Admits to having multiple episodes of diarrhea overnight.  Past follow-through study, NG tube was removed yesterday    Past Medical History:   Diagnosis Date    Lucas's esophagus     C. difficile colitis     Carotid artery disease     -50-74% left, 16-49% right ()    COPD (chronic obstructive pulmonary disease)     DM2 (diabetes mellitus, type 2)     ESRD (end stage renal disease)     on HD    Gastric ulcer     at anastomatic site    Gastroparesis     unknown    GERD (gastroesophageal reflux disease)     Gout     not current    Hemodialysis patient     mwf    Hernia, incisional     abdomen    History of colonoscopy     UTD    History of degenerative disc disease     Hyperlipidemia     Hypertension     Medicare annual wellness visit, subsequent 2020    Microscopic colitis     Nephrotic syndrome     Neuroendocrine tumor     of stomach    FABY (obstructive sleep apnea)     not treating    Pedal edema     ressolved    Proteinuria     Renal cell cancer, right     Rib pain on right side     chronic    Rotator cuff tear     bilateral    Stomach cancer 2016    Stroke     Uterine cancer     Vitamin B 12 deficiency     Vitamin D deficiency      Past Surgical History:   Procedure Laterality Date    APPENDECTOMY      ARTERIOVENOUS FISTULA  Right     ARTERIOVENOUS FISTULA REPAIR      Clotted off and insert graft    ARTERIOVENOUS FISTULA/SHUNT SURGERY Left 12/30/2020    Procedure: ARTERIOVENOUS FISTULA FORMATION;  Surgeon: Jan Caicedo MD;  Location: [facility] MAIN OR;  Service: Vascular;  Laterality: Left;    BREAST BIOPSY      right nipple 1981    COLONOSCOPY N/A 11/24/2020    Procedure: COLONOSCOPY with polypectomy x 7;  Surgeon: Jeffery White MD;  Location: [facility] ENDOSCOPY;  Service: Gastroenterology;  Laterality: N/A;  post op: polyps, diverticulosis, hemorrhoids    COLONOSCOPY N/A 6/1/2023    Procedure: COLONOSCOPY with polypectomy x 3 biopsy x 1;  Surgeon: Jeffery White MD;  Location: [facility] ENDOSCOPY;  Service: Gastroenterology;  Laterality: N/A;  diverticulosis polyps    ENDOSCOPY N/A 03/12/2022    Procedure: ESOPHAGOGASTRODUODENOSCOPY with biopsy x 1 area;  Surgeon: Javan Augustin MD;  Location: [facility] ENDOSCOPY;  Service: Gastroenterology;  Laterality: N/A;  post op: anastamosis    ENDOSCOPY N/A 10/9/2023    Procedure: ESOPHAGOGASTRODUODENOSCOPY with biopsy x3 areas;  Surgeon: Ace Campbell MD;  Location: [facility] ENDOSCOPY;  Service: Gastroenterology;  Laterality: N/A;  duodenal ulcers;irregular z line    GASTRIC RESECTION      cancer    HYSTERECTOMY      LAPAROSCOPIC CHOLECYSTECTOMY      NEPHRECTOMY      right kidney removed     REDUCTION MAMMAPLASTY      TUMOR REMOVAL      boils    VENTRAL/INCISIONAL HERNIA REPAIR Right 08/28/2019    Procedure: VENTRAL/INCISIONAL HERNIA REPAIR;  Surgeon: Nilay Stevens MD;  Location: [facility] MAIN OR;  Service: General    VENTRAL/INCISIONAL HERNIA REPAIR N/A 10/01/2019    Procedure: OPEN VENTRAL/INCISIONAL HERNIA REPAIR;  Surgeon: Nilay Stevens MD;  Location: [facility] MAIN OR;  Service: General    VENTRAL/INCISIONAL HERNIA REPAIR N/A 10/11/2021    Procedure: VENTRAL/INCISIONAL HERNIA REPAIR LAPAROSCOPIC;  Surgeon: Nilay Stevens MD;  Location: [facility] MAIN OR;  Service: General;   Laterality: N/A;    VENTRAL/INCISIONAL HERNIA REPAIR N/A 2023    Procedure: VENTRAL/INCISIONAL HERNIA REPAIR with MESH;  Surgeon: Nilay Stevens MD;  Location: [facility] MAIN OR;  Service: General;  Laterality: N/A;     Family History   Problem Relation Age of Onset    Heart disease Father        Social History     Tobacco Use    Smoking status: Former     Packs/day: 0.25     Years: 57.00     Additional pack years: 0.00     Total pack years: 14.25     Types: Cigarettes     Quit date: 2021     Years since quittin.5     Passive exposure: Past    Smokeless tobacco: Never    Tobacco comments:     quit smoking 2 months ago   Vaping Use    Vaping Use: Never used   Substance Use Topics    Alcohol use: No    Drug use: No     Medications Prior to Admission   Medication Sig Dispense Refill Last Dose    albuterol sulfate  (90 Base) MCG/ACT inhaler Inhale 2 puffs Every 4 (Four) Hours As Needed for Wheezing.   Past Month    amLODIPine (NORVASC) 10 MG tablet Take 1 tablet by mouth Daily.   11/10/2023    budesonide-formoterol (SYMBICORT) 160-4.5 MCG/ACT inhaler Inhale 2 puffs 2 (Two) Times a Day.   2023    calcium acetate (PHOS BINDER,) 667 MG capsule capsule Take 1 capsule by mouth 3 (Three) Times a Day.   11/10/2023    carvedilol (COREG) 3.125 MG tablet Take 1 tablet by mouth 2 (Two) Times a Day With Meals.   11/10/2023    Cholecalciferol 25 MCG (1000 UT) tablet Take 1 tablet by mouth Daily.   11/10/2023    lidocaine-prilocaine (EMLA) 2.5-2.5 % cream Apply 1 application  topically to the appropriate area as directed Daily As Needed. Apply to access site 1 to 2 hours before dialysis  4 2023    Multiple Vitamins-Minerals (RENAPLEX-D) tablet Take 1 tablet by mouth Daily.   11/10/2023    pantoprazole (PROTONIX) 40 MG EC tablet Take 1 tablet by mouth 2 (Two) Times a Day.   11/10/2023    sevelamer (RENVELA) 800 MG tablet Take 3 tablets by mouth 3 (Three) Times a Day With Meals.   11/10/2023    simvastatin  (ZOCOR) 40 MG tablet Take 1 tablet by mouth Every Night.   11/10/2023    sodium bicarbonate 650 MG tablet Take 2 tablets by mouth 2 (Two) Times a Day.   11/10/2023    sucralfate (Carafate) 1 g tablet Take 1 tablet by mouth 4 (Four) Times a Day. 120 tablet 1 11/10/2023    temazepam (RESTORIL) 30 MG capsule Take 1 capsule by mouth Every Night.   11/9/2023     budesonide-formoterol, 2 puff, Inhalation, BID - RT  pantoprazole, 40 mg, Intravenous, Q AM  senna-docusate sodium, 2 tablet, Oral, BID  sodium chloride, 10 mL, Intravenous, Q12H  sodium chloride, 10 mL, Intravenous, Q12H           senna-docusate sodium **AND** polyethylene glycol **AND** bisacodyl **AND** bisacodyl    hydrALAZINE    HYDROmorphone    nitroglycerin    ondansetron    sodium chloride    sodium chloride    sodium chloride    sodium chloride  Atorvastatin calcium, Gabapentin, and Niacin    Review of Systems     Objective     Vital Signs and Labs:  Vital Signs Patient Vitals for the past 24 hrs:   BP Temp Temp src Pulse Resp SpO2   11/11/23 1221 146/78 98.2 °F (36.8 °C) Oral 84 14 95 %   11/11/23 0827 162/80 98.1 °F (36.7 °C) Oral 93 16 93 %   11/11/23 0713 -- -- -- 85 14 94 %   11/11/23 0709 -- -- -- 84 14 94 %   11/11/23 0415 116/73 98.2 °F (36.8 °C) Oral 88 16 93 %   11/11/23 0050 126/73 98 °F (36.7 °C) Oral 83 21 94 %   11/10/23 2048 139/63 98 °F (36.7 °C) Oral 86 18 98 %   11/10/23 1933 114/65 97.9 °F (36.6 °C) Oral 88 17 97 %   11/10/23 1911 -- -- -- 83 14 94 %   11/10/23 1908 -- -- -- 84 14 95 %   11/10/23 1549 136/53 -- -- 76 15 98 %   11/10/23 1521 136/55 97.5 °F (36.4 °C) Temporal 73 18 --     I/O:  I/O last 3 completed shifts:  In: 1100 [I.V.:600; IV Piggyback:500]  Out: 1200     Physical Exam:  Physical Exam  Cardiovascular:      Rate and Rhythm: Normal rate.   Pulmonary:      Effort: Pulmonary effort is normal.   Abdominal:      General: There is no distension.      Palpations: Abdomen is soft.      Tenderness: There is no abdominal  tenderness. There is no guarding.   Neurological:      Mental Status: She is alert and oriented to person, place, and time.   Psychiatric:         Mood and Affect: Mood normal.         CBC    Results from last 7 days   Lab Units 11/10/23  2229 11/10/23  1615 11/09/23  2044   WBC 10*3/mm3 10.60 12.40* 15.50*   HEMOGLOBIN g/dL 11.8* 12.9 11.5*   PLATELETS 10*3/mm3 349 418 473*     BMP   Results from last 7 days   Lab Units 11/10/23  2229 11/10/23  1615 11/09/23  2044   SODIUM mmol/L 136 137 137   POTASSIUM mmol/L 4.9 4.1 4.3   CHLORIDE mmol/L 98 98 94*   CO2 mmol/L 24.0 25.0 24.0   BUN mg/dL 17 15 32*   CREATININE mg/dL 4.72* 3.96* 6.30*   GLUCOSE mg/dL 81 69 101*   MAGNESIUM mg/dL 1.9 1.9 1.9   PHOSPHORUS mg/dL 3.9 2.5  --      Radiology(recent) FL Small Bowel Follow Through Single-Contrast    Result Date: 11/10/2023  Impression: 1. Findings suggest the presence of partial small bowel obstruction. Transition zone of relatively decompressed small bowel in the right lower quadrant of the abdomen, likely at the ileum. Some the upstream small bowel loops are mild to moderately dilated. 2. There is no evidence of a complete or high-grade obstruction. Contrast reaches the colon within 1 hour and 15 minutes Electronically Signed: Florinda Davidson MD  11/10/2023 12:58 PM EST  Workstation ID: [id number]    XR Abdomen KUB    Result Date: 11/9/2023  Impression: Nasogastric tube past GE junction. Tip is terminating at the fundus. Electronically Signed: Jackeline Carney MD  11/9/2023 10:27 PM EST  Workstation ID: [id number]    CT Abdomen Pelvis Without Contrast    Result Date: 11/9/2023  1.Fluid-filled distended small bowel loops consistent with small bowel obstruction. A transition point right lower quadrant. Mild stranding the mesentery right lower quadrant. 2.Distended fluid-filled stomach. Surgical changes distal stomach. 3.Status post cholecystectomy. Stable bile duct dilatation. Correlate bilirubin. 4.Diverticulosis without  diverticulitis. 5.Cardiomegaly. 6.Status post hysterectomy. 7.Status post right nephrectomy. Atrophic left kidney. Electronically Signed: Jackeline Carney MD  11/9/2023 8:05 PM EST  Workstation ID: [id number]     I reviewed the patient's new clinical results.    Assessment & Plan       Small bowel obstruction    SBO (small bowel obstruction)      73 y.o. female who was admitted 11/9 complaining of nausea, vomiting, and abdominal pain for concerns of possible small bowel obstruction    Plan:  -Tolerating liquid diet, will advance to regular  -If patient continues to improve and tolerates diet, will consider discharging patient tomorrow      This note was created using Dragon Voice Recognition software.    JAIMLA Herman  11/11/23  13:34 EST    Reviewed above documentation and agree.  Discussed with PA and saw patient personally on rounds.  Doing very well denies nausea vomiting or abdominal pain having bowel movements.  On exam her abdomen is soft and nontender no incarcerated hernias.  Plan advance to regular diet today if she tolerates this she can be discharged tomorrow versus remaining inpatient for Dr. Stevens to reevaluate on Monday.  I will continue to follow.

## 2023-11-12 ENCOUNTER — READMISSION MANAGEMENT (OUTPATIENT)
Dept: CALL CENTER | Facility: HOSPITAL | Age: 73
End: 2023-11-12
Payer: MEDICARE

## 2023-11-12 VITALS
SYSTOLIC BLOOD PRESSURE: 128 MMHG | DIASTOLIC BLOOD PRESSURE: 79 MMHG | RESPIRATION RATE: 16 BRPM | TEMPERATURE: 98 F | HEIGHT: 60 IN | WEIGHT: 158.73 LBS | OXYGEN SATURATION: 97 % | HEART RATE: 77 BPM | BODY MASS INDEX: 31.16 KG/M2

## 2023-11-12 LAB
ALBUMIN SERPL-MCNC: 3.7 G/DL (ref 3.5–5.2)
ANION GAP SERPL CALCULATED.3IONS-SCNC: 17 MMOL/L (ref 5–15)
BASOPHILS # BLD AUTO: 0.1 10*3/MM3 (ref 0–0.2)
BASOPHILS NFR BLD AUTO: 1.1 % (ref 0–1.5)
BUN SERPL-MCNC: 37 MG/DL (ref 8–23)
BUN/CREAT SERPL: 5.5 (ref 7–25)
CALCIUM SPEC-SCNC: 8.8 MG/DL (ref 8.6–10.5)
CHLORIDE SERPL-SCNC: 98 MMOL/L (ref 98–107)
CO2 SERPL-SCNC: 20 MMOL/L (ref 22–29)
CREAT SERPL-MCNC: 6.71 MG/DL (ref 0.57–1)
DEPRECATED RDW RBC AUTO: 67.8 FL (ref 37–54)
EGFRCR SERPLBLD CKD-EPI 2021: 6 ML/MIN/1.73
EOSINOPHIL # BLD AUTO: 0.4 10*3/MM3 (ref 0–0.4)
EOSINOPHIL NFR BLD AUTO: 4.2 % (ref 0.3–6.2)
ERYTHROCYTE [DISTWIDTH] IN BLOOD BY AUTOMATED COUNT: 20.4 % (ref 12.3–15.4)
GLUCOSE SERPL-MCNC: 89 MG/DL (ref 65–99)
HCT VFR BLD AUTO: 34.4 % (ref 34–46.6)
HGB BLD-MCNC: 11 G/DL (ref 12–15.9)
LYMPHOCYTES # BLD AUTO: 1.2 10*3/MM3 (ref 0.7–3.1)
LYMPHOCYTES NFR BLD AUTO: 13.7 % (ref 19.6–45.3)
MAGNESIUM SERPL-MCNC: 2 MG/DL (ref 1.6–2.4)
MCH RBC QN AUTO: 29.9 PG (ref 26.6–33)
MCHC RBC AUTO-ENTMCNC: 32 G/DL (ref 31.5–35.7)
MCV RBC AUTO: 93.2 FL (ref 79–97)
MONOCYTES # BLD AUTO: 0.8 10*3/MM3 (ref 0.1–0.9)
MONOCYTES NFR BLD AUTO: 8.9 % (ref 5–12)
NEUTROPHILS NFR BLD AUTO: 6.2 10*3/MM3 (ref 1.7–7)
NEUTROPHILS NFR BLD AUTO: 72.1 % (ref 42.7–76)
NRBC BLD AUTO-RTO: 0 /100 WBC (ref 0–0.2)
PHOSPHATE SERPL-MCNC: 5.7 MG/DL (ref 2.5–4.5)
PLATELET # BLD AUTO: 399 10*3/MM3 (ref 140–450)
PMV BLD AUTO: 7.5 FL (ref 6–12)
POTASSIUM SERPL-SCNC: 4.6 MMOL/L (ref 3.5–5.2)
RBC # BLD AUTO: 3.69 10*6/MM3 (ref 3.77–5.28)
SODIUM SERPL-SCNC: 135 MMOL/L (ref 136–145)
WBC NRBC COR # BLD: 8.6 10*3/MM3 (ref 3.4–10.8)

## 2023-11-12 PROCEDURE — 85025 COMPLETE CBC W/AUTO DIFF WBC: CPT

## 2023-11-12 PROCEDURE — 94799 UNLISTED PULMONARY SVC/PX: CPT

## 2023-11-12 PROCEDURE — 83735 ASSAY OF MAGNESIUM: CPT

## 2023-11-12 PROCEDURE — 36415 COLL VENOUS BLD VENIPUNCTURE: CPT

## 2023-11-12 PROCEDURE — 80069 RENAL FUNCTION PANEL: CPT | Performed by: HOSPITALIST

## 2023-11-12 PROCEDURE — 99232 SBSQ HOSP IP/OBS MODERATE 35: CPT | Performed by: STUDENT IN AN ORGANIZED HEALTH CARE EDUCATION/TRAINING PROGRAM

## 2023-11-12 PROCEDURE — 94761 N-INVAS EAR/PLS OXIMETRY MLT: CPT

## 2023-11-12 PROCEDURE — 94664 DEMO&/EVAL PT USE INHALER: CPT

## 2023-11-12 RX ADMIN — PANTOPRAZOLE SODIUM 40 MG: 40 INJECTION, POWDER, LYOPHILIZED, FOR SOLUTION INTRAVENOUS at 06:32

## 2023-11-12 RX ADMIN — BUDESONIDE AND FORMOTEROL FUMARATE DIHYDRATE 2 PUFF: 160; 4.5 AEROSOL RESPIRATORY (INHALATION) at 06:39

## 2023-11-12 NOTE — DISCHARGE SUMMARY
Date of Discharge:  11/12/2023    Discharge Diagnosis:   Acute abdominal pain  Acute partial small bowel obstruction  ESRD  Hypertension with ESRD  Anemia of ESRD  Diabetes mellitus type 2 with renal manifestations  Diabetes mellitus with neuropathic manifestations  Panlobular COPD with emphysema  Mucopurulent chronic bronchitis  Hypoventilation apnea syndrome with FABY  Exogenous obesity  B12 deficiency  Gastroesophageal reflux disease with esophagitis  Lucas's esophageal change  Secondary hyperaldosteronism  Secondary hyperparathyroidism  Nicotine dependency with nicotine use disorder  Renal cell carcinoma history  Vitamin D deficiency  IBAN  Hyperuricemia  Presenting Problem/History of Present Illness  Active Hospital Problems    Diagnosis  POA    **Small bowel obstruction [K56.609]  Yes    SBO (small bowel obstruction) [K56.609]  Yes      Resolved Hospital Problems   No resolved problems to display.          Hospital Course      73 y.o. female with significant medical history listed below presented to the ER with complaints of worsening abdominal pain over the last 6-8  weeks that had become severe in her RLQ. She complained of nausea, abdominal distention, decreased appetite, and loose stools. She denied any fever, chills, vomiting, chest pain, or shortness of breath. She is a dialysis MWF, and has not missed any treatments. She is also currently undergoing radiation for lung cancer.   In the ER BUN 32, creatinine 6.3, wbc 15.5, CT A/P showed small bowel obstruction, NG tube was placed and surgery consulted.  She was admitted.  She improved and did not need surgical intervention.  At the time of d/c, she is tolerating a regular diet and having Bms.  She will need to f/u with general surgery as an outpatient.  She was following by nephrology during her hospital to manage her HD.      Procedures Performed         Consults:   Consults       Date and Time Order Name Status Description    11/9/2023  8:34 PM  Inpatient Nephrology Consult Completed             Pertinent Test Results:    Lab Results (most recent)       Procedure Component Value Units Date/Time    Renal Function Panel [[id number]]  (Abnormal) Collected: 11/12/23 0401    Specimen: Blood Updated: 11/12/23 0438     Glucose 89 mg/dL      BUN 37 mg/dL      Creatinine 6.71 mg/dL      Sodium 135 mmol/L      Potassium 4.6 mmol/L      Chloride 98 mmol/L      CO2 20.0 mmol/L      Calcium 8.8 mg/dL      Albumin 3.7 g/dL      Phosphorus 5.7 mg/dL      Anion Gap 17.0 mmol/L      BUN/Creatinine Ratio 5.5     eGFR 6.0 mL/min/1.73      Comment: <15 Indicative of kidney failure       Narrative:      GFR Normal >60  Chronic Kidney Disease <60  Kidney Failure <15    The GFR formula is only valid for adults with stable renal function between ages 18 and 70.    Magnesium [[id number]]  (Normal) Collected: 11/12/23 0401    Specimen: Blood Updated: 11/12/23 0437     Magnesium 2.0 mg/dL     CBC & Differential [[id number]]  (Abnormal) Collected: 11/12/23 0401    Specimen: Blood Updated: 11/12/23 0418    Narrative:      The following orders were created for panel order CBC & Differential.  Procedure                               Abnormality         Status                     ---------                               -----------         ------                     CBC Auto Differential[[id number]]        Abnormal            Final result                 Please view results for these tests on the individual orders.    CBC Auto Differential [[id number]]  (Abnormal) Collected: 11/12/23 0401    Specimen: Blood Updated: 11/12/23 0418     WBC 8.60 10*3/mm3      RBC 3.69 10*6/mm3      Hemoglobin 11.0 g/dL      Hematocrit 34.4 %      MCV 93.2 fL      MCH 29.9 pg      MCHC 32.0 g/dL      RDW 20.4 %      RDW-SD 67.8 fl      MPV 7.5 fL      Platelets 399 10*3/mm3      Neutrophil % 72.1 %      Lymphocyte % 13.7 %      Monocyte % 8.9 %      Eosinophil % 4.2 %      Basophil % 1.1 %      Neutrophils,  Absolute 6.20 10*3/mm3      Lymphocytes, Absolute 1.20 10*3/mm3      Monocytes, Absolute 0.80 10*3/mm3      Eosinophils, Absolute 0.40 10*3/mm3      Basophils, Absolute 0.10 10*3/mm3      nRBC 0.0 /100 WBC     Renal Function Panel [[id number]]  (Abnormal) Collected: 11/10/23 2229    Specimen: Blood Updated: 11/10/23 2259     Glucose 81 mg/dL      BUN 17 mg/dL      Creatinine 4.72 mg/dL      Sodium 136 mmol/L      Potassium 4.9 mmol/L      Comment: Slight hemolysis detected by analyzer. Result may be falsely elevated.        Chloride 98 mmol/L      CO2 24.0 mmol/L      Calcium 9.4 mg/dL      Albumin 3.8 g/dL      Phosphorus 3.9 mg/dL      Anion Gap 14.0 mmol/L      BUN/Creatinine Ratio 3.6     eGFR 9.2 mL/min/1.73      Comment: <15 Indicative of kidney failure       Narrative:      GFR Normal >60  Chronic Kidney Disease <60  Kidney Failure <15    The GFR formula is only valid for adults with stable renal function between ages 18 and 70.    Magnesium [[id number]]  (Normal) Collected: 11/10/23 2229    Specimen: Blood Updated: 11/10/23 2255     Magnesium 1.9 mg/dL     CBC & Differential [[id number]]  (Abnormal) Collected: 11/10/23 2229    Specimen: Blood Updated: 11/10/23 2237    Narrative:      The following orders were created for panel order CBC & Differential.  Procedure                               Abnormality         Status                     ---------                               -----------         ------                     CBC Auto Differential[[id number]]        Abnormal            Final result                 Please view results for these tests on the individual orders.    CBC Auto Differential [[id number]]  (Abnormal) Collected: 11/10/23 2229    Specimen: Blood Updated: 11/10/23 2237     WBC 10.60 10*3/mm3      RBC 3.98 10*6/mm3      Hemoglobin 11.8 g/dL      Hematocrit 35.6 %      MCV 89.4 fL      MCH 29.6 pg      MCHC 33.1 g/dL      RDW 20.0 %      RDW-SD 66.1 fl      MPV 7.2 fL      Platelets 349 10*3/mm3       Neutrophil % 77.8 %      Lymphocyte % 9.9 %      Monocyte % 8.1 %      Eosinophil % 3.5 %      Basophil % 0.7 %      Neutrophils, Absolute 8.30 10*3/mm3      Lymphocytes, Absolute 1.10 10*3/mm3      Monocytes, Absolute 0.90 10*3/mm3      Eosinophils, Absolute 0.40 10*3/mm3      Basophils, Absolute 0.10 10*3/mm3      nRBC 0.1 /100 WBC     Basic Metabolic Panel [[id number]]  (Abnormal) Collected: 11/10/23 1615    Specimen: Blood Updated: 11/10/23 1654     Glucose 69 mg/dL      BUN 15 mg/dL      Creatinine 3.96 mg/dL      Sodium 137 mmol/L      Potassium 4.1 mmol/L      Comment: Slight hemolysis detected by analyzer. Result may be falsely elevated.        Chloride 98 mmol/L      CO2 25.0 mmol/L      Calcium 10.0 mg/dL      BUN/Creatinine Ratio 3.8     Anion Gap 14.0 mmol/L      eGFR 11.4 mL/min/1.73      Comment: <15 Indicative of kidney failure       Narrative:      GFR Normal >60  Chronic Kidney Disease <60  Kidney Failure <15    The GFR formula is only valid for adults with stable renal function between ages 18 and 70.    Scan Slide [[id number]] Collected: 11/10/23 1615    Specimen: Blood Updated: 11/10/23 1653     Anisocytosis Slight/1+     Ovalocytes Slight/1+     Polychromasia Slight/1+     WBC Morphology Normal     Platelet Morphology Normal    Phosphorus [[id number]]  (Normal) Collected: 11/10/23 1615    Specimen: Blood Updated: 11/10/23 1653     Phosphorus 2.5 mg/dL     Comprehensive Metabolic Panel [[id number]]  (Abnormal) Collected: 11/09/23 2044    Specimen: Blood Updated: 11/09/23 2120     Glucose 101 mg/dL      BUN 32 mg/dL      Creatinine 6.30 mg/dL      Sodium 137 mmol/L      Potassium 4.3 mmol/L      Comment: Slight hemolysis detected by analyzer. Result may be falsely elevated.        Chloride 94 mmol/L      CO2 24.0 mmol/L      Calcium 9.5 mg/dL      Total Protein 7.8 g/dL      Albumin 4.3 g/dL      ALT (SGPT) 12 U/L      AST (SGOT) 21 U/L      Comment: Slight hemolysis detected by analyzer. Result  may be falsely elevated.        Alkaline Phosphatase 144 U/L      Total Bilirubin 0.3 mg/dL      Globulin 3.5 gm/dL      A/G Ratio 1.2 g/dL      BUN/Creatinine Ratio 5.1     Anion Gap 19.0 mmol/L      eGFR 6.5 mL/min/1.73      Comment: <15 Indicative of kidney failure       Narrative:      GFR Normal >60  Chronic Kidney Disease <60  Kidney Failure <15    The GFR formula is only valid for adults with stable renal function between ages 18 and 70.    Lipase [[id number]]  (Normal) Collected: 11/09/23 2044    Specimen: Blood Updated: 11/09/23 2118     Lipase 26 U/L              Results for orders placed during the hospital encounter of 07/25/21    Adult Transthoracic Echo Complete W/ Cont if Necessary Per Protocol    Interpretation Summary  Normal LV size and contractility EF of 60%  Normal RV size  Normal atrial size  Aortic valve, mitral valve, tricuspid valve appears structurally normal, mild mitral regurgitation  seen.  No pericardial effusion seen.  Proximal aorta appears normal in size.              Condition on Discharge:  good    Vital Signs  Temp:  [98 °F (36.7 °C)-98.7 °F (37.1 °C)] 98 °F (36.7 °C)  Heart Rate:  [73-83] 77  Resp:  [16] 16  BP: (128-147)/(69-79) 128/79    Physical Exam:     General Appearance:    Alert, cooperative, in no acute distress   Head:    Normocephalic, without obvious abnormality, atraumatic   Eyes:            Lids and lashes normal, conjunctivae and sclerae normal, no   icterus, no pallor, corneas clear, PERRLA   Ears:    Ears appear intact with no abnormalities noted   Throat:   No oral lesions, no thrush, oral mucosa moist   Neck:   No adenopathy, supple, trachea midline, no thyromegaly, no   carotid bruit, no JVD   Lungs:     Clear to auscultation,respirations regular, even and                  unlabored    Heart:    Regular rhythm and normal rate, normal S1 and S2, no            murmur, no gallop, no rub, no click   Chest Wall:    No abnormalities observed   Abdomen:     Normal  bowel sounds, no masses, no organomegaly, soft        non-tender, non-distended, no guarding, no rebound                tenderness   Extremities:   Moves all extremities well, no edema, no cyanosis, no             redness   Pulses:   Pulses palpable and equal bilaterally   Skin:   No bleeding, bruising or rash   Lymph nodes:   No palpable adenopathy   Neurologic:   Cranial nerves 2 - 12 grossly intact, sensation intact, DTR       present and equal bilaterally       Discharge Disposition  Home-Health Care Svc    Discharge Medications     Discharge Medications        Continue These Medications        Instructions Start Date   albuterol sulfate  (90 Base) MCG/ACT inhaler  Commonly known as: PROVENTIL HFA;VENTOLIN HFA;PROAIR HFA   2 puffs, Inhalation, Every 4 Hours PRN      amLODIPine 10 MG tablet  Commonly known as: NORVASC   10 mg, Oral, Daily      budesonide-formoterol 160-4.5 MCG/ACT inhaler  Commonly known as: SYMBICORT   2 puffs, Inhalation, 2 Times Daily - RT      calcium acetate 667 MG capsule capsule  Commonly known as: PHOS BINDER)   667 mg, Oral, 3 Times Daily      carvedilol 3.125 MG tablet  Commonly known as: COREG   3.125 mg, Oral, 2 Times Daily With Meals      cholecalciferol 25 MCG (1000 UT) tablet  Commonly known as: VITAMIN D3   1,000 Units, Oral, Daily      lidocaine-prilocaine 2.5-2.5 % cream  Commonly known as: EMLA   1 application , Topical, Daily PRN, Apply to access site 1 to 2 hours before dialysis      pantoprazole 40 MG EC tablet  Commonly known as: PROTONIX   1 tablet, Oral, 2 Times Daily      RenaPlex-D tablet tablet  Generic drug: multivitamin with minerals   1 tablet, Oral, Daily      sevelamer 800 MG tablet  Commonly known as: RENVELA   2,400 mg, Oral, 3 Times Daily With Meals      simvastatin 40 MG tablet  Commonly known as: ZOCOR   40 mg, Oral, Nightly      sodium bicarbonate 650 MG tablet   1,300 mg, Oral, 2 Times Daily      sucralfate 1 g tablet  Commonly known as: Carafate   1  g, Oral, 4 Times Daily      temazepam 30 MG capsule  Commonly known as: RESTORIL   30 mg, Oral, Nightly               Discharge Diet:     Activity at Discharge:     Follow-up Appointments  Future Appointments   Date Time Provider Department Center   11/13/2023  5:30 PM Matteo Merlos MD [facility] None   11/30/2023  1:00 PM [facility] 1 [facility]   1/2/2024 10:00 AM [facility] VASC [facility] 4/CLINIC  [facility]   1/2/2024 10:45 AM [facility]/CLINIC  [facility]   1/2/2024 11:30 AM ROOM 1,  [facility]  [facility] None     Additional Instructions for the Follow-ups that You Need to Schedule       Discharge Follow-up with PCP   As directed       Currently Documented PCP:    Jin Malcolm PA-C    PCP Phone Number:    [phone]     Follow Up Details: 2 weeks                Test Results Pending at Discharge       Kelli Henderson MD  11/12/23  13:26 EST

## 2023-11-12 NOTE — PLAN OF CARE
Goal Outcome Evaluation:                 Pt tolerated diet well, no c/o of pain, pt is up in chair . Room near nurses station .

## 2023-11-12 NOTE — PLAN OF CARE
Goal Outcome Evaluation:         Pt discharging. No pain. Refused stool softener. Aox4. Ambulates independently. Plan of care ongoing.

## 2023-11-12 NOTE — CASE MANAGEMENT/SOCIAL WORK
Case Management Discharge Note      Final Note: home; current with moe olegpratik         Selected Continued Care - Discharged on 11/12/2023 Admission date: 11/9/2023 - Discharge disposition: Home-Health Care [facility] [place]       Transportation Services  Private: Car    Final Discharge Disposition Code: 01 - home or self-care

## 2023-11-12 NOTE — PROGRESS NOTES
General Surgery Progress Note    Name: Sherry Lobato ADMIT: 2023   : 1950  PCP: Jin Malcolm PA-C    MRN: [id number] LOS: 3 days   AGE/SEX: 73 y.o. female  ROOM: [address] [place] [address]    Chief Complaint   Patient presents with    Abdominal Pain     Subjective   Vital signs stable, afebrile.  Overall doing well and has no complaints this morning.  Denies pain.  Tolerating diet, denies nausea and vomiting.  Passing flatus and having BMs.      Objective     Scheduled Medications:   budesonide-formoterol, 2 puff, Inhalation, BID - RT  pantoprazole, 40 mg, Intravenous, Q AM  senna-docusate sodium, 2 tablet, Oral, BID  sodium chloride, 10 mL, Intravenous, Q12H  sodium chloride, 10 mL, Intravenous, Q12H        Active Infusions:       As Needed Medications:    senna-docusate sodium **AND** polyethylene glycol **AND** bisacodyl **AND** bisacodyl    hydrALAZINE    HYDROmorphone    nitroglycerin    ondansetron    sodium chloride    sodium chloride    sodium chloride    sodium chloride    Vital Signs  Vital Signs Patient Vitals for the past 24 hrs:   BP Temp Temp src Pulse Resp SpO2   23 0643 -- -- -- 77 16 97 %   23 0639 -- -- -- 73 16 96 %   23 0400 128/79 98 °F (36.7 °C) Oral 76 16 97 %   23 147/69 98.7 °F (37.1 °C) Oral 80 16 97 %   23 -- -- -- 83 16 96 %   23 -- -- -- 83 16 96 %   23 1221 146/78 98.2 °F (36.8 °C) Oral 84 14 95 %     I/O:  I/O last 3 completed shifts:  In: 720 [P.O.:720]  Out: 0     Physical Exam:  Physical Exam  Cardiovascular:      Rate and Rhythm: Normal rate.   Pulmonary:      Effort: Pulmonary effort is normal.   Abdominal:      General: There is no distension.      Palpations: Abdomen is soft.      Tenderness: There is no abdominal tenderness. There is no guarding.   Neurological:      Mental Status: She is alert and oriented to person, place, and time.   Psychiatric:         Mood and Affect: Mood normal.         Results  Review:     CBC    Results from last 7 days   Lab Units 11/12/23  0401 11/10/23  2229 11/10/23  1615 11/09/23  2044   WBC 10*3/mm3 8.60 10.60 12.40* 15.50*   HEMOGLOBIN g/dL 11.0* 11.8* 12.9 11.5*   PLATELETS 10*3/mm3 399 349 418 473*     BMP   Results from last 7 days   Lab Units 11/12/23  0401 11/10/23  2229 11/10/23  1615 11/09/23  2044   SODIUM mmol/L 135* 136 137 137   POTASSIUM mmol/L 4.6 4.9 4.1 4.3   CHLORIDE mmol/L 98 98 98 94*   CO2 mmol/L 20.0* 24.0 25.0 24.0   BUN mg/dL 37* 17 15 32*   CREATININE mg/dL 6.71* 4.72* 3.96* 6.30*   GLUCOSE mg/dL 89 81 69 101*   MAGNESIUM mg/dL 2.0 1.9 1.9 1.9   PHOSPHORUS mg/dL 5.7* 3.9 2.5  --      Radiology(recent) FL Small Bowel Follow Through Single-Contrast    Result Date: 11/10/2023  Impression: 1. Findings suggest the presence of partial small bowel obstruction. Transition zone of relatively decompressed small bowel in the right lower quadrant of the abdomen, likely at the ileum. Some the upstream small bowel loops are mild to moderately dilated. 2. There is no evidence of a complete or high-grade obstruction. Contrast reaches the colon within 1 hour and 15 minutes Electronically Signed: Florinda Davidson MD  11/10/2023 12:58 PM EST  Workstation ID: [id number]     I reviewed the patient's new clinical results.    Assessment & Plan       Small bowel obstruction    SBO (small bowel obstruction)      73 y.o. female who was admitted 11/9 complaining of nausea, vomiting, and abdominal pain for concerns of possible small bowel obstruction   -Tolerating regular diet, denies nausea and vomiting.  Continues to pass flatus and have bowel movements  -Okay to discharge from GS standpoint.  Will need to follow-up in office in 1 to 2 weeks with Dr. Stevens        This note was created using Dragon Voice Recognition software.    JAMILA Herman  11/12/23  10:31 EST

## 2023-11-13 ENCOUNTER — HOSPITAL ENCOUNTER (OUTPATIENT)
Dept: RADIATION ONCOLOGY | Facility: HOSPITAL | Age: 73
Discharge: HOME | End: 2023-11-13

## 2023-11-13 ENCOUNTER — RADIATION ONCOLOGY WEEKLY ASSESSMENT (OUTPATIENT)
Dept: RADIATION ONCOLOGY | Facility: HOSPITAL | Age: 73
End: 2023-11-13
Payer: MEDICARE

## 2023-11-13 VITALS
HEART RATE: 99 BPM | TEMPERATURE: 98.3 F | SYSTOLIC BLOOD PRESSURE: 165 MMHG | BODY MASS INDEX: 30.55 KG/M2 | OXYGEN SATURATION: 94 % | DIASTOLIC BLOOD PRESSURE: 76 MMHG | WEIGHT: 155.6 LBS | HEIGHT: 60 IN | RESPIRATION RATE: 15 BRPM

## 2023-11-13 DIAGNOSIS — C34.11 ADENOCARCINOMA OF UPPER LOBE OF RIGHT LUNG: Primary | ICD-10-CM

## 2023-11-13 LAB
RAD ONC ARIA COURSE ID: NORMAL
RAD ONC ARIA COURSE LAST TREATMENT DATE: NORMAL
RAD ONC ARIA COURSE START DATE: NORMAL
RAD ONC ARIA COURSE TREATMENT ELAPSED DAYS: 0
RAD ONC ARIA FIRST TREATMENT DATE: NORMAL
RAD ONC ARIA PLAN FRACTIONS TREATED TO DATE: 1
RAD ONC ARIA PLAN ID: NORMAL
RAD ONC ARIA PLAN PRESCRIBED DOSE PER FRACTION: 10 GY
RAD ONC ARIA PLAN PRIMARY REFERENCE POINT: NORMAL
RAD ONC ARIA PLAN TOTAL FRACTIONS PRESCRIBED: 5
RAD ONC ARIA PLAN TOTAL PRESCRIBED DOSE: 5000 CGY
RAD ONC ARIA REFERENCE POINT DOSAGE GIVEN TO DATE: 10 GY
RAD ONC ARIA REFERENCE POINT ID: NORMAL
RAD ONC ARIA REFERENCE POINT SESSION DOSAGE GIVEN: 10 GY

## 2023-11-13 PROCEDURE — 77373 STRTCTC BDY RAD THER TX DLVR: CPT | Performed by: RADIOLOGY

## 2023-11-13 NOTE — OUTREACH NOTE
Prep Survey      Flowsheet Row Responses   [facility] patient discharged from? [facility]   Is LACE score < 7 ? No   Eligibility Readm Mgmt   Discharge diagnosis Acute abdominal painAcute partial small bowel obstruction   Does the patient have one of the following disease processes/diagnoses(primary or secondary)? Other   Does the patient have Home health ordered? No   Is there a DME ordered? No   General alerts for this patient Hemodialysis at [facility] on Mon/Wed/Fri   Prep survey completed? Yes            CHUCHO GRAJEDA - Registered Nurse

## 2023-11-13 NOTE — PROGRESS NOTES
"NAME: Sherry Lobato DATE: 2023   : 1950    MRN: [id number] DIAGNOSIS: Rt lung cancer       RADIATION ON TREATMENT VISIT NOTE - CHEST    Treatment Summary  Treatment Site Ref. ID Energy Dose/Fx (cGy) #Fx Dose Correction (cGy) Total Dose (cGy) Start Date End Date Elapsed Days   RtLung SBRT RtLung SBRT 6X-FFF 1,000  0 1,000 2023             General:     Review of Systems  [x] No new complaints [] Fever/chills /night sweats  [] Decreased energy level [] Decreased appetite [] Oxygen:  L/min  [] Dry cough   [] Productive cough  [] SOB  [] Hemoptysis  [] Dysphagia      [x] Fatigue, severity: 0 [x] Pain, severity: 0, location: denies pain  [] Pain medication regimen:    [] Esophagitis regimen:    [] Skin regimen:      Subjective:  She started treatments today, no concerns or complaints.    KPS: 70-80     Vital Signs: /76   Pulse 99   Temp 98.3 °F (36.8 °C) (Oral)   Resp 15   Ht 152.4 cm (60\")   Wt 70.6 kg (155 lb 9.6 oz)   SpO2 94%   BMI 30.39 kg/m²     Weight:   Wt Readings from Last 3 Encounters:   23 70.6 kg (155 lb 9.6 oz)   23 72 kg (158 lb 11.7 oz)   10/26/23 71.7 kg (158 lb)       Medication:   Current Outpatient Medications:     albuterol sulfate  (90 Base) MCG/ACT inhaler, Inhale 2 puffs Every 4 (Four) Hours As Needed for Wheezing., Disp: , Rfl:     amLODIPine (NORVASC) 10 MG tablet, Take 1 tablet by mouth Daily., Disp: , Rfl:     budesonide-formoterol (SYMBICORT) 160-4.5 MCG/ACT inhaler, Inhale 2 puffs 2 (Two) Times a Day., Disp: , Rfl:     calcium acetate (PHOS BINDER,) 667 MG capsule capsule, Take 1 capsule by mouth 3 (Three) Times a Day., Disp: , Rfl:     carvedilol (COREG) 3.125 MG tablet, Take 1 tablet by mouth 2 (Two) Times a Day With Meals., Disp: , Rfl:     Cholecalciferol 25 MCG (1000 UT) tablet, Take 1 tablet by mouth Daily., Disp: , Rfl:     lidocaine-prilocaine (EMLA) 2.5-2.5 % cream, Apply 1 application  topically to the appropriate area as " directed Daily As Needed. Apply to access site 1 to 2 hours before dialysis, Disp: , Rfl: 4    Multiple Vitamins-Minerals (RENAPLEX-D) tablet, Take 1 tablet by mouth Daily., Disp: , Rfl:     pantoprazole (PROTONIX) 40 MG EC tablet, Take 1 tablet by mouth 2 (Two) Times a Day., Disp: , Rfl:     sevelamer (RENVELA) 800 MG tablet, Take 3 tablets by mouth 3 (Three) Times a Day With Meals., Disp: , Rfl:     simvastatin (ZOCOR) 40 MG tablet, Take 1 tablet by mouth Every Night., Disp: , Rfl:     sodium bicarbonate 650 MG tablet, Take 2 tablets by mouth 2 (Two) Times a Day., Disp: , Rfl:     sucralfate (Carafate) 1 g tablet, Take 1 tablet by mouth 4 (Four) Times a Day., Disp: 120 tablet, Rfl: 1    temazepam (RESTORIL) 30 MG capsule, Take 1 capsule by mouth Every Night., Disp: , Rfl:      LABS (Reviewed):  Hematology  WBC   Date Value Ref Range Status   11/12/2023 8.60 3.40 - 10.80 10*3/mm3 Final   07/14/2022 9.33 4.5 - 11.0 10*3/uL Final     RBC   Date Value Ref Range Status   11/12/2023 3.69 (L) 3.77 - 5.28 10*6/mm3 Final   07/14/2022 3.72 (L) 4.0 - 5.2 10*6/uL Final     Hemoglobin   Date Value Ref Range Status   11/12/2023 11.0 (L) 12.0 - 15.9 g/dL Final   05/12/2023 10.3 (L) 11.2 - 15.7 Gram/dL Final   07/14/2022 11.2 (L) 12.0 - 16.0 g/dL Final     Hematocrit   Date Value Ref Range Status   11/12/2023 34.4 34.0 - 46.6 % Final   07/14/2022 35.8 (L) 36.0 - 46.0 % Final     Platelets   Date Value Ref Range Status   11/12/2023 399 140 - 450 10*3/mm3 Final   07/14/2022 423 140 - 440 10*3/uL Final     Chemistry   Lab Results   Component Value Date    GLUCOSE 89 11/12/2023    BUN 37 (H) 11/12/2023    CREATININE 6.71 (H) 11/12/2023    EGFRIFNONA 6 (L) 10/08/2021    EGFRIFAFRI  10/08/2021      Comment:      <15 Indicative of kidney failure.    BCR 5.5 (L) 11/12/2023    K 4.6 11/12/2023    CO2 20.0 (L) 11/12/2023    CALCIUM 8.8 11/12/2023    ALBUMIN 3.7 11/12/2023    LABIL2 0.8 (L) 03/14/2019    AST 21 11/09/2023    ALT 12  11/09/2023         Physical Exam:     Pulmonary: [] Cough:     [] Dyspnea:  Neck:  [] Lymphadenopathy  Lungs:  [x] Clear to auscultation  CVS:  [x] Regular rate & rhythm  Skin:  stGstrstastdstest:st st1st GI:  [] Dysphagia:     [] Esophagitis:     Comments/Notes:      [x] Review of labs, images, dosimetry, dose delivered, & treatment parameters.    Comments:     [x] Patient treatment setup reviewed.    Comments:     Recommendations:  continue SBRT  Did well with this treatment today.    [x] Continue RT  [] Change RT Plan [] Hold RT, length:        Approved Electronically By:  Matteo Merlos MD, 11/13/2023, 17:17 EST      Confidentiality of this medical record shall be maintained except when use or disclosure is required or permitted by law, regulation or written authorization by the patient.

## 2023-11-15 ENCOUNTER — READMISSION MANAGEMENT (OUTPATIENT)
Dept: CALL CENTER | Facility: HOSPITAL | Age: 73
End: 2023-11-15
Payer: MEDICARE

## 2023-11-15 ENCOUNTER — HOSPITAL ENCOUNTER (OUTPATIENT)
Dept: RADIATION ONCOLOGY | Facility: HOSPITAL | Age: 73
Discharge: HOME | End: 2023-11-15

## 2023-11-15 LAB
RAD ONC ARIA COURSE ID: NORMAL
RAD ONC ARIA COURSE LAST TREATMENT DATE: NORMAL
RAD ONC ARIA COURSE START DATE: NORMAL
RAD ONC ARIA COURSE TREATMENT ELAPSED DAYS: 2
RAD ONC ARIA FIRST TREATMENT DATE: NORMAL
RAD ONC ARIA PLAN FRACTIONS TREATED TO DATE: 2
RAD ONC ARIA PLAN ID: NORMAL
RAD ONC ARIA PLAN PRESCRIBED DOSE PER FRACTION: 10 GY
RAD ONC ARIA PLAN PRIMARY REFERENCE POINT: NORMAL
RAD ONC ARIA PLAN TOTAL FRACTIONS PRESCRIBED: 5
RAD ONC ARIA PLAN TOTAL PRESCRIBED DOSE: 5000 CGY
RAD ONC ARIA REFERENCE POINT DOSAGE GIVEN TO DATE: 20 GY
RAD ONC ARIA REFERENCE POINT ID: NORMAL
RAD ONC ARIA REFERENCE POINT SESSION DOSAGE GIVEN: 10 GY

## 2023-11-15 PROCEDURE — 77373 STRTCTC BDY RAD THER TX DLVR: CPT | Performed by: RADIOLOGY

## 2023-11-15 NOTE — OUTREACH NOTE
"Medical Week 1 Survey      Flowsheet Row Responses   [facility] patient discharged from? [facility]   Does the patient have one of the following disease processes/diagnoses(primary or secondary)? Other   Week 1 attempt successful? Yes   Call start time 1702   Call end time 1708   General alerts for this patient Hemodialysis at [facility] on Mon/Wed/Fri   Discharge diagnosis Acute abdominal painAcute partial small bowel obstruction   Person spoke with today (if not patient) and relationship Ari   Is the patient taking all medications as directed (includes completed medication regime)? Yes   Medication comments no changes   Does the patient have a primary care provider?  Yes   Has the patient kept scheduled appointments due by today? Yes   Psychosocial issues? No   Did the patient receive a copy of their discharge instructions? Yes   What is the patient's perception of their health status since discharge? Returned to baseline/stable   Is the patient/caregiver able to teach back signs and symptoms related to disease process for when to call PCP? Yes   Is the patient/caregiver able to teach back signs and symptoms related to disease process for when to call 911? Yes   Is the patient/caregiver able to teach back the hierarchy of who to call/visit for symptoms/problems? PCP, Specialist, Home health nurse, Urgent Care, ED, 911 Yes   If the patient is a current smoker, are they able to teach back resources for cessation? Not a smoker   Additional teach back comments States she has been going to dialysis but also has radiation on those same days.  Has been to 2 and will have a total of 5. States she is \"riped out after that\".   Week 1 call completed? Yes   Wrap up additional comments Denies questions or needs at this time.   Call end time 1708            Tawanna MEDINA - Licensed Nurse  "

## 2023-11-16 PROCEDURE — 77336 RADIATION PHYSICS CONSULT: CPT | Performed by: RADIOLOGY

## 2023-11-17 ENCOUNTER — HOSPITAL ENCOUNTER (OUTPATIENT)
Dept: RADIATION ONCOLOGY | Facility: HOSPITAL | Age: 73
Discharge: HOME | End: 2023-11-17

## 2023-11-17 LAB
RAD ONC ARIA COURSE ID: NORMAL
RAD ONC ARIA COURSE LAST TREATMENT DATE: NORMAL
RAD ONC ARIA COURSE START DATE: NORMAL
RAD ONC ARIA COURSE TREATMENT ELAPSED DAYS: 4
RAD ONC ARIA FIRST TREATMENT DATE: NORMAL
RAD ONC ARIA PLAN FRACTIONS TREATED TO DATE: 3
RAD ONC ARIA PLAN ID: NORMAL
RAD ONC ARIA PLAN PRESCRIBED DOSE PER FRACTION: 10 GY
RAD ONC ARIA PLAN PRIMARY REFERENCE POINT: NORMAL
RAD ONC ARIA PLAN TOTAL FRACTIONS PRESCRIBED: 5
RAD ONC ARIA PLAN TOTAL PRESCRIBED DOSE: 5000 CGY
RAD ONC ARIA REFERENCE POINT DOSAGE GIVEN TO DATE: 30 GY
RAD ONC ARIA REFERENCE POINT ID: NORMAL
RAD ONC ARIA REFERENCE POINT SESSION DOSAGE GIVEN: 10 GY

## 2023-11-17 PROCEDURE — 77373 STRTCTC BDY RAD THER TX DLVR: CPT | Performed by: RADIOLOGY

## 2023-11-20 ENCOUNTER — APPOINTMENT (OUTPATIENT)
Dept: RADIATION ONCOLOGY | Facility: HOSPITAL | Age: 73
End: 2023-11-20
Payer: MEDICARE

## 2023-11-20 ENCOUNTER — HOSPITAL ENCOUNTER (OUTPATIENT)
Dept: RADIATION ONCOLOGY | Facility: HOSPITAL | Age: 73
Discharge: HOME | End: 2023-11-20
Payer: MEDICARE

## 2023-11-21 ENCOUNTER — RADIATION ONCOLOGY WEEKLY ASSESSMENT (OUTPATIENT)
Dept: RADIATION ONCOLOGY | Facility: HOSPITAL | Age: 73
End: 2023-11-21
Payer: MEDICARE

## 2023-11-21 VITALS
HEART RATE: 100 BPM | OXYGEN SATURATION: 93 % | BODY MASS INDEX: 30.86 KG/M2 | RESPIRATION RATE: 18 BRPM | WEIGHT: 158 LBS | SYSTOLIC BLOOD PRESSURE: 138 MMHG | DIASTOLIC BLOOD PRESSURE: 66 MMHG

## 2023-11-21 DIAGNOSIS — C34.11 ADENOCARCINOMA OF UPPER LOBE OF RIGHT LUNG: Primary | ICD-10-CM

## 2023-11-21 PROCEDURE — FACE2FACE: Performed by: RADIOLOGY

## 2023-11-21 NOTE — PATIENT INSTRUCTIONS
RADIATION THERAPY DISCHARGE INSTRUCTIONS  Chest    CONGRATULATIONS! You completed 5 radiation treatments for treatment of your right lung cancer. These discharge instructions are important for you to follow until your one-month follow up appointment with Dr. Merlos. Please make sure to review these instructions and call the Radiation Oncology Department if you have any questions or concerns with symptoms you may experience. Thank you for trusting us with your cancer treatment!    DIET  Eat a regular, well balanced diet that is high in protein such as meat, eggs, cheese, and nut butters  Drink 48 to 64 ounces of fluid daily  Use nutritional supplements if you are not able to eat full meals  Monitor your weight and report continued weight loss to your doctor    MEDICATIONS  Use Tylenol as needed to decrease discomfort and irritation to treatment area  Take pain medications only as prescribed  Take a laxative or stool softener as needed to prevent constipation due to pain medications  Use Lisa's Magic Mouthwash or other oral pain relief medication before meals and before taking medications as needed to ease the discomfort of swallowing (if applicable)    SKIN CARE  Wash treated skin gently with your hands using a mild, non-drying soap such as Dove® or Aveeno® until skin returns to normal  Pat skin dry - do not rub  Keep treated skin moist with frequent applications of Aquaphor® or unscented lotion (such as Eucerin)®  Always protect your treated skin when outdoors by wearing protective clothing and applying sunscreen SPF 15 or higher at least 30 minutes before going outdoors and reapply frequently  Resume use of deodorant to the armpit of the affected arm when skin reactions improve    ACTIVITY  Fatigue is a normal side effect of radiation therapy and should improve over time  Alternate rest and activity  Exercise such as walking may help to improve your fatigue    FOLLOW-UP  Continue follow-up appointments with all  other doctors as necessary  Call your radiation oncology doctor if you are concerned with any side effects you are experiencing, such as increased shortness of breath, fevers or chills, night sweats, increased coughing or new cough, blood in your sputum, or difficulty swallowing: [phone]    _______________________________________________________________________    Completed by: Dionna Fay RN on 11/21/2023 at 09:59 EST

## 2023-11-21 NOTE — PROGRESS NOTES
RADIATION THERAPY COMPLETION and DISCHARGE     Sherry Lobato completed radiation therapy on 11/22/2023 for right lung cancer. The summary of her treatment is as follows:    TREATMENT SITE:   RtLung SBRT START DATE:   11/13/2023   TOTAL DOSE (cGy):   5000 END DATE:   11/22/2023   DOSE/FRACTION:   1000 ELAPSED DAYS:   5   TOTAL FACTIONS:   5 PHYSICIAN:    Matteo Merlos MD     She is scheduled for a one-month follow-up appointment with Dr. Merlos on 12/21/2023 at 8:00AM.     The following instructions were provided to the patient and/or family in their printed after visit summary (AVS) as well as discussed in-person with the radiation oncology nurse. The patient and/or family member had the opportunity to ask questions and verbalized their questions were adequately answered.   _______________________________________________________________________      RADIATION THERAPY DISCHARGE INSTRUCTIONS  Chest    CONGRATULATIONS! You completed 5 radiation treatments for treatment of your right lung cancer. These discharge instructions are important for you to follow until your one-month follow up appointment with Dr. Merlos. Please make sure to review these instructions and call the Radiation Oncology Department if you have any questions or concerns with symptoms you may experience. Thank you for trusting us with your cancer treatment!    DIET  Eat a regular, well balanced diet that is high in protein such as meat, eggs, cheese, and nut butters  Drink 48 to 64 ounces of fluid daily  Use nutritional supplements if you are not able to eat full meals  Monitor your weight and report continued weight loss to your doctor    MEDICATIONS  Use Tylenol as needed to decrease discomfort and irritation to treatment area  Take pain medications only as prescribed  Take a laxative or stool softener as needed to prevent constipation due to pain medications  Use Lisa's Magic Mouthwash or other oral pain relief medication before meals and before taking  medications as needed to ease the discomfort of swallowing (if applicable)    SKIN CARE  Wash treated skin gently with your hands using a mild, non-drying soap such as Dove® or Aveeno® until skin returns to normal  Pat skin dry - do not rub  Keep treated skin moist with frequent applications of Aquaphor® or unscented lotion (such as Eucerin)®  Always protect your treated skin when outdoors by wearing protective clothing and applying sunscreen SPF 15 or higher at least 30 minutes before going outdoors and reapply frequently  Resume use of deodorant to the armpit of the affected arm when skin reactions improve    ACTIVITY  Fatigue is a normal side effect of radiation therapy and should improve over time  Alternate rest and activity  Exercise such as walking may help to improve your fatigue    FOLLOW-UP  Continue follow-up appointments with all other doctors as necessary  Call your radiation oncology doctor if you are concerned with any side effects you are experiencing, such as increased shortness of breath, fevers or chills, night sweats, increased coughing or new cough, blood in your sputum, or difficulty swallowing: [phone]    _______________________________________________________________________    Completed by: Dionna Fay RN, Radiation Oncology Nurse on 11/22/2023  at 09:57 EST

## 2023-11-21 NOTE — PROGRESS NOTES
NAME: Sherry Lobato DATE: 2023   : 1950    MRN: [id number] DIAGNOSIS:   Encounter Diagnosis   Name Primary?    Adenocarcinoma of upper lobe of right lung Yes          RADIATION ON TREATMENT VISIT NOTE - CHEST    Treatment Summary  Treatment Site Ref. ID Energy Dose/Fx (cGy) #Fx Dose Correction (cGy) Total Dose (cGy) Start Date End Date Elapsed Days   RtLung SBRT RtLung SBRT 6X-FFF 1,000 3 / 5 0 3,000 2023 4         Patient has had volume overload (3L removed on dialysis)  Nephrology suspects pneumonia  CBCTs have not lined up and thus repeat CT sim is needed today with new planning to resume as soon as possible    General:     Review of Systems  [] No new complaints [] Fever/chills /night sweats  [] Decreased energy level [] Decreased appetite [] Oxygen:  L/min  [] Dry cough   [x] Productive cough  [] SOB  [] Hemoptysis  [] Dysphagia      [x] Fatigue, severity: 1 [] Pain, severity: 0, location:   [] Pain medication regimen:    [] Esophagitis regimen:    [] Skin regimen:      Subjective:  Feeling better today than yesterday after having dialysis. 3L removed with HD. Prescribing cough medication and antibiotic for PNA (Dr. Eubanks, nephrologist). Productive cough with thin to thick white sputum.     KPS: 70     Vital Signs: Pulse 100   Resp 18   Wt 71.7 kg (158 lb)   SpO2 93%   BMI 30.86 kg/m²     Weight:   Wt Readings from Last 3 Encounters:   23 71.7 kg (158 lb)   23 70.6 kg (155 lb 9.6 oz)   23 72 kg (158 lb 11.7 oz)       Medication:   Current Outpatient Medications:     albuterol sulfate  (90 Base) MCG/ACT inhaler, Inhale 2 puffs Every 4 (Four) Hours As Needed for Wheezing., Disp: , Rfl:     amLODIPine (NORVASC) 10 MG tablet, Take 1 tablet by mouth Daily., Disp: , Rfl:     budesonide-formoterol (SYMBICORT) 160-4.5 MCG/ACT inhaler, Inhale 2 puffs 2 (Two) Times a Day., Disp: , Rfl:     calcium acetate (PHOS BINDER,) 667 MG capsule capsule, Take 1 capsule  by mouth 3 (Three) Times a Day., Disp: , Rfl:     carvedilol (COREG) 3.125 MG tablet, Take 1 tablet by mouth 2 (Two) Times a Day With Meals., Disp: , Rfl:     Cholecalciferol 25 MCG (1000 UT) tablet, Take 1 tablet by mouth Daily., Disp: , Rfl:     lidocaine-prilocaine (EMLA) 2.5-2.5 % cream, Apply 1 application  topically to the appropriate area as directed Daily As Needed. Apply to access site 1 to 2 hours before dialysis, Disp: , Rfl: 4    Multiple Vitamins-Minerals (RENAPLEX-D) tablet, Take 1 tablet by mouth Daily., Disp: , Rfl:     pantoprazole (PROTONIX) 40 MG EC tablet, Take 1 tablet by mouth 2 (Two) Times a Day., Disp: , Rfl:     sevelamer (RENVELA) 800 MG tablet, Take 3 tablets by mouth 3 (Three) Times a Day With Meals., Disp: , Rfl:     simvastatin (ZOCOR) 40 MG tablet, Take 1 tablet by mouth Every Night., Disp: , Rfl:     sodium bicarbonate 650 MG tablet, Take 2 tablets by mouth 2 (Two) Times a Day., Disp: , Rfl:     sucralfate (Carafate) 1 g tablet, Take 1 tablet by mouth 4 (Four) Times a Day., Disp: 120 tablet, Rfl: 1    temazepam (RESTORIL) 30 MG capsule, Take 1 capsule by mouth Every Night., Disp: , Rfl:      LABS (Reviewed):  Hematology  WBC   Date Value Ref Range Status   11/12/2023 8.60 3.40 - 10.80 10*3/mm3 Final   07/14/2022 9.33 4.5 - 11.0 10*3/uL Final     RBC   Date Value Ref Range Status   11/12/2023 3.69 (L) 3.77 - 5.28 10*6/mm3 Final   07/14/2022 3.72 (L) 4.0 - 5.2 10*6/uL Final     Hemoglobin   Date Value Ref Range Status   11/12/2023 11.0 (L) 12.0 - 15.9 g/dL Final   05/12/2023 10.3 (L) 11.2 - 15.7 Gram/dL Final   07/14/2022 11.2 (L) 12.0 - 16.0 g/dL Final     Hematocrit   Date Value Ref Range Status   11/12/2023 34.4 34.0 - 46.6 % Final   07/14/2022 35.8 (L) 36.0 - 46.0 % Final     Platelets   Date Value Ref Range Status   11/12/2023 399 140 - 450 10*3/mm3 Final   07/14/2022 423 140 - 440 10*3/uL Final     Chemistry   Lab Results   Component Value Date    GLUCOSE 89 11/12/2023    BUN 37  (H) 11/12/2023    CREATININE 6.71 (H) 11/12/2023    EGFRIFNONA 6 (L) 10/08/2021    EGFRIFAFRI  10/08/2021      Comment:      <15 Indicative of kidney failure.    BCR 5.5 (L) 11/12/2023    K 4.6 11/12/2023    CO2 20.0 (L) 11/12/2023    CALCIUM 8.8 11/12/2023    ALBUMIN 3.7 11/12/2023    LABIL2 0.8 (L) 03/14/2019    AST 21 11/09/2023    ALT 12 11/09/2023         Physical Exam:     Pulmonary: [x] Cough: improved, less fluid sounds on lungs    [] Dyspnea:  Neck:  [] Lymphadenopathy  Lungs:  [x] Clear to auscultation  CVS:  [x] Regular rate & rhythm  Skin:  stGstrstastdstest:st st1st GI:  [] Dysphagia:     [] Esophagitis:     Comments/Notes:      [x] Review of labs, images, dosimetry, dose delivered, & treatment parameters.    Comments:     [x] Patient treatment setup reviewed.    Comments:     Recommendations:  Patient has had volume overload (3L removed on dialysis)  Nephrology suspects pneumonia  CBCTs have not lined up and thus repeat CT sim is needed today with new planning to resume as soon as possible    [x] Continue RT  [x] Change RT Plan [] Hold RT, length:        Approved Electronically By:  Matteo Merlos MD, 11/21/2023, 10:17 EST      Confidentiality of this medical record shall be maintained except when use or disclosure is required or permitted by law, regulation or written authorization by the patient.

## 2023-11-22 ENCOUNTER — APPOINTMENT (OUTPATIENT)
Dept: RADIATION ONCOLOGY | Facility: HOSPITAL | Age: 73
End: 2023-11-22
Payer: MEDICARE

## 2023-11-22 ENCOUNTER — READMISSION MANAGEMENT (OUTPATIENT)
Dept: CALL CENTER | Facility: HOSPITAL | Age: 73
End: 2023-11-22
Payer: MEDICARE

## 2023-11-22 LAB
RAD ONC ARIA COURSE ID: NORMAL
RAD ONC ARIA COURSE LAST TREATMENT DATE: NORMAL
RAD ONC ARIA COURSE START DATE: NORMAL
RAD ONC ARIA COURSE TREATMENT ELAPSED DAYS: 9
RAD ONC ARIA FIRST TREATMENT DATE: NORMAL
RAD ONC ARIA PLAN FRACTIONS TREATED TO DATE: 1
RAD ONC ARIA PLAN ID: NORMAL
RAD ONC ARIA PLAN PRESCRIBED DOSE PER FRACTION: 10 GY
RAD ONC ARIA PLAN PRIMARY REFERENCE POINT: NORMAL
RAD ONC ARIA PLAN TOTAL FRACTIONS PRESCRIBED: 2
RAD ONC ARIA PLAN TOTAL PRESCRIBED DOSE: 2000 CGY
RAD ONC ARIA REFERENCE POINT DOSAGE GIVEN TO DATE: 40 GY
RAD ONC ARIA REFERENCE POINT ID: NORMAL
RAD ONC ARIA REFERENCE POINT SESSION DOSAGE GIVEN: 10 GY

## 2023-11-22 PROCEDURE — 77373 STRTCTC BDY RAD THER TX DLVR: CPT | Performed by: RADIOLOGY

## 2023-11-22 PROCEDURE — 77300 RADIATION THERAPY DOSE PLAN: CPT | Performed by: RADIOLOGY

## 2023-11-22 PROCEDURE — 77301 RADIOTHERAPY DOSE PLAN IMRT: CPT | Performed by: RADIOLOGY

## 2023-11-22 PROCEDURE — 77338 DESIGN MLC DEVICE FOR IMRT: CPT | Performed by: RADIOLOGY

## 2023-11-22 PROCEDURE — 77386: CPT | Performed by: RADIOLOGY

## 2023-11-22 NOTE — OUTREACH NOTE
Medical Week 2 Survey      Flowsheet Row Responses   [facility] patient discharged from? [facility]   Does the patient have one of the following disease processes/diagnoses(primary or secondary)? Other   Week 2 attempt successful? Yes   Call start time 1236   Call end time 1239   Comments regarding appointments Pt to see surgurey on November 28, 2023.   Has the patient kept scheduled appointments due by today? Yes   What is the patient's perception of their health status since discharge? Improving   Week 2 Call Completed? Yes   Wrap up additional comments Pt reports no continued abdominal pain since d/c. Pt to see surgery group on November 28, 2023.   Call end time 1239            Jami DONALD - Registered Nurse

## 2023-11-28 ENCOUNTER — OFFICE VISIT (OUTPATIENT)
Dept: SURGERY | Facility: CLINIC | Age: 73
End: 2023-11-28
Payer: MEDICARE

## 2023-11-28 ENCOUNTER — HOSPITAL ENCOUNTER (OUTPATIENT)
Dept: RADIATION ONCOLOGY | Facility: HOSPITAL | Age: 73
Discharge: HOME | End: 2023-11-28

## 2023-11-28 ENCOUNTER — APPOINTMENT (OUTPATIENT)
Dept: RADIATION ONCOLOGY | Facility: HOSPITAL | Age: 73
End: 2023-11-28
Payer: MEDICARE

## 2023-11-28 VITALS
HEIGHT: 60 IN | WEIGHT: 154.6 LBS | BODY MASS INDEX: 30.35 KG/M2 | DIASTOLIC BLOOD PRESSURE: 93 MMHG | HEART RATE: 96 BPM | TEMPERATURE: 98.2 F | OXYGEN SATURATION: 96 % | SYSTOLIC BLOOD PRESSURE: 121 MMHG | RESPIRATION RATE: 18 BRPM

## 2023-11-28 DIAGNOSIS — K56.609 SMALL BOWEL OBSTRUCTION: Primary | ICD-10-CM

## 2023-11-28 PROCEDURE — 99213 OFFICE O/P EST LOW 20 MIN: CPT | Performed by: SURGERY

## 2023-11-28 PROCEDURE — 1159F MED LIST DOCD IN RCRD: CPT | Performed by: SURGERY

## 2023-11-28 PROCEDURE — 3080F DIAST BP >= 90 MM HG: CPT | Performed by: SURGERY

## 2023-11-28 PROCEDURE — 3074F SYST BP LT 130 MM HG: CPT | Performed by: SURGERY

## 2023-11-28 PROCEDURE — 1160F RVW MEDS BY RX/DR IN RCRD: CPT | Performed by: SURGERY

## 2023-11-28 RX ORDER — HYOSCYAMINE SULFATE 0.125 MG
TABLET ORAL
COMMUNITY
Start: 2023-11-01

## 2023-11-28 NOTE — PROGRESS NOTES
"Subjective   Sherry Lobato is a 73 y.o. female.   Who I recently saw in the emergency department with right lower quadrant abdominal pain some inflammatory changes on her CT scan evidence at least a partial small bowel obstruction.  She underwent small bowel follow-through which showed transit to the colon and about an hour.  In general since that time she said that she has been feeling okay she has been having regular bowel movements she has been still having intermittent epigastric pain.  She says it is worse in the morning after she eats much better as the day goes on she has some intermittent gurgling through the day.  She has been having some loose stool.    Objective   /93 (BP Location: Right arm, Patient Position: Sitting, Cuff Size: Adult)   Pulse 96   Temp 98.2 °F (36.8 °C) (Infrared)   Resp 18   Ht 152.4 cm (60\")   Wt 70.1 kg (154 lb 9.6 oz)   SpO2 96%   BMI 30.19 kg/m²   Physical Exam  Constitutional:       Appearance: Normal appearance.   HENT:      Head: Normocephalic and atraumatic.   Abdominal:      Comments: Soft nondistended minimal tenderness to palpation the small hernia in the right lower quadrant is soft and reducible without much tenderness to palpation   Skin:     General: Skin is warm and dry.   Neurological:      General: No focal deficit present.      Mental Status: She is alert. Mental status is at baseline.         Assessment & Plan   Diagnoses and all orders for this visit:    1. Small bowel obstruction (Primary)    Here for hospital follow-up.  She likely does have partial small bowel obstruction could be related to adhesions could be related to the small hernia in the right lower quadrant.  It is reducible and soft.  Counseled her about some of the treatment options I could offer her.  We thought her symptoms were mainly related to scar tissue from her extensive surgical history exploratory laparotomy lysis of adhesions could be offered.  We thought the pain was most " likely due to the hernia some type of hernia repair either open with an underlay or onlay mesh could be offered to avoid the abdominal cavity and expedite her recovery.  With the have any surgery she has had in the past and her current medical condition she does not want to have any more surgeries and that she absolutely has to.  She understands that when to seek immediate medical attention and most likely will be revisiting this intermittently through time in the emergency department.  If she ever decides that she is going to have a surgery and we have an identifiable strong indication will move forward at that time.    Nilay Stevens MD  11/28/2023  8:20 AM EST    This note was created using Dragon Voice Recognition software.

## 2023-11-28 NOTE — PATIENT INSTRUCTIONS
RADIATION THERAPY DISCHARGE INSTRUCTIONS  Chest    CONGRATULATIONS! You completed 5 radiation treatments for treatment of your right upper lobe lung cancer. These discharge instructions are important for you to follow until your one-month follow up appointment with Dr. Merlos. Please make sure to review these instructions and call the Radiation Oncology Department if you have any questions or concerns with symptoms you may experience. Thank you for trusting us with your cancer treatment!    DIET  Eat a regular, well balanced diet that is high in protein such as meat, eggs, cheese, and nut butters  Drink 48 to 64 ounces of fluid daily  Use nutritional supplements if you are not able to eat full meals  Monitor your weight and report continued weight loss to your doctor    MEDICATIONS  Use Tylenol as needed to decrease discomfort and irritation to treatment area  Take pain medications only as prescribed  Take a laxative or stool softener as needed to prevent constipation due to pain medications  Use Lisa's Magic Mouthwash or other oral pain relief medication before meals and before taking medications as needed to ease the discomfort of swallowing (if applicable)    SKIN CARE  Wash treated skin gently with your hands using a mild, non-drying soap such as Dove® or Aveeno® until skin returns to normal  Pat skin dry - do not rub  Keep treated skin moist with frequent applications of Aquaphor® or unscented lotion (such as Eucerin)®  Always protect your treated skin when outdoors by wearing protective clothing and applying sunscreen SPF 15 or higher at least 30 minutes before going outdoors and reapply frequently  Resume use of deodorant to the armpit of the affected arm when skin reactions improve    ACTIVITY  Fatigue is a normal side effect of radiation therapy and should improve over time  Alternate rest and activity  Exercise such as walking may help to improve your fatigue    FOLLOW-UP  Continue follow-up appointments  with all other doctors as necessary  Call your radiation oncology doctor if you are concerned with any side effects you are experiencing, such as increased shortness of breath, fevers or chills, night sweats, increased coughing or new cough, blood in your sputum, or difficulty swallowing: [phone]    _______________________________________________________________________    Completed by: Dionna Fay RN on 11/28/2023 at 09:15 EST

## 2023-11-28 NOTE — PROGRESS NOTES
RADIATION THERAPY COMPLETION and DISCHARGE     Sherry Lobato completed radiation therapy on 11/28/2023 for adenocarcinoma of RUL. The summary of her treatment is as follows:    TREATMENT SITE:   RtLung SBRT START DATE:   11/13/2023   TOTAL DOSE (cGy):   5000 END DATE:   11/28/2023   DOSE/FRACTION:   1000 ELAPSED DAYS:   15   TOTAL FACTIONS:   5 PHYSICIAN:    Matteo Merlos MD     She is scheduled for a one-month follow-up appointment with Dr. Merlos on 12/21/2023 at 8:00AM.     The following instructions were provided to the patient and/or family in their printed after visit summary (AVS) as well as discussed in-person with the radiation oncology nurse. The patient and/or family member had the opportunity to ask questions and verbalized their questions were adequately answered.   _______________________________________________________________________      RADIATION THERAPY DISCHARGE INSTRUCTIONS  Chest    CONGRATULATIONS! You completed 5 radiation treatments for treatment of your right upper lobe lung cancer. These discharge instructions are important for you to follow until your one-month follow up appointment with Dr. Merlos. Please make sure to review these instructions and call the Radiation Oncology Department if you have any questions or concerns with symptoms you may experience. Thank you for trusting us with your cancer treatment!    DIET  Eat a regular, well balanced diet that is high in protein such as meat, eggs, cheese, and nut butters  Drink 48 to 64 ounces of fluid daily  Use nutritional supplements if you are not able to eat full meals  Monitor your weight and report continued weight loss to your doctor    MEDICATIONS  Use Tylenol as needed to decrease discomfort and irritation to treatment area  Take pain medications only as prescribed  Take a laxative or stool softener as needed to prevent constipation due to pain medications  Use Lisa's Magic Mouthwash or other oral pain relief medication before meals  and before taking medications as needed to ease the discomfort of swallowing (if applicable)    SKIN CARE  Wash treated skin gently with your hands using a mild, non-drying soap such as Dove® or Aveeno® until skin returns to normal  Pat skin dry - do not rub  Keep treated skin moist with frequent applications of Aquaphor® or unscented lotion (such as Eucerin)®  Always protect your treated skin when outdoors by wearing protective clothing and applying sunscreen SPF 15 or higher at least 30 minutes before going outdoors and reapply frequently  Resume use of deodorant to the armpit of the affected arm when skin reactions improve    ACTIVITY  Fatigue is a normal side effect of radiation therapy and should improve over time  Alternate rest and activity  Exercise such as walking may help to improve your fatigue    FOLLOW-UP  Continue follow-up appointments with all other doctors as necessary  Call your radiation oncology doctor if you are concerned with any side effects you are experiencing, such as increased shortness of breath, fevers or chills, night sweats, increased coughing or new cough, blood in your sputum, or difficulty swallowing: [phone]    _______________________________________________________________________    Completed by: Dionna Fay RN, Radiation Oncology Nurse on 11/28/2023  at 09:07 EST

## 2023-11-29 PROCEDURE — 77300 RADIATION THERAPY DOSE PLAN: CPT | Performed by: RADIOLOGY

## 2023-11-29 PROCEDURE — 77301 RADIOTHERAPY DOSE PLAN IMRT: CPT | Performed by: RADIOLOGY

## 2023-11-29 PROCEDURE — 77338 DESIGN MLC DEVICE FOR IMRT: CPT | Performed by: RADIOLOGY

## 2023-11-30 ENCOUNTER — HOSPITAL ENCOUNTER (OUTPATIENT)
Dept: RADIATION ONCOLOGY | Facility: HOSPITAL | Age: 73
Discharge: HOME | End: 2023-11-30

## 2023-11-30 ENCOUNTER — HOSPITAL ENCOUNTER (OUTPATIENT)
Dept: ULTRASOUND IMAGING | Facility: HOSPITAL | Age: 73
Discharge: HOME | End: 2023-11-30
Payer: MEDICARE

## 2023-11-30 ENCOUNTER — RADIATION ONCOLOGY WEEKLY ASSESSMENT (OUTPATIENT)
Dept: RADIATION ONCOLOGY | Facility: HOSPITAL | Age: 73
End: 2023-11-30
Payer: MEDICARE

## 2023-11-30 DIAGNOSIS — C34.11 ADENOCARCINOMA OF UPPER LOBE OF RIGHT LUNG: Primary | ICD-10-CM

## 2023-11-30 DIAGNOSIS — K11.8 PAROTID NODULE: ICD-10-CM

## 2023-11-30 LAB
RAD ONC ARIA COURSE ID: NORMAL
RAD ONC ARIA COURSE LAST TREATMENT DATE: NORMAL
RAD ONC ARIA COURSE START DATE: NORMAL
RAD ONC ARIA COURSE TREATMENT ELAPSED DAYS: 17
RAD ONC ARIA FIRST TREATMENT DATE: NORMAL
RAD ONC ARIA PLAN FRACTIONS TREATED TO DATE: 1
RAD ONC ARIA PLAN ID: NORMAL
RAD ONC ARIA PLAN PRESCRIBED DOSE PER FRACTION: 10 GY
RAD ONC ARIA PLAN PRIMARY REFERENCE POINT: NORMAL
RAD ONC ARIA PLAN TOTAL FRACTIONS PRESCRIBED: 1
RAD ONC ARIA PLAN TOTAL PRESCRIBED DOSE: 1000 CGY
RAD ONC ARIA REFERENCE POINT DOSAGE GIVEN TO DATE: 50 GY
RAD ONC ARIA REFERENCE POINT ID: NORMAL
RAD ONC ARIA REFERENCE POINT SESSION DOSAGE GIVEN: 10 GY

## 2023-11-30 PROCEDURE — 88333 PATH CONSLTJ SURG CYTO XM 1: CPT | Performed by: INTERNAL MEDICINE

## 2023-11-30 PROCEDURE — 76536 US EXAM OF HEAD AND NECK: CPT

## 2023-11-30 PROCEDURE — 25010000002 LIDOCAINE 1 % SOLUTION: Performed by: INTERNAL MEDICINE

## 2023-11-30 PROCEDURE — 77373 STRTCTC BDY RAD THER TX DLVR: CPT | Performed by: RADIOLOGY

## 2023-11-30 PROCEDURE — 88305 TISSUE EXAM BY PATHOLOGIST: CPT | Performed by: INTERNAL MEDICINE

## 2023-11-30 PROCEDURE — 88334 PATH CONSLTJ SURG CYTO XM EA: CPT | Performed by: INTERNAL MEDICINE

## 2023-11-30 RX ORDER — LIDOCAINE HYDROCHLORIDE 10 MG/ML
10 INJECTION, SOLUTION INFILTRATION; PERINEURAL ONCE
Status: COMPLETED | OUTPATIENT
Start: 2023-11-30 | End: 2023-11-30

## 2023-11-30 RX ADMIN — LIDOCAINE HYDROCHLORIDE 8 ML: 10 INJECTION, SOLUTION INFILTRATION; PERINEURAL at 13:08

## 2023-11-30 NOTE — PROGRESS NOTES
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 female with a PMH of Lucas's esophagus, C. 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 Further work-up revealed lung cancer.     CT chest without contrast on 8/15/2023 at [facility] showed significant interval increase in size of RUL nodule (now 1.6 x 1.5 cm, previously 5 mm on 2022), interval development of smaller peripheral subcentimeter nodules concerning for pulmonary metastasis, emphysema.     CT-guided RUL mass biopsy on 10/10/2023 at [facility] IR revealed well differentiated adenocarcinoma with focal mucinous features, no definite invasion identified.     PET/CT on 10/19/2023 at [facility] showed low to indeterminate level FDG accumulation just above the mediastinal blood pool of RUL nodule, 1.4 cm hypermetabolic nodule or lymph node at the inferior margin of the left parotid gland (salivary malignancy cannot be excluded -recommend tissue sampling), numerous small noncalcified nodules present in both lungs worrisome for malignancy but do not demonstrate FDG uptake as they are below the threshold for PET resolution, no convincing evidence of primary malignancy or metastatic disease in the abdomen, pelvis, or skeleton.      She was seen for initial consultation by Dr. Stephen Perea on 10/24/2023. She was not deemed to be a candidate for surgery due to her multiple comorbidities and  prior/current cancer history. Therefore, she was referred to our clinic for consideration of radiation therapy in the setting.      PLAN OF TREATMENT:  SBRT, curative intent   Treatment Site Ref. ID Energy Dose/Fx (cGy) #Fx Dose Correction (cGy) Total Dose (cGy) Start Date End Date Elapsed Days   RtLung SBRT RtLung SBRT 6X-FFF 1,000 3 / 5 0 3,000 11/13/2023 11/17/2023 4   RtLungSBRTAdj RtLung SBRT 6X-FFF 1,000 1 / 2 0 1,000 11/22/2023 11/22/2023 0   RtLngSBRTAdj2 RtLung SBRT 6X-FFF 1,000 1 / 1 0 1,000 11/30/2023 11/30/2023 0      DATE STARTED:  11/13/2023   DATE COMPLETED:  11/30/2023      TOTAL DOSE: 5000 cGy in 5 fractions   DOSE/FRACTION: 1000 cGy per fraction  ENERGY: 6 MV FFF VMAT/SBRT     STATUS OF TUMOR/PATIENT AT COMPLETION OF RADIOTHERAPY: Patient stable, course was longer than expected due to fluid status in lung (on HD) and infection and then after adapting plan to continue therapy she actually improved before the final treatment could be delivered, fluid status resolved and we had to do a repeat CT sim and plan accounting for the treatment response and new set-up.      TOLERANCE: grade 1 fatigue  No skin or CW/rib issues.      DISPOSITION:  1 month f/u here      Current Outpatient Medications:     albuterol sulfate  (90 Base) MCG/ACT inhaler, Inhale 2 puffs Every 4 (Four) Hours As Needed for Wheezing., Disp: , Rfl:     amLODIPine (NORVASC) 10 MG tablet, Take 1 tablet by mouth Daily., Disp: , Rfl:     budesonide-formoterol (SYMBICORT) 160-4.5 MCG/ACT inhaler, Inhale 2 puffs 2 (Two) Times a Day., Disp: , Rfl:     calcium acetate (PHOS BINDER,) 667 MG capsule capsule, Take 1 capsule by mouth 3 (Three) Times a Day., Disp: , Rfl:     carvedilol (COREG) 3.125 MG tablet, Take 1 tablet by mouth 2 (Two) Times a Day With Meals., Disp: , Rfl:     Cholecalciferol 25 MCG (1000 UT) tablet, Take 1 tablet by mouth Daily., Disp: , Rfl:     hyoscyamine (ANASPAZ,LEVSIN) 0.125 MG tablet, TAKE 1 TABLET BY MOUTH EVERY  4 HOURS AS NEEDED FOR ABDOMINAL CRAMPING, Disp: , Rfl:     lidocaine-prilocaine (EMLA) 2.5-2.5 % cream, Apply 1 application  topically to the appropriate area as directed Daily As Needed. Apply to access site 1 to 2 hours before dialysis, Disp: , Rfl: 4    Multiple Vitamins-Minerals (RENAPLEX-D) tablet, Take 1 tablet by mouth Daily., Disp: , Rfl:     pantoprazole (PROTONIX) 40 MG EC tablet, Take 1 tablet by mouth 2 (Two) Times a Day., Disp: , Rfl:     sevelamer (RENVELA) 800 MG tablet, Take 3 tablets by mouth 3 (Three) Times a Day With Meals., Disp: , Rfl:     simvastatin (ZOCOR) 40 MG tablet, Take 1 tablet by mouth Every Night., Disp: , Rfl:     sodium bicarbonate 650 MG tablet, Take 2 tablets by mouth 2 (Two) Times a Day., Disp: , Rfl:     sucralfate (Carafate) 1 g tablet, Take 1 tablet by mouth 4 (Four) Times a Day., Disp: 120 tablet, Rfl: 1    temazepam (RESTORIL) 30 MG capsule, Take 1 capsule by mouth Every Night., Disp: , Rfl:     KPS: 70-80            cc:  MD Jin Torres MD        Approved Electronically By:  Matteo Merlos MD, 11/30/2023, 09:43 EST                              Confidentiality of this medical record shall be maintained except when use or disclosure is required or permitted by law, regulation or written authorization by the patient.

## 2023-11-30 NOTE — PROGRESS NOTES
RADIATION THERAPY COMPLETION and DISCHARGE     Sherry Lobato completed radiation therapy on 11/30/2023 for right lung cancer. The summary of her treatment is as follows:    TREATMENT SITE:   RtLung SBRT START DATE:   11/13/2023   TOTAL DOSE (cGy):   5000 END DATE:   11/30/2023   DOSE/FRACTION:   1000 ELAPSED DAYS:   12   TOTAL FACTIONS:   5 PHYSICIAN:    Matteo Merlos MD     She is scheduled for a one-month follow-up appointment with Dr. Merlos on 12/21/2023 at 8:00AM.     The following instructions were provided to the patient and/or family in their printed after visit summary (AVS) as well as discussed in-person with the radiation oncology nurse. The patient and/or family member had the opportunity to ask questions and verbalized their questions were adequately answered.   _______________________________________________________________________      RADIATION THERAPY DISCHARGE INSTRUCTIONS  Chest    CONGRATULATIONS! You completed 5 radiation treatments for treatment of your right lung cancer. These discharge instructions are important for you to follow until your one-month follow up appointment with Dr. Merlos. Please make sure to review these instructions and call the Radiation Oncology Department if you have any questions or concerns with symptoms you may experience. Thank you for trusting us with your cancer treatment!    DIET  Eat a regular, well balanced diet that is high in protein such as meat, eggs, cheese, and nut butters  Drink 48 to 64 ounces of fluid daily  Use nutritional supplements if you are not able to eat full meals  Monitor your weight and report continued weight loss to your doctor    MEDICATIONS  Use Tylenol as needed to decrease discomfort and irritation to treatment area  Take pain medications only as prescribed  Take a laxative or stool softener as needed to prevent constipation due to pain medications  Use Lisa's Magic Mouthwash or other oral pain relief medication before meals and before  taking medications as needed to ease the discomfort of swallowing (if applicable)    SKIN CARE  Wash treated skin gently with your hands using a mild, non-drying soap such as Dove® or Aveeno® until skin returns to normal  Pat skin dry - do not rub  Keep treated skin moist with frequent applications of Aquaphor® or unscented lotion (such as Eucerin)®  Always protect your treated skin when outdoors by wearing protective clothing and applying sunscreen SPF 15 or higher at least 30 minutes before going outdoors and reapply frequently  Resume use of deodorant to the armpit of the affected arm when skin reactions improve    ACTIVITY  Fatigue is a normal side effect of radiation therapy and should improve over time  Alternate rest and activity  Exercise such as walking may help to improve your fatigue    FOLLOW-UP  Continue follow-up appointments with all other doctors as necessary  Call your radiation oncology doctor if you are concerned with any side effects you are experiencing, such as increased shortness of breath, fevers or chills, night sweats, increased coughing or new cough, blood in your sputum, or difficulty swallowing: [phone]    _______________________________________________________________________    Completed by: Dionna Fay RN, Radiation Oncology Nurse on 11/30/2023  at 07:36 EST

## 2023-11-30 NOTE — PATIENT INSTRUCTIONS
RADIATION THERAPY DISCHARGE INSTRUCTIONS  Chest    CONGRATULATIONS! You completed 5 radiation treatments for treatment of your right lung cancer. These discharge instructions are important for you to follow until your one-month follow up appointment with Dr. Merlos. Please make sure to review these instructions and call the Radiation Oncology Department if you have any questions or concerns with symptoms you may experience. Thank you for trusting us with your cancer treatment!    DIET  Eat a regular, well balanced diet that is high in protein such as meat, eggs, cheese, and nut butters  Drink 48 to 64 ounces of fluid daily  Use nutritional supplements if you are not able to eat full meals  Monitor your weight and report continued weight loss to your doctor    MEDICATIONS  Use Tylenol as needed to decrease discomfort and irritation to treatment area  Take pain medications only as prescribed  Take a laxative or stool softener as needed to prevent constipation due to pain medications  Use Lisa's Magic Mouthwash or other oral pain relief medication before meals and before taking medications as needed to ease the discomfort of swallowing (if applicable)    SKIN CARE  Wash treated skin gently with your hands using a mild, non-drying soap such as Dove® or Aveeno® until skin returns to normal  Pat skin dry - do not rub  Keep treated skin moist with frequent applications of Aquaphor® or unscented lotion (such as Eucerin)®  Always protect your treated skin when outdoors by wearing protective clothing and applying sunscreen SPF 15 or higher at least 30 minutes before going outdoors and reapply frequently  Resume use of deodorant to the armpit of the affected arm when skin reactions improve    ACTIVITY  Fatigue is a normal side effect of radiation therapy and should improve over time  Alternate rest and activity  Exercise such as walking may help to improve your fatigue    FOLLOW-UP  Continue follow-up appointments with all  other doctors as necessary  Call your radiation oncology doctor if you are concerned with any side effects you are experiencing, such as increased shortness of breath, fevers or chills, night sweats, increased coughing or new cough, blood in your sputum, or difficulty swallowing: [phone]    _______________________________________________________________________    Completed by: Dionna Fay RN on 11/30/2023 at 07:36 EST

## 2023-12-01 LAB
LAB AP CASE REPORT: NORMAL
Lab: NORMAL
PATH REPORT.FINAL DX SPEC: NORMAL
PATH REPORT.GROSS SPEC: NORMAL

## 2023-12-20 NOTE — PROGRESS NOTES
RADIATION ONCOLOGY FOLLOW-UP NOTE    NAME: Sherry Lobato  YOB: 1950  MRN #: [id number]  DATE OF SERVICE: 12/21/2023  REFERRING PROVIDER: Jin Malcolm PA-C  [address] [place] [address]  PRIMARY CARE PROVIDER: Jin Malcolm PA-C    DIAGNOSIS:  RUL NSCLC, well differentiated adenocarcinoma with focal mucinous features  [id number], Stage IA2    REASON FOR VISIT:  1M s/p XRT F/U    RADIATION TREATMENT COURSE:  5000 cGy in 5 fractions SBRT to right lung, completed 11/30/2023.    HISTORY OF PRESENT ILLNESS: The patient is a 73 y.o. year old female who was last seen in our office on 11/30/2023 upon completion of radiation therapy treatment. She has PMH of Lucas's esophagus, C. 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  She was seen by Dr. Perea and recommended SBRT after multi-D tumor board presentation.   She was recommended ablative SBRT and this went very well.    She follows with Dr. Solis, and was last seen on 10/23/2023. They were planning for a 3 week follow up.     No interval imaging, but she did have an US biopsy completed on 11/30/2023 for the left parotid lesion. Pathology below:  Final Diagnosis   Parotid gland, core biopsy:    Warthin's tumor (papillary cystadenoma lymphomatosum)    No evidence of malignancy       She has no CW/rib issues. No skin issues.      The following portions of the patient's history were reviewed and updated as appropriate: allergies, current medications, past family history, past medical history, past social history, past surgical history and problem list. Reviewed with the patient and remain unchanged.    PAST MEDICAL HISTORY:  she has a past medical history of Lucas's esophagus, C. difficile colitis, Carotid  artery disease, COPD (chronic obstructive pulmonary disease), DM2 (diabetes mellitus, type 2), ESRD (end stage renal disease), Gastric ulcer, Gastroparesis, GERD (gastroesophageal reflux disease), Gout, Hemodialysis patient, Hernia, incisional, History of colonoscopy, History of degenerative disc disease, Hyperlipidemia, Hypertension, Medicare annual wellness visit, subsequent (11/07/2020), Microscopic colitis, Nephrotic syndrome, Neuroendocrine tumor, FABY (obstructive sleep apnea), Pedal edema, Proteinuria, Renal cell cancer, right, Rib pain on right side, Rotator cuff tear, Stomach cancer (2016), Stroke, Uterine cancer, Vitamin B 12 deficiency, and Vitamin D deficiency.    MEDICATIONS:    Current Outpatient Medications:     benzonatate (TESSALON) 200 MG capsule, Take 1 capsule by mouth 3 (Three) Times a Day As Needed for Cough., Disp: , Rfl:     LOPERAMIDE HCL PO, Take 2 mg by mouth., Disp: , Rfl:     albuterol sulfate  (90 Base) MCG/ACT inhaler, Inhale 2 puffs Every 4 (Four) Hours As Needed for Wheezing., Disp: , Rfl:     amLODIPine (NORVASC) 10 MG tablet, Take 1 tablet by mouth Daily., Disp: , Rfl:     budesonide-formoterol (SYMBICORT) 160-4.5 MCG/ACT inhaler, Inhale 2 puffs 2 (Two) Times a Day., Disp: , Rfl:     calcium acetate (PHOS BINDER,) 667 MG capsule capsule, Take 1 capsule by mouth 3 (Three) Times a Day., Disp: , Rfl:     carvedilol (COREG) 3.125 MG tablet, Take 1 tablet by mouth 2 (Two) Times a Day With Meals., Disp: , Rfl:     Cholecalciferol 25 MCG (1000 UT) tablet, Take 1 tablet by mouth Daily., Disp: , Rfl:     hyoscyamine (ANASPAZ,LEVSIN) 0.125 MG tablet, TAKE 1 TABLET BY MOUTH EVERY 4 HOURS AS NEEDED FOR ABDOMINAL CRAMPING, Disp: , Rfl:     lidocaine-prilocaine (EMLA) 2.5-2.5 % cream, Apply 1 application  topically to the appropriate area as directed Daily As Needed. Apply to access site 1 to 2 hours before dialysis, Disp: , Rfl: 4    Multiple Vitamins-Minerals (RENAPLEX-D) tablet, Take 1  "tablet by mouth Daily., Disp: , Rfl:     pantoprazole (PROTONIX) 40 MG EC tablet, Take 1 tablet by mouth 2 (Two) Times a Day., Disp: , Rfl:     sevelamer (RENVELA) 800 MG tablet, Take 3 tablets by mouth 3 (Three) Times a Day With Meals., Disp: , Rfl:     simvastatin (ZOCOR) 40 MG tablet, Take 1 tablet by mouth Every Night., Disp: , Rfl:     sodium bicarbonate 650 MG tablet, Take 2 tablets by mouth 2 (Two) Times a Day., Disp: , Rfl:     sucralfate (Carafate) 1 g tablet, Take 1 tablet by mouth 4 (Four) Times a Day., Disp: 120 tablet, Rfl: 1    temazepam (RESTORIL) 30 MG capsule, Take 1 capsule by mouth Every Night., Disp: , Rfl:     ALLERGIES:    Allergies   Allergen Reactions    Atorvastatin Calcium Other (See Comments)     \"felt like I was on fire\"    Gabapentin Dizziness     says was unable to walk    Niacin Other (See Comments)     \"FEELS LIKE SHE IS ON FIRE\"         PAST SURGICAL HISTORY:  she has a past surgical history that includes Appendectomy; Laparoscopic cholecystectomy; Tumor removal; Breast biopsy; Reduction mammaplasty; Hysterectomy; AV fistula placement (Right); AV fistula repair; Gastric Resection; ventral/incisional hernia repair (Right, 08/28/2019); ventral/incisional hernia repair (N/A, 10/01/2019); Nephrectomy; Colonoscopy (N/A, 11/24/2020); arteriovenous fistula/shunt surgery (Left, 12/30/2020); ventral/incisional hernia repair (N/A, 10/11/2021); Esophagogastroduodenoscopy (N/A, 03/12/2022); ventral/incisional hernia repair (N/A, 01/23/2023); Colonoscopy (N/A, 6/1/2023); and Esophagogastroduodenoscopy (N/A, 10/9/2023).    PREVIOUS RADIOTHERAPY OR CHEMOTHERAPY:  XRT  No prior chemo    FAMILY HISTORY:  herfamily history includes Heart disease in her father.    SOCIAL HISTORY:  she reports that she quit smoking about 2 years ago. Her smoking use included cigarettes. She has a 14.25 pack-year smoking history. She has been exposed to tobacco smoke. She has never used smokeless tobacco. She reports " that she does not drink alcohol and does not use drugs.    PAIN AND PAIN MANAGEMENT:  denies pain.  Vitals:    12/21/23 0753   BP: 132/67   Pulse: 87   Resp: 20   SpO2: 95%   Weight: 70.2 kg (154 lb 12.8 oz)   PainSc: 0-No pain       NUTRITIONAL STATUS:  no issues    KPS:  80:  Normal activity with effort; some signs or symptoms      REVIEW OF SYSTEMS:   General: No fevers, chills, weight change, or drenching night sweats. Skin: No rashes or jaundice.  HEENT: No change in vision or hearing, no headaches.  Neck: No dysphagia or masses.  Heme/Lymph: No easy bruising or bleeding.  Respiratory System: No shortness of breath or cough.  Cardiovascular: No chest pain, palpitations, or dyspnea on exertion.  - Pacemaker. GI: No nausea, vomiting, diarrhea, melena, or hematochezia.  : No dysuria or hematuria.  Endocrine: No heat or cold intolerance. Musculoskeletal: No myalgias or arthralgias.  Neuro: No weakness, numbness, syncope, or seizures. Psych: No mood changes or depression. Ext: Denies swelling.    Objective   VITAL SIGNS:  Vitals:    12/21/23 0753   BP: 132/67   Pulse: 87   Resp: 20   SpO2: 95%   Weight: 70.2 kg (154 lb 12.8 oz)   PainSc: 0-No pain     PHYSICAL EXAM:  GENERAL: In no apparent distress, sitting comfortably in room.    CARDIOVASCULAR: S1 & S2 detected; no murmurs, rubs or gallops.  CHEST: Clear to auscultation bilaterally; no wheezes, crackles or rubs. Work of breathing normal.  MUSCULOSKELETAL: No tenderness to palpation along the spine or scapulae. Normal range of motion.  EXTREMITIES: No clubbing, cyanosis, edema.  SKIN: No erythema, rashes, ulcerations noted.     PERTINENT IMAGING/PATHOLOGY/LABS (Medical Decision Making):     COORDINATION OF CARE: A copy of this note is sent to the referring provider.    PATHOLOGY (Reviewed):     IMAGING (Reviewed):     LABS (Reviewed):  HEMATOLOGY:  WBC   Date Value Ref Range Status   11/12/2023 8.60 3.40 - 10.80 10*3/mm3 Final   07/14/2022 9.33 4.5 - 11.0  10*3/uL Final     RBC   Date Value Ref Range Status   11/12/2023 3.69 (L) 3.77 - 5.28 10*6/mm3 Final   07/14/2022 3.72 (L) 4.0 - 5.2 10*6/uL Final     Hemoglobin   Date Value Ref Range Status   11/12/2023 11.0 (L) 12.0 - 15.9 g/dL Final   05/12/2023 10.3 (L) 11.2 - 15.7 Gram/dL Final   07/14/2022 11.2 (L) 12.0 - 16.0 g/dL Final     Hematocrit   Date Value Ref Range Status   11/12/2023 34.4 34.0 - 46.6 % Final   07/14/2022 35.8 (L) 36.0 - 46.0 % Final     Platelets   Date Value Ref Range Status   11/12/2023 399 140 - 450 10*3/mm3 Final   07/14/2022 423 140 - 440 10*3/uL Final     CHEMISTRY:  Lab Results   Component Value Date    GLUCOSE 89 11/12/2023    BUN 37 (H) 11/12/2023    CREATININE 6.71 (H) 11/12/2023    EGFRIFNONA 6 (L) 10/08/2021    EGFRIFAFRI 5 (L) 07/14/2022    BCR 5.5 (L) 11/12/2023    K 4.6 11/12/2023    CO2 20.0 (L) 11/12/2023    CALCIUM 8.8 11/12/2023    ALBUMIN 3.7 11/12/2023    LABIL2 0.8 (L) 03/14/2019    AST 21 11/09/2023    ALT 12 11/09/2023     Assessment & Plan   ASSESSMENT AND PLAN:    RUL NSCLC, well differentiated adenocarcinoma with focal mucinous features  [id number], Stage IA2    -did well with SBRT  -no interval/sub-acute toxicities  Repeat CT chest ordered for post SBRT surveillance in 2 months.  Patient notified that Dr. Solis is leaving to work at [facility]; she wants to re-establish here--Dr. Ro or colleague.    All questions answered.  CT chest ordered.  RTC after CT chest study.    This assessment comes from my review of the imaging, pathology, physician notes and other pertinent information as mentioned.      CC: MD Iliana Harley MD      Approved by:     Matteo Merlos MD

## 2023-12-21 ENCOUNTER — OFFICE VISIT (OUTPATIENT)
Dept: RADIATION ONCOLOGY | Facility: HOSPITAL | Age: 73
End: 2023-12-21
Payer: MEDICARE

## 2023-12-21 VITALS
WEIGHT: 154.8 LBS | OXYGEN SATURATION: 95 % | DIASTOLIC BLOOD PRESSURE: 67 MMHG | RESPIRATION RATE: 20 BRPM | BODY MASS INDEX: 30.23 KG/M2 | SYSTOLIC BLOOD PRESSURE: 132 MMHG | HEART RATE: 87 BPM

## 2023-12-21 DIAGNOSIS — C7A.8 NEUROENDOCRINE CARCINOMA: ICD-10-CM

## 2023-12-21 DIAGNOSIS — C34.11 ADENOCARCINOMA OF UPPER LOBE OF RIGHT LUNG: Primary | ICD-10-CM

## 2023-12-21 PROCEDURE — G0463 HOSPITAL OUTPT CLINIC VISIT: HCPCS | Performed by: RADIOLOGY

## 2023-12-21 RX ORDER — BENZONATATE 200 MG/1
200 CAPSULE ORAL 3 TIMES DAILY PRN
COMMUNITY
Start: 2023-11-30

## 2023-12-26 NOTE — PROGRESS NOTES
HEMATOLOGY ONCOLOGY OUTPATIENT CONSULTATION       Patient name: Sherry Lobato  : 1950  MRN: [id number]  Primary Care Physician: Jni Malcolm PA-C  Referring Physician: Matteo Merlos MD  Reason For Consult: multiple malignancies, Non small cell lung cancer      History of Present Illness:  Patient is a 73 y.o. 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  No adjuvant treatment.  She did have hypersecretory symptoms causing renal insufficiency events leading to end-stage renal disease.  Patient has had comprehensive genetic testing with negative findings.    2023 CT chest 7 pelvis with right upper lobe lung mass  10/10/2023 CT-guided biopsy of lung mass with well-differentiated adenocarcinoma with focal mucinous features     10/19/2023 -PET/CT with 1.6 cm right upper lobe lung nodule low to intermediate level FDG accumulation, 1.4 cm hypermetabolic nodule or lymph node was seen at the inferior margin of the left parotid gland.  Numerous small noncalcified nodules are present in both lungs worrisome for malignancy.  No primary malignancy or metastatic disease in the abdomen pelvis or skeleton    FNA - parotid with no malignancy - Warthin tumor    10/27/2023 patient treated with SBRT 5 Gray in 5 fractions    Subjective:  Patient presents for initial consultation       Past Medical History:   Diagnosis Date    Lucas's esophagus     C. difficile colitis     Carotid artery disease     -50-74% left, 16-49% right ()    COPD (chronic obstructive pulmonary disease)     DM2 (diabetes mellitus, type 2)     ESRD  (end stage renal disease)     on HD    Gastric ulcer     at anastomatic site    Gastroparesis     unknown    GERD (gastroesophageal reflux disease)     Gout     not current    Hemodialysis patient     mwf    Hernia, incisional     abdomen    History of colonoscopy     UTD    History of degenerative disc disease     Hyperlipidemia     Hypertension     Medicare annual wellness visit, subsequent 11/07/2020    Microscopic colitis     Nephrotic syndrome     Neuroendocrine tumor     of stomach    FABY (obstructive sleep apnea)     not treating    Pedal edema     ressolved    Proteinuria     Renal cell cancer, right     Rib pain on right side     chronic    Rotator cuff tear     bilateral    Stomach cancer 2016    Stroke     Uterine cancer     Vitamin B 12 deficiency     Vitamin D deficiency        Past Surgical History:   Procedure Laterality Date    APPENDECTOMY      ARTERIOVENOUS FISTULA Right     ARTERIOVENOUS FISTULA REPAIR      Clotted off and insert graft    ARTERIOVENOUS FISTULA/SHUNT SURGERY Left 12/30/2020    Procedure: ARTERIOVENOUS FISTULA FORMATION;  Surgeon: Jan Caicedo MD;  Location: [facility] MAIN OR;  Service: Vascular;  Laterality: Left;    BREAST BIOPSY      right nipple 1981    COLONOSCOPY N/A 11/24/2020    Procedure: COLONOSCOPY with polypectomy x 7;  Surgeon: Jeffery White MD;  Location: [facility] ENDOSCOPY;  Service: Gastroenterology;  Laterality: N/A;  post op: polyps, diverticulosis, hemorrhoids    COLONOSCOPY N/A 6/1/2023    Procedure: COLONOSCOPY with polypectomy x 3 biopsy x 1;  Surgeon: Jeffery White MD;  Location: [facility] ENDOSCOPY;  Service: Gastroenterology;  Laterality: N/A;  diverticulosis polyps    ENDOSCOPY N/A 03/12/2022    Procedure: ESOPHAGOGASTRODUODENOSCOPY with biopsy x 1 area;  Surgeon: Javan Augustin MD;  Location: [facility] ENDOSCOPY;  Service: Gastroenterology;  Laterality: N/A;  post op: anastamosis    ENDOSCOPY N/A 10/9/2023    Procedure: ESOPHAGOGASTRODUODENOSCOPY  with biopsy x3 areas;  Surgeon: Ace Campbell MD;  Location: [facility] ENDOSCOPY;  Service: Gastroenterology;  Laterality: N/A;  duodenal ulcers;irregular z line    GASTRIC RESECTION      cancer    HYSTERECTOMY      LAPAROSCOPIC CHOLECYSTECTOMY      NEPHRECTOMY      right kidney removed     REDUCTION MAMMAPLASTY      TUMOR REMOVAL      boils    VENTRAL/INCISIONAL HERNIA REPAIR Right 08/28/2019    Procedure: VENTRAL/INCISIONAL HERNIA REPAIR;  Surgeon: Nilay Stevens MD;  Location: [facility] MAIN OR;  Service: General    VENTRAL/INCISIONAL HERNIA REPAIR N/A 10/01/2019    Procedure: OPEN VENTRAL/INCISIONAL HERNIA REPAIR;  Surgeon: Nilay Stevens MD;  Location: [facility] MAIN OR;  Service: General    VENTRAL/INCISIONAL HERNIA REPAIR N/A 10/11/2021    Procedure: VENTRAL/INCISIONAL HERNIA REPAIR LAPAROSCOPIC;  Surgeon: Nilay Stevens MD;  Location: [facility] MAIN OR;  Service: General;  Laterality: N/A;    VENTRAL/INCISIONAL HERNIA REPAIR N/A 01/23/2023    Procedure: VENTRAL/INCISIONAL HERNIA REPAIR with MESH;  Surgeon: Nilay Stevens MD;  Location: [facility] MAIN OR;  Service: General;  Laterality: N/A;         Current Outpatient Medications:     albuterol sulfate  (90 Base) MCG/ACT inhaler, Inhale 2 puffs Every 4 (Four) Hours As Needed for Wheezing., Disp: , Rfl:     amLODIPine (NORVASC) 10 MG tablet, Take 1 tablet by mouth Daily., Disp: , Rfl:     benzonatate (TESSALON) 200 MG capsule, Take 1 capsule by mouth 3 (Three) Times a Day As Needed for Cough., Disp: , Rfl:     budesonide-formoterol (SYMBICORT) 160-4.5 MCG/ACT inhaler, Inhale 2 puffs 2 (Two) Times a Day., Disp: , Rfl:     calcium acetate (PHOS BINDER,) 667 MG capsule capsule, Take 1 capsule by mouth 3 (Three) Times a Day., Disp: , Rfl:     carvedilol (COREG) 3.125 MG tablet, Take 1 tablet by mouth 2 (Two) Times a Day With Meals., Disp: , Rfl:     Cholecalciferol 25 MCG (1000 UT) tablet, Take 1 tablet by mouth Daily., Disp: , Rfl:     hyoscyamine  "(ANASPAZ,LEVSIN) 0.125 MG tablet, TAKE 1 TABLET BY MOUTH EVERY 4 HOURS AS NEEDED FOR ABDOMINAL CRAMPING, Disp: , Rfl:     lidocaine-prilocaine (EMLA) 2.5-2.5 % cream, Apply 1 application  topically to the appropriate area as directed Daily As Needed. Apply to access site 1 to 2 hours before dialysis, Disp: , Rfl: 4    LOPERAMIDE HCL PO, Take 2 mg by mouth., Disp: , Rfl:     Multiple Vitamins-Minerals (RENAPLEX-D) tablet, Take 1 tablet by mouth Daily., Disp: , Rfl:     pantoprazole (PROTONIX) 40 MG EC tablet, Take 1 tablet by mouth 2 (Two) Times a Day., Disp: , Rfl:     sevelamer (RENVELA) 800 MG tablet, Take 3 tablets by mouth 3 (Three) Times a Day With Meals., Disp: , Rfl:     simvastatin (ZOCOR) 40 MG tablet, Take 1 tablet by mouth Every Night., Disp: , Rfl:     sodium bicarbonate 650 MG tablet, Take 2 tablets by mouth 2 (Two) Times a Day., Disp: , Rfl:     sucralfate (Carafate) 1 g tablet, Take 1 tablet by mouth 4 (Four) Times a Day., Disp: 120 tablet, Rfl: 1    temazepam (RESTORIL) 30 MG capsule, Take 1 capsule by mouth Every Night., Disp: , Rfl:     Allergies   Allergen Reactions    Atorvastatin Calcium Other (See Comments)     \"felt like I was on fire\"    Gabapentin Dizziness     says was unable to walk    Niacin Other (See Comments)     \"FEELS LIKE SHE IS ON FIRE\"         Family History   Problem Relation Age of Onset    Heart disease Father        Cancer-related family history is not on file.      Social History     Tobacco Use    Smoking status: Former     Packs/day: 0.25     Years: 57.00     Additional pack years: 0.00     Total pack years: 14.25     Types: Cigarettes     Quit date: 2021     Years since quittin.6     Passive exposure: Past    Smokeless tobacco: Never    Tobacco comments:     quit smoking 2 months ago   Vaping Use    Vaping Use: Never used   Substance Use Topics    Alcohol use: No    Drug use: No     Social History     Social History Narrative    Not on file       ROS:   Review of " Systems   Constitutional:  Negative for fatigue and fever.   HENT:  Negative for congestion and nosebleeds.    Eyes:  Negative for pain.   Respiratory:  Negative for cough and shortness of breath.    Cardiovascular:  Negative for chest pain.   Gastrointestinal:  Negative for abdominal pain, blood in stool, diarrhea, nausea and vomiting.   Endocrine: Negative for cold intolerance and heat intolerance.   Genitourinary:  Negative for difficulty urinating.   Musculoskeletal:  Negative for arthralgias.   Skin:  Negative for rash.   Neurological:  Negative for dizziness and headaches.   Hematological:  Does not bruise/bleed easily.   Psychiatric/Behavioral:  Negative for behavioral problems.          Objective:    Vital Signs:  There were no vitals filed for this visit.  There is no height or weight on file to calculate BMI.    ECOG  (2) Ambulatory and capable of self care, unable to carry out work activity, up and about > 50% or waking hours    Physical Exam:   Physical Exam  Constitutional:       Appearance: Normal appearance.   HENT:      Head: Normocephalic and atraumatic.   Eyes:      Pupils: Pupils are equal, round, and reactive to light.   Cardiovascular:      Rate and Rhythm: Normal rate and regular rhythm.      Pulses: Normal pulses.      Heart sounds: No murmur heard.  Pulmonary:      Effort: Pulmonary effort is normal.      Breath sounds: Normal breath sounds.   Abdominal:      General: There is no distension.      Palpations: Abdomen is soft. There is no mass.      Tenderness: There is no abdominal tenderness.   Musculoskeletal:         General: Normal range of motion.      Cervical back: Normal range of motion.   Skin:     General: Skin is warm.   Neurological:      General: No focal deficit present.      Mental Status: She is alert.   Psychiatric:         Mood and Affect: Mood normal.         Lab Results - Last 18 Months   Lab Units 11/12/23  0401 11/10/23  2229 11/10/23  1615   WBC 10*3/mm3 8.60 10.60  "12.40*   HEMOGLOBIN g/dL 11.0* 11.8* 12.9   HEMATOCRIT % 34.4 35.6 39.7   PLATELETS 10*3/mm3 399 349 418   MCV fL 93.2 89.4 92.4     Lab Results - Last 18 Months   Lab Units 11/12/23  0401 11/10/23  2229 11/10/23  1615 11/09/23  2044 10/08/23  2229 10/08/23  0748 10/04/23  0040 10/03/23  1331   SODIUM mmol/L 135* 136 137 137   < > 135*   < > 139   POTASSIUM mmol/L 4.6 4.9 4.1 4.3   < > 5.4*   < > 6.2*   CHLORIDE mmol/L 98 98 98 94*   < > 92*   < > 96*   CO2 mmol/L 20.0* 24.0 25.0 24.0   < > 22.0   < > 23.0   BUN mg/dL 37* 17 15 32*   < > 48*   < > 49*   CREATININE mg/dL 6.71* 4.72* 3.96* 6.30*   < > 6.02*   < > 6.29*   CALCIUM mg/dL 8.8 9.4 10.0 9.5   < > 9.3   < > 8.3*   BILIRUBIN mg/dL  --   --   --  0.3  --  0.3  --  0.3   ALK PHOS U/L  --   --   --  144*  --  105  --  111   ALT (SGPT) U/L  --   --   --  12  --  12  --  14   AST (SGOT) U/L  --   --   --  21  --  15  --  19   GLUCOSE mg/dL 89 81 69 101*   < > 121*   < > 85    < > = values in this interval not displayed.       Lab Results   Component Value Date    GLUCOSE 89 11/12/2023    BUN 37 (H) 11/12/2023    CREATININE 6.71 (H) 11/12/2023    EGFRIFNONA 6 (L) 10/08/2021    EGFRIFAFRI 5 (L) 07/14/2022    BCR 5.5 (L) 11/12/2023    K 4.6 11/12/2023    CO2 20.0 (L) 11/12/2023    CALCIUM 8.8 11/12/2023    ALBUMIN 3.7 11/12/2023    LABIL2 0.8 (L) 03/14/2019    AST 21 11/09/2023    ALT 12 11/09/2023       Lab Results - Last 18 Months   Lab Units 10/10/23  0737 01/26/23  1645 07/14/22  1114   INR  1.06 1.05 1.0   APTT seconds 30.8 30.8*  --        Lab Results   Component Value Date    IRON 32 (L) 10/27/2022    TIBC 210 (L) 10/27/2022    FERRITIN 55 12/06/2016       Lab Results   Component Value Date    FOLATE 11.9 04/15/2017       Lab Results   Component Value Date    OCCULTBLD POSITIVE (A) 06/01/2017    OCCULTBLD POSITIVE (A) 05/16/2017    OCCULTBLD POSITIVE (A) 05/05/2017       No results found for: \"RETICCTPCT\"  Lab Results   Component Value Date    [id number] 1,234 " "(H) 04/15/2017     No results found for: \"SPEP\", \"UPEP\"  Uric Acid   Date Value Ref Range Status   07/10/2018 4.7 2.6 - 8.0 mg/dL Final     Lab Results   Component Value Date    SEDRATE 69 (H) 02/08/2019     No results found for: \"FIBRINOGEN\", \"HAPTOGLOBIN\"  Lab Results   Component Value Date    PTT 30.8 10/10/2023    INR 1.06 10/10/2023     No results found for: \"\"  No results found for: \"CEA\"  No components found for: \"CA-19-9\"  No results found for: \"PSA\"  Lab Results   Component Value Date    SEDRATE 69 (H) 02/08/2019          Assessment & Plan     Patient is 73-year-old female with multiple malignancy in the past  recently diagnosed with non-small cell lung cancer    Stage I non-small cell lung cancer  Treated with SBRT  Continue surveillance imaging   No indication for adjuvant treatment    Warthin's tumor parotid  Will need ENT referral for further treatment recommendations    End-stage renal disease  Continue hemodialysis    Anemia  Will need iron studies, this could be related to end-stage renal disease consider Procrit can be done with dialysis    NET duodenum   S/p resection  No indication of recurrence    RCC  S/p nephrectomy  No recurrence    Follow-up in 3 months with repeat hemoglobin, will have CT imaging at that point     Thank you very much for providing the opportunity to participate in this patient’s care. Please do not hesitate to call if there are any other questions.  Time spent on encounter including record review, history taking, exam, discussion, counseling and documentation at: 60 minutes    "

## 2023-12-28 ENCOUNTER — APPOINTMENT (OUTPATIENT)
Dept: LAB | Facility: HOSPITAL | Age: 73
End: 2023-12-28
Payer: MEDICARE

## 2023-12-28 ENCOUNTER — CONSULT (OUTPATIENT)
Dept: ONCOLOGY | Facility: CLINIC | Age: 73
End: 2023-12-28
Payer: MEDICARE

## 2023-12-28 VITALS
HEIGHT: 60 IN | OXYGEN SATURATION: 92 % | DIASTOLIC BLOOD PRESSURE: 65 MMHG | TEMPERATURE: 96.8 F | SYSTOLIC BLOOD PRESSURE: 144 MMHG | HEART RATE: 74 BPM | WEIGHT: 157.2 LBS | RESPIRATION RATE: 18 BRPM | BODY MASS INDEX: 30.86 KG/M2

## 2023-12-28 DIAGNOSIS — C7A.8 NEUROENDOCRINE CARCINOMA: Primary | ICD-10-CM

## 2023-12-28 DIAGNOSIS — D50.9 IRON DEFICIENCY ANEMIA, UNSPECIFIED IRON DEFICIENCY ANEMIA TYPE: ICD-10-CM

## 2023-12-28 LAB
BASOPHILS NFR BLD AUTO: ABNORMAL %
DEPRECATED RDW RBC AUTO: 55.8 FL (ref 37–54)
EOSINOPHIL # BLD AUTO: 0.21 10*3/MM3 (ref 0–0.4)
EOSINOPHIL NFR BLD AUTO: 1.8 % (ref 0.3–6.2)
ERYTHROCYTE [DISTWIDTH] IN BLOOD BY AUTOMATED COUNT: 17.1 % (ref 12.3–15.4)
HCT VFR BLD AUTO: 32.7 % (ref 34–46.6)
HGB BLD-MCNC: 10.6 G/DL (ref 12–15.9)
LYMPHOCYTES # BLD AUTO: 1 10*3/MM3 (ref 0.7–3.1)
LYMPHOCYTES NFR BLD AUTO: 8.5 % (ref 19.6–45.3)
MCH RBC QN AUTO: 30.5 PG (ref 26.6–33)
MCHC RBC AUTO-ENTMCNC: 32.4 G/DL (ref 31.5–35.7)
MCV RBC AUTO: 94 FL (ref 79–97)
MONOCYTES # BLD AUTO: 0.89 10*3/MM3 (ref 0.1–0.9)
MONOCYTES NFR BLD AUTO: 7.6 % (ref 5–12)
NEUTROPHILS NFR BLD AUTO: ABNORMAL %
PLATELET # BLD AUTO: 311 10*3/MM3 (ref 140–450)
PMV BLD AUTO: 9.8 FL (ref 6–12)
RBC # BLD AUTO: 3.48 10*6/MM3 (ref 3.77–5.28)
WBC NRBC COR # BLD AUTO: 11.72 10*3/MM3 (ref 3.4–10.8)

## 2023-12-28 PROCEDURE — 1159F MED LIST DOCD IN RCRD: CPT | Performed by: INTERNAL MEDICINE

## 2023-12-28 PROCEDURE — 85025 COMPLETE CBC W/AUTO DIFF WBC: CPT | Performed by: INTERNAL MEDICINE

## 2023-12-28 PROCEDURE — 3077F SYST BP >= 140 MM HG: CPT | Performed by: INTERNAL MEDICINE

## 2023-12-28 PROCEDURE — 1160F RVW MEDS BY RX/DR IN RCRD: CPT | Performed by: INTERNAL MEDICINE

## 2023-12-28 PROCEDURE — 1126F AMNT PAIN NOTED NONE PRSNT: CPT | Performed by: INTERNAL MEDICINE

## 2023-12-28 PROCEDURE — 36415 COLL VENOUS BLD VENIPUNCTURE: CPT | Performed by: INTERNAL MEDICINE

## 2023-12-28 PROCEDURE — 3078F DIAST BP <80 MM HG: CPT | Performed by: INTERNAL MEDICINE

## 2024-01-25 ENCOUNTER — OFFICE (AMBULATORY)
Dept: URBAN - METROPOLITAN AREA CLINIC 64 | Facility: CLINIC | Age: 74
End: 2024-01-25

## 2024-01-25 VITALS
HEIGHT: 63 IN | WEIGHT: 154 LBS | DIASTOLIC BLOOD PRESSURE: 68 MMHG | HEART RATE: 87 BPM | SYSTOLIC BLOOD PRESSURE: 130 MMHG

## 2024-01-25 DIAGNOSIS — R10.84 GENERALIZED ABDOMINAL PAIN: ICD-10-CM

## 2024-01-25 DIAGNOSIS — K62.5 HEMORRHAGE OF ANUS AND RECTUM: ICD-10-CM

## 2024-01-25 DIAGNOSIS — R10.13 EPIGASTRIC PAIN: ICD-10-CM

## 2024-01-25 DIAGNOSIS — D3A.8 OTHER BENIGN NEUROENDOCRINE TUMORS: ICD-10-CM

## 2024-01-25 DIAGNOSIS — R19.7 DIARRHEA, UNSPECIFIED: ICD-10-CM

## 2024-01-25 DIAGNOSIS — R15.2 FECAL URGENCY: ICD-10-CM

## 2024-01-25 PROCEDURE — 99214 OFFICE O/P EST MOD 30 MIN: CPT

## 2024-01-25 RX ORDER — COLESTIPOL HYDROCHLORIDE 1 G/1
TABLET, FILM COATED ORAL
Qty: 90 | Refills: 0 | Status: ACTIVE

## 2024-01-25 RX ORDER — DICYCLOMINE HYDROCHLORIDE 20 MG/1
60 TABLET ORAL
Qty: 120 | Refills: 12 | Status: ACTIVE
Start: 2024-01-25

## 2024-02-20 ENCOUNTER — APPOINTMENT (OUTPATIENT)
Dept: VASCULAR SURGERY | Facility: HOSPITAL | Age: 74
End: 2024-02-20
Payer: MEDICARE

## 2024-02-20 ENCOUNTER — HOSPITAL ENCOUNTER (OUTPATIENT)
Dept: CARDIOLOGY | Facility: HOSPITAL | Age: 74
Discharge: HOME | End: 2024-02-20
Payer: MEDICARE

## 2024-02-20 DIAGNOSIS — N18.6 END STAGE RENAL DISEASE: ICD-10-CM

## 2024-02-20 DIAGNOSIS — Z99.2 ENCOUNTER FOR EXTRACORPOREAL DIALYSIS: ICD-10-CM

## 2024-02-20 DIAGNOSIS — R09.89 BRUIT: ICD-10-CM

## 2024-02-20 LAB
BH CV VAS DIALYSIS ARTERIAL ANASTOMOSIS DIAMETER: 0.56 CM
BH CV VAS DIALYSIS ARTERIAL ANASTOMOSIS EDV: 85 CM/SEC
BH CV VAS DIALYSIS ARTERIAL ANASTOMOSIS PSV: 187 CM/SEC
BH CV VAS DIALYSIS CONDUIT DIST DEPTH: 0.54 CM
BH CV VAS DIALYSIS CONDUIT DIST DIAMETER: 0.84 CM
BH CV VAS DIALYSIS CONDUIT DIST EDV: 46 CM/SEC
BH CV VAS DIALYSIS CONDUIT DIST FLOW VOL: 838 ML/MIN
BH CV VAS DIALYSIS CONDUIT DIST PSV: 79 CM/SEC
BH CV VAS DIALYSIS CONDUIT MID DEPTH: 0.26 CM
BH CV VAS DIALYSIS CONDUIT MID DIAMETER: 1.61 CM
BH CV VAS DIALYSIS CONDUIT MID EDV: 32 CM/SEC
BH CV VAS DIALYSIS CONDUIT MID PSV: 93 CM/SEC
BH CV VAS DIALYSIS CONDUIT MID/DIST DEPTH: 0.42 CM
BH CV VAS DIALYSIS CONDUIT MID/DIST DIAMETER: 0.69 CM
BH CV VAS DIALYSIS CONDUIT MID/DIST EDV: 49 CM/SEC
BH CV VAS DIALYSIS CONDUIT MID/DIST FLOW VOL: 680 ML/MIN
BH CV VAS DIALYSIS CONDUIT MID/DIST PSV: 76 CM/SEC
BH CV VAS DIALYSIS CONDUIT PROX DEPTH: 0.17 CM
BH CV VAS DIALYSIS CONDUIT PROX DIAMETER: 0.44 CM
BH CV VAS DIALYSIS CONDUIT PROX EDV: 108 CM/SEC
BH CV VAS DIALYSIS CONDUIT PROX FLOW VOL: 702 ML/MIN
BH CV VAS DIALYSIS CONDUIT PROX PSV: 212 CM/SEC
BH CV VAS DIALYSIS CONDUIT PROX/MID DEPTH: 0.8 CM
BH CV VAS DIALYSIS CONDUIT PROX/MID DIAMETER: 0.63 CM
BH CV VAS DIALYSIS CONDUIT PROX/MID EDV: 153 CM/SEC
BH CV VAS DIALYSIS CONDUIT PROX/MID FLOW VOL: 1202 ML/MIN
BH CV VAS DIALYSIS CONDUIT PROX/MID PSV: 323 CM/SEC
BH CV VAS DIALYSIS PRE-INFLOW BRACHIAL DIAMETER: 0.52 CM
BH CV VAS DIALYSIS PRE-INFLOW BRACHIAL EDV: 97 CM/SEC
BH CV VAS DIALYSIS PRE-INFLOW BRACHIAL FLOW VOL: 732 ML/MIN
BH CV VAS DIALYSIS PRE-INFLOW BRACHIAL PSV: 210 CM/SEC
BH CV VAS DIALYSIS VENOUS OUTFLOW SUBCLAVIAN DIAMETER: 1.02 CM
BH CV VAS DIALYSIS VENOUS OUTFLOW SUBCLAVIAN EDV: 47 CM/SEC
BH CV VAS DIALYSIS VENOUS OUTFLOW SUBCLAVIAN PSV: 80 CM/SEC
BH CV XLRA MEAS LEFT DIST CCA EDV: -20.3 CM/SEC
BH CV XLRA MEAS LEFT DIST CCA PSV: -157 CM/SEC
BH CV XLRA MEAS LEFT DIST ICA EDV: -14.7 CM/SEC
BH CV XLRA MEAS LEFT DIST ICA PSV: -89.2 CM/SEC
BH CV XLRA MEAS LEFT ICA/CCA RATIO: 3.4
BH CV XLRA MEAS LEFT PROX CCA EDV: 12.6 CM/SEC
BH CV XLRA MEAS LEFT PROX CCA PSV: 106 CM/SEC
BH CV XLRA MEAS LEFT PROX ECA PSV: -399 CM/SEC
BH CV XLRA MEAS LEFT PROX ICA EDV: 36.8 CM/SEC
BH CV XLRA MEAS LEFT PROX ICA PSV: 358 CM/SEC
BH CV XLRA MEAS LEFT PROX SCLA PSV: 344 CM/SEC
BH CV XLRA MEAS LEFT VERTEBRAL A EDV: 13 CM/SEC
BH CV XLRA MEAS LEFT VERTEBRAL A PSV: 66.7 CM/SEC
BH CV XLRA MEAS RIGHT DIST CCA EDV: 12.6 CM/SEC
BH CV XLRA MEAS RIGHT DIST CCA PSV: 101 CM/SEC
BH CV XLRA MEAS RIGHT DIST ICA EDV: -16.5 CM/SEC
BH CV XLRA MEAS RIGHT DIST ICA PSV: -107 CM/SEC
BH CV XLRA MEAS RIGHT ICA/CCA RATIO: -1.3
BH CV XLRA MEAS RIGHT PROX CCA EDV: 10.2 CM/SEC
BH CV XLRA MEAS RIGHT PROX CCA PSV: 281 CM/SEC
BH CV XLRA MEAS RIGHT PROX ECA PSV: 304 CM/SEC
BH CV XLRA MEAS RIGHT PROX ICA EDV: -20.3 CM/SEC
BH CV XLRA MEAS RIGHT PROX ICA PSV: -131 CM/SEC
BH CV XLRA MEAS RIGHT PROX SCLA PSV: 181 CM/SEC
BH CV XLRA MEAS RIGHT VERTEBRAL A EDV: 12.6 CM/SEC
BH CV XLRA MEAS RIGHT VERTEBRAL A PSV: 55.4 CM/SEC
RIGHT ARM BP: NORMAL MMHG

## 2024-02-20 PROCEDURE — G0463 HOSPITAL OUTPT CLINIC VISIT: HCPCS

## 2024-02-20 PROCEDURE — 93880 EXTRACRANIAL BILAT STUDY: CPT

## 2024-02-20 PROCEDURE — 93990 DOPPLER FLOW TESTING: CPT

## 2024-02-21 ENCOUNTER — TRANSCRIBE ORDERS (OUTPATIENT)
Age: 74
End: 2024-02-21
Payer: MEDICARE

## 2024-02-21 DIAGNOSIS — I71.43 INFRARENAL ABDOMINAL AORTIC ANEURYSM (AAA) WITHOUT RUPTURE: ICD-10-CM

## 2024-02-21 DIAGNOSIS — R09.89 BRUIT: Primary | ICD-10-CM

## 2024-03-01 NOTE — PROGRESS NOTES
RADIATION ONCOLOGY FOLLOW-UP NOTE    NAME: Sherry Lobato  YOB: 1950  MRN #: [id number]  DATE OF SERVICE: 3/7/2024  REFERRING PROVIDER: Jin Malcolm PA-C  [address] [place] [address]  PRIMARY CARE PROVIDER: Jin Malcolm PA-C    DIAGNOSIS:    Encounter Diagnoses   Name Primary?    Adenocarcinoma of upper lobe of right lung Yes    Neuroendocrine carcinoma      REASON FOR VISIT:  3M CT F/U    RADIATION TREATMENT COURSE:    11/13/2023- 11/30/2023: 5000 cGy in 5 fractions SBRT to right lung.    HISTORY OF PRESENT ILLNESS: The patient is a 73 y.o. year old female who was last seen in our office on 12/21/2023 by Dr. Matteo Merlos. The plan was to have repeat CT chest for post SBRT surveillance, re-establish with med/onc here, and follow up here after imaging.    She was seen by Dr. Ro for consultation on 12/28/2023. Their plan is to continue surveillance imaging, no indication for adjuvant treatment at this time, and follow up in 3 months with repeat hemoglobin and new CT imaging. She is scheduled for follow up on 03/26/2024.    CT CHEST W/O CONTRAST  DATE OF EXAM: 03/05/2024  [facility]  FINDINGS:  There is moderate calcified plaque of the aortic arch and origins of the great vessels. There is a large amount of coronary artery calcification.  There is no mediastinal, hilar or axillary adenopathy. There are no pleural effusions. Again seen are areas of groundglass attenuation central within both lungs unchanged from prior studies. This may be related to small airways disease. There has been interval decrease in size of right upper lobe pulmonary nodule now measuring 7 x 5 mm in size, previously 16 x 15 mm. Within the right upper lobe there are adjacent 2-3 mm size noncalcified pulmonary nodules which appear stable. There are no new or enlarging noncalcified pulmonary nodules. Linear scarring or atelectasis is seen within the anterior right  middle lobe.  There is lobular enlargement of both adrenal glands unchanged from multiple prior studies dating back to 2019. This may be related to adrenal hyperplasia. No new adrenal nodule identified.  There are degenerative changes of the spine. There are no lytic of sclerotic bony lesions.  IMPRESSION:  Interval decrease in size of right upper lobe pulmonary nodule now measuring 7 x 5 mm. No evidence of metastatic disease within the chest.  Lobular enlargement of both adrenal glands unchanged from multiple prior studies compatible changes of adrenal hyperplasia.    The following portions of the patient's history were reviewed and updated as appropriate: allergies, current medications, past family history, past medical history, past social history, past surgical history and problem list. Reviewed with the patient and remain unchanged.    PAST MEDICAL HISTORY:  she has a past medical history of Lucas's esophagus, C. difficile colitis, Carotid artery disease, COPD (chronic obstructive pulmonary disease), DM2 (diabetes mellitus, type 2), ESRD (end stage renal disease), Gastric ulcer, Gastroparesis, GERD (gastroesophageal reflux disease), Gout, Hemodialysis patient, Hernia, incisional, History of colonoscopy, History of degenerative disc disease, Hyperlipidemia, Hypertension, Medicare annual wellness visit, subsequent (11/07/2020), Microscopic colitis, Nephrotic syndrome, Neuroendocrine tumor, FABY (obstructive sleep apnea), Pedal edema, Proteinuria, Renal cell cancer, right, Rib pain on right side, Rotator cuff tear, Stomach cancer (2016), Stroke, Uterine cancer, Vitamin B 12 deficiency, and Vitamin D deficiency.    MEDICATIONS:    Current Outpatient Medications:     albuterol sulfate  (90 Base) MCG/ACT inhaler, Inhale 2 puffs Every 4 (Four) Hours As Needed for Wheezing., Disp: , Rfl:     amLODIPine (NORVASC) 10 MG tablet, Take 1 tablet by mouth Daily., Disp: , Rfl:     benzonatate (TESSALON) 200 MG capsule,  "Take 1 capsule by mouth 3 (Three) Times a Day As Needed for Cough., Disp: , Rfl:     budesonide-formoterol (SYMBICORT) 160-4.5 MCG/ACT inhaler, Inhale 2 puffs 2 (Two) Times a Day., Disp: , Rfl:     calcium acetate (PHOS BINDER,) 667 MG capsule capsule, Take 1 capsule by mouth 3 (Three) Times a Day., Disp: , Rfl:     carvedilol (COREG) 3.125 MG tablet, Take 1 tablet by mouth 2 (Two) Times a Day With Meals., Disp: , Rfl:     Cholecalciferol 25 MCG (1000 UT) tablet, Take 1 tablet by mouth Daily., Disp: , Rfl:     hyoscyamine (ANASPAZ,LEVSIN) 0.125 MG tablet, TAKE 1 TABLET BY MOUTH EVERY 4 HOURS AS NEEDED FOR ABDOMINAL CRAMPING, Disp: , Rfl:     lidocaine-prilocaine (EMLA) 2.5-2.5 % cream, Apply 1 application  topically to the appropriate area as directed Daily As Needed. Apply to access site 1 to 2 hours before dialysis, Disp: , Rfl: 4    Methoxy PEG-Epoetin Beta (MIRCERA IJ), 30 mcg Every 14 (Fourteen) Days. Dialysis three times a week, Disp: , Rfl:     Multiple Vitamins-Minerals (RENAPLEX-D) tablet, Take 1 tablet by mouth Daily., Disp: , Rfl:     pantoprazole (PROTONIX) 40 MG EC tablet, Take 1 tablet by mouth 2 (Two) Times a Day., Disp: , Rfl:     sevelamer (RENVELA) 800 MG tablet, Take 3 tablets by mouth 3 (Three) Times a Day With Meals., Disp: , Rfl:     simvastatin (ZOCOR) 40 MG tablet, Take 1 tablet by mouth Every Night., Disp: , Rfl:     sodium bicarbonate 650 MG tablet, Take 2 tablets by mouth 2 (Two) Times a Day., Disp: , Rfl:     sucralfate (Carafate) 1 g tablet, Take 1 tablet by mouth 4 (Four) Times a Day., Disp: 120 tablet, Rfl: 1    temazepam (RESTORIL) 30 MG capsule, Take 1 capsule by mouth Every Night., Disp: , Rfl:     ALLERGIES:    Allergies   Allergen Reactions    Atorvastatin Calcium Other (See Comments)     \"felt like I was on fire\"    Gabapentin Dizziness     says was unable to walk    Niacin Other (See Comments)     \"FEELS LIKE SHE IS ON FIRE\"         PAST SURGICAL HISTORY:  she has a past " surgical history that includes Appendectomy; Laparoscopic cholecystectomy; Tumor removal; Breast biopsy; Reduction mammaplasty; Hysterectomy; AV fistula placement (Right); AV fistula repair; Gastric Resection; ventral/incisional hernia repair (Right, 08/28/2019); ventral/incisional hernia repair (N/A, 10/01/2019); Nephrectomy; Colonoscopy (N/A, 11/24/2020); arteriovenous fistula/shunt surgery (Left, 12/30/2020); ventral/incisional hernia repair (N/A, 10/11/2021); Esophagogastroduodenoscopy (N/A, 03/12/2022); ventral/incisional hernia repair (N/A, 01/23/2023); Colonoscopy (N/A, 6/1/2023); and Esophagogastroduodenoscopy (N/A, 10/9/2023).    PREVIOUS RADIOTHERAPY OR CHEMOTHERAPY:  Yes    FAMILY HISTORY:  herfamily history includes Heart disease in her father.    SOCIAL HISTORY:  she reports that she quit smoking about 2 years ago. Her smoking use included cigarettes. She started smoking about 59 years ago. She has a 14.3 pack-year smoking history. She has been exposed to tobacco smoke. She has never used smokeless tobacco. She reports that she does not drink alcohol and does not use drugs.    PAIN AND PAIN MANAGEMENT:    Vitals:    03/07/24 0810   BP: 157/58   Pulse: 83   Resp: 18   SpO2: 94%   Weight: 68.9 kg (152 lb)   PainSc: 0-No pain       NUTRITIONAL STATUS:  Otherwise no issues    KPS:  80:  Normal activity with effort; some signs or symptoms  ECOG:  (1) Restricted in physically strenuous activity, ambulatory and able to do work of light nature   PHQ-9 Total Score:       REVIEW OF SYSTEMS:   Review of Systems     Otherwise negative as below.     General: No fevers, chills, weight change, or drenching night sweats. Skin: No rashes or jaundice.  HEENT: No change in vision or hearing, no headaches.  Neck: No dysphagia or masses.  Heme/Lymph: No easy bruising or bleeding.  Respiratory System: No shortness of breath or cough.  Cardiovascular: No chest pain, palpitations, or dyspnea on exertion.  +/- Pacemaker. GI: No  nausea, vomiting, diarrhea, melena, or hematochezia.  : No dysuria or hematuria.  Endocrine: No heat or cold intolerance. Musculoskeletal: No myalgias or arthralgias.  Neuro: No weakness, numbness, syncope, or seizures. Psych: No mood changes or depression. Ext: Denies swelling.    Objective   VITAL SIGNS:  Vitals:    03/07/24 0810   BP: 157/58   Pulse: 83   Resp: 18   SpO2: 94%   Weight: 68.9 kg (152 lb)   PainSc: 0-No pain     PHYSICAL EXAM:  GENERAL: In no apparent distress, sitting comfortably in room.    HEENT: Normocephalic, atraumatic. Pupils are equal, round, reactive to light. Sclera anicteric. Conjunctiva not injected. Oropharynx without erythema, ulcerations or thrush.   NECK: Supple with no masses.  LYMPHATIC: No cervical, supraclavicular or axillary adenopathy appreciated bilaterally.   CARDIOVASCULAR: S1 & S2 detected; no murmurs, rubs or gallops.  CHEST: Auscultation reveals bilateral rails at end expiration.  There are no rhonchi.. Work of breathing normal.  ABDOMEN: Bowel sounds present. Abdomen is soft, nontender, nondistended.  Patient has multiple scars across the abdomen and an obvious new hernia in the right lower abdominal wall.  Patient has had previous partial gastrectomy by Dr. Franki Denton with a transverse incision and numerous other incisions due to 4 separate hernia operations.  MUSCULOSKELETAL: No tenderness to palpation along the spine or scapulae. Normal range of motion.  Overall, patient is quite weak with decreased muscle strength in lower extremities.  EXTREMITIES: No clubbing, cyanosis, edema.  SKIN: No erythema, rashes, ulcerations noted.   NEUROLOGIC: Cranial nerves II-XII grossly intact bilaterally. No focal neurologic deficits.  PSYCHIATRIC:  Alert, aware, and appropriate.    PERTINENT IMAGING/PATHOLOGY/LABS (Medical Decision Making):     COORDINATION OF CARE: A copy of this note is sent to the referring provider.    PATHOLOGY (Reviewed):     IMAGING (Reviewed): I  personally reviewed new CT scan.  The peripheral right upper lobe mass is responding beautifully at least 50% decreased in size.  There is a nodule of the inferior portion right upper lobe which is quite anterior that will need to be followed.  We will pay close attention to this lesion with new CT scan without contrast to be performed in 3 months.    LABS (Reviewed):  HEMATOLOGY:  WBC   Date Value Ref Range Status   12/28/2023 11.72 (H) 3.40 - 10.80 10*3/mm3 Final   07/14/2022 9.33 4.5 - 11.0 10*3/uL Final     RBC   Date Value Ref Range Status   12/28/2023 3.48 (L) 3.77 - 5.28 10*6/mm3 Final   07/14/2022 3.72 (L) 4.0 - 5.2 10*6/uL Final     Hemoglobin   Date Value Ref Range Status   12/28/2023 10.6 (L) 12.0 - 15.9 g/dL Final   05/12/2023 10.3 (L) 11.2 - 15.7 Gram/dL Final   07/14/2022 11.2 (L) 12.0 - 16.0 g/dL Final     Hematocrit   Date Value Ref Range Status   12/28/2023 32.7 (L) 34.0 - 46.6 % Final   07/14/2022 35.8 (L) 36.0 - 46.0 % Final     Platelets   Date Value Ref Range Status   12/28/2023 311 140 - 450 10*3/mm3 Final   07/14/2022 423 140 - 440 10*3/uL Final     CHEMISTRY:  Lab Results   Component Value Date    GLUCOSE 89 11/12/2023    BUN 37 (H) 11/12/2023    CREATININE 6.71 (H) 11/12/2023    EGFRIFNONA 6 (L) 10/08/2021    EGFRIFAFRI 5 (L) 07/14/2022    BCR 5.5 (L) 11/12/2023    K 4.6 11/12/2023    CO2 20.0 (L) 11/12/2023    CALCIUM 8.8 11/12/2023    ALBUMIN 3.7 11/12/2023    LABIL2 0.8 (L) 03/14/2019    AST 21 11/09/2023    ALT 12 11/09/2023     Assessment & Plan   ASSESSMENT AND PLAN:    1. Adenocarcinoma of upper lobe of right lung    2. Neuroendocrine carcinoma         Orders Placed This Encounter   Procedures    CT Chest Without Contrast     Standing Status:   Future     Standing Expiration Date:   3/7/2025     Order Specific Question:   Does this exam require Ernesto Bronch Protocol?     Answer:   No     Order Specific Question:   Release to patient     Answer:   Routine Release [[id number]]       This  assessment comes from my review of the imaging, pathology, physician notes and other pertinent information as mentioned.    DISPOSITION: Ms. Lobato is relatively stable at this time.  She has numerous medical problems as she attends dialysis every other day.  She has decreased circulation to her left hand and a nonhealing sore on the fifth digit.  She has yet another hernia of the abdominal wall but likely will not be able to have surgery due to multiple previous surgeries.    Fortunately, she has responded very well to SBRT treatment to the lung.  Our plans are to see her in 4 months after repeat CT scan of the chest.    TIME SPENT WITH PATIENT:   I spent greater than 20 minutes with the patient with time spent in counseling and coordination of care, including review of imaging and pathology; indications, goals, logistics, alternatives and risks - both common and rare - for my recommendations as well as surveillance and potential outcomes.  CC:   Khoa Wilson MD  3/7/2024  3:45 PM EST

## 2024-03-05 ENCOUNTER — HOSPITAL ENCOUNTER (OUTPATIENT)
Dept: PET IMAGING | Facility: HOSPITAL | Age: 74
Discharge: HOME | End: 2024-03-05
Admitting: RADIOLOGY
Payer: MEDICARE

## 2024-03-05 DIAGNOSIS — C34.11 ADENOCARCINOMA OF UPPER LOBE OF RIGHT LUNG: ICD-10-CM

## 2024-03-05 PROCEDURE — 71250 CT THORAX DX C-: CPT

## 2024-03-07 ENCOUNTER — OFFICE VISIT (OUTPATIENT)
Dept: RADIATION ONCOLOGY | Facility: HOSPITAL | Age: 74
End: 2024-03-07
Payer: MEDICARE

## 2024-03-07 VITALS
OXYGEN SATURATION: 94 % | BODY MASS INDEX: 29.69 KG/M2 | RESPIRATION RATE: 18 BRPM | HEART RATE: 83 BPM | DIASTOLIC BLOOD PRESSURE: 58 MMHG | SYSTOLIC BLOOD PRESSURE: 157 MMHG | WEIGHT: 152 LBS

## 2024-03-07 DIAGNOSIS — C34.11 ADENOCARCINOMA OF UPPER LOBE OF RIGHT LUNG: Primary | ICD-10-CM

## 2024-03-07 DIAGNOSIS — C7A.8 NEUROENDOCRINE CARCINOMA: ICD-10-CM

## 2024-03-07 PROCEDURE — G0463 HOSPITAL OUTPT CLINIC VISIT: HCPCS | Performed by: RADIOLOGY

## 2024-03-07 RX ORDER — BENZONATATE 200 MG/1
200 CAPSULE ORAL 3 TIMES DAILY PRN
Qty: 90 CAPSULE | Refills: 0 | Status: SHIPPED | OUTPATIENT
Start: 2024-03-07

## 2024-03-14 ENCOUNTER — HOSPITAL ENCOUNTER (OUTPATIENT)
Dept: INTERVENTIONAL RADIOLOGY/VASCULAR | Facility: HOSPITAL | Age: 74
Discharge: HOME | End: 2024-03-14
Payer: MEDICARE

## 2024-03-14 VITALS — HEART RATE: 85 BPM | RESPIRATION RATE: 16 BRPM | OXYGEN SATURATION: 92 %

## 2024-03-14 DIAGNOSIS — Z99.2 ESRD (END STAGE RENAL DISEASE) ON DIALYSIS: ICD-10-CM

## 2024-03-14 DIAGNOSIS — N18.6 ESRD (END STAGE RENAL DISEASE) ON DIALYSIS: ICD-10-CM

## 2024-03-14 PROCEDURE — 25010000002 ONDANSETRON PER 1 MG: Performed by: RADIOLOGY

## 2024-03-14 PROCEDURE — C1725 CATH, TRANSLUMIN NON-LASER: HCPCS

## 2024-03-14 PROCEDURE — 25010000002 FENTANYL CITRATE (PF) 50 MCG/ML SOLUTION: Performed by: RADIOLOGY

## 2024-03-14 PROCEDURE — C1769 GUIDE WIRE: HCPCS

## 2024-03-14 PROCEDURE — 25010000002 LIDOCAINE 1 % SOLUTION

## 2024-03-14 PROCEDURE — C1894 INTRO/SHEATH, NON-LASER: HCPCS

## 2024-03-14 PROCEDURE — 25510000001 IOPAMIDOL PER 1 ML: Performed by: INTERNAL MEDICINE

## 2024-03-14 RX ORDER — LIDOCAINE HYDROCHLORIDE 10 MG/ML
INJECTION, SOLUTION INFILTRATION; PERINEURAL AS NEEDED
Status: COMPLETED | OUTPATIENT
Start: 2024-03-14 | End: 2024-03-14

## 2024-03-14 RX ORDER — ONDANSETRON 2 MG/ML
INJECTION INTRAMUSCULAR; INTRAVENOUS AS NEEDED
Status: COMPLETED | OUTPATIENT
Start: 2024-03-14 | End: 2024-03-14

## 2024-03-14 RX ORDER — FENTANYL CITRATE 50 UG/ML
INJECTION, SOLUTION INTRAMUSCULAR; INTRAVENOUS AS NEEDED
Status: COMPLETED | OUTPATIENT
Start: 2024-03-14 | End: 2024-03-14

## 2024-03-14 RX ADMIN — LIDOCAINE HYDROCHLORIDE 5 ML: 10 INJECTION, SOLUTION INFILTRATION; PERINEURAL at 15:34

## 2024-03-14 RX ADMIN — FENTANYL CITRATE 100 MCG: 50 INJECTION, SOLUTION INTRAMUSCULAR; INTRAVENOUS at 15:45

## 2024-03-14 RX ADMIN — ONDANSETRON 4 MG: 2 INJECTION INTRAMUSCULAR; INTRAVENOUS at 15:45

## 2024-03-14 RX ADMIN — IOPAMIDOL 30 ML: 755 INJECTION, SOLUTION INTRAVENOUS at 16:04

## 2024-03-14 NOTE — DISCHARGE INSTRUCTIONS
Take it easy at home for 48 hours.  No lifting more than 10 pounds for that period of time.  Keep dressing clean and dry, it can be changed at next dialysis appointment.  No driving, drinking alcohol or making legal decisions for 24 hours.  Some blood on the dressing is to be expected.  If dressing becomes saturated, hold firm but gentle pressure until it stops.  If you can't get it to stop, return to ER for treatment.  If severe pain, dizziness, racing heart, shortness of breath occur suddenly, call 911.  Look for signs of infection: redness, swelling, discharge, fever, etc., and report these to your family doctor immediately.  Follow up with referring provider for results and/or plan of care.

## 2024-03-19 ENCOUNTER — LAB (OUTPATIENT)
Dept: LAB | Facility: HOSPITAL | Age: 74
End: 2024-03-19
Payer: MEDICARE

## 2024-03-19 DIAGNOSIS — C7A.8 NEUROENDOCRINE CARCINOMA: ICD-10-CM

## 2024-03-19 DIAGNOSIS — D50.9 IRON DEFICIENCY ANEMIA, UNSPECIFIED IRON DEFICIENCY ANEMIA TYPE: ICD-10-CM

## 2024-03-19 LAB
BASOPHILS # BLD AUTO: 0.18 10*3/MM3 (ref 0–0.2)
BASOPHILS NFR BLD AUTO: 2 % (ref 0–1.5)
DEPRECATED RDW RBC AUTO: 59.8 FL (ref 37–54)
EOSINOPHIL # BLD AUTO: 0.32 10*3/MM3 (ref 0–0.4)
EOSINOPHIL NFR BLD AUTO: 3.5 % (ref 0.3–6.2)
ERYTHROCYTE [DISTWIDTH] IN BLOOD BY AUTOMATED COUNT: 17 % (ref 12.3–15.4)
FERRITIN SERPL-MCNC: 2099 NG/ML (ref 13–150)
HCT VFR BLD AUTO: 38.1 % (ref 34–46.6)
HGB BLD-MCNC: 11.7 G/DL (ref 12–15.9)
IRON 24H UR-MRATE: 51 MCG/DL (ref 37–145)
IRON SATN MFR SERPL: 29 % (ref 20–50)
LYMPHOCYTES # BLD AUTO: 0.83 10*3/MM3 (ref 0.7–3.1)
LYMPHOCYTES NFR BLD AUTO: 9 % (ref 19.6–45.3)
MCH RBC QN AUTO: 30.6 PG (ref 26.6–33)
MCHC RBC AUTO-ENTMCNC: 30.7 G/DL (ref 31.5–35.7)
MCV RBC AUTO: 99.7 FL (ref 79–97)
MONOCYTES # BLD AUTO: 0.77 10*3/MM3 (ref 0.1–0.9)
MONOCYTES NFR BLD AUTO: 8.4 % (ref 5–12)
NEUTROPHILS NFR BLD AUTO: 7.09 10*3/MM3 (ref 1.7–7)
NEUTROPHILS NFR BLD AUTO: 77.1 % (ref 42.7–76)
PLATELET # BLD AUTO: 368 10*3/MM3 (ref 140–450)
PMV BLD AUTO: 9.1 FL (ref 6–12)
RBC # BLD AUTO: 3.82 10*6/MM3 (ref 3.77–5.28)
TIBC SERPL-MCNC: 176 MCG/DL (ref 298–536)
TRANSFERRIN SERPL-MCNC: 118 MG/DL (ref 200–360)
WBC NRBC COR # BLD AUTO: 9.19 10*3/MM3 (ref 3.4–10.8)

## 2024-03-19 PROCEDURE — 83540 ASSAY OF IRON: CPT | Performed by: INTERNAL MEDICINE

## 2024-03-19 PROCEDURE — 85025 COMPLETE CBC W/AUTO DIFF WBC: CPT

## 2024-03-19 PROCEDURE — 36415 COLL VENOUS BLD VENIPUNCTURE: CPT

## 2024-03-19 PROCEDURE — 82728 ASSAY OF FERRITIN: CPT | Performed by: INTERNAL MEDICINE

## 2024-03-19 PROCEDURE — 84466 ASSAY OF TRANSFERRIN: CPT | Performed by: INTERNAL MEDICINE

## 2024-03-19 NOTE — PROGRESS NOTES
HEMATOLOGY ONCOLOGY OUTPATIENT FOLLOW UP       Patient name: Sherry Lobato  : 1950  MRN: [id number]  Primary Care Physician: Jin Malcolm PA-C  Referring Physician: Jin Malcolm PA-C  Reason For Consult: multiple malignancies, Non small cell lung cancer      History of Present Illness:  Patient is a 73 y.o. 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  No adjuvant treatment.  She did have hypersecretory symptoms causing renal insufficiency events leading to end-stage renal disease.  Patient has had comprehensive genetic testing with negative findings.    2023 CT chest 7 pelvis with right upper lobe lung mass  10/10/2023 CT-guided biopsy of lung mass with well-differentiated adenocarcinoma with focal mucinous features     10/19/2023 -PET/CT with 1.6 cm right upper lobe lung nodule low to intermediate level FDG accumulation, 1.4 cm hypermetabolic nodule or lymph node was seen at the inferior margin of the left parotid gland.  Numerous small noncalcified nodules are present in both lungs worrisome for malignancy.  No primary malignancy or metastatic disease in the abdomen pelvis or skeleton    FNA - parotid with no malignancy - Warthin tumor    10/27/2023 patient treated with SBRT 5 Gray in 5 fractions    Subjective:  Continues HD has some fatigue, no other new symptoms.      Past Medical History:   Diagnosis Date    Lucas's esophagus     C. difficile colitis     Carotid artery disease     -50-74% left, 16-49% right ()    COPD (chronic obstructive pulmonary disease)     DM2 (diabetes mellitus,  type 2)     ESRD (end stage renal disease)     on HD    Gastric ulcer     at anastomatic site    Gastroparesis     unknown    GERD (gastroesophageal reflux disease)     Gout     not current    Hemodialysis patient     mwf    Hernia, incisional     abdomen    History of colonoscopy     UTD    History of degenerative disc disease     Hyperlipidemia     Hypertension     Medicare annual wellness visit, subsequent 11/07/2020    Microscopic colitis     Nephrotic syndrome     Neuroendocrine tumor     of stomach    FABY (obstructive sleep apnea)     not treating    Pedal edema     ressolved    Proteinuria     Renal cell cancer, right     Rib pain on right side     chronic    Rotator cuff tear     bilateral    Stomach cancer 2016    Stroke     Uterine cancer     Vitamin B 12 deficiency     Vitamin D deficiency        Past Surgical History:   Procedure Laterality Date    APPENDECTOMY      ARTERIOVENOUS FISTULA Right     ARTERIOVENOUS FISTULA REPAIR      Clotted off and insert graft    ARTERIOVENOUS FISTULA/SHUNT SURGERY Left 12/30/2020    Procedure: ARTERIOVENOUS FISTULA FORMATION;  Surgeon: Jan Caicedo MD;  Location: [facility] MAIN OR;  Service: Vascular;  Laterality: Left;    BREAST BIOPSY      right nipple 1981    COLONOSCOPY N/A 11/24/2020    Procedure: COLONOSCOPY with polypectomy x 7;  Surgeon: Jeffery White MD;  Location: [facility] ENDOSCOPY;  Service: Gastroenterology;  Laterality: N/A;  post op: polyps, diverticulosis, hemorrhoids    COLONOSCOPY N/A 6/1/2023    Procedure: COLONOSCOPY with polypectomy x 3 biopsy x 1;  Surgeon: Jeffery White MD;  Location: [facility] ENDOSCOPY;  Service: Gastroenterology;  Laterality: N/A;  diverticulosis polyps    ENDOSCOPY N/A 03/12/2022    Procedure: ESOPHAGOGASTRODUODENOSCOPY with biopsy x 1 area;  Surgeon: Javan Augustin MD;  Location: [facility] ENDOSCOPY;  Service: Gastroenterology;  Laterality: N/A;  post op: anastamosis    ENDOSCOPY N/A 10/9/2023    Procedure:  ESOPHAGOGASTRODUODENOSCOPY with biopsy x3 areas;  Surgeon: Ace Campbell MD;  Location: [facility] ENDOSCOPY;  Service: Gastroenterology;  Laterality: N/A;  duodenal ulcers;irregular z line    GASTRIC RESECTION      cancer    HYSTERECTOMY      LAPAROSCOPIC CHOLECYSTECTOMY      NEPHRECTOMY      right kidney removed     REDUCTION MAMMAPLASTY      TUMOR REMOVAL      boils    VENTRAL/INCISIONAL HERNIA REPAIR Right 08/28/2019    Procedure: VENTRAL/INCISIONAL HERNIA REPAIR;  Surgeon: Nilay Stevens MD;  Location: [facility] MAIN OR;  Service: General    VENTRAL/INCISIONAL HERNIA REPAIR N/A 10/01/2019    Procedure: OPEN VENTRAL/INCISIONAL HERNIA REPAIR;  Surgeon: Nilay Stevens MD;  Location: [facility] MAIN OR;  Service: General    VENTRAL/INCISIONAL HERNIA REPAIR N/A 10/11/2021    Procedure: VENTRAL/INCISIONAL HERNIA REPAIR LAPAROSCOPIC;  Surgeon: Nilay Stevens MD;  Location: [facility] MAIN OR;  Service: General;  Laterality: N/A;    VENTRAL/INCISIONAL HERNIA REPAIR N/A 01/23/2023    Procedure: VENTRAL/INCISIONAL HERNIA REPAIR with MESH;  Surgeon: Nilay Stevens MD;  Location: [facility] MAIN OR;  Service: General;  Laterality: N/A;         Current Outpatient Medications:     albuterol sulfate  (90 Base) MCG/ACT inhaler, Inhale 2 puffs Every 4 (Four) Hours As Needed for Wheezing., Disp: , Rfl:     amLODIPine (NORVASC) 10 MG tablet, Take 1 tablet by mouth Daily., Disp: , Rfl:     benzonatate (TESSALON) 200 MG capsule, Take 1 capsule by mouth 3 (Three) Times a Day As Needed for Cough., Disp: 90 capsule, Rfl: 0    budesonide-formoterol (SYMBICORT) 160-4.5 MCG/ACT inhaler, Inhale 2 puffs 2 (Two) Times a Day., Disp: , Rfl:     calcium acetate (PHOS BINDER,) 667 MG capsule capsule, Take 1 capsule by mouth 3 (Three) Times a Day., Disp: , Rfl:     carvedilol (COREG) 3.125 MG tablet, Take 1 tablet by mouth 2 (Two) Times a Day With Meals., Disp: , Rfl:     Cholecalciferol 25 MCG (1000 UT) tablet, Take 1 tablet by mouth Daily.,  "Disp: , Rfl:     hyoscyamine (ANASPAZ,LEVSIN) 0.125 MG tablet, TAKE 1 TABLET BY MOUTH EVERY 4 HOURS AS NEEDED FOR ABDOMINAL CRAMPING, Disp: , Rfl:     lidocaine-prilocaine (EMLA) 2.5-2.5 % cream, Apply 1 Application topically to the appropriate area as directed Daily As Needed. Apply to access site 1 to 2 hours before dialysis, Disp: , Rfl: 4    Methoxy PEG-Epoetin Beta (MIRCERA IJ), 30 mcg Every 14 (Fourteen) Days. Dialysis three times a week, Disp: , Rfl:     Multiple Vitamins-Minerals (RENAPLEX-D) tablet, Take 1 tablet by mouth Daily., Disp: , Rfl:     pantoprazole (PROTONIX) 40 MG EC tablet, Take 1 tablet by mouth 2 (Two) Times a Day., Disp: , Rfl:     sevelamer (RENVELA) 800 MG tablet, Take 3 tablets by mouth 3 (Three) Times a Day With Meals., Disp: , Rfl:     simvastatin (ZOCOR) 40 MG tablet, Take 1 tablet by mouth Every Night., Disp: , Rfl:     sodium bicarbonate 650 MG tablet, Take 2 tablets by mouth 2 (Two) Times a Day., Disp: , Rfl:     sucralfate (Carafate) 1 g tablet, Take 1 tablet by mouth 4 (Four) Times a Day., Disp: 120 tablet, Rfl: 1    temazepam (RESTORIL) 30 MG capsule, Take 1 capsule by mouth Every Night., Disp: , Rfl:     Allergies   Allergen Reactions    Atorvastatin Calcium Other (See Comments)     \"felt like I was on fire\"    Gabapentin Dizziness     says was unable to walk    Niacin Other (See Comments)     \"FEELS LIKE SHE IS ON FIRE\"         Family History   Problem Relation Age of Onset    Heart disease Father        Cancer-related family history is not on file.      Social History     Tobacco Use    Smoking status: Former     Current packs/day: 0.00     Average packs/day: 0.3 packs/day for 57.0 years (14.3 ttl pk-yrs)     Types: Cigarettes     Start date: 1964     Quit date: 2021     Years since quittin.9     Passive exposure: Past    Smokeless tobacco: Never    Tobacco comments:     quit smoking 2 months ago   Vaping Use    Vaping status: Never Used   Substance Use Topics    " "Alcohol use: No    Drug use: No     Social History     Social History Narrative    Not on file       ROS:   Review of Systems   Constitutional:  Negative for fatigue and fever.   HENT:  Negative for congestion and nosebleeds.    Eyes:  Negative for pain and discharge.   Respiratory:  Negative for cough and shortness of breath.    Cardiovascular:  Negative for chest pain.   Gastrointestinal:  Negative for abdominal pain, blood in stool, diarrhea, nausea and vomiting.   Endocrine: Negative for cold intolerance and heat intolerance.   Genitourinary:  Negative for difficulty urinating.   Musculoskeletal:  Negative for arthralgias.   Skin:  Negative for rash.   Neurological:  Negative for dizziness and headaches.   Hematological:  Does not bruise/bleed easily.   Psychiatric/Behavioral:  Negative for behavioral problems.          Objective:    Vital Signs:  Vitals:    03/26/24 0909   BP: 142/71   Pulse: 73   Resp: 14   Temp: 98.1 °F (36.7 °C)   SpO2: 96%   Weight: 68.4 kg (150 lb 12.8 oz)   Height: 152.4 cm (60\")   PainSc: 0-No pain     Body mass index is 29.45 kg/m².    ECOG  (2) Ambulatory and capable of self care, unable to carry out work activity, up and about > 50% or waking hours    Physical Exam:   Physical Exam  Constitutional:       Appearance: Normal appearance.   HENT:      Head: Normocephalic and atraumatic.   Eyes:      Pupils: Pupils are equal, round, and reactive to light.   Cardiovascular:      Rate and Rhythm: Normal rate and regular rhythm.      Pulses: Normal pulses.      Heart sounds: No murmur heard.  Pulmonary:      Effort: Pulmonary effort is normal.      Breath sounds: Normal breath sounds.   Abdominal:      General: There is no distension.      Palpations: Abdomen is soft. There is no mass.      Tenderness: There is no abdominal tenderness.   Musculoskeletal:         General: Normal range of motion.      Cervical back: Normal range of motion and neck supple.   Skin:     General: Skin is warm. "   Neurological:      General: No focal deficit present.      Mental Status: She is alert.   Psychiatric:         Mood and Affect: Mood normal.         Lab Results - Last 18 Months   Lab Units 03/19/24  0759 12/28/23  1434 11/12/23  0401   WBC 10*3/mm3 9.19 11.72* 8.60   HEMOGLOBIN g/dL 11.7* 10.6* 11.0*   HEMATOCRIT % 38.1 32.7* 34.4   PLATELETS 10*3/mm3 368 311 399   MCV fL 99.7* 94.0 93.2     Lab Results - Last 18 Months   Lab Units 11/12/23  0401 11/10/23  2229 11/10/23  1615 11/09/23  2044 10/08/23  2229 10/08/23  0748 10/04/23  0040 10/03/23  1331   SODIUM mmol/L 135* 136 137 137   < > 135*   < > 139   POTASSIUM mmol/L 4.6 4.9 4.1 4.3   < > 5.4*   < > 6.2*   CHLORIDE mmol/L 98 98 98 94*   < > 92*   < > 96*   CO2 mmol/L 20.0* 24.0 25.0 24.0   < > 22.0   < > 23.0   BUN mg/dL 37* 17 15 32*   < > 48*   < > 49*   CREATININE mg/dL 6.71* 4.72* 3.96* 6.30*   < > 6.02*   < > 6.29*   CALCIUM mg/dL 8.8 9.4 10.0 9.5   < > 9.3   < > 8.3*   BILIRUBIN mg/dL  --   --   --  0.3  --  0.3  --  0.3   ALK PHOS U/L  --   --   --  144*  --  105  --  111   ALT (SGPT) U/L  --   --   --  12  --  12  --  14   AST (SGOT) U/L  --   --   --  21  --  15  --  19   GLUCOSE mg/dL 89 81 69 101*   < > 121*   < > 85    < > = values in this interval not displayed.       Lab Results   Component Value Date    GLUCOSE 89 11/12/2023    BUN 37 (H) 11/12/2023    CREATININE 6.71 (H) 11/12/2023    EGFRIFNONA 6 (L) 10/08/2021    EGFRIFAFRI 5 (L) 07/14/2022    BCR 5.5 (L) 11/12/2023    K 4.6 11/12/2023    CO2 20.0 (L) 11/12/2023    CALCIUM 8.8 11/12/2023    ALBUMIN 3.7 11/12/2023    LABIL2 0.8 (L) 03/14/2019    AST 21 11/09/2023    ALT 12 11/09/2023       Lab Results - Last 18 Months   Lab Units 10/10/23  0737 01/26/23  1645   INR  1.06 1.05   APTT seconds 30.8 30.8*       Lab Results   Component Value Date    IRON 51 03/19/2024    TIBC 176 (L) 03/19/2024    FERRITIN 2,099.00 (H) 03/19/2024       Lab Results   Component Value Date    FOLATE 11.9 04/15/2017  "      Lab Results   Component Value Date    OCCULTBLD POSITIVE (A) 06/01/2017    OCCULTBLD POSITIVE (A) 05/16/2017    OCCULTBLD POSITIVE (A) 05/05/2017       No results found for: \"RETICCTPCT\"  Lab Results   Component Value Date    BMMFZVHJ89 1,234 (H) 04/15/2017     No results found for: \"SPEP\", \"UPEP\"  Uric Acid   Date Value Ref Range Status   07/10/2018 4.7 2.6 - 8.0 mg/dL Final     Lab Results   Component Value Date    SEDRATE 69 (H) 02/08/2019     No results found for: \"FIBRINOGEN\", \"HAPTOGLOBIN\"  Lab Results   Component Value Date    PTT 30.8 10/10/2023    INR 1.06 10/10/2023     No results found for: \"\"  No results found for: \"CEA\"  No components found for: \"CA-19-9\"  No results found for: \"PSA\"  Lab Results   Component Value Date    SEDRATE 69 (H) 02/08/2019          Assessment & Plan     Patient is 73-year-old female with multiple malignancy in the past  recently diagnosed with non-small cell lung cancer    Stage I non-small cell lung cancer  Treated with SBRT  Last CT reviewed, next in June continue surveillance    Warthin's tumor parotid  Discussed ENT referral   Patient does not want any more surgery  She understands the risk.   Will not refer at this point.    End-stage renal disease  Continue hemodialysis    Anemia  Will need iron studies, this could be related to end-stage renal disease consider Procrit can be done with dialysis    NET duodenum   S/p resection  No indication of recurrence    RCC  S/p nephrectomy  No recurrence    Genetic counseling  Recommended counseling with multiple cancers  She does not want it.    CBC in 3 months and follow up.    Thank you very much for providing the opportunity to participate in this patient’s care. Please do not hesitate to call if there are any other questions.    "

## 2024-03-26 ENCOUNTER — OFFICE VISIT (OUTPATIENT)
Dept: ONCOLOGY | Facility: CLINIC | Age: 74
End: 2024-03-26
Payer: MEDICARE

## 2024-03-26 VITALS
WEIGHT: 150.8 LBS | OXYGEN SATURATION: 96 % | SYSTOLIC BLOOD PRESSURE: 142 MMHG | BODY MASS INDEX: 29.61 KG/M2 | HEIGHT: 60 IN | RESPIRATION RATE: 14 BRPM | TEMPERATURE: 98.1 F | HEART RATE: 73 BPM | DIASTOLIC BLOOD PRESSURE: 71 MMHG

## 2024-03-26 DIAGNOSIS — C7A.8 NEUROENDOCRINE CARCINOMA: Primary | ICD-10-CM

## 2024-03-26 PROCEDURE — 1160F RVW MEDS BY RX/DR IN RCRD: CPT | Performed by: INTERNAL MEDICINE

## 2024-03-26 PROCEDURE — 3078F DIAST BP <80 MM HG: CPT | Performed by: INTERNAL MEDICINE

## 2024-03-26 PROCEDURE — 1159F MED LIST DOCD IN RCRD: CPT | Performed by: INTERNAL MEDICINE

## 2024-03-26 PROCEDURE — 3077F SYST BP >= 140 MM HG: CPT | Performed by: INTERNAL MEDICINE

## 2024-04-01 NOTE — PROGRESS NOTES
"Chief Complaint  Follow-up, Carotid Artery Disease, and ulcer on finger (Left fifth finger)    Follow-up for hemodialysis access, left fifth finger wound    Subjective        Sherry Lobato presents to [facility] VASCULAR SURGERY  History of Present Illness    Patient is a pleasant 73-year-old lady with a history of end-stage renal disease requiring dialysis status post left arm brachiobasilic fistula creation by Dr. Caicedo in December 2020.  She recently underwent a fistulogram on March 14 of this year with interventional radiology who noted moderate stenosis in the arterial limb and high-grade stenosis at the arterial anastomosis, both of which were treated with 8 mm balloon angioplasty with excellent fluoroscopic results.    She also has carotid artery stenosis, less than 50% on the right side and 50 to 69% on the left; asymptomatic bilaterally.      She is here today for follow-up for persistent wound on her left fifth finger.  There is overlying the dorsal aspect of the PIP joint.  She reports it hurts but on exam it does not appear infected.  She has no fevers chills or systemic symptoms of infection.       Objective   Vital Signs:  /69 (BP Location: Right arm, Patient Position: Sitting)   Pulse 103   Ht 152.4 cm (60\")   Wt 68 kg (150 lb)   BMI 29.29 kg/m²   Estimated body mass index is 29.29 kg/m² as calculated from the following:    Height as of this encounter: 152.4 cm (60\").    Weight as of this encounter: 68 kg (150 lb).               Physical Exam     Respirations nonlabored    Left wrist with palpable ulnar pulse no palpable radial pulse    Monophasic signals in radial and ulnar at the wrist, deep and superficial palmar arch, digital arteries at the base of her left fifth finger on the medial and lateral aspect.  All do augment with compression of her fistula.     Left fifth finger with linear ulceration over the dorsal aspect of the PIP joint approximately 4 mm in " length, no significant erythema induration or drainage    Result Review :            I reviewed the most recent fistulogram from March 14 performed by interventional radiology.  Fistula.  Widely patent.  I do not see any images to evaluate steal.           Assessment and Plan       With numerous medical comorbidities including end-stage renal disease requiring dialysis via left arm arteriovenous fistula, who has a nonhealing ulceration on the dorsal aspect of her left fifth finger present for 2 months.  It does not appear infected but does cause patient some pain.  I suspect this is slow to heal due to her medical comorbidities but also perhaps decreased blood flow from steal syndrome.    Will get AV fistula duplex as well as wrist brachial indices with digit pressures with and without compression of her fistula to assess for steal.    If blood flow adequate, then will refer to hand surgery.  If inadequate then would likely need an intervention to treat steal syndrome.    Diagnoses and all orders for this visit:    1. Arteriovenous fistula (Primary)    2. Carotid stenosis, asymptomatic, left    3. Open wound of finger, initial encounter  -     Duplex Hemodialysis Access CAR; Future  -     Doppler Arterial Single Level Upper Extremity - Bilateral CAR; Future    4. End stage renal disease  -     Duplex Hemodialysis Access CAR; Future    5. Other specified peripheral vascular diseases  -     Doppler Arterial Single Level Upper Extremity - Bilateral CAR; Future                   Follow Up     Return in about 2 weeks (around 4/16/2024).  Patient was given instructions and counseling regarding her condition or for health maintenance advice. Please see specific information pulled into the AVS if appropriate.

## 2024-04-02 ENCOUNTER — OFFICE VISIT (OUTPATIENT)
Age: 74
End: 2024-04-02
Payer: MEDICARE

## 2024-04-02 VITALS
SYSTOLIC BLOOD PRESSURE: 171 MMHG | BODY MASS INDEX: 29.45 KG/M2 | HEART RATE: 103 BPM | WEIGHT: 150 LBS | HEIGHT: 60 IN | DIASTOLIC BLOOD PRESSURE: 69 MMHG

## 2024-04-02 DIAGNOSIS — I65.22 CAROTID STENOSIS, ASYMPTOMATIC, LEFT: ICD-10-CM

## 2024-04-02 DIAGNOSIS — S61.209A OPEN WOUND OF FINGER, INITIAL ENCOUNTER: ICD-10-CM

## 2024-04-02 DIAGNOSIS — I77.0 ARTERIOVENOUS FISTULA: Primary | ICD-10-CM

## 2024-04-02 DIAGNOSIS — I73.89 OTHER SPECIFIED PERIPHERAL VASCULAR DISEASES: ICD-10-CM

## 2024-04-02 DIAGNOSIS — N18.6 END STAGE RENAL DISEASE: ICD-10-CM

## 2024-04-09 ENCOUNTER — HOSPITAL ENCOUNTER (OUTPATIENT)
Dept: CARDIOLOGY | Facility: HOSPITAL | Age: 74
Discharge: HOME | End: 2024-04-09
Payer: MEDICARE

## 2024-04-09 DIAGNOSIS — N18.6 END STAGE RENAL DISEASE: ICD-10-CM

## 2024-04-09 DIAGNOSIS — S61.209A OPEN WOUND OF FINGER, INITIAL ENCOUNTER: ICD-10-CM

## 2024-04-09 DIAGNOSIS — I73.89 OTHER SPECIFIED PERIPHERAL VASCULAR DISEASES: ICD-10-CM

## 2024-04-09 LAB
BH CV UPPER ARTERIAL LEFT 2ND DIGIT SYS MAX: 28
BH CV UPPER ARTERIAL LEFT FBI 2ND DIGIT RATIO: 0.18
BH CV UPPER ARTERIAL LEFT WBI RATIO: 0.37
BH CV UPPER ARTERIAL RIGHT 2ND DIGIT SYS MAX: 127
BH CV UPPER ARTERIAL RIGHT FBI 2ND DIGIT RATIO: 0.8
BH CV UPPER ARTERIAL RIGHT WBI RATIO: 1.06
UPPER ARTERIAL LEFT ARM RADIAL SYS MAX: 55
UPPER ARTERIAL LEFT ARM ULNAR SYS MAX: 58
UPPER ARTERIAL RIGHT ARM FOREARM SYS MAX: 158
UPPER ARTERIAL RIGHT ARM RADIAL SYS MAX: 168
UPPER ARTERIAL RIGHT ARM ULNAR SYS MAX: 163

## 2024-04-09 PROCEDURE — 93922 UPR/L XTREMITY ART 2 LEVELS: CPT | Performed by: STUDENT IN AN ORGANIZED HEALTH CARE EDUCATION/TRAINING PROGRAM

## 2024-04-09 PROCEDURE — 93922 UPR/L XTREMITY ART 2 LEVELS: CPT

## 2024-04-16 ENCOUNTER — OFFICE VISIT (OUTPATIENT)
Age: 74
End: 2024-04-16
Payer: MEDICARE

## 2024-04-16 VITALS
HEIGHT: 60 IN | BODY MASS INDEX: 29.43 KG/M2 | WEIGHT: 149.91 LBS | SYSTOLIC BLOOD PRESSURE: 110 MMHG | HEART RATE: 74 BPM | DIASTOLIC BLOOD PRESSURE: 44 MMHG

## 2024-04-16 DIAGNOSIS — I77.0 ARTERIOVENOUS FISTULA: Primary | ICD-10-CM

## 2024-04-16 DIAGNOSIS — N18.6 ESRD (END STAGE RENAL DISEASE): Chronic | ICD-10-CM

## 2024-04-16 DIAGNOSIS — T82.898S STEAL SYNDROME DIALYSIS VASCULAR ACCESS, SEQUELA: ICD-10-CM

## 2024-04-16 RX ORDER — LIDOCAINE AND PRILOCAINE 25; 25 MG/G; MG/G
1 CREAM TOPICAL DAILY PRN
Qty: 5800 G | Refills: 4 | Status: SHIPPED | OUTPATIENT
Start: 2024-04-16

## 2024-04-16 NOTE — PROGRESS NOTES
"Chief Complaint  Follow-up (2 week f/u ), Carotid Artery Disease, and end stage renal disease    Follow-up for hemodialysis access, left fifth finger wound    Subjective        Sherry Lobato presents to [facility] VASCULAR SURGERY  History of Present Illness    Patient is a pleasant 73-year-old lady with a history of end-stage renal disease requiring dialysis status post left arm brachiobasilic fistula creation by Dr. Caicedo in December 2020.  She recently underwent a fistulogram on March 14 of this year with interventional radiology who noted moderate stenosis in the arterial limb and high-grade stenosis at the arterial anastomosis, both of which were treated with 8 mm balloon angioplasty with excellent fluoroscopic results.    She also has carotid artery stenosis, less than 50% on the right side and 50 to 69% on the left; asymptomatic bilaterally.      Patient returns today for follow-up after this brachial indices were performed.    She reports persistent throbbing pain in the fifth finger of her left hand as well as numbness and pain of her forearm and hand during dialysis.  She has taken some hydrocodone she has at home without much help.      Objective   Vital Signs:  /44 (BP Location: Right arm, Patient Position: Sitting)   Pulse 74   Ht 152.4 cm (60\")   Wt 68 kg (149 lb 14.6 oz)   BMI 29.28 kg/m²   Estimated body mass index is 29.28 kg/m² as calculated from the following:    Height as of this encounter: 152.4 cm (60\").    Weight as of this encounter: 68 kg (149 lb 14.6 oz).               Physical Exam     Respirations nonlabored    Weakly palpable left ulnar pulse, no palpable left radial pulse    Monophasic signals in radial and ulnar at the wrist, deep and superficial palmar arch, digital arteries at the base of her left fifth finger on the medial and lateral aspect.  All do augment with compression of her fistula.     Readily palpable left brachial pulse.    Left fifth " finger with linear ulceration over the dorsal aspect of the PIP joint approximately 4 mm in length, no significant erythema induration or drainage, appears unchanged from previous    Result Review :            Wrist brachial indices as noted above.           Assessment and Plan       73 year old lady with numerous medical comorbidities including end-stage renal disease requiring dialysis via left arm arteriovenous fistula, who has a nonhealing ulceration on the dorsal aspect of her left fifth finger present for 2 months.  It does not appear infected but does cause patient some pain.  I suspect this is slow to heal due to her medical comorbidities but also perhaps decreased blood flow from steal syndrome.    She follows up today with noninvasive testing suggest diminished perfusion in the left hand.  Interestingly, the blood flow does not really improve significantly with fistula compression, suggesting this may not be secondary to steal phenomenon.    I recommend we proceed with a fistulogram with concomitant arteriogram of the left arm with treatment of any proximal stenosis, and if no proximal stenosis found then we will plan to cut down and performed banding of the fistula.    Despite the minimal improvement in waveforms with compression of the fistula based on the patient's symptoms and her physical exam I do still think steal is the most likely underlying cause, perhaps exacerbated by chronic small vessel and microvascular disease in the left hand secondary to smoking and diabetes.     Details of this procedure were discussed with the patient she verbalized understanding and agrees to proceed.      Diagnoses and all orders for this visit:    1. Arteriovenous fistula (Primary)  -     Case Request; Standing  -     Case Request    2. ESRD (end stage renal disease)    3. Steal syndrome dialysis vascular access, sequela  -     Case Request; Standing  -     Case Request    Other orders  -     lidocaine-prilocaine  (EMLA) 2.5-2.5 % cream; Apply 1 Application topically to the appropriate area as directed Daily As Needed (severe pain, left 5th finger). Apply to left 5th finger  Dispense: 5800 g; Refill: 4  -     Follow Anesthesia Guidelines / Protocol; Future  -     Follow Anesthesia Guidelines / Protocol; Standing  -     Verify NPO Status; Standing  -     Obtain Informed Consent; Standing  -     Clip Operative Site; Standing  -     Have Patient Void Prior to Procedure; Standing  -     No Hand or Wrist IV Placement. Place IV in AC; Standing  -     Verify / Perform Chlorhexidine Skin Prep; Standing  -     Verify NPO Status; Standing  -     Clip Operative Site; Standing  -     Patient to Void Prior to Transfer to OR; Standing  -     No Hand or Wrist IV Placement, Place IV in AC; Standing  -     Verify / Perform Chlorhexidine Skin Prep; Standing  -     Verify / Perform Chlorhexidine Skin Prep if Indicated (If Not Already Completed); Standing  -     Obtain Informed Consent; Future  -     Provide NPO Instructions to Patient; Future  -     Chlorhexidine Skin Prep; Future  -     Provide Patient With Instructions on NPO Status; Future  -     Provide Chlorhexidine Skin Prep Wipes and Instructions; Future  -     Place Sequential Compression Device; Standing  -     Maintain Sequential Compression Device; Standing                     Follow Up     No follow-ups on file.  Patient was given instructions and counseling regarding her condition or for health maintenance advice. Please see specific information pulled into the AVS if appropriate.

## 2024-04-18 ENCOUNTER — HOSPITAL ENCOUNTER (OUTPATIENT)
Dept: CARDIOLOGY | Facility: HOSPITAL | Age: 74
Discharge: HOME | End: 2024-04-18
Admitting: STUDENT IN AN ORGANIZED HEALTH CARE EDUCATION/TRAINING PROGRAM
Payer: MEDICARE

## 2024-04-18 LAB
QT INTERVAL: 374 MS
QTC INTERVAL: 437 MS

## 2024-04-18 PROCEDURE — 93005 ELECTROCARDIOGRAM TRACING: CPT | Performed by: STUDENT IN AN ORGANIZED HEALTH CARE EDUCATION/TRAINING PROGRAM

## 2024-04-23 ENCOUNTER — ANESTHESIA (OUTPATIENT)
Dept: PERIOP | Facility: HOSPITAL | Age: 74
End: 2024-04-23
Payer: MEDICARE

## 2024-04-23 ENCOUNTER — HOSPITAL ENCOUNTER (OUTPATIENT)
Facility: HOSPITAL | Age: 74
Setting detail: HOSPITAL OUTPATIENT SURGERY
Discharge: HOME | End: 2024-04-23
Attending: STUDENT IN AN ORGANIZED HEALTH CARE EDUCATION/TRAINING PROGRAM | Admitting: STUDENT IN AN ORGANIZED HEALTH CARE EDUCATION/TRAINING PROGRAM
Payer: MEDICARE

## 2024-04-23 ENCOUNTER — ANESTHESIA EVENT (OUTPATIENT)
Dept: PERIOP | Facility: HOSPITAL | Age: 74
End: 2024-04-23
Payer: MEDICARE

## 2024-04-23 ENCOUNTER — ANCILLARY PROCEDURE (OUTPATIENT)
Dept: PERIOP | Facility: HOSPITAL | Age: 74
End: 2024-04-23
Payer: MEDICARE

## 2024-04-23 VITALS
WEIGHT: 150 LBS | HEART RATE: 75 BPM | OXYGEN SATURATION: 93 % | DIASTOLIC BLOOD PRESSURE: 71 MMHG | BODY MASS INDEX: 29.45 KG/M2 | HEIGHT: 60 IN | SYSTOLIC BLOOD PRESSURE: 125 MMHG | RESPIRATION RATE: 15 BRPM | TEMPERATURE: 98.1 F

## 2024-04-23 DIAGNOSIS — T82.898S STEAL SYNDROME DIALYSIS VASCULAR ACCESS, SEQUELA: Primary | ICD-10-CM

## 2024-04-23 LAB
ANION GAP SERPL CALCULATED.3IONS-SCNC: 17 MMOL/L (ref 5–15)
BUN SERPL-MCNC: 47 MG/DL (ref 8–23)
BUN/CREAT SERPL: 6.3 (ref 7–25)
CALCIUM SPEC-SCNC: 9.5 MG/DL (ref 8.6–10.5)
CHLORIDE SERPL-SCNC: 102 MMOL/L (ref 98–107)
CO2 SERPL-SCNC: 22 MMOL/L (ref 22–29)
CREAT SERPL-MCNC: 7.42 MG/DL (ref 0.57–1)
DEPRECATED RDW RBC AUTO: 59.5 FL (ref 37–54)
EGFRCR SERPLBLD CKD-EPI 2021: 5.3 ML/MIN/1.73
ERYTHROCYTE [DISTWIDTH] IN BLOOD BY AUTOMATED COUNT: 17.1 % (ref 12.3–15.4)
GLUCOSE BLDC GLUCOMTR-MCNC: 69 MG/DL (ref 70–105)
GLUCOSE BLDC GLUCOMTR-MCNC: 75 MG/DL (ref 70–105)
GLUCOSE SERPL-MCNC: 80 MG/DL (ref 65–99)
HCT VFR BLD AUTO: 36.9 % (ref 34–46.6)
HGB BLD-MCNC: 11.5 G/DL (ref 12–15.9)
MCH RBC QN AUTO: 30.9 PG (ref 26.6–33)
MCHC RBC AUTO-ENTMCNC: 31.2 G/DL (ref 31.5–35.7)
MCV RBC AUTO: 99.2 FL (ref 79–97)
PLATELET # BLD AUTO: 387 10*3/MM3 (ref 140–450)
PMV BLD AUTO: 9.1 FL (ref 6–12)
POTASSIUM SERPL-SCNC: 4.9 MMOL/L (ref 3.5–5.2)
QT INTERVAL: 374 MS
QTC INTERVAL: 437 MS
RBC # BLD AUTO: 3.72 10*6/MM3 (ref 3.77–5.28)
SODIUM SERPL-SCNC: 141 MMOL/L (ref 136–145)
WBC NRBC COR # BLD AUTO: 9.44 10*3/MM3 (ref 3.4–10.8)

## 2024-04-23 PROCEDURE — 25010000002 CEFAZOLIN PER 500 MG: Performed by: NURSE ANESTHETIST, CERTIFIED REGISTERED

## 2024-04-23 PROCEDURE — 25010000002 ONDANSETRON PER 1 MG: Performed by: NURSE ANESTHETIST, CERTIFIED REGISTERED

## 2024-04-23 PROCEDURE — 25010000002 HEPARIN (PORCINE) PER 1000 UNITS: Performed by: STUDENT IN AN ORGANIZED HEALTH CARE EDUCATION/TRAINING PROGRAM

## 2024-04-23 PROCEDURE — 85027 COMPLETE CBC AUTOMATED: CPT | Performed by: STUDENT IN AN ORGANIZED HEALTH CARE EDUCATION/TRAINING PROGRAM

## 2024-04-23 PROCEDURE — 25010000002 BUPIVACAINE (PF) 0.25 % SOLUTION: Performed by: STUDENT IN AN ORGANIZED HEALTH CARE EDUCATION/TRAINING PROGRAM

## 2024-04-23 PROCEDURE — 36832 AV FISTULA REVISION OPEN: CPT | Performed by: STUDENT IN AN ORGANIZED HEALTH CARE EDUCATION/TRAINING PROGRAM

## 2024-04-23 PROCEDURE — 82948 REAGENT STRIP/BLOOD GLUCOSE: CPT

## 2024-04-23 PROCEDURE — C1887 CATHETER, GUIDING: HCPCS | Performed by: STUDENT IN AN ORGANIZED HEALTH CARE EDUCATION/TRAINING PROGRAM

## 2024-04-23 PROCEDURE — 25010000002 SUGAMMADEX 200 MG/2ML SOLUTION: Performed by: NURSE ANESTHETIST, CERTIFIED REGISTERED

## 2024-04-23 PROCEDURE — 80048 BASIC METABOLIC PNL TOTAL CA: CPT | Performed by: STUDENT IN AN ORGANIZED HEALTH CARE EDUCATION/TRAINING PROGRAM

## 2024-04-23 PROCEDURE — 25010000002 DEXAMETHASONE PER 1 MG: Performed by: NURSE ANESTHETIST, CERTIFIED REGISTERED

## 2024-04-23 PROCEDURE — C1894 INTRO/SHEATH, NON-LASER: HCPCS | Performed by: STUDENT IN AN ORGANIZED HEALTH CARE EDUCATION/TRAINING PROGRAM

## 2024-04-23 PROCEDURE — 82948 REAGENT STRIP/BLOOD GLUCOSE: CPT | Performed by: NURSE ANESTHETIST, CERTIFIED REGISTERED

## 2024-04-23 PROCEDURE — C1769 GUIDE WIRE: HCPCS | Performed by: STUDENT IN AN ORGANIZED HEALTH CARE EDUCATION/TRAINING PROGRAM

## 2024-04-23 PROCEDURE — 25010000002 FENTANYL CITRATE (PF) 100 MCG/2ML SOLUTION: Performed by: NURSE ANESTHETIST, CERTIFIED REGISTERED

## 2024-04-23 PROCEDURE — 25010000002 PROPOFOL 200 MG/20ML EMULSION: Performed by: NURSE ANESTHETIST, CERTIFIED REGISTERED

## 2024-04-23 PROCEDURE — 25810000003 SODIUM CHLORIDE 0.9 % SOLUTION: Performed by: NURSE ANESTHETIST, CERTIFIED REGISTERED

## 2024-04-23 PROCEDURE — 25810000003 SODIUM CHLORIDE 0.9 % SOLUTION 250 ML FLEX CONT: Performed by: NURSE ANESTHETIST, CERTIFIED REGISTERED

## 2024-04-23 PROCEDURE — 25010000002 HYDROMORPHONE 1 MG/ML SOLUTION: Performed by: NURSE ANESTHETIST, CERTIFIED REGISTERED

## 2024-04-23 PROCEDURE — 25510000001 IOPAMIDOL PER 1 ML: Performed by: STUDENT IN AN ORGANIZED HEALTH CARE EDUCATION/TRAINING PROGRAM

## 2024-04-23 PROCEDURE — 25010000002 PHENYLEPHRINE 10 MG/ML SOLUTION 5 ML VIAL: Performed by: NURSE ANESTHETIST, CERTIFIED REGISTERED

## 2024-04-23 PROCEDURE — C1725 CATH, TRANSLUMIN NON-LASER: HCPCS | Performed by: STUDENT IN AN ORGANIZED HEALTH CARE EDUCATION/TRAINING PROGRAM

## 2024-04-23 PROCEDURE — S0260 H&P FOR SURGERY: HCPCS | Performed by: STUDENT IN AN ORGANIZED HEALTH CARE EDUCATION/TRAINING PROGRAM

## 2024-04-23 RX ORDER — CEFAZOLIN SODIUM 1 G/3ML
INJECTION, POWDER, FOR SOLUTION INTRAMUSCULAR; INTRAVENOUS AS NEEDED
Status: DISCONTINUED | OUTPATIENT
Start: 2024-04-23 | End: 2024-04-23 | Stop reason: SURG

## 2024-04-23 RX ORDER — ONDANSETRON 2 MG/ML
INJECTION INTRAMUSCULAR; INTRAVENOUS AS NEEDED
Status: DISCONTINUED | OUTPATIENT
Start: 2024-04-23 | End: 2024-04-23 | Stop reason: SURG

## 2024-04-23 RX ORDER — DROPERIDOL 2.5 MG/ML
0.62 INJECTION, SOLUTION INTRAMUSCULAR; INTRAVENOUS ONCE AS NEEDED
Status: DISCONTINUED | OUTPATIENT
Start: 2024-04-23 | End: 2024-04-23 | Stop reason: HOSPADM

## 2024-04-23 RX ORDER — SODIUM CHLORIDE 9 MG/ML
INJECTION, SOLUTION INTRAVENOUS CONTINUOUS PRN
Status: DISCONTINUED | OUTPATIENT
Start: 2024-04-23 | End: 2024-04-23 | Stop reason: SURG

## 2024-04-23 RX ORDER — OXYCODONE HYDROCHLORIDE 5 MG/1
5 TABLET ORAL EVERY 4 HOURS PRN
Qty: 10 TABLET | Refills: 0 | Status: SHIPPED | OUTPATIENT
Start: 2024-04-23

## 2024-04-23 RX ORDER — BUPIVACAINE HYDROCHLORIDE 2.5 MG/ML
INJECTION, SOLUTION EPIDURAL; INFILTRATION; INTRACAUDAL AS NEEDED
Status: DISCONTINUED | OUTPATIENT
Start: 2024-04-23 | End: 2024-04-23 | Stop reason: HOSPADM

## 2024-04-23 RX ORDER — EPHEDRINE SULFATE 5 MG/ML
5 INJECTION INTRAVENOUS ONCE AS NEEDED
Status: DISCONTINUED | OUTPATIENT
Start: 2024-04-23 | End: 2024-04-23 | Stop reason: HOSPADM

## 2024-04-23 RX ORDER — OXYCODONE HYDROCHLORIDE 5 MG/1
5 TABLET ORAL ONCE AS NEEDED
Status: DISCONTINUED | OUTPATIENT
Start: 2024-04-23 | End: 2024-04-23 | Stop reason: HOSPADM

## 2024-04-23 RX ORDER — DEXAMETHASONE SODIUM PHOSPHATE 4 MG/ML
INJECTION, SOLUTION INTRA-ARTICULAR; INTRALESIONAL; INTRAMUSCULAR; INTRAVENOUS; SOFT TISSUE AS NEEDED
Status: DISCONTINUED | OUTPATIENT
Start: 2024-04-23 | End: 2024-04-23 | Stop reason: SURG

## 2024-04-23 RX ORDER — FLUMAZENIL 0.1 MG/ML
0.2 INJECTION INTRAVENOUS AS NEEDED
Status: DISCONTINUED | OUTPATIENT
Start: 2024-04-23 | End: 2024-04-23 | Stop reason: HOSPADM

## 2024-04-23 RX ORDER — LABETALOL HYDROCHLORIDE 5 MG/ML
5 INJECTION, SOLUTION INTRAVENOUS
Status: DISCONTINUED | OUTPATIENT
Start: 2024-04-23 | End: 2024-04-23 | Stop reason: HOSPADM

## 2024-04-23 RX ORDER — ACETAMINOPHEN 325 MG/1
650 TABLET ORAL ONCE AS NEEDED
Status: DISCONTINUED | OUTPATIENT
Start: 2024-04-23 | End: 2024-04-23 | Stop reason: HOSPADM

## 2024-04-23 RX ORDER — FENTANYL CITRATE 50 UG/ML
50 INJECTION, SOLUTION INTRAMUSCULAR; INTRAVENOUS
Status: DISCONTINUED | OUTPATIENT
Start: 2024-04-23 | End: 2024-04-23 | Stop reason: HOSPADM

## 2024-04-23 RX ORDER — HYDRALAZINE HYDROCHLORIDE 20 MG/ML
5 INJECTION INTRAMUSCULAR; INTRAVENOUS
Status: DISCONTINUED | OUTPATIENT
Start: 2024-04-23 | End: 2024-04-23 | Stop reason: HOSPADM

## 2024-04-23 RX ORDER — FENTANYL CITRATE 50 UG/ML
INJECTION, SOLUTION INTRAMUSCULAR; INTRAVENOUS AS NEEDED
Status: DISCONTINUED | OUTPATIENT
Start: 2024-04-23 | End: 2024-04-23 | Stop reason: SURG

## 2024-04-23 RX ORDER — LIDOCAINE HYDROCHLORIDE 20 MG/ML
INJECTION, SOLUTION EPIDURAL; INFILTRATION; INTRACAUDAL; PERINEURAL AS NEEDED
Status: DISCONTINUED | OUTPATIENT
Start: 2024-04-23 | End: 2024-04-23 | Stop reason: SURG

## 2024-04-23 RX ORDER — ONDANSETRON 2 MG/ML
4 INJECTION INTRAMUSCULAR; INTRAVENOUS ONCE AS NEEDED
Status: DISCONTINUED | OUTPATIENT
Start: 2024-04-23 | End: 2024-04-23 | Stop reason: HOSPADM

## 2024-04-23 RX ORDER — PROPOFOL 10 MG/ML
INJECTION, EMULSION INTRAVENOUS AS NEEDED
Status: DISCONTINUED | OUTPATIENT
Start: 2024-04-23 | End: 2024-04-23 | Stop reason: SURG

## 2024-04-23 RX ORDER — NALOXONE HCL 0.4 MG/ML
0.4 VIAL (ML) INJECTION AS NEEDED
Status: DISCONTINUED | OUTPATIENT
Start: 2024-04-23 | End: 2024-04-23 | Stop reason: HOSPADM

## 2024-04-23 RX ORDER — IPRATROPIUM BROMIDE AND ALBUTEROL SULFATE 2.5; .5 MG/3ML; MG/3ML
3 SOLUTION RESPIRATORY (INHALATION) ONCE AS NEEDED
Status: DISCONTINUED | OUTPATIENT
Start: 2024-04-23 | End: 2024-04-23 | Stop reason: HOSPADM

## 2024-04-23 RX ORDER — ROCURONIUM BROMIDE 10 MG/ML
INJECTION, SOLUTION INTRAVENOUS AS NEEDED
Status: DISCONTINUED | OUTPATIENT
Start: 2024-04-23 | End: 2024-04-23 | Stop reason: SURG

## 2024-04-23 RX ADMIN — PHENYLEPHRINE HYDROCHLORIDE 0.3 MCG/KG/MIN: 10 INJECTION INTRAVENOUS at 15:14

## 2024-04-23 RX ADMIN — LIDOCAINE HYDROCHLORIDE 100 MG: 20 INJECTION, SOLUTION EPIDURAL; INFILTRATION; INTRACAUDAL; PERINEURAL at 14:43

## 2024-04-23 RX ADMIN — HYDROMORPHONE HYDROCHLORIDE 0.5 MG: 1 INJECTION, SOLUTION INTRAMUSCULAR; INTRAVENOUS; SUBCUTANEOUS at 16:40

## 2024-04-23 RX ADMIN — CEFAZOLIN 2 G: 1 INJECTION, POWDER, FOR SOLUTION INTRAMUSCULAR; INTRAVENOUS at 15:00

## 2024-04-23 RX ADMIN — DEXAMETHASONE SODIUM PHOSPHATE 4 MG: 4 INJECTION, SOLUTION INTRAMUSCULAR; INTRAVENOUS at 14:55

## 2024-04-23 RX ADMIN — ONDANSETRON 4 MG: 2 INJECTION INTRAMUSCULAR; INTRAVENOUS at 15:37

## 2024-04-23 RX ADMIN — FENTANYL CITRATE 25 MCG: 50 INJECTION, SOLUTION INTRAMUSCULAR; INTRAVENOUS at 15:25

## 2024-04-23 RX ADMIN — PROPOFOL 150 MG: 10 INJECTION, EMULSION INTRAVENOUS at 14:43

## 2024-04-23 RX ADMIN — FENTANYL CITRATE 25 MCG: 50 INJECTION, SOLUTION INTRAMUSCULAR; INTRAVENOUS at 14:55

## 2024-04-23 RX ADMIN — ROCURONIUM BROMIDE 30 MG: 10 INJECTION, SOLUTION INTRAVENOUS at 14:43

## 2024-04-23 RX ADMIN — SODIUM CHLORIDE: 9 INJECTION, SOLUTION INTRAVENOUS at 14:40

## 2024-04-23 RX ADMIN — SUGAMMADEX 200 MG: 100 INJECTION, SOLUTION INTRAVENOUS at 15:47

## 2024-04-23 NOTE — DISCHARGE INSTRUCTIONS
[facility] Vascular Surgery  Meliton Bassett, Taj, Ana Maria Antonio Thomas, [place]  [address]  [place] [address]  [phone]        Discharge Instructions for Fistulogram with banding      Go home, rest and take it easy today.       You may experience some dizziness or memory loss from the anesthesia.  This may last for the next 24 hours.  Someone should plan on staying with you for the first 24 hours for your safety.    Do not make any important legal decisions or sign any legal papers for the next 24 hours.      Eat and drink lightly today.  Start off with liquids, jello, soup, crackers or other bland foods at first. You may advance your diet tomorrow as tolerated as long as you do not experience any nausea or vomiting.    No driving for the remainder of the day.  You may resume limited driving tomorrow, if necessary.     You may resume routine medications including blood thinners. However, Glucophage should not be started until 72 hours after the dye is given.      No lifting over 10 pounds and no strenuous activity for the next 2-3 days with the involved arm.        Limit going up and down steps for 2 days.    Leave the dressing on until tomorrow morning.  You may then take this off, as well as the small see through dressing with gauze, tomorrow.  If this does not come off easily, do not pull this off.  If it is stuck, shower and let the warm water loosen it before removal.     Check your groin/ arm dressing regularly for bleeding through the dressing (under the pressure dressing).  A small amount of blood contained by the gauze is normal: the whole dressing should not be filled with blood, or leaking out under the sides.  If the bandage is saturated, you may remove it and replace it with a new dressing.      If you experience bleeding through the gauze/ clear dressing, lay flat and have someone apply direct pressure for 15 minutes.  If bleeding has stopped after this, you may put a  clean gauze and tape over the area.  Continue to lie flat for 1-2 hours.  If bleeding continues after 15 minutes of pressure, call us at [phone]. There is an MD available after hours for urgent matters.            If you experience heavy bleeding continue to hold firm pressure and call 911.    Do not call the MD on call.     If your doctor gave you a prescription for a narcotic pill (Tylenol #3, Vicodin, Hydrocodone, Oxycodone or Percocet), you may not drive a vehicle or operate machinery while taking this type of medication.     13. Severe pain is not expected after this procedure.  If you experience severe pain, please call our office at [phone].          Remember to contact our office for any of the following:    Fever > 101 degrees  Severe pain   Severe nausea or vomiting   Significant bleeding of your incisions  Drainage that has a bad smell or is yellow or green in appearance  Any other questions or concerns

## 2024-04-23 NOTE — H&P
Name: Sherry Lobato ADMIT: 2024   : 1950  PCP: Jin Malcolm PA-C    MRN: [id number] LOS: 0 days   AGE/SEX: 73 y.o. female  ROOM:  [facility] CVOR/CVOR     Vascular Surgery H&P Update    Patient seen in preop area.  Doing well today with no significant changes since last seen in clinic last week.  Planning left arm fistulogram with possible intervention, possible fistula cutdown and banding.    Details of the procedure including risk benefits and alternatives discussed with patient and she verbalized understanding and agrees to proceed.    Cefazolin for perioperative antibiotics    Planned outpatient procedure..      Franki Delgadillo II, MD  24  12:51 EDT  Office Number [phone]    [facility] Vascular Surgery

## 2024-04-23 NOTE — ANESTHESIA POSTPROCEDURE EVALUATION
Patient: Sherry Lobato    Procedure Summary       Date: 04/23/24 Room / Location: [facility] OR 13 / [facility] HYBRID OR    Anesthesia Start: 1440 Anesthesia Stop: 1603    Procedure: FISTULOGRAM WITH SUBCLAVIAN ARTERY ANGIOPLASTY, fistula banding (Left) Diagnosis:       Arteriovenous fistula      Steal syndrome dialysis vascular access, sequela      (Arteriovenous fistula [I77.0])      (Steal syndrome dialysis vascular access, sequela [T82.898S])    Surgeons: Franki Delgadillo II, MD Provider: Janina Caballero CRNA    Anesthesia Type: general ASA Status: 3            Anesthesia Type: general    Vitals  Vitals Value Taken Time   /50 04/23/24 1701   Temp 98.1 °F (36.7 °C) 04/23/24 1701   Pulse 73 04/23/24 1702   Resp 12 04/23/24 1701   SpO2 92 % 04/23/24 1702   Vitals shown include unfiled device data.        Post Anesthesia Care and Evaluation    Patient location during evaluation: PACU  Patient participation: complete - patient participated  Level of consciousness: awake  Pain scale: See nurse's notes for pain score.  Pain management: adequate    Airway patency: patent  Anesthetic complications: No anesthetic complications  PONV Status: none  Cardiovascular status: acceptable  Respiratory status: acceptable and spontaneous ventilation  Hydration status: acceptable    Comments: Patient seen and examined postoperatively; vital signs stable; SpO2 greater than or equal to 90%; cardiopulmonary status stable; nausea/vomiting adequately controlled; pain adequately controlled; no apparent anesthesia complications; patient discharged from anesthesia care when discharge criteria were met

## 2024-04-23 NOTE — OP NOTE
Date of Admission:  4/23/2024  Today's Date:  04/23/24  Franki Delgadillo II, MD  [facility]    Preoperative Diagnosis:   Steal syndrome with tissue loss left arm    Postoperative Diagnosis:   Same    Procedure Performed:   Left arm fistulogram  Left arm arteriogram  Balloon angioplasty left subclavian artery with 5 x 40 mm angioplasty balloon  Open revision left arm fistula, banding with 2-0 silk suture over 4 mm angioplasty balloon    Surgeon:   Franki Delgadillo II, MD    Assistant:    Rolanda Heck [facility], Provided critical assistance in exposure, retraction, and suction that overall decrease blood loss and operative time.    Anesthesia:   General    Estimated Blood Loss:   Minimal    Findings:    Left arm fistulogram: Widely patent brachiocephalic fistula, widely patent axillary, subclavian veins, innominate vein, superior vena cava.     Left arm arteriogram: Subclavian artery widely patent, axillary artery with area of focal calcification difficult to pass wire initially, angiogram concerning for possible stenosis.  This was treated with 5 mm balloon angioplasty with no waist on balloon.  Brachial artery widely patent, reversal of flow in the brachial artery distal to fistula noted initially.  This improved with compression of the fistula.  Patent proximal radial and ulnar arteries.  Decision made to proceed with banding of the fistula.  This was done via cutdown on the fistula in the perianastomotic segment over a 4 mm angioplasty balloon.  Completion fistulogram showed improvement in flow through the brachial artery with significantly less reversal of flow in brachial artery.      Patient had palpable radial pulse and good thrill in fistula at conclusion of procedure.    Implants:    Nothing was implanted during the procedure    Staff:   Circulator: Lynne Fernandes RN; Lori Larios RN  Radiology Technologist: Minnie Sauceda  Scrub Person: Candy Villarreal  Assistant: Rolanda Heck CSA    Specimen:    none    Complications:   none    Dispo:   to PACU    Indication for procedure:   73 y.o. female with numerous medical comorbidities including end-stage renal disease requiring dialysis currently with a left arm brachiobasilic fistula who has had a nonhealing ulceration on the dorsal aspect of her left fifth finger for months.  She has also had progressively worsening pain in this left hand.  Her noninvasive studies were not conclusive for steal syndrome however physical exam and history definitely most concerning for hemodialysis access related steal syndrome.  Plans made for left arm fistulogram with arteriogram of the arm and possible fistula banding.    Description of procedure:   Patient brought to the hybrid room placed supine on the table.  General endotracheal anesthesia was begun by the anesthesia service.  Once the patient was asleep her left arm was prepped circumferentially with chlorhexidine and she was draped in sterile fashion.  A timeout was performed.  I began procedure by accessing the fistula in the proximal upper arm with a microneedle.  Microwire was inserted and confirmed in the fistula on fluoroscopy.  We then removed the needle and placed a microsheath over the wire.  Fistulogram was performed that confirmed appropriate placement and also that the fistula outflow was widely patent.  0.035 angled Glidewire was advanced through the micro sheath and into the proximal brachial artery.  Microsheath was exchanged for a 5 Armenian sheath and a Landa catheter was advanced over the wire.  Using a Landa catheter were able to advance the wire and catheter into the aorta.  Arteriogram of the left subclavian artery and axillary artery was performed.  There is no any significant stenosis at the origin of the left subclavian artery.  Remainder of left subclavian artery widely patent, antegrade flow noted in the vertebral and internal mammary arteries.  Axillary artery widely patent, however there was  a focal area of approximately 3 cm in length that was heavily calcified within the axillary artery and had been somewhat difficult to advance wire initially.  There was no clear stenosis there but we did advance a 5 mm angioplasty balloon into position and inflated it.  There is no significant waist on the angioplasty balloon there, so I do not think there is a hemodynamically significant stenosis.  Brachial artery was widely patent.  There was clear reversal of flow in a to and fro pattern in the brachial artery distal to the fistula.  Repeat angiogram of this area was performed with compression of the fistula but that showed antegrade flow through the brachial artery and good opacification of the radial and ulnar arteries proximally.  Decision was made to proceed with banding of the fistula based on these findings.  A incision was made overlying the perianastomotic segment of the arteriovenous fistula with a 15 blade scalpel.  Electrocautery was used to dissect down through the subcutaneous tissue.  Metzenbaum scissors were used to dissect out the arteriovenous fistula circumferentially.  A vessel loop was passed and then we carried our dissection proximally and distally for 3 cm.  A 4 x 40 mm angioplasty balloon was advanced over the wire into we could palpate it within the perianastomotic segment of the fistula.  There was inflated and was confirmed in position on fluoroscopy.  We then passed to 2-0 silk sutures around the fistula in this area approximately 2 cm apart and they were tied down over the 4 mm angioplasty balloon.  Angioplasty balloon was deflated and removed.  Straight multi-sidehole flush catheter was advanced back over the wire and repeat fistulogram from the brachial artery was performed here that showed good flow through the fistula with antegrade flow through the brachial artery distal to the fistula, considerably improved from prior.  The patient had an excellent thrill in the fistula and did  have a palpable radial pulse at the wrist at this time, which she did not have before.  The wire and sheath were removed from the fistula and the 5 Indonesian sheath was removed and pressure was held until hemostasis was obtained.  We then inspected the incision meticulously for hemostasis which was able to be obtained with electrocautery.  It was irrigated with warm normal saline and then closed in 2 layers with interrupted 3-0 Vicryl suture in a deep layer and then 4-0 Vicryl suture in a running subcuticular layer for the skin.  The incision was dressed with skin glue.  The patient was then awakened from anesthesia and transferred PACU in stable condition.  The patient tolerated the procedure well and there were no intraoperative complications.  All wires catheters sheaths and other devices were removed and found to be whole and intact prior to the inclusion of the procedure and all sponge instrument and needle counts were correct.      Franki Delgadillo II, MD  04/23/24     Active Hospital Problems    Diagnosis  POA    **Steal syndrome dialysis vascular access, sequela [T82.898S]  Not Applicable    Arteriovenous fistula [I77.0]  Yes      Resolved Hospital Problems   No resolved problems to display.

## 2024-04-23 NOTE — ANESTHESIA PROCEDURE NOTES
Airway  Urgency: elective    Date/Time: 4/23/2024 2:47 PM  Airway not difficult    General Information and Staff    Patient location during procedure: OR  CRNA/CAA: Janina Caballero CRNA    Indications and Patient Condition  Indications for airway management: airway protection    Preoxygenated: yes (pt pre-O2 with 100% O2)  Mask difficulty assessment: 2 - vent by mask + OA or adjuvant +/- NMBA (easy BMV )    Final Airway Details  Final airway type: endotracheal airway      Successful airway: ETT  Cuffed: yes   Successful intubation technique: video laryngoscopy  Facilitating devices/methods: intubating stylet  Endotracheal tube insertion site: oral  Blade: Freeman  Blade size: 4  ETT size (mm): 7.0  Cormack-Lehane Classification: grade I - full view of glottis  Placement verified by: chest auscultation and capnometry   Cuff volume (mL): 7  Measured from: lips  Number of attempts at approach: 1  Assessment: lips, teeth, and gum same as pre-op and atraumatic intubation    Additional Comments  ATOETx1. No change in dentition.

## 2024-05-07 ENCOUNTER — OFFICE (AMBULATORY)
Dept: URBAN - METROPOLITAN AREA CLINIC 64 | Facility: CLINIC | Age: 74
End: 2024-05-07

## 2024-05-07 VITALS
WEIGHT: 152 LBS | DIASTOLIC BLOOD PRESSURE: 66 MMHG | HEART RATE: 86 BPM | HEIGHT: 63 IN | SYSTOLIC BLOOD PRESSURE: 133 MMHG

## 2024-05-07 DIAGNOSIS — D3A.8 OTHER BENIGN NEUROENDOCRINE TUMORS: ICD-10-CM

## 2024-05-07 DIAGNOSIS — R19.7 DIARRHEA, UNSPECIFIED: ICD-10-CM

## 2024-05-07 PROCEDURE — 99214 OFFICE O/P EST MOD 30 MIN: CPT | Performed by: INTERNAL MEDICINE

## 2024-05-07 RX ORDER — COLESTIPOL HYDROCHLORIDE 1 G/1
3 TABLET, FILM COATED ORAL
Qty: 90 | Refills: 11 | Status: ACTIVE
Start: 2024-05-07

## 2024-05-13 NOTE — PROGRESS NOTES
"Chief Complaint  Post-op (3 week post op.)    Follow-up for left arm steal syndrome with finger wound    Subjective        Sherry Lobato presents to [facility] VASCULAR SURGERY  History of Present Illness    Patient is a very pleasant 73-year-old lady with history of end-stage renal disease requiring dialysis who has a nonhealing ulceration on the dorsal aspect of her left pinky finger.  Physical exam concerning for steal syndrome.  On 4/23/2024 patient underwent left arm fistulogram with arteriogram including balloon angioplasty of subclavian artery as well as banding of her fistula for steal syndrome    She is here now for her first follow-up appointment.      Patient is doing very well overall.  She is not having significant pain in her left hand.  The ulceration on the dorsal aspect of her left pinky finger has completely healed.  She reports that healed approximately 1 week after her procedure.     Objective   Vital Signs:  /80 (BP Location: Left arm, Patient Position: Sitting, Cuff Size: Adult)   Pulse 87   Ht 152.4 cm (60\")   Wt 68 kg (150 lb)   SpO2 94%   BMI 29.29 kg/m²   Estimated body mass index is 29.29 kg/m² as calculated from the following:    Height as of this encounter: 152.4 cm (60\").    Weight as of this encounter: 68 kg (150 lb).               Physical Exam   NAD  Respirations unlabored  Left arm fistula with excellent thrill.  Incision healed.  Weakly palpable left radial pulse.  No wounds or ulcerations on left hand.  The ulcer on the dorsal aspect of her left fifth finger PIP joint is completely healed.    Result Review :                     Assessment and Plan       73-year-old lady with nonhealing ulceration on the dorsal aspect of her left pinky finger and clinical concern for steal syndrome, status post fistulogram and banding of the arteriovenous fistula.     She has done very well since this procedure.  The ulceration on her left fifth finger has " completely healed.  Her fistula is still working very well for dialysis.  The incision from the procedure is healed as well.      She was previously scheduled to follow-up with us in September with bilateral carotid duplex for surveillance.  We will get a fistula duplex at the same time as well.    Continue simvastatin as well as all other medications as prescribed by other providers.    Diagnoses and all orders for this visit:    1. Arteriovenous fistula (Primary)  -     Duplex Hemodialysis Access CAR; Future    2. Steal syndrome as complication of dialysis access, subsequent encounter  Assessment & Plan:  Improved after banding fistula.  Continue using fistula for dialysis  Follow-up in 4 months with fistula duplex        3. End-stage renal disease                   Follow Up     Return in about 4 months (around 9/14/2024).  Patient was given instructions and counseling regarding her condition or for health maintenance advice. Please see specific information pulled into the AVS if appropriate.

## 2024-05-14 ENCOUNTER — OFFICE VISIT (OUTPATIENT)
Age: 74
End: 2024-05-14
Payer: MEDICARE

## 2024-05-14 VITALS
OXYGEN SATURATION: 94 % | SYSTOLIC BLOOD PRESSURE: 120 MMHG | WEIGHT: 150 LBS | HEART RATE: 87 BPM | DIASTOLIC BLOOD PRESSURE: 80 MMHG | BODY MASS INDEX: 29.45 KG/M2 | HEIGHT: 60 IN

## 2024-05-14 DIAGNOSIS — T82.898D STEAL SYNDROME AS COMPLICATION OF DIALYSIS ACCESS, SUBSEQUENT ENCOUNTER: ICD-10-CM

## 2024-05-14 DIAGNOSIS — I77.0 ARTERIOVENOUS FISTULA: Primary | ICD-10-CM

## 2024-05-14 DIAGNOSIS — N18.6 END-STAGE RENAL DISEASE: ICD-10-CM

## 2024-05-14 PROBLEM — T82.898A STEAL SYNDROME DIALYSIS VASCULAR ACCESS: Status: ACTIVE | Noted: 2024-04-16

## 2024-05-14 PROCEDURE — 1160F RVW MEDS BY RX/DR IN RCRD: CPT | Performed by: STUDENT IN AN ORGANIZED HEALTH CARE EDUCATION/TRAINING PROGRAM

## 2024-05-14 PROCEDURE — 1159F MED LIST DOCD IN RCRD: CPT | Performed by: STUDENT IN AN ORGANIZED HEALTH CARE EDUCATION/TRAINING PROGRAM

## 2024-05-14 PROCEDURE — 99024 POSTOP FOLLOW-UP VISIT: CPT | Performed by: STUDENT IN AN ORGANIZED HEALTH CARE EDUCATION/TRAINING PROGRAM

## 2024-05-14 PROCEDURE — 3079F DIAST BP 80-89 MM HG: CPT | Performed by: STUDENT IN AN ORGANIZED HEALTH CARE EDUCATION/TRAINING PROGRAM

## 2024-05-14 PROCEDURE — 3074F SYST BP LT 130 MM HG: CPT | Performed by: STUDENT IN AN ORGANIZED HEALTH CARE EDUCATION/TRAINING PROGRAM

## 2024-05-14 NOTE — ASSESSMENT & PLAN NOTE
Improved after banding fistula.  Continue using fistula for dialysis  Follow-up in 4 months with fistula duplex

## 2024-06-07 ENCOUNTER — HOSPITAL ENCOUNTER (OUTPATIENT)
Dept: PET IMAGING | Facility: HOSPITAL | Age: 74
Discharge: HOME | End: 2024-06-07
Payer: MEDICARE

## 2024-06-07 DIAGNOSIS — C34.11 ADENOCARCINOMA OF UPPER LOBE OF RIGHT LUNG: ICD-10-CM

## 2024-06-07 PROCEDURE — 71250 CT THORAX DX C-: CPT

## 2024-06-08 ENCOUNTER — APPOINTMENT (OUTPATIENT)
Dept: GENERAL RADIOLOGY | Facility: HOSPITAL | Age: 74
DRG: 193 | End: 2024-06-08
Payer: MEDICARE

## 2024-06-08 ENCOUNTER — HOSPITAL ENCOUNTER (INPATIENT)
Facility: HOSPITAL | Age: 74
LOS: 6 days | Discharge: HOME | DRG: 193 | End: 2024-06-14
Attending: EMERGENCY MEDICINE | Admitting: INTERNAL MEDICINE
Payer: MEDICARE

## 2024-06-08 DIAGNOSIS — J18.9 PNEUMONIA DUE TO INFECTIOUS ORGANISM, UNSPECIFIED LATERALITY, UNSPECIFIED PART OF LUNG: ICD-10-CM

## 2024-06-08 DIAGNOSIS — J44.1 CHRONIC OBSTRUCTIVE PULMONARY DISEASE WITH ACUTE EXACERBATION: ICD-10-CM

## 2024-06-08 DIAGNOSIS — R06.03 ACUTE RESPIRATORY DISTRESS: Primary | ICD-10-CM

## 2024-06-08 DIAGNOSIS — J18.9 PNEUMONIA OF RIGHT LUNG DUE TO INFECTIOUS ORGANISM, UNSPECIFIED PART OF LUNG: ICD-10-CM

## 2024-06-08 LAB
ALBUMIN SERPL-MCNC: 4 G/DL (ref 3.5–5.2)
ALBUMIN/GLOB SERPL: 1.3 G/DL
ALP SERPL-CCNC: 111 U/L (ref 39–117)
ALT SERPL W P-5'-P-CCNC: 10 U/L (ref 1–33)
ANION GAP SERPL CALCULATED.3IONS-SCNC: 12.7 MMOL/L (ref 5–15)
AST SERPL-CCNC: 21 U/L (ref 1–32)
BASOPHILS # BLD AUTO: 0.05 10*3/MM3 (ref 0–0.2)
BASOPHILS NFR BLD AUTO: 0.5 % (ref 0–1.5)
BILIRUB SERPL-MCNC: 0.3 MG/DL (ref 0–1.2)
BUN SERPL-MCNC: 37 MG/DL (ref 8–23)
BUN/CREAT SERPL: 6.4 (ref 7–25)
CALCIUM SPEC-SCNC: 8.8 MG/DL (ref 8.6–10.5)
CHLORIDE SERPL-SCNC: 106 MMOL/L (ref 98–107)
CO2 SERPL-SCNC: 23.3 MMOL/L (ref 22–29)
CREAT SERPL-MCNC: 5.77 MG/DL (ref 0.57–1)
D-LACTATE SERPL-SCNC: 1.2 MMOL/L (ref 0.3–2)
DEPRECATED RDW RBC AUTO: 57.1 FL (ref 37–54)
EGFRCR SERPLBLD CKD-EPI 2021: 7.2 ML/MIN/1.73
EOSINOPHIL # BLD AUTO: 0.33 10*3/MM3 (ref 0–0.4)
EOSINOPHIL NFR BLD AUTO: 3 % (ref 0.3–6.2)
ERYTHROCYTE [DISTWIDTH] IN BLOOD BY AUTOMATED COUNT: 15.9 % (ref 12.3–15.4)
GLOBULIN UR ELPH-MCNC: 3.1 GM/DL
GLUCOSE SERPL-MCNC: 137 MG/DL (ref 65–99)
HCT VFR BLD AUTO: 32.5 % (ref 34–46.6)
HGB BLD-MCNC: 10.3 G/DL (ref 12–15.9)
HOLD SPECIMEN: NORMAL
HOLD SPECIMEN: NORMAL
IMM GRANULOCYTES # BLD AUTO: 0.05 10*3/MM3 (ref 0–0.05)
IMM GRANULOCYTES NFR BLD AUTO: 0.5 % (ref 0–0.5)
LYMPHOCYTES # BLD AUTO: 0.88 10*3/MM3 (ref 0.7–3.1)
LYMPHOCYTES NFR BLD AUTO: 8.1 % (ref 19.6–45.3)
MCH RBC QN AUTO: 31.1 PG (ref 26.6–33)
MCHC RBC AUTO-ENTMCNC: 31.7 G/DL (ref 31.5–35.7)
MCV RBC AUTO: 98.2 FL (ref 79–97)
MONOCYTES # BLD AUTO: 0.79 10*3/MM3 (ref 0.1–0.9)
MONOCYTES NFR BLD AUTO: 7.3 % (ref 5–12)
NEUTROPHILS NFR BLD AUTO: 8.76 10*3/MM3 (ref 1.7–7)
NEUTROPHILS NFR BLD AUTO: 80.6 % (ref 42.7–76)
NRBC BLD AUTO-RTO: 0 /100 WBC (ref 0–0.2)
NT-PROBNP SERPL-MCNC: ABNORMAL PG/ML (ref 0–900)
PLATELET # BLD AUTO: 386 10*3/MM3 (ref 140–450)
PMV BLD AUTO: 10.2 FL (ref 6–12)
POTASSIUM SERPL-SCNC: 4.2 MMOL/L (ref 3.5–5.2)
PROT SERPL-MCNC: 7.1 G/DL (ref 6–8.5)
RBC # BLD AUTO: 3.31 10*6/MM3 (ref 3.77–5.28)
SODIUM SERPL-SCNC: 142 MMOL/L (ref 136–145)
WBC NRBC COR # BLD AUTO: 10.86 10*3/MM3 (ref 3.4–10.8)
WHOLE BLOOD HOLD COAG: NORMAL
WHOLE BLOOD HOLD SPECIMEN: NORMAL

## 2024-06-08 PROCEDURE — 36415 COLL VENOUS BLD VENIPUNCTURE: CPT

## 2024-06-08 PROCEDURE — 83880 ASSAY OF NATRIURETIC PEPTIDE: CPT | Performed by: EMERGENCY MEDICINE

## 2024-06-08 PROCEDURE — 83605 ASSAY OF LACTIC ACID: CPT | Performed by: EMERGENCY MEDICINE

## 2024-06-08 PROCEDURE — 94761 N-INVAS EAR/PLS OXIMETRY MLT: CPT

## 2024-06-08 PROCEDURE — 25810000003 SODIUM CHLORIDE 0.9 % SOLUTION 250 ML FLEX CONT: Performed by: EMERGENCY MEDICINE

## 2024-06-08 PROCEDURE — 25010000002 CEFTRIAXONE PER 250 MG: Performed by: EMERGENCY MEDICINE

## 2024-06-08 PROCEDURE — 94799 UNLISTED PULMONARY SVC/PX: CPT

## 2024-06-08 PROCEDURE — 25010000002 AZITHROMYCIN PER 500 MG: Performed by: EMERGENCY MEDICINE

## 2024-06-08 PROCEDURE — 94640 AIRWAY INHALATION TREATMENT: CPT

## 2024-06-08 PROCEDURE — 80053 COMPREHEN METABOLIC PANEL: CPT | Performed by: EMERGENCY MEDICINE

## 2024-06-08 PROCEDURE — 71045 X-RAY EXAM CHEST 1 VIEW: CPT

## 2024-06-08 PROCEDURE — 87040 BLOOD CULTURE FOR BACTERIA: CPT | Performed by: EMERGENCY MEDICINE

## 2024-06-08 PROCEDURE — 25010000002 METHYLPREDNISOLONE PER 125 MG: Performed by: EMERGENCY MEDICINE

## 2024-06-08 PROCEDURE — 85025 COMPLETE CBC W/AUTO DIFF WBC: CPT | Performed by: EMERGENCY MEDICINE

## 2024-06-08 PROCEDURE — 99285 EMERGENCY DEPT VISIT HI MDM: CPT

## 2024-06-08 RX ORDER — BISACODYL 5 MG/1
5 TABLET, DELAYED RELEASE ORAL DAILY PRN
Status: DISCONTINUED | OUTPATIENT
Start: 2024-06-08 | End: 2024-06-14 | Stop reason: HOSPADM

## 2024-06-08 RX ORDER — MONTELUKAST SODIUM 4 MG/1
1 TABLET, CHEWABLE ORAL 3 TIMES DAILY
COMMUNITY
End: 2024-06-14 | Stop reason: HOSPADM

## 2024-06-08 RX ORDER — AMOXICILLIN 250 MG
2 CAPSULE ORAL 2 TIMES DAILY PRN
Status: DISCONTINUED | OUTPATIENT
Start: 2024-06-08 | End: 2024-06-14 | Stop reason: HOSPADM

## 2024-06-08 RX ORDER — ONDANSETRON 4 MG/1
4 TABLET, ORALLY DISINTEGRATING ORAL EVERY 6 HOURS PRN
Status: DISCONTINUED | OUTPATIENT
Start: 2024-06-08 | End: 2024-06-14 | Stop reason: HOSPADM

## 2024-06-08 RX ORDER — SODIUM BICARBONATE 650 MG/1
1300 TABLET ORAL 3 TIMES DAILY
COMMUNITY

## 2024-06-08 RX ORDER — SODIUM CHLORIDE 0.9 % (FLUSH) 0.9 %
10 SYRINGE (ML) INJECTION AS NEEDED
Status: DISCONTINUED | OUTPATIENT
Start: 2024-06-08 | End: 2024-06-14 | Stop reason: HOSPADM

## 2024-06-08 RX ORDER — BISACODYL 10 MG
10 SUPPOSITORY, RECTAL RECTAL DAILY PRN
Status: DISCONTINUED | OUTPATIENT
Start: 2024-06-08 | End: 2024-06-14 | Stop reason: HOSPADM

## 2024-06-08 RX ORDER — ONDANSETRON 2 MG/ML
4 INJECTION INTRAMUSCULAR; INTRAVENOUS EVERY 6 HOURS PRN
Status: DISCONTINUED | OUTPATIENT
Start: 2024-06-08 | End: 2024-06-14 | Stop reason: HOSPADM

## 2024-06-08 RX ORDER — ACETAMINOPHEN 325 MG/1
650 TABLET ORAL EVERY 4 HOURS PRN
Status: DISCONTINUED | OUTPATIENT
Start: 2024-06-08 | End: 2024-06-14 | Stop reason: HOSPADM

## 2024-06-08 RX ORDER — IPRATROPIUM BROMIDE AND ALBUTEROL SULFATE 2.5; .5 MG/3ML; MG/3ML
3 SOLUTION RESPIRATORY (INHALATION)
Status: DISCONTINUED | OUTPATIENT
Start: 2024-06-08 | End: 2024-06-14 | Stop reason: HOSPADM

## 2024-06-08 RX ORDER — SODIUM CHLORIDE 9 MG/ML
40 INJECTION, SOLUTION INTRAVENOUS AS NEEDED
Status: DISCONTINUED | OUTPATIENT
Start: 2024-06-08 | End: 2024-06-14 | Stop reason: HOSPADM

## 2024-06-08 RX ORDER — LOSARTAN POTASSIUM 50 MG/1
50 TABLET ORAL DAILY
COMMUNITY
End: 2024-06-14 | Stop reason: HOSPADM

## 2024-06-08 RX ORDER — IPRATROPIUM BROMIDE AND ALBUTEROL SULFATE 2.5; .5 MG/3ML; MG/3ML
3 SOLUTION RESPIRATORY (INHALATION) ONCE
Status: COMPLETED | OUTPATIENT
Start: 2024-06-08 | End: 2024-06-08

## 2024-06-08 RX ORDER — NITROGLYCERIN 0.4 MG/1
0.4 TABLET SUBLINGUAL
Status: DISCONTINUED | OUTPATIENT
Start: 2024-06-08 | End: 2024-06-14 | Stop reason: HOSPADM

## 2024-06-08 RX ORDER — PREDNISONE 50 MG/1
50 TABLET ORAL
Status: DISCONTINUED | OUTPATIENT
Start: 2024-06-09 | End: 2024-06-09

## 2024-06-08 RX ORDER — BUDESONIDE 0.5 MG/2ML
0.5 INHALANT ORAL
Status: DISCONTINUED | OUTPATIENT
Start: 2024-06-08 | End: 2024-06-14

## 2024-06-08 RX ORDER — POLYETHYLENE GLYCOL 3350 17 G/17G
17 POWDER, FOR SOLUTION ORAL DAILY PRN
Status: DISCONTINUED | OUTPATIENT
Start: 2024-06-08 | End: 2024-06-14 | Stop reason: HOSPADM

## 2024-06-08 RX ORDER — GUAIFENESIN 600 MG/1
600 TABLET, EXTENDED RELEASE ORAL EVERY 12 HOURS SCHEDULED
Status: DISCONTINUED | OUTPATIENT
Start: 2024-06-08 | End: 2024-06-09

## 2024-06-08 RX ORDER — ALBUTEROL SULFATE 2.5 MG/3ML
2.5 SOLUTION RESPIRATORY (INHALATION) ONCE
Status: COMPLETED | OUTPATIENT
Start: 2024-06-08 | End: 2024-06-08

## 2024-06-08 RX ORDER — METHYLPREDNISOLONE SODIUM SUCCINATE 125 MG/2ML
125 INJECTION, POWDER, LYOPHILIZED, FOR SOLUTION INTRAMUSCULAR; INTRAVENOUS ONCE
Status: COMPLETED | OUTPATIENT
Start: 2024-06-08 | End: 2024-06-08

## 2024-06-08 RX ORDER — SODIUM CHLORIDE 0.9 % (FLUSH) 0.9 %
10 SYRINGE (ML) INJECTION EVERY 12 HOURS SCHEDULED
Status: DISCONTINUED | OUTPATIENT
Start: 2024-06-08 | End: 2024-06-14 | Stop reason: HOSPADM

## 2024-06-08 RX ADMIN — ALBUTEROL SULFATE 2.5 MG: 2.5 SOLUTION RESPIRATORY (INHALATION) at 15:50

## 2024-06-08 RX ADMIN — IPRATROPIUM BROMIDE AND ALBUTEROL SULFATE 3 ML: .5; 3 SOLUTION RESPIRATORY (INHALATION) at 18:55

## 2024-06-08 RX ADMIN — IPRATROPIUM BROMIDE AND ALBUTEROL SULFATE 3 ML: .5; 3 SOLUTION RESPIRATORY (INHALATION) at 15:45

## 2024-06-08 RX ADMIN — AZITHROMYCIN 500 MG: 500 INJECTION, POWDER, LYOPHILIZED, FOR SOLUTION INTRAVENOUS at 18:12

## 2024-06-08 RX ADMIN — GUAIFENESIN 600 MG: 600 TABLET, EXTENDED RELEASE ORAL at 20:47

## 2024-06-08 RX ADMIN — METHYLPREDNISOLONE SODIUM SUCCINATE 125 MG: 125 INJECTION, POWDER, FOR SOLUTION INTRAMUSCULAR; INTRAVENOUS at 15:24

## 2024-06-08 RX ADMIN — CEFTRIAXONE 2000 MG: 2 INJECTION, POWDER, FOR SOLUTION INTRAMUSCULAR; INTRAVENOUS at 17:48

## 2024-06-08 RX ADMIN — BUDESONIDE 0.5 MG: 0.5 INHALANT RESPIRATORY (INHALATION) at 18:50

## 2024-06-08 RX ADMIN — Medication 10 ML: at 20:47

## 2024-06-08 NOTE — ED NOTES
Nursing report ED to floor  Sherry Lobato  73 y.o.  female    HPI:   Chief Complaint   Patient presents with    Shortness of Breath       Admitting doctor:   Angelia Taylor MD    Admitting diagnosis:   The primary encounter diagnosis was Acute respiratory distress. Diagnoses of Chronic obstructive pulmonary disease with acute exacerbation and Pneumonia of right lung due to infectious organism, unspecified part of lung were also pertinent to this visit.    Code status:   Current Code Status       Date Active Code Status Order ID Comments User Context       6/8/2024 1659 CPR (Attempt to Resuscitate) [id number]  Cindy Santiago, [facility]        Question Answer    Code Status (Patient has no pulse and is not breathing) CPR (Attempt to Resuscitate)    Medical Interventions (Patient has pulse or is breathing) Full Support    Level Of Support Discussed With Patient                    Allergies:   Atorvastatin calcium, Gabapentin, and Niacin    Isolation:  No active isolations     Fall Risk:  Fall Risk Assessment was completed, and patient is at moderate risk for falls.   Predictive Model Details         15 (Low) Factor Value    Calculated 6/8/2024 19:08 Age 73    Risk of Fall Model Active Peripheral IV Present     Imaging order in this encounter Present     Respiratory Rate 18     Number of Distinct Medication Classes administered 5     Magnesium not on file     Diastolic BP 59     Teodoro Scale not on file     Calcium 8.8 mg/dL     Creatinine 5.77 mg/dL     Albumin 4 g/dL     Days after Admission 0.189     Chloride 106 mmol/L     Tobacco Use Quit     Potassium 4.2 mmol/L     ALT 10 U/L     Total Bilirubin 0.3 mg/dL         Weight:   There were no vitals filed for this visit.    Intake and Output  No intake or output data in the 24 hours ending 06/08/24 1908    Diet:   Dietary Orders (From admission, onward)       Start     Ordered    06/08/24 1659  Diet: Cardiac, Diabetic; Healthy Heart (2-3 Na+); Consistent Carbohydrate;  Fluid Consistency: Thin (IDDSI 0)  Diet Effective Now        References:    Diet Order [place]   Question Answer Comment   Diets: Cardiac    Diets: Diabetic    Cardiac Diet: Healthy Heart (2-3 Na+)    Diabetic Diet: Consistent Carbohydrate    Fluid Consistency: Thin (IDDSI 0)        06/08/24 1659                     Most recent vitals:   Vitals:    06/08/24 1850 06/08/24 1854 06/08/24 1855 06/08/24 1903   BP:       BP Location:       Patient Position:       Pulse: 90 99 94 91   Resp: 18 18 18 18   Temp:       TempSrc:       SpO2: 98% 92% 96% 100%       Active LDAs/IV Access:   Lines, Drains & Airways       Active LDAs       Name Placement date Placement time Site Days    Peripheral IV 06/08/24 1504 Posterior;Right Forearm 06/08/24  1504  Forearm  less than 1                    Skin Condition:   Skin Assessments (last day)       None             Labs (abnormal labs have a star):   Labs Reviewed   COMPREHENSIVE METABOLIC PANEL - Abnormal; Notable for the following components:       Result Value    Glucose 137 (*)     BUN 37 (*)     Creatinine 5.77 (*)     BUN/Creatinine Ratio 6.4 (*)     eGFR 7.2 (*)     All other components within normal limits    Narrative:     GFR Normal >60  Chronic Kidney Disease <60  Kidney Failure <15    The GFR formula is only valid for adults with stable renal function between ages 18 and 70.   BNP (IN-HOUSE) - Abnormal; Notable for the following components:    proBNP 44,204.0 (*)     All other components within normal limits    Narrative:     This assay is used as an aid in the diagnosis of individuals suspected of having heart failure. It can be used as an aid in the diagnosis of acute decompensated heart failure (ADHF) in patients presenting with signs and symptoms of ADHF to the emergency department (ED). In addition, NT-proBNP of <300 pg/mL indicates ADHF is not likely.    Age Range Result Interpretation  NT-proBNP Concentration (pg/mL:      <50             Positive            >450                    Gray                 300-450                    Negative             <300    50-75           Positive            >900                  Gray                300-900                  Negative            <300      >75             Positive            >1800                  Gray                300-1800                  Negative            <300   CBC WITH AUTO DIFFERENTIAL - Abnormal; Notable for the following components:    WBC 10.86 (*)     RBC 3.31 (*)     Hemoglobin 10.3 (*)     Hematocrit 32.5 (*)     MCV 98.2 (*)     RDW 15.9 (*)     RDW-SD 57.1 (*)     Neutrophil % 80.6 (*)     Lymphocyte % 8.1 (*)     Neutrophils, Absolute 8.76 (*)     All other components within normal limits   POC LACTATE - Normal   BLOOD CULTURE   BLOOD CULTURE   RAINBOW DRAW    Narrative:     The following orders were created for panel order [place] Draw.  Procedure                               Abnormality         Status                     ---------                               -----------         ------                     Green Top (Gel)[[id number]]                                  Final result               Lavender Top[[id number]]                                     Final result               Gold Top - SST[[id number]]                                   Final result               Light Blue Top[[id number]]                                   Final result                 Please view results for these tests on the individual orders.   GREEN TOP   LAVENDER TOP   GOLD TOP - SST   LIGHT BLUE TOP   CBC AND DIFFERENTIAL    Narrative:     The following orders were created for panel order CBC & Differential.  Procedure                               Abnormality         Status                     ---------                               -----------         ------                     CBC Auto Differential[[id number]]        Abnormal            Final result                 Please view results for these tests on the individual orders.       LOC:  Person, Place, Time, and Situation    Telemetry:  Telemetry    Cardiac Monitoring Ordered: yes    EKG:   ECG 12 Lead Dyspnea    (Results Pending)       Medications Given in the ED:   Medications   sodium chloride 0.9 % flush 10 mL (has no administration in time range)   sodium chloride 0.9 % flush 10 mL (has no administration in time range)   azithromycin (ZITHROMAX) 500 mg in sodium chloride 0.9 % 250 mL IVPB-VTB (500 mg Intravenous New Bag 6/8/24 1812)   sodium chloride 0.9 % flush 10 mL (has no administration in time range)   sodium chloride 0.9 % flush 10 mL (has no administration in time range)   sodium chloride 0.9 % infusion 40 mL (has no administration in time range)   nitroglycerin (NITROSTAT) SL tablet 0.4 mg (has no administration in time range)   acetaminophen (TYLENOL) tablet 650 mg (has no administration in time range)   sennosides-docusate (PERICOLACE) 8.6-50 MG per tablet 2 tablet (has no administration in time range)     And   polyethylene glycol (MIRALAX) packet 17 g (has no administration in time range)     And   bisacodyl (DULCOLAX) EC tablet 5 mg (has no administration in time range)     And   bisacodyl (DULCOLAX) suppository 10 mg (has no administration in time range)   ondansetron ODT (ZOFRAN-ODT) disintegrating tablet 4 mg (has no administration in time range)     Or   ondansetron (ZOFRAN) injection 4 mg (has no administration in time range)   cefTRIAXone (ROCEPHIN) 1,000 mg in sodium chloride 0.9 % 100 mL MBP (has no administration in time range)   ipratropium-albuterol (DUO-NEB) nebulizer solution 3 mL (3 mL Nebulization Given 6/8/24 1855)   budesonide (PULMICORT) nebulizer solution 0.5 mg (0.5 mg Nebulization Given 6/8/24 1850)   guaiFENesin (MUCINEX) 12 hr tablet 600 mg (has no administration in time range)   predniSONE (DELTASONE) tablet 50 mg (has no administration in time range)   ipratropium-albuterol (DUO-NEB) nebulizer solution 3 mL (3 mL Nebulization Given 6/8/24 5157)   albuterol  (PROVENTIL) nebulizer solution 0.083% 2.5 mg/3mL (2.5 mg Nebulization Given 6/8/24 1550)   methylPREDNISolone sodium succinate (SOLU-Medrol) injection 125 mg (125 mg Intravenous Given 6/8/24 1524)   cefTRIAXone (ROCEPHIN) 2,000 mg in sodium chloride 0.9 % 100 mL MBP (0 mg Intravenous Stopped 6/8/24 1812)       Imaging results:  CT Chest Without Contrast Diagnostic    Result Date: 6/8/2024  Impression: 1. Right middle lobe pneumonia. 2. Pulmonary edema with small bilateral pleural effusions right greater than left and dependent lower lobe atelectasis. 3. Right upper lobe 7 mm nodule similar to prior study. 4. Mild increase in size of several mediastinal lymph nodes which may relate to reactive adenopathy recommend attention on follow-up. 5. Additional incidental findings above. Electronically Signed: Corey Harris MD  6/8/2024 3:38 PM EDT  Workstation ID: [id number]    XR Chest 1 View    Result Date: 6/8/2024  Impression: 1. Right middle lobe pneumonia. 2. Cardiomegaly and pulmonary edema. 3. Small bilateral pleural effusions right greater than left. Electronically Signed: Corey Harris MD  6/8/2024 3:29 PM EDT  Workstation ID: [id number]     Social issues:   Social History     Socioeconomic History    Marital status: Single   Tobacco Use    Smoking status: Former     Current packs/day: 0.00     Average packs/day: 0.3 packs/day for 57.0 years (14.3 ttl pk-yrs)     Types: Cigarettes     Start date: 5/1/1964     Quit date: 5/1/2021     Years since quitting: 3.1     Passive exposure: Past    Smokeless tobacco: Never    Tobacco comments:     quit smoking 2 months ago   Vaping Use    Vaping status: Never Used   Substance and Sexual Activity    Alcohol use: No    Drug use: No    Sexual activity: Defer       NIH Stroke Scale:  Interval: (not recorded)  1a. Level of Consciousness: (not recorded)  1b. LOC Questions: (not recorded)  1c. LOC Commands: (not recorded)  2. Best Gaze: (not recorded)  3. Visual: (not recorded)  4. Facial  Palsy: (not recorded)  5a. Motor Arm, Left: (not recorded)  5b. Motor Arm, Right: (not recorded)  6a. Motor Leg, Left: (not recorded)  6b. Motor Leg, Right: (not recorded)  7. Limb Ataxia: (not recorded)  8. Sensory: (not recorded)  9. Best Language: (not recorded)  10. Dysarthria: (not recorded)  11. Extinction and Inattention (formerly Neglect): (not recorded)    Total (NIH Stroke Scale): (not recorded)     Additional notable assessment information:     Nursing report ED to floor:  Patty Jarrell, RN   06/08/24 19:08 EDT

## 2024-06-08 NOTE — ED PROVIDER NOTES
Subjective   History of Present Illness  Complaint Short of breath cough    History of present is a 73-year-old female multiple health problems including renal failure dialysis COPD hypertension who complains couple week history of a cough productive of thick sputum.  No fever chills been noted.  She is now progressively, short of breath over the last several days.  Worse with exertion better with rest.  No leg pain or swelling no recent long car ride plane or immobilization foreign travels last hospitalization was back in January.  No one in the home with similar illness.  No chest pain no neck arm or jaw pain.      Review of Systems   Constitutional:  Negative for chills and fever.   HENT:  Positive for congestion.    Respiratory:  Positive for cough, chest tightness and shortness of breath.    Cardiovascular:  Negative for chest pain and leg swelling.   Gastrointestinal:  Negative for abdominal pain and vomiting.   Skin:  Negative for rash and wound.   Neurological:  Positive for weakness.       Past Medical History:   Diagnosis Date    Lucas's esophagus     C. difficile colitis     Carotid artery disease     -50-74% left, 16-49% right (1/19)    COPD (chronic obstructive pulmonary disease)     DM2 (diabetes mellitus, type 2)     ESRD (end stage renal disease)     on HD    Gastric ulcer     at anastomatic site    Gastroparesis     unknown    GERD (gastroesophageal reflux disease)     Gout     not current    Hemodialysis patient     mwf    Hernia, incisional     abdomen    History of colonoscopy     UTD    History of degenerative disc disease     Hyperlipidemia     Hypertension     Medicare annual wellness visit, subsequent 11/07/2020    Microscopic colitis     Nephrotic syndrome     Neuroendocrine tumor     of stomach    FABY (obstructive sleep apnea)     not treating    Pedal edema     ressolved    Proteinuria     Renal cell cancer, right     Rib pain on right side     chronic    Rotator cuff tear     bilateral  "   Stomach cancer 2016    Stroke     Uterine cancer     Vitamin B 12 deficiency     Vitamin D deficiency        Allergies   Allergen Reactions    Atorvastatin Calcium Other (See Comments)     \"felt like I was on fire\"    Gabapentin Dizziness     says was unable to walk    Niacin Other (See Comments)     \"FEELS LIKE SHE IS ON FIRE\"         Past Surgical History:   Procedure Laterality Date    APPENDECTOMY      ARTERIOVENOUS FISTULA Right     ARTERIOVENOUS FISTULA REPAIR      Clotted off and insert graft    ARTERIOVENOUS FISTULA/SHUNT SURGERY Left 12/30/2020    Procedure: ARTERIOVENOUS FISTULA FORMATION;  Surgeon: Jan Caicedo MD;  Location: [facility] MAIN OR;  Service: Vascular;  Laterality: Left;    ARTERIOVENOUS FISTULA/SHUNT SURGERY Left 4/23/2024    Procedure: FISTULOGRAM WITH SUBCLAVIAN ARTERY ANGIOPLASTY, fistula banding;  Surgeon: Franki Delgadillo II, MD;  Location: [facility] HYBRID OR;  Service: Vascular;  Laterality: Left;    BREAST BIOPSY      right nipple 1981    COLONOSCOPY N/A 11/24/2020    Procedure: COLONOSCOPY with polypectomy x 7;  Surgeon: Jeffery White MD;  Location: [facility] ENDOSCOPY;  Service: Gastroenterology;  Laterality: N/A;  post op: polyps, diverticulosis, hemorrhoids    COLONOSCOPY N/A 6/1/2023    Procedure: COLONOSCOPY with polypectomy x 3 biopsy x 1;  Surgeon: Jeffery White MD;  Location: [facility] ENDOSCOPY;  Service: Gastroenterology;  Laterality: N/A;  diverticulosis polyps    ENDOSCOPY N/A 03/12/2022    Procedure: ESOPHAGOGASTRODUODENOSCOPY with biopsy x 1 area;  Surgeon: Javan Augustin MD;  Location: [facility] ENDOSCOPY;  Service: Gastroenterology;  Laterality: N/A;  post op: anastamosis    ENDOSCOPY N/A 10/9/2023    Procedure: ESOPHAGOGASTRODUODENOSCOPY with biopsy x3 areas;  Surgeon: Ace Campbell MD;  Location: [facility] ENDOSCOPY;  Service: Gastroenterology;  Laterality: N/A;  duodenal ulcers;irregular z line    GASTRIC RESECTION      cancer    HYSTERECTOMY      LAPAROSCOPIC " CHOLECYSTECTOMY      NEPHRECTOMY      right kidney removed     REDUCTION MAMMAPLASTY      TUMOR REMOVAL      boils    VENTRAL/INCISIONAL HERNIA REPAIR Right 08/28/2019    Procedure: VENTRAL/INCISIONAL HERNIA REPAIR;  Surgeon: Nilay Stevens MD;  Location: [facility] MAIN OR;  Service: General    VENTRAL/INCISIONAL HERNIA REPAIR N/A 10/01/2019    Procedure: OPEN VENTRAL/INCISIONAL HERNIA REPAIR;  Surgeon: Nilay Stevens MD;  Location: [facility] MAIN OR;  Service: General    VENTRAL/INCISIONAL HERNIA REPAIR N/A 10/11/2021    Procedure: VENTRAL/INCISIONAL HERNIA REPAIR LAPAROSCOPIC;  Surgeon: Nilay Stevens MD;  Location: [facility] MAIN OR;  Service: General;  Laterality: N/A;    VENTRAL/INCISIONAL HERNIA REPAIR N/A 01/23/2023    Procedure: VENTRAL/INCISIONAL HERNIA REPAIR with MESH;  Surgeon: Nilay Stevens MD;  Location: [facility] MAIN OR;  Service: General;  Laterality: N/A;       Family History   Problem Relation Age of Onset    Heart disease Father        Social History     Socioeconomic History    Marital status: Single   Tobacco Use    Smoking status: Former     Current packs/day: 0.00     Average packs/day: 0.3 packs/day for 57.0 years (14.3 ttl pk-yrs)     Types: Cigarettes     Start date: 5/1/1964     Quit date: 5/1/2021     Years since quitting: 3.1     Passive exposure: Past    Smokeless tobacco: Never    Tobacco comments:     quit smoking 2 months ago   Vaping Use    Vaping status: Never Used   Substance and Sexual Activity    Alcohol use: No    Drug use: No    Sexual activity: Defer     Prior to Admission medications    Medication Sig Start Date End Date Taking? Authorizing Provider   albuterol sulfate  (90 Base) MCG/ACT inhaler Inhale 2 puffs Every 4 (Four) Hours As Needed for Wheezing.    Provider, MD Danica   amLODIPine (NORVASC) 10 MG tablet Take 1 tablet by mouth Daily.    ProviderDanica MD   benzonatate (TESSALON) 200 MG capsule Take 1 capsule by mouth 3 (Three) Times a Day As Needed  for Cough. 3/7/24   Khoa Wilson MD   budesonide-formoterol (SYMBICORT) 160-4.5 MCG/ACT inhaler Inhale 2 puffs 2 (Two) Times a Day.    Danica Murphy MD   calcium acetate (PHOS BINDER,) 667 MG capsule capsule Take 1 capsule by mouth 3 (Three) Times a Day.    Danica Murphy MD   carvedilol (COREG) 3.125 MG tablet Take 1 tablet by mouth 2 (Two) Times a Day With Meals.    Danica Murphy MD   Cholecalciferol 25 MCG (1000 UT) tablet Take 1 tablet by mouth Daily.    Danica Murphy MD   hyoscyamine (ANASPAZ,LEVSIN) 0.125 MG tablet TAKE 1 TABLET BY MOUTH EVERY 4 HOURS AS NEEDED FOR ABDOMINAL CRAMPING 11/1/23   Danica Murphy MD   lidocaine-prilocaine (EMLA) 2.5-2.5 % cream Apply 1 Application topically to the appropriate area as directed Daily As Needed (severe pain, left 5th finger). Apply to left 5th finger 4/16/24   Franki Delgadillo II, MD   Methoxy PEG-Epoetin Beta (MIRCERA IJ) 30 mcg Every 14 (Fourteen) Days. Dialysis three times a week 12/27/23 12/25/24  Danica Murphy MD   Multiple Vitamins-Minerals (RENAPLEX-D) tablet Take 1 tablet by mouth Daily.    Danica Murphy MD   oxyCODONE (Roxicodone) 5 MG immediate release tablet Take 1 tablet by mouth Every 4 (Four) Hours As Needed for Severe Pain. 4/23/24   Franki Delgadillo II, MD   pantoprazole (PROTONIX) 40 MG EC tablet Take 1 tablet by mouth 2 (Two) Times a Day. 3/9/23   Danica Murphy MD   sevelamer (RENVELA) 800 MG tablet Take 3 tablets by mouth 3 (Three) Times a Day With Meals. 10/4/23   Angelia Taylor MD   simvastatin (ZOCOR) 40 MG tablet Take 1 tablet by mouth Every Night.    Danica Murphy MD   sodium bicarbonate 650 MG tablet Take 2 tablets by mouth 2 (Two) Times a Day. 10/4/23   Angelia Taylor MD   sucralfate (Carafate) 1 g tablet Take 1 tablet by mouth 4 (Four) Times a Day. 10/9/23   Cindy Santiago APRN   temazepam (RESTORIL) 30 MG capsule Take 1 capsule by mouth Every Night. 1/10/23   Provider,  Danica, MD          Objective   Physical Exam  Constitutional this is a 73-year-old awake alert moderate distress but nontoxic-appearing triage vital signs reviewed she is on 2 L of oxygen sats in the low 90s.  Patient does not normally wear oxygen at home HEENT extraocular muscles are intact pupils equal round reactive sclera clear mouth clear neck supple no adenopathy no meningeal signs no JV no bruits lungs scattered wheezes throughout rhonchi in right base no retraction heart regular without murmur rub abdomen soft nontender good bowel sounds no pulsatile mass extremities no edema no cords or Homans' sign no evidence of DVT skin is warm and dry without rashes or cellulitic changes pulses equal upper and lower extremities neurologic awake alert and follows commands without focal weakness.  Procedures           ED Course      Results for orders placed or performed during the hospital encounter of 06/08/24   Comprehensive Metabolic Panel    Specimen: Arm, Right; Blood   Result Value Ref Range    Glucose 137 (H) 65 - 99 mg/dL    BUN 37 (H) 8 - 23 mg/dL    Creatinine 5.77 (H) 0.57 - 1.00 mg/dL    Sodium 142 136 - 145 mmol/L    Potassium 4.2 3.5 - 5.2 mmol/L    Chloride 106 98 - 107 mmol/L    CO2 23.3 22.0 - 29.0 mmol/L    Calcium 8.8 8.6 - 10.5 mg/dL    Total Protein 7.1 6.0 - 8.5 g/dL    Albumin 4.0 3.5 - 5.2 g/dL    ALT (SGPT) 10 1 - 33 U/L    AST (SGOT) 21 1 - 32 U/L    Alkaline Phosphatase 111 39 - 117 U/L    Total Bilirubin 0.3 0.0 - 1.2 mg/dL    Globulin 3.1 gm/dL    A/G Ratio 1.3 g/dL    BUN/Creatinine Ratio 6.4 (L) 7.0 - 25.0    Anion Gap 12.7 5.0 - 15.0 mmol/L    eGFR 7.2 (L) >60.0 mL/min/1.73   BNP    Specimen: Blood   Result Value Ref Range    proBNP 44,204.0 (H) 0.0 - 900.0 pg/mL   CBC Auto Differential    Specimen: Blood   Result Value Ref Range    WBC 10.86 (H) 3.40 - 10.80 10*3/mm3    RBC 3.31 (L) 3.77 - 5.28 10*6/mm3    Hemoglobin 10.3 (L) 12.0 - 15.9 g/dL    Hematocrit 32.5 (L) 34.0 - 46.6 %     MCV 98.2 (H) 79.0 - 97.0 fL    MCH 31.1 26.6 - 33.0 pg    MCHC 31.7 31.5 - 35.7 g/dL    RDW 15.9 (H) 12.3 - 15.4 %    RDW-SD 57.1 (H) 37.0 - 54.0 fl    MPV 10.2 6.0 - 12.0 fL    Platelets 386 140 - 450 10*3/mm3    Neutrophil % 80.6 (H) 42.7 - 76.0 %    Lymphocyte % 8.1 (L) 19.6 - 45.3 %    Monocyte % 7.3 5.0 - 12.0 %    Eosinophil % 3.0 0.3 - 6.2 %    Basophil % 0.5 0.0 - 1.5 %    Immature Grans % 0.5 0.0 - 0.5 %    Neutrophils, Absolute 8.76 (H) 1.70 - 7.00 10*3/mm3    Lymphocytes, Absolute 0.88 0.70 - 3.10 10*3/mm3    Monocytes, Absolute 0.79 0.10 - 0.90 10*3/mm3    Eosinophils, Absolute 0.33 0.00 - 0.40 10*3/mm3    Basophils, Absolute 0.05 0.00 - 0.20 10*3/mm3    Immature Grans, Absolute 0.05 0.00 - 0.05 10*3/mm3    nRBC 0.0 0.0 - 0.2 /100 WBC   POC Lactate    Specimen: Blood   Result Value Ref Range    Lactate 1.2 0.3 - 2.0 mmol/L   Green Top (Gel)   Result Value Ref Range    Extra Tube Hold for add-ons.    Lavender Top   Result Value Ref Range    Extra Tube hold for add-on    Gold Top - SST   Result Value Ref Range    Extra Tube Hold for add-ons.    Light Blue Top   Result Value Ref Range    Extra Tube Hold for add-ons.      CT Chest Without Contrast Diagnostic    Result Date: 6/8/2024  Impression: 1. Right middle lobe pneumonia. 2. Pulmonary edema with small bilateral pleural effusions right greater than left and dependent lower lobe atelectasis. 3. Right upper lobe 7 mm nodule similar to prior study. 4. Mild increase in size of several mediastinal lymph nodes which may relate to reactive adenopathy recommend attention on follow-up. 5. Additional incidental findings above. Electronically Signed: Corey Harris MD  6/8/2024 3:38 PM EDT  Workstation ID: [id number]    XR Chest 1 View    Result Date: 6/8/2024  Impression: 1. Right middle lobe pneumonia. 2. Cardiomegaly and pulmonary edema. 3. Small bilateral pleural effusions right greater than left. Electronically Signed: Corey Harris MD  6/8/2024 3:29 PM EDT   Workstation ID: [id number]   Medications   sodium chloride 0.9 % flush 10 mL (has no administration in time range)   sodium chloride 0.9 % flush 10 mL (has no administration in time range)   cefTRIAXone (ROCEPHIN) 2,000 mg in sodium chloride 0.9 % 100 mL MBP (has no administration in time range)   azithromycin (ZITHROMAX) 500 mg in sodium chloride 0.9 % 250 mL IVPB-VTB (has no administration in time range)   sodium chloride 0.9 % flush 10 mL (has no administration in time range)   sodium chloride 0.9 % flush 10 mL (has no administration in time range)   sodium chloride 0.9 % infusion 40 mL (has no administration in time range)   nitroglycerin (NITROSTAT) SL tablet 0.4 mg (has no administration in time range)   acetaminophen (TYLENOL) tablet 650 mg (has no administration in time range)   sennosides-docusate (PERICOLACE) 8.6-50 MG per tablet 2 tablet (has no administration in time range)     And   polyethylene glycol (MIRALAX) packet 17 g (has no administration in time range)     And   bisacodyl (DULCOLAX) EC tablet 5 mg (has no administration in time range)     And   bisacodyl (DULCOLAX) suppository 10 mg (has no administration in time range)   ondansetron ODT (ZOFRAN-ODT) disintegrating tablet 4 mg (has no administration in time range)     Or   ondansetron (ZOFRAN) injection 4 mg (has no administration in time range)   cefTRIAXone (ROCEPHIN) 1,000 mg in sodium chloride 0.9 % 100 mL MBP (has no administration in time range)   ipratropium-albuterol (DUO-NEB) nebulizer solution 3 mL (has no administration in time range)   budesonide (PULMICORT) nebulizer solution 0.5 mg (has no administration in time range)   guaiFENesin (MUCINEX) 12 hr tablet 600 mg (has no administration in time range)   predniSONE (DELTASONE) tablet 50 mg (has no administration in time range)   ipratropium-albuterol (DUO-NEB) nebulizer solution 3 mL (3 mL Nebulization Given 6/8/24 1545)   albuterol (PROVENTIL) nebulizer solution 0.083% 2.5 mg/3mL (2.5  mg Nebulization Given 6/8/24 1550)   methylPREDNISolone sodium succinate (SOLU-Medrol) injection 125 mg (125 mg Intravenous Given 6/8/24 1524)                                    EKG my interpretation normal sinus rhythm rate of 79 some possible LVH QTc of 483 nonspecific T wave changes noted diffusely abnormal EKG unchanged from left ventricular paced rhythm 1/13/2024 abnormal          Medical Decision Making  Medical decision making.  IV established monitor placed my review of sinus rhythm EKG obtained normal sinus rhythm rate of 79 some LVH is noted.  Nonspecific T wave changes noted diffusely its abnormal unchanged from a paced rhythm on 1/13/2024 patient given a DuoNeb and albuterol and 125 mg Solu-Medrol IV labs obtained my independent review comprehensive metabolic profile marked for BUN of 37 creatinine 5.7 potassium is okay at 4.2 proBNP 44,000 white count was 10.8 with a hemoglobin 10.3 lactic acid 1.2 blood cultures pending.  The chest x-ray my independent review shows a right lower lobe middle lobe pneumonia.  Radiology review shows right middle lobe pneumonia cardiomegaly and some pulmonary edema some pleural effusions noted.  CT scan that was done yesterday without contrast I reviewed radiology port showed right middle lobe pneumonia pulmonary edema with some effusions noted.  She had a right upper lobe 7 mm nodule similar to prior study.  Several mediastinal lymph nodes mildly increased in size since the last 1.  She had this done as part of the follow-up on her cancer and radiation treatment she had done.  Patient repeat exam still with some scattered wheezes and rhonchi throughout.  She was started on Rocephin 2 g IV and Zithromax 500 mg IV.  The patient has pneumonia she has COPD I do not see any evidence that would suggest DVT or pulmonary embolism or dissection or acute myocardial infarction pericarditis myocarditis or pericardial effusion may be some low-grade edema.  But not a complete list of  all possibilities.  I talked to Cindy Santiago for Dr. Galindo.  Patient made aware of the findings she remained stable on 2 L of oxygen she will be admitted for further care stable otherwise unremarkable improved ER course.    Problems Addressed:  Acute respiratory distress: complicated acute illness or injury  Chronic obstructive pulmonary disease with acute exacerbation: complicated acute illness or injury  Pneumonia of right lung due to infectious organism, unspecified part of lung: complicated acute illness or injury    Amount and/or Complexity of Data Reviewed  External Data Reviewed: radiology and ECG.  Labs: ordered. Decision-making details documented in ED Course.  Radiology: ordered and independent interpretation performed. Decision-making details documented in ED Course.  ECG/medicine tests: ordered and independent interpretation performed. Decision-making details documented in ED Course.    Risk  Decision regarding hospitalization.        Final diagnoses:   Acute respiratory distress   Chronic obstructive pulmonary disease with acute exacerbation   Pneumonia of right lung due to infectious organism, unspecified part of lung       ED Disposition  ED Disposition       ED Disposition   Decision to Admit    Condition   --    Comment   Level of Care: Telemetry [5]   Admitting Physician: AMY GALINDO [[id number]]   Attending Physician: AMY GALINDO [[id number]]                 No follow-up provider specified.       Medication List      No changes were made to your prescriptions during this visit.            Matteo Ayala MD  06/08/24 1741       Matteo Ayala MD  06/08/24 1800

## 2024-06-08 NOTE — Clinical Note
Level of Care: Telemetry [5]   Diagnosis: PNA (pneumonia) [[id number]]   Certification: I Certify That Inpatient Hospital Services Are Medically Necessary For Greater Than 2 Midnights

## 2024-06-09 ENCOUNTER — APPOINTMENT (OUTPATIENT)
Dept: GENERAL RADIOLOGY | Facility: HOSPITAL | Age: 74
DRG: 193 | End: 2024-06-09
Payer: MEDICARE

## 2024-06-09 LAB
ANION GAP SERPL CALCULATED.3IONS-SCNC: 17.7 MMOL/L (ref 5–15)
ARTERIAL PATENCY WRIST A: POSITIVE
ATMOSPHERIC PRESS: ABNORMAL MM[HG]
BASE EXCESS BLDA CALC-SCNC: -5.2 MMOL/L (ref 0–3)
BDY SITE: ABNORMAL
BUN SERPL-MCNC: 46 MG/DL (ref 8–23)
BUN/CREAT SERPL: 6.8 (ref 7–25)
CALCIUM SPEC-SCNC: 9.1 MG/DL (ref 8.6–10.5)
CHLORIDE SERPL-SCNC: 102 MMOL/L (ref 98–107)
CO2 BLDA-SCNC: 25.7 MMOL/L (ref 22–29)
CO2 SERPL-SCNC: 20.3 MMOL/L (ref 22–29)
CREAT SERPL-MCNC: 6.78 MG/DL (ref 0.57–1)
EGFRCR SERPLBLD CKD-EPI 2021: 6 ML/MIN/1.73
GLUCOSE BLDC GLUCOMTR-MCNC: 165 MG/DL (ref 70–105)
GLUCOSE BLDC GLUCOMTR-MCNC: 226 MG/DL (ref 70–105)
GLUCOSE BLDC GLUCOMTR-MCNC: 277 MG/DL (ref 70–105)
GLUCOSE SERPL-MCNC: 253 MG/DL (ref 65–99)
HCO3 BLDA-SCNC: 23.8 MMOL/L (ref 21–28)
HCT VFR BLDA CALC: 37 % (ref 38–51)
HEMODILUTION: NO
HGB BLDA-MCNC: 12.5 G/DL (ref 12–17)
MODALITY: ABNORMAL
PCO2 BLDA: 61.6 MM HG (ref 35–48)
PH BLDA: 7.2 PH UNITS (ref 7.35–7.45)
PO2 BLDA: 82.8 MM HG (ref 83–108)
POTASSIUM SERPL-SCNC: 5.3 MMOL/L (ref 3.5–5.2)
SAO2 % BLDCOA: 92.8 % (ref 94–98)
SODIUM SERPL-SCNC: 140 MMOL/L (ref 136–145)

## 2024-06-09 PROCEDURE — 80048 BASIC METABOLIC PNL TOTAL CA: CPT | Performed by: NURSE PRACTITIONER

## 2024-06-09 PROCEDURE — 94799 UNLISTED PULMONARY SVC/PX: CPT

## 2024-06-09 PROCEDURE — 63710000001 PREDNISONE PER 5 MG: Performed by: NURSE PRACTITIONER

## 2024-06-09 PROCEDURE — 94664 DEMO&/EVAL PT USE INHALER: CPT

## 2024-06-09 PROCEDURE — 94761 N-INVAS EAR/PLS OXIMETRY MLT: CPT

## 2024-06-09 PROCEDURE — 63710000001 INSULIN LISPRO (HUMAN) PER 5 UNITS: Performed by: FAMILY MEDICINE

## 2024-06-09 PROCEDURE — 71045 X-RAY EXAM CHEST 1 VIEW: CPT

## 2024-06-09 PROCEDURE — 82948 REAGENT STRIP/BLOOD GLUCOSE: CPT

## 2024-06-09 PROCEDURE — 25010000002 LORAZEPAM PER 2 MG: Performed by: FAMILY MEDICINE

## 2024-06-09 PROCEDURE — 25010000002 FUROSEMIDE PER 20 MG: Performed by: INTERNAL MEDICINE

## 2024-06-09 PROCEDURE — 25010000002 METHYLPREDNISOLONE PER 40 MG: Performed by: FAMILY MEDICINE

## 2024-06-09 PROCEDURE — 85018 HEMOGLOBIN: CPT

## 2024-06-09 PROCEDURE — 36600 WITHDRAWAL OF ARTERIAL BLOOD: CPT | Performed by: INTERNAL MEDICINE

## 2024-06-09 PROCEDURE — 25010000002 CEFTRIAXONE PER 250 MG: Performed by: NURSE PRACTITIONER

## 2024-06-09 PROCEDURE — 87340 HEPATITIS B SURFACE AG IA: CPT | Performed by: INTERNAL MEDICINE

## 2024-06-09 PROCEDURE — 5A1D70Z PERFORMANCE OF URINARY FILTRATION, INTERMITTENT, LESS THAN 6 HOURS PER DAY: ICD-10-PCS | Performed by: INTERNAL MEDICINE

## 2024-06-09 PROCEDURE — 94660 CPAP INITIATION&MGMT: CPT

## 2024-06-09 PROCEDURE — 82803 BLOOD GASES ANY COMBINATION: CPT | Performed by: INTERNAL MEDICINE

## 2024-06-09 RX ORDER — PANTOPRAZOLE SODIUM 40 MG/1
40 TABLET, DELAYED RELEASE ORAL 2 TIMES DAILY
Status: DISCONTINUED | OUTPATIENT
Start: 2024-06-09 | End: 2024-06-14 | Stop reason: HOSPADM

## 2024-06-09 RX ORDER — CALCIUM ACETATE 667 MG/1
667 CAPSULE ORAL 3 TIMES DAILY
Status: DISCONTINUED | OUTPATIENT
Start: 2024-06-09 | End: 2024-06-14 | Stop reason: HOSPADM

## 2024-06-09 RX ORDER — ENOXAPARIN SODIUM 100 MG/ML
40 INJECTION SUBCUTANEOUS NIGHTLY
Status: DISCONTINUED | OUTPATIENT
Start: 2024-06-09 | End: 2024-06-10

## 2024-06-09 RX ORDER — SODIUM BICARBONATE 650 MG/1
1300 TABLET ORAL 3 TIMES DAILY
Status: DISCONTINUED | OUTPATIENT
Start: 2024-06-09 | End: 2024-06-14 | Stop reason: HOSPADM

## 2024-06-09 RX ORDER — METHYLPREDNISOLONE SODIUM SUCCINATE 40 MG/ML
40 INJECTION, POWDER, LYOPHILIZED, FOR SOLUTION INTRAMUSCULAR; INTRAVENOUS EVERY 12 HOURS
Status: DISCONTINUED | OUTPATIENT
Start: 2024-06-09 | End: 2024-06-11

## 2024-06-09 RX ORDER — NICOTINE POLACRILEX 4 MG
15 LOZENGE BUCCAL
Status: DISCONTINUED | OUTPATIENT
Start: 2024-06-09 | End: 2024-06-14 | Stop reason: HOSPADM

## 2024-06-09 RX ORDER — BENZONATATE 100 MG/1
100 CAPSULE ORAL 3 TIMES DAILY PRN
Status: DISCONTINUED | OUTPATIENT
Start: 2024-06-09 | End: 2024-06-14 | Stop reason: HOSPADM

## 2024-06-09 RX ORDER — INSULIN LISPRO 100 [IU]/ML
2-9 INJECTION, SOLUTION INTRAVENOUS; SUBCUTANEOUS
Status: DISCONTINUED | OUTPATIENT
Start: 2024-06-09 | End: 2024-06-14 | Stop reason: HOSPADM

## 2024-06-09 RX ORDER — DEXTROSE MONOHYDRATE 25 G/50ML
25 INJECTION, SOLUTION INTRAVENOUS
Status: DISCONTINUED | OUTPATIENT
Start: 2024-06-09 | End: 2024-06-14 | Stop reason: HOSPADM

## 2024-06-09 RX ORDER — OXYCODONE HYDROCHLORIDE 5 MG/1
5 TABLET ORAL EVERY 4 HOURS PRN
Status: DISCONTINUED | OUTPATIENT
Start: 2024-06-09 | End: 2024-06-14 | Stop reason: HOSPADM

## 2024-06-09 RX ORDER — ATORVASTATIN CALCIUM 20 MG/1
20 TABLET, FILM COATED ORAL DAILY
Status: DISCONTINUED | OUTPATIENT
Start: 2024-06-09 | End: 2024-06-09

## 2024-06-09 RX ORDER — LORAZEPAM 2 MG/ML
0.25 INJECTION INTRAMUSCULAR ONCE
Status: COMPLETED | OUTPATIENT
Start: 2024-06-09 | End: 2024-06-09

## 2024-06-09 RX ORDER — TEMAZEPAM 15 MG/1
30 CAPSULE ORAL NIGHTLY
Status: DISCONTINUED | OUTPATIENT
Start: 2024-06-09 | End: 2024-06-14 | Stop reason: HOSPADM

## 2024-06-09 RX ORDER — AMLODIPINE BESYLATE 5 MG/1
10 TABLET ORAL DAILY
Status: DISCONTINUED | OUTPATIENT
Start: 2024-06-09 | End: 2024-06-14 | Stop reason: HOSPADM

## 2024-06-09 RX ORDER — SEVELAMER CARBONATE 800 MG/1
2400 TABLET, FILM COATED ORAL
Status: DISCONTINUED | OUTPATIENT
Start: 2024-06-09 | End: 2024-06-14 | Stop reason: HOSPADM

## 2024-06-09 RX ORDER — IBUPROFEN 600 MG/1
1 TABLET ORAL
Status: DISCONTINUED | OUTPATIENT
Start: 2024-06-09 | End: 2024-06-14 | Stop reason: HOSPADM

## 2024-06-09 RX ORDER — CARVEDILOL 3.12 MG/1
3.12 TABLET ORAL 2 TIMES DAILY WITH MEALS
Status: DISCONTINUED | OUTPATIENT
Start: 2024-06-09 | End: 2024-06-14 | Stop reason: HOSPADM

## 2024-06-09 RX ORDER — LOSARTAN POTASSIUM 50 MG/1
50 TABLET ORAL DAILY
Status: DISCONTINUED | OUTPATIENT
Start: 2024-06-09 | End: 2024-06-11

## 2024-06-09 RX ORDER — FUROSEMIDE 10 MG/ML
40 INJECTION INTRAMUSCULAR; INTRAVENOUS ONCE
Status: COMPLETED | OUTPATIENT
Start: 2024-06-09 | End: 2024-06-09

## 2024-06-09 RX ORDER — GUAIFENESIN 600 MG/1
1200 TABLET, EXTENDED RELEASE ORAL EVERY 12 HOURS SCHEDULED
Status: DISCONTINUED | OUTPATIENT
Start: 2024-06-09 | End: 2024-06-14 | Stop reason: HOSPADM

## 2024-06-09 RX ORDER — TEMAZEPAM 15 MG/1
30 CAPSULE ORAL NIGHTLY PRN
Status: DISCONTINUED | OUTPATIENT
Start: 2024-06-09 | End: 2024-06-09

## 2024-06-09 RX ADMIN — CARVEDILOL 3.12 MG: 3.12 TABLET, FILM COATED ORAL at 12:15

## 2024-06-09 RX ADMIN — FUROSEMIDE 40 MG: 10 INJECTION, SOLUTION INTRAMUSCULAR; INTRAVENOUS at 15:10

## 2024-06-09 RX ADMIN — PANTOPRAZOLE SODIUM 40 MG: 40 TABLET, DELAYED RELEASE ORAL at 20:08

## 2024-06-09 RX ADMIN — SODIUM BICARBONATE 1300 MG: 650 TABLET ORAL at 12:15

## 2024-06-09 RX ADMIN — LORAZEPAM 0.25 MG: 2 INJECTION INTRAMUSCULAR; INTRAVENOUS at 22:38

## 2024-06-09 RX ADMIN — LOSARTAN POTASSIUM 50 MG: 50 TABLET, FILM COATED ORAL at 12:15

## 2024-06-09 RX ADMIN — METHYLPREDNISOLONE SODIUM SUCCINATE 40 MG: 40 INJECTION, POWDER, FOR SOLUTION INTRAMUSCULAR; INTRAVENOUS at 22:45

## 2024-06-09 RX ADMIN — IPRATROPIUM BROMIDE AND ALBUTEROL SULFATE 3 ML: .5; 3 SOLUTION RESPIRATORY (INHALATION) at 15:00

## 2024-06-09 RX ADMIN — BUDESONIDE 0.5 MG: 0.5 INHALANT RESPIRATORY (INHALATION) at 19:50

## 2024-06-09 RX ADMIN — SEVELAMER CARBONATE 2400 MG: 800 TABLET, FILM COATED ORAL at 12:15

## 2024-06-09 RX ADMIN — CALCIUM ACETATE 667 MG: 667 CAPSULE ORAL at 12:15

## 2024-06-09 RX ADMIN — INSULIN LISPRO 2 UNITS: 100 INJECTION, SOLUTION INTRAVENOUS; SUBCUTANEOUS at 20:23

## 2024-06-09 RX ADMIN — METHYLPREDNISOLONE SODIUM SUCCINATE 40 MG: 40 INJECTION, POWDER, FOR SOLUTION INTRAMUSCULAR; INTRAVENOUS at 12:15

## 2024-06-09 RX ADMIN — BENZONATATE 100 MG: 100 CAPSULE ORAL at 14:05

## 2024-06-09 RX ADMIN — BUDESONIDE 0.5 MG: 0.5 INHALANT RESPIRATORY (INHALATION) at 06:49

## 2024-06-09 RX ADMIN — CEFTRIAXONE 1000 MG: 1 INJECTION, POWDER, FOR SOLUTION INTRAMUSCULAR; INTRAVENOUS at 20:07

## 2024-06-09 RX ADMIN — INSULIN LISPRO 6 UNITS: 100 INJECTION, SOLUTION INTRAVENOUS; SUBCUTANEOUS at 12:17

## 2024-06-09 RX ADMIN — IPRATROPIUM BROMIDE AND ALBUTEROL SULFATE 3 ML: .5; 3 SOLUTION RESPIRATORY (INHALATION) at 19:45

## 2024-06-09 RX ADMIN — CALCIUM ACETATE 667 MG: 667 CAPSULE ORAL at 20:08

## 2024-06-09 RX ADMIN — TEMAZEPAM 30 MG: 15 CAPSULE ORAL at 20:08

## 2024-06-09 RX ADMIN — SODIUM BICARBONATE 1300 MG: 650 TABLET ORAL at 20:08

## 2024-06-09 RX ADMIN — CARVEDILOL 3.12 MG: 3.12 TABLET, FILM COATED ORAL at 20:22

## 2024-06-09 RX ADMIN — TEMAZEPAM 30 MG: 15 CAPSULE ORAL at 01:33

## 2024-06-09 RX ADMIN — IPRATROPIUM BROMIDE AND ALBUTEROL SULFATE 3 ML: .5; 3 SOLUTION RESPIRATORY (INHALATION) at 10:44

## 2024-06-09 RX ADMIN — PREDNISONE 50 MG: 50 TABLET ORAL at 08:55

## 2024-06-09 RX ADMIN — IPRATROPIUM BROMIDE AND ALBUTEROL SULFATE 3 ML: .5; 3 SOLUTION RESPIRATORY (INHALATION) at 06:43

## 2024-06-09 RX ADMIN — PANTOPRAZOLE SODIUM 40 MG: 40 TABLET, DELAYED RELEASE ORAL at 12:15

## 2024-06-09 RX ADMIN — GUAIFENESIN 600 MG: 600 TABLET, EXTENDED RELEASE ORAL at 08:55

## 2024-06-09 RX ADMIN — GUAIFENESIN 1200 MG: 600 TABLET, EXTENDED RELEASE ORAL at 20:08

## 2024-06-09 RX ADMIN — SEVELAMER CARBONATE 2400 MG: 800 TABLET, FILM COATED ORAL at 20:19

## 2024-06-09 RX ADMIN — AMLODIPINE BESYLATE 10 MG: 5 TABLET ORAL at 12:15

## 2024-06-09 RX ADMIN — Medication 10 ML: at 08:56

## 2024-06-09 NOTE — PLAN OF CARE
Problem: Adult Inpatient Plan of Care  Goal: Plan of Care Review  Outcome: Ongoing, Progressing  Flowsheets (Taken 6/9/2024 1702)  Outcome Evaluation: Pt transsferred to PCU from sips after rapid called with pt soa and decreased sats and increase in HR also stat dialysis.  see  RN notes   Goal Outcome Evaluation:              Outcome Evaluation: Pt transsferred to PCU from sips after rapid called with pt soa and decreased sats and increase in HR also stat dialysis.  see  RN notes

## 2024-06-09 NOTE — PLAN OF CARE
Goal Outcome Evaluation:      Does not sleep in bed. Sleeps in chair. Does not wear O2. Soa with much exertion. Loose productive cough. No falls. HD fistulas to left arm healed. The area to the rt arm from elbow up we are not allowed to use for bp or pt  states had a maturing graft to use there and can not use it there. Voids very little. Care continues

## 2024-06-09 NOTE — H&P
Patient Care Team:  Jin Malcolm PA-C as PCP - General (Physician Assistant)  Nilay Stevens MD as Consulting Physician (General Surgery)  Stephen Perea MD as Surgeon (Thoracic Surgery)  Makayla Navarro RN as Nurse Navigator  Iliana Ro MD as Consulting Physician (Hematology and Oncology)    Chief Complaint  Subjective     The patient is a 73 y.o. female who presents with an acute community-acquired pneumonia associated with pulmonary edema    HPI  The patient was in her usual state of health until approximate 1 week prior to presentation when she began to experience insidious onset of some progressive shortness of breath or cough.  She attempted home medications but decompensated presented to [facility] emergency room for evaluation where she was admitted with an acute community-acquired pneumonia of the right lower lung with an acute exacerbation of panlobular COPD with emphysema and acute pulmonary edema  Review of Systems  Review of Systems   Constitutional:  Positive for activity change.   Respiratory:  Positive for cough and shortness of breath.    Neurological:  Positive for weakness.       History  Past Medical History:   Diagnosis Date    Lucas's esophagus     C. difficile colitis     Carotid artery disease     -50-74% left, 16-49% right (1/19)    COPD (chronic obstructive pulmonary disease)     DM2 (diabetes mellitus, type 2)     ESRD (end stage renal disease)     on HD    Gastric ulcer     at anastomatic site    Gastroparesis     unknown    GERD (gastroesophageal reflux disease)     Gout     not current    Hemodialysis patient     mwf    Hernia, incisional     abdomen    History of colonoscopy     UTD    History of degenerative disc disease     Hyperlipidemia     Hypertension     Medicare annual wellness visit, subsequent 11/07/2020    Microscopic colitis     Nephrotic syndrome     Neuroendocrine tumor     of stomach    FABY (obstructive sleep apnea)     not treating    Pedal edema      ressolved    Proteinuria     Renal cell cancer, right     Rib pain on right side     chronic    Rotator cuff tear     bilateral    Stomach cancer 2016    Stroke     Uterine cancer     Vitamin B 12 deficiency     Vitamin D deficiency      Past Surgical History:   Procedure Laterality Date    APPENDECTOMY      ARTERIOVENOUS FISTULA Right     ARTERIOVENOUS FISTULA REPAIR      Clotted off and insert graft    ARTERIOVENOUS FISTULA/SHUNT SURGERY Left 12/30/2020    Procedure: ARTERIOVENOUS FISTULA FORMATION;  Surgeon: Jan Caicedo MD;  Location: [facility] MAIN OR;  Service: Vascular;  Laterality: Left;    ARTERIOVENOUS FISTULA/SHUNT SURGERY Left 4/23/2024    Procedure: FISTULOGRAM WITH SUBCLAVIAN ARTERY ANGIOPLASTY, fistula banding;  Surgeon: Franki Delgadillo II, MD;  Location: [facility] HYBRID OR;  Service: Vascular;  Laterality: Left;    BREAST BIOPSY      right nipple 1981    COLONOSCOPY N/A 11/24/2020    Procedure: COLONOSCOPY with polypectomy x 7;  Surgeon: Jeffery White MD;  Location: [facility] ENDOSCOPY;  Service: Gastroenterology;  Laterality: N/A;  post op: polyps, diverticulosis, hemorrhoids    COLONOSCOPY N/A 6/1/2023    Procedure: COLONOSCOPY with polypectomy x 3 biopsy x 1;  Surgeon: Jeffery White MD;  Location: [facility] ENDOSCOPY;  Service: Gastroenterology;  Laterality: N/A;  diverticulosis polyps    ENDOSCOPY N/A 03/12/2022    Procedure: ESOPHAGOGASTRODUODENOSCOPY with biopsy x 1 area;  Surgeon: Javan Augustin MD;  Location: [facility] ENDOSCOPY;  Service: Gastroenterology;  Laterality: N/A;  post op: anastamosis    ENDOSCOPY N/A 10/9/2023    Procedure: ESOPHAGOGASTRODUODENOSCOPY with biopsy x3 areas;  Surgeon: Ace Campbell MD;  Location: [facility] ENDOSCOPY;  Service: Gastroenterology;  Laterality: N/A;  duodenal ulcers;irregular z line    GASTRIC RESECTION      cancer    HYSTERECTOMY      LAPAROSCOPIC CHOLECYSTECTOMY      NEPHRECTOMY      right kidney removed     REDUCTION MAMMAPLASTY      TUMOR  REMOVAL      boils    VENTRAL/INCISIONAL HERNIA REPAIR Right 08/28/2019    Procedure: VENTRAL/INCISIONAL HERNIA REPAIR;  Surgeon: Nilay Stevens MD;  Location: [facility] MAIN OR;  Service: General    VENTRAL/INCISIONAL HERNIA REPAIR N/A 10/01/2019    Procedure: OPEN VENTRAL/INCISIONAL HERNIA REPAIR;  Surgeon: Nilay Stevens MD;  Location: [facility] MAIN OR;  Service: General    VENTRAL/INCISIONAL HERNIA REPAIR N/A 10/11/2021    Procedure: VENTRAL/INCISIONAL HERNIA REPAIR LAPAROSCOPIC;  Surgeon: Nilay Stevens MD;  Location: [facility] MAIN OR;  Service: General;  Laterality: N/A;    VENTRAL/INCISIONAL HERNIA REPAIR N/A 01/23/2023    Procedure: VENTRAL/INCISIONAL HERNIA REPAIR with MESH;  Surgeon: Nilay Stevens MD;  Location: [facility] MAIN OR;  Service: General;  Laterality: N/A;     Family History   Problem Relation Age of Onset    Heart disease Father      Social History     Tobacco Use    Smoking status: Former     Current packs/day: 0.00     Average packs/day: 0.3 packs/day for 57.0 years (14.3 ttl pk-yrs)     Types: Cigarettes     Start date: 5/1/1964     Quit date: 5/1/2021     Years since quitting: 3.1     Passive exposure: Past    Smokeless tobacco: Never    Tobacco comments:     quit smoking 2 months ago   Vaping Use    Vaping status: Never Used   Substance Use Topics    Alcohol use: No    Drug use: No     Allergies:  Atorvastatin calcium, Gabapentin, and Niacin    Objective     Vital Signs  Temp:  [97.3 °F (36.3 °C)-98.2 °F (36.8 °C)] 98 °F (36.7 °C)  Heart Rate:  [81-99] 92  Resp:  [11-25] 20  BP: (119-158)/(52-90) 146/74      Physical Exam:   Physical Exam  Constitutional:       General: She is not in acute distress.     Appearance: She is ill-appearing. She is not toxic-appearing.   Cardiovascular:      Rate and Rhythm: Rhythm irregular.      Heart sounds: Normal heart sounds.   Pulmonary:      Breath sounds: Wheezing present.      Comments: Poor air exchange  Neurological:      Mental Status: She is alert.  "             Results Review:   CBC    Results from last 7 days   Lab Units 06/08/24  1507 06/05/24  0000   WBC 10*3/mm3 10.86*  --    HEMOGLOBIN g/dL 10.3* 10.2*  30.6*   PLATELETS 10*3/mm3 386  --      BMP   Results from last 7 days   Lab Units 06/09/24  0231 06/08/24  1613 06/05/24  0000   SODIUM mmol/L 140 142  --    POTASSIUM mmol/L 5.3* 4.2 4.7   CHLORIDE mmol/L 102 106  --    CO2 mmol/L 20.3* 23.3  --    BUN mg/dL 46* 37*  --    CREATININE mg/dL 6.78* 5.77*  --    GLUCOSE mg/dL 253* 137*  --      Cr Clearance Estimated Creatinine Clearance: 6.3 mL/min (A) (by C-G formula based on SCr of 6.78 mg/dL (H)).  Coag     HbA1C   Lab Results   Component Value Date    HGBA1C 5.5 11/07/2020    HGBA1C 6.2 (H) 03/28/2020    HGBA1C 6.5 (H) 09/30/2019     Blood Glucose No results found for: \"POCGLU\"  Infection     CMP   Results from last 7 days   Lab Units 06/09/24  0231 06/08/24  1613 06/05/24  0000   SODIUM mmol/L 140 142  --    POTASSIUM mmol/L 5.3* 4.2 4.7   CHLORIDE mmol/L 102 106  --    CO2 mmol/L 20.3* 23.3  --    BUN mg/dL 46* 37*  --    CREATININE mg/dL 6.78* 5.77*  --    GLUCOSE mg/dL 253* 137*  --    ALBUMIN g/dL  --  4.0  --    BILIRUBIN mg/dL  --  0.3  --    ALK PHOS U/L  --  111  --    AST (SGOT) U/L  --  21  --    ALT (SGPT) U/L  --  10  --      UA      Radiology(recent) CT Chest Without Contrast Diagnostic    Result Date: 6/8/2024  Impression: 1. Right middle lobe pneumonia. 2. Pulmonary edema with small bilateral pleural effusions right greater than left and dependent lower lobe atelectasis. 3. Right upper lobe 7 mm nodule similar to prior study. 4. Mild increase in size of several mediastinal lymph nodes which may relate to reactive adenopathy recommend attention on follow-up. 5. Additional incidental findings above. Electronically Signed: Corey Harris MD  6/8/2024 3:38 PM EDT  Workstation ID: [id number]    XR Chest 1 View    Result Date: 6/8/2024  Impression: 1. Right middle lobe pneumonia. 2. Cardiomegaly " and pulmonary edema. 3. Small bilateral pleural effusions right greater than left. 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  Diabetes mellitus type 2 with angiopathic manifestations  Diabetic dyslipidemia  Secondary hyperparathyroidism  Secondary hyperaldosteronism  History of uterine cancer  Cerebrovascular disease with history of CVA  History of gout  Atherosclerotic heart disease of native coronary arteries of native heart with angina pectoris  Hypoventilation apnea syndrome with FABY  Degenerative disc disease lumbosacral spine  B12 deficiency  Vitamin D deficiency  Chronic use of insulin  History of neuroendocrine carcinoma of the duodenum  History of partial duodenectomy  Carotid stenosis  IBAN  Gastroesophageal reflux disease with esophagitis  History of Lucas's esophageal change  Diabetic gastroparesis  History of peptic ulcer  History of renal cell carcinoma right  History of right radical nephrectomy          Plan:  Continue ESRD//volume control//aggressive pulmonary toilet//antimicrobial therapy//pulmonology and nephrology to participate  Expected Length of Stay 5 days    I discussed the patient's findings and my recommendations with patient and nursing staff.     Rey Jackson MD  06/09/24  11:06 EDT

## 2024-06-09 NOTE — CONSULTS
Group: [facility]         CONSULT NOTE    Patient Identification:  Sherry Am[age] y.o.  female  1950  [id number]            Requesting physician: Attending physician    Reason for Consultation: Pneumonia, COPD, pleural effusion      History of Present Illness:  73-year-old female with history of COPD, adenocarcinoma of the lung, CAD, end-stage renal disease on hemodialysis, HTN, HLD, renal cell carcinoma and prior stroke who presented 6/8/2024 with complaints of shortness of breath and cough for 1 week.  Patient is afebrile and hemodynamically stable, oxygen saturation 96% on room air, earlier she was requiring 2 L of oxygen.  proBNP 44,204, creatinine 6.7, BUN 46, WBCs 10.8, hemoglobin 10.3.  Chest x-ray revealed right middle lobe pneumonia, cardiomegaly, pulmonary edema, small bilateral effusion greater on the right side      Assessment:  Right middle lobe pneumonia due to unspecified pathogen    COPD acute exacerbation  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 post radiation therapy November 2023  ESRD  Chronic anemia due to chronic kidney disease  CAD  HTN  HLD  DM  History of CVA  Former smoker: Quit 2016      Recommendations:  Antibiotics  Oxygen supplement and titration to maintain saturation 90 to 95%  Bronchodilators  Inhaled corticosteroids  IV steroids  Mucinex  Encourage use of incentive spirometry    HD/electrolyte management as per nephrology  BP/glucose control  DVT/GI prophylaxis  Check daily labs and correct electrolytes as needed  I personally reviewed the radiological studies        Review of Sytems:  Constitutional: Negative for chills, and fever and positive for malaise/fatigue.   HENT: Negative.    Eyes: Negative.    Cardiovascular: Negative.    Respiratory: Positive for cough and shortness of breath.    Skin: Negative.    Musculoskeletal: Negative.    Gastrointestinal: Negative.     Genitourinary: Negative.    Neurological: Negative.    Psychiatric/Behavioral: Negative.    Past Medical History:  Past Medical History:   Diagnosis Date    Lucas's esophagus     C. difficile colitis     Carotid artery disease     -50-74% left, 16-49% right (1/19)    COPD (chronic obstructive pulmonary disease)     DM2 (diabetes mellitus, type 2)     ESRD (end stage renal disease)     on HD    Gastric ulcer     at anastomatic site    Gastroparesis     unknown    GERD (gastroesophageal reflux disease)     Gout     not current    Hemodialysis patient     mwf    Hernia, incisional     abdomen    History of colonoscopy     UTD    History of degenerative disc disease     Hyperlipidemia     Hypertension     Medicare annual wellness visit, subsequent 11/07/2020    Microscopic colitis     Nephrotic syndrome     Neuroendocrine tumor     of stomach    FABY (obstructive sleep apnea)     not treating    Pedal edema     ressolved    Proteinuria     Renal cell cancer, right     Rib pain on right side     chronic    Rotator cuff tear     bilateral    Stomach cancer 2016    Stroke     Uterine cancer     Vitamin B 12 deficiency     Vitamin D deficiency        Past Surgical History:  Past Surgical History:   Procedure Laterality Date    APPENDECTOMY      ARTERIOVENOUS FISTULA Right     ARTERIOVENOUS FISTULA REPAIR      Clotted off and insert graft    ARTERIOVENOUS FISTULA/SHUNT SURGERY Left 12/30/2020    Procedure: ARTERIOVENOUS FISTULA FORMATION;  Surgeon: Jan Caicedo MD;  Location: [facility] MAIN OR;  Service: Vascular;  Laterality: Left;    ARTERIOVENOUS FISTULA/SHUNT SURGERY Left 4/23/2024    Procedure: FISTULOGRAM WITH SUBCLAVIAN ARTERY ANGIOPLASTY, fistula banding;  Surgeon: Franki Delgadillo II, MD;  Location: [facility] HYBRID OR;  Service: Vascular;  Laterality: Left;    BREAST BIOPSY      right nipple 1981    COLONOSCOPY N/A 11/24/2020    Procedure: COLONOSCOPY with polypectomy x 7;  Surgeon: Jeffery White MD;   Location: [facility] ENDOSCOPY;  Service: Gastroenterology;  Laterality: N/A;  post op: polyps, diverticulosis, hemorrhoids    COLONOSCOPY N/A 6/1/2023    Procedure: COLONOSCOPY with polypectomy x 3 biopsy x 1;  Surgeon: Jeffery White MD;  Location: [facility] ENDOSCOPY;  Service: Gastroenterology;  Laterality: N/A;  diverticulosis polyps    ENDOSCOPY N/A 03/12/2022    Procedure: ESOPHAGOGASTRODUODENOSCOPY with biopsy x 1 area;  Surgeon: Javan Augustin MD;  Location: [facility] ENDOSCOPY;  Service: Gastroenterology;  Laterality: N/A;  post op: anastamosis    ENDOSCOPY N/A 10/9/2023    Procedure: ESOPHAGOGASTRODUODENOSCOPY with biopsy x3 areas;  Surgeon: Ace Campbell MD;  Location: [facility] ENDOSCOPY;  Service: Gastroenterology;  Laterality: N/A;  duodenal ulcers;irregular z line    GASTRIC RESECTION      cancer    HYSTERECTOMY      LAPAROSCOPIC CHOLECYSTECTOMY      NEPHRECTOMY      right kidney removed     REDUCTION MAMMAPLASTY      TUMOR REMOVAL      boils    VENTRAL/INCISIONAL HERNIA REPAIR Right 08/28/2019    Procedure: VENTRAL/INCISIONAL HERNIA REPAIR;  Surgeon: Nilay Stevens MD;  Location: [facility] MAIN OR;  Service: General    VENTRAL/INCISIONAL HERNIA REPAIR N/A 10/01/2019    Procedure: OPEN VENTRAL/INCISIONAL HERNIA REPAIR;  Surgeon: Nilay Stevens MD;  Location: [facility] MAIN OR;  Service: General    VENTRAL/INCISIONAL HERNIA REPAIR N/A 10/11/2021    Procedure: VENTRAL/INCISIONAL HERNIA REPAIR LAPAROSCOPIC;  Surgeon: Nilay Stevens MD;  Location: [facility] MAIN OR;  Service: General;  Laterality: N/A;    VENTRAL/INCISIONAL HERNIA REPAIR N/A 01/23/2023    Procedure: VENTRAL/INCISIONAL HERNIA REPAIR with MESH;  Surgeon: Nilay Stevens MD;  Location: [facility] MAIN OR;  Service: General;  Laterality: N/A;        Home Meds:  Medications Prior to Admission   Medication Sig Dispense Refill Last Dose    albuterol sulfate  (90 Base) MCG/ACT inhaler Inhale 2 puffs Every 4 (Four) Hours As Needed for Wheezing.        "amLODIPine (NORVASC) 10 MG tablet Take 1 tablet by mouth Daily.       benzonatate (TESSALON) 200 MG capsule Take 1 capsule by mouth 3 (Three) Times a Day As Needed for Cough. 90 capsule 0     calcium acetate (PHOS BINDER,) 667 MG capsule capsule Take 1 capsule by mouth 3 (Three) Times a Day.       carvedilol (COREG) 3.125 MG tablet Take 1 tablet by mouth 2 (Two) Times a Day With Meals.       Cholecalciferol 25 MCG (1000 UT) tablet Take 1 tablet by mouth Daily.       colestipol (COLESTID) 1 g tablet Take 1 tablet by mouth 3 times a day.       hyoscyamine (ANASPAZ,LEVSIN) 0.125 MG tablet Take 1 tablet by mouth Every 4 (Four) Hours As Needed.       lidocaine-prilocaine (EMLA) 2.5-2.5 % cream Apply 1 Application topically to the appropriate area as directed Daily As Needed (severe pain, left 5th finger). Apply to left 5th finger 5800 g 4     losartan (COZAAR) 50 MG tablet Take 1 tablet by mouth Daily.       Methoxy PEG-Epoetin Beta (MIRCERA IJ) 30 mcg Every 14 (Fourteen) Days. Dialysis       metoprolol tartrate (LOPRESSOR) 25 MG tablet Take 0.5 tablets by mouth 2 (Two) Times a Day.       Multiple Vitamins-Minerals (RENAPLEX-D) tablet Take 1 tablet by mouth Daily.       oxyCODONE (Roxicodone) 5 MG immediate release tablet Take 1 tablet by mouth Every 4 (Four) Hours As Needed for Severe Pain. 10 tablet 0     pantoprazole (PROTONIX) 40 MG EC tablet Take 1 tablet by mouth 2 (Two) Times a Day.       sevelamer (RENVELA) 800 MG tablet Take 3 tablets by mouth 3 (Three) Times a Day With Meals.       simvastatin (ZOCOR) 40 MG tablet Take 1 tablet by mouth Every Night.       sodium bicarbonate 650 MG tablet Take 2 tablets by mouth 3 (Three) Times a Day.       temazepam (RESTORIL) 30 MG capsule Take 1 capsule by mouth Every Night.          Allergies:  Allergies   Allergen Reactions    Atorvastatin Calcium Other (See Comments)     \"felt like I was on fire\"    Gabapentin Dizziness     says was unable to walk    Niacin Other (See " "Comments)     \"FEELS LIKE SHE IS ON FIRE\"         Social History:   Social History     Socioeconomic History    Marital status: Single   Tobacco Use    Smoking status: Former     Current packs/day: 0.00     Average packs/day: 0.3 packs/day for 57.0 years (14.3 ttl pk-yrs)     Types: Cigarettes     Start date: 5/1/1964     Quit date: 5/1/2021     Years since quitting: 3.1     Passive exposure: Past    Smokeless tobacco: Never    Tobacco comments:     quit smoking 2 months ago   Vaping Use    Vaping status: Never Used   Substance and Sexual Activity    Alcohol use: No    Drug use: No    Sexual activity: Defer       Family History:  Family History   Problem Relation Age of Onset    Heart disease Father        Physical Exam:  /74 (BP Location: Right arm, Patient Position: Lying) Comment (BP Location): forearm  Pulse 92   Temp 98 °F (36.7 °C) (Oral)   Resp 20   Ht 152.4 cm (60\")   Wt 68.1 kg (150 lb 2.1 oz)   SpO2 100%   BMI 29.32 kg/m²  Body mass index is 29.32 kg/m². 100% 68.1 kg (150 lb 2.1 oz)  General Appearance:  Alert   HEENT:  Normocephalic, without obvious abnormality, Conjunctiva/corneas clear,.   Nares normal, no drainage     Neck:  Supple, symmetrical, trachea midline. No JVD.  Lungs /Chest wall:   Bilateral basal rhonchi, respirations unlabored, symmetrical wall movement.     Heart:  Regular rate and rhythm, S1 S2 normal  Abdomen: Soft, non-tender, no masses, no organomegaly.    Extremities: No edema, no clubbing or cyanosis    LABS:  Lab Results   Component Value Date    CALCIUM 9.1 06/09/2024    PHOS 5.7 (H) 11/12/2023     Results from last 7 days   Lab Units 06/09/24  0231 06/08/24  1613 06/08/24  1613 06/08/24  1507 06/05/24  0000   SODIUM mmol/L 140  --  142  --   --    POTASSIUM mmol/L 5.3*  --  4.2  --  4.7   CHLORIDE mmol/L 102  --  106  --   --    CO2 mmol/L 20.3*  --  23.3  --   --    BUN mg/dL 46*  --  37*  --   --    CREATININE mg/dL 6.78*  --  5.77*  --   --    GLUCOSE mg/dL 253*   " < > 137*  --   --    CALCIUM mg/dL 9.1  --  8.8  --   --    WBC 10*3/mm3  --   --   --  10.86*  --    HEMOGLOBIN g/dL  --   --   --  10.3* 10.2*  30.6*   PLATELETS 10*3/mm3  --   --   --  386  --    ALT (SGPT) U/L  --   --  10  --   --    AST (SGOT) U/L  --   --  21  --   --    PROBNP pg/mL  --   --   --  44,204.0*  --     < > = values in this interval not displayed.     Lab Results   Component Value Date    TROPONINT 0.032 (C) 01/26/2023             Results from last 7 days   Lab Units 06/08/24  1720   LACTATE mmol/L 1.2                     Lab Results   Component Value Date    TSH 2.87 04/15/2017     Estimated Creatinine Clearance: 6.3 mL/min (A) (by C-G formula based on SCr of 6.78 mg/dL (H)).         Imaging:  Imaging Results (Last 24 Hours)       Procedure Component Value Units Date/Time    XR Chest 1 View [[id number]] Collected: 06/08/24 1527     Updated: 06/08/24 1531    Narrative:      XR CHEST 1 VW    Date of Exam: 6/8/2024 3:15 PM EDT    Indication: Cough history of lung cancer right lung CT scan yesterday not read yet    Comparison: CT chest without contrast [id number]    Findings:  Cardiomegaly with interstitial thickening consistent with pulmonary edema. Consolidation in the right middle lobe consistent with pneumonia. Mild dependent atelectasis at the right lung base. Small bilateral pleural effusions right greater than left. No   pneumothorax. Aortic arch atherosclerosis. Vascular stent overlies the right axillary region.       Impression:      Impression:  1. Right middle lobe pneumonia.  2. Cardiomegaly and pulmonary edema.  3. Small bilateral pleural effusions right greater than left.      Electronically Signed: Corey Harris MD    6/8/2024 3:29 PM EDT    Workstation ID: [id number]              Current Meds:   SCHEDULE  amLODIPine, 10 mg, Oral, Daily  budesonide, 0.5 mg, Nebulization, BID - RT  calcium acetate, 667 mg, Oral, TID  carvedilol, 3.125 mg, Oral, BID With Meals  cefTRIAXone, 1,000 mg,  Intravenous, Q24H  enoxaparin, 40 mg, Subcutaneous, Nightly  guaiFENesin, 600 mg, Oral, Q12H  insulin lispro, 2-9 Units, Subcutaneous, 4x Daily AC & at Bedtime  ipratropium-albuterol, 3 mL, Nebulization, 4x Daily - RT  losartan, 50 mg, Oral, Daily  methylPREDNISolone sodium succinate, 40 mg, Intravenous, Q12H  pantoprazole, 40 mg, Oral, BID  sevelamer, 2,400 mg, Oral, TID With Meals  sodium bicarbonate, 1,300 mg, Oral, TID  sodium chloride, 10 mL, Intravenous, Q12H  temazepam, 30 mg, Oral, Nightly      Infusions     PRNs    acetaminophen    benzonatate    senna-docusate sodium **AND** polyethylene glycol **AND** bisacodyl **AND** bisacodyl    dextrose    dextrose    glucagon (human recombinant)    nitroglycerin    ondansetron ODT **OR** ondansetron    oxyCODONE    sodium chloride    [COMPLETED] Insert Peripheral IV **AND** sodium chloride    sodium chloride    sodium chloride    temazepam        Lyle Hammond MD  6/9/2024  12:01 EDT      Much of this encounter note is an electronic transcription/translation of spoken language to printed text using Dragon Software.

## 2024-06-09 NOTE — CONSULTS
Referring Provider: ESRD  Reason for Consultation: Dr. Jackson    Subjective     Chief complaint   Chief Complaint   Patient presents with    Shortness of Breath       History of present illness:    73-year-old female with ESRD, COPD, adenocarcinoma of the lung, CAD,  HTN, HLD, renal cell carcinoma and prior stroke who presented 6/8/2024 with complaints of shortness of breath and cough for 1 week.  She has developed increasing shortness of breath and a rapid was called.  Chest x-ray revealed right middle lobe pneumonia, cardiomegaly, pulmonary edema, small bilateral effusion greater on the right side.  Past Medical History:   Diagnosis Date    Lucas's esophagus     C. difficile colitis     Carotid artery disease     -50-74% left, 16-49% right (1/19)    COPD (chronic obstructive pulmonary disease)     DM2 (diabetes mellitus, type 2)     ESRD (end stage renal disease)     on HD    Gastric ulcer     at anastomatic site    Gastroparesis     unknown    GERD (gastroesophageal reflux disease)     Gout     not current    Hemodialysis patient     mwf    Hernia, incisional     abdomen    History of colonoscopy     UTD    History of degenerative disc disease     Hyperlipidemia     Hypertension     Medicare annual wellness visit, subsequent 11/07/2020    Microscopic colitis     Nephrotic syndrome     Neuroendocrine tumor     of stomach    FABY (obstructive sleep apnea)     not treating    Pedal edema     ressolved    Proteinuria     Renal cell cancer, right     Rib pain on right side     chronic    Rotator cuff tear     bilateral    Stomach cancer 2016    Stroke     Uterine cancer     Vitamin B 12 deficiency     Vitamin D deficiency      Past Surgical History:   Procedure Laterality Date    APPENDECTOMY      ARTERIOVENOUS FISTULA Right     ARTERIOVENOUS FISTULA REPAIR      Clotted off and insert graft    ARTERIOVENOUS FISTULA/SHUNT SURGERY Left 12/30/2020    Procedure: ARTERIOVENOUS FISTULA FORMATION;  Surgeon: Sascha  Jan MOORE MD;  Location: [facility] MAIN OR;  Service: Vascular;  Laterality: Left;    ARTERIOVENOUS FISTULA/SHUNT SURGERY Left 4/23/2024    Procedure: FISTULOGRAM WITH SUBCLAVIAN ARTERY ANGIOPLASTY, fistula banding;  Surgeon: Franki Delgadillo II, MD;  Location: [facility] HYBRID OR;  Service: Vascular;  Laterality: Left;    BREAST BIOPSY      right nipple 1981    COLONOSCOPY N/A 11/24/2020    Procedure: COLONOSCOPY with polypectomy x 7;  Surgeon: Jeffery White MD;  Location: [facility] ENDOSCOPY;  Service: Gastroenterology;  Laterality: N/A;  post op: polyps, diverticulosis, hemorrhoids    COLONOSCOPY N/A 6/1/2023    Procedure: COLONOSCOPY with polypectomy x 3 biopsy x 1;  Surgeon: Jeffery White MD;  Location: [facility] ENDOSCOPY;  Service: Gastroenterology;  Laterality: N/A;  diverticulosis polyps    ENDOSCOPY N/A 03/12/2022    Procedure: ESOPHAGOGASTRODUODENOSCOPY with biopsy x 1 area;  Surgeon: Javan Augustin MD;  Location: [facility] ENDOSCOPY;  Service: Gastroenterology;  Laterality: N/A;  post op: anastamosis    ENDOSCOPY N/A 10/9/2023    Procedure: ESOPHAGOGASTRODUODENOSCOPY with biopsy x3 areas;  Surgeon: Ace Campbell MD;  Location: [facility] ENDOSCOPY;  Service: Gastroenterology;  Laterality: N/A;  duodenal ulcers;irregular z line    GASTRIC RESECTION      cancer    HYSTERECTOMY      LAPAROSCOPIC CHOLECYSTECTOMY      NEPHRECTOMY      right kidney removed     REDUCTION MAMMAPLASTY      TUMOR REMOVAL      boils    VENTRAL/INCISIONAL HERNIA REPAIR Right 08/28/2019    Procedure: VENTRAL/INCISIONAL HERNIA REPAIR;  Surgeon: Nilay Stevens MD;  Location: [facility] MAIN OR;  Service: General    VENTRAL/INCISIONAL HERNIA REPAIR N/A 10/01/2019    Procedure: OPEN VENTRAL/INCISIONAL HERNIA REPAIR;  Surgeon: Nilay Stevens MD;  Location: [facility] MAIN OR;  Service: General    VENTRAL/INCISIONAL HERNIA REPAIR N/A 10/11/2021    Procedure: VENTRAL/INCISIONAL HERNIA REPAIR LAPAROSCOPIC;  Surgeon: Nilay Stevens MD;  Location:   [facility] MAIN OR;  Service: General;  Laterality: N/A;    VENTRAL/INCISIONAL HERNIA REPAIR N/A 01/23/2023    Procedure: VENTRAL/INCISIONAL HERNIA REPAIR with MESH;  Surgeon: Nilay Stevens MD;  Location: [facility] MAIN OR;  Service: General;  Laterality: N/A;     Family History   Problem Relation Age of Onset    Heart disease Father        Social History     Tobacco Use    Smoking status: Former     Current packs/day: 0.00     Average packs/day: 0.3 packs/day for 57.0 years (14.3 ttl pk-yrs)     Types: Cigarettes     Start date: 5/1/1964     Quit date: 5/1/2021     Years since quitting: 3.1     Passive exposure: Past    Smokeless tobacco: Never    Tobacco comments:     quit smoking 2 months ago   Vaping Use    Vaping status: Never Used   Substance Use Topics    Alcohol use: No    Drug use: No     Medications Prior to Admission   Medication Sig Dispense Refill Last Dose    albuterol sulfate  (90 Base) MCG/ACT inhaler Inhale 2 puffs Every 4 (Four) Hours As Needed for Wheezing.       amLODIPine (NORVASC) 10 MG tablet Take 1 tablet by mouth Daily.       benzonatate (TESSALON) 200 MG capsule Take 1 capsule by mouth 3 (Three) Times a Day As Needed for Cough. 90 capsule 0     calcium acetate (PHOS BINDER,) 667 MG capsule capsule Take 1 capsule by mouth 3 (Three) Times a Day.       carvedilol (COREG) 3.125 MG tablet Take 1 tablet by mouth 2 (Two) Times a Day With Meals.       Cholecalciferol 25 MCG (1000 UT) tablet Take 1 tablet by mouth Daily.       colestipol (COLESTID) 1 g tablet Take 1 tablet by mouth 3 times a day.       hyoscyamine (ANASPAZ,LEVSIN) 0.125 MG tablet Take 1 tablet by mouth Every 4 (Four) Hours As Needed.       lidocaine-prilocaine (EMLA) 2.5-2.5 % cream Apply 1 Application topically to the appropriate area as directed Daily As Needed (severe pain, left 5th finger). Apply to left 5th finger 5800 g 4     losartan (COZAAR) 50 MG tablet Take 1 tablet by mouth Daily.       Methoxy PEG-Epoetin Beta (MIRCERA  "IJ) 30 mcg Every 14 (Fourteen) Days. Dialysis       metoprolol tartrate (LOPRESSOR) 25 MG tablet Take 0.5 tablets by mouth 2 (Two) Times a Day.       Multiple Vitamins-Minerals (RENAPLEX-D) tablet Take 1 tablet by mouth Daily.       oxyCODONE (Roxicodone) 5 MG immediate release tablet Take 1 tablet by mouth Every 4 (Four) Hours As Needed for Severe Pain. 10 tablet 0     pantoprazole (PROTONIX) 40 MG EC tablet Take 1 tablet by mouth 2 (Two) Times a Day.       sevelamer (RENVELA) 800 MG tablet Take 3 tablets by mouth 3 (Three) Times a Day With Meals.       simvastatin (ZOCOR) 40 MG tablet Take 1 tablet by mouth Every Night.       sodium bicarbonate 650 MG tablet Take 2 tablets by mouth 3 (Three) Times a Day.       temazepam (RESTORIL) 30 MG capsule Take 1 capsule by mouth Every Night.        Allergies:  Atorvastatin calcium, Gabapentin, and Niacin    ROS unable  Objective     Vital Signs  Temp:  [97.3 °F (36.3 °C)-98.2 °F (36.8 °C)] 98.1 °F (36.7 °C)  Heart Rate:  [] 107  Resp:  [11-34] 30  BP: (145-177)/() 177/95    Flowsheet Rows      Flowsheet Row First Filed Value   Admission Height 152.4 cm (60\") Documented at 06/09/2024 0242   Admission Weight 68.1 kg (150 lb 2.1 oz) Documented at 06/09/2024 0242             I/O this shift:  In: 360 [P.O.:360]  Out: -   No intake/output data recorded.    Intake/Output Summary (Last 24 hours) at 6/9/2024 1614  Last data filed at 6/9/2024 1312  Gross per 24 hour   Intake 360 ml   Output --   Net 360 ml       Physical Exam:  General Appearance: Sickly, acute respiratory distress on airvo,   Skin:  dry  HEENT: pupils round and reactive to light, oral mucosa normal, nonicteric sclera  Neck: supple, no JVD, trachea midline  Lungs: rales bilaterally  Heart: tachycardia  Abdomen: soft, nontender, normoactive bowels  : no palpable bladder,  Extremities:Trace edema, cyanosis or clubbing  Neuro: normal speech and mental status    Results Review:  Results for orders placed or " performed during the hospital encounter of 06/08/24   Comprehensive Metabolic Panel    Specimen: Arm, Right; Blood   Result Value Ref Range    Glucose 137 (H) 65 - 99 mg/dL    BUN 37 (H) 8 - 23 mg/dL    Creatinine 5.77 (H) 0.57 - 1.00 mg/dL    Sodium 142 136 - 145 mmol/L    Potassium 4.2 3.5 - 5.2 mmol/L    Chloride 106 98 - 107 mmol/L    CO2 23.3 22.0 - 29.0 mmol/L    Calcium 8.8 8.6 - 10.5 mg/dL    Total Protein 7.1 6.0 - 8.5 g/dL    Albumin 4.0 3.5 - 5.2 g/dL    ALT (SGPT) 10 1 - 33 U/L    AST (SGOT) 21 1 - 32 U/L    Alkaline Phosphatase 111 39 - 117 U/L    Total Bilirubin 0.3 0.0 - 1.2 mg/dL    Globulin 3.1 gm/dL    A/G Ratio 1.3 g/dL    BUN/Creatinine Ratio 6.4 (L) 7.0 - 25.0    Anion Gap 12.7 5.0 - 15.0 mmol/L    eGFR 7.2 (L) >60.0 mL/min/1.73   BNP    Specimen: Blood   Result Value Ref Range    proBNP 44,204.0 (H) 0.0 - 900.0 pg/mL   CBC Auto Differential    Specimen: Blood   Result Value Ref Range    WBC 10.86 (H) 3.40 - 10.80 10*3/mm3    RBC 3.31 (L) 3.77 - 5.28 10*6/mm3    Hemoglobin 10.3 (L) 12.0 - 15.9 g/dL    Hematocrit 32.5 (L) 34.0 - 46.6 %    MCV 98.2 (H) 79.0 - 97.0 fL    MCH 31.1 26.6 - 33.0 pg    MCHC 31.7 31.5 - 35.7 g/dL    RDW 15.9 (H) 12.3 - 15.4 %    RDW-SD 57.1 (H) 37.0 - 54.0 fl    MPV 10.2 6.0 - 12.0 fL    Platelets 386 140 - 450 10*3/mm3    Neutrophil % 80.6 (H) 42.7 - 76.0 %    Lymphocyte % 8.1 (L) 19.6 - 45.3 %    Monocyte % 7.3 5.0 - 12.0 %    Eosinophil % 3.0 0.3 - 6.2 %    Basophil % 0.5 0.0 - 1.5 %    Immature Grans % 0.5 0.0 - 0.5 %    Neutrophils, Absolute 8.76 (H) 1.70 - 7.00 10*3/mm3    Lymphocytes, Absolute 0.88 0.70 - 3.10 10*3/mm3    Monocytes, Absolute 0.79 0.10 - 0.90 10*3/mm3    Eosinophils, Absolute 0.33 0.00 - 0.40 10*3/mm3    Basophils, Absolute 0.05 0.00 - 0.20 10*3/mm3    Immature Grans, Absolute 0.05 0.00 - 0.05 10*3/mm3    nRBC 0.0 0.0 - 0.2 /100 WBC   Basic Metabolic Panel    Specimen: Arm, Right; Blood   Result Value Ref Range    Glucose 253 (H) 65 - 99 mg/dL     BUN 46 (H) 8 - 23 mg/dL    Creatinine 6.78 (H) 0.57 - 1.00 mg/dL    Sodium 140 136 - 145 mmol/L    Potassium 5.3 (H) 3.5 - 5.2 mmol/L    Chloride 102 98 - 107 mmol/L    CO2 20.3 (L) 22.0 - 29.0 mmol/L    Calcium 9.1 8.6 - 10.5 mg/dL    BUN/Creatinine Ratio 6.8 (L) 7.0 - 25.0    Anion Gap 17.7 (H) 5.0 - 15.0 mmol/L    eGFR 6.0 (L) >60.0 mL/min/1.73   Blood Gas, Arterial -    Specimen: Arterial Blood   Result Value Ref Range    Site Left Radial     Cristóbal's Test Positive     pH, Arterial 7.195 (C) 7.350 - 7.450 pH units    pCO2, Arterial 61.6 (H) 35.0 - 48.0 mm Hg    pO2, Arterial 82.8 (L) 83.0 - 108.0 mm Hg    HCO3, Arterial 23.8 21.0 - 28.0 mmol/L    Base Excess, Arterial -5.2 (L) 0.0 - 3.0 mmol/L    O2 Saturation, Arterial 92.8 (L) 94.0 - 98.0 %    CO2 Content 25.7 22 - 29 mmol/L    Barometric Pressure for Blood Gas      Modality Cannula     Hemodilution No    POC Lactate    Specimen: Blood   Result Value Ref Range    Lactate 1.2 0.3 - 2.0 mmol/L   POC Glucose Once    Specimen: Blood   Result Value Ref Range    Glucose 277 (H) 70 - 105 mg/dL   POC Glucose Once    Specimen: Blood   Result Value Ref Range    Glucose 226 (H) 70 - 105 mg/dL   POC H&H    Specimen: Arterial Blood   Result Value Ref Range    Hemoglobin 12.5 12.0 - 17.0 g/dL    Hematocrit 37 (L) 38 - 51 %   Green Top (Gel)   Result Value Ref Range    Extra Tube Hold for add-ons.    Lavender Top   Result Value Ref Range    Extra Tube hold for add-on    Gold Top - SST   Result Value Ref Range    Extra Tube Hold for add-ons.    Light Blue Top   Result Value Ref Range    Extra Tube Hold for add-ons.      Imaging Results (Last 72 Hours)       Procedure Component Value Units Date/Time    XR Chest 1 View [[id number]] Collected: 06/09/24 1538     Updated: 06/09/24 1541    Narrative:      XR CHEST 1 VW    Date of Exam: 6/9/2024 3:14 PM EDT    Indication: SOA    Comparison: 6/8/2024    Findings:  Cardiomegaly. Persistent right middle lobe consolidation concerning for  pneumonia. Diffuse interstitial thickening consistent with pulmonary edema with small bilateral pleural effusions. Negative for pneumothorax. Osseous structures appear intact.   Vascular stent overlies the right axilla. Dense aortic arch atherosclerotic calcifications.      Impression:      Impression:  1. Persistent right middle lobe pneumonia.  2. Stable cardiomegaly and pulmonary edema with small bilateral pleural effusions.      Electronically Signed: Corey Harris MD    6/9/2024 3:38 PM EDT    Workstation ID: [id number]    XR Chest 1 View [[id number]] Collected: 06/08/24 1527     Updated: 06/08/24 1531    Narrative:      XR CHEST 1 VW    Date of Exam: 6/8/2024 3:15 PM EDT    Indication: Cough history of lung cancer right lung CT scan yesterday not read yet    Comparison: CT chest without contrast [id number]    Findings:  Cardiomegaly with interstitial thickening consistent with pulmonary edema. Consolidation in the right middle lobe consistent with pneumonia. Mild dependent atelectasis at the right lung base. Small bilateral pleural effusions right greater than left. No   pneumothorax. Aortic arch atherosclerosis. Vascular stent overlies the right axillary region.       Impression:      Impression:  1. Right middle lobe pneumonia.  2. Cardiomegaly and pulmonary edema.  3. Small bilateral pleural effusions right greater than left.      Electronically Signed: Corey Harris MD    6/8/2024 3:29 PM EDT    Workstation ID: [id number]              amLODIPine, 10 mg, Oral, Daily  budesonide, 0.5 mg, Nebulization, BID - RT  calcium acetate, 667 mg, Oral, TID  carvedilol, 3.125 mg, Oral, BID With Meals  cefTRIAXone, 1,000 mg, Intravenous, Q24H  enoxaparin, 40 mg, Subcutaneous, Nightly  guaiFENesin, 1,200 mg, Oral, Q12H  insulin lispro, 2-9 Units, Subcutaneous, 4x Daily AC & at Bedtime  ipratropium-albuterol, 3 mL, Nebulization, 4x Daily - RT  losartan, 50 mg, Oral, Daily  methylPREDNISolone sodium succinate, 40 mg, Intravenous,  Q12H  pantoprazole, 40 mg, Oral, BID  sevelamer, 2,400 mg, Oral, TID With Meals  sodium bicarbonate, 1,300 mg, Oral, TID  sodium chloride, 10 mL, Intravenous, Q12H  temazepam, 30 mg, Oral, Nightly           Assessment & Plan       PNA (pneumonia)     ESRD with volume overload  Hypercapneic respiratory failure  Lung cancer  COPD  Plan:  Recommend move patient to ICU.  Hd orders in stat with goal of 3 liter UF.  Pulmonologist on board.    I discussed the patient's findings and my recommendations with patient    Janina Martinez MD  06/09/24  16:14 EDT    Please note that portions of this note were completed with a voice recognition program.

## 2024-06-09 NOTE — CODE DOCUMENTATION
"Rapid response called for shortness of breath and episode of oxygen desaturation. On arrival patient appeared to have labored breathing while sitting at edge of bed. Multiple attempts to lay patient in bed, but patient did not tolerate it. Breathing treatment started, but patient kept yelling that she \"couldn't do it\" while RT was administering treatment. Lung sounds unchanged after breathing treatment. Call placed to Dr. Taylor, who ordered CXR and ABG. ABG results called to Dr. Taylor, who ordered bipap and one time dose of IV lasix, as well as transfer to PCU. Nephrology arrived at bedside, and placed stat HD orders. Prior to RT's arrival with bipap, patient desatted to 74% with maxed airvo and nonrebreather in place. Bipap placed on patient, and O2 improved. After 10 minutes of bipap, patient became extremely restless and kept trying to take off bipap mask. Patient had to be verbally redirected multiple times. Patient was transferred to PCU. HD nurse at bedside. Family updated by primary RN via telephone.  "

## 2024-06-10 ENCOUNTER — APPOINTMENT (OUTPATIENT)
Dept: NEPHROLOGY | Facility: HOSPITAL | Age: 74
DRG: 193 | End: 2024-06-10
Payer: MEDICARE

## 2024-06-10 ENCOUNTER — ANESTHESIA EVENT (OUTPATIENT)
Dept: GASTROENTEROLOGY | Facility: HOSPITAL | Age: 74
End: 2024-06-10
Payer: MEDICARE

## 2024-06-10 LAB
ANION GAP SERPL CALCULATED.3IONS-SCNC: 14.6 MMOL/L (ref 5–15)
BUN SERPL-MCNC: 44 MG/DL (ref 8–23)
BUN/CREAT SERPL: 7.9 (ref 7–25)
CALCIUM SPEC-SCNC: 8.1 MG/DL (ref 8.6–10.5)
CHLORIDE SERPL-SCNC: 104 MMOL/L (ref 98–107)
CO2 SERPL-SCNC: 19.4 MMOL/L (ref 22–29)
CREAT SERPL-MCNC: 5.54 MG/DL (ref 0.57–1)
DEPRECATED RDW RBC AUTO: 62.3 FL (ref 37–54)
EGFRCR SERPLBLD CKD-EPI 2021: 7.6 ML/MIN/1.73
ERYTHROCYTE [DISTWIDTH] IN BLOOD BY AUTOMATED COUNT: 15.7 % (ref 12.3–15.4)
GLUCOSE BLDC GLUCOMTR-MCNC: 115 MG/DL (ref 70–105)
GLUCOSE BLDC GLUCOMTR-MCNC: 126 MG/DL (ref 70–105)
GLUCOSE BLDC GLUCOMTR-MCNC: 144 MG/DL (ref 70–105)
GLUCOSE BLDC GLUCOMTR-MCNC: 182 MG/DL (ref 70–105)
GLUCOSE SERPL-MCNC: 124 MG/DL (ref 65–99)
HBV SURFACE AG SERPL QL IA: NORMAL
HCT VFR BLD AUTO: 32.6 % (ref 34–46.6)
HGB BLD-MCNC: 9.2 G/DL (ref 12–15.9)
MCH RBC QN AUTO: 30.4 PG (ref 26.6–33)
MCHC RBC AUTO-ENTMCNC: 28.2 G/DL (ref 31.5–35.7)
MCV RBC AUTO: 107.6 FL (ref 79–97)
PLATELET # BLD AUTO: 335 10*3/MM3 (ref 140–450)
PMV BLD AUTO: 9.8 FL (ref 6–12)
POTASSIUM SERPL-SCNC: 5.1 MMOL/L (ref 3.5–5.2)
RBC # BLD AUTO: 3.03 10*6/MM3 (ref 3.77–5.28)
SODIUM SERPL-SCNC: 138 MMOL/L (ref 136–145)
WBC NRBC COR # BLD AUTO: 13.31 10*3/MM3 (ref 3.4–10.8)

## 2024-06-10 PROCEDURE — 25010000002 METHYLPREDNISOLONE PER 40 MG: Performed by: FAMILY MEDICINE

## 2024-06-10 PROCEDURE — 25010000002 MORPHINE PER 10 MG: Performed by: INTERNAL MEDICINE

## 2024-06-10 PROCEDURE — 94761 N-INVAS EAR/PLS OXIMETRY MLT: CPT

## 2024-06-10 PROCEDURE — 82948 REAGENT STRIP/BLOOD GLUCOSE: CPT | Performed by: FAMILY MEDICINE

## 2024-06-10 PROCEDURE — 94660 CPAP INITIATION&MGMT: CPT

## 2024-06-10 PROCEDURE — 25010000002 ENOXAPARIN PER 10 MG: Performed by: FAMILY MEDICINE

## 2024-06-10 PROCEDURE — 25010000002 CEFTRIAXONE PER 250 MG: Performed by: NURSE PRACTITIONER

## 2024-06-10 PROCEDURE — 94664 DEMO&/EVAL PT USE INHALER: CPT

## 2024-06-10 PROCEDURE — 94799 UNLISTED PULMONARY SVC/PX: CPT

## 2024-06-10 PROCEDURE — 63710000001 INSULIN LISPRO (HUMAN) PER 5 UNITS: Performed by: FAMILY MEDICINE

## 2024-06-10 PROCEDURE — 85027 COMPLETE CBC AUTOMATED: CPT | Performed by: FAMILY MEDICINE

## 2024-06-10 PROCEDURE — 80048 BASIC METABOLIC PNL TOTAL CA: CPT | Performed by: FAMILY MEDICINE

## 2024-06-10 PROCEDURE — 82948 REAGENT STRIP/BLOOD GLUCOSE: CPT

## 2024-06-10 RX ORDER — LORAZEPAM 0.5 MG/1
0.5 TABLET ORAL EVERY 6 HOURS PRN
Status: DISCONTINUED | OUTPATIENT
Start: 2024-06-10 | End: 2024-06-14 | Stop reason: HOSPADM

## 2024-06-10 RX ORDER — ENOXAPARIN SODIUM 100 MG/ML
30 INJECTION SUBCUTANEOUS NIGHTLY
Status: DISCONTINUED | OUTPATIENT
Start: 2024-06-10 | End: 2024-06-14 | Stop reason: HOSPADM

## 2024-06-10 RX ORDER — MORPHINE SULFATE 2 MG/ML
2 INJECTION, SOLUTION INTRAMUSCULAR; INTRAVENOUS ONCE
Status: COMPLETED | OUTPATIENT
Start: 2024-06-10 | End: 2024-06-10

## 2024-06-10 RX ADMIN — MORPHINE SULFATE 2 MG: 2 INJECTION, SOLUTION INTRAMUSCULAR; INTRAVENOUS at 10:59

## 2024-06-10 RX ADMIN — IPRATROPIUM BROMIDE AND ALBUTEROL SULFATE 3 ML: .5; 3 SOLUTION RESPIRATORY (INHALATION) at 18:30

## 2024-06-10 RX ADMIN — Medication 10 ML: at 08:59

## 2024-06-10 RX ADMIN — GUAIFENESIN 1200 MG: 600 TABLET, EXTENDED RELEASE ORAL at 08:55

## 2024-06-10 RX ADMIN — IPRATROPIUM BROMIDE AND ALBUTEROL SULFATE 3 ML: .5; 3 SOLUTION RESPIRATORY (INHALATION) at 15:56

## 2024-06-10 RX ADMIN — METHYLPREDNISOLONE SODIUM SUCCINATE 40 MG: 40 INJECTION, POWDER, FOR SOLUTION INTRAMUSCULAR; INTRAVENOUS at 23:46

## 2024-06-10 RX ADMIN — INSULIN LISPRO 2 UNITS: 100 INJECTION, SOLUTION INTRAVENOUS; SUBCUTANEOUS at 22:07

## 2024-06-10 RX ADMIN — PANTOPRAZOLE SODIUM 40 MG: 40 TABLET, DELAYED RELEASE ORAL at 08:55

## 2024-06-10 RX ADMIN — SODIUM BICARBONATE 1300 MG: 650 TABLET ORAL at 15:22

## 2024-06-10 RX ADMIN — AMLODIPINE BESYLATE 10 MG: 5 TABLET ORAL at 08:55

## 2024-06-10 RX ADMIN — BUDESONIDE 0.5 MG: 0.5 INHALANT RESPIRATORY (INHALATION) at 18:30

## 2024-06-10 RX ADMIN — SODIUM BICARBONATE 1300 MG: 650 TABLET ORAL at 20:34

## 2024-06-10 RX ADMIN — SEVELAMER CARBONATE 2400 MG: 800 TABLET, FILM COATED ORAL at 17:25

## 2024-06-10 RX ADMIN — GUAIFENESIN 1200 MG: 600 TABLET, EXTENDED RELEASE ORAL at 20:34

## 2024-06-10 RX ADMIN — Medication 10 ML: at 20:35

## 2024-06-10 RX ADMIN — IPRATROPIUM BROMIDE AND ALBUTEROL SULFATE 3 ML: .5; 3 SOLUTION RESPIRATORY (INHALATION) at 06:34

## 2024-06-10 RX ADMIN — CEFTRIAXONE 1000 MG: 1 INJECTION, POWDER, FOR SOLUTION INTRAMUSCULAR; INTRAVENOUS at 17:24

## 2024-06-10 RX ADMIN — ENOXAPARIN SODIUM 30 MG: 100 INJECTION SUBCUTANEOUS at 15:22

## 2024-06-10 RX ADMIN — CALCIUM ACETATE 667 MG: 667 CAPSULE ORAL at 20:34

## 2024-06-10 RX ADMIN — PANTOPRAZOLE SODIUM 40 MG: 40 TABLET, DELAYED RELEASE ORAL at 20:34

## 2024-06-10 RX ADMIN — BUDESONIDE 0.5 MG: 0.5 INHALANT RESPIRATORY (INHALATION) at 06:40

## 2024-06-10 RX ADMIN — LOSARTAN POTASSIUM 50 MG: 50 TABLET, FILM COATED ORAL at 08:55

## 2024-06-10 RX ADMIN — CARVEDILOL 3.12 MG: 3.12 TABLET, FILM COATED ORAL at 08:55

## 2024-06-10 RX ADMIN — CARVEDILOL 3.12 MG: 3.12 TABLET, FILM COATED ORAL at 17:24

## 2024-06-10 RX ADMIN — METHYLPREDNISOLONE SODIUM SUCCINATE 40 MG: 40 INJECTION, POWDER, FOR SOLUTION INTRAMUSCULAR; INTRAVENOUS at 15:24

## 2024-06-10 RX ADMIN — SODIUM BICARBONATE 1300 MG: 650 TABLET ORAL at 08:55

## 2024-06-10 RX ADMIN — SEVELAMER CARBONATE 2400 MG: 800 TABLET, FILM COATED ORAL at 08:55

## 2024-06-10 RX ADMIN — CALCIUM ACETATE 667 MG: 667 CAPSULE ORAL at 15:22

## 2024-06-10 RX ADMIN — CALCIUM ACETATE 667 MG: 667 CAPSULE ORAL at 08:55

## 2024-06-10 RX ADMIN — LORAZEPAM 0.5 MG: 0.5 TABLET ORAL at 10:11

## 2024-06-10 NOTE — PROGRESS NOTES
[facility] Progress Note      Patient Name: Sherry Lobato  : 1950  MRN: [id number]  Primary Care Physician:  Jin Malcolm PA-C  Date of admission: 2024    Subjective     Interval History:     Patient resting comfortably.  She was on BiPAP overnight  Still has dyspnea, cough.  No chest pain, nausea or vomiting    Review of Systems:   As noted above    Objective     Vitals:   Temp:  [97.9 °F (36.6 °C)-98.3 °F (36.8 °C)] 97.9 °F (36.6 °C)  Heart Rate:  [] 102  Resp:  [13-34] 20  BP: (115-177)/() 164/117  Flow (L/min):  [4-8] 4    Intake/Output Summary (Last 24 hours) at 6/10/2024 0829  Last data filed at 2024 1917  Gross per 24 hour   Intake 360 ml   Output 870 ml   Net -510 ml       Physical Exam:    General Appearance: Sickly, NAD  HEENT: oral mucosa normal, nonicteric sclera  Neck: supple, no JVD  Lungs: Bilateral rhonchi and rails  Heart: RRR, normal S1 and S2  Abdomen: soft, nondistended  Extremities: Trace edema   neuro: Awake alert and moving all extremities    Scheduled Meds:     amLODIPine, 10 mg, Oral, Daily  budesonide, 0.5 mg, Nebulization, BID - RT  calcium acetate, 667 mg, Oral, TID  carvedilol, 3.125 mg, Oral, BID With Meals  cefTRIAXone, 1,000 mg, Intravenous, Q24H  enoxaparin, 40 mg, Subcutaneous, Nightly  guaiFENesin, 1,200 mg, Oral, Q12H  insulin lispro, 2-9 Units, Subcutaneous, 4x Daily AC & at Bedtime  ipratropium-albuterol, 3 mL, Nebulization, 4x Daily - RT  losartan, 50 mg, Oral, Daily  methylPREDNISolone sodium succinate, 40 mg, Intravenous, Q12H  pantoprazole, 40 mg, Oral, BID  sevelamer, 2,400 mg, Oral, TID With Meals  sodium bicarbonate, 1,300 mg, Oral, TID  sodium chloride, 10 mL, Intravenous, Q12H  temazepam, 30 mg, Oral, Nightly      IV Meds:        Results Reviewed:   I have personally reviewed the results from the time of this admission to 6/10/2024 08:29 EDT     Results from last 7 days   Lab Units 06/10/24  0113  06/09/24  0231 06/08/24  1613   SODIUM mmol/L 138 140 142   POTASSIUM mmol/L 5.1 5.3* 4.2   CHLORIDE mmol/L 104 102 106   CO2 mmol/L 19.4* 20.3* 23.3   BUN mg/dL 44* 46* 37*   CREATININE mg/dL 5.54* 6.78* 5.77*   CALCIUM mg/dL 8.1* 9.1 8.8   BILIRUBIN mg/dL  --   --  0.3   ALK PHOS U/L  --   --  111   ALT (SGPT) U/L  --   --  10   AST (SGOT) U/L  --   --  21   GLUCOSE mg/dL 124* 253* 137*     Estimated Creatinine Clearance: 7.6 mL/min (A) (by C-G formula based on SCr of 5.54 mg/dL (H)).          Results from last 7 days   Lab Units 06/10/24  0113 06/09/24  1458 06/08/24  1507 06/05/24  0000   WBC 10*3/mm3 13.31*  --  10.86*  --    HEMOGLOBIN g/dL 9.2*  --  10.3* 10.2*  30.6*   HEMOGLOBIN, POC g/dL  --  12.5  --   --    PLATELETS 10*3/mm3 335  --  386  --            Assessment / Plan     ASSESSMENT:    End-stage renal disease.  Patient gets dialysis Monday Wednesday and Friday as outpatient.  Admitted with respiratory failure, fluid overload.  S/p hemodialysis yesterday.  Electrolytes okay.  Volume status improving  Acute respiratory failure.  Due to pneumonia and fluid overload.  Pulmonary following  Hypertension with chronic kidney disease.  Blood pressure high, probably due to steroids  Anemia and chronic kidney disease.  Hemoglobin 9.2 this morning  Lung cancer    PLAN:  Repeat hemodialysis today  Fluid removal with dialysis as tolerated  Monitor blood pressure and titrate antihypertensives as needed      Faustino Buckley MD  06/10/24  08:29 EDT    [facility]  [phone]

## 2024-06-10 NOTE — PROGRESS NOTES
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 post radiation therapy November 2023    ESRD on hemodialysis Monday Wednesday Friday    Chronic anemia due to chronic kidney disease  CAD  HTN  HLD  DM  History of CVA  Former smoker: Quit 2016        Recommendations:    bronchoscopy in a.m.     antibiotics  Oxygen supplement and titration to maintain saturation 90 to 95%  Bronchodilators  Inhaled corticosteroids  IV steroids  Mucinex  Encourage use of incentive spirometry     HD/electrolyte management as per nephrology  BP/glucose control  DVT/GI prophylaxis  Check daily labs and correct electrolytes as needed  I personally reviewed the radiological studies             LOS: 2 days     Subjective         Objective     Vital signs for last 24 hours:  Vitals:    06/10/24 0634 06/10/24 0637 06/10/24 0640 06/10/24 0643   BP:       BP Location:       Patient Position:       Pulse: 100 99 99 102   Resp: 20 20 20 20   Temp:       TempSrc:       SpO2: 97% 99% 100% 100%   Weight:       Height:           Intake/Output last 3 shifts:  I/O last 3 completed shifts:  In: 360 [P.O.:360]  Out: 870   Intake/Output this shift:  No intake/output data recorded.      Radiology  Imaging Results (Last 24 Hours)       Procedure Component Value Units Date/Time    XR Chest 1 View [[id number]] Collected: 06/09/24 1538     Updated: 06/09/24 1541    Narrative:      XR CHEST 1 VW    Date of Exam: 6/9/2024 3:14 PM EDT    Indication: SOA    Comparison: 6/8/2024    Findings:  Cardiomegaly. Persistent right middle lobe consolidation concerning for pneumonia. Diffuse interstitial thickening consistent with pulmonary edema with small bilateral pleural effusions. Negative for pneumothorax. Osseous structures appear intact.   Vascular  stent overlies the right axilla. Dense aortic arch atherosclerotic calcifications.      Impression:      Impression:  1. Persistent right middle lobe pneumonia.  2. Stable cardiomegaly and pulmonary edema with small bilateral pleural effusions.      Electronically Signed: Corey Harris MD    6/9/2024 3:38 PM EDT    Workstation ID: [id number]            Labs:  Results from last 7 days   Lab Units 06/10/24  0113   WBC 10*3/mm3 13.31*   HEMOGLOBIN g/dL 9.2*   HEMATOCRIT % 32.6*   PLATELETS 10*3/mm3 335     Results from last 7 days   Lab Units 06/10/24  0113 06/09/24  0231 06/08/24  1613   SODIUM mmol/L 138   < > 142   POTASSIUM mmol/L 5.1   < > 4.2   CHLORIDE mmol/L 104   < > 106   CO2 mmol/L 19.4*   < > 23.3   BUN mg/dL 44*   < > 37*   CREATININE mg/dL 5.54*   < > 5.77*   CALCIUM mg/dL 8.1*   < > 8.8   BILIRUBIN mg/dL  --   --  0.3   ALK PHOS U/L  --   --  111   ALT (SGPT) U/L  --   --  10   AST (SGOT) U/L  --   --  21   GLUCOSE mg/dL 124*   < > 137*    < > = values in this interval not displayed.     Results from last 7 days   Lab Units 06/09/24  1458   PH, ARTERIAL pH units 7.195*   PO2 ART mm Hg 82.8*   PCO2, ARTERIAL mm Hg 61.6*   HCO3 ART mmol/L 23.8     Results from last 7 days   Lab Units 06/08/24  1613   ALBUMIN g/dL 4.0                               Meds:   SCHEDULE  amLODIPine, 10 mg, Oral, Daily  budesonide, 0.5 mg, Nebulization, BID - RT  calcium acetate, 667 mg, Oral, TID  carvedilol, 3.125 mg, Oral, BID With Meals  cefTRIAXone, 1,000 mg, Intravenous, Q24H  enoxaparin, 40 mg, Subcutaneous, Nightly  guaiFENesin, 1,200 mg, Oral, Q12H  insulin lispro, 2-9 Units, Subcutaneous, 4x Daily AC & at Bedtime  ipratropium-albuterol, 3 mL, Nebulization, 4x Daily - RT  losartan, 50 mg, Oral, Daily  methylPREDNISolone sodium succinate, 40 mg, Intravenous, Q12H  pantoprazole, 40 mg, Oral, BID  sevelamer, 2,400 mg, Oral, TID With Meals  sodium bicarbonate, 1,300 mg, Oral, TID  sodium chloride, 10 mL, Intravenous,  Q12H  temazepam, 30 mg, Oral, Nightly      Infusions     PRNs    acetaminophen    benzonatate    senna-docusate sodium **AND** polyethylene glycol **AND** bisacodyl **AND** bisacodyl    dextrose    dextrose    glucagon (human recombinant)    nitroglycerin    ondansetron ODT **OR** ondansetron    oxyCODONE    sodium chloride    [COMPLETED] Insert Peripheral IV **AND** sodium chloride    sodium chloride    sodium chloride    Physical Exam:  Physical Exam  Cardiovascular:      Heart sounds: Murmur heard.      No gallop.   Pulmonary:      Effort: No respiratory distress.      Breath sounds: No stridor. Rhonchi and rales present. No wheezing.   Chest:      Chest wall: No tenderness.         ROS  Review of Systems   Respiratory:  Positive for cough and shortness of breath. Negative for wheezing and stridor.    Cardiovascular:  Negative for chest pain, palpitations and leg swelling.             Total time spent with patient greater than: 45 Minutes

## 2024-06-10 NOTE — PLAN OF CARE
Goal Outcome Evaluation:  Plan of Care Reviewed With: patient        Progress: improving  Outcome Evaluation: Pt tolerated Bipap for one hour this morning. Pt was very axoius and complaining of SOA  before and during dialysis. Orders were obtained for a one time dose of morphine to help with SOA and PRN 0.5 ativan was added to help with anxiety. Pt finished dialysis and they were able to drain 3.9 L. Pt is on schedule for tomorrow morning to go down for a bronchoscopy. Pt resting in chair at this time.

## 2024-06-10 NOTE — CASE MANAGEMENT/SOCIAL WORK
Discharge Planning Assessment   Derick     Patient Name: Sherry Lobato  MRN: [id number]  Today's Date: 6/10/2024    Admit Date: 6/8/2024    Plan: Anticipate routine home with friend. Current OP HD MWF [address] [facility] O2 needs.   Discharge Needs Assessment       Row Name 06/10/24 1223       Living Environment    People in Home significant other    Current Living Arrangements home    Potentially Unsafe Housing Conditions none    In the past 12 months has the electric, gas, oil, or water company threatened to shut off services in your home? No    Primary Care Provided by self    Provides Primary Care For no one    Family Caregiver if Needed significant other    Quality of Family Relationships helpful;involved;supportive    Able to Return to Prior Arrangements yes       Resource/Environmental Concerns    Resource/Environmental Concerns none    Transportation Concerns none       Transportation Needs    In the past 12 months, has lack of transportation kept you from medical appointments or from getting medications? no    In the past 12 months, has lack of transportation kept you from meetings, work, or from getting things needed for daily living? No       Food Insecurity    Within the past 12 months, you worried that your food would run out before you got the money to buy more. Never true    Within the past 12 months, the food you bought just didn't last and you didn't have money to get more. Never true       Transition Planning    Patient/Family Anticipates Transition to home with family    Patient/Family Anticipated Services at Transition none    Transportation Anticipated car, drives self;family or friend will provide       Discharge Needs Assessment    Readmission Within the Last 30 Days no previous admission in last 30 days    Current Outpatient/Agency/Support Group outpatient hemodialysis    Equipment Currently Used at Home walker, rolling;commode    Concerns to be Addressed denies needs/concerns at  this time    Anticipated Changes Related to Illness none    Equipment Needed After Discharge none    Provided Post Acute Provider List? N/A              Discharge Plan       Row Name 06/10/24 1226       Plan    Plan Anticipate routine home with friend. Current OP HD MWF [address] [facility] O2 needs.    Patient/Family in Agreement with Plan yes    Plan Comments CM met with patient at bedside to discuss dc planning. PCP and pharmacy confirmed, reported no trouble affording medications, and declined needs at this time for any DME/HH/PT services. Patient reported that she lives at home with a friend named Derek. Reported she has all the DME she needed but does not have O2. Reported that she has a girl coming one day per week to help cook/clean but reported she is independent driving. Reported that she drives herself to and from her OP HD on Mondays, Wednesdays, and Fridays at [place]. DC Barriers: Scheduled to have bronchoscopy performed tomorrow, 6/11, pulmonology following, currently on 4L O2 with no home O2. May require walking oximetry to be performed 24-48 hrs prior to d/c.              Demographic Summary       Row Name 06/10/24 1223       General Information    Admission Type inpatient    Arrived From emergency department    Required Notices Provided Important Message from Medicare    Referral Source admission list    Reason for Consult discharge planning    Preferred Language English       Contact Information    Permission Granted to Share Info With                    Functional Status       Row Name 06/10/24 1223       Functional Status    Usual Activity Tolerance moderate    Current Activity Tolerance moderate       Functional Status, IADL    Medications independent    Meal Preparation independent    Housekeeping independent    Laundry independent    Shopping independent                Snow Hurtado RN     Office Phone: [phone]  Office Cell:  [phone]

## 2024-06-10 NOTE — PLAN OF CARE
Goal Outcome Evaluation:                   No pain reported, SOB reported, refusing bipap after 1hr, 0.25ativan given for comfort, stopped dialysis early, refused venipuncture, pulled labs from IV.    Call light in reach,

## 2024-06-11 ENCOUNTER — ANESTHESIA (OUTPATIENT)
Dept: GASTROENTEROLOGY | Facility: HOSPITAL | Age: 74
End: 2024-06-11
Payer: MEDICARE

## 2024-06-11 ENCOUNTER — HOSPITAL ENCOUNTER (OUTPATIENT)
Dept: PET IMAGING | Facility: HOSPITAL | Age: 74
Discharge: HOME | End: 2024-06-11
Payer: MEDICARE

## 2024-06-11 LAB
ALBUMIN SERPL-MCNC: 3.5 G/DL (ref 3.5–5.2)
ALBUMIN SERPL-MCNC: 4 G/DL (ref 3.5–5.2)
ALBUMIN/GLOB SERPL: 1.3 G/DL
ALP SERPL-CCNC: 100 U/L (ref 39–117)
ALT SERPL W P-5'-P-CCNC: 20 U/L (ref 1–33)
ANION GAP SERPL CALCULATED.3IONS-SCNC: 14.3 MMOL/L (ref 5–15)
ANION GAP SERPL CALCULATED.3IONS-SCNC: 16.1 MMOL/L (ref 5–15)
AST SERPL-CCNC: 29 U/L (ref 1–32)
B PARAPERT DNA SPEC QL NAA+PROBE: NOT DETECTED
B PERT DNA SPEC QL NAA+PROBE: NOT DETECTED
BASOPHILS # BLD AUTO: 0.01 10*3/MM3 (ref 0–0.2)
BASOPHILS NFR BLD AUTO: 0.1 % (ref 0–1.5)
BILIRUB SERPL-MCNC: 0.2 MG/DL (ref 0–1.2)
BUN SERPL-MCNC: 35 MG/DL (ref 8–23)
BUN SERPL-MCNC: 62 MG/DL (ref 8–23)
BUN/CREAT SERPL: 10.4 (ref 7–25)
BUN/CREAT SERPL: 8.5 (ref 7–25)
C PNEUM DNA NPH QL NAA+NON-PROBE: NOT DETECTED
CALCIUM SPEC-SCNC: 8 MG/DL (ref 8.6–10.5)
CALCIUM SPEC-SCNC: 8.5 MG/DL (ref 8.6–10.5)
CHLORIDE SERPL-SCNC: 100 MMOL/L (ref 98–107)
CHLORIDE SERPL-SCNC: 105 MMOL/L (ref 98–107)
CO2 SERPL-SCNC: 19.7 MMOL/L (ref 22–29)
CO2 SERPL-SCNC: 22.9 MMOL/L (ref 22–29)
CREAT SERPL-MCNC: 4.1 MG/DL (ref 0.57–1)
CREAT SERPL-MCNC: 5.98 MG/DL (ref 0.57–1)
DEPRECATED RDW RBC AUTO: 55.8 FL (ref 37–54)
DEPRECATED RDW RBC AUTO: 59.5 FL (ref 37–54)
EGFRCR SERPLBLD CKD-EPI 2021: 10.9 ML/MIN/1.73
EGFRCR SERPLBLD CKD-EPI 2021: 6.9 ML/MIN/1.73
EOSINOPHIL # BLD AUTO: 0 10*3/MM3 (ref 0–0.4)
EOSINOPHIL NFR BLD AUTO: 0 % (ref 0.3–6.2)
ERYTHROCYTE [DISTWIDTH] IN BLOOD BY AUTOMATED COUNT: 15.5 % (ref 12.3–15.4)
ERYTHROCYTE [DISTWIDTH] IN BLOOD BY AUTOMATED COUNT: 15.5 % (ref 12.3–15.4)
FLUAV SUBTYP SPEC NAA+PROBE: NOT DETECTED
FLUBV RNA ISLT QL NAA+PROBE: NOT DETECTED
GLOBULIN UR ELPH-MCNC: 3 GM/DL
GLUCOSE BLDC GLUCOMTR-MCNC: 119 MG/DL (ref 70–105)
GLUCOSE BLDC GLUCOMTR-MCNC: 127 MG/DL (ref 70–105)
GLUCOSE BLDC GLUCOMTR-MCNC: 148 MG/DL (ref 70–105)
GLUCOSE BLDC GLUCOMTR-MCNC: 191 MG/DL (ref 70–105)
GLUCOSE BLDC GLUCOMTR-MCNC: 415 MG/DL (ref 70–105)
GLUCOSE BLDC GLUCOMTR-MCNC: 415 MG/DL (ref 70–105)
GLUCOSE BLDC GLUCOMTR-MCNC: 74 MG/DL (ref 70–105)
GLUCOSE SERPL-MCNC: 128 MG/DL (ref 65–99)
GLUCOSE SERPL-MCNC: 58 MG/DL (ref 65–99)
HADV DNA SPEC NAA+PROBE: NOT DETECTED
HCOV 229E RNA SPEC QL NAA+PROBE: NOT DETECTED
HCOV HKU1 RNA SPEC QL NAA+PROBE: NOT DETECTED
HCOV NL63 RNA SPEC QL NAA+PROBE: NOT DETECTED
HCOV OC43 RNA SPEC QL NAA+PROBE: NOT DETECTED
HCT VFR BLD AUTO: 24 % (ref 34–46.6)
HCT VFR BLD AUTO: 31.3 % (ref 34–46.6)
HGB BLD-MCNC: 7 G/DL (ref 12–15.9)
HGB BLD-MCNC: 9.6 G/DL (ref 12–15.9)
HMPV RNA NPH QL NAA+NON-PROBE: NOT DETECTED
HPIV1 RNA ISLT QL NAA+PROBE: NOT DETECTED
HPIV2 RNA SPEC QL NAA+PROBE: NOT DETECTED
HPIV3 RNA NPH QL NAA+PROBE: NOT DETECTED
HPIV4 P GENE NPH QL NAA+PROBE: NOT DETECTED
IMM GRANULOCYTES # BLD AUTO: 0.09 10*3/MM3 (ref 0–0.05)
IMM GRANULOCYTES NFR BLD AUTO: 0.9 % (ref 0–0.5)
IRON 24H UR-MRATE: 115 MCG/DL (ref 37–145)
IRON SATN MFR SERPL: 67 % (ref 20–50)
LYMPHOCYTES # BLD AUTO: 0.38 10*3/MM3 (ref 0.7–3.1)
LYMPHOCYTES NFR BLD AUTO: 3.7 % (ref 19.6–45.3)
M PNEUMO IGG SER IA-ACNC: NOT DETECTED
MCH RBC QN AUTO: 30.5 PG (ref 26.6–33)
MCH RBC QN AUTO: 30.8 PG (ref 26.6–33)
MCHC RBC AUTO-ENTMCNC: 29.2 G/DL (ref 31.5–35.7)
MCHC RBC AUTO-ENTMCNC: 30.7 G/DL (ref 31.5–35.7)
MCV RBC AUTO: 105.7 FL (ref 79–97)
MCV RBC AUTO: 99.4 FL (ref 79–97)
MONOCYTES # BLD AUTO: 0.71 10*3/MM3 (ref 0.1–0.9)
MONOCYTES NFR BLD AUTO: 6.9 % (ref 5–12)
NEUTROPHILS NFR BLD AUTO: 88.4 % (ref 42.7–76)
NEUTROPHILS NFR BLD AUTO: 9.05 10*3/MM3 (ref 1.7–7)
NRBC BLD AUTO-RTO: 0 /100 WBC (ref 0–0.2)
PHOSPHATE SERPL-MCNC: 4.4 MG/DL (ref 2.5–4.5)
PLATELET # BLD AUTO: 237 10*3/MM3 (ref 140–450)
PLATELET # BLD AUTO: 358 10*3/MM3 (ref 140–450)
PMV BLD AUTO: 10 FL (ref 6–12)
PMV BLD AUTO: 9.9 FL (ref 6–12)
POTASSIUM SERPL-SCNC: 4.5 MMOL/L (ref 3.5–5.2)
POTASSIUM SERPL-SCNC: 5.5 MMOL/L (ref 3.5–5.2)
PROT SERPL-MCNC: 7 G/DL (ref 6–8.5)
RBC # BLD AUTO: 2.27 10*6/MM3 (ref 3.77–5.28)
RBC # BLD AUTO: 3.15 10*6/MM3 (ref 3.77–5.28)
RHINOVIRUS RNA SPEC NAA+PROBE: NOT DETECTED
RSV RNA NPH QL NAA+NON-PROBE: NOT DETECTED
SARS-COV-2 RNA NPH QL NAA+NON-PROBE: NOT DETECTED
SODIUM SERPL-SCNC: 139 MMOL/L (ref 136–145)
SODIUM SERPL-SCNC: 139 MMOL/L (ref 136–145)
TIBC SERPL-MCNC: 173 MCG/DL (ref 298–536)
TRANSFERRIN SERPL-MCNC: 116 MG/DL (ref 200–360)
WBC NRBC COR # BLD AUTO: 10.24 10*3/MM3 (ref 3.4–10.8)
WBC NRBC COR # BLD AUTO: 8.86 10*3/MM3 (ref 3.4–10.8)

## 2024-06-11 PROCEDURE — 25010000002 METHYLPREDNISOLONE PER 40 MG: Performed by: FAMILY MEDICINE

## 2024-06-11 PROCEDURE — 94664 DEMO&/EVAL PT USE INHALER: CPT

## 2024-06-11 PROCEDURE — 87071 CULTURE AEROBIC QUANT OTHER: CPT | Performed by: INTERNAL MEDICINE

## 2024-06-11 PROCEDURE — 25010000002 ENOXAPARIN PER 10 MG: Performed by: FAMILY MEDICINE

## 2024-06-11 PROCEDURE — 82948 REAGENT STRIP/BLOOD GLUCOSE: CPT | Performed by: FAMILY MEDICINE

## 2024-06-11 PROCEDURE — 88108 CYTOPATH CONCENTRATE TECH: CPT | Performed by: INTERNAL MEDICINE

## 2024-06-11 PROCEDURE — 87205 SMEAR GRAM STAIN: CPT | Performed by: INTERNAL MEDICINE

## 2024-06-11 PROCEDURE — 87206 SMEAR FLUORESCENT/ACID STAI: CPT | Performed by: INTERNAL MEDICINE

## 2024-06-11 PROCEDURE — 83540 ASSAY OF IRON: CPT | Performed by: INTERNAL MEDICINE

## 2024-06-11 PROCEDURE — 25010000002 PROPOFOL 200 MG/20ML EMULSION: Performed by: NURSE ANESTHETIST, CERTIFIED REGISTERED

## 2024-06-11 PROCEDURE — 80053 COMPREHEN METABOLIC PANEL: CPT | Performed by: INTERNAL MEDICINE

## 2024-06-11 PROCEDURE — 85027 COMPLETE CBC AUTOMATED: CPT | Performed by: INTERNAL MEDICINE

## 2024-06-11 PROCEDURE — 25810000003 SODIUM CHLORIDE 0.9 % SOLUTION: Performed by: NURSE ANESTHETIST, CERTIFIED REGISTERED

## 2024-06-11 PROCEDURE — 87252 VIRUS INOCULATION TISSUE: CPT | Performed by: INTERNAL MEDICINE

## 2024-06-11 PROCEDURE — 94660 CPAP INITIATION&MGMT: CPT

## 2024-06-11 PROCEDURE — 84466 ASSAY OF TRANSFERRIN: CPT | Performed by: INTERNAL MEDICINE

## 2024-06-11 PROCEDURE — 84100 ASSAY OF PHOSPHORUS: CPT | Performed by: INTERNAL MEDICINE

## 2024-06-11 PROCEDURE — 94761 N-INVAS EAR/PLS OXIMETRY MLT: CPT

## 2024-06-11 PROCEDURE — 94799 UNLISTED PULMONARY SVC/PX: CPT

## 2024-06-11 PROCEDURE — 87798 DETECT AGENT NOS DNA AMP: CPT | Performed by: INTERNAL MEDICINE

## 2024-06-11 PROCEDURE — 0202U NFCT DS 22 TRGT SARS-COV-2: CPT | Performed by: INTERNAL MEDICINE

## 2024-06-11 PROCEDURE — 82948 REAGENT STRIP/BLOOD GLUCOSE: CPT

## 2024-06-11 PROCEDURE — 63710000001 INSULIN LISPRO (HUMAN) PER 5 UNITS: Performed by: FAMILY MEDICINE

## 2024-06-11 PROCEDURE — 25010000002 CEFTRIAXONE PER 250 MG: Performed by: NURSE PRACTITIONER

## 2024-06-11 PROCEDURE — 87102 FUNGUS ISOLATION CULTURE: CPT | Performed by: INTERNAL MEDICINE

## 2024-06-11 PROCEDURE — 63710000001 INSULIN LISPRO (HUMAN) PER 5 UNITS: Performed by: INTERNAL MEDICINE

## 2024-06-11 PROCEDURE — 85025 COMPLETE CBC W/AUTO DIFF WBC: CPT | Performed by: NURSE PRACTITIONER

## 2024-06-11 PROCEDURE — 0B9K8ZX DRAINAGE OF RIGHT LUNG, VIA NATURAL OR ARTIFICIAL OPENING ENDOSCOPIC, DIAGNOSTIC: ICD-10-PCS | Performed by: INTERNAL MEDICINE

## 2024-06-11 PROCEDURE — 87116 MYCOBACTERIA CULTURE: CPT | Performed by: INTERNAL MEDICINE

## 2024-06-11 RX ORDER — LIDOCAINE HYDROCHLORIDE 20 MG/ML
INJECTION, SOLUTION INFILTRATION; PERINEURAL AS NEEDED
Status: DISCONTINUED | OUTPATIENT
Start: 2024-06-11 | End: 2024-06-11 | Stop reason: HOSPADM

## 2024-06-11 RX ORDER — LIDOCAINE HYDROCHLORIDE 20 MG/ML
JELLY TOPICAL AS NEEDED
Status: DISCONTINUED | OUTPATIENT
Start: 2024-06-11 | End: 2024-06-11 | Stop reason: HOSPADM

## 2024-06-11 RX ORDER — SODIUM CHLORIDE 9 MG/ML
INJECTION, SOLUTION INTRAVENOUS CONTINUOUS PRN
Status: DISCONTINUED | OUTPATIENT
Start: 2024-06-11 | End: 2024-06-11 | Stop reason: SURG

## 2024-06-11 RX ORDER — ONDANSETRON 2 MG/ML
4 INJECTION INTRAMUSCULAR; INTRAVENOUS ONCE AS NEEDED
Status: DISCONTINUED | OUTPATIENT
Start: 2024-06-11 | End: 2024-06-11 | Stop reason: HOSPADM

## 2024-06-11 RX ORDER — PROPOFOL 10 MG/ML
INJECTION, EMULSION INTRAVENOUS AS NEEDED
Status: DISCONTINUED | OUTPATIENT
Start: 2024-06-11 | End: 2024-06-11 | Stop reason: SURG

## 2024-06-11 RX ORDER — METHYLPREDNISOLONE SODIUM SUCCINATE 40 MG/ML
40 INJECTION, POWDER, LYOPHILIZED, FOR SOLUTION INTRAMUSCULAR; INTRAVENOUS EVERY 24 HOURS
Status: DISCONTINUED | OUTPATIENT
Start: 2024-06-12 | End: 2024-06-13

## 2024-06-11 RX ORDER — INSULIN LISPRO 100 [IU]/ML
10 INJECTION, SOLUTION INTRAVENOUS; SUBCUTANEOUS
Status: DISCONTINUED | OUTPATIENT
Start: 2024-06-11 | End: 2024-06-14 | Stop reason: HOSPADM

## 2024-06-11 RX ORDER — LIDOCAINE HYDROCHLORIDE 20 MG/ML
INJECTION, SOLUTION INFILTRATION; PERINEURAL AS NEEDED
Status: DISCONTINUED | OUTPATIENT
Start: 2024-06-11 | End: 2024-06-11 | Stop reason: SURG

## 2024-06-11 RX ADMIN — Medication 10 ML: at 10:09

## 2024-06-11 RX ADMIN — IPRATROPIUM BROMIDE AND ALBUTEROL SULFATE 3 ML: .5; 3 SOLUTION RESPIRATORY (INHALATION) at 08:07

## 2024-06-11 RX ADMIN — SODIUM BICARBONATE 1300 MG: 650 TABLET ORAL at 15:38

## 2024-06-11 RX ADMIN — GUAIFENESIN 1200 MG: 600 TABLET, EXTENDED RELEASE ORAL at 10:08

## 2024-06-11 RX ADMIN — CALCIUM ACETATE 667 MG: 667 CAPSULE ORAL at 10:08

## 2024-06-11 RX ADMIN — SODIUM BICARBONATE 1300 MG: 650 TABLET ORAL at 22:09

## 2024-06-11 RX ADMIN — CALCIUM ACETATE 667 MG: 667 CAPSULE ORAL at 15:38

## 2024-06-11 RX ADMIN — GUAIFENESIN 1200 MG: 600 TABLET, EXTENDED RELEASE ORAL at 22:09

## 2024-06-11 RX ADMIN — CARVEDILOL 3.12 MG: 3.12 TABLET, FILM COATED ORAL at 17:48

## 2024-06-11 RX ADMIN — SEVELAMER CARBONATE 2400 MG: 800 TABLET, FILM COATED ORAL at 11:35

## 2024-06-11 RX ADMIN — INSULIN LISPRO 9 UNITS: 100 INJECTION, SOLUTION INTRAVENOUS; SUBCUTANEOUS at 16:58

## 2024-06-11 RX ADMIN — METHYLPREDNISOLONE SODIUM SUCCINATE 40 MG: 40 INJECTION, POWDER, FOR SOLUTION INTRAMUSCULAR; INTRAVENOUS at 11:34

## 2024-06-11 RX ADMIN — INSULIN LISPRO 10 UNITS: 100 INJECTION, SOLUTION INTRAVENOUS; SUBCUTANEOUS at 19:06

## 2024-06-11 RX ADMIN — SEVELAMER CARBONATE 2400 MG: 800 TABLET, FILM COATED ORAL at 17:48

## 2024-06-11 RX ADMIN — SODIUM BICARBONATE 1300 MG: 650 TABLET ORAL at 10:08

## 2024-06-11 RX ADMIN — BUDESONIDE 0.5 MG: 0.5 INHALANT RESPIRATORY (INHALATION) at 11:15

## 2024-06-11 RX ADMIN — CEFTRIAXONE 1000 MG: 1 INJECTION, POWDER, FOR SOLUTION INTRAMUSCULAR; INTRAVENOUS at 22:10

## 2024-06-11 RX ADMIN — LIDOCAINE HYDROCHLORIDE 100 MG: 20 INJECTION, SOLUTION INFILTRATION; PERINEURAL at 07:47

## 2024-06-11 RX ADMIN — TEMAZEPAM 30 MG: 15 CAPSULE ORAL at 22:10

## 2024-06-11 RX ADMIN — SODIUM CHLORIDE: 9 INJECTION, SOLUTION INTRAVENOUS at 07:44

## 2024-06-11 RX ADMIN — PANTOPRAZOLE SODIUM 40 MG: 40 TABLET, DELAYED RELEASE ORAL at 22:10

## 2024-06-11 RX ADMIN — AMLODIPINE BESYLATE 10 MG: 5 TABLET ORAL at 10:08

## 2024-06-11 RX ADMIN — CALCIUM ACETATE 667 MG: 667 CAPSULE ORAL at 22:09

## 2024-06-11 RX ADMIN — PANTOPRAZOLE SODIUM 40 MG: 40 TABLET, DELAYED RELEASE ORAL at 10:07

## 2024-06-11 RX ADMIN — CARVEDILOL 3.12 MG: 3.12 TABLET, FILM COATED ORAL at 10:07

## 2024-06-11 RX ADMIN — ENOXAPARIN SODIUM 30 MG: 100 INJECTION SUBCUTANEOUS at 15:38

## 2024-06-11 RX ADMIN — Medication 10 ML: at 22:11

## 2024-06-11 RX ADMIN — PROPOFOL 80 MG: 10 INJECTION, EMULSION INTRAVENOUS at 07:47

## 2024-06-11 NOTE — ANESTHESIA PREPROCEDURE EVALUATION
Anesthesia Evaluation     Patient summary reviewed and Nursing notes reviewed   NPO Solid Status: > 8 hours  NPO Liquid Status: > 8 hours           Airway   Mallampati: II  TM distance: >3 FB  Neck ROM: full  No difficulty expected  Dental - normal exam     Pulmonary - normal exam    breath sounds clear to auscultation  (+) pneumonia worsening , lung cancer, COPD,shortness of breath, sleep apnea  Cardiovascular - normal exam  Exercise tolerance: unable to assess    ECG reviewed  Rhythm: regular  Rate: normal    (+) hypertension, CHF , hyperlipidemia,  carotid artery disease    ROS comment: ECHO Normal    Neuro/Psych  (+) CVA, numbness  GI/Hepatic/Renal/Endo    (+) obesity, GERD, PUD, renal disease- CRI and ESRD, diabetes mellitus    Musculoskeletal     Abdominal  - normal exam   Substance History - negative use     OB/GYN negative ob/gyn ROS         Other   arthritis,   history of cancer (Lung/Renal Cell)    ROS/Med Hx Other:   PNA (pneumonia)     Assessment:     Right middle lobe pneumonia due to unspecified pathogen     COPD acute exacerbation  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      antibiotics  Oxygen supplement and titration to maintain saturation 90 to 95%  Bronchodilators  Inhaled corticosteroids  IV steroids  Mucinex  Encourage use of incentive spirometry     HD/electrolyte management as per nephrology  BP/glucose control  DVT/GI prophylaxis  Check daily labs and correct electrolytes as needed  I personally reviewed the radiological studies                  Anesthesia Plan    ASA 4     general     (Hgb 7.0)  intravenous induction     Anesthetic plan, risks, benefits, and  alternatives have been provided, discussed and informed consent has been obtained with: patient.    CODE STATUS:    Level Of Support Discussed With: Patient  Code Status (Patient has no pulse and is not breathing): CPR (Attempt to Resuscitate)  Medical Interventions (Patient has pulse or is breathing): Full Support

## 2024-06-11 NOTE — ANESTHESIA POSTPROCEDURE EVALUATION
Patient: Sherry Lobato    Procedure Summary       Date: 06/11/24 [facility]    Anesthesia Start: 0743 Anesthesia Stop: 0755    Procedure: BRONCHOSCOPY WITH RIGHT LUNG WASHING (Bronchus) Diagnosis:       Pneumonia due to infectious organism, unspecified laterality, unspecified part of lung      (Pneumonia due to infectious organism, unspecified laterality, unspecified part of lung [J18.9])    Surgeons: Aramis Barragan MD Provider: Carlos Ham MD    Anesthesia Type: general ASA Status: 4            Anesthesia Type: general    Vitals  Vitals Value Taken Time   /58 06/11/24 0820   Temp     Pulse 84 06/11/24 0822   Resp 21 06/11/24 0818   SpO2 98 % 06/11/24 0822   Vitals shown include unfiled device data.        Post Anesthesia Care and Evaluation    Patient location during evaluation: PHASE II  Patient participation: complete - patient participated  Level of consciousness: awake  Pain scale: See nurse's notes for pain score.  Pain management: adequate    Airway patency: patent  Anesthetic complications: No anesthetic complications  PONV Status: none  Cardiovascular status: acceptable  Respiratory status: acceptable and spontaneous ventilation  Hydration status: acceptable    Comments: Patient seen and examined postoperatively; vital signs stable; SpO2 greater than or equal to 90%; cardiopulmonary status stable; nausea/vomiting adequately controlled; pain adequately controlled; no apparent anesthesia complications; patient discharged from anesthesia care when discharge criteria were met

## 2024-06-11 NOTE — PLAN OF CARE
Goal Outcome Evaluation: Pt pleasant throughout shift. Pt went for a bronch this morning, stated she feels like she can breathe better after. Pt on 1L NC at 94%. No complaints on shift. Pt scheduled for dialysis tomorrow. Pt refused chair alarm and stated she would use her call light before getting up. Pt able to make needs known, call light in reach. Care ongoing.

## 2024-06-11 NOTE — CASE MANAGEMENT/SOCIAL WORK
Continued Stay Note  LANI Sepulveda     Patient Name: Sherry Lobato  MRN: [id number]  Today's Date: 6/11/2024    Admit Date: 6/8/2024    Plan: Anticipate routine home with friend. Current OP HD MWF [address] [facility] O2 needs.   Discharge Plan       Row Name 06/11/24 0921       Plan    Plan Comments DC Barriers: Bronchoscopy performed today, 6/11, continues on 3L O2 with no home O2. May require walking oximetry to be performed 24-48 hrs prior to d/c.           Snow Hurtado RN     Office Phone: [phone]  Office Cell: [phone]

## 2024-06-11 NOTE — PROGRESS NOTES
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 post radiation therapy November 2023    ESRD on hemodialysis Monday Wednesday Friday    Chronic anemia due to chronic kidney disease  CAD  HTN  HLD  DM  History of CVA  Former smoker: Quit 2016        Recommendations:    bronchoscopy i completed 6/11/2024  Very thick mucoid secretions suctioned therapeutically  Severe reactive airway disease with bronchospasm  Moderate inflammatory airway disease    antibiotics  Oxygen supplement and titration to maintain saturation 90 to 95%  Bronchodilators  Inhaled corticosteroids  IV steroids  Mucinex  Encourage use of incentive spirometry     HD/electrolyte management as per nephrology  BP/glucose control  DVT/GI prophylaxis  Check daily labs and correct electrolytes as needed  I personally reviewed the radiological studies             LOS: 3 days     Subjective         Objective     Vital signs for last 24 hours:  Vitals:    06/10/24 2123 06/11/24 0122 06/11/24 0530 06/11/24 0730   BP: 99/60 120/74 117/63 123/71   BP Location: Left leg Right leg Right leg Right leg   Patient Position: Lying Lying Lying Sitting   Pulse: 83 85 78 91   Resp: 14 14 18 17   Temp: 98.7 °F (37.1 °C) 98.4 °F (36.9 °C) 98.1 °F (36.7 °C)    TempSrc: Oral Oral Oral    SpO2: 99% 96% 98% 94%   Weight:   61.9 kg (136 lb 7.4 oz)    Height:           Intake/Output last 3 shifts:  I/O last 3 completed shifts:  In: 240 [P.O.:240]  Out: 4770   Intake/Output this shift:  No intake/output data recorded.      Radiology  Imaging Results (Last 24 Hours)       ** No results found for the last 24 hours. **            Labs:  Results from last 7 days   Lab Units 06/11/24  0431   WBC 10*3/mm3 8.86   HEMOGLOBIN g/dL 7.0*   HEMATOCRIT % 24.0*   PLATELETS  10*3/mm3 237     Results from last 7 days   Lab Units 06/11/24  0431 06/09/24  0231 06/08/24  1613   SODIUM mmol/L 139   < > 142   POTASSIUM mmol/L 4.5   < > 4.2   CHLORIDE mmol/L 105   < > 106   CO2 mmol/L 19.7*   < > 23.3   BUN mg/dL 35*   < > 37*   CREATININE mg/dL 4.10*   < > 5.77*   CALCIUM mg/dL 8.0*   < > 8.8   BILIRUBIN mg/dL  --   --  0.3   ALK PHOS U/L  --   --  111   ALT (SGPT) U/L  --   --  10   AST (SGOT) U/L  --   --  21   GLUCOSE mg/dL 128*   < > 137*    < > = values in this interval not displayed.     Results from last 7 days   Lab Units 06/09/24  1458   PH, ARTERIAL pH units 7.195*   PO2 ART mm Hg 82.8*   PCO2, ARTERIAL mm Hg 61.6*   HCO3 ART mmol/L 23.8     Results from last 7 days   Lab Units 06/11/24  0431 06/08/24  1613   ALBUMIN g/dL 3.5 4.0                               Meds:   SCHEDULE  amLODIPine, 10 mg, Oral, Daily  budesonide, 0.5 mg, Nebulization, BID - RT  calcium acetate, 667 mg, Oral, TID  carvedilol, 3.125 mg, Oral, BID With Meals  cefTRIAXone, 1,000 mg, Intravenous, Q24H  enoxaparin, 30 mg, Subcutaneous, Nightly  guaiFENesin, 1,200 mg, Oral, Q12H  insulin lispro, 2-9 Units, Subcutaneous, 4x Daily AC & at Bedtime  ipratropium-albuterol, 3 mL, Nebulization, 4x Daily - RT  losartan, 50 mg, Oral, Daily  methylPREDNISolone sodium succinate, 40 mg, Intravenous, Q12H  pantoprazole, 40 mg, Oral, BID  sevelamer, 2,400 mg, Oral, TID With Meals  sodium bicarbonate, 1,300 mg, Oral, TID  sodium chloride, 10 mL, Intravenous, Q12H  temazepam, 30 mg, Oral, Nightly      Infusions     PRNs    acetaminophen    benzonatate    senna-docusate sodium **AND** polyethylene glycol **AND** bisacodyl **AND** bisacodyl    dextrose    dextrose    glucagon (human recombinant)    LORazepam    nitroglycerin    ondansetron ODT **OR** ondansetron    oxyCODONE    sodium chloride    [COMPLETED] Insert Peripheral IV **AND** sodium chloride    sodium chloride    sodium chloride    Physical Exam:  Physical  Exam  Cardiovascular:      Heart sounds: Murmur heard.      No gallop.   Pulmonary:      Effort: No respiratory distress.      Breath sounds: No stridor. Rhonchi and rales present. No wheezing.   Chest:      Chest wall: No tenderness.         ROS  Review of Systems   Respiratory:  Positive for cough and shortness of breath. Negative for wheezing and stridor.    Cardiovascular:  Negative for chest pain, palpitations and leg swelling.             Total time spent with patient greater than: 45 Minutes

## 2024-06-11 NOTE — PROGRESS NOTES
[facility] Progress Note      Patient Name: Sherry Lobato  : 1950  MRN: [id number]  Primary Care Physician:  Jin Malcolm PA-C  Date of admission: 2024    Subjective     Interval History:     Patient was seen and examined this morning.  Status post bronchoscopy yesterday  Patient feeling little better.  Still has dyspnea and cough but improving slowly    Review of Systems:   As noted above    Objective     Vitals:   Temp:  [97.9 °F (36.6 °C)-98.7 °F (37.1 °C)] 98.1 °F (36.7 °C)  Heart Rate:  [] 83  Resp:  [14-28] 21  BP: ()/() 102/58  Flow (L/min):  [2-4] 3    Intake/Output Summary (Last 24 hours) at 2024 0832  Last data filed at 2024 0530  Gross per 24 hour   Intake 240 ml   Output 3900 ml   Net -3660 ml       Physical Exam:    General Appearance: Sickly, NAD  HEENT: oral mucosa normal, nonicteric sclera  Neck: supple, no JVD  Lungs: Bilateral rhonchi and Wheezing  Heart: RRR, normal S1 and S2  Abdomen: soft, nondistended  Extremities: Trace edema   neuro: Awake alert and moving all extremities    Scheduled Meds:     amLODIPine, 10 mg, Oral, Daily  budesonide, 0.5 mg, Nebulization, BID - RT  calcium acetate, 667 mg, Oral, TID  carvedilol, 3.125 mg, Oral, BID With Meals  cefTRIAXone, 1,000 mg, Intravenous, Q24H  enoxaparin, 30 mg, Subcutaneous, Nightly  guaiFENesin, 1,200 mg, Oral, Q12H  insulin lispro, 2-9 Units, Subcutaneous, 4x Daily AC & at Bedtime  ipratropium-albuterol, 3 mL, Nebulization, 4x Daily - RT  losartan, 50 mg, Oral, Daily  methylPREDNISolone sodium succinate, 40 mg, Intravenous, Q12H  pantoprazole, 40 mg, Oral, BID  sevelamer, 2,400 mg, Oral, TID With Meals  sodium bicarbonate, 1,300 mg, Oral, TID  sodium chloride, 10 mL, Intravenous, Q12H  temazepam, 30 mg, Oral, Nightly      IV Meds:        Results Reviewed:   I have personally reviewed the results from the time of this admission to 2024 08:32 EDT     Results  from last 7 days   Lab Units 06/11/24  0431 06/10/24  0113 06/09/24  0231 06/08/24  1613   SODIUM mmol/L 139 138 140 142   POTASSIUM mmol/L 4.5 5.1 5.3* 4.2   CHLORIDE mmol/L 105 104 102 106   CO2 mmol/L 19.7* 19.4* 20.3* 23.3   BUN mg/dL 35* 44* 46* 37*   CREATININE mg/dL 4.10* 5.54* 6.78* 5.77*   CALCIUM mg/dL 8.0* 8.1* 9.1 8.8   BILIRUBIN mg/dL  --   --   --  0.3   ALK PHOS U/L  --   --   --  111   ALT (SGPT) U/L  --   --   --  10   AST (SGOT) U/L  --   --   --  21   GLUCOSE mg/dL 128* 124* 253* 137*     Estimated Creatinine Clearance: 9.9 mL/min (A) (by C-G formula based on SCr of 4.1 mg/dL (H)).  Results from last 7 days   Lab Units 06/11/24  0431   PHOSPHORUS mg/dL 4.4         Results from last 7 days   Lab Units 06/11/24  0431 06/10/24  0113 06/09/24  1458 06/08/24  1507 06/05/24  0000   WBC 10*3/mm3 8.86 13.31*  --  10.86*  --    HEMOGLOBIN g/dL 7.0* 9.2*  --  10.3* 10.2*  30.6*   HEMOGLOBIN, POC g/dL  --   --  12.5  --   --    PLATELETS 10*3/mm3 237 335  --  386  --            Assessment / Plan     ASSESSMENT:    End-stage renal disease.  Patient gets dialysis Monday Wednesday and Friday as outpatient.  Admitted with respiratory failure, fluid overload.  Electrolytes okay.  Volume status improving  Acute respiratory failure.  Due to pneumonia and fluid overload.  Pulmonary following  Hypertension with chronic kidney disease. Blood pressure running low now  Anemia and chronic kidney disease.  Hemoglobin Decreased to 7.0 this morning  Lung cancer    PLAN:    Hemodialysis tomorrow  Fluid removal with dialysis as tolerated  Discontinue losartan  Start Epogen with dialysis  Follow iron studies      Faustino Buckley MD  06/11/24  08:32 EDT    [facility]  [phone]

## 2024-06-11 NOTE — PROGRESS NOTES
LOS: 2 days   Patient Care Team:  Jin Malcolm PA-C as PCP - General (Physician Assistant)  Nilay Stevens MD as Consulting Physician (General Surgery)  Stephen Perea MD as Surgeon (Thoracic Surgery)  Makayla Navarro RN as Nurse Navigator  Iliana Ro MD as Consulting Physician (Hematology and Oncology)    Subjective     Interval History: No change overnight    Patient Complaints: SOA, anxious    History taken from: patient    Review of Systems   Constitutional:  Positive for activity change and fatigue. Negative for appetite change, chills, diaphoresis and fever.   HENT:  Negative for facial swelling.    Eyes:  Negative for visual disturbance.   Respiratory:  Positive for cough and shortness of breath. Negative for wheezing and stridor.    Cardiovascular:  Negative for chest pain, palpitations and leg swelling.   Gastrointestinal:  Negative for abdominal pain, constipation and diarrhea.   Genitourinary:  Negative for hematuria.   Musculoskeletal:  Negative for arthralgias.   Neurological:  Negative for tremors and weakness.   Psychiatric/Behavioral:  Negative for confusion.            Objective     Vital Signs  Temp:  [97.9 °F (36.6 °C)-98 °F (36.7 °C)] 97.9 °F (36.6 °C)  Heart Rate:  [] 90  Resp:  [13-28] 15  BP: (108-164)/() 108/67    Physical Exam:     General Appearance:    Alert, cooperative, tachypneic, anxious   Head:    Normocephalic, without obvious abnormality, atraumatic   Eyes:            Lids and lashes normal, conjunctivae and sclerae normal, no   icterus, no pallor, corneas clear, PERRLA   Ears:    Ears appear intact with no abnormalities noted   Throat:   No oral lesions, no thrush, oral mucosa moist   Neck:   No adenopathy, supple, trachea midline, no thyromegaly, no   carotid bruit, no JVD   Lungs:     Crackles bibasilar    Heart:    Regular rhythm and normal rate, normal S1 and S2, no            murmur, no gallop, no rub, no click   Chest Wall:    No abnormalities  observed   Abdomen:     Normal bowel sounds, no masses, no organomegaly, soft        Non-tender non-distended, no guarding,   Extremities:   Moves all extremities well, no edema, no cyanosis, no             Redness   Pulses:   Pulses palpable and equal bilaterally   Skin:   No bleeding, bruising or rash   Lymph nodes:   No palpable adenopathy   Neurologic:   Cranial nerves 2 - 12 grossly intact, sensation intact, DTR       present and equal bilaterally        Results Review:    Lab Results (last 24 hours)       Procedure Component Value Units Date/Time    Blood Culture - Blood, Arm, Right [[id number]]  (Normal) Collected: 06/08/24 1741    Specimen: Blood from Arm, Right Updated: 06/10/24 1800     Blood Culture No growth at 2 days    Blood Culture - Blood, Hand, Right [[id number]]  (Normal) Collected: 06/08/24 1717    Specimen: Blood from Hand, Right Updated: 06/10/24 1732     Blood Culture No growth at 2 days    Narrative:      Less than seven (7) mL's of blood was collected.  Insufficient quantity may yield false negative results.    POC Glucose Once [[id number]]  (Abnormal) Collected: 06/10/24 1610    Specimen: Blood Updated: 06/10/24 1612     Glucose 115 mg/dL      Comment: Serial Number: [id number]:  [id number]       POC Glucose 4x Daily Before Meals & at Bedtime [[id number]]  (Abnormal) Collected: 06/10/24 1205    Specimen: Blood Updated: 06/10/24 1207     Glucose 126 mg/dL      Comment: Serial Number: [id number]:  [id number]       Hepatitis B Surface Antigen [[id number]]  (Normal) Collected: 06/09/24 1717    Specimen: Blood Updated: 06/10/24 1044     Hepatitis B Surface Ag Non-Reactive    POC Glucose Once [[id number]]  (Abnormal) Collected: 06/10/24 0657    Specimen: Blood Updated: 06/10/24 0659     Glucose 144 mg/dL      Comment: Serial Number: [id number]:  [id number]       Basic Metabolic Panel [[id number]]  (Abnormal) Collected: 06/10/24 0113    Specimen: Blood Updated: 06/10/24 0207     Glucose  124 mg/dL      BUN 44 mg/dL      Creatinine 5.54 mg/dL      Sodium 138 mmol/L      Potassium 5.1 mmol/L      Comment: Specimen hemolyzed.  Result may be falsely elevated.        Chloride 104 mmol/L      CO2 19.4 mmol/L      Calcium 8.1 mg/dL      BUN/Creatinine Ratio 7.9     Anion Gap 14.6 mmol/L      eGFR 7.6 mL/min/1.73      Comment: <15 Indicative of kidney failure       Narrative:      GFR Normal >60  Chronic Kidney Disease <60  Kidney Failure <15    The GFR formula is only valid for adults with stable renal function between ages 18 and 70.    CBC (No Diff) [[id number]]  (Abnormal) Collected: 06/10/24 0113    Specimen: Blood Updated: 06/10/24 0121     WBC 13.31 10*3/mm3      RBC 3.03 10*6/mm3      Hemoglobin 9.2 g/dL      Hematocrit 32.6 %      .6 fL      MCH 30.4 pg      MCHC 28.2 g/dL      RDW 15.7 %      RDW-SD 62.3 fl      MPV 9.8 fL      Platelets 335 10*3/mm3              Imaging Results (Last 24 Hours)       ** No results found for the last 24 hours. **                 I reviewed the patient's new clinical results.    Medication Review:   Scheduled Meds:amLODIPine, 10 mg, Oral, Daily  budesonide, 0.5 mg, Nebulization, BID - RT  calcium acetate, 667 mg, Oral, TID  carvedilol, 3.125 mg, Oral, BID With Meals  cefTRIAXone, 1,000 mg, Intravenous, Q24H  enoxaparin, 30 mg, Subcutaneous, Nightly  guaiFENesin, 1,200 mg, Oral, Q12H  insulin lispro, 2-9 Units, Subcutaneous, 4x Daily AC & at Bedtime  ipratropium-albuterol, 3 mL, Nebulization, 4x Daily - RT  losartan, 50 mg, Oral, Daily  methylPREDNISolone sodium succinate, 40 mg, Intravenous, Q12H  pantoprazole, 40 mg, Oral, BID  sevelamer, 2,400 mg, Oral, TID With Meals  sodium bicarbonate, 1,300 mg, Oral, TID  sodium chloride, 10 mL, Intravenous, Q12H  temazepam, 30 mg, Oral, Nightly      Continuous Infusions:   PRN Meds:.  acetaminophen    benzonatate    senna-docusate sodium **AND** polyethylene glycol **AND** bisacodyl **AND** bisacodyl    dextrose     dextrose    glucagon (human recombinant)    LORazepam    nitroglycerin    ondansetron ODT **OR** ondansetron    oxyCODONE    sodium chloride    [COMPLETED] Insert Peripheral IV **AND** sodium chloride    sodium chloride    sodium chloride     Assessment & Plan       PNA (pneumonia)  - continue steroids, antibiotics, guaifenesin, Duonebs.  Appreciate pulmonary input.  ESRD on dialysis - continue M-W-F dialysis  Anxiety - prn lorazepam, low dose  Insomnia - temazepam  Secondary hyperparathyroidism - Phoslo  HTN - carvedilol - need to clarify why she is taking 2 beta-blockers; holding metoprolol; continue losartan  GERD - PPI    Lovenox for dvt prophy        Plan for disposition:ELIZA Taylor MD  06/10/24  20:22 EDT

## 2024-06-11 NOTE — PROGRESS NOTES
LOS: 3 days   Patient Care Team:  Jin Malcolm PA-C as PCP - General (Physician Assistant)  Nilay Stevens MD as Consulting Physician (General Surgery)  Stephen Perea MD as Surgeon (Thoracic Surgery)  Makayla Navarro, RN as Nurse Navigator  Iliana Ro MD as Consulting Physician (Hematology and Oncology)    Subjective     Interval History: Had bronchoscopy earlier this morning with removal of extensive mucous plugs    Patient Complaints: Significantly improved after bronchoscopy; cough continues but shortness of breath is largely resolved; refuses renal diet    History taken from: patient    Review of Systems   Constitutional:  Positive for activity change. Negative for appetite change, chills and diaphoresis.   HENT:  Negative for congestion.    Eyes:  Negative for visual disturbance.   Respiratory:  Positive for cough. Negative for shortness of breath, wheezing and stridor.    Cardiovascular:  Negative for chest pain, palpitations and leg swelling.   Gastrointestinal:  Negative for abdominal pain, constipation, diarrhea and nausea.   Endocrine: Negative for polyuria.   Genitourinary:  Negative for dysuria.   Musculoskeletal:  Positive for arthralgias and gait problem.   Skin:  Negative for rash and wound.   Neurological:  Negative for dizziness, light-headedness and headaches.   Psychiatric/Behavioral:  Negative for confusion.            Objective     Vital Signs  Temp:  [97.9 °F (36.6 °C)-98.7 °F (37.1 °C)] 98 °F (36.7 °C)  Heart Rate:  [77-91] 85  Resp:  [14-22] 22  BP: ()/(51-90) 107/69    Physical Exam:     General Appearance:    Alert, cooperative, in no acute distress, chronically ill-appearing but much more comfortable than yesterday   Head:    Normocephalic, without obvious abnormality, atraumatic   Eyes:            Lids and lashes normal, conjunctivae and sclerae normal, no   icterus, no pallor, corneas clear, PERRLA   Ears:    Ears appear intact with no abnormalities noted   Throat:    No oral lesions, no thrush, oral mucosa moist   Neck:   No adenopathy, supple, trachea midline, no thyromegaly, no   carotid bruit, no JVD   Lungs:   Scattered rhonchi    Heart:    Regular rhythm and normal rate, normal S1 and S2, no            murmur, no gallop, no rub, no click   Chest Wall:    No abnormalities observed   Abdomen:     Normal bowel sounds, no masses, no organomegaly, soft        Non-tender non-distended, no guarding,   Extremities:   Moves all extremities well, no edema, no cyanosis, no             Redness   Pulses:   Pulses palpable and equal bilaterally   Skin:   No bleeding, bruising or rash   Lymph nodes:   No palpable adenopathy   Neurologic:   Cranial nerves 2 - 12 grossly intact, sensation intact, DTR       present and equal bilaterally        Results Review:    Lab Results (last 24 hours)       Procedure Component Value Units Date/Time    POC Glucose Once [[id number]]  (Abnormal) Collected: 06/11/24 1801    Specimen: Blood Updated: 06/11/24 1803     Glucose 191 mg/dL      Comment: Serial Number: [id number]:  [id number]       Blood Culture - Blood, Arm, Right [[id number]]  (Normal) Collected: 06/08/24 1741    Specimen: Blood from Arm, Right Updated: 06/11/24 1800     Blood Culture No growth at 3 days    Blood Culture - Blood, Hand, Right [[id number]]  (Normal) Collected: 06/08/24 1717    Specimen: Blood from Hand, Right Updated: 06/11/24 1732     Blood Culture No growth at 3 days    Narrative:      Less than seven (7) mL's of blood was collected.  Insufficient quantity may yield false negative results.    POC Glucose Once [[id number]]  (Abnormal) Collected: 06/11/24 1626    Specimen: Blood Updated: 06/11/24 1628     Glucose 415 mg/dL      Comment: Serial Number: [id number]:  [id number]       POC Glucose Once [[id number]]  (Abnormal) Collected: 06/11/24 1617    Specimen: Blood Updated: 06/11/24 1620     Glucose 415 mg/dL      Comment: Serial Number: [id number]:  [id number]        POC Glucose 4x Daily Before Meals & at Bedtime [[id number]]  (Abnormal) Collected: 06/11/24 1107    Specimen: Blood Updated: 06/11/24 1108     Glucose 127 mg/dL      Comment: Serial Number: [id number]:  [id number]       Non-gynecologic Cytology [[id number]] Collected: 06/11/24 0750    Specimen: Lavage from Lung, R Updated: 06/11/24 1023    BAL Culture, Quantitative - Lavage, Lung, R [[id number]] Collected: 06/11/24 0750    Specimen: Lavage from Lung, R Updated: 06/11/24 1020     Gram Stain Few (2+) WBCs seen      Rare (1+) Gram positive cocci in pairs    Respiratory Panel PCR w/COVID-19(SARS-CoV-2) [facility] In-House, NP Swab in UTM/VTM, 2 HR TAT - Lavage, Lung, R [[id number]]  (Normal) Collected: 06/11/24 0750    Specimen: Lavage from Lung, R Updated: 06/11/24 0958     ADENOVIRUS, PCR Not Detected     Coronavirus 229E Not Detected     Coronavirus HKU1 Not Detected     Coronavirus NL63 Not Detected     Coronavirus OC43 Not Detected     COVID19 Not Detected     Human Metapneumovirus Not Detected     Human Rhinovirus/Enterovirus Not Detected     Influenza A PCR Not Detected     Influenza B PCR Not Detected     Parainfluenza Virus 1 Not Detected     Parainfluenza Virus 2 Not Detected     Parainfluenza Virus 3 Not Detected     Parainfluenza Virus 4 Not Detected     RSV, PCR Not Detected     Bordetella pertussis pcr Not Detected     Bordetella parapertussis PCR Not Detected     Chlamydophila pneumoniae PCR Not Detected     Mycoplasma pneumo by PCR Not Detected    Narrative:      In the setting of a positive respiratory panel with a viral infection PLUS a negative procalcitonin without other underlying concern for bacterial infection, consider observing off antibiotics or discontinuation of antibiotics and continue supportive care. If the respiratory panel is positive for atypical bacterial infection (Bordetella pertussis, Chlamydophila pneumoniae, or Mycoplasma pneumoniae), consider antibiotic  de-escalation to target atypical bacterial infection.    POC Glucose Once [[id number]]  (Abnormal) Collected: 06/11/24 0942    Specimen: Blood Updated: 06/11/24 0944     Glucose 119 mg/dL      Comment: Serial Number: [id number]:  [id number]       Pneumocystis PCR - Lavage, Lung, R [[id number]] Collected: 06/11/24 0750    Specimen: Lavage from Lung, R Updated: 06/11/24 0905    Fungus Culture - Lavage, Lung, R [[id number]] Collected: 06/11/24 0750    Specimen: Lavage from Lung, R Updated: 06/11/24 0905    AFB Culture - Lavage, Lung, R [[id number]] Collected: 06/11/24 0750    Specimen: Lavage from Lung, R Updated: 06/11/24 0905    Virus Culture - Lavage, Lung, R [[id number]] Collected: 06/11/24 0750    Specimen: Lavage from Lung, R Updated: 06/11/24 0905    Iron Profile [[id number]]  (Abnormal) Collected: 06/11/24 0431    Specimen: Blood Updated: 06/11/24 0857     Iron 115 mcg/dL      Iron Saturation (TSAT) 67 %      Transferrin 116 mg/dL      TIBC 173 mcg/dL     Renal Function Panel [[id number]]  (Abnormal) Collected: 06/11/24 0431    Specimen: Blood Updated: 06/11/24 0517     Glucose 128 mg/dL      BUN 35 mg/dL      Creatinine 4.10 mg/dL      Sodium 139 mmol/L      Potassium 4.5 mmol/L      Chloride 105 mmol/L      CO2 19.7 mmol/L      Calcium 8.0 mg/dL      Albumin 3.5 g/dL      Phosphorus 4.4 mg/dL      Anion Gap 14.3 mmol/L      BUN/Creatinine Ratio 8.5     eGFR 10.9 mL/min/1.73      Comment: <15 Indicative of kidney failure       Narrative:      GFR Normal >60  Chronic Kidney Disease <60  Kidney Failure <15    The GFR formula is only valid for adults with stable renal function between ages 18 and 70.    CBC (No Diff) [[id number]]  (Abnormal) Collected: 06/11/24 0431    Specimen: Blood Updated: 06/11/24 0501     WBC 8.86 10*3/mm3      RBC 2.27 10*6/mm3      Hemoglobin 7.0 g/dL      Comment: Result checked          Hematocrit 24.0 %      .7 fL      MCH 30.8 pg      MCHC 29.2 g/dL      RDW 15.5 %      RDW-SD  59.5 fl      MPV 10.0 fL      Platelets 237 10*3/mm3     POC Glucose Once [[id number]]  (Abnormal) Collected: 06/10/24 2026    Specimen: Blood Updated: 06/10/24 2028     Glucose 182 mg/dL      Comment: Serial Number: [id number]:  [id number]                Imaging Results (Last 24 Hours)       ** No results found for the last 24 hours. **                 I reviewed the patient's new clinical results.    Medication Review:   Scheduled Meds:amLODIPine, 10 mg, Oral, Daily  budesonide, 0.5 mg, Nebulization, BID - RT  calcium acetate, 667 mg, Oral, TID  carvedilol, 3.125 mg, Oral, BID With Meals  cefTRIAXone, 1,000 mg, Intravenous, Q24H  enoxaparin, 30 mg, Subcutaneous, Nightly  [START ON 6/12/2024] epoetin dylan/dylan-epbx, 10,000 Units, Intravenous, Once  guaiFENesin, 1,200 mg, Oral, Q12H  insulin lispro, 10 Units, Subcutaneous, TID With Meals  insulin lispro, 2-9 Units, Subcutaneous, 4x Daily AC & at Bedtime  ipratropium-albuterol, 3 mL, Nebulization, 4x Daily - RT  [START ON 6/12/2024] methylPREDNISolone sodium succinate, 40 mg, Intravenous, Q24H  pantoprazole, 40 mg, Oral, BID  sevelamer, 2,400 mg, Oral, TID With Meals  sodium bicarbonate, 1,300 mg, Oral, TID  sodium chloride, 10 mL, Intravenous, Q12H  temazepam, 30 mg, Oral, Nightly      Continuous Infusions:   PRN Meds:.  acetaminophen    benzonatate    senna-docusate sodium **AND** polyethylene glycol **AND** bisacodyl **AND** bisacodyl    dextrose    dextrose    glucagon (human recombinant)    LORazepam    nitroglycerin    ondansetron ODT **OR** ondansetron    oxyCODONE    sodium chloride    [COMPLETED] Insert Peripheral IV **AND** sodium chloride    sodium chloride    sodium chloride     Assessment & Plan       PNA (pneumonia)  -Clinically improved after relief of mucous plugging; continue current antibiotics, bronchodilators, incentive spirometry, steroids    Type 2 diabetes with hyperglycemia-reducing dose of steroids due to significant  hyperglycemia  Anemia due to end-stage renal disease-EPO; transfuse if repeat confirms low hg  End-stage renal disease-routine dialysis, sodium bicarbonate, phosphate binders  Chronic insomnia-temazepam  Essential hypertension-amlodipine, carvedilol  Secondary hyperparathyroidism  GERD    Lovenox for DVT prophylaxis      Plan for disposition: To be determined    Angelia Taylor MD  06/11/24  19:45 EDT

## 2024-06-11 NOTE — OP NOTE
Bronchoscopy Procedure Note    Timeout was done appropriately by staff    Procedure:  Bronchoscopy, Therapeutic  Bilateral lung washing    Preoperative Diagnosis: Right middle lobe partial right lower lobe atelectasis    Postoperative Diagnosis:     Very thick mucoid secretions suctioned therapeutically  Severe reactive airway disease with bronchospasm  Moderate inflammatory airway disease    Anesthesia: Moderate Sedation    Procedure Details: Patient was consented for the procedure with all risks and benefits of the procedure explained in detail.  Patient was given the opportunity to ask questions and all concerns were answered.  The bronchocope was inserted into the main airway via the oropharynx. An anatomical survey was done of the main airways and the subsegmental bronchus to at least the first subsegmental level of all five lobes of both lungs.  The findings are reported below.  A bronchoalveolar lavage was performed using aliquots of normal saline instilled into the airways then aspirated back.      Findings:      Very thick mucoid secretions suctioned therapeutically  Severe reactive airway disease with bronchospasm  Moderate inflammatory airway disease    Patient tolerated procedure well        Estimated Blood Loss:  Minimal           Specimens:  Sent serosanguinous fluid                Complications:  None; patient tolerated the procedure well.           Disposition: PACU - hemodynamically stable.      Patient tolerated the procedure well.    Aramis Barragan MD  6/11/2024  12:08 EDT

## 2024-06-12 LAB
ALBUMIN SERPL-MCNC: 3.9 G/DL (ref 3.5–5.2)
ALBUMIN SERPL-MCNC: 3.9 G/DL (ref 3.5–5.2)
ALBUMIN/GLOB SERPL: 1.2 G/DL
ALBUMIN/GLOB SERPL: 1.3 G/DL
ALP SERPL-CCNC: 100 U/L (ref 39–117)
ALP SERPL-CCNC: 98 U/L (ref 39–117)
ALT SERPL W P-5'-P-CCNC: 20 U/L (ref 1–33)
ALT SERPL W P-5'-P-CCNC: 21 U/L (ref 1–33)
ANION GAP SERPL CALCULATED.3IONS-SCNC: 12.9 MMOL/L (ref 5–15)
ANION GAP SERPL CALCULATED.3IONS-SCNC: 17.2 MMOL/L (ref 5–15)
AST SERPL-CCNC: 26 U/L (ref 1–32)
AST SERPL-CCNC: 29 U/L (ref 1–32)
BASOPHILS # BLD AUTO: 0.01 10*3/MM3 (ref 0–0.2)
BASOPHILS # BLD AUTO: 0.02 10*3/MM3 (ref 0–0.2)
BASOPHILS NFR BLD AUTO: 0.1 % (ref 0–1.5)
BASOPHILS NFR BLD AUTO: 0.2 % (ref 0–1.5)
BILIRUB SERPL-MCNC: 0.2 MG/DL (ref 0–1.2)
BILIRUB SERPL-MCNC: 0.2 MG/DL (ref 0–1.2)
BUN SERPL-MCNC: 42 MG/DL (ref 8–23)
BUN SERPL-MCNC: 68 MG/DL (ref 8–23)
BUN/CREAT SERPL: 10.3 (ref 7–25)
BUN/CREAT SERPL: 8.8 (ref 7–25)
CA-I SERPL ISE-MCNC: 1.03 MMOL/L (ref 1.2–1.3)
CALCIUM SPEC-SCNC: 8.4 MG/DL (ref 8.6–10.5)
CALCIUM SPEC-SCNC: 8.7 MG/DL (ref 8.6–10.5)
CHLORIDE SERPL-SCNC: 101 MMOL/L (ref 98–107)
CHLORIDE SERPL-SCNC: 98 MMOL/L (ref 98–107)
CO2 SERPL-SCNC: 22.8 MMOL/L (ref 22–29)
CO2 SERPL-SCNC: 25.1 MMOL/L (ref 22–29)
CREAT SERPL-MCNC: 4.8 MG/DL (ref 0.57–1)
CREAT SERPL-MCNC: 6.62 MG/DL (ref 0.57–1)
DEPRECATED RDW RBC AUTO: 56.2 FL (ref 37–54)
DEPRECATED RDW RBC AUTO: 58.9 FL (ref 37–54)
EGFRCR SERPLBLD CKD-EPI 2021: 6.1 ML/MIN/1.73
EGFRCR SERPLBLD CKD-EPI 2021: 9 ML/MIN/1.73
EOSINOPHIL # BLD AUTO: 0.01 10*3/MM3 (ref 0–0.4)
EOSINOPHIL # BLD AUTO: 0.02 10*3/MM3 (ref 0–0.4)
EOSINOPHIL NFR BLD AUTO: 0.1 % (ref 0.3–6.2)
EOSINOPHIL NFR BLD AUTO: 0.2 % (ref 0.3–6.2)
ERYTHROCYTE [DISTWIDTH] IN BLOOD BY AUTOMATED COUNT: 15.3 % (ref 12.3–15.4)
ERYTHROCYTE [DISTWIDTH] IN BLOOD BY AUTOMATED COUNT: 15.4 % (ref 12.3–15.4)
GLOBULIN UR ELPH-MCNC: 3.1 GM/DL
GLOBULIN UR ELPH-MCNC: 3.2 GM/DL
GLUCOSE BLDC GLUCOMTR-MCNC: 257 MG/DL (ref 70–105)
GLUCOSE BLDC GLUCOMTR-MCNC: 305 MG/DL (ref 70–105)
GLUCOSE BLDC GLUCOMTR-MCNC: 76 MG/DL (ref 70–105)
GLUCOSE BLDC GLUCOMTR-MCNC: 79 MG/DL (ref 70–105)
GLUCOSE SERPL-MCNC: 105 MG/DL (ref 65–99)
GLUCOSE SERPL-MCNC: 79 MG/DL (ref 65–99)
HCT VFR BLD AUTO: 31.7 % (ref 34–46.6)
HCT VFR BLD AUTO: 34.9 % (ref 34–46.6)
HGB BLD-MCNC: 10.1 G/DL (ref 12–15.9)
HGB BLD-MCNC: 9.6 G/DL (ref 12–15.9)
IMM GRANULOCYTES # BLD AUTO: 0.1 10*3/MM3 (ref 0–0.05)
IMM GRANULOCYTES # BLD AUTO: 0.2 10*3/MM3 (ref 0–0.05)
IMM GRANULOCYTES NFR BLD AUTO: 0.9 % (ref 0–0.5)
IMM GRANULOCYTES NFR BLD AUTO: 1.8 % (ref 0–0.5)
LAB AP CASE REPORT: NORMAL
LYMPHOCYTES # BLD AUTO: 0.65 10*3/MM3 (ref 0.7–3.1)
LYMPHOCYTES # BLD AUTO: 0.82 10*3/MM3 (ref 0.7–3.1)
LYMPHOCYTES NFR BLD AUTO: 5.9 % (ref 19.6–45.3)
LYMPHOCYTES NFR BLD AUTO: 7.3 % (ref 19.6–45.3)
MCH RBC QN AUTO: 30.1 PG (ref 26.6–33)
MCH RBC QN AUTO: 30.2 PG (ref 26.6–33)
MCHC RBC AUTO-ENTMCNC: 28.9 G/DL (ref 31.5–35.7)
MCHC RBC AUTO-ENTMCNC: 30.3 G/DL (ref 31.5–35.7)
MCV RBC AUTO: 104.2 FL (ref 79–97)
MCV RBC AUTO: 99.7 FL (ref 79–97)
MONOCYTES # BLD AUTO: 0.96 10*3/MM3 (ref 0.1–0.9)
MONOCYTES # BLD AUTO: 0.99 10*3/MM3 (ref 0.1–0.9)
MONOCYTES NFR BLD AUTO: 8.7 % (ref 5–12)
MONOCYTES NFR BLD AUTO: 8.8 % (ref 5–12)
NEUTROPHILS NFR BLD AUTO: 81.7 % (ref 42.7–76)
NEUTROPHILS NFR BLD AUTO: 84.3 % (ref 42.7–76)
NEUTROPHILS NFR BLD AUTO: 9.18 10*3/MM3 (ref 1.7–7)
NEUTROPHILS NFR BLD AUTO: 9.35 10*3/MM3 (ref 1.7–7)
NRBC BLD AUTO-RTO: 0 /100 WBC (ref 0–0.2)
NRBC BLD AUTO-RTO: 0 /100 WBC (ref 0–0.2)
PATH REPORT.FINAL DX SPEC: NORMAL
PATH REPORT.GROSS SPEC: NORMAL
PHOSPHATE SERPL-MCNC: 3.2 MG/DL (ref 2.5–4.5)
PHOSPHATE SERPL-MCNC: 4.8 MG/DL (ref 2.5–4.5)
PLATELET # BLD AUTO: 353 10*3/MM3 (ref 140–450)
PLATELET # BLD AUTO: 393 10*3/MM3 (ref 140–450)
PMV BLD AUTO: 10.3 FL (ref 6–12)
PMV BLD AUTO: 10.6 FL (ref 6–12)
POTASSIUM SERPL-SCNC: 5.5 MMOL/L (ref 3.5–5.2)
POTASSIUM SERPL-SCNC: 5.6 MMOL/L (ref 3.5–5.2)
PROT SERPL-MCNC: 7 G/DL (ref 6–8.5)
PROT SERPL-MCNC: 7.1 G/DL (ref 6–8.5)
RBC # BLD AUTO: 3.18 10*6/MM3 (ref 3.77–5.28)
RBC # BLD AUTO: 3.35 10*6/MM3 (ref 3.77–5.28)
SODIUM SERPL-SCNC: 138 MMOL/L (ref 136–145)
SODIUM SERPL-SCNC: 139 MMOL/L (ref 136–145)
WBC NRBC COR # BLD AUTO: 11.08 10*3/MM3 (ref 3.4–10.8)
WBC NRBC COR # BLD AUTO: 11.23 10*3/MM3 (ref 3.4–10.8)

## 2024-06-12 PROCEDURE — 84100 ASSAY OF PHOSPHORUS: CPT | Performed by: INTERNAL MEDICINE

## 2024-06-12 PROCEDURE — 25010000002 EPOETIN ALFA-EPBX 10000 UNIT/ML SOLUTION: Performed by: INTERNAL MEDICINE

## 2024-06-12 PROCEDURE — 82330 ASSAY OF CALCIUM: CPT | Performed by: INTERNAL MEDICINE

## 2024-06-12 PROCEDURE — 63710000001 INSULIN LISPRO (HUMAN) PER 5 UNITS: Performed by: INTERNAL MEDICINE

## 2024-06-12 PROCEDURE — 94799 UNLISTED PULMONARY SVC/PX: CPT

## 2024-06-12 PROCEDURE — 82948 REAGENT STRIP/BLOOD GLUCOSE: CPT | Performed by: FAMILY MEDICINE

## 2024-06-12 PROCEDURE — 25010000002 ENOXAPARIN PER 10 MG: Performed by: FAMILY MEDICINE

## 2024-06-12 PROCEDURE — 80053 COMPREHEN METABOLIC PANEL: CPT | Performed by: NURSE PRACTITIONER

## 2024-06-12 PROCEDURE — 94660 CPAP INITIATION&MGMT: CPT

## 2024-06-12 PROCEDURE — 94761 N-INVAS EAR/PLS OXIMETRY MLT: CPT

## 2024-06-12 PROCEDURE — 63710000001 INSULIN LISPRO (HUMAN) PER 5 UNITS: Performed by: FAMILY MEDICINE

## 2024-06-12 PROCEDURE — 82948 REAGENT STRIP/BLOOD GLUCOSE: CPT

## 2024-06-12 PROCEDURE — 25010000002 METHYLPREDNISOLONE PER 40 MG: Performed by: INTERNAL MEDICINE

## 2024-06-12 PROCEDURE — 85025 COMPLETE CBC W/AUTO DIFF WBC: CPT | Performed by: NURSE PRACTITIONER

## 2024-06-12 PROCEDURE — 25010000002 CEFTRIAXONE PER 250 MG: Performed by: NURSE PRACTITIONER

## 2024-06-12 RX ORDER — SODIUM BICARBONATE 650 MG/1
1300 TABLET ORAL 2 TIMES DAILY
Status: DISCONTINUED | OUTPATIENT
Start: 2024-06-12 | End: 2024-06-14

## 2024-06-12 RX ORDER — SUCRALFATE 1 G/1
1 TABLET ORAL 4 TIMES DAILY
Status: DISCONTINUED | OUTPATIENT
Start: 2024-06-12 | End: 2024-06-14 | Stop reason: HOSPADM

## 2024-06-12 RX ORDER — ACETYLCYSTEINE 200 MG/ML
2 SOLUTION ORAL; RESPIRATORY (INHALATION)
Status: DISCONTINUED | OUTPATIENT
Start: 2024-06-12 | End: 2024-06-14

## 2024-06-12 RX ADMIN — IPRATROPIUM BROMIDE AND ALBUTEROL SULFATE 3 ML: .5; 3 SOLUTION RESPIRATORY (INHALATION) at 14:40

## 2024-06-12 RX ADMIN — INSULIN LISPRO 10 UNITS: 100 INJECTION, SOLUTION INTRAVENOUS; SUBCUTANEOUS at 12:05

## 2024-06-12 RX ADMIN — METHYLPREDNISOLONE SODIUM SUCCINATE 40 MG: 40 INJECTION, POWDER, FOR SOLUTION INTRAMUSCULAR; INTRAVENOUS at 11:59

## 2024-06-12 RX ADMIN — SODIUM BICARBONATE 1300 MG: 650 TABLET ORAL at 16:40

## 2024-06-12 RX ADMIN — SUCRALFATE 1 G: 1 TABLET ORAL at 21:42

## 2024-06-12 RX ADMIN — IPRATROPIUM BROMIDE AND ALBUTEROL SULFATE 3 ML: .5; 3 SOLUTION RESPIRATORY (INHALATION) at 20:06

## 2024-06-12 RX ADMIN — BUDESONIDE 0.5 MG: 0.5 INHALANT RESPIRATORY (INHALATION) at 07:32

## 2024-06-12 RX ADMIN — INSULIN LISPRO 10 UNITS: 100 INJECTION, SOLUTION INTRAVENOUS; SUBCUTANEOUS at 17:50

## 2024-06-12 RX ADMIN — CARVEDILOL 3.12 MG: 3.12 TABLET, FILM COATED ORAL at 17:50

## 2024-06-12 RX ADMIN — CARVEDILOL 3.12 MG: 3.12 TABLET, FILM COATED ORAL at 11:59

## 2024-06-12 RX ADMIN — GUAIFENESIN 1200 MG: 600 TABLET, EXTENDED RELEASE ORAL at 21:42

## 2024-06-12 RX ADMIN — INSULIN LISPRO 7 UNITS: 100 INJECTION, SOLUTION INTRAVENOUS; SUBCUTANEOUS at 17:49

## 2024-06-12 RX ADMIN — SUCRALFATE 1 G: 1 TABLET ORAL at 11:59

## 2024-06-12 RX ADMIN — SEVELAMER CARBONATE 2400 MG: 800 TABLET, FILM COATED ORAL at 18:35

## 2024-06-12 RX ADMIN — IPRATROPIUM BROMIDE AND ALBUTEROL SULFATE 3 ML: .5; 3 SOLUTION RESPIRATORY (INHALATION) at 07:30

## 2024-06-12 RX ADMIN — CALCIUM ACETATE 667 MG: 667 CAPSULE ORAL at 21:42

## 2024-06-12 RX ADMIN — INSULIN LISPRO 6 UNITS: 100 INJECTION, SOLUTION INTRAVENOUS; SUBCUTANEOUS at 21:41

## 2024-06-12 RX ADMIN — CALCIUM ACETATE 667 MG: 667 CAPSULE ORAL at 16:40

## 2024-06-12 RX ADMIN — CEFTRIAXONE 1000 MG: 1 INJECTION, POWDER, FOR SOLUTION INTRAMUSCULAR; INTRAVENOUS at 17:50

## 2024-06-12 RX ADMIN — SEVELAMER CARBONATE 2400 MG: 800 TABLET, FILM COATED ORAL at 11:59

## 2024-06-12 RX ADMIN — ACETYLCYSTEINE 2 ML: 200 SOLUTION ORAL; RESPIRATORY (INHALATION) at 11:13

## 2024-06-12 RX ADMIN — GUAIFENESIN 1200 MG: 600 TABLET, EXTENDED RELEASE ORAL at 11:59

## 2024-06-12 RX ADMIN — Medication 10 ML: at 12:00

## 2024-06-12 RX ADMIN — TEMAZEPAM 30 MG: 15 CAPSULE ORAL at 21:41

## 2024-06-12 RX ADMIN — SUCRALFATE 1 G: 1 TABLET ORAL at 17:50

## 2024-06-12 RX ADMIN — ACETYLCYSTEINE 2 ML: 200 SOLUTION ORAL; RESPIRATORY (INHALATION) at 20:06

## 2024-06-12 RX ADMIN — PANTOPRAZOLE SODIUM 40 MG: 40 TABLET, DELAYED RELEASE ORAL at 12:02

## 2024-06-12 RX ADMIN — IPRATROPIUM BROMIDE AND ALBUTEROL SULFATE 3 ML: .5; 3 SOLUTION RESPIRATORY (INHALATION) at 11:13

## 2024-06-12 RX ADMIN — BUDESONIDE 0.5 MG: 0.5 INHALANT RESPIRATORY (INHALATION) at 20:12

## 2024-06-12 RX ADMIN — SODIUM BICARBONATE 1300 MG: 650 TABLET ORAL at 21:42

## 2024-06-12 RX ADMIN — PANTOPRAZOLE SODIUM 40 MG: 40 TABLET, DELAYED RELEASE ORAL at 21:42

## 2024-06-12 RX ADMIN — ENOXAPARIN SODIUM 30 MG: 100 INJECTION SUBCUTANEOUS at 16:40

## 2024-06-12 RX ADMIN — AMLODIPINE BESYLATE 10 MG: 5 TABLET ORAL at 11:59

## 2024-06-12 RX ADMIN — Medication 10 ML: at 21:41

## 2024-06-12 RX ADMIN — EPOETIN ALFA-EPBX 10000 UNITS: 10000 INJECTION, SOLUTION INTRAVENOUS; SUBCUTANEOUS at 01:26

## 2024-06-12 NOTE — PLAN OF CARE
Goal Outcome Evaluation:            No pain, no SOB, 2lnc while slep, RA while awake. Hgb up to 9 from 7.  VSS. Alertx4, up with walker and standby assist. call light in reach, can make own needs known.

## 2024-06-12 NOTE — PROGRESS NOTES
LOS: 4 days   Patient Care Team:  Jin Malcolm PA-C as PCP - General (Physician Assistant)  Nilay Stevens MD as Consulting Physician (General Surgery)  Stephen Perea MD as Surgeon (Thoracic Surgery)  Makayla Navarro, RN as Nurse Navigator  Iliana Ro MD as Consulting Physician (Hematology and Oncology)    Subjective     Interval History: Status post hemodialysis with removal of 2.4     Patient Complaints: Significantly improved after bronchoscopy; cough continues but shortness of breath is largely resolved; refuses renal diet    History taken from: patient    Review of Systems   Constitutional:  Positive for activity change. Negative for appetite change, chills and diaphoresis.   HENT:  Negative for congestion.    Eyes:  Negative for visual disturbance.   Respiratory:  Positive for cough. Negative for shortness of breath, wheezing and stridor.    Cardiovascular:  Negative for chest pain, palpitations and leg swelling.   Gastrointestinal:  Negative for abdominal pain, constipation, diarrhea and nausea.   Endocrine: Negative for polyuria.   Genitourinary:  Negative for dysuria.   Musculoskeletal:  Positive for arthralgias and gait problem.   Skin:  Negative for rash and wound.   Neurological:  Negative for dizziness, light-headedness and headaches.   Psychiatric/Behavioral:  Negative for confusion.            Objective     Vital Signs  Temp:  [97.2 °F (36.2 °C)-98.4 °F (36.9 °C)] 97.2 °F (36.2 °C)  Heart Rate:  [68-88] 77  Resp:  [13-22] 16  BP: ()/() 104/66    Physical Exam:     General Appearance:    Alert, cooperative, in no acute distress, chronically ill-appearing but much more comfortable than yesterday   Head:    Normocephalic, without obvious abnormality, atraumatic   Eyes:            Lids and lashes normal, conjunctivae and sclerae normal, no   icterus, no pallor, corneas clear, PERRLA   Ears:    Ears appear intact with no abnormalities noted   Throat:   No oral lesions, no thrush,  "oral mucosa moist   Neck:   No adenopathy, supple, trachea midline, no thyromegaly, no   carotid bruit, no JVD   Lungs:   Scattered rhonchi    Heart:    Regular rhythm and normal rate, normal S1 and S2, no            murmur, no gallop, no rub, no click   Chest Wall:    No abnormalities observed   Abdomen:     Normal bowel sounds, no masses, no organomegaly, soft        Non-tender non-distended, no guarding,   Extremities:   Moves all extremities well, no edema, no cyanosis, no             Redness   Pulses:   Pulses palpable and equal bilaterally   Skin:   No bleeding, bruising or rash   Lymph nodes:   No palpable adenopathy   Neurologic:   Cranial nerves 2 - 12 grossly intact, sensation intact, DTR       present and equal bilaterally        Results Review:    Lab Results (last 24 hours)       Procedure Component Value Units Date/Time    Non-gynecologic Cytology [[id number]] Collected: 06/11/24 0750    Specimen: Lavage from Lung, R Updated: 06/12/24 1206     Case Report --     Medical Cytology Report                           Case: [id number]                                  Authorizing Provider:  Aramis Barragan MD             Collected:           06/11/2024 07:50 AM          Ordering Location:     [facility]  Received:            06/11/2024 10:23 AM                                 SUITES                                                                       Pathologist:           Layton Calabrese MD                                                            Specimen:    Lung, R                                                                                     Final Diagnosis --     Right lung, bronchial wash with smears and cytospin:  Acute inflammation, reactive but mature squamous cells, pulmonary macrophages, bronchial epithelial cells and Candida  No malignancy identified    SARA       Gross Description --     1. Lung, R.  Received in carbowax and designated \"Lung, R\" are 45 mL of green clear fluid. " Particulate matter is present. This specimen is processed as per protocol.          BAL Culture, Quantitative - Lavage, Lung, R [[id number]] Collected: 06/11/24 0750    Specimen: Lavage from Lung, R Updated: 06/12/24 1150     Gram Stain Few (2+) WBCs seen      Rare (1+) Gram positive cocci in pairs    Narrative:      Organism under investigation.      AFB Culture - Lavage, Lung, R [[id number]] Collected: 06/11/24 0750    Specimen: Lavage from Lung, R Updated: 06/12/24 1142     AFB Stain No acid fast bacilli seen on concentrated smear    POC Glucose Once [[id number]]  (Normal) Collected: 06/12/24 1122    Specimen: Blood Updated: 06/12/24 1124     Glucose 79 mg/dL      Comment: Serial Number: [id number]:  [id number]       Calcium, Ionized [[id number]]  (Abnormal) Collected: 06/12/24 0748    Specimen: Blood Updated: 06/12/24 0803     Ionized Calcium 1.03 mmol/L     POC Glucose 4x Daily Before Meals & at Bedtime [[id number]]  (Normal) Collected: 06/12/24 0712    Specimen: Blood Updated: 06/12/24 0714     Glucose 76 mg/dL      Comment: Serial Number: [id number]:  [id number]       Comprehensive Metabolic Panel [[id number]]  (Abnormal) Collected: 06/12/24 0258    Specimen: Blood Updated: 06/12/24 0410     Glucose 105 mg/dL      BUN 68 mg/dL      Creatinine 6.62 mg/dL      Sodium 138 mmol/L      Potassium 5.5 mmol/L      Comment: Slight hemolysis detected by analyzer. Result may be falsely elevated.        Chloride 98 mmol/L      CO2 22.8 mmol/L      Calcium 8.4 mg/dL      Total Protein 7.0 g/dL      Albumin 3.9 g/dL      ALT (SGPT) 21 U/L      AST (SGOT) 26 U/L      Alkaline Phosphatase 100 U/L      Total Bilirubin 0.2 mg/dL      Globulin 3.1 gm/dL      A/G Ratio 1.3 g/dL      BUN/Creatinine Ratio 10.3     Anion Gap 17.2 mmol/L      eGFR 6.1 mL/min/1.73      Comment: <15 Indicative of kidney failure       Narrative:      GFR Normal >60  Chronic Kidney Disease <60  Kidney Failure <15    The GFR formula is only valid  for adults with stable renal function between ages 18 and 70.    Phosphorus [[id number]]  (Abnormal) Collected: 06/12/24 0258    Specimen: Blood Updated: 06/12/24 0359     Phosphorus 4.8 mg/dL     CBC & Differential [[id number]]  (Abnormal) Collected: 06/12/24 0258    Specimen: Blood Updated: 06/12/24 0329    Narrative:      The following orders were created for panel order CBC & Differential.  Procedure                               Abnormality         Status                     ---------                               -----------         ------                     CBC Auto Differential[[id number]]        Abnormal            Final result                 Please view results for these tests on the individual orders.    CBC Auto Differential [[id number]]  (Abnormal) Collected: 06/12/24 0258    Specimen: Blood Updated: 06/12/24 0329     WBC 11.08 10*3/mm3      RBC 3.18 10*6/mm3      Hemoglobin 9.6 g/dL      Hematocrit 31.7 %      MCV 99.7 fL      MCH 30.2 pg      MCHC 30.3 g/dL      RDW 15.3 %      RDW-SD 56.2 fl      MPV 10.6 fL      Platelets 393 10*3/mm3      Neutrophil % 84.3 %      Lymphocyte % 5.9 %      Monocyte % 8.7 %      Eosinophil % 0.1 %      Basophil % 0.1 %      Immature Grans % 0.9 %      Neutrophils, Absolute 9.35 10*3/mm3      Lymphocytes, Absolute 0.65 10*3/mm3      Monocytes, Absolute 0.96 10*3/mm3      Eosinophils, Absolute 0.01 10*3/mm3      Basophils, Absolute 0.01 10*3/mm3      Immature Grans, Absolute 0.10 10*3/mm3      nRBC 0.0 /100 WBC     POC Glucose Once [[id number]]  (Abnormal) Collected: 06/11/24 2159    Specimen: Blood Updated: 06/11/24 2202     Glucose 148 mg/dL      Comment: Serial Number: [id number]:  [id number]       Comprehensive Metabolic Panel [[id number]]  (Abnormal) Collected: 06/11/24 2115    Specimen: Blood Updated: 06/11/24 2144     Glucose 58 mg/dL      BUN 62 mg/dL      Creatinine 5.98 mg/dL      Sodium 139 mmol/L      Potassium 5.5 mmol/L      Comment: Slight hemolysis  detected by analyzer. Result may be falsely elevated.        Chloride 100 mmol/L      CO2 22.9 mmol/L      Calcium 8.5 mg/dL      Total Protein 7.0 g/dL      Albumin 4.0 g/dL      ALT (SGPT) 20 U/L      AST (SGOT) 29 U/L      Comment: Slight hemolysis detected by analyzer. Result may be falsely elevated.        Alkaline Phosphatase 100 U/L      Total Bilirubin 0.2 mg/dL      Globulin 3.0 gm/dL      A/G Ratio 1.3 g/dL      BUN/Creatinine Ratio 10.4     Anion Gap 16.1 mmol/L      eGFR 6.9 mL/min/1.73      Comment: <15 Indicative of kidney failure       Narrative:      GFR Normal >60  Chronic Kidney Disease <60  Kidney Failure <15    The GFR formula is only valid for adults with stable renal function between ages 18 and 70.    CBC & Differential [[id number]]  (Abnormal) Collected: 06/11/24 2115    Specimen: Blood Updated: 06/11/24 2129    Narrative:      The following orders were created for panel order CBC & Differential.  Procedure                               Abnormality         Status                     ---------                               -----------         ------                     CBC Auto Differential[[id number]]        Abnormal            Final result                 Please view results for these tests on the individual orders.    CBC Auto Differential [[id number]]  (Abnormal) Collected: 06/11/24 2115    Specimen: Blood Updated: 06/11/24 2129     WBC 10.24 10*3/mm3      RBC 3.15 10*6/mm3      Hemoglobin 9.6 g/dL      Comment: Result checked          Hematocrit 31.3 %      MCV 99.4 fL      MCH 30.5 pg      MCHC 30.7 g/dL      RDW 15.5 %      RDW-SD 55.8 fl      MPV 9.9 fL      Platelets 358 10*3/mm3      Neutrophil % 88.4 %      Lymphocyte % 3.7 %      Monocyte % 6.9 %      Eosinophil % 0.0 %      Basophil % 0.1 %      Immature Grans % 0.9 %      Neutrophils, Absolute 9.05 10*3/mm3      Lymphocytes, Absolute 0.38 10*3/mm3      Monocytes, Absolute 0.71 10*3/mm3      Eosinophils, Absolute 0.00 10*3/mm3       Basophils, Absolute 0.01 10*3/mm3      Immature Grans, Absolute 0.09 10*3/mm3      nRBC 0.0 /100 WBC     POC Glucose Once [[id number]]  (Normal) Collected: 06/1950    Specimen: Blood Updated: 06/11/24 1951     Glucose 74 mg/dL      Comment: Serial Number: [id number]:  [id number]       POC Glucose Once [[id number]]  (Abnormal) Collected: 06/11/24 1801    Specimen: Blood Updated: 06/11/24 1803     Glucose 191 mg/dL      Comment: Serial Number: [id number]:  [id number]       Blood Culture - Blood, Arm, Right [[id number]]  (Normal) Collected: 06/08/24 1741    Specimen: Blood from Arm, Right Updated: 06/11/24 1800     Blood Culture No growth at 3 days    Blood Culture - Blood, Hand, Right [[id number]]  (Normal) Collected: 06/08/24 1717    Specimen: Blood from Hand, Right Updated: 06/11/24 1732     Blood Culture No growth at 3 days    Narrative:      Less than seven (7) mL's of blood was collected.  Insufficient quantity may yield false negative results.    POC Glucose Once [[id number]]  (Abnormal) Collected: 06/11/24 1626    Specimen: Blood Updated: 06/11/24 1628     Glucose 415 mg/dL      Comment: Serial Number: [id number]:  [id number]       POC Glucose Once [[id number]]  (Abnormal) Collected: 06/11/24 1617    Specimen: Blood Updated: 06/11/24 1620     Glucose 415 mg/dL      Comment: Serial Number: [id number]:  [id number]                Imaging Results (Last 24 Hours)       ** No results found for the last 24 hours. **                 I reviewed the patient's new clinical results.    Medication Review:   Scheduled Meds:acetylcysteine, 2 mL, Nebulization, BID - RT  amLODIPine, 10 mg, Oral, Daily  budesonide, 0.5 mg, Nebulization, BID - RT  calcium acetate, 667 mg, Oral, TID  carvedilol, 3.125 mg, Oral, BID With Meals  cefTRIAXone, 1,000 mg, Intravenous, Q24H  enoxaparin, 30 mg, Subcutaneous, Nightly  guaiFENesin, 1,200 mg, Oral, Q12H  insulin lispro, 10 Units, Subcutaneous, TID With Meals  insulin  lispro, 2-9 Units, Subcutaneous, 4x Daily AC & at Bedtime  ipratropium-albuterol, 3 mL, Nebulization, 4x Daily - RT  methylPREDNISolone sodium succinate, 40 mg, Intravenous, Q24H  pantoprazole, 40 mg, Oral, BID  sevelamer, 2,400 mg, Oral, TID With Meals  sodium bicarbonate, 1,300 mg, Oral, BID  sodium bicarbonate, 1,300 mg, Oral, TID  sodium chloride, 10 mL, Intravenous, Q12H  sucralfate, 1 g, Oral, 4x Daily  temazepam, 30 mg, Oral, Nightly      Continuous Infusions:   PRN Meds:.  acetaminophen    benzonatate    senna-docusate sodium **AND** polyethylene glycol **AND** bisacodyl **AND** bisacodyl    dextrose    dextrose    glucagon (human recombinant)    LORazepam    nitroglycerin    ondansetron ODT **OR** ondansetron    oxyCODONE    sodium chloride    [COMPLETED] Insert Peripheral IV **AND** sodium chloride    sodium chloride    sodium chloride     Assessment & Plan       PNA (pneumonia)  -Clinically improved after relief of mucous plugging; continue current antibiotics, bronchodilators, incentive spirometry, steroids    Type 2 diabetes with hyperglycemia-reducing dose of steroids due to significant hyperglycemia  Anemia due to end-stage renal disease-EPO; transfuse if repeat confirms low hg  End-stage renal disease-routine dialysis, sodium bicarbonate, phosphate binders  Chronic insomnia-temazepam  Essential hypertension-amlodipine, carvedilol  Secondary hyperparathyroidism  GERD    Lovenox for DVT prophylaxis      Plan for disposition: To be determined    MAEGAN Mendosa  06/12/24  12:39 EDT

## 2024-06-12 NOTE — PROGRESS NOTES
[facility] Progress Note      Patient Name: Sherry Lobato  : 1950  MRN: [id number]  Primary Care Physician:  Jin Malcolm PA-C  Date of admission: 2024    Subjective     Interval History:     Patient seen during dialysis this morning, tolerating okay  Still has dyspnea and cough but improving slowly  Patient overall feeling better    Review of Systems:   As noted above    Objective     Vitals:   Temp:  [97.2 °F (36.2 °C)-98.4 °F (36.9 °C)] 97.2 °F (36.2 °C)  Heart Rate:  [68-88] 68  Resp:  [13-22] 16  BP: ()/() 118/74  Flow (L/min):  [1-3] 1    Intake/Output Summary (Last 24 hours) at 2024 1037  Last data filed at 2024 0435  Gross per 24 hour   Intake 480 ml   Output --   Net 480 ml       Physical Exam:    General Appearance:  NAD  HEENT: oral mucosa normal, nonicteric sclera  Neck: supple, no JVD  Lungs: Bilateral rhonchi and Wheezing  Heart: RRR, normal S1 and S2  Abdomen: soft, nondistended  Extremities: No edema   neuro: Awake alert and moving all extremities    Scheduled Meds:     acetylcysteine, 2 mL, Nebulization, BID - RT  amLODIPine, 10 mg, Oral, Daily  budesonide, 0.5 mg, Nebulization, BID - RT  calcium acetate, 667 mg, Oral, TID  carvedilol, 3.125 mg, Oral, BID With Meals  cefTRIAXone, 1,000 mg, Intravenous, Q24H  enoxaparin, 30 mg, Subcutaneous, Nightly  guaiFENesin, 1,200 mg, Oral, Q12H  insulin lispro, 10 Units, Subcutaneous, TID With Meals  insulin lispro, 2-9 Units, Subcutaneous, 4x Daily AC & at Bedtime  ipratropium-albuterol, 3 mL, Nebulization, 4x Daily - RT  methylPREDNISolone sodium succinate, 40 mg, Intravenous, Q24H  pantoprazole, 40 mg, Oral, BID  sevelamer, 2,400 mg, Oral, TID With Meals  sodium bicarbonate, 1,300 mg, Oral, BID  sodium bicarbonate, 1,300 mg, Oral, TID  sodium chloride, 10 mL, Intravenous, Q12H  sucralfate, 1 g, Oral, 4x Daily  temazepam, 30 mg, Oral, Nightly      IV Meds:        Results Reviewed:   I  have personally reviewed the results from the time of this admission to 6/12/2024 10:37 EDT     Results from last 7 days   Lab Units 06/12/24 0258 06/11/24 2115 06/11/24  0431 06/09/24  0231 06/08/24  1613   SODIUM mmol/L 138 139 139   < > 142   POTASSIUM mmol/L 5.5* 5.5* 4.5   < > 4.2   CHLORIDE mmol/L 98 100 105   < > 106   CO2 mmol/L 22.8 22.9 19.7*   < > 23.3   BUN mg/dL 68* 62* 35*   < > 37*   CREATININE mg/dL 6.62* 5.98* 4.10*   < > 5.77*   CALCIUM mg/dL 8.4* 8.5* 8.0*   < > 8.8   BILIRUBIN mg/dL 0.2 0.2  --   --  0.3   ALK PHOS U/L 100 100  --   --  111   ALT (SGPT) U/L 21 20  --   --  10   AST (SGOT) U/L 26 29  --   --  21   GLUCOSE mg/dL 105* 58* 128*   < > 137*    < > = values in this interval not displayed.     Estimated Creatinine Clearance: 6.1 mL/min (A) (by C-G formula based on SCr of 6.62 mg/dL (H)).  Results from last 7 days   Lab Units 06/12/24 0258 06/11/24  0431   PHOSPHORUS mg/dL 4.8* 4.4         Results from last 7 days   Lab Units 06/12/24 0258 06/11/24 2115 06/11/24  0431 06/10/24  0113 06/09/24  1458 06/08/24  1507   WBC 10*3/mm3 11.08* 10.24 8.86 13.31*  --  10.86*   HEMOGLOBIN g/dL 9.6* 9.6* 7.0* 9.2*  --  10.3*   HEMOGLOBIN, POC g/dL  --   --   --   --  12.5  --    PLATELETS 10*3/mm3 393 358 237 335  --  386           Assessment / Plan     ASSESSMENT:    End-stage renal disease.  Patient gets dialysis Monday Wednesday and Friday as outpatient.    Hyperkalemia per morning labs.  Volume status okay  Acute respiratory failure.  Due to pneumonia and fluid overload.  Pulmonary following  Hypertension with chronic kidney disease.  Pressure okay   anemia in chronic kidney disease.  Hemoglobin better   lung cancer    PLAN:    Dialysis MWF  Fluid removal with dialysis as tolerated  Epogen with dialysis      Faustino Buckley MD  06/12/24  10:37 EDT    [facility]  [phone]

## 2024-06-12 NOTE — PLAN OF CARE
Goal Outcome Evaluation:                 Alert and oriented x 4. Crackles bll on 2 liters per nc. Up ad arnoldo. Very thick mucous, ancourage use of flutter valve.  Encourage use of incentive spirometry

## 2024-06-13 LAB
ALBUMIN SERPL-MCNC: 3.6 G/DL (ref 3.5–5.2)
ALBUMIN/GLOB SERPL: 1.2 G/DL
ALP SERPL-CCNC: 91 U/L (ref 39–117)
ALT SERPL W P-5'-P-CCNC: 23 U/L (ref 1–33)
ANION GAP SERPL CALCULATED.3IONS-SCNC: 17 MMOL/L (ref 5–15)
AST SERPL-CCNC: 26 U/L (ref 1–32)
BACTERIA SPEC AEROBE CULT: NORMAL
BASOPHILS # BLD AUTO: 0.02 10*3/MM3 (ref 0–0.2)
BASOPHILS NFR BLD AUTO: 0.2 % (ref 0–1.5)
BILIRUB SERPL-MCNC: 0.2 MG/DL (ref 0–1.2)
BUN SERPL-MCNC: 60 MG/DL (ref 8–23)
BUN/CREAT SERPL: 9.5 (ref 7–25)
CALCIUM SPEC-SCNC: 8.3 MG/DL (ref 8.6–10.5)
CHLORIDE SERPL-SCNC: 98 MMOL/L (ref 98–107)
CO2 SERPL-SCNC: 23 MMOL/L (ref 22–29)
CREAT SERPL-MCNC: 6.33 MG/DL (ref 0.57–1)
DEPRECATED RDW RBC AUTO: 57.3 FL (ref 37–54)
EGFRCR SERPLBLD CKD-EPI 2021: 6.5 ML/MIN/1.73
EOSINOPHIL # BLD AUTO: 0.01 10*3/MM3 (ref 0–0.4)
EOSINOPHIL NFR BLD AUTO: 0.1 % (ref 0.3–6.2)
ERYTHROCYTE [DISTWIDTH] IN BLOOD BY AUTOMATED COUNT: 15.4 % (ref 12.3–15.4)
GLOBULIN UR ELPH-MCNC: 3 GM/DL
GLUCOSE BLDC GLUCOMTR-MCNC: 103 MG/DL (ref 70–105)
GLUCOSE BLDC GLUCOMTR-MCNC: 108 MG/DL (ref 70–105)
GLUCOSE BLDC GLUCOMTR-MCNC: 242 MG/DL (ref 70–105)
GLUCOSE BLDC GLUCOMTR-MCNC: 306 MG/DL (ref 70–105)
GLUCOSE BLDC GLUCOMTR-MCNC: 85 MG/DL (ref 70–105)
GLUCOSE SERPL-MCNC: 159 MG/DL (ref 65–99)
GRAM STN SPEC: NORMAL
GRAM STN SPEC: NORMAL
HCT VFR BLD AUTO: 31.4 % (ref 34–46.6)
HGB BLD-MCNC: 9.5 G/DL (ref 12–15.9)
IMM GRANULOCYTES # BLD AUTO: 0.16 10*3/MM3 (ref 0–0.05)
IMM GRANULOCYTES NFR BLD AUTO: 1.6 % (ref 0–0.5)
LYMPHOCYTES # BLD AUTO: 0.77 10*3/MM3 (ref 0.7–3.1)
LYMPHOCYTES NFR BLD AUTO: 7.6 % (ref 19.6–45.3)
MCH RBC QN AUTO: 30.7 PG (ref 26.6–33)
MCHC RBC AUTO-ENTMCNC: 30.3 G/DL (ref 31.5–35.7)
MCV RBC AUTO: 101.6 FL (ref 79–97)
MONOCYTES # BLD AUTO: 0.8 10*3/MM3 (ref 0.1–0.9)
MONOCYTES NFR BLD AUTO: 7.9 % (ref 5–12)
NEUTROPHILS NFR BLD AUTO: 8.41 10*3/MM3 (ref 1.7–7)
NEUTROPHILS NFR BLD AUTO: 82.6 % (ref 42.7–76)
NRBC BLD AUTO-RTO: 0 /100 WBC (ref 0–0.2)
PHOSPHATE SERPL-MCNC: 3.3 MG/DL (ref 2.5–4.5)
PLATELET # BLD AUTO: 330 10*3/MM3 (ref 140–450)
PMV BLD AUTO: 10.5 FL (ref 6–12)
POTASSIUM SERPL-SCNC: 5.3 MMOL/L (ref 3.5–5.2)
POTASSIUM SERPL-SCNC: 5.4 MMOL/L (ref 3.5–5.2)
PROT SERPL-MCNC: 6.6 G/DL (ref 6–8.5)
RBC # BLD AUTO: 3.09 10*6/MM3 (ref 3.77–5.28)
SODIUM SERPL-SCNC: 138 MMOL/L (ref 136–145)
WBC NRBC COR # BLD AUTO: 10.17 10*3/MM3 (ref 3.4–10.8)

## 2024-06-13 PROCEDURE — 63710000001 INSULIN LISPRO (HUMAN) PER 5 UNITS: Performed by: INTERNAL MEDICINE

## 2024-06-13 PROCEDURE — 85025 COMPLETE CBC W/AUTO DIFF WBC: CPT | Performed by: NURSE PRACTITIONER

## 2024-06-13 PROCEDURE — 63710000001 PREDNISONE PER 5 MG: Performed by: INTERNAL MEDICINE

## 2024-06-13 PROCEDURE — 84132 ASSAY OF SERUM POTASSIUM: CPT | Performed by: INTERNAL MEDICINE

## 2024-06-13 PROCEDURE — 25010000002 ENOXAPARIN PER 10 MG: Performed by: FAMILY MEDICINE

## 2024-06-13 PROCEDURE — 63710000001 INSULIN LISPRO (HUMAN) PER 5 UNITS: Performed by: FAMILY MEDICINE

## 2024-06-13 PROCEDURE — 94664 DEMO&/EVAL PT USE INHALER: CPT

## 2024-06-13 PROCEDURE — 94761 N-INVAS EAR/PLS OXIMETRY MLT: CPT

## 2024-06-13 PROCEDURE — 80053 COMPREHEN METABOLIC PANEL: CPT | Performed by: NURSE PRACTITIONER

## 2024-06-13 PROCEDURE — 82948 REAGENT STRIP/BLOOD GLUCOSE: CPT

## 2024-06-13 PROCEDURE — 63710000001 PREDNISONE PER 1 MG: Performed by: INTERNAL MEDICINE

## 2024-06-13 PROCEDURE — 94799 UNLISTED PULMONARY SVC/PX: CPT

## 2024-06-13 PROCEDURE — 84100 ASSAY OF PHOSPHORUS: CPT | Performed by: INTERNAL MEDICINE

## 2024-06-13 PROCEDURE — 25010000002 CEFTRIAXONE PER 250 MG: Performed by: NURSE PRACTITIONER

## 2024-06-13 RX ORDER — PREDNISONE 10 MG/1
10 TABLET ORAL DAILY
Status: DISCONTINUED | OUTPATIENT
Start: 2024-06-17 | End: 2024-06-14

## 2024-06-13 RX ORDER — PREDNISONE 20 MG/1
20 TABLET ORAL DAILY
Status: DISCONTINUED | OUTPATIENT
Start: 2024-06-15 | End: 2024-06-14

## 2024-06-13 RX ADMIN — SEVELAMER CARBONATE 2400 MG: 800 TABLET, FILM COATED ORAL at 08:07

## 2024-06-13 RX ADMIN — AMLODIPINE BESYLATE 10 MG: 5 TABLET ORAL at 08:07

## 2024-06-13 RX ADMIN — CALCIUM ACETATE 667 MG: 667 CAPSULE ORAL at 08:07

## 2024-06-13 RX ADMIN — SODIUM ZIRCONIUM CYCLOSILICATE 10 G: 10 POWDER, FOR SUSPENSION ORAL at 20:48

## 2024-06-13 RX ADMIN — CALCIUM ACETATE 667 MG: 667 CAPSULE ORAL at 20:48

## 2024-06-13 RX ADMIN — CEFTRIAXONE 1000 MG: 1 INJECTION, POWDER, FOR SOLUTION INTRAMUSCULAR; INTRAVENOUS at 17:22

## 2024-06-13 RX ADMIN — PANTOPRAZOLE SODIUM 40 MG: 40 TABLET, DELAYED RELEASE ORAL at 08:07

## 2024-06-13 RX ADMIN — BUDESONIDE 0.5 MG: 0.5 INHALANT RESPIRATORY (INHALATION) at 06:45

## 2024-06-13 RX ADMIN — SUCRALFATE 1 G: 1 TABLET ORAL at 17:22

## 2024-06-13 RX ADMIN — SUCRALFATE 1 G: 1 TABLET ORAL at 11:17

## 2024-06-13 RX ADMIN — ACETYLCYSTEINE 2 ML: 200 SOLUTION ORAL; RESPIRATORY (INHALATION) at 06:40

## 2024-06-13 RX ADMIN — INSULIN LISPRO 7 UNITS: 100 INJECTION, SOLUTION INTRAVENOUS; SUBCUTANEOUS at 20:47

## 2024-06-13 RX ADMIN — ENOXAPARIN SODIUM 30 MG: 100 INJECTION SUBCUTANEOUS at 17:23

## 2024-06-13 RX ADMIN — Medication 10 ML: at 20:48

## 2024-06-13 RX ADMIN — SUCRALFATE 1 G: 1 TABLET ORAL at 20:47

## 2024-06-13 RX ADMIN — PREDNISONE 30 MG: 20 TABLET ORAL at 10:11

## 2024-06-13 RX ADMIN — CARVEDILOL 3.12 MG: 3.12 TABLET, FILM COATED ORAL at 17:23

## 2024-06-13 RX ADMIN — SODIUM ZIRCONIUM CYCLOSILICATE 10 G: 10 POWDER, FOR SUSPENSION ORAL at 11:17

## 2024-06-13 RX ADMIN — SEVELAMER CARBONATE 2400 MG: 800 TABLET, FILM COATED ORAL at 17:22

## 2024-06-13 RX ADMIN — INSULIN LISPRO 4 UNITS: 100 INJECTION, SOLUTION INTRAVENOUS; SUBCUTANEOUS at 17:24

## 2024-06-13 RX ADMIN — GUAIFENESIN 1200 MG: 600 TABLET, EXTENDED RELEASE ORAL at 20:48

## 2024-06-13 RX ADMIN — IPRATROPIUM BROMIDE AND ALBUTEROL SULFATE 3 ML: .5; 3 SOLUTION RESPIRATORY (INHALATION) at 06:40

## 2024-06-13 RX ADMIN — INSULIN LISPRO 10 UNITS: 100 INJECTION, SOLUTION INTRAVENOUS; SUBCUTANEOUS at 17:23

## 2024-06-13 RX ADMIN — SEVELAMER CARBONATE 2400 MG: 800 TABLET, FILM COATED ORAL at 11:17

## 2024-06-13 RX ADMIN — INSULIN LISPRO 10 UNITS: 100 INJECTION, SOLUTION INTRAVENOUS; SUBCUTANEOUS at 12:42

## 2024-06-13 RX ADMIN — PANTOPRAZOLE SODIUM 40 MG: 40 TABLET, DELAYED RELEASE ORAL at 20:47

## 2024-06-13 RX ADMIN — SODIUM BICARBONATE 1300 MG: 650 TABLET ORAL at 17:22

## 2024-06-13 RX ADMIN — CALCIUM ACETATE 667 MG: 667 CAPSULE ORAL at 17:22

## 2024-06-13 RX ADMIN — INSULIN LISPRO 10 UNITS: 100 INJECTION, SOLUTION INTRAVENOUS; SUBCUTANEOUS at 08:06

## 2024-06-13 RX ADMIN — IPRATROPIUM BROMIDE AND ALBUTEROL SULFATE 3 ML: .5; 3 SOLUTION RESPIRATORY (INHALATION) at 14:41

## 2024-06-13 RX ADMIN — SODIUM BICARBONATE 1300 MG: 650 TABLET ORAL at 08:07

## 2024-06-13 RX ADMIN — CARVEDILOL 3.12 MG: 3.12 TABLET, FILM COATED ORAL at 08:07

## 2024-06-13 RX ADMIN — Medication 10 ML: at 08:07

## 2024-06-13 RX ADMIN — GUAIFENESIN 1200 MG: 600 TABLET, EXTENDED RELEASE ORAL at 08:07

## 2024-06-13 RX ADMIN — SODIUM BICARBONATE 1300 MG: 650 TABLET ORAL at 20:48

## 2024-06-13 RX ADMIN — SUCRALFATE 1 G: 1 TABLET ORAL at 08:07

## 2024-06-13 RX ADMIN — IPRATROPIUM BROMIDE AND ALBUTEROL SULFATE 3 ML: .5; 3 SOLUTION RESPIRATORY (INHALATION) at 10:55

## 2024-06-13 NOTE — CASE MANAGEMENT/SOCIAL WORK
Continued Stay Note  LANI Sepulveda     Patient Name: Sherry Lobato  MRN: [id number]  Today's Date: 6/13/2024    Admit Date: 6/8/2024    Plan: Routine home. Current OP HD MWF [facility] [address]   Discharge Plan       Row Name 06/13/24 1611       Plan    Plan Routine home. Current OP HD MWF [facility] [address]    Patient/Family in Agreement with Plan yes    Provided Post Acute Provider List? N/A    Provided Post Acute Provider Quality & Resource List? N/A    Plan Comments D/C Barriers: Pulmonary following, Nephrology following, elevated potassium being treated, cultures pending, IV abx, Nebs             Expected Discharge Date and Time       Expected Discharge Date Expected Discharge Time    Jun 14, 2024        Isadora Contreras RN     [phone]  [email or web address]

## 2024-06-13 NOTE — PROGRESS NOTES
LOS: 5 days   Patient Care Team:  Jin Malcolm PA-C as PCP - General (Physician Assistant)  Nilay Stevens MD as Consulting Physician (General Surgery)  Stephen Perea MD as Surgeon (Thoracic Surgery)  Makayla Navarro, RN as Nurse Navigator  Iliana Ro MD as Consulting Physician (Hematology and Oncology)    Subjective     Patient denies any complaints    Review of Systems   Constitutional:  Positive for activity change.   HENT: Negative.     Respiratory: Negative.     Cardiovascular: Negative.    Gastrointestinal: Negative.    Genitourinary: Negative.    Musculoskeletal: Negative.    Neurological: Negative.    Psychiatric/Behavioral: Negative.             Objective     Vital Signs  Temp:  [97.5 °F (36.4 °C)-98.4 °F (36.9 °C)] 98.2 °F (36.8 °C)  Heart Rate:  [72-88] 77  Resp:  [12-22] 14  BP: (104-177)/(50-88) 177/65      Physical Exam  Vitals reviewed.   Constitutional:       Appearance: She is not ill-appearing.   HENT:      Head: Normocephalic and atraumatic.      Right Ear: External ear normal.      Left Ear: External ear normal.      Nose: Nose normal.   Eyes:      General:         Right eye: No discharge.         Left eye: No discharge.   Cardiovascular:      Rate and Rhythm: Normal rate and regular rhythm.      Pulses: Normal pulses.      Heart sounds: Normal heart sounds.   Pulmonary:      Effort: Pulmonary effort is normal.      Breath sounds: Normal breath sounds.   Abdominal:      General: Bowel sounds are normal.      Palpations: Abdomen is soft.   Musculoskeletal:         General: Normal range of motion.      Cervical back: Normal range of motion.   Skin:     General: Skin is warm and dry.   Neurological:      Mental Status: She is alert and oriented to person, place, and time.   Psychiatric:         Behavior: Behavior normal.              Results Review:    Lab Results (last 24 hours)       Procedure Component Value Units Date/Time    POC Glucose Once [[id number]]  (Normal) Collected:  06/13/24 1108    Specimen: Blood Updated: 06/13/24 1109     Glucose 85 mg/dL      Comment: Serial Number: [id number]:  [id number]       BAL Culture, Quantitative - Lavage, Lung, R [[id number]] Collected: 06/11/24 0750    Specimen: Lavage from Lung, R Updated: 06/13/24 1013     BAL Culture 25,000 CFU/mL Normal respiratory heidi. No S. aureus or Pseudomonas aeruginosa detected. Final report.     Gram Stain Few (2+) WBCs seen      Rare (1+) Gram positive cocci in pairs    Narrative:            POC Glucose Once [[id number]]  (Abnormal) Collected: 06/13/24 0709    Specimen: Blood Updated: 06/13/24 0710     Glucose 108 mg/dL      Comment: Serial Number: [id number]:  [id number]       POC Glucose Once [[id number]]  (Normal) Collected: 06/13/24 0452    Specimen: Blood Updated: 06/13/24 0454     Glucose 103 mg/dL      Comment: Serial Number: [id number]:  [id number]       Phosphorus [[id number]]  (Normal) Collected: 06/12/24 2301    Specimen: Blood Updated: 06/12/24 2345     Phosphorus 3.2 mg/dL     Comprehensive Metabolic Panel [[id number]]  (Abnormal) Collected: 06/12/24 2301    Specimen: Blood Updated: 06/12/24 2345     Glucose 79 mg/dL      BUN 42 mg/dL      Creatinine 4.80 mg/dL      Sodium 139 mmol/L      Potassium 5.6 mmol/L      Comment: Slight hemolysis detected by analyzer. Result may be falsely elevated.        Chloride 101 mmol/L      CO2 25.1 mmol/L      Calcium 8.7 mg/dL      Total Protein 7.1 g/dL      Albumin 3.9 g/dL      ALT (SGPT) 20 U/L      AST (SGOT) 29 U/L      Comment: Slight hemolysis detected by analyzer. Result may be falsely elevated.        Alkaline Phosphatase 98 U/L      Total Bilirubin 0.2 mg/dL      Globulin 3.2 gm/dL      A/G Ratio 1.2 g/dL      BUN/Creatinine Ratio 8.8     Anion Gap 12.9 mmol/L      eGFR 9.0 mL/min/1.73      Comment: <15 Indicative of kidney failure       Narrative:      GFR Normal >60  Chronic Kidney Disease <60  Kidney Failure <15    The GFR formula is only  valid for adults with stable renal function between ages 18 and 70.    CBC & Differential [[id number]]  (Abnormal) Collected: 06/12/24 2301    Specimen: Blood Updated: 06/12/24 2320    Narrative:      The following orders were created for panel order CBC & Differential.  Procedure                               Abnormality         Status                     ---------                               -----------         ------                     CBC Auto Differential[[id number]]        Abnormal            Final result                 Please view results for these tests on the individual orders.    CBC Auto Differential [[id number]]  (Abnormal) Collected: 06/12/24 2301    Specimen: Blood Updated: 06/12/24 2320     WBC 11.23 10*3/mm3      RBC 3.35 10*6/mm3      Hemoglobin 10.1 g/dL      Hematocrit 34.9 %      .2 fL      MCH 30.1 pg      MCHC 28.9 g/dL      RDW 15.4 %      RDW-SD 58.9 fl      MPV 10.3 fL      Platelets 353 10*3/mm3      Neutrophil % 81.7 %      Lymphocyte % 7.3 %      Monocyte % 8.8 %      Eosinophil % 0.2 %      Basophil % 0.2 %      Immature Grans % 1.8 %      Neutrophils, Absolute 9.18 10*3/mm3      Lymphocytes, Absolute 0.82 10*3/mm3      Monocytes, Absolute 0.99 10*3/mm3      Eosinophils, Absolute 0.02 10*3/mm3      Basophils, Absolute 0.02 10*3/mm3      Immature Grans, Absolute 0.20 10*3/mm3      nRBC 0.0 /100 WBC     POC Glucose Once [[id number]]  (Abnormal) Collected: 06/12/24 2009    Specimen: Blood Updated: 06/12/24 2011     Glucose 257 mg/dL      Comment: Serial Number: [id number]:  [id number]       Blood Culture - Blood, Arm, Right [[id number]]  (Normal) Collected: 06/08/24 1741    Specimen: Blood from Arm, Right Updated: 06/12/24 1800     Blood Culture No growth at 4 days    Blood Culture - Blood, Hand, Right [[id number]]  (Normal) Collected: 06/08/24 1717    Specimen: Blood from Hand, Right Updated: 06/12/24 1731     Blood Culture No growth at 4 days    Narrative:      Less than seven  "(7) mL's of blood was collected.  Insufficient quantity may yield false negative results.    POC Glucose Once [[id number]]  (Abnormal) Collected: 06/12/24 1641    Specimen: Blood Updated: 06/12/24 1642     Glucose 305 mg/dL      Comment: Serial Number: [id number]:  [id number]       Non-gynecologic Cytology [[id number]] Collected: 06/11/24 0750    Specimen: Lavage from Lung, R Updated: 06/12/24 1206     Case Report --     Medical Cytology Report                           Case: [id number]                                  Authorizing Provider:  Aramis Barragan MD             Collected:           06/11/2024 07:50 AM          Ordering Location:     [facility]  Received:            06/11/2024 10:23 AM                                 SUITES                                                                       Pathologist:           Layton Calabrese MD                                                            Specimen:    Lung, R                                                                                     Final Diagnosis --     Right lung, bronchial wash with smears and cytospin:  Acute inflammation, reactive but mature squamous cells, pulmonary macrophages, bronchial epithelial cells and Candida  No malignancy identified    SARA       Gross Description --     1. Lung, R.  Received in [facility] and designated \"Lung, R\" are 45 mL of green clear fluid. Particulate matter is present. This specimen is processed as per protocol.                   Imaging Results (Last 24 Hours)       ** No results found for the last 24 hours. **                 I reviewed the patient's new clinical results.    Medication Review:   Scheduled Meds:acetylcysteine, 2 mL, Nebulization, BID - RT  amLODIPine, 10 mg, Oral, Daily  budesonide, 0.5 mg, Nebulization, BID - RT  calcium acetate, 667 mg, Oral, TID  carvedilol, 3.125 mg, Oral, BID With Meals  cefTRIAXone, 1,000 mg, Intravenous, Q24H  enoxaparin, 30 mg, Subcutaneous, " Nightly  guaiFENesin, 1,200 mg, Oral, Q12H  insulin lispro, 10 Units, Subcutaneous, TID With Meals  insulin lispro, 2-9 Units, Subcutaneous, 4x Daily AC & at Bedtime  ipratropium-albuterol, 3 mL, Nebulization, 4x Daily - RT  pantoprazole, 40 mg, Oral, BID  predniSONE, 30 mg, Oral, Daily   Followed by  [START ON 6/15/2024] predniSONE, 20 mg, Oral, Daily   Followed by  [START ON 6/17/2024] predniSONE, 10 mg, Oral, Daily  sevelamer, 2,400 mg, Oral, TID With Meals  sodium bicarbonate, 1,300 mg, Oral, BID  sodium bicarbonate, 1,300 mg, Oral, TID  sodium chloride, 10 mL, Intravenous, Q12H  sodium zirconium cyclosilicate, 10 g, Oral, BID  sucralfate, 1 g, Oral, 4x Daily  temazepam, 30 mg, Oral, Nightly      Continuous Infusions:   PRN Meds:.  acetaminophen    benzonatate    senna-docusate sodium **AND** polyethylene glycol **AND** bisacodyl **AND** bisacodyl    dextrose    dextrose    glucagon (human recombinant)    LORazepam    nitroglycerin    ondansetron ODT **OR** ondansetron    oxyCODONE    sodium chloride    [COMPLETED] Insert Peripheral IV **AND** sodium chloride    sodium chloride    sodium chloride     Interval History:    Assessment & Plan     PNA (pneumonia)  -Clinically improved after relief of mucous plugging  -continue supportive care     Type 2 diabetes with hyperglycemia-reducing dose of steroids due to significant hyperglycemia  Anemia due to end-stage renal disease-EPO; transfuse if repeat confirms low hg  End-stage renal disease-routine dialysis, sodium bicarbonate, phosphate binders  Chronic insomnia-temazepam  Essential hypertension-amlodipine, carvedilol  Secondary hyperparathyroidism  GERD     Lovenox for DVT prophylaxis    Plan for disposition:Home tomorrow after HD    Cindy Santiago, MAEGAN  06/13/24  12:04 EDT

## 2024-06-13 NOTE — PLAN OF CARE
Goal Outcome Evaluation:              Outcome Evaluation: aand o x4, vital signs stable ra at 94% , no pain or discomfort. patient up ad arnoldo. potassium still high today being treated with medications. porssible home tomorrow after HD

## 2024-06-13 NOTE — PROGRESS NOTES
Daily Progress Note        PNA (pneumonia)    Assessment:    Right middle lobe pneumonia due to unspecified pathogen    bronchoscopy completed 6/11/2024  Very thick mucoid secretions suctioned therapeutically  Severe reactive airway disease with bronchospasm  Moderate inflammatory airway disease     COPD acute exacerbation  Pulmonary function test 10/19/2023.  FVC 2.1 L which is 90%  FEV1 1.69 L which is 95%, improved to 99% postbronchodilator  FEV1/FVC ratio 79  FEF 25-75% 104%  MVV 82%.  Total lung capacity 88%  Residual volume 80%.  Diffusion capacity 48%.    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 post radiation therapy November 2023    ESRD on hemodialysis Monday Wednesday Friday    Chronic anemia due to chronic kidney disease  CAD  HTN  HLD  DM  History of CVA  Former smoker: Quit 2016        Recommendations:    Mucomyst   Mucinex   Steroids wean  antibiotics 5 days of Rocephin to be completed on 6/14/2024  Oxygen supplement and titration to maintain saturation 90 to 95%  Bronchodilators  Inhaled corticosteroids    Mucinex  Encourage use of incentive spirometry     HD/electrolyte management as per nephrology  BP/glucose control  DVT/GI prophylaxis  Check daily labs and correct electrolytes as needed  I personally reviewed the radiological studies             LOS: 5 days     Subjective         Objective     Vital signs for last 24 hours:  Vitals:    06/13/24 0640 06/13/24 0645 06/13/24 0646 06/13/24 0650   BP:       BP Location:       Patient Position:       Pulse: 74 78 78 72   Resp: 15 18 15 12   Temp:       TempSrc:       SpO2: 95% 99% 97% 96%   Weight:       Height:           Intake/Output last 3 shifts:  I/O last 3 completed shifts:  In: 1440 [P.O.:1440]  Out: 2400   Intake/Output this shift:  No intake/output data recorded.      Radiology  Imaging Results (Last 24 Hours)       ** No results found for the last 24  hours. **            Labs:  Results from last 7 days   Lab Units 06/12/24  2301   WBC 10*3/mm3 11.23*   HEMOGLOBIN g/dL 10.1*   HEMATOCRIT % 34.9   PLATELETS 10*3/mm3 353     Results from last 7 days   Lab Units 06/12/24  2301   SODIUM mmol/L 139   POTASSIUM mmol/L 5.6*   CHLORIDE mmol/L 101   CO2 mmol/L 25.1   BUN mg/dL 42*   CREATININE mg/dL 4.80*   CALCIUM mg/dL 8.7   BILIRUBIN mg/dL 0.2   ALK PHOS U/L 98   ALT (SGPT) U/L 20   AST (SGOT) U/L 29   GLUCOSE mg/dL 79     Results from last 7 days   Lab Units 06/09/24  1458   PH, ARTERIAL pH units 7.195*   PO2 ART mm Hg 82.8*   PCO2, ARTERIAL mm Hg 61.6*   HCO3 ART mmol/L 23.8     Results from last 7 days   Lab Units 06/12/24  2301 06/12/24  0258 06/11/24  2115   ALBUMIN g/dL 3.9 3.9 4.0                               Meds:   SCHEDULE  acetylcysteine, 2 mL, Nebulization, BID - RT  amLODIPine, 10 mg, Oral, Daily  budesonide, 0.5 mg, Nebulization, BID - RT  calcium acetate, 667 mg, Oral, TID  carvedilol, 3.125 mg, Oral, BID With Meals  cefTRIAXone, 1,000 mg, Intravenous, Q24H  enoxaparin, 30 mg, Subcutaneous, Nightly  guaiFENesin, 1,200 mg, Oral, Q12H  insulin lispro, 10 Units, Subcutaneous, TID With Meals  insulin lispro, 2-9 Units, Subcutaneous, 4x Daily AC & at Bedtime  ipratropium-albuterol, 3 mL, Nebulization, 4x Daily - RT  methylPREDNISolone sodium succinate, 40 mg, Intravenous, Q24H  pantoprazole, 40 mg, Oral, BID  sevelamer, 2,400 mg, Oral, TID With Meals  sodium bicarbonate, 1,300 mg, Oral, BID  sodium bicarbonate, 1,300 mg, Oral, TID  sodium chloride, 10 mL, Intravenous, Q12H  sucralfate, 1 g, Oral, 4x Daily  temazepam, 30 mg, Oral, Nightly      Infusions     PRNs    acetaminophen    benzonatate    senna-docusate sodium **AND** polyethylene glycol **AND** bisacodyl **AND** bisacodyl    dextrose    dextrose    glucagon (human recombinant)    LORazepam    nitroglycerin    ondansetron ODT **OR** ondansetron    oxyCODONE    sodium chloride    [COMPLETED] Insert  Peripheral IV **AND** sodium chloride    sodium chloride    sodium chloride    Physical Exam:  Physical Exam  Cardiovascular:      Heart sounds: Murmur heard.      No gallop.   Pulmonary:      Effort: No respiratory distress.      Breath sounds: No stridor. Rhonchi and rales present. No wheezing.   Chest:      Chest wall: No tenderness.         ROS  Review of Systems   Respiratory:  Positive for cough and shortness of breath. Negative for wheezing and stridor.    Cardiovascular:  Negative for chest pain, palpitations and leg swelling.             Total time spent with patient greater than: 45 Minutes

## 2024-06-13 NOTE — PROGRESS NOTES
[facility] Progress Note      Patient Name: Sherry Lobato  : 1950  MRN: [id number]  Primary Care Physician:  Jin Malcolm PA-C  Date of admission: 2024    Subjective     Interval History:     Patient feeling better.  Dyspnea, cough improving.  Denies any chest pain, nausea or vomiting    Review of Systems:   As noted above    Objective     Vitals:   Temp:  [97.5 °F (36.4 °C)-98.4 °F (36.9 °C)] 98.2 °F (36.8 °C)  Heart Rate:  [71-88] 87  Resp:  [12-22] 14  BP: (104-177)/(50-88) 177/65  Flow (L/min):  [1] 1    Intake/Output Summary (Last 24 hours) at 2024 1035  Last data filed at 2024 0454  Gross per 24 hour   Intake 1200 ml   Output 2400 ml   Net -1200 ml       Physical Exam:    General Appearance:  NAD  HEENT: oral mucosa normal, nonicteric sclera  Neck: supple, no JVD  Lungs: Bilateral rhonchi and Wheezing  Heart: RRR, normal S1 and S2  Abdomen: soft, nondistended  Extremities: No edema   neuro: Awake alert and moving all extremities    Scheduled Meds:     acetylcysteine, 2 mL, Nebulization, BID - RT  amLODIPine, 10 mg, Oral, Daily  budesonide, 0.5 mg, Nebulization, BID - RT  calcium acetate, 667 mg, Oral, TID  carvedilol, 3.125 mg, Oral, BID With Meals  cefTRIAXone, 1,000 mg, Intravenous, Q24H  enoxaparin, 30 mg, Subcutaneous, Nightly  guaiFENesin, 1,200 mg, Oral, Q12H  insulin lispro, 10 Units, Subcutaneous, TID With Meals  insulin lispro, 2-9 Units, Subcutaneous, 4x Daily AC & at Bedtime  ipratropium-albuterol, 3 mL, Nebulization, 4x Daily - RT  pantoprazole, 40 mg, Oral, BID  predniSONE, 30 mg, Oral, Daily   Followed by  [START ON 6/15/2024] predniSONE, 20 mg, Oral, Daily   Followed by  [START ON 2024] predniSONE, 10 mg, Oral, Daily  sevelamer, 2,400 mg, Oral, TID With Meals  sodium bicarbonate, 1,300 mg, Oral, BID  sodium bicarbonate, 1,300 mg, Oral, TID  sodium chloride, 10 mL, Intravenous, Q12H  sucralfate, 1 g, Oral, 4x Daily  temazepam, 30  mg, Oral, Nightly      IV Meds:        Results Reviewed:   I have personally reviewed the results from the time of this admission to 6/13/2024 10:35 EDT     Results from last 7 days   Lab Units 06/12/24 2301 06/12/24 0258 06/11/24 2115   SODIUM mmol/L 139 138 139   POTASSIUM mmol/L 5.6* 5.5* 5.5*   CHLORIDE mmol/L 101 98 100   CO2 mmol/L 25.1 22.8 22.9   BUN mg/dL 42* 68* 62*   CREATININE mg/dL 4.80* 6.62* 5.98*   CALCIUM mg/dL 8.7 8.4* 8.5*   BILIRUBIN mg/dL 0.2 0.2 0.2   ALK PHOS U/L 98 100 100   ALT (SGPT) U/L 20 21 20   AST (SGOT) U/L 29 26 29   GLUCOSE mg/dL 79 105* 58*     Estimated Creatinine Clearance: 8.7 mL/min (A) (by C-G formula based on SCr of 4.8 mg/dL (H)).  Results from last 7 days   Lab Units 06/12/24 2301 06/12/24 0258 06/11/24  0431   PHOSPHORUS mg/dL 3.2 4.8* 4.4         Results from last 7 days   Lab Units 06/12/24 2301 06/12/24 0258 06/11/24 2115 06/11/24 0431 06/10/24  0113   WBC 10*3/mm3 11.23* 11.08* 10.24 8.86 13.31*   HEMOGLOBIN g/dL 10.1* 9.6* 9.6* 7.0* 9.2*   PLATELETS 10*3/mm3 353 393 358 237 335           Assessment / Plan     ASSESSMENT:    End-stage renal disease.  Patient gets dialysis Monday Wednesday and Friday as outpatient.    Hyperkalemia per morning labs.  Volume status okay  Acute respiratory failure.  Due to pneumonia and fluid overload.  Pulmonary following  Hypertension with chronic kidney disease.  Pressure okay   anemia in chronic kidney disease.  Hemoglobin better   lung cancer  Hyperkalemia.  Potassium 5.6 again    PLAN:    Dialysis F  Fluid removal with dialysis as tolerated  Started Lokelma for hyperkalemia  Repeat potassium level this afternoon      Faustino Buckley MD  06/13/24  10:35 EDT    [facility]  [phone]

## 2024-06-13 NOTE — PLAN OF CARE
Goal Outcome Evaluation:      Patient vss, a/ox4, no c/o pain or soa noted on RA, MWF HD BUE fistulas + bruit/thrill, IV rocephin continues with no ASE, afebrile, patient prefers to sleep in recliner vs bed.

## 2024-06-14 ENCOUNTER — READMISSION MANAGEMENT (OUTPATIENT)
Dept: CALL CENTER | Facility: HOSPITAL | Age: 74
End: 2024-06-14
Payer: MEDICARE

## 2024-06-14 VITALS
TEMPERATURE: 97.4 F | HEIGHT: 60 IN | OXYGEN SATURATION: 98 % | WEIGHT: 146.83 LBS | HEART RATE: 80 BPM | BODY MASS INDEX: 28.83 KG/M2 | SYSTOLIC BLOOD PRESSURE: 130 MMHG | DIASTOLIC BLOOD PRESSURE: 86 MMHG | RESPIRATION RATE: 16 BRPM

## 2024-06-14 PROBLEM — J18.9 PNEUMONIA, UNSPECIFIED ORGANISM: Status: ACTIVE | Noted: 2024-06-14

## 2024-06-14 LAB
GLUCOSE BLDC GLUCOMTR-MCNC: 152 MG/DL (ref 70–105)
GLUCOSE BLDC GLUCOMTR-MCNC: 84 MG/DL (ref 70–105)
GLUCOSE BLDC GLUCOMTR-MCNC: 88 MG/DL (ref 70–105)
P JIROVECII DNA L RESP QL NAA+NON-PROBE: NEGATIVE
REF LAB TEST METHOD: NORMAL

## 2024-06-14 PROCEDURE — 82948 REAGENT STRIP/BLOOD GLUCOSE: CPT | Performed by: FAMILY MEDICINE

## 2024-06-14 PROCEDURE — 94799 UNLISTED PULMONARY SVC/PX: CPT

## 2024-06-14 PROCEDURE — 63710000001 PREDNISONE PER 5 MG: Performed by: INTERNAL MEDICINE

## 2024-06-14 PROCEDURE — 63710000001 INSULIN LISPRO (HUMAN) PER 5 UNITS: Performed by: INTERNAL MEDICINE

## 2024-06-14 PROCEDURE — 94761 N-INVAS EAR/PLS OXIMETRY MLT: CPT

## 2024-06-14 PROCEDURE — 94664 DEMO&/EVAL PT USE INHALER: CPT

## 2024-06-14 PROCEDURE — 82948 REAGENT STRIP/BLOOD GLUCOSE: CPT

## 2024-06-14 PROCEDURE — 94660 CPAP INITIATION&MGMT: CPT

## 2024-06-14 PROCEDURE — 63710000001 PREDNISONE PER 1 MG: Performed by: INTERNAL MEDICINE

## 2024-06-14 RX ORDER — BENZONATATE 200 MG/1
200 CAPSULE ORAL 3 TIMES DAILY PRN
Qty: 90 CAPSULE | Refills: 0 | Status: SHIPPED | OUTPATIENT
Start: 2024-06-14

## 2024-06-14 RX ORDER — SUCRALFATE 1 G/1
1 TABLET ORAL 4 TIMES DAILY
Qty: 120 TABLET | Refills: 1 | Status: SHIPPED | OUTPATIENT
Start: 2024-06-14

## 2024-06-14 RX ORDER — SODIUM ZIRCONIUM CYCLOSILICATE 10 G/10G
1 POWDER, FOR SUSPENSION ORAL DAILY
Qty: 30 PACKET | Refills: 1 | Status: SHIPPED | OUTPATIENT
Start: 2024-06-14

## 2024-06-14 RX ORDER — SEVELAMER CARBONATE 800 MG/1
2400 TABLET, FILM COATED ORAL
Start: 2024-06-14

## 2024-06-14 RX ORDER — IPRATROPIUM BROMIDE AND ALBUTEROL SULFATE 2.5; .5 MG/3ML; MG/3ML
3 SOLUTION RESPIRATORY (INHALATION) EVERY 4 HOURS PRN
Status: DISCONTINUED | OUTPATIENT
Start: 2024-06-14 | End: 2024-06-14 | Stop reason: HOSPADM

## 2024-06-14 RX ADMIN — AMLODIPINE BESYLATE 10 MG: 5 TABLET ORAL at 12:37

## 2024-06-14 RX ADMIN — SUCRALFATE 1 G: 1 TABLET ORAL at 08:40

## 2024-06-14 RX ADMIN — PREDNISONE 30 MG: 20 TABLET ORAL at 08:41

## 2024-06-14 RX ADMIN — SODIUM BICARBONATE 1300 MG: 650 TABLET ORAL at 08:40

## 2024-06-14 RX ADMIN — SEVELAMER CARBONATE 2400 MG: 800 TABLET, FILM COATED ORAL at 12:51

## 2024-06-14 RX ADMIN — Medication 10 ML: at 08:47

## 2024-06-14 RX ADMIN — GUAIFENESIN 1200 MG: 600 TABLET, EXTENDED RELEASE ORAL at 08:41

## 2024-06-14 RX ADMIN — SEVELAMER CARBONATE 2400 MG: 800 TABLET, FILM COATED ORAL at 08:41

## 2024-06-14 RX ADMIN — CALCIUM ACETATE 667 MG: 667 CAPSULE ORAL at 08:41

## 2024-06-14 RX ADMIN — PANTOPRAZOLE SODIUM 40 MG: 40 TABLET, DELAYED RELEASE ORAL at 08:40

## 2024-06-14 RX ADMIN — CARVEDILOL 3.12 MG: 3.12 TABLET, FILM COATED ORAL at 12:37

## 2024-06-14 RX ADMIN — SUCRALFATE 1 G: 1 TABLET ORAL at 12:00

## 2024-06-14 RX ADMIN — IPRATROPIUM BROMIDE AND ALBUTEROL SULFATE 3 ML: .5; 3 SOLUTION RESPIRATORY (INHALATION) at 11:24

## 2024-06-14 RX ADMIN — IPRATROPIUM BROMIDE AND ALBUTEROL SULFATE 3 ML: .5; 3 SOLUTION RESPIRATORY (INHALATION) at 09:31

## 2024-06-14 RX ADMIN — SODIUM ZIRCONIUM CYCLOSILICATE 10 G: 10 POWDER, FOR SUSPENSION ORAL at 08:43

## 2024-06-14 RX ADMIN — INSULIN LISPRO 10 UNITS: 100 INJECTION, SOLUTION INTRAVENOUS; SUBCUTANEOUS at 08:42

## 2024-06-14 NOTE — OUTREACH NOTE
Prep Survey      Flowsheet Row Responses   [facility] patient discharged from? [facility]   Is LACE score < 7 ? No   Eligibility Readm Mgmt   Discharge diagnosis PNA (pneumonia)   Does the patient have one of the following disease processes/diagnoses(primary or secondary)? Pneumonia   Prep survey completed? Yes            Jami CRESPO - Registered Nurse

## 2024-06-14 NOTE — PLAN OF CARE
Goal Outcome Evaluation:         Patient with no c/o pain or soa noted, VSS with O2 weaned to RA, scheduled for HD in am.

## 2024-06-14 NOTE — NURSING NOTE
73-year-old female presents to the hospital with pneumonia.  A consult was received to assess the patient sacral area.  Patient reports that the area has been present for years.  No topical treatment is eased that she has bought multiple chair cushions which has not helped it.  At this time.  Patient does not have anything open but there is scar tissue there and at callus-like area from where she has been sitting on it.  Would recommend zinc oxide and a waffle chair cushion.  Continue with pressure injury prevention strategies.  
HD treatment complete, patient refused to do 3.5hrs, says that she always does 3hrs. Removed 2000ml, bp stable. Report given to lamont BRADLEY. Blood returned and needles pulled. Nothing more to report.  
Pt arrived to room 2124 from [facility].  Report received at bedside from Deann.  Pt on AVAP at 80%.  Pt anxious and restless.  Dialysis RN here and setting up.  Oriented to room  call light in reach   
Pt. appeared anxious and reported SOA. Pt. had 2L NC on while up in chair satting 93-94%. Pt. Became more anxious stating she couldn't breath, HR increased and O2 decreased. Rapid called. Pt. Transferred to PCU room 2124. S/O, Ari, notified via telephone. Bedside report given to KIRK Navas.  
Report received from KIRK Roa. Verbal consent obtained from patient. Machine & water checks passed. Orders verified. AVF is intact, +bruit, +thrill. Tolerated cannulation well. Treatment started without difficulty. Education given.   
Treatment ended. Blood returned. Access is intact, +bruit, +thrill. Pressure dressing clean, dry, intact. Education given. Report given to primary RN.   BVP= 76.9  UF = 3.9L  
Treatment ended. Needles removed. Blood returned. Access is intact, + bruit, +thrill. Pressure dressing in place, clean, dry, intact. Patient is Aox4, has no complaints at this time. Education given. Report given to floor RN.   
73.9

## 2024-06-14 NOTE — PROGRESS NOTES
[facility] Progress Note      Patient Name: Sherry Lobato  : 1950  MRN: [id number]  Primary Care Physician:  Jin Malcolm PA-C  Date of admission: 2024    Subjective     Interval History:     Patient feeling better.  Dyspnea, cough improving.  Denies any chest pain, nausea or vomiting    Review of Systems:   As noted above    Objective     Vitals:   Temp:  [97.4 °F (36.3 °C)-97.9 °F (36.6 °C)] 97.4 °F (36.3 °C)  Heart Rate:  [73-85] 73  Resp:  [11-20] 15  BP: (100-155)/(54-93) 135/73    Intake/Output Summary (Last 24 hours) at 2024 1027  Last data filed at 2024 0800  Gross per 24 hour   Intake 960 ml   Output --   Net 960 ml       Physical Exam:    General Appearance:  NAD  HEENT: oral mucosa normal, nonicteric sclera  Neck: supple, no JVD  Lungs: Bilateral rhonchi and Wheezing  Heart: RRR, normal S1 and S2  Abdomen: soft, nondistended  Extremities: No edema   neuro: Awake alert and moving all extremities    Scheduled Meds:     amLODIPine, 10 mg, Oral, Daily  calcium acetate, 667 mg, Oral, TID  carvedilol, 3.125 mg, Oral, BID With Meals  enoxaparin, 30 mg, Subcutaneous, Nightly  guaiFENesin, 1,200 mg, Oral, Q12H  insulin lispro, 10 Units, Subcutaneous, TID With Meals  insulin lispro, 2-9 Units, Subcutaneous, 4x Daily AC & at Bedtime  ipratropium-albuterol, 3 mL, Nebulization, 4x Daily - RT  pantoprazole, 40 mg, Oral, BID  sevelamer, 2,400 mg, Oral, TID With Meals  sodium bicarbonate, 1,300 mg, Oral, TID  sodium chloride, 10 mL, Intravenous, Q12H  sodium zirconium cyclosilicate, 10 g, Oral, BID  sucralfate, 1 g, Oral, 4x Daily  temazepam, 30 mg, Oral, Nightly      IV Meds:        Results Reviewed:   I have personally reviewed the results from the time of this admission to 2024 10:27 EDT     Results from last 7 days   Lab Units 24  2256 24  1502 24  2301 24  0258   SODIUM mmol/L 138  --  139 138   POTASSIUM mmol/L 5.3* 5.4*  5.6* 5.5*   CHLORIDE mmol/L 98  --  101 98   CO2 mmol/L 23.0  --  25.1 22.8   BUN mg/dL 60*  --  42* 68*   CREATININE mg/dL 6.33*  --  4.80* 6.62*   CALCIUM mg/dL 8.3*  --  8.7 8.4*   BILIRUBIN mg/dL 0.2  --  0.2 0.2   ALK PHOS U/L 91  --  98 100   ALT (SGPT) U/L 23  --  20 21   AST (SGOT) U/L 26  --  29 26   GLUCOSE mg/dL 159*  --  79 105*     Estimated Creatinine Clearance: 6.6 mL/min (A) (by C-G formula based on SCr of 6.33 mg/dL (H)).  Results from last 7 days   Lab Units 06/13/24 2256 06/12/24 2301 06/12/24  0258   PHOSPHORUS mg/dL 3.3 3.2 4.8*         Results from last 7 days   Lab Units 06/13/24 2256 06/12/24 2301 06/12/24  0258 06/11/24  2115 06/11/24  0431   WBC 10*3/mm3 10.17 11.23* 11.08* 10.24 8.86   HEMOGLOBIN g/dL 9.5* 10.1* 9.6* 9.6* 7.0*   PLATELETS 10*3/mm3 330 353 393 358 237           Assessment / Plan     ASSESSMENT:    End-stage renal disease.  Patient gets dialysis Monday Wednesday and Friday as outpatient.    Hyperkalemia per morning labs.  Volume status okay  Acute respiratory failure.  Due to pneumonia and fluid overload.  Pulmonary following  Hypertension with chronic kidney disease.  Pressure okay   anemia in chronic kidney disease.  lung cancer  Hyperkalemia.  Mild    PLAN:    Hemodialysis today  Fluid removal with dialysis as tolerated  Continue Lokelma 10 g daily  Patient to resume outpatient dialysis on discharge.  Counseled in detail regarding dietary restriction and to take phosphorus binders regularly  Advised smoking cessation      Faustino Buckley MD  06/14/24  10:27 EDT    [facility] \[facility]\""  [phone]

## 2024-06-14 NOTE — CASE MANAGEMENT/SOCIAL WORK
Continued Stay Note  LANI Sepulveda     Patient Name: Sherry Lobato  MRN: [id number]  Today's Date: 6/14/2024    Admit Date: 6/8/2024    Plan: Routine home. Current OP HD MWF [facility]   Discharge Plan       Row Name 06/14/24 1219       Plan    Patient/Family in Agreement with Plan yes    Plan Comments CM confirmed patient is on room air, no need for home O2. D/c orders noted, no further needs at this time.             Expected Discharge Date and Time       Expected Discharge Date Expected Discharge Time    Jun 14, 2024        Isadora Contreras RN     [phone]  [email or web address]

## 2024-06-14 NOTE — DISCHARGE SUMMARY
Date of Discharge:  6/14/2024    Discharge Diagnosis:   PNA (pneumonia)   Type 2 diabetes    Hyperglycemia   Anemia due to end-stage renal disease   End-stage renal disease   Chronic insomnia   Essential hypertension   Secondary hyperparathyroidism  GERD    Presenting Problem/History of Present Illness  Active Hospital Problems    Diagnosis  POA    **PNA (pneumonia) [J18.9]  Yes    Pneumonia, unspecified organism [J18.9]  Yes      Resolved Hospital Problems   No resolved problems to display.          Hospital Course    Patient is a 73 y.o. female presented with ESRD on HD,  COPD, adenocarcinoma of the lung, CAD,  HTN, HLD, renal cell carcinoma and prior stroke who presented 6/8/24 with complaints of shortness of breath and cough for 1 week. She had developed increasing shortness of breath and a rapid was called.  Chest x-ray revealed right middle lobe pneumonia, cardiomegaly, pulmonary edema, small bilateral effusion greater on the right side. She was treated with antibiotics, supportive care, and HD. Nephrology following and she was started on lokelma for hyperkalemia.  She has improved and is back to baseline. She will need to follow up with PCP in 2 weeks, continue HD, and follow up with nephrology.       Procedures Performed    Procedure(s):  BRONCHOSCOPY WITH RIGHT LUNG WASHING  -------------------       Consults:   Consults       Date and Time Order Name Status Description    6/9/2024 10:53 AM Inpatient Pulmonology Consult Completed     6/8/2024  4:59 PM Inpatient Nephrology Consult Completed     6/8/2024  4:51 PM Family Medicine Consult              Pertinent Test Results:    Lab Results (most recent)       Procedure Component Value Units Date/Time    POC Glucose Once [[id number]]  (Normal) Collected: 06/14/24 0653    Specimen: Blood Updated: 06/14/24 0655     Glucose 84 mg/dL      Comment: Serial Number: [id number]:  [id number]       Virus Culture - Lavage, Lung, R [[id number]] Collected: 06/11/24 0754     Specimen: Lavage from Lung, R Updated: 06/14/24 0209     Viral Culture, General Comment     Comment: Preliminary Report:  No virus isolated at 24 hours. Next report to follow after 4 days.       Narrative:      Performed at:  [address] [place] [address]  [id number]  : Marge Frank MD, Phone:  [phone]    Comprehensive Metabolic Panel [[id number]]  (Abnormal) Collected: 06/13/24 2256    Specimen: Blood Updated: 06/13/24 2331     Glucose 159 mg/dL      BUN 60 mg/dL      Creatinine 6.33 mg/dL      Sodium 138 mmol/L      Potassium 5.3 mmol/L      Comment: Slight hemolysis detected by analyzer. Result may be falsely elevated.        Chloride 98 mmol/L      CO2 23.0 mmol/L      Calcium 8.3 mg/dL      Total Protein 6.6 g/dL      Albumin 3.6 g/dL      ALT (SGPT) 23 U/L      AST (SGOT) 26 U/L      Comment: Slight hemolysis detected by analyzer. Result may be falsely elevated.        Alkaline Phosphatase 91 U/L      Total Bilirubin 0.2 mg/dL      Globulin 3.0 gm/dL      A/G Ratio 1.2 g/dL      BUN/Creatinine Ratio 9.5     Anion Gap 17.0 mmol/L      eGFR 6.5 mL/min/1.73      Comment: <15 Indicative of kidney failure       Narrative:      GFR Normal >60  Chronic Kidney Disease <60  Kidney Failure <15    The GFR formula is only valid for adults with stable renal function between ages 18 and 70.    Phosphorus [[id number]]  (Normal) Collected: 06/13/24 2256    Specimen: Blood Updated: 06/13/24 2328     Phosphorus 3.3 mg/dL     CBC & Differential [[id number]]  (Abnormal) Collected: 06/13/24 2256    Specimen: Blood Updated: 06/13/24 2302    Narrative:      The following orders were created for panel order CBC & Differential.  Procedure                               Abnormality         Status                     ---------                               -----------         ------                     CBC Auto Differential[[id number]]        Abnormal            Final result                  Please view results for these tests on the individual orders.    CBC Auto Differential [[id number]]  (Abnormal) Collected: 06/13/24 2256    Specimen: Blood Updated: 06/13/24 2302     WBC 10.17 10*3/mm3      RBC 3.09 10*6/mm3      Hemoglobin 9.5 g/dL      Hematocrit 31.4 %      .6 fL      MCH 30.7 pg      MCHC 30.3 g/dL      RDW 15.4 %      RDW-SD 57.3 fl      MPV 10.5 fL      Platelets 330 10*3/mm3      Neutrophil % 82.6 %      Lymphocyte % 7.6 %      Monocyte % 7.9 %      Eosinophil % 0.1 %      Basophil % 0.2 %      Immature Grans % 1.6 %      Neutrophils, Absolute 8.41 10*3/mm3      Lymphocytes, Absolute 0.77 10*3/mm3      Monocytes, Absolute 0.80 10*3/mm3      Eosinophils, Absolute 0.01 10*3/mm3      Basophils, Absolute 0.02 10*3/mm3      Immature Grans, Absolute 0.16 10*3/mm3      nRBC 0.0 /100 WBC     POC Glucose Once [[id number]]  (Abnormal) Collected: 06/13/24 1912    Specimen: Blood Updated: 06/13/24 1913     Glucose 306 mg/dL      Comment: Serial Number: [id number]:  [id number]       Blood Culture - Blood, Arm, Right [[id number]]  (Normal) Collected: 06/08/24 1741    Specimen: Blood from Arm, Right Updated: 06/13/24 1800     Blood Culture No growth at 5 days    Blood Culture - Blood, Hand, Right [[id number]]  (Normal) Collected: 06/08/24 1717    Specimen: Blood from Hand, Right Updated: 06/13/24 1732     Blood Culture No growth at 5 days    Narrative:      Less than seven (7) mL's of blood was collected.  Insufficient quantity may yield false negative results.    Potassium [[id number]]  (Abnormal) Collected: 06/13/24 1502    Specimen: Blood from Hand, Right Updated: 06/13/24 1532     Potassium 5.4 mmol/L      Comment: Specimen hemolyzed.  Result may be falsely elevated.       BAL Culture, Quantitative - Lavage, Lung, R [[id number]] Collected: 06/11/24 0750    Specimen: Lavage from Lung, R Updated: 06/13/24 1013     BAL Culture 25,000 CFU/mL Normal respiratory heidi. No S. aureus or Pseudomonas  aeruginosa detected. Final report.     Gram Stain Few (2+) WBCs seen      Rare (1+) Gram positive cocci in pairs    Narrative:            Phosphorus [[id number]]  (Normal) Collected: 06/12/24 2301    Specimen: Blood Updated: 06/12/24 2345     Phosphorus 3.2 mg/dL     Comprehensive Metabolic Panel [[id number]]  (Abnormal) Collected: 06/12/24 2301    Specimen: Blood Updated: 06/12/24 2345     Glucose 79 mg/dL      BUN 42 mg/dL      Creatinine 4.80 mg/dL      Sodium 139 mmol/L      Potassium 5.6 mmol/L      Comment: Slight hemolysis detected by analyzer. Result may be falsely elevated.        Chloride 101 mmol/L      CO2 25.1 mmol/L      Calcium 8.7 mg/dL      Total Protein 7.1 g/dL      Albumin 3.9 g/dL      ALT (SGPT) 20 U/L      AST (SGOT) 29 U/L      Comment: Slight hemolysis detected by analyzer. Result may be falsely elevated.        Alkaline Phosphatase 98 U/L      Total Bilirubin 0.2 mg/dL      Globulin 3.2 gm/dL      A/G Ratio 1.2 g/dL      BUN/Creatinine Ratio 8.8     Anion Gap 12.9 mmol/L      eGFR 9.0 mL/min/1.73      Comment: <15 Indicative of kidney failure       Narrative:      GFR Normal >60  Chronic Kidney Disease <60  Kidney Failure <15    The GFR formula is only valid for adults with stable renal function between ages 18 and 70.    CBC & Differential [[id number]]  (Abnormal) Collected: 06/12/24 2301    Specimen: Blood Updated: 06/12/24 2320    Narrative:      The following orders were created for panel order CBC & Differential.  Procedure                               Abnormality         Status                     ---------                               -----------         ------                     CBC Auto Differential[[id number]]        Abnormal            Final result                 Please view results for these tests on the individual orders.    CBC Auto Differential [[id number]]  (Abnormal) Collected: 06/12/24 2301    Specimen: Blood Updated: 06/12/24 2320     WBC 11.23 10*3/mm3      RBC 3.35  "10*6/mm3      Hemoglobin 10.1 g/dL      Hematocrit 34.9 %      .2 fL      MCH 30.1 pg      MCHC 28.9 g/dL      RDW 15.4 %      RDW-SD 58.9 fl      MPV 10.3 fL      Platelets 353 10*3/mm3      Neutrophil % 81.7 %      Lymphocyte % 7.3 %      Monocyte % 8.8 %      Eosinophil % 0.2 %      Basophil % 0.2 %      Immature Grans % 1.8 %      Neutrophils, Absolute 9.18 10*3/mm3      Lymphocytes, Absolute 0.82 10*3/mm3      Monocytes, Absolute 0.99 10*3/mm3      Eosinophils, Absolute 0.02 10*3/mm3      Basophils, Absolute 0.02 10*3/mm3      Immature Grans, Absolute 0.20 10*3/mm3      nRBC 0.0 /100 WBC     Non-gynecologic Cytology [[id number]] Collected: 06/11/24 0750    Specimen: Lavage from Lung, R Updated: 06/12/24 1206     Case Report --     Medical Cytology Report                           Case: [id number]                                  Authorizing Provider:  Aramis Barragan MD             Collected:           06/11/2024 07:50 AM          Ordering Location:     [facility]  Received:            06/11/2024 10:23 AM                                 SUITES                                                                       Pathologist:           Layton Calabrese MD                                                            Specimen:    Lung, R                                                                                     Final Diagnosis --     Right lung, bronchial wash with smears and cytospin:  Acute inflammation, reactive but mature squamous cells, pulmonary macrophages, bronchial epithelial cells and Candida  No malignancy identified    SARA       Gross Description --     1. Lung, R.  [facility] \"Lung, R\" are 45 mL of green clear fluid. Particulate matter is present. This specimen is processed as per protocol.          AFB Culture - Lavage, Lung, R [[id number]] Collected: 06/11/24 0750    Specimen: Lavage from Lung, R Updated: 06/12/24 1142     AFB Stain No acid fast bacilli " seen on concentrated smear    Calcium, Ionized [[id number]]  (Abnormal) Collected: 06/12/24 0748    Specimen: Blood Updated: 06/12/24 0803     Ionized Calcium 1.03 mmol/L     Respiratory Panel PCR w/COVID-19(SARS-CoV-2) [facility] In-House, NP Swab in UTM/VTM, 2 HR TAT - Lavage, Lung, R [[id number]]  (Normal) Collected: 06/11/24 0750    Specimen: Lavage from Lung, R Updated: 06/11/24 0958     ADENOVIRUS, PCR Not Detected     Coronavirus 229E Not Detected     Coronavirus HKU1 Not Detected     Coronavirus NL63 Not Detected     Coronavirus OC43 Not Detected     COVID19 Not Detected     Human Metapneumovirus Not Detected     Human Rhinovirus/Enterovirus Not Detected     Influenza A PCR Not Detected     Influenza B PCR Not Detected     Parainfluenza Virus 1 Not Detected     Parainfluenza Virus 2 Not Detected     Parainfluenza Virus 3 Not Detected     Parainfluenza Virus 4 Not Detected     RSV, PCR Not Detected     Bordetella pertussis pcr Not Detected     Bordetella parapertussis PCR Not Detected     Chlamydophila pneumoniae PCR Not Detected     Mycoplasma pneumo by PCR Not Detected    Narrative:      In the setting of a positive respiratory panel with a viral infection PLUS a negative procalcitonin without other underlying concern for bacterial infection, consider observing off antibiotics or discontinuation of antibiotics and continue supportive care. If the respiratory panel is positive for atypical bacterial infection (Bordetella pertussis, Chlamydophila pneumoniae, or Mycoplasma pneumoniae), consider antibiotic de-escalation to target atypical bacterial infection.    Pneumocystis PCR - Lavage, Lung, R [[id number]] Collected: 06/11/24 0750    Specimen: Lavage from Lung, R Updated: 06/11/24 0905    Fungus Culture - Lavage, Lung, R [[id number]] Collected: 06/11/24 0750    Specimen: Lavage from Lung, R Updated: 06/11/24 0905    Iron Profile [[id number]]  (Abnormal) Collected: 06/11/24 0431    Specimen: Blood  Updated: 06/11/24 0857     Iron 115 mcg/dL      Iron Saturation (TSAT) 67 %      Transferrin 116 mg/dL      TIBC 173 mcg/dL     Renal Function Panel [[id number]]  (Abnormal) Collected: 06/11/24 0431    Specimen: Blood Updated: 06/11/24 0517     Glucose 128 mg/dL      BUN 35 mg/dL      Creatinine 4.10 mg/dL      Sodium 139 mmol/L      Potassium 4.5 mmol/L      Chloride 105 mmol/L      CO2 19.7 mmol/L      Calcium 8.0 mg/dL      Albumin 3.5 g/dL      Phosphorus 4.4 mg/dL      Anion Gap 14.3 mmol/L      BUN/Creatinine Ratio 8.5     eGFR 10.9 mL/min/1.73      Comment: <15 Indicative of kidney failure       Narrative:      GFR Normal >60  Chronic Kidney Disease <60  Kidney Failure <15    The GFR formula is only valid for adults with stable renal function between ages 18 and 70.    CBC (No Diff) [[id number]]  (Abnormal) Collected: 06/11/24 0431    Specimen: Blood Updated: 06/11/24 0501     WBC 8.86 10*3/mm3      RBC 2.27 10*6/mm3      Hemoglobin 7.0 g/dL      Comment: Result checked          Hematocrit 24.0 %      .7 fL      MCH 30.8 pg      MCHC 29.2 g/dL      RDW 15.5 %      RDW-SD 59.5 fl      MPV 10.0 fL      Platelets 237 10*3/mm3     Hepatitis B Surface Antigen [[id number]]  (Normal) Collected: 06/09/24 1717    Specimen: Blood Updated: 06/10/24 1044     Hepatitis B Surface Ag Non-Reactive    Basic Metabolic Panel [[id number]]  (Abnormal) Collected: 06/10/24 0113    Specimen: Blood Updated: 06/10/24 0207     Glucose 124 mg/dL      BUN 44 mg/dL      Creatinine 5.54 mg/dL      Sodium 138 mmol/L      Potassium 5.1 mmol/L      Comment: Specimen hemolyzed.  Result may be falsely elevated.        Chloride 104 mmol/L      CO2 19.4 mmol/L      Calcium 8.1 mg/dL      BUN/Creatinine Ratio 7.9     Anion Gap 14.6 mmol/L      eGFR 7.6 mL/min/1.73      Comment: <15 Indicative of kidney failure       Narrative:      GFR Normal >60  Chronic Kidney Disease <60  Kidney Failure <15    The GFR formula is only valid for adults with  stable renal function between ages 18 and 70.    CBC (No Diff) [[id number]]  (Abnormal) Collected: 06/10/24 0113    Specimen: Blood Updated: 06/10/24 0121     WBC 13.31 10*3/mm3      RBC 3.03 10*6/mm3      Hemoglobin 9.2 g/dL      Hematocrit 32.6 %      .6 fL      MCH 30.4 pg      MCHC 28.2 g/dL      RDW 15.7 %      RDW-SD 62.3 fl      MPV 9.8 fL      Platelets 335 10*3/mm3     Blood Gas, Arterial - [[id number]]  (Abnormal) Collected: 06/09/24 1458    Specimen: Arterial Blood Updated: 06/09/24 1552     Site Left Radial     Cristóbal's Test Positive     pH, Arterial 7.195 pH units      pCO2, Arterial 61.6 mm Hg      pO2, Arterial 82.8 mm Hg      HCO3, Arterial 23.8 mmol/L      Base Excess, Arterial -5.2 mmol/L      Comment: Serial Number: [id number]:  [id number]        O2 Saturation, Arterial 92.8 %      CO2 Content 25.7 mmol/L      Barometric Pressure for Blood Gas --     Comment: N/A        Modality Cannula     Hemodilution No    POC H&H [[id number]]  (Abnormal) Collected: 06/09/24 1458    Specimen: Arterial Blood Updated: 06/09/24 1501     Hemoglobin 12.5 g/dL      Comment: Serial Number: [id number]:  [id number]        Hematocrit 37 %     Basic Metabolic Panel [[id number]]  (Abnormal) Collected: 06/09/24 0231    Specimen: Blood from Arm, Right Updated: 06/09/24 0303     Glucose 253 mg/dL      BUN 46 mg/dL      Creatinine 6.78 mg/dL      Sodium 140 mmol/L      Potassium 5.3 mmol/L      Comment: Specimen hemolyzed.  Result may be falsely elevated.        Chloride 102 mmol/L      CO2 20.3 mmol/L      Calcium 9.1 mg/dL      BUN/Creatinine Ratio 6.8     Anion Gap 17.7 mmol/L      eGFR 6.0 mL/min/1.73      Comment: <15 Indicative of kidney failure       Narrative:      GFR Normal >60  Chronic Kidney Disease <60  Kidney Failure <15    The GFR formula is only valid for adults with stable renal function between ages 18 and 70.    POC Lactate [[id number]]  (Normal) Collected: 06/08/24 1720    Specimen: Blood Updated: 06/08/24  1721     Lactate 1.2 mmol/L      Comment: Serial Number: [id number]:  [id number]       BNP [[id number]]  (Abnormal) Collected: 06/08/24 1507    Specimen: Blood Updated: 06/08/24 1601     proBNP 44,204.0 pg/mL     Narrative:      This assay is used as an aid in the diagnosis of individuals suspected of having heart failure. It can be used as an aid in the diagnosis of acute decompensated heart failure (ADHF) in patients presenting with signs and symptoms of ADHF to the emergency department (ED). In addition, NT-proBNP of <300 pg/mL indicates ADHF is not likely.    Age Range Result Interpretation  NT-proBNP Concentration (pg/mL:      <50             Positive            >450                   Gray                 300-450                    Negative             <300    50-75           Positive            >900                  Gray                300-900                  Negative            <300      >75             Positive            >1800                  Gray                300-1800                  Negative            <300    [place] Draw [[id number]] Collected: 06/08/24 1507    Specimen: Blood Updated: 06/08/24 1515    Narrative:      The following orders were created for panel order [place] Draw.  Procedure                               Abnormality         Status                     ---------                               -----------         ------                     Green Top (Gel)[[id number]]                                  Final result               Lavender Top[[id number]]                                     Final result               Gold Top - SST[[id number]]                                   Final result               Light Blue Top[[id number]]                                   Final result                 Please view results for these tests on the individual orders.    Green Top (Gel) [[id number]] Collected: 06/08/24 1507    Specimen: Blood Updated: 06/08/24 1515     Extra Tube Hold for add-ons.      Comment: Auto resulted.       Lavender Top [[id number]] Collected: 06/08/24 1507    Specimen: Blood Updated: 06/08/24 1515     Extra Tube hold for add-on     Comment: Auto resulted       Gold Top - SST [[id number]] Collected: 06/08/24 1507    Specimen: Blood Updated: 06/08/24 1515     Extra Tube Hold for add-ons.     Comment: Auto resulted.       Light Blue Top [[id number]] Collected: 06/08/24 1507    Specimen: Blood Updated: 06/08/24 1515     Extra Tube Hold for add-ons.     Comment: Auto resulted                Results for orders placed during the hospital encounter of 07/25/21    Adult Transthoracic Echo Complete W/ Cont if Necessary Per Protocol    Interpretation Summary  Normal LV size and contractility EF of 60%  Normal RV size  Normal atrial size  Aortic valve, mitral valve, tricuspid valve appears structurally normal, mild mitral regurgitation  seen.  No pericardial effusion seen.  Proximal aorta appears normal in size.       Condition on Discharge:  Stable    Vital Signs  Temp:  [97.4 °F (36.3 °C)-97.9 °F (36.6 °C)] 97.4 °F (36.3 °C)  Heart Rate:  [73-85] 73  Resp:  [11-20] 15  BP: (100-155)/(54-93) 135/73      Physical Exam  Vitals reviewed.   Constitutional:       Appearance: She is not ill-appearing.   HENT:      Head: Normocephalic and atraumatic.      Right Ear: External ear normal.      Left Ear: External ear normal.      Nose: Nose normal.      Mouth/Throat:      Mouth: Mucous membranes are moist.   Eyes:      General:         Right eye: No discharge.         Left eye: No discharge.   Cardiovascular:      Rate and Rhythm: Normal rate and regular rhythm.      Pulses: Normal pulses.      Heart sounds: Normal heart sounds.   Pulmonary:      Effort: Pulmonary effort is normal.      Breath sounds: Normal breath sounds.   Abdominal:      General: Bowel sounds are normal.      Palpations: Abdomen is soft.   Musculoskeletal:         General: Normal range of motion.      Cervical back: Normal range of motion.    Skin:     General: Skin is warm and dry.   Neurological:      Mental Status: She is alert and oriented to person, place, and time.   Psychiatric:         Behavior: Behavior normal.              Discharge Disposition  Home or Self Care    Discharge Medications     Discharge Medications        New Medications        Instructions Start Date   Lokelma 10 g packet  Generic drug: sodium zirconium cyclosilicate   10 g, Oral, Daily, Empty entire contents of the packet(s) into a glass with at least 3 tablespoons (45 mL) of water. Stir well and drink immediately; if powder remains in the glass, add water, stir and drink immediately; repeat until no powder remains. Administer other oral medications at least 2 hours before or 2 hours after dose.             Continue These Medications        Instructions Start Date   albuterol sulfate  (90 Base) MCG/ACT inhaler  Commonly known as: PROVENTIL HFA;VENTOLIN HFA;PROAIR HFA   2 puffs, Inhalation, Every 4 Hours PRN      amLODIPine 10 MG tablet  Commonly known as: NORVASC   10 mg, Oral, Daily      benzonatate 200 MG capsule  Commonly known as: TESSALON   200 mg, Oral, 3 Times Daily PRN      calcium acetate 667 MG capsule capsule  Commonly known as: PHOS BINDER)   667 mg, Oral, 3 Times Daily      carvedilol 3.125 MG tablet  Commonly known as: COREG   3.125 mg, Oral, 2 Times Daily With Meals      cholecalciferol 25 MCG (1000 UT) tablet  Commonly known as: VITAMIN D3   1,000 Units, Oral, Daily      hyoscyamine 0.125 MG tablet  Commonly known as: ANASPAZ,LEVSIN   Take 1 tablet by mouth Every 4 (Four) Hours As Needed.      lidocaine-prilocaine 2.5-2.5 % cream  Commonly known as: EMLA   1 Application, Topical, Daily PRN, Apply to left 5th finger      MIRCERA IJ   30 mcg, Every 14 Days, Dialysis       oxyCODONE 5 MG immediate release tablet  Commonly known as: Roxicodone   5 mg, Oral, Every 4 Hours PRN      pantoprazole 40 MG EC tablet  Commonly known as: PROTONIX   1 tablet, Oral, 2  Times Daily      RenaPlex-D tablet tablet  Generic drug: multivitamin with minerals   1 tablet, Oral, Daily      sevelamer 800 MG tablet  Commonly known as: RENVELA   2,400 mg, Oral, 3 Times Daily With Meals      simvastatin 40 MG tablet  Commonly known as: ZOCOR   40 mg, Oral, Nightly      sodium bicarbonate 650 MG tablet   1,300 mg, Oral, 3 Times Daily      sucralfate 1 g tablet  Commonly known as: Carafate   1 g, Oral, 4 Times Daily      temazepam 30 MG capsule  Commonly known as: RESTORIL   30 mg, Oral, Nightly             Stop These Medications      colestipol 1 g tablet  Commonly known as: COLESTID     losartan 50 MG tablet  Commonly known as: COZAAR     metoprolol tartrate 25 MG tablet  Commonly known as: LOPRESSOR              Discharge Diet:   Diet Instructions       Diet: Cardiac Diets; Low Sodium (2g); Regular (IDDSI 7); Thin (IDDSI 0)      Discharge Diet: Cardiac Diets    Cardiac Diet: Low Sodium (2g)    Texture: Regular (IDDSI 7)    Fluid Consistency: Thin (IDDSI 0)            Activity at Discharge:   Activity Instructions       Gradually Increase Activity Until at Pre-Hospitalization Level              Follow-up Appointments  Future Appointments   Date Time Provider Department Center   6/27/2024  9:00 AM Alcides Live MD [facility] None   7/2/2024 10:30 AM LAB MD  [facility]   7/2/2024 10:30 AM Iliana Ro MD [facility]   9/3/2024  8:30 AM [facility]ACH[facility] 4 [facility]   9/3/2024  9:15 AM [facility] VASC MACH[facility] 4  [facility]   9/3/2024 10:00 AM Franki Delgadillo II, MD [facility]     Additional Instructions for the Follow-ups that You Need to Schedule       Discharge Follow-up with PCP   As directed       Currently Documented PCP:    Jin Malcolm PA-C    PCP Phone Number:    [phone]     Follow Up Details: 2 weeks        Discharge Follow-up with Specified Provider: Dr Buckley; 1 Month   As directed      To: Dr Buckley   Follow Up: 1 Month                Test  Results Pending at Discharge  Pending Labs       Order Current Status    Fungus Culture - Lavage, Lung, R In process    Pneumocystis PCR - Lavage, Lung, R In process    AFB Culture - Lavage, Lung, R Preliminary result    Virus Culture - Lavage, Lung, R Preliminary result             Cindy Santiago, APRN  06/14/24  10:31 EDT    Time: Discharge 25 min

## 2024-06-14 NOTE — PROGRESS NOTES
Daily Progress Note        PNA (pneumonia)    Assessment:    Right middle lobe pneumonia due to unspecified pathogen    bronchoscopy completed 6/11/2024  Very thick mucoid secretions suctioned therapeutically  Severe reactive airway disease with bronchospasm  Moderate inflammatory airway disease     COPD acute exacerbation  Pulmonary function test 10/19/2023.  FVC 2.1 L which is 90%  FEV1 1.69 L which is 95%, improved to 99% postbronchodilator  FEV1/FVC ratio 79  FEF 25-75% 104%  MVV 82%.  Total lung capacity 88%  Residual volume 80%.  Diffusion capacity 48%.    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 post radiation therapy November 2023    ESRD on hemodialysis Monday Wednesday Friday    Chronic anemia due to chronic kidney disease  CAD  HTN  HLD  DM  History of CVA  Former smoker: Quit 2016        Recommendations:    Mucomyst   Mucinex   Steroids wean  antibiotics 5 days of Rocephin completed on 6/14/2024  Oxygen supplement and titration to maintain saturation 90 to 95%  Bronchodilators  Inhaled corticosteroids    Mucinex  Encourage use of incentive spirometry     HD/electrolyte management as per nephrology  BP/glucose control  DVT/GI prophylaxis  Check daily labs and correct electrolytes as needed    I personally reviewed the radiological studies             LOS: 6 days     Subjective         Objective     Vital signs for last 24 hours:  Vitals:    06/13/24 1628 06/13/24 2049 06/14/24 0058 06/14/24 0500   BP: 114/60 135/85 123/54 155/93   BP Location: Right leg Right leg Right leg Right leg   Patient Position: Sitting Lying Lying Lying   Pulse: 74 78 76    Resp: 11 16 20 16   Temp: 97.9 °F (36.6 °C) 97.7 °F (36.5 °C) 97.9 °F (36.6 °C) 97.9 °F (36.6 °C)   TempSrc: Oral Oral Oral Oral   SpO2: 97% 96% 95%    Weight:    66.6 kg (146 lb 13.2 oz)   Height:           Intake/Output last 3 shifts:  I/O last 3 completed shifts:  In:  1800 [P.O.:1800]  Out: 0   Intake/Output this shift:  No intake/output data recorded.      Radiology  Imaging Results (Last 24 Hours)       ** No results found for the last 24 hours. **            Labs:  Results from last 7 days   Lab Units 06/13/24  2256   WBC 10*3/mm3 10.17   HEMOGLOBIN g/dL 9.5*   HEMATOCRIT % 31.4*   PLATELETS 10*3/mm3 330     Results from last 7 days   Lab Units 06/13/24  2256   SODIUM mmol/L 138   POTASSIUM mmol/L 5.3*   CHLORIDE mmol/L 98   CO2 mmol/L 23.0   BUN mg/dL 60*   CREATININE mg/dL 6.33*   CALCIUM mg/dL 8.3*   BILIRUBIN mg/dL 0.2   ALK PHOS U/L 91   ALT (SGPT) U/L 23   AST (SGOT) U/L 26   GLUCOSE mg/dL 159*     Results from last 7 days   Lab Units 06/09/24  1458   PH, ARTERIAL pH units 7.195*   PO2 ART mm Hg 82.8*   PCO2, ARTERIAL mm Hg 61.6*   HCO3 ART mmol/L 23.8     Results from last 7 days   Lab Units 06/13/24  2256 06/12/24  2301 06/12/24  0258   ALBUMIN g/dL 3.6 3.9 3.9                               Meds:   SCHEDULE  acetylcysteine, 2 mL, Nebulization, BID - RT  amLODIPine, 10 mg, Oral, Daily  budesonide, 0.5 mg, Nebulization, BID - RT  calcium acetate, 667 mg, Oral, TID  carvedilol, 3.125 mg, Oral, BID With Meals  enoxaparin, 30 mg, Subcutaneous, Nightly  guaiFENesin, 1,200 mg, Oral, Q12H  insulin lispro, 10 Units, Subcutaneous, TID With Meals  insulin lispro, 2-9 Units, Subcutaneous, 4x Daily AC & at Bedtime  ipratropium-albuterol, 3 mL, Nebulization, 4x Daily - RT  pantoprazole, 40 mg, Oral, BID  predniSONE, 30 mg, Oral, Daily   Followed by  [START ON 6/15/2024] predniSONE, 20 mg, Oral, Daily   Followed by  [START ON 6/17/2024] predniSONE, 10 mg, Oral, Daily  sevelamer, 2,400 mg, Oral, TID With Meals  sodium bicarbonate, 1,300 mg, Oral, BID  sodium bicarbonate, 1,300 mg, Oral, TID  sodium chloride, 10 mL, Intravenous, Q12H  sodium zirconium cyclosilicate, 10 g, Oral, BID  sucralfate, 1 g, Oral, 4x Daily  temazepam, 30 mg, Oral, Nightly      Infusions     PRNs     acetaminophen    benzonatate    senna-docusate sodium **AND** polyethylene glycol **AND** bisacodyl **AND** bisacodyl    dextrose    dextrose    glucagon (human recombinant)    LORazepam    nitroglycerin    ondansetron ODT **OR** ondansetron    oxyCODONE    sodium chloride    [COMPLETED] Insert Peripheral IV **AND** sodium chloride    sodium chloride    sodium chloride    Physical Exam:  Physical Exam  Cardiovascular:      Heart sounds: Murmur heard.      No gallop.   Pulmonary:      Effort: No respiratory distress.      Breath sounds: No stridor. Rhonchi and rales present. No wheezing.   Chest:      Chest wall: No tenderness.         ROS  Review of Systems   Respiratory:  Positive for cough and shortness of breath. Negative for wheezing and stridor.    Cardiovascular:  Negative for chest pain, palpitations and leg swelling.             Total time spent with patient greater than: 45 Minutes

## 2024-06-16 LAB
MYCOBACTERIUM SPEC CULT: NORMAL
NIGHT BLUE STAIN TISS: NORMAL

## 2024-06-17 LAB — FUNGUS WND CULT: ABNORMAL

## 2024-06-17 NOTE — CASE MANAGEMENT/SOCIAL WORK
Case Management Discharge Note           Selected Continued Care - Discharged on 6/14/2024 Admission date: 6/8/2024 - Discharge disposition: Home or Self Care            Transportation Services  Private: Car    Final Discharge Disposition Code: 01 - home or self-care

## 2024-06-18 LAB
MYCOBACTERIUM SPEC CULT: NORMAL
NIGHT BLUE STAIN TISS: NORMAL

## 2024-06-20 ENCOUNTER — READMISSION MANAGEMENT (OUTPATIENT)
Dept: CALL CENTER | Facility: HOSPITAL | Age: 74
End: 2024-06-20
Payer: MEDICARE

## 2024-06-20 LAB — VIRUS SPEC CULT: NORMAL

## 2024-06-20 NOTE — OUTREACH NOTE
"COPD/PN Week 1 Survey      Flowsheet Row Responses   [facility] patient discharged from? [facility]   Does the patient have one of the following disease processes/diagnoses(primary or secondary)? Pneumonia   Week 1 attempt successful? Yes   Call start time 1327   Call end time 1330   Discharge diagnosis PNA (pneumonia)   Meds reviewed with patient/caregiver? Yes   Is the patient having any side effects they believe may be caused by any medication additions or changes? No   Prescription comments Pt plans on picking up RX today (6/20/24)-PCP prescribed an alternative to Lokelma d/t cost (per pt)   Is the patient taking all medications as directed (includes completed medication regime)? Yes   Does the patient have a primary care provider?  Yes   Has the patient kept scheduled appointments due by today? Yes  [PCP apt 6/19/24]   Has home health visited the patient within 72 hours of discharge? N/A   Pulse Ox monitoring Intermittent   Pulse Ox device source Patient   O2 Sat comments 97-98% RA   O2 Sat: education provided Sat levels, Monitoring frequency, When to seek care   Psychosocial issues? No   Did the patient receive a copy of their discharge instructions? Yes   Nursing interventions Reviewed instructions with patient   What is the patient's perception of their health status since discharge? Improving  [\"still weak\"]   Nursing Interventions Nurse provided patient education   If the patient is a current smoker, are they able to teach back resources for cessation? Not a smoker   Is the patient/caregiver able to teach back the hierarchy of who to call/visit for symptoms/problems? PCP, Specialist, Home health nurse, Urgent Care, ED, 911 Yes   Is the patient/caregiver able to teach back signs and symptoms of worsening condition: Fever/chills, Shortness of breath, Chest pain   Is the patient/caregiver able to teach back importance of completing antibiotic course of treatment? Yes   Week 1 call completed? Yes   Graduated " Yes   Graduated/Revoked comments Pt feels better, just tired.  Pt has had a FU apt with her PCP since [facility].   Call end time 1330            Alyse H - Registered Nurse

## 2024-06-25 LAB
MYCOBACTERIUM SPEC CULT: NORMAL
NIGHT BLUE STAIN TISS: NORMAL

## 2024-06-25 NOTE — PROGRESS NOTES
[facility] RADIATION ONCOLOGY  FOLLOW-UP NOTE    NAME: Sherry Lobato  YOB: 1950  MRN #: [id number]  DATE OF SERVICE: 7/2/2024  REFERRING PROVIDER: Jin Malcolm PA-C   PRIMARY CARE PROVIDER: Jin Malcolm PA-C    CHIEF COMPLAINT:  3M CT F/U    DIAGNOSIS:    Encounter Diagnoses   Name Primary?    Adenocarcinoma of upper lobe of right lung Yes    Neuroendocrine carcinoma       RADIATION TREATMENT COURSE:    11/13/2023- 11/30/2023: 5000 cGy in 5 fractions SBRT to right lung..    INTERVAL HISTORY     Shrery Lobato is a 73 y.o. female who was last seen in our office on 03/07/2024 by Dr. Khoa Wilson. The plan was to see her in 4 months after repeat CT Chest.    She follows with Dr. Ro, and was last seen on 03/26/2024. Their plan is to have repeat imaging as scheduled, and follow up in 3 months on 07/02/2024.    On 6/8/2024 the patient was admitted to the hospital for treatment of pneumonia.  She reports she has recovered.  She does have a cough that is starting to increase.  She otherwise has no new or worrisome symptoms.  She denies any shortness of breath.    IMAGING     CT CHEST W/O CONTRAST  DATE OF EXAM: 06/07/2024  [facility]AC[facility]  FINDINGS:  Visualized non-contrast soft tissues of the lower neck are without acute abnormality. The heart is enlarged. Coronary artery calcifications are present. Mitral annular calcifications. Negative for pericardial effusion or pleural effusion. Vascular stents overlying the right axillary region partially imaged.  Several mediastinal lymph nodes are mildly increased in size from prior study for example, prevascular node measuring 13 mm, previously 9 mm (2/40). Prevascular node measures 13 mm, previously 8 mm (2/40). Subcarinal node measures 12 mm, previously 8 mm (2/53).  Previously seen treated nodule in the right upper lobe measuring 7 mm appears similar to prior study (3/43). There is new diffuse  interlobular septal thickening and scattered groundglass opacities consistent with pulmonary edema. Small bilateral pleural effusions right greater than left with dependent lower lobe atelectasis. New dense consolidation with air bronchograms involving the right middle lobe consistent with pneumonia.  Non-contrast visualized portions of the liver, spleen, and pancreas are without acute abnormality. Atrophic left kidney partially imaged. Bilateral adrenal gland thickening which again may relate to hyperplasia, unchanged. Postsurgical changes at the gastric antrum partially imaged. Extensive upper abdominal aortic atherosclerotic calcifications. Surgical anchors at the left humeral head. Diffuse osseous sclerosis suggesting renal osteodystrophy. No acute fractures.  IMPRESSION:  Right middle lobe pneumonia.  Pulmonary edema with small bilateral pleural effusions right greater than left and dependent lower lobe atelectasis.  Right upper lobe 7 mm nodule similar to prior study.  Mild increased in size of several mediastinal lymph nodes which may relate to reactive adenopathy, recommend attention on follow-up.  Additional incidental findings above.      IR peripheral av dialysis perc angioplasty  Result Date: 3/15/2024  1. Moderate stenosis within the fistula near the arterial limb and high-grade stenosis at the arterial anastomosis. These were both treated with an 8 mm x 40 mm angioplasty balloon with excellent results under fluoroscopic guidance. Report dictated by: Chuy Zaidi DNP  I have personally reviewed this case and agree with the findings above: Electronically Signed: Donnie Ham MD  3/15/2024 9:23 AM EDT  Workstation ID: [id number]    CT Chest Without Contrast Diagnostic  Result Date: 3/6/2024  Impression: 1. Interval decrease in size of right upper lobe pulmonary nodule now measuring 7 x 5 mm. No evidence of metastatic disease within the chest. 2. Lobular enlargement of both adrenal glands unchanged from  multiple prior studies compatible changes of adrenal hyperplasia. Electronically Signed: Donnie Conde MD  3/6/2024 10:49 AM EST  Workstation ID: [id number]    PATHOLOGY     No recent pathology to review.    LABS     HEMATOLOGY:  WBC   Date Value Ref Range Status   07/02/2024 10.46 3.40 - 10.80 10*3/mm3 Final   07/14/2022 9.33 4.5 - 11.0 10*3/uL Final     RBC   Date Value Ref Range Status   07/02/2024 3.63 (L) 3.77 - 5.28 10*6/mm3 Final   07/14/2022 3.72 (L) 4.0 - 5.2 10*6/uL Final     Hemoglobin   Date Value Ref Range Status   07/02/2024 11.4 (L) 12.0 - 15.9 g/dL Final   06/26/2024 9.1 (L) 12.0 - 16.0 g/dL Final   06/26/2024 27.3 (L) 36.0 - 48.0 % Final   07/14/2022 11.2 (L) 12.0 - 16.0 g/dL Final     Hematocrit   Date Value Ref Range Status   07/02/2024 37.8 34.0 - 46.6 % Final   07/14/2022 35.8 (L) 36.0 - 46.0 % Final     Platelets   Date Value Ref Range Status   07/02/2024 288 140 - 450 10*3/mm3 Final   07/14/2022 423 140 - 440 10*3/uL Final     CHEMISTRY:  Lab Results   Component Value Date    GLUCOSE 159 (H) 06/13/2024    BUN 60 (H) 06/13/2024    CREATININE 6.33 (H) 06/13/2024    EGFRIFNONA 6 (L) 10/08/2021    EGFRIFAFRI 5 (L) 07/14/2022    BCR 9.5 06/13/2024    K 4.7 06/26/2024    CO2 23.0 06/13/2024    CALCIUM 8.3 (L) 06/13/2024    ALBUMIN 3.6 06/13/2024    LABIL2 0.8 (L) 03/14/2019    AST 26 06/13/2024    ALT 23 06/13/2024     PROBLEM LIST     Patient Active Problem List   Diagnosis    Anemia    Absolute anemia    Acute exacerbation of chronic obstructive pulmonary disease    B12 deficiency    Barretts esophagus    Gastroesophageal reflux disease    Benign neuroendocrine tumor    Stage 4 chronic kidney disease    Carcinoma, renal cell    ESRD (end stage renal disease)    Clostridium difficile colitis    Degeneration of intervertebral disc of lumbar region    Type 2 diabetes mellitus, without long-term current use of insulin    Disorder of wrist joint    Diverticulitis of colon    Gout    Hyperlipidemia     Hypertension    Insomnia    Nephrotic syndrome    Neuroendocrine carcinoma    Orthostatic hypotension    Sleep apnea, obstructive    Carotid stenosis, asymptomatic, left    Vitamin D deficiency    Hordeolum externum of right lower eyelid    Right lower quadrant abdominal pain    Incisional hernia of anterior abdominal wall without obstruction or gangrene    Obesity (BMI 30-39.9)    Pseudophakia of both eyes    Diabetic peripheral neuropathy    Tobacco abuse    Medicare annual wellness visit, subsequent    Shortness of breath    Anemia in chronic kidney disease    Coagulation defect, unspecified    Iron deficiency anemia    Secondary hyperparathyroidism of renal origin    Thrombocytosis    Tobacco abuse    AV graft thrombosis, initial encounter    Thrombosis due to vascular prosthetic devices, implants and grafts, sequela    Disorder of phosphorus metabolism, unspecified    Congestive heart failure    ESRD (end stage renal disease)    Hypoxemia    Dyspnea    COPD exacerbation    Colitis    Abnormal CT scan, gastrointestinal tract    Acute cough    Pneumonia of both lungs due to infectious organism    Pneumonia of both lungs due to infectious organism, unspecified part of lung    Altered mental status, unspecified altered mental status type    Cytokine release syndrome, grade 1    Hyperkalemia    Epigastric pain    Adenocarcinoma of upper lobe of right lung    Small bowel obstruction    SBO (small bowel obstruction)    Emphysema lung    Chronic obstructive pulmonary disease    Degeneration of lumbar intervertebral disc    Disorder of phosphorus metabolism    Acute gout    Hypertensive disorder    Arteriovenous fistula    Hernia of abdominal wall    Umbilical hernia    Ventral incisional hernia    Body mass index (BMI) 30.0-30.9, adult    Bilateral pseudophakia    Right lower quadrant pain    Obstructive sleep apnea syndrome    End-stage renal disease    Tobacco user    Type 2 diabetes mellitus    Steal syndrome  "dialysis vascular access    PNA (pneumonia)    Pneumonia, unspecified organism      CURRENT MEDICATIONS     Current Outpatient Medications   Medication Instructions    albuterol sulfate  (90 Base) MCG/ACT inhaler 2 puffs, Inhalation, Every 4 Hours PRN    amLODIPine (NORVASC) 10 mg, Oral, Daily    benzonatate (TESSALON) 200 mg, Oral, 3 Times Daily PRN    calcium acetate (PHOS BINDER)) 667 mg, Oral, 3 Times Daily    carvedilol (COREG) 3.125 mg, Oral, 2 Times Daily With Meals    cholecalciferol (VITAMIN D3) 1,000 Units, Oral, Daily    hyoscyamine (ANASPAZ,LEVSIN) 0.125 MG tablet Take 1 tablet by mouth Every 4 (Four) Hours As Needed.    lidocaine-prilocaine (EMLA) 2.5-2.5 % cream 1 Application, Topical, Daily PRN, Apply to left 5th finger    Lokelma 10 g, Oral, Daily, Empty entire contents of the packet(s) into a glass with at least 3 tablespoons (45 mL) of water. Stir well and drink immediately; if powder remains in the glass, add water, stir and drink immediately; repeat until no powder remains. Administer other oral medications at least 2 hours before or 2 hours after dose.    Methoxy PEG-Epoetin Beta (MIRCERA IJ) 30 mcg, Every 14 Days, Dialysis     Multiple Vitamins-Minerals (RENAPLEX-D) tablet 1 tablet, Oral, Daily    oxyCODONE (ROXICODONE) 5 mg, Oral, Every 4 Hours PRN    pantoprazole (PROTONIX) 40 MG EC tablet 1 tablet, Oral, 2 Times Daily    sevelamer (RENVELA) 2,400 mg, Oral, 3 Times Daily With Meals    simvastatin (ZOCOR) 40 mg, Oral, Nightly    sodium bicarbonate 1,300 mg, Oral, 3 Times Daily    sucralfate (CARAFATE) 1 g, Oral, 4 Times Daily    temazepam (RESTORIL) 30 mg, Oral, Nightly      ALLERGIES     Allergies   Allergen Reactions    Atorvastatin Calcium Other (See Comments)     \"felt like I was on fire\"    Gabapentin Dizziness     says was unable to walk    Niacin Other (See Comments)     \"FEELS LIKE SHE IS ON FIRE\"         REVIEW OF SYSTEMS     Review of Systems   Constitutional:  Negative for " activity change.   Respiratory:  Positive for cough. Negative for shortness of breath.         Vitals:    07/02/24 0920   BP: 155/70   Pulse: 87   Resp: 20   Temp: 98 °F (36.7 °C)   SpO2: 94%        Physical Exam  Constitutional:       General: She is not in acute distress.  HENT:      Head: Normocephalic and atraumatic.   Pulmonary:      Effort: Pulmonary effort is normal. No respiratory distress.   Neurological:      Mental Status: She is alert and oriented to person, place, and time. Mental status is at baseline.   Psychiatric:         Mood and Affect: Mood normal.         Behavior: Behavior normal.          ECOG:  Restricted in physically strenuous activity but ambulatory and able to carry out work of a light or sedentary nature, e.g., light house work, office work = 1  PHQ-2 Depression Screening  Little interest or pleasure in doing things? 0-->not at all   Feeling down, depressed, or hopeless? 0-->not at all   PHQ-2 Total Score 0         ASSESSMENT AND PLAN     ASSESSMENT:      Sherry Lobato is a 73 y.o. female with history RUL NSCLC, well differentiated adenocarcinoma with focal mucinous features  [id number], Stage IA2. She is sp SBRT completed on 11/30/2023, 5000 cGy in 5 fractions SBRT to right lung. She returns for routine surveillance.     Diagnoses and all orders for this visit:    1. Adenocarcinoma of upper lobe of right lung (Primary)  -     CT Chest Without Contrast Diagnostic; Future    2. Neuroendocrine carcinoma       PLAN:      Orders:  - CT chest in 2 months to assess pneumonia and lung changes.   - Follow-up after CT chest or sooner as clinically indicated.    The patient is doing well. Her previously treated lesion has responded well to treatment and has decreased in size. Other findings include pneumonia and likely reactive adenopathy. We discussed plans for imaging in 2 months or sooner as clinically indicated. The patient is in agreement with this plan. She is encouraged to reach out  sooner with any questions or concerns.     I spent 30 minutes caring for Sherry on this date of service. This time includes time spent by me in the following activities:preparing for the visit, reviewing tests, obtaining and/or reviewing a separately obtained history, performing a medically appropriate examination and/or evaluation , counseling and educating the patient/family/caregiver, ordering medications, tests, or procedures, documenting information in the medical record, and independently interpreting results and communicating that information with the patient/family/caregiver    FOLLOW UP     No follow-ups on file.     CC: Jin Malcolm PA-C Crase, Joshua Ryan, PA-C

## 2024-07-01 NOTE — PROGRESS NOTES
HEMATOLOGY ONCOLOGY OUTPATIENT FOLLOW UP       Patient name: Sherry Lobato  : 1950  MRN: [id number]  Primary Care Physician: Jin Malcolm PA-C  Referring Physician: No ref. provider found  Reason For Consult: multiple malignancies, Non small cell lung cancer      History of Present Illness:  Patient is a 73 y.o. 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  No adjuvant treatment.  She did have hypersecretory symptoms causing renal insufficiency events leading to end-stage renal disease.  Patient has had comprehensive genetic testing with negative findings.    2023 CT chest 7 pelvis with right upper lobe lung mass  10/10/2023 CT-guided biopsy of lung mass with well-differentiated adenocarcinoma with focal mucinous features     10/19/2023 -PET/CT with 1.6 cm right upper lobe lung nodule low to intermediate level FDG accumulation, 1.4 cm hypermetabolic nodule or lymph node was seen at the inferior margin of the left parotid gland.  Numerous small noncalcified nodules are present in both lungs worrisome for malignancy.  No primary malignancy or metastatic disease in the abdomen pelvis or skeleton    FNA - parotid with no malignancy - Warthin tumor    10/27/2023 patient treated with SBRT 5 Gray in 5 fractions    24 - CT chest non contrast - reactive adenopathy, pulm edema, RUL nodule stable, mild increase in mediastinal adenopathy may be reactive.   24 - WBC 10.46, hb 11.4, plt 288,     Subjective:  Continues HD, no other new symptoms, has ongoing fatigue. No bleeding.       Past Medical History:    Diagnosis Date    Lucas's esophagus     C. difficile colitis     Carotid artery disease     -50-74% left, 16-49% right (1/19)    COPD (chronic obstructive pulmonary disease)     DM2 (diabetes mellitus, type 2)     ESRD (end stage renal disease)     on HD    Gastric ulcer     at anastomatic site    Gastroparesis     unknown    GERD (gastroesophageal reflux disease)     Gout     not current    Hemodialysis patient     [facility]wf    Hernia, incisional     abdomen    History of colonoscopy     UTD    History of degenerative disc disease     Hyperlipidemia     Hypertension     Medicare annual wellness visit, subsequent 11/07/2020    Microscopic colitis     Nephrotic syndrome     Neuroendocrine tumor     of stomach    FABY (obstructive sleep apnea)     not treating    Pedal edema     ressolved    Proteinuria     Renal cell cancer, right     Rib pain on right side     chronic    Rotator cuff tear     bilateral    Stomach cancer 2016    Stroke     Uterine cancer     Vitamin B 12 deficiency     Vitamin D deficiency        Past Surgical History:   Procedure Laterality Date    APPENDECTOMY      ARTERIOVENOUS FISTULA Right     ARTERIOVENOUS FISTULA REPAIR      Clotted off and insert graft    ARTERIOVENOUS FISTULA/SHUNT SURGERY Left 12/30/2020    Procedure: ARTERIOVENOUS FISTULA FORMATION;  Surgeon: Jan Caicedo MD;  Location: [facility] MAIN OR;  Service: Vascular;  Laterality: Left;    ARTERIOVENOUS FISTULA/SHUNT SURGERY Left 4/23/2024    Procedure: FISTULOGRAM WITH SUBCLAVIAN ARTERY ANGIOPLASTY, fistula banding;  Surgeon: Franki Delgadillo II, MD;  Location: [facility] HYBRID OR;  Service: Vascular;  Laterality: Left;    BREAST BIOPSY      right nipple 1981    BRONCHOSCOPY N/A 6/11/2024    Procedure: BRONCHOSCOPY WITH RIGHT LUNG WASHING;  Surgeon: Aramis Barragan MD;  Location: [facility] ENDOSCOPY;  Service: Pulmonary;  Laterality: N/A;  RML atelectasis    COLONOSCOPY N/A 11/24/2020    Procedure: COLONOSCOPY with polypectomy x 7;   Surgeon: Jeffery White MD;  Location: [facility] ENDOSCOPY;  Service: Gastroenterology;  Laterality: N/A;  post op: polyps, diverticulosis, hemorrhoids    COLONOSCOPY N/A 6/1/2023    Procedure: COLONOSCOPY with polypectomy x 3 biopsy x 1;  Surgeon: Jeffery White MD;  Location: [facility] ENDOSCOPY;  Service: Gastroenterology;  Laterality: N/A;  diverticulosis polyps    ENDOSCOPY N/A 03/12/2022    Procedure: ESOPHAGOGASTRODUODENOSCOPY with biopsy x 1 area;  Surgeon: Javan Augustin MD;  Location: [facility] ENDOSCOPY;  Service: Gastroenterology;  Laterality: N/A;  post op: anastamosis    ENDOSCOPY N/A 10/9/2023    Procedure: ESOPHAGOGASTRODUODENOSCOPY with biopsy x3 areas;  Surgeon: Ace Campbell MD;  Location: [facility] ENDOSCOPY;  Service: Gastroenterology;  Laterality: N/A;  duodenal ulcers;irregular z line    GASTRIC RESECTION      cancer    HYSTERECTOMY      LAPAROSCOPIC CHOLECYSTECTOMY      NEPHRECTOMY      right kidney removed     REDUCTION MAMMAPLASTY      TUMOR REMOVAL      boils    VENTRAL/INCISIONAL HERNIA REPAIR Right 08/28/2019    Procedure: VENTRAL/INCISIONAL HERNIA REPAIR;  Surgeon: Nilay Stevens MD;  Location: [facility] MAIN OR;  Service: General    VENTRAL/INCISIONAL HERNIA REPAIR N/A 10/01/2019    Procedure: OPEN VENTRAL/INCISIONAL HERNIA REPAIR;  Surgeon: Nilay Stevens MD;  Location: [facility] MAIN OR;  Service: General    VENTRAL/INCISIONAL HERNIA REPAIR N/A 10/11/2021    Procedure: VENTRAL/INCISIONAL HERNIA REPAIR LAPAROSCOPIC;  Surgeon: Nilay Stevens MD;  Location: [facility] MAIN OR;  Service: General;  Laterality: N/A;    VENTRAL/INCISIONAL HERNIA REPAIR N/A 01/23/2023    Procedure: VENTRAL/INCISIONAL HERNIA REPAIR with MESH;  Surgeon: Nilay Stevens MD;  Location: [facility] MAIN OR;  Service: General;  Laterality: N/A;         Current Outpatient Medications:     albuterol sulfate  (90 Base) MCG/ACT inhaler, Inhale 2 puffs Every 4 (Four) Hours As Needed for Wheezing., Disp: , Rfl:      amLODIPine (NORVASC) 10 MG tablet, Take 1 tablet by mouth Daily., Disp: , Rfl:     benzonatate (TESSALON) 200 MG capsule, Take 1 capsule by mouth 3 (Three) Times a Day As Needed for Cough., Disp: 90 capsule, Rfl: 0    calcium acetate (PHOS BINDER,) 667 MG capsule capsule, Take 1 capsule by mouth 3 (Three) Times a Day., Disp: , Rfl:     carvedilol (COREG) 3.125 MG tablet, Take 1 tablet by mouth 2 (Two) Times a Day With Meals., Disp: , Rfl:     Cholecalciferol 25 MCG (1000 UT) tablet, Take 1 tablet by mouth Daily., Disp: , Rfl:     hyoscyamine (ANASPAZ,LEVSIN) 0.125 MG tablet, Take 1 tablet by mouth Every 4 (Four) Hours As Needed., Disp: , Rfl:     lidocaine-prilocaine (EMLA) 2.5-2.5 % cream, Apply 1 Application topically to the appropriate area as directed Daily As Needed (severe pain, left 5th finger). Apply to left 5th finger, Disp: 5800 g, Rfl: 4    Methoxy PEG-Epoetin Beta (MIRCERA IJ), 30 mcg Every 14 (Fourteen) Days. Dialysis, Disp: , Rfl:     Multiple Vitamins-Minerals (RENAPLEX-D) tablet, Take 1 tablet by mouth Daily., Disp: , Rfl:     oxyCODONE (Roxicodone) 5 MG immediate release tablet, Take 1 tablet by mouth Every 4 (Four) Hours As Needed for Severe Pain., Disp: 10 tablet, Rfl: 0    pantoprazole (PROTONIX) 40 MG EC tablet, Take 1 tablet by mouth 2 (Two) Times a Day., Disp: , Rfl:     sevelamer (RENVELA) 800 MG tablet, Take 3 tablets by mouth 3 (Three) Times a Day With Meals., Disp: , Rfl:     simvastatin (ZOCOR) 40 MG tablet, Take 1 tablet by mouth Every Night., Disp: , Rfl:     sodium bicarbonate 650 MG tablet, Take 2 tablets by mouth 3 (Three) Times a Day., Disp: , Rfl:     sodium zirconium cyclosilicate (Lokelma) 10 g packet, Take 10 g by mouth Daily. Empty entire contents of the packet(s) into a glass with at least 3 tablespoons (45 mL) of water. Stir well and drink immediately; if powder remains in the glass, add water, stir and drink immediately; repeat until no powder remains. Administer other oral  "medications at least 2 hours before or 2 hours after dose., Disp: 30 packet, Rfl: 1    sucralfate (Carafate) 1 g tablet, Take 1 tablet by mouth 4 (Four) Times a Day., Disp: 120 tablet, Rfl: 1    temazepam (RESTORIL) 30 MG capsule, Take 1 capsule by mouth Every Night., Disp: , Rfl:     Allergies   Allergen Reactions    Atorvastatin Calcium Other (See Comments)     \"felt like I was on fire\"    Gabapentin Dizziness     says was unable to walk    Niacin Other (See Comments)     \"FEELS LIKE SHE IS ON FIRE\"         Family History   Problem Relation Age of Onset    Heart disease Father        Cancer-related family history is not on file.      Social History     Tobacco Use    Smoking status: Former     Current packs/day: 0.00     Average packs/day: 0.3 packs/day for 57.0 years (14.3 ttl pk-yrs)     Types: Cigarettes     Start date: 5/1/1964     Quit date: 5/1/2021     Years since quitting: 3.1     Passive exposure: Past    Smokeless tobacco: Never    Tobacco comments:     quit smoking 2 months ago   Vaping Use    Vaping status: Never Used   Substance Use Topics    Alcohol use: No    Drug use: No     Social History     Social History Narrative    Not on file       ROS:   Review of Systems   Constitutional:  Negative for fatigue and fever.   HENT:  Negative for congestion and nosebleeds.    Eyes:  Negative for pain and discharge.   Respiratory:  Negative for cough and shortness of breath.    Cardiovascular:  Negative for chest pain.   Gastrointestinal:  Negative for abdominal pain, blood in stool, diarrhea, nausea and vomiting.   Endocrine: Negative for cold intolerance and heat intolerance.   Genitourinary:  Negative for difficulty urinating.   Musculoskeletal:  Negative for arthralgias.   Skin:  Negative for rash.   Neurological:  Negative for dizziness and headaches.   Hematological:  Does not bruise/bleed easily.   Psychiatric/Behavioral:  Negative for behavioral problems.          Objective:    Vital Signs:  There were " no vitals filed for this visit.    There is no height or weight on file to calculate BMI.    ECOG  (2) Ambulatory and capable of self care, unable to carry out work activity, up and about > 50% or waking hours    Physical Exam:   Physical Exam  Constitutional:       Appearance: Normal appearance.   HENT:      Head: Normocephalic and atraumatic.   Eyes:      Pupils: Pupils are equal, round, and reactive to light.   Cardiovascular:      Rate and Rhythm: Normal rate and regular rhythm.      Pulses: Normal pulses.      Heart sounds: No murmur heard.  Pulmonary:      Effort: Pulmonary effort is normal.      Breath sounds: Normal breath sounds.   Abdominal:      General: There is no distension.      Palpations: Abdomen is soft. There is no mass.      Tenderness: There is no abdominal tenderness.   Musculoskeletal:         General: Normal range of motion.      Cervical back: Normal range of motion.   Skin:     General: Skin is warm.   Neurological:      General: No focal deficit present.      Mental Status: She is alert.   Psychiatric:         Mood and Affect: Mood normal.         Lab Results - Last 18 Months   Lab Units 06/13/24 2256 06/12/24  2301 06/12/24  0258   WBC 10*3/mm3 10.17 11.23* 11.08*   HEMOGLOBIN g/dL 9.5* 10.1* 9.6*   HEMATOCRIT % 31.4* 34.9 31.7*   PLATELETS 10*3/mm3 330 353 393   MCV fL 101.6* 104.2* 99.7*     Lab Results - Last 18 Months   Lab Units 06/13/24 2256 06/13/24  1502 06/12/24  2301 06/12/24  0258   SODIUM mmol/L 138  --  139 138   POTASSIUM mmol/L 5.3* 5.4* 5.6* 5.5*   CHLORIDE mmol/L 98  --  101 98   CO2 mmol/L 23.0  --  25.1 22.8   BUN mg/dL 60*  --  42* 68*   CREATININE mg/dL 6.33*  --  4.80* 6.62*   CALCIUM mg/dL 8.3*  --  8.7 8.4*   BILIRUBIN mg/dL 0.2  --  0.2 0.2   ALK PHOS U/L 91  --  98 100   ALT (SGPT) U/L 23  --  20 21   AST (SGOT) U/L 26  --  29 26   GLUCOSE mg/dL 159*  --  79 105*       Lab Results   Component Value Date    GLUCOSE 159 (H) 06/13/2024    BUN 60 (H) 06/13/2024     "CREATININE 6.33 (H) 06/13/2024    EGFRIFNONA 6 (L) 10/08/2021    EGFRIFAFRI 5 (L) 07/14/2022    BCR 9.5 06/13/2024    K 5.3 (H) 06/13/2024    CO2 23.0 06/13/2024    CALCIUM 8.3 (L) 06/13/2024    ALBUMIN 3.6 06/13/2024    LABIL2 0.8 (L) 03/14/2019    AST 26 06/13/2024    ALT 23 06/13/2024       Lab Results - Last 18 Months   Lab Units 10/10/23  0737 01/26/23  1645   INR  1.06 1.05   APTT seconds 30.8 30.8*       Lab Results   Component Value Date    IRON 115 06/11/2024    TIBC 173 (L) 06/11/2024    FERRITIN 2,099.00 (H) 03/19/2024       Lab Results   Component Value Date    FOLATE 11.9 04/15/2017       Lab Results   Component Value Date    OCCULTBLD POSITIVE (A) 06/01/2017    OCCULTBLD POSITIVE (A) 05/16/2017    OCCULTBLD POSITIVE (A) 05/05/2017       No results found for: \"RETICCTPCT\"  Lab Results   Component Value Date    IRNOAKFD38 1,234 (H) 04/15/2017     No results found for: \"SPEP\", \"UPEP\"  Uric Acid   Date Value Ref Range Status   07/10/2018 4.7 2.6 - 8.0 mg/dL Final     Lab Results   Component Value Date    SEDRATE 69 (H) 02/08/2019     No results found for: \"FIBRINOGEN\", \"HAPTOGLOBIN\"  Lab Results   Component Value Date    PTT 30.8 10/10/2023    INR 1.06 10/10/2023     No results found for: \"\"  No results found for: \"CEA\"  No components found for: \"CA-19-9\"  No results found for: \"PSA\"  Lab Results   Component Value Date    SEDRATE 69 (H) 02/08/2019          Assessment & Plan     Patient is 73-year-old female with multiple malignancy in the past  recently diagnosed with non-small cell lung cancer    Stage I non-small cell lung cancer  Treated with SBRT  Ct reviewed likely reactive nodes  Repeat in 3 months with CT chest abdomen pelvis    Warthin's tumor parotid  Discussed ENT referral   Patient does not want any more surgery  She understands the risk.   Will not refer at this point. Continues to not want to see ENT    End-stage renal disease  Continues hemodialysis    Anemia  Will need iron studies, " this could be related to end-stage renal disease consider Procrit can be done with dialysis as needed, hb is improved.     NET duodenum   S/p resection  No indication of recurrence will repeat imaging of abdomen pelvis as well.     RCC  S/p nephrectomy  No recurrence  CT will be done on follow up.     Genetic counseling  Recommended counseling with multiple cancers  She does not want it.    CBC, imaging in 3 months and follow up    Thank you very much for providing the opportunity to participate in this patient’s care. Please do not hesitate to call if there are any other questions.

## 2024-07-02 ENCOUNTER — OFFICE VISIT (OUTPATIENT)
Dept: RADIATION ONCOLOGY | Facility: HOSPITAL | Age: 74
End: 2024-07-02
Payer: MEDICARE

## 2024-07-02 ENCOUNTER — LAB (OUTPATIENT)
Dept: LAB | Facility: HOSPITAL | Age: 74
End: 2024-07-02
Payer: MEDICARE

## 2024-07-02 ENCOUNTER — OFFICE VISIT (OUTPATIENT)
Dept: ONCOLOGY | Facility: CLINIC | Age: 74
End: 2024-07-02
Payer: MEDICARE

## 2024-07-02 VITALS
HEIGHT: 60 IN | RESPIRATION RATE: 20 BRPM | TEMPERATURE: 98 F | BODY MASS INDEX: 29.57 KG/M2 | DIASTOLIC BLOOD PRESSURE: 70 MMHG | SYSTOLIC BLOOD PRESSURE: 155 MMHG | WEIGHT: 150.6 LBS | OXYGEN SATURATION: 94 % | HEART RATE: 87 BPM

## 2024-07-02 VITALS
OXYGEN SATURATION: 94 % | HEART RATE: 87 BPM | RESPIRATION RATE: 20 BRPM | SYSTOLIC BLOOD PRESSURE: 155 MMHG | WEIGHT: 150 LBS | HEIGHT: 60 IN | TEMPERATURE: 98 F | BODY MASS INDEX: 29.45 KG/M2 | DIASTOLIC BLOOD PRESSURE: 70 MMHG

## 2024-07-02 DIAGNOSIS — C7A.8 NEUROENDOCRINE CARCINOMA: Primary | ICD-10-CM

## 2024-07-02 DIAGNOSIS — C64.9 RENAL CELL CARCINOMA, UNSPECIFIED LATERALITY: Chronic | ICD-10-CM

## 2024-07-02 DIAGNOSIS — C34.11 ADENOCARCINOMA OF UPPER LOBE OF RIGHT LUNG: Primary | ICD-10-CM

## 2024-07-02 DIAGNOSIS — C7A.8 NEUROENDOCRINE CARCINOMA: ICD-10-CM

## 2024-07-02 LAB
BASOPHILS NFR BLD AUTO: ABNORMAL %
DEPRECATED RDW RBC AUTO: 64.4 FL (ref 37–54)
EOSINOPHIL # BLD AUTO: 0.69 10*3/MM3 (ref 0–0.4)
EOSINOPHIL NFR BLD AUTO: 6.6 % (ref 0.3–6.2)
ERYTHROCYTE [DISTWIDTH] IN BLOOD BY AUTOMATED COUNT: 18.4 % (ref 12.3–15.4)
HCT VFR BLD AUTO: 37.8 % (ref 34–46.6)
HGB BLD-MCNC: 11.4 G/DL (ref 12–15.9)
LYMPHOCYTES # BLD AUTO: 1.13 10*3/MM3 (ref 0.7–3.1)
LYMPHOCYTES NFR BLD AUTO: 10.8 % (ref 19.6–45.3)
MCH RBC QN AUTO: 31.4 PG (ref 26.6–33)
MCHC RBC AUTO-ENTMCNC: 30.2 G/DL (ref 31.5–35.7)
MCV RBC AUTO: 104.1 FL (ref 79–97)
MONOCYTES # BLD AUTO: 0.84 10*3/MM3 (ref 0.1–0.9)
MONOCYTES NFR BLD AUTO: 8 % (ref 5–12)
MYCOBACTERIUM SPEC CULT: NORMAL
NEUTROPHILS NFR BLD AUTO: ABNORMAL %
NIGHT BLUE STAIN TISS: NORMAL
PLATELET # BLD AUTO: 288 10*3/MM3 (ref 140–450)
PMV BLD AUTO: 10.1 FL (ref 6–12)
RBC # BLD AUTO: 3.63 10*6/MM3 (ref 3.77–5.28)
WBC NRBC COR # BLD AUTO: 10.46 10*3/MM3 (ref 3.4–10.8)

## 2024-07-02 PROCEDURE — G0463 HOSPITAL OUTPT CLINIC VISIT: HCPCS | Performed by: INTERNAL MEDICINE

## 2024-07-02 PROCEDURE — 36415 COLL VENOUS BLD VENIPUNCTURE: CPT

## 2024-07-02 PROCEDURE — 85025 COMPLETE CBC W/AUTO DIFF WBC: CPT

## 2024-07-09 LAB
MYCOBACTERIUM SPEC CULT: NORMAL
NIGHT BLUE STAIN TISS: NORMAL

## 2024-07-11 LAB — FUNGUS WND CULT: ABNORMAL

## 2024-07-16 LAB
MYCOBACTERIUM SPEC CULT: NORMAL
NIGHT BLUE STAIN TISS: NORMAL

## 2024-07-23 LAB
MYCOBACTERIUM SPEC CULT: NORMAL
NIGHT BLUE STAIN TISS: NORMAL

## 2024-08-26 ENCOUNTER — HOSPITAL ENCOUNTER (INPATIENT)
Facility: HOSPITAL | Age: 74
LOS: 2 days | Discharge: HOME | DRG: 377 | End: 2024-08-29
Attending: EMERGENCY MEDICINE | Admitting: INTERNAL MEDICINE
Payer: MEDICARE

## 2024-08-26 ENCOUNTER — APPOINTMENT (OUTPATIENT)
Dept: GENERAL RADIOLOGY | Facility: HOSPITAL | Age: 74
DRG: 377 | End: 2024-08-26
Payer: MEDICARE

## 2024-08-26 ENCOUNTER — APPOINTMENT (OUTPATIENT)
Dept: CT IMAGING | Facility: HOSPITAL | Age: 74
DRG: 377 | End: 2024-08-26
Payer: MEDICARE

## 2024-08-26 DIAGNOSIS — R53.1 WEAKNESS: Primary | ICD-10-CM

## 2024-08-26 DIAGNOSIS — N18.5 HYPERTENSIVE HEART AND KIDNEY DISEASE WITHOUT HEART FAILURE AND WITH STAGE 5 CHRONIC KIDNEY DISEASE NOT ON CHRONIC DIALYSIS: ICD-10-CM

## 2024-08-26 DIAGNOSIS — I13.11 HYPERTENSIVE HEART AND KIDNEY DISEASE WITHOUT HEART FAILURE AND WITH STAGE 5 CHRONIC KIDNEY DISEASE NOT ON CHRONIC DIALYSIS: ICD-10-CM

## 2024-08-26 DIAGNOSIS — D50.0 ANEMIA DUE TO BLOOD LOSS: ICD-10-CM

## 2024-08-26 DIAGNOSIS — R73.9 HYPERGLYCEMIA: ICD-10-CM

## 2024-08-26 DIAGNOSIS — Z86.73 HISTORY OF STROKE: ICD-10-CM

## 2024-08-26 DIAGNOSIS — D50.0 ANEMIA DUE TO GI BLOOD LOSS: ICD-10-CM

## 2024-08-26 DIAGNOSIS — K92.2 ACUTE UPPER GI BLEEDING: ICD-10-CM

## 2024-08-26 LAB
ABO GROUP BLD: NORMAL
ALBUMIN SERPL-MCNC: 3.5 G/DL (ref 3.5–5.2)
ALBUMIN/GLOB SERPL: 1.5 G/DL
ALP SERPL-CCNC: 98 U/L (ref 39–117)
ALT SERPL W P-5'-P-CCNC: 14 U/L (ref 1–33)
AMMONIA BLD-SCNC: 21 UMOL/L (ref 11–51)
ANION GAP SERPL CALCULATED.3IONS-SCNC: 17.9 MMOL/L (ref 5–15)
AST SERPL-CCNC: 22 U/L (ref 1–32)
BASOPHILS # BLD AUTO: 0.04 10*3/MM3 (ref 0–0.2)
BASOPHILS NFR BLD AUTO: 0.4 % (ref 0–1.5)
BILIRUB SERPL-MCNC: <0.2 MG/DL (ref 0–1.2)
BLD GP AB SCN SERPL QL: NEGATIVE
BUN SERPL-MCNC: 96 MG/DL (ref 8–23)
BUN/CREAT SERPL: 17.1 (ref 7–25)
CALCIUM SPEC-SCNC: 8.7 MG/DL (ref 8.6–10.5)
CHLORIDE SERPL-SCNC: 101 MMOL/L (ref 98–107)
CO2 SERPL-SCNC: 19.1 MMOL/L (ref 22–29)
CREAT SERPL-MCNC: 5.61 MG/DL (ref 0.57–1)
DEPRECATED RDW RBC AUTO: 58.5 FL (ref 37–54)
EGFRCR SERPLBLD CKD-EPI 2021: 7.4 ML/MIN/1.73
EOSINOPHIL # BLD AUTO: 0.41 10*3/MM3 (ref 0–0.4)
EOSINOPHIL NFR BLD AUTO: 3.6 % (ref 0.3–6.2)
ERYTHROCYTE [DISTWIDTH] IN BLOOD BY AUTOMATED COUNT: 16.3 % (ref 12.3–15.4)
GLOBULIN UR ELPH-MCNC: 2.3 GM/DL
GLUCOSE SERPL-MCNC: 171 MG/DL (ref 65–99)
HBA1C MFR BLD: 5.86 % (ref 4.8–5.6)
HCT VFR BLD AUTO: 20 % (ref 34–46.6)
HGB BLD-MCNC: 6.1 G/DL (ref 12–15.9)
HOLD SPECIMEN: NORMAL
HOLD SPECIMEN: NORMAL
IMM GRANULOCYTES # BLD AUTO: 0.19 10*3/MM3 (ref 0–0.05)
IMM GRANULOCYTES NFR BLD AUTO: 1.7 % (ref 0–0.5)
LYMPHOCYTES # BLD AUTO: 1.04 10*3/MM3 (ref 0.7–3.1)
LYMPHOCYTES NFR BLD AUTO: 9.1 % (ref 19.6–45.3)
MAGNESIUM SERPL-MCNC: 1.7 MG/DL (ref 1.6–2.4)
MCH RBC QN AUTO: 29.9 PG (ref 26.6–33)
MCHC RBC AUTO-ENTMCNC: 30.5 G/DL (ref 31.5–35.7)
MCV RBC AUTO: 98 FL (ref 79–97)
MONOCYTES # BLD AUTO: 0.68 10*3/MM3 (ref 0.1–0.9)
MONOCYTES NFR BLD AUTO: 6 % (ref 5–12)
NEUTROPHILS NFR BLD AUTO: 79.2 % (ref 42.7–76)
NEUTROPHILS NFR BLD AUTO: 9.03 10*3/MM3 (ref 1.7–7)
NRBC BLD AUTO-RTO: 0 /100 WBC (ref 0–0.2)
PHOSPHATE SERPL-MCNC: 4.3 MG/DL (ref 2.5–4.5)
PLATELET # BLD AUTO: 318 10*3/MM3 (ref 140–450)
PMV BLD AUTO: 10.3 FL (ref 6–12)
POTASSIUM SERPL-SCNC: 4.7 MMOL/L (ref 3.5–5.2)
PROT SERPL-MCNC: 5.8 G/DL (ref 6–8.5)
RBC # BLD AUTO: 2.04 10*6/MM3 (ref 3.77–5.28)
RH BLD: NEGATIVE
SODIUM SERPL-SCNC: 138 MMOL/L (ref 136–145)
T&S EXPIRATION DATE: NORMAL
TROPONIN T SERPL HS-MCNC: 42 NG/L
WBC NRBC COR # BLD AUTO: 11.39 10*3/MM3 (ref 3.4–10.8)
WHOLE BLOOD HOLD SPECIMEN: NORMAL

## 2024-08-26 PROCEDURE — 71045 X-RAY EXAM CHEST 1 VIEW: CPT

## 2024-08-26 PROCEDURE — G0378 HOSPITAL OBSERVATION PER HR: HCPCS

## 2024-08-26 PROCEDURE — 36430 TRANSFUSION BLD/BLD COMPNT: CPT

## 2024-08-26 PROCEDURE — 86850 RBC ANTIBODY SCREEN: CPT | Performed by: EMERGENCY MEDICINE

## 2024-08-26 PROCEDURE — 84484 ASSAY OF TROPONIN QUANT: CPT | Performed by: EMERGENCY MEDICINE

## 2024-08-26 PROCEDURE — 94799 UNLISTED PULMONARY SVC/PX: CPT

## 2024-08-26 PROCEDURE — 82140 ASSAY OF AMMONIA: CPT | Performed by: EMERGENCY MEDICINE

## 2024-08-26 PROCEDURE — P9016 RBC LEUKOCYTES REDUCED: HCPCS

## 2024-08-26 PROCEDURE — 99285 EMERGENCY DEPT VISIT HI MDM: CPT

## 2024-08-26 PROCEDURE — 94640 AIRWAY INHALATION TREATMENT: CPT

## 2024-08-26 PROCEDURE — 85025 COMPLETE CBC W/AUTO DIFF WBC: CPT | Performed by: EMERGENCY MEDICINE

## 2024-08-26 PROCEDURE — 86901 BLOOD TYPING SEROLOGIC RH(D): CPT | Performed by: EMERGENCY MEDICINE

## 2024-08-26 PROCEDURE — 86900 BLOOD TYPING SEROLOGIC ABO: CPT

## 2024-08-26 PROCEDURE — 80053 COMPREHEN METABOLIC PANEL: CPT | Performed by: EMERGENCY MEDICINE

## 2024-08-26 PROCEDURE — 70450 CT HEAD/BRAIN W/O DYE: CPT

## 2024-08-26 PROCEDURE — 94761 N-INVAS EAR/PLS OXIMETRY MLT: CPT

## 2024-08-26 PROCEDURE — 83036 HEMOGLOBIN GLYCOSYLATED A1C: CPT | Performed by: EMERGENCY MEDICINE

## 2024-08-26 PROCEDURE — 86923 COMPATIBILITY TEST ELECTRIC: CPT

## 2024-08-26 PROCEDURE — 36415 COLL VENOUS BLD VENIPUNCTURE: CPT

## 2024-08-26 PROCEDURE — 86900 BLOOD TYPING SEROLOGIC ABO: CPT | Performed by: EMERGENCY MEDICINE

## 2024-08-26 PROCEDURE — 25010000002 DIPHENHYDRAMINE PER 50 MG: Performed by: EMERGENCY MEDICINE

## 2024-08-26 PROCEDURE — 84100 ASSAY OF PHOSPHORUS: CPT | Performed by: INTERNAL MEDICINE

## 2024-08-26 PROCEDURE — 93005 ELECTROCARDIOGRAM TRACING: CPT | Performed by: EMERGENCY MEDICINE

## 2024-08-26 PROCEDURE — 83735 ASSAY OF MAGNESIUM: CPT | Performed by: EMERGENCY MEDICINE

## 2024-08-26 PROCEDURE — 74176 CT ABD & PELVIS W/O CONTRAST: CPT

## 2024-08-26 RX ORDER — HYDRALAZINE HYDROCHLORIDE 20 MG/ML
10 INJECTION INTRAMUSCULAR; INTRAVENOUS EVERY 6 HOURS PRN
Status: DISCONTINUED | OUTPATIENT
Start: 2024-08-26 | End: 2024-08-29 | Stop reason: HOSPADM

## 2024-08-26 RX ORDER — ACETAMINOPHEN 325 MG/1
650 TABLET ORAL EVERY 4 HOURS PRN
Status: DISCONTINUED | OUTPATIENT
Start: 2024-08-26 | End: 2024-08-29 | Stop reason: HOSPADM

## 2024-08-26 RX ORDER — PANTOPRAZOLE SODIUM 40 MG/10ML
40 INJECTION, POWDER, LYOPHILIZED, FOR SOLUTION INTRAVENOUS ONCE
Status: COMPLETED | OUTPATIENT
Start: 2024-08-26 | End: 2024-08-26

## 2024-08-26 RX ORDER — CALCIUM ACETATE 667 MG/1
667 CAPSULE ORAL
COMMUNITY

## 2024-08-26 RX ORDER — SODIUM CHLORIDE 0.9 % (FLUSH) 0.9 %
10 SYRINGE (ML) INJECTION EVERY 12 HOURS SCHEDULED
Status: DISCONTINUED | OUTPATIENT
Start: 2024-08-26 | End: 2024-08-29 | Stop reason: HOSPADM

## 2024-08-26 RX ORDER — PANTOPRAZOLE SODIUM 40 MG/10ML
40 INJECTION, POWDER, LYOPHILIZED, FOR SOLUTION INTRAVENOUS EVERY 12 HOURS SCHEDULED
Status: DISCONTINUED | OUTPATIENT
Start: 2024-08-26 | End: 2024-08-29 | Stop reason: HOSPADM

## 2024-08-26 RX ORDER — TEMAZEPAM 15 MG/1
15 CAPSULE ORAL NIGHTLY PRN
Status: DISCONTINUED | OUTPATIENT
Start: 2024-08-26 | End: 2024-08-29 | Stop reason: HOSPADM

## 2024-08-26 RX ORDER — CALCIUM ACETATE 667 MG/1
667 CAPSULE ORAL 3 TIMES DAILY
Status: DISCONTINUED | OUTPATIENT
Start: 2024-08-26 | End: 2024-08-29 | Stop reason: HOSPADM

## 2024-08-26 RX ORDER — BISACODYL 5 MG/1
5 TABLET, DELAYED RELEASE ORAL DAILY PRN
Status: DISCONTINUED | OUTPATIENT
Start: 2024-08-26 | End: 2024-08-29 | Stop reason: HOSPADM

## 2024-08-26 RX ORDER — SEVELAMER CARBONATE 800 MG/1
2400 TABLET, FILM COATED ORAL
Status: DISCONTINUED | OUTPATIENT
Start: 2024-08-26 | End: 2024-08-29 | Stop reason: HOSPADM

## 2024-08-26 RX ORDER — SEVELAMER HYDROCHLORIDE 800 MG/1
3 TABLET, FILM COATED ORAL
COMMUNITY

## 2024-08-26 RX ORDER — AMOXICILLIN 250 MG
2 CAPSULE ORAL 2 TIMES DAILY PRN
Status: DISCONTINUED | OUTPATIENT
Start: 2024-08-26 | End: 2024-08-29 | Stop reason: HOSPADM

## 2024-08-26 RX ORDER — POLYETHYLENE GLYCOL 3350 17 G/17G
17 POWDER, FOR SOLUTION ORAL DAILY PRN
Status: DISCONTINUED | OUTPATIENT
Start: 2024-08-26 | End: 2024-08-29 | Stop reason: HOSPADM

## 2024-08-26 RX ORDER — ACETAMINOPHEN 500 MG
1000 TABLET ORAL ONCE
Status: COMPLETED | OUTPATIENT
Start: 2024-08-26 | End: 2024-08-26

## 2024-08-26 RX ORDER — SUCRALFATE 1 G/1
1 TABLET ORAL 4 TIMES DAILY
Status: DISCONTINUED | OUTPATIENT
Start: 2024-08-26 | End: 2024-08-29 | Stop reason: HOSPADM

## 2024-08-26 RX ORDER — NITROGLYCERIN 0.4 MG/1
0.4 TABLET SUBLINGUAL
Status: DISCONTINUED | OUTPATIENT
Start: 2024-08-26 | End: 2024-08-29 | Stop reason: HOSPADM

## 2024-08-26 RX ORDER — IPRATROPIUM BROMIDE AND ALBUTEROL SULFATE 2.5; .5 MG/3ML; MG/3ML
3 SOLUTION RESPIRATORY (INHALATION) EVERY 6 HOURS PRN
Status: DISCONTINUED | OUTPATIENT
Start: 2024-08-26 | End: 2024-08-29 | Stop reason: HOSPADM

## 2024-08-26 RX ORDER — IPRATROPIUM BROMIDE AND ALBUTEROL SULFATE 2.5; .5 MG/3ML; MG/3ML
3 SOLUTION RESPIRATORY (INHALATION) ONCE
Status: COMPLETED | OUTPATIENT
Start: 2024-08-26 | End: 2024-08-26

## 2024-08-26 RX ORDER — SODIUM CHLORIDE 9 MG/ML
40 INJECTION, SOLUTION INTRAVENOUS AS NEEDED
Status: DISCONTINUED | OUTPATIENT
Start: 2024-08-26 | End: 2024-08-29 | Stop reason: HOSPADM

## 2024-08-26 RX ORDER — LOSARTAN POTASSIUM 50 MG/1
50 TABLET ORAL DAILY
COMMUNITY
End: 2024-08-29 | Stop reason: HOSPADM

## 2024-08-26 RX ORDER — SODIUM BICARBONATE 650 MG/1
1300 TABLET ORAL 3 TIMES DAILY
Status: DISCONTINUED | OUTPATIENT
Start: 2024-08-26 | End: 2024-08-29 | Stop reason: HOSPADM

## 2024-08-26 RX ORDER — SODIUM CHLORIDE 0.9 % (FLUSH) 0.9 %
10 SYRINGE (ML) INJECTION AS NEEDED
Status: DISCONTINUED | OUTPATIENT
Start: 2024-08-26 | End: 2024-08-29 | Stop reason: HOSPADM

## 2024-08-26 RX ORDER — METOPROLOL TARTRATE 1 MG/ML
5 INJECTION, SOLUTION INTRAVENOUS EVERY 6 HOURS PRN
Status: DISCONTINUED | OUTPATIENT
Start: 2024-08-26 | End: 2024-08-29 | Stop reason: HOSPADM

## 2024-08-26 RX ORDER — ONDANSETRON 2 MG/ML
4 INJECTION INTRAMUSCULAR; INTRAVENOUS EVERY 6 HOURS PRN
Status: DISCONTINUED | OUTPATIENT
Start: 2024-08-26 | End: 2024-08-29 | Stop reason: HOSPADM

## 2024-08-26 RX ORDER — DIPHENHYDRAMINE HYDROCHLORIDE 50 MG/ML
25 INJECTION INTRAMUSCULAR; INTRAVENOUS ONCE
Status: COMPLETED | OUTPATIENT
Start: 2024-08-26 | End: 2024-08-26

## 2024-08-26 RX ORDER — BISACODYL 10 MG
10 SUPPOSITORY, RECTAL RECTAL DAILY PRN
Status: DISCONTINUED | OUTPATIENT
Start: 2024-08-26 | End: 2024-08-29 | Stop reason: HOSPADM

## 2024-08-26 RX ADMIN — SUCRALFATE 1 G: 1 TABLET ORAL at 20:47

## 2024-08-26 RX ADMIN — IPRATROPIUM BROMIDE AND ALBUTEROL SULFATE 3 ML: .5; 3 SOLUTION RESPIRATORY (INHALATION) at 13:01

## 2024-08-26 RX ADMIN — PANTOPRAZOLE SODIUM 40 MG: 40 INJECTION, POWDER, FOR SOLUTION INTRAVENOUS at 20:48

## 2024-08-26 RX ADMIN — Medication 10 ML: at 20:53

## 2024-08-26 RX ADMIN — ACETAMINOPHEN 1000 MG: 500 TABLET, FILM COATED ORAL at 13:43

## 2024-08-26 RX ADMIN — TEMAZEPAM 15 MG: 15 CAPSULE ORAL at 20:48

## 2024-08-26 RX ADMIN — DIPHENHYDRAMINE HYDROCHLORIDE 25 MG: 50 INJECTION, SOLUTION INTRAMUSCULAR; INTRAVENOUS at 13:43

## 2024-08-26 RX ADMIN — SODIUM BICARBONATE 1300 MG: 650 TABLET ORAL at 20:47

## 2024-08-26 RX ADMIN — PANTOPRAZOLE SODIUM 40 MG: 40 INJECTION, POWDER, FOR SOLUTION INTRAVENOUS at 13:20

## 2024-08-26 RX ADMIN — CALCIUM ACETATE 667 MG: 667 CAPSULE ORAL at 20:49

## 2024-08-26 RX ADMIN — SEVELAMER CARBONATE 2400 MG: 800 TABLET, FILM COATED ORAL at 20:47

## 2024-08-26 NOTE — CASE MANAGEMENT/SOCIAL WORK
Discharge Planning Assessment   [place]     Patient Name: Sherry Lobato  MRN: [id number]  Today's Date: 8/26/2024    Admit Date: 8/26/2024    Plan: D/C Plan: Home with S.O.   Discharge Needs Assessment       Row Name 08/26/24 1413       Living Environment    People in Home significant other    Name(s) of People in Home Ari (Derek)- Significant other    Current Living Arrangements home    Potentially Unsafe Housing Conditions none    In the past 12 months has the electric, gas, oil, or water company threatened to shut off services in your home? No    Primary Care Provided by self    Provides Primary Care For no one, unable/limited ability to care for self    Family Caregiver if Needed significant other    Family Caregiver Names S.O.- Ari; Son-Raúl    Quality of Family Relationships supportive    Able to Return to Prior Arrangements yes    Living Arrangement Comments home with S.O.       Resource/Environmental Concerns    Resource/Environmental Concerns none    Transportation Concerns none       Transportation Needs    In the past 12 months, has lack of transportation kept you from medical appointments or from getting medications? no    In the past 12 months, has lack of transportation kept you from meetings, work, or from getting things needed for daily living? No       Food Insecurity    Within the past 12 months, you worried that your food would run out before you got the money to buy more. Never true    Within the past 12 months, the food you bought just didn't last and you didn't have money to get more. Never true       Transition Planning    Patient/Family Anticipates Transition to home with family    Transportation Anticipated car, drives self;family or friend will provide       Discharge Needs Assessment    Readmission Within the Last 30 Days no previous admission in last 30 days    Equipment Currently Used at Home cane, straight;walker, rolling;wheelchair;cpap;glucometer    Concerns to be Addressed  discharge planning    Anticipated Changes Related to Illness none    Equipment Needed After Discharge none    Provided Post Acute Provider List? N/A    Provided Post Acute Provider Quality & Resource List? N/A                   Discharge Plan       Row Name 08/26/24 1415       Plan    Plan D/C Plan: Home with S.O.    Patient/Family in Agreement with Plan yes    Plan Comments Met with patient and S.O., Ari, at bedside. Confirmed PCP and pharmacy. States she is IADL's, denies financial or transportation concerns. Anticipates return home with S.O. at [place]. DC Barriers: PRBC transfusion; Monitor H&H; plan for Endo                  Continued Care and Services - Admitted Since 8/26/2024    No active coordination exists for this encounter.          Demographic Summary       Row Name 08/26/24 1412       General Information    Admission Type observation    Arrived From other (see comments)  Dialysis    Referral Source emergency department    Reason for Consult discharge planning    Preferred Language English       Contact Information    Permission Granted to Share Info With                    Functional Status       Row Name 08/26/24 1413       Functional Status    Usual Activity Tolerance moderate    Current Activity Tolerance fair       Physical Activity    On average, how many days per week do you engage in moderate to strenuous exercise (like a brisk walk)? 5 days    On average, how many minutes do you engage in exercise at this level? 20 min    Number of minutes of exercise per week 100       Assessment of Health Literacy    How often do you have someone help you read hospital materials? Occasionally    How often do you have problems learning about your medical condition because of difficulty understanding written information? Occasionally    How often do you have a problem understanding what is told to you about your medical condition? Occasionally    How confident are you filling out medical forms by  yourself? Quite a bit    Health Literacy Good       Functional Status, IADL    Medications independent    Meal Preparation independent    Housekeeping independent    Laundry independent    Shopping independent    IADL Comments Pt states she is IADL's       Mental Status    General Appearance WDL WDL       Mental Status Summary    Recent Changes in Mental Status/Cognitive Functioning no changes           Met with patient in room wearing PPE: mask    Maintained distance greater than six feet and spent less than 15 minutes in the room    Lisa Stevens RN    Phone [phone]  Fax [phone]

## 2024-08-26 NOTE — NURSING NOTE
Spoke with Dr. Buckley about running patient tonight stat vs. In the morning and he said that would be ok to run her first thing in the morning if needed. Dialysis Manager notified.

## 2024-08-26 NOTE — H&P
Patient Care Team:  Jin Malcolm PA-C as PCP - General (Physician Assistant)  Nilay Stevens MD as Consulting Physician (General Surgery)  Stephen Perea MD as Surgeon (Thoracic Surgery)  Makayla Navarro RN as Nurse Navigator  Iliana Ro MD as Consulting Physician (Hematology and Oncology)    Chief complaint   weak    Subjective     Patient is a 74 y.o. female with multiple medical problems including ESRD on dialysis, Lucas's esophagus and peptic ulcer disease who presents with generalized weakness and hypotension noted today at dialysis.  She completed 1 hour of treatment and had to stop.  She reports dark black stool x 1 week.  She takes ibuprofen twice daily on a regular basis, but hasn't had any for the last few days.  HG in ER is 6.    Review of Systems   Constitutional:  Positive for activity change, appetite change and fatigue. Negative for chills, diaphoresis, fever and unexpected weight change.   HENT:  Negative for facial swelling.    Eyes:  Negative for visual disturbance.   Respiratory:  Negative for cough, shortness of breath, wheezing and stridor.    Cardiovascular:  Negative for chest pain, palpitations and leg swelling.   Gastrointestinal:  Negative for abdominal distention, abdominal pain, constipation and diarrhea.   Genitourinary:  Negative for dysuria.   Musculoskeletal:  Positive for arthralgias. Negative for back pain and gait problem.   Neurological:  Positive for weakness. Negative for tremors, light-headedness and headaches.   Psychiatric/Behavioral:  Negative for confusion.           History  Past Medical History:   Diagnosis Date    Lucas's esophagus     C. difficile colitis     Carotid artery disease     -50-74% left, 16-49% right (1/19)    COPD (chronic obstructive pulmonary disease)     DM2 (diabetes mellitus, type 2)     ESRD (end stage renal disease)     on HD    Gastric ulcer     at anastomatic site    Gastroparesis     unknown    GERD (gastroesophageal reflux  disease)     Gout     not current    Hemodialysis patient     mwf    Hernia, incisional     abdomen    History of colonoscopy     UTD    History of degenerative disc disease     Hyperlipidemia     Hypertension     Medicare annual wellness visit, subsequent 11/07/2020    Microscopic colitis     Nephrotic syndrome     Neuroendocrine tumor     of stomach    FABY (obstructive sleep apnea)     not treating    Pedal edema     ressolved    Proteinuria     Renal cell cancer, right     Rib pain on right side     chronic    Rotator cuff tear     bilateral    Stomach cancer 2016    Stroke     Uterine cancer     Vitamin B 12 deficiency     Vitamin D deficiency      Past Surgical History:   Procedure Laterality Date    APPENDECTOMY      ARTERIOVENOUS FISTULA Right     ARTERIOVENOUS FISTULA REPAIR      Clotted off and insert graft    ARTERIOVENOUS FISTULA/SHUNT SURGERY Left 12/30/2020    Procedure: ARTERIOVENOUS FISTULA FORMATION;  Surgeon: Jan Caicedo MD;  Location: [facility] MAIN OR;  Service: Vascular;  Laterality: Left;    ARTERIOVENOUS FISTULA/SHUNT SURGERY Left 4/23/2024    Procedure: FISTULOGRAM WITH SUBCLAVIAN ARTERY ANGIOPLASTY, fistula banding;  Surgeon: Franki Delgadillo II, MD;  Location: [facility] HYBRID OR;  Service: Vascular;  Laterality: Left;    BREAST BIOPSY      right nipple 1981    BRONCHOSCOPY N/A 6/11/2024    Procedure: BRONCHOSCOPY WITH RIGHT LUNG WASHING;  Surgeon: Aramis Barragan MD;  Location: [facility] ENDOSCOPY;  Service: Pulmonary;  Laterality: N/A;  RML atelectasis    COLONOSCOPY N/A 11/24/2020    Procedure: COLONOSCOPY with polypectomy x 7;  Surgeon: Jeffery White MD;  Location: [facility] ENDOSCOPY;  Service: Gastroenterology;  Laterality: N/A;  post op: polyps, diverticulosis, hemorrhoids    COLONOSCOPY N/A 6/1/2023    Procedure: COLONOSCOPY with polypectomy x 3 biopsy x 1;  Surgeon: Jeffery White MD;  Location: [facility] ENDOSCOPY;  Service: Gastroenterology;  Laterality: N/A;  diverticulosis polyps     ENDOSCOPY N/A 03/12/2022    Procedure: ESOPHAGOGASTRODUODENOSCOPY with biopsy x 1 area;  Surgeon: Javan Augustin MD;  Location: [facility] ENDOSCOPY;  Service: Gastroenterology;  Laterality: N/A;  post op: anastamosis    ENDOSCOPY N/A 10/9/2023    Procedure: ESOPHAGOGASTRODUODENOSCOPY with biopsy x3 areas;  Surgeon: Ace Campbell MD;  Location: [facility] ENDOSCOPY;  Service: Gastroenterology;  Laterality: N/A;  duodenal ulcers;irregular z line    GASTRIC RESECTION      cancer    HYSTERECTOMY      LAPAROSCOPIC CHOLECYSTECTOMY      NEPHRECTOMY      right kidney removed     REDUCTION MAMMAPLASTY      TUMOR REMOVAL      boils    VENTRAL/INCISIONAL HERNIA REPAIR Right 08/28/2019    Procedure: VENTRAL/INCISIONAL HERNIA REPAIR;  Surgeon: Nilay Stevens MD;  Location: [facility] MAIN OR;  Service: General    VENTRAL/INCISIONAL HERNIA REPAIR N/A 10/01/2019    Procedure: OPEN VENTRAL/INCISIONAL HERNIA REPAIR;  Surgeon: Nilay Stevens MD;  Location: [facility] MAIN OR;  Service: General    VENTRAL/INCISIONAL HERNIA REPAIR N/A 10/11/2021    Procedure: VENTRAL/INCISIONAL HERNIA REPAIR LAPAROSCOPIC;  Surgeon: Nilay Stevens MD;  Location: [facility] MAIN OR;  Service: General;  Laterality: N/A;    VENTRAL/INCISIONAL HERNIA REPAIR N/A 01/23/2023    Procedure: VENTRAL/INCISIONAL HERNIA REPAIR with MESH;  Surgeon: Nilay Stevens MD;  Location: [facility] MAIN OR;  Service: General;  Laterality: N/A;     Family History   Problem Relation Age of Onset    Heart disease Father      Social History     Tobacco Use    Smoking status: Former     Current packs/day: 0.00     Average packs/day: 0.3 packs/day for 57.0 years (14.3 ttl pk-yrs)     Types: Cigarettes     Start date: 5/1/1964     Quit date: 5/1/2021     Years since quitting: 3.3     Passive exposure: Past    Smokeless tobacco: Never    Tobacco comments:     quit smoking 2 months ago   Vaping Use    Vaping status: Never Used   Substance Use Topics    Alcohol use: No    Drug use: No     (Not  in a hospital admission)    Allergies:  Atorvastatin calcium, Gabapentin, and Niacin    Objective     Vital Signs  Temp:  [97.7 °F (36.5 °C)-98.6 °F (37 °C)] 98.6 °F (37 °C)  Heart Rate:  [] 85  Resp:  [16-22] 18  BP: (105-149)/() 139/99     Physical Exam:      General Appearance:    Alert, cooperative, in no acute distress, fatigued,    Head:    Normocephalic, without obvious abnormality, atraumatic   Eyes:            Lids and lashes normal, conjunctivae and sclerae normal, no   icterus, no pallor, corneas clear, PERRLA   Ears:    Ears appear intact with no abnormalities noted   Throat:   No oral lesions, no thrush, oral mucosa moist   Neck:   No adenopathy, supple, trachea midline, no thyromegaly, no   carotid bruit, no JVD   Lungs:     Clear to auscultation,respirations regular, even and                  unlabored    Heart:    Regular rhythm and normal rate, II/VI systolic murmur   Chest Wall:    No abnormalities observed   Abdomen:     Normal bowel sounds, no masses, no organomegaly, soft        non-tender, non-distended, no guarding, no rebound                tenderness   Extremities:   Moves all extremities well, no edema, no cyanosis, no             redness   Pulses:   Pulses palpable and equal bilaterally   Skin:   No bleeding, bruising or rash   Lymph nodes:   No palpable adenopathy   Neurologic:   Cranial nerves 2 - 12 grossly intact, sensation intact, DTR       present and equal bilaterally       Results Review:     Imaging Results (Last 24 Hours)       Procedure Component Value Units Date/Time    CT Abdomen Pelvis Without Contrast [[id number]] Collected: 08/26/24 1259     Updated: 08/26/24 1312    Narrative:      CT ABDOMEN PELVIS WO CONTRAST    Date of Exam: 8/26/2024 12:57 PM EDT    Indication: Melanic stool, epigastric pain.    Comparison: Small bowel follow-through 11/10/2023. CT abdomen and pelvis 11/9/2023    Technique: Axial CT images were obtained of the abdomen and pelvis without the  administration of contrast. Sagittal and coronal reconstructions were performed.  Automated exposure control and iterative reconstruction methods were used.      Findings:  There is a right lower quadrant ventral hernia containing small bowel, without evidence of herniated bowel inflammation. Hernia sac measures approximately 4.0 x 2.0 x 4.5 cm (3/109, 5/49). This hernia appears new since 11/9/2023. Some of the upstream   small bowel loops appear mildly fluid distended; however, there is no evidence of high-grade bowel obstruction.     The liver, spleen, pancreas, are normal. Right nephrectomy. Severe left renal atrophy with left renal cyst. Stable appearance of bilateral adrenal adenomas.     Severe calcific atherosclerosis in the aorta, iliac and femoral vessels and mesenteric vessels. No adenopathy. No ascites. Surgical changes of the stomach.     Advanced uncomplicated sigmoid diverticulosis. Hysterectomy. Left adnexal cyst measuring 2 cm is unchanged. Urinary bladder is decompressed. Rectum is normal.      Normal heart size. Dense coronary artery calcifications. Chronic fibrotic changes are suggested within the lung bases, without acute airspace disease.      No acute or suspicious osseous abnormalities. Degenerative changes of the spine and hips.      Impression:      1. There is a new small right lower quadrant ventral hernia containing a segment of small bowel.  2. Mild distention of mid abdominal small bowel loops without evidence of high-grade obstruction.  3. Surgical changes of the stomach, right nephrectomy, hysterectomy, cholecystectomy.  4. Stable bilateral adrenal adenomas.  5. Advanced sigmoid diverticulosis without CT evidence of acute diverticulitis..  6. Atrophic left kidney.      Electronically Signed: Florinda Davidson MD    8/26/2024 1:10 PM EDT    Workstation ID: [id number]    XR Chest 1 View [[id number]] Collected: 08/26/24 1253     Updated: 08/26/24 1312    Narrative:      XR CHEST 1 VW    Date  of Exam: 8/26/2024 12:40 PM EDT    Indication: Wheezing, history of renal failure    Comparison: 6/9/2024 and prior    Findings:  Study is limited by overlying support and monitoring apparatus. The heart size is stable. Pulmonary vascularity is congested and indistinct. Increased groundglass and interstitial opacities with a perihilar and bibasilar predominance noted. Findings are   somewhat asymmetric on the right compared to the left. This is accentuated by patient positioning. Osseous structures are grossly unremarkable. Vascular stent is noted in the right upper extremity      Impression:      Impression:    1. Diffuse increased interstitial changes suggesting pulmonary edema, fluid overload. Asymmetric opacities at the right base may represent asymmetric edema, atelectasis or pneumonia. Findings demonstrate slight interval improvement from comparison      Electronically Signed: Ace Graham MD    8/26/2024 1:10 PM EDT    Workstation ID: [id number]    CT Head Without Contrast [[id number]] Collected: 08/26/24 1259     Updated: 08/26/24 1304    Narrative:      CT HEAD WO CONTRAST    Date of Exam: 8/26/2024 12:57 PM EDT    Indication: , History of stroke, binocular blurred vision.    Comparison: Head CT 1/26/2023    Technique: Axial CT images were obtained of the head without contrast administration.  Coronal reconstructions were performed.  Automated exposure control and iterative reconstruction methods were used.      Findings:  Encephalomalacia and gliosis centered within the left parietal lobe in keeping with old infarct. No new large territory loss of gray-white differentiation, acute intracranial hemorrhage, large mass lesion, midline shift or hydrocephalus. Parenchymal   volume within normal limits for age. Minimal periventricular hypodensity similar to prior likely reflecting chronic microvascular ischemic change. Orbits appear symmetric. Distal carotid atherosclerotic disease. No large extra-axial fluid  collection.   Negative for obstructive sinus disease or large mastoid effusion. Negative for depressed skull fracture.      Impression:      Impression:  No convincing acute intracranial finding by CT. Chronic left parietal lobe infarct similar to previous comparison.      Electronically Signed: Bg eDan MD    8/26/2024 1:02 PM EDT    Workstation ID: [id number]             Lab Results (last 24 hours)       Procedure Component Value Units Date/Time    Rolfe Draw [[id number]] Collected: 08/26/24 1210    Specimen: Blood Updated: 08/26/24 1434    Narrative:      The following orders were created for panel order Rolfe Draw.  Procedure                               Abnormality         Status                     ---------                               -----------         ------                     Green Top (Gel)[[id number]]                                  Final result               Lavender Top[[id number]]                                     Final result               Gold Top - SST[[id number]]                                   Final result               Light Blue Top[[id number]]                                                                Please view results for these tests on the individual orders.    Ammonia [[id number]]  (Normal) Collected: 08/26/24 1246    Specimen: Blood Updated: 08/26/24 1401     Ammonia 21 umol/L     Hemoglobin A1c [[id number]]  (Abnormal) Collected: 08/26/24 1210    Specimen: Blood Updated: 08/26/24 1333     Hemoglobin A1C 5.86 %     Comprehensive Metabolic Panel [[id number]]  (Abnormal) Collected: 08/26/24 1210    Specimen: Blood Updated: 08/26/24 1259     Glucose 171 mg/dL      BUN 96 mg/dL      Creatinine 5.61 mg/dL      Sodium 138 mmol/L      Potassium 4.7 mmol/L      Comment: Slight hemolysis detected by analyzer. Result may be falsely elevated.        Chloride 101 mmol/L      CO2 19.1 mmol/L      Calcium 8.7 mg/dL      Total Protein 5.8 g/dL      Albumin 3.5 g/dL      ALT (SGPT) 14 U/L       AST (SGOT) 22 U/L      Alkaline Phosphatase 98 U/L      Total Bilirubin <0.2 mg/dL      Globulin 2.3 gm/dL      A/G Ratio 1.5 g/dL      BUN/Creatinine Ratio 17.1     Anion Gap 17.9 mmol/L      eGFR 7.4 mL/min/1.73      Comment: <15 Indicative of kidney failure       Narrative:      GFR Normal >60  Chronic Kidney Disease <60  Kidney Failure <15    The GFR formula is only valid for adults with stable renal function between ages 18 and 70.    Magnesium [[id number]]  (Normal) Collected: 08/26/24 1210    Specimen: Blood Updated: 08/26/24 1259     Magnesium 1.7 mg/dL     Single High Sensitivity Troponin T [[id number]]  (Abnormal) Collected: 08/26/24 1210    Specimen: Blood Updated: 08/26/24 1245     HS Troponin T 42 ng/L     Narrative:      High Sensitive Troponin T Reference Range:  <14.0 ng/L- Negative Female for AMI  <22.0 ng/L- Negative Male for AMI  >=14 - Abnormal Female indicating possible myocardial injury.  >=22 - Abnormal Male indicating possible myocardial injury.   Clinicians would have to utilize clinical acumen, EKG, Troponin, and serial changes to determine if it is an Acute Myocardial Infarction or myocardial injury due to an underlying chronic condition.         CBC & Differential [[id number]]  (Abnormal) Collected: 08/26/24 1210    Specimen: Blood Updated: 08/26/24 1244    Narrative:      The following orders were created for panel order CBC & Differential.  Procedure                               Abnormality         Status                     ---------                               -----------         ------                     CBC Auto Differential[[id number]]        Abnormal            Final result                 Please view results for these tests on the individual orders.    CBC Auto Differential [[id number]]  (Abnormal) Collected: 08/26/24 1210    Specimen: Blood Updated: 08/26/24 1244     WBC 11.39 10*3/mm3      RBC 2.04 10*6/mm3      Hemoglobin 6.1 g/dL      Hematocrit 20.0 %      MCV 98.0 fL       MCH 29.9 pg      MCHC 30.5 g/dL      RDW 16.3 %      RDW-SD 58.5 fl      MPV 10.3 fL      Platelets 318 10*3/mm3      Neutrophil % 79.2 %      Lymphocyte % 9.1 %      Monocyte % 6.0 %      Eosinophil % 3.6 %      Basophil % 0.4 %      Immature Grans % 1.7 %      Neutrophils, Absolute 9.03 10*3/mm3      Lymphocytes, Absolute 1.04 10*3/mm3      Monocytes, Absolute 0.68 10*3/mm3      Eosinophils, Absolute 0.41 10*3/mm3      Basophils, Absolute 0.04 10*3/mm3      Immature Grans, Absolute 0.19 10*3/mm3      nRBC 0.0 /100 WBC     Green Top (Gel) [[id number]] Collected: 08/26/24 1210    Specimen: Blood Updated: 08/26/24 1215     Extra Tube Hold for add-ons.     Comment: Auto resulted.       Lavender Top [[id number]] Collected: 08/26/24 1210    Specimen: Blood Updated: 08/26/24 1215     Extra Tube hold for add-on     Comment: Auto resulted       Gold Top - SST [[id number]] Collected: 08/26/24 1210    Specimen: Blood Updated: 08/26/24 1215     Extra Tube Hold for add-ons.     Comment: Auto resulted.                I reviewed the patient's new clinical results.    Assessment & Plan       Acute upper GI bleeding  Anemia due to GI blood loss IV - likely NSAID induced; IV PPI; NPO at midnight in anticipation of EGD; GI consult; transfusion ordered  ESRD on dialysis - schedule per Dr. Buckley  Chronic CAD - stable without anginal symptoms  COPD - well compensated  Tobacco use disorder  H/o Lucas's esophagus  H/o renal cell carcinoma  DM-II - currently off of meds with well controlled A1C  HTN - holding home meds  HLD - resume statin at discharge    SCD's for dvt prophy        I discussed the patient's findings and my recommendations with patient.     Angelia Taylor MD  08/26/24  16:04 EDT

## 2024-08-26 NOTE — ED PROVIDER NOTES
Subjective   History of Present Illness  74-year-old female states she became very weak during dialysis today.  She states it was stopped after 1 hour.  The patient reported had some palpitations with dialysis.  She reports no focal neurologic weakness or lateralizing neurologic signs.  She states she did have blurred vision today and stated her blood sugar was in the 270 range which is much higher than it has been recently.  She states her hyperglycemia medicine has been discontinued.  The patient states that she has had melanic stool for about 5 days.  She reports no hematochezia, hematuria, or hematemesis.  She reports no unexplained bruising.  The patient also reports she had palpitation during dialysis and according to EMS had trigeminy earlier in the day.  The patient has had previous abdominal surgery      Review of Systems   HENT:  Negative for trouble swallowing.    Respiratory:  Positive for shortness of breath. Negative for chest tightness.    Cardiovascular:  Positive for palpitations. Negative for chest pain and leg swelling.   Gastrointestinal:  Positive for abdominal pain and nausea. Negative for blood in stool.   Neurological:  Positive for weakness.   All other systems reviewed and are negative.      Past Medical History:   Diagnosis Date    Lucas's esophagus     C. difficile colitis     Carotid artery disease     -50-74% left, 16-49% right (1/19)    COPD (chronic obstructive pulmonary disease)     DM2 (diabetes mellitus, type 2)     ESRD (end stage renal disease)     on HD    Gastric ulcer     at anastomatic site    Gastroparesis     unknown    GERD (gastroesophageal reflux disease)     Gout     not current    Hemodialysis patient     mwf    Hernia, incisional     abdomen    History of colonoscopy     UTD    History of degenerative disc disease     Hyperlipidemia     Hypertension     Medicare annual wellness visit, subsequent 11/07/2020    Microscopic colitis     Nephrotic syndrome      "Neuroendocrine tumor     of stomach    FABY (obstructive sleep apnea)     not treating    Pedal edema     ressolved    Proteinuria     Renal cell cancer, right     Rib pain on right side     chronic    Rotator cuff tear     bilateral    Stomach cancer 2016    Stroke     Uterine cancer     Vitamin B 12 deficiency     Vitamin D deficiency        Allergies   Allergen Reactions    Atorvastatin Calcium Other (See Comments)     \"felt like I was on fire\"    Gabapentin Dizziness     says was unable to walk    Niacin Other (See Comments)     \"FEELS LIKE SHE IS ON FIRE\"         Past Surgical History:   Procedure Laterality Date    APPENDECTOMY      ARTERIOVENOUS FISTULA Right     ARTERIOVENOUS FISTULA REPAIR      Clotted off and insert graft    ARTERIOVENOUS FISTULA/SHUNT SURGERY Left 12/30/2020    Procedure: ARTERIOVENOUS FISTULA FORMATION;  Surgeon: Jan Caicedo MD;  Location: [facility] MAIN OR;  Service: Vascular;  Laterality: Left;    ARTERIOVENOUS FISTULA/SHUNT SURGERY Left 4/23/2024    Procedure: FISTULOGRAM WITH SUBCLAVIAN ARTERY ANGIOPLASTY, fistula banding;  Surgeon: Franki Delgadillo II, MD;  Location: [facility] HYBRID OR;  Service: Vascular;  Laterality: Left;    BREAST BIOPSY      right nipple 1981    BRONCHOSCOPY N/A 6/11/2024    Procedure: BRONCHOSCOPY WITH RIGHT LUNG WASHING;  Surgeon: Aramis Barragan MD;  Location: [facility] ENDOSCOPY;  Service: Pulmonary;  Laterality: N/A;  RML atelectasis    COLONOSCOPY N/A 11/24/2020    Procedure: COLONOSCOPY with polypectomy x 7;  Surgeon: Jeffery White MD;  Location: [facility] ENDOSCOPY;  Service: Gastroenterology;  Laterality: N/A;  post op: polyps, diverticulosis, hemorrhoids    COLONOSCOPY N/A 6/1/2023    Procedure: COLONOSCOPY with polypectomy x 3 biopsy x 1;  Surgeon: Jeffery White MD;  Location: [facility] ENDOSCOPY;  Service: Gastroenterology;  Laterality: N/A;  diverticulosis polyps    ENDOSCOPY N/A 03/12/2022    Procedure: ESOPHAGOGASTRODUODENOSCOPY with biopsy x 1 area;  " Surgeon: Javan Augustin MD;  Location: [facility] ENDOSCOPY;  Service: Gastroenterology;  Laterality: N/A;  post op: anastamosis    ENDOSCOPY N/A 10/9/2023    Procedure: ESOPHAGOGASTRODUODENOSCOPY with biopsy x3 areas;  Surgeon: Ace Campbell MD;  Location: [facility] ENDOSCOPY;  Service: Gastroenterology;  Laterality: N/A;  duodenal ulcers;irregular z line    GASTRIC RESECTION      cancer    HYSTERECTOMY      LAPAROSCOPIC CHOLECYSTECTOMY      NEPHRECTOMY      right kidney removed     REDUCTION MAMMAPLASTY      TUMOR REMOVAL      boils    VENTRAL/INCISIONAL HERNIA REPAIR Right 08/28/2019    Procedure: VENTRAL/INCISIONAL HERNIA REPAIR;  Surgeon: Nilay Stevens MD;  Location: [facility] MAIN OR;  Service: General    VENTRAL/INCISIONAL HERNIA REPAIR N/A 10/01/2019    Procedure: OPEN VENTRAL/INCISIONAL HERNIA REPAIR;  Surgeon: Nilay Stevens MD;  Location: [facility] MAIN OR;  Service: General    VENTRAL/INCISIONAL HERNIA REPAIR N/A 10/11/2021    Procedure: VENTRAL/INCISIONAL HERNIA REPAIR LAPAROSCOPIC;  Surgeon: Nilay Stevens MD;  Location: [facility] MAIN OR;  Service: General;  Laterality: N/A;    VENTRAL/INCISIONAL HERNIA REPAIR N/A 01/23/2023    Procedure: VENTRAL/INCISIONAL HERNIA REPAIR with MESH;  Surgeon: Nilay Stevens MD;  Location: [facility] MAIN OR;  Service: General;  Laterality: N/A;       Family History   Problem Relation Age of Onset    Heart disease Father        Social History     Socioeconomic History    Marital status: Single   Tobacco Use    Smoking status: Former     Current packs/day: 0.00     Average packs/day: 0.3 packs/day for 57.0 years (14.3 ttl pk-yrs)     Types: Cigarettes     Start date: 5/1/1964     Quit date: 5/1/2021     Years since quitting: 3.3     Passive exposure: Past    Smokeless tobacco: Never    Tobacco comments:     quit smoking 2 months ago   Vaping Use    Vaping status: Never Used   Substance and Sexual Activity    Alcohol use: No    Drug use: No    Sexual activity: Defer          Reports no unusual food water travel or activity.  Does not use alcohol or NSAIDs.  States not a current smoker  Objective   Physical Exam  Alert Fouzia Coma Scale 15 appears pale   HEENT: Pupils equal and reactive to light. Conjunctivae are not injected. Normal tympanic membranes. Oropharynx and nares are normal.   Neck: Supple. Midline trachea. No JVD. No goiter.   Chest: Scattered expiratory wheezes bilaterally but no rales or rhonchi and equal breath sounds bilaterally, regular rate and rhythm without murmur or rub.  No S3 or S4   Abdomen: Positive bowel sounds, mild epigastric discomfort is noted, abdomen is mildly obese but nondistended. No rebound or peritoneal signs. No CVA tenderness.  Heme positive stool reported  Extremities no clubbing. cyanosis or edema. Motor sensory exam is normal. The full range of motion is intact   Skin: Warm and dry, no rashes or petechia.   Lymphatic: No regional lymphadenopathy. No calf pain, swelling or Homans sign    Procedures           ED Course                            Labs Reviewed   COMPREHENSIVE METABOLIC PANEL - Abnormal; Notable for the following components:       Result Value    Glucose 171 (*)     BUN 96 (*)     Creatinine 5.61 (*)     CO2 19.1 (*)     Total Protein 5.8 (*)     Anion Gap 17.9 (*)     eGFR 7.4 (*)     All other components within normal limits    Narrative:     GFR Normal >60  Chronic Kidney Disease <60  Kidney Failure <15    The GFR formula is only valid for adults with stable renal function between ages 18 and 70.   SINGLE HS TROPONIN T - Abnormal; Notable for the following components:    HS Troponin T 42 (*)     All other components within normal limits    Narrative:     High Sensitive Troponin T Reference Range:  <14.0 ng/L- Negative Female for AMI  <22.0 ng/L- Negative Male for AMI  >=14 - Abnormal Female indicating possible myocardial injury.  >=22 - Abnormal Male indicating possible myocardial injury.   Clinicians would have to  utilize clinical acumen, EKG, Troponin, and serial changes to determine if it is an Acute Myocardial Infarction or myocardial injury due to an underlying chronic condition.        HEMOGLOBIN A1C - Abnormal; Notable for the following components:    Hemoglobin A1C 5.86 (*)     All other components within normal limits   CBC WITH AUTO DIFFERENTIAL - Abnormal; Notable for the following components:    WBC 11.39 (*)     RBC 2.04 (*)     Hemoglobin 6.1 (*)     Hematocrit 20.0 (*)     MCV 98.0 (*)     MCHC 30.5 (*)     RDW 16.3 (*)     RDW-SD 58.5 (*)     Neutrophil % 79.2 (*)     Lymphocyte % 9.1 (*)     Immature Grans % 1.7 (*)     Neutrophils, Absolute 9.03 (*)     Eosinophils, Absolute 0.41 (*)     Immature Grans, Absolute 0.19 (*)     All other components within normal limits   MAGNESIUM - Normal   RAINBOW DRAW    Narrative:     The following orders were created for panel order [place] Draw.  Procedure                               Abnormality         Status                     ---------                               -----------         ------                     Green Top (Gel)[[id number]]                                  Final result               Lavender Top[[id number]]                                     Final result               Gold Top - SST[[id number]]                                   Final result               Light Blue Top[[id number]]                                                                Please view results for these tests on the individual orders.   URINALYSIS W/ CULTURE IF INDICATED   AMMONIA   TYPE AND SCREEN   PREPARE RBC   GREEN TOP   LAVENDER TOP   GOLD TOP - SST   CBC AND DIFFERENTIAL    Narrative:     The following orders were created for panel order CBC & Differential.  Procedure                               Abnormality         Status                     ---------                               -----------         ------                     CBC Auto Differential[[id number]]        Abnormal             Final result                 Please view results for these tests on the individual orders.   LIGHT BLUE TOP     Medications   ipratropium-albuterol (DUO-NEB) nebulizer solution 3 mL (3 mL Nebulization Given 8/26/24 1301)   pantoprazole (PROTONIX) injection 40 mg (40 mg Intravenous Given 8/26/24 1320)   diphenhydrAMINE (BENADRYL) injection 25 mg (25 mg Intravenous Given 8/26/24 1343)   acetaminophen (TYLENOL) tablet 1,000 mg (1,000 mg Oral Given 8/26/24 1343)     CT Abdomen Pelvis Without Contrast    Result Date: 8/26/2024  1. There is a new small right lower quadrant ventral hernia containing a segment of small bowel. 2. Mild distention of mid abdominal small bowel loops without evidence of high-grade obstruction. 3. Surgical changes of the stomach, right nephrectomy, hysterectomy, cholecystectomy. 4. Stable bilateral adrenal adenomas. 5. Advanced sigmoid diverticulosis without CT evidence of acute diverticulitis.. 6. Atrophic left kidney. Electronically Signed: Florinda Davidson MD  8/26/2024 1:10 PM EDT  Workstation ID: [id number]    XR Chest 1 View    Result Date: 8/26/2024  Impression: 1. Diffuse increased interstitial changes suggesting pulmonary edema, fluid overload. Asymmetric opacities at the right base may represent asymmetric edema, atelectasis or pneumonia. Findings demonstrate slight interval improvement from comparison Electronically Signed: Ace Graham MD  8/26/2024 1:10 PM EDT  Workstation ID: [id number]    CT Head Without Contrast    Result Date: 8/26/2024  Impression: No convincing acute intracranial finding by CT. Chronic left parietal lobe infarct similar to previous comparison. Electronically Signed: Bg Dean MD  8/26/2024 1:02 PM EDT  Workstation ID: [id number]                      Medical Decision Making  Patient received breathing treatment Emergency Department stated she felt better.  The patient was given Protonix.  The patient was emergently scheduled for transfusion for 2 units  of packed red cells.  The patient will be admitted to high-level monitored bed.  The case was discussed with the hospitalist service.  The patient will need upper endoscopy    Amount and/or Complexity of Data Reviewed  Independent Historian:      Details: Significant other  Labs: ordered. Decision-making details documented in ED Course.  Radiology: ordered and independent interpretation performed.  ECG/medicine tests: ordered.  Discussion of management or test interpretation with external provider(s): Hospitalist    Risk  OTC drugs.  Prescription drug management.  Decision regarding hospitalization.        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       Medication List      No changes were made to your prescriptions during this visit.            Jan Pulliam MD  08/26/24 [id number]

## 2024-08-26 NOTE — ED NOTES
Nursing report ED to floor  Sherry Lobato  74 y.o.  female    HPI:   Chief Complaint   Patient presents with    Blurred Vision       Admitting doctor:   Angelia Taylor MD    Admitting diagnosis:   The primary encounter diagnosis was Weakness. 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    Code status:   Current Code Status       Date Active Code Status Order ID Comments User Context       Prior            Allergies:   Atorvastatin calcium, Gabapentin, and Niacin    Isolation:  No active isolations     Fall Risk:  Fall Risk Assessment was completed, and patient is at moderate risk for falls.   Predictive Model Details         22 (Low) Factor Value    Calculated 8/26/2024 14:55 Age 74    Risk of Fall Model Active Peripheral IV Present     Imaging order in this encounter Present     Respiratory Rate 18     Magnesium 1.7 mg/dL     Number of Distinct Medication Classes administered 4     Calcium 8.7 mg/dL     Teodoro Scale not on file     Albumin 3.5 g/dL     Creatinine 5.61 mg/dL     Diastolic BP 80     Chloride 101 mmol/L     Number of administrations of Ulcer Drugs 1     Gastrointestinal Assessment X     Potassium 4.7 mmol/L     Total Bilirubin <0.2 mg/dL     Days after Admission 0.129     Tobacco Use Quit     ALT 14 U/L         Weight:       08/26/24  1148   Weight: 68 kg (149 lb 14.6 oz)       Intake and Output  No intake or output data in the 24 hours ending 08/26/24 1459    Diet:        Most recent vitals:   Vitals:    08/26/24 1401 08/26/24 1419 08/26/24 1419 08/26/24 1435   BP: 140/64 114/57 114/57 149/80   Pulse: 91 95 95 87   Resp:  22 22 18   Temp:  97.7 °F (36.5 °C) 97.7 °F (36.5 °C) 98 °F (36.7 °C)   TempSrc:  Oral     SpO2:  100% 100% 100%   Weight:       Height:           Active LDAs/IV  Access:   Lines, Drains & Airways       Active LDAs       Name Placement date Placement time Site Days    Peripheral IV 08/26/24 1221 Posterior;Right Forearm 08/26/24  1221  Forearm  less than 1                    Skin Condition:   Skin Assessments (last day)       None             Labs (abnormal labs have a star):   Labs Reviewed   COMPREHENSIVE METABOLIC PANEL - Abnormal; Notable for the following components:       Result Value    Glucose 171 (*)     BUN 96 (*)     Creatinine 5.61 (*)     CO2 19.1 (*)     Total Protein 5.8 (*)     Anion Gap 17.9 (*)     eGFR 7.4 (*)     All other components within normal limits    Narrative:     GFR Normal >60  Chronic Kidney Disease <60  Kidney Failure <15    The GFR formula is only valid for adults with stable renal function between ages 18 and 70.   SINGLE HS TROPONIN T - Abnormal; Notable for the following components:    HS Troponin T 42 (*)     All other components within normal limits    Narrative:     High Sensitive Troponin T Reference Range:  <14.0 ng/L- Negative Female for AMI  <22.0 ng/L- Negative Male for AMI  >=14 - Abnormal Female indicating possible myocardial injury.  >=22 - Abnormal Male indicating possible myocardial injury.   Clinicians would have to utilize clinical acumen, EKG, Troponin, and serial changes to determine if it is an Acute Myocardial Infarction or myocardial injury due to an underlying chronic condition.        HEMOGLOBIN A1C - Abnormal; Notable for the following components:    Hemoglobin A1C 5.86 (*)     All other components within normal limits   CBC WITH AUTO DIFFERENTIAL - Abnormal; Notable for the following components:    WBC 11.39 (*)     RBC 2.04 (*)     Hemoglobin 6.1 (*)     Hematocrit 20.0 (*)     MCV 98.0 (*)     MCHC 30.5 (*)     RDW 16.3 (*)     RDW-SD 58.5 (*)     Neutrophil % 79.2 (*)     Lymphocyte % 9.1 (*)     Immature Grans % 1.7 (*)     Neutrophils, Absolute 9.03 (*)     Eosinophils, Absolute 0.41 (*)     Immature Grans,  Absolute 0.19 (*)     All other components within normal limits   AMMONIA - Normal   MAGNESIUM - Normal   RAINBOW DRAW    Narrative:     The following orders were created for panel order [place] Draw.  Procedure                               Abnormality         Status                     ---------                               -----------         ------                     Green Top (Gel)[[id number]]                                  Final result               Lavender Top[[id number]]                                     Final result               Gold Top - SST[[id number]]                                   Final result               Light Blue Top[[id number]]                                                                Please view results for these tests on the individual orders.   URINALYSIS W/ CULTURE IF INDICATED   TYPE AND SCREEN   PREPARE RBC   GREEN TOP   LAVENDER TOP   GOLD TOP - SST   CBC AND DIFFERENTIAL    Narrative:     The following orders were created for panel order CBC & Differential.  Procedure                               Abnormality         Status                     ---------                               -----------         ------                     CBC Auto Differential[[id number]]        Abnormal            Final result                 Please view results for these tests on the individual orders.       LOC: Person, Place, Time, and Situation    Telemetry:  Telemetry    Cardiac Monitoring Ordered: yes    EKG:   ECG 12 Lead Dyspnea   Preliminary Result   HEART RATE=93  bpm   RR Ilbvzxfg=690  ms   MT Hitwhnil=830  ms   P Horizontal Axis=-42  deg   P Front Axis=67  deg   QRSD Interval=85  ms   QT Kqvsceew=599  ms   KBvO=719  ms   QRS Axis=6  deg   T Wave Axis=193  deg   - ABNORMAL ECG -   Sinus rhythm   Abnormal T, consider ischemia, lateral leads   Date and Time of Study:2024-08-26 12:34:20          Medications Given in the ED:   Medications   ipratropium-albuterol (DUO-NEB) nebulizer solution 3 mL  (3 mL Nebulization Given 8/26/24 1301)   pantoprazole (PROTONIX) injection 40 mg (40 mg Intravenous Given 8/26/24 1320)   diphenhydrAMINE (BENADRYL) injection 25 mg (25 mg Intravenous Given 8/26/24 1343)   acetaminophen (TYLENOL) tablet 1,000 mg (1,000 mg Oral Given 8/26/24 1343)       Imaging results:  CT Abdomen Pelvis Without Contrast    Result Date: 8/26/2024  1. There is a new small right lower quadrant ventral hernia containing a segment of small bowel. 2. Mild distention of mid abdominal small bowel loops without evidence of high-grade obstruction. 3. Surgical changes of the stomach, right nephrectomy, hysterectomy, cholecystectomy. 4. Stable bilateral adrenal adenomas. 5. Advanced sigmoid diverticulosis without CT evidence of acute diverticulitis.. 6. Atrophic left kidney. Electronically Signed: Florinda Davidson MD  8/26/2024 1:10 PM EDT  Workstation ID: [id number]    XR Chest 1 View    Result Date: 8/26/2024  Impression: 1. Diffuse increased interstitial changes suggesting pulmonary edema, fluid overload. Asymmetric opacities at the right base may represent asymmetric edema, atelectasis or pneumonia. Findings demonstrate slight interval improvement from comparison Electronically Signed: Ace Graham MD  8/26/2024 1:10 PM EDT  Workstation ID: [id number]    CT Head Without Contrast    Result Date: 8/26/2024  Impression: No convincing acute intracranial finding by CT. Chronic left parietal lobe infarct similar to previous comparison. Electronically Signed: Bg Dean MD  8/26/2024 1:02 PM EDT  Workstation ID: [id number]     Social issues:   Social History     Socioeconomic History    Marital status: Single   Tobacco Use    Smoking status: Former     Current packs/day: 0.00     Average packs/day: 0.3 packs/day for 57.0 years (14.3 ttl pk-yrs)     Types: Cigarettes     Start date: 5/1/1964     Quit date: 5/1/2021     Years since quitting: 3.3     Passive exposure: Past    Smokeless tobacco: Never    Tobacco  comments:     quit smoking 2 months ago   Vaping Use    Vaping status: Never Used   Substance and Sexual Activity    Alcohol use: No    Drug use: No    Sexual activity: Defer       NIH Stroke Scale:  Interval: (not recorded)  1a. Level of Consciousness: (not recorded)  1b. LOC Questions: (not recorded)  1c. LOC Commands: (not recorded)  2. Best Gaze: (not recorded)  3. Visual: (not recorded)  4. Facial Palsy: (not recorded)  5a. Motor Arm, Left: (not recorded)  5b. Motor Arm, Right: (not recorded)  6a. Motor Leg, Left: (not recorded)  6b. Motor Leg, Right: (not recorded)  7. Limb Ataxia: (not recorded)  8. Sensory: (not recorded)  9. Best Language: (not recorded)  10. Dysarthria: (not recorded)  11. Extinction and Inattention (formerly Neglect): (not recorded)    Total (NIH Stroke Scale): (not recorded)     Additional notable assessment information:     Nursing report ED to floor:  Ced Rodas RN   08/26/24 14:59 EDT

## 2024-08-26 NOTE — CONSULTS
[facility] CONSULT NOTE    Referring Provider: Angelia Mariscal MD   Reason for Consultation: ESRD    Chief complaint.  Generalized weakness, hypotension    History of present illness: Patient is 74 years old female with ESRD, hypertension, diabetes, peptic ulcer disease, admitted to the hospital with generalized weakness and hypotension during dialysis.  Patient has been cutting her dialysis sharp for last few days and was having hypotension and tachyarrhythmias during dialysis.  Patient was also having dark tarry stools for over a week.  Patient has been taking NSAIDs.  She denies any chest pain, nausea, vomiting, abdominal pain.  Patient's hemoglobin 6.1 on presentation.  She receiving PRBC transfusion.    History  Past Medical History:   Diagnosis Date    Lucas's esophagus     C. difficile colitis     Carotid artery disease     -50-74% left, 16-49% right (1/19)    COPD (chronic obstructive pulmonary disease)     DM2 (diabetes mellitus, type 2)     ESRD (end stage renal disease)     on HD    Gastric ulcer     at anastomatic site    Gastroparesis     unknown    GERD (gastroesophageal reflux disease)     Gout     not current    Hemodialysis patient     mwf    Hernia, incisional     abdomen    History of colonoscopy     UTD    History of degenerative disc disease     Hyperlipidemia     Hypertension     Medicare annual wellness visit, subsequent 11/07/2020    Microscopic colitis     Nephrotic syndrome     Neuroendocrine tumor     of stomach    FABY (obstructive sleep apnea)     not treating    Pedal edema     ressolved    Proteinuria     Renal cell cancer, right     Rib pain on right side     chronic    Rotator cuff tear     bilateral    Stomach cancer 2016    Stroke     Uterine cancer     Vitamin B 12 deficiency     Vitamin D deficiency      Past Surgical History:   Procedure Laterality Date    APPENDECTOMY      ARTERIOVENOUS FISTULA Right     ARTERIOVENOUS FISTULA REPAIR      Clotted off and insert graft     ARTERIOVENOUS FISTULA/SHUNT SURGERY Left 12/30/2020    Procedure: ARTERIOVENOUS FISTULA FORMATION;  Surgeon: Jan Caicedo MD;  Location: [facility] MAIN OR;  Service: Vascular;  Laterality: Left;    ARTERIOVENOUS FISTULA/SHUNT SURGERY Left 4/23/2024    Procedure: FISTULOGRAM WITH SUBCLAVIAN ARTERY ANGIOPLASTY, fistula banding;  Surgeon: Franki Delgadillo II, MD;  Location: [facility] HYBRID OR;  Service: Vascular;  Laterality: Left;    BREAST BIOPSY      right nipple 1981    BRONCHOSCOPY N/A 6/11/2024    Procedure: BRONCHOSCOPY WITH RIGHT LUNG WASHING;  Surgeon: Aramis Barragan MD;  Location: [facility] ENDOSCOPY;  Service: Pulmonary;  Laterality: N/A;  RML atelectasis    COLONOSCOPY N/A 11/24/2020    Procedure: COLONOSCOPY with polypectomy x 7;  Surgeon: Jeffery White MD;  Location: [facility] ENDOSCOPY;  Service: Gastroenterology;  Laterality: N/A;  post op: polyps, diverticulosis, hemorrhoids    COLONOSCOPY N/A 6/1/2023    Procedure: COLONOSCOPY with polypectomy x 3 biopsy x 1;  Surgeon: Jeffery White MD;  Location: [facility] ENDOSCOPY;  Service: Gastroenterology;  Laterality: N/A;  diverticulosis polyps    ENDOSCOPY N/A 03/12/2022    Procedure: ESOPHAGOGASTRODUODENOSCOPY with biopsy x 1 area;  Surgeon: Javan Augustin MD;  Location: [facility] ENDOSCOPY;  Service: Gastroenterology;  Laterality: N/A;  post op: anastamosis    ENDOSCOPY N/A 10/9/2023    Procedure: ESOPHAGOGASTRODUODENOSCOPY with biopsy x3 areas;  Surgeon: Ace Campbell MD;  Location: [facility] ENDOSCOPY;  Service: Gastroenterology;  Laterality: N/A;  duodenal ulcers;irregular z line    GASTRIC RESECTION      cancer    HYSTERECTOMY      LAPAROSCOPIC CHOLECYSTECTOMY      NEPHRECTOMY      right kidney removed     REDUCTION MAMMAPLASTY      TUMOR REMOVAL      boils    VENTRAL/INCISIONAL HERNIA REPAIR Right 08/28/2019    Procedure: VENTRAL/INCISIONAL HERNIA REPAIR;  Surgeon: Nilay Stevens MD;  Location: [facility] MAIN OR;  Service: General    VENTRAL/INCISIONAL  HERNIA REPAIR N/A 10/01/2019    Procedure: OPEN VENTRAL/INCISIONAL HERNIA REPAIR;  Surgeon: Nilay Stevens MD;  Location: [facility] MAIN OR;  Service: General    VENTRAL/INCISIONAL HERNIA REPAIR N/A 10/11/2021    Procedure: VENTRAL/INCISIONAL HERNIA REPAIR LAPAROSCOPIC;  Surgeon: Nilay Stevens MD;  Location: [facility] MAIN OR;  Service: General;  Laterality: N/A;    VENTRAL/INCISIONAL HERNIA REPAIR N/A 01/23/2023    Procedure: VENTRAL/INCISIONAL HERNIA REPAIR with MESH;  Surgeon: Nilay Stevens MD;  Location: [facility] MAIN OR;  Service: General;  Laterality: N/A;     Social History     Tobacco Use    Smoking status: Former     Current packs/day: 0.00     Average packs/day: 0.3 packs/day for 57.0 years (14.3 ttl pk-yrs)     Types: Cigarettes     Start date: 5/1/1964     Quit date: 5/1/2021     Years since quitting: 3.3     Passive exposure: Past    Smokeless tobacco: Never    Tobacco comments:     quit smoking 2 months ago   Vaping Use    Vaping status: Never Used   Substance Use Topics    Alcohol use: No    Drug use: No     Family History   Problem Relation Age of Onset    Heart disease Father        Review of Systems  ROS  As per HPI  Objective     Vital Signs  Temp:  [97.7 °F (36.5 °C)-98.7 °F (37.1 °C)] 98.7 °F (37.1 °C)  Heart Rate:  [] 83  Resp:  [13-22] 13  BP: (105-149)/() 141/53    I/O this shift:  In: 300 [Blood:300]  Out: -   No intake/output data recorded.    Physical Exam:  Physical Exam    General Appearance: Pale, chronically ill-appearing distress   Skin: warm and dry  HEENT: oral mucosa normal, nonicteric sclera  Neck: supple, no JVD  Lungs: CTA  Heart: RRR, normal S1 and S2  Abdomen: soft, nontender, nondistended  : no palpable bladder  Extremities: no edema, cyanosis or clubbing  Neuro: normal speech and mental status     Results Review:   I reviewed the patient's new clinical results.    Lab Results   Component Value Date    CALCIUM 8.7 08/26/2024    PHOS 4.3 08/26/2024     Results  from last 7 days   Lab Units 08/26/24  1210 08/21/24  0000   MAGNESIUM mg/dL 1.7  --    SODIUM mmol/L 138  --    POTASSIUM mmol/L 4.7 7.6*   CHLORIDE mmol/L 101  --    CO2 mmol/L 19.1*  --    BUN mg/dL 96*  --    CREATININE mg/dL 5.61*  --    GLUCOSE mg/dL 171*  --    CALCIUM mg/dL 8.7  --    WBC 10*3/mm3 11.39*  --    HEMOGLOBIN g/dL 6.1* 11.4*  34.2*   PLATELETS 10*3/mm3 318  --    ALT (SGPT) U/L 14  --    AST (SGOT) U/L 22  --      Lab Results   Component Value Date    TROPONINT 42 (H) 08/26/2024     Estimated Creatinine Clearance: 7.5 mL/min (A) (by C-G formula based on SCr of 5.61 mg/dL (H)).  Lab Results   Component Value Date    URICACID 4.7 07/10/2018       Brief Urine Lab Results       None            Prior to Admission medications    Medication Sig Start Date End Date Taking? Authorizing Provider   albuterol sulfate  (90 Base) MCG/ACT inhaler Inhale 2 puffs Every 4 (Four) Hours As Needed for Wheezing.   Yes Danica Murphy MD   amLODIPine (NORVASC) 10 MG tablet Take 1 tablet by mouth Daily.   Yes Danica Murphy MD   calcium acetate (PHOS BINDER,) 667 MG capsule capsule Take 1 capsule by mouth With Snacks.   Yes Danica Murphy MD   carvedilol (COREG) 3.125 MG tablet Take 1 tablet by mouth 2 (Two) Times a Day With Meals.   Yes Danica Murphy MD   Cholecalciferol 25 MCG (1000 UT) tablet Take 1 tablet by mouth Daily.   Yes Danica Murphy MD   hyoscyamine (ANASPAZ,LEVSIN) 0.125 MG tablet Take 1 tablet by mouth Every 4 (Four) Hours As Needed. 11/1/23  Yes Dnaica Murphy MD   lidocaine-prilocaine (EMLA) 2.5-2.5 % cream Apply 1 Application topically to the appropriate area as directed Daily As Needed (severe pain, left 5th finger). Apply to left 5th finger 4/16/24  Yes Franki Delgadillo II, MD   losartan (COZAAR) 50 MG tablet Take 1 tablet by mouth Daily.   Yes Katherine MD Danica   Multiple Vitamins-Minerals (RENAPLEX-D) tablet Take 1 tablet by mouth Daily.    Yes Danica Murphy MD   pantoprazole (PROTONIX) 40 MG EC tablet Take 1 tablet by mouth 2 (Two) Times a Day. 3/9/23  Yes Danica Murphy MD   sevelamer (RENAGEL) 800 MG tablet Take 3 tablets by mouth With Snacks.   Yes Danica Murphy MD   sevelamer (RENVELA) 800 MG tablet Take 3 tablets by mouth 3 (Three) Times a Day With Meals. 6/14/24  Yes Cindy Santiago APRN   simvastatin (ZOCOR) 40 MG tablet Take 1 tablet by mouth Every Night.   Yes Danica Murphy MD   sodium bicarbonate 650 MG tablet Take 2 tablets by mouth 3 (Three) Times a Day.   Yes Danica Murphy MD   sucralfate (Carafate) 1 g tablet Take 1 tablet by mouth 4 (Four) Times a Day. 6/14/24  Yes Cindy Santiago APRN   temazepam (RESTORIL) 30 MG capsule Take 1 capsule by mouth Every Night. 1/10/23  Yes Danica Murphy MD   calcium acetate (PHOS BINDER,) 667 MG capsule capsule Take 1 capsule by mouth 3 (Three) Times a Day.    Danica Murphy MD   benzonatate (TESSALON) 200 MG capsule Take 1 capsule by mouth 3 (Three) Times a Day As Needed for Cough. 6/14/24 8/26/24  Cindy Santiago APRN   Methoxy PEG-Epoetin Beta (MIRCERA IJ) 30 mcg Every 14 (Fourteen) Days. Dialysis 12/27/23 8/26/24  Danica Murphy MD   oxyCODONE (Roxicodone) 5 MG immediate release tablet Take 1 tablet by mouth Every 4 (Four) Hours As Needed for Severe Pain. 4/23/24 8/26/24  Franki Delgadillo II, MD   sodium zirconium cyclosilicate (Lokelma) 10 g packet Take 10 g by mouth Daily. Empty entire contents of the packet(s) into a glass with at least 3 tablespoons (45 mL) of water. Stir well and drink immediately; if powder remains in the glass, add water, stir and drink immediately; repeat until no powder remains. Administer other oral medications at least 2 hours before or 2 hours after dose. 6/14/24 8/26/24  Cindy Santiago APRN       calcium acetate, 667 mg, Oral, TID  pantoprazole, 40 mg, Intravenous, Q12H  sevelamer, 2,400 mg, Oral, TID With  Meals  sodium bicarbonate, 1,300 mg, Oral, TID  sodium chloride, 10 mL, Intravenous, Q12H  sucralfate, 1 g, Oral, 4x Daily           Assessment & Plan       End-stage renal disease.  Patient gets dialysis Monday Wednesday and Friday as outpatient but has been cutting her dialysis short for the last few sessions due to hypotension and palpitations  Anemia.  Due to GI bleed on top of anemia and chronic kidney disease.  Patient receiving PRBC transfusion  Hypertension with chronic kidney disease.  Patient was hypotensive during dialysis, probably due to GI bleed  GI bleed.    Plan:  Hemodialysis tonight  Patient receiving PRBC transfusion.  GI to evaluate  Keep off antihypertensives for now.  Gradually restart if blood pressure starts increasing    I discussed the patients findings and my recommendations with patient, family, and nursing staff    Faustino Buckley MD  08/26/24  17:38 EDT

## 2024-08-27 ENCOUNTER — INPATIENT HOSPITAL (AMBULATORY)
Age: 74
End: 2024-08-27

## 2024-08-27 ENCOUNTER — INPATIENT HOSPITAL (AMBULATORY)
Dept: URBAN - METROPOLITAN AREA HOSPITAL 84 | Facility: HOSPITAL | Age: 74
End: 2024-08-27

## 2024-08-27 ENCOUNTER — ANESTHESIA (OUTPATIENT)
Dept: GASTROENTEROLOGY | Facility: HOSPITAL | Age: 74
DRG: 377 | End: 2024-08-27
Payer: MEDICARE

## 2024-08-27 ENCOUNTER — ANESTHESIA EVENT (OUTPATIENT)
Dept: GASTROENTEROLOGY | Facility: HOSPITAL | Age: 74
DRG: 377 | End: 2024-08-27
Payer: MEDICARE

## 2024-08-27 ENCOUNTER — APPOINTMENT (OUTPATIENT)
Dept: NEPHROLOGY | Facility: HOSPITAL | Age: 74
DRG: 377 | End: 2024-08-27
Payer: MEDICARE

## 2024-08-27 DIAGNOSIS — R10.84 GENERALIZED ABDOMINAL PAIN: ICD-10-CM

## 2024-08-27 DIAGNOSIS — Z90.3 ACQUIRED ABSENCE OF STOMACH [PART OF]: ICD-10-CM

## 2024-08-27 DIAGNOSIS — K31.811 ANGIODYSPLASIA OF STOMACH AND DUODENUM WITH BLEEDING: ICD-10-CM

## 2024-08-27 DIAGNOSIS — K92.1 MELENA: ICD-10-CM

## 2024-08-27 DIAGNOSIS — Z87.11 PERSONAL HISTORY OF PEPTIC ULCER DISEASE: ICD-10-CM

## 2024-08-27 DIAGNOSIS — K31.7 POLYP OF STOMACH AND DUODENUM: ICD-10-CM

## 2024-08-27 DIAGNOSIS — D62 ACUTE POSTHEMORRHAGIC ANEMIA: ICD-10-CM

## 2024-08-27 PROBLEM — D50.0 ANEMIA DUE TO GI BLOOD LOSS: Status: ACTIVE | Noted: 2024-08-27

## 2024-08-27 LAB
ALBUMIN SERPL-MCNC: 3.5 G/DL (ref 3.5–5.2)
ANION GAP SERPL CALCULATED.3IONS-SCNC: 16.8 MMOL/L (ref 5–15)
BUN SERPL-MCNC: 106 MG/DL (ref 8–23)
BUN/CREAT SERPL: 15 (ref 7–25)
CALCIUM SPEC-SCNC: 8.7 MG/DL (ref 8.6–10.5)
CHLORIDE SERPL-SCNC: 100 MMOL/L (ref 98–107)
CO2 SERPL-SCNC: 20.2 MMOL/L (ref 22–29)
CREAT SERPL-MCNC: 7.08 MG/DL (ref 0.57–1)
DEPRECATED RDW RBC AUTO: 54.9 FL (ref 37–54)
EGFRCR SERPLBLD CKD-EPI 2021: 5.6 ML/MIN/1.73
ERYTHROCYTE [DISTWIDTH] IN BLOOD BY AUTOMATED COUNT: 16.7 % (ref 12.3–15.4)
GLUCOSE BLDC GLUCOMTR-MCNC: 80 MG/DL (ref 70–105)
GLUCOSE SERPL-MCNC: 101 MG/DL (ref 65–99)
HCT VFR BLD AUTO: 22.9 % (ref 34–46.6)
HGB BLD-MCNC: 7.6 G/DL (ref 12–15.9)
MCH RBC QN AUTO: 29.8 PG (ref 26.6–33)
MCHC RBC AUTO-ENTMCNC: 33.2 G/DL (ref 31.5–35.7)
MCV RBC AUTO: 89.8 FL (ref 79–97)
PHOSPHATE SERPL-MCNC: 6.2 MG/DL (ref 2.5–4.5)
PLATELET # BLD AUTO: 303 10*3/MM3 (ref 140–450)
PMV BLD AUTO: 10.1 FL (ref 6–12)
POTASSIUM SERPL-SCNC: 5.1 MMOL/L (ref 3.5–5.2)
QT INTERVAL: 364 MS
QTC INTERVAL: 454 MS
RBC # BLD AUTO: 2.55 10*6/MM3 (ref 3.77–5.28)
SODIUM SERPL-SCNC: 137 MMOL/L (ref 136–145)
WBC NRBC COR # BLD AUTO: 8.34 10*3/MM3 (ref 3.4–10.8)

## 2024-08-27 PROCEDURE — 25010000002 PROPOFOL 10 MG/ML EMULSION: Performed by: NURSE ANESTHETIST, CERTIFIED REGISTERED

## 2024-08-27 PROCEDURE — 43255 EGD CONTROL BLEEDING ANY: CPT | Performed by: INTERNAL MEDICINE

## 2024-08-27 PROCEDURE — 25010000002 ALBUMIN HUMAN 25% PER 50 ML: Performed by: INTERNAL MEDICINE

## 2024-08-27 PROCEDURE — 0W3P8ZZ CONTROL BLEEDING IN GASTROINTESTINAL TRACT, VIA NATURAL OR ARTIFICIAL OPENING ENDOSCOPIC: ICD-10-PCS | Performed by: INTERNAL MEDICINE

## 2024-08-27 PROCEDURE — 5A1D70Z PERFORMANCE OF URINARY FILTRATION, INTERMITTENT, LESS THAN 6 HOURS PER DAY: ICD-10-PCS | Performed by: INTERNAL MEDICINE

## 2024-08-27 PROCEDURE — P9047 ALBUMIN (HUMAN), 25%, 50ML: HCPCS | Performed by: INTERNAL MEDICINE

## 2024-08-27 PROCEDURE — 94799 UNLISTED PULMONARY SVC/PX: CPT

## 2024-08-27 PROCEDURE — 82948 REAGENT STRIP/BLOOD GLUCOSE: CPT

## 2024-08-27 PROCEDURE — 25010000002 ONDANSETRON PER 1 MG: Performed by: INTERNAL MEDICINE

## 2024-08-27 PROCEDURE — 85027 COMPLETE CBC AUTOMATED: CPT | Performed by: INTERNAL MEDICINE

## 2024-08-27 PROCEDURE — 25010000002 OCTREOTIDE PER 25 MCG: Performed by: INTERNAL MEDICINE

## 2024-08-27 PROCEDURE — 80069 RENAL FUNCTION PANEL: CPT | Performed by: INTERNAL MEDICINE

## 2024-08-27 RX ORDER — OCTREOTIDE ACETATE 500 UG/ML
200 INJECTION, SOLUTION INTRAVENOUS; SUBCUTANEOUS 3 TIMES DAILY
Status: DISCONTINUED | OUTPATIENT
Start: 2024-08-27 | End: 2024-08-29 | Stop reason: HOSPADM

## 2024-08-27 RX ORDER — DIPHENHYDRAMINE HYDROCHLORIDE 50 MG/ML
12.5 INJECTION INTRAMUSCULAR; INTRAVENOUS
Status: DISCONTINUED | OUTPATIENT
Start: 2024-08-27 | End: 2024-08-27 | Stop reason: HOSPADM

## 2024-08-27 RX ORDER — MIDODRINE HYDROCHLORIDE 5 MG/1
5 TABLET ORAL
Status: DISCONTINUED | OUTPATIENT
Start: 2024-08-27 | End: 2024-08-29 | Stop reason: HOSPADM

## 2024-08-27 RX ORDER — SODIUM CHLORIDE 9 MG/ML
INJECTION, SOLUTION INTRAVENOUS CONTINUOUS PRN
Status: DISCONTINUED | OUTPATIENT
Start: 2024-08-27 | End: 2024-08-27 | Stop reason: SURG

## 2024-08-27 RX ORDER — LIDOCAINE HYDROCHLORIDE 20 MG/ML
INJECTION, SOLUTION INFILTRATION; PERINEURAL AS NEEDED
Status: DISCONTINUED | OUTPATIENT
Start: 2024-08-27 | End: 2024-08-27 | Stop reason: SURG

## 2024-08-27 RX ORDER — IPRATROPIUM BROMIDE AND ALBUTEROL SULFATE 2.5; .5 MG/3ML; MG/3ML
3 SOLUTION RESPIRATORY (INHALATION) ONCE AS NEEDED
Status: DISCONTINUED | OUTPATIENT
Start: 2024-08-27 | End: 2024-08-27 | Stop reason: HOSPADM

## 2024-08-27 RX ORDER — ONDANSETRON 2 MG/ML
4 INJECTION INTRAMUSCULAR; INTRAVENOUS ONCE AS NEEDED
Status: DISCONTINUED | OUTPATIENT
Start: 2024-08-27 | End: 2024-08-27 | Stop reason: HOSPADM

## 2024-08-27 RX ORDER — EPHEDRINE SULFATE 5 MG/ML
5 INJECTION INTRAVENOUS ONCE AS NEEDED
Status: DISCONTINUED | OUTPATIENT
Start: 2024-08-27 | End: 2024-08-27 | Stop reason: HOSPADM

## 2024-08-27 RX ORDER — ALBUMIN (HUMAN) 12.5 G/50ML
12.5 SOLUTION INTRAVENOUS AS NEEDED
Status: ACTIVE | OUTPATIENT
Start: 2024-08-28 | End: 2024-08-28

## 2024-08-27 RX ORDER — PHENYLEPHRINE HCL IN 0.9% NACL 1 MG/10 ML
SYRINGE (ML) INTRAVENOUS AS NEEDED
Status: DISCONTINUED | OUTPATIENT
Start: 2024-08-27 | End: 2024-08-27 | Stop reason: SURG

## 2024-08-27 RX ORDER — ALBUMIN (HUMAN) 12.5 G/50ML
12.5 SOLUTION INTRAVENOUS 3 TIMES DAILY PRN
Status: DISCONTINUED | OUTPATIENT
Start: 2024-08-27 | End: 2024-08-29 | Stop reason: HOSPADM

## 2024-08-27 RX ORDER — PROPOFOL 10 MG/ML
VIAL (ML) INTRAVENOUS AS NEEDED
Status: DISCONTINUED | OUTPATIENT
Start: 2024-08-27 | End: 2024-08-27 | Stop reason: SURG

## 2024-08-27 RX ADMIN — SEVELAMER CARBONATE 2400 MG: 800 TABLET, FILM COATED ORAL at 16:50

## 2024-08-27 RX ADMIN — SUCRALFATE 1 G: 1 TABLET ORAL at 20:48

## 2024-08-27 RX ADMIN — PROPOFOL INJECTABLE EMULSION 100 MG: 10 INJECTION, EMULSION INTRAVENOUS at 14:47

## 2024-08-27 RX ADMIN — ALBUMIN (HUMAN) 12.5 G: 0.25 INJECTION, SOLUTION INTRAVENOUS at 08:48

## 2024-08-27 RX ADMIN — SODIUM BICARBONATE 1300 MG: 650 TABLET ORAL at 16:49

## 2024-08-27 RX ADMIN — Medication 200 MCG: at 14:47

## 2024-08-27 RX ADMIN — CALCIUM ACETATE 667 MG: 667 CAPSULE ORAL at 16:49

## 2024-08-27 RX ADMIN — SODIUM BICARBONATE 1300 MG: 650 TABLET ORAL at 20:49

## 2024-08-27 RX ADMIN — OCTREOTIDE ACETATE 200 MCG: 500 INJECTION, SOLUTION INTRAVENOUS; SUBCUTANEOUS at 17:43

## 2024-08-27 RX ADMIN — ONDANSETRON 4 MG: 2 INJECTION INTRAMUSCULAR; INTRAVENOUS at 06:20

## 2024-08-27 RX ADMIN — IPRATROPIUM BROMIDE AND ALBUTEROL SULFATE 3 ML: .5; 3 SOLUTION RESPIRATORY (INHALATION) at 09:04

## 2024-08-27 RX ADMIN — Medication 10 ML: at 07:33

## 2024-08-27 RX ADMIN — SUCRALFATE 1 G: 1 TABLET ORAL at 16:50

## 2024-08-27 RX ADMIN — LIDOCAINE HYDROCHLORIDE 50 MG: 20 INJECTION, SOLUTION INFILTRATION; PERINEURAL at 14:47

## 2024-08-27 RX ADMIN — SODIUM CHLORIDE: 9 INJECTION, SOLUTION INTRAVENOUS at 14:38

## 2024-08-27 RX ADMIN — CALCIUM ACETATE 667 MG: 667 CAPSULE ORAL at 20:49

## 2024-08-27 RX ADMIN — MIDODRINE HYDROCHLORIDE 5 MG: 5 TABLET ORAL at 16:49

## 2024-08-27 RX ADMIN — TEMAZEPAM 15 MG: 15 CAPSULE ORAL at 20:48

## 2024-08-27 RX ADMIN — PANTOPRAZOLE SODIUM 40 MG: 40 INJECTION, POWDER, FOR SOLUTION INTRAVENOUS at 20:49

## 2024-08-27 RX ADMIN — Medication 10 ML: at 20:49

## 2024-08-27 NOTE — PROGRESS NOTES
[facility] Progress Note      Patient Name: Sherry Lobato  : 1950  MRN: [id number]  Primary Care Physician:  Jin Malcolm PA-C  Date of admission: 2024    Subjective     Interval History:     Patient was seen and examined this morning.  She could not finish dialysis due to hypotension and patient felt very tired.  Denied any chest pain, shortness of breath, nausea or vomiting    Review of Systems:   As noted above    Objective     Vitals:   Temp:  [97.5 °F (36.4 °C)-98.7 °F (37.1 °C)] 98 °F (36.7 °C)  Heart Rate:  [71-95] 82  Resp:  [12-22] 16  BP: ()/(48-99) 138/90  Flow (L/min):  [3] 3    Intake/Output Summary (Last 24 hours) at 2024 1326  Last data filed at 2024 1200  Gross per 24 hour   Intake 698.75 ml   Output -300 ml   Net 998.75 ml       Physical Exam:    General Appearance:. pale  Chronically ill-appearing  HEENT: oral mucosa normal, nonicteric sclera  Neck: supple, no JVD  Lungs: CTA  Heart: RRR, normal S1 and S2  Abdomen: soft, nondistended  Extremities: no edema  Neuro: Awake alert and moving all extremities    Scheduled Meds:     calcium acetate, 667 mg, Oral, TID  epoetin dylan/dylan-epbx, 10,000 Units, Intravenous, Once  pantoprazole, 40 mg, Intravenous, Q12H  sevelamer, 2,400 mg, Oral, TID With Meals  sodium bicarbonate, 1,300 mg, Oral, TID  sodium chloride, 10 mL, Intravenous, Q12H  sucralfate, 1 g, Oral, 4x Daily      IV Meds:        Results Reviewed:   I have personally reviewed the results from the time of this admission to 2024 13:26 EDT     Results from last 7 days   Lab Units 24  0758 24  1210 24  0000   SODIUM mmol/L 137 138  --    POTASSIUM mmol/L 5.1 4.7 7.6*   CHLORIDE mmol/L 100 101  --    CO2 mmol/L 20.2* 19.1*  --    BUN mg/dL 106* 96*  --    CREATININE mg/dL 7.08* 5.61*  --    CALCIUM mg/dL 8.7 8.7  --    BILIRUBIN mg/dL  --  <0.2  --    ALK PHOS U/L  --  98  --    ALT (SGPT) U/L  --  14  --     AST (SGOT) U/L  --  22  --    GLUCOSE mg/dL 101* 171*  --      Estimated Creatinine Clearance: 5.9 mL/min (A) (by C-G formula based on SCr of 7.08 mg/dL (H)).  Results from last 7 days   Lab Units 08/27/24  0758 08/26/24  1210   MAGNESIUM mg/dL  --  1.7   PHOSPHORUS mg/dL 6.2* 4.3         Results from last 7 days   Lab Units 08/27/24  0758 08/26/24  1210 08/21/24  0000   WBC 10*3/mm3 8.34 11.39*  --    HEMOGLOBIN g/dL 7.6* 6.1* 11.4*  34.2*   PLATELETS 10*3/mm3 303 318  --            Assessment / Plan     ASSESSMENT:    End-stage renal disease. MWF.  Patient could not finish her hemodialysis today due to hypotension.  Electrolytes okay  Anemia.  Due to GI bleed on top of anemia and chronic kidney disease.  Hemoglobin 7.6 this morning.  Status post PRBC transfusion  Hypertension with chronic kidney disease.  Patient has been hypotensive now and could not finish her dialysis today.  GI bleed.    PLAN:    Start oral midodrine  I will try hemodialysis again tomorrow  Cautious fluid removal with dialysis  Keep off antihypertensives  GI planning for EGD today    Faustino Buckley MD  08/27/24  13:26 EDT    [facility]  [phone]          Patient seen and examined.  Patient

## 2024-08-27 NOTE — CONSULTS
GI CONSULT  NOTE:    Referring Provider:  Dr. Taylor    Chief complaint: Severe weakness and black stools.    Subjective .     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  She reports feeling weak for 1 week.  Has been having black stools for the past 6 days.  Denies Pepto-Bismol or iron use.  She normally moves her bowels 4-5 times every morning and has continued to do so but they have been black.  No bright red blood per rectum.  Complains of a mid abdominal pain.  No heartburn and has continued on PPI.  She denies any NSAID use.  She had some hypotension today during dialysis and had nausea at that time but denies any vomiting.      Endo History:  Oct 2023 EGD (Dr. Campbell) BII, duodenal ulcers.  June 2023 colonooscopy polyps, tics, g2 hemorrhoids  3/12/2022 EGD (Dr. Augustin) Billroth II anastomosis with exploration of both efferent & afferent limbs showing no recurrence of carcinoid tumor status post Whipple resection. Bile gastritis. Duodenal biopsy showed acute duodenitis with focal ulceration and reactive villi. Negative celiac disease. No residual carcinoid tumor identified.  11/2020 Colonoscopy  (Dr. White) - polyps (TA), moderate diverticulosis. RECALL 2023 2/2019 EGD by Dr. White - evidence of prior partial gastrectomy, and large gastric fold, 2 anastomotic ulcers which appear chronic but nonbleeding with biopsy showing acute and chronic inflamed duodenal type mucosa and negative for sprue    Past Medical History:  Past Medical History:   Diagnosis Date    Lucas's esophagus     C. difficile colitis     Carotid artery disease      -50-74% left, 16-49% right (1/19)    COPD (chronic obstructive pulmonary disease)     DM2 (diabetes mellitus, type 2)     ESRD (end stage renal disease)     on HD    Gastric ulcer     at anastomatic site    Gastroparesis     unknown    GERD (gastroesophageal reflux disease)     Gout     not current    Hemodialysis patient     mwf    Hernia, incisional     abdomen    History of colonoscopy     UTD    History of degenerative disc disease     Hyperlipidemia     Hypertension     Medicare annual wellness visit, subsequent 11/07/2020    Microscopic colitis     Nephrotic syndrome     Neuroendocrine tumor     of stomach    FABY (obstructive sleep apnea)     not treating    Pedal edema     ressolved    Proteinuria     Renal cell cancer, right     Rib pain on right side     chronic    Rotator cuff tear     bilateral    Stomach cancer 2016    Stroke     Uterine cancer     Vitamin B 12 deficiency     Vitamin D deficiency        Past Surgical History:  Past Surgical History:   Procedure Laterality Date    APPENDECTOMY      ARTERIOVENOUS FISTULA Right     ARTERIOVENOUS FISTULA REPAIR      Clotted off and insert graft    ARTERIOVENOUS FISTULA/SHUNT SURGERY Left 12/30/2020    Procedure: ARTERIOVENOUS FISTULA FORMATION;  Surgeon: Jan Caicedo MD;  Location: [facility] MAIN OR;  Service: Vascular;  Laterality: Left;    ARTERIOVENOUS FISTULA/SHUNT SURGERY Left 4/23/2024    Procedure: FISTULOGRAM WITH SUBCLAVIAN ARTERY ANGIOPLASTY, fistula banding;  Surgeon: Franki Delgadillo II, MD;  Location: [facility] HYBRID OR;  Service: Vascular;  Laterality: Left;    BREAST BIOPSY      right nipple 1981    BRONCHOSCOPY N/A 6/11/2024    Procedure: BRONCHOSCOPY WITH RIGHT LUNG WASHING;  Surgeon: Aramis Barragan MD;  Location: [facility] ENDOSCOPY;  Service: Pulmonary;  Laterality: N/A;  RML atelectasis    COLONOSCOPY N/A 11/24/2020    Procedure: COLONOSCOPY with polypectomy x 7;  Surgeon: Jeffery White MD;  Location: [facility] ENDOSCOPY;  Service:  Gastroenterology;  Laterality: N/A;  post op: polyps, diverticulosis, hemorrhoids    COLONOSCOPY N/A 6/1/2023    Procedure: COLONOSCOPY with polypectomy x 3 biopsy x 1;  Surgeon: Jeffery White MD;  Location: [facility] ENDOSCOPY;  Service: Gastroenterology;  Laterality: N/A;  diverticulosis polyps    ENDOSCOPY N/A 03/12/2022    Procedure: ESOPHAGOGASTRODUODENOSCOPY with biopsy x 1 area;  Surgeon: Javan Augustin MD;  Location: [facility] ENDOSCOPY;  Service: Gastroenterology;  Laterality: N/A;  post op: anastamosis    ENDOSCOPY N/A 10/9/2023    Procedure: ESOPHAGOGASTRODUODENOSCOPY with biopsy x3 areas;  Surgeon: Ace Campbell MD;  Location: [facility] ENDOSCOPY;  Service: Gastroenterology;  Laterality: N/A;  duodenal ulcers;irregular z line    GASTRIC RESECTION      cancer    HYSTERECTOMY      LAPAROSCOPIC CHOLECYSTECTOMY      NEPHRECTOMY      right kidney removed     REDUCTION MAMMAPLASTY      TUMOR REMOVAL      boils    VENTRAL/INCISIONAL HERNIA REPAIR Right 08/28/2019    Procedure: VENTRAL/INCISIONAL HERNIA REPAIR;  Surgeon: Nilay Stevens MD;  Location: [facility] MAIN OR;  Service: General    VENTRAL/INCISIONAL HERNIA REPAIR N/A 10/01/2019    Procedure: OPEN VENTRAL/INCISIONAL HERNIA REPAIR;  Surgeon: Nilay Stevnes MD;  Location: [facility] MAIN OR;  Service: General    VENTRAL/INCISIONAL HERNIA REPAIR N/A 10/11/2021    Procedure: VENTRAL/INCISIONAL HERNIA REPAIR LAPAROSCOPIC;  Surgeon: Nilay Stevens MD;  Location: [facility] MAIN OR;  Service: General;  Laterality: N/A;    VENTRAL/INCISIONAL HERNIA REPAIR N/A 01/23/2023    Procedure: VENTRAL/INCISIONAL HERNIA REPAIR with MESH;  Surgeon: Nilay Stevens MD;  Location: [facility] MAIN OR;  Service: General;  Laterality: N/A;       Social History:  Social History     Tobacco Use    Smoking status: Former     Current packs/day: 0.00     Average packs/day: 0.3 packs/day for 57.0 years (14.3 ttl pk-yrs)     Types: Cigarettes     Start date: 5/1/1964     Quit date: 5/1/2021      Years since quitting: 3.3     Passive exposure: Past    Smokeless tobacco: Never    Tobacco comments:     quit smoking 2 months ago   Vaping Use    Vaping status: Never Used   Substance Use Topics    Alcohol use: No    Drug use: No       Family History:  Family History   Problem Relation Age of Onset    Heart disease Father        Medications:  Medications Prior to Admission   Medication Sig Dispense Refill Last Dose    albuterol sulfate  (90 Base) MCG/ACT inhaler Inhale 2 puffs Every 4 (Four) Hours As Needed for Wheezing.       amLODIPine (NORVASC) 10 MG tablet Take 1 tablet by mouth Daily.   8/26/2024    calcium acetate (PHOS BINDER,) 667 MG capsule capsule Take 1 capsule by mouth With Snacks.       carvedilol (COREG) 3.125 MG tablet Take 1 tablet by mouth 2 (Two) Times a Day With Meals.   8/26/2024    Cholecalciferol 25 MCG (1000 UT) tablet Take 1 tablet by mouth Daily.   8/26/2024    hyoscyamine (ANASPAZ,LEVSIN) 0.125 MG tablet Take 1 tablet by mouth Every 4 (Four) Hours As Needed.       lidocaine-prilocaine (EMLA) 2.5-2.5 % cream Apply 1 Application topically to the appropriate area as directed Daily As Needed (severe pain, left 5th finger). Apply to left 5th finger 5800 g 4     losartan (COZAAR) 50 MG tablet Take 1 tablet by mouth Daily.   8/26/2024    Multiple Vitamins-Minerals (RENAPLEX-D) tablet Take 1 tablet by mouth Daily.   8/26/2024    pantoprazole (PROTONIX) 40 MG EC tablet Take 1 tablet by mouth 2 (Two) Times a Day.   Past Week    sevelamer (RENAGEL) 800 MG tablet Take 3 tablets by mouth With Snacks.       sevelamer (RENVELA) 800 MG tablet Take 3 tablets by mouth 3 (Three) Times a Day With Meals.   8/26/2024    simvastatin (ZOCOR) 40 MG tablet Take 1 tablet by mouth Every Night.   8/25/2024    sodium bicarbonate 650 MG tablet Take 2 tablets by mouth 3 (Three) Times a Day.   8/26/2024    sucralfate (Carafate) 1 g tablet Take 1 tablet by mouth 4 (Four) Times a Day. 120 tablet 1 8/26/2024     "temazepam (RESTORIL) 30 MG capsule Take 1 capsule by mouth Every Night.   8/25/2024    calcium acetate (PHOS BINDER,) 667 MG capsule capsule Take 1 capsule by mouth 3 (Three) Times a Day.          Scheduled Meds:calcium acetate, 667 mg, Oral, TID  epoetin dylan/dylan-epbx, 10,000 Units, Intravenous, Once  pantoprazole, 40 mg, Intravenous, Q12H  sevelamer, 2,400 mg, Oral, TID With Meals  sodium bicarbonate, 1,300 mg, Oral, TID  sodium chloride, 10 mL, Intravenous, Q12H  sucralfate, 1 g, Oral, 4x Daily      Continuous Infusions:   PRN Meds:.  acetaminophen    albumin human    senna-docusate sodium **AND** polyethylene glycol **AND** bisacodyl **AND** bisacodyl    hydrALAZINE    ipratropium-albuterol    metoprolol tartrate    nitroglycerin    ondansetron    sodium chloride    sodium chloride    temazepam    ALLERGIES:  Atorvastatin calcium, Gabapentin, and Niacin    ROS:  Review of Systems   Constitutional:  Positive for fatigue. Negative for chills and fever.   Respiratory:  Negative for cough and shortness of breath.    Cardiovascular:  Negative for chest pain.   Gastrointestinal:  Positive for abdominal pain, blood in stool, diarrhea and nausea.   Musculoskeletal:  Positive for arthralgias. Negative for back pain.   Neurological:  Positive for weakness. Negative for dizziness.   Psychiatric/Behavioral:  Negative for agitation.        Objective     Vital Signs:   Visit Vitals  BP 92/52 (BP Location: Left leg)   Pulse 84   Temp 98.2 °F (36.8 °C) (Oral)   Resp 16   Ht 152.4 cm (60\")   Wt 68 kg (149 lb 14.6 oz)   SpO2 100%   BMI 29.28 kg/m²       Physical Exam:      General Appearance:    Awake and alert, in no acute distress   Head:    Normocephalic, without obvious abnormality, atraumatic   Eyes:            Conjunctivae normal, anicteric sclera   Ears:    Ears appear intact with no abnormalities noted   Throat:   No oral lesions, no thrush, oral mucosa moist       Lungs:     Respirations regular, even and unlabored     "   Chest Wall:    No abnormalities observed   Abdomen:     Soft, mild generalized tenderness, no rebound or guarding, non-distended   Rectal:     Deferred   Extremities:   Moves all extremities well, no edema, no cyanosis, no  redness   Pulses:   Pulses palpable and equal bilaterally   Skin:   No bleeding, bruising or rash, no jaundice       Neurologic:   Sensation intact       Results Review:   I reviewed the patient's labs and imaging.  CBC  Results from last 7 days   Lab Units 08/27/24  0758 08/26/24  1210 08/21/24  0000   RBC 10*6/mm3 2.55* 2.04*  --    WBC 10*3/mm3 8.34 11.39*  --    HEMOGLOBIN g/dL 7.6* 6.1* 11.4*  34.2*   PLATELETS 10*3/mm3 303 318  --        CMP  Results from last 7 days   Lab Units 08/27/24  0758 08/26/24  1246 08/26/24  1210 08/21/24  0000   SODIUM mmol/L 137  --  138  --    POTASSIUM mmol/L 5.1  --  4.7 7.6*   CHLORIDE mmol/L 100  --  101  --    CO2 mmol/L 20.2*  --  19.1*  --    BUN mg/dL 106*  --  96*  --    CREATININE mg/dL 7.08*  --  5.61*  --    GLUCOSE mg/dL 101*  --  171*  --    ALBUMIN g/dL 3.5  --  3.5  --    BILIRUBIN mg/dL  --   --  <0.2  --    ALK PHOS U/L  --   --  98  --    AST (SGOT) U/L  --   --  22  --    ALT (SGPT) U/L  --   --  14  --    AMMONIA umol/L  --  21  --   --        Amylase and Lipase        CRP         Imaging Results (Last 24 Hours)       Procedure Component Value Units Date/Time    CT Abdomen Pelvis Without Contrast [[id number]] Collected: 08/26/24 1259     Updated: 08/26/24 1312    Narrative:      CT ABDOMEN PELVIS WO CONTRAST    Date of Exam: 8/26/2024 12:57 PM EDT    Indication: Melanic stool, epigastric pain.    Comparison: Small bowel follow-through 11/10/2023. CT abdomen and pelvis 11/9/2023    Technique: Axial CT images were obtained of the abdomen and pelvis without the administration of contrast. Sagittal and coronal reconstructions were performed.  Automated exposure control and iterative reconstruction methods were used.      Findings:  There is a  right lower quadrant ventral hernia containing small bowel, without evidence of herniated bowel inflammation. Hernia sac measures approximately 4.0 x 2.0 x 4.5 cm (3/109, 5/49). This hernia appears new since 11/9/2023. Some of the upstream   small bowel loops appear mildly fluid distended; however, there is no evidence of high-grade bowel obstruction.     The liver, spleen, pancreas, are normal. Right nephrectomy. Severe left renal atrophy with left renal cyst. Stable appearance of bilateral adrenal adenomas.     Severe calcific atherosclerosis in the aorta, iliac and femoral vessels and mesenteric vessels. No adenopathy. No ascites. Surgical changes of the stomach.     Advanced uncomplicated sigmoid diverticulosis. Hysterectomy. Left adnexal cyst measuring 2 cm is unchanged. Urinary bladder is decompressed. Rectum is normal.      Normal heart size. Dense coronary artery calcifications. Chronic fibrotic changes are suggested within the lung bases, without acute airspace disease.      No acute or suspicious osseous abnormalities. Degenerative changes of the spine and hips.      Impression:      1. There is a new small right lower quadrant ventral hernia containing a segment of small bowel.  2. Mild distention of mid abdominal small bowel loops without evidence of high-grade obstruction.  3. Surgical changes of the stomach, right nephrectomy, hysterectomy, cholecystectomy.  4. Stable bilateral adrenal adenomas.  5. Advanced sigmoid diverticulosis without CT evidence of acute diverticulitis..  6. Atrophic left kidney.      Electronically Signed: lForinda Davidson MD    8/26/2024 1:10 PM EDT    Workstation ID: [id number]    XR Chest 1 View [[id number]] Collected: 08/26/24 1253     Updated: 08/26/24 1312    Narrative:      XR CHEST 1 VW    Date of Exam: 8/26/2024 12:40 PM EDT    Indication: Wheezing, history of renal failure    Comparison: 6/9/2024 and prior    Findings:  Study is limited by overlying support and monitoring  apparatus. The heart size is stable. Pulmonary vascularity is congested and indistinct. Increased groundglass and interstitial opacities with a perihilar and bibasilar predominance noted. Findings are   somewhat asymmetric on the right compared to the left. This is accentuated by patient positioning. Osseous structures are grossly unremarkable. Vascular stent is noted in the right upper extremity      Impression:      Impression:    1. Diffuse increased interstitial changes suggesting pulmonary edema, fluid overload. Asymmetric opacities at the right base may represent asymmetric edema, atelectasis or pneumonia. Findings demonstrate slight interval improvement from comparison      Electronically Signed: Ace Graham MD    8/26/2024 1:10 PM EDT    Workstation ID: [id number]    CT Head Without Contrast [[id number]] Collected: 08/26/24 1259     Updated: 08/26/24 1304    Narrative:      CT HEAD WO CONTRAST    Date of Exam: 8/26/2024 12:57 PM EDT    Indication: , History of stroke, binocular blurred vision.    Comparison: Head CT 1/26/2023    Technique: Axial CT images were obtained of the head without contrast administration.  Coronal reconstructions were performed.  Automated exposure control and iterative reconstruction methods were used.      Findings:  Encephalomalacia and gliosis centered within the left parietal lobe in keeping with old infarct. No new large territory loss of gray-white differentiation, acute intracranial hemorrhage, large mass lesion, midline shift or hydrocephalus. Parenchymal   volume within normal limits for age. Minimal periventricular hypodensity similar to prior likely reflecting chronic microvascular ischemic change. Orbits appear symmetric. Distal carotid atherosclerotic disease. No large extra-axial fluid collection.   Negative for obstructive sinus disease or large mastoid effusion. Negative for depressed skull fracture.      Impression:      Impression:  No convincing acute  intracranial finding by CT. Chronic left parietal lobe infarct similar to previous comparison.      Electronically Signed: Bg Dean MD    8/26/2024 1:02 PM EDT    Workstation ID: [id number]              ASSESSMENT AND PLAN:  Melena  Severe macrocytic anemia  Nausea  Generalized abdominal pain  Ventral hernia with mildly distended small bowel loops on CT  History of duodenal ulcers  History of duodenal neuroendocrine tumor status post surgery 2015 with Billroth II anatomy  End-stage renal disease on hemodialysis  Diabetes  CAD  History of cholecystectomy  History of renal cell carcinoma      Principal Problem:    Acute upper GI bleeding  Active Problems:    Anemia due to GI blood loss     Plan  74-year-old female admitted 8/26/2024 with weakness and hypotension during dialysis.  Reports melena for the past week or so and found to have significant drop in hemoglobin to 6.1.  Has a history of duodenal ulcers and duodenal neuroendocrine tumor.    She had dialysis again this morning but became weak and nauseous with some hypotension.  Reports black stools for the past 6 days.  Hemoglobin just 6 days ago was 11.4 and was down to 6.1.  She has received 2 units of packed red blood cells.  Has been NPO.  Will proceed with EGD today.  Continue IV PPI twice daily.  Monitor H&H and transfuse as needed.  Supportive care.    I discussed the patients findings and my recommendations with the patient.  Jennifer Ng, MAEGAN  08/27/24  11:58 EDT

## 2024-08-27 NOTE — OP NOTE
ESOPHAGOGASTRODUODENOSCOPY Procedure Report    Patient Name:  Sherry Lobato  YOB: 1950    Date of Surgery:  8/27/2024     Pre-Op Diagnosis:  Anemia due to GI blood loss [D50.0]       Post-Op Diagnosis Codes:     * Anemia due to GI blood loss [D50.0]    Post Op Diagnosis:  Gastric angiectasia  Gastric polyp  Gastric bypass anatomy      Procedure/CPT® Codes:      Procedure(s):  ESOPHAGOGASTRODUODENOSCOPY with argon plasma coagulation    Staff:  Surgeon(s):  HILARIO Blackmon MD      Anesthesia: Monitored Anesthesia Care    Description of Procedure:  A description of the procedure as well as risks, benefits and alternative methods were explained to the patient who voiced understanding and signed the corresponding consent form. A physical exam was performed and vital signs were monitored throughout the procedure.    An upper GI endoscope was placed into the mouth and proceeded through the esophagus, stomach and second portion of the duodenum without difficulty. The scope was then retroflexed and the fundus was visualized. The procedure was not difficult and there were no immediate complications.  There was no blood loss.    Impression:  1.  1 small angiectasia in the stomach body with bleeding/oozing bright red blood.  This was treated with hemostasis with APC on the stomach setting successfully ablated with no further bleeding.  2.  1 small gastric polyp in the stomach body/fundus with some contact bleeding.  This area was also treated with APC coagulation successfully with hemostasis.  3.  Gastric bypass/Billroth II anatomy.  Normal-appearing mucosa at the anastomosis, no ulceration was seen. Nodular/granular mucosa throughout the stomach body, but no significant erythema.  4.  Normal mucosa in the proximal duodenum, at least 20 to 30 cm beyond the anastomosis, in either limb of the duodenum, no ulceration was seen.    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   -OK to resume renal/cardiac/diabetic diet   -monitor hemoglobin, transfuse if <7  -overdue for colonoscopy, history of polyps, consider repeating as outpatient in near future    JO ANN Blackmon MD     Date: 8/27/2024    Time: 15:07 EDT

## 2024-08-27 NOTE — PLAN OF CARE
Problem: Adult Inpatient Plan of Care  Goal: Plan of Care Review  Outcome: Ongoing, Progressing  Goal: Patient-Specific Goal (Individualized)  Outcome: Ongoing, Progressing  Goal: Absence of Hospital-Acquired Illness or Injury  Outcome: Ongoing, Progressing  Intervention: Identify and Manage Fall Risk  Recent Flowsheet Documentation  Taken 8/27/2024 0400 by Kelli Andrade RN  Safety Promotion/Fall Prevention: safety round/check completed  Taken 8/27/2024 0200 by Kelli Andrade RN  Safety Promotion/Fall Prevention: safety round/check completed  Taken 8/27/2024 0000 by Kelli Andrade RN  Safety Promotion/Fall Prevention: safety round/check completed  Taken 8/26/2024 2200 by Kelli Andrade RN  Safety Promotion/Fall Prevention: safety round/check completed  Taken 8/26/2024 2000 by Kelli Andrade RN  Safety Promotion/Fall Prevention: safety round/check completed  Intervention: Prevent Infection  Recent Flowsheet Documentation  Taken 8/26/2024 2000 by Kelli Andrade RN  Infection Prevention: single patient room provided  Goal: Optimal Comfort and Wellbeing  Outcome: Ongoing, Progressing  Goal: Readiness for Transition of Care  Outcome: Ongoing, Progressing     Problem: Diabetes Comorbidity  Goal: Blood Glucose Level Within Targeted Range  Outcome: Ongoing, Progressing   Goal Outcome Evaluation:

## 2024-08-27 NOTE — NURSING NOTE
Unable to tolerate dialysis. Patient had hypotension, abdominal cramps, and chills not relieved by oxygen, breathing treatment, and albumin. Blood returned. Treatment discontinued. BP & symptoms improved.   Provider notified. Report given to KIRK Armendariz.   Patient transported back to unit via bed, Aox4, in no apparent distress. Access is intact, pressure dressing in place.

## 2024-08-27 NOTE — PLAN OF CARE
Problem: Adult Inpatient Plan of Care  Goal: Absence of Hospital-Acquired Illness or Injury  Intervention: Identify and Manage Fall Risk  Recent Flowsheet Documentation  Taken 8/27/2024 1619 by Kala Corbett RN  Safety Promotion/Fall Prevention:   assistive device/personal items within reach   clutter free environment maintained   fall prevention program maintained   lighting adjusted   nonskid shoes/slippers when out of bed   room organization consistent   safety round/check completed  Taken 8/27/2024 1421 by Kala Corbett RN  Safety Promotion/Fall Prevention: patient off unit  Taken 8/27/2024 1330 by Kala Corbett RN  Safety Promotion/Fall Prevention: patient off unit  Taken 8/27/2024 1243 by Kala Corbett RN  Safety Promotion/Fall Prevention:   assistive device/personal items within reach   clutter free environment maintained   fall prevention program maintained   lighting adjusted   nonskid shoes/slippers when out of bed   room organization consistent   safety round/check completed  Taken 8/27/2024 1004 by Kala Cobrett RN  Safety Promotion/Fall Prevention:   assistive device/personal items within reach   clutter free environment maintained   fall prevention program maintained   lighting adjusted   nonskid shoes/slippers when out of bed   room organization consistent   safety round/check completed  Taken 8/27/2024 0745 by Kala Corbett RN  Safety Promotion/Fall Prevention: patient off unit  Taken 8/27/2024 0724 by Kala Corbett RN  Safety Promotion/Fall Prevention:   assistive device/personal items within reach   clutter free environment maintained   fall prevention program maintained   lighting adjusted   nonskid shoes/slippers when out of bed   safety round/check completed   room organization consistent  Intervention: Prevent Skin Injury  Recent Flowsheet Documentation  Taken 8/27/2024 1619 by Kala Corbett RN  Body Position: position changed independently  Taken 8/27/2024 1243 by Kala Corbett, KIRK  Body  Position: position changed independently  Taken 8/27/2024 1004 by Kala Corbett RN  Body Position: position changed independently  Taken 8/27/2024 0724 by Kala Corbett RN  Body Position: position changed independently  Intervention: Prevent and Manage VTE (Venous Thromboembolism) Risk  Recent Flowsheet Documentation  Taken 8/27/2024 1619 by Kala Corbett RN  Activity Management: up in chair  Taken 8/27/2024 1243 by Kala Corbett RN  Activity Management: up in chair  Range of Motion: active ROM (range of motion) encouraged  Taken 8/27/2024 1004 by Kala Corbett RN  Activity Management: up ad arnoldo  Taken 8/27/2024 0724 by Kala Corbett RN  Activity Management: up ad arnoldo  Range of Motion: active ROM (range of motion) encouraged  Intervention: Prevent Infection  Recent Flowsheet Documentation  Taken 8/27/2024 1619 by Kala Corbett RN  Infection Prevention:   hand hygiene promoted   personal protective equipment utilized  Taken 8/27/2024 1243 by Kala Corbett RN  Infection Prevention:   hand hygiene promoted   personal protective equipment utilized  Taken 8/27/2024 1004 by Kala Corbett RN  Infection Prevention:   hand hygiene promoted   personal protective equipment utilized  Taken 8/27/2024 0724 by Kala Corbett RN  Infection Prevention:   personal protective equipment utilized   hand hygiene promoted  Goal: Optimal Comfort and Wellbeing  Intervention: Provide Person-Centered Care  Recent Flowsheet Documentation  Taken 8/27/2024 1619 by Kala Corbett RN  Trust Relationship/Rapport: care explained  Taken 8/27/2024 0724 by Kala Corbett RN  Trust Relationship/Rapport: care explained   Goal Outcome Evaluation:         HD attempted this shift. Pt did not tolerate. BP too low. EGD performed. Cauterized. Will recheck Hgb. No complaints at this time. Pt with regular diet. Pt up in chair, call light in reach. Plan of care to continue.

## 2024-08-27 NOTE — NURSING NOTE
In talking with patient patient AxOx3. Hypotensive, no IV fluids, O2 sat 90 no O2. Placed on 4 LNC. Dr Pringle at bedside talking with patient . Discussed giving patient IVF's to help with pressure. IV to right fore arm red and patient C/O pain with flushing. IV started to right posterior forearm #20 gauge. 500cc NS infusing at slow wide open.

## 2024-08-27 NOTE — PROGRESS NOTES
LOS: 0 days   Patient Care Team:  Jin Malcolm PA-C as PCP - General (Physician Assistant)  Nilay Stevens MD as Consulting Physician (General Surgery)  Stephen Perea MD as Surgeon (Thoracic Surgery)  Makayla Navarro, RN as Nurse Navigator  Iliana Ro MD as Consulting Physician (Hematology and Oncology)    Subjective     Patient with no new complaints continues with tarry stools this am    Review of Systems   Constitutional:  Positive for activity change and fatigue.   HENT: Negative.     Respiratory: Negative.     Cardiovascular: Negative.    Gastrointestinal: Negative.    Genitourinary: Negative.    Musculoskeletal: Negative.    Neurological: Negative.    Psychiatric/Behavioral: Negative.             Objective     Vital Signs  Temp:  [97.5 °F (36.4 °C)-98.7 °F (37.1 °C)] 98.2 °F (36.8 °C)  Heart Rate:  [] 80  Resp:  [12-22] 16  BP: ()/() 92/52      Physical Exam  Vitals reviewed.   Constitutional:       Appearance: She is not ill-appearing.   HENT:      Head: Normocephalic and atraumatic.      Right Ear: External ear normal.      Left Ear: External ear normal.      Nose: Nose normal.      Mouth/Throat:      Mouth: Mucous membranes are moist.   Eyes:      General:         Right eye: No discharge.         Left eye: No discharge.   Cardiovascular:      Rate and Rhythm: Normal rate and regular rhythm.      Pulses: Normal pulses.      Heart sounds: Normal heart sounds.   Pulmonary:      Effort: Pulmonary effort is normal.      Breath sounds: Normal breath sounds.   Abdominal:      General: Bowel sounds are normal.      Palpations: Abdomen is soft.   Musculoskeletal:         General: Normal range of motion.      Cervical back: Normal range of motion.   Skin:     General: Skin is warm and dry.   Neurological:      Mental Status: She is alert and oriented to person, place, and time.   Psychiatric:         Behavior: Behavior normal.              Results Review:    Lab Results (last 24 hours)        Procedure Component Value Units Date/Time    Renal Function Panel [[id number]]  (Abnormal) Collected: 08/27/24 0758    Specimen: Blood Updated: 08/27/24 0911     Glucose 101 mg/dL       mg/dL      Creatinine 7.08 mg/dL      Sodium 137 mmol/L      Potassium 5.1 mmol/L      Chloride 100 mmol/L      CO2 20.2 mmol/L      Calcium 8.7 mg/dL      Albumin 3.5 g/dL      Phosphorus 6.2 mg/dL      Anion Gap 16.8 mmol/L      BUN/Creatinine Ratio 15.0     eGFR 5.6 mL/min/1.73      Comment: <15 Indicative of kidney failure       Narrative:      GFR Normal >60  Chronic Kidney Disease <60  Kidney Failure <15    The GFR formula is only valid for adults with stable renal function between ages 18 and 70.    CBC (No Diff) [[id number]]  (Abnormal) Collected: 08/27/24 0758    Specimen: Blood Updated: 08/27/24 0848     WBC 8.34 10*3/mm3      RBC 2.55 10*6/mm3      Hemoglobin 7.6 g/dL      Comment: Result checked          Hematocrit 22.9 %      MCV 89.8 fL      MCH 29.8 pg      MCHC 33.2 g/dL      RDW 16.7 %      RDW-SD 54.9 fl      MPV 10.1 fL      Platelets 303 10*3/mm3     Phosphorus [[id number]]  (Normal) Collected: 08/26/24 1210    Specimen: Blood Updated: 08/26/24 1717     Phosphorus 4.3 mg/dL     [facility] [[id number]] Collected: 08/26/24 1210    Specimen: Blood Updated: 08/26/24 1434    Narrative:      The following orders were created for panel order [facility].  Procedure                               Abnormality         Status                     ---------                               -----------         ------                     Green Top (Gel)[[id number]]                                  Final result               Lavender Top[[id number]]                                     Final result               Gold Top - SST[[id number]]                                   Final result               Light Blue Top[[id number]]                                                                Please view results for these tests on  the individual orders.    Ammonia [[id number]]  (Normal) Collected: 08/26/24 1246    Specimen: Blood Updated: 08/26/24 1401     Ammonia 21 umol/L     Hemoglobin A1c [[id number]]  (Abnormal) Collected: 08/26/24 1210    Specimen: Blood Updated: 08/26/24 1333     Hemoglobin A1C 5.86 %     Comprehensive Metabolic Panel [[id number]]  (Abnormal) Collected: 08/26/24 1210    Specimen: Blood Updated: 08/26/24 1259     Glucose 171 mg/dL      BUN 96 mg/dL      Creatinine 5.61 mg/dL      Sodium 138 mmol/L      Potassium 4.7 mmol/L      Comment: Slight hemolysis detected by analyzer. Result may be falsely elevated.        Chloride 101 mmol/L      CO2 19.1 mmol/L      Calcium 8.7 mg/dL      Total Protein 5.8 g/dL      Albumin 3.5 g/dL      ALT (SGPT) 14 U/L      AST (SGOT) 22 U/L      Alkaline Phosphatase 98 U/L      Total Bilirubin <0.2 mg/dL      Globulin 2.3 gm/dL      A/G Ratio 1.5 g/dL      BUN/Creatinine Ratio 17.1     Anion Gap 17.9 mmol/L      eGFR 7.4 mL/min/1.73      Comment: <15 Indicative of kidney failure       Narrative:      GFR Normal >60  Chronic Kidney Disease <60  Kidney Failure <15    The GFR formula is only valid for adults with stable renal function between ages 18 and 70.    Magnesium [[id number]]  (Normal) Collected: 08/26/24 1210    Specimen: Blood Updated: 08/26/24 1259     Magnesium 1.7 mg/dL     Single High Sensitivity Troponin T [[id number]]  (Abnormal) Collected: 08/26/24 1210    Specimen: Blood Updated: 08/26/24 1245     HS Troponin T 42 ng/L     Narrative:      High Sensitive Troponin T Reference Range:  <14.0 ng/L- Negative Female for AMI  <22.0 ng/L- Negative Male for AMI  >=14 - Abnormal Female indicating possible myocardial injury.  >=22 - Abnormal Male indicating possible myocardial injury.   Clinicians would have to utilize clinical acumen, EKG, Troponin, and serial changes to determine if it is an Acute Myocardial Infarction or myocardial injury due to an underlying chronic condition.         CBC  & Differential [[id number]]  (Abnormal) Collected: 08/26/24 1210    Specimen: Blood Updated: 08/26/24 1244    Narrative:      The following orders were created for panel order CBC & Differential.  Procedure                               Abnormality         Status                     ---------                               -----------         ------                     CBC Auto Differential[[id number]]        Abnormal            Final result                 Please view results for these tests on the individual orders.    CBC Auto Differential [[id number]]  (Abnormal) Collected: 08/26/24 1210    Specimen: Blood Updated: 08/26/24 1244     WBC 11.39 10*3/mm3      RBC 2.04 10*6/mm3      Hemoglobin 6.1 g/dL      Hematocrit 20.0 %      MCV 98.0 fL      MCH 29.9 pg      MCHC 30.5 g/dL      RDW 16.3 %      RDW-SD 58.5 fl      MPV 10.3 fL      Platelets 318 10*3/mm3      Neutrophil % 79.2 %      Lymphocyte % 9.1 %      Monocyte % 6.0 %      Eosinophil % 3.6 %      Basophil % 0.4 %      Immature Grans % 1.7 %      Neutrophils, Absolute 9.03 10*3/mm3      Lymphocytes, Absolute 1.04 10*3/mm3      Monocytes, Absolute 0.68 10*3/mm3      Eosinophils, Absolute 0.41 10*3/mm3      Basophils, Absolute 0.04 10*3/mm3      Immature Grans, Absolute 0.19 10*3/mm3      nRBC 0.0 /100 WBC     Green Top (Gel) [[id number]] Collected: 08/26/24 1210    Specimen: Blood Updated: 08/26/24 1215     Extra Tube Hold for add-ons.     Comment: Auto resulted.       Lavender Top [[id number]] Collected: 08/26/24 1210    Specimen: Blood Updated: 08/26/24 1215     Extra Tube hold for add-on     Comment: Auto resulted       Gold Top - SST [[id number]] Collected: 08/26/24 1210    Specimen: Blood Updated: 08/26/24 1215     Extra Tube Hold for add-ons.     Comment: Auto resulted.                Imaging Results (Last 24 Hours)       Procedure Component Value Units Date/Time    CT Abdomen Pelvis Without Contrast [[id number]] Collected: 08/26/24 1259     Updated: 08/26/24  1312    Narrative:      CT ABDOMEN PELVIS WO CONTRAST    Date of Exam: 8/26/2024 12:57 PM EDT    Indication: Melanic stool, epigastric pain.    Comparison: Small bowel follow-through 11/10/2023. CT abdomen and pelvis 11/9/2023    Technique: Axial CT images were obtained of the abdomen and pelvis without the administration of contrast. Sagittal and coronal reconstructions were performed.  Automated exposure control and iterative reconstruction methods were used.      Findings:  There is a right lower quadrant ventral hernia containing small bowel, without evidence of herniated bowel inflammation. Hernia sac measures approximately 4.0 x 2.0 x 4.5 cm (3/109, 5/49). This hernia appears new since 11/9/2023. Some of the upstream   small bowel loops appear mildly fluid distended; however, there is no evidence of high-grade bowel obstruction.     The liver, spleen, pancreas, are normal. Right nephrectomy. Severe left renal atrophy with left renal cyst. Stable appearance of bilateral adrenal adenomas.     Severe calcific atherosclerosis in the aorta, iliac and femoral vessels and mesenteric vessels. No adenopathy. No ascites. Surgical changes of the stomach.     Advanced uncomplicated sigmoid diverticulosis. Hysterectomy. Left adnexal cyst measuring 2 cm is unchanged. Urinary bladder is decompressed. Rectum is normal.      Normal heart size. Dense coronary artery calcifications. Chronic fibrotic changes are suggested within the lung bases, without acute airspace disease.      No acute or suspicious osseous abnormalities. Degenerative changes of the spine and hips.      Impression:      1. There is a new small right lower quadrant ventral hernia containing a segment of small bowel.  2. Mild distention of mid abdominal small bowel loops without evidence of high-grade obstruction.  3. Surgical changes of the stomach, right nephrectomy, hysterectomy, cholecystectomy.  4. Stable bilateral adrenal adenomas.  5. Advanced sigmoid  diverticulosis without CT evidence of acute diverticulitis..  6. Atrophic left kidney.      Electronically Signed: Florinda Davidson MD    8/26/2024 1:10 PM EDT    Workstation ID: [id number]    XR Chest 1 View [[id number]] Collected: 08/26/24 1253     Updated: 08/26/24 1312    Narrative:      XR CHEST 1 VW    Date of Exam: 8/26/2024 12:40 PM EDT    Indication: Wheezing, history of renal failure    Comparison: 6/9/2024 and prior    Findings:  Study is limited by overlying support and monitoring apparatus. The heart size is stable. Pulmonary vascularity is congested and indistinct. Increased groundglass and interstitial opacities with a perihilar and bibasilar predominance noted. Findings are   somewhat asymmetric on the right compared to the left. This is accentuated by patient positioning. Osseous structures are grossly unremarkable. Vascular stent is noted in the right upper extremity      Impression:      Impression:    1. Diffuse increased interstitial changes suggesting pulmonary edema, fluid overload. Asymmetric opacities at the right base may represent asymmetric edema, atelectasis or pneumonia. Findings demonstrate slight interval improvement from comparison      Electronically Signed: Ace Graham MD    8/26/2024 1:10 PM EDT    Workstation ID: [id number]    CT Head Without Contrast [[id number]] Collected: 08/26/24 1259     Updated: 08/26/24 1304    Narrative:      CT HEAD WO CONTRAST    Date of Exam: 8/26/2024 12:57 PM EDT    Indication: , History of stroke, binocular blurred vision.    Comparison: Head CT 1/26/2023    Technique: Axial CT images were obtained of the head without contrast administration.  Coronal reconstructions were performed.  Automated exposure control and iterative reconstruction methods were used.      Findings:  Encephalomalacia and gliosis centered within the left parietal lobe in keeping with old infarct. No new large territory loss of gray-white differentiation, acute intracranial  hemorrhage, large mass lesion, midline shift or hydrocephalus. Parenchymal   volume within normal limits for age. Minimal periventricular hypodensity similar to prior likely reflecting chronic microvascular ischemic change. Orbits appear symmetric. Distal carotid atherosclerotic disease. No large extra-axial fluid collection.   Negative for obstructive sinus disease or large mastoid effusion. Negative for depressed skull fracture.      Impression:      Impression:  No convincing acute intracranial finding by CT. Chronic left parietal lobe infarct similar to previous comparison.      Electronically Signed: Bg Dean MD    8/26/2024 1:02 PM EDT    Workstation ID: [id number]                 I reviewed the patient's new clinical results.    Medication Review:   Scheduled Meds:calcium acetate, 667 mg, Oral, TID  epoetin dylan/dylan-epbx, 10,000 Units, Intravenous, Once  pantoprazole, 40 mg, Intravenous, Q12H  sevelamer, 2,400 mg, Oral, TID With Meals  sodium bicarbonate, 1,300 mg, Oral, TID  sodium chloride, 10 mL, Intravenous, Q12H  sucralfate, 1 g, Oral, 4x Daily      Continuous Infusions:   PRN Meds:.  acetaminophen    albumin human    senna-docusate sodium **AND** polyethylene glycol **AND** bisacodyl **AND** bisacodyl    hydrALAZINE    ipratropium-albuterol    metoprolol tartrate    nitroglycerin    ondansetron    sodium chloride    sodium chloride    temazepam     Interval History:    Assessment & Plan     Acute upper GI bleeding  Anemia due to GI blood loss IV   Hypotension  - likely NSAID induced; IV PPI; NPO at midnight in anticipation of EGD; GI consult; transfusion ordered  -EGD today    ESRD on dialysis   - schedule per Dr. Buckley  -unable to complete HD yesterday and today     Chronic CAD - stable without anginal symptoms  COPD - well compensated  Tobacco use disorder  H/o Lucas's esophagus  H/o renal cell carcinoma  DM-II - currently off of meds with well controlled A1C  HTN - holding home meds due to soft  bp  HLD - resume statin at discharge     SCD's for dvt prophy  Plan for disposition:Home    Cindy Santiago, MAEGAN  08/27/24  09:38 EDT

## 2024-08-27 NOTE — ANESTHESIA PREPROCEDURE EVALUATION
Anesthesia Evaluation     Patient summary reviewed and Nursing notes reviewed   NPO Solid Status: > 8 hours  NPO Liquid Status: > 8 hours           Airway   Mallampati: II  TM distance: >3 FB  Neck ROM: full  No difficulty expected  Dental - normal exam     Pulmonary - normal exam    breath sounds clear to auscultation  (+) pneumonia worsening , lung cancer, COPD,shortness of breath, sleep apnea  Cardiovascular - normal exam  Exercise tolerance: unable to assess    ECG reviewed  Rhythm: regular  Rate: normal    (+) hypertension, CHF , hyperlipidemia,  carotid artery disease    ROS comment: ECHO Normal    Neuro/Psych  (+) CVA, numbness  GI/Hepatic/Renal/Endo    (+) obesity, GERD, PUD, GI bleeding , renal disease- CRI and ESRD, diabetes mellitus    Musculoskeletal     Abdominal  - normal exam   Substance History - negative use     OB/GYN negative ob/gyn ROS         Other   arthritis,   history of cancer (Lung/Renal Cell)    ROS/Med Hx Other:   PNA (pneumonia)     Assessment:     Right middle lobe pneumonia due to unspecified pathogen     COPD acute exacerbation  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      antibiotics  Oxygen supplement and titration to maintain saturation 90 to 95%  Bronchodilators  Inhaled corticosteroids  IV steroids  Mucinex  Encourage use of incentive spirometry     HD/electrolyte management as per nephrology  BP/glucose control  DVT/GI prophylaxis  Check daily labs and correct electrolytes as needed  I personally reviewed the radiological studies                  Anesthesia Plan    ASA 4     general   total IV anesthesia  (Hgb 7.0)  intravenous induction      Anesthetic plan, risks, benefits, and alternatives have been provided, discussed and informed consent has been obtained with: patient.    Plan discussed with CRNA.    CODE STATUS:    Code Status (Patient has no pulse and is not breathing): CPR (Attempt to Resuscitate)  Medical Interventions (Patient has pulse or is breathing): Full Support

## 2024-08-28 ENCOUNTER — INPATIENT HOSPITAL (AMBULATORY)
Age: 74
End: 2024-08-28

## 2024-08-28 ENCOUNTER — INPATIENT HOSPITAL (AMBULATORY)
Dept: URBAN - METROPOLITAN AREA HOSPITAL 84 | Facility: HOSPITAL | Age: 74
End: 2024-08-28

## 2024-08-28 DIAGNOSIS — K92.1 MELENA: ICD-10-CM

## 2024-08-28 DIAGNOSIS — Z90.3 ACQUIRED ABSENCE OF STOMACH [PART OF]: ICD-10-CM

## 2024-08-28 DIAGNOSIS — R10.84 GENERALIZED ABDOMINAL PAIN: ICD-10-CM

## 2024-08-28 DIAGNOSIS — Z87.11 PERSONAL HISTORY OF PEPTIC ULCER DISEASE: ICD-10-CM

## 2024-08-28 DIAGNOSIS — D62 ACUTE POSTHEMORRHAGIC ANEMIA: ICD-10-CM

## 2024-08-28 DIAGNOSIS — K31.811 ANGIODYSPLASIA OF STOMACH AND DUODENUM WITH BLEEDING: ICD-10-CM

## 2024-08-28 LAB
ALBUMIN SERPL-MCNC: 3.8 G/DL (ref 3.5–5.2)
ANION GAP SERPL CALCULATED.3IONS-SCNC: 15.8 MMOL/L (ref 5–15)
BH BB BLOOD EXPIRATION DATE: NORMAL
BH BB BLOOD EXPIRATION DATE: NORMAL
BH BB BLOOD TYPE BARCODE: 9500
BH BB BLOOD TYPE BARCODE: 9500
BH BB DISPENSE STATUS: NORMAL
BH BB DISPENSE STATUS: NORMAL
BH BB PRODUCT CODE: NORMAL
BH BB PRODUCT CODE: NORMAL
BH BB UNIT NUMBER: NORMAL
BH BB UNIT NUMBER: NORMAL
BUN SERPL-MCNC: 81 MG/DL (ref 8–23)
BUN/CREAT SERPL: 12.7 (ref 7–25)
CALCIUM SPEC-SCNC: 8.8 MG/DL (ref 8.6–10.5)
CHLORIDE SERPL-SCNC: 100 MMOL/L (ref 98–107)
CO2 SERPL-SCNC: 22.2 MMOL/L (ref 22–29)
CREAT SERPL-MCNC: 6.38 MG/DL (ref 0.57–1)
CROSSMATCH INTERPRETATION: NORMAL
CROSSMATCH INTERPRETATION: NORMAL
DEPRECATED RDW RBC AUTO: 54 FL (ref 37–54)
EGFRCR SERPLBLD CKD-EPI 2021: 6.4 ML/MIN/1.73
ERYTHROCYTE [DISTWIDTH] IN BLOOD BY AUTOMATED COUNT: 16.4 % (ref 12.3–15.4)
GLUCOSE SERPL-MCNC: 98 MG/DL (ref 65–99)
HCT VFR BLD AUTO: 23.4 % (ref 34–46.6)
HGB BLD-MCNC: 7.8 G/DL (ref 12–15.9)
MCH RBC QN AUTO: 30.6 PG (ref 26.6–33)
MCHC RBC AUTO-ENTMCNC: 33.3 G/DL (ref 31.5–35.7)
MCV RBC AUTO: 91.8 FL (ref 79–97)
PHOSPHATE SERPL-MCNC: 6 MG/DL (ref 2.5–4.5)
PLATELET # BLD AUTO: 296 10*3/MM3 (ref 140–450)
PMV BLD AUTO: 10.5 FL (ref 6–12)
POTASSIUM SERPL-SCNC: 5.7 MMOL/L (ref 3.5–5.2)
RBC # BLD AUTO: 2.55 10*6/MM3 (ref 3.77–5.28)
SODIUM SERPL-SCNC: 138 MMOL/L (ref 136–145)
UNIT  ABO: NORMAL
UNIT  ABO: NORMAL
UNIT  RH: NORMAL
UNIT  RH: NORMAL
WBC NRBC COR # BLD AUTO: 9.47 10*3/MM3 (ref 3.4–10.8)

## 2024-08-28 PROCEDURE — 99232 SBSQ HOSP IP/OBS MODERATE 35: CPT | Performed by: NURSE PRACTITIONER

## 2024-08-28 PROCEDURE — 25010000002 OCTREOTIDE PER 25 MCG: Performed by: INTERNAL MEDICINE

## 2024-08-28 PROCEDURE — 80069 RENAL FUNCTION PANEL: CPT | Performed by: INTERNAL MEDICINE

## 2024-08-28 PROCEDURE — 85027 COMPLETE CBC AUTOMATED: CPT | Performed by: INTERNAL MEDICINE

## 2024-08-28 PROCEDURE — 25010000002 EPOETIN ALFA-EPBX 10000 UNIT/ML SOLUTION: Performed by: INTERNAL MEDICINE

## 2024-08-28 RX ADMIN — SEVELAMER CARBONATE 2400 MG: 800 TABLET, FILM COATED ORAL at 17:27

## 2024-08-28 RX ADMIN — CALCIUM ACETATE 667 MG: 667 CAPSULE ORAL at 17:27

## 2024-08-28 RX ADMIN — Medication 10 ML: at 22:00

## 2024-08-28 RX ADMIN — SUCRALFATE 1 G: 1 TABLET ORAL at 17:27

## 2024-08-28 RX ADMIN — MIDODRINE HYDROCHLORIDE 5 MG: 5 TABLET ORAL at 07:31

## 2024-08-28 RX ADMIN — TEMAZEPAM 15 MG: 15 CAPSULE ORAL at 20:16

## 2024-08-28 RX ADMIN — SUCRALFATE 1 G: 1 TABLET ORAL at 20:16

## 2024-08-28 RX ADMIN — EPOETIN ALFA-EPBX 10000 UNITS: 10000 INJECTION, SOLUTION INTRAVENOUS; SUBCUTANEOUS at 10:24

## 2024-08-28 RX ADMIN — CALCIUM ACETATE 667 MG: 667 CAPSULE ORAL at 20:16

## 2024-08-28 RX ADMIN — PANTOPRAZOLE SODIUM 40 MG: 40 INJECTION, POWDER, FOR SOLUTION INTRAVENOUS at 20:16

## 2024-08-28 RX ADMIN — OCTREOTIDE ACETATE 200 MCG: 500 INJECTION, SOLUTION INTRAVENOUS; SUBCUTANEOUS at 17:28

## 2024-08-28 RX ADMIN — OCTREOTIDE ACETATE 200 MCG: 500 INJECTION, SOLUTION INTRAVENOUS; SUBCUTANEOUS at 00:14

## 2024-08-28 RX ADMIN — MIDODRINE HYDROCHLORIDE 5 MG: 5 TABLET ORAL at 17:28

## 2024-08-28 RX ADMIN — SUCRALFATE 1 G: 1 TABLET ORAL at 13:06

## 2024-08-28 RX ADMIN — SODIUM BICARBONATE 1300 MG: 650 TABLET ORAL at 20:16

## 2024-08-28 RX ADMIN — OCTREOTIDE ACETATE 200 MCG: 500 INJECTION, SOLUTION INTRAVENOUS; SUBCUTANEOUS at 20:16

## 2024-08-28 RX ADMIN — MIDODRINE HYDROCHLORIDE 5 MG: 5 TABLET ORAL at 13:06

## 2024-08-28 RX ADMIN — SODIUM BICARBONATE 1300 MG: 650 TABLET ORAL at 17:27

## 2024-08-28 RX ADMIN — SEVELAMER CARBONATE 2400 MG: 800 TABLET, FILM COATED ORAL at 13:06

## 2024-08-28 NOTE — PROGRESS NOTES
LOS: 1 day   Patient Care Team:  Jin Malcolm PA-C as PCP - General (Physician Assistant)  Nilay Stevens MD as Consulting Physician (General Surgery)  Stephen Perea MD as Surgeon (Thoracic Surgery)  Makayla Navarro, RN as Nurse Navigator  Iliana Ro MD as Consulting Physician (Hematology and Oncology)      Subjective     Subjective: Patient doing well today.  Tolerated dialysis better.  No abdominal pain.  Has ordered her lunch.  No nausea.      ROS:   Review of Systems   Respiratory:  Negative for cough and shortness of breath.    Cardiovascular:  Negative for chest pain.   Gastrointestinal:  Negative for abdominal pain, blood in stool, nausea and vomiting.   Neurological:  Negative for dizziness.   Psychiatric/Behavioral:  Negative for agitation and confusion.         Medication Review:   Scheduled Meds:calcium acetate, 667 mg, Oral, TID  midodrine, 5 mg, Oral, TID AC  octreotide, 200 mcg, Subcutaneous, TID  pantoprazole, 40 mg, Intravenous, Q12H  sevelamer, 2,400 mg, Oral, TID With Meals  sodium bicarbonate, 1,300 mg, Oral, TID  sodium chloride, 10 mL, Intravenous, Q12H  sucralfate, 1 g, Oral, 4x Daily      Continuous Infusions:   PRN Meds:.  acetaminophen    albumin human    albumin human    senna-docusate sodium **AND** polyethylene glycol **AND** bisacodyl **AND** bisacodyl    hydrALAZINE    ipratropium-albuterol    metoprolol tartrate    nitroglycerin    ondansetron    sodium chloride    sodium chloride    temazepam      Objective     Vital Signs  Temp:  [97.9 °F (36.6 °C)-98.5 °F (36.9 °C)] 97.9 °F (36.6 °C)  Heart Rate:  [] 71  Resp:  [9-18] 14  BP: ()/() 127/58    Physical Exam:    General Appearance:    Awake and alert, in no acute distress   Head:    Normocephalic, without obvious abnormality   Eyes:          Conjunctivae normal, anicteric sclera   Ears:    Hearing intact   Throat:   No oral lesions, no thrush, oral mucosa moist       Lungs:     Respirations regular, even and  unlabored       Abdomen:     Soft, non-tender, no rebound or guarding, non-distended, no hepatosplenomegaly   Rectal:     Deferred   Extremities:   No edema, no cyanosis, no redness   Skin:   No bleeding, bruising or rash, no jaundice   Neurologic:   Sensation intact        Results Review:    CBC  Results from last 7 days   Lab Units 08/28/24  0452 08/27/24  0758 08/26/24  1210   RBC 10*6/mm3 2.55* 2.55* 2.04*   WBC 10*3/mm3 9.47 8.34 11.39*   HEMOGLOBIN g/dL 7.8* 7.6* 6.1*   PLATELETS 10*3/mm3 296 303 318       CMP  Results from last 7 days   Lab Units 08/28/24  0452 08/27/24  0758 08/26/24  1246 08/26/24  1210   SODIUM mmol/L 138 137  --  138   POTASSIUM mmol/L 5.7* 5.1  --  4.7   CHLORIDE mmol/L 100 100  --  101   CO2 mmol/L 22.2 20.2*  --  19.1*   BUN mg/dL 81* 106*  --  96*   CREATININE mg/dL 6.38* 7.08*  --  5.61*   GLUCOSE mg/dL 98 101*  --  171*   ALBUMIN g/dL 3.8 3.5  --  3.5   BILIRUBIN mg/dL  --   --   --  <0.2   ALK PHOS U/L  --   --   --  98   AST (SGOT) U/L  --   --   --  22   ALT (SGPT) U/L  --   --   --  14   AMMONIA umol/L  --   --  21  --        Amylase and Lipase        CRP         Imaging Results (Last 24 Hours)       ** No results found for the last 24 hours. 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  Reports melena for the past week or so and found to have significant drop in hemoglobin to 6.1.  Has a history of duodenal ulcers and duodenal neuroendocrine tumor.    Status post EGD by Dr. Blackmon 8/27/2024 -1 small angiectasia stomach body which  was bleeding status post APC, small gastric polyp with contact bleeding status post APC, Billroth II anatomy.    Patient doing well today.  Tolerated dialysis and was able to complete this.  No abdominal pain.  Hemoglobin stable at 7.8.  WBC normal.  Continue to monitor H&H and transfuse as needed.  Diet as tolerated.  She can be discharged from a GI standpoint.  She will continue on PPI and has been started on octreotide.  We recommend outpatient follow-up and if she continues to be anemic, discussed proceeding with pill capsule endoscopy as outpatient.       Jennifer Ng, MAEGAN  08/28/24  12:39 EDT

## 2024-08-28 NOTE — PLAN OF CARE
Goal Outcome Evaluation:        Problem: Adult Inpatient Plan of Care  Goal: Absence of Hospital-Acquired Illness or Injury  Intervention: Identify and Manage Fall Risk  Intervention: Prevent Skin Injury  Intervention: Prevent and Manage VTE (Venous Thromboembolism) Risk  Goal: Optimal Comfort and Wellbeing  Intervention: Provide Person-Centered Care         Patient A&O sitting in chair with call light within reach.    Albumin was not given by RN due to orders stating to give while in dialysis. Tried calling Dialysis nurse to see if it was given to no answer.

## 2024-08-28 NOTE — PROGRESS NOTES
LOS: 1 day   Patient Care Team:  Jin Malcolm PA-C as PCP - General (Physician Assistant)  Nilay Stevens MD as Consulting Physician (General Surgery)  Stephen Perea MD as Surgeon (Thoracic Surgery)  Makayla Navarro, RN as Nurse Navigator  Iliana Ro MD as Consulting Physician (Hematology and Oncology)    Subjective     Was able to complete dialysis today.  Status post EGD 8/27/2024    Review of Systems   Constitutional:  Positive for activity change and fatigue.   HENT: Negative.     Respiratory: Negative.     Cardiovascular: Negative.    Gastrointestinal: Negative.    Genitourinary: Negative.    Musculoskeletal: Negative.    Neurological: Negative.    Psychiatric/Behavioral: Negative.             Objective     Vital Signs  Temp:  [97.9 °F (36.6 °C)-98.5 °F (36.9 °C)] 97.9 °F (36.6 °C)  Heart Rate:  [] 72  Resp:  [11-17] 14  BP: ()/() 134/56      Physical Exam  Vitals reviewed.   Constitutional:       Appearance: She is not ill-appearing.   HENT:      Head: Normocephalic and atraumatic.      Right Ear: External ear normal.      Left Ear: External ear normal.      Nose: Nose normal.      Mouth/Throat:      Mouth: Mucous membranes are moist.   Eyes:      General:         Right eye: No discharge.         Left eye: No discharge.   Cardiovascular:      Rate and Rhythm: Normal rate and regular rhythm.      Pulses: Normal pulses.      Heart sounds: Normal heart sounds.   Pulmonary:      Effort: Pulmonary effort is normal.      Breath sounds: Normal breath sounds.   Abdominal:      General: Bowel sounds are normal.      Palpations: Abdomen is soft.   Musculoskeletal:         General: Normal range of motion.      Cervical back: Normal range of motion.   Skin:     General: Skin is warm and dry.   Neurological:      Mental Status: She is alert and oriented to person, place, and time.   Psychiatric:         Behavior: Behavior normal.              Results Review:    Lab Results (last 24 hours)        Procedure Component Value Units Date/Time    Renal Function Panel [[id number]]  (Abnormal) Collected: 08/28/24 0452    Specimen: Blood Updated: 08/28/24 0627     Glucose 98 mg/dL      BUN 81 mg/dL      Creatinine 6.38 mg/dL      Sodium 138 mmol/L      Potassium 5.7 mmol/L      Chloride 100 mmol/L      CO2 22.2 mmol/L      Calcium 8.8 mg/dL      Albumin 3.8 g/dL      Phosphorus 6.0 mg/dL      Anion Gap 15.8 mmol/L      BUN/Creatinine Ratio 12.7     eGFR 6.4 mL/min/1.73      Comment: <15 Indicative of kidney failure       Narrative:      GFR Normal >60  Chronic Kidney Disease <60  Kidney Failure <15    The GFR formula is only valid for adults with stable renal function between ages 18 and 70.    CBC (No Diff) [[id number]]  (Abnormal) Collected: 08/28/24 0452    Specimen: Blood Updated: 08/28/24 0600     WBC 9.47 10*3/mm3      RBC 2.55 10*6/mm3      Hemoglobin 7.8 g/dL      Hematocrit 23.4 %      MCV 91.8 fL      MCH 30.6 pg      MCHC 33.3 g/dL      RDW 16.4 %      RDW-SD 54.0 fl      MPV 10.5 fL      Platelets 296 10*3/mm3     POC Glucose Once [[id number]]  (Normal) Collected: 08/27/24 1316    Specimen: Blood Updated: 08/27/24 1318     Glucose 80 mg/dL      Comment: Serial Number: [id number]:  [id number]       Renal Function Panel [[id number]]  (Abnormal) Collected: 08/27/24 0758    Specimen: Blood Updated: 08/27/24 0911     Glucose 101 mg/dL       mg/dL      Creatinine 7.08 mg/dL      Sodium 137 mmol/L      Potassium 5.1 mmol/L      Chloride 100 mmol/L      CO2 20.2 mmol/L      Calcium 8.7 mg/dL      Albumin 3.5 g/dL      Phosphorus 6.2 mg/dL      Anion Gap 16.8 mmol/L      BUN/Creatinine Ratio 15.0     eGFR 5.6 mL/min/1.73      Comment: <15 Indicative of kidney failure       Narrative:      GFR Normal >60  Chronic Kidney Disease <60  Kidney Failure <15    The GFR formula is only valid for adults with stable renal function between ages 18 and 70.    CBC (No Diff) [[id number]]  (Abnormal) Collected:  08/27/24 0758    Specimen: Blood Updated: 08/27/24 0848     WBC 8.34 10*3/mm3      RBC 2.55 10*6/mm3      Hemoglobin 7.6 g/dL      Comment: Result checked          Hematocrit 22.9 %      MCV 89.8 fL      MCH 29.8 pg      MCHC 33.2 g/dL      RDW 16.7 %      RDW-SD 54.9 fl      MPV 10.1 fL      Platelets 303 10*3/mm3              Imaging Results (Last 24 Hours)       ** No results found for the last 24 hours. **                 I reviewed the patient's new clinical results.    Medication Review:   Scheduled Meds:calcium acetate, 667 mg, Oral, TID  epoetin dylan/dylan-epbx, 10,000 Units, Intravenous, Once  midodrine, 5 mg, Oral, TID AC  octreotide, 200 mcg, Subcutaneous, TID  pantoprazole, 40 mg, Intravenous, Q12H  sevelamer, 2,400 mg, Oral, TID With Meals  sodium bicarbonate, 1,300 mg, Oral, TID  sodium chloride, 10 mL, Intravenous, Q12H  sucralfate, 1 g, Oral, 4x Daily      Continuous Infusions:   PRN Meds:.  acetaminophen    albumin human    albumin human    senna-docusate sodium **AND** polyethylene glycol **AND** bisacodyl **AND** bisacodyl    hydrALAZINE    ipratropium-albuterol    metoprolol tartrate    nitroglycerin    ondansetron    sodium chloride    sodium chloride    temazepam     Interval History:    Assessment & Plan     Acute upper GI bleeding  Anemia due to GI blood loss IV   Hypotension  - likely NSAID induced; IV PPI; sp EGD; 1 small angiectasia stomach body which was bleeding status post APC, small gastric polyp with contact bleeding status post APC, Billroth II anatomy   -SQ Octreotide TID per GI recommendation since patient will most liely not tolerate BB with dialysis, she will continue this at discharge.  GI working on prior authorization  -monitor HH and transfuse <7    ESRD on dialysis   - schedule per Dr. Buckley    Chronic CAD - stable without anginal symptoms  COPD - well compensated  Tobacco use disorder  H/o Lucas's esophagus  H/o renal cell carcinoma  DM-II - currently off of meds with well  controlled A1C  HTN -   HLD - resume statin at discharge     SCD's for dvt prophy  Plan for disposition: Home in am if hemoglobin remained stable    MAEGAN Mendosa  08/28/24  08:28 EDT

## 2024-08-28 NOTE — PROGRESS NOTES
[facility] Progress Note      Patient Name: Sherry Lobato  : 1950  MRN: [id number]  Primary Care Physician:  Jin Malcolm PA-C  Date of admission: 2024    Subjective     Interval History:     Patient seen during dialysis this morning, tolerating better.  Complaining of cramps during dialysis  Denies any chest pain, shortness of breath, nausea or vomiting.  Overall patient feeling better    Review of Systems:   As noted above    Objective     Vitals:   Temp:  [97.9 °F (36.6 °C)-98.5 °F (36.9 °C)] 98 °F (36.7 °C)  Heart Rate:  [] 62  Resp:  [9-18] 13  BP: ()/() 147/62  Flow (L/min):  [2-4] 2    Intake/Output Summary (Last 24 hours) at 2024 1006  Last data filed at 2024 2046  Gross per 24 hour   Intake 340 ml   Output 0 ml   Net 340 ml       Physical Exam:    General Appearance: Awake, NAD  HEENT: oral mucosa normal, nonicteric sclera  Neck: supple, no JVD  Lungs: CTA  Heart: RRR, normal S1 and S2  Abdomen: soft, nondistended  Extremities: no edema  Neuro: Awake alert and moving all extremities    Scheduled Meds:     calcium acetate, 667 mg, Oral, TID  epoetin dylan/dylan-epbx, 10,000 Units, Intravenous, Once  midodrine, 5 mg, Oral, TID AC  octreotide, 200 mcg, Subcutaneous, TID  pantoprazole, 40 mg, Intravenous, Q12H  sevelamer, 2,400 mg, Oral, TID With Meals  sodium bicarbonate, 1,300 mg, Oral, TID  sodium chloride, 10 mL, Intravenous, Q12H  sucralfate, 1 g, Oral, 4x Daily      IV Meds:        Results Reviewed:   I have personally reviewed the results from the time of this admission to 2024 10:06 EDT     Results from last 7 days   Lab Units 24  0452 24  0758 24  1210   SODIUM mmol/L 138 137 138   POTASSIUM mmol/L 5.7* 5.1 4.7   CHLORIDE mmol/L 100 100 101   CO2 mmol/L 22.2 20.2* 19.1*   BUN mg/dL 81* 106* 96*   CREATININE mg/dL 6.38* 7.08* 5.61*   CALCIUM mg/dL 8.8 8.7 8.7   BILIRUBIN mg/dL  --   --  <0.2   ALK PHOS  U/L  --   --  98   ALT (SGPT) U/L  --   --  14   AST (SGOT) U/L  --   --  22   GLUCOSE mg/dL 98 101* 171*     Estimated Creatinine Clearance: 6.6 mL/min (A) (by C-G formula based on SCr of 6.38 mg/dL (H)).  Results from last 7 days   Lab Units 08/28/24  0452 08/27/24  0758 08/26/24  1210   MAGNESIUM mg/dL  --   --  1.7   PHOSPHORUS mg/dL 6.0* 6.2* 4.3         Results from last 7 days   Lab Units 08/28/24  0452 08/27/24  0758 08/26/24  1210   WBC 10*3/mm3 9.47 8.34 11.39*   HEMOGLOBIN g/dL 7.8* 7.6* 6.1*   PLATELETS 10*3/mm3 296 303 318           Assessment / Plan     ASSESSMENT:    End-stage renal disease. MWF.  Patient receiving dialysis this morning.  Electrolytes okay.  Blood pressure better during dialysis  Anemia.  Due to GI bleed on top of anemia and chronic kidney disease.  Hemoglobin 7.8 this morning.  Status post PRBC transfusion  Hypotension.  Blood pressure better.  On oral midodrine  GI bleed.  Status post EGD which showed gastric angiectasis and gastric polyp    PLAN:    Hemodialysis MWF  Continue oral midodrine  Epogen with dialysis  Patient will resume her regular dialysis Monday Wednesday and Friday on discharge    Faustino Buckley MD  08/28/24  10:06 EDT    [facility]  [phone]

## 2024-08-29 ENCOUNTER — READMISSION MANAGEMENT (OUTPATIENT)
Dept: CALL CENTER | Facility: HOSPITAL | Age: 74
End: 2024-08-29
Payer: MEDICARE

## 2024-08-29 VITALS
HEART RATE: 64 BPM | BODY MASS INDEX: 29.43 KG/M2 | OXYGEN SATURATION: 100 % | RESPIRATION RATE: 16 BRPM | TEMPERATURE: 98 F | HEIGHT: 60 IN | SYSTOLIC BLOOD PRESSURE: 127 MMHG | DIASTOLIC BLOOD PRESSURE: 63 MMHG | WEIGHT: 149.91 LBS

## 2024-08-29 PROBLEM — D89.831 CYTOKINE RELEASE SYNDROME, GRADE 1: Status: RESOLVED | Noted: 2023-01-31 | Resolved: 2024-08-29

## 2024-08-29 PROBLEM — T82.868S: Status: RESOLVED | Noted: 2020-12-26 | Resolved: 2024-08-29

## 2024-08-29 PROBLEM — K52.9 COLITIS: Status: RESOLVED | Noted: 2022-03-11 | Resolved: 2024-08-29

## 2024-08-29 PROBLEM — Z72.0 TOBACCO ABUSE: Chronic | Status: RESOLVED | Noted: 2020-12-27 | Resolved: 2024-08-29

## 2024-08-29 PROBLEM — C34.90 NON-SMALL CELL LUNG CANCER: Status: ACTIVE | Noted: 2024-08-29

## 2024-08-29 PROBLEM — R93.3 ABNORMAL CT SCAN, GASTROINTESTINAL TRACT: Status: RESOLVED | Noted: 2022-03-11 | Resolved: 2024-08-29

## 2024-08-29 PROBLEM — R10.31 RIGHT LOWER QUADRANT PAIN: Status: RESOLVED | Noted: 2019-07-27 | Resolved: 2024-08-29

## 2024-08-29 PROBLEM — R41.82 ALTERED MENTAL STATUS, UNSPECIFIED ALTERED MENTAL STATUS TYPE: Status: RESOLVED | Noted: 2023-01-26 | Resolved: 2024-08-29

## 2024-08-29 PROBLEM — R09.02 HYPOXEMIA: Status: RESOLVED | Noted: 2021-07-27 | Resolved: 2024-08-29

## 2024-08-29 PROBLEM — E66.9 OBESITY (BMI 30-39.9): Chronic | Status: RESOLVED | Noted: 2019-09-30 | Resolved: 2024-08-29

## 2024-08-29 PROBLEM — J18.9 PNEUMONIA OF BOTH LUNGS DUE TO INFECTIOUS ORGANISM: Status: RESOLVED | Noted: 2022-10-27 | Resolved: 2024-08-29

## 2024-08-29 PROBLEM — K43.2 INCISIONAL HERNIA OF ANTERIOR ABDOMINAL WALL WITHOUT OBSTRUCTION OR GANGRENE: Status: RESOLVED | Noted: 2019-09-30 | Resolved: 2024-08-29

## 2024-08-29 PROBLEM — D11.9 WARTHIN'S TUMOR: Status: ACTIVE | Noted: 2024-08-29

## 2024-08-29 PROBLEM — D50.9 IRON DEFICIENCY ANEMIA: Status: RESOLVED | Noted: 2017-05-03 | Resolved: 2024-08-29

## 2024-08-29 PROBLEM — D75.839 THROMBOCYTOSIS: Status: RESOLVED | Noted: 2020-12-27 | Resolved: 2024-08-29

## 2024-08-29 PROBLEM — R05.1 ACUTE COUGH: Status: RESOLVED | Noted: 2022-10-27 | Resolved: 2024-08-29

## 2024-08-29 PROBLEM — J18.9 PNA (PNEUMONIA): Status: RESOLVED | Noted: 2024-06-08 | Resolved: 2024-08-29

## 2024-08-29 PROBLEM — N18.9 ANEMIA IN CHRONIC KIDNEY DISEASE: Status: RESOLVED | Noted: 2017-05-03 | Resolved: 2024-08-29

## 2024-08-29 PROBLEM — J44.1 ACUTE EXACERBATION OF CHRONIC OBSTRUCTIVE PULMONARY DISEASE: Status: RESOLVED | Noted: 2019-02-05 | Resolved: 2024-08-29

## 2024-08-29 PROBLEM — D63.1 ANEMIA IN CHRONIC KIDNEY DISEASE: Status: RESOLVED | Noted: 2017-05-03 | Resolved: 2024-08-29

## 2024-08-29 PROBLEM — E83.30 DISORDER OF PHOSPHORUS METABOLISM, UNSPECIFIED: Status: RESOLVED | Noted: 2020-11-23 | Resolved: 2024-08-29

## 2024-08-29 PROBLEM — M25.9 DISORDER OF WRIST JOINT: Status: RESOLVED | Noted: 2018-07-05 | Resolved: 2024-08-29

## 2024-08-29 PROBLEM — R10.13 EPIGASTRIC PAIN: Status: RESOLVED | Noted: 2023-10-08 | Resolved: 2024-08-29

## 2024-08-29 PROBLEM — N25.81 SECONDARY HYPERPARATHYROIDISM OF RENAL ORIGIN: Status: RESOLVED | Noted: 2017-05-03 | Resolved: 2024-08-29

## 2024-08-29 PROBLEM — K43.9 HERNIA OF ABDOMINAL WALL: Status: RESOLVED | Noted: 2019-09-30 | Resolved: 2024-08-29

## 2024-08-29 PROBLEM — Z99.2 CKD (CHRONIC KIDNEY DISEASE) STAGE V REQUIRING CHRONIC DIALYSIS: Status: ACTIVE | Noted: 2017-08-01

## 2024-08-29 PROBLEM — R06.00 DYSPNEA: Status: RESOLVED | Noted: 2021-07-28 | Resolved: 2024-08-29

## 2024-08-29 PROBLEM — J18.9 PNEUMONIA, UNSPECIFIED ORGANISM: Status: RESOLVED | Noted: 2024-06-14 | Resolved: 2024-08-29

## 2024-08-29 PROBLEM — R10.31 RIGHT LOWER QUADRANT ABDOMINAL PAIN: Status: RESOLVED | Noted: 2019-07-27 | Resolved: 2024-08-29

## 2024-08-29 PROBLEM — N25.81 SECONDARY HYPERPARATHYROIDISM OF RENAL ORIGIN: Status: ACTIVE | Noted: 2024-08-29

## 2024-08-29 PROBLEM — J44.1 COPD EXACERBATION: Status: RESOLVED | Noted: 2021-07-29 | Resolved: 2024-08-29

## 2024-08-29 PROBLEM — D68.9 COAGULATION DEFECT, UNSPECIFIED: Status: RESOLVED | Noted: 2017-05-03 | Resolved: 2024-08-29

## 2024-08-29 PROBLEM — K56.609 SMALL BOWEL OBSTRUCTION: Status: RESOLVED | Noted: 2023-11-09 | Resolved: 2024-08-29

## 2024-08-29 PROBLEM — N18.6 ESRD (END STAGE RENAL DISEASE): Status: RESOLVED | Noted: 2021-07-27 | Resolved: 2024-08-29

## 2024-08-29 PROBLEM — M10.9 ACUTE GOUT: Status: RESOLVED | Noted: 2023-11-11 | Resolved: 2024-08-29

## 2024-08-29 PROBLEM — K56.609 SBO (SMALL BOWEL OBSTRUCTION): Status: RESOLVED | Noted: 2023-11-09 | Resolved: 2024-08-29

## 2024-08-29 PROBLEM — Z00.00 MEDICARE ANNUAL WELLNESS VISIT, SUBSEQUENT: Status: RESOLVED | Noted: 2020-11-07 | Resolved: 2024-08-29

## 2024-08-29 PROBLEM — T82.868A AV GRAFT THROMBOSIS, INITIAL ENCOUNTER: Status: RESOLVED | Noted: 2020-12-27 | Resolved: 2024-08-29

## 2024-08-29 PROBLEM — K43.2 VENTRAL INCISIONAL HERNIA: Status: RESOLVED | Noted: 2019-09-30 | Resolved: 2024-08-29

## 2024-08-29 PROBLEM — K42.9 UMBILICAL HERNIA: Status: RESOLVED | Noted: 2023-11-11 | Resolved: 2024-08-29

## 2024-08-29 PROBLEM — R06.02 SHORTNESS OF BREATH: Status: RESOLVED | Noted: 2020-12-27 | Resolved: 2024-08-29

## 2024-08-29 PROBLEM — J18.9 PNEUMONIA OF BOTH LUNGS DUE TO INFECTIOUS ORGANISM, UNSPECIFIED PART OF LUNG: Status: RESOLVED | Noted: 2022-10-27 | Resolved: 2024-08-29

## 2024-08-29 PROBLEM — Z72.0 TOBACCO USER: Status: RESOLVED | Noted: 2020-07-24 | Resolved: 2024-08-29

## 2024-08-29 PROBLEM — E87.5 HYPERKALEMIA: Status: RESOLVED | Noted: 2023-10-03 | Resolved: 2024-08-29

## 2024-08-29 LAB
ALBUMIN SERPL-MCNC: 3.8 G/DL (ref 3.5–5.2)
ANION GAP SERPL CALCULATED.3IONS-SCNC: 12.9 MMOL/L (ref 5–15)
BUN SERPL-MCNC: 43 MG/DL (ref 8–23)
BUN/CREAT SERPL: 8.7 (ref 7–25)
CALCIUM SPEC-SCNC: 8.5 MG/DL (ref 8.6–10.5)
CHLORIDE SERPL-SCNC: 100 MMOL/L (ref 98–107)
CO2 SERPL-SCNC: 26.1 MMOL/L (ref 22–29)
CREAT SERPL-MCNC: 4.95 MG/DL (ref 0.57–1)
DEPRECATED RDW RBC AUTO: 55.1 FL (ref 37–54)
EGFRCR SERPLBLD CKD-EPI 2021: 8.6 ML/MIN/1.73
ERYTHROCYTE [DISTWIDTH] IN BLOOD BY AUTOMATED COUNT: 16 % (ref 12.3–15.4)
GLUCOSE SERPL-MCNC: 95 MG/DL (ref 65–99)
HBV SURFACE AB SER RIA-ACNC: REACTIVE
HBV SURFACE AG SERPL QL IA: NORMAL
HCT VFR BLD AUTO: 26.5 % (ref 34–46.6)
HGB BLD-MCNC: 8.2 G/DL (ref 12–15.9)
MCH RBC QN AUTO: 29.1 PG (ref 26.6–33)
MCHC RBC AUTO-ENTMCNC: 30.9 G/DL (ref 31.5–35.7)
MCV RBC AUTO: 94 FL (ref 79–97)
PHOSPHATE SERPL-MCNC: 5.5 MG/DL (ref 2.5–4.5)
PLATELET # BLD AUTO: 340 10*3/MM3 (ref 140–450)
PMV BLD AUTO: 10.1 FL (ref 6–12)
POTASSIUM SERPL-SCNC: 4.9 MMOL/L (ref 3.5–5.2)
RBC # BLD AUTO: 2.82 10*6/MM3 (ref 3.77–5.28)
SODIUM SERPL-SCNC: 139 MMOL/L (ref 136–145)
WBC NRBC COR # BLD AUTO: 8.95 10*3/MM3 (ref 3.4–10.8)

## 2024-08-29 PROCEDURE — 25010000002 OCTREOTIDE PER 25 MCG: Performed by: INTERNAL MEDICINE

## 2024-08-29 PROCEDURE — 87340 HEPATITIS B SURFACE AG IA: CPT | Performed by: INTERNAL MEDICINE

## 2024-08-29 PROCEDURE — 80069 RENAL FUNCTION PANEL: CPT | Performed by: INTERNAL MEDICINE

## 2024-08-29 PROCEDURE — 86706 HEP B SURFACE ANTIBODY: CPT | Performed by: INTERNAL MEDICINE

## 2024-08-29 PROCEDURE — 85027 COMPLETE CBC AUTOMATED: CPT | Performed by: INTERNAL MEDICINE

## 2024-08-29 RX ORDER — ACETAMINOPHEN 325 MG/1
650 TABLET ORAL EVERY 4 HOURS PRN
Start: 2024-08-29

## 2024-08-29 RX ORDER — OCTREOTIDE ACETATE 100 UG/ML
200 INJECTION, SOLUTION INTRAVENOUS; SUBCUTANEOUS EVERY 8 HOURS SCHEDULED
Qty: 60 ML | Refills: 1 | Status: SHIPPED | OUTPATIENT
Start: 2024-08-29 | End: 2024-08-29 | Stop reason: HOSPADM

## 2024-08-29 RX ORDER — MIDODRINE HYDROCHLORIDE 5 MG/1
5 TABLET ORAL
Qty: 90 TABLET | Refills: 1 | Status: SHIPPED | OUTPATIENT
Start: 2024-08-29 | End: 2024-09-02

## 2024-08-29 RX ORDER — MIDODRINE HYDROCHLORIDE 5 MG/1
5 TABLET ORAL
Qty: 90 TABLET | Refills: 1 | Status: SHIPPED | OUTPATIENT
Start: 2024-08-29 | End: 2024-08-29

## 2024-08-29 RX ADMIN — SUCRALFATE 1 G: 1 TABLET ORAL at 08:31

## 2024-08-29 RX ADMIN — PANTOPRAZOLE SODIUM 40 MG: 40 INJECTION, POWDER, FOR SOLUTION INTRAVENOUS at 08:30

## 2024-08-29 RX ADMIN — SODIUM BICARBONATE 1300 MG: 650 TABLET ORAL at 08:31

## 2024-08-29 RX ADMIN — OCTREOTIDE ACETATE 200 MCG: 500 INJECTION, SOLUTION INTRAVENOUS; SUBCUTANEOUS at 08:32

## 2024-08-29 RX ADMIN — Medication 10 ML: at 08:32

## 2024-08-29 NOTE — PROGRESS NOTES
[facility] Progress Note      Patient Name: Sherry Lobato  : 1950  MRN: [id number]  Primary Care Physician:  Jin Malcolm PA-C  Date of admission: 2024    Subjective     Interval History:     Status post hemodialysis yesterday, patient tolerated well  Denies any chest pain, shortness of breath, nausea or vomiting.  Overall patient feeling better    Review of Systems:   As noted above    Objective     Vitals:   Temp:  [97.8 °F (36.6 °C)-98.1 °F (36.7 °C)] 98 °F (36.7 °C)  Heart Rate:  [61-71] 64  Resp:  [14-19] 16  BP: (124-149)/(58-87) 127/63    Intake/Output Summary (Last 24 hours) at 2024 1051  Last data filed at 2024  Gross per 24 hour   Intake 100 ml   Output 2400 ml   Net -2300 ml       Physical Exam:    General Appearance: Awake, NAD  HEENT: oral mucosa normal, nonicteric sclera  Neck: supple, no JVD  Lungs: CTA  Heart: RRR, normal S1 and S2  Abdomen: soft, nondistended  Extremities: no edema  Neuro: Awake alert and moving all extremities    Scheduled Meds:     calcium acetate, 667 mg, Oral, TID  midodrine, 5 mg, Oral, TID AC  octreotide, 200 mcg, Subcutaneous, TID  pantoprazole, 40 mg, Intravenous, Q12H  sevelamer, 2,400 mg, Oral, TID With Meals  sodium bicarbonate, 1,300 mg, Oral, TID  sodium chloride, 10 mL, Intravenous, Q12H  sucralfate, 1 g, Oral, 4x Daily      IV Meds:        Results Reviewed:   I have personally reviewed the results from the time of this admission to 2024 10:51 EDT     Results from last 7 days   Lab Units 24  0553 24  0452 24  0758 24  1210   SODIUM mmol/L 139 138 137 138   POTASSIUM mmol/L 4.9 5.7* 5.1 4.7   CHLORIDE mmol/L 100 100 100 101   CO2 mmol/L 26.1 22.2 20.2* 19.1*   BUN mg/dL 43* 81* 106* 96*   CREATININE mg/dL 4.95* 6.38* 7.08* 5.61*   CALCIUM mg/dL 8.5* 8.8 8.7 8.7   BILIRUBIN mg/dL  --   --   --  <0.2   ALK PHOS U/L  --   --   --  98   ALT (SGPT) U/L  --   --   --  14   AST  (SGOT) U/L  --   --   --  22   GLUCOSE mg/dL 95 98 101* 171*     Estimated Creatinine Clearance: 8.4 mL/min (A) (by C-G formula based on SCr of 4.95 mg/dL (H)).  Results from last 7 days   Lab Units 08/29/24  0553 08/28/24  0452 08/27/24  0758 08/26/24  1210   MAGNESIUM mg/dL  --   --   --  1.7   PHOSPHORUS mg/dL 5.5* 6.0* 6.2* 4.3         Results from last 7 days   Lab Units 08/29/24  0553 08/28/24  0452 08/27/24  0758 08/26/24  1210   WBC 10*3/mm3 8.95 9.47 8.34 11.39*   HEMOGLOBIN g/dL 8.2* 7.8* 7.6* 6.1*   PLATELETS 10*3/mm3 340 296 303 318           Assessment / Plan     ASSESSMENT:    End-stage renal disease. MWF.  S/p hemodialysis yesterday.  Electrolytes, volume status okay  Anemia.  Due to GI bleed on top of anemia and chronic kidney disease.  Hemoglobin 8.2 this morning.  Status post PRBC transfusion  Hypotension.  Blood pressure better.  On oral midodrine  GI bleed.  Status post EGD which showed gastric angiectasis and gastric polyp    PLAN:    Hemodialysis MWF  Continue oral midodrine.  Off antihypertensives  Epogen with dialysis  Patient being discharged home today.  Advised to monitor blood pressure at home and report back if blood pressure starts running high.  Midodrine can be stopped and oral antihypertensives can be resumed slowly    Faustino Buckley MD  08/29/24  10:51 EDT    [facility]\""  [phone]

## 2024-08-29 NOTE — PLAN OF CARE
Problem: Adult Inpatient Plan of Care  Goal: Plan of Care Review  Outcome: Met  Flowsheets (Taken 8/29/2024 1058)  Plan of Care Reviewed With: patient  Outcome Evaluation: patient discharging home via personal vehicle with spouse.  Goal: Patient-Specific Goal (Individualized)  Outcome: Met  Goal: Absence of Hospital-Acquired Illness or Injury  Outcome: Met  Intervention: Identify and Manage Fall Risk  Description: Perform standard risk assessment on admission using a validated tool or comprehensive approach appropriate to the patient; reassess fall risk frequently, with change in status or transfer to another level of care.  Communicate fall injury risk to interprofessional healthcare team.  Determine need for increased observation, equipment and environmental modification, such as low bed, signage and supportive, nonskid footwear.  Adjust safety measures to individual developmental age, stage and identified risk factors.  Reinforce the importance of safety and physical activity with patient and family.  Perform regular intentional rounding to assess need for position change, pain assessment and personal needs, including assistance with toileting.  Recent Flowsheet Documentation  Taken 8/29/2024 1000 by Faina Castellanos RN  Safety Promotion/Fall Prevention: safety round/check completed  Taken 8/29/2024 0800 by Faina Castellanos RN  Safety Promotion/Fall Prevention:   safety round/check completed   assistive device/personal items within reach   clutter free environment maintained   fall prevention program maintained  Intervention: Prevent Skin Injury  Description: Perform a screening for skin injury risk, such as pressure or moisture associated skin damage on admission and at regular intervals throughout hospital stay.  Keep all areas of skin (especially folds) clean and dry.  Maintain adequate skin hydration.  Relieve and redistribute pressure and protect bony prominences; implement measures based on patient-specific  risk factors.  Match turning and repositioning schedule to clinical condition.  Encourage weight shift frequently; assist with reposition if unable to complete independently.  Float heels off bed; avoid pressure on the Achilles tendon.  Keep skin free from extended contact with medical devices.  Encourage functional activity and mobility, as early as tolerated.  Use aids (e.g., slide boards, mechanical lift) during transfer.  Recent Flowsheet Documentation  Taken 8/29/2024 1000 by Faina Castellanos RN  Body Position: position changed independently  Taken 8/29/2024 0800 by Faina Castellanos RN  Body Position: position changed independently  Skin Protection:   adhesive use limited   tubing/devices free from skin contact  Intervention: Prevent and Manage VTE (Venous Thromboembolism) Risk  Description: Assess for VTE (venous thromboembolism) risk.  Encourage and assist with early ambulation.  Initiate and maintain compression or other therapy, as indicated, based on identified risk in accordance with organizational protocol and provider order.  Encourage both active and passive leg exercises while in bed, if unable to ambulate.  Recent Flowsheet Documentation  Taken 8/29/2024 0800 by Faina Castellanos RN  Activity Management:   up ad arnoldo   up in chair  Goal: Optimal Comfort and Wellbeing  Outcome: Met  Intervention: Provide Person-Centered Care  Description: Use a family-focused approach to care.  Develop trust and rapport by proactively providing information, encouraging questions, addressing concerns and offering reassurance.  Acknowledge emotional response to hospitalization.  Recognize and utilize personal coping strategies.  Honor spiritual and cultural preferences.  Recent Flowsheet Documentation  Taken 8/29/2024 0800 by Faina Castellanos RN  Trust Relationship/Rapport:   care explained   choices provided   reassurance provided   thoughts/feelings acknowledged  Goal: Readiness for Transition of Care  Outcome: Met   Goal  Outcome Evaluation:  Plan of Care Reviewed With: patient           Outcome Evaluation: patient discharging home via personal vehicle with spouse.

## 2024-08-29 NOTE — DISCHARGE SUMMARY
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]   Secondary hyperparathyroidism of renal origin [N25.81]   Anemia due to GI blood loss [D50.0]   ESRD (end stage renal disease) [N18.6]   CKD (chronic kidney disease) stage V requiring chronic dialysis [N18.6, Z99.2]   B12 deficiency [E53.8]   Gastroesophageal reflux disease [K21.9]   Hyperlipidemia [E78.5]   Hypertension [I10]   Sleep apnea, obstructive [G47.33]   Type 2 diabetes mellitus, without long-term current use of insulin [E11.9]       Presenting Problem/History of Present Illness  Active Hospital Problems    Diagnosis  POA    **Acute upper GI bleeding [K92.2]  Yes    Non-small cell lung cancer [C34.90]  Yes    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          Hospital Course  Patient is a 74 y.o. female dialysis patient with multiple medical problems including previous anemia due to GI bleeding presented with shortness of breath and generalized weakness.  She was noted to be hypotensive at dialysis and was referred to ED where hg was 6.  She reported black stools x 1 week.  She was transfused and underwent EGD with laser coagulation of gastric angioectasia and polyp.  GI recommended octreotide - monthly injections as outpatient.  She did well following EGD and hemoglobin is trending up.  She  feels well and will be discharged.  BP is low-normal on midodrine.  She will resume carvedilol and d/c amlodipine and losartan.  She will monitor blood pressure twice a day.  I anticipate as she improves she may need to resume additional medications or d/c midodrine.  She will resume her regular dialysis schedule.    Procedures Performed    Procedure(s):  ESOPHAGOGASTRODUODENOSCOPY with argon plasma coagulation  -------------------  08/27 1442 Upper GI Endoscopy    Consults:   Consults       Date and Time Order Name Status Description    8/26/2024  4:16 PM Inpatient Nephrology Consult Completed     8/26/2024  3:58 PM Inpatient Gastroenterology Consult Completed             Pertinent Test Results:    Lab Results (most recent)       Procedure Component Value Units Date/Time    Hepatitis B Surface Antigen [[id number]]  (Normal) Collected: 08/29/24 0553    Specimen: Blood Updated: 08/29/24 0706     Hepatitis B Surface Ag Non-Reactive    Renal Function Panel [[id number]]  (Abnormal) Collected: 08/29/24 0553    Specimen: Blood Updated: 08/29/24 0651     Glucose 95 mg/dL      BUN 43 mg/dL      Creatinine 4.95 mg/dL      Sodium 139 mmol/L      Potassium 4.9 mmol/L      Chloride 100 mmol/L      CO2 26.1 mmol/L      Calcium 8.5 mg/dL      Albumin 3.8 g/dL      Phosphorus 5.5 mg/dL      Anion Gap 12.9 mmol/L      BUN/Creatinine Ratio 8.7     eGFR 8.6 mL/min/1.73      Comment: <15 Indicative of kidney failure       Narrative:      GFR Normal >60  Chronic Kidney Disease <60  Kidney Failure <15    The GFR formula is only valid for adults with stable renal function between ages 18 and 70.    CBC (No Diff) [[id number]]  (Abnormal) Collected: 08/29/24 0553    Specimen: Blood Updated: 08/29/24 0614     WBC 8.95 10*3/mm3      RBC 2.82 10*6/mm3      Hemoglobin 8.2 g/dL      Hematocrit 26.5 %      MCV 94.0 fL      MCH 29.1 pg      MCHC 30.9 g/dL      RDW 16.0 %      RDW-SD 55.1 fl      MPV 10.1 fL      Platelets 340 10*3/mm3     Hepatitis  B Surface Antibody [[id number]] Collected: 08/29/24 0553    Specimen: Blood Updated: 08/29/24 0601    Renal Function Panel [[id number]]  (Abnormal) Collected: 08/28/24 0452    Specimen: Blood Updated: 08/28/24 0627     Glucose 98 mg/dL      BUN 81 mg/dL      Creatinine 6.38 mg/dL      Sodium 138 mmol/L      Potassium 5.7 mmol/L      Chloride 100 mmol/L      CO2 22.2 mmol/L      Calcium 8.8 mg/dL      Albumin 3.8 g/dL      Phosphorus 6.0 mg/dL      Anion Gap 15.8 mmol/L      BUN/Creatinine Ratio 12.7     eGFR 6.4 mL/min/1.73      Comment: <15 Indicative of kidney failure       Narrative:      GFR Normal >60  Chronic Kidney Disease <60  Kidney Failure <15    The GFR formula is only valid for adults with stable renal function between ages 18 and 70.    CBC (No Diff) [[id number]]  (Abnormal) Collected: 08/28/24 0452    Specimen: Blood Updated: 08/28/24 0600     WBC 9.47 10*3/mm3      RBC 2.55 10*6/mm3      Hemoglobin 7.8 g/dL      Hematocrit 23.4 %      MCV 91.8 fL      MCH 30.6 pg      MCHC 33.3 g/dL      RDW 16.4 %      RDW-SD 54.0 fl      MPV 10.5 fL      Platelets 296 10*3/mm3     POC Glucose Once [[id number]]  (Normal) Collected: 08/27/24 1316    Specimen: Blood Updated: 08/27/24 1318     Glucose 80 mg/dL      Comment: Serial Number: [id number]:  [id number]       Phosphorus [[id number]]  (Normal) Collected: 08/26/24 1210    Specimen: Blood Updated: 08/26/24 1717     Phosphorus 4.3 mg/dL     [place] Draw [[id number]] Collected: 08/26/24 1210    Specimen: Blood Updated: 08/26/24 1434    Narrative:      The following orders were created for panel order [place] Draw.  Procedure                               Abnormality         Status                     ---------                               -----------         ------                     Green Top (Gel)[[id number]]                                  Final result               Lavender Top[[id number]]                                     Final result               Gold Top  - SST[[id number]]                                   Final result               Light Blue Top[[id number]]                                                                Please view results for these tests on the individual orders.    Ammonia [[id number]]  (Normal) Collected: 08/26/24 1246    Specimen: Blood Updated: 08/26/24 1401     Ammonia 21 umol/L     Hemoglobin A1c [[id number]]  (Abnormal) Collected: 08/26/24 1210    Specimen: Blood Updated: 08/26/24 1333     Hemoglobin A1C 5.86 %     Comprehensive Metabolic Panel [[id number]]  (Abnormal) Collected: 08/26/24 1210    Specimen: Blood Updated: 08/26/24 1259     Glucose 171 mg/dL      BUN 96 mg/dL      Creatinine 5.61 mg/dL      Sodium 138 mmol/L      Potassium 4.7 mmol/L      Comment: Slight hemolysis detected by analyzer. Result may be falsely elevated.        Chloride 101 mmol/L      CO2 19.1 mmol/L      Calcium 8.7 mg/dL      Total Protein 5.8 g/dL      Albumin 3.5 g/dL      ALT (SGPT) 14 U/L      AST (SGOT) 22 U/L      Alkaline Phosphatase 98 U/L      Total Bilirubin <0.2 mg/dL      Globulin 2.3 gm/dL      A/G Ratio 1.5 g/dL      BUN/Creatinine Ratio 17.1     Anion Gap 17.9 mmol/L      eGFR 7.4 mL/min/1.73      Comment: <15 Indicative of kidney failure       Narrative:      GFR Normal >60  Chronic Kidney Disease <60  Kidney Failure <15    The GFR formula is only valid for adults with stable renal function between ages 18 and 70.    Magnesium [[id number]]  (Normal) Collected: 08/26/24 1210    Specimen: Blood Updated: 08/26/24 1259     Magnesium 1.7 mg/dL     Single High Sensitivity Troponin T [[id number]]  (Abnormal) Collected: 08/26/24 1210    Specimen: Blood Updated: 08/26/24 1245     HS Troponin T 42 ng/L     Narrative:      High Sensitive Troponin T Reference Range:  <14.0 ng/L- Negative Female for AMI  <22.0 ng/L- Negative Male for AMI  >=14 - Abnormal Female indicating possible myocardial injury.  >=22 - Abnormal Male indicating possible myocardial injury.    Clinicians would have to utilize clinical acumen, EKG, Troponin, and serial changes to determine if it is an Acute Myocardial Infarction or myocardial injury due to an underlying chronic condition.         CBC & Differential [[id number]]  (Abnormal) Collected: 08/26/24 1210    Specimen: Blood Updated: 08/26/24 1244    Narrative:      The following orders were created for panel order CBC & Differential.  Procedure                               Abnormality         Status                     ---------                               -----------         ------                     CBC Auto Differential[[id number]]        Abnormal            Final result                 Please view results for these tests on the individual orders.    CBC Auto Differential [[id number]]  (Abnormal) Collected: 08/26/24 1210    Specimen: Blood Updated: 08/26/24 1244     WBC 11.39 10*3/mm3      RBC 2.04 10*6/mm3      Hemoglobin 6.1 g/dL      Hematocrit 20.0 %      MCV 98.0 fL      MCH 29.9 pg      MCHC 30.5 g/dL      RDW 16.3 %      RDW-SD 58.5 fl      MPV 10.3 fL      Platelets 318 10*3/mm3      Neutrophil % 79.2 %      Lymphocyte % 9.1 %      Monocyte % 6.0 %      Eosinophil % 3.6 %      Basophil % 0.4 %      Immature Grans % 1.7 %      Neutrophils, Absolute 9.03 10*3/mm3      Lymphocytes, Absolute 1.04 10*3/mm3      Monocytes, Absolute 0.68 10*3/mm3      Eosinophils, Absolute 0.41 10*3/mm3      Basophils, Absolute 0.04 10*3/mm3      Immature Grans, Absolute 0.19 10*3/mm3      nRBC 0.0 /100 WBC     Green Top (Gel) [[id number]] Collected: 08/26/24 1210    Specimen: Blood Updated: 08/26/24 1215     Extra Tube Hold for add-ons.     Comment: Auto resulted.       Lavender Top [[id number]] Collected: 08/26/24 1210    Specimen: Blood Updated: 08/26/24 1215     Extra Tube hold for add-on     Comment: Auto resulted       Gold Top - SST [[id number]] Collected: 08/26/24 1210    Specimen: Blood Updated: 08/26/24 1215     Extra Tube Hold for add-ons.      Comment: Auto resulted.                Results for orders placed during the hospital encounter of 07/25/21    Adult Transthoracic Echo Complete W/ Cont if Necessary Per Protocol    Interpretation Summary  Normal LV size and contractility EF of 60%  Normal RV size  Normal atrial size  Aortic valve, mitral valve, tricuspid valve appears structurally normal, mild mitral regurgitation  seen.  No pericardial effusion seen.  Proximal aorta appears normal in size.              Condition on Discharge:  Improved, stable    Vital Signs  Temp:  [97.8 °F (36.6 °C)-98.1 °F (36.7 °C)] 98 °F (36.7 °C)  Heart Rate:  [61-71] 64  Resp:  [14-19] 16  BP: (124-149)/(58-87) 127/63    Physical Exam:     General Appearance:    Alert, cooperative, in no acute distress   Head:    Normocephalic, without obvious abnormality, atraumatic   Eyes:            Lids and lashes normal, conjunctivae and sclerae normal, no   icterus, no pallor, corneas clear, PERRLA   Ears:    Ears appear intact with no abnormalities noted   Throat:   No oral lesions, no thrush, oral mucosa moist   Neck:   No adenopathy, supple, trachea midline, no thyromegaly, no   carotid bruit, no JVD   Lungs:     Clear to auscultation,respirations regular, even and                  unlabored    Heart:    Regular rhythm and normal rate, normal S1 and S2, no            murmur, no gallop, no rub, no click   Chest Wall:    No abnormalities observed   Abdomen:     Normal bowel sounds, no masses, no organomegaly, soft        non-tender, non-distended, no guarding, no rebound                tenderness   Extremities:   Moves all extremities well, no edema, no cyanosis, no             redness   Pulses:   Pulses palpable and equal bilaterally   Skin:   No bleeding, bruising or rash   Lymph nodes:   No palpable adenopathy   Neurologic:   Cranial nerves 2 - 12 grossly intact, sensation intact, DTR       present and equal bilaterally       Discharge Disposition  Home or Self Care    Discharge  Medications     Discharge Medications        New Medications        Instructions Start Date   acetaminophen 325 MG tablet  Commonly known as: TYLENOL   650 mg, Oral, Every 4 Hours PRN      midodrine 5 MG tablet  Commonly known as: PROAMATINE   5 mg, Oral, 3 Times Daily Before Meals      SandoSTATIN LAR DEPOT 30 MG injection  Generic drug: octreotide   Inject 30 mg subcutaneously once a month             Continue These Medications        Instructions Start Date   albuterol sulfate  (90 Base) MCG/ACT inhaler  Commonly known as: PROVENTIL HFA;VENTOLIN HFA;PROAIR HFA   2 puffs, Inhalation, Every 4 Hours PRN      calcium acetate 667 MG capsule capsule  Commonly known as: PHOS BINDER)   667 mg, Oral, 3 Times Daily      calcium acetate 667 MG capsule capsule  Commonly known as: PHOS BINDER)   667 mg, Oral, With Snacks      carvedilol 3.125 MG tablet  Commonly known as: COREG   3.125 mg, Oral, 2 Times Daily With Meals      cholecalciferol 25 MCG (1000 UT) tablet  Commonly known as: VITAMIN D3   1,000 Units, Oral, Daily      hyoscyamine 0.125 MG tablet  Commonly known as: ANASPAZLEVSIN   Take 1 tablet by mouth Every 4 (Four) Hours As Needed.      lidocaine-prilocaine 2.5-2.5 % cream  Commonly known as: EMLA   1 Application, Topical, Daily PRN, Apply to left 5th finger      pantoprazole 40 MG EC tablet  Commonly known as: PROTONIX   1 tablet, Oral, 2 Times Daily      RenaPlex-D tablet tablet  Generic drug: multivitamin with minerals   1 tablet, Oral, Daily      sevelamer 800 MG tablet  Commonly known as: RENVELA   2,400 mg, Oral, 3 Times Daily With Meals      sevelamer 800 MG tablet  Commonly known as: RENAGEL   3 tablets, Oral, With Snacks      simvastatin 40 MG tablet  Commonly known as: ZOCOR   40 mg, Oral, Nightly      sodium bicarbonate 650 MG tablet   1,300 mg, Oral, 3 Times Daily      sucralfate 1 g tablet  Commonly known as: Carafate   1 g, Oral, 4 Times Daily      temazepam 30 MG capsule  Commonly known as:  RESTORIL   30 mg, Oral, Nightly             Stop These Medications      amLODIPine 10 MG tablet  Commonly known as: NORVASC     losartan 50 MG tablet  Commonly known as: COZAAR              Discharge Diet: Regular diet (refuses renal diet)    Activity at Discharge: No restrictions    Follow-up Appointments  Future Appointments   Date Time Provider Department Center   9/3/2024  8:30 AM [facility]   9/3/2024  9:15 AM [facility]   9/3/2024 10:00 AM Anastasia Thurman APRN [facility]   9/5/2024  9:30 AM Alcides Live MD [facility] None   10/1/2024 11:00 AM LAB MD  [facility]   10/1/2024 11:00 AM Anjum Cam MD [facility]   10/15/2024  1:45 PM Mak Sosa MD [facility]     Additional Instructions for the Follow-ups that You Need to Schedule       Discharge Follow-up with PCP   As directed       Currently Documented PCP:    Jin Malcolm PA-C    PCP Phone Number:    [phone]     Follow Up Details: Call office to schedule a prompt hospital follow up appointment.        Discharge Follow-up with Specified Provider: Gastroenterology; 3 Weeks   As directed      To: Gastroenterology   Follow Up: 3 Weeks                Test Results Pending at Discharge  Pending Labs       Order Current Status    Hepatitis B Surface Antibody In process             Angelia Taylor MD  08/29/24  10:46 EDT    Time: Discharge 25 min

## 2024-08-29 NOTE — OUTREACH NOTE
Prep Survey      Flowsheet Row Responses   [facility] facility patient discharged from? [facility]   Is LACE score < 7 ? No   Eligibility Readm Mgmt   Discharge diagnosis Acute upper GI bleeding   Does the patient have one of the following disease processes/diagnoses(primary or secondary)? Other   Does the patient have Home health ordered? No   Is there a DME ordered? No   Prep survey completed? Yes            Cristina MORALES - Registered Nurse

## 2024-08-29 NOTE — SIGNIFICANT NOTE
Patient is discharging home to follow up with GI in 3 weeks. She will be picking her meds up from our pharmacy on her way out. She is awaiting pre-approval from her insurance company for the Sandostatin injections. Belongings sent at discharge. Discharge instructions given, education provided. Spouse at bedside and providing transportation via personal vehicle.

## 2024-08-30 PROBLEM — N18.6 CKD (CHRONIC KIDNEY DISEASE) STAGE V REQUIRING CHRONIC DIALYSIS: Status: RESOLVED | Noted: 2017-08-01 | Resolved: 2024-08-30

## 2024-08-30 PROBLEM — Z99.2 CKD (CHRONIC KIDNEY DISEASE) STAGE V REQUIRING CHRONIC DIALYSIS: Status: RESOLVED | Noted: 2017-08-01 | Resolved: 2024-08-30

## 2024-08-30 PROBLEM — I65.23 BILATERAL CAROTID ARTERY STENOSIS: Status: ACTIVE | Noted: 2018-07-05

## 2024-08-30 NOTE — CASE MANAGEMENT/SOCIAL WORK
Case Management Discharge Note      Final Note: Routine home    Provided Post Acute Provider List?: N/A  Provided Post Acute Provider Quality & Resource List?: N/A    Selected Continued Care - Discharged on 8/29/2024 Admission date: 8/26/2024 - Discharge disposition: Home or Self Care             Transportation Services  Private: Car    Final Discharge Disposition Code: 01 - home or self-care

## 2024-09-02 ENCOUNTER — HOSPITAL ENCOUNTER (INPATIENT)
Facility: HOSPITAL | Age: 74
LOS: 3 days | Discharge: HOME | DRG: 377 | End: 2024-09-05
Attending: FAMILY MEDICINE | Admitting: INTERNAL MEDICINE
Payer: MEDICARE

## 2024-09-02 ENCOUNTER — READMISSION MANAGEMENT (OUTPATIENT)
Dept: CALL CENTER | Facility: HOSPITAL | Age: 74
End: 2024-09-02
Payer: MEDICARE

## 2024-09-02 DIAGNOSIS — R42 LIGHTHEADED: ICD-10-CM

## 2024-09-02 DIAGNOSIS — D64.9 ANEMIA, UNSPECIFIED TYPE: Primary | ICD-10-CM

## 2024-09-02 LAB
ABO GROUP BLD: NORMAL
ALBUMIN SERPL-MCNC: 3.5 G/DL (ref 3.5–5.2)
ALBUMIN/GLOB SERPL: 1.5 G/DL
ALP SERPL-CCNC: 95 U/L (ref 39–117)
ALT SERPL W P-5'-P-CCNC: 8 U/L (ref 1–33)
ANION GAP SERPL CALCULATED.3IONS-SCNC: 11.7 MMOL/L (ref 5–15)
ANION GAP SERPL CALCULATED.3IONS-SCNC: 12.3 MMOL/L (ref 5–15)
AST SERPL-CCNC: 18 U/L (ref 1–32)
BASOPHILS # BLD AUTO: 0.04 10*3/MM3 (ref 0–0.2)
BASOPHILS NFR BLD AUTO: 0.3 % (ref 0–1.5)
BILIRUB SERPL-MCNC: 0.2 MG/DL (ref 0–1.2)
BLD GP AB SCN SERPL QL: NEGATIVE
BUN SERPL-MCNC: 33 MG/DL (ref 8–23)
BUN SERPL-MCNC: 40 MG/DL (ref 8–23)
BUN/CREAT SERPL: 7.6 (ref 7–25)
BUN/CREAT SERPL: 7.7 (ref 7–25)
CALCIUM SPEC-SCNC: 8.4 MG/DL (ref 8.6–10.5)
CALCIUM SPEC-SCNC: 9 MG/DL (ref 8.6–10.5)
CHLORIDE SERPL-SCNC: 101 MMOL/L (ref 98–107)
CHLORIDE SERPL-SCNC: 104 MMOL/L (ref 98–107)
CO2 SERPL-SCNC: 23.7 MMOL/L (ref 22–29)
CO2 SERPL-SCNC: 25.3 MMOL/L (ref 22–29)
CREAT SERPL-MCNC: 4.31 MG/DL (ref 0.57–1)
CREAT SERPL-MCNC: 5.23 MG/DL (ref 0.57–1)
DEPRECATED RDW RBC AUTO: 55.8 FL (ref 37–54)
DEPRECATED RDW RBC AUTO: 58.4 FL (ref 37–54)
EGFRCR SERPLBLD CKD-EPI 2021: 10.2 ML/MIN/1.73
EGFRCR SERPLBLD CKD-EPI 2021: 8.1 ML/MIN/1.73
EOSINOPHIL # BLD AUTO: 0.27 10*3/MM3 (ref 0–0.4)
EOSINOPHIL NFR BLD AUTO: 2.2 % (ref 0.3–6.2)
ERYTHROCYTE [DISTWIDTH] IN BLOOD BY AUTOMATED COUNT: 16 % (ref 12.3–15.4)
ERYTHROCYTE [DISTWIDTH] IN BLOOD BY AUTOMATED COUNT: 17.6 % (ref 12.3–15.4)
GLOBULIN UR ELPH-MCNC: 2.3 GM/DL
GLUCOSE SERPL-MCNC: 105 MG/DL (ref 65–99)
GLUCOSE SERPL-MCNC: 124 MG/DL (ref 65–99)
HCT VFR BLD AUTO: 18.7 % (ref 34–46.6)
HCT VFR BLD AUTO: 22.2 % (ref 34–46.6)
HGB BLD-MCNC: 6 G/DL (ref 12–15.9)
HGB BLD-MCNC: 7.1 G/DL (ref 12–15.9)
HOLD SPECIMEN: NORMAL
HOLD SPECIMEN: NORMAL
IMM GRANULOCYTES # BLD AUTO: 0.09 10*3/MM3 (ref 0–0.05)
IMM GRANULOCYTES NFR BLD AUTO: 0.7 % (ref 0–0.5)
LYMPHOCYTES # BLD AUTO: 0.96 10*3/MM3 (ref 0.7–3.1)
LYMPHOCYTES NFR BLD AUTO: 7.7 % (ref 19.6–45.3)
MCH RBC QN AUTO: 29.7 PG (ref 26.6–33)
MCH RBC QN AUTO: 30.8 PG (ref 26.6–33)
MCHC RBC AUTO-ENTMCNC: 32 G/DL (ref 31.5–35.7)
MCHC RBC AUTO-ENTMCNC: 32.1 G/DL (ref 31.5–35.7)
MCV RBC AUTO: 92.9 FL (ref 79–97)
MCV RBC AUTO: 95.9 FL (ref 79–97)
MONOCYTES # BLD AUTO: 0.9 10*3/MM3 (ref 0.1–0.9)
MONOCYTES NFR BLD AUTO: 7.2 % (ref 5–12)
NEUTROPHILS NFR BLD AUTO: 10.24 10*3/MM3 (ref 1.7–7)
NEUTROPHILS NFR BLD AUTO: 81.9 % (ref 42.7–76)
NRBC BLD AUTO-RTO: 0 /100 WBC (ref 0–0.2)
PLATELET # BLD AUTO: 340 10*3/MM3 (ref 140–450)
PLATELET # BLD AUTO: 355 10*3/MM3 (ref 140–450)
PMV BLD AUTO: 10 FL (ref 6–12)
PMV BLD AUTO: 9.9 FL (ref 6–12)
POTASSIUM SERPL-SCNC: 3.8 MMOL/L (ref 3.5–5.2)
POTASSIUM SERPL-SCNC: 4.1 MMOL/L (ref 3.5–5.2)
PROT SERPL-MCNC: 5.8 G/DL (ref 6–8.5)
RBC # BLD AUTO: 1.95 10*6/MM3 (ref 3.77–5.28)
RBC # BLD AUTO: 2.39 10*6/MM3 (ref 3.77–5.28)
RETICS # AUTO: 0.07 10*6/MM3 (ref 0.02–0.13)
RETICS/RBC NFR AUTO: 3.04 % (ref 0.7–1.9)
RH BLD: NEGATIVE
SODIUM SERPL-SCNC: 138 MMOL/L (ref 136–145)
SODIUM SERPL-SCNC: 140 MMOL/L (ref 136–145)
T&S EXPIRATION DATE: NORMAL
WBC NRBC COR # BLD AUTO: 11.51 10*3/MM3 (ref 3.4–10.8)
WBC NRBC COR # BLD AUTO: 12.5 10*3/MM3 (ref 3.4–10.8)
WHOLE BLOOD HOLD COAG: NORMAL
WHOLE BLOOD HOLD SPECIMEN: NORMAL

## 2024-09-02 PROCEDURE — 86900 BLOOD TYPING SEROLOGIC ABO: CPT | Performed by: PHYSICIAN ASSISTANT

## 2024-09-02 PROCEDURE — 86901 BLOOD TYPING SEROLOGIC RH(D): CPT | Performed by: PHYSICIAN ASSISTANT

## 2024-09-02 PROCEDURE — 80053 COMPREHEN METABOLIC PANEL: CPT | Performed by: PHYSICIAN ASSISTANT

## 2024-09-02 PROCEDURE — P9016 RBC LEUKOCYTES REDUCED: HCPCS

## 2024-09-02 PROCEDURE — 86923 COMPATIBILITY TEST ELECTRIC: CPT

## 2024-09-02 PROCEDURE — 86900 BLOOD TYPING SEROLOGIC ABO: CPT

## 2024-09-02 PROCEDURE — 99285 EMERGENCY DEPT VISIT HI MDM: CPT

## 2024-09-02 PROCEDURE — 36415 COLL VENOUS BLD VENIPUNCTURE: CPT

## 2024-09-02 PROCEDURE — 85025 COMPLETE CBC W/AUTO DIFF WBC: CPT | Performed by: PHYSICIAN ASSISTANT

## 2024-09-02 PROCEDURE — 85045 AUTOMATED RETICULOCYTE COUNT: CPT | Performed by: FAMILY MEDICINE

## 2024-09-02 PROCEDURE — 85027 COMPLETE CBC AUTOMATED: CPT | Performed by: FAMILY MEDICINE

## 2024-09-02 PROCEDURE — 86850 RBC ANTIBODY SCREEN: CPT | Performed by: PHYSICIAN ASSISTANT

## 2024-09-02 PROCEDURE — 36430 TRANSFUSION BLD/BLD COMPNT: CPT

## 2024-09-02 RX ORDER — CARVEDILOL 3.12 MG/1
3.12 TABLET ORAL 2 TIMES DAILY WITH MEALS
Status: DISCONTINUED | OUTPATIENT
Start: 2024-09-02 | End: 2024-09-05 | Stop reason: HOSPADM

## 2024-09-02 RX ORDER — MIDODRINE HYDROCHLORIDE 5 MG/1
5 TABLET ORAL 3 TIMES DAILY PRN
COMMUNITY

## 2024-09-02 RX ORDER — PANTOPRAZOLE SODIUM 40 MG/10ML
40 INJECTION, POWDER, LYOPHILIZED, FOR SOLUTION INTRAVENOUS ONCE
Status: COMPLETED | OUTPATIENT
Start: 2024-09-02 | End: 2024-09-02

## 2024-09-02 RX ORDER — LOSARTAN POTASSIUM 50 MG/1
50 TABLET ORAL DAILY
COMMUNITY

## 2024-09-02 RX ORDER — PANTOPRAZOLE SODIUM 40 MG/1
40 TABLET, DELAYED RELEASE ORAL
Status: DISCONTINUED | OUTPATIENT
Start: 2024-09-03 | End: 2024-09-03

## 2024-09-02 RX ORDER — SUCRALFATE 1 G/1
1 TABLET ORAL
Status: DISCONTINUED | OUTPATIENT
Start: 2024-09-02 | End: 2024-09-05 | Stop reason: HOSPADM

## 2024-09-02 RX ORDER — AMLODIPINE BESYLATE 5 MG/1
10 TABLET ORAL DAILY
Status: DISCONTINUED | OUTPATIENT
Start: 2024-09-03 | End: 2024-09-03

## 2024-09-02 RX ORDER — CALCIUM ACETATE 667 MG/1
667 CAPSULE ORAL 3 TIMES DAILY
Status: DISCONTINUED | OUTPATIENT
Start: 2024-09-02 | End: 2024-09-03

## 2024-09-02 RX ORDER — TEMAZEPAM 15 MG/1
30 CAPSULE ORAL NIGHTLY
Status: DISCONTINUED | OUTPATIENT
Start: 2024-09-02 | End: 2024-09-05 | Stop reason: HOSPADM

## 2024-09-02 RX ORDER — ATORVASTATIN CALCIUM 20 MG/1
20 TABLET, FILM COATED ORAL DAILY
Status: DISCONTINUED | OUTPATIENT
Start: 2024-09-03 | End: 2024-09-03

## 2024-09-02 RX ORDER — SODIUM CHLORIDE 0.9 % (FLUSH) 0.9 %
10 SYRINGE (ML) INJECTION AS NEEDED
Status: DISCONTINUED | OUTPATIENT
Start: 2024-09-02 | End: 2024-09-05 | Stop reason: HOSPADM

## 2024-09-02 RX ORDER — SODIUM BICARBONATE 650 MG/1
1300 TABLET ORAL 3 TIMES DAILY
Status: DISCONTINUED | OUTPATIENT
Start: 2024-09-02 | End: 2024-09-03

## 2024-09-02 RX ORDER — METOPROLOL TARTRATE 25 MG/1
25 TABLET, FILM COATED ORAL DAILY
COMMUNITY
End: 2024-09-05 | Stop reason: HOSPADM

## 2024-09-02 RX ORDER — ALBUTEROL SULFATE 0.83 MG/ML
2.5 SOLUTION RESPIRATORY (INHALATION) EVERY 6 HOURS PRN
Status: DISCONTINUED | OUTPATIENT
Start: 2024-09-02 | End: 2024-09-05 | Stop reason: HOSPADM

## 2024-09-02 RX ORDER — AMLODIPINE BESYLATE 10 MG/1
10 TABLET ORAL DAILY
COMMUNITY

## 2024-09-02 RX ORDER — ACETAMINOPHEN 325 MG/1
650 TABLET ORAL EVERY 4 HOURS PRN
Status: DISCONTINUED | OUTPATIENT
Start: 2024-09-02 | End: 2024-09-05 | Stop reason: HOSPADM

## 2024-09-02 RX ORDER — CHOLECALCIFEROL (VITAMIN D3) 25 MCG
1000 TABLET ORAL DAILY
Status: DISCONTINUED | OUTPATIENT
Start: 2024-09-03 | End: 2024-09-05 | Stop reason: HOSPADM

## 2024-09-02 RX ADMIN — PANTOPRAZOLE SODIUM 40 MG: 40 INJECTION, POWDER, FOR SOLUTION INTRAVENOUS at 20:05

## 2024-09-02 RX ADMIN — TEMAZEPAM 30 MG: 15 CAPSULE ORAL at 20:05

## 2024-09-02 NOTE — ED PROVIDER NOTES
Subjective   History of Present Illness  Chief Complaint: Abnormal lab, fatigue    Patient is a 74-year-old female with history of ESRD on dialysis diabetes hypertension history of GI bleed presents to the ER with complaints of fatigue and lightheadedness today after dialysis.  Patient states she was admitted 1 week ago for generalized weakness, anemia found to have bleeding gastric ulcers that were cauterized during EGD procedure during hospital admission.  She states she was discharged 3 days ago.  Patient states she received dialysis today, states her blood pressure was lower during dialysis but improved after she was laid flat.  Patient states that dialysis checked her hemoglobin and it was 7.6 and she was sent to the ER for further evaluation.  She denies any complaints of pain, no chest pain abdominal pain or headache.  No shortness of breath.  She feels lightheaded and fatigued.  Some nausea.  No vomiting.  No melena or hematochezia or hematemesis.  No fever or chills.    PCP: Willian Malcolm    History provided by:  Patient      Review of Systems   Constitutional:  Positive for fatigue. Negative for fever.   HENT:  Negative for sore throat and trouble swallowing.    Eyes: Negative.    Respiratory:  Negative for shortness of breath and wheezing.    Cardiovascular:  Negative for chest pain.   Gastrointestinal:  Positive for nausea. Negative for abdominal pain, diarrhea and vomiting.   Endocrine: Negative.    Genitourinary:  Negative for dysuria.   Musculoskeletal:  Negative for myalgias.   Skin:  Negative for rash.   Allergic/Immunologic: Negative.    Neurological:  Positive for weakness (Generalzized) and light-headedness. Negative for headaches.   Psychiatric/Behavioral:  Negative for behavioral problems.    All other systems reviewed and are negative.      Past Medical History:   Diagnosis Date    Lucas's esophagus     C. difficile colitis     Carotid artery disease     -50-74% left, 16-49% right (1/19)     "COPD (chronic obstructive pulmonary disease)     DM2 (diabetes mellitus, type 2)     ESRD (end stage renal disease)     on HD    Gastric ulcer     at anastomatic site    Gastroparesis     unknown    GERD (gastroesophageal reflux disease)     Gout     not current    Hemodialysis patient     mwf    Hernia, incisional     abdomen    History of colonoscopy     UTD    History of degenerative disc disease     Hyperlipidemia     Hypertension     Medicare annual wellness visit, subsequent 11/07/2020    Microscopic colitis     Nephrotic syndrome     Neuroendocrine tumor     of stomach    FABY (obstructive sleep apnea)     not treating    Pedal edema     ressolved    Proteinuria     Renal cell cancer, right     Rib pain on right side     chronic    Rotator cuff tear     bilateral    Stomach cancer 2016    Stroke     Uterine cancer     Vitamin B 12 deficiency     Vitamin D deficiency        Allergies   Allergen Reactions    Atorvastatin Calcium Other (See Comments)     \"felt like I was on fire\"    Gabapentin Dizziness     says was unable to walk    Niacin Other (See Comments)     \"FEELS LIKE SHE IS ON FIRE\"         Past Surgical History:   Procedure Laterality Date    APPENDECTOMY      ARTERIOVENOUS FISTULA Right     ARTERIOVENOUS FISTULA REPAIR      Clotted off and insert graft    ARTERIOVENOUS FISTULA/SHUNT SURGERY Left 12/30/2020    Procedure: ARTERIOVENOUS FISTULA FORMATION;  Surgeon: Jan Caicedo MD;  Location: [facility] MAIN OR;  Service: Vascular;  Laterality: Left;    ARTERIOVENOUS FISTULA/SHUNT SURGERY Left 4/23/2024    Procedure: FISTULOGRAM WITH SUBCLAVIAN ARTERY ANGIOPLASTY, fistula banding;  Surgeon: Franki Delgadillo II, MD;  Location: [facility] HYBRID OR;  Service: Vascular;  Laterality: Left;    BREAST BIOPSY      right nipple 1981    BRONCHOSCOPY N/A 6/11/2024    Procedure: BRONCHOSCOPY WITH RIGHT LUNG WASHING;  Surgeon: Aramis Barragan MD;  Location: [facility] ENDOSCOPY;  Service: Pulmonary;  Laterality: N/A;  RML " atelectasis    COLONOSCOPY N/A 11/24/2020    Procedure: COLONOSCOPY with polypectomy x 7;  Surgeon: Jeffery White MD;  Location: [facility] ENDOSCOPY;  Service: Gastroenterology;  Laterality: N/A;  post op: polyps, diverticulosis, hemorrhoids    COLONOSCOPY N/A 6/1/2023    Procedure: COLONOSCOPY with polypectomy x 3 biopsy x 1;  Surgeon: Jeffery White MD;  Location: [facility] ENDOSCOPY;  Service: Gastroenterology;  Laterality: N/A;  diverticulosis polyps    ENDOSCOPY N/A 03/12/2022    Procedure: ESOPHAGOGASTRODUODENOSCOPY with biopsy x 1 area;  Surgeon: Javan Augustin MD;  Location: [facility] ENDOSCOPY;  Service: Gastroenterology;  Laterality: N/A;  post op: anastamosis    ENDOSCOPY N/A 10/9/2023    Procedure: ESOPHAGOGASTRODUODENOSCOPY with biopsy x3 areas;  Surgeon: Ace Campblel MD;  Location: [facility] ENDOSCOPY;  Service: Gastroenterology;  Laterality: N/A;  duodenal ulcers;irregular z line    ENDOSCOPY N/A 8/27/2024    Procedure: ESOPHAGOGASTRODUODENOSCOPY with argon plasma coagulation;  Surgeon: HILARIO Blackmon MD;  Location: [facility] ENDOSCOPY;  Service: Gastroenterology;  Laterality: N/A;  post op: AVM    GASTRIC RESECTION      cancer    HYSTERECTOMY      LAPAROSCOPIC CHOLECYSTECTOMY      NEPHRECTOMY      right kidney removed     REDUCTION MAMMAPLASTY      TUMOR REMOVAL      boils    VENTRAL/INCISIONAL HERNIA REPAIR Right 08/28/2019    Procedure: VENTRAL/INCISIONAL HERNIA REPAIR;  Surgeon: Nilay Stevens MD;  Location: [facility] MAIN OR;  Service: General    VENTRAL/INCISIONAL HERNIA REPAIR N/A 10/01/2019    Procedure: OPEN VENTRAL/INCISIONAL HERNIA REPAIR;  Surgeon: Nilay Stevens MD;  Location: [facility] MAIN OR;  Service: General    VENTRAL/INCISIONAL HERNIA REPAIR N/A 10/11/2021    Procedure: VENTRAL/INCISIONAL HERNIA REPAIR LAPAROSCOPIC;  Surgeon: Nilay Stevens MD;  Location: [facility] MAIN OR;  Service: General;  Laterality: N/A;    VENTRAL/INCISIONAL HERNIA REPAIR N/A 01/23/2023    Procedure:  VENTRAL/INCISIONAL HERNIA REPAIR with MESH;  Surgeon: Nilay Stevens MD;  Location: [facility] MAIN OR;  Service: General;  Laterality: N/A;       Family History   Problem Relation Age of Onset    Heart disease Father        Social History     Socioeconomic History    Marital status: Single   Tobacco Use    Smoking status: Former     Current packs/day: 0.00     Average packs/day: 0.3 packs/day for 57.0 years (14.3 ttl pk-yrs)     Types: Cigarettes     Start date: 5/1/1964     Quit date: 5/1/2021     Years since quitting: 3.3     Passive exposure: Past    Smokeless tobacco: Never    Tobacco comments:     quit smoking 2 months ago   Vaping Use    Vaping status: Never Used   Substance and Sexual Activity    Alcohol use: No    Drug use: No    Sexual activity: Defer           Objective   Physical Exam  Vitals and nursing note reviewed.   Constitutional:       Appearance: Normal appearance. She is well-developed. She is obese. She is not ill-appearing or toxic-appearing.   HENT:      Head: Normocephalic and atraumatic.   Eyes:      Pupils: Pupils are equal, round, and reactive to light.   Cardiovascular:      Rate and Rhythm: Normal rate and regular rhythm.      Pulses: Normal pulses.      Heart sounds: Murmur heard.   Pulmonary:      Effort: Pulmonary effort is normal. No respiratory distress.      Breath sounds: Normal breath sounds. No wheezing.   Abdominal:      General: Bowel sounds are normal. There is no distension.      Palpations: Abdomen is soft.      Tenderness: There is no abdominal tenderness. There is no right CVA tenderness or left CVA tenderness.   Skin:     General: Skin is warm and dry.      Capillary Refill: Capillary refill takes less than 2 seconds.      Coloration: Skin is pale.      Findings: No rash.   Neurological:      General: No focal deficit present.      Mental Status: She is alert and oriented to person, place, and time.   Psychiatric:         Mood and Affect: Mood normal.         Behavior:  "Behavior normal.         Procedures           ED Course  ED Course as of 09/02/24 1616   Mon Sep 02, 2024   1508 Creatinine(!): 4.31  4 days ago, 4.95 []   1549 Hemoglobin(!!): 6.0  1 unit packed red blood cells ordered []   1615 Spoke with Dr. Jackson regarding admission []      ED Course User Index  [] Lisa Malcolm, PA    /56   Pulse 82   Temp 98.2 °F (36.8 °C)   Resp 18   Ht 152.4 cm (60\")   Wt 68 kg (149 lb 14.6 oz)   SpO2 95%   BMI 29.28 kg/m²   Labs Reviewed   COMPREHENSIVE METABOLIC PANEL - Abnormal; Notable for the following components:       Result Value    Glucose 124 (*)     BUN 33 (*)     Creatinine 4.31 (*)     Total Protein 5.8 (*)     eGFR 10.2 (*)     All other components within normal limits    Narrative:     GFR Normal >60  Chronic Kidney Disease <60  Kidney Failure <15    The GFR formula is only valid for adults with stable renal function between ages 18 and 70.   CBC WITH AUTO DIFFERENTIAL - Abnormal; Notable for the following components:    WBC 12.50 (*)     RBC 1.95 (*)     Hemoglobin 6.0 (*)     Hematocrit 18.7 (*)     RDW 16.0 (*)     RDW-SD 55.8 (*)     Neutrophil % 81.9 (*)     Lymphocyte % 7.7 (*)     Immature Grans % 0.7 (*)     Neutrophils, Absolute 10.24 (*)     Immature Grans, Absolute 0.09 (*)     All other components within normal limits   RAINBOW DRAW    Narrative:     The following orders were created for panel order [place] Draw.  Procedure                               Abnormality         Status                     ---------                               -----------         ------                     Green Top (Gel)[[id number]]                                  Final result               Lavender Top[[id number]]                                     Final result               Gold Top - SST[[id number]]                                   Final result               Light Blue Top[[id number]]                                   Final result                 Please view " results for these tests on the individual orders.   TYPE AND SCREEN   PREPARE RBC   GREEN TOP   LAVENDER TOP   GOLD TOP - SST   LIGHT BLUE TOP   CBC AND DIFFERENTIAL    Narrative:     The following orders were created for panel order CBC & Differential.  Procedure                               Abnormality         Status                     ---------                               -----------         ------                     CBC Auto Differential[[id number]]        Abnormal            Final result                 Please view results for these tests on the individual orders.     Medications   sodium chloride 0.9 % flush 10 mL (has no administration in time range)   pantoprazole (PROTONIX) injection 40 mg (has no administration in time range)     No radiology results for the last day                                           Medical Decision Making  Differential Dx (Includes but not limited to): ANemia, GI bleed    Chart Review: Patient had EGD 8/27/2024 with Dr. Blackmon, showing 1 small angiectasia in the stomach body actively bleeding that was successfully ablated, small gastric polyp in the stomach with some contact bleeding.    While in the ED IV was placed and labs were obtained appropriate PPE was worn during exam and throughout all encounters with the patient.  Patient had above evaluation.  She is afebrile nontoxic in appearance and in no acute respiratory distress.  Vital signs are stable.  Blood pressure 112/65.  She denies hematochezia or melena since having her EGD.  Hemoglobin 6.0 hematocrit 18.7.  WBC count 12,000.  CMP appears stable compared to previous creatinine 4.3 BUN 33 glucose 124.  Patient was given 1 unit packed red blood cells.  Patient be admitted for transfusion and further evaluation.  Patient may need colonoscopy to evaluate for potential other sources of bleeding.  Spoke with Dr. Jackson regarding admission    Based on the clinical findings at this time I anticipate that the patient will  require 2 midnight stay.    Problems Addressed:  Anemia, unspecified type: acute illness or injury  Lightheaded: acute illness or injury    Amount and/or Complexity of Data Reviewed  External Data Reviewed: notes.  Labs: ordered. Decision-making details documented in ED Course.    Risk  Prescription drug management.  Decision regarding hospitalization.        Final diagnoses:   Anemia, unspecified type   Lightheaded       ED Disposition  ED Disposition       ED Disposition   Intended Admit    Condition   --    Comment   --               No follow-up provider specified.       Medication List      No changes were made to your prescriptions during this visit.            Lisa Malcolm PA  09/02/24 1922

## 2024-09-03 ENCOUNTER — INPATIENT HOSPITAL (AMBULATORY)
Age: 74
End: 2024-09-03

## 2024-09-03 ENCOUNTER — INPATIENT HOSPITAL (AMBULATORY)
Dept: URBAN - METROPOLITAN AREA HOSPITAL 84 | Facility: HOSPITAL | Age: 74
End: 2024-09-03

## 2024-09-03 DIAGNOSIS — D64.9 ANEMIA, UNSPECIFIED: ICD-10-CM

## 2024-09-03 LAB
HCT VFR BLD AUTO: 26.8 % (ref 34–46.6)
HEMOCCULT STL QL IA: POSITIVE
HGB BLD-MCNC: 8.1 G/DL (ref 12–15.9)

## 2024-09-03 PROCEDURE — 99222 1ST HOSP IP/OBS MODERATE 55: CPT | Mod: FS | Performed by: NURSE PRACTITIONER

## 2024-09-03 PROCEDURE — 82274 ASSAY TEST FOR BLOOD FECAL: CPT | Performed by: INTERNAL MEDICINE

## 2024-09-03 PROCEDURE — 85018 HEMOGLOBIN: CPT | Performed by: INTERNAL MEDICINE

## 2024-09-03 PROCEDURE — 85014 HEMATOCRIT: CPT | Performed by: INTERNAL MEDICINE

## 2024-09-03 RX ORDER — SEVELAMER CARBONATE 800 MG/1
2400 TABLET, FILM COATED ORAL
Status: DISCONTINUED | OUTPATIENT
Start: 2024-09-03 | End: 2024-09-05 | Stop reason: HOSPADM

## 2024-09-03 RX ORDER — BISACODYL 5 MG/1
10 TABLET, DELAYED RELEASE ORAL ONCE
Status: COMPLETED | OUTPATIENT
Start: 2024-09-04 | End: 2024-09-03

## 2024-09-03 RX ORDER — SODIUM BICARBONATE 650 MG/1
1300 TABLET ORAL DAILY
Status: DISCONTINUED | OUTPATIENT
Start: 2024-09-04 | End: 2024-09-05 | Stop reason: HOSPADM

## 2024-09-03 RX ORDER — PANTOPRAZOLE SODIUM 40 MG/1
40 TABLET, DELAYED RELEASE ORAL
Status: DISCONTINUED | OUTPATIENT
Start: 2024-09-03 | End: 2024-09-05 | Stop reason: HOSPADM

## 2024-09-03 RX ORDER — ROSUVASTATIN CALCIUM 10 MG/1
10 TABLET, COATED ORAL DAILY
Status: DISCONTINUED | OUTPATIENT
Start: 2024-09-03 | End: 2024-09-05 | Stop reason: HOSPADM

## 2024-09-03 RX ORDER — SODIUM, POTASSIUM,MAG SULFATES 17.5-3.13G
1 SOLUTION, RECONSTITUTED, ORAL ORAL EVERY 12 HOURS
Status: COMPLETED | OUTPATIENT
Start: 2024-09-03 | End: 2024-09-04

## 2024-09-03 RX ORDER — CALCIUM ACETATE 667 MG/1
1334 CAPSULE ORAL 3 TIMES DAILY
Status: DISCONTINUED | OUTPATIENT
Start: 2024-09-03 | End: 2024-09-05 | Stop reason: HOSPADM

## 2024-09-03 RX ORDER — ALBUMIN (HUMAN) 12.5 G/50ML
12.5 SOLUTION INTRAVENOUS AS NEEDED
Status: ACTIVE | OUTPATIENT
Start: 2024-09-04 | End: 2024-09-04

## 2024-09-03 RX ADMIN — CALCIUM ACETATE 1334 MG: 667 CAPSULE ORAL at 20:02

## 2024-09-03 RX ADMIN — SEVELAMER CARBONATE 2400 MG: 800 TABLET, FILM COATED ORAL at 08:32

## 2024-09-03 RX ADMIN — Medication 1000 UNITS: at 08:33

## 2024-09-03 RX ADMIN — CALCIUM ACETATE 1334 MG: 667 CAPSULE ORAL at 16:52

## 2024-09-03 RX ADMIN — BISACODYL 10 MG: 5 TABLET, COATED ORAL at 23:47

## 2024-09-03 RX ADMIN — SUCRALFATE 1 G: 1 TABLET ORAL at 12:27

## 2024-09-03 RX ADMIN — PANTOPRAZOLE SODIUM 40 MG: 40 TABLET, DELAYED RELEASE ORAL at 16:52

## 2024-09-03 RX ADMIN — ROSUVASTATIN 10 MG: 10 TABLET, FILM COATED ORAL at 20:04

## 2024-09-03 RX ADMIN — TEMAZEPAM 30 MG: 15 CAPSULE ORAL at 20:02

## 2024-09-03 RX ADMIN — SEVELAMER CARBONATE 2400 MG: 800 TABLET, FILM COATED ORAL at 16:53

## 2024-09-03 RX ADMIN — SUCRALFATE 1 G: 1 TABLET ORAL at 20:02

## 2024-09-03 RX ADMIN — CALCIUM ACETATE 1334 MG: 667 CAPSULE ORAL at 08:32

## 2024-09-03 RX ADMIN — CARVEDILOL 3.12 MG: 3.12 TABLET, FILM COATED ORAL at 08:33

## 2024-09-03 RX ADMIN — SUCRALFATE 1 G: 1 TABLET ORAL at 08:33

## 2024-09-03 RX ADMIN — CARVEDILOL 3.12 MG: 3.12 TABLET, FILM COATED ORAL at 16:54

## 2024-09-03 RX ADMIN — PANTOPRAZOLE SODIUM 40 MG: 40 TABLET, DELAYED RELEASE ORAL at 05:17

## 2024-09-03 RX ADMIN — Medication 1 BOTTLE: at 16:54

## 2024-09-03 RX ADMIN — SUCRALFATE 1 G: 1 TABLET ORAL at 16:52

## 2024-09-03 NOTE — CONSULTS
[facility] Consult Note      Patient Name: Sherry Lobato  : 1950  MRN: [id number]  Primary Care Physician:  Jin Malcolm PA-C  Referring Physician: No Known Provider  Date of admission: 2024    Subjective     Reason for Consult:  ESRD    HPI:   Sherry Lobato is a 74 y.o. female with ESRD on HD MWF via LUE AVF, HTN, Dm2, recent GIB related to PUD who presented after dialysis yesterday with lightheadedness & fatigue.  Worsening anemia noted, with hgb 6.0, was transfused and up to 8.1 today.  Hgb was 8.2 upon discharge last week.  Underwent EGD on 24 with gastric angioectasis and gastric polyp, cauterized.  She denies further melena since this procedure.  SBP low 100s thus far.  No chest pain or dyspnea.  No n/v.     Review of Systems:   14 point review of systems is otherwise negative except for mentioned above on HPI    Personal History     Past Medical History:   Diagnosis Date    Lucas's esophagus     C. difficile colitis     Carotid artery disease     -50-74% left, 16-49% right ()    COPD (chronic obstructive pulmonary disease)     DM2 (diabetes mellitus, type 2)     ESRD (end stage renal disease)     on HD    Gastric ulcer     at anastomatic site    Gastroparesis     unknown    GERD (gastroesophageal reflux disease)     Gout     not current    Hemodialysis patient     mwf    Hernia, incisional     abdomen    History of colonoscopy     UTD    History of degenerative disc disease     Hyperlipidemia     Hypertension     Medicare annual wellness visit, subsequent 2020    Microscopic colitis     Nephrotic syndrome     Neuroendocrine tumor     of stomach    FABY (obstructive sleep apnea)     not treating    Pedal edema     ressolved    Proteinuria     Renal cell cancer, right     Rib pain on right side     chronic    Rotator cuff tear     bilateral    Stomach cancer 2016    Stroke     Uterine cancer     Vitamin B 12 deficiency     Vitamin D  deficiency        Past Surgical History:   Procedure Laterality Date    APPENDECTOMY      ARTERIOVENOUS FISTULA Right     ARTERIOVENOUS FISTULA REPAIR      Clotted off and insert graft    ARTERIOVENOUS FISTULA/SHUNT SURGERY Left 12/30/2020    Procedure: ARTERIOVENOUS FISTULA FORMATION;  Surgeon: Jan Caicedo MD;  Location: [facility] MAIN OR;  Service: Vascular;  Laterality: Left;    ARTERIOVENOUS FISTULA/SHUNT SURGERY Left 4/23/2024    Procedure: FISTULOGRAM WITH SUBCLAVIAN ARTERY ANGIOPLASTY, fistula banding;  Surgeon: Franki Delgadillo II, MD;  Location: [facility] HYBRID OR;  Service: Vascular;  Laterality: Left;    BREAST BIOPSY      right nipple 1981    BRONCHOSCOPY N/A 6/11/2024    Procedure: BRONCHOSCOPY WITH RIGHT LUNG WASHING;  Surgeon: Aramis Barragan MD;  Location: [facility] ENDOSCOPY;  Service: Pulmonary;  Laterality: N/A;  RML atelectasis    COLONOSCOPY N/A 11/24/2020    Procedure: COLONOSCOPY with polypectomy x 7;  Surgeon: Jeffery White MD;  Location: [facility] ENDOSCOPY;  Service: Gastroenterology;  Laterality: N/A;  post op: polyps, diverticulosis, hemorrhoids    COLONOSCOPY N/A 6/1/2023    Procedure: COLONOSCOPY with polypectomy x 3 biopsy x 1;  Surgeon: Jeffery White MD;  Location: [facility] ENDOSCOPY;  Service: Gastroenterology;  Laterality: N/A;  diverticulosis polyps    ENDOSCOPY N/A 03/12/2022    Procedure: ESOPHAGOGASTRODUODENOSCOPY with biopsy x 1 area;  Surgeon: Javan Augustin MD;  Location: [facility] ENDOSCOPY;  Service: Gastroenterology;  Laterality: N/A;  post op: anastamosis    ENDOSCOPY N/A 10/9/2023    Procedure: ESOPHAGOGASTRODUODENOSCOPY with biopsy x3 areas;  Surgeon: Ace Campbell MD;  Location: [facility] ENDOSCOPY;  Service: Gastroenterology;  Laterality: N/A;  duodenal ulcers;irregular z line    ENDOSCOPY N/A 8/27/2024    Procedure: ESOPHAGOGASTRODUODENOSCOPY with argon plasma coagulation;  Surgeon: HILARIO Blackmon MD;  Location: [facility] ENDOSCOPY;  Service: Gastroenterology;   Laterality: N/A;  post op: AVM    GASTRIC RESECTION      cancer    HYSTERECTOMY      LAPAROSCOPIC CHOLECYSTECTOMY      NEPHRECTOMY      right kidney removed     REDUCTION MAMMAPLASTY      TUMOR REMOVAL      boils    VENTRAL/INCISIONAL HERNIA REPAIR Right 08/28/2019    Procedure: VENTRAL/INCISIONAL HERNIA REPAIR;  Surgeon: Nilay Stevens MD;  Location: [facility] MAIN OR;  Service: General    VENTRAL/INCISIONAL HERNIA REPAIR N/A 10/01/2019    Procedure: OPEN VENTRAL/INCISIONAL HERNIA REPAIR;  Surgeon: Nilay Stevens MD;  Location: [facility] MAIN OR;  Service: General    VENTRAL/INCISIONAL HERNIA REPAIR N/A 10/11/2021    Procedure: VENTRAL/INCISIONAL HERNIA REPAIR LAPAROSCOPIC;  Surgeon: Nilay Stevens MD;  Location: [facility] MAIN OR;  Service: General;  Laterality: N/A;    VENTRAL/INCISIONAL HERNIA REPAIR N/A 01/23/2023    Procedure: VENTRAL/INCISIONAL HERNIA REPAIR with MESH;  Surgeon: Nilay Stevens MD;  Location: [facility] MAIN OR;  Service: General;  Laterality: N/A;       Family History: family history includes Heart disease in her father.    Social History:  reports that she quit smoking about 3 years ago. Her smoking use included cigarettes. She started smoking about 60 years ago. She has a 14.3 pack-year smoking history. She has been exposed to tobacco smoke. She has never used smokeless tobacco. She reports that she does not drink alcohol and does not use drugs.    Home Medications:  Prior to Admission medications    Medication Sig Start Date End Date Taking? Authorizing Provider   amLODIPine (NORVASC) 10 MG tablet Take 1 tablet by mouth Daily.   Yes Danica Murphy MD   calcium acetate (PHOS BINDER,) 667 MG capsule capsule Take 1 capsule by mouth 3 (Three) Times a Day.   Yes Danica Murphy MD   carvedilol (COREG) 3.125 MG tablet Take 1 tablet by mouth 2 (Two) Times a Day With Meals.   Yes Danica Murphy MD   Cholecalciferol 25 MCG (1000 UT) tablet Take 1 tablet by mouth Daily.   Yes  Danica Murphy MD   losartan (COZAAR) 50 MG tablet Take 1 tablet by mouth Daily.   Yes Danica Murphy MD   metoprolol tartrate (LOPRESSOR) 25 MG tablet Take 1 tablet by mouth Daily.   Yes Danica Murphy MD   Multiple Vitamins-Minerals (RENAPLEX-D) tablet Take 1 tablet by mouth Daily.   Yes Danica Murphy MD   pantoprazole (PROTONIX) 40 MG EC tablet Take 1 tablet by mouth 2 (Two) Times a Day. 3/9/23  Yes Danica Murphy MD   sevelamer (RENVELA) 800 MG tablet Take 3 tablets by mouth 3 (Three) Times a Day With Meals. 6/14/24  Yes Cindy Santiago APRN   simvastatin (ZOCOR) 40 MG tablet Take 1 tablet by mouth Every Night.   Yes Danica Murphy MD   sodium bicarbonate 650 MG tablet Take 2 tablets by mouth 3 (Three) Times a Day.   Yes Danica Murphy MD   sucralfate (Carafate) 1 g tablet Take 1 tablet by mouth 4 (Four) Times a Day. 6/14/24  Yes Cindy Santiago APRN   temazepam (RESTORIL) 30 MG capsule Take 1 capsule by mouth Every Night. 1/10/23  Yes Danica Murphy MD   acetaminophen (TYLENOL) 325 MG tablet Take 2 tablets by mouth Every 4 (Four) Hours As Needed for Mild Pain. 8/29/24   Angelia Taylor MD   albuterol sulfate  (90 Base) MCG/ACT inhaler Inhale 2 puffs Every 4 (Four) Hours As Needed for Wheezing.    Danica Murphy MD   calcium acetate (PHOS BINDER,) 667 MG capsule capsule Take 1 capsule by mouth With Snacks.    Danica Murphy MD   hyoscyamine (ANASPAZ,LEVSIN) 0.125 MG tablet Take 1 tablet by mouth Every 4 (Four) Hours As Needed. 11/1/23   Danica Murphy MD   lidocaine-prilocaine (EMLA) 2.5-2.5 % cream Apply 1 Application topically to the appropriate area as directed Daily As Needed (severe pain, left 5th finger). Apply to left 5th finger 4/16/24   Franki Delgadillo II, MD   midodrine (PROAMATINE) 5 MG tablet Take 1 tablet by mouth 3 (Three) Times a Day As Needed.    Danica Murphy MD   octreotide (SandoSTATIN LAR DEPOT) 30 MG  "injection Inject 30 mg subcutaneously once a month 8/28/24      sevelamer (RENAGEL) 800 MG tablet Take 3 tablets by mouth With Snacks.    Provider, Danica, MD       Allergies:  Allergies   Allergen Reactions    Atorvastatin Calcium Other (See Comments)     \"felt like I was on fire\"    Gabapentin Dizziness     says was unable to walk    Niacin Other (See Comments)     \"FEELS LIKE SHE IS ON FIRE\"         Objective     Vitals:   Temp:  [98 °F (36.7 °C)-98.8 °F (37.1 °C)] 98 °F (36.7 °C)  Heart Rate:  [82-96] 84  Resp:  [18-21] 21  BP: ()/(43-74) 109/61    Intake/Output Summary (Last 24 hours) at 9/3/2024 0750  Last data filed at 9/2/2024 1904  Gross per 24 hour   Intake 387.5 ml   Output --   Net 387.5 ml       Physical Exam:    General Appearance: pleasant WF comfortable alert on RA  Skin: warm and dry  HEENT: oral mucosa normal, nonicteric sclera  Neck: supple, no JVD  Lungs: CTA bilat no rales  Heart: RRR, normal S1 and S2  Abdomen: soft, nontender, nondistended  : no palpable bladder  Extremities: no edema, cyanosis or clubbing; LUE AVF +T/B  Neuro: normal speech and mental status     Scheduled Meds:     atorvastatin, 20 mg, Oral, Daily  calcium acetate, 1,334 mg, Oral, TID  carvedilol, 3.125 mg, Oral, BID With Meals  cholecalciferol, 1,000 Units, Oral, Daily  pantoprazole, 40 mg, Oral, Q AM  sevelamer, 2,400 mg, Oral, TID With Meals  [START ON 9/4/2024] sodium bicarbonate, 1,300 mg, Oral, Daily  sucralfate, 1 g, Oral, 4x Daily AC & at Bedtime  temazepam, 30 mg, Oral, Nightly      IV Meds:        Results Reviewed:   I have personally reviewed the results from the time of this admission to 9/3/2024 07:50 EDT     Lab Results   Component Value Date    GLUCOSE 105 (H) 09/02/2024    CALCIUM 8.4 (L) 09/02/2024     09/02/2024    K 4.1 09/02/2024    CO2 23.7 09/02/2024     09/02/2024    BUN 40 (H) 09/02/2024    CREATININE 5.23 (H) 09/02/2024    EGFRIFAFRI 5 (L) 07/14/2022    EGFRIFNONA 6 (L) " 10/08/2021    BCR 7.6 09/02/2024    ANIONGAP 12.3 09/02/2024      Lab Results   Component Value Date    MG 1.7 08/26/2024    PHOS 5.5 (H) 08/29/2024    ALBUMIN 3.5 09/02/2024           Assessment / Plan     ASSESSMENT:  ESRD - HD MWF via LUE AVF.  Dialyzed yesterday.  Reports EDW 68 kg.  Volume/lytes stable  Anemia of CKD & ABL (recent GIB) - hgb down to 6, transfused, now 8.1.  On RHETT at dialysis unit  HTN - over controlled in setting of recent GIB and ABL anemia, SBP low 100s.  On coreg + norvasc   PUD - recent EGD 8/27/24 with gastric angioectasia + polyp, cauterized.  On PPI + carafate  MBD, on renvela 3 AC and ca acetate 2 AC at home, revised order     PLAN:  HD tomorrow  Good Hgb response to transfusion  Consider GI re-involvement  Hold norvasc for now, continue coreg  Adjusted phos binders to reflect home use    Thank you for involving us in the care of Sherry SANDERS Cheryle.  Please feel free to call with any questions.    Barrington Pruitt MD  09/03/24  07:50 EDT    [facility]  [phone]

## 2024-09-03 NOTE — CONSULTS
GI CONSULT  NOTE:    Referring Provider:  Dr. Taylor    Chief complaint: Anemia    Subjective .     History of present illness: Sherry Lobato is a 74 y.o. 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  GI consulted for anemia.    Starting 3 days ago patient noticed that she just did not feel right -feeling weak and struggling to walk.  She went from having a normal brown bowel movement on the day prior to admission to then having black and hard stools for the past couple days.  Her last bowel movement was this morning. Patient had recent admission where she underwent EGD with Dr. Blackmon on 8/27/2024.  Patient denies any vomiting or hematemesis, no bright red blood per rectum.       Endo History:  8/27/2024 EGD with Dr. Blackmon shows small angiectasia in the stomach body with bleeding/oozing bright red blood treated with APC.  1 small gastric polyp in the stomach body s/p APC.  Gastric bypass/Billroth II anatomy, normal-appearing mucosa at anastomosis no ulceration.  Nodular/granular mucosa throughout the stomach body without significant erythema.  Normal mucosa in the proximal duodenum at least 20 to 30 cm beyond the anastomosis, no ulceration seen.  Oct 2023 EGD (TORI) BII, duodenal ulcers  June 2023 colonooscopy polyps, tics, g2 hemorrhoids  3/12/2022 EGD (Dr. Augustin) Billroth II anastomosis with exploration of both efferent & afferent limbs showing no recurrence of carcinoid tumor status post Whipple resection. Bile gastritis. Duodenal biopsy showed acute duodenitis with focal ulceration and reactive villi. Negative celiac disease. No residual carcinoid tumor identified.  11/2020 Colonoscopy  (Dr. White) - polyps (TA),  moderate diverticulosis. RECALL 2023 2/2019 EGD by Dr. White - evidence of prior partial gastrectomy, and large gastric fold, 2 anastomotic ulcers which appear chronic but nonbleeding with biopsy showing acute and chronic inflamed duodenal type mucosa and negative for sprue    Past Medical History:  Past Medical History:   Diagnosis Date    Lucas's esophagus     C. difficile colitis     Carotid artery disease     -50-74% left, 16-49% right (1/19)    COPD (chronic obstructive pulmonary disease)     DM2 (diabetes mellitus, type 2)     ESRD (end stage renal disease)     on HD    Gastric ulcer     at anastomatic site    Gastroparesis     unknown    GERD (gastroesophageal reflux disease)     Gout     not current    Hemodialysis patient     mwf    Hernia, incisional     abdomen    History of colonoscopy     UTD    History of degenerative disc disease     Hyperlipidemia     Hypertension     Medicare annual wellness visit, subsequent 11/07/2020    Microscopic colitis     Nephrotic syndrome     Neuroendocrine tumor     of stomach    FABY (obstructive sleep apnea)     not treating    Pedal edema     ressolved    Proteinuria     Renal cell cancer, right     Rib pain on right side     chronic    Rotator cuff tear     bilateral    Stomach cancer 2016    Stroke     Uterine cancer     Vitamin B 12 deficiency     Vitamin D deficiency        Past Surgical History:  Past Surgical History:   Procedure Laterality Date    APPENDECTOMY      ARTERIOVENOUS FISTULA Right     ARTERIOVENOUS FISTULA REPAIR      Clotted off and insert graft    ARTERIOVENOUS FISTULA/SHUNT SURGERY Left 12/30/2020    Procedure: ARTERIOVENOUS FISTULA FORMATION;  Surgeon: Jan Caicedo MD;  Location: [facility] MAIN OR;  Service: Vascular;  Laterality: Left;    ARTERIOVENOUS FISTULA/SHUNT SURGERY Left 4/23/2024    Procedure: FISTULOGRAM WITH SUBCLAVIAN ARTERY ANGIOPLASTY, fistula banding;  Surgeon: Franki Delgadillo II, MD;  Location: [facility] HYBRID OR;   Service: Vascular;  Laterality: Left;    BREAST BIOPSY      right nipple 1981    BRONCHOSCOPY N/A 6/11/2024    Procedure: BRONCHOSCOPY WITH RIGHT LUNG WASHING;  Surgeon: Aramis Barragan MD;  Location: [facility] ENDOSCOPY;  Service: Pulmonary;  Laterality: N/A;  RML atelectasis    COLONOSCOPY N/A 11/24/2020    Procedure: COLONOSCOPY with polypectomy x 7;  Surgeon: Jeffery White MD;  Location: [facility] ENDOSCOPY;  Service: Gastroenterology;  Laterality: N/A;  post op: polyps, diverticulosis, hemorrhoids    COLONOSCOPY N/A 6/1/2023    Procedure: COLONOSCOPY with polypectomy x 3 biopsy x 1;  Surgeon: Jeffery White MD;  Location: [facility] ENDOSCOPY;  Service: Gastroenterology;  Laterality: N/A;  diverticulosis polyps    ENDOSCOPY N/A 03/12/2022    Procedure: ESOPHAGOGASTRODUODENOSCOPY with biopsy x 1 area;  Surgeon: Javan Augustin MD;  Location: [facility] ENDOSCOPY;  Service: Gastroenterology;  Laterality: N/A;  post op: anastamosis    ENDOSCOPY N/A 10/9/2023    Procedure: ESOPHAGOGASTRODUODENOSCOPY with biopsy x3 areas;  Surgeon: Ace Campbell MD;  Location: [facility] ENDOSCOPY;  Service: Gastroenterology;  Laterality: N/A;  duodenal ulcers;irregular z line    ENDOSCOPY N/A 8/27/2024    Procedure: ESOPHAGOGASTRODUODENOSCOPY with argon plasma coagulation;  Surgeon: HILARIO Blacmkon MD;  Location: [facility] ENDOSCOPY;  Service: Gastroenterology;  Laterality: N/A;  post op: AVM    GASTRIC RESECTION      cancer    HYSTERECTOMY      LAPAROSCOPIC CHOLECYSTECTOMY      NEPHRECTOMY      right kidney removed     REDUCTION MAMMAPLASTY      TUMOR REMOVAL      boils    VENTRAL/INCISIONAL HERNIA REPAIR Right 08/28/2019    Procedure: VENTRAL/INCISIONAL HERNIA REPAIR;  Surgeon: Nilay Stevens MD;  Location: [facility] MAIN OR;  Service: General    VENTRAL/INCISIONAL HERNIA REPAIR N/A 10/01/2019    Procedure: OPEN VENTRAL/INCISIONAL HERNIA REPAIR;  Surgeon: Nilay Setvens MD;  Location: [facility] MAIN OR;  Service: General    VENTRAL/INCISIONAL  HERNIA REPAIR N/A 10/11/2021    Procedure: VENTRAL/INCISIONAL HERNIA REPAIR LAPAROSCOPIC;  Surgeon: Nilay Stevens MD;  Location: [facility] MAIN OR;  Service: General;  Laterality: N/A;    VENTRAL/INCISIONAL HERNIA REPAIR N/A 01/23/2023    Procedure: VENTRAL/INCISIONAL HERNIA REPAIR with MESH;  Surgeon: Nilay Stevens MD;  Location: [facility] MAIN OR;  Service: General;  Laterality: N/A;       Social History:  Social History     Tobacco Use    Smoking status: Former     Current packs/day: 0.00     Average packs/day: 0.3 packs/day for 57.0 years (14.3 ttl pk-yrs)     Types: Cigarettes     Start date: 5/1/1964     Quit date: 5/1/2021     Years since quitting: 3.3     Passive exposure: Past    Smokeless tobacco: Never    Tobacco comments:     quit smoking 2 months ago   Vaping Use    Vaping status: Never Used   Substance Use Topics    Alcohol use: No    Drug use: No       Family History:  Family History   Problem Relation Age of Onset    Heart disease Father        Medications:  Medications Prior to Admission   Medication Sig Dispense Refill Last Dose    amLODIPine (NORVASC) 10 MG tablet Take 1 tablet by mouth Daily.   9/2/2024    calcium acetate (PHOS BINDER,) 667 MG capsule capsule Take 1 capsule by mouth 3 (Three) Times a Day.   9/2/2024    carvedilol (COREG) 3.125 MG tablet Take 1 tablet by mouth 2 (Two) Times a Day With Meals.   9/2/2024    Cholecalciferol 25 MCG (1000 UT) tablet Take 1 tablet by mouth Daily.   9/2/2024    losartan (COZAAR) 50 MG tablet Take 1 tablet by mouth Daily.   9/1/2024    metoprolol tartrate (LOPRESSOR) 25 MG tablet Take 1 tablet by mouth Daily.   9/2/2024    Multiple Vitamins-Minerals (RENAPLEX-D) tablet Take 1 tablet by mouth Daily.   9/2/2024    pantoprazole (PROTONIX) 40 MG EC tablet Take 1 tablet by mouth 2 (Two) Times a Day.   9/2/2024    sevelamer (RENVELA) 800 MG tablet Take 3 tablets by mouth 3 (Three) Times a Day With Meals.   9/2/2024    simvastatin (ZOCOR) 40 MG tablet Take 1  tablet by mouth Every Night.   9/1/2024    sodium bicarbonate 650 MG tablet Take 2 tablets by mouth 3 (Three) Times a Day.   9/2/2024    sucralfate (Carafate) 1 g tablet Take 1 tablet by mouth 4 (Four) Times a Day. 120 tablet 1 9/2/2024    temazepam (RESTORIL) 30 MG capsule Take 1 capsule by mouth Every Night.   9/1/2024    acetaminophen (TYLENOL) 325 MG tablet Take 2 tablets by mouth Every 4 (Four) Hours As Needed for Mild Pain.       albuterol sulfate  (90 Base) MCG/ACT inhaler Inhale 2 puffs Every 4 (Four) Hours As Needed for Wheezing.       calcium acetate (PHOS BINDER,) 667 MG capsule capsule Take 1 capsule by mouth With Snacks.       hyoscyamine (ANASPAZ,LEVSIN) 0.125 MG tablet Take 1 tablet by mouth Every 4 (Four) Hours As Needed.       lidocaine-prilocaine (EMLA) 2.5-2.5 % cream Apply 1 Application topically to the appropriate area as directed Daily As Needed (severe pain, left 5th finger). Apply to left 5th finger 5800 g 4     midodrine (PROAMATINE) 5 MG tablet Take 1 tablet by mouth 3 (Three) Times a Day As Needed.       octreotide (SandoSTATIN LAR DEPOT) 30 MG injection Inject 30 mg subcutaneously once a month 4 each 6     sevelamer (RENAGEL) 800 MG tablet Take 3 tablets by mouth With Snacks.          Scheduled Meds:calcium acetate, 1,334 mg, Oral, TID  carvedilol, 3.125 mg, Oral, BID With Meals  cholecalciferol, 1,000 Units, Oral, Daily  pantoprazole, 40 mg, Oral, Q AM  rosuvastatin, 10 mg, Oral, Daily  sevelamer, 2,400 mg, Oral, TID With Meals  [START ON 9/4/2024] sodium bicarbonate, 1,300 mg, Oral, Daily  sucralfate, 1 g, Oral, 4x Daily AC & at Bedtime  temazepam, 30 mg, Oral, Nightly      Continuous Infusions:   PRN Meds:.  acetaminophen    [START ON 9/4/2024] albumin human    albuterol    [COMPLETED] Insert Peripheral IV **AND** sodium chloride    ALLERGIES:  Atorvastatin calcium, Gabapentin, and Niacin    ROS:  The following systems were reviewed and negative;   Constitution:  No fevers,  chills, no unintentional weight loss; weak  Skin: no rash, no jaundice  Eyes:  No blurry vision, no eye pain  HENT:  No change in hearing or smell  Resp:  No dyspnea or cough  CV:  No chest pain or palpitations  :  No dysuria, hematuria  Musculoskeletal:  No leg cramps or arthralgias  Neuro:  No tremor, no numbness  Psych:  No depression or confusion    Objective     Vital Signs:   Vitals:    09/02/24 1634 09/02/24 1904 09/03/24 0003 09/03/24 0753   BP: 107/66 110/74 109/61 118/81   BP Location:  Right leg Left leg Right leg   Patient Position:  Sitting Lying Lying   Pulse: 88 84  81   Resp: 18 21 21 20   Temp: 98.8 °F (37.1 °C) 98.3 °F (36.8 °C) 98 °F (36.7 °C) 98.7 °F (37.1 °C)   TempSrc:  Oral Oral Oral   SpO2: 99% 96%  97%   Weight:       Height:           Physical Exam:       General Appearance:    Awake and alert, in no acute distress   Head:    Normocephalic, without obvious abnormality, atraumatic   Throat:   No oral lesions, no thrush, oral mucosa moist   Lungs:     Respirations regular, even and unlabored   Chest Wall:    No abnormalities observed   Abdomen:     Soft, mild lower abdominal tenderness, no rebound or guarding, non-distended   Rectal:     Deferred   Extremities:   Moves all extremities, no edema, no cyanosis       Skin:   No rash, no jaundice, normal palpation       Neurologic:   Cranial nerves 2 - 12 grossly intact, no asterixis       Results Review:   I reviewed the patient's labs and imaging.  CBC    Results from last 7 days   Lab Units 09/03/24 0523 09/02/24 2227 09/02/24 1439 08/30/24  0000 08/29/24  0553 08/28/24  0452   WBC 10*3/mm3  --  11.51* 12.50*  --  8.95 9.47   HEMOGLOBIN g/dL 8.1* 7.1* 6.0* 7.6*  22.8* 8.2* 7.8*   PLATELETS 10*3/mm3  --  340 355  --  340 296     CMP   Results from last 7 days   Lab Units 09/02/24 2227 09/02/24 1439 08/30/24  0000 08/29/24  0553 08/28/24  0452   SODIUM mmol/L 140 138  --  139 138   POTASSIUM mmol/L 4.1 3.8 5.0 4.9 5.7*   CHLORIDE mmol/L  "104 101  --  100 100   CO2 mmol/L 23.7 25.3  --  26.1 22.2   BUN mg/dL 40* 33*  --  43* 81*   CREATININE mg/dL 5.23* 4.31*  --  4.95* 6.38*   GLUCOSE mg/dL 105* 124*  --  95 98   ALBUMIN g/dL  --  3.5  --  3.8 3.8   BILIRUBIN mg/dL  --  0.2  --   --   --    ALK PHOS U/L  --  95  --   --   --    AST (SGOT) U/L  --  18  --   --   --    ALT (SGPT) U/L  --  8  --   --   --    PHOSPHORUS mg/dL  --   --   --  5.5* 6.0*     Cr Clearance Estimated Creatinine Clearance: 8 mL/min (A) (by C-G formula based on SCr of 5.23 mg/dL (H)).  Coag     HbA1C   Lab Results   Component Value Date    HGBA1C 5.86 (H) 08/26/2024    HGBA1C 5.5 11/07/2020    HGBA1C 6.2 (H) 03/28/2020     Blood Glucose No results found for: \"POCGLU\"  Infection     UA      Imaging Results (Last 72 Hours)       ** No results found for the last 72 hours. 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  GI consulted for anemia.  She was recently seen by Dr. Blackmon who did a EGD 8/27/2024 showing GAVE s/p APC and gastric polypectomy s/p APC.  She was discharged from this hospital on 8/29/2024 started feeling worse again on 8/30/2024.    -Will repeat EGD with push enteroscopy and colonoscopy as patient is due.  Further recommendations to follow  -Clear liquid diet, n.p.o. after midnight  -Hemoglobin 8.1 from 6.0 at admission.  Continue to monitor H/H transfuse if less than 7.  -Increase PPI to twice daily, continue Carafate  -Continue supportive care      I discussed the " patients findings and my recommendations with the patient.  I have discussed the case with Dr. White and change plan accordingly.    We appreciate the referral    Electronically signed by MAEGAN Mena, 09/03/24, 11:38 AM EDT.

## 2024-09-03 NOTE — H&P
Patient Care Team:  Jin Malcolm PA-C as PCP - General (Physician Assistant)  Nilay Stevens MD as Consulting Physician (General Surgery)  Stephen Perea MD as Surgeon (Thoracic Surgery)  Makayla Navarro RN as Nurse Navigator  Iliana Ro MD as Consulting Physician (Hematology and Oncology)    Chief complaint weak, lightheaded    Subjective     Patient is a 74 y.o. female with multiple medical problems and recent hospitalization for anemia due to GI blood loss (portal gastropathy, gastric polyp) who presents with progressive weakness for the last 3 days.  She had EGD on 8/27/2024 with portal gastropathy and gastric polyp.  She was treated with continuous IV octreotide while in the hospital.  Dr. Blackmon was attempting to get long-acting (monthly) octreotide approved for her.  She was discharged before this approval could be secured.  When she went home she was having normal brown stools.  48 hours later she started having dark black stools again and began to feel weak.  Upon presentation to the ER last night her hemoglobin was 6.  She is feeling better this morning after transfusion and her hemoglobin is up to 8.1.    Review of Systems   Constitutional:  Positive for activity change, appetite change and fatigue. Negative for chills, diaphoresis and fever.   HENT:  Negative for facial swelling.    Eyes:  Negative for visual disturbance.   Respiratory:  Negative for cough and shortness of breath.    Gastrointestinal:  Positive for blood in stool. Negative for abdominal pain, constipation, diarrhea, nausea and vomiting.   Endocrine: Negative for polyuria.   Genitourinary:  Negative for dysuria.   Musculoskeletal:  Negative for arthralgias.   Skin:  Negative for rash and wound.   Neurological:  Negative for dizziness and headaches.   Psychiatric/Behavioral:  Negative for confusion.           History  Past Medical History:   Diagnosis Date    Lucas's esophagus     C. difficile colitis     Carotid artery  disease     -50-74% left, 16-49% right (1/19)    COPD (chronic obstructive pulmonary disease)     DM2 (diabetes mellitus, type 2)     ESRD (end stage renal disease)     on HD    Gastric ulcer     at anastomatic site    Gastroparesis     unknown    GERD (gastroesophageal reflux disease)     Gout     not current    Hemodialysis patient     mwf    Hernia, incisional     abdomen    History of colonoscopy     UTD    History of degenerative disc disease     Hyperlipidemia     Hypertension     Medicare annual wellness visit, subsequent 11/07/2020    Microscopic colitis     Nephrotic syndrome     Neuroendocrine tumor     of stomach    FABY (obstructive sleep apnea)     not treating    Pedal edema     ressolved    Proteinuria     Renal cell cancer, right     Rib pain on right side     chronic    Rotator cuff tear     bilateral    Stomach cancer 2016    Stroke     Uterine cancer     Vitamin B 12 deficiency     Vitamin D deficiency      Past Surgical History:   Procedure Laterality Date    APPENDECTOMY      ARTERIOVENOUS FISTULA Right     ARTERIOVENOUS FISTULA REPAIR      Clotted off and insert graft    ARTERIOVENOUS FISTULA/SHUNT SURGERY Left 12/30/2020    Procedure: ARTERIOVENOUS FISTULA FORMATION;  Surgeon: Jan Caicedo MD;  Location: [facility] MAIN OR;  Service: Vascular;  Laterality: Left;    ARTERIOVENOUS FISTULA/SHUNT SURGERY Left 4/23/2024    Procedure: FISTULOGRAM WITH SUBCLAVIAN ARTERY ANGIOPLASTY, fistula banding;  Surgeon: Franki Delgadillo II, MD;  Location: [facility] HYBRID OR;  Service: Vascular;  Laterality: Left;    BREAST BIOPSY      right nipple 1981    BRONCHOSCOPY N/A 6/11/2024    Procedure: BRONCHOSCOPY WITH RIGHT LUNG WASHING;  Surgeon: Aramis Barragan MD;  Location: [facility] ENDOSCOPY;  Service: Pulmonary;  Laterality: N/A;  RML atelectasis    COLONOSCOPY N/A 11/24/2020    Procedure: COLONOSCOPY with polypectomy x 7;  Surgeon: Jeffery White MD;  Location: [facility] ENDOSCOPY;  Service: Gastroenterology;   Laterality: N/A;  post op: polyps, diverticulosis, hemorrhoids    COLONOSCOPY N/A 6/1/2023    Procedure: COLONOSCOPY with polypectomy x 3 biopsy x 1;  Surgeon: Jeffery White MD;  Location: [facility] ENDOSCOPY;  Service: Gastroenterology;  Laterality: N/A;  diverticulosis polyps    ENDOSCOPY N/A 03/12/2022    Procedure: ESOPHAGOGASTRODUODENOSCOPY with biopsy x 1 area;  Surgeon: Javan Augustin MD;  Location: [facility] ENDOSCOPY;  Service: Gastroenterology;  Laterality: N/A;  post op: anastamosis    ENDOSCOPY N/A 10/9/2023    Procedure: ESOPHAGOGASTRODUODENOSCOPY with biopsy x3 areas;  Surgeon: Ace Campbell MD;  Location: [facility] ENDOSCOPY;  Service: Gastroenterology;  Laterality: N/A;  duodenal ulcers;irregular z line    ENDOSCOPY N/A 8/27/2024    Procedure: ESOPHAGOGASTRODUODENOSCOPY with argon plasma coagulation;  Surgeon: HILARIO Blackmon MD;  Location: [facility] ENDOSCOPY;  Service: Gastroenterology;  Laterality: N/A;  post op: AVM    GASTRIC RESECTION      cancer    HYSTERECTOMY      LAPAROSCOPIC CHOLECYSTECTOMY      NEPHRECTOMY      right kidney removed     REDUCTION MAMMAPLASTY      TUMOR REMOVAL      boils    VENTRAL/INCISIONAL HERNIA REPAIR Right 08/28/2019    Procedure: VENTRAL/INCISIONAL HERNIA REPAIR;  Surgeon: Nilay Stevens MD;  Location: [facility] MAIN OR;  Service: General    VENTRAL/INCISIONAL HERNIA REPAIR N/A 10/01/2019    Procedure: OPEN VENTRAL/INCISIONAL HERNIA REPAIR;  Surgeon: Nilay Stevens MD;  Location: [facility] MAIN OR;  Service: General    VENTRAL/INCISIONAL HERNIA REPAIR N/A 10/11/2021    Procedure: VENTRAL/INCISIONAL HERNIA REPAIR LAPAROSCOPIC;  Surgeon: Nilay Stevens MD;  Location: [facility] MAIN OR;  Service: General;  Laterality: N/A;    VENTRAL/INCISIONAL HERNIA REPAIR N/A 01/23/2023    Procedure: VENTRAL/INCISIONAL HERNIA REPAIR with MESH;  Surgeon: Nilay Stevens MD;  Location: [facility] MAIN OR;  Service: General;  Laterality: N/A;     Family History   Problem Relation Age of Onset     Heart disease Father      Social History     Tobacco Use    Smoking status: Former     Current packs/day: 0.00     Average packs/day: 0.3 packs/day for 57.0 years (14.3 ttl pk-yrs)     Types: Cigarettes     Start date: 5/1/1964     Quit date: 5/1/2021     Years since quitting: 3.3     Passive exposure: Past    Smokeless tobacco: Never    Tobacco comments:     quit smoking 2 months ago   Vaping Use    Vaping status: Never Used   Substance Use Topics    Alcohol use: No    Drug use: No     Medications Prior to Admission   Medication Sig Dispense Refill Last Dose    amLODIPine (NORVASC) 10 MG tablet Take 1 tablet by mouth Daily.   9/2/2024    calcium acetate (PHOS BINDER,) 667 MG capsule capsule Take 1 capsule by mouth 3 (Three) Times a Day.   9/2/2024    carvedilol (COREG) 3.125 MG tablet Take 1 tablet by mouth 2 (Two) Times a Day With Meals.   9/2/2024    Cholecalciferol 25 MCG (1000 UT) tablet Take 1 tablet by mouth Daily.   9/2/2024    losartan (COZAAR) 50 MG tablet Take 1 tablet by mouth Daily.   9/1/2024    metoprolol tartrate (LOPRESSOR) 25 MG tablet Take 1 tablet by mouth Daily.   9/2/2024    Multiple Vitamins-Minerals (RENAPLEX-D) tablet Take 1 tablet by mouth Daily.   9/2/2024    pantoprazole (PROTONIX) 40 MG EC tablet Take 1 tablet by mouth 2 (Two) Times a Day.   9/2/2024    sevelamer (RENVELA) 800 MG tablet Take 3 tablets by mouth 3 (Three) Times a Day With Meals.   9/2/2024    simvastatin (ZOCOR) 40 MG tablet Take 1 tablet by mouth Every Night.   9/1/2024    sodium bicarbonate 650 MG tablet Take 2 tablets by mouth 3 (Three) Times a Day.   9/2/2024    sucralfate (Carafate) 1 g tablet Take 1 tablet by mouth 4 (Four) Times a Day. 120 tablet 1 9/2/2024    temazepam (RESTORIL) 30 MG capsule Take 1 capsule by mouth Every Night.   9/1/2024    acetaminophen (TYLENOL) 325 MG tablet Take 2 tablets by mouth Every 4 (Four) Hours As Needed for Mild Pain.       albuterol sulfate  (90 Base) MCG/ACT inhaler  Inhale 2 puffs Every 4 (Four) Hours As Needed for Wheezing.       calcium acetate (PHOS BINDER,) 667 MG capsule capsule Take 1 capsule by mouth With Snacks.       hyoscyamine (ANASPAZ,LEVSIN) 0.125 MG tablet Take 1 tablet by mouth Every 4 (Four) Hours As Needed.       lidocaine-prilocaine (EMLA) 2.5-2.5 % cream Apply 1 Application topically to the appropriate area as directed Daily As Needed (severe pain, left 5th finger). Apply to left 5th finger 5800 g 4     midodrine (PROAMATINE) 5 MG tablet Take 1 tablet by mouth 3 (Three) Times a Day As Needed.       octreotide (SandoSTATIN LAR DEPOT) 30 MG injection Inject 30 mg subcutaneously once a month 4 each 6     sevelamer (RENAGEL) 800 MG tablet Take 3 tablets by mouth With Snacks.        Allergies:  Atorvastatin calcium, Gabapentin, and Niacin    Objective     Vital Signs  Temp:  [98 °F (36.7 °C)-98.8 °F (37.1 °C)] 98.7 °F (37.1 °C)  Heart Rate:  [81-96] 81  Resp:  [18-21] 20  BP: ()/(43-81) 118/81     Physical Exam:      General Appearance:    Alert, cooperative, in no acute distress   Head:    Normocephalic, without obvious abnormality, atraumatic   Eyes:            Lids and lashes normal, conjunctivae and sclerae normal, no   icterus, no pallor, corneas clear, PERRLA   Ears:    Ears appear intact with no abnormalities noted   Throat:   No oral lesions, no thrush, oral mucosa moist   Neck:   No adenopathy, supple, trachea midline, no thyromegaly, no   carotid bruit, no JVD   Lungs:     Clear to auscultation,respirations regular, even and                  unlabored    Heart:    Regular rhythm and normal rate, normal S1 and S2, no            murmur, no gallop, no rub, no click   Chest Wall:    No abnormalities observed   Abdomen:     Normal bowel sounds, no masses, no organomegaly, soft        non-tender, non-distended, no guarding, no rebound                tenderness   Extremities:   Moves all extremities well, no edema, no cyanosis, no             redness    Pulses:   Pulses palpable and equal bilaterally   Skin:   No bleeding, bruising or rash   Lymph nodes:   No palpable adenopathy   Neurologic:   Cranial nerves 2 - 12 grossly intact, sensation intact, DTR       present and equal bilaterally       Results Review:     Imaging Results (Last 24 Hours)       ** No results found for the last 24 hours. **             Lab Results (last 24 hours)       Procedure Component Value Units Date/Time    Hemoglobin & Hematocrit, Blood [[id number]]  (Abnormal) Collected: 09/03/24 0523    Specimen: Blood Updated: 09/03/24 0603     Hemoglobin 8.1 g/dL      Comment: Result checked          Hematocrit 26.8 %     Basic Metabolic Panel [[id number]]  (Abnormal) Collected: 09/02/24 2227    Specimen: Blood Updated: 09/02/24 2302     Glucose 105 mg/dL      BUN 40 mg/dL      Creatinine 5.23 mg/dL      Sodium 140 mmol/L      Potassium 4.1 mmol/L      Chloride 104 mmol/L      CO2 23.7 mmol/L      Calcium 8.4 mg/dL      BUN/Creatinine Ratio 7.6     Anion Gap 12.3 mmol/L      eGFR 8.1 mL/min/1.73      Comment: <15 Indicative of kidney failure       Narrative:      GFR Normal >60  Chronic Kidney Disease <60  Kidney Failure <15    The GFR formula is only valid for adults with stable renal function between ages 18 and 70.    Reticulocytes [[id number]]  (Abnormal) Collected: 09/02/24 2227    Specimen: Blood Updated: 09/02/24 2243     Reticulocyte % 3.04 %      Reticulocyte Absolute 0.0727 10*6/mm3     CBC (No Diff) [[id number]]  (Abnormal) Collected: 09/02/24 2227    Specimen: Blood Updated: 09/02/24 2243     WBC 11.51 10*3/mm3      RBC 2.39 10*6/mm3      Hemoglobin 7.1 g/dL      Hematocrit 22.2 %      MCV 92.9 fL      MCH 29.7 pg      MCHC 32.0 g/dL      RDW 17.6 %      RDW-SD 58.4 fl      MPV 9.9 fL      Platelets 340 10*3/mm3     CBC & Differential [[id number]]  (Abnormal) Collected: 09/02/24 1439    Specimen: Blood Updated: 09/02/24 1536    Narrative:      The following orders were created for panel  order CBC & Differential.  Procedure                               Abnormality         Status                     ---------                               -----------         ------                     CBC Auto Differential[[id number]]        Abnormal            Final result                 Please view results for these tests on the individual orders.    CBC Auto Differential [[id number]]  (Abnormal) Collected: 09/02/24 1439    Specimen: Blood Updated: 09/02/24 1536     WBC 12.50 10*3/mm3      RBC 1.95 10*6/mm3      Hemoglobin 6.0 g/dL      Hematocrit 18.7 %      MCV 95.9 fL      MCH 30.8 pg      MCHC 32.1 g/dL      RDW 16.0 %      RDW-SD 55.8 fl      MPV 10.0 fL      Platelets 355 10*3/mm3      Neutrophil % 81.9 %      Lymphocyte % 7.7 %      Monocyte % 7.2 %      Eosinophil % 2.2 %      Basophil % 0.3 %      Immature Grans % 0.7 %      Neutrophils, Absolute 10.24 10*3/mm3      Lymphocytes, Absolute 0.96 10*3/mm3      Monocytes, Absolute 0.90 10*3/mm3      Eosinophils, Absolute 0.27 10*3/mm3      Basophils, Absolute 0.04 10*3/mm3      Immature Grans, Absolute 0.09 10*3/mm3      nRBC 0.0 /100 WBC     Comprehensive Metabolic Panel [[id number]]  (Abnormal) Collected: 09/02/24 1439    Specimen: Blood Updated: 09/02/24 1506     Glucose 124 mg/dL      BUN 33 mg/dL      Creatinine 4.31 mg/dL      Sodium 138 mmol/L      Potassium 3.8 mmol/L      Comment: Slight hemolysis detected by analyzer. Result may be falsely elevated.        Chloride 101 mmol/L      CO2 25.3 mmol/L      Calcium 9.0 mg/dL      Total Protein 5.8 g/dL      Albumin 3.5 g/dL      ALT (SGPT) 8 U/L      AST (SGOT) 18 U/L      Alkaline Phosphatase 95 U/L      Total Bilirubin 0.2 mg/dL      Globulin 2.3 gm/dL      A/G Ratio 1.5 g/dL      BUN/Creatinine Ratio 7.7     Anion Gap 11.7 mmol/L      eGFR 10.2 mL/min/1.73      Comment: <15 Indicative of kidney failure       Narrative:      GFR Normal >60  Chronic Kidney Disease <60  Kidney Failure <15    The GFR formula  is only valid for adults with stable renal function between ages 18 and 70.    [facility] Draw [[id number]] Collected: 09/02/24 1439    Specimen: Blood Updated: 09/02/24 1445    Narrative:      The following orders were created for panel order [facility] Draw.  Procedure                               Abnormality         Status                     ---------                               -----------         ------                     Green Top (Gel)[[id number]]                                  Final result               Lavender Top[[id number]]                                     Final result               Gold Top - SST[[id number]]                                   Final result               Light Blue Top[[id number]]                                   Final result                 Please view results for these tests on the individual orders.    Green Top (Gel) [[id number]] Collected: 09/02/24 1439    Specimen: Blood Updated: 09/02/24 1445     Extra Tube Hold for add-ons.     Comment: Auto resulted.       Lavender Top [[id number]] Collected: 09/02/24 1439    Specimen: Blood Updated: 09/02/24 1445     Extra Tube hold for add-on     Comment: Auto resulted       Gold Top - SST [[id number]] Collected: 09/02/24 1439    Specimen: Blood Updated: 09/02/24 1445     Extra Tube Hold for add-ons.     Comment: Auto resulted.       Light Blue Top [[id number]] Collected: 09/02/24 1439    Specimen: Blood Updated: 09/02/24 1445     Extra Tube Hold for add-ons.     Comment: Auto resulted                I reviewed the patient's new clinical results.    Assessment & Plan       Anemia  -Due to GI blood loss; she continues to have melena this morning; continue PPI, Carafate and await GI recommendations.  Transfuse as needed    End-stage renal disease-continue Monday Wednesday Friday dialysis schedule    Mood disorder-continue home medications    Secondary hyperparathyroidism-phosphate binder   essential hypertension-carvedilol    SCDs for DVT  prophylaxis    I discussed the patient's findings and my recommendations with patient.     Angelia Taylor MD  09/03/24  08:42 EDT

## 2024-09-03 NOTE — NURSING NOTE
74-year-old female who presents to the hospital after dialysis complaints of fatigue and weakness.  Consult was received to assess the patient sacral buttocks area.  Patient reports for several years.  She has an area on her bottom that is rough and causes her a great deal of discomfort.  She has tried multiple chair cushions and has been seen by dermatology as well as gynecology.  The areas had no improvement.  Patient does have a rather prominent coccyx but that is not the area that is bothering her the coccyx area does have a stage I.  There is an area of nonblanchable erythema.  Her complaint of discomfort is from an area beneath this which appears to be more of a callus.  She denies having any past ulcerations.  Recommend that she can try zinc oxide barrier cream to soften the area and protected and continue to use chair cushions but no other recommendations from wound care

## 2024-09-03 NOTE — PLAN OF CARE
Goal Outcome Evaluation:                 Patient on a clear liquid diet for planned EGD/colonoscopy tomorrow. Heme occult positive. No c/o pain.

## 2024-09-03 NOTE — CASE MANAGEMENT/SOCIAL WORK
Discharge Planning Assessment   [facility]     Patient Name: Sherry Lobato  MRN: [id number]  Today's Date: 9/3/2024    Admit Date: 9/2/2024    Plan: Return home with family   Discharge Needs Assessment       Row Name 09/03/24 1700       Living Environment    People in Home significant other    Name(s) of People in Home SO Derek    Current Living Arrangements home    Potentially Unsafe Housing Conditions none    In the past 12 months has the electric, gas, oil, or water company threatened to shut off services in your home? No    Primary Care Provided by self    Provides Primary Care For no one    Family Caregiver if Needed significant other    Family Caregiver Names SO Derek    Quality of Family Relationships helpful;involved;supportive    Able to Return to Prior Arrangements yes       Resource/Environmental Concerns    Resource/Environmental Concerns none    Transportation Concerns none       Transportation Needs    In the past 12 months, has lack of transportation kept you from medical appointments or from getting medications? no    In the past 12 months, has lack of transportation kept you from meetings, work, or from getting things needed for daily living? No       Food Insecurity    Within the past 12 months, you worried that your food would run out before you got the money to buy more. Never true    Within the past 12 months, the food you bought just didn't last and you didn't have money to get more. Never true       Transition Planning    Patient/Family Anticipates Transition to home with family    Patient/Family Anticipated Services at Transition none    Transportation Anticipated car, drives self;family or friend will provide       Discharge Needs Assessment    Readmission Within the Last 30 Days no previous admission in last 30 days    Equipment Currently Used at Home cane, straight    Concerns to be Addressed no discharge needs identified;denies needs/concerns at this time    Anticipated Changes Related to  Illness none    Equipment Needed After Discharge none                   Discharge Plan       Row Name 09/03/24 1700       Plan    Plan Return home with family    Patient/Family in Agreement with Plan yes    Plan Comments CM met with pt at bedside to discuss discharge needs. Pt lives at home with DOTTIE Reed, drives and is IADLs. PCP and pharmacy verified- pt declined [facility]. No issues stated affording food/ medications or transport, and home environment is safe. Current DME: cane. No current HHC and none anticipated on discharge. Family will provide transportation home. Readmit complete                  Demographic Summary       Row Name 09/03/24 1656       General Information    Admission Type inpatient    Arrived From emergency department    Required Notices Provided Important Message from Medicare    Referral Source admission list    Reason for Consult discharge planning    Preferred Language English       Contact Information    Permission Granted to Share Info With                    Functional Status       Row Name 09/03/24 1658       Functional Status    Usual Activity Tolerance moderate    Current Activity Tolerance fair       Functional Status, IADL    Medications independent    Meal Preparation independent    Housekeeping independent    Laundry independent    Shopping independent       Mental Status    General Appearance WDL WDL       Mental Status Summary    Recent Changes in Mental Status/Cognitive Functioning no changes       Employment/    Current or Previous Occupation not applicable                Patient Forms       Row Name 09/03/24 1658       Patient Forms    Important Message from Medicare (IMM) Delivered  IMM 9/3 per CM    Delivered to Patient    Method of delivery In person                      June Goode RN      Office phone: [phone]  Office fax: [phone]

## 2024-09-03 NOTE — PLAN OF CARE
Goal Outcome Evaluation:               Pt has had no complaints. Call light within reach. Hgb came back at 7.1 after receiving 1 unit PRBC and will be rechecked at 6am. Plan of care ongoing. Pt did state she had coccyx pressure wound that has been there for at least 5 years. Wound care was consulted.

## 2024-09-03 NOTE — OUTREACH NOTE
Medical Week 1 Survey      Flowsheet Row Responses   [facility] facility patient discharged from? [facility]   Does the patient have one of the following disease processes/diagnoses(primary or secondary)? Other   Week 1 attempt successful? No   Unsuccessful attempts Attempt 1   Revoke Readmitted            Cristina MORALES - Registered Nurse

## 2024-09-03 NOTE — CASE MANAGEMENT/SOCIAL WORK
Case Management Readmission Assessment Note    Case Management Readmission Assessment (all recorded)       Readmission Interview       Row Name 09/03/24 1551             Readmission Indications    Is the patient and/or family able to complete the readmission assessment questions? Yes      Is this hospitalization related to the prior hospital diagnosis? Yes        Row Name 09/03/24 1551             Recommendation for rehospitalization    Did you speak with your physician prior to coming to the hospital No        Row Name 09/03/24 1551             Follow-up Appointments    Do you have a PCP? Yes      Did you have an appointment with PCP after your hospitalization? Yes      When was your appointment scheduled? 08/30/24      Did you go to appointment? Yes      Did you have an appointment with a Specialist? Yes      When was your appointment scheduled? --  oncology, nephrology, radiation, GI      Did you go to appointment? Yes      Are you current with the Pulmonary Clinic? No      Are you current with the CHF Clinic? No        Row Name 09/03/24 1551             Medications    Did you have newly prescribed medications at discharge? Yes      Did you understand the reasons for your medications at discharge and how to take them? Yes      Did you understand the side effects of your medications? Yes      Are you taking all of you prescribed medications? Yes      What pharmacy was used to fill prescription(s)? [facility]      Were medications picked up? Yes        Row Name 09/03/24 1551             Discharge Instructions    Did you understand your discharge instructions? Yes      Did your family/caregiver hear your instructions? Yes      Were you told to eat a special diet? No      Did you adhere to the diet? Yes      Were you given a number of someone to call if you had questions or concerns? Yes        Row Name 09/03/24 1551             Index discharge location/services    Where did you go upon discharge? Home      Do you  have supportive family or friends in the home? Yes        Row Name 09/03/24 1551             Discharge Readiness    On a scale of 1-5 (5 being well prepared), how ready were you for discharge 5        Row Name 09/03/24 1551             Palliative Care/Hospice    Are you current with Palliative Care? No      Are you current with Hospice Care? No        Row Name 09/03/24 1551 09/02/24 1917          Advance Directives (For Healthcare)    Pre-existing AND/MOST/POLST Order No No     Advance Directive Status Patient does not have advance directive Patient does not have advance directive     Have you reviewed your Advance Directive and is it valid for this stay? No No     Literature Provided on Advance Directives No No     Patient Requests Assistance on Advance Directives Patient Declined Patient Declined       Row Name 09/03/24 1658             Readmission Assessment Final Comments    Final Comments ED from with anemia, recent GIB related to PUD. C/O lightheadedness, fatigue. Worsening anemia, HGB 6.0 Tachypnea. GI consult pending. Meets IP level of care. Previous admit obs x 1 day, changed to IP 8/27 and discharged 8/29. Dx GIB, EGD with laser coagulation of gastric angioectasia and polyp. GI recommended octreotide - monthly injections as outpatient. Met IP level of care.

## 2024-09-04 ENCOUNTER — INPATIENT HOSPITAL (AMBULATORY)
Age: 74
End: 2024-09-04

## 2024-09-04 ENCOUNTER — ANESTHESIA EVENT (OUTPATIENT)
Dept: GASTROENTEROLOGY | Facility: HOSPITAL | Age: 74
End: 2024-09-04
Payer: MEDICARE

## 2024-09-04 ENCOUNTER — ANESTHESIA (OUTPATIENT)
Dept: GASTROENTEROLOGY | Facility: HOSPITAL | Age: 74
End: 2024-09-04
Payer: MEDICARE

## 2024-09-04 ENCOUNTER — INPATIENT HOSPITAL (AMBULATORY)
Dept: URBAN - METROPOLITAN AREA HOSPITAL 84 | Facility: HOSPITAL | Age: 74
End: 2024-09-04

## 2024-09-04 DIAGNOSIS — K31.819 ANGIODYSPLASIA OF STOMACH AND DUODENUM WITHOUT BLEEDING: ICD-10-CM

## 2024-09-04 DIAGNOSIS — K57.30 DIVERTICULOSIS OF LARGE INTESTINE WITHOUT PERFORATION OR ABS: ICD-10-CM

## 2024-09-04 DIAGNOSIS — K64.1 SECOND DEGREE HEMORRHOIDS: ICD-10-CM

## 2024-09-04 DIAGNOSIS — K31.7 POLYP OF STOMACH AND DUODENUM: ICD-10-CM

## 2024-09-04 DIAGNOSIS — D64.9 ANEMIA, UNSPECIFIED: ICD-10-CM

## 2024-09-04 DIAGNOSIS — D12.4 BENIGN NEOPLASM OF DESCENDING COLON: ICD-10-CM

## 2024-09-04 LAB
ALBUMIN SERPL-MCNC: 3.5 G/DL (ref 3.5–5.2)
ALBUMIN/GLOB SERPL: 1.4 G/DL
ALP SERPL-CCNC: 99 U/L (ref 39–117)
ALT SERPL W P-5'-P-CCNC: 6 U/L (ref 1–33)
ANION GAP SERPL CALCULATED.3IONS-SCNC: 16.4 MMOL/L (ref 5–15)
AST SERPL-CCNC: 15 U/L (ref 1–32)
BASE DEFICIT: ABNORMAL
BASE EXCESS BLDV CALC-SCNC: <0 MMOL/L (ref 0–3)
BASOPHILS # BLD AUTO: 0.08 10*3/MM3 (ref 0–0.2)
BASOPHILS NFR BLD AUTO: 0.6 % (ref 0–1.5)
BH BB BLOOD EXPIRATION DATE: NORMAL
BH BB BLOOD TYPE BARCODE: 9500
BH BB DISPENSE STATUS: NORMAL
BH BB PRODUCT CODE: NORMAL
BH BB UNIT NUMBER: NORMAL
BILIRUB SERPL-MCNC: 0.3 MG/DL (ref 0–1.2)
BUN SERPL-MCNC: 57 MG/DL (ref 8–23)
BUN/CREAT SERPL: 7.9 (ref 7–25)
CA-I BLDA-SCNC: 1.05 MMOL/L (ref 1.12–1.32)
CALCIUM SPEC-SCNC: 9.3 MG/DL (ref 8.6–10.5)
CHLORIDE SERPL-SCNC: 106 MMOL/L (ref 98–107)
CO2 CONTENT VENOUS: 19 MMOL/L (ref 24–29)
CO2 SERPL-SCNC: 22.6 MMOL/L (ref 22–29)
CREAT SERPL-MCNC: 7.24 MG/DL (ref 0.57–1)
CROSSMATCH INTERPRETATION: NORMAL
DEPRECATED RDW RBC AUTO: 65.6 FL (ref 37–54)
EGFRCR SERPLBLD CKD-EPI 2021: 5.5 ML/MIN/1.73
EOSINOPHIL # BLD AUTO: 0.34 10*3/MM3 (ref 0–0.4)
EOSINOPHIL NFR BLD AUTO: 2.5 % (ref 0.3–6.2)
ERYTHROCYTE [DISTWIDTH] IN BLOOD BY AUTOMATED COUNT: 18.5 % (ref 12.3–15.4)
GLOBULIN UR ELPH-MCNC: 2.5 GM/DL
GLUCOSE BLDC GLUCOMTR-MCNC: 66 MG/DL (ref 70–105)
GLUCOSE SERPL-MCNC: 99 MG/DL (ref 65–99)
HCO3 BLDV-SCNC: 17.9 MMOL/L (ref 23–28)
HCT VFR BLD AUTO: 22.6 % (ref 34–46.6)
HCT VFR BLD AUTO: 26.5 % (ref 34–46.6)
HCT VFR BLDA CALC: 17 % (ref 38–51)
HGB BLD-MCNC: 6.8 G/DL (ref 12–15.9)
HGB BLD-MCNC: 8.5 G/DL (ref 12–15.9)
HGB BLDA-MCNC: 5.8 G/DL (ref 12–17)
IMM GRANULOCYTES # BLD AUTO: 0.13 10*3/MM3 (ref 0–0.05)
IMM GRANULOCYTES NFR BLD AUTO: 1 % (ref 0–0.5)
LYMPHOCYTES # BLD AUTO: 1.28 10*3/MM3 (ref 0.7–3.1)
LYMPHOCYTES NFR BLD AUTO: 9.4 % (ref 19.6–45.3)
MCH RBC QN AUTO: 29.7 PG (ref 26.6–33)
MCHC RBC AUTO-ENTMCNC: 30.1 G/DL (ref 31.5–35.7)
MCV RBC AUTO: 98.7 FL (ref 79–97)
MONOCYTES # BLD AUTO: 1.09 10*3/MM3 (ref 0.1–0.9)
MONOCYTES NFR BLD AUTO: 8 % (ref 5–12)
NEUTROPHILS NFR BLD AUTO: 10.75 10*3/MM3 (ref 1.7–7)
NEUTROPHILS NFR BLD AUTO: 78.5 % (ref 42.7–76)
NRBC BLD AUTO-RTO: 0 /100 WBC (ref 0–0.2)
PCO2 BLDV: 31 MM HG (ref 41–51)
PH BLDV: 7.37 PH UNITS (ref 7.31–7.41)
PHOSPHATE SERPL-MCNC: 3.8 MG/DL (ref 2.5–4.5)
PLATELET # BLD AUTO: 345 10*3/MM3 (ref 140–450)
PMV BLD AUTO: 9.9 FL (ref 6–12)
PO2 BLDV: 26 MM HG (ref 35–42)
POTASSIUM BLDA-SCNC: 4.4 MMOL/L (ref 3.5–4.9)
POTASSIUM SERPL-SCNC: 5.6 MMOL/L (ref 3.5–5.2)
PROT SERPL-MCNC: 6 G/DL (ref 6–8.5)
RBC # BLD AUTO: 2.29 10*6/MM3 (ref 3.77–5.28)
SAO2 % BLDCOV: ABNORMAL %
SODIUM BLD-SCNC: 148 MMOL/L (ref 138–146)
SODIUM SERPL-SCNC: 145 MMOL/L (ref 136–145)
UNIT  ABO: NORMAL
UNIT  RH: NORMAL
WBC NRBC COR # BLD AUTO: 13.67 10*3/MM3 (ref 3.4–10.8)

## 2024-09-04 PROCEDURE — 36430 TRANSFUSION BLD/BLD COMPNT: CPT

## 2024-09-04 PROCEDURE — 45385 COLONOSCOPY W/LESION REMOVAL: CPT | Performed by: INTERNAL MEDICINE

## 2024-09-04 PROCEDURE — P9016 RBC LEUKOCYTES REDUCED: HCPCS

## 2024-09-04 PROCEDURE — 43270 EGD LESION ABLATION: CPT | Performed by: INTERNAL MEDICINE

## 2024-09-04 PROCEDURE — 86900 BLOOD TYPING SEROLOGIC ABO: CPT

## 2024-09-04 PROCEDURE — 85014 HEMATOCRIT: CPT | Performed by: INTERNAL MEDICINE

## 2024-09-04 PROCEDURE — 0W3P8ZZ CONTROL BLEEDING IN GASTROINTESTINAL TRACT, VIA NATURAL OR ARTIFICIAL OPENING ENDOSCOPIC: ICD-10-PCS | Performed by: INTERNAL MEDICINE

## 2024-09-04 PROCEDURE — 84132 ASSAY OF SERUM POTASSIUM: CPT

## 2024-09-04 PROCEDURE — 25010000002 PROPOFOL 500 MG/50ML EMULSION: Performed by: NURSE ANESTHETIST, CERTIFIED REGISTERED

## 2024-09-04 PROCEDURE — 0DBM8ZX EXCISION OF DESCENDING COLON, VIA NATURAL OR ARTIFICIAL OPENING ENDOSCOPIC, DIAGNOSTIC: ICD-10-PCS | Performed by: INTERNAL MEDICINE

## 2024-09-04 PROCEDURE — 25810000003 LACTATED RINGERS PER 1000 ML: Performed by: NURSE ANESTHETIST, CERTIFIED REGISTERED

## 2024-09-04 PROCEDURE — 63710000001 DIPHENHYDRAMINE PER 50 MG: Performed by: INTERNAL MEDICINE

## 2024-09-04 PROCEDURE — 85018 HEMOGLOBIN: CPT | Performed by: INTERNAL MEDICINE

## 2024-09-04 PROCEDURE — C1751 CATH, INF, PER/CENT/MIDLINE: HCPCS

## 2024-09-04 PROCEDURE — 88305 TISSUE EXAM BY PATHOLOGIST: CPT | Performed by: INTERNAL MEDICINE

## 2024-09-04 PROCEDURE — 0DJD8ZZ INSPECTION OF LOWER INTESTINAL TRACT, VIA NATURAL OR ARTIFICIAL OPENING ENDOSCOPIC: ICD-10-PCS | Performed by: INTERNAL MEDICINE

## 2024-09-04 PROCEDURE — 80053 COMPREHEN METABOLIC PANEL: CPT | Performed by: NURSE PRACTITIONER

## 2024-09-04 PROCEDURE — 25010000002 PROPOFOL 10 MG/ML EMULSION: Performed by: NURSE ANESTHETIST, CERTIFIED REGISTERED

## 2024-09-04 PROCEDURE — 85025 COMPLETE CBC W/AUTO DIFF WBC: CPT | Performed by: INTERNAL MEDICINE

## 2024-09-04 PROCEDURE — 82947 ASSAY GLUCOSE BLOOD QUANT: CPT

## 2024-09-04 PROCEDURE — 0DJ08ZZ INSPECTION OF UPPER INTESTINAL TRACT, VIA NATURAL OR ARTIFICIAL OPENING ENDOSCOPIC: ICD-10-PCS | Performed by: INTERNAL MEDICINE

## 2024-09-04 PROCEDURE — 85014 HEMATOCRIT: CPT

## 2024-09-04 PROCEDURE — 84100 ASSAY OF PHOSPHORUS: CPT | Performed by: INTERNAL MEDICINE

## 2024-09-04 PROCEDURE — 82803 BLOOD GASES ANY COMBINATION: CPT

## 2024-09-04 PROCEDURE — 82330 ASSAY OF CALCIUM: CPT

## 2024-09-04 PROCEDURE — 84295 ASSAY OF SERUM SODIUM: CPT

## 2024-09-04 DEVICE — DEV CLIP HEMO RESOLUTION360/ULTR 235CM 17MM STRL: Type: IMPLANTABLE DEVICE | Site: STOMACH | Status: FUNCTIONAL

## 2024-09-04 RX ORDER — ACETAMINOPHEN 325 MG/1
650 TABLET ORAL ONCE
Status: COMPLETED | OUTPATIENT
Start: 2024-09-04 | End: 2024-09-04

## 2024-09-04 RX ORDER — PROPOFOL 10 MG/ML
INJECTION, EMULSION INTRAVENOUS AS NEEDED
Status: DISCONTINUED | OUTPATIENT
Start: 2024-09-04 | End: 2024-09-04 | Stop reason: SURG

## 2024-09-04 RX ORDER — ACETAMINOPHEN 650 MG/1
650 SUPPOSITORY RECTAL ONCE
Status: COMPLETED | OUTPATIENT
Start: 2024-09-04 | End: 2024-09-04

## 2024-09-04 RX ORDER — ONDANSETRON 4 MG/1
4 TABLET, ORALLY DISINTEGRATING ORAL EVERY 6 HOURS PRN
Status: DISCONTINUED | OUTPATIENT
Start: 2024-09-04 | End: 2024-09-05 | Stop reason: HOSPADM

## 2024-09-04 RX ORDER — DIPHENHYDRAMINE HYDROCHLORIDE 50 MG/ML
25 INJECTION INTRAMUSCULAR; INTRAVENOUS ONCE
Status: COMPLETED | OUTPATIENT
Start: 2024-09-04 | End: 2024-09-04

## 2024-09-04 RX ORDER — SORBITOL SOLUTION 70 %
50 SOLUTION, ORAL MISCELLANEOUS ONCE
Status: COMPLETED | OUTPATIENT
Start: 2024-09-04 | End: 2024-09-04

## 2024-09-04 RX ORDER — ONDANSETRON 2 MG/ML
4 INJECTION INTRAMUSCULAR; INTRAVENOUS EVERY 6 HOURS PRN
Status: DISCONTINUED | OUTPATIENT
Start: 2024-09-04 | End: 2024-09-05 | Stop reason: HOSPADM

## 2024-09-04 RX ORDER — DIPHENHYDRAMINE HCL 25 MG
25 CAPSULE ORAL ONCE
Status: COMPLETED | OUTPATIENT
Start: 2024-09-04 | End: 2024-09-04

## 2024-09-04 RX ORDER — LIDOCAINE HYDROCHLORIDE 10 MG/ML
INJECTION, SOLUTION EPIDURAL; INFILTRATION; INTRACAUDAL; PERINEURAL AS NEEDED
Status: DISCONTINUED | OUTPATIENT
Start: 2024-09-04 | End: 2024-09-04 | Stop reason: SURG

## 2024-09-04 RX ORDER — ACETAMINOPHEN 160 MG/5ML
650 SOLUTION ORAL ONCE
Status: COMPLETED | OUTPATIENT
Start: 2024-09-04 | End: 2024-09-04

## 2024-09-04 RX ORDER — SODIUM CHLORIDE, SODIUM LACTATE, POTASSIUM CHLORIDE, CALCIUM CHLORIDE 600; 310; 30; 20 MG/100ML; MG/100ML; MG/100ML; MG/100ML
INJECTION, SOLUTION INTRAVENOUS CONTINUOUS PRN
Status: DISCONTINUED | OUTPATIENT
Start: 2024-09-04 | End: 2024-09-04 | Stop reason: SURG

## 2024-09-04 RX ADMIN — CALCIUM ACETATE 1334 MG: 667 CAPSULE ORAL at 16:29

## 2024-09-04 RX ADMIN — SODIUM ZIRCONIUM CYCLOSILICATE 10 G: 10 POWDER, FOR SUSPENSION ORAL at 05:36

## 2024-09-04 RX ADMIN — Medication 1 BOTTLE: at 03:21

## 2024-09-04 RX ADMIN — Medication 1000 UNITS: at 16:28

## 2024-09-04 RX ADMIN — Medication 10 ML: at 20:49

## 2024-09-04 RX ADMIN — DIPHENHYDRAMINE HYDROCHLORIDE 25 MG: 25 CAPSULE ORAL at 06:21

## 2024-09-04 RX ADMIN — SEVELAMER CARBONATE 2400 MG: 800 TABLET, FILM COATED ORAL at 16:31

## 2024-09-04 RX ADMIN — PROPOFOL 30 MG: 10 INJECTION, EMULSION INTRAVENOUS at 13:57

## 2024-09-04 RX ADMIN — PROPOFOL 30 MG: 10 INJECTION, EMULSION INTRAVENOUS at 14:04

## 2024-09-04 RX ADMIN — ROSUVASTATIN 10 MG: 10 TABLET, FILM COATED ORAL at 20:51

## 2024-09-04 RX ADMIN — TEMAZEPAM 30 MG: 15 CAPSULE ORAL at 20:48

## 2024-09-04 RX ADMIN — LIDOCAINE HYDROCHLORIDE 50 MG: 10 INJECTION, SOLUTION EPIDURAL; INFILTRATION; INTRACAUDAL; PERINEURAL at 13:57

## 2024-09-04 RX ADMIN — SODIUM BICARBONATE 1300 MG: 650 TABLET ORAL at 16:28

## 2024-09-04 RX ADMIN — ACETAMINOPHEN 650 MG: 325 TABLET, FILM COATED ORAL at 06:20

## 2024-09-04 RX ADMIN — PANTOPRAZOLE SODIUM 40 MG: 40 TABLET, DELAYED RELEASE ORAL at 16:30

## 2024-09-04 RX ADMIN — CARVEDILOL 3.12 MG: 3.12 TABLET, FILM COATED ORAL at 16:30

## 2024-09-04 RX ADMIN — PROPOFOL 30 MG: 10 INJECTION, EMULSION INTRAVENOUS at 14:20

## 2024-09-04 RX ADMIN — SODIUM CHLORIDE, SODIUM LACTATE, POTASSIUM CHLORIDE, AND CALCIUM CHLORIDE: .6; .31; .03; .02 INJECTION, SOLUTION INTRAVENOUS at 13:55

## 2024-09-04 RX ADMIN — PROPOFOL 100 MCG/KG/MIN: 10 INJECTION, EMULSION INTRAVENOUS at 13:57

## 2024-09-04 RX ADMIN — SUCRALFATE 1 G: 1 TABLET ORAL at 20:48

## 2024-09-04 RX ADMIN — SORBITOL SOLUTION (BULK) 50 ML: 70 SOLUTION at 07:19

## 2024-09-04 RX ADMIN — SUCRALFATE 1 G: 1 TABLET ORAL at 16:30

## 2024-09-04 NOTE — PLAN OF CARE
Goal Outcome Evaluation:                 Reached out to GI doctor regarding drop in hemoglobin and patient having bloody stools with large clots, after given the bowel prep. No new orders given. GI NP called asking how bowel prep was going, informed NP of blood and clots in stool and sorbitol was ordered. After sorbitol was given MD and NP updated that stool was still bloody with clots and no new orders given. One unit of PRBC's given. Redraw hemoglobin at 1330 was 5.8. Patient in endo now.

## 2024-09-04 NOTE — CASE MANAGEMENT/SOCIAL WORK
Continued Stay Note  LANI Sepulveda     Patient Name: Sherry Lobato  MRN: [id number]  Today's Date: 9/4/2024    Admit Date: 9/2/2024    Plan: Return home with family   Discharge Plan       Row Name 09/04/24 1425       Plan    Plan Comments DC barriers: Hgb 6.8 with PRBS transfusion, K= 5.6, BUN/creat trending up, positive occult stool, colonoscopy today, HD today. GI and nephrology following               June Goode RN     Office phone: [phone]  Office fax: [phone]

## 2024-09-04 NOTE — ANESTHESIA POSTPROCEDURE EVALUATION
Patient: Sherry Lobato    Procedure Summary       Date: 09/04/24 Room / Location: [facility] ENDOSCOPY 1 / [facility] ENDOSCOPY    Anesthesia Start: 1355 Anesthesia Stop: 1442    Procedures:       COLONOSCOPY WITH POLYPECTOMY X 1      ESOPHAGOGASTRODUODENOSCOPY WITH SMALL BOWEL ENTEROSCOPY, ARGON PLASMA COAGULATION AND ENDOSCOPIC CLIPPING X 2 OF GASTRIC ANTRAL VASCULAR ECTASIA Diagnosis:       Anemia, unspecified type      (Anemia, unspecified type [D64.9])    Surgeons: Jeffery White MD Provider: Cholo Sullivan MD    Anesthesia Type: general ASA Status: 4            Anesthesia Type: general    Vitals  Vitals Value Taken Time   /46 09/04/24 1457   Temp     Pulse 91 09/04/24 1457   Resp 16 09/04/24 1452   SpO2 96 % 09/04/24 1457   Vitals shown include unfiled device data.        Post Anesthesia Care and Evaluation    Patient location during evaluation: PACU  Patient participation: complete - patient participated  Level of consciousness: awake  Pain scale: See nurse's notes for pain score.  Pain management: adequate    Airway patency: patent  Anesthetic complications: No anesthetic complications  PONV Status: none  Cardiovascular status: acceptable  Respiratory status: acceptable and spontaneous ventilation  Hydration status: acceptable    Comments: Patient seen and examined postoperatively; vital signs stable; SpO2 greater than or equal to 90%; cardiopulmonary status stable; nausea/vomiting adequately controlled; pain adequately controlled; no apparent anesthesia complications; patient discharged from anesthesia care when discharge criteria were met

## 2024-09-04 NOTE — NURSING NOTE
"Pt states, \"This is going to be too late for dialysis. I would like to do it tomorrow.\" Will run patient first thing in AM.   "

## 2024-09-04 NOTE — OP NOTE
COLONOSCOPY, ESOPHAGOGASTRODUODENOSCOPY WITH SMALL BOWEL ENTEROSCOPY Procedure Report    Patient Name:  Sherry Lobato  YOB: 1950    Date of Surgery:  9/4/2024     Pre-Op Diagnosis:  Anemia, unspecified type [D64.9]       Post-Op Diagnosis:  Post-Op Diagnosis Codes:     * Anemia, unspecified type [D64.9]  Slowly oozing antral polyp with angioectasias cauterized via argon plasma coagulation and clipped x 3 with resolution of the bleeding  Billroth II gastric bypass anatomy  6 mm sessile descending polyp removed via hot snare polypectomy  Moderate left-sided diverticulosis with medium openings  Grade 2 internal and external hemorrhoids  Otherwise normal colonoscopy to the terminal ileum      Procedure/CPT® Codes:      Procedure(s):  COLONOSCOPY WITH POLYPECTOMY X 1  ESOPHAGOGASTRODUODENOSCOPY WITH SMALL BOWEL ENTEROSCOPY, ARGON PLASMA COAGULATION AND ENDOSCOPIC CLIPPING X 2 OF GASTRIC ANTRAL VASCULAR ECTASIA    Staff:  Surgeon(s):  Jeffery White MD         Anesthesia: Monitored Anesthesia Care    Implants:    Implant Name Type Inv. Item Serial No.  Lot No. LRB No. Used Action   DEV CLIP HEMO [id number] 235CM 17MM STRL - [id number] Implant DEV CLIP HEMO [id number] 235CM 17MM STRL  [employer] [id number] N/A 1 Implanted   DEV CLIP HEMO [id number]/ULTR 235CM 17MM STRL - [id number] Implant DEV CLIP HEMO [id number]/ULTR 235CM 17MM STRL  [employer] [id number] N/A 1 Implanted   DEV CLIP HEMO [id number]/ULTR 235CM 17MM STRL - [id number] Implant DEV CLIP HEMO [id number]/ULTR 235CM 17MM STRL  [employer] [id number] N/A 1 Implanted       Specimen:        See Below    Estimated blood loss: none    Complications:  None    Description of Procedure:  Description of Procedure:  A description of the procedure as well as risks, benefits and alternative methods were explained to the patient who voiced understanding and signed the corresponding  consent form. A physical exam was performed and vital signs were monitored throughout the procedure.    An upper GI endoscope was placed into the mouth and proceeded through the esophagus, stomach and second portion of the duodenum without difficulty. The scope was then retroflexed and the fundus was visualized. The procedure was not difficult and there were no immediate complications.    A rectal exam was performed which was normal. An Olympus colonoscope was placed into the rectum and proceeded under direct visualization through the colon until the cecum and appendiceal orifice were identified. Careful visualization occurred upon slow withdraw of the scope. The scope was then retroflexed and the distal rectum was visualized. The quality of the prep was good. The procedure was not difficult and there were no immediate complications.      Findings:   Slowly oozing antral polyp with angioectasias cauterized via argon plasma coagulation and clipped x 3 with resolution of the bleeding  Billroth II gastric bypass anatomy  6 mm sessile descending polyp removed via hot snare polypectomy  Moderate left-sided diverticulosis with medium openings  No bleeding was seen in the colon  Grade 2 internal and external hemorrhoids  Otherwise normal colonoscopy to the terminal ileum    Impression:  Slowly oozing antral angioectasias cauterized with APC and clipped x 3    Recommendations:  Monitor hemoglobin  Monitor for recurrent bleeding  Continue PPI  Follow-up biopsies  No recall on colonoscopy      Jeffery White MD     Date: 9/4/2024  Time: 14:38 EDT

## 2024-09-04 NOTE — PROGRESS NOTES
"    [facility] Progress Note      Patient Name: Sherry Lobato  : 1950  MRN: [id number]  Primary Care Physician:  Jin Malcolm PA-C  Date of admission: 2024    Subjective     Interval History:   F/u ESRD     Review of Systems:   Underwent EGD & colonoscopy today:   Slowly oozing antral polyp with angioectasias cauterized via argon plasma coagulation and clipped x 3 with resolution of the bleeding  Billroth II gastric bypass anatomy  6 mm sessile descending polyp removed via hot snare polypectomy  Moderate left-sided diverticulosis with medium openings  Grade 2 internal and external hemorrhoids  Otherwise normal colonoscopy to the terminal ileum    She's refusing dialysis afterward \"too late\" per dialysis RN    Objective     Vitals:   Temp:  [97.5 °F (36.4 °C)-98.9 °F (37.2 °C)] 98.9 °F (37.2 °C)  Heart Rate:  [] 105  Resp:  [14-25] 20  BP: ()/(42-86) 146/69    Intake/Output Summary (Last 24 hours) at 2024 1654  Last data filed at 2024 1431  Gross per 24 hour   Intake 919.17 ml   Output --   Net 919.17 ml       Physical Exam:    General Appearance: alert, comfortable  Skin: warm and dry  HEENT: oral mucosa normal, nonicteric sclera  Neck: supple, no JVD  Lungs: CTA bilat no rales  Heart: RRR, normal S1 and S2  Abdomen: soft, nontender, nondistended  : no palpable bladder  Extremities: no edema, cyanosis or clubbing  Neuro: normal speech and mental status     Scheduled Meds:     calcium acetate, 1,334 mg, Oral, TID  carvedilol, 3.125 mg, Oral, BID With Meals  cholecalciferol, 1,000 Units, Oral, Daily  pantoprazole, 40 mg, Oral, BID AC  rosuvastatin, 10 mg, Oral, Daily  sevelamer, 2,400 mg, Oral, TID With Meals  sodium bicarbonate, 1,300 mg, Oral, Daily  sucralfate, 1 g, Oral, 4x Daily AC & at Bedtime  temazepam, 30 mg, Oral, Nightly      IV Meds:        Results Reviewed:   I have personally reviewed the results from the time of this admission to " 9/4/2024 16:54 EDT     Results from last 7 days   Lab Units 09/04/24  0403 09/02/24  2227 09/02/24  1439   SODIUM mmol/L 145 140 138   POTASSIUM mmol/L 5.6* 4.1 3.8   CHLORIDE mmol/L 106 104 101   CO2 mmol/L 22.6 23.7 25.3   BUN mg/dL 57* 40* 33*   CREATININE mg/dL 7.24* 5.23* 4.31*   CALCIUM mg/dL 9.3 8.4* 9.0   BILIRUBIN mg/dL 0.3  --  0.2   ALK PHOS U/L 99  --  95   ALT (SGPT) U/L 6  --  8   AST (SGOT) U/L 15  --  18   GLUCOSE mg/dL 99 105* 124*     Estimated Creatinine Clearance: 5.8 mL/min (A) (by C-G formula based on SCr of 7.24 mg/dL (H)).  Results from last 7 days   Lab Units 09/04/24  0403 08/29/24  0553   PHOSPHORUS mg/dL 3.8 5.5*         Results from last 7 days   Lab Units 09/04/24  1330 09/04/24  0304 09/03/24  0523 09/02/24  2227 09/02/24  1439 08/30/24  0000 08/29/24  0553   WBC 10*3/mm3  --  13.67*  --  11.51* 12.50*  --  8.95   HEMOGLOBIN g/dL  --  6.8* 8.1* 7.1* 6.0*   < > 8.2*   HEMOGLOBIN, POC g/dL 5.8*  --   --   --   --   --   --    PLATELETS 10*3/mm3  --  345  --  340 355  --  340    < > = values in this interval not displayed.           Assessment / Plan     ASSESSMENT:  ESRD - HD MWF via LUE AVF.  Reports EDW 68 kg.  K generous 5.6  Anemia of CKD & ABL (GIB) - hgb down to 5.8, transfusing again   HTN - was over controlled in setting of GIB and ABL anemia, holding norvasc, on coreg  PUD - recent EGD 8/27/24 with gastric angioectasia + polyp, cauterized.  On PPI + carafate.  Endoscopy today summarized above  MBD, on renvela 3 AC and ca acetate 2 AC at home, revised order     PLAN:  Refusing dialysis post endoscopy, will run tomorrow AM  Given lokelma this AM  Transfusing again  Explained to her will need dialysis again on FRI to get back on cycle      Barrington Pruitt MD  09/04/24  16:54 EDT    [facility]  [phone]

## 2024-09-04 NOTE — ANESTHESIA PREPROCEDURE EVALUATION
Anesthesia Evaluation     Patient summary reviewed and Nursing notes reviewed   NPO Solid Status: > 8 hours  NPO Liquid Status: > 8 hours           Airway   Mallampati: II  TM distance: >3 FB  Neck ROM: full  No difficulty expected  Dental - normal exam   (+) edentulous    Pulmonary - normal exam   (+) a smoker Former, lung cancer, COPD,sleep apnea  Cardiovascular - normal exam    ECG reviewed    (+) hypertension, CHF , hyperlipidemia,  carotid artery disease      Neuro/Psych  (+) CVA, numbness  GI/Hepatic/Renal/Endo    (+) GERD, PUD, GI bleeding , renal disease-, diabetes mellitus    Musculoskeletal     Abdominal  - normal exam    Bowel sounds: normal.   Substance History      OB/GYN          Other   arthritis, blood dyscrasia anemia,   history of cancer    ROS/Med Hx Other: Received blood today   K istat HD today         Sinus rhythm  Abnormal T, consider ischemia, lateral leads      21  Normal LV size and contractility EF of 60%  Normal RV size  Normal atrial size  Aortic valve, mitral valve, tricuspid valve appears structurally normal, mild mitral regurgitation  seen.  No pericardial effusion seen.  Proximal aorta appears normal in size.                      Anesthesia Plan    ASA 4     general     (MAC)  intravenous induction     Anesthetic plan, risks, benefits, and alternatives have been provided, discussed and informed consent has been obtained with: patient.        CODE STATUS:    Code Status (Patient has no pulse and is not breathing): CPR (Attempt to Resuscitate)  Medical Interventions (Patient has pulse or is breathing): Full Support

## 2024-09-04 NOTE — PLAN OF CARE
Goal Outcome Evaluation:               Pt is on bowel prep. Pt morning labs shows low hgb. Call was placed to on call MD. Pt is resting. Call light within reach. Plan of care ongoing.

## 2024-09-04 NOTE — PROGRESS NOTES
LOS: 2 days   Patient Care Team:  Jin Malcolm PA-C as PCP - General (Physician Assistant)  Nilay Stevens MD as Consulting Physician (General Surgery)  Stephen Perea MD as Surgeon (Thoracic Surgery)  Makayla Navarro, RN as Nurse Navigator  Iliana Ro MD as Consulting Physician (Hematology and Oncology)    Subjective     Anticipating EGD and colonoscopy today.  Concerned about significant blood loss throughout the night.  Currently getting transfusion    Review of Systems   Constitutional:  Positive for activity change and fatigue.   HENT: Negative.     Respiratory: Negative.  Negative for choking and shortness of breath.    Cardiovascular:  Negative for chest pain, palpitations and leg swelling.   Gastrointestinal:  Positive for blood in stool.   Genitourinary: Negative.    Musculoskeletal:  Positive for arthralgias.   Neurological:  Positive for weakness.           Objective     Vital Signs  Temp:  [97.5 °F (36.4 °C)-99.1 °F (37.3 °C)] 98.1 °F (36.7 °C)  Heart Rate:  [81-98] 87  Resp:  [15-25] 20  BP: ()/(51-88) 103/67      Physical Exam  Vitals reviewed.   HENT:      Head: Normocephalic.      Right Ear: External ear normal.      Left Ear: External ear normal.      Nose: Nose normal.      Mouth/Throat:      Mouth: Mucous membranes are moist.   Eyes:      General:         Right eye: No discharge.         Left eye: No discharge.   Cardiovascular:      Rate and Rhythm: Normal rate.   Pulmonary:      Effort: Pulmonary effort is normal.   Abdominal:      General: Bowel sounds are normal.      Palpations: Abdomen is soft.   Musculoskeletal:         General: Normal range of motion.   Skin:     General: Skin is warm and dry.   Neurological:      Mental Status: She is alert and oriented to person, place, and time.   Psychiatric:         Behavior: Behavior normal.              Results Review:    Lab Results (last 24 hours)       Procedure Component Value Units Date/Time    Phosphorus [[id number]]   (Normal) Collected: 09/04/24 0403    Specimen: Blood from Arm, Right Updated: 09/04/24 0448     Phosphorus 3.8 mg/dL     Comprehensive Metabolic Panel [[id number]]  (Abnormal) Collected: 09/04/24 0403    Specimen: Blood from Arm, Right Updated: 09/04/24 0448     Glucose 99 mg/dL      BUN 57 mg/dL      Creatinine 7.24 mg/dL      Sodium 145 mmol/L      Potassium 5.6 mmol/L      Comment: Result checked          Chloride 106 mmol/L      CO2 22.6 mmol/L      Calcium 9.3 mg/dL      Total Protein 6.0 g/dL      Albumin 3.5 g/dL      ALT (SGPT) 6 U/L      AST (SGOT) 15 U/L      Alkaline Phosphatase 99 U/L      Total Bilirubin 0.3 mg/dL      Globulin 2.5 gm/dL      A/G Ratio 1.4 g/dL      BUN/Creatinine Ratio 7.9     Anion Gap 16.4 mmol/L      eGFR 5.5 mL/min/1.73      Comment: <15 Indicative of kidney failure       Narrative:      GFR Normal >60  Chronic Kidney Disease <60  Kidney Failure <15    The GFR formula is only valid for adults with stable renal function between ages 18 and 70.    CBC & Differential [[id number]]  (Abnormal) Collected: 09/04/24 0304    Specimen: Blood from Arm, Right Updated: 09/04/24 0418    Narrative:      The following orders were created for panel order CBC & Differential.  Procedure                               Abnormality         Status                     ---------                               -----------         ------                     CBC Auto Differential[[id number]]        Abnormal            Final result                 Please view results for these tests on the individual orders.    CBC Auto Differential [[id number]]  (Abnormal) Collected: 09/04/24 0304    Specimen: Blood from Arm, Right Updated: 09/04/24 0418     WBC 13.67 10*3/mm3      RBC 2.29 10*6/mm3      Hemoglobin 6.8 g/dL      Hematocrit 22.6 %      MCV 98.7 fL      MCH 29.7 pg      MCHC 30.1 g/dL      RDW 18.5 %      RDW-SD 65.6 fl      MPV 9.9 fL      Platelets 345 10*3/mm3      Neutrophil % 78.5 %      Lymphocyte % 9.4 %       Monocyte % 8.0 %      Eosinophil % 2.5 %      Basophil % 0.6 %      Immature Grans % 1.0 %      Neutrophils, Absolute 10.75 10*3/mm3      Lymphocytes, Absolute 1.28 10*3/mm3      Monocytes, Absolute 1.09 10*3/mm3      Eosinophils, Absolute 0.34 10*3/mm3      Basophils, Absolute 0.08 10*3/mm3      Immature Grans, Absolute 0.13 10*3/mm3      nRBC 0.0 /100 WBC     Occult Blood, Fecal By Immunoassay - Stool, Per Rectum [[id number]]  (Abnormal) Collected: 09/03/24 0935    Specimen: Stool from Per Rectum Updated: 09/03/24 1006     Occult Blood, Fecal by Immunoassay Positive             Imaging Results (Last 24 Hours)       ** No results found for the last 24 hours. **                 I reviewed the patient's new clinical results.    Medication Review:   Scheduled Meds:calcium acetate, 1,334 mg, Oral, TID  carvedilol, 3.125 mg, Oral, BID With Meals  cholecalciferol, 1,000 Units, Oral, Daily  pantoprazole, 40 mg, Oral, BID AC  rosuvastatin, 10 mg, Oral, Daily  sevelamer, 2,400 mg, Oral, TID With Meals  sodium bicarbonate, 1,300 mg, Oral, Daily  sucralfate, 1 g, Oral, 4x Daily AC & at Bedtime  temazepam, 30 mg, Oral, Nightly      Continuous Infusions:   PRN Meds:.  acetaminophen    albumin human    albuterol    [COMPLETED] Insert Peripheral IV **AND** sodium chloride     Interval History:    Assessment & Plan      Anemia  -Due to GI blood loss; she continues to have melena; continue PPI, Carafate.  Monitor H&H, transfuse less than 7  - Currently receiving 1 unit PRBC  - GI planning EGD\colonoscopy today     End-stage renal disease-continue Monday Wednesday Friday dialysis schedule     Mood disorder-continue home medications     Secondary hyperparathyroidism-phosphate binder   essential hypertension-carvedilol     SCDs for DVT prophylaxis     I discussed the patient's findings and my recommendations with patient.     Marzena Puga, MAEGAN  09/04/24  09:07 EDT

## 2024-09-05 ENCOUNTER — READMISSION MANAGEMENT (OUTPATIENT)
Dept: CALL CENTER | Facility: HOSPITAL | Age: 74
End: 2024-09-05
Payer: MEDICARE

## 2024-09-05 ENCOUNTER — INPATIENT HOSPITAL (AMBULATORY)
Age: 74
End: 2024-09-05

## 2024-09-05 ENCOUNTER — INPATIENT HOSPITAL (AMBULATORY)
Dept: URBAN - METROPOLITAN AREA HOSPITAL 84 | Facility: HOSPITAL | Age: 74
End: 2024-09-05

## 2024-09-05 VITALS
BODY MASS INDEX: 29.43 KG/M2 | HEART RATE: 84 BPM | WEIGHT: 149.91 LBS | SYSTOLIC BLOOD PRESSURE: 137 MMHG | TEMPERATURE: 98.4 F | RESPIRATION RATE: 14 BRPM | DIASTOLIC BLOOD PRESSURE: 92 MMHG | OXYGEN SATURATION: 95 % | HEIGHT: 60 IN

## 2024-09-05 DIAGNOSIS — K92.1 MELENA: ICD-10-CM

## 2024-09-05 DIAGNOSIS — D64.9 ANEMIA, UNSPECIFIED: ICD-10-CM

## 2024-09-05 LAB
ANION GAP SERPL CALCULATED.3IONS-SCNC: 20.1 MMOL/L (ref 5–15)
BASOPHILS # BLD AUTO: 0.07 10*3/MM3 (ref 0–0.2)
BASOPHILS NFR BLD AUTO: 0.6 % (ref 0–1.5)
BH BB BLOOD EXPIRATION DATE: NORMAL
BH BB BLOOD TYPE BARCODE: 9500
BH BB DISPENSE STATUS: NORMAL
BH BB PRODUCT CODE: NORMAL
BH BB UNIT NUMBER: NORMAL
BUN SERPL-MCNC: 56 MG/DL (ref 8–23)
BUN/CREAT SERPL: 6.6 (ref 7–25)
CALCIUM SPEC-SCNC: 9.1 MG/DL (ref 8.6–10.5)
CHLORIDE SERPL-SCNC: 104 MMOL/L (ref 98–107)
CO2 SERPL-SCNC: 17.9 MMOL/L (ref 22–29)
CREAT SERPL-MCNC: 8.51 MG/DL (ref 0.57–1)
CROSSMATCH INTERPRETATION: NORMAL
DEPRECATED RDW RBC AUTO: 58.6 FL (ref 37–54)
EGFRCR SERPLBLD CKD-EPI 2021: 4.5 ML/MIN/1.73
EOSINOPHIL # BLD AUTO: 0.35 10*3/MM3 (ref 0–0.4)
EOSINOPHIL NFR BLD AUTO: 3 % (ref 0.3–6.2)
ERYTHROCYTE [DISTWIDTH] IN BLOOD BY AUTOMATED COUNT: 17.2 % (ref 12.3–15.4)
GLUCOSE SERPL-MCNC: 132 MG/DL (ref 65–99)
HCT VFR BLD AUTO: 27.8 % (ref 34–46.6)
HGB BLD-MCNC: 8.8 G/DL (ref 12–15.9)
IMM GRANULOCYTES # BLD AUTO: 0.09 10*3/MM3 (ref 0–0.05)
IMM GRANULOCYTES NFR BLD AUTO: 0.8 % (ref 0–0.5)
LYMPHOCYTES # BLD AUTO: 1.07 10*3/MM3 (ref 0.7–3.1)
LYMPHOCYTES NFR BLD AUTO: 9.2 % (ref 19.6–45.3)
MCH RBC QN AUTO: 30.3 PG (ref 26.6–33)
MCHC RBC AUTO-ENTMCNC: 31.7 G/DL (ref 31.5–35.7)
MCV RBC AUTO: 95.9 FL (ref 79–97)
MONOCYTES # BLD AUTO: 0.74 10*3/MM3 (ref 0.1–0.9)
MONOCYTES NFR BLD AUTO: 6.4 % (ref 5–12)
NEUTROPHILS NFR BLD AUTO: 80 % (ref 42.7–76)
NEUTROPHILS NFR BLD AUTO: 9.28 10*3/MM3 (ref 1.7–7)
NRBC BLD AUTO-RTO: 0 /100 WBC (ref 0–0.2)
PLATELET # BLD AUTO: 330 10*3/MM3 (ref 140–450)
PMV BLD AUTO: 9.9 FL (ref 6–12)
POTASSIUM SERPL-SCNC: 5.1 MMOL/L (ref 3.5–5.2)
RBC # BLD AUTO: 2.9 10*6/MM3 (ref 3.77–5.28)
SODIUM SERPL-SCNC: 142 MMOL/L (ref 136–145)
UNIT  ABO: NORMAL
UNIT  RH: NORMAL
WBC NRBC COR # BLD AUTO: 11.6 10*3/MM3 (ref 3.4–10.8)

## 2024-09-05 PROCEDURE — 85025 COMPLETE CBC W/AUTO DIFF WBC: CPT | Performed by: INTERNAL MEDICINE

## 2024-09-05 PROCEDURE — 99232 SBSQ HOSP IP/OBS MODERATE 35: CPT | Mod: FS | Performed by: NURSE PRACTITIONER

## 2024-09-05 PROCEDURE — 5A1D70Z PERFORMANCE OF URINARY FILTRATION, INTERMITTENT, LESS THAN 6 HOURS PER DAY: ICD-10-PCS | Performed by: INTERNAL MEDICINE

## 2024-09-05 PROCEDURE — 80048 BASIC METABOLIC PNL TOTAL CA: CPT | Performed by: INTERNAL MEDICINE

## 2024-09-05 RX ORDER — AMLODIPINE BESYLATE 5 MG/1
5 TABLET ORAL
Status: DISCONTINUED | OUTPATIENT
Start: 2024-09-05 | End: 2024-09-05 | Stop reason: HOSPADM

## 2024-09-05 RX ADMIN — CALCIUM ACETATE 1334 MG: 667 CAPSULE ORAL at 07:40

## 2024-09-05 RX ADMIN — ROSUVASTATIN 10 MG: 10 TABLET, FILM COATED ORAL at 12:46

## 2024-09-05 RX ADMIN — CARVEDILOL 3.12 MG: 3.12 TABLET, FILM COATED ORAL at 12:46

## 2024-09-05 RX ADMIN — SEVELAMER CARBONATE 2400 MG: 800 TABLET, FILM COATED ORAL at 07:39

## 2024-09-05 RX ADMIN — SUCRALFATE 1 G: 1 TABLET ORAL at 12:46

## 2024-09-05 RX ADMIN — SEVELAMER CARBONATE 2400 MG: 800 TABLET, FILM COATED ORAL at 12:46

## 2024-09-05 RX ADMIN — AMLODIPINE BESYLATE 5 MG: 5 TABLET ORAL at 12:46

## 2024-09-05 RX ADMIN — Medication 1000 UNITS: at 12:46

## 2024-09-05 RX ADMIN — SODIUM BICARBONATE 1300 MG: 650 TABLET ORAL at 12:46

## 2024-09-05 NOTE — PLAN OF CARE
Goal Outcome Evaluation:         Patient received blood transfusion. HGB drawn at 2100, HGB 8.5, complaining of IV being tender at 0230. Was not able to flush, patient requested it to be taken out and wait for IV team to place another IV.

## 2024-09-05 NOTE — DISCHARGE SUMMARY
Date of Discharge:  9/5/2024    Discharge Diagnosis:   Melena  Anemia  History of renal cell carcinoma   End-stage renal disease  History of gastric bypass  Mood disorder  Secondary hyperparathyroid    Presenting Problem/History of Present Illness  Active Hospital Problems    Diagnosis  POA    Anemia [D64.9]  Yes      Resolved Hospital Problems   No resolved problems to display.          Hospital Course    Patient is a 74 y.o. female presented with multiple medical problems and recent hospitalization for anemia due to GI blood loss (portal gastropathy, gastric polyp) who presents with progressive weakness for the last 3 days.  She had EGD on 8/27/2024 with portal gastropathy and gastric polyp.  She was treated with continuous IV octreotide while in the hospital.  Dr. Blackmon was attempting to get long-acting (monthly) octreotide approved for her.  She was discharged before this approval could be secured.  When she went home she was having normal brown stools, and then 48 hours later she started having dark black stools and began to feel weak. On admission hgb was 6.  GI was consulted and she underwent EGD on 9/4 found to have oozing antral polyp with a GSS this cauterized and clipped with resolution of the bleeding.  This morning hemoglobin stable continue PPI.  Patient tolerating diet had hemodialysis today.  She will have hemodialysis again tomorrow with labs to monitor hemoglobin.  She will need to follow-up with PCP in 2 weeks.    Procedures Performed    Procedure(s):  COLONOSCOPY WITH POLYPECTOMY X 1  ESOPHAGOGASTRODUODENOSCOPY WITH SMALL BOWEL ENTEROSCOPY, ARGON PLASMA COAGULATION AND ENDOSCOPIC CLIPPING X 2 OF GASTRIC ANTRAL VASCULAR ECTASIA  -------------------  09/04 1400 Ambulatory Esophageal PH Monitoring    Consults:   Consults       Date and Time Order Name Status Description    9/3/2024  8:42 AM Inpatient Gastroenterology Consult Completed     9/2/2024  9:32 PM Inpatient Nephrology Consult Completed      9/2/2024  4:09 PM Family Medicine Consult      8/26/2024  4:16 PM Inpatient Nephrology Consult Completed     8/26/2024  3:58 PM Inpatient Gastroenterology Consult Completed             Pertinent Test Results:    Lab Results (most recent)       Procedure Component Value Units Date/Time    Tissue Pathology Exam [[id number]] Collected: 09/04/24 1429    Specimen: Polyp from Large Intestine, Left / Descending Colon Updated: 09/05/24 0953    Basic Metabolic Panel [[id number]]  (Abnormal) Collected: 09/05/24 0548    Specimen: Blood from Arm, Right Updated: 09/05/24 0633     Glucose 132 mg/dL      BUN 56 mg/dL      Creatinine 8.51 mg/dL      Sodium 142 mmol/L      Potassium 5.1 mmol/L      Chloride 104 mmol/L      CO2 17.9 mmol/L      Calcium 9.1 mg/dL      BUN/Creatinine Ratio 6.6     Anion Gap 20.1 mmol/L      eGFR 4.5 mL/min/1.73      Comment: <15 Indicative of kidney failure       Narrative:      GFR Normal >60  Chronic Kidney Disease <60  Kidney Failure <15    The GFR formula is only valid for adults with stable renal function between ages 18 and 70.    CBC & Differential [[id number]]  (Abnormal) Collected: 09/05/24 0548    Specimen: Blood from Arm, Right Updated: 09/05/24 0608    Narrative:      The following orders were created for panel order CBC & Differential.  Procedure                               Abnormality         Status                     ---------                               -----------         ------                     CBC Auto Differential[[id number]]        Abnormal            Final result                 Please view results for these tests on the individual orders.    CBC Auto Differential [[id number]]  (Abnormal) Collected: 09/05/24 0548    Specimen: Blood from Arm, Right Updated: 09/05/24 0608     WBC 11.60 10*3/mm3      RBC 2.90 10*6/mm3      Hemoglobin 8.8 g/dL      Hematocrit 27.8 %      MCV 95.9 fL      MCH 30.3 pg      MCHC 31.7 g/dL      RDW 17.2 %      RDW-SD 58.6 fl      MPV 9.9 fL       Platelets 330 10*3/mm3      Neutrophil % 80.0 %      Lymphocyte % 9.2 %      Monocyte % 6.4 %      Eosinophil % 3.0 %      Basophil % 0.6 %      Immature Grans % 0.8 %      Neutrophils, Absolute 9.28 10*3/mm3      Lymphocytes, Absolute 1.07 10*3/mm3      Monocytes, Absolute 0.74 10*3/mm3      Eosinophils, Absolute 0.35 10*3/mm3      Basophils, Absolute 0.07 10*3/mm3      Immature Grans, Absolute 0.09 10*3/mm3      nRBC 0.0 /100 WBC     Hemoglobin & Hematocrit, Blood [[id number]]  (Abnormal) Collected: 09/04/24 2053    Specimen: Blood from Arm, Right Updated: 09/04/24 2128     Hemoglobin 8.5 g/dL      Hematocrit 26.5 %     POC Chem Panel [[id number]]  (Abnormal) Collected: 09/04/24 1330    Specimen: Venous Blood Updated: 09/04/24 1335     Glucose 66 mg/dL      Comment: Serial Number: [id number]:  [id number]        Sodium 148 mmol/L      POC Potassium 4.4 mmol/L      Ionized Calcium 1.05 mmol/L      Hematocrit 17 %      Hemoglobin 5.8 g/dL      pH, Venous 7.370 pH Units      pCO2, Venous 31.0 mm Hg      CO2 CONTENT  19 mmol/L      HCO3, Venous 17.9 mmol/L      Base Excess, Venous <0.0 mmol/L      Base Deficit --     O2 Saturation, Venous --     pO2, Venous 26.0 mm Hg     Phosphorus [[id number]]  (Normal) Collected: 09/04/24 0403    Specimen: Blood from Arm, Right Updated: 09/04/24 0448     Phosphorus 3.8 mg/dL     Comprehensive Metabolic Panel [[id number]]  (Abnormal) Collected: 09/04/24 0403    Specimen: Blood from Arm, Right Updated: 09/04/24 0448     Glucose 99 mg/dL      BUN 57 mg/dL      Creatinine 7.24 mg/dL      Sodium 145 mmol/L      Potassium 5.6 mmol/L      Comment: Result checked          Chloride 106 mmol/L      CO2 22.6 mmol/L      Calcium 9.3 mg/dL      Total Protein 6.0 g/dL      Albumin 3.5 g/dL      ALT (SGPT) 6 U/L      AST (SGOT) 15 U/L      Alkaline Phosphatase 99 U/L      Total Bilirubin 0.3 mg/dL      Globulin 2.5 gm/dL      A/G Ratio 1.4 g/dL      BUN/Creatinine Ratio 7.9     Anion Gap 16.4 mmol/L       eGFR 5.5 mL/min/1.73      Comment: <15 Indicative of kidney failure       Narrative:      GFR Normal >60  Chronic Kidney Disease <60  Kidney Failure <15    The GFR formula is only valid for adults with stable renal function between ages 18 and 70.    CBC & Differential [[id number]]  (Abnormal) Collected: 09/04/24 0304    Specimen: Blood from Arm, Right Updated: 09/04/24 0418    Narrative:      The following orders were created for panel order CBC & Differential.  Procedure                               Abnormality         Status                     ---------                               -----------         ------                     CBC Auto Differential[[id number]]        Abnormal            Final result                 Please view results for these tests on the individual orders.    CBC Auto Differential [[id number]]  (Abnormal) Collected: 09/04/24 0304    Specimen: Blood from Arm, Right Updated: 09/04/24 0418     WBC 13.67 10*3/mm3      RBC 2.29 10*6/mm3      Hemoglobin 6.8 g/dL      Hematocrit 22.6 %      MCV 98.7 fL      MCH 29.7 pg      MCHC 30.1 g/dL      RDW 18.5 %      RDW-SD 65.6 fl      MPV 9.9 fL      Platelets 345 10*3/mm3      Neutrophil % 78.5 %      Lymphocyte % 9.4 %      Monocyte % 8.0 %      Eosinophil % 2.5 %      Basophil % 0.6 %      Immature Grans % 1.0 %      Neutrophils, Absolute 10.75 10*3/mm3      Lymphocytes, Absolute 1.28 10*3/mm3      Monocytes, Absolute 1.09 10*3/mm3      Eosinophils, Absolute 0.34 10*3/mm3      Basophils, Absolute 0.08 10*3/mm3      Immature Grans, Absolute 0.13 10*3/mm3      nRBC 0.0 /100 WBC     Occult Blood, Fecal By Immunoassay - Stool, Per Rectum [[id number]]  (Abnormal) Collected: 09/03/24 0935    Specimen: Stool from Per Rectum Updated: 09/03/24 1006     Occult Blood, Fecal by Immunoassay Positive    Hemoglobin & Hematocrit, Blood [[id number]]  (Abnormal) Collected: 09/03/24 0523    Specimen: Blood Updated: 09/03/24 0603     Hemoglobin 8.1 g/dL      Comment:  Result checked          Hematocrit 26.8 %     Basic Metabolic Panel [[id number]]  (Abnormal) Collected: 09/02/24 2227    Specimen: Blood Updated: 09/02/24 2302     Glucose 105 mg/dL      BUN 40 mg/dL      Creatinine 5.23 mg/dL      Sodium 140 mmol/L      Potassium 4.1 mmol/L      Chloride 104 mmol/L      CO2 23.7 mmol/L      Calcium 8.4 mg/dL      BUN/Creatinine Ratio 7.6     Anion Gap 12.3 mmol/L      eGFR 8.1 mL/min/1.73      Comment: <15 Indicative of kidney failure       Narrative:      GFR Normal >60  Chronic Kidney Disease <60  Kidney Failure <15    The GFR formula is only valid for adults with stable renal function between ages 18 and 70.    Reticulocytes [[id number]]  (Abnormal) Collected: 09/02/24 2227    Specimen: Blood Updated: 09/02/24 2243     Reticulocyte % 3.04 %      Reticulocyte Absolute 0.0727 10*6/mm3     CBC (No Diff) [[id number]]  (Abnormal) Collected: 09/02/24 2227    Specimen: Blood Updated: 09/02/24 2243     WBC 11.51 10*3/mm3      RBC 2.39 10*6/mm3      Hemoglobin 7.1 g/dL      Hematocrit 22.2 %      MCV 92.9 fL      MCH 29.7 pg      MCHC 32.0 g/dL      RDW 17.6 %      RDW-SD 58.4 fl      MPV 9.9 fL      Platelets 340 10*3/mm3     Comprehensive Metabolic Panel [[id number]]  (Abnormal) Collected: 09/02/24 1439    Specimen: Blood Updated: 09/02/24 1506     Glucose 124 mg/dL      BUN 33 mg/dL      Creatinine 4.31 mg/dL      Sodium 138 mmol/L      Potassium 3.8 mmol/L      Comment: Slight hemolysis detected by analyzer. Result may be falsely elevated.        Chloride 101 mmol/L      CO2 25.3 mmol/L      Calcium 9.0 mg/dL      Total Protein 5.8 g/dL      Albumin 3.5 g/dL      ALT (SGPT) 8 U/L      AST (SGOT) 18 U/L      Alkaline Phosphatase 95 U/L      Total Bilirubin 0.2 mg/dL      Globulin 2.3 gm/dL      A/G Ratio 1.5 g/dL      BUN/Creatinine Ratio 7.7     Anion Gap 11.7 mmol/L      eGFR 10.2 mL/min/1.73      Comment: <15 Indicative of kidney failure       Narrative:      GFR Normal >60  Chronic  Kidney Disease <60  Kidney Failure <15    The GFR formula is only valid for adults with stable renal function between ages 18 and 70.    [employer] Draw [[id number]] Collected: 09/02/24 1439    Specimen: Blood Updated: 09/02/24 1445    Narrative:      The following orders were created for panel order [employer] Draw.  Procedure                               Abnormality         Status                     ---------                               -----------         ------                     Green Top (Gel)[[id number]]                                  Final result               Lavender Top[[id number]]                                     Final result               Gold Top - SST[[id number]]                                   Final result               Light Blue Top[[id number]]                                   Final result                 Please view results for these tests on the individual orders.    Green Top (Gel) [[id number]] Collected: 09/02/24 1439    Specimen: Blood Updated: 09/02/24 1445     Extra Tube Hold for add-ons.     Comment: Auto resulted.       Lavender Top [[id number]] Collected: 09/02/24 1439    Specimen: Blood Updated: 09/02/24 1445     Extra Tube hold for add-on     Comment: Auto resulted       Gold Top - SST [[id number]] Collected: 09/02/24 1439    Specimen: Blood Updated: 09/02/24 1445     Extra Tube Hold for add-ons.     Comment: Auto resulted.       Light Blue Top [[id number]] Collected: 09/02/24 1439    Specimen: Blood Updated: 09/02/24 1445     Extra Tube Hold for add-ons.     Comment: Auto resulted                Results for orders placed during the hospital encounter of 07/25/21    Adult Transthoracic Echo Complete W/ Cont if Necessary Per Protocol    Interpretation Summary  Normal LV size and contractility EF of 60%  Normal RV size  Normal atrial size  Aortic valve, mitral valve, tricuspid valve appears structurally normal, mild mitral regurgitation  seen.  No pericardial effusion seen.  Proximal aorta  appears normal in size.         Condition on Discharge:  Stable    Vital Signs  Temp:  [98.1 °F (36.7 °C)-98.9 °F (37.2 °C)] 98.4 °F (36.9 °C)  Heart Rate:  [] 84  Resp:  [12-23] 14  BP: (124-154)/(42-92) 137/92      Physical Exam  Vitals reviewed.   Constitutional:       Appearance: She is not ill-appearing.   HENT:      Head: Normocephalic and atraumatic.      Right Ear: External ear normal.      Left Ear: External ear normal.      Nose: Nose normal.      Mouth/Throat:      Mouth: Mucous membranes are moist.   Eyes:      General:         Right eye: No discharge.         Left eye: No discharge.   Cardiovascular:      Rate and Rhythm: Normal rate and regular rhythm.      Pulses: Normal pulses.      Heart sounds: Normal heart sounds.   Pulmonary:      Effort: Pulmonary effort is normal.      Breath sounds: Normal breath sounds.   Abdominal:      General: Bowel sounds are normal.      Palpations: Abdomen is soft.   Musculoskeletal:         General: Normal range of motion.      Cervical back: Normal range of motion.   Skin:     General: Skin is warm.   Neurological:      Mental Status: She is alert and oriented to person, place, and time.   Psychiatric:         Behavior: Behavior normal.              Discharge Disposition  Home or Self Care    Discharge Medications     Discharge Medications        Continue These Medications        Instructions Start Date   acetaminophen 325 MG tablet  Commonly known as: TYLENOL   650 mg, Oral, Every 4 Hours PRN      albuterol sulfate  (90 Base) MCG/ACT inhaler  Commonly known as: PROVENTIL HFA;VENTOLIN HFA;PROAIR HFA   2 puffs, Inhalation, Every 4 Hours PRN      amLODIPine 10 MG tablet  Commonly known as: NORVASC   10 mg, Oral, Daily      calcium acetate 667 MG capsule capsule  Commonly known as: PHOS BINDER)   667 mg, Oral, 3 Times Daily      calcium acetate 667 MG capsule capsule  Commonly known as: PHOS BINDER)   667 mg, Oral, With Snacks      carvedilol 3.125 MG  tablet  Commonly known as: COREG   3.125 mg, Oral, 2 Times Daily With Meals      cholecalciferol 25 MCG (1000 UT) tablet  Commonly known as: VITAMIN D3   1,000 Units, Oral, Daily      hyoscyamine 0.125 MG tablet  Commonly known as: ANASPAZLEVSIN   Take 1 tablet by mouth Every 4 (Four) Hours As Needed.      lidocaine-prilocaine 2.5-2.5 % cream  Commonly known as: EMLA   1 Application, Topical, Daily PRN, Apply to left 5th finger      losartan 50 MG tablet  Commonly known as: COZAAR   50 mg, Oral, Daily      midodrine 5 MG tablet  Commonly known as: PROAMATINE   5 mg, Oral, 3 Times Daily PRN      pantoprazole 40 MG EC tablet  Commonly known as: PROTONIX   1 tablet, Oral, 2 Times Daily      RenaPlex-D tablet tablet  Generic drug: multivitamin with minerals   1 tablet, Oral, Daily      SandoSTATIN LAR DEPOT 30 MG injection  Generic drug: octreotide   Inject 30 mg subcutaneously once a month      sevelamer 800 MG tablet  Commonly known as: RENVELA   2,400 mg, Oral, 3 Times Daily With Meals      sevelamer 800 MG tablet  Commonly known as: RENAGEL   3 tablets, Oral, With Snacks      sodium bicarbonate 650 MG tablet   1,300 mg, Oral, 3 Times Daily      sucralfate 1 g tablet  Commonly known as: Carafate   1 g, Oral, 4 Times Daily      temazepam 30 MG capsule  Commonly known as: RESTORIL   30 mg, Oral, Nightly             Stop These Medications      metoprolol tartrate 25 MG tablet  Commonly known as: LOPRESSOR     simvastatin 40 MG tablet  Commonly known as: ZOCOR              Discharge Diet:   Diet Instructions       Diet: Cardiac Diets, Renal Diets; Healthy Heart (2-3 Na+); Regular (IDDSI 7); Thin (IDDSI 0); Low Sodium (2-3g)      Discharge Diet:  Cardiac Diets  Renal Diets       Cardiac Diet: Healthy Heart (2-3 Na+)    Texture: Regular (IDDSI 7)    Fluid Consistency: Thin (IDDSI 0)    Renal Diet: Low Sodium (2-3g)            Activity at Discharge:   Activity Instructions       Gradually Increase Activity Until at  Pre-Hospitalization Level              Follow-up Appointments  Future Appointments   Date Time Provider Department [place]   10/1/2024 11:00 AM LAB MD [facility]   10/1/2024 11:00 AM Anjum Cam MD [facility]   10/15/2024  1:45 PM Mak Sosa MD [facility]     Additional Instructions for the Follow-ups that You Need to Schedule       Discharge Follow-up with PCP   As directed       Currently Documented PCP:    Jin Malcolm PA-C    PCP Phone Number:    [phone]     Follow Up Details: 2 weeks                Test Results Pending at Discharge  Pending Labs       Order Current Status    Tissue Pathology Exam In process             MAEGAN Jordan  09/05/24  13:14 EDT    Time: Discharge 25 min

## 2024-09-05 NOTE — CASE MANAGEMENT/SOCIAL WORK
Case Management Discharge Note      Final Note: Routine home         Selected Continued Care - Discharged on 9/5/2024 Admission date: 9/2/2024 - Discharge disposition: Home or Self Care         Transportation Services  Private: Car    Final Discharge Disposition Code: 01 - home or self-care

## 2024-09-05 NOTE — PROGRESS NOTES
LOS: 3 days   Patient Care Team:  Jin Malcolm PA-C as PCP - General (Physician Assistant)  Nilay Stevens MD as Consulting Physician (General Surgery)  Stephen Perea MD as Surgeon (Thoracic Surgery)  Makayla Navarro, RN as Nurse Navigator  Iliana Ro MD as Consulting Physician (Hematology and Oncology)      Subjective     Interval History:     Subjective: Patient denies any further bright red blood per rectum or melena.  No abdominal pain.  No nausea/vomiting.  Tolerating diet.      ROS:   No chest pain, shortness of breath, or cough.        Medication Review:     Current Facility-Administered Medications:     acetaminophen (TYLENOL) tablet 650 mg, 650 mg, Oral, Q4H PRN, Jeffery White MD    albuterol (PROVENTIL) nebulizer solution 0.083% 2.5 mg/3mL, 2.5 mg, Nebulization, Q6H PRN, Jeffery White MD    amLODIPine (NORVASC) tablet 5 mg, 5 mg, Oral, Q24H, Barrington Pruitt MD, 5 mg at 09/05/24 1246    calcium acetate (PHOS BINDER)) capsule 1,334 mg, 1,334 mg, Oral, TID, Jeffery White MD, 1,334 mg at 09/05/24 0740    carvedilol (COREG) tablet 3.125 mg, 3.125 mg, Oral, BID With Meals, Jeffery White MD, 3.125 mg at 09/05/24 1246    cholecalciferol (VITAMIN D3) tablet 1,000 Units, 1,000 Units, Oral, Daily, Jeffery White MD, 1,000 Units at 09/05/24 1246    ondansetron ODT (ZOFRAN-ODT) disintegrating tablet 4 mg, 4 mg, Oral, Q6H PRN **OR** ondansetron (ZOFRAN) injection 4 mg, 4 mg, Intravenous, Q6H PRN, Jeffery White MD    pantoprazole (PROTONIX) EC tablet 40 mg, 40 mg, Oral, BID AC, Jeffery Whiet MD, 40 mg at 09/04/24 1630    rosuvastatin (CRESTOR) tablet 10 mg, 10 mg, Oral, Daily, Jeffery White MD, 10 mg at 09/05/24 1246    sevelamer (RENVELA) tablet 2,400 mg, 2,400 mg, Oral, TID With Meals, Jeffery White MD, 2,400 mg at 09/05/24 1246    sodium bicarbonate tablet 1,300 mg, 1,300 mg, Oral, Daily, Jeffery White MD, 1,300 mg at 09/05/24 1246    [COMPLETED] Insert Peripheral IV, ,  , Once **AND** sodium chloride 0.9 % flush 10 mL, 10 mL, Intravenous, PRN, Jeffery White MD, 10 mL at 09/04/24 2049    sucralfate (CARAFATE) tablet 1 g, 1 g, Oral, 4x Daily AC & at Bedtime, Jeffery White MD, 1 g at 09/05/24 1246    temazepam (RESTORIL) capsule 30 mg, 30 mg, Oral, Nightly, Jeffery White MD, 30 mg at 09/04/24 2048      Objective     Vital Signs  Vitals:    09/05/24 1100 09/05/24 1130 09/05/24 1140 09/05/24 1243   BP: 135/58 140/49 134/53 137/92   BP Location: Right leg Right leg Right leg Right leg   Patient Position: Lying Lying Lying Sitting   Pulse: 74 63 78 84   Resp: 16 12 15 14   Temp:    98.4 °F (36.9 °C)   TempSrc:    Oral   SpO2: 93% 93% 96% 95%   Weight:       Height:           Physical Exam:     General Appearance:    Awake and alert, in no acute distress   Head:    Normocephalic, without obvious abnormality   Eyes:          Conjunctivae normal, anicteric sclera   Throat:   No oral lesions, no thrush, oral mucosa moist   Neck:   No adenopathy, supple, no JVD   Lungs:     respirations regular, even and unlabored   Abdomen:     Soft, non-tender, no rebound or guarding, non-distended   Rectal:     Deferred   Extremities:   No edema, no cyanosis   Skin:   No bruising or rash, no jaundice        Results Review:    CBC    Results from last 7 days   Lab Units 09/05/24  0548 09/04/24  2053 09/04/24  1330 09/04/24  0304 09/03/24  0523 09/02/24  2227 09/02/24  1439   WBC 10*3/mm3 11.60*  --   --  13.67*  --  11.51* 12.50*   HEMOGLOBIN g/dL 8.8* 8.5*  --  6.8* 8.1* 7.1* 6.0*   HEMOGLOBIN, POC g/dL  --   --  5.8*  --   --   --   --    PLATELETS 10*3/mm3 330  --   --  345  --  340 355     CMP   Results from last 7 days   Lab Units 09/05/24  0548 09/04/24  0403 09/02/24  2227 09/02/24  1439 08/30/24  0000   SODIUM mmol/L 142 145 140 138  --    POTASSIUM mmol/L 5.1 5.6* 4.1 3.8 5.0   CHLORIDE mmol/L 104 106 104 101  --    CO2 mmol/L 17.9* 22.6 23.7 25.3  --    BUN mg/dL 56* 57* 40* 33*  --     CREATININE mg/dL 8.51* 7.24* 5.23* 4.31*  --    GLUCOSE mg/dL 132* 99 105* 124*  --    ALBUMIN g/dL  --  3.5  --  3.5  --    BILIRUBIN mg/dL  --  0.3  --  0.2  --    ALK PHOS U/L  --  99  --  95  --    AST (SGOT) U/L  --  15  --  18  --    ALT (SGPT) U/L  --  6  --  8  --    PHOSPHORUS mg/dL  --  3.8  --   --   --      Cr Clearance Estimated Creatinine Clearance: 4.9 mL/min (A) (by C-G formula based on SCr of 8.51 mg/dL (H)).  Coag     HbA1C   Lab Results   Component Value Date    HGBA1C 5.86 (H) 08/26/2024    HGBA1C 5.5 11/07/2020    HGBA1C 6.2 (H) 03/28/2020         Infection     UA      Microbiology Results (last 10 days)       ** No results found for the last 240 hours. **          Imaging Results (Last 72 Hours)       ** No results found for the last 72 hours. 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  GI consulted for anemia.  She was recently seen by Dr. Blackmon who did a EGD 8/27/2024 showing GAVE s/p APC and gastric polypectomy s/p APC.  She was discharged from this hospital on 8/29/2024 started feeling worse again on 8/30/2024.    Patient denies any further overt GI bleeding.  S/p EGD/mobile enteroscopy/colonoscopy yesterday showing a slowly oozing antral polyp with AVMs s/p APC and Hemoclip placement x 3, Billroth II gastric anatomy, colon polyp, diverticulosis, and grade 2 internal/external hemorrhoids.  Hemoglobin is 8.8 today from 8.5.  Continue to monitor H/H and transfuse as  needed.  Continue PPI and Carafate.  Diet as tolerated.  Not much else to add from a GI standpoint as an inpatient at this time.  She can follow-up in our office in 4 to 6 weeks after discharge.  GI will be available as needed.    Electronically signed by MAEGAN Mena, 09/05/24, 1:49 PM EDT.

## 2024-09-05 NOTE — PLAN OF CARE
Problem: Adult Inpatient Plan of Care  Goal: Plan of Care Review  Outcome: Met  Goal: Patient-Specific Goal (Individualized)  Outcome: Met  Goal: Absence of Hospital-Acquired Illness or Injury  Outcome: Met  Intervention: Identify and Manage Fall Risk  Recent Flowsheet Documentation  Taken 9/5/2024 1243 by Sharon Ernandez RN  Safety Promotion/Fall Prevention: safety round/check completed  Taken 9/5/2024 1000 by Sharon Ernandez RN  Safety Promotion/Fall Prevention: patient off unit  Taken 9/5/2024 0800 by Sharon Ernandez RN  Safety Promotion/Fall Prevention: patient off unit  Taken 9/5/2024 0731 by Sharon Ernandez RN  Safety Promotion/Fall Prevention:   safety round/check completed   room organization consistent   nonskid shoes/slippers when out of bed   muscle strengthening facilitated   clutter free environment maintained  Intervention: Prevent Skin Injury  Recent Flowsheet Documentation  Taken 9/5/2024 0731 by Sharon Ernandez RN  Body Position: position changed independently  Skin Protection:   adhesive use limited   transparent dressing maintained   tubing/devices free from skin contact  Intervention: Prevent and Manage VTE (Venous Thromboembolism) Risk  Recent Flowsheet Documentation  Taken 9/5/2024 0731 by Sharon Ernandez RN  Activity Management: up ad arnoldo  VTE Prevention/Management:   sequential compression devices off   patient refused intervention  Range of Motion: active ROM (range of motion) encouraged  Intervention: Prevent Infection  Recent Flowsheet Documentation  Taken 9/5/2024 0731 by Sharon Ernandez RN  Infection Prevention: single patient room provided  Goal: Optimal Comfort and Wellbeing  Outcome: Met  Intervention: Provide Person-Centered Care  Recent Flowsheet Documentation  Taken 9/5/2024 0731 by Sharon Ernandez RN  Trust Relationship/Rapport:   care explained   choices provided  Goal: Readiness for Transition of Care  Outcome: Met     Problem: Pain Acute  Goal:  Acceptable Pain Control and Functional Ability  Outcome: Met  Intervention: Prevent or Manage Pain  Recent Flowsheet Documentation  Taken 9/5/2024 0731 by Sharon Ernandez RN  Bowel Elimination Promotion: adequate fluid intake promoted  Medication Review/Management: medications reviewed  Intervention: Optimize Psychosocial Wellbeing  Recent Flowsheet Documentation  Taken 9/5/2024 0731 by Sharon Ernandez RN  Supportive Measures: active listening utilized  Diversional Activities: television     Problem: Skin Injury Risk Increased  Goal: Skin Health and Integrity  Outcome: Met  Intervention: Optimize Skin Protection  Recent Flowsheet Documentation  Taken 9/5/2024 0731 by Sharon Ernandez RN  Pressure Reduction Techniques: frequent weight shift encouraged  Head of Bed (HOB) Positioning: HOB elevated  Pressure Reduction Devices: pressure-redistributing mattress utilized  Skin Protection:   adhesive use limited   transparent dressing maintained   tubing/devices free from skin contact     Problem: Fall Injury Risk  Goal: Absence of Fall and Fall-Related Injury  Outcome: Met  Intervention: Identify and Manage Contributors  Recent Flowsheet Documentation  Taken 9/5/2024 0731 by Sharon Ernandez RN  Medication Review/Management: medications reviewed  Intervention: Promote Injury-Free Environment  Recent Flowsheet Documentation  Taken 9/5/2024 1243 by Sharon Ernandez RN  Safety Promotion/Fall Prevention: safety round/check completed  Taken 9/5/2024 1000 by Sharon Ernandez RN  Safety Promotion/Fall Prevention: patient off unit  Taken 9/5/2024 0800 by Sharon Ernandez RN  Safety Promotion/Fall Prevention: patient off unit  Taken 9/5/2024 0731 by Sharon Ernandez RN  Safety Promotion/Fall Prevention:   safety round/check completed   room organization consistent   nonskid shoes/slippers when out of bed   muscle strengthening facilitated   clutter free environment maintained   Goal Outcome Evaluation:   Pt had  dialysis today. Pt received discharge paperwork. Pt did not have IV at time of discharge. Pt to go home with family. No concerns.

## 2024-09-06 LAB
BH BB BLOOD EXPIRATION DATE: NORMAL
BH BB BLOOD TYPE BARCODE: 9500
BH BB DISPENSE STATUS: NORMAL
BH BB PRODUCT CODE: NORMAL
BH BB UNIT NUMBER: NORMAL
CROSSMATCH INTERPRETATION: NORMAL
LAB AP CASE REPORT: NORMAL
PATH REPORT.FINAL DX SPEC: NORMAL
PATH REPORT.GROSS SPEC: NORMAL
UNIT  ABO: NORMAL
UNIT  RH: NORMAL

## 2024-09-06 NOTE — OUTREACH NOTE
Prep Survey      Flowsheet Row Responses   [facility] facility patient discharged from? [facility]   Is LACE score < 7 ? No   Eligibility Readm Mgmt   Discharge diagnosis anemia, melena   Does the patient have one of the following disease processes/diagnoses(primary or secondary)? Other   Does the patient have Home health ordered? No   Is there a DME ordered? No   Prep survey completed? Yes            Cristina MORALES - Registered Nurse

## 2024-09-09 ENCOUNTER — READMISSION MANAGEMENT (OUTPATIENT)
Dept: CALL CENTER | Facility: HOSPITAL | Age: 74
End: 2024-09-09
Payer: MEDICARE

## 2024-09-09 NOTE — OUTREACH NOTE
Medical Week 1 Survey      Flowsheet Row Responses   [facility] patient discharged from? [facility]   Does the patient have one of the following disease processes/diagnoses(primary or secondary)? Other   Week 1 attempt successful? Yes   Call start time 1651   Call end time 1654   Discharge diagnosis anemia, melena   Meds reviewed with patient/caregiver? Yes   Is the patient having any side effects they believe may be caused by any medication additions or changes? No   Does the patient have all medications ordered at discharge? N/A   Is the patient taking all medications as directed (includes completed medication regime)? Yes   Does the patient have a primary care provider?  Yes   Does the patient have an appointment with their PCP within 7 days of discharge? Yes   Has the patient kept scheduled appointments due by today? N/A   Psychosocial issues? No   Did the patient receive a copy of their discharge instructions? Yes   Nursing interventions Reviewed instructions with patient   What is the patient's perception of their health status since discharge? Improving   Is the patient/caregiver able to teach back signs and symptoms related to disease process for when to call PCP? Yes   Is the patient/caregiver able to teach back signs and symptoms related to disease process for when to call 911? Yes   Is the patient/caregiver able to teach back the hierarchy of who to call/visit for symptoms/problems? PCP, Specialist, Home health nurse, Urgent Care, ED, 911 Yes   Additional teach back comments She is doing well.  Doesn't have any pain and has followed up with PCP.  Dialysis keeps close eye on her bp.   Week 1 call completed? Yes   Wrap up additional comments Denies questions or needs at this time.   Call end time 1654            Tawanna MEDINA - Licensed Nurse

## 2024-09-14 ENCOUNTER — HOSPITAL ENCOUNTER (INPATIENT)
Facility: HOSPITAL | Age: 74
LOS: 1 days | Discharge: HOME | DRG: 393 | End: 2024-09-15
Attending: EMERGENCY MEDICINE | Admitting: INTERNAL MEDICINE
Payer: MEDICARE

## 2024-09-14 ENCOUNTER — APPOINTMENT (OUTPATIENT)
Dept: CT IMAGING | Facility: HOSPITAL | Age: 74
DRG: 393 | End: 2024-09-14
Payer: MEDICARE

## 2024-09-14 DIAGNOSIS — R10.10 PAIN OF UPPER ABDOMEN: ICD-10-CM

## 2024-09-14 DIAGNOSIS — R10.31 RIGHT LOWER QUADRANT ABDOMINAL PAIN: Primary | ICD-10-CM

## 2024-09-14 PROBLEM — R10.9 PAIN IN THE ABDOMEN: Status: ACTIVE | Noted: 2024-09-14

## 2024-09-14 LAB
ALBUMIN SERPL-MCNC: 3.8 G/DL (ref 3.5–5.2)
ALBUMIN/GLOB SERPL: 1.2 G/DL
ALP SERPL-CCNC: 147 U/L (ref 39–117)
ALT SERPL W P-5'-P-CCNC: 11 U/L (ref 1–33)
ANION GAP SERPL CALCULATED.3IONS-SCNC: 14.6 MMOL/L (ref 5–15)
AST SERPL-CCNC: 30 U/L (ref 1–32)
BASOPHILS # BLD AUTO: 0.05 10*3/MM3 (ref 0–0.2)
BASOPHILS NFR BLD AUTO: 0.5 % (ref 0–1.5)
BILIRUB SERPL-MCNC: 0.2 MG/DL (ref 0–1.2)
BUN SERPL-MCNC: 21 MG/DL (ref 8–23)
BUN/CREAT SERPL: 4.3 (ref 7–25)
CALCIUM SPEC-SCNC: 9.4 MG/DL (ref 8.6–10.5)
CHLORIDE SERPL-SCNC: 98 MMOL/L (ref 98–107)
CO2 SERPL-SCNC: 23.4 MMOL/L (ref 22–29)
CREAT SERPL-MCNC: 4.83 MG/DL (ref 0.57–1)
DEPRECATED RDW RBC AUTO: 64.7 FL (ref 37–54)
EGFRCR SERPLBLD CKD-EPI 2021: 8.9 ML/MIN/1.73
EOSINOPHIL # BLD AUTO: 0.21 10*3/MM3 (ref 0–0.4)
EOSINOPHIL NFR BLD AUTO: 2.1 % (ref 0.3–6.2)
ERYTHROCYTE [DISTWIDTH] IN BLOOD BY AUTOMATED COUNT: 17.2 % (ref 12.3–15.4)
GLOBULIN UR ELPH-MCNC: 3.1 GM/DL
GLUCOSE SERPL-MCNC: 86 MG/DL (ref 65–99)
HCT VFR BLD AUTO: 36.2 % (ref 34–46.6)
HGB BLD-MCNC: 10.6 G/DL (ref 12–15.9)
IMM GRANULOCYTES # BLD AUTO: 0.06 10*3/MM3 (ref 0–0.05)
IMM GRANULOCYTES NFR BLD AUTO: 0.6 % (ref 0–0.5)
LIPASE SERPL-CCNC: 25 U/L (ref 13–60)
LYMPHOCYTES # BLD AUTO: 0.89 10*3/MM3 (ref 0.7–3.1)
LYMPHOCYTES NFR BLD AUTO: 8.7 % (ref 19.6–45.3)
MCH RBC QN AUTO: 29.9 PG (ref 26.6–33)
MCHC RBC AUTO-ENTMCNC: 29.3 G/DL (ref 31.5–35.7)
MCV RBC AUTO: 102 FL (ref 79–97)
MONOCYTES # BLD AUTO: 0.65 10*3/MM3 (ref 0.1–0.9)
MONOCYTES NFR BLD AUTO: 6.3 % (ref 5–12)
NEUTROPHILS NFR BLD AUTO: 8.38 10*3/MM3 (ref 1.7–7)
NEUTROPHILS NFR BLD AUTO: 81.8 % (ref 42.7–76)
NRBC BLD AUTO-RTO: 0 /100 WBC (ref 0–0.2)
PLATELET # BLD AUTO: 319 10*3/MM3 (ref 140–450)
PMV BLD AUTO: 9.6 FL (ref 6–12)
POTASSIUM SERPL-SCNC: 4.7 MMOL/L (ref 3.5–5.2)
PROT SERPL-MCNC: 6.9 G/DL (ref 6–8.5)
RBC # BLD AUTO: 3.55 10*6/MM3 (ref 3.77–5.28)
SODIUM SERPL-SCNC: 136 MMOL/L (ref 136–145)
WBC NRBC COR # BLD AUTO: 10.24 10*3/MM3 (ref 3.4–10.8)

## 2024-09-14 PROCEDURE — 36415 COLL VENOUS BLD VENIPUNCTURE: CPT

## 2024-09-14 PROCEDURE — 99222 1ST HOSP IP/OBS MODERATE 55: CPT | Performed by: SURGERY

## 2024-09-14 PROCEDURE — 25010000002 HYDROMORPHONE 1 MG/ML SOLUTION: Performed by: NURSE PRACTITIONER

## 2024-09-14 PROCEDURE — 25010000002 HYDROMORPHONE 1 MG/ML SOLUTION: Performed by: EMERGENCY MEDICINE

## 2024-09-14 PROCEDURE — 83690 ASSAY OF LIPASE: CPT | Performed by: EMERGENCY MEDICINE

## 2024-09-14 PROCEDURE — 25010000002 ONDANSETRON PER 1 MG: Performed by: EMERGENCY MEDICINE

## 2024-09-14 PROCEDURE — 99285 EMERGENCY DEPT VISIT HI MDM: CPT

## 2024-09-14 PROCEDURE — 80053 COMPREHEN METABOLIC PANEL: CPT | Performed by: EMERGENCY MEDICINE

## 2024-09-14 PROCEDURE — 74176 CT ABD & PELVIS W/O CONTRAST: CPT

## 2024-09-14 PROCEDURE — 85025 COMPLETE CBC W/AUTO DIFF WBC: CPT | Performed by: EMERGENCY MEDICINE

## 2024-09-14 RX ORDER — AMOXICILLIN 250 MG
2 CAPSULE ORAL 2 TIMES DAILY PRN
Status: DISCONTINUED | OUTPATIENT
Start: 2024-09-14 | End: 2024-09-15 | Stop reason: HOSPADM

## 2024-09-14 RX ORDER — NITROGLYCERIN 0.4 MG/1
0.4 TABLET SUBLINGUAL
Status: DISCONTINUED | OUTPATIENT
Start: 2024-09-14 | End: 2024-09-15 | Stop reason: HOSPADM

## 2024-09-14 RX ORDER — SODIUM CHLORIDE 0.9 % (FLUSH) 0.9 %
10 SYRINGE (ML) INJECTION EVERY 12 HOURS SCHEDULED
Status: DISCONTINUED | OUTPATIENT
Start: 2024-09-14 | End: 2024-09-15 | Stop reason: HOSPADM

## 2024-09-14 RX ORDER — SODIUM CHLORIDE 0.9 % (FLUSH) 0.9 %
10 SYRINGE (ML) INJECTION AS NEEDED
Status: DISCONTINUED | OUTPATIENT
Start: 2024-09-14 | End: 2024-09-15 | Stop reason: HOSPADM

## 2024-09-14 RX ORDER — ONDANSETRON 4 MG/1
4 TABLET, ORALLY DISINTEGRATING ORAL EVERY 6 HOURS PRN
Status: DISCONTINUED | OUTPATIENT
Start: 2024-09-14 | End: 2024-09-15 | Stop reason: HOSPADM

## 2024-09-14 RX ORDER — HYDRALAZINE HYDROCHLORIDE 20 MG/ML
10 INJECTION INTRAMUSCULAR; INTRAVENOUS EVERY 6 HOURS PRN
Status: DISCONTINUED | OUTPATIENT
Start: 2024-09-14 | End: 2024-09-15 | Stop reason: HOSPADM

## 2024-09-14 RX ORDER — BISACODYL 5 MG/1
5 TABLET, DELAYED RELEASE ORAL DAILY PRN
Status: DISCONTINUED | OUTPATIENT
Start: 2024-09-14 | End: 2024-09-15 | Stop reason: HOSPADM

## 2024-09-14 RX ORDER — BISACODYL 10 MG
10 SUPPOSITORY, RECTAL RECTAL DAILY PRN
Status: DISCONTINUED | OUTPATIENT
Start: 2024-09-14 | End: 2024-09-15 | Stop reason: HOSPADM

## 2024-09-14 RX ORDER — ONDANSETRON 2 MG/ML
4 INJECTION INTRAMUSCULAR; INTRAVENOUS ONCE
Status: COMPLETED | OUTPATIENT
Start: 2024-09-14 | End: 2024-09-14

## 2024-09-14 RX ORDER — SODIUM BICARBONATE 650 MG/1
1300 TABLET ORAL 3 TIMES DAILY
Status: DISCONTINUED | OUTPATIENT
Start: 2024-09-14 | End: 2024-09-15 | Stop reason: HOSPADM

## 2024-09-14 RX ORDER — ONDANSETRON 2 MG/ML
4 INJECTION INTRAMUSCULAR; INTRAVENOUS EVERY 6 HOURS PRN
Status: DISCONTINUED | OUTPATIENT
Start: 2024-09-14 | End: 2024-09-15 | Stop reason: HOSPADM

## 2024-09-14 RX ORDER — TEMAZEPAM 15 MG/1
30 CAPSULE ORAL NIGHTLY
Status: DISCONTINUED | OUTPATIENT
Start: 2024-09-14 | End: 2024-09-15 | Stop reason: HOSPADM

## 2024-09-14 RX ORDER — POLYETHYLENE GLYCOL 3350 17 G/17G
17 POWDER, FOR SOLUTION ORAL DAILY PRN
Status: DISCONTINUED | OUTPATIENT
Start: 2024-09-14 | End: 2024-09-15 | Stop reason: HOSPADM

## 2024-09-14 RX ORDER — SODIUM CHLORIDE 9 MG/ML
40 INJECTION, SOLUTION INTRAVENOUS AS NEEDED
Status: DISCONTINUED | OUTPATIENT
Start: 2024-09-14 | End: 2024-09-15 | Stop reason: HOSPADM

## 2024-09-14 RX ADMIN — SODIUM BICARBONATE 1300 MG: 650 TABLET ORAL at 21:00

## 2024-09-14 RX ADMIN — ONDANSETRON 4 MG: 2 INJECTION INTRAMUSCULAR; INTRAVENOUS at 10:49

## 2024-09-14 RX ADMIN — HYDROMORPHONE HYDROCHLORIDE 0.5 MG: 1 INJECTION, SOLUTION INTRAMUSCULAR; INTRAVENOUS; SUBCUTANEOUS at 13:40

## 2024-09-14 RX ADMIN — Medication 10 ML: at 20:55

## 2024-09-14 RX ADMIN — SODIUM BICARBONATE 1300 MG: 650 TABLET ORAL at 18:14

## 2024-09-14 RX ADMIN — HYDROMORPHONE HYDROCHLORIDE 0.5 MG: 1 INJECTION, SOLUTION INTRAMUSCULAR; INTRAVENOUS; SUBCUTANEOUS at 10:48

## 2024-09-14 RX ADMIN — TEMAZEPAM 30 MG: 15 CAPSULE ORAL at 21:00

## 2024-09-14 RX ADMIN — Medication 10 ML: at 13:41

## 2024-09-14 RX ADMIN — HYDROMORPHONE HYDROCHLORIDE 0.5 MG: 1 INJECTION, SOLUTION INTRAMUSCULAR; INTRAVENOUS; SUBCUTANEOUS at 18:45

## 2024-09-15 ENCOUNTER — READMISSION MANAGEMENT (OUTPATIENT)
Dept: CALL CENTER | Facility: HOSPITAL | Age: 74
End: 2024-09-15
Payer: MEDICARE

## 2024-09-15 VITALS
HEIGHT: 60 IN | BODY MASS INDEX: 29.52 KG/M2 | OXYGEN SATURATION: 97 % | DIASTOLIC BLOOD PRESSURE: 78 MMHG | SYSTOLIC BLOOD PRESSURE: 125 MMHG | WEIGHT: 150.35 LBS | RESPIRATION RATE: 15 BRPM | HEART RATE: 72 BPM | TEMPERATURE: 97.5 F

## 2024-09-15 LAB
ANION GAP SERPL CALCULATED.3IONS-SCNC: 12 MMOL/L (ref 5–15)
BUN SERPL-MCNC: 29 MG/DL (ref 8–23)
BUN/CREAT SERPL: 5.1 (ref 7–25)
CALCIUM SPEC-SCNC: 8.7 MG/DL (ref 8.6–10.5)
CHLORIDE SERPL-SCNC: 100 MMOL/L (ref 98–107)
CO2 SERPL-SCNC: 25 MMOL/L (ref 22–29)
CREAT SERPL-MCNC: 5.68 MG/DL (ref 0.57–1)
EGFRCR SERPLBLD CKD-EPI 2021: 7.3 ML/MIN/1.73
GLUCOSE SERPL-MCNC: 93 MG/DL (ref 65–99)
POTASSIUM SERPL-SCNC: 4.2 MMOL/L (ref 3.5–5.2)
SODIUM SERPL-SCNC: 137 MMOL/L (ref 136–145)

## 2024-09-15 PROCEDURE — 99232 SBSQ HOSP IP/OBS MODERATE 35: CPT | Performed by: SURGERY

## 2024-09-15 PROCEDURE — 25010000002 HYDROMORPHONE 1 MG/ML SOLUTION: Performed by: NURSE PRACTITIONER

## 2024-09-15 PROCEDURE — 80048 BASIC METABOLIC PNL TOTAL CA: CPT | Performed by: NURSE PRACTITIONER

## 2024-09-15 RX ORDER — HYDROCODONE BITARTRATE AND ACETAMINOPHEN 5; 325 MG/1; MG/1
1 TABLET ORAL ONCE AS NEEDED
Status: DISCONTINUED | OUTPATIENT
Start: 2024-09-15 | End: 2024-09-15

## 2024-09-15 RX ORDER — HYDROCODONE BITARTRATE AND ACETAMINOPHEN 5; 325 MG/1; MG/1
1 TABLET ORAL ONCE AS NEEDED
Status: COMPLETED | OUTPATIENT
Start: 2024-09-15 | End: 2024-09-15

## 2024-09-15 RX ORDER — HYDROCODONE BITARTRATE AND ACETAMINOPHEN 5; 325 MG/1; MG/1
1 TABLET ORAL EVERY 6 HOURS PRN
Qty: 12 TABLET | Refills: 0 | Status: SHIPPED | OUTPATIENT
Start: 2024-09-15

## 2024-09-15 RX ADMIN — HYDROCODONE BITARTRATE AND ACETAMINOPHEN 1 TABLET: 5; 325 TABLET ORAL at 16:19

## 2024-09-15 RX ADMIN — SODIUM BICARBONATE 1300 MG: 650 TABLET ORAL at 09:05

## 2024-09-15 RX ADMIN — SODIUM BICARBONATE 1300 MG: 650 TABLET ORAL at 15:49

## 2024-09-15 RX ADMIN — Medication 10 ML: at 09:00

## 2024-09-15 RX ADMIN — HYDROMORPHONE HYDROCHLORIDE 0.5 MG: 1 INJECTION, SOLUTION INTRAMUSCULAR; INTRAVENOUS; SUBCUTANEOUS at 05:13

## 2024-09-15 NOTE — OUTREACH NOTE
Prep Survey      Flowsheet Row Responses   [facility] patient discharged from? [facility]   Is LACE score < 7 ? No   Eligibility Readm Mgmt   Discharge diagnosis Pain in the abdomen   Does the patient have one of the following disease processes/diagnoses(primary or secondary)? Other   Does the patient have Home health ordered? No   Is there a DME ordered? No   Prep survey completed? Yes            Cristina MORALES - Registered Nurse

## 2024-09-24 ENCOUNTER — READMISSION MANAGEMENT (OUTPATIENT)
Dept: CALL CENTER | Facility: HOSPITAL | Age: 74
End: 2024-09-24
Payer: MEDICARE

## 2024-09-27 NOTE — PROGRESS NOTES
HEMATOLOGY ONCOLOGY OUTPATIENT FOLLOW UP       Patient name: Sherry Lobato  : 1950  MRN: [id number]  Primary Care Physician: Jin Malcolm PA-C  Referring Physician: Jin Malcolm PA-C  Reason For Consult: multiple malignancies, Non small cell lung cancer      History of Present Illness:  Patient is a 74 y.o. 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  No adjuvant treatment.  She did have hypersecretory symptoms causing renal insufficiency events leading to end-stage renal disease.  Patient has had comprehensive genetic testing with negative findings.    2023 CT chest 7 pelvis with right upper lobe lung mass  10/10/2023 CT-guided biopsy of lung mass with well-differentiated adenocarcinoma with focal mucinous features     10/19/2023 -PET/CT with 1.6 cm right upper lobe lung nodule low to intermediate level FDG accumulation, 1.4 cm hypermetabolic nodule or lymph node was seen at the inferior margin of the left parotid gland.  Numerous small noncalcified nodules are present in both lungs worrisome for malignancy.  No primary malignancy or metastatic disease in the abdomen pelvis or skeleton    FNA - parotid with no malignancy - Warthin tumor    10/27/2023 patient treated with SBRT 5 Gray in 5 fractions    24 - CT chest non contrast - reactive adenopathy, pulm edema, RUL nodule stable, mild increase in mediastinal adenopathy may be reactive.   24 - WBC 10.46, hb 11.4, plt 288,       The patient was admitted to [facility] again on 2024 for weakness and palpitations during dialysis. She was  discharged on 08/29/2024. She then returned to [facility] ED on 09/02/2024 for fatigue and lightheadedness after dialysis, and was discharged on 09/05/2024. She had another hospital admission on 09/15/2024 for abdominal & hernia pain, and was discharged the same day.     9/14/24: CT Abd/Pelvis W contrast:   IMPRESSION:     1. There are fluid-filled loops of small bowel with occasional air-fluid levels. There is relative decompression of the small bowel distally. A discrete transition sinuses not definitively identified. Liquid stool is noted throughout the colon. Findings  suggest a diarrheal illness. Findings may represent partial small bowel obstruction in the correct clinical setting. Recommend clinical correlation and follow-up as clinically indicated.     2. Defects in the anterior abdominal wall right lower quadrant again noted with knuckle of small bowel extending into the defect. No extension into the hernia sac is noted. There is fluid-filled distention of bowel anterior and posterior to this site   without a discrete transition point at that site appreciated.     3. Diffuse wall thickening of the stomach again noted. Underlying gastritis not excluded.     4. Other findings as above.    10/1/24: CT Chest WO Contrast:  IMPRESSION:    1. Stable 7 mm right upper lobe nodule. No new suspicious nodule  2. Stable mildly enlarged mediastinal lymph nodes  3. Interval resolution of right middle lobe airspace consolidation  4. New patchy airspace disease in the right upper lobe likely representing treatment related change unless the patient has any clinical findings of pneumonia  5. Interstitial disease favored to be interstitial edema  6. Small pleural effusions  7. Coronary artery atherosclerosis  8. Diffusely sclerotic bones, likely metabolic, possibly representing renal osteodystrophy  9. Asymmetric fullness of the right breast fibroglandular tissues. Recommend correlation with physical exam and mammography if  warranted       Subjective:  10/1/24: patient seen today for follow up visit. She was previously being seen by Dr. Ro in our office. She has underlying history of Early stage RUL Adenocarcinoma s/p Definitive SBRT. Currently on Surveillance. She Continues HD, no other new symptoms, has ongoing fatigue. No bleeding.   Diarrhea has resolved.      Past Medical History:   Diagnosis Date    Lucas's esophagus     C. difficile colitis     Carotid artery disease     -50-74% left, 16-49% right (1/19)    COPD (chronic obstructive pulmonary disease)     DM2 (diabetes mellitus, type 2)     ESRD (end stage renal disease)     on HD    Gastric ulcer     at anastomatic site    Gastroparesis     unknown    GERD (gastroesophageal reflux disease)     Gout     not current    Hemodialysis patient     mwf    Hernia, incisional     abdomen    History of colonoscopy     UTD    History of degenerative disc disease     Hyperlipidemia     Hypertension     Medicare annual wellness visit, subsequent 11/07/2020    Microscopic colitis     Nephrotic syndrome     Neuroendocrine tumor     of stomach    FABY (obstructive sleep apnea)     not treating    Pedal edema     ressolved    Proteinuria     Renal cell cancer, right     Rib pain on right side     chronic    Rotator cuff tear     bilateral    Stomach cancer 2016    Stroke     Uterine cancer     Vitamin B 12 deficiency     Vitamin D deficiency        Past Surgical History:   Procedure Laterality Date    APPENDECTOMY      ARTERIOVENOUS FISTULA Right     ARTERIOVENOUS FISTULA REPAIR      Clotted off and insert graft    ARTERIOVENOUS FISTULA/SHUNT SURGERY Left 12/30/2020    Procedure: ARTERIOVENOUS FISTULA FORMATION;  Surgeon: Jan Caicedo MD;  Location: [facility] OR;  Service: Vascular;  Laterality: Left;    ARTERIOVENOUS FISTULA/SHUNT SURGERY Left 4/23/2024    Procedure: FISTULOGRAM WITH SUBCLAVIAN ARTERY ANGIOPLASTY, fistula banding;  Surgeon: Franki Delgadillo II, MD;  Location:   [facility] OR;  Service: Vascular;  Laterality: Left;    BREAST BIOPSY      right nipple 1981    BRONCHOSCOPY N/A 6/11/2024    Procedure: BRONCHOSCOPY WITH RIGHT LUNG WASHING;  Surgeon: Aramis Barragan MD;  Location: [facility] ENDOSCOPY;  Service: Pulmonary;  Laterality: N/A;  RML atelectasis    COLONOSCOPY N/A 11/24/2020    Procedure: COLONOSCOPY with polypectomy x 7;  Surgeon: Jeffery White MD;  Location: [facility] ENDOSCOPY;  Service: Gastroenterology;  Laterality: N/A;  post op: polyps, diverticulosis, hemorrhoids    COLONOSCOPY N/A 6/1/2023    Procedure: COLONOSCOPY with polypectomy x 3 biopsy x 1;  Surgeon: Jeffery White MD;  Location: [facility] ENDOSCOPY;  Service: Gastroenterology;  Laterality: N/A;  diverticulosis polyps    COLONOSCOPY N/A 9/4/2024    Procedure: COLONOSCOPY WITH POLYPECTOMY X 1;  Surgeon: Jeffery White MD;  Location: [facility] ENDOSCOPY;  Service: Gastroenterology;  Laterality: N/A;  POST- POLYP, DIVERTICULOSIS, INTERNAL AND EXTERNAL HEMORRHOIDS    ENDOSCOPY N/A 03/12/2022    Procedure: ESOPHAGOGASTRODUODENOSCOPY with biopsy x 1 area;  Surgeon: Javan Augustin MD;  Location: [facility] ENDOSCOPY;  Service: Gastroenterology;  Laterality: N/A;  post op: anastamosis    ENDOSCOPY N/A 10/9/2023    Procedure: ESOPHAGOGASTRODUODENOSCOPY with biopsy x3 areas;  Surgeon: Ace Campbell MD;  Location: [facility] ENDOSCOPY;  Service: Gastroenterology;  Laterality: N/A;  duodenal ulcers;irregular z line    ENDOSCOPY N/A 8/27/2024    Procedure: ESOPHAGOGASTRODUODENOSCOPY with argon plasma coagulation;  Surgeon: HILARIO Blackmon MD;  Location: [facility] ENDOSCOPY;  Service: Gastroenterology;  Laterality: N/A;  post op: AVM    ENTEROSCOPY SMALL BOWEL N/A 9/4/2024    Procedure: ESOPHAGOGASTRODUODENOSCOPY WITH SMALL BOWEL ENTEROSCOPY, ARGON PLASMA COAGULATION AND ENDOSCOPIC CLIPPING X 2 OF GASTRIC ANTRAL VASCULAR ECTASIA;  Surgeon: Jeffery White MD;  Location: [facility] ENDOSCOPY;  Service: Gastroenterology;  Laterality:  N/A;  POST- GAVE    GASTRIC RESECTION      cancer    HYSTERECTOMY      LAPAROSCOPIC CHOLECYSTECTOMY      NEPHRECTOMY      right kidney removed     REDUCTION MAMMAPLASTY      TUMOR REMOVAL      boils    VENTRAL/INCISIONAL HERNIA REPAIR Right 08/28/2019    Procedure: VENTRAL/INCISIONAL HERNIA REPAIR;  Surgeon: Nilay Stevens MD;  Location: [facility] MAIN OR;  Service: General    VENTRAL/INCISIONAL HERNIA REPAIR N/A 10/01/2019    Procedure: OPEN VENTRAL/INCISIONAL HERNIA REPAIR;  Surgeon: Nilay Stevens MD;  Location: [facility] MAIN OR;  Service: General    VENTRAL/INCISIONAL HERNIA REPAIR N/A 10/11/2021    Procedure: VENTRAL/INCISIONAL HERNIA REPAIR LAPAROSCOPIC;  Surgeon: Nilay Stevens MD;  Location: [facility] MAIN OR;  Service: General;  Laterality: N/A;    VENTRAL/INCISIONAL HERNIA REPAIR N/A 01/23/2023    Procedure: VENTRAL/INCISIONAL HERNIA REPAIR with MESH;  Surgeon: Nilay Stevens MD;  Location: [facility] MAIN OR;  Service: General;  Laterality: N/A;         Current Outpatient Medications:     acetaminophen (TYLENOL) 325 MG tablet, Take 2 tablets by mouth Every 4 (Four) Hours As Needed for Mild Pain., Disp: , Rfl:     albuterol sulfate  (90 Base) MCG/ACT inhaler, Inhale 2 puffs Every 4 (Four) Hours As Needed for Wheezing., Disp: , Rfl:     amLODIPine (NORVASC) 10 MG tablet, Take 1 tablet by mouth Daily., Disp: , Rfl:     calcium acetate (PHOS BINDER,) 667 MG capsule capsule, Take 1 capsule by mouth 3 (Three) Times a Day., Disp: , Rfl:     calcium acetate (PHOS BINDER,) 667 MG capsule capsule, Take 1 capsule by mouth With Snacks., Disp: , Rfl:     carvedilol (COREG) 3.125 MG tablet, Take 1 tablet by mouth 2 (Two) Times a Day With Meals., Disp: , Rfl:     Cholecalciferol 25 MCG (1000 UT) tablet, Take 1 tablet by mouth Daily., Disp: , Rfl:     HYDROcodone-acetaminophen (Norco) 5-325 MG per tablet, Take 1 tablet by mouth Every 6 (Six) Hours As Needed for Moderate Pain., Disp: 12 tablet, Rfl: 0    hyoscyamine  "(ANASPAZ,LEVSIN) 0.125 MG tablet, Take 1 tablet by mouth Every 4 (Four) Hours As Needed., Disp: , Rfl:     lidocaine-prilocaine (EMLA) 2.5-2.5 % cream, Apply 1 Application topically to the appropriate area as directed Daily As Needed (severe pain, left 5th finger). Apply to left 5th finger, Disp: 5800 g, Rfl: 4    losartan (COZAAR) 50 MG tablet, Take 1 tablet by mouth Daily., Disp: , Rfl:     midodrine (PROAMATINE) 5 MG tablet, Take 1 tablet by mouth 3 (Three) Times a Day As Needed., Disp: , Rfl:     Multiple Vitamins-Minerals (RENAPLEX-D) tablet, Take 1 tablet by mouth Daily., Disp: , Rfl:     pantoprazole (PROTONIX) 40 MG EC tablet, Take 1 tablet by mouth 2 (Two) Times a Day., Disp: , Rfl:     sevelamer (RENAGEL) 800 MG tablet, Take 3 tablets by mouth With Snacks., Disp: , Rfl:     sevelamer (RENVELA) 800 MG tablet, Take 3 tablets by mouth 3 (Three) Times a Day With Meals., Disp: , Rfl:     sodium bicarbonate 650 MG tablet, Take 2 tablets by mouth 3 (Three) Times a Day., Disp: , Rfl:     sucralfate (Carafate) 1 g tablet, Take 1 tablet by mouth 4 (Four) Times a Day., Disp: 120 tablet, Rfl: 1    temazepam (RESTORIL) 30 MG capsule, Take 1 capsule by mouth Every Night., Disp: , Rfl:     Allergies   Allergen Reactions    Atorvastatin Calcium Other (See Comments)     \"felt like I was on fire\"    Gabapentin Dizziness     says was unable to walk    Niacin Other (See Comments)     \"FEELS LIKE SHE IS ON FIRE\"         Family History   Problem Relation Age of Onset    Heart disease Father        Cancer-related family history is not on file.      Social History     Tobacco Use    Smoking status: Former     Current packs/day: 0.00     Average packs/day: 0.3 packs/day for 57.0 years (14.3 ttl pk-yrs)     Types: Cigarettes     Start date: 5/1/1964     Quit date: 5/1/2021     Years since quitting: 3.4     Passive exposure: Past    Smokeless tobacco: Never    Tobacco comments:     quit smoking 2 months ago   Vaping Use    Vaping " "status: Never Used   Substance Use Topics    Alcohol use: No    Drug use: No     Social History     Social History Narrative    Not on file       ROS:   Review of Systems   Constitutional:  Negative for fatigue and fever.   HENT:  Negative for congestion and nosebleeds.    Eyes:  Negative for pain and discharge.   Respiratory:  Negative for cough and shortness of breath.    Cardiovascular:  Negative for chest pain.   Gastrointestinal:  Negative for abdominal pain, blood in stool, diarrhea, nausea and vomiting.   Endocrine: Negative for cold intolerance and heat intolerance.   Genitourinary:  Negative for difficulty urinating.   Musculoskeletal:  Negative for arthralgias.   Skin:  Negative for rash.   Neurological:  Negative for dizziness and headaches.   Hematological:  Does not bruise/bleed easily.   Psychiatric/Behavioral:  Negative for behavioral problems.          Objective:    Vital Signs:  Vitals:    10/01/24 0934   BP: 167/84   Pulse: 83   Temp: 98.4 °F (36.9 °C)   TempSrc: Oral   SpO2: 98%   Weight: 68.5 kg (151 lb)   Height: 152.4 cm (60\")   PainSc: 0-No pain       Body mass index is 29.49 kg/m².    ECOG  (2) Ambulatory and capable of self care, unable to carry out work activity, up and about > 50% or waking hours    Physical Exam:   Physical Exam  Constitutional:       Appearance: Normal appearance.   HENT:      Head: Normocephalic and atraumatic.   Eyes:      Pupils: Pupils are equal, round, and reactive to light.   Cardiovascular:      Rate and Rhythm: Normal rate and regular rhythm.      Pulses: Normal pulses.      Heart sounds: No murmur heard.  Pulmonary:      Effort: Pulmonary effort is normal.      Breath sounds: Normal breath sounds.   Abdominal:      General: There is no distension.      Palpations: Abdomen is soft. There is no mass.      Tenderness: There is no abdominal tenderness.   Musculoskeletal:         General: Normal range of motion.      Cervical back: Normal range of motion.   Skin:     " General: Skin is warm.   Neurological:      General: No focal deficit present.      Mental Status: She is alert.   Psychiatric:         Mood and Affect: Mood normal.         Lab Results - Last 18 Months   Lab Units 11/12/24 0319 11/11/24 2026 11/11/24  1500   WBC 10*3/mm3 8.36 9.29 11.77*   HEMOGLOBIN g/dL 11.0* 12.1 11.7*   HEMATOCRIT % 36.6 39.3 38.1   PLATELETS 10*3/mm3 300 306 316   MCV fL 96.6 95.9 95.3     Lab Results - Last 18 Months   Lab Units 09/15/24  0230 09/14/24  1057 09/07/24  0000 09/05/24  0548 09/04/24  0403 09/02/24  2227 09/02/24  1439   SODIUM mmol/L 137 136  --  142 145   < > 138   POTASSIUM mmol/L 4.2 4.7 4.7 5.1 5.6*   < > 3.8   CHLORIDE mmol/L 100 98  --  104 106   < > 101   CO2 mmol/L 25.0 23.4  --  17.9* 22.6   < > 25.3   BUN mg/dL 29* 21  --  56* 57*   < > 33*   CREATININE mg/dL 5.68* 4.83*  --  8.51* 7.24*   < > 4.31*   CALCIUM mg/dL 8.7 9.4  --  9.1 9.3   < > 9.0   BILIRUBIN mg/dL  --  0.2  --   --  0.3  --  0.2   ALK PHOS U/L  --  147*  --   --  99  --  95   ALT (SGPT) U/L  --  11  --   --  6  --  8   AST (SGOT) U/L  --  30  --   --  15  --  18   GLUCOSE mg/dL 93 86  --  132* 99   < > 124*    < > = values in this interval not displayed.       Lab Results   Component Value Date    GLUCOSE 93 09/15/2024    BUN 29 (H) 09/15/2024    CREATININE 5.68 (H) 09/15/2024    EGFRIFNONA 6 (L) 10/08/2021    EGFRIFAFRI 5 (L) 07/14/2022    BCR 5.1 (L) 09/15/2024    K 4.2 09/15/2024    CO2 25.0 09/15/2024    CALCIUM 8.7 09/15/2024    ALBUMIN 3.8 09/14/2024    LABIL2 0.8 (L) 03/14/2019    AST 30 09/14/2024    ALT 11 09/14/2024       Lab Results - Last 18 Months   Lab Units 10/10/23  0737   INR  1.06   APTT seconds 30.8       Lab Results   Component Value Date    IRON 115 06/11/2024    TIBC 173 (L) 06/11/2024    FERRITIN 2,099.00 (H) 03/19/2024       Lab Results   Component Value Date    FOLATE 11.9 04/15/2017       Lab Results   Component Value Date    OCCULTBLD POSITIVE (A) 06/01/2017    OCCULTBLD  "POSITIVE (A) 05/16/2017    OCCULTBLD POSITIVE (A) 05/05/2017       Lab Results   Component Value Date    RETICCTPCT 3.04 (H) 09/02/2024     Lab Results   Component Value Date    EOINTHCQ12 1,234 (H) 04/15/2017     No results found for: \"SPEP\", \"UPEP\"  Uric Acid   Date Value Ref Range Status   07/10/2018 4.7 2.6 - 8.0 mg/dL Final     Lab Results   Component Value Date    SEDRATE 69 (H) 02/08/2019     No results found for: \"FIBRINOGEN\", \"HAPTOGLOBIN\"  Lab Results   Component Value Date    PTT 30.8 10/10/2023    INR 1.06 10/10/2023     No results found for: \"\"  No results found for: \"CEA\"  No components found for: \"CA-19-9\"  No results found for: \"PSA\"  Lab Results   Component Value Date    SEDRATE 69 (H) 02/08/2019          Assessment & Plan     Patient is 73-year-old female with multiple malignancy in the past  recently diagnosed with non-small cell lung cancer    Stage I non-small cell lung cancer  Treated with SBRT  Continue with Surveillance.   CT chest on 10/1/24 showed 7mm RUL nodule, no other concerning findings.  -Will benefit from Repeat CT chest In 3 months.    Warthin's tumor parotid  Discussed ENT referral   Patient does not want any more surgery  She understands the risk.   Will not refer at this point. Continues to not want to see ENT    End-stage renal disease  Continues hemodialysis    Anemia  his could be related to end-stage renal disease   consider Procrit can be done with dialysis as needed, hb is improved to 11.5 today.    NET duodenum   S/p resection  No indication of recurrence   Repeat imaging of abdomen pelvis as above.      RCC  S/p nephrectomy  No recurrence      Genetic counseling  Recommended counseling with multiple cancers  She does not want it.    3 month follow up with repeat CT chest, sooner as needed.    Thank you very much for providing the opportunity to participate in this patient’s care. Please do not hesitate to call if there are any other questions.    "

## 2024-09-29 NOTE — PROGRESS NOTES
Encounter Date:10/15/2024        Patient ID: Sherry Lobato is a 74 y.o. female.      Chief Complaint:      History of Present Illness    74 years old pleasant woman with hypertension, hyperlipidemia, diabetes, renal cell carcinoma with end-stage renal disease on hemodialysis, gastric bypass, mood disorder, GI bleeding who comes to cardiology office to establish care.  She had a cardiac catheterization in 2021 by me at [facility].  Current cardiac medications include amlodipine, Coreg, losartan, midodrine  She was told that her heart skips a beat and she should see a cardiologist.  She also complains of shortness of breath and has a cardiac murmur.    The following portions of the patient's history were reviewed and updated as appropriate: allergies, current medications, past family history, past medical history, past social history, past surgical history, and problem list.    Review of Systems   Constitutional: Negative for malaise/fatigue.   Cardiovascular:  Negative for chest pain, dyspnea on exertion, leg swelling and palpitations.   Respiratory:  Negative for cough and shortness of breath.    Gastrointestinal:  Negative for abdominal pain, nausea and vomiting.   Neurological:  Negative for dizziness, focal weakness, headaches, light-headedness and numbness.   All other systems reviewed and are negative.      Current Outpatient Medications:     acetaminophen (TYLENOL) 325 MG tablet, Take 2 tablets by mouth Every 4 (Four) Hours As Needed for Mild Pain., Disp: , Rfl:     albuterol sulfate  (90 Base) MCG/ACT inhaler, Inhale 2 puffs Every 4 (Four) Hours As Needed for Wheezing., Disp: , Rfl:     amLODIPine (NORVASC) 10 MG tablet, Take 1 tablet by mouth Daily., Disp: , Rfl:     calcium acetate (PHOS BINDER,) 667 MG capsule capsule, Take 1 capsule by mouth 3 (Three) Times a Day., Disp: , Rfl:     calcium acetate (PHOS BINDER,) 667 MG capsule capsule, Take 1 capsule by mouth With Snacks., Disp: , Rfl:      "carvedilol (COREG) 3.125 MG tablet, Take 1 tablet by mouth 2 (Two) Times a Day With Meals., Disp: , Rfl:     Cholecalciferol 25 MCG (1000 UT) tablet, Take 1 tablet by mouth Daily., Disp: , Rfl:     HYDROcodone-acetaminophen (Norco) 5-325 MG per tablet, Take 1 tablet by mouth Every 6 (Six) Hours As Needed for Moderate Pain., Disp: 12 tablet, Rfl: 0    hyoscyamine (ANASPAZ,LEVSIN) 0.125 MG tablet, Take 1 tablet by mouth Every 4 (Four) Hours As Needed., Disp: , Rfl:     lidocaine-prilocaine (EMLA) 2.5-2.5 % cream, Apply 1 Application topically to the appropriate area as directed Daily As Needed (severe pain, left 5th finger). Apply to left 5th finger, Disp: 5800 g, Rfl: 4    losartan (COZAAR) 50 MG tablet, Take 1 tablet by mouth Daily., Disp: , Rfl:     midodrine (PROAMATINE) 5 MG tablet, Take 1 tablet by mouth 3 (Three) Times a Day As Needed., Disp: , Rfl:     Multiple Vitamins-Minerals (RENAPLEX-D) tablet, Take 1 tablet by mouth Daily., Disp: , Rfl:     pantoprazole (PROTONIX) 40 MG EC tablet, Take 1 tablet by mouth 2 (Two) Times a Day., Disp: , Rfl:     sevelamer (RENAGEL) 800 MG tablet, Take 3 tablets by mouth With Snacks., Disp: , Rfl:     sevelamer (RENVELA) 800 MG tablet, Take 3 tablets by mouth 3 (Three) Times a Day With Meals., Disp: , Rfl:     sodium bicarbonate 650 MG tablet, Take 2 tablets by mouth 3 (Three) Times a Day., Disp: , Rfl:     sucralfate (Carafate) 1 g tablet, Take 1 tablet by mouth 4 (Four) Times a Day., Disp: 120 tablet, Rfl: 1    temazepam (RESTORIL) 30 MG capsule, Take 1 capsule by mouth Every Night., Disp: , Rfl:     Current outpatient and discharge medications have been reconciled for the patient.  Reviewed by: Mak Sosa MD       Allergies   Allergen Reactions    Atorvastatin Calcium Other (See Comments)     \"felt like I was on fire\"    Gabapentin Dizziness     says was unable to walk    Niacin Other (See Comments)     \"FEELS LIKE SHE IS ON FIRE\"         Family History   Problem " Relation Age of Onset    Heart disease Father        Past Surgical History:   Procedure Laterality Date    APPENDECTOMY      ARTERIOVENOUS FISTULA Right     ARTERIOVENOUS FISTULA REPAIR      Clotted off and insert graft    ARTERIOVENOUS FISTULA/SHUNT SURGERY Left 12/30/2020    Procedure: ARTERIOVENOUS FISTULA FORMATION;  Surgeon: Jan Caicedo MD;  Location: [facility] MAIN OR;  Service: Vascular;  Laterality: Left;    ARTERIOVENOUS FISTULA/SHUNT SURGERY Left 4/23/2024    Procedure: FISTULOGRAM WITH SUBCLAVIAN ARTERY ANGIOPLASTY, fistula banding;  Surgeon: Franki Delgadillo II, MD;  Location: [facility] HYBRID OR;  Service: Vascular;  Laterality: Left;    BREAST BIOPSY      right nipple 1981    BRONCHOSCOPY N/A 6/11/2024    Procedure: BRONCHOSCOPY WITH RIGHT LUNG WASHING;  Surgeon: Aramis Barragan MD;  Location: [facility] ENDOSCOPY;  Service: Pulmonary;  Laterality: N/A;  RML atelectasis    COLONOSCOPY N/A 11/24/2020    Procedure: COLONOSCOPY with polypectomy x 7;  Surgeon: Jeffery White MD;  Location: [facility] ENDOSCOPY;  Service: Gastroenterology;  Laterality: N/A;  post op: polyps, diverticulosis, hemorrhoids    COLONOSCOPY N/A 6/1/2023    Procedure: COLONOSCOPY with polypectomy x 3 biopsy x 1;  Surgeon: Jeffery White MD;  Location: [facility] ENDOSCOPY;  Service: Gastroenterology;  Laterality: N/A;  diverticulosis polyps    COLONOSCOPY N/A 9/4/2024    Procedure: COLONOSCOPY WITH POLYPECTOMY X 1;  Surgeon: Jeffery White MD;  Location: [facility] ENDOSCOPY;  Service: Gastroenterology;  Laterality: N/A;  POST- POLYP, DIVERTICULOSIS, INTERNAL AND EXTERNAL HEMORRHOIDS    ENDOSCOPY N/A 03/12/2022    Procedure: ESOPHAGOGASTRODUODENOSCOPY with biopsy x 1 area;  Surgeon: Javan Augustin MD;  Location: [facility] ENDOSCOPY;  Service: Gastroenterology;  Laterality: N/A;  post op: anastamosis    ENDOSCOPY N/A 10/9/2023    Procedure: ESOPHAGOGASTRODUODENOSCOPY with biopsy x3 areas;  Surgeon: Ace Campbell MD;  Location: [facility]  ENDOSCOPY;  Service: Gastroenterology;  Laterality: N/A;  duodenal ulcers;irregular z line    ENDOSCOPY N/A 8/27/2024    Procedure: ESOPHAGOGASTRODUODENOSCOPY with argon plasma coagulation;  Surgeon: HILARIO Blackmon MD;  Location: [facility] ENDOSCOPY;  Service: Gastroenterology;  Laterality: N/A;  post op: AVM    ENTEROSCOPY SMALL BOWEL N/A 9/4/2024    Procedure: ESOPHAGOGASTRODUODENOSCOPY WITH SMALL BOWEL ENTEROSCOPY, ARGON PLASMA COAGULATION AND ENDOSCOPIC CLIPPING X 2 OF GASTRIC ANTRAL VASCULAR ECTASIA;  Surgeon: Jeffery White MD;  Location: [facility] ENDOSCOPY;  Service: Gastroenterology;  Laterality: N/A;  POST- GAVE    GASTRIC RESECTION      cancer    HYSTERECTOMY      LAPAROSCOPIC CHOLECYSTECTOMY      NEPHRECTOMY      right kidney removed     REDUCTION MAMMAPLASTY      TUMOR REMOVAL      boils    VENTRAL/INCISIONAL HERNIA REPAIR Right 08/28/2019    Procedure: VENTRAL/INCISIONAL HERNIA REPAIR;  Surgeon: Nilay Stevens MD;  Location: [facility] MAIN OR;  Service: General    VENTRAL/INCISIONAL HERNIA REPAIR N/A 10/01/2019    Procedure: OPEN VENTRAL/INCISIONAL HERNIA REPAIR;  Surgeon: Nilay Stevens MD;  Location: [facility] MAIN OR;  Service: General    VENTRAL/INCISIONAL HERNIA REPAIR N/A 10/11/2021    Procedure: VENTRAL/INCISIONAL HERNIA REPAIR LAPAROSCOPIC;  Surgeon: Nilay Stevens MD;  Location: [facility] MAIN OR;  Service: General;  Laterality: N/A;    VENTRAL/INCISIONAL HERNIA REPAIR N/A 01/23/2023    Procedure: VENTRAL/INCISIONAL HERNIA REPAIR with MESH;  Surgeon: Nilay Stevens MD;  Location: [facility] MAIN OR;  Service: General;  Laterality: N/A;       Past Medical History:   Diagnosis Date    Lucas's esophagus     C. difficile colitis     Carotid artery disease     -50-74% left, 16-49% right (1/19)    COPD (chronic obstructive pulmonary disease)     DM2 (diabetes mellitus, type 2)     ESRD (end stage renal disease)     on HD    Gastric ulcer     at anastomatic site    Gastroparesis     unknown    GERD  (gastroesophageal reflux disease)     Gout     not current    Hemodialysis patient     mwf    Hernia, incisional     abdomen    History of colonoscopy     UTD    History of degenerative disc disease     Hyperlipidemia     Hypertension     Medicare annual wellness visit, subsequent 11/07/2020    Microscopic colitis     Nephrotic syndrome     Neuroendocrine tumor     of stomach    FABY (obstructive sleep apnea)     not treating    Pedal edema     ressolved    Proteinuria     Renal cell cancer, right     Rib pain on right side     chronic    Rotator cuff tear     bilateral    Stomach cancer 2016    Stroke     Uterine cancer     Vitamin B 12 deficiency     Vitamin D deficiency        Family History   Problem Relation Age of Onset    Heart disease Father        Social History     Socioeconomic History    Marital status: Single   Tobacco Use    Smoking status: Former     Current packs/day: 0.00     Average packs/day: 0.3 packs/day for 57.0 years (14.3 ttl pk-yrs)     Types: Cigarettes     Start date: 5/1/1964     Quit date: 5/1/2021     Years since quitting: 3.4     Passive exposure: Past    Smokeless tobacco: Never    Tobacco comments:     quit smoking 2 months ago   Vaping Use    Vaping status: Never Used   Substance and Sexual Activity    Alcohol use: No    Drug use: No    Sexual activity: Defer               Objective:       Physical Exam    There were no vitals taken for this visit.  The patient is alert, oriented and in no distress.    Vital signs as noted above.    Head and neck revealed no carotid bruits or jugular venous distension.  No thyromegaly or lymphadenopathy is present.    Lungs clear.  No wheezing.  Breath sounds are normal bilaterally.    Heart normal first and second heart sounds.  No murmur..  No pericardial rub is present.  No gallop is present.    Abdomen soft and nontender.  No organomegaly is present.    Extremities revealed good peripheral pulses without any pedal edema.    Skin warm and  dry.    Musculoskeletal system is grossly normal.    CNS grossly normal.           Diagnosis Plan   1. Primary hypertension        2. ESRD (end stage renal disease)        3. Gastroesophageal reflux disease without esophagitis        4. Type 2 diabetes mellitus without complication, without long-term current use of insulin        5. Mixed hyperlipidemia        6. Overweight (BMI 25.0-29.9)        LAB RESULTS (LAST 7 DAYS)    CBC  Results from last 7 days   Lab Units 09/25/24  0000   HEMOGLOBIN % 10.7*  32.1*       BMP  Results from last 7 days   Lab Units 09/25/24  0000   POTASSIUM mEq/L 4.6       CMP   Results from last 7 days   Lab Units 09/25/24  0000   POTASSIUM mEq/L 4.6         BNP        TROPONIN        CoAg        Creatinine Clearance  Estimated Creatinine Clearance: 7.4 mL/min (A) (by C-G formula based on SCr of 5.68 mg/dL (H)).    ABG        Radiology  No radiology results for the last day    EKG  Procedures    Stress test      Echocardiogram  Results for orders placed during the hospital encounter of 07/25/21    Adult Transthoracic Echo Complete W/ Cont if Necessary Per Protocol    Interpretation Summary  Normal LV size and contractility EF of 60%  Normal RV size  Normal atrial size  Aortic valve, mitral valve, tricuspid valve appears structurally normal, mild mitral regurgitation  seen.  No pericardial effusion seen.  Proximal aorta appears normal in size.      Cardiac catheterization  Results for orders placed during the hospital encounter of 09/27/22    Cardiac Catheterization/Vascular Study    Narrative  Images from the original result were not included.    Today's Date:  09/27/22  Jan Caicedo MD  [facility] cardiac catheterization lab    Preoperative Diagnosis:  Poorly functioning hemodialysis fistula/shunt    Postoperative Diagnosis:  Same    Procedure Performed:    CPT:  93484 Fistulogram    Surgeon:  Jan Caicedo MD    Assistant:  None    Anesthesia:  Local    Estimated  Blood Loss:  Minimal    Findings:  Normal fistulogram in brachial radial ulnar arteries, normal central venogram    Implants:  None    Specimen:  none    Complications:  none    Dispo:  to PACU    Indication for procedure:  72 y.o. female with pleasant female with pain left hand with hemodialysis.  They are able to use fistula for about 3 hours but left hand becomes painful.  Patient with symptoms of right hand polyneuropathy with previous AV graft that was ligated.  Plan left arm fistulogram of brachial artery basilic vein fistula and left arm arteriogram.  Risk discussed with patient agrees to proceed.    Description of procedure:  Patient was taken to the operating room.  Anesthesia was administered and a monitored setting.  Surgical sites were prepped and draped in the usual sterile fashion.  A full surgical timeout was performed.  Under ultrasound guidance the fistula was accessed using a micropuncture system.  Wire placement was confirmed with fluoroscopy.  Micro-needle was exchanged for the micro-sheath.  Diagnostic arteriovenous fistulogram was performed.    Left arm fistula prepped and draped in usual fashion.  1% Xylocaine used for local anesthesia.  Fistula was accessed near axilla going retrograde towards the brachial artery anastomosis without difficulty using ultrasound guidance.  Micropuncture wire advanced at difficulty.  Catheter placed.  Left upper extremity fistulogram and central venogram performed with all widely patent.  Pressure held on basilic vein fistula centrally with fistulogram of the brachial anastomosis widely patent.  Left arm runoff arteriogram performed using same technique with brachial radial ulnar and interosseous arteries are widely patent.  Anastomosis of basilic vein to brachial artery widely patent.  There was a dilated portion of the fistula and then the mild narrowing of the proximal long segment of the basilic vein going towards the brachial artery but no significant  stenoses.  Patient essentially with normal fistulogram.  Suspect pain with hemodialysis due to polyneuropathy.    Jan Caicedo MD  09/27/22          Assessment and Plan       Diagnoses and all orders for this visit:    Primary hypertension (Primary)  Currently on  losartan and Coreg  Restart amlodipine  Blood pressure is elevated today.  Permissive hypertension for hemodialysis.  Use midodrine as needed    Shortness of breath/cardiac murmur  May be related to high flow from AV fistula.  Obtain an echocardiogram.    Palpitation  I will obtain a 14 days Holter  Already on a low-dose beta-blocker.      ESRD (end stage renal disease)  On hemodialysis with AV fistula  Anemia with history of GI bleeding  Follow-up with Dr. Buckley    Gastroesophageal reflux disease without esophagitis  PPI as needed  History of GI bleed    Type 2 diabetes mellitus without complication, without long-term current use of insulin  A1c is 5.8    Mixed hyperlipidemia  Previous LDL 98 in 2020  Repeat lipid panel    Overweight (BMI 25.0-29.9)  Status post gastric bypass  BMI is 28.7.  She weighs 147 pounds.

## 2024-10-01 ENCOUNTER — LAB (OUTPATIENT)
Dept: LAB | Facility: HOSPITAL | Age: 74
End: 2024-10-01
Payer: MEDICARE

## 2024-10-01 ENCOUNTER — OFFICE VISIT (OUTPATIENT)
Dept: ONCOLOGY | Facility: CLINIC | Age: 74
End: 2024-10-01
Payer: MEDICARE

## 2024-10-01 ENCOUNTER — HOSPITAL ENCOUNTER (OUTPATIENT)
Dept: PET IMAGING | Facility: HOSPITAL | Age: 74
Discharge: HOME | End: 2024-10-01
Admitting: INTERNAL MEDICINE
Payer: MEDICARE

## 2024-10-01 VITALS
WEIGHT: 151 LBS | SYSTOLIC BLOOD PRESSURE: 167 MMHG | HEART RATE: 83 BPM | DIASTOLIC BLOOD PRESSURE: 84 MMHG | BODY MASS INDEX: 29.64 KG/M2 | OXYGEN SATURATION: 98 % | HEIGHT: 60 IN | TEMPERATURE: 98.4 F

## 2024-10-01 DIAGNOSIS — C7A.8 NEUROENDOCRINE CARCINOMA: Primary | ICD-10-CM

## 2024-10-01 DIAGNOSIS — C64.9 RENAL CELL CARCINOMA, UNSPECIFIED LATERALITY: ICD-10-CM

## 2024-10-01 DIAGNOSIS — C34.11 ADENOCARCINOMA OF UPPER LOBE OF RIGHT LUNG: ICD-10-CM

## 2024-10-01 LAB
ALBUMIN SERPL-MCNC: 3.5 G/DL (ref 3.5–5.2)
ALBUMIN/GLOB SERPL: 1.3 G/DL
ALP SERPL-CCNC: 121 U/L (ref 39–117)
ALT SERPL W P-5'-P-CCNC: <5 U/L (ref 1–33)
ANION GAP SERPL CALCULATED.3IONS-SCNC: 10.1 MMOL/L (ref 5–15)
AST SERPL-CCNC: 19 U/L (ref 1–32)
BASOPHILS # BLD AUTO: 0.26 10*3/MM3 (ref 0–0.2)
BASOPHILS NFR BLD AUTO: 3.8 % (ref 0–1.5)
BILIRUB SERPL-MCNC: 0.2 MG/DL (ref 0–1.2)
BUN SERPL-MCNC: 19 MG/DL (ref 8–23)
BUN/CREAT SERPL: 3.5 (ref 7–25)
CALCIUM SPEC-SCNC: 9.1 MG/DL (ref 8.6–10.5)
CHLORIDE SERPL-SCNC: 100 MMOL/L (ref 98–107)
CO2 SERPL-SCNC: 26.9 MMOL/L (ref 22–29)
CREAT SERPL-MCNC: 5.47 MG/DL (ref 0.57–1)
DEPRECATED RDW RBC AUTO: 59.8 FL (ref 37–54)
EGFRCR SERPLBLD CKD-EPI 2021: 7.7 ML/MIN/1.73
EOSINOPHIL # BLD AUTO: 0.4 10*3/MM3 (ref 0–0.4)
EOSINOPHIL NFR BLD AUTO: 5.8 % (ref 0.3–6.2)
ERYTHROCYTE [DISTWIDTH] IN BLOOD BY AUTOMATED COUNT: 17.5 % (ref 12.3–15.4)
GLOBULIN UR ELPH-MCNC: 2.8 GM/DL
GLUCOSE SERPL-MCNC: 80 MG/DL (ref 65–99)
HCT VFR BLD AUTO: 38.3 % (ref 34–46.6)
HGB BLD-MCNC: 11.5 G/DL (ref 12–15.9)
HOLD SPECIMEN: NORMAL
LYMPHOCYTES # BLD AUTO: 0.89 10*3/MM3 (ref 0.7–3.1)
LYMPHOCYTES NFR BLD AUTO: 13 % (ref 19.6–45.3)
MCH RBC QN AUTO: 29.9 PG (ref 26.6–33)
MCHC RBC AUTO-ENTMCNC: 30 G/DL (ref 31.5–35.7)
MCV RBC AUTO: 99.5 FL (ref 79–97)
MONOCYTES # BLD AUTO: 0.89 10*3/MM3 (ref 0.1–0.9)
MONOCYTES NFR BLD AUTO: 13 % (ref 5–12)
NEUTROPHILS NFR BLD AUTO: 4.42 10*3/MM3 (ref 1.7–7)
NEUTROPHILS NFR BLD AUTO: 64.4 % (ref 42.7–76)
PLATELET # BLD AUTO: 358 10*3/MM3 (ref 140–450)
PMV BLD AUTO: 9.4 FL (ref 6–12)
POTASSIUM SERPL-SCNC: 4 MMOL/L (ref 3.5–5.2)
PROT SERPL-MCNC: 6.3 G/DL (ref 6–8.5)
RBC # BLD AUTO: 3.85 10*6/MM3 (ref 3.77–5.28)
SODIUM SERPL-SCNC: 137 MMOL/L (ref 136–145)
WBC NRBC COR # BLD AUTO: 6.86 10*3/MM3 (ref 3.4–10.8)

## 2024-10-01 PROCEDURE — 3077F SYST BP >= 140 MM HG: CPT | Performed by: STUDENT IN AN ORGANIZED HEALTH CARE EDUCATION/TRAINING PROGRAM

## 2024-10-01 PROCEDURE — 85025 COMPLETE CBC W/AUTO DIFF WBC: CPT

## 2024-10-01 PROCEDURE — 36415 COLL VENOUS BLD VENIPUNCTURE: CPT

## 2024-10-01 PROCEDURE — 71250 CT THORAX DX C-: CPT

## 2024-10-01 PROCEDURE — 3079F DIAST BP 80-89 MM HG: CPT | Performed by: STUDENT IN AN ORGANIZED HEALTH CARE EDUCATION/TRAINING PROGRAM

## 2024-10-01 PROCEDURE — 80053 COMPREHEN METABOLIC PANEL: CPT | Performed by: INTERNAL MEDICINE

## 2024-10-01 PROCEDURE — 1126F AMNT PAIN NOTED NONE PRSNT: CPT | Performed by: STUDENT IN AN ORGANIZED HEALTH CARE EDUCATION/TRAINING PROGRAM

## 2024-10-01 PROCEDURE — 99214 OFFICE O/P EST MOD 30 MIN: CPT | Performed by: STUDENT IN AN ORGANIZED HEALTH CARE EDUCATION/TRAINING PROGRAM

## 2024-10-01 RX ORDER — METOPROLOL TARTRATE 25 MG/1
12.5 TABLET, FILM COATED ORAL
COMMUNITY
Start: 2024-09-23 | End: 2024-10-15

## 2024-10-03 ENCOUNTER — READMISSION MANAGEMENT (OUTPATIENT)
Dept: CALL CENTER | Facility: HOSPITAL | Age: 74
End: 2024-10-03
Payer: MEDICARE

## 2024-10-03 NOTE — OUTREACH NOTE
Medical Week 2 Survey      Flowsheet Row Responses   [facility] patient discharged from? [facility]   Does the patient have one of the following disease processes/diagnoses(primary or secondary)? Other   Week 2 attempt successful? Yes   Call start time 1314   Discharge diagnosis Pain in the abdomen   Call end time 1318   Person spoke with today (if not patient) and relationship patient   Meds reviewed with patient/caregiver? Yes   Is the patient taking all medications as directed (includes completed medication regime)? Yes   Does the patient have a primary care provider?  Yes   Does the patient have an appointment with their PCP within 7 days of discharge? No   Comments regarding PCP PCP is dr. Gotti   What is preventing the patient from scheduling follow up appointments within 7 days of discharge? Haven't had time   Nursing Interventions Advised patient to make appointment   Has the patient kept scheduled appointments due by today? N/A   Did the patient receive a copy of their discharge instructions? Yes   Nursing interventions Reviewed instructions with patient   What is the patient's perception of their health status since discharge? Improving   Is the patient/caregiver able to teach back the hierarchy of who to call/visit for symptoms/problems? PCP, Specialist, Home health nurse, Urgent Care, ED, 911 Yes   If the patient is a current smoker, are they able to teach back resources for cessation? Not a smoker   Week 2 Call Completed? Yes   Is the patient interested in additional calls from an ambulatory ? No   Would this patient benefit from a Referral to Amb Social Work? No   Wrap up additional comments Pt states she is feeling better. She denies needs/concerns at this time.   Call end time 1318            ESTER [facility] - Registered Nurse

## 2024-10-03 NOTE — PROGRESS NOTES
[facility] RADIATION ONCOLOGY  FOLLOW-UP NOTE    NAME: Sherry Lobato  YOB: 1950  MRN #: [id number]  DATE OF SERVICE: 10/08/2024  REFERRING PROVIDER: Jin Malcolm PA-C   PRIMARY CARE PROVIDER: Jin Malcolm PA-C    CHIEF COMPLAINT:  2M F/U    DIAGNOSIS:   Encounter Diagnoses   Name Primary?    Adenocarcinoma of upper lobe of right lung Yes    Neuroendocrine carcinoma       RADIATION TREATMENT COURSE:    11/13/2023- 11/30/2023: 5000 cGy in 5 fractions SBRT to right lung.    INTERVAL HISTORY     Sherry Lobato is a 74 y.o. female who was last seen in our office on 07/02/2024. The plan was to have repeat CT chest in 2 months to assess pneumonia and lung changes, and follow up here afterwards.    She was following with Dr. Ro as well, and was last seen on 07/02/2024. Their plan is to have repeat imaging in 3 months, and follow up afterwards.    The patient was admitted to [facility] again on 08/26/2024 for weakness and palpitations during dialysis. She was discharged on 08/29/2024. She then returned to [facility] ED on 09/02/2024 for fatigue and lightheadedness after dialysis, and was discharged on 09/05/2024. She had another hospital admission on 09/15/2024 for abdominal & hernia pain, and was discharged the same day.    IMAGING       CT CHEST WO CONTRAST DIAGNOSTIC     Date of Exam: 10/1/2024 9:23 AM EDT  Findings:     Luisa/mediastinum: No significant change in size of mediastinal lymph nodes. No interval lymph node enlargement. Thoracic aorta normal in caliber. Coronary artery calcification. No pericardial effusion     Lungs/pleura: Small bilateral pleural effusions, slightly decreased on the right. Stable 7 mm nodule in the peripheral right upper lobe (3/42). Previously visualized tiny nodules in the right upper lobe are no longer visualized. Stable tiny nodule in the   posterior right upper lobe (3/24). No new suspicious nodule interval resolution of right middle lobe  consolidation. Patchy airspace disease in the right upper lobe roughly on the same axial plane as the treated nodule. Mild diffuse increased   interstitial markings with interlobular septal thickening in the lung bases.     Upper Abdomen: No suspicious findings. Bilateral adrenal adenomas     Bones/soft tissues: No suspicious bone lesion. Diffusely sclerotic vertebrae. 1.5 cm cystic lesion immediately deep to the right nipple. Asymmetric fullness of the fibroglandular breast tissue on the right     IMPRESSION:     1. Stable 7 mm right upper lobe nodule. No new suspicious nodule  2. Stable mildly enlarged mediastinal lymph nodes  3. Interval resolution of right middle lobe airspace consolidation  4. New patchy airspace disease in the right upper lobe likely representing treatment related change unless the patient has any clinical findings of pneumonia  5. Interstitial disease favored to be interstitial edema  6. Small pleural effusions  7. Coronary artery atherosclerosis  8. Diffusely sclerotic bones, likely metabolic, possibly representing renal osteodystrophy  9. Asymmetric fullness of the right breast fibroglandular tissues. Recommend correlation with physical exam and mammography if warranted    CT Abdomen Pelvis Without Contrast  Result Date: 9/14/2024  1. There are fluid-filled loops of small bowel with occasional air-fluid levels. There is relative decompression of the small bowel distally. A discrete transition sinuses not definitively identified. Liquid stool is noted throughout the colon. Findings suggest a diarrheal illness. Findings may represent partial small bowel obstruction in the correct clinical setting. Recommend clinical correlation and follow-up as clinically indicated. 2. Defects in the anterior abdominal wall right lower quadrant again noted with knuckle of small bowel extending into the defect. No extension into the hernia sac is noted. There is fluid-filled distention of bowel anterior and  posterior to this site without a discrete transition point at that site appreciated. 3. Diffuse wall thickening of the stomach again noted. Underlying gastritis not excluded. 4. Other findings as above. Electronically Signed: Ace Graham MD  9/14/2024 11:10 AM EDT  Workstation ID: [id number]    CT Abdomen Pelvis Without Contrast  Result Date: 8/26/2024  1. There is a new small right lower quadrant ventral hernia containing a segment of small bowel. 2. Mild distention of mid abdominal small bowel loops without evidence of high-grade obstruction. 3. Surgical changes of the stomach, right nephrectomy, hysterectomy, cholecystectomy. 4. Stable bilateral adrenal adenomas. 5. Advanced sigmoid diverticulosis without CT evidence of acute diverticulitis.. 6. Atrophic left kidney. Electronically Signed: Florinda Davidson MD  8/26/2024 1:10 PM EDT  Workstation ID: [id number]    XR Chest 1 View  Result Date: 8/26/2024  Impression: 1. Diffuse increased interstitial changes suggesting pulmonary edema, fluid overload. Asymmetric opacities at the right base may represent asymmetric edema, atelectasis or pneumonia. Findings demonstrate slight interval improvement from comparison Electronically Signed: Ace Graham MD  8/26/2024 1:10 PM EDT  Workstation ID: [id number]    CT Head Without Contrast  Result Date: 8/26/2024  Impression: No convincing acute intracranial finding by CT. Chronic left parietal lobe infarct similar to previous comparison. Electronically Signed: Bg Dean MD  8/26/2024 1:02 PM EDT  Workstation ID: [id number]    XR Chest 1 View  Result Date: 6/9/2024  Impression: 1. Persistent right middle lobe pneumonia. 2. Stable cardiomegaly and pulmonary edema with small bilateral pleural effusions. Electronically Signed: Corey Harris MD  6/9/2024 3:38 PM EDT  Workstation ID: [id number]    CT Chest Without Contrast Diagnostic  Result Date: 6/8/2024  Impression: 1. Right middle lobe pneumonia. 2. Pulmonary edema with small  bilateral pleural effusions right greater than left and dependent lower lobe atelectasis. 3. Right upper lobe 7 mm nodule similar to prior study. 4. Mild increase in size of several mediastinal lymph nodes which may relate to reactive adenopathy recommend attention on follow-up. 5. Additional incidental findings above. Electronically Signed: Corey Harris MD  6/8/2024 3:38 PM EDT  Workstation ID: [id number]    XR Chest 1 View  Result Date: 6/8/2024  Impression: 1. Right middle lobe pneumonia. 2. Cardiomegaly and pulmonary edema. 3. Small bilateral pleural effusions right greater than left. Electronically Signed: Corey Harris MD  6/8/2024 3:29 PM EDT  Workstation ID: [id number]    PATHOLOGY     No recent pathology to review.    LABS     HEMATOLOGY:  WBC   Date Value Ref Range Status   10/01/2024 6.86 3.40 - 10.80 10*3/mm3 Final   07/14/2022 9.33 4.5 - 11.0 10*3/uL Final     RBC   Date Value Ref Range Status   10/01/2024 3.85 3.77 - 5.28 10*6/mm3 Final   07/14/2022 3.72 (L) 4.0 - 5.2 10*6/uL Final     Hemoglobin   Date Value Ref Range Status   10/02/2024 10.6 (L) 12.0 - 16.0 g/dL Final   10/02/2024 31.8 (L) 36.0 - 48.0 % Final   07/14/2022 11.2 (L) 12.0 - 16.0 g/dL Final     Hematocrit   Date Value Ref Range Status   10/01/2024 38.3 34.0 - 46.6 % Final   07/14/2022 35.8 (L) 36.0 - 46.0 % Final     Platelets   Date Value Ref Range Status   10/01/2024 358 140 - 450 10*3/mm3 Final   07/14/2022 423 140 - 440 10*3/uL Final     CHEMISTRY:  Lab Results   Component Value Date    GLUCOSE 80 10/01/2024    BUN 19 10/01/2024    CREATININE 5.47 (H) 10/01/2024    EGFRIFNONA 6 (L) 10/08/2021    EGFRIFAFRI 5 (L) 07/14/2022    BCR 3.5 (L) 10/01/2024    K 4.5 10/02/2024    CO2 26.9 10/01/2024    CALCIUM 9.1 10/01/2024    ALBUMIN 3.5 10/01/2024    LABIL2 0.8 (L) 03/14/2019    AST 19 10/01/2024    ALT <5 10/01/2024     PROBLEM LIST     Patient Active Problem List   Diagnosis    B12 deficiency    Barretts esophagus    Gastroesophageal reflux  disease    Benign neuroendocrine tumor    Carcinoma, renal cell    ESRD (end stage renal disease)    Degeneration of intervertebral disc of lumbar region    Type 2 diabetes mellitus, without long-term current use of insulin    Gout    Hyperlipidemia    Hypertension    Insomnia    Nephrotic syndrome    Neuroendocrine carcinoma    Orthostatic hypotension    Sleep apnea, obstructive    Bilateral carotid artery stenosis    Vitamin D deficiency    Hordeolum externum of right lower eyelid    Pseudophakia of both eyes    Diabetic peripheral neuropathy    Tobacco abuse    Congestive heart failure    Adenocarcinoma of upper lobe of right lung    Emphysema lung    Chronic obstructive pulmonary disease    Degeneration of lumbar intervertebral disc    Disorder of phosphorus metabolism    Hypertensive disorder    Arteriovenous fistula    Bilateral pseudophakia    Obstructive sleep apnea syndrome    Type 2 diabetes mellitus    Steal syndrome dialysis vascular access    Acute upper GI bleeding    Anemia due to GI blood loss    Non-small cell lung cancer    Warthin's tumor    Secondary hyperparathyroidism of renal origin    Anemia    Pain in the abdomen      CURRENT MEDICATIONS     Current Outpatient Medications   Medication Instructions    albuterol sulfate  (90 Base) MCG/ACT inhaler 2 puffs, Inhalation, Every 4 Hours PRN    amLODIPine (NORVASC) 10 mg, Oral, Daily    calcium acetate (PHOS BINDER)) 667 mg, Oral, 3 Times Daily    carvedilol (COREG) 3.125 mg, 2 Times Daily With Meals    cholecalciferol (VITAMIN D3) 1,000 Units, Oral, Daily    HYDROcodone-acetaminophen (Norco) 5-325 MG per tablet 1 tablet, Oral, Every 6 Hours PRN    hyoscyamine (ANASPAZ,LEVSIN) 0.125 MG tablet Take 1 tablet by mouth Every 4 (Four) Hours As Needed.    lidocaine-prilocaine (EMLA) 2.5-2.5 % cream 1 Application, Topical, Daily PRN, Apply to left 5th finger    losartan (COZAAR) 50 mg, Oral, Daily    metoprolol tartrate (LOPRESSOR) 12.5 mg, Oral     "midodrine (PROAMATINE) 5 mg, Oral, 3 Times Daily PRN    sevelamer (RENAGEL) 800 MG tablet 3 tablets, Oral, With Snacks    sevelamer (RENVELA) 2,400 mg, Oral, 3 Times Daily With Meals    sodium bicarbonate 1,300 mg, Oral, 3 Times Daily    sucralfate (CARAFATE) 1 g, Oral, 4 Times Daily    temazepam (RESTORIL) 30 mg, Oral, Nightly      ALLERGIES     Allergies   Allergen Reactions    Atorvastatin Calcium Other (See Comments)     \"felt like I was on fire\"    Gabapentin Dizziness     says was unable to walk    Niacin Other (See Comments)     \"FEELS LIKE SHE IS ON FIRE\"         REVIEW OF SYSTEMS     Review of Systems   Constitutional:  Positive for fatigue. Negative for activity change.   Respiratory:  Positive for cough.       Vitals:    10/08/24 1022   BP: 158/71   Pulse: 78   Resp: 18   Temp: 97.7 °F (36.5 °C)   SpO2: 95%      Physical Exam  Constitutional:       General: She is not in acute distress.  HENT:      Head: Normocephalic and atraumatic.   Pulmonary:      Effort: Pulmonary effort is normal. No respiratory distress.   Neurological:      Mental Status: She is alert and oriented to person, place, and time. Mental status is at baseline.   Psychiatric:         Mood and Affect: Mood normal.         Behavior: Behavior normal.           ECOG:  Restricted in physically strenuous activity but ambulatory and able to carry out work of a light or sedentary nature, e.g., light house work, office work = 1      ASSESSMENT AND PLAN     ASSESSMENT:      Sherry Lobato is a 73 y.o. female with history of RUL NSCLC, well differentiated adenocarcinoma with focal mucinous features, [id number], Stage IA2. She is sp SBRT completed on 11/30/2023, 5000 cGy in 5 fractions SBRT to right lung. She also has a history of neuroendocrine cancer. She returns for routine surveillance.     Diagnoses and all orders for this visit:    1. Adenocarcinoma of upper lobe of right lung (Primary)  -     CT Chest Without Contrast Diagnostic; Future  -   "   CT Abdomen Pelvis Without Contrast; Future    2. Neuroendocrine carcinoma  -     CT Chest Without Contrast Diagnostic; Future  -     CT Abdomen Pelvis Without Contrast; Future       PLAN:      Orders:  - Repeat CT imaging of the chest, abdomen, in pelvis in 4 months  - Follow-up after imaging or sooner as clinically indicated    The patient has recovered well from her recent hospitalization. She recently picked up mucinex to see if it helps with her cough. She otherwise is doing well with no new complaints. We reviewed imaging which showed no evidence of new or progressive malignancy in the chest. Discussed plans for continued surveillance. Plan for repeat imaging in 4 months or sooner as clinically indicated.     I spent 30 minutes caring for Sherry on this date of service. This time includes time spent by me in the following activities:preparing for the visit, reviewing tests, obtaining and/or reviewing a separately obtained history, performing a medically appropriate examination and/or evaluation , counseling and educating the patient/family/caregiver, ordering medications, tests, or procedures, documenting information in the medical record, and independently interpreting results and communicating that information with the patient/family/caregiver    FOLLOW UP     No follow-ups on file.     CC: Jin Malcolm PA-C Crase, Joshua Ryan, PA-C

## 2024-10-08 ENCOUNTER — OFFICE VISIT (OUTPATIENT)
Dept: RADIATION ONCOLOGY | Facility: HOSPITAL | Age: 74
End: 2024-10-08
Payer: MEDICARE

## 2024-10-08 VITALS
SYSTOLIC BLOOD PRESSURE: 158 MMHG | BODY MASS INDEX: 29.49 KG/M2 | WEIGHT: 150.2 LBS | RESPIRATION RATE: 18 BRPM | DIASTOLIC BLOOD PRESSURE: 71 MMHG | HEART RATE: 78 BPM | TEMPERATURE: 97.7 F | OXYGEN SATURATION: 95 % | HEIGHT: 60 IN

## 2024-10-08 DIAGNOSIS — C7A.8 NEUROENDOCRINE CARCINOMA: ICD-10-CM

## 2024-10-08 DIAGNOSIS — C34.11 ADENOCARCINOMA OF UPPER LOBE OF RIGHT LUNG: Primary | ICD-10-CM

## 2024-10-08 PROCEDURE — G0463 HOSPITAL OUTPT CLINIC VISIT: HCPCS | Performed by: INTERNAL MEDICINE

## 2024-10-15 ENCOUNTER — OFFICE VISIT (OUTPATIENT)
Dept: CARDIOLOGY | Facility: CLINIC | Age: 74
End: 2024-10-15
Payer: MEDICARE

## 2024-10-15 VITALS
SYSTOLIC BLOOD PRESSURE: 173 MMHG | BODY MASS INDEX: 28.86 KG/M2 | DIASTOLIC BLOOD PRESSURE: 69 MMHG | OXYGEN SATURATION: 97 % | HEIGHT: 60 IN | HEART RATE: 89 BPM | WEIGHT: 147 LBS

## 2024-10-15 DIAGNOSIS — E66.3 OVERWEIGHT (BMI 25.0-29.9): ICD-10-CM

## 2024-10-15 DIAGNOSIS — I10 PRIMARY HYPERTENSION: Primary | Chronic | ICD-10-CM

## 2024-10-15 DIAGNOSIS — R06.02 SOB (SHORTNESS OF BREATH): ICD-10-CM

## 2024-10-15 DIAGNOSIS — E11.9 TYPE 2 DIABETES MELLITUS WITHOUT COMPLICATION, WITHOUT LONG-TERM CURRENT USE OF INSULIN: Chronic | ICD-10-CM

## 2024-10-15 DIAGNOSIS — E78.2 MIXED HYPERLIPIDEMIA: Chronic | ICD-10-CM

## 2024-10-15 DIAGNOSIS — K21.9 GASTROESOPHAGEAL REFLUX DISEASE WITHOUT ESOPHAGITIS: Chronic | ICD-10-CM

## 2024-10-15 DIAGNOSIS — R00.2 PALPITATIONS: ICD-10-CM

## 2024-10-15 DIAGNOSIS — N18.6 ESRD (END STAGE RENAL DISEASE): Chronic | ICD-10-CM

## 2024-10-15 RX ORDER — AMLODIPINE BESYLATE 5 MG/1
5 TABLET ORAL DAILY
Qty: 90 TABLET | Refills: 3 | Status: ON HOLD | OUTPATIENT
Start: 2024-10-15

## 2024-10-15 RX ORDER — MUPIROCIN 20 MG/G
1 OINTMENT TOPICAL DAILY
Status: ON HOLD | COMMUNITY
Start: 2024-10-14

## 2024-10-15 RX ORDER — PANTOPRAZOLE SODIUM 20 MG/1
20 TABLET, DELAYED RELEASE ORAL DAILY
Status: ON HOLD | COMMUNITY

## 2024-10-20 ENCOUNTER — APPOINTMENT (OUTPATIENT)
Dept: GENERAL RADIOLOGY | Facility: HOSPITAL | Age: 74
End: 2024-10-20
Payer: MEDICARE

## 2024-10-20 ENCOUNTER — HOSPITAL ENCOUNTER (INPATIENT)
Facility: HOSPITAL | Age: 74
LOS: 2 days | Discharge: HOME | End: 2024-10-22
Attending: INTERNAL MEDICINE | Admitting: INTERNAL MEDICINE
Payer: MEDICARE

## 2024-10-20 DIAGNOSIS — Z99.2 ESRD (END STAGE RENAL DISEASE) ON DIALYSIS: ICD-10-CM

## 2024-10-20 DIAGNOSIS — N18.6 ESRD (END STAGE RENAL DISEASE) ON DIALYSIS: ICD-10-CM

## 2024-10-20 DIAGNOSIS — J81.0 ACUTE PULMONARY EDEMA: ICD-10-CM

## 2024-10-20 DIAGNOSIS — R06.00 DYSPNEA, UNSPECIFIED TYPE: Primary | ICD-10-CM

## 2024-10-20 PROBLEM — I16.9 HYPERTENSIVE CRISIS: Status: ACTIVE | Noted: 2024-10-20

## 2024-10-20 LAB
ALBUMIN SERPL-MCNC: 3.7 G/DL (ref 3.5–5.2)
ALBUMIN/GLOB SERPL: 1.2 G/DL
ALP SERPL-CCNC: 129 U/L (ref 39–117)
ALT SERPL W P-5'-P-CCNC: 11 U/L (ref 1–33)
ANION GAP SERPL CALCULATED.3IONS-SCNC: 14 MMOL/L (ref 10–20)
ANION GAP SERPL CALCULATED.3IONS-SCNC: 14.6 MMOL/L (ref 5–15)
APTT PPP: 29 SECONDS (ref 61–76.5)
AST SERPL-CCNC: 29 U/L (ref 1–32)
ATMOSPHERIC PRESS: ABNORMAL MM[HG]
BASE EXCESS BLDV CALC-SCNC: -0.6 MMOL/L (ref -2–2)
BASOPHILS # BLD AUTO: 0.12 10*3/MM3 (ref 0–0.2)
BASOPHILS NFR BLD AUTO: 0.7 % (ref 0–1.5)
BDY SITE: ABNORMAL
BILIRUB SERPL-MCNC: 0.2 MG/DL (ref 0–1.2)
BUN BLDA-MCNC: 59 MG/DL (ref 8–26)
BUN SERPL-MCNC: 42 MG/DL (ref 8–23)
BUN/CREAT SERPL: 6 (ref 7–25)
CA-I BLDA-SCNC: 0.9 MMOL/L (ref 1.12–1.32)
CALCIUM SPEC-SCNC: 9.1 MG/DL (ref 8.6–10.5)
CHLORIDE BLDA-SCNC: 107 MMOL/L (ref 98–109)
CHLORIDE SERPL-SCNC: 102 MMOL/L (ref 98–107)
CO2 BLDA-SCNC: 22 MMOL/L (ref 24–29)
CO2 BLDA-SCNC: 26.9 MMOL/L (ref 22–29)
CO2 SERPL-SCNC: 22.4 MMOL/L (ref 22–29)
CREAT BLDA-MCNC: 8.4 MG/DL (ref 0.6–1.3)
CREAT SERPL-MCNC: 7.03 MG/DL (ref 0.57–1)
D-LACTATE SERPL-SCNC: 0.5 MMOL/L (ref 0.3–2)
DEPRECATED RDW RBC AUTO: 59.6 FL (ref 37–54)
EGFRCR SERPLBLD CKD-EPI 2021: 4.6 ML/MIN/1.73
EGFRCR SERPLBLD CKD-EPI 2021: 5.7 ML/MIN/1.73
EOSINOPHIL # BLD AUTO: 0.13 10*3/MM3 (ref 0–0.4)
EOSINOPHIL NFR BLD AUTO: 0.8 % (ref 0.3–6.2)
ERYTHROCYTE [DISTWIDTH] IN BLOOD BY AUTOMATED COUNT: 17.1 % (ref 12.3–15.4)
GLOBULIN UR ELPH-MCNC: 3.2 GM/DL
GLUCOSE BLDC GLUCOMTR-MCNC: 117 MG/DL (ref 70–105)
GLUCOSE SERPL-MCNC: 111 MG/DL (ref 65–99)
HBV SURFACE AG SERPL QL IA: NORMAL
HCO3 BLDV-SCNC: 25.5 MMOL/L (ref 22–26)
HCT VFR BLD AUTO: 39.8 % (ref 34–46.6)
HCT VFR BLDA CALC: 36 % (ref 38–51)
HGB BLD-MCNC: 12.2 G/DL (ref 12–15.9)
HGB BLDA-MCNC: 12.2 G/DL (ref 12–17)
HOLD SPECIMEN: NORMAL
HOLD SPECIMEN: NORMAL
IMM GRANULOCYTES # BLD AUTO: 0.09 10*3/MM3 (ref 0–0.05)
IMM GRANULOCYTES NFR BLD AUTO: 0.5 % (ref 0–0.5)
INHALED O2 CONCENTRATION: 28 %
INR PPP: 0.95 (ref 0.93–1.1)
LYMPHOCYTES # BLD AUTO: 0.49 10*3/MM3 (ref 0.7–3.1)
LYMPHOCYTES NFR BLD AUTO: 2.8 % (ref 19.6–45.3)
MCH RBC QN AUTO: 29.2 PG (ref 26.6–33)
MCHC RBC AUTO-ENTMCNC: 30.7 G/DL (ref 31.5–35.7)
MCV RBC AUTO: 95.2 FL (ref 79–97)
MODALITY: ABNORMAL
MONOCYTES # BLD AUTO: 0.65 10*3/MM3 (ref 0.1–0.9)
MONOCYTES NFR BLD AUTO: 3.8 % (ref 5–12)
NEUTROPHILS NFR BLD AUTO: 15.82 10*3/MM3 (ref 1.7–7)
NEUTROPHILS NFR BLD AUTO: 91.4 % (ref 42.7–76)
NRBC BLD AUTO-RTO: 0 /100 WBC (ref 0–0.2)
NT-PROBNP SERPL-MCNC: ABNORMAL PG/ML (ref 0–1800)
PCO2 BLDV: 45.8 MM HG (ref 42–51)
PH BLDV: 7.35 PH UNITS (ref 7.32–7.43)
PHOSPHATE SERPL-MCNC: 4.7 MG/DL (ref 2.5–4.5)
PLATELET # BLD AUTO: 395 10*3/MM3 (ref 140–450)
PMV BLD AUTO: 10.6 FL (ref 6–12)
PO2 BLDV: 22.5 MM HG (ref 40–42)
POTASSIUM BLDA-SCNC: 6 MMOL/L (ref 3.5–4.9)
POTASSIUM SERPL-SCNC: 5.1 MMOL/L (ref 3.5–5.2)
PROT SERPL-MCNC: 6.9 G/DL (ref 6–8.5)
PROTHROMBIN TIME: 10.4 SECONDS (ref 9.6–11.7)
QT INTERVAL: 333 MS
QTC INTERVAL: 463 MS
RBC # BLD AUTO: 4.18 10*6/MM3 (ref 3.77–5.28)
SAO2 % BLDCOV: 35.4 % (ref 45–75)
SARS-COV-2 RNA RESP QL NAA+PROBE: NOT DETECTED
SODIUM BLD-SCNC: 136 MMOL/L (ref 138–146)
SODIUM SERPL-SCNC: 139 MMOL/L (ref 136–145)
WBC NRBC COR # BLD AUTO: 17.3 10*3/MM3 (ref 3.4–10.8)
WHOLE BLOOD HOLD COAG: NORMAL
WHOLE BLOOD HOLD SPECIMEN: NORMAL

## 2024-10-20 PROCEDURE — 93010 ELECTROCARDIOGRAM REPORT: CPT | Performed by: INTERNAL MEDICINE

## 2024-10-20 PROCEDURE — 85610 PROTHROMBIN TIME: CPT | Performed by: NURSE PRACTITIONER

## 2024-10-20 PROCEDURE — 93005 ELECTROCARDIOGRAM TRACING: CPT | Performed by: INTERNAL MEDICINE

## 2024-10-20 PROCEDURE — 83605 ASSAY OF LACTIC ACID: CPT | Performed by: NURSE PRACTITIONER

## 2024-10-20 PROCEDURE — 93005 ELECTROCARDIOGRAM TRACING: CPT | Performed by: NURSE PRACTITIONER

## 2024-10-20 PROCEDURE — 80047 BASIC METABLC PNL IONIZED CA: CPT

## 2024-10-20 PROCEDURE — 94799 UNLISTED PULMONARY SVC/PX: CPT

## 2024-10-20 PROCEDURE — 87635 SARS-COV-2 COVID-19 AMP PRB: CPT | Performed by: NURSE PRACTITIONER

## 2024-10-20 PROCEDURE — 25010000002 CEFTRIAXONE PER 250 MG: Performed by: NURSE PRACTITIONER

## 2024-10-20 PROCEDURE — 87340 HEPATITIS B SURFACE AG IA: CPT | Performed by: INTERNAL MEDICINE

## 2024-10-20 PROCEDURE — 85025 COMPLETE CBC W/AUTO DIFF WBC: CPT | Performed by: NURSE PRACTITIONER

## 2024-10-20 PROCEDURE — 85014 HEMATOCRIT: CPT

## 2024-10-20 PROCEDURE — 36415 COLL VENOUS BLD VENIPUNCTURE: CPT

## 2024-10-20 PROCEDURE — 84484 ASSAY OF TROPONIN QUANT: CPT | Performed by: INTERNAL MEDICINE

## 2024-10-20 PROCEDURE — 82803 BLOOD GASES ANY COMBINATION: CPT

## 2024-10-20 PROCEDURE — 87040 BLOOD CULTURE FOR BACTERIA: CPT | Performed by: NURSE PRACTITIONER

## 2024-10-20 PROCEDURE — 83880 ASSAY OF NATRIURETIC PEPTIDE: CPT | Performed by: NURSE PRACTITIONER

## 2024-10-20 PROCEDURE — 25010000002 FUROSEMIDE PER 20 MG: Performed by: NURSE PRACTITIONER

## 2024-10-20 PROCEDURE — 93005 ELECTROCARDIOGRAM TRACING: CPT

## 2024-10-20 PROCEDURE — 80053 COMPREHEN METABOLIC PANEL: CPT | Performed by: NURSE PRACTITIONER

## 2024-10-20 PROCEDURE — 25010000002 NITROGLYCERIN 200 MCG/ML SOLUTION: Performed by: NURSE PRACTITIONER

## 2024-10-20 PROCEDURE — 84100 ASSAY OF PHOSPHORUS: CPT | Performed by: INTERNAL MEDICINE

## 2024-10-20 PROCEDURE — 25010000002 ONDANSETRON PER 1 MG: Performed by: INTERNAL MEDICINE

## 2024-10-20 PROCEDURE — 94640 AIRWAY INHALATION TREATMENT: CPT

## 2024-10-20 PROCEDURE — 5A1D70Z PERFORMANCE OF URINARY FILTRATION, INTERMITTENT, LESS THAN 6 HOURS PER DAY: ICD-10-PCS | Performed by: INTERNAL MEDICINE

## 2024-10-20 PROCEDURE — 94761 N-INVAS EAR/PLS OXIMETRY MLT: CPT

## 2024-10-20 PROCEDURE — 85730 THROMBOPLASTIN TIME PARTIAL: CPT | Performed by: NURSE PRACTITIONER

## 2024-10-20 PROCEDURE — 87205 SMEAR GRAM STAIN: CPT | Performed by: NURSE PRACTITIONER

## 2024-10-20 PROCEDURE — 99285 EMERGENCY DEPT VISIT HI MDM: CPT

## 2024-10-20 PROCEDURE — 71045 X-RAY EXAM CHEST 1 VIEW: CPT

## 2024-10-20 PROCEDURE — 94664 DEMO&/EVAL PT USE INHALER: CPT

## 2024-10-20 RX ORDER — SODIUM CHLORIDE 0.9 % (FLUSH) 0.9 %
10 SYRINGE (ML) INJECTION EVERY 12 HOURS SCHEDULED
Status: DISCONTINUED | OUTPATIENT
Start: 2024-10-20 | End: 2024-10-22 | Stop reason: HOSPADM

## 2024-10-20 RX ORDER — AMLODIPINE BESYLATE 5 MG/1
5 TABLET ORAL DAILY
Status: DISCONTINUED | OUTPATIENT
Start: 2024-10-21 | End: 2024-10-21

## 2024-10-20 RX ORDER — SODIUM BICARBONATE 650 MG/1
1300 TABLET ORAL 3 TIMES DAILY
Status: DISCONTINUED | OUTPATIENT
Start: 2024-10-20 | End: 2024-10-22 | Stop reason: HOSPADM

## 2024-10-20 RX ORDER — SEVELAMER CARBONATE 800 MG/1
2400 TABLET, FILM COATED ORAL
Status: DISCONTINUED | OUTPATIENT
Start: 2024-10-20 | End: 2024-10-22 | Stop reason: HOSPADM

## 2024-10-20 RX ORDER — BISACODYL 5 MG/1
5 TABLET, DELAYED RELEASE ORAL DAILY PRN
Status: DISCONTINUED | OUTPATIENT
Start: 2024-10-20 | End: 2024-10-22 | Stop reason: HOSPADM

## 2024-10-20 RX ORDER — SODIUM CHLORIDE 0.9 % (FLUSH) 0.9 %
10 SYRINGE (ML) INJECTION AS NEEDED
Status: DISCONTINUED | OUTPATIENT
Start: 2024-10-20 | End: 2024-10-22 | Stop reason: HOSPADM

## 2024-10-20 RX ORDER — ONDANSETRON 2 MG/ML
4 INJECTION INTRAMUSCULAR; INTRAVENOUS EVERY 6 HOURS PRN
Status: DISCONTINUED | OUTPATIENT
Start: 2024-10-20 | End: 2024-10-22 | Stop reason: HOSPADM

## 2024-10-20 RX ORDER — NITROGLYCERIN 0.4 MG/1
0.4 TABLET SUBLINGUAL
Status: DISCONTINUED | OUTPATIENT
Start: 2024-10-20 | End: 2024-10-22 | Stop reason: HOSPADM

## 2024-10-20 RX ORDER — TEMAZEPAM 15 MG/1
30 CAPSULE ORAL NIGHTLY
Status: DISCONTINUED | OUTPATIENT
Start: 2024-10-20 | End: 2024-10-22 | Stop reason: HOSPADM

## 2024-10-20 RX ORDER — LABETALOL HYDROCHLORIDE 5 MG/ML
10 INJECTION, SOLUTION INTRAVENOUS EVERY 6 HOURS PRN
Status: DISCONTINUED | OUTPATIENT
Start: 2024-10-20 | End: 2024-10-22 | Stop reason: HOSPADM

## 2024-10-20 RX ORDER — AMOXICILLIN 250 MG
2 CAPSULE ORAL 2 TIMES DAILY PRN
Status: DISCONTINUED | OUTPATIENT
Start: 2024-10-20 | End: 2024-10-22 | Stop reason: HOSPADM

## 2024-10-20 RX ORDER — PANTOPRAZOLE SODIUM 40 MG/1
40 TABLET, DELAYED RELEASE ORAL DAILY
Status: DISCONTINUED | OUTPATIENT
Start: 2024-10-21 | End: 2024-10-22 | Stop reason: HOSPADM

## 2024-10-20 RX ORDER — BISACODYL 10 MG
10 SUPPOSITORY, RECTAL RECTAL DAILY PRN
Status: DISCONTINUED | OUTPATIENT
Start: 2024-10-20 | End: 2024-10-22 | Stop reason: HOSPADM

## 2024-10-20 RX ORDER — CALCIUM ACETATE 667 MG/1
667 CAPSULE ORAL 3 TIMES DAILY
Status: DISCONTINUED | OUTPATIENT
Start: 2024-10-20 | End: 2024-10-21

## 2024-10-20 RX ORDER — ACETAMINOPHEN 325 MG/1
650 TABLET ORAL EVERY 4 HOURS PRN
Status: DISCONTINUED | OUTPATIENT
Start: 2024-10-20 | End: 2024-10-22 | Stop reason: HOSPADM

## 2024-10-20 RX ORDER — SUCRALFATE 1 G/1
1 TABLET ORAL 4 TIMES DAILY
Status: DISCONTINUED | OUTPATIENT
Start: 2024-10-20 | End: 2024-10-21

## 2024-10-20 RX ORDER — LIDOCAINE 40 MG/G
1 CREAM TOPICAL AS NEEDED
Status: DISCONTINUED | OUTPATIENT
Start: 2024-10-20 | End: 2024-10-22 | Stop reason: HOSPADM

## 2024-10-20 RX ORDER — SEVELAMER CARBONATE 800 MG/1
1600 TABLET, FILM COATED ORAL
Status: DISCONTINUED | OUTPATIENT
Start: 2024-10-20 | End: 2024-10-21

## 2024-10-20 RX ORDER — ALBUTEROL SULFATE 90 UG/1
2 INHALANT RESPIRATORY (INHALATION) ONCE
Status: COMPLETED | OUTPATIENT
Start: 2024-10-20 | End: 2024-10-20

## 2024-10-20 RX ORDER — POLYETHYLENE GLYCOL 3350 17 G/17G
17 POWDER, FOR SOLUTION ORAL DAILY PRN
Status: DISCONTINUED | OUTPATIENT
Start: 2024-10-20 | End: 2024-10-22 | Stop reason: HOSPADM

## 2024-10-20 RX ORDER — HYDROCODONE BITARTRATE AND ACETAMINOPHEN 5; 325 MG/1; MG/1
1 TABLET ORAL EVERY 6 HOURS PRN
Status: DISCONTINUED | OUTPATIENT
Start: 2024-10-20 | End: 2024-10-22 | Stop reason: HOSPADM

## 2024-10-20 RX ORDER — NITROGLYCERIN 20 MG/100ML
50-200 INJECTION INTRAVENOUS
Status: DISCONTINUED | OUTPATIENT
Start: 2024-10-20 | End: 2024-10-22

## 2024-10-20 RX ORDER — FUROSEMIDE 10 MG/ML
80 INJECTION INTRAMUSCULAR; INTRAVENOUS ONCE
Status: COMPLETED | OUTPATIENT
Start: 2024-10-20 | End: 2024-10-20

## 2024-10-20 RX ORDER — CARVEDILOL 3.12 MG/1
3.12 TABLET ORAL 2 TIMES DAILY WITH MEALS
Status: DISCONTINUED | OUTPATIENT
Start: 2024-10-20 | End: 2024-10-22 | Stop reason: HOSPADM

## 2024-10-20 RX ADMIN — SODIUM ZIRCONIUM CYCLOSILICATE 10 G: 10 POWDER, FOR SUSPENSION ORAL at 16:53

## 2024-10-20 RX ADMIN — ONDANSETRON 4 MG: 2 INJECTION, SOLUTION INTRAMUSCULAR; INTRAVENOUS at 21:12

## 2024-10-20 RX ADMIN — ALBUTEROL SULFATE 2 PUFF: 108 AEROSOL, METERED RESPIRATORY (INHALATION) at 13:55

## 2024-10-20 RX ADMIN — CEFTRIAXONE 2000 MG: 2 INJECTION, POWDER, FOR SOLUTION INTRAMUSCULAR; INTRAVENOUS at 15:11

## 2024-10-20 RX ADMIN — Medication 10 ML: at 22:41

## 2024-10-20 RX ADMIN — TEMAZEPAM 30 MG: 15 CAPSULE ORAL at 22:41

## 2024-10-20 RX ADMIN — NITROGLYCERIN 50 MCG/MIN: 20 INJECTION INTRAVENOUS at 17:28

## 2024-10-20 RX ADMIN — FUROSEMIDE 80 MG: 10 INJECTION, SOLUTION INTRAMUSCULAR; INTRAVENOUS at 16:49

## 2024-10-20 NOTE — ED NOTES
Patient has dialysis access site at upper left arm.  She has a revised access site in upper right arm and advised that we can only use her right arm from elbow down for blood draws.

## 2024-10-20 NOTE — ED PROVIDER NOTES
"Subjective   History of Present Illness  Chief complaint:       Context: 74-year-old female past medical history significant for ESRD on dialysis  (MWF with dr dey) pretension hyperlipidemia type 2 diabetes FABY presents with complaints of shortness of breath [place]; denies any chest pain diaphoresis or nausea.  She states she has been compliant with her dialysis.  Dry non productive cough, no fever. Denies lower extremity swelling.  She states she is not normally on oxygen and does not take diuretics.  History of VTE.        PCP: felix branham        Review of Systems   Constitutional:  Negative for fever.       Past Medical History:   Diagnosis Date    Lucas's esophagus     C. difficile colitis     Carotid artery disease     -50-74% left, 16-49% right (1/19)    COPD (chronic obstructive pulmonary disease)     DM2 (diabetes mellitus, type 2)     ESRD (end stage renal disease)     on HD    Gastric ulcer     at anastomatic site    Gastroparesis     unknown    GERD (gastroesophageal reflux disease)     Gout     not current    Hemodialysis patient     mwf    Hernia, incisional     abdomen    History of colonoscopy     UTD    History of degenerative disc disease     Hyperlipidemia     Hypertension     Medicare annual wellness visit, subsequent 11/07/2020    Microscopic colitis     Nephrotic syndrome     Neuroendocrine tumor     of stomach    FABY (obstructive sleep apnea)     not treating    Pedal edema     ressolved    Proteinuria     Renal cell cancer, right     Rib pain on right side     chronic    Rotator cuff tear     bilateral    Stomach cancer 2016    Stroke     Uterine cancer     Vitamin B 12 deficiency     Vitamin D deficiency        Allergies   Allergen Reactions    Atorvastatin Calcium Other (See Comments)     \"felt like I was on fire\"    Gabapentin Dizziness     says was unable to walk    Niacin Other (See Comments)     \"FEELS LIKE SHE IS ON FIRE\"         Past Surgical History: "   Procedure Laterality Date    APPENDECTOMY      ARTERIOVENOUS FISTULA Right     ARTERIOVENOUS FISTULA REPAIR      Clotted off and insert graft    ARTERIOVENOUS FISTULA/SHUNT SURGERY Left 12/30/2020    Procedure: ARTERIOVENOUS FISTULA FORMATION;  Surgeon: Jan Caicedo MD;  Location: [facility] MAIN OR;  Service: Vascular;  Laterality: Left;    ARTERIOVENOUS FISTULA/SHUNT SURGERY Left 4/23/2024    Procedure: FISTULOGRAM WITH SUBCLAVIAN ARTERY ANGIOPLASTY, fistula banding;  Surgeon: Franki Delgadillo II, MD;  Location: [facility] HYBRID OR;  Service: Vascular;  Laterality: Left;    BREAST BIOPSY      right nipple 1981    BRONCHOSCOPY N/A 6/11/2024    Procedure: BRONCHOSCOPY WITH RIGHT LUNG WASHING;  Surgeon: Aramis Barragan MD;  Location: [facility] ENDOSCOPY;  Service: Pulmonary;  Laterality: N/A;  RML atelectasis    COLONOSCOPY N/A 11/24/2020    Procedure: COLONOSCOPY with polypectomy x 7;  Surgeon: Jeffery White MD;  Location: [facility] ENDOSCOPY;  Service: Gastroenterology;  Laterality: N/A;  post op: polyps, diverticulosis, hemorrhoids    COLONOSCOPY N/A 6/1/2023    Procedure: COLONOSCOPY with polypectomy x 3 biopsy x 1;  Surgeon: Jeffery White MD;  Location: [facility] ENDOSCOPY;  Service: Gastroenterology;  Laterality: N/A;  diverticulosis polyps    COLONOSCOPY N/A 9/4/2024    Procedure: COLONOSCOPY WITH POLYPECTOMY X 1;  Surgeon: Jeffery White MD;  Location: [facility] ENDOSCOPY;  Service: Gastroenterology;  Laterality: N/A;  POST- POLYP, DIVERTICULOSIS, INTERNAL AND EXTERNAL HEMORRHOIDS    ENDOSCOPY N/A 03/12/2022    Procedure: ESOPHAGOGASTRODUODENOSCOPY with biopsy x 1 area;  Surgeon: Javan Augustin MD;  Location: [facility] ENDOSCOPY;  Service: Gastroenterology;  Laterality: N/A;  post op: anastamosis    ENDOSCOPY N/A 10/9/2023    Procedure: ESOPHAGOGASTRODUODENOSCOPY with biopsy x3 areas;  Surgeon: Ace Campbell MD;  Location: [facility] ENDOSCOPY;  Service: Gastroenterology;  Laterality: N/A;  duodenal ulcers;irregular  z line    ENDOSCOPY N/A 8/27/2024    Procedure: ESOPHAGOGASTRODUODENOSCOPY with argon plasma coagulation;  Surgeon: HILARIO Blackmon MD;  Location: [facility] ENDOSCOPY;  Service: Gastroenterology;  Laterality: N/A;  post op: AVM    ENTEROSCOPY SMALL BOWEL N/A 9/4/2024    Procedure: ESOPHAGOGASTRODUODENOSCOPY WITH SMALL BOWEL ENTEROSCOPY, ARGON PLASMA COAGULATION AND ENDOSCOPIC CLIPPING X 2 OF GASTRIC ANTRAL VASCULAR ECTASIA;  Surgeon: Jeffery White MD;  Location: [facility] ENDOSCOPY;  Service: Gastroenterology;  Laterality: N/A;  POST- GAVE    GASTRIC RESECTION      cancer    HYSTERECTOMY      LAPAROSCOPIC CHOLECYSTECTOMY      NEPHRECTOMY      right kidney removed     REDUCTION MAMMAPLASTY      TUMOR REMOVAL      boils    VENTRAL/INCISIONAL HERNIA REPAIR Right 08/28/2019    Procedure: VENTRAL/INCISIONAL HERNIA REPAIR;  Surgeon: Nilay Stevens MD;  Location: [facility] MAIN OR;  Service: General    VENTRAL/INCISIONAL HERNIA REPAIR N/A 10/01/2019    Procedure: OPEN VENTRAL/INCISIONAL HERNIA REPAIR;  Surgeon: Nilay Stevens MD;  Location: [facility] MAIN OR;  Service: General    VENTRAL/INCISIONAL HERNIA REPAIR N/A 10/11/2021    Procedure: VENTRAL/INCISIONAL HERNIA REPAIR LAPAROSCOPIC;  Surgeon: Nilay Stevens MD;  Location: [facility] MAIN OR;  Service: General;  Laterality: N/A;    VENTRAL/INCISIONAL HERNIA REPAIR N/A 01/23/2023    Procedure: VENTRAL/INCISIONAL HERNIA REPAIR with MESH;  Surgeon: Nilay Stevens MD;  Location: [facility] MAIN OR;  Service: General;  Laterality: N/A;       Family History   Problem Relation Age of Onset    Heart disease Father        Social History     Socioeconomic History    Marital status: Single   Tobacco Use    Smoking status: Former     Current packs/day: 0.00     Average packs/day: 0.3 packs/day for 57.0 years (14.3 ttl pk-yrs)     Types: Cigarettes     Start date: 5/1/1964     Quit date: 5/1/2021     Years since quitting: 3.4     Passive exposure: Past    Smokeless tobacco: Never     Tobacco comments:     quit smoking 2 months ago   Vaping Use    Vaping status: Never Used   Substance and Sexual Activity    Alcohol use: No    Drug use: Never    Sexual activity: Defer           Objective   Physical Exam    Vital signs and triage nurse note reviewed.  Constitutional: Awake, alert;   HEENT: Normocephalic, atraumatic; pupils with intact EOM; oropharynx is pink and moist without exudate or erythema.  Neck: Supple, full range of motion without pain; no JVD  Cardiovascular: Tachycardic rate and rhythm, normal S1-S2. + DAINA  Pulmonary: Respiratory effort regular mildly tachypneic without retractions, breath sounds rhonchi bilaterally, bibasilar Rales  Abdomen: Soft, nontender nondistended with normoactive bowel sounds; no rebound or guarding.  Musculoskeletal: Independent range of motion of all extremities with no palpable tenderness or edema.  AV fistula right upper extremity with very  Neuro: Alert oriented x3, speech is clear and appropriate, GCS 15  Skin:  Fleshtone warm, dry, intact; no erythematous or petechial rash or lesion      Procedures           ED Course  ED Course as of 10/20/24 1711   Sun Oct 20, 2024   1703 Sspoke with dr reyes for nephro [JW]      ED Course User Index  [JW] Masha Lazaro, MAEGAN                                   Labs Reviewed   APTT - Abnormal; Notable for the following components:       Result Value    PTT 29.0 (*)     All other components within normal limits   CBC WITH AUTO DIFFERENTIAL - Abnormal; Notable for the following components:    WBC 17.30 (*)     MCHC 30.7 (*)     RDW 17.1 (*)     RDW-SD 59.6 (*)     Neutrophil % 91.4 (*)     Lymphocyte % 2.8 (*)     Monocyte % 3.8 (*)     Neutrophils, Absolute 15.82 (*)     Lymphocytes, Absolute 0.49 (*)     Immature Grans, Absolute 0.09 (*)     All other components within normal limits   BLOOD GAS, VENOUS - Abnormal; Notable for the following components:    pO2, Venous 22.5 (*)     O2 Saturation, Venous 35.4 (*)     All  other components within normal limits   POCT CHEM 8 - Abnormal; Notable for the following components:    Glucose 117 (*)     BUN 59 (*)     Creatinine 8.40 (*)     Sodium 136 (*)     POC Potassium 6.0 (*)     Total CO2 22 (*)     Hematocrit 36 (*)     Ionized Calcium 0.90 (*)     eGFR 4.6 (*)     All other components within normal limits   COVID-19,CEPHEID/LATONIA,COR/MERCEDES/LAG/PAD/JC/YVROSE IN-HOUSE,NP SWAB IN TRANSPORT MEDIA 1 HR TAT, RT-PCR - Normal    Narrative:     Fact sheet for providers: https://www.fda.gov/media/503446/download     Fact sheet for patients: https://www.fda.gov/media/570807/download  Fact sheet for providers: https://www.fda.gov/media/459278/download    Fact sheet for patients: https://www.fda.gov/media/048408/download    Test performed by PCR.   PROTIME-INR - Normal   POC LACTATE - Normal   BLOOD CULTURE   BLOOD CULTURE   RESPIRATORY CULTURE   BLOOD GAS, ARTERIAL   RAINBOW DRAW    Narrative:     The following orders were created for panel order Leslie Draw.  Procedure                               Abnormality         Status                     ---------                               -----------         ------                     Green Top (Gel)[[id number]]                                  Final result               Lavender Top[[id number]]                                     Final result               Gold Top - SST[[id number]]                                   Final result               Light Blue Top[[id number]]                                   Final result                 Please view results for these tests on the individual orders.   COMPREHENSIVE METABOLIC PANEL   BNP (IN-HOUSE)   CBC AND DIFFERENTIAL    Narrative:     The following orders were created for panel order CBC & Differential.  Procedure                               Abnormality         Status                     ---------                               -----------         ------                     CBC Auto Differential[[id number]]         Abnormal            Final result                 Please view results for these tests on the individual orders.   GREEN TOP   LAVENDER TOP   GOLD TOP - SST   LIGHT BLUE TOP     Scheduled Meds:     Continuous Infusions:nitroglycerin,  mcg/min      PRN Meds:.  Insert Peripheral IV **AND** sodium chloride    sodium chloride  XR Chest 1 View    Result Date: 10/20/2024  Impression: Cardiomegaly with diffuse bilateral interstitial thickening and right mid and lower lung airspace disease. These findings may reflect asymmetric alveolar edema versus pneumonia. Clinical correlation is recommended. Electronically Signed: June Hager MD  10/20/2024 2:08 PM EDT  Workstation ID: [id number]     Prior to Admission medications    Medication Sig Start Date End Date Taking? Authorizing Provider   albuterol sulfate  (90 Base) MCG/ACT inhaler Inhale 2 puffs Every 4 (Four) Hours As Needed for Wheezing.    ProviderDanica MD   amLODIPine (NORVASC) 5 MG tablet Take 1 tablet by mouth Daily. 10/15/24   Mak Sosa MD   calcium acetate (PHOS BINDER,) 667 MG capsule capsule Take 1 capsule by mouth 3 (Three) Times a Day.    ProviderDanica MD   carvedilol (COREG) 3.125 MG tablet Take 1 tablet by mouth 2 (Two) Times a Day With Meals.    Danica Murphy MD   Cholecalciferol 25 MCG (1000 UT) tablet Take 1 tablet by mouth Daily.    Danica Murphy MD   HYDROcodone-acetaminophen (Norco) 5-325 MG per tablet Take 1 tablet by mouth Every 6 (Six) Hours As Needed for Moderate Pain. 9/15/24   Kelli Henderson MD   hyoscyamine (ANASPAZ,LEVSIN) 0.125 MG tablet Take 1 tablet by mouth Every 4 (Four) Hours As Needed.  Patient not taking: Reported on 10/15/2024 11/1/23   Danica Murphy MD   lidocaine-prilocaine (EMLA) 2.5-2.5 % cream Apply 1 Application topically to the appropriate area as directed Daily As Needed (severe pain, left 5th finger). Apply to left 5th finger 4/16/24   Franki Delgadillo II, MD   losartan  "(COZAAR) 50 MG tablet Take 1 tablet by mouth Daily.    Danica Murphy MD   midodrine (PROAMATINE) 5 MG tablet Take 1 tablet by mouth 3 (Three) Times a Day As Needed.  Patient not taking: Reported on 10/15/2024    Danica Murphy MD   mupirocin (BACTROBAN) 2 % ointment Apply 1 Application topically to the appropriate area as directed Daily. 10/14/24   Danica Murphy MD   pantoprazole (PROTONIX) 20 MG EC tablet Take 1 tablet by mouth Daily.    Danica Murphy MD   sevelamer (RENAGEL) 800 MG tablet Take 3 tablets by mouth With Snacks.    Danica Murphy MD   sevelamer (RENVELA) 800 MG tablet Take 3 tablets by mouth 3 (Three) Times a Day With Meals. 6/14/24   Cindy Santiago APRN   sodium bicarbonate 650 MG tablet Take 2 tablets by mouth 3 (Three) Times a Day.    Danica Murphy MD   sucralfate (Carafate) 1 g tablet Take 1 tablet by mouth 4 (Four) Times a Day. 6/14/24   Cindy Santiago APRN   temazepam (RESTORIL) 30 MG capsule Take 1 capsule by mouth Every Night. 1/10/23   Danica Murhpy MD               Medical Decision Making      BP (!) 142/124   Pulse 93   Temp 98.3 °F (36.8 °C) (Oral)   Resp 22   Ht 152.4 cm (60\")   Wt 65.8 kg (145 lb)   SpO2 98%   BMI 28.32 kg/m²         Radiology interpretation:  X-rays reviewed and interpreted by rima: Pulmonary edema  Further interpretation by radiologist as above  Lab interpretation:  Labs all viewed by me and significant for, PTT 29, white count 17, COVID-negative unable to obtain ABG, VBG 7.35 pH potassium 6 creatinine 8.4    EKG viewed and interpreted by Dr. Woodward, at 1315 sinus tach rate of 116 with baseline artifact QTc 463  comparison: 8/26/2024 sinus rhythm rate of 93  Seeing staff obtained repeat EKG at 1353 due to baseline artifact showing sinus tachycardia rate of 100 without acute ST depression or elevation or peaked T waves, QTc 484            Appropriate PPE worn during exam.  Discussed care with: optum, " nephro paged     Patient was placed on cardiac monitor had an IV established labs x-ray EKG obtained.  No peaked T waves.  She was mildly tachycardic and hypoxic on initial evaluation was placed on 2 L nasal cannula which she responded well to. Patient screened positive for hospital sepsis policy and orders were placed accordingly. patient was given antibiotics-fluid exclusion for concern for fluid overload and history of renal failure.  Her x-ray and symptoms are more consistent with fluid overload versus infection.  She was given diuresis and Tridil after discussion with Amna Pharm.D.  Based on the clinical findings at this time I anticipate the patient will require a 2 midnight stay  On reexam patient's breathing has eased although she remains on supplemental oxygen.  I discussed findings with patient and son who are agreeable to admission and plan of care.  Nephrology was paged and she was admitted to her primary care.  i discussed findings with patient who voices understanding of admission.    Discussed with Dr. Galindo  Discussed with Dr. Woodward    Problems Addressed:  Acute pulmonary edema: complicated acute illness or injury  Dyspnea, unspecified type: complicated acute illness or injury  ESRD (end stage renal disease) on dialysis: complicated acute illness or injury    Amount and/or Complexity of Data Reviewed  Labs: ordered.  Radiology: ordered.  ECG/medicine tests: ordered.    Risk  Prescription drug management.  Decision regarding hospitalization.        Final diagnoses:   Dyspnea, unspecified type   Acute pulmonary edema   ESRD (end stage renal disease) on dialysis       ED Disposition  ED Disposition       ED Disposition   Decision to Admit    Condition   --    Comment   Level of Care: Telemetry [5]   Admitting Physician: AMY GALINDO [[id number]]   Attending Physician: AMY GALINDO [[id number]]   Bed Request Comments: salas/pcu                 No follow-up provider specified.       Medication List      No changes  were made to your prescriptions during this visit.            Masha Lazaro, APRN  10/20/24 1633       Masha Lazaro, MAEGAN  10/20/24 1715

## 2024-10-21 ENCOUNTER — APPOINTMENT (OUTPATIENT)
Dept: GENERAL RADIOLOGY | Facility: HOSPITAL | Age: 74
End: 2024-10-21
Payer: MEDICARE

## 2024-10-21 ENCOUNTER — APPOINTMENT (OUTPATIENT)
Dept: NUCLEAR MEDICINE | Facility: HOSPITAL | Age: 74
End: 2024-10-21
Payer: MEDICARE

## 2024-10-21 LAB
ANION GAP SERPL CALCULATED.3IONS-SCNC: 12.8 MMOL/L (ref 5–15)
APTT PPP: 32.3 SECONDS (ref 61–76.5)
APTT PPP: 47.6 SECONDS (ref 61–76.5)
BACTERIA SPEC RESP CULT: NORMAL
BACTERIA SPEC RESP CULT: NORMAL
BASOPHILS # BLD AUTO: 0.08 10*3/MM3 (ref 0–0.2)
BASOPHILS # BLD AUTO: 0.1 10*3/MM3 (ref 0–0.2)
BASOPHILS NFR BLD AUTO: 0.6 % (ref 0–1.5)
BASOPHILS NFR BLD AUTO: 1.1 % (ref 0–1.5)
BUN SERPL-MCNC: 28 MG/DL (ref 8–23)
BUN/CREAT SERPL: 5.5 (ref 7–25)
CALCIUM SPEC-SCNC: 8.7 MG/DL (ref 8.6–10.5)
CHLORIDE SERPL-SCNC: 100 MMOL/L (ref 98–107)
CO2 SERPL-SCNC: 25.2 MMOL/L (ref 22–29)
CREAT SERPL-MCNC: 5.11 MG/DL (ref 0.57–1)
DEPRECATED RDW RBC AUTO: 58.3 FL (ref 37–54)
DEPRECATED RDW RBC AUTO: 58.7 FL (ref 37–54)
EGFRCR SERPLBLD CKD-EPI 2021: 8.3 ML/MIN/1.73
EOSINOPHIL # BLD AUTO: 0.12 10*3/MM3 (ref 0–0.4)
EOSINOPHIL # BLD AUTO: 0.22 10*3/MM3 (ref 0–0.4)
EOSINOPHIL NFR BLD AUTO: 0.7 % (ref 0.3–6.2)
EOSINOPHIL NFR BLD AUTO: 3.1 % (ref 0.3–6.2)
ERYTHROCYTE [DISTWIDTH] IN BLOOD BY AUTOMATED COUNT: 16.6 % (ref 12.3–15.4)
ERYTHROCYTE [DISTWIDTH] IN BLOOD BY AUTOMATED COUNT: 16.7 % (ref 12.3–15.4)
GLUCOSE SERPL-MCNC: 73 MG/DL (ref 65–99)
GRAM STN SPEC: NORMAL
HCT VFR BLD AUTO: 36 % (ref 34–46.6)
HCT VFR BLD AUTO: 37.3 % (ref 34–46.6)
HGB BLD-MCNC: 10.8 G/DL (ref 12–15.9)
HGB BLD-MCNC: 11.3 G/DL (ref 12–15.9)
IMM GRANULOCYTES # BLD AUTO: 0.02 10*3/MM3 (ref 0–0.05)
IMM GRANULOCYTES # BLD AUTO: 0.1 10*3/MM3 (ref 0–0.05)
IMM GRANULOCYTES NFR BLD AUTO: 0.3 % (ref 0–0.5)
IMM GRANULOCYTES NFR BLD AUTO: 0.6 % (ref 0–0.5)
INR PPP: 1.01 (ref 0.93–1.1)
LYMPHOCYTES # BLD AUTO: 0.33 10*3/MM3 (ref 0.7–3.1)
LYMPHOCYTES # BLD AUTO: 0.94 10*3/MM3 (ref 0.7–3.1)
LYMPHOCYTES NFR BLD AUTO: 1.8 % (ref 19.6–45.3)
LYMPHOCYTES NFR BLD AUTO: 13 % (ref 19.6–45.3)
MCH RBC QN AUTO: 28.3 PG (ref 26.6–33)
MCH RBC QN AUTO: 29 PG (ref 26.6–33)
MCHC RBC AUTO-ENTMCNC: 30 G/DL (ref 31.5–35.7)
MCHC RBC AUTO-ENTMCNC: 30.3 G/DL (ref 31.5–35.7)
MCV RBC AUTO: 94.5 FL (ref 79–97)
MCV RBC AUTO: 95.9 FL (ref 79–97)
MONOCYTES # BLD AUTO: 0.65 10*3/MM3 (ref 0.1–0.9)
MONOCYTES # BLD AUTO: 1.18 10*3/MM3 (ref 0.1–0.9)
MONOCYTES NFR BLD AUTO: 6.6 % (ref 5–12)
MONOCYTES NFR BLD AUTO: 9 % (ref 5–12)
NEUTROPHILS NFR BLD AUTO: 16.08 10*3/MM3 (ref 1.7–7)
NEUTROPHILS NFR BLD AUTO: 5.3 10*3/MM3 (ref 1.7–7)
NEUTROPHILS NFR BLD AUTO: 73.5 % (ref 42.7–76)
NEUTROPHILS NFR BLD AUTO: 89.7 % (ref 42.7–76)
NRBC BLD AUTO-RTO: 0 /100 WBC (ref 0–0.2)
NRBC BLD AUTO-RTO: 0 /100 WBC (ref 0–0.2)
PLATELET # BLD AUTO: 335 10*3/MM3 (ref 140–450)
PLATELET # BLD AUTO: 339 10*3/MM3 (ref 140–450)
PMV BLD AUTO: 10.4 FL (ref 6–12)
PMV BLD AUTO: 10.5 FL (ref 6–12)
POTASSIUM SERPL-SCNC: 3.6 MMOL/L (ref 3.5–5.2)
PROTHROMBIN TIME: 11 SECONDS (ref 9.6–11.7)
RBC # BLD AUTO: 3.81 10*6/MM3 (ref 3.77–5.28)
RBC # BLD AUTO: 3.89 10*6/MM3 (ref 3.77–5.28)
SODIUM SERPL-SCNC: 138 MMOL/L (ref 136–145)
TROPONIN T SERPL HS-MCNC: 109 NG/L
TROPONIN T SERPL HS-MCNC: 110 NG/L
TROPONIN T SERPL HS-MCNC: 80 NG/L
WBC NRBC COR # BLD AUTO: 17.91 10*3/MM3 (ref 3.4–10.8)
WBC NRBC COR # BLD AUTO: 7.21 10*3/MM3 (ref 3.4–10.8)

## 2024-10-21 PROCEDURE — 93010 ELECTROCARDIOGRAM REPORT: CPT | Performed by: INTERNAL MEDICINE

## 2024-10-21 PROCEDURE — 87205 SMEAR GRAM STAIN: CPT | Performed by: INTERNAL MEDICINE

## 2024-10-21 PROCEDURE — 25010000002 HYDROMORPHONE PER 4 MG: Performed by: INTERNAL MEDICINE

## 2024-10-21 PROCEDURE — 25010000002 MORPHINE PER 10 MG: Performed by: INTERNAL MEDICINE

## 2024-10-21 PROCEDURE — 85610 PROTHROMBIN TIME: CPT | Performed by: INTERNAL MEDICINE

## 2024-10-21 PROCEDURE — 80048 BASIC METABOLIC PNL TOTAL CA: CPT | Performed by: INTERNAL MEDICINE

## 2024-10-21 PROCEDURE — 84484 ASSAY OF TROPONIN QUANT: CPT | Performed by: INTERNAL MEDICINE

## 2024-10-21 PROCEDURE — 25010000002 ONDANSETRON PER 1 MG: Performed by: INTERNAL MEDICINE

## 2024-10-21 PROCEDURE — 85025 COMPLETE CBC W/AUTO DIFF WBC: CPT | Performed by: INTERNAL MEDICINE

## 2024-10-21 PROCEDURE — 71046 X-RAY EXAM CHEST 2 VIEWS: CPT

## 2024-10-21 PROCEDURE — 93005 ELECTROCARDIOGRAM TRACING: CPT | Performed by: NURSE PRACTITIONER

## 2024-10-21 PROCEDURE — 85730 THROMBOPLASTIN TIME PARTIAL: CPT | Performed by: INTERNAL MEDICINE

## 2024-10-21 PROCEDURE — 25010000002 HEPARIN (PORCINE) 25000-0.45 UT/250ML-% SOLUTION: Performed by: INTERNAL MEDICINE

## 2024-10-21 RX ORDER — MORPHINE SULFATE 2 MG/ML
2 INJECTION, SOLUTION INTRAMUSCULAR; INTRAVENOUS
Status: DISCONTINUED | OUTPATIENT
Start: 2024-10-21 | End: 2024-10-21

## 2024-10-21 RX ORDER — HYDROMORPHONE HYDROCHLORIDE 1 MG/ML
0.5 INJECTION, SOLUTION INTRAMUSCULAR; INTRAVENOUS; SUBCUTANEOUS
Status: DISCONTINUED | OUTPATIENT
Start: 2024-10-21 | End: 2024-10-22 | Stop reason: HOSPADM

## 2024-10-21 RX ORDER — SUCRALFATE 1 G/1
1 TABLET ORAL
Status: DISCONTINUED | OUTPATIENT
Start: 2024-10-21 | End: 2024-10-22 | Stop reason: HOSPADM

## 2024-10-21 RX ORDER — SUCRALFATE 1 G/1
1 TABLET ORAL DAILY
COMMUNITY

## 2024-10-21 RX ORDER — CALCIUM ACETATE 667 MG/1
667 CAPSULE ORAL
Status: DISCONTINUED | OUTPATIENT
Start: 2024-10-21 | End: 2024-10-22 | Stop reason: HOSPADM

## 2024-10-21 RX ORDER — HEPARIN SODIUM 10000 [USP'U]/100ML
18 INJECTION, SOLUTION INTRAVENOUS
Status: DISCONTINUED | OUTPATIENT
Start: 2024-10-21 | End: 2024-10-22

## 2024-10-21 RX ADMIN — NITROGLYCERIN 0.4 MG: 0.4 TABLET SUBLINGUAL at 03:54

## 2024-10-21 RX ADMIN — HYDROMORPHONE HYDROCHLORIDE 0.5 MG: 1 INJECTION, SOLUTION INTRAMUSCULAR; INTRAVENOUS; SUBCUTANEOUS at 10:27

## 2024-10-21 RX ADMIN — HYDROCODONE BITARTRATE AND ACETAMINOPHEN 1 TABLET: 5; 325 TABLET ORAL at 03:43

## 2024-10-21 RX ADMIN — NITROGLYCERIN 0.4 MG: 0.4 TABLET SUBLINGUAL at 04:09

## 2024-10-21 RX ADMIN — MORPHINE SULFATE 2 MG: 2 INJECTION, SOLUTION INTRAMUSCULAR; INTRAVENOUS at 09:03

## 2024-10-21 RX ADMIN — SODIUM BICARBONATE 1300 MG: 650 TABLET ORAL at 11:44

## 2024-10-21 RX ADMIN — NITROGLYCERIN 0.4 MG: 0.4 TABLET SUBLINGUAL at 03:43

## 2024-10-21 RX ADMIN — CARVEDILOL 3.12 MG: 3.12 TABLET, FILM COATED ORAL at 09:08

## 2024-10-21 RX ADMIN — AMLODIPINE BESYLATE 5 MG: 5 TABLET ORAL at 09:08

## 2024-10-21 RX ADMIN — HYDROMORPHONE HYDROCHLORIDE 0.5 MG: 1 INJECTION, SOLUTION INTRAMUSCULAR; INTRAVENOUS; SUBCUTANEOUS at 17:38

## 2024-10-21 RX ADMIN — ONDANSETRON 4 MG: 2 INJECTION, SOLUTION INTRAMUSCULAR; INTRAVENOUS at 17:49

## 2024-10-21 RX ADMIN — HEPARIN SODIUM 18 UNITS/KG/HR: 10000 INJECTION, SOLUTION INTRAVENOUS at 09:56

## 2024-10-21 RX ADMIN — Medication 10 ML: at 10:00

## 2024-10-21 RX ADMIN — TEMAZEPAM 30 MG: 15 CAPSULE ORAL at 20:33

## 2024-10-21 RX ADMIN — SEVELAMER CARBONATE 2400 MG: 800 TABLET, FILM COATED ORAL at 11:44

## 2024-10-21 RX ADMIN — SODIUM BICARBONATE 1300 MG: 650 TABLET ORAL at 20:33

## 2024-10-21 RX ADMIN — SUCRALFATE 1 G: 1 TABLET ORAL at 11:44

## 2024-10-21 RX ADMIN — CALCIUM ACETATE 667 MG: 667 CAPSULE ORAL at 11:44

## 2024-10-21 RX ADMIN — PANTOPRAZOLE SODIUM 40 MG: 40 TABLET, DELAYED RELEASE ORAL at 12:09

## 2024-10-21 RX ADMIN — HYDROMORPHONE HYDROCHLORIDE 0.5 MG: 1 INJECTION, SOLUTION INTRAMUSCULAR; INTRAVENOUS; SUBCUTANEOUS at 13:51

## 2024-10-21 RX ADMIN — Medication 10 ML: at 20:33

## 2024-10-21 NOTE — PLAN OF CARE
Goal Outcome Evaluation:              Outcome Evaluation: Slept at intervals in recliner after HD completed. Nitro drip weaned and stopped during HD. O2 at 2L in use while sleeping. No c/o discomfort. Will continue to monitor.

## 2024-10-21 NOTE — PLAN OF CARE
Problem: Adult Inpatient Plan of Care  Goal: Absence of Hospital-Acquired Illness or Injury  Intervention: Identify and Manage Fall Risk  Recent Flowsheet Documentation  Taken 10/21/2024 1606 by Kala Corbett RN  Safety Promotion/Fall Prevention: patient off unit  Taken 10/21/2024 1410 by Kala Corbett RN  Safety Promotion/Fall Prevention: patient off unit  Taken 10/21/2024 1346 by Kala Corbett RN  Safety Promotion/Fall Prevention: patient off unit  Taken 10/21/2024 1230 by Kala Corbett RN  Safety Promotion/Fall Prevention:   assistive device/personal items within reach   clutter free environment maintained   fall prevention program maintained   lighting adjusted   nonskid shoes/slippers when out of bed   room organization consistent   safety round/check completed  Taken 10/21/2024 1022 by Kala Corbett RN  Safety Promotion/Fall Prevention:   assistive device/personal items within reach   clutter free environment maintained   fall prevention program maintained   lighting adjusted   nonskid shoes/slippers when out of bed   room organization consistent   safety round/check completed  Intervention: Prevent Skin Injury  Recent Flowsheet Documentation  Taken 10/21/2024 1230 by Kala Corbett RN  Body Position: weight shifting  Taken 10/21/2024 1022 by Kala Corbett RN  Body Position: weight shifting  Taken 10/21/2024 0903 by Kala Corbett RN  Body Position: weight shifting  Intervention: Prevent Infection  Recent Flowsheet Documentation  Taken 10/21/2024 1230 by Kala Corbett RN  Infection Prevention:   hand hygiene promoted   personal protective equipment utilized  Taken 10/21/2024 1022 by Kala Corbett RN  Infection Prevention:   hand hygiene promoted   personal protective equipment utilized  Goal: Optimal Comfort and Wellbeing  Intervention: Provide Person-Centered Care  Recent Flowsheet Documentation  Taken 10/21/2024 1230 by Kala Corbett RN  Trust Relationship/Rapport: care explained  Taken 10/21/2024 0903 by  Kala Corbett, RN  Trust Relationship/Rapport: care explained  Taken 10/21/2024 0818 by Kala Corbett, RN  Trust Relationship/Rapport: care explained   Goal Outcome Evaluation:     Pt off the floor this afternoon for HD. PRN pain meds given throughout the day for pain. Plan of care to continue.

## 2024-10-21 NOTE — H&P
Patient Care Team:  Raegan Gotti MD as PCP - General (Family Medicine)  Nilay Stevens MD as Consulting Physician (General Surgery)  Stephen Perea MD as Surgeon (Thoracic Surgery)  Makayla Navarro RN as Nurse Navigator  Mak Sosa MD as Cardiologist (Cardiology)  Anjum Cam MD as Consulting Physician (Hematology and Oncology)    Chief complaint dyspnea    Subjective     Patient is a 74 y.o. female who presents with  past medical history of renal cell carcinoma, end-stage renal disease on dialysis, hypertension, diabetes history of gastric bypass, mood disorder, secondary hyper parathyroid, anemia, and longstanding hernia.  She was asking Acino this evening and started to walk Respifor became increasingly and acutely short of breath and called EMS.  She has been compliant with medication and hemodialysis.  In the ED proBNP was greater than 70,000, call was placed to nephrology and she is undergoing hemodialysis at the time of exam and feeling much improved.  She does have leukocytosis with no fever blood cultures pending respiratory culture pending x-ray with pulmonary edema    Review of Systems   Constitutional:  Positive for activity change.   HENT: Negative.     Respiratory:  Positive for shortness of breath.    Cardiovascular: Negative.    Gastrointestinal: Negative.    Genitourinary: Negative.    Musculoskeletal: Negative.    Neurological: Negative.    Psychiatric/Behavioral: Negative.            History  Past Medical History:   Diagnosis Date    Lucas's esophagus     C. difficile colitis     Carotid artery disease     -50-74% left, 16-49% right (1/19)    COPD (chronic obstructive pulmonary disease)     DM2 (diabetes mellitus, type 2)     ESRD (end stage renal disease)     on HD    Gastric ulcer     at anastomatic site    Gastroparesis     unknown    GERD (gastroesophageal reflux disease)     Gout     not current    Hemodialysis patient     mwf    Hernia, incisional     abdomen     History of colonoscopy     UTD    History of degenerative disc disease     Hyperlipidemia     Hypertension     Medicare annual wellness visit, subsequent 11/07/2020    Microscopic colitis     Nephrotic syndrome     Neuroendocrine tumor     of stomach    FABY (obstructive sleep apnea)     not treating    Pedal edema     ressolved    Proteinuria     Renal cell cancer, right     Rib pain on right side     chronic    Rotator cuff tear     bilateral    Stomach cancer 2016    Stroke     Uterine cancer     Vitamin B 12 deficiency     Vitamin D deficiency      Past Surgical History:   Procedure Laterality Date    APPENDECTOMY      ARTERIOVENOUS FISTULA Right     ARTERIOVENOUS FISTULA REPAIR      Clotted off and insert graft    ARTERIOVENOUS FISTULA/SHUNT SURGERY Left 12/30/2020    Procedure: ARTERIOVENOUS FISTULA FORMATION;  Surgeon: Jan Caicedo MD;  Location: [facility] MAIN OR;  Service: Vascular;  Laterality: Left;    ARTERIOVENOUS FISTULA/SHUNT SURGERY Left 4/23/2024    Procedure: FISTULOGRAM WITH SUBCLAVIAN ARTERY ANGIOPLASTY, fistula banding;  Surgeon: Franki Delgadillo II, MD;  Location: [facility] HYBRID OR;  Service: Vascular;  Laterality: Left;    BREAST BIOPSY      right nipple 1981    BRONCHOSCOPY N/A 6/11/2024    Procedure: BRONCHOSCOPY WITH RIGHT LUNG WASHING;  Surgeon: Aramis Barragan MD;  Location: [facility] ENDOSCOPY;  Service: Pulmonary;  Laterality: N/A;  RML atelectasis    COLONOSCOPY N/A 11/24/2020    Procedure: COLONOSCOPY with polypectomy x 7;  Surgeon: Jeffery White MD;  Location: [facility] ENDOSCOPY;  Service: Gastroenterology;  Laterality: N/A;  post op: polyps, diverticulosis, hemorrhoids    COLONOSCOPY N/A 6/1/2023    Procedure: COLONOSCOPY with polypectomy x 3 biopsy x 1;  Surgeon: Jeffery White MD;  Location: [facility] ENDOSCOPY;  Service: Gastroenterology;  Laterality: N/A;  diverticulosis polyps    COLONOSCOPY N/A 9/4/2024    Procedure: COLONOSCOPY WITH POLYPECTOMY X 1;  Surgeon: Jeffery White MD;   Location: [facility] ENDOSCOPY;  Service: Gastroenterology;  Laterality: N/A;  POST- POLYP, DIVERTICULOSIS, INTERNAL AND EXTERNAL HEMORRHOIDS    ENDOSCOPY N/A 03/12/2022    Procedure: ESOPHAGOGASTRODUODENOSCOPY with biopsy x 1 area;  Surgeon: Javan Augustin MD;  Location: [facility] ENDOSCOPY;  Service: Gastroenterology;  Laterality: N/A;  post op: anastamosis    ENDOSCOPY N/A 10/9/2023    Procedure: ESOPHAGOGASTRODUODENOSCOPY with biopsy x3 areas;  Surgeon: Ace Campbell MD;  Location: [facility] ENDOSCOPY;  Service: Gastroenterology;  Laterality: N/A;  duodenal ulcers;irregular z line    ENDOSCOPY N/A 8/27/2024    Procedure: ESOPHAGOGASTRODUODENOSCOPY with argon plasma coagulation;  Surgeon: HILARIO Blackmon MD;  Location: [facility] ENDOSCOPY;  Service: Gastroenterology;  Laterality: N/A;  post op: AVM    ENTEROSCOPY SMALL BOWEL N/A 9/4/2024    Procedure: ESOPHAGOGASTRODUODENOSCOPY WITH SMALL BOWEL ENTEROSCOPY, ARGON PLASMA COAGULATION AND ENDOSCOPIC CLIPPING X 2 OF GASTRIC ANTRAL VASCULAR ECTASIA;  Surgeon: Jeffery White MD;  Location: [facility] ENDOSCOPY;  Service: Gastroenterology;  Laterality: N/A;  POST- GAVE    GASTRIC RESECTION      cancer    HYSTERECTOMY      LAPAROSCOPIC CHOLECYSTECTOMY      NEPHRECTOMY      right kidney removed     REDUCTION MAMMAPLASTY      TUMOR REMOVAL      boils    VENTRAL/INCISIONAL HERNIA REPAIR Right 08/28/2019    Procedure: VENTRAL/INCISIONAL HERNIA REPAIR;  Surgeon: Nilay Stevens MD;  Location: [facility] MAIN OR;  Service: General    VENTRAL/INCISIONAL HERNIA REPAIR N/A 10/01/2019    Procedure: OPEN VENTRAL/INCISIONAL HERNIA REPAIR;  Surgeon: Nilay Stevens MD;  Location: [facility] MAIN OR;  Service: General    VENTRAL/INCISIONAL HERNIA REPAIR N/A 10/11/2021    Procedure: VENTRAL/INCISIONAL HERNIA REPAIR LAPAROSCOPIC;  Surgeon: Nilay Stevens MD;  Location: [facility] MAIN OR;  Service: General;  Laterality: N/A;    VENTRAL/INCISIONAL HERNIA REPAIR N/A 01/23/2023    Procedure:  VENTRAL/INCISIONAL HERNIA REPAIR with MESH;  Surgeon: Nilay Stevens MD;  Location: [facility] MAIN OR;  Service: General;  Laterality: N/A;     Family History   Problem Relation Age of Onset    Heart disease Father      Social History     Tobacco Use    Smoking status: Former     Current packs/day: 0.00     Average packs/day: 0.3 packs/day for 57.0 years (14.3 ttl pk-yrs)     Types: Cigarettes     Start date: 5/1/1964     Quit date: 5/1/2021     Years since quitting: 3.4     Passive exposure: Past    Smokeless tobacco: Never    Tobacco comments:     quit smoking 2 months ago   Vaping Use    Vaping status: Never Used   Substance Use Topics    Alcohol use: No    Drug use: Never     Medications Prior to Admission   Medication Sig Dispense Refill Last Dose/Taking    albuterol sulfate  (90 Base) MCG/ACT inhaler Inhale 2 puffs Every 4 (Four) Hours As Needed for Wheezing.       amLODIPine (NORVASC) 5 MG tablet Take 1 tablet by mouth Daily. 90 tablet 3     calcium acetate (PHOS BINDER,) 667 MG capsule capsule Take 1 capsule by mouth 3 (Three) Times a Day.       carvedilol (COREG) 3.125 MG tablet Take 1 tablet by mouth 2 (Two) Times a Day With Meals.       Cholecalciferol 25 MCG (1000 UT) tablet Take 1 tablet by mouth Daily.       HYDROcodone-acetaminophen (Norco) 5-325 MG per tablet Take 1 tablet by mouth Every 6 (Six) Hours As Needed for Moderate Pain. 12 tablet 0     hyoscyamine (ANASPAZ,LEVSIN) 0.125 MG tablet Take 1 tablet by mouth Every 4 (Four) Hours As Needed. (Patient not taking: Reported on 10/15/2024)       lidocaine-prilocaine (EMLA) 2.5-2.5 % cream Apply 1 Application topically to the appropriate area as directed Daily As Needed (severe pain, left 5th finger). Apply to left 5th finger 5800 g 4     losartan (COZAAR) 50 MG tablet Take 1 tablet by mouth Daily.       midodrine (PROAMATINE) 5 MG tablet Take 1 tablet by mouth 3 (Three) Times a Day As Needed. (Patient not taking: Reported on 10/15/2024)        mupirocin (BACTROBAN) 2 % ointment Apply 1 Application topically to the appropriate area as directed Daily.       pantoprazole (PROTONIX) 20 MG EC tablet Take 1 tablet by mouth Daily.       sevelamer (RENAGEL) 800 MG tablet Take 3 tablets by mouth With Snacks.       sevelamer (RENVELA) 800 MG tablet Take 3 tablets by mouth 3 (Three) Times a Day With Meals.       sodium bicarbonate 650 MG tablet Take 2 tablets by mouth 3 (Three) Times a Day.       sucralfate (Carafate) 1 g tablet Take 1 tablet by mouth 4 (Four) Times a Day. 120 tablet 1     temazepam (RESTORIL) 30 MG capsule Take 1 capsule by mouth Every Night.        Allergies:  Atorvastatin calcium, Gabapentin, and Niacin    Objective     Vital Signs  Temp:  [97.8 °F (36.6 °C)-98.3 °F (36.8 °C)] 97.8 °F (36.6 °C)  Heart Rate:  [] 87  Resp:  [16-32] 18  BP: (127-158)/() 139/78       Physical Exam  Vitals reviewed.   Constitutional:       Appearance: She is not ill-appearing.   HENT:      Head: Normocephalic and atraumatic.      Right Ear: External ear normal.      Left Ear: External ear normal.      Nose: Nose normal.      Mouth/Throat:      Mouth: Mucous membranes are moist.   Eyes:      General:         Right eye: No discharge.         Left eye: No discharge.   Cardiovascular:      Rate and Rhythm: Normal rate and regular rhythm.      Pulses: Normal pulses.      Heart sounds: Normal heart sounds.   Pulmonary:      Effort: Pulmonary effort is normal.      Breath sounds: Normal breath sounds.   Abdominal:      General: Bowel sounds are normal.      Palpations: Abdomen is soft.   Musculoskeletal:         General: Normal range of motion.      Cervical back: Normal range of motion.   Skin:     General: Skin is warm and dry.   Neurological:      Mental Status: She is alert and oriented to person, place, and time.   Psychiatric:         Behavior: Behavior normal.          Results Review:     Imaging Results (Last 24 Hours)       Procedure Component Value  Units Date/Time    XR Chest 1 View [[id number]] Collected: 10/20/24 1406     Updated: 10/20/24 1410    Narrative:      XR CHEST 1 VW    Date of Exam: 10/20/2024 1:55 PM EDT    Indication: soa    Comparison: CT chest 10/1/2024    Findings:  Cardiomediastinal silhouette is enlarged. There is atherosclerosis of the aorta. There is mild diffuse interstitial thickening with right mid and lower lung airspace disease. Trace bilateral pleural effusions.      Impression:      Impression:  Cardiomegaly with diffuse bilateral interstitial thickening and right mid and lower lung airspace disease. These findings may reflect asymmetric alveolar edema versus pneumonia. Clinical correlation is recommended.      Electronically Signed: June Hager MD    10/20/2024 2:08 PM EDT    Workstation ID: [id number]             Lab Results (last 24 hours)       Procedure Component Value Units Date/Time    Hepatitis B Surface Antigen [[id number]] Collected: 10/20/24 2009    Specimen: Blood Updated: 10/20/24 2017    Phosphorus [[id number]]  (Abnormal) Collected: 10/20/24 1648    Specimen: Blood Updated: 10/20/24 1940     Phosphorus 4.7 mg/dL     BNP [[id number]]  (Abnormal) Collected: 10/20/24 1648    Specimen: Blood Updated: 10/20/24 1752     proBNP >70,000.0 pg/mL     Narrative:      This assay is used as an aid in the diagnosis of individuals suspected of having heart failure. It can be used as an aid in the diagnosis of acute decompensated heart failure (ADHF) in patients presenting with signs and symptoms of ADHF to the emergency department (ED). In addition, NT-proBNP of <300 pg/mL indicates ADHF is not likely.    Age Range Result Interpretation  NT-proBNP Concentration (pg/mL:      <50             Positive            >450                   Gray                 300-450                    Negative             <300    50-75           Positive            >900                  Gray                300-900                  Negative             <300      >75             Positive            >1800                  Gray                300-1800                  Negative            <300    Comprehensive Metabolic Panel [[id number]]  (Abnormal) Collected: 10/20/24 1648    Specimen: Blood Updated: 10/20/24 1739     Glucose 111 mg/dL      BUN 42 mg/dL      Creatinine 7.03 mg/dL      Sodium 139 mmol/L      Potassium 5.1 mmol/L      Comment: Slight hemolysis detected by analyzer. Result may be falsely elevated.        Chloride 102 mmol/L      CO2 22.4 mmol/L      Calcium 9.1 mg/dL      Total Protein 6.9 g/dL      Albumin 3.7 g/dL      ALT (SGPT) 11 U/L      AST (SGOT) 29 U/L      Comment: Slight hemolysis detected by analyzer. Result may be falsely elevated.        Alkaline Phosphatase 129 U/L      Total Bilirubin 0.2 mg/dL      Globulin 3.2 gm/dL      A/G Ratio 1.2 g/dL      BUN/Creatinine Ratio 6.0     Anion Gap 14.6 mmol/L      eGFR 5.7 mL/min/1.73      Comment: <15 Indicative of kidney failure       Narrative:      GFR Normal >60  Chronic Kidney Disease <60  Kidney Failure <15    The GFR formula is only valid for adults with stable renal function between ages 18 and 70.    Respiratory Culture - Sputum, Cough [[id number]] Collected: 10/20/24 1637    Specimen: Sputum from Cough Updated: 10/20/24 1647    POC CHEM 8 [[id number]]  (Abnormal) Collected: 10/20/24 1545    Specimen: Venous Blood Updated: 10/20/24 1549     Glucose 117 mg/dL      BUN 59 mg/dL      Creatinine 8.40 mg/dL      Sodium 136 mmol/L      POC Potassium 6.0 mmol/L      Chloride 107 mmol/L      Total CO2 22 mmol/L      Anion Gap 14.0 mmol/L      Comment: Serial Number: [id number]:  [id number]        Hemoglobin 12.2 g/dL      Hematocrit 36 %      Ionized Calcium 0.90 mmol/L      eGFR 4.6 mL/min/1.73     COVID-19,CEPHEID/LATONIA,COR/MERCEDES/PAD/JC/LAG/YVROSE IN-HOUSE,NP SWAB IN TRANSPORT MEDIA 1 HR TAT, RT-PCR - Swab, Nasopharynx [[id number]]  (Normal) Collected: 10/20/24 1444    Specimen: Swab from Nasopharynx  Updated: 10/20/24 1526     COVID19 Not Detected    Narrative:      Fact sheet for providers: https://www.fda.gov/media/349096/download     Fact sheet for patients: https://www.fda.gov/media/493750/download  Fact sheet for providers: https://www.fda.gov/media/579761/download    Fact sheet for patients: https://www.fda.gov/media/007368/download    Test performed by PCR.    Blood Culture - Blood, Arm, Right [[id number]] Collected: 10/20/24 1508    Specimen: Blood from Arm, Right Updated: 10/20/24 1512    aPTT [[id number]]  (Abnormal) Collected: 10/20/24 1444    Specimen: Blood Updated: 10/20/24 1509     PTT 29.0 seconds     Protime-INR [[id number]]  (Normal) Collected: 10/20/24 1444    Specimen: Blood Updated: 10/20/24 1509     Protime 10.4 Seconds      INR 0.95    Blood Gas, Venous - [[id number]]  (Abnormal) Collected: 10/20/24 1439    Specimen: Venous Blood Updated: 10/20/24 1505     Site PA     pH, Venous 7.353 pH Units      pCO2, Venous 45.8 mm Hg      pO2, Venous 22.5 mm Hg      HCO3, Venous 25.5 mmol/L      Base Excess, Venous -0.6 mmol/L      Comment: Serial Number: [id number]:  [id number]        O2 Saturation, Venous 35.4 %      CO2 Content 26.9 mmol/L      Barometric Pressure for Blood Gas --     Comment: N/A        Modality Cannula     FIO2 28 %     [place] Draw [[id number]] Collected: 10/20/24 1444    Specimen: Blood Updated: 10/20/24 1501    Narrative:      The following orders were created for panel order [place] Draw.  Procedure                               Abnormality         Status                     ---------                               -----------         ------                     Green Top (Gel)[[id number]]                                  Final result               Lavender Top[[id number]]                                     Final result               Gold Top - SST[[id number]]                                   Final result               Light Blue Top[[id number]]                                   Final  result                 Please view results for these tests on the individual orders.    Green Top (Gel) [[id number]] Collected: 10/20/24 1444    Specimen: Blood Updated: 10/20/24 1501     Extra Tube Hold for add-ons.     Comment: Auto resulted.       Lavender Top [[id number]] Collected: 10/20/24 1444    Specimen: Blood Updated: 10/20/24 1501     Extra Tube hold for add-on     Comment: Auto resulted       Gold Top - SST [[id number]] Collected: 10/20/24 1444    Specimen: Blood Updated: 10/20/24 1501     Extra Tube Hold for add-ons.     Comment: Auto resulted.       Light Blue Top [[id number]] Collected: 10/20/24 1444    Specimen: Blood Updated: 10/20/24 1501     Extra Tube Hold for add-ons.     Comment: Auto resulted       CBC & Differential [[id number]]  (Abnormal) Collected: 10/20/24 1444    Specimen: Blood Updated: 10/20/24 1458    Narrative:      The following orders were created for panel order CBC & Differential.  Procedure                               Abnormality         Status                     ---------                               -----------         ------                     CBC Auto Differential[[id number]]        Abnormal            Final result                 Please view results for these tests on the individual orders.    CBC Auto Differential [[id number]]  (Abnormal) Collected: 10/20/24 1444    Specimen: Blood Updated: 10/20/24 1458     WBC 17.30 10*3/mm3      RBC 4.18 10*6/mm3      Hemoglobin 12.2 g/dL      Hematocrit 39.8 %      MCV 95.2 fL      MCH 29.2 pg      MCHC 30.7 g/dL      RDW 17.1 %      RDW-SD 59.6 fl      MPV 10.6 fL      Platelets 395 10*3/mm3      Neutrophil % 91.4 %      Lymphocyte % 2.8 %      Monocyte % 3.8 %      Eosinophil % 0.8 %      Basophil % 0.7 %      Immature Grans % 0.5 %      Neutrophils, Absolute 15.82 10*3/mm3      Lymphocytes, Absolute 0.49 10*3/mm3      Monocytes, Absolute 0.65 10*3/mm3      Eosinophils, Absolute 0.13 10*3/mm3      Basophils, Absolute 0.12 10*3/mm3       Immature Grans, Absolute 0.09 10*3/mm3      nRBC 0.0 /100 WBC     Blood Culture - Blood, Arm, Right [[id number]] Collected: 10/20/24 1444    Specimen: Blood from Arm, Right Updated: 10/20/24 1448    POC Lactate [[id number]]  (Normal) Collected: 10/20/24 1410    Specimen: Blood Updated: 10/20/24 1412     Lactate 0.5 mmol/L      Comment: Serial Number: [id number]:  [id number]                I reviewed the patient's new clinical results.    Assessment & Plan     Dyspnea  Fluid overload  ESRD on dialysis   - schedule per Dr. Buckley  -emergent HD with good outcome  -nitro gtt titrate off as HD progresses david if hypotension     Chronic CAD - stable without anginal symptoms  COPD - well compensated  Tobacco use disorder  H/o Lucas's esophagus  H/o renal cell carcinoma  DM-II - currently off of meds with well controlled A1C  HTN - holding home meds due to soft bp  HLD - resume statin at discharge     SCD's for dvt prophy    I discussed the patient's findings and my recommendations with patient.     Cindy Santiago, MAEGAN  10/20/24  20:23 EDT

## 2024-10-21 NOTE — CONSULTS
[facility] CONSULT NOTE    Referring Provider: Masha Lazaro APRN   Reason for Consultation: end-stage renal disease    Chief complaint. Chest pain    History of present illness:  patient is 74 years old female with history of ESRD, hypertension, diabetes, anemia, secondary hyperparathyroidism, presented to the hospital with chest pain which was acute and substernal along with the pain in lower neck.  Patient had some dyspnea.  Denied any fever, chills, cough, expectoration, nausea or vomiting.  Chest x-ray showed mild pulmonary edema.  Patient received only 2 hours of dialysis yesterday but was stopped early due to hypotension.  This morning patient is having chest pain, mainly with movements    History  Past Medical History:   Diagnosis Date    Lucas's esophagus     C. difficile colitis     Carotid artery disease     -50-74% left, 16-49% right (1/19)    COPD (chronic obstructive pulmonary disease)     DM2 (diabetes mellitus, type 2)     ESRD (end stage renal disease)     on HD    Gastric ulcer     at anastomatic site    Gastroparesis     unknown    GERD (gastroesophageal reflux disease)     Gout     not current    Hemodialysis patient     mwf    Hernia, incisional     abdomen    History of colonoscopy     UTD    History of degenerative disc disease     Hyperlipidemia     Hypertension     Medicare annual wellness visit, subsequent 11/07/2020    Microscopic colitis     Nephrotic syndrome     Neuroendocrine tumor     of stomach    FABY (obstructive sleep apnea)     not treating    Pedal edema     ressolved    Proteinuria     Renal cell cancer, right     Rib pain on right side     chronic    Rotator cuff tear     bilateral    Stomach cancer 2016    Stroke     Uterine cancer     Vitamin B 12 deficiency     Vitamin D deficiency      Past Surgical History:   Procedure Laterality Date    APPENDECTOMY      ARTERIOVENOUS FISTULA Right     ARTERIOVENOUS FISTULA REPAIR      Clotted off and insert graft     ARTERIOVENOUS FISTULA/SHUNT SURGERY Left 12/30/2020    Procedure: ARTERIOVENOUS FISTULA FORMATION;  Surgeon: Jan Caicdeo MD;  Location: [facility] MAIN OR;  Service: Vascular;  Laterality: Left;    ARTERIOVENOUS FISTULA/SHUNT SURGERY Left 4/23/2024    Procedure: FISTULOGRAM WITH SUBCLAVIAN ARTERY ANGIOPLASTY, fistula banding;  Surgeon: Franki Delgadillo II, MD;  Location: [facility] HYBRID OR;  Service: Vascular;  Laterality: Left;    BREAST BIOPSY      right nipple 1981    BRONCHOSCOPY N/A 6/11/2024    Procedure: BRONCHOSCOPY WITH RIGHT LUNG WASHING;  Surgeon: Aramis Barragan MD;  Location: [facility] ENDOSCOPY;  Service: Pulmonary;  Laterality: N/A;  RML atelectasis    COLONOSCOPY N/A 11/24/2020    Procedure: COLONOSCOPY with polypectomy x 7;  Surgeon: Jeffery White MD;  Location: [facility] ENDOSCOPY;  Service: Gastroenterology;  Laterality: N/A;  post op: polyps, diverticulosis, hemorrhoids    COLONOSCOPY N/A 6/1/2023    Procedure: COLONOSCOPY with polypectomy x 3 biopsy x 1;  Surgeon: Jeffery White MD;  Location: [facility] ENDOSCOPY;  Service: Gastroenterology;  Laterality: N/A;  diverticulosis polyps    COLONOSCOPY N/A 9/4/2024    Procedure: COLONOSCOPY WITH POLYPECTOMY X 1;  Surgeon: Jeffery White MD;  Location: [facility] ENDOSCOPY;  Service: Gastroenterology;  Laterality: N/A;  POST- POLYP, DIVERTICULOSIS, INTERNAL AND EXTERNAL HEMORRHOIDS    ENDOSCOPY N/A 03/12/2022    Procedure: ESOPHAGOGASTRODUODENOSCOPY with biopsy x 1 area;  Surgeon: Javan Augustin MD;  Location: [facility] ENDOSCOPY;  Service: Gastroenterology;  Laterality: N/A;  post op: anastamosis    ENDOSCOPY N/A 10/9/2023    Procedure: ESOPHAGOGASTRODUODENOSCOPY with biopsy x3 areas;  Surgeon: Ace Campbell MD;  Location: [facility] ENDOSCOPY;  Service: Gastroenterology;  Laterality: N/A;  duodenal ulcers;irregular z line    ENDOSCOPY N/A 8/27/2024    Procedure: ESOPHAGOGASTRODUODENOSCOPY with argon plasma coagulation;  Surgeon: HILARIO Blackmon MD;   Location: [facility] ENDOSCOPY;  Service: Gastroenterology;  Laterality: N/A;  post op: AVM    ENTEROSCOPY SMALL BOWEL N/A 9/4/2024    Procedure: ESOPHAGOGASTRODUODENOSCOPY WITH SMALL BOWEL ENTEROSCOPY, ARGON PLASMA COAGULATION AND ENDOSCOPIC CLIPPING X 2 OF GASTRIC ANTRAL VASCULAR ECTASIA;  Surgeon: Jeffery White MD;  Location: [facility] ENDOSCOPY;  Service: Gastroenterology;  Laterality: N/A;  POST- GAVE    GASTRIC RESECTION      cancer    HYSTERECTOMY      LAPAROSCOPIC CHOLECYSTECTOMY      NEPHRECTOMY      right kidney removed     REDUCTION MAMMAPLASTY      TUMOR REMOVAL      boils    VENTRAL/INCISIONAL HERNIA REPAIR Right 08/28/2019    Procedure: VENTRAL/INCISIONAL HERNIA REPAIR;  Surgeon: Nilay Stevens MD;  Location: [facility] MAIN OR;  Service: General    VENTRAL/INCISIONAL HERNIA REPAIR N/A 10/01/2019    Procedure: OPEN VENTRAL/INCISIONAL HERNIA REPAIR;  Surgeon: Nilay Stevens MD;  Location: [facility] MAIN OR;  Service: General    VENTRAL/INCISIONAL HERNIA REPAIR N/A 10/11/2021    Procedure: VENTRAL/INCISIONAL HERNIA REPAIR LAPAROSCOPIC;  Surgeon: Nilay Stevens MD;  Location: [facility] MAIN OR;  Service: General;  Laterality: N/A;    VENTRAL/INCISIONAL HERNIA REPAIR N/A 01/23/2023    Procedure: VENTRAL/INCISIONAL HERNIA REPAIR with MESH;  Surgeon: Nilay Stevens MD;  Location: [facility] MAIN OR;  Service: General;  Laterality: N/A;     Social History     Tobacco Use    Smoking status: Former     Current packs/day: 0.00     Average packs/day: 0.3 packs/day for 57.0 years (14.3 ttl pk-yrs)     Types: Cigarettes     Start date: 5/1/1964     Quit date: 5/1/2021     Years since quitting: 3.4     Passive exposure: Past    Smokeless tobacco: Never    Tobacco comments:     quit smoking 2 months ago   Vaping Use    Vaping status: Never Used   Substance Use Topics    Alcohol use: No    Drug use: Never     Family History   Problem Relation Age of Onset    Heart disease Father        Review of Systems  ROS  As per  HPI  Objective     Vital Signs  Temp:  [97.4 °F (36.3 °C)-98.3 °F (36.8 °C)] 97.4 °F (36.3 °C)  Heart Rate:  [] 87  Resp:  [16-32] 16  BP: ()/() 146/94    No intake/output data recorded.  No intake/output data recorded.    Physical Exam:  Physical Exam    General Appearance: alert, oriented x 3, no acute distress   Skin: warm and dry  HEENT: oral mucosa normal, nonicteric sclera  Neck: supple, no JVD  Lungs:few basilar crackles  Heart: RRR, normal S1 and S2  Abdomen: soft, nontender, nondistended  : no palpable bladder  Extremities: no edema, cyanosis or clubbing  Neuro: normal speech and mental status     Results Review:   I reviewed the patient's new clinical results.    Lab Results   Component Value Date    CALCIUM 8.7 10/21/2024    PHOS 4.7 (H) 10/20/2024     Results from last 7 days   Lab Units 10/21/24  0230 10/20/24  1648 10/20/24  1648 10/20/24  1545 10/20/24  1444 10/16/24  0000   SODIUM mmol/L 138  --  139  --   --   --    POTASSIUM mmol/L 3.6  --  5.1  --   --  4.8   CHLORIDE mmol/L 100  --  102  --   --   --    CO2 mmol/L 25.2  --  22.4  --   --   --    BUN mg/dL 28*  --  42*  --   --   --    CREATININE mg/dL 5.11*  --  7.03* 8.40*  --   --    GLUCOSE mg/dL 73   < > 111*  --   --   --    CALCIUM mg/dL 8.7  --  9.1  --   --   --    WBC 10*3/mm3 7.21  --   --   --  17.30*  --    HEMOGLOBIN g/dL 10.8*  --   --   --  12.2 11.6*  34.8*   HEMOGLOBIN, POC g/dL  --   --   --  12.2  --   --    PLATELETS 10*3/mm3 335  --   --   --  395  --    ALT (SGPT) U/L  --   --  11  --   --   --    AST (SGOT) U/L  --   --  29  --   --   --     < > = values in this interval not displayed.     Lab Results   Component Value Date    TROPONINT 109 (C) 10/21/2024     Estimated Creatinine Clearance: 8.1 mL/min (A) (by C-G formula based on SCr of 5.11 mg/dL (H)).  Lab Results   Component Value Date    URICACID 4.7 07/10/2018       Brief Urine Lab Results       None            Prior to Admission medications     Medication Sig Start Date End Date Taking? Authorizing Provider   albuterol sulfate  (90 Base) MCG/ACT inhaler Inhale 2 puffs Every 4 (Four) Hours As Needed for Wheezing.    Danica Murphy MD   amLODIPine (NORVASC) 5 MG tablet Take 1 tablet by mouth Daily. 10/15/24   Mak Sosa MD   calcium acetate (PHOS BINDER,) 667 MG capsule capsule Take 1 capsule by mouth 3 (Three) Times a Day.    Danica Murphy MD   carvedilol (COREG) 3.125 MG tablet Take 1 tablet by mouth 2 (Two) Times a Day With Meals.    Danica Murphy MD   Cholecalciferol 25 MCG (1000 UT) tablet Take 1 tablet by mouth Daily.    Danica Murphy MD   HYDROcodone-acetaminophen (Norco) 5-325 MG per tablet Take 1 tablet by mouth Every 6 (Six) Hours As Needed for Moderate Pain. 9/15/24   Kelli Henderson MD   hyoscyamine (ANASPAZ,LEVSIN) 0.125 MG tablet Take 1 tablet by mouth Every 4 (Four) Hours As Needed.  Patient not taking: Reported on 10/15/2024 11/1/23   Danica Murphy MD   lidocaine-prilocaine (EMLA) 2.5-2.5 % cream Apply 1 Application topically to the appropriate area as directed Daily As Needed (severe pain, left 5th finger). Apply to left 5th finger 4/16/24   Franki Delgadillo II, MD   losartan (COZAAR) 50 MG tablet Take 1 tablet by mouth Daily.    Danica Murphy MD   midodrine (PROAMATINE) 5 MG tablet Take 1 tablet by mouth 3 (Three) Times a Day As Needed.  Patient not taking: Reported on 10/15/2024    Danica Murphy MD   mupirocin (BACTROBAN) 2 % ointment Apply 1 Application topically to the appropriate area as directed Daily. 10/14/24   Danica Murphy MD   pantoprazole (PROTONIX) 20 MG EC tablet Take 1 tablet by mouth Daily.    Danica Murphy MD   sevelamer (RENAGEL) 800 MG tablet Take 3 tablets by mouth With Snacks.    Danica Murphy MD   sevelamer (RENVELA) 800 MG tablet Take 3 tablets by mouth 3 (Three) Times a Day With Meals. 6/14/24   Cindy Santiago APRN sodium  bicarbonate 650 MG tablet Take 2 tablets by mouth 3 (Three) Times a Day.    Provider, MD Danica   sucralfate (Carafate) 1 g tablet Take 1 tablet by mouth 4 (Four) Times a Day. 6/14/24   Cindy Santiago APRN   temazepam (RESTORIL) 30 MG capsule Take 1 capsule by mouth Every Night. 1/10/23   Provider, MD Danica       amLODIPine, 5 mg, Oral, Daily  calcium acetate, 667 mg, Oral, TID  carvedilol, 3.125 mg, Oral, BID With Meals  heparin, 80 Units/kg, Intravenous, Once  pantoprazole, 20 mg, Oral, Daily  sevelamer, 1,600 mg, Oral, TID With Meals  sevelamer, 2,400 mg, Oral, TID With Meals  sodium bicarbonate, 1,300 mg, Oral, TID  sodium chloride, 10 mL, Intravenous, Q12H  sucralfate, 1 g, Oral, 4x Daily  temazepam, 30 mg, Oral, Nightly      heparin, 18 Units/kg/hr  nitroglycerin,  mcg/min, Last Rate: Stopped (10/20/24 2126)        Assessment & Plan       End-stage renal disease.  Patient gets dialysis Monday Wednesday and Friday as outpatient.  Patient received short dialysis yesterday and was stopped early due to hypotension.  Electrolytes okay.  Mild hypervolemia  Hypertension with chronic renal disease.  Blood pressure okay this morning but was hypotensive last night during dialysis  Hyperkalemia.  Resolved  Chest pain.patient is on heparin drip till  PE is ruled out.  Management per primary team  Anemia and chronic kidney disease.  Hemoglobin acceptable    Plan:  Repeat short dialysis today  Fluid removal with dialysis as tolerated  Discontinue amlodipine to prevent hypotension  I will monitor patient's volume status and electrolytes    I discussed the patients findings and my recommendations with patient and nursing staff    Faustino Buckley MD  10/21/24  09:39 EDT

## 2024-10-21 NOTE — PROGRESS NOTES
LOS: 1 day   Patient Care Team:  Raegan Gotti MD as PCP - General (Family Medicine)  Nilay Stevens MD as Consulting Physician (General Surgery)  Stephen Perea MD as Surgeon (Thoracic Surgery)  Makayla Navarro RN as Nurse Navigator  Mak Sosa MD as Cardiologist (Cardiology)  Anjum Cam MD as Consulting Physician (Hematology and Oncology)    Subjective     Interval History: No significant change.  Tolerated 2 hours of dialysis last night    Patient Complaints: Constant substernal chest pain, shortness of breath, cough productive of clear but slightly blood-tinged sputum    History taken from: patient    Review of Systems   Constitutional:  Positive for activity change. Negative for appetite change, chills and diaphoresis.           Objective     Vital Signs  Temp:  [97.4 °F (36.3 °C)-98.3 °F (36.8 °C)] 97.4 °F (36.3 °C)  Heart Rate:  [] 87  Resp:  [16-32] 16  BP: ()/() 146/94    Physical Exam:     General Appearance:    Alert, cooperative, sitting up in chair, uncomfortable   Head:    Normocephalic, without obvious abnormality, atraumatic   Eyes:            Lids and lashes normal, conjunctivae and sclerae normal, no   icterus, no pallor, corneas clear, PERRLA   Ears:    Ears appear intact with no abnormalities noted   Throat:   No oral lesions, no thrush, oral mucosa moist   Neck:   No adenopathy, supple, trachea midline, no thyromegaly, no   carotid bruit, no JVD   Lungs:     Clear to auscultation,respirations regular, even and                  unlabored    Heart:    Regular rhythm and normal rate, normal S1 and S2, no            murmur, no gallop, no rub, no click   Chest Wall:    No abnormalities observed   Abdomen:     Normal bowel sounds, no masses, no organomegaly, soft        Non-tender non-distended, no guarding,   Extremities:   Moves all extremities well, no edema, no cyanosis, no             Redness   Pulses:   Pulses palpable and equal bilaterally   Skin:   No  bleeding, bruising or rash   Lymph nodes:   No palpable adenopathy   Neurologic:   Cranial nerves 2 - 12 grossly intact, sensation intact, DTR       present and equal bilaterally        Results Review:    Lab Results (last 24 hours)       Procedure Component Value Units Date/Time    High Sensitivity Troponin T [[id number]]  (Abnormal) Collected: 10/21/24 0230    Specimen: Blood from Arm, Right Updated: 10/21/24 0939     HS Troponin T 109 ng/L     Narrative:      High Sensitive Troponin T Reference Range:  <14.0 ng/L- Negative Female for AMI  <22.0 ng/L- Negative Male for AMI  >=14 - Abnormal Female indicating possible myocardial injury.  >=22 - Abnormal Male indicating possible myocardial injury.   Clinicians would have to utilize clinical acumen, EKG, Troponin, and serial changes to determine if it is an Acute Myocardial Infarction or myocardial injury due to an underlying chronic condition.         Respiratory Culture - Sputum, Cough [[id number]] Collected: 10/20/24 1637    Specimen: Sputum from Cough Updated: 10/21/24 0643     Respiratory Culture Rejected     Gram Stain No WBCs seen      Moderate (3+) Epithelial cells per low power field      Moderate (3+) Mixed bacterial morphotypes seen on Gram Stain    Narrative:      Specimen rejected due to oropharyngeal contamination. Please reorder and recollect specimen if clinically necessary.    Basic Metabolic Panel [[id number]]  (Abnormal) Collected: 10/21/24 0230    Specimen: Blood from Arm, Right Updated: 10/21/24 0401     Glucose 73 mg/dL      BUN 28 mg/dL      Creatinine 5.11 mg/dL      Sodium 138 mmol/L      Potassium 3.6 mmol/L      Chloride 100 mmol/L      CO2 25.2 mmol/L      Calcium 8.7 mg/dL      BUN/Creatinine Ratio 5.5     Anion Gap 12.8 mmol/L      eGFR 8.3 mL/min/1.73      Comment: <15 Indicative of kidney failure       Narrative:      GFR Normal >60  Chronic Kidney Disease <60  Kidney Failure <15    The GFR formula is only valid for adults with stable  renal function between ages 18 and 70.    CBC Auto Differential [[id number]]  (Abnormal) Collected: 10/21/24 0230    Specimen: Blood from Arm, Right Updated: 10/21/24 0316     WBC 7.21 10*3/mm3      RBC 3.81 10*6/mm3      Hemoglobin 10.8 g/dL      Hematocrit 36.0 %      MCV 94.5 fL      MCH 28.3 pg      MCHC 30.0 g/dL      RDW 16.7 %      RDW-SD 58.3 fl      MPV 10.5 fL      Platelets 335 10*3/mm3      Neutrophil % 73.5 %      Lymphocyte % 13.0 %      Monocyte % 9.0 %      Eosinophil % 3.1 %      Basophil % 1.1 %      Immature Grans % 0.3 %      Neutrophils, Absolute 5.30 10*3/mm3      Lymphocytes, Absolute 0.94 10*3/mm3      Monocytes, Absolute 0.65 10*3/mm3      Eosinophils, Absolute 0.22 10*3/mm3      Basophils, Absolute 0.08 10*3/mm3      Immature Grans, Absolute 0.02 10*3/mm3      nRBC 0.0 /100 WBC     Hepatitis B Surface Antigen [[id number]]  (Normal) Collected: 10/20/24 2009    Specimen: Blood Updated: 10/20/24 2051     Hepatitis B Surface Ag Non-Reactive    Phosphorus [[id number]]  (Abnormal) Collected: 10/20/24 1648    Specimen: Blood Updated: 10/20/24 1940     Phosphorus 4.7 mg/dL     BNP [[id number]]  (Abnormal) Collected: 10/20/24 1648    Specimen: Blood Updated: 10/20/24 1752     proBNP >70,000.0 pg/mL     Narrative:      This assay is used as an aid in the diagnosis of individuals suspected of having heart failure. It can be used as an aid in the diagnosis of acute decompensated heart failure (ADHF) in patients presenting with signs and symptoms of ADHF to the emergency department (ED). In addition, NT-proBNP of <300 pg/mL indicates ADHF is not likely.    Age Range Result Interpretation  NT-proBNP Concentration (pg/mL:      <50             Positive            >450                   Gray                 300-450                    Negative             <300    50-75           Positive            >900                  Gray                300-900                  Negative            <300      >75              Positive            >1800                  Gray                300-1800                  Negative            <300    Comprehensive Metabolic Panel [[id number]]  (Abnormal) Collected: 10/20/24 1648    Specimen: Blood Updated: 10/20/24 1739     Glucose 111 mg/dL      BUN 42 mg/dL      Creatinine 7.03 mg/dL      Sodium 139 mmol/L      Potassium 5.1 mmol/L      Comment: Slight hemolysis detected by analyzer. Result may be falsely elevated.        Chloride 102 mmol/L      CO2 22.4 mmol/L      Calcium 9.1 mg/dL      Total Protein 6.9 g/dL      Albumin 3.7 g/dL      ALT (SGPT) 11 U/L      AST (SGOT) 29 U/L      Comment: Slight hemolysis detected by analyzer. Result may be falsely elevated.        Alkaline Phosphatase 129 U/L      Total Bilirubin 0.2 mg/dL      Globulin 3.2 gm/dL      A/G Ratio 1.2 g/dL      BUN/Creatinine Ratio 6.0     Anion Gap 14.6 mmol/L      eGFR 5.7 mL/min/1.73      Comment: <15 Indicative of kidney failure       Narrative:      GFR Normal >60  Chronic Kidney Disease <60  Kidney Failure <15    The GFR formula is only valid for adults with stable renal function between ages 18 and 70.    POC CHEM 8 [[id number]]  (Abnormal) Collected: 10/20/24 1545    Specimen: Venous Blood Updated: 10/20/24 1549     Glucose 117 mg/dL      BUN 59 mg/dL      Creatinine 8.40 mg/dL      Sodium 136 mmol/L      POC Potassium 6.0 mmol/L      Chloride 107 mmol/L      Total CO2 22 mmol/L      Anion Gap 14.0 mmol/L      Comment: Serial Number: [id number]:  [id number]        Hemoglobin 12.2 g/dL      Hematocrit 36 %      Ionized Calcium 0.90 mmol/L      eGFR 4.6 mL/min/1.73     COVID-19,CEPHEID/LATONIA,COR/MERCEDES/PAD/JC/LAG/YVROSE IN-HOUSE,NP SWAB IN TRANSPORT MEDIA 1 HR TAT, RT-PCR - Swab, Nasopharynx [[id number]]  (Normal) Collected: 10/20/24 1444    Specimen: Swab from Nasopharynx Updated: 10/20/24 1526     COVID19 Not Detected    Narrative:      Fact sheet for providers: https://www.fda.gov/media/145581/download     Fact sheet for  patients: https://www.fda.gov/media/526908/download  Fact sheet for providers: https://www.fda.gov/media/758310/download    Fact sheet for patients: https://www.fda.gov/media/523560/download    Test performed by PCR.    Blood Culture - Blood, Arm, Right [[id number]] Collected: 10/20/24 1508    Specimen: Blood from Arm, Right Updated: 10/20/24 1512    aPTT [[id number]]  (Abnormal) Collected: 10/20/24 1444    Specimen: Blood Updated: 10/20/24 1509     PTT 29.0 seconds     Protime-INR [[id number]]  (Normal) Collected: 10/20/24 1444    Specimen: Blood Updated: 10/20/24 1509     Protime 10.4 Seconds      INR 0.95    Blood Gas, Venous - [[id number]]  (Abnormal) Collected: 10/20/24 1439    Specimen: Venous Blood Updated: 10/20/24 1505     Site PA     pH, Venous 7.353 pH Units      pCO2, Venous 45.8 mm Hg      pO2, Venous 22.5 mm Hg      HCO3, Venous 25.5 mmol/L      Base Excess, Venous -0.6 mmol/L      Comment: Serial Number: [id number]:  [id number]        O2 Saturation, Venous 35.4 %      CO2 Content 26.9 mmol/L      Barometric Pressure for Blood Gas --     Comment: N/A        Modality Cannula     FIO2 28 %     [place] Draw [[id number]] Collected: 10/20/24 1444    Specimen: Blood Updated: 10/20/24 1501    Narrative:      The following orders were created for panel order [place] Draw.  Procedure                               Abnormality         Status                     ---------                               -----------         ------                     Green Top (Gel)[[id number]]                                  Final result               Lavender Top[[id number]]                                     Final result               Gold Top - SST[[id number]]                                   Final result               Light Blue Top[[id number]]                                   Final result                 Please view results for these tests on the individual orders.    Green Top (Gel) [[id number]] Collected: 10/20/24 1444    Specimen:  Blood Updated: 10/20/24 1501     Extra Tube Hold for add-ons.     Comment: Auto resulted.       Lavender Top [[id number]] Collected: 10/20/24 1444    Specimen: Blood Updated: 10/20/24 1501     Extra Tube hold for add-on     Comment: Auto resulted       Gold Top - SST [[id number]] Collected: 10/20/24 1444    Specimen: Blood Updated: 10/20/24 1501     Extra Tube Hold for add-ons.     Comment: Auto resulted.       Light Blue Top [[id number]] Collected: 10/20/24 1444    Specimen: Blood Updated: 10/20/24 1501     Extra Tube Hold for add-ons.     Comment: Auto resulted       CBC & Differential [[id number]]  (Abnormal) Collected: 10/20/24 1444    Specimen: Blood Updated: 10/20/24 1458    Narrative:      The following orders were created for panel order CBC & Differential.  Procedure                               Abnormality         Status                     ---------                               -----------         ------                     CBC Auto Differential[[id number]]        Abnormal            Final result                 Please view results for these tests on the individual orders.    CBC Auto Differential [[id number]]  (Abnormal) Collected: 10/20/24 1444    Specimen: Blood Updated: 10/20/24 1458     WBC 17.30 10*3/mm3      RBC 4.18 10*6/mm3      Hemoglobin 12.2 g/dL      Hematocrit 39.8 %      MCV 95.2 fL      MCH 29.2 pg      MCHC 30.7 g/dL      RDW 17.1 %      RDW-SD 59.6 fl      MPV 10.6 fL      Platelets 395 10*3/mm3      Neutrophil % 91.4 %      Lymphocyte % 2.8 %      Monocyte % 3.8 %      Eosinophil % 0.8 %      Basophil % 0.7 %      Immature Grans % 0.5 %      Neutrophils, Absolute 15.82 10*3/mm3      Lymphocytes, Absolute 0.49 10*3/mm3      Monocytes, Absolute 0.65 10*3/mm3      Eosinophils, Absolute 0.13 10*3/mm3      Basophils, Absolute 0.12 10*3/mm3      Immature Grans, Absolute 0.09 10*3/mm3      nRBC 0.0 /100 WBC     Blood Culture - Blood, Arm, Right [[id number]] Collected: 10/20/24 1444    Specimen:  Blood from Arm, Right Updated: 10/20/24 1448    POC Lactate [[id number]]  (Normal) Collected: 10/20/24 1410    Specimen: Blood Updated: 10/20/24 1412     Lactate 0.5 mmol/L      Comment: Serial Number: [id number]:  [id number]                Imaging Results (Last 24 Hours)       Procedure Component Value Units Date/Time    XR Chest 1 View [[id number]] Collected: 10/20/24 1406     Updated: 10/20/24 1410    Narrative:      XR CHEST 1 VW    Date of Exam: 10/20/2024 1:55 PM EDT    Indication: soa    Comparison: CT chest 10/1/2024    Findings:  Cardiomediastinal silhouette is enlarged. There is atherosclerosis of the aorta. There is mild diffuse interstitial thickening with right mid and lower lung airspace disease. Trace bilateral pleural effusions.      Impression:      Impression:  Cardiomegaly with diffuse bilateral interstitial thickening and right mid and lower lung airspace disease. These findings may reflect asymmetric alveolar edema versus pneumonia. Clinical correlation is recommended.      Electronically Signed: June Haegr MD    10/20/2024 2:08 PM EDT    Workstation ID: [id number]                 I reviewed the patient's new clinical results.    Medication Review:   Scheduled Meds:amLODIPine, 5 mg, Oral, Daily  calcium acetate, 667 mg, Oral, TID  carvedilol, 3.125 mg, Oral, BID With Meals  heparin, 80 Units/kg, Intravenous, Once  pantoprazole, 20 mg, Oral, Daily  sevelamer, 1,600 mg, Oral, TID With Meals  sevelamer, 2,400 mg, Oral, TID With Meals  sodium bicarbonate, 1,300 mg, Oral, TID  sodium chloride, 10 mL, Intravenous, Q12H  sucralfate, 1 g, Oral, 4x Daily  temazepam, 30 mg, Oral, Nightly      Continuous Infusions:heparin, 18 Units/kg/hr  nitroglycerin,  mcg/min, Last Rate: Stopped (10/20/24 2126)      PRN Meds:.  acetaminophen    senna-docusate sodium **AND** polyethylene glycol **AND** bisacodyl **AND** bisacodyl    heparin    heparin    HYDROcodone-acetaminophen    labetalol    lidocaine     Morphine    nitroglycerin    ondansetron    [COMPLETED] Insert Peripheral IV **AND** sodium chloride    sodium chloride    sodium chloride     Assessment & Plan       Hypertensive crisis  -Improved after dialysis  Hyperkalemia-resolved  Substernal chest pain-I am concerned about pulmonary embolism given that she is tachycardic and has constant substernal chest pain.  Renal function will not allow contrasted CT scan, so VQ scan has been ordered.  I am empirically starting heparin drip results of the VQ scan.  Will use morphine as needed for pain    Essential hypertension-amlodipine, carvedilol  End-stage renal disease-Per renal team  Secondary hyperparathyroidism-pharmacist is trying to clarify what patient is actually taking at home as her fill history for prescriptions is not consistent with her medication list        Plan for disposition: To be determined    Angelia Taylor MD  10/21/24  09:43 EDT

## 2024-10-22 ENCOUNTER — APPOINTMENT (OUTPATIENT)
Dept: NUCLEAR MEDICINE | Facility: HOSPITAL | Age: 74
End: 2024-10-22
Payer: MEDICARE

## 2024-10-22 ENCOUNTER — READMISSION MANAGEMENT (OUTPATIENT)
Dept: CALL CENTER | Facility: HOSPITAL | Age: 74
End: 2024-10-22
Payer: MEDICARE

## 2024-10-22 VITALS
BODY MASS INDEX: 28.7 KG/M2 | HEART RATE: 86 BPM | WEIGHT: 146.2 LBS | RESPIRATION RATE: 20 BRPM | DIASTOLIC BLOOD PRESSURE: 85 MMHG | TEMPERATURE: 98 F | OXYGEN SATURATION: 93 % | SYSTOLIC BLOOD PRESSURE: 129 MMHG | HEIGHT: 60 IN

## 2024-10-22 LAB
ALBUMIN SERPL-MCNC: 3.8 G/DL (ref 3.5–5.2)
ANION GAP SERPL CALCULATED.3IONS-SCNC: 11.3 MMOL/L (ref 5–15)
APTT PPP: 49.6 SECONDS (ref 61–76.5)
APTT PPP: 51.2 SECONDS (ref 61–76.5)
BUN SERPL-MCNC: 18 MG/DL (ref 8–23)
BUN/CREAT SERPL: 4.6 (ref 7–25)
CALCIUM SPEC-SCNC: 9.7 MG/DL (ref 8.6–10.5)
CHLORIDE SERPL-SCNC: 97 MMOL/L (ref 98–107)
CO2 SERPL-SCNC: 27.7 MMOL/L (ref 22–29)
CREAT SERPL-MCNC: 3.92 MG/DL (ref 0.57–1)
DEPRECATED RDW RBC AUTO: 59 FL (ref 37–54)
EGFRCR SERPLBLD CKD-EPI 2021: 11.4 ML/MIN/1.73
ERYTHROCYTE [DISTWIDTH] IN BLOOD BY AUTOMATED COUNT: 16.8 % (ref 12.3–15.4)
GLUCOSE SERPL-MCNC: 106 MG/DL (ref 65–99)
HCT VFR BLD AUTO: 35.8 % (ref 34–46.6)
HGB BLD-MCNC: 11 G/DL (ref 12–15.9)
MCH RBC QN AUTO: 29.3 PG (ref 26.6–33)
MCHC RBC AUTO-ENTMCNC: 30.7 G/DL (ref 31.5–35.7)
MCV RBC AUTO: 95.2 FL (ref 79–97)
PHOSPHATE SERPL-MCNC: 4.5 MG/DL (ref 2.5–4.5)
PLATELET # BLD AUTO: 345 10*3/MM3 (ref 140–450)
PMV BLD AUTO: 10.6 FL (ref 6–12)
POTASSIUM SERPL-SCNC: 4.3 MMOL/L (ref 3.5–5.2)
RBC # BLD AUTO: 3.76 10*6/MM3 (ref 3.77–5.28)
SODIUM SERPL-SCNC: 136 MMOL/L (ref 136–145)
WBC NRBC COR # BLD AUTO: 17.33 10*3/MM3 (ref 3.4–10.8)

## 2024-10-22 PROCEDURE — 25010000002 HEPARIN (PORCINE) 25000-0.45 UT/250ML-% SOLUTION: Performed by: INTERNAL MEDICINE

## 2024-10-22 PROCEDURE — 0 TECHNETIUM TC99M PYROPHOSPHATE: Performed by: INTERNAL MEDICINE

## 2024-10-22 PROCEDURE — 85027 COMPLETE CBC AUTOMATED: CPT | Performed by: INTERNAL MEDICINE

## 2024-10-22 PROCEDURE — 85730 THROMBOPLASTIN TIME PARTIAL: CPT | Performed by: INTERNAL MEDICINE

## 2024-10-22 PROCEDURE — 80069 RENAL FUNCTION PANEL: CPT | Performed by: INTERNAL MEDICINE

## 2024-10-22 PROCEDURE — A9540 TC99M MAA: HCPCS | Performed by: INTERNAL MEDICINE

## 2024-10-22 PROCEDURE — A9538 TC99M PYROPHOSPHATE: HCPCS | Performed by: INTERNAL MEDICINE

## 2024-10-22 PROCEDURE — 0 TECHNETIUM ALBUMIN AGGREGATED: Performed by: INTERNAL MEDICINE

## 2024-10-22 PROCEDURE — 78582 LUNG VENTILAT&PERFUS IMAGING: CPT

## 2024-10-22 RX ORDER — AZITHROMYCIN 250 MG/1
250 TABLET, FILM COATED ORAL
Status: DISCONTINUED | OUTPATIENT
Start: 2024-10-22 | End: 2024-10-22 | Stop reason: HOSPADM

## 2024-10-22 RX ORDER — AZITHROMYCIN 250 MG/1
TABLET, FILM COATED ORAL
Qty: 7 TABLET | Refills: 0 | Status: SHIPPED | OUTPATIENT
Start: 2024-10-22

## 2024-10-22 RX ADMIN — SEVELAMER CARBONATE 2400 MG: 800 TABLET, FILM COATED ORAL at 07:05

## 2024-10-22 RX ADMIN — HEPARIN SODIUM 20 UNITS/KG/HR: 10000 INJECTION, SOLUTION INTRAVENOUS at 01:11

## 2024-10-22 RX ADMIN — CARVEDILOL 3.12 MG: 3.12 TABLET, FILM COATED ORAL at 07:06

## 2024-10-22 RX ADMIN — KIT FOR THE PREPARATION OF TECHNETIUM TC 99M ALBUMIN AGGREGATED 1 DOSE: 2.5 INJECTION, POWDER, FOR SOLUTION INTRAVENOUS at 08:53

## 2024-10-22 RX ADMIN — TECHNETIUM TC99M PYROPHOSPHATE 1 DOSE: 12 INJECTION INTRAVENOUS at 08:53

## 2024-10-22 RX ADMIN — Medication 10 ML: at 07:08

## 2024-10-22 RX ADMIN — CALCIUM ACETATE 667 MG: 667 CAPSULE ORAL at 07:05

## 2024-10-22 RX ADMIN — SUCRALFATE 1 G: 1 TABLET ORAL at 07:06

## 2024-10-22 RX ADMIN — SODIUM BICARBONATE 1300 MG: 650 TABLET ORAL at 07:05

## 2024-10-22 RX ADMIN — PANTOPRAZOLE SODIUM 40 MG: 40 TABLET, DELAYED RELEASE ORAL at 07:05

## 2024-10-22 NOTE — CASE MANAGEMENT/SOCIAL WORK
Case Management Discharge Note      Final Note: Home         Selected Continued Care - Discharged on 10/22/2024 Admission date: 10/20/2024 - Discharge disposition: Home or Self Care           Transportation Services  Private: Car    Final Discharge Disposition Code: 01 - home or self-care

## 2024-10-22 NOTE — OUTREACH NOTE
Prep Survey      Flowsheet Row Responses   [facility] facility patient discharged from? [facility]   Is LACE score < 7 ? No   Eligibility Readm Mgmt   Discharge diagnosis Hypertensive crisis   Does the patient have one of the following disease processes/diagnoses(primary or secondary)? Other   Prep survey completed? Yes            Jami CRESPO - Registered Nurse

## 2024-10-22 NOTE — DISCHARGE SUMMARY
Date of Discharge:  10/22/2024    Discharge Diagnosis:   Dyspnea   Fluid overload  End-stage renal disease  Chronic CAD  COPD  Tobacco use disorder  History of Lucas's esophagitis  History of renal cell carcinoma  Diabetes type 2  Hypertension  Hyperlipidemia    Presenting Problem/History of Present Illness  Active Hospital Problems    Diagnosis  POA    **Hypertensive crisis [I16.9]  Yes      Resolved Hospital Problems   No resolved problems to display.          Hospital Course    Patient is a 74 y.o. female presented with past medical history of renal cell carcinoma, end-stage renal disease on dialysis, hypertension, diabetes history of gastric bypass, mood disorder, secondary hyper parathyroid, anemia, and longstanding hernia.  She was at the [facility] date of admission and became increasingly and acutely short of breath and called EMS.  She has been compliant with medication and hemodialysis.  In the ED proBNP was greater than 70,000, call was placed to nephrology and she is undergoing hemodialysis at the time of exam and feeling much improved.  She did complain of chest pain shortness of breath.  She was started on heparin drip for possible PE obtain VQ scan without PE.  Heparin drip was stopped patient's feeling back to baseline does have a cough but no shortness of breath or chest pain.  Will treat for  community acquired pneumonia, urology wants her to stay off amlodipine.  She is hemodynamically stable back to baseline and will need to f/u with PCP in 2 weeks    Procedures Performed         Consults:   Consults       Date and Time Order Name Status Description    10/20/2024  4:05 PM Family Medicine Consult      10/20/2024  4:00 PM Nephrology (on -call MD unless specified) Completed             Pertinent Test Results:    Lab Results (most recent)       Procedure Component Value Units Date/Time    aPTT [[id number]]  (Abnormal) Collected: 10/22/24 0749    Specimen: Blood from Arm, Right Updated: 10/22/24 9852      PTT 51.2 seconds     CBC (No Diff) [[id number]]  (Abnormal) Collected: 10/22/24 0024    Specimen: Blood Updated: 10/22/24 0105     WBC 17.33 10*3/mm3      RBC 3.76 10*6/mm3      Hemoglobin 11.0 g/dL      Hematocrit 35.8 %      MCV 95.2 fL      MCH 29.3 pg      MCHC 30.7 g/dL      RDW 16.8 %      RDW-SD 59.0 fl      MPV 10.6 fL      Platelets 345 10*3/mm3      Comment: Result checked         Renal Function Panel [[id number]]  (Abnormal) Collected: 10/22/24 0024    Specimen: Blood Updated: 10/22/24 0057     Glucose 106 mg/dL      BUN 18 mg/dL      Creatinine 3.92 mg/dL      Sodium 136 mmol/L      Potassium 4.3 mmol/L      Comment: Specimen hemolyzed.  Result may be falsely elevated.        Chloride 97 mmol/L      CO2 27.7 mmol/L      Calcium 9.7 mg/dL      Albumin 3.8 g/dL      Phosphorus 4.5 mg/dL      Anion Gap 11.3 mmol/L      BUN/Creatinine Ratio 4.6     eGFR 11.4 mL/min/1.73      Comment: <15 Indicative of kidney failure       Narrative:      GFR Normal >60  Chronic Kidney Disease <60  Kidney Failure <15    The GFR formula is only valid for adults with stable renal function between ages 18 and 70.    aPTT [[id number]]  (Abnormal) Collected: 10/22/24 0024    Specimen: Blood Updated: 10/22/24 0044     PTT 49.6 seconds     Blood Culture - Blood, Arm, Right [[id number]]  (Normal) Collected: 10/20/24 1508    Specimen: Blood from Arm, Right Updated: 10/21/24 1515     Blood Culture No growth at 24 hours    Respiratory Culture - Sputum, Cough [[id number]] Collected: 10/21/24 1323    Specimen: Sputum from Cough Updated: 10/21/24 1503     Respiratory Culture Rejected     Gram Stain Few (2+) Epithelial cells per low power field      Rare (1+) WBCs per low power field      Moderate (3+) Mixed bacterial morphotypes seen on Gram Stain    Narrative:      Specimen rejected due to oropharyngeal contamination. Please reorder and recollect specimen if clinically necessary.    Blood Culture - Blood, Arm, Right [[id number]]  (Normal)  Collected: 10/20/24 1444    Specimen: Blood from Arm, Right Updated: 10/21/24 1501     Blood Culture No growth at 24 hours    High Sensitivity Troponin T [[id number]]  (Abnormal) Collected: 10/21/24 1349    Specimen: Blood Updated: 10/21/24 1427     HS Troponin T 80 ng/L     Narrative:      High Sensitive Troponin T Reference Range:  <14.0 ng/L- Negative Female for AMI  <22.0 ng/L- Negative Male for AMI  >=14 - Abnormal Female indicating possible myocardial injury.  >=22 - Abnormal Male indicating possible myocardial injury.   Clinicians would have to utilize clinical acumen, EKG, Troponin, and serial changes to determine if it is an Acute Myocardial Infarction or myocardial injury due to an underlying chronic condition.         High Sensitivity Troponin T 2Hr [[id number]] Collected: 10/21/24 0953    Specimen: Blood Updated: 10/21/24 1055    Narrative:      High Sensitive Troponin T Reference Range:<14.0 ng/L- Negative Female for AMI<22.0 ng/L- Negative Male for AMI>=14 - Abnormal Female indicating possible myocardial injury.>=22 - Abnormal Male indicating possible myocardial injury. Clinicians would have to utilize clinical acumen, EKG, Troponin, and serial changes to determine if it is an Acute Myocardial Infarction or myocardial injury due to an underlying chronic condition. The previously reported component GEN 5 2HR TROPONIN T REFLEX is no longer being reported. Previous result was 67 ng/L (Reference Range: <14 ng/L) on 10/21/2024 at 1042 EDT.  The previously reported component TROPONIN T DELTA is no longer being reported. Previous result was -42 ng/L (Reference Range: >=-4 - <+4 ng/L) on 10/21/2024 at 1042 EDT.    High Sensitivity Troponin T [[id number]]  (Abnormal) Collected: 10/20/24 1648    Specimen: Blood Updated: 10/21/24 1018     HS Troponin T 110 ng/L     Narrative:      High Sensitive Troponin T Reference Range:  <14.0 ng/L- Negative Female for AMI  <22.0 ng/L- Negative Male for AMI  >=14 - Abnormal  Female indicating possible myocardial injury.  >=22 - Abnormal Male indicating possible myocardial injury.   Clinicians would have to utilize clinical acumen, EKG, Troponin, and serial changes to determine if it is an Acute Myocardial Infarction or myocardial injury due to an underlying chronic condition.         Protime-INR [[id number]]  (Normal) Collected: 10/21/24 0953    Specimen: Blood Updated: 10/21/24 1015     Protime 11.0 Seconds      INR 1.01    CBC & Differential [[id number]]  (Abnormal) Collected: 10/21/24 0953    Specimen: Blood Updated: 10/21/24 1009    Narrative:      The following orders were created for panel order CBC & Differential.  Procedure                               Abnormality         Status                     ---------                               -----------         ------                     CBC Auto Differential[[id number]]        Abnormal            Final result                 Please view results for these tests on the individual orders.    CBC Auto Differential [[id number]]  (Abnormal) Collected: 10/21/24 0953    Specimen: Blood Updated: 10/21/24 1009     WBC 17.91 10*3/mm3      RBC 3.89 10*6/mm3      Hemoglobin 11.3 g/dL      Hematocrit 37.3 %      MCV 95.9 fL      MCH 29.0 pg      MCHC 30.3 g/dL      RDW 16.6 %      RDW-SD 58.7 fl      MPV 10.4 fL      Platelets 339 10*3/mm3      Neutrophil % 89.7 %      Lymphocyte % 1.8 %      Monocyte % 6.6 %      Eosinophil % 0.7 %      Basophil % 0.6 %      Immature Grans % 0.6 %      Neutrophils, Absolute 16.08 10*3/mm3      Lymphocytes, Absolute 0.33 10*3/mm3      Monocytes, Absolute 1.18 10*3/mm3      Eosinophils, Absolute 0.12 10*3/mm3      Basophils, Absolute 0.10 10*3/mm3      Immature Grans, Absolute 0.10 10*3/mm3      nRBC 0.0 /100 WBC     Respiratory Culture - Sputum, Cough [[id number]] Collected: 10/20/24 1637    Specimen: Sputum from Cough Updated: 10/21/24 0643     Respiratory Culture Rejected     Gram Stain No WBCs seen      Moderate  (3+) Epithelial cells per low power field      Moderate (3+) Mixed bacterial morphotypes seen on Gram Stain    Narrative:      Specimen rejected due to oropharyngeal contamination. Please reorder and recollect specimen if clinically necessary.    Basic Metabolic Panel [[id number]]  (Abnormal) Collected: 10/21/24 0230    Specimen: Blood from Arm, Right Updated: 10/21/24 0401     Glucose 73 mg/dL      BUN 28 mg/dL      Creatinine 5.11 mg/dL      Sodium 138 mmol/L      Potassium 3.6 mmol/L      Chloride 100 mmol/L      CO2 25.2 mmol/L      Calcium 8.7 mg/dL      BUN/Creatinine Ratio 5.5     Anion Gap 12.8 mmol/L      eGFR 8.3 mL/min/1.73      Comment: <15 Indicative of kidney failure       Narrative:      GFR Normal >60  Chronic Kidney Disease <60  Kidney Failure <15    The GFR formula is only valid for adults with stable renal function between ages 18 and 70.    CBC Auto Differential [[id number]]  (Abnormal) Collected: 10/21/24 0230    Specimen: Blood from Arm, Right Updated: 10/21/24 0316     WBC 7.21 10*3/mm3      RBC 3.81 10*6/mm3      Hemoglobin 10.8 g/dL      Hematocrit 36.0 %      MCV 94.5 fL      MCH 28.3 pg      MCHC 30.0 g/dL      RDW 16.7 %      RDW-SD 58.3 fl      MPV 10.5 fL      Platelets 335 10*3/mm3      Neutrophil % 73.5 %      Lymphocyte % 13.0 %      Monocyte % 9.0 %      Eosinophil % 3.1 %      Basophil % 1.1 %      Immature Grans % 0.3 %      Neutrophils, Absolute 5.30 10*3/mm3      Lymphocytes, Absolute 0.94 10*3/mm3      Monocytes, Absolute 0.65 10*3/mm3      Eosinophils, Absolute 0.22 10*3/mm3      Basophils, Absolute 0.08 10*3/mm3      Immature Grans, Absolute 0.02 10*3/mm3      nRBC 0.0 /100 WBC     Hepatitis B Surface Antigen [[id number]]  (Normal) Collected: 10/20/24 2009    Specimen: Blood Updated: 10/20/24 2051     Hepatitis B Surface Ag Non-Reactive    Phosphorus [[id number]]  (Abnormal) Collected: 10/20/24 1648    Specimen: Blood Updated: 10/20/24 1940     Phosphorus 4.7 mg/dL     BNP  [[id number]]  (Abnormal) Collected: 10/20/24 1648    Specimen: Blood Updated: 10/20/24 1752     proBNP >70,000.0 pg/mL     Narrative:      This assay is used as an aid in the diagnosis of individuals suspected of having heart failure. It can be used as an aid in the diagnosis of acute decompensated heart failure (ADHF) in patients presenting with signs and symptoms of ADHF to the emergency department (ED). In addition, NT-proBNP of <300 pg/mL indicates ADHF is not likely.    Age Range Result Interpretation  NT-proBNP Concentration (pg/mL:      <50             Positive            >450                   Gray                 300-450                    Negative             <300    50-75           Positive            >900                  Gray                300-900                  Negative            <300      >75             Positive            >1800                  Gray                300-1800                  Negative            <300    Comprehensive Metabolic Panel [[id number]]  (Abnormal) Collected: 10/20/24 1648    Specimen: Blood Updated: 10/20/24 1739     Glucose 111 mg/dL      BUN 42 mg/dL      Creatinine 7.03 mg/dL      Sodium 139 mmol/L      Potassium 5.1 mmol/L      Comment: Slight hemolysis detected by analyzer. Result may be falsely elevated.        Chloride 102 mmol/L      CO2 22.4 mmol/L      Calcium 9.1 mg/dL      Total Protein 6.9 g/dL      Albumin 3.7 g/dL      ALT (SGPT) 11 U/L      AST (SGOT) 29 U/L      Comment: Slight hemolysis detected by analyzer. Result may be falsely elevated.        Alkaline Phosphatase 129 U/L      Total Bilirubin 0.2 mg/dL      Globulin 3.2 gm/dL      A/G Ratio 1.2 g/dL      BUN/Creatinine Ratio 6.0     Anion Gap 14.6 mmol/L      eGFR 5.7 mL/min/1.73      Comment: <15 Indicative of kidney failure       Narrative:      GFR Normal >60  Chronic Kidney Disease <60  Kidney Failure <15    The GFR formula is only valid for adults with stable renal function between ages 18 and 70.     POC CHEM 8 [[id number]]  (Abnormal) Collected: 10/20/24 1545    Specimen: Venous Blood Updated: 10/20/24 1549     Glucose 117 mg/dL      BUN 59 mg/dL      Creatinine 8.40 mg/dL      Sodium 136 mmol/L      POC Potassium 6.0 mmol/L      Chloride 107 mmol/L      Total CO2 22 mmol/L      Anion Gap 14.0 mmol/L      Comment: Serial Number: [id number]:  [id number]        Hemoglobin 12.2 g/dL      Hematocrit 36 %      Ionized Calcium 0.90 mmol/L      eGFR 4.6 mL/min/1.73     COVID-19,CEPHEID/LATONIA,COR/[facility] IN-HOUSE,NP SWAB IN TRANSPORT MEDIA 1 HR TAT, RT-PCR - Swab, Nasopharynx [[id number]]  (Normal) Collected: 10/20/24 1444    Specimen: Swab from Nasopharynx Updated: 10/20/24 1526     COVID19 Not Detected    Narrative:      Fact sheet for providers: https://www.fda.gov/media/753620/download     Fact sheet for patients: https://www.fda.gov/media/787957/download  Fact sheet for providers: https://www.fda.gov/media/537728/download    Fact sheet for patients: https://www.fda.gov/media/100012/download    Test performed by PCR.    Protime-INR [[id number]]  (Normal) Collected: 10/20/24 1444    Specimen: Blood Updated: 10/20/24 1509     Protime 10.4 Seconds      INR 0.95    Blood Gas, Venous - [[id number]]  (Abnormal) Collected: 10/20/24 1439    Specimen: Venous Blood Updated: 10/20/24 1505     Site PA     pH, Venous 7.353 pH Units      pCO2, Venous 45.8 mm Hg      pO2, Venous 22.5 mm Hg      HCO3, Venous 25.5 mmol/L      Base Excess, Venous -0.6 mmol/L      Comment: Serial Number: [id number]:  [id number]        O2 Saturation, Venous 35.4 %      CO2 Content 26.9 mmol/L      Barometric Pressure for Blood Gas --     Comment: N/A        Modality Cannula     FIO2 28 %     [place] Draw [[id number]] Collected: 10/20/24 1444    Specimen: Blood Updated: 10/20/24 1501    Narrative:      The following orders were created for panel order [place] Draw.  Procedure                               Abnormality         Status                      ---------                               -----------         ------                     Green Top (Gel)[[id number]]                                  Final result               Lavender Top[[id number]]                                     Final result               Gold Top - SST[[id number]]                                   Final result               Light Blue Top[[id number]]                                   Final result                 Please view results for these tests on the individual orders.    Green Top (Gel) [[id number]] Collected: 10/20/24 1444    Specimen: Blood Updated: 10/20/24 1501     Extra Tube Hold for add-ons.     Comment: Auto resulted.       Lavender Top [[id number]] Collected: 10/20/24 1444    Specimen: Blood Updated: 10/20/24 1501     Extra Tube hold for add-on     Comment: Auto resulted       Gold Top - SST [[id number]] Collected: 10/20/24 1444    Specimen: Blood Updated: 10/20/24 1501     Extra Tube Hold for add-ons.     Comment: Auto resulted.       Light Blue Top [[id number]] Collected: 10/20/24 1444    Specimen: Blood Updated: 10/20/24 1501     Extra Tube Hold for add-ons.     Comment: Auto resulted       CBC & Differential [[id number]]  (Abnormal) Collected: 10/20/24 1444    Specimen: Blood Updated: 10/20/24 1458    Narrative:      The following orders were created for panel order CBC & Differential.  Procedure                               Abnormality         Status                     ---------                               -----------         ------                     CBC Auto Differential[[id number]]        Abnormal            Final result                 Please view results for these tests on the individual orders.    POC Lactate [[id number]]  (Normal) Collected: 10/20/24 1410    Specimen: Blood Updated: 10/20/24 1412     Lactate 0.5 mmol/L      Comment: Serial Number: [id number]:  [id number]                Results for orders placed during the hospital encounter of  07/25/21    Adult Transthoracic Echo Complete W/ Cont if Necessary Per Protocol    Interpretation Summary  Normal LV size and contractility EF of 60%  Normal RV size  Normal atrial size  Aortic valve, mitral valve, tricuspid valve appears structurally normal, mild mitral regurgitation  seen.  No pericardial effusion seen.  Proximal aorta appears normal in size.       Condition on Discharge:  Stable    Vital Signs  Temp:  [97.5 °F (36.4 °C)-99.5 °F (37.5 °C)] 98 °F (36.7 °C)  Heart Rate:  [86-97] 86  Resp:  [10-24] 20  BP: ()/(45-90) 129/85      Physical Exam  Vitals reviewed.   Constitutional:       Appearance: She is not ill-appearing.   HENT:      Head: Normocephalic and atraumatic.      Right Ear: External ear normal.      Left Ear: External ear normal.      Nose: Nose normal.      Mouth/Throat:      Mouth: Mucous membranes are moist.   Eyes:      General:         Right eye: No discharge.         Left eye: No discharge.   Cardiovascular:      Rate and Rhythm: Normal rate and regular rhythm.      Pulses: Normal pulses.      Heart sounds: Normal heart sounds.   Pulmonary:      Effort: Pulmonary effort is normal.      Breath sounds: Normal breath sounds.   Abdominal:      General: Bowel sounds are normal.      Palpations: Abdomen is soft.   Musculoskeletal:         General: Normal range of motion.      Cervical back: Normal range of motion.   Skin:     General: Skin is warm.   Neurological:      Mental Status: She is alert and oriented to person, place, and time.   Psychiatric:         Behavior: Behavior normal.              Discharge Disposition  Home or Self Care    Discharge Medications     Discharge Medications        New Medications        Instructions Start Date   azithromycin 250 MG tablet  Commonly known as: ZITHROMAX   Indications: Pneumonia             Continue These Medications        Instructions Start Date   albuterol sulfate  (90 Base) MCG/ACT inhaler  Commonly known as: PROVENTIL  HFA;VENTOLIN HFA;PROAIR HFA   2 puffs, Every 4 Hours PRN      calcium acetate 667 MG capsule capsule  Commonly known as: PHOS BINDER)   667 mg, 3 Times Daily      carvedilol 3.125 MG tablet  Commonly known as: COREG   3.125 mg, Oral, Daily      cholecalciferol 25 MCG (1000 UT) tablet  Commonly known as: VITAMIN D3   1,000 Units, Daily      lidocaine-prilocaine 2.5-2.5 % cream  Commonly known as: EMLA   1 Application, Topical, Daily PRN, Apply to left 5th finger      pantoprazole 20 MG EC tablet  Commonly known as: PROTONIX   20 mg, Daily      sevelamer 800 MG tablet  Commonly known as: RENVELA   2,400 mg, Oral, 3 Times Daily With Meals      sodium bicarbonate 650 MG tablet   1,300 mg, 3 Times Daily      sucralfate 1 g tablet  Commonly known as: CARAFATE   1 g, Oral, Daily      temazepam 30 MG capsule  Commonly known as: RESTORIL   30 mg, Nightly             Stop These Medications      amLODIPine 5 MG tablet  Commonly known as: NORVASC     losartan 100 MG tablet  Commonly known as: COZAAR     mupirocin 2 % ointment  Commonly known as: BACTROBAN              Discharge Diet:   Diet Instructions       Diet: Renal Diets; Low Sodium (2-3g); Regular (IDDSI 7); Thin (IDDSI 0)      Discharge Diet: Renal Diets    Renal Diet: Low Sodium (2-3g)    Texture: Regular (IDDSI 7)    Fluid Consistency: Thin (IDDSI 0)            Activity at Discharge:   Activity Instructions       Activity as Tolerated              Follow-up Appointments  Future Appointments   Date Time Provider Department Center   11/12/2024  8:30 AM [facility] ECHO [facility] NA   1/7/2025  9:20 AM LAB MD  [facility] LAB NA  [facility]   1/7/2025  9:30 AM Anjum Cam MD [facility] NA [facility]   2/13/2025  8:00 AM [facility] CC CT [facility] PET [facility]   2/18/2025  8:30 AM Alcides Live MD [facility] RO [facility] None   4/15/2025 12:45 PM Mak Sosa MD [facility] NA     Additional Instructions for the Follow-ups that You Need to Schedule       Discharge Follow-up  with PCP   As directed       Currently Documented PCP:    Raegan Gotti MD    PCP Phone Number:    [phone]     Follow Up Details: 2 weeks                Test Results Pending at Discharge  Pending Labs       Order Current Status    Blood Culture - Blood, Arm, Right Preliminary result    Blood Culture - Blood, Arm, Right Preliminary result             MAEGAN Jordan  10/22/24  11:31 EDT    Time: Discharge 25 min

## 2024-10-22 NOTE — PLAN OF CARE
Goal Outcome Evaluation:  Plan of Care Reviewed With: patient        Progress: improving  Outcome Evaluation: Rested well in recliner during the night. O2 at 2L in use. Heparin drip infusing. No c/o chest pain. Will continue to monitor.

## 2024-10-22 NOTE — PROGRESS NOTES
[facility] Progress Note      Patient Name: Sherry Lobato  : 1950  MRN: [id number]  Primary Care Physician:  Raegan Gotti MD  Date of admission: 10/20/2024    Subjective     Interval History:     Patient sitting comfortably.  She has some dyspnea.  Denies any chest pain, nausea or vomiting    Review of Systems:   As noted above    Objective     Vitals:   Temp:  [97.5 °F (36.4 °C)-99.5 °F (37.5 °C)] 98 °F (36.7 °C)  Heart Rate:  [86-97] 86  Resp:  [10-24] 20  BP: ()/(45-90) 129/85  Flow (L/min) (Oxygen Therapy):  [2-3] 3    Intake/Output Summary (Last 24 hours) at 10/22/2024 0936  Last data filed at 10/22/2024 0800  Gross per 24 hour   Intake 360 ml   Output 2500 ml   Net -2140 ml       Physical Exam:    General Appearance: NAD  HEENT: oral mucosa normal, nonicteric sclera  Neck: supple, no JVD  Lungs: CTA  Heart: RRR, normal S1 and S2  Abdomen: soft, nondistended  Extremities: no edema  Neuro: Awake alert and moving all extremities    Scheduled Meds:     calcium acetate, 667 mg, Oral, TID With Meals  carvedilol, 3.125 mg, Oral, BID With Meals  pantoprazole, 40 mg, Oral, Daily  sevelamer, 2,400 mg, Oral, TID With Meals  sodium bicarbonate, 1,300 mg, Oral, TID  sodium chloride, 10 mL, Intravenous, Q12H  sucralfate, 1 g, Oral, QAM AC  temazepam, 30 mg, Oral, Nightly      IV Meds:   heparin, 18 Units/kg/hr, Last Rate: 22 Units/kg/hr (10/22/24 0112)  nitroglycerin,  mcg/min, Last Rate: Stopped (10/20/24 2126)        Results Reviewed:   I have personally reviewed the results from the time of this admission to 10/22/2024 09:36 EDT     Results from last 7 days   Lab Units 10/22/24  0024 10/21/24  0230 10/20/24  1648   SODIUM mmol/L 136 138 139   POTASSIUM mmol/L 4.3 3.6 5.1   CHLORIDE mmol/L 97* 100 102   CO2 mmol/L 27.7 25.2 22.4   BUN mg/dL 18 28* 42*   CREATININE mg/dL 3.92* 5.11* 7.03*   CALCIUM mg/dL 9.7 8.7 9.1   BILIRUBIN mg/dL  --   --  0.2   ALK PHOS U/L   --   --  129*   ALT (SGPT) U/L  --   --  11   AST (SGOT) U/L  --   --  29   GLUCOSE mg/dL 106* 73 111*     Estimated Creatinine Clearance: 10.5 mL/min (A) (by C-G formula based on SCr of 3.92 mg/dL (H)).  Results from last 7 days   Lab Units 10/22/24  0024 10/20/24  1648   PHOSPHORUS mg/dL 4.5 4.7*         Results from last 7 days   Lab Units 10/22/24  0024 10/21/24  0953 10/21/24  0230 10/20/24  1545 10/20/24  1444   WBC 10*3/mm3 17.33* 17.91* 7.21  --  17.30*   HEMOGLOBIN g/dL 11.0* 11.3* 10.8*  --  12.2   HEMOGLOBIN, POC g/dL  --   --   --  12.2  --    PLATELETS 10*3/mm3 345 339 335  --  395     Results from last 7 days   Lab Units 10/21/24  0953 10/20/24  1444   INR  1.01 0.95       Assessment / Plan     ASSESSMENT:    End-stage renal disease.  Patient gets dialysis Monday Wednesday and Friday. Electrolytes, volume status okay  Hypertension with chronic renal disease.  Blood pressure okay.  Hyperkalemia.  Resolved  Chest pain.patient going for stress test today  Management per primary team  Anemia and chronic kidney disease.  Hemoglobin acceptable    PLAN:  Hemodialysis MWF  Keep off amlodipine  Surveillance labs    Faustino Buckley MD  10/22/24  09:36 EDT    [facility]  [phone]

## 2024-10-22 NOTE — CASE MANAGEMENT/SOCIAL WORK
Continued Stay Note  LANI Sepulveda     Patient Name: Sherry Lobato  MRN: [id number]  Today's Date: 10/22/2024    Admit Date: 10/20/2024    Plan: D/C Plan: Home no services   Discharge Plan       Row Name 10/22/24 1228       Plan    Plan D/C Plan: Home no services               Expected Discharge Date and Time       Expected Discharge Date Expected Discharge Time    Oct 22, 2024               Ashley Agee RN  RN/.  Office Ph. [phone]  Cell Ph.  [phone]

## 2024-10-25 LAB
BACTERIA SPEC AEROBE CULT: NORMAL
BACTERIA SPEC AEROBE CULT: NORMAL

## 2024-10-27 LAB
QT INTERVAL: 375 MS
QT INTERVAL: 394 MS
QTC INTERVAL: 474 MS
QTC INTERVAL: 484 MS

## 2024-10-31 ENCOUNTER — READMISSION MANAGEMENT (OUTPATIENT)
Dept: CALL CENTER | Facility: HOSPITAL | Age: 74
End: 2024-10-31
Payer: MEDICARE

## 2024-10-31 NOTE — OUTREACH NOTE
Medical Week 2 Survey      Flowsheet Row Responses   [facility] facility patient discharged from? [facility]   Does the patient have one of the following disease processes/diagnoses(primary or secondary)? Other   Week 2 attempt successful? No   Unsuccessful attempts Attempt 1            NADYA Esparza Registered Nurse

## 2024-11-03 ENCOUNTER — APPOINTMENT (OUTPATIENT)
Dept: GENERAL RADIOLOGY | Facility: HOSPITAL | Age: 74
End: 2024-11-03
Payer: MEDICARE

## 2024-11-03 ENCOUNTER — HOSPITAL ENCOUNTER (OUTPATIENT)
Facility: HOSPITAL | Age: 74
Setting detail: OBSERVATION
Discharge: HOME | End: 2024-11-04
Attending: EMERGENCY MEDICINE | Admitting: INTERNAL MEDICINE
Payer: MEDICARE

## 2024-11-03 ENCOUNTER — APPOINTMENT (OUTPATIENT)
Dept: CT IMAGING | Facility: HOSPITAL | Age: 74
End: 2024-11-03
Payer: MEDICARE

## 2024-11-03 DIAGNOSIS — R10.30 LOWER ABDOMINAL PAIN: Primary | ICD-10-CM

## 2024-11-03 DIAGNOSIS — K59.00 CONSTIPATION, UNSPECIFIED CONSTIPATION TYPE: ICD-10-CM

## 2024-11-03 DIAGNOSIS — Z99.2 CHRONIC KIDNEY DISEASE ON CHRONIC DIALYSIS: ICD-10-CM

## 2024-11-03 DIAGNOSIS — R07.9 CHEST PAIN, UNSPECIFIED TYPE: ICD-10-CM

## 2024-11-03 DIAGNOSIS — N18.6 CHRONIC KIDNEY DISEASE ON CHRONIC DIALYSIS: ICD-10-CM

## 2024-11-03 DIAGNOSIS — K57.90 DIVERTICULOSIS: ICD-10-CM

## 2024-11-03 LAB
ABO GROUP BLD: NORMAL
ALBUMIN SERPL-MCNC: 3.8 G/DL (ref 3.5–5.2)
ALBUMIN/GLOB SERPL: 1.5 G/DL
ALP SERPL-CCNC: 131 U/L (ref 39–117)
ALT SERPL W P-5'-P-CCNC: 20 U/L (ref 1–33)
ANION GAP SERPL CALCULATED.3IONS-SCNC: 18.4 MMOL/L (ref 5–15)
AST SERPL-CCNC: 26 U/L (ref 1–32)
BASOPHILS # BLD AUTO: 0.06 10*3/MM3 (ref 0–0.2)
BASOPHILS NFR BLD AUTO: 0.4 % (ref 0–1.5)
BILIRUB SERPL-MCNC: 0.2 MG/DL (ref 0–1.2)
BLD GP AB SCN SERPL QL: NEGATIVE
BUN SERPL-MCNC: 50 MG/DL (ref 8–23)
BUN/CREAT SERPL: 6.8 (ref 7–25)
CALCIUM SPEC-SCNC: 8.9 MG/DL (ref 8.6–10.5)
CHLORIDE SERPL-SCNC: 104 MMOL/L (ref 98–107)
CO2 SERPL-SCNC: 18.6 MMOL/L (ref 22–29)
CREAT SERPL-MCNC: 7.39 MG/DL (ref 0.57–1)
DEPRECATED RDW RBC AUTO: 58.6 FL (ref 37–54)
EGFRCR SERPLBLD CKD-EPI 2021: 5.3 ML/MIN/1.73
EOSINOPHIL # BLD AUTO: 0.37 10*3/MM3 (ref 0–0.4)
EOSINOPHIL NFR BLD AUTO: 2.7 % (ref 0.3–6.2)
ERYTHROCYTE [DISTWIDTH] IN BLOOD BY AUTOMATED COUNT: 16.8 % (ref 12.3–15.4)
GEN 5 2HR TROPONIN T REFLEX: 54 NG/L
GLOBULIN UR ELPH-MCNC: 2.6 GM/DL
GLUCOSE SERPL-MCNC: 107 MG/DL (ref 65–99)
HCT VFR BLD AUTO: 35.1 % (ref 34–46.6)
HGB BLD-MCNC: 11 G/DL (ref 12–15.9)
HOLD SPECIMEN: NORMAL
IMM GRANULOCYTES # BLD AUTO: 0.07 10*3/MM3 (ref 0–0.05)
IMM GRANULOCYTES NFR BLD AUTO: 0.5 % (ref 0–0.5)
INR PPP: 1 (ref 0.93–1.1)
LIPASE SERPL-CCNC: 21 U/L (ref 13–60)
LYMPHOCYTES # BLD AUTO: 1.34 10*3/MM3 (ref 0.7–3.1)
LYMPHOCYTES NFR BLD AUTO: 9.9 % (ref 19.6–45.3)
MAGNESIUM SERPL-MCNC: 1.5 MG/DL (ref 1.6–2.4)
MCH RBC QN AUTO: 30.1 PG (ref 26.6–33)
MCHC RBC AUTO-ENTMCNC: 31.3 G/DL (ref 31.5–35.7)
MCV RBC AUTO: 96.2 FL (ref 79–97)
MONOCYTES # BLD AUTO: 0.69 10*3/MM3 (ref 0.1–0.9)
MONOCYTES NFR BLD AUTO: 5.1 % (ref 5–12)
NEUTROPHILS NFR BLD AUTO: 10.95 10*3/MM3 (ref 1.7–7)
NEUTROPHILS NFR BLD AUTO: 81.4 % (ref 42.7–76)
NRBC BLD AUTO-RTO: 0.2 /100 WBC (ref 0–0.2)
PLATELET # BLD AUTO: 367 10*3/MM3 (ref 140–450)
PMV BLD AUTO: 10.2 FL (ref 6–12)
POTASSIUM SERPL-SCNC: 4.9 MMOL/L (ref 3.5–5.2)
PROT SERPL-MCNC: 6.4 G/DL (ref 6–8.5)
PROTHROMBIN TIME: 10.9 SECONDS (ref 9.6–11.7)
RBC # BLD AUTO: 3.65 10*6/MM3 (ref 3.77–5.28)
RH BLD: NEGATIVE
SODIUM SERPL-SCNC: 141 MMOL/L (ref 136–145)
T&S EXPIRATION DATE: NORMAL
TROPONIN T DELTA: 0 NG/L
TROPONIN T SERPL HS-MCNC: 54 NG/L
WBC NRBC COR # BLD AUTO: 13.48 10*3/MM3 (ref 3.4–10.8)

## 2024-11-03 PROCEDURE — 96376 TX/PRO/DX INJ SAME DRUG ADON: CPT

## 2024-11-03 PROCEDURE — 86900 BLOOD TYPING SEROLOGIC ABO: CPT | Performed by: EMERGENCY MEDICINE

## 2024-11-03 PROCEDURE — 25010000002 HYDROMORPHONE 1 MG/ML SOLUTION: Performed by: EMERGENCY MEDICINE

## 2024-11-03 PROCEDURE — 25010000002 MAGNESIUM SULFATE IN D5W 1G/100ML (PREMIX) 1-5 GM/100ML-% SOLUTION: Performed by: EMERGENCY MEDICINE

## 2024-11-03 PROCEDURE — 85025 COMPLETE CBC W/AUTO DIFF WBC: CPT | Performed by: EMERGENCY MEDICINE

## 2024-11-03 PROCEDURE — 96365 THER/PROPH/DIAG IV INF INIT: CPT

## 2024-11-03 PROCEDURE — 99285 EMERGENCY DEPT VISIT HI MDM: CPT

## 2024-11-03 PROCEDURE — 85610 PROTHROMBIN TIME: CPT | Performed by: EMERGENCY MEDICINE

## 2024-11-03 PROCEDURE — 86901 BLOOD TYPING SEROLOGIC RH(D): CPT | Performed by: EMERGENCY MEDICINE

## 2024-11-03 PROCEDURE — G0378 HOSPITAL OBSERVATION PER HR: HCPCS

## 2024-11-03 PROCEDURE — 71045 X-RAY EXAM CHEST 1 VIEW: CPT

## 2024-11-03 PROCEDURE — 74176 CT ABD & PELVIS W/O CONTRAST: CPT

## 2024-11-03 PROCEDURE — 93005 ELECTROCARDIOGRAM TRACING: CPT | Performed by: EMERGENCY MEDICINE

## 2024-11-03 PROCEDURE — 36415 COLL VENOUS BLD VENIPUNCTURE: CPT

## 2024-11-03 PROCEDURE — 84484 ASSAY OF TROPONIN QUANT: CPT | Performed by: EMERGENCY MEDICINE

## 2024-11-03 PROCEDURE — 25010000002 ONDANSETRON PER 1 MG: Performed by: EMERGENCY MEDICINE

## 2024-11-03 PROCEDURE — 80053 COMPREHEN METABOLIC PANEL: CPT | Performed by: EMERGENCY MEDICINE

## 2024-11-03 PROCEDURE — 83690 ASSAY OF LIPASE: CPT | Performed by: EMERGENCY MEDICINE

## 2024-11-03 PROCEDURE — 86850 RBC ANTIBODY SCREEN: CPT | Performed by: EMERGENCY MEDICINE

## 2024-11-03 PROCEDURE — 83735 ASSAY OF MAGNESIUM: CPT | Performed by: EMERGENCY MEDICINE

## 2024-11-03 PROCEDURE — 25810000003 SODIUM CHLORIDE 0.9 % SOLUTION: Performed by: EMERGENCY MEDICINE

## 2024-11-03 PROCEDURE — 96375 TX/PRO/DX INJ NEW DRUG ADDON: CPT

## 2024-11-03 PROCEDURE — 96366 THER/PROPH/DIAG IV INF ADDON: CPT

## 2024-11-03 PROCEDURE — 93005 ELECTROCARDIOGRAM TRACING: CPT

## 2024-11-03 RX ORDER — BISACODYL 5 MG/1
5 TABLET, DELAYED RELEASE ORAL DAILY PRN
Status: DISCONTINUED | OUTPATIENT
Start: 2024-11-03 | End: 2024-11-05 | Stop reason: HOSPADM

## 2024-11-03 RX ORDER — SEVELAMER HYDROCHLORIDE 800 MG/1
800 TABLET, FILM COATED ORAL 2 TIMES DAILY WITH MEALS
Status: ON HOLD | COMMUNITY

## 2024-11-03 RX ORDER — BISACODYL 10 MG
10 SUPPOSITORY, RECTAL RECTAL DAILY PRN
Status: DISCONTINUED | OUTPATIENT
Start: 2024-11-03 | End: 2024-11-05 | Stop reason: HOSPADM

## 2024-11-03 RX ORDER — AMLODIPINE BESYLATE 5 MG/1
5 TABLET ORAL DAILY
Status: ON HOLD | COMMUNITY

## 2024-11-03 RX ORDER — ONDANSETRON 2 MG/ML
4 INJECTION INTRAMUSCULAR; INTRAVENOUS ONCE
Status: COMPLETED | OUTPATIENT
Start: 2024-11-03 | End: 2024-11-03

## 2024-11-03 RX ORDER — LEVETIRACETAM 500 MG/5ML
1000 INJECTION, SOLUTION, CONCENTRATE INTRAVENOUS ONCE
Status: DISCONTINUED | OUTPATIENT
Start: 2024-11-03 | End: 2024-11-03

## 2024-11-03 RX ORDER — ONDANSETRON 4 MG/1
4 TABLET, ORALLY DISINTEGRATING ORAL EVERY 6 HOURS PRN
Status: DISCONTINUED | OUTPATIENT
Start: 2024-11-03 | End: 2024-11-05 | Stop reason: HOSPADM

## 2024-11-03 RX ORDER — NITROGLYCERIN 0.4 MG/1
0.4 TABLET SUBLINGUAL
Status: DISCONTINUED | OUTPATIENT
Start: 2024-11-03 | End: 2024-11-05 | Stop reason: HOSPADM

## 2024-11-03 RX ORDER — DIPHENOXYLATE HYDROCHLORIDE AND ATROPINE SULFATE 2.5; .025 MG/1; MG/1
1 TABLET ORAL DAILY
Status: ON HOLD | COMMUNITY

## 2024-11-03 RX ORDER — PANTOPRAZOLE SODIUM 40 MG/10ML
80 INJECTION, POWDER, LYOPHILIZED, FOR SOLUTION INTRAVENOUS ONCE
Status: COMPLETED | OUTPATIENT
Start: 2024-11-03 | End: 2024-11-03

## 2024-11-03 RX ORDER — MAGNESIUM SULFATE 1 G/100ML
1 INJECTION INTRAVENOUS
Status: COMPLETED | OUTPATIENT
Start: 2024-11-03 | End: 2024-11-04

## 2024-11-03 RX ORDER — SODIUM CHLORIDE 9 MG/ML
40 INJECTION, SOLUTION INTRAVENOUS AS NEEDED
Status: DISCONTINUED | OUTPATIENT
Start: 2024-11-03 | End: 2024-11-05 | Stop reason: HOSPADM

## 2024-11-03 RX ORDER — AMOXICILLIN 250 MG
2 CAPSULE ORAL 2 TIMES DAILY PRN
Status: DISCONTINUED | OUTPATIENT
Start: 2024-11-03 | End: 2024-11-05 | Stop reason: HOSPADM

## 2024-11-03 RX ORDER — SODIUM CHLORIDE 0.9 % (FLUSH) 0.9 %
10 SYRINGE (ML) INJECTION EVERY 12 HOURS SCHEDULED
Status: DISCONTINUED | OUTPATIENT
Start: 2024-11-04 | End: 2024-11-05 | Stop reason: HOSPADM

## 2024-11-03 RX ORDER — ONDANSETRON 2 MG/ML
4 INJECTION INTRAMUSCULAR; INTRAVENOUS EVERY 6 HOURS PRN
Status: DISCONTINUED | OUTPATIENT
Start: 2024-11-03 | End: 2024-11-05 | Stop reason: HOSPADM

## 2024-11-03 RX ORDER — HYDRALAZINE HYDROCHLORIDE 20 MG/ML
10 INJECTION INTRAMUSCULAR; INTRAVENOUS EVERY 6 HOURS PRN
Status: DISCONTINUED | OUTPATIENT
Start: 2024-11-03 | End: 2024-11-05 | Stop reason: HOSPADM

## 2024-11-03 RX ORDER — POLYETHYLENE GLYCOL 3350 17 G/17G
17 POWDER, FOR SOLUTION ORAL DAILY PRN
Status: DISCONTINUED | OUTPATIENT
Start: 2024-11-03 | End: 2024-11-05 | Stop reason: HOSPADM

## 2024-11-03 RX ORDER — SODIUM CHLORIDE 0.9 % (FLUSH) 0.9 %
10 SYRINGE (ML) INJECTION AS NEEDED
Status: DISCONTINUED | OUTPATIENT
Start: 2024-11-03 | End: 2024-11-05 | Stop reason: HOSPADM

## 2024-11-03 RX ORDER — OXYCODONE HYDROCHLORIDE 5 MG/1
5 TABLET ORAL EVERY 4 HOURS PRN
Status: DISCONTINUED | OUTPATIENT
Start: 2024-11-03 | End: 2024-11-05 | Stop reason: HOSPADM

## 2024-11-03 RX ADMIN — PANTOPRAZOLE SODIUM 80 MG: 40 INJECTION, POWDER, FOR SOLUTION INTRAVENOUS at 18:29

## 2024-11-03 RX ADMIN — MAGNESIUM SULFATE IN DEXTROSE 1 G: 10 INJECTION, SOLUTION INTRAVENOUS at 22:43

## 2024-11-03 RX ADMIN — HYDROMORPHONE HYDROCHLORIDE 0.5 MG: 1 INJECTION, SOLUTION INTRAMUSCULAR; INTRAVENOUS; SUBCUTANEOUS at 19:18

## 2024-11-03 RX ADMIN — HYDROMORPHONE HYDROCHLORIDE 0.5 MG: 1 INJECTION, SOLUTION INTRAMUSCULAR; INTRAVENOUS; SUBCUTANEOUS at 20:24

## 2024-11-03 RX ADMIN — NITROGLYCERIN 1 INCH: 20 OINTMENT TOPICAL at 23:07

## 2024-11-03 RX ADMIN — ONDANSETRON 4 MG: 2 INJECTION, SOLUTION INTRAMUSCULAR; INTRAVENOUS at 19:18

## 2024-11-03 RX ADMIN — MAGNESIUM SULFATE IN DEXTROSE 1 G: 10 INJECTION, SOLUTION INTRAVENOUS at 23:53

## 2024-11-03 RX ADMIN — SODIUM CHLORIDE 500 ML: 9 INJECTION, SOLUTION INTRAVENOUS at 18:29

## 2024-11-03 NOTE — Clinical Note
Level of Care: Telemetry [5]  Diagnosis: Lower abdominal pain [661364]  Admitting Physician: AMY GALINDO [[id number]]  Attending Physician: AMY GALINDO [[id number]]

## 2024-11-04 ENCOUNTER — ON CAMPUS - OUTPATIENT (AMBULATORY)
Age: 74
End: 2024-11-04
Payer: COMMERCIAL

## 2024-11-04 ENCOUNTER — READMISSION MANAGEMENT (OUTPATIENT)
Dept: CALL CENTER | Facility: HOSPITAL | Age: 74
End: 2024-11-04
Payer: MEDICARE

## 2024-11-04 ENCOUNTER — ON CAMPUS - OUTPATIENT (AMBULATORY)
Dept: URBAN - METROPOLITAN AREA HOSPITAL 85 | Facility: HOSPITAL | Age: 74
End: 2024-11-04
Payer: COMMERCIAL

## 2024-11-04 ENCOUNTER — APPOINTMENT (OUTPATIENT)
Dept: NEPHROLOGY | Facility: HOSPITAL | Age: 74
End: 2024-11-04
Payer: MEDICARE

## 2024-11-04 VITALS
BODY MASS INDEX: 28.7 KG/M2 | HEIGHT: 60 IN | WEIGHT: 146.16 LBS | TEMPERATURE: 98.4 F | SYSTOLIC BLOOD PRESSURE: 119 MMHG | DIASTOLIC BLOOD PRESSURE: 64 MMHG | OXYGEN SATURATION: 96 % | RESPIRATION RATE: 12 BRPM | HEART RATE: 72 BPM

## 2024-11-04 DIAGNOSIS — Z98.84 BARIATRIC SURGERY STATUS: ICD-10-CM

## 2024-11-04 DIAGNOSIS — D72.829 ELEVATED WHITE BLOOD CELL COUNT, UNSPECIFIED: ICD-10-CM

## 2024-11-04 DIAGNOSIS — K31.84 GASTROPARESIS: ICD-10-CM

## 2024-11-04 DIAGNOSIS — D64.9 ANEMIA, UNSPECIFIED: ICD-10-CM

## 2024-11-04 DIAGNOSIS — R19.7 DIARRHEA, UNSPECIFIED: ICD-10-CM

## 2024-11-04 DIAGNOSIS — R93.3 ABNORMAL FINDINGS ON DIAGNOSTIC IMAGING OF OTHER PARTS OF DI: ICD-10-CM

## 2024-11-04 DIAGNOSIS — R10.30 LOWER ABDOMINAL PAIN, UNSPECIFIED: ICD-10-CM

## 2024-11-04 LAB
ALBUMIN SERPL-MCNC: 3.7 G/DL (ref 3.5–5.2)
ALBUMIN/GLOB SERPL: 1.2 G/DL
ALP SERPL-CCNC: 130 U/L (ref 39–117)
ALT SERPL W P-5'-P-CCNC: 14 U/L (ref 1–33)
ANION GAP SERPL CALCULATED.3IONS-SCNC: 16.4 MMOL/L (ref 5–15)
AST SERPL-CCNC: 20 U/L (ref 1–32)
BILIRUB SERPL-MCNC: 0.2 MG/DL (ref 0–1.2)
BUN SERPL-MCNC: 51 MG/DL (ref 8–23)
BUN/CREAT SERPL: 6.8 (ref 7–25)
CALCIUM SPEC-SCNC: 9 MG/DL (ref 8.6–10.5)
CHLORIDE SERPL-SCNC: 105 MMOL/L (ref 98–107)
CO2 SERPL-SCNC: 18.6 MMOL/L (ref 22–29)
CREAT SERPL-MCNC: 7.54 MG/DL (ref 0.57–1)
EGFRCR SERPLBLD CKD-EPI 2021: 5.2 ML/MIN/1.73
GLOBULIN UR ELPH-MCNC: 3.1 GM/DL
GLUCOSE SERPL-MCNC: 86 MG/DL (ref 65–99)
MAGNESIUM SERPL-MCNC: 2.6 MG/DL (ref 1.6–2.4)
POTASSIUM SERPL-SCNC: 4.5 MMOL/L (ref 3.5–5.2)
PROT SERPL-MCNC: 6.8 G/DL (ref 6–8.5)
QT INTERVAL: 356 MS
QTC INTERVAL: 446 MS
SODIUM SERPL-SCNC: 140 MMOL/L (ref 136–145)
WHOLE BLOOD HOLD SPECIMEN: NORMAL

## 2024-11-04 PROCEDURE — 99222 1ST HOSP IP/OBS MODERATE 55: CPT | Mod: FS | Performed by: NURSE PRACTITIONER

## 2024-11-04 PROCEDURE — G0378 HOSPITAL OBSERVATION PER HR: HCPCS

## 2024-11-04 PROCEDURE — 83735 ASSAY OF MAGNESIUM: CPT | Performed by: EMERGENCY MEDICINE

## 2024-11-04 PROCEDURE — G0257 UNSCHED DIALYSIS ESRD PT HOS: HCPCS

## 2024-11-04 PROCEDURE — 25010000002 MAGNESIUM SULFATE IN D5W 1G/100ML (PREMIX) 1-5 GM/100ML-% SOLUTION: Performed by: EMERGENCY MEDICINE

## 2024-11-04 PROCEDURE — 80053 COMPREHEN METABOLIC PANEL: CPT | Performed by: NURSE PRACTITIONER

## 2024-11-04 RX ORDER — SUCRALFATE 1 G/1
1 TABLET ORAL
Status: DISCONTINUED | OUTPATIENT
Start: 2024-11-04 | End: 2024-11-05 | Stop reason: HOSPADM

## 2024-11-04 RX ORDER — SEVELAMER CARBONATE 800 MG/1
800 TABLET, FILM COATED ORAL 2 TIMES DAILY WITH MEALS
Status: DISCONTINUED | OUTPATIENT
Start: 2024-11-04 | End: 2024-11-05 | Stop reason: HOSPADM

## 2024-11-04 RX ORDER — PANTOPRAZOLE SODIUM 40 MG/1
40 TABLET, DELAYED RELEASE ORAL
Status: DISCONTINUED | OUTPATIENT
Start: 2024-11-04 | End: 2024-11-05 | Stop reason: HOSPADM

## 2024-11-04 RX ORDER — CARVEDILOL 3.12 MG/1
3.12 TABLET ORAL 2 TIMES DAILY WITH MEALS
Status: DISCONTINUED | OUTPATIENT
Start: 2024-11-04 | End: 2024-11-05 | Stop reason: HOSPADM

## 2024-11-04 RX ORDER — AMLODIPINE BESYLATE 5 MG/1
5 TABLET ORAL DAILY
Status: DISCONTINUED | OUTPATIENT
Start: 2024-11-04 | End: 2024-11-05 | Stop reason: HOSPADM

## 2024-11-04 RX ORDER — ONDANSETRON 4 MG/1
4 TABLET, ORALLY DISINTEGRATING ORAL EVERY 6 HOURS PRN
Qty: 40 TABLET | Refills: 1 | Status: SHIPPED | OUTPATIENT
Start: 2024-11-04 | End: 2024-11-11

## 2024-11-04 RX ORDER — CALCIUM ACETATE 667 MG/1
667 CAPSULE ORAL 2 TIMES DAILY WITH MEALS
Status: DISCONTINUED | OUTPATIENT
Start: 2024-11-04 | End: 2024-11-05 | Stop reason: HOSPADM

## 2024-11-04 RX ORDER — TEMAZEPAM 15 MG/1
30 CAPSULE ORAL NIGHTLY
Status: DISCONTINUED | OUTPATIENT
Start: 2024-11-04 | End: 2024-11-05 | Stop reason: HOSPADM

## 2024-11-04 RX ORDER — SODIUM BICARBONATE 650 MG/1
1300 TABLET ORAL 2 TIMES DAILY
Status: DISCONTINUED | OUTPATIENT
Start: 2024-11-04 | End: 2024-11-05 | Stop reason: HOSPADM

## 2024-11-04 RX ADMIN — AMLODIPINE BESYLATE 5 MG: 5 TABLET ORAL at 09:27

## 2024-11-04 RX ADMIN — PANTOPRAZOLE SODIUM 40 MG: 40 TABLET, DELAYED RELEASE ORAL at 09:27

## 2024-11-04 RX ADMIN — SODIUM BICARBONATE 1300 MG: 650 TABLET ORAL at 09:27

## 2024-11-04 RX ADMIN — Medication 10 ML: at 09:27

## 2024-11-04 RX ADMIN — OXYCODONE 5 MG: 5 TABLET ORAL at 01:04

## 2024-11-04 RX ADMIN — MAGNESIUM SULFATE IN DEXTROSE 1 G: 10 INJECTION, SOLUTION INTRAVENOUS at 01:04

## 2024-11-04 NOTE — NURSING NOTE
Patient is done with her HD treatment today. She did very well. We removed 1400ml of fluid this treatment. Blood pressure was stable the entire treatment. Nothing else to report at this time. Report was sent to Katlin LAM RN

## 2024-11-04 NOTE — PROGRESS NOTES
"     LOS: 0 days   Patient Care Team:  Raegan Gotti MD as PCP - General (Family Medicine)  Nilay Stevens MD as Consulting Physician (General Surgery)  Stephen Perea MD as Surgeon (Thoracic Surgery)  Makayla Navarro RN as Nurse Navigator  Mak Sosa MD as Cardiologist (Cardiology)  Anjum Cam MD as Consulting Physician (Hematology and Oncology)    Subjective     Interval History: Improved overnight    Patient Complaints: Nausea, abdominal pain resolved.  Wants to know \"why does this keep happening\"?  Eating biscuits and gravy.    History taken from: patient    Review of Systems   Constitutional:  Negative for activity change, appetite change, diaphoresis, fatigue and fever.   HENT:  Negative for congestion.    Respiratory:  Negative for cough and shortness of breath.    Gastrointestinal:  Negative for abdominal pain, constipation, diarrhea, nausea and vomiting.   Genitourinary:  Negative for dysuria.   Musculoskeletal:  Negative for arthralgias and back pain.   Neurological:  Negative for dizziness, light-headedness and headaches.   Psychiatric/Behavioral:  Negative for confusion.            Objective     Vital Signs  Temp:  [97.9 °F (36.6 °C)-98.6 °F (37 °C)] 98.4 °F (36.9 °C)  Heart Rate:  [80-96] 80  Resp:  [12-18] 13  BP: (119-183)/(64-84) 123/73    Physical Exam:     General Appearance:    Alert, cooperative, in no acute distress,   Head:    Normocephalic, without obvious abnormality, atraumatic   Eyes:            Lids and lashes normal, conjunctivae and sclerae normal, no   icterus, no pallor, corneas clear, PERRLA   Ears:    Ears appear intact with no abnormalities noted   Throat:   No oral lesions, no thrush, oral mucosa moist   Neck:   No adenopathy, supple, trachea midline, no thyromegaly, no   carotid bruit, no JVD   Lungs:     Clear to auscultation,respirations regular, even and                  unlabored    Heart:    Regular rhythm and normal rate, normal S1 and S2, no            " murmur, no gallop, no rub, no click   Chest Wall:    No abnormalities observed   Abdomen:     Normal bowel sounds, no masses, no organomegaly, soft        Non-tender non-distended, no guarding,   Extremities:   Moves all extremities well, no edema, no cyanosis, no             Redness   Pulses:   Pulses palpable and equal bilaterally   Skin:   No bleeding, bruising or rash   Lymph nodes:   No palpable adenopathy   Neurologic:   Cranial nerves 2 - 12 grossly intact, sensation intact, DTR       present and equal bilaterally        Results Review:    Lab Results (last 24 hours)       Procedure Component Value Units Date/Time    Magnesium [[id number]]  (Abnormal) Collected: 11/04/24 0331    Specimen: Blood Updated: 11/04/24 0433     Magnesium 2.6 mg/dL     Comprehensive Metabolic Panel [[id number]]  (Abnormal) Collected: 11/04/24 0331    Specimen: Blood Updated: 11/04/24 0430     Glucose 86 mg/dL      BUN 51 mg/dL      Creatinine 7.54 mg/dL      Sodium 140 mmol/L      Potassium 4.5 mmol/L      Chloride 105 mmol/L      CO2 18.6 mmol/L      Calcium 9.0 mg/dL      Total Protein 6.8 g/dL      Albumin 3.7 g/dL      ALT (SGPT) 14 U/L      AST (SGOT) 20 U/L      Alkaline Phosphatase 130 U/L      Total Bilirubin 0.2 mg/dL      Globulin 3.1 gm/dL      A/G Ratio 1.2 g/dL      BUN/Creatinine Ratio 6.8     Anion Gap 16.4 mmol/L      eGFR 5.2 mL/min/1.73      Comment: <15 Indicative of kidney failure       Narrative:      GFR Normal >60  Chronic Kidney Disease <60  Kidney Failure <15    The GFR formula is only valid for adults with stable renal function between ages 18 and 70.    Extra Tubes [[id number]] Collected: 11/04/24 0331    Specimen: Blood, Venous Line Updated: 11/04/24 0400    Narrative:      The following orders were created for panel order Extra Tubes.  Procedure                               Abnormality         Status                     ---------                               -----------         ------                      Lavender Top[[id number]]                                     Final result                 Please view results for these tests on the individual orders.    Lavender Top [[id number]] Collected: 11/04/24 0331    Specimen: Blood Updated: 11/04/24 0400     Extra Tube hold for add-on     Comment: Auto resulted       High Sensitivity Troponin T 2Hr [[id number]]  (Abnormal) Collected: 11/03/24 2050    Specimen: Blood Updated: 11/03/24 2229     HS Troponin T 54 ng/L      Troponin T Delta 0 ng/L     Narrative:      High Sensitive Troponin T Reference Range:  <14.0 ng/L- Negative Female for AMI  <22.0 ng/L- Negative Male for AMI  >=14 - Abnormal Female indicating possible myocardial injury.  >=22 - Abnormal Male indicating possible myocardial injury.   Clinicians would have to utilize clinical acumen, EKG, Troponin, and serial changes to determine if it is an Acute Myocardial Infarction or myocardial injury due to an underlying chronic condition.         Single High Sensitivity Troponin T [[id number]]  (Abnormal) Collected: 11/03/24 1843    Specimen: Blood Updated: 11/03/24 1909     HS Troponin T 54 ng/L     Narrative:      High Sensitive Troponin T Reference Range:  <14.0 ng/L- Negative Female for AMI  <22.0 ng/L- Negative Male for AMI  >=14 - Abnormal Female indicating possible myocardial injury.  >=22 - Abnormal Male indicating possible myocardial injury.   Clinicians would have to utilize clinical acumen, EKG, Troponin, and serial changes to determine if it is an Acute Myocardial Infarction or myocardial injury due to an underlying chronic condition.         Comprehensive Metabolic Panel [[id number]]  (Abnormal) Collected: 11/03/24 1818    Specimen: Blood Updated: 11/03/24 1846     Glucose 107 mg/dL      BUN 50 mg/dL      Creatinine 7.39 mg/dL      Sodium 141 mmol/L      Potassium 4.9 mmol/L      Comment: Slight hemolysis detected by analyzer. Result may be falsely elevated.        Chloride 104 mmol/L      CO2 18.6 mmol/L       Calcium 8.9 mg/dL      Total Protein 6.4 g/dL      Albumin 3.8 g/dL      ALT (SGPT) 20 U/L      AST (SGOT) 26 U/L      Alkaline Phosphatase 131 U/L      Total Bilirubin 0.2 mg/dL      Globulin 2.6 gm/dL      A/G Ratio 1.5 g/dL      BUN/Creatinine Ratio 6.8     Anion Gap 18.4 mmol/L      eGFR 5.3 mL/min/1.73      Comment: <15 Indicative of kidney failure       Narrative:      GFR Normal >60  Chronic Kidney Disease <60  Kidney Failure <15    The GFR formula is only valid for adults with stable renal function between ages 18 and 70.    Magnesium [[id number]]  (Abnormal) Collected: 11/03/24 1818    Specimen: Blood Updated: 11/03/24 1846     Magnesium 1.5 mg/dL     Lipase [[id number]]  (Normal) Collected: 11/03/24 1818    Specimen: Blood Updated: 11/03/24 1846     Lipase 21 U/L     Extra Tubes [[id number]] Collected: 11/03/24 1818    Specimen: Blood, Venous Line Updated: 11/03/24 1832    Narrative:      The following orders were created for panel order Extra Tubes.  Procedure                               Abnormality         Status                     ---------                               -----------         ------                     Gold Top - SST[[id number]]                                   Final result                 Please view results for these tests on the individual orders.    Gold Top - SST [[id number]] Collected: 11/03/24 1818    Specimen: Blood Updated: 11/03/24 1832     Extra Tube Hold for add-ons.     Comment: Auto resulted.       Protime-INR [[id number]]  (Normal) Collected: 11/03/24 1818    Specimen: Blood Updated: 11/03/24 1832     Protime 10.9 Seconds      INR 1.00    CBC & Differential [[id number]]  (Abnormal) Collected: 11/03/24 1818    Specimen: Blood Updated: 11/03/24 1822    Narrative:      The following orders were created for panel order CBC & Differential.  Procedure                               Abnormality         Status                     ---------                               -----------          ------                     CBC Auto Differential[[id number]]        Abnormal            Final result                 Please view results for these tests on the individual orders.    CBC Auto Differential [[id number]]  (Abnormal) Collected: 11/03/24 1818    Specimen: Blood Updated: 11/03/24 1822     WBC 13.48 10*3/mm3      RBC 3.65 10*6/mm3      Hemoglobin 11.0 g/dL      Hematocrit 35.1 %      MCV 96.2 fL      MCH 30.1 pg      MCHC 31.3 g/dL      RDW 16.8 %      RDW-SD 58.6 fl      MPV 10.2 fL      Platelets 367 10*3/mm3      Neutrophil % 81.4 %      Lymphocyte % 9.9 %      Monocyte % 5.1 %      Eosinophil % 2.7 %      Basophil % 0.4 %      Immature Grans % 0.5 %      Neutrophils, Absolute 10.95 10*3/mm3      Lymphocytes, Absolute 1.34 10*3/mm3      Monocytes, Absolute 0.69 10*3/mm3      Eosinophils, Absolute 0.37 10*3/mm3      Basophils, Absolute 0.06 10*3/mm3      Immature Grans, Absolute 0.07 10*3/mm3      nRBC 0.2 /100 WBC              Imaging Results (Last 24 Hours)       Procedure Component Value Units Date/Time    CT Abdomen Pelvis Without Contrast [[id number]] Collected: 11/03/24 2206     Updated: 11/03/24 2214    Narrative:      CT ABDOMEN PELVIS WO CONTRAST    Date of Exam: 11/3/2024 8:56 PM EST    Indication: bilateral lower quad pain.    Comparison: CT abdomen pelvis 9/14/2024    Technique: Axial CT images were obtained of the abdomen and pelvis without the administration of contrast. Sagittal and coronal reconstructions were performed.  Automated exposure control and iterative reconstruction methods were used.      Findings:  Lung Bases:     The heart is enlarged. There is mild mitral valve annular calcification. There is mild coronary artery calcific atherosclerosis. There is mild lingular scarring.    Liver:  Postoperative changes from cholecystectomy are seen with intra and extrahepatic biliary ductal prominence consistent with reservoir effect.    Biliary/Gallbladder:    The gallbladder is normal  without evidence of radiopaque stones. The biliary tree is nondilated.    Spleen:  Spleen is normal in size and CT density.    Pancreas:    Pancreas is normal. There is no evidence of pancreatic mass or peripancreatic fluid.    Kidneys:    There is marked cortical thinning of the left kidney. There are changes from right nephrectomy.    Adrenals:    There is a small fat-containing right adrenal lesion. There is prominence of the left adrenal gland unchanged and likely from adenoma.    Retroperitoneal/Lymph Nodes/Vasculature:    No retroperitoneal adenopathy is identified. There is heavy calcific atherosclerosis of the abdominal aorta and branch vascular structures.    Gastrointestinal/Mesentery:    There are postoperative changes of the stomach with diffuse wall thickening identified. Surgical changes are noted distally of the stomach from partial gastrectomy. There is no extensive gastric distention. Mild small bowel distention is seen less   significant than on prior study. There are irregular areas along the anterior abdominal wall from previous surgery sites with partial small bowel herniation without changes of obstruction. There is a moderate stool burden suggestive of constipation.   There is diverticulosis without evidence of diverticulitis.    Bladder:    The bladder is normal.    Genital:     Unremarkable          Bony Structures:     Visualized bony structures are consistent with the patient's age.        Impression:      Impression:  1.Postoperative changes from partial gastrectomy. There is diffuse gastric wall thickening.  2.There is mild small bowel distention less than on prior study.  3.There is a moderate stool burden suggestive of constipation.  4.There is diverticulosis without diverticulitis.        Electronically Signed: Volodymyr Ricardo MD    11/3/2024 10:12 PM EST    Workstation ID: [id number]    XR Chest 1 View [[id number]] Collected: 11/03/24 1839     Updated: 11/03/24 1842    Narrative:      XR  CHEST 1 VW    Date of Exam: 11/3/2024 6:20 PM EST    Indication: Chest pain    Comparison: None available.    Findings:  There are no airspace consolidations. No pleural fluid. No pneumothorax. The pulmonary vasculature appears within normal limits. There is enlargement of the cardiac silhouette. No acute osseous abnormality identified.      Impression:      Impression:  No acute pulmonary process    Enlargement of the cardiac silhouette    Electronically Signed: Jesus Chadwick MD    11/3/2024 6:40 PM EST    Workstation ID: [id number]                 I reviewed the patient's new clinical results.    Medication Review:   Scheduled Meds:sodium chloride, 10 mL, Intravenous, Q12H      Continuous Infusions:   PRN Meds:.  senna-docusate sodium **AND** polyethylene glycol **AND** bisacodyl **AND** bisacodyl    hydrALAZINE    HYDROmorphone    Magnesium Low Dose Replacement - Follow Nurse / BPA Driven Protocol    nitroglycerin    ondansetron ODT **OR** ondansetron    oxyCODONE    sodium chloride    sodium chloride     Assessment & Plan       Lower abdominal pain    Pain in the abdomen  - pt reports recurrent episodes of n/v with abdominal pain and diarrhea, occurring about every 3 weeks. Yesterday's episode followed a large meal she had eaten the night before, with foods she doesn't normally eat.  She has prior h/o PUD and is s/p partial gastrectomy in the remote past.  Compliant with low dose pantoprazole and sucralfate.  Does not take NSAID's.  CT scan shows post-operative changes of stomach with gastritis and small bowel dilatation.  Recent EGD showed antral polyp with angioectasias.  Colonoscopy had polyp and diverticulosis without diverticulitis.  Recurrent episodes of abdominal pain may be related to delayed gastric emptying vs biliary dyskinesia vs IBS vs dumping syndrome.  Will ask GI to see.  Likely needs HIDA scan.      ESRD - dialysis today  HTN - amlodipine, carvedilol  Secondary hyperparathyroidsim - calcium  acetate, Renvela,           Plan for disposition:Home soon; may be able to pursue further GI workup as outpatient.    Angelia Taylor MD  11/04/24  09:15 EST

## 2024-11-04 NOTE — CASE MANAGEMENT/SOCIAL WORK
Discharge Planning Assessment   [facility]     Patient Name: Sherry Lobato  MRN: [id number]  Today's Date: 11/4/2024    Admit Date: 11/3/2024    Plan: From home with SO.   Discharge Needs Assessment       Row Name 11/04/24 1514       Living Environment    People in Home significant other    Name(s) of People in Home SO Derek    Current Living Arrangements home    Potentially Unsafe Housing Conditions none    In the past 12 months has the electric, gas, oil, or water company threatened to shut off services in your home? No    Primary Care Provided by self    Provides Primary Care For no one    Family Caregiver if Needed significant other    Family Caregiver Names SO- Derek    Quality of Family Relationships helpful;involved;supportive    Able to Return to Prior Arrangements yes       Resource/Environmental Concerns    Resource/Environmental Concerns none    Transportation Concerns none       Transportation Needs    In the past 12 months, has lack of transportation kept you from medical appointments or from getting medications? no    In the past 12 months, has lack of transportation kept you from meetings, work, or from getting things needed for daily living? No       Food Insecurity    Within the past 12 months, you worried that your food would run out before you got the money to buy more. Never true    Within the past 12 months, the food you bought just didn't last and you didn't have money to get more. Never true       Transition Planning    Patient/Family Anticipates Transition to home with family    Patient/Family Anticipated Services at Transition none    Transportation Anticipated family or friend will provide       Discharge Needs Assessment    Readmission Within the Last 30 Days no previous admission in last 30 days    Equipment Currently Used at Home bp cuff    Concerns to be Addressed discharge planning    Anticipated Changes Related to Illness none    Equipment Needed After Discharge none                    Discharge Plan       Row Name 11/04/24 1515       Plan    Plan From home with SO.    Patient/Family in Agreement with Plan yes    Plan Comments Patient lives at home with her [place]. Patient can drive and her SO will transport at dc. Patient can do ADL. PCP ([facility]) and pharmacy ([facility]) confirmed. Patient declines [facility]B. Denies financial assisstance needs with medications and/or food. Denies PT and/or HH needs. Discharge barriers: GI following, 11/4 NM hida scan, incerased CRE, nephrology following.                  Continued Care and Services - Admitted Since 11/3/2024    No active coordination exists for this encounter.       Expected Discharge Date and Time       Expected Discharge Date Expected Discharge Time    Nov 5, 2024            Demographic Summary       Row Name 11/04/24 1514       General Information    Admission Type observation    Arrived From emergency department    Required Notices Provided Observation Status Notice    Referral Source admission list    Reason for Consult discharge planning    Preferred Language English       Contact Information    Permission Granted to Share Info With                    Functional Status       Row Name 11/04/24 1514       Functional Status    Usual Activity Tolerance moderate    Current Activity Tolerance moderate       Functional Status, IADL    Medications independent    Meal Preparation independent    Housekeeping independent    Laundry independent    Shopping independent                Patient Forms       Row Name 11/04/24 1518       Patient Forms    Important Message from Medicare (IMM) Delivered  MOON given by reg.    Patient Observation Letter Delivered  MORTON given by reg.                  Met with patient in room wearing PPE: mask.    Maintained distance greater than six feet and spent less than 15 minutes in the room.       Julee Sterling RN

## 2024-11-04 NOTE — CONSULTS
[facility] CONSULT NOTE    Referring Provider: Cindy Santiago APRN   Reason for Consultation: End-stage renal disease    Chief complaint.  Abdominal pain, nausea    History of present illness: Patient is 74 years old female with history of cancer renal disease, hypertension, COPD, diabetes, presented to the hospital lower abdominal pain and nausea yesterday.  Patient had some but it is resolved now.  Denied any fever, chills, hematuria, dysuria cough, expectoration, orthopnea, melena, hematochezia.    History  Past Medical History:   Diagnosis Date    Lucas's esophagus     C. difficile colitis     Carotid artery disease     -50-74% left, 16-49% right (1/19)    COPD (chronic obstructive pulmonary disease)     DM2 (diabetes mellitus, type 2)     ESRD (end stage renal disease)     on HD    Gastric ulcer     at anastomatic site    Gastroparesis     unknown    GERD (gastroesophageal reflux disease)     Gout     not current    Hemodialysis patient     mwf    Hernia, incisional     abdomen    History of colonoscopy     UTD    History of degenerative disc disease     Hyperlipidemia     Hypertension     Medicare annual wellness visit, subsequent 11/07/2020    Microscopic colitis     Nephrotic syndrome     Neuroendocrine tumor     of stomach    FABY (obstructive sleep apnea)     not treating    Pedal edema     ressolved    Proteinuria     Renal cell cancer, right     Rib pain on right side     chronic    Rotator cuff tear     bilateral    Stomach cancer 2016    Stroke     Uterine cancer     Vitamin B 12 deficiency     Vitamin D deficiency      Past Surgical History:   Procedure Laterality Date    APPENDECTOMY      ARTERIOVENOUS FISTULA Right     ARTERIOVENOUS FISTULA REPAIR      Clotted off and insert graft    ARTERIOVENOUS FISTULA/SHUNT SURGERY Left 12/30/2020    Procedure: ARTERIOVENOUS FISTULA FORMATION;  Surgeon: Jan Caicedo MD;  Location: [facility] OR;  Service: Vascular;  Laterality: Left;     ARTERIOVENOUS FISTULA/SHUNT SURGERY Left 4/23/2024    Procedure: FISTULOGRAM WITH SUBCLAVIAN ARTERY ANGIOPLASTY, fistula banding;  Surgeon: Franki Delgadillo II, MD;  Location: [facility] HYBRID OR;  Service: Vascular;  Laterality: Left;    BREAST BIOPSY      right nipple 1981    BRONCHOSCOPY N/A 6/11/2024    Procedure: BRONCHOSCOPY WITH RIGHT LUNG WASHING;  Surgeon: Aramis Barragan MD;  Location: [facility] ENDOSCOPY;  Service: Pulmonary;  Laterality: N/A;  RML atelectasis    COLONOSCOPY N/A 11/24/2020    Procedure: COLONOSCOPY with polypectomy x 7;  Surgeon: Jeffery White MD;  Location: [facility] ENDOSCOPY;  Service: Gastroenterology;  Laterality: N/A;  post op: polyps, diverticulosis, hemorrhoids    COLONOSCOPY N/A 6/1/2023    Procedure: COLONOSCOPY with polypectomy x 3 biopsy x 1;  Surgeon: Jeffery White MD;  Location: [facility] ENDOSCOPY;  Service: Gastroenterology;  Laterality: N/A;  diverticulosis polyps    COLONOSCOPY N/A 9/4/2024    Procedure: COLONOSCOPY WITH POLYPECTOMY X 1;  Surgeon: Jeffery White MD;  Location: [facility] ENDOSCOPY;  Service: Gastroenterology;  Laterality: N/A;  POST- POLYP, DIVERTICULOSIS, INTERNAL AND EXTERNAL HEMORRHOIDS    ENDOSCOPY N/A 03/12/2022    Procedure: ESOPHAGOGASTRODUODENOSCOPY with biopsy x 1 area;  Surgeon: Javan Augustin MD;  Location: [facility] ENDOSCOPY;  Service: Gastroenterology;  Laterality: N/A;  post op: anastamosis    ENDOSCOPY N/A 10/9/2023    Procedure: ESOPHAGOGASTRODUODENOSCOPY with biopsy x3 areas;  Surgeon: Ace Campbell MD;  Location: [facility] ENDOSCOPY;  Service: Gastroenterology;  Laterality: N/A;  duodenal ulcers;irregular z line    ENDOSCOPY N/A 8/27/2024    Procedure: ESOPHAGOGASTRODUODENOSCOPY with argon plasma coagulation;  Surgeon: HILARIO Blackmon MD;  Location: [facility] ENDOSCOPY;  Service: Gastroenterology;  Laterality: N/A;  post op: AVM    ENTEROSCOPY SMALL BOWEL N/A 9/4/2024    Procedure: ESOPHAGOGASTRODUODENOSCOPY WITH SMALL BOWEL ENTEROSCOPY, ARGON  PLASMA COAGULATION AND ENDOSCOPIC CLIPPING X 2 OF GASTRIC ANTRAL VASCULAR ECTASIA;  Surgeon: Jeffery White MD;  Location: [facility] ENDOSCOPY;  Service: Gastroenterology;  Laterality: N/A;  POST- GAVE    GASTRIC RESECTION      cancer    HYSTERECTOMY      LAPAROSCOPIC CHOLECYSTECTOMY      NEPHRECTOMY      right kidney removed     REDUCTION MAMMAPLASTY      TUMOR REMOVAL      boils    VENTRAL/INCISIONAL HERNIA REPAIR Right 08/28/2019    Procedure: VENTRAL/INCISIONAL HERNIA REPAIR;  Surgeon: Nilay Stevens MD;  Location: [facility] MAIN OR;  Service: General    VENTRAL/INCISIONAL HERNIA REPAIR N/A 10/01/2019    Procedure: OPEN VENTRAL/INCISIONAL HERNIA REPAIR;  Surgeon: Nilay Stevens MD;  Location: [facility] MAIN OR;  Service: General    VENTRAL/INCISIONAL HERNIA REPAIR N/A 10/11/2021    Procedure: VENTRAL/INCISIONAL HERNIA REPAIR LAPAROSCOPIC;  Surgeon: Nilay Stevens MD;  Location: [facility] MAIN OR;  Service: General;  Laterality: N/A;    VENTRAL/INCISIONAL HERNIA REPAIR N/A 01/23/2023    Procedure: VENTRAL/INCISIONAL HERNIA REPAIR with MESH;  Surgeon: Nilay Stevens MD;  Location: [facility] MAIN OR;  Service: General;  Laterality: N/A;     Social History     Tobacco Use    Smoking status: Former     Current packs/day: 0.00     Average packs/day: 0.3 packs/day for 57.0 years (14.3 ttl pk-yrs)     Types: Cigarettes     Start date: 5/1/1964     Quit date: 5/1/2021     Years since quitting: 3.5     Passive exposure: Past    Smokeless tobacco: Never    Tobacco comments:     quit smoking 2 months ago   Vaping Use    Vaping status: Never Used   Substance Use Topics    Alcohol use: No    Drug use: Never     Family History   Problem Relation Age of Onset    Heart disease Father        Review of Systems  ROS  As per HPI  Objective     Vital Signs  Temp:  [97.9 °F (36.6 °C)-98.6 °F (37 °C)] 98.4 °F (36.9 °C)  Heart Rate:  [80-96] 80  Resp:  [12-18] 13  BP: (119-183)/(64-84) 123/73    No intake/output data recorded.  I/O last 3  completed shifts:  In: 600 [I.V.:100; IV Piggyback:500]  Out: -     Physical Exam:  Physical Exam    General Appearance: alert, oriented x 3, no acute distress   Skin: warm and dry  HEENT: oral mucosa normal, nonicteric sclera  Neck: supple, no JVD  Lungs: CTA  Heart: RRR, normal S1 and S2  Abdomen: soft, nontender, nondistended  : no palpable bladder  Extremities: no edema, cyanosis or clubbing  Neuro: normal speech and mental status     Results Review:   I reviewed the patient's new clinical results.    Lab Results   Component Value Date    CALCIUM 9.0 11/04/2024    PHOS 4.5 10/22/2024     Results from last 7 days   Lab Units 11/04/24  0331 11/03/24  1818 11/03/24  1818 10/30/24  0000   MAGNESIUM mg/dL 2.6*  --  1.5*  --    SODIUM mmol/L 140  --  141  --    POTASSIUM mmol/L 4.5  --  4.9 4.5   CHLORIDE mmol/L 105  --  104  --    CO2 mmol/L 18.6*  --  18.6*  --    BUN mg/dL 51*  --  50*  --    CREATININE mg/dL 7.54*  --  7.39*  --    GLUCOSE mg/dL 86   < > 107*  --    CALCIUM mg/dL 9.0  --  8.9  --    WBC 10*3/mm3  --   --  13.48*  --    HEMOGLOBIN g/dL  --   --  11.0* 11*  33*   PLATELETS 10*3/mm3  --   --  367  --    ALT (SGPT) U/L 14  --  20  --    AST (SGOT) U/L 20  --  26  --     < > = values in this interval not displayed.     Lab Results   Component Value Date    TROPONINT 54 (C) 11/03/2024     Estimated Creatinine Clearance: 5.5 mL/min (A) (by C-G formula based on SCr of 7.54 mg/dL (H)).  Lab Results   Component Value Date    URICACID 4.7 07/10/2018       Brief Urine Lab Results       None            Prior to Admission medications    Medication Sig Start Date End Date Taking? Authorizing Provider   albuterol sulfate  (90 Base) MCG/ACT inhaler Inhale 2 puffs Every 4 (Four) Hours As Needed for Wheezing.   Yes Provider, Historical, MD   amLODIPine (NORVASC) 5 MG tablet Take 1 tablet by mouth Daily.   Yes Provider, Historical, MD   calcium acetate (PHOS BINDER,) 667 MG capsule capsule Take 1 capsule by  mouth 2 (Two) Times a Day.   Yes Danica Murphy MD   carvedilol (COREG) 3.125 MG tablet Take 1 tablet by mouth 2 (Two) Times a Day With Meals.   Yes Danica Murphy MD   Cholecalciferol 25 MCG (1000 UT) tablet Take 1 tablet by mouth Daily.   Yes Danica Murphy MD   lidocaine-prilocaine (EMLA) 2.5-2.5 % cream Apply 1 Application topically to the appropriate area as directed Daily As Needed (severe pain, left 5th finger). Apply to left 5th finger 4/16/24  Yes Franki Delgadillo II, MD   multivitamin (MULTI-VITAMIN PO) Take 1 tablet by mouth Daily.   Yes Danica Murphy MD   pantoprazole (PROTONIX) 20 MG EC tablet Take 1 tablet by mouth Daily.   Yes Danica Murphy MD   sevelamer (RENAGEL) 800 MG tablet Take 1 tablet by mouth 2 (Two) Times a Day With Meals.   Yes Danica Murphy MD   sodium bicarbonate 650 MG tablet Take 2 tablets by mouth 2 (Two) Times a Day.   Yes Danica Murphy MD   sucralfate (CARAFATE) 1 g tablet Take 1 tablet by mouth 4 (Four) Times a Day As Needed.   Yes Danica Murphy MD   temazepam (RESTORIL) 30 MG capsule Take 1 capsule by mouth Every Night. 1/10/23  Yes Danica Murphy MD       sodium chloride, 10 mL, Intravenous, Q12H           Assessment & Plan       End-stage renal disease.  Patient gets dialysis Monday Wednesday and Friday as outpatient.  Admitted with abdominal pain which has resolved now.  Electrolytes, volume status okay  Hypertension with chronic kidney disease.  Blood pressure fairly well-controlled  Anemia and chronic kidney disease.  Hemoglobin acceptable  Abdominal pain.  Resolved    Plan:  Hemodialysis today  Okay to discharge from renal standpoint after dialysis  Surveillance labs    I discussed the patients findings and my recommendations with patient, nursing staff, and primary care team    Faustino Buckley MD  11/04/24  08:58 EST

## 2024-11-04 NOTE — PLAN OF CARE
Problem: Adult Inpatient Plan of Care  Goal: Plan of Care Review  Outcome: Not Progressing  Flowsheets (Taken 11/4/2024 1511)  Progress: no change  Plan of Care Reviewed With:   patient   spouse  Goal: Patient-Specific Goal (Individualized)  Outcome: Not Progressing  Goal: Absence of Hospital-Acquired Illness or Injury  Outcome: Not Progressing  Intervention: Identify and Manage Fall Risk  Recent Flowsheet Documentation  Taken 11/4/2024 1247 by Katlin Irving RN  Safety Promotion/Fall Prevention:   assistive device/personal items within reach   clutter free environment maintained   nonskid shoes/slippers when out of bed   room organization consistent   safety round/check completed  Taken 11/4/2024 1026 by Katlin Irving RN  Safety Promotion/Fall Prevention:   assistive device/personal items within reach   clutter free environment maintained   nonskid shoes/slippers when out of bed   room organization consistent   safety round/check completed  Taken 11/4/2024 0816 by Katlin Irving RN  Safety Promotion/Fall Prevention:   assistive device/personal items within reach   clutter free environment maintained   nonskid shoes/slippers when out of bed   room organization consistent   safety round/check completed  Intervention: Prevent Skin Injury  Recent Flowsheet Documentation  Taken 11/4/2024 0816 by Katlin Irving RN  Body Position: position changed independently  Intervention: Prevent and Manage VTE (Venous Thromboembolism) Risk  Recent Flowsheet Documentation  Taken 11/4/2024 0816 by Katlin Irving RN  VTE Prevention/Management:   SCDs (sequential compression devices) off   patient refused intervention  Intervention: Prevent Infection  Recent Flowsheet Documentation  Taken 11/4/2024 1247 by Katlin Irving RN  Infection Prevention:   environmental surveillance performed   hand hygiene promoted   single patient room provided  Taken 11/4/2024 1026 by Katlin Irving RN  Infection Prevention:   environmental  surveillance performed   hand hygiene promoted   single patient room provided   visitors restricted/screened  Taken 11/4/2024 0816 by Katlin Irving RN  Infection Prevention:   environmental surveillance performed   hand hygiene promoted   single patient room provided   visitors restricted/screened  Goal: Optimal Comfort and Wellbeing  Outcome: Not Progressing  Intervention: Provide Person-Centered Care  Recent Flowsheet Documentation  Taken 11/4/2024 0816 by Katlin Irving RN  Trust Relationship/Rapport:   care explained   choices provided  Goal: Readiness for Transition of Care  Outcome: Not Progressing  Intervention: Mutually Develop Transition Plan  Recent Flowsheet Documentation  Taken 11/4/2024 1057 by Katlin Irving RN  Equipment Needed After Discharge: none  Equipment Currently Used at Home: none  Anticipated Changes Related to Illness: none  Transportation Anticipated: family or friend will provide  Transportation Concerns: none  Current Discharge Risk: chronically ill  Concerns to be Addressed: denies needs/concerns at this time  Readmission Within the Last 30 Days: no previous admission in last 30 days  Patient/Family Anticipated Services at Transition: none  Patient/Family Anticipates Transition to: home with family     Problem: Pain Acute  Goal: Optimal Pain Control and Function  Outcome: Not Progressing  Intervention: Optimize Psychosocial Wellbeing  Recent Flowsheet Documentation  Taken 11/4/2024 0816 by Katlin Irving RN  Supportive Measures:   active listening utilized   self-care encouraged  Diversional Activities:   television   smartphone  Intervention: Prevent or Manage Pain  Recent Flowsheet Documentation  Taken 11/4/2024 1247 by Katlin Irving RN  Medication Review/Management:   medications reviewed   high-risk medications identified  Taken 11/4/2024 1026 by Katlin Irving RN  Medication Review/Management:   medications reviewed   high-risk medications identified  Taken 11/4/2024 0816 by  Hindle, Katlin, RN  Sensory Stimulation Regulation: television on  Bowel Elimination Promotion:   adequate fluid intake promoted   ambulation promoted  Sleep/Rest Enhancement: relaxation techniques promoted  Medication Review/Management:   medications reviewed   high-risk medications identified     Problem: Chronic Kidney Disease  Goal: Optimal Coping with Chronic Illness  Outcome: Not Progressing  Intervention: Support Psychosocial Response  Recent Flowsheet Documentation  Taken 11/4/2024 0816 by Katlin Irving RN  Supportive Measures:   active listening utilized   self-care encouraged  Family/Support System Care:   self-care encouraged   support provided  Goal: Electrolyte Balance  Outcome: Not Progressing  Goal: Fluid Balance  Outcome: Not Progressing  Goal: Optimal Functional Ability  Outcome: Not Progressing  Intervention: Optimize Functional Ability  Recent Flowsheet Documentation  Taken 11/4/2024 0816 by Katlin Irving RN  Activity Management: activity encouraged  Self-Care Promotion:   independence encouraged   BADL personal objects within reach  Goal: Absence of Anemia Signs and Symptoms  Outcome: Not Progressing  Intervention: Manage Signs of Anemia and Bleeding  Recent Flowsheet Documentation  Taken 11/4/2024 0816 by Katlin Irving RN  Environmental Support: calm environment promoted  Bleeding Precautions: monitored for signs of bleeding  Goal: Optimal Oral Intake  Outcome: Not Progressing  Goal: Acceptable Pain Control  Outcome: Not Progressing  Intervention: Prevent or Manage Pain  Recent Flowsheet Documentation  Taken 11/4/2024 0816 by Katlin Irving RN  Sleep/Rest Enhancement: relaxation techniques promoted  Goal: Minimize Renal Failure Effects  Outcome: Not Progressing  Intervention: Monitor and Support Renal Function  Recent Flowsheet Documentation  Taken 11/4/2024 1247 by Katlin Irving RN  Medication Review/Management:   medications reviewed   high-risk medications identified  Taken 11/4/2024  1026 by Katlin Irving, RN  Medication Review/Management:   medications reviewed   high-risk medications identified  Taken 11/4/2024 0816 by Katlin Irving, RN  Medication Review/Management:   medications reviewed   high-risk medications identified   Goal Outcome Evaluation:  Plan of Care Reviewed With: patient, spouse        Progress: no change     Alert and oriented x 4. Able to verbalize needs and wants. Takes medication whole and tolerates well. Does not produce urine due to being on hemodialysis. Unable to have HIDA scan d/t not having gallbladder. No c/o pain/discomfort/SOB noted. Does not require O2 therapy at this time. No s/s of hyper/hypoglycemia noted. Currently off unit. From home. Nephrology has signed off at this time. Continues to be followed by gastroenterology.

## 2024-11-04 NOTE — OUTREACH NOTE
Medical Week 2 Survey      Flowsheet Row Responses   [facility] facility patient discharged from? [facility]   Does the patient have one of the following disease processes/diagnoses(primary or secondary)? Other   Week 2 attempt successful? No   Unsuccessful attempts Attempt 2   Revoke Readmitted            Helena HINDS - Registered Nurse

## 2024-11-04 NOTE — CONSULTS
GI CONSULT  NOTE:    Referring Provider:  Dr. Taylor    Chief complaint: Abdominal pain    Subjective .     History of present illness: Sherry Lobato is a 74 y.o. 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  The patient is well-known to our practice and was recently seen during hospital admission in 9/2024 for similar complaints.  At that time, the patient did undergo EGD/colonoscopy with results below.  She states that following endoscopy her abdominal pain resolved for approximately 1 month, but then returned.  She states that her current lower abdominal pain is sharp in nature and feels similar to pain experienced in 9/2024.  She is unable to identify exacerbating or alleviating factors.  No nausea/vomiting, heartburn, or dysphagia.  Reports regular bowel movements 4-5 times daily which is normal for her.  States that stool is typically loose.  No bright red blood per rectum or melena.  No recent fever.  Does report a minimal amount of recent weight loss.      Endo History:  9/2024 EGD/colonoscopy (Dr. White) -slowly oozing antral polyp with AVMs s/p APC and clip x 3, Billroth II anatomy, TA polyp, diverticulosis, grade 2 internal/external hemorrhoids  8/27/2024 EGD with Dr. Blackmon shows small angiectasia in the stomach body with bleeding/oozing bright red blood treated with APC.  1 small gastric polyp in the stomach body s/p APC.  Gastric bypass/Billroth II anatomy, normal-appearing mucosa at anastomosis no ulceration.  Nodular/granular mucosa throughout the stomach body without significant erythema.  Normal mucosa in the proximal duodenum at least 20 to 30 cm beyond the anastomosis, no ulceration seen.  Oct 2023 EGD (TORI)  BII, duodenal ulcers  June 2023 colonooscopy polyps, tics, g2 hemorrhoids  3/12/2022 EGD (Dr. Augustin) Billroth II anastomosis with exploration of both efferent & afferent limbs showing no recurrence of carcinoid tumor status post Whipple resection. Bile gastritis. Duodenal biopsy showed acute duodenitis with focal ulceration and reactive villi. Negative celiac disease. No residual carcinoid tumor identified.  11/2020 Colonoscopy  (Dr. White) - polyps (TA), moderate diverticulosis. RECALL 2023 2/2019 EGD by Dr. White - evidence of prior partial gastrectomy, and large gastric fold, 2 anastomotic ulcers which appear chronic but nonbleeding with biopsy showing acute and chronic inflamed duodenal type mucosa and negative for sprue    Past Medical History:  Past Medical History:   Diagnosis Date    Lucas's esophagus     C. difficile colitis     Carotid artery disease     -50-74% left, 16-49% right (1/19)    COPD (chronic obstructive pulmonary disease)     DM2 (diabetes mellitus, type 2)     ESRD (end stage renal disease)     on HD    Gastric ulcer     at anastomatic site    Gastroparesis     unknown    GERD (gastroesophageal reflux disease)     Gout     not current    Hemodialysis patient     mwf    Hernia, incisional     abdomen    History of colonoscopy     UTD    History of degenerative disc disease     Hyperlipidemia     Hypertension     Medicare annual wellness visit, subsequent 11/07/2020    Microscopic colitis     Nephrotic syndrome     Neuroendocrine tumor     of stomach    FABY (obstructive sleep apnea)     not treating    Pedal edema     ressolved    Proteinuria     Renal cell cancer, right     Rib pain on right side     chronic    Rotator cuff tear     bilateral    Stomach cancer 2016    Stroke     Uterine cancer     Vitamin B 12 deficiency     Vitamin D deficiency        Past Surgical History:  Past Surgical History:   Procedure Laterality Date    APPENDECTOMY      ARTERIOVENOUS FISTULA Right      ARTERIOVENOUS FISTULA REPAIR      Clotted off and insert graft    ARTERIOVENOUS FISTULA/SHUNT SURGERY Left 12/30/2020    Procedure: ARTERIOVENOUS FISTULA FORMATION;  Surgeon: Jan Caicedo MD;  Location: [facility] MAIN OR;  Service: Vascular;  Laterality: Left;    ARTERIOVENOUS FISTULA/SHUNT SURGERY Left 4/23/2024    Procedure: FISTULOGRAM WITH SUBCLAVIAN ARTERY ANGIOPLASTY, fistula banding;  Surgeon: Franki Delgadillo II, MD;  Location: [facility] HYBRID OR;  Service: Vascular;  Laterality: Left;    BREAST BIOPSY      right nipple 1981    BRONCHOSCOPY N/A 6/11/2024    Procedure: BRONCHOSCOPY WITH RIGHT LUNG WASHING;  Surgeon: Aramis Barragan MD;  Location: [facility] ENDOSCOPY;  Service: Pulmonary;  Laterality: N/A;  RML atelectasis    COLONOSCOPY N/A 11/24/2020    Procedure: COLONOSCOPY with polypectomy x 7;  Surgeon: Jeffery White MD;  Location: [facility] ENDOSCOPY;  Service: Gastroenterology;  Laterality: N/A;  post op: polyps, diverticulosis, hemorrhoids    COLONOSCOPY N/A 6/1/2023    Procedure: COLONOSCOPY with polypectomy x 3 biopsy x 1;  Surgeon: Jeffery White MD;  Location: [facility] ENDOSCOPY;  Service: Gastroenterology;  Laterality: N/A;  diverticulosis polyps    COLONOSCOPY N/A 9/4/2024    Procedure: COLONOSCOPY WITH POLYPECTOMY X 1;  Surgeon: Jeffery White MD;  Location: [facility] ENDOSCOPY;  Service: Gastroenterology;  Laterality: N/A;  POST- POLYP, DIVERTICULOSIS, INTERNAL AND EXTERNAL HEMORRHOIDS    ENDOSCOPY N/A 03/12/2022    Procedure: ESOPHAGOGASTRODUODENOSCOPY with biopsy x 1 area;  Surgeon: Javan Augustni MD;  Location: [facility] ENDOSCOPY;  Service: Gastroenterology;  Laterality: N/A;  post op: anastamosis    ENDOSCOPY N/A 10/9/2023    Procedure: ESOPHAGOGASTRODUODENOSCOPY with biopsy x3 areas;  Surgeon: Ace Campbell MD;  Location: [facility] ENDOSCOPY;  Service: Gastroenterology;  Laterality: N/A;  duodenal ulcers;irregular z line    ENDOSCOPY N/A 8/27/2024    Procedure: ESOPHAGOGASTRODUODENOSCOPY with  argon plasma coagulation;  Surgeon: HILARIO Blackmon MD;  Location: [facility] ENDOSCOPY;  Service: Gastroenterology;  Laterality: N/A;  post op: AVM    ENTEROSCOPY SMALL BOWEL N/A 9/4/2024    Procedure: ESOPHAGOGASTRODUODENOSCOPY WITH SMALL BOWEL ENTEROSCOPY, ARGON PLASMA COAGULATION AND ENDOSCOPIC CLIPPING X 2 OF GASTRIC ANTRAL VASCULAR ECTASIA;  Surgeon: Jeffery White MD;  Location: [facility] ENDOSCOPY;  Service: Gastroenterology;  Laterality: N/A;  POST- GAVE    GASTRIC RESECTION      cancer    HYSTERECTOMY      LAPAROSCOPIC CHOLECYSTECTOMY      NEPHRECTOMY      right kidney removed     REDUCTION MAMMAPLASTY      TUMOR REMOVAL      boils    VENTRAL/INCISIONAL HERNIA REPAIR Right 08/28/2019    Procedure: VENTRAL/INCISIONAL HERNIA REPAIR;  Surgeon: Nilay Stevens MD;  Location: [facility] MAIN OR;  Service: General    VENTRAL/INCISIONAL HERNIA REPAIR N/A 10/01/2019    Procedure: OPEN VENTRAL/INCISIONAL HERNIA REPAIR;  Surgeon: Nilay Stevens MD;  Location: [facility] MAIN OR;  Service: General    VENTRAL/INCISIONAL HERNIA REPAIR N/A 10/11/2021    Procedure: VENTRAL/INCISIONAL HERNIA REPAIR LAPAROSCOPIC;  Surgeon: Nilay Stevens MD;  Location: [facility] MAIN OR;  Service: General;  Laterality: N/A;    VENTRAL/INCISIONAL HERNIA REPAIR N/A 01/23/2023    Procedure: VENTRAL/INCISIONAL HERNIA REPAIR with MESH;  Surgeon: Nilay Stevens MD;  Location: [facility] MAIN OR;  Service: General;  Laterality: N/A;       Social History:  Social History     Tobacco Use    Smoking status: Former     Current packs/day: 0.00     Average packs/day: 0.3 packs/day for 57.0 years (14.3 ttl pk-yrs)     Types: Cigarettes     Start date: 5/1/1964     Quit date: 5/1/2021     Years since quitting: 3.5     Passive exposure: Past    Smokeless tobacco: Never    Tobacco comments:     quit smoking 2 months ago   Vaping Use    Vaping status: Never Used   Substance Use Topics    Alcohol use: No    Drug use: Never       Family History:  Family History    Problem Relation Age of Onset    Heart disease Father        Medications:  Medications Prior to Admission   Medication Sig Dispense Refill Last Dose/Taking    albuterol sulfate  (90 Base) MCG/ACT inhaler Inhale 2 puffs Every 4 (Four) Hours As Needed for Wheezing.   11/3/2024    amLODIPine (NORVASC) 5 MG tablet Take 1 tablet by mouth Daily.   11/3/2024 at  6:30 AM    calcium acetate (PHOS BINDER,) 667 MG capsule capsule Take 1 capsule by mouth 2 (Two) Times a Day.   11/3/2024 at  6:30 AM    carvedilol (COREG) 3.125 MG tablet Take 1 tablet by mouth 2 (Two) Times a Day With Meals.   11/3/2024 at  6:30 AM    Cholecalciferol 25 MCG (1000 UT) tablet Take 1 tablet by mouth Daily.   11/3/2024 at  6:30 AM    lidocaine-prilocaine (EMLA) 2.5-2.5 % cream Apply 1 Application topically to the appropriate area as directed Daily As Needed (severe pain, left 5th finger). Apply to left 5th finger 5800 g 4 Taking As Needed    multivitamin (MULTI-VITAMIN PO) Take 1 tablet by mouth Daily.   11/3/2024 at  6:30 AM    pantoprazole (PROTONIX) 20 MG EC tablet Take 1 tablet by mouth Daily.   11/3/2024 at  6:30 AM    sevelamer (RENAGEL) 800 MG tablet Take 1 tablet by mouth 2 (Two) Times a Day With Meals.   11/3/2024 at  6:30 AM    sodium bicarbonate 650 MG tablet Take 2 tablets by mouth 2 (Two) Times a Day.   11/3/2024 at  6:30 AM    sucralfate (CARAFATE) 1 g tablet Take 1 tablet by mouth 4 (Four) Times a Day As Needed.   11/3/2024 at  6:30 AM    temazepam (RESTORIL) 30 MG capsule Take 1 capsule by mouth Every Night.   11/2/2024       Scheduled Meds:amLODIPine, 5 mg, Oral, Daily  calcium acetate, 667 mg, Oral, BID With Meals  carvedilol, 3.125 mg, Oral, BID With Meals  pantoprazole, 40 mg, Oral, QAM AC  sevelamer, 800 mg, Oral, BID With Meals  sodium bicarbonate, 1,300 mg, Oral, BID  sodium chloride, 10 mL, Intravenous, Q12H  sucralfate, 1 g, Oral, 4x Daily AC & at Bedtime  temazepam, 30 mg, Oral, Nightly      Continuous Infusions:    PRN Meds:.  senna-docusate sodium **AND** polyethylene glycol **AND** bisacodyl **AND** bisacodyl    hydrALAZINE    HYDROmorphone    hyoscyamine    Magnesium Low Dose Replacement - Follow Nurse / BPA Driven Protocol    nitroglycerin    ondansetron ODT **OR** ondansetron    oxyCODONE    sodium chloride    sodium chloride    ALLERGIES:  Atorvastatin calcium, Gabapentin, and Niacin    ROS:  The following systems were reviewed;   Constitution:  No fevers, no chills, unintentional weight loss  Skin: no rash, no jaundice  Eyes:  No blurry vision, no eye pain  HENT:  No change in hearing or smell  Resp:  No dyspnea or cough  CV:  No chest pain or palpitations  :  No dysuria, hematuria  Musculoskeletal:  No leg cramps or arthralgias  Neuro:  No tremor, no numbness  Psych:  No depression or confusion    Objective     Vital Signs:   Vitals:    11/04/24 0100 11/04/24 0451 11/04/24 0845 11/04/24 1254   BP: 119/64  123/73 122/65   BP Location: Right leg  Right leg Left leg   Patient Position: Lying  Sitting Lying   Pulse:   80 81   Resp: 12 14 13 15   Temp: 97.9 °F (36.6 °C) 98 °F (36.7 °C) 98.4 °F (36.9 °C)    TempSrc: Oral  Oral    SpO2: 98%   93%   Weight:       Height:           Physical Exam:       General Appearance:    Awake and alert, in no acute distress   Head:    Normocephalic, without obvious abnormality, atraumatic   Throat:   No oral lesions, no thrush, oral mucosa moist   Lungs:     Respirations regular, even and unlabored   Chest Wall:    No abnormalities observed   Abdomen:     Soft, lower abdominal tenderness, no rebound or guarding, non-distended   Rectal:     Deferred   Extremities:   Moves all extremities, no edema, no cyanosis   Pulses:   Pulses palpable and equal bilaterally   Skin:   No rash, no jaundice, normal palpation   Lymph nodes:   No cervical, supraclavicular or submandibular palpable adenopathy   Neurologic:   Cranial nerves 2 - 12 grossly intact, no asterixis       Results Review:   I  "reviewed the patient's labs and imaging.  CBC    Results from last 7 days   Lab Units 11/03/24  1818 10/30/24  0000   WBC 10*3/mm3 13.48*  --    HEMOGLOBIN g/dL 11.0* 11*  33*   PLATELETS 10*3/mm3 367  --      CMP   Results from last 7 days   Lab Units 11/04/24  0331 11/03/24  1818 10/30/24  0000   SODIUM mmol/L 140 141  --    POTASSIUM mmol/L 4.5 4.9 4.5   CHLORIDE mmol/L 105 104  --    CO2 mmol/L 18.6* 18.6*  --    BUN mg/dL 51* 50*  --    CREATININE mg/dL 7.54* 7.39*  --    GLUCOSE mg/dL 86 107*  --    ALBUMIN g/dL 3.7 3.8  --    BILIRUBIN mg/dL 0.2 0.2  --    ALK PHOS U/L 130* 131*  --    AST (SGOT) U/L 20 26  --    ALT (SGPT) U/L 14 20  --    MAGNESIUM mg/dL 2.6* 1.5*  --    LIPASE U/L  --  21  --      Cr Clearance Estimated Creatinine Clearance: 5.5 mL/min (A) (by C-G formula based on SCr of 7.54 mg/dL (H)).  Coag   Results from last 7 days   Lab Units 11/03/24 1818   INR  1.00     HbA1C   Lab Results   Component Value Date    HGBA1C 5.86 (H) 08/26/2024    HGBA1C 5.5 11/07/2020    HGBA1C 6.2 (H) 03/28/2020     Blood Glucose No results found for: \"POCGLU\"  Infection     UA      Imaging Results (Last 72 Hours)       Procedure Component Value Units Date/Time    CT Abdomen Pelvis Without Contrast [[id number]] Collected: 11/03/24 2206     Updated: 11/04/24 1331    Addenda:        ADDENDUM #1  ADDENDUM: The original report, in the body of report, states \"gallbladder   is normal without evidence of radiopaque stones. The biliary tree is   nondilated\". I believe the statement was an auto- populated statement   which was not modified.  However, the report also states \"postoperative changes from   cholecystectomy are seen with intrahepatic and extra hepatic biliary   ductal prominence consistent with reservoir effect.\". This latter   statement is thought to be it does appear the patient has   had prior cholecystectomy.  Upon review of the patient's EPIC medical record, the patient has a   documented history of " laparoscopic cholecystectomy.    Electronically Signed: Florinda Davidson MD    11/4/2024 1:29 PM EST    Workstation ID: [id number]  ORIGINAL REPORT:  CT ABDOMEN PELVIS WO CONTRAST    Date of Exam: 11/3/2024 8:56 PM EST    Indication: bilateral lower quad pain.    Comparison: CT abdomen pelvis 9/14/2024    Technique: Axial CT images were obtained of the abdomen and pelvis without   the administration of contrast. Sagittal and coronal reconstructions were   performed.  Automated exposure control and iterative reconstruction   methods were used.      Findings:  Lung Bases:     The heart is enlarged. There is mild mitral valve annular calcification.   There is mild coronary artery calcific atherosclerosis. There is mild   lingular scarring.    Liver:  Postoperative changes from cholecystectomy are seen with intra and   extrahepatic biliary ductal prominence consistent with reservoir effect.    Biliary/Gallbladder:    The gallbladder is normal without evidence of radiopaque stones. The   biliary tree is nondilated.    Spleen:  Spleen is normal in size and CT density.    Pancreas:    Pancreas is normal. There is no evidence of pancreatic mass or   peripancreatic fluid.    Kidneys:    There is marked cortical thinning of the left kidney. There are changes   from right nephrectomy.    Adrenals:    There is a small fat-containing right adrenal lesion. There is prominence   of the left adrenal gland unchanged and likely from adenoma.    Retroperitoneal/Lymph Nodes/Vasculature:    No retroperitoneal adenopathy is identified. There is heavy calcific   atherosclerosis of the abdominal aorta and branch vascular structures.    Gastrointestinal/Mesentery:    There are postoperative changes of the stomach with diffuse wall   thickening identified. Surgical changes are noted distally of the stomach   from partial gastrectomy. There is no extensive gastric distention. Mild   small bowel distention is seen less   significant than on prior  study. There are irregular areas along the   anterior abdominal wall from previous surgery sites with partial small   bowel herniation without changes of obstruction. There is a moderate stool   burden suggestive of constipation.   There is diverticulosis without evidence of diverticulitis.    Bladder:    The bladder is normal.    Genital:     Unremarkable          Bony Structures:     Visualized bony structures are consistent with the patient's age.      Impression:  1.Postoperative changes from partial gastrectomy. There is diffuse gastric   wall thickening.  2.There is mild small bowel distention less than on prior study.  3.There is a moderate stool burden suggestive of constipation.  4.There is diverticulosis without diverticulitis.        Electronically Signed: Volodyymr Ricardo MD    11/3/2024 10:12 PM EST    Workstation ID: [id number]  Signed: 11/04/24 1329 by Florinda Davidson MD    Narrative:      CT ABDOMEN PELVIS WO CONTRAST    Date of Exam: 11/3/2024 8:56 PM EST    Indication: bilateral lower quad pain.    Comparison: CT abdomen pelvis 9/14/2024    Technique: Axial CT images were obtained of the abdomen and pelvis without the administration of contrast. Sagittal and coronal reconstructions were performed.  Automated exposure control and iterative reconstruction methods were used.      Findings:  Lung Bases:     The heart is enlarged. There is mild mitral valve annular calcification. There is mild coronary artery calcific atherosclerosis. There is mild lingular scarring.    Liver:  Postoperative changes from cholecystectomy are seen with intra and extrahepatic biliary ductal prominence consistent with reservoir effect.    Biliary/Gallbladder:    The gallbladder is normal without evidence of radiopaque stones. The biliary tree is nondilated.    Spleen:  Spleen is normal in size and CT density.    Pancreas:    Pancreas is normal. There is no evidence of pancreatic mass or peripancreatic fluid.    Kidneys:     There is marked cortical thinning of the left kidney. There are changes from right nephrectomy.    Adrenals:    There is a small fat-containing right adrenal lesion. There is prominence of the left adrenal gland unchanged and likely from adenoma.    Retroperitoneal/Lymph Nodes/Vasculature:    No retroperitoneal adenopathy is identified. There is heavy calcific atherosclerosis of the abdominal aorta and branch vascular structures.    Gastrointestinal/Mesentery:    There are postoperative changes of the stomach with diffuse wall thickening identified. Surgical changes are noted distally of the stomach from partial gastrectomy. There is no extensive gastric distention. Mild small bowel distention is seen less   significant than on prior study. There are irregular areas along the anterior abdominal wall from previous surgery sites with partial small bowel herniation without changes of obstruction. There is a moderate stool burden suggestive of constipation.   There is diverticulosis without evidence of diverticulitis.    Bladder:    The bladder is normal.    Genital:     Unremarkable          Bony Structures:     Visualized bony structures are consistent with the patient's age.        Impression:      Impression:  1.Postoperative changes from partial gastrectomy. There is diffuse gastric wall thickening.  2.There is mild small bowel distention less than on prior study.  3.There is a moderate stool burden suggestive of constipation.  4.There is diverticulosis without diverticulitis.        Electronically Signed: Volodymyr Ricardo MD    11/3/2024 10:12 PM EST    Workstation ID: [id number]    XR Chest 1 View [[id number]] Collected: 11/03/24 1839     Updated: 11/03/24 1842    Narrative:      XR CHEST 1 VW    Date of Exam: 11/3/2024 6:20 PM EST    Indication: Chest pain    Comparison: None available.    Findings:  There are no airspace consolidations. No pleural fluid. No pneumothorax. The pulmonary vasculature appears within  normal limits. There is enlargement of the cardiac silhouette. No acute osseous abnormality identified.      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    CT abdomen/pelvis WO on admission shows diffuse gastric wall thickening, mild small bowel distention, and moderate colonic stool burden.  Will check GI PCR and stool for C. difficile.  Start hyoscyamine as needed and monitor for improvement in abdominal pain.  Could consider a trial of amitriptyline if no improvement with hyoscyamine.  No plans for repeat endoscopy as patient just had EGD/colonoscopy in 9/2024.  HIDA scan is planned.  Await results.  Hemoglobin 11.0.  Continue to monitor H/H and transfuse as needed.  Continue PPI and Carafate.  Diet as tolerated from GI standpoint.  Supportive care.      I discussed the patients findings and my recommendations with the patient.  I will discuss case with Dr. Philip and change plan accordingly.    We appreciate the referral.    Electronically signed by MAEGAN Mena, 11/04/24, 2:22 PM EST.

## 2024-11-04 NOTE — PLAN OF CARE
Goal Outcome Evaluation:  Plan of Care Reviewed With: patient getting pain medication for her ABD pain. No complaints of chest pain since on floor. Received the Mag. Will recheck with morning labs. Patient alert and oriented x 4. Call light with in reach. Plan of care on going.        Progress: improving

## 2024-11-04 NOTE — ED PROVIDER NOTES
Subjective   History of Present Illness  74-year-old female complaining of lower abdominal pain and also chest pain starting within the last 12 hours.  The patient states she has had some shortness of breath.  She denies orthopnea or PND.  She reports no lower extremity swelling.  She denies melena hematemesis hematochezia.  Patient reports she is has a history of gastroparesis but this pain in her belly is somewhat lower than expected from that.  Patient also reports that she has had diverticular disease however this discomfort seems to be bilateral in both lower quadrants.  The patient has had no fever chills or cough.  She states that over the last couple of months she has noted a trend toward easy fatigability at this      Review of Systems   Constitutional:  Positive for fatigue. Negative for chills, fever and unexpected weight change.   Respiratory:  Positive for chest tightness and shortness of breath.    Gastrointestinal:  Positive for abdominal pain, constipation and nausea. Negative for abdominal distention, diarrhea and vomiting.   Neurological:  Positive for weakness. Negative for seizures, syncope and light-headedness.   Hematological:  Does not bruise/bleed easily.       Past Medical History:   Diagnosis Date    Lucas's esophagus     C. difficile colitis     Carotid artery disease     -50-74% left, 16-49% right (1/19)    COPD (chronic obstructive pulmonary disease)     DM2 (diabetes mellitus, type 2)     ESRD (end stage renal disease)     on HD    Gastric ulcer     at anastomatic site    Gastroparesis     unknown    GERD (gastroesophageal reflux disease)     Gout     not current    Hemodialysis patient     mwf    Hernia, incisional     abdomen    History of colonoscopy     UTD    History of degenerative disc disease     Hyperlipidemia     Hypertension     Medicare annual wellness visit, subsequent 11/07/2020    Microscopic colitis     Nephrotic syndrome     Neuroendocrine tumor     of stomach    FABY  "(obstructive sleep apnea)     not treating    Pedal edema     ressolved    Proteinuria     Renal cell cancer, right     Rib pain on right side     chronic    Rotator cuff tear     bilateral    Stomach cancer 2016    Stroke     Uterine cancer     Vitamin B 12 deficiency     Vitamin D deficiency        Allergies   Allergen Reactions    Atorvastatin Calcium Other (See Comments)     \"felt like I was on fire\"    Gabapentin Dizziness     says was unable to walk    Niacin Other (See Comments)     \"FEELS LIKE SHE IS ON FIRE\"         Past Surgical History:   Procedure Laterality Date    APPENDECTOMY      ARTERIOVENOUS FISTULA Right     ARTERIOVENOUS FISTULA REPAIR      Clotted off and insert graft    ARTERIOVENOUS FISTULA/SHUNT SURGERY Left 12/30/2020    Procedure: ARTERIOVENOUS FISTULA FORMATION;  Surgeon: Jan Caicedo MD;  Location: [facility] MAIN OR;  Service: Vascular;  Laterality: Left;    ARTERIOVENOUS FISTULA/SHUNT SURGERY Left 4/23/2024    Procedure: FISTULOGRAM WITH SUBCLAVIAN ARTERY ANGIOPLASTY, fistula banding;  Surgeon: Franki Delgadillo II, MD;  Location: [facility] HYBRID OR;  Service: Vascular;  Laterality: Left;    BREAST BIOPSY      right nipple 1981    BRONCHOSCOPY N/A 6/11/2024    Procedure: BRONCHOSCOPY WITH RIGHT LUNG WASHING;  Surgeon: Aramis Barragan MD;  Location: [facility] ENDOSCOPY;  Service: Pulmonary;  Laterality: N/A;  RML atelectasis    COLONOSCOPY N/A 11/24/2020    Procedure: COLONOSCOPY with polypectomy x 7;  Surgeon: Jeffery White MD;  Location: [facility] ENDOSCOPY;  Service: Gastroenterology;  Laterality: N/A;  post op: polyps, diverticulosis, hemorrhoids    COLONOSCOPY N/A 6/1/2023    Procedure: COLONOSCOPY with polypectomy x 3 biopsy x 1;  Surgeon: Jeffery White MD;  Location: [facility] ENDOSCOPY;  Service: Gastroenterology;  Laterality: N/A;  diverticulosis polyps    COLONOSCOPY N/A 9/4/2024    Procedure: COLONOSCOPY WITH POLYPECTOMY X 1;  Surgeon: Jeffery White MD;  Location: [facility] " ENDOSCOPY;  Service: Gastroenterology;  Laterality: N/A;  POST- POLYP, DIVERTICULOSIS, INTERNAL AND EXTERNAL HEMORRHOIDS    ENDOSCOPY N/A 03/12/2022    Procedure: ESOPHAGOGASTRODUODENOSCOPY with biopsy x 1 area;  Surgeon: Jaavn Augustin MD;  Location: [facility] ENDOSCOPY;  Service: Gastroenterology;  Laterality: N/A;  post op: anastamosis    ENDOSCOPY N/A 10/9/2023    Procedure: ESOPHAGOGASTRODUODENOSCOPY with biopsy x3 areas;  Surgeon: Ace Campbell MD;  Location: [facility] ENDOSCOPY;  Service: Gastroenterology;  Laterality: N/A;  duodenal ulcers;irregular z line    ENDOSCOPY N/A 8/27/2024    Procedure: ESOPHAGOGASTRODUODENOSCOPY with argon plasma coagulation;  Surgeon: HILARIO Blackmon MD;  Location: [facility] ENDOSCOPY;  Service: Gastroenterology;  Laterality: N/A;  post op: AVM    ENTEROSCOPY SMALL BOWEL N/A 9/4/2024    Procedure: ESOPHAGOGASTRODUODENOSCOPY WITH SMALL BOWEL ENTEROSCOPY, ARGON PLASMA COAGULATION AND ENDOSCOPIC CLIPPING X 2 OF GASTRIC ANTRAL VASCULAR ECTASIA;  Surgeon: Jeffery White MD;  Location: [facility] ENDOSCOPY;  Service: Gastroenterology;  Laterality: N/A;  POST- GAVE    GASTRIC RESECTION      cancer    HYSTERECTOMY      LAPAROSCOPIC CHOLECYSTECTOMY      NEPHRECTOMY      right kidney removed     REDUCTION MAMMAPLASTY      TUMOR REMOVAL      boils    VENTRAL/INCISIONAL HERNIA REPAIR Right 08/28/2019    Procedure: VENTRAL/INCISIONAL HERNIA REPAIR;  Surgeon: Nilay Stevens MD;  Location: [facility] MAIN OR;  Service: General    VENTRAL/INCISIONAL HERNIA REPAIR N/A 10/01/2019    Procedure: OPEN VENTRAL/INCISIONAL HERNIA REPAIR;  Surgeon: Nilay Stevens MD;  Location: [facility] MAIN OR;  Service: General    VENTRAL/INCISIONAL HERNIA REPAIR N/A 10/11/2021    Procedure: VENTRAL/INCISIONAL HERNIA REPAIR LAPAROSCOPIC;  Surgeon: Nilay Stevens MD;  Location: [facility] MAIN OR;  Service: General;  Laterality: N/A;    VENTRAL/INCISIONAL HERNIA REPAIR N/A 01/23/2023    Procedure: VENTRAL/INCISIONAL HERNIA  REPAIR with MESH;  Surgeon: Nilay Stevens MD;  Location: [facility] MAIN OR;  Service: General;  Laterality: N/A;       Family History   Problem Relation Age of Onset    Heart disease Father        Social History     Socioeconomic History    Marital status: Single   Tobacco Use    Smoking status: Former     Current packs/day: 0.00     Average packs/day: 0.3 packs/day for 57.0 years (14.3 ttl pk-yrs)     Types: Cigarettes     Start date: 5/1/1964     Quit date: 5/1/2021     Years since quitting: 3.5     Passive exposure: Past    Smokeless tobacco: Never    Tobacco comments:     quit smoking 2 months ago   Vaping Use    Vaping status: Never Used   Substance and Sexual Activity    Alcohol use: No    Drug use: Never    Sexual activity: Defer           Objective   Physical Exam  Alert Fouzia Coma Scale 15   HEENT: Pupils equal and reactive to light. Conjunctivae are not injected. Normal tympanic membranes. Oropharynx and nares are normal.   Neck: Supple. Midline trachea. No JVD. No goiter.   Chest: Clear and equal breath sounds bilaterally, regular rate and rhythm without murmur or rub.  No S3 or S4   Abdomen: Positive bowel sounds, nontender, nondistended. No rebound or peritoneal signs. No CVA tenderness.   Extremities no clubbing. cyanosis or edema. Motor sensory exam is normal. The full range of motion is intact   Skin: Warm and dry, no rashes or petechia.   Lymphatic: No regional lymphadenopathy. No calf pain, swelling or Homans sign    Procedures           ED Course                                          Labs Reviewed   COMPREHENSIVE METABOLIC PANEL - Abnormal; Notable for the following components:       Result Value    Glucose 107 (*)     BUN 50 (*)     Creatinine 7.39 (*)     CO2 18.6 (*)     Alkaline Phosphatase 131 (*)     BUN/Creatinine Ratio 6.8 (*)     Anion Gap 18.4 (*)     eGFR 5.3 (*)     All other components within normal limits    Narrative:     GFR Normal >60  Chronic Kidney Disease <60  Kidney  Failure <15    The GFR formula is only valid for adults with stable renal function between ages 18 and 70.   SINGLE HS TROPONIN T - Abnormal; Notable for the following components:    HS Troponin T 54 (*)     All other components within normal limits    Narrative:     High Sensitive Troponin T Reference Range:  <14.0 ng/L- Negative Female for AMI  <22.0 ng/L- Negative Male for AMI  >=14 - Abnormal Female indicating possible myocardial injury.  >=22 - Abnormal Male indicating possible myocardial injury.   Clinicians would have to utilize clinical acumen, EKG, Troponin, and serial changes to determine if it is an Acute Myocardial Infarction or myocardial injury due to an underlying chronic condition.        MAGNESIUM - Abnormal; Notable for the following components:    Magnesium 1.5 (*)     All other components within normal limits   CBC WITH AUTO DIFFERENTIAL - Abnormal; Notable for the following components:    WBC 13.48 (*)     RBC 3.65 (*)     Hemoglobin 11.0 (*)     MCHC 31.3 (*)     RDW 16.8 (*)     RDW-SD 58.6 (*)     Neutrophil % 81.4 (*)     Lymphocyte % 9.9 (*)     Neutrophils, Absolute 10.95 (*)     Immature Grans, Absolute 0.07 (*)     All other components within normal limits   HIGH SENSITIVITIY TROPONIN T 2HR - Abnormal; Notable for the following components:    HS Troponin T 54 (*)     All other components within normal limits    Narrative:     High Sensitive Troponin T Reference Range:  <14.0 ng/L- Negative Female for AMI  <22.0 ng/L- Negative Male for AMI  >=14 - Abnormal Female indicating possible myocardial injury.  >=22 - Abnormal Male indicating possible myocardial injury.   Clinicians would have to utilize clinical acumen, EKG, Troponin, and serial changes to determine if it is an Acute Myocardial Infarction or myocardial injury due to an underlying chronic condition.        PROTIME-INR - Normal   LIPASE - Normal   URINALYSIS W/ CULTURE IF INDICATED   MAGNESIUM   TYPE AND SCREEN   CBC AND  DIFFERENTIAL    Narrative:     The following orders were created for panel order CBC & Differential.  Procedure                               Abnormality         Status                     ---------                               -----------         ------                     CBC Auto Differential[[id number]]        Abnormal            Final result                 Please view results for these tests on the individual orders.   EXTRA TUBES    Narrative:     The following orders were created for panel order Extra Tubes.  Procedure                               Abnormality         Status                     ---------                               -----------         ------                     [facility] - SST[[id number]]                                   Final result                 Please view results for these tests on the individual orders.   [facility] - SST     .ES  CT Abdomen Pelvis Without Contrast    Result Date: 11/3/2024  Impression: 1.Postoperative changes from partial gastrectomy. There is diffuse gastric wall thickening. 2.There is mild small bowel distention less than on prior study. 3.There is a moderate stool burden suggestive of constipation. 4.There is diverticulosis without diverticulitis. Electronically Signed: Volodymyr Ricardo MD  11/3/2024 10:12 PM EST  Workstation ID: [id number]    XR Chest 1 View    Result Date: 11/3/2024  Impression: No acute pulmonary process Enlargement of the cardiac silhouette Electronically Signed: Jesus Chadwick MD  11/3/2024 6:40 PM EST  Workstation ID: [id number]          Medical Decision Making  Stable ER course.  Pain was relieved although required hydromorphone.  The patient will be observed tonight we will obtain serial troponin the patient will need dialysis in the morning.  The patient was given a dose of sorbitol for the apparent constipation.  The patient was agreeable to this plan of treat    Amount and/or Complexity of Data Reviewed  Labs: ordered.  Radiology:  ordered.  ECG/medicine tests: ordered.    Risk  OTC drugs.  Prescription drug management.        Final diagnoses:   Lower abdominal pain   Constipation, unspecified constipation type   Diverticulosis   Chronic kidney disease on chronic dialysis   Chest pain, unspecified type       ED Disposition  ED Disposition       ED Disposition   Decision to Admit    Condition   --    Comment   Level of Care: Telemetry [5]   Diagnosis: Lower abdominal pain [129996]   Admitting Physician: AMY GALINDO [[id number]]   Attending Physician: AMY GALINDO [[id number]]                 No follow-up provider specified.       Medication List      No changes were made to your prescriptions during this visit.            Jan Pulliam MD  11/03/24 [id number]

## 2024-11-04 NOTE — H&P
Patient Care Team:  Raegan Gotti MD as PCP - General (Family Medicine)  Nilay Stevens MD as Consulting Physician (General Surgery)  Stephen Perea MD as Surgeon (Thoracic Surgery)  Makayla Navarro RN as Nurse Navigator  Mak Sosa MD as Cardiologist (Cardiology)  Anjum Cam MD as Consulting Physician (Hematology and Oncology)    Chief complaint Abdomen pain    Subjective     Patient is a 74 y.o. female who presents with past medical history of renal cell carcinoma, end-stage renal disease on dialysis, hypertension, diabetes history of gastric bypass, mood disorder, secondary hyper parathyroid, anemia, and longstanding hernia.  Patient woke this am with abdomen pain and nausea that worsened as the day so she presented to the ED. Labs and abdomen of ct unremarkable     Review of Systems   Constitutional:  Positive for activity change and appetite change.   HENT: Negative.     Respiratory: Negative.     Cardiovascular: Negative.    Gastrointestinal:  Positive for abdominal pain, diarrhea and nausea. Negative for vomiting.   Genitourinary: Negative.    Musculoskeletal: Negative.    Neurological: Negative.    Psychiatric/Behavioral: Negative.            History  Past Medical History:   Diagnosis Date    Lucas's esophagus     C. difficile colitis     Carotid artery disease     -50-74% left, 16-49% right (1/19)    COPD (chronic obstructive pulmonary disease)     DM2 (diabetes mellitus, type 2)     ESRD (end stage renal disease)     on HD    Gastric ulcer     at anastomatic site    Gastroparesis     unknown    GERD (gastroesophageal reflux disease)     Gout     not current    Hemodialysis patient     mwf    Hernia, incisional     abdomen    History of colonoscopy     UTD    History of degenerative disc disease     Hyperlipidemia     Hypertension     Medicare annual wellness visit, subsequent 11/07/2020    Microscopic colitis     Nephrotic syndrome     Neuroendocrine tumor     of stomach    FABY  (obstructive sleep apnea)     not treating    Pedal edema     ressolved    Proteinuria     Renal cell cancer, right     Rib pain on right side     chronic    Rotator cuff tear     bilateral    Stomach cancer 2016    Stroke     Uterine cancer     Vitamin B 12 deficiency     Vitamin D deficiency      Past Surgical History:   Procedure Laterality Date    APPENDECTOMY      ARTERIOVENOUS FISTULA Right     ARTERIOVENOUS FISTULA REPAIR      Clotted off and insert graft    ARTERIOVENOUS FISTULA/SHUNT SURGERY Left 12/30/2020    Procedure: ARTERIOVENOUS FISTULA FORMATION;  Surgeon: Jan Caicedo MD;  [facility];  Service: Vascular;  Laterality: Left;    ARTERIOVENOUS FISTULA/SHUNT SURGERY Left 4/23/2024    Procedure: FISTULOGRAM WITH SUBCLAVIAN ARTERY ANGIOPLASTY, fistula banding;  Surgeon: Franki Delgadillo II, MD;  [facility];  Service: Vascular;  Laterality: Left;    BREAST BIOPSY      right nipple 1981    BRONCHOSCOPY N/A 6/11/2024    Procedure: BRONCHOSCOPY WITH RIGHT LUNG WASHING;  Surgeon: Aramis Barragan MD;  [facility];  Service: Pulmonary;  Laterality: N/A;  RML atelectasis    COLONOSCOPY N/A 11/24/2020    Procedure: COLONOSCOPY with polypectomy x 7;  Surgeon: Jeffery White MD;  [facility];  Service: Gastroenterology;  Laterality: N/A;  post op: polyps, diverticulosis, hemorrhoids    COLONOSCOPY N/A 6/1/2023    Procedure: COLONOSCOPY with polypectomy x 3 biopsy x 1;  Surgeon: Jeffery White MD;  [facility];  Service: Gastroenterology;  Laterality: N/A;  diverticulosis polyps    COLONOSCOPY N/A 9/4/2024    Procedure: COLONOSCOPY WITH POLYPECTOMY X 1;  Surgeon: Jeffery White MD;  [facility];  Service: Gastroenterology;  Laterality: N/A;  POST- POLYP, DIVERTICULOSIS, INTERNAL AND EXTERNAL HEMORRHOIDS    ENDOSCOPY N/A 03/12/2022    Procedure: ESOPHAGOGASTRODUODENOSCOPY with biopsy x 1 area;  Surgeon: Javan Augustin,  MD;  Location: [facility] ENDOSCOPY;  Service: Gastroenterology;  Laterality: N/A;  post op: anastamosis    ENDOSCOPY N/A 10/9/2023    Procedure: ESOPHAGOGASTRODUODENOSCOPY with biopsy x3 areas;  Surgeon: Ace Campbell MD;  Location: [facility] ENDOSCOPY;  Service: Gastroenterology;  Laterality: N/A;  duodenal ulcers;irregular z line    ENDOSCOPY N/A 8/27/2024    Procedure: ESOPHAGOGASTRODUODENOSCOPY with argon plasma coagulation;  Surgeon: HILARIO Blackmon MD;  Location: [facility] ENDOSCOPY;  Service: Gastroenterology;  Laterality: N/A;  post op: AVM    ENTEROSCOPY SMALL BOWEL N/A 9/4/2024    Procedure: ESOPHAGOGASTRODUODENOSCOPY WITH SMALL BOWEL ENTEROSCOPY, ARGON PLASMA COAGULATION AND ENDOSCOPIC CLIPPING X 2 OF GASTRIC ANTRAL VASCULAR ECTASIA;  Surgeon: Jeffery White MD;  Location: [facility] ENDOSCOPY;  Service: Gastroenterology;  Laterality: N/A;  POST- GAVE    GASTRIC RESECTION      cancer    HYSTERECTOMY      LAPAROSCOPIC CHOLECYSTECTOMY      NEPHRECTOMY      right kidney removed     REDUCTION MAMMAPLASTY      TUMOR REMOVAL      boils    VENTRAL/INCISIONAL HERNIA REPAIR Right 08/28/2019    Procedure: VENTRAL/INCISIONAL HERNIA REPAIR;  Surgeon: Nilay Stevens MD;  Location: [facility] MAIN OR;  Service: General    VENTRAL/INCISIONAL HERNIA REPAIR N/A 10/01/2019    Procedure: OPEN VENTRAL/INCISIONAL HERNIA REPAIR;  Surgeon: Nilay Stevens MD;  Location: [facility] MAIN OR;  Service: General    VENTRAL/INCISIONAL HERNIA REPAIR N/A 10/11/2021    Procedure: VENTRAL/INCISIONAL HERNIA REPAIR LAPAROSCOPIC;  Surgeon: Nilay Stevens MD;  Location: [facility] MAIN OR;  Service: General;  Laterality: N/A;    VENTRAL/INCISIONAL HERNIA REPAIR N/A 01/23/2023    Procedure: VENTRAL/INCISIONAL HERNIA REPAIR with MESH;  Surgeon: Nilay Stevens MD;  Location: [facility] MAIN OR;  Service: General;  Laterality: N/A;     Family History   Problem Relation Age of Onset    Heart disease Father      Social History     Tobacco Use    Smoking status:  Former     Current packs/day: 0.00     Average packs/day: 0.3 packs/day for 57.0 years (14.3 ttl pk-yrs)     Types: Cigarettes     Start date: 5/1/1964     Quit date: 5/1/2021     Years since quitting: 3.5     Passive exposure: Past    Smokeless tobacco: Never    Tobacco comments:     quit smoking 2 months ago   Vaping Use    Vaping status: Never Used   Substance Use Topics    Alcohol use: No    Drug use: Never     (Not in a hospital admission)    Allergies:  Atorvastatin calcium, Gabapentin, and Niacin    Objective     Vital Signs  Temp:  [98.6 °F (37 °C)] 98.6 °F (37 °C)  Heart Rate:  [86-96] 86  Resp:  [18] 18  BP: (133-183)/(67-84) 133/77       Physical Exam  Vitals reviewed.   Constitutional:       Appearance: She is not ill-appearing.   HENT:      Head: Normocephalic and atraumatic.      Right Ear: External ear normal.      Left Ear: External ear normal.      Nose: Nose normal.      Mouth/Throat:      Mouth: Mucous membranes are moist.   Eyes:      General:         Right eye: No discharge.         Left eye: No discharge.   Cardiovascular:      Rate and Rhythm: Normal rate and regular rhythm.      Pulses: Normal pulses.      Heart sounds: Normal heart sounds.   Pulmonary:      Effort: Pulmonary effort is normal.      Breath sounds: Normal breath sounds.   Abdominal:      General: Bowel sounds are normal.      Palpations: Abdomen is soft.   Musculoskeletal:         General: Normal range of motion.   Skin:     General: Skin is warm.   Neurological:      Mental Status: She is alert and oriented to person, place, and time.   Psychiatric:         Behavior: Behavior normal.          Results Review:     Imaging Results (Last 24 Hours)       Procedure Component Value Units Date/Time    CT Abdomen Pelvis Without Contrast [[id number]] Collected: 11/03/24 2206     Updated: 11/03/24 2214    Narrative:      CT ABDOMEN PELVIS WO CONTRAST    Date of Exam: 11/3/2024 8:56 PM EST    Indication: bilateral lower quad  pain.    Comparison: CT abdomen pelvis 9/14/2024    Technique: Axial CT images were obtained of the abdomen and pelvis without the administration of contrast. Sagittal and coronal reconstructions were performed.  Automated exposure control and iterative reconstruction methods were used.      Findings:  Lung Bases:     The heart is enlarged. There is mild mitral valve annular calcification. There is mild coronary artery calcific atherosclerosis. There is mild lingular scarring.    Liver:  Postoperative changes from cholecystectomy are seen with intra and extrahepatic biliary ductal prominence consistent with reservoir effect.    Biliary/Gallbladder:    The gallbladder is normal without evidence of radiopaque stones. The biliary tree is nondilated.    Spleen:  Spleen is normal in size and CT density.    Pancreas:    Pancreas is normal. There is no evidence of pancreatic mass or peripancreatic fluid.    Kidneys:    There is marked cortical thinning of the left kidney. There are changes from right nephrectomy.    Adrenals:    There is a small fat-containing right adrenal lesion. There is prominence of the left adrenal gland unchanged and likely from adenoma.    Retroperitoneal/Lymph Nodes/Vasculature:    No retroperitoneal adenopathy is identified. There is heavy calcific atherosclerosis of the abdominal aorta and branch vascular structures.    Gastrointestinal/Mesentery:    There are postoperative changes of the stomach with diffuse wall thickening identified. Surgical changes are noted distally of the stomach from partial gastrectomy. There is no extensive gastric distention. Mild small bowel distention is seen less   significant than on prior study. There are irregular areas along the anterior abdominal wall from previous surgery sites with partial small bowel herniation without changes of obstruction. There is a moderate stool burden suggestive of constipation.   There is diverticulosis without evidence of  diverticulitis.    Bladder:    The bladder is normal.    Genital:     Unremarkable          Bony Structures:     Visualized bony structures are consistent with the patient's age.        Impression:      Impression:  1.Postoperative changes from partial gastrectomy. There is diffuse gastric wall thickening.  2.There is mild small bowel distention less than on prior study.  3.There is a moderate stool burden suggestive of constipation.  4.There is diverticulosis without diverticulitis.        Electronically Signed: Volodymyr Ricardo MD    11/3/2024 10:12 PM EST    Workstation ID: [id number]    XR Chest 1 View [[id number]] Collected: 11/03/24 1839     Updated: 11/03/24 1842    Narrative:      XR CHEST 1 VW    Date of Exam: 11/3/2024 6:20 PM EST    Indication: Chest pain    Comparison: None available.    Findings:  There are no airspace consolidations. No pleural fluid. No pneumothorax. The pulmonary vasculature appears within normal limits. There is enlargement of the cardiac silhouette. No acute osseous abnormality identified.      Impression:      Impression:  No acute pulmonary process    Enlargement of the cardiac silhouette    Electronically Signed: Jesus Chadwick MD    11/3/2024 6:40 PM EST    Workstation ID: [id number]             Lab Results (last 24 hours)       Procedure Component Value Units Date/Time    High Sensitivity Troponin T 2Hr [[id number]]  (Abnormal) Collected: 11/03/24 2050    Specimen: Blood Updated: 11/03/24 2229     HS Troponin T 54 ng/L      Troponin T Delta 0 ng/L     Narrative:      High Sensitive Troponin T Reference Range:  <14.0 ng/L- Negative Female for AMI  <22.0 ng/L- Negative Male for AMI  >=14 - Abnormal Female indicating possible myocardial injury.  >=22 - Abnormal Male indicating possible myocardial injury.   Clinicians would have to utilize clinical acumen, EKG, Troponin, and serial changes to determine if it is an Acute Myocardial Infarction or myocardial injury due to an underlying  chronic condition.         Single High Sensitivity Troponin T [[id number]]  (Abnormal) Collected: 11/03/24 1843    Specimen: Blood Updated: 11/03/24 1909     HS Troponin T 54 ng/L     Narrative:      High Sensitive Troponin T Reference Range:  <14.0 ng/L- Negative Female for AMI  <22.0 ng/L- Negative Male for AMI  >=14 - Abnormal Female indicating possible myocardial injury.  >=22 - Abnormal Male indicating possible myocardial injury.   Clinicians would have to utilize clinical acumen, EKG, Troponin, and serial changes to determine if it is an Acute Myocardial Infarction or myocardial injury due to an underlying chronic condition.         Comprehensive Metabolic Panel [[id number]]  (Abnormal) Collected: 11/03/24 1818    Specimen: Blood Updated: 11/03/24 1846     Glucose 107 mg/dL      BUN 50 mg/dL      Creatinine 7.39 mg/dL      Sodium 141 mmol/L      Potassium 4.9 mmol/L      Comment: Slight hemolysis detected by analyzer. Result may be falsely elevated.        Chloride 104 mmol/L      CO2 18.6 mmol/L      Calcium 8.9 mg/dL      Total Protein 6.4 g/dL      Albumin 3.8 g/dL      ALT (SGPT) 20 U/L      AST (SGOT) 26 U/L      Alkaline Phosphatase 131 U/L      Total Bilirubin 0.2 mg/dL      Globulin 2.6 gm/dL      A/G Ratio 1.5 g/dL      BUN/Creatinine Ratio 6.8     Anion Gap 18.4 mmol/L      eGFR 5.3 mL/min/1.73      Comment: <15 Indicative of kidney failure       Narrative:      GFR Normal >60  Chronic Kidney Disease <60  Kidney Failure <15    The GFR formula is only valid for adults with stable renal function between ages 18 and 70.    Magnesium [[id number]]  (Abnormal) Collected: 11/03/24 1818    Specimen: Blood Updated: 11/03/24 1846     Magnesium 1.5 mg/dL     Lipase [[id number]]  (Normal) Collected: 11/03/24 1818    Specimen: Blood Updated: 11/03/24 1846     Lipase 21 U/L     Extra Tubes [[id number]] Collected: 11/03/24 1818    Specimen: Blood, Venous Line Updated: 11/03/24 1832    Narrative:      The following  orders were created for panel order Extra Tubes.  Procedure                               Abnormality         Status                     ---------                               -----------         ------                     Gold Top - SST[[id number]]                                   Final result                 Please view results for these tests on the individual orders.    Gold Top - SST [[id number]] Collected: 11/03/24 1818    Specimen: Blood Updated: 11/03/24 1832     Extra Tube Hold for add-ons.     Comment: Auto resulted.       Protime-INR [[id number]]  (Normal) Collected: 11/03/24 1818    Specimen: Blood Updated: 11/03/24 1832     Protime 10.9 Seconds      INR 1.00    CBC & Differential [[id number]]  (Abnormal) Collected: 11/03/24 1818    Specimen: Blood Updated: 11/03/24 1822    Narrative:      The following orders were created for panel order CBC & Differential.  Procedure                               Abnormality         Status                     ---------                               -----------         ------                     CBC Auto Differential[[id number]]        Abnormal            Final result                 Please view results for these tests on the individual orders.    CBC Auto Differential [[id number]]  (Abnormal) Collected: 11/03/24 1818    Specimen: Blood Updated: 11/03/24 1822     WBC 13.48 10*3/mm3      RBC 3.65 10*6/mm3      Hemoglobin 11.0 g/dL      Hematocrit 35.1 %      MCV 96.2 fL      MCH 30.1 pg      MCHC 31.3 g/dL      RDW 16.8 %      RDW-SD 58.6 fl      MPV 10.2 fL      Platelets 367 10*3/mm3      Neutrophil % 81.4 %      Lymphocyte % 9.9 %      Monocyte % 5.1 %      Eosinophil % 2.7 %      Basophil % 0.4 %      Immature Grans % 0.5 %      Neutrophils, Absolute 10.95 10*3/mm3      Lymphocytes, Absolute 1.34 10*3/mm3      Monocytes, Absolute 0.69 10*3/mm3      Eosinophils, Absolute 0.37 10*3/mm3      Basophils, Absolute 0.06 10*3/mm3      Immature Grans, Absolute 0.07 10*3/mm3       nRBC 0.2 /100 WBC              I reviewed the patient's new clinical results.    Assessment & Plan     Abdomen pain  -supportive care monitor overnight    ESRD on dialysis   - schedule per Dr. Buckley  -due tomorrow     Chronic CAD - stable without anginal symptoms  COPD - well compensated  Tobacco use disorder  H/o Lucas's esophagus  H/o renal cell carcinoma  DM-II - currently off of meds with well controlled A1C  HTN - home meds  HLD - statin           DVT prophylaxis:scd  GI prophylaxis:protonix  Code status:full      I discussed the patient's findings and my recommendations with patient.     Cindy Santiago, APRN  11/03/24  23:56 EST

## 2024-11-05 ENCOUNTER — READMISSION MANAGEMENT (OUTPATIENT)
Dept: CALL CENTER | Facility: HOSPITAL | Age: 74
End: 2024-11-05
Payer: MEDICARE

## 2024-11-05 NOTE — DISCHARGE SUMMARY
Date of Discharge:  11/4/2024    Discharge Diagnosis:   **Lower abdominal pain [R10.30]   Pain in the abdomen [R10.9]   Emphysema lung [J43.9]   Diabetic peripheral neuropathy [E11.42]   ESRD (end stage renal disease) [N18.6]   Gastroesophageal reflux disease [K21.9]   Hypertension [I10]   Hyperlipidemia [E78.5]   Type 2 diabetes mellitus, without long-term current use of insulin [E11.9]   Irritable bowel syndrome    Presenting Problem/History of Present Illness  Active Hospital Problems    Diagnosis  POA    **Lower abdominal pain [R10.30]  Yes    Pain in the abdomen [R10.9]  Yes    Emphysema lung [J43.9]  Yes    Diabetic peripheral neuropathy [E11.42]  Yes    ESRD (end stage renal disease) [N18.6]  Yes    Gastroesophageal reflux disease [K21.9]  Yes    Hypertension [I10]  Yes    Hyperlipidemia [E78.5]  Yes    Type 2 diabetes mellitus, without long-term current use of insulin [E11.9]  Yes      Resolved Hospital Problems   No resolved problems to display.          Hospital Course  Patient is a 74 y.o. female presented with episode of crampy abdominal pain with diarrhea for one day.  She denied fever and chills.  She had eaten a heavy Thanksgiving-type meal at [employer] the night before, eating foods she doesn't normally eat.  Pain was severe and she presented to ER.  CT scan showed previous gastrectomy.  Symptoms resolved overnight and the following morning she was eating biscuits and gravy without any symptoms.  She had her regularly scheduled dialysis.  She was evaluated by GI, who recommended hyoscyamine prn.  Stool studies for pathogens were ordered, but diarrhea had resolved and no sample was available to send.  She was asymptomatic throughout the day and requested discharge home.  She will follow up with PCP and GI as outpatient.     Procedures Performed         Consults:   Consults       Date and Time Order Name Status Description    11/4/2024  9:10 AM Inpatient Gastroenterology Consult Completed     11/3/2024  11:53 PM Inpatient Nephrology Consult Completed     10/20/2024  4:00 PM Nephrology (on -call MD unless specified) Completed             Pertinent Test Results:    Lab Results (most recent)       Procedure Component Value Units Date/Time    Magnesium [[id number]]  (Abnormal) Collected: 11/04/24 0331    Specimen: Blood Updated: 11/04/24 0433     Magnesium 2.6 mg/dL     Comprehensive Metabolic Panel [[id number]]  (Abnormal) Collected: 11/04/24 0331    Specimen: Blood Updated: 11/04/24 0430     Glucose 86 mg/dL      BUN 51 mg/dL      Creatinine 7.54 mg/dL      Sodium 140 mmol/L      Potassium 4.5 mmol/L      Chloride 105 mmol/L      CO2 18.6 mmol/L      Calcium 9.0 mg/dL      Total Protein 6.8 g/dL      Albumin 3.7 g/dL      ALT (SGPT) 14 U/L      AST (SGOT) 20 U/L      Alkaline Phosphatase 130 U/L      Total Bilirubin 0.2 mg/dL      Globulin 3.1 gm/dL      A/G Ratio 1.2 g/dL      BUN/Creatinine Ratio 6.8     Anion Gap 16.4 mmol/L      eGFR 5.2 mL/min/1.73      Comment: <15 Indicative of kidney failure       Narrative:      GFR Normal >60  Chronic Kidney Disease <60  Kidney Failure <15    The GFR formula is only valid for adults with stable renal function between ages 18 and 70.    Extra Tubes [[id number]] Collected: 11/04/24 0331    Specimen: Blood, Venous Line Updated: 11/04/24 0400    Narrative:      The following orders were created for panel order Extra Tubes.  Procedure                               Abnormality         Status                     ---------                               -----------         ------                     Lavender Top[[id number]]                                     Final result                 Please view results for these tests on the individual orders.    Lavender Top [[id number]] Collected: 11/04/24 0331    Specimen: Blood Updated: 11/04/24 0400     Extra Tube hold for add-on     Comment: Auto resulted       High Sensitivity Troponin T 2Hr [[id number]]  (Abnormal) Collected: 11/03/24 2050     Specimen: Blood Updated: 11/03/24 2229     HS Troponin T 54 ng/L      Troponin T Delta 0 ng/L     Narrative:      High Sensitive Troponin T Reference Range:  <14.0 ng/L- Negative Female for AMI  <22.0 ng/L- Negative Male for AMI  >=14 - Abnormal Female indicating possible myocardial injury.  >=22 - Abnormal Male indicating possible myocardial injury.   Clinicians would have to utilize clinical acumen, EKG, Troponin, and serial changes to determine if it is an Acute Myocardial Infarction or myocardial injury due to an underlying chronic condition.         Single High Sensitivity Troponin T [[id number]]  (Abnormal) Collected: 11/03/24 1843    Specimen: Blood Updated: 11/03/24 1909     HS Troponin T 54 ng/L     Narrative:      High Sensitive Troponin T Reference Range:  <14.0 ng/L- Negative Female for AMI  <22.0 ng/L- Negative Male for AMI  >=14 - Abnormal Female indicating possible myocardial injury.  >=22 - Abnormal Male indicating possible myocardial injury.   Clinicians would have to utilize clinical acumen, EKG, Troponin, and serial changes to determine if it is an Acute Myocardial Infarction or myocardial injury due to an underlying chronic condition.         Comprehensive Metabolic Panel [[id number]]  (Abnormal) Collected: 11/03/24 1818    Specimen: Blood Updated: 11/03/24 1846     Glucose 107 mg/dL      BUN 50 mg/dL      Creatinine 7.39 mg/dL      Sodium 141 mmol/L      Potassium 4.9 mmol/L      Comment: Slight hemolysis detected by analyzer. Result may be falsely elevated.        Chloride 104 mmol/L      CO2 18.6 mmol/L      Calcium 8.9 mg/dL      Total Protein 6.4 g/dL      Albumin 3.8 g/dL      ALT (SGPT) 20 U/L      AST (SGOT) 26 U/L      Alkaline Phosphatase 131 U/L      Total Bilirubin 0.2 mg/dL      Globulin 2.6 gm/dL      A/G Ratio 1.5 g/dL      BUN/Creatinine Ratio 6.8     Anion Gap 18.4 mmol/L      eGFR 5.3 mL/min/1.73      Comment: <15 Indicative of kidney failure       Narrative:      GFR Normal  >60  Chronic Kidney Disease <60  Kidney Failure <15    The GFR formula is only valid for adults with stable renal function between ages 18 and 70.    Magnesium [[id number]]  (Abnormal) Collected: 11/03/24 1818    Specimen: Blood Updated: 11/03/24 1846     Magnesium 1.5 mg/dL     Lipase [[id number]]  (Normal) Collected: 11/03/24 1818    Specimen: Blood Updated: 11/03/24 1846     Lipase 21 U/L     Extra Tubes [[id number]] Collected: 11/03/24 1818    Specimen: Blood, Venous Line Updated: 11/03/24 1832    Narrative:      The following orders were created for panel order Extra Tubes.  Procedure                               Abnormality         Status                     ---------                               -----------         ------                     Gold Top - SST[[id number]]                                   Final result                 Please view results for these tests on the individual orders.    Gold Top - SST [[id number]] Collected: 11/03/24 1818    Specimen: Blood Updated: 11/03/24 1832     Extra Tube Hold for add-ons.     Comment: Auto resulted.       Protime-INR [[id number]]  (Normal) Collected: 11/03/24 1818    Specimen: Blood Updated: 11/03/24 1832     Protime 10.9 Seconds      INR 1.00    CBC & Differential [[id number]]  (Abnormal) Collected: 11/03/24 1818    Specimen: Blood Updated: 11/03/24 1822    Narrative:      The following orders were created for panel order CBC & Differential.  Procedure                               Abnormality         Status                     ---------                               -----------         ------                     CBC Auto Differential[[id number]]        Abnormal            Final result                 Please view results for these tests on the individual orders.    CBC Auto Differential [[id number]]  (Abnormal) Collected: 11/03/24 1818    Specimen: Blood Updated: 11/03/24 1822     WBC 13.48 10*3/mm3      RBC 3.65 10*6/mm3      Hemoglobin 11.0 g/dL      Hematocrit 35.1  %      MCV 96.2 fL      MCH 30.1 pg      MCHC 31.3 g/dL      RDW 16.8 %      RDW-SD 58.6 fl      MPV 10.2 fL      Platelets 367 10*3/mm3      Neutrophil % 81.4 %      Lymphocyte % 9.9 %      Monocyte % 5.1 %      Eosinophil % 2.7 %      Basophil % 0.4 %      Immature Grans % 0.5 %      Neutrophils, Absolute 10.95 10*3/mm3      Lymphocytes, Absolute 1.34 10*3/mm3      Monocytes, Absolute 0.69 10*3/mm3      Eosinophils, Absolute 0.37 10*3/mm3      Basophils, Absolute 0.06 10*3/mm3      Immature Grans, Absolute 0.07 10*3/mm3      nRBC 0.2 /100 WBC              Results for orders placed during the hospital encounter of 07/25/21    Adult Transthoracic Echo Complete W/ Cont if Necessary Per Protocol    Interpretation Summary  Normal LV size and contractility EF of 60%  Normal RV size  Normal atrial size  Aortic valve, mitral valve, tricuspid valve appears structurally normal, mild mitral regurgitation  seen.  No pericardial effusion seen.  Proximal aorta appears normal in size.              Condition on Discharge:  Improved, stable    Vital Signs  Temp:  [97.9 °F (36.6 °C)-98.4 °F (36.9 °C)] 98.4 °F (36.9 °C)  Heart Rate:  [70-92] 72  Resp:  [12-16] 12  BP: (111-172)/(64-78) 119/64    Physical Exam:     General Appearance:    Alert, cooperative, in no acute distress   Head:    Normocephalic, without obvious abnormality, atraumatic   Eyes:            Lids and lashes normal, conjunctivae and sclerae normal, no   icterus, no pallor, corneas clear, PERRLA   Ears:    Ears appear intact with no abnormalities noted   Throat:   No oral lesions, no thrush, oral mucosa moist   Neck:   No adenopathy, supple, trachea midline, no thyromegaly, no   carotid bruit, no JVD   Lungs:     Clear to auscultation,respirations regular, even and                  unlabored    Heart:    Regular rhythm and normal rate, normal S1 and S2, no            murmur, no gallop, no rub, no click   Chest Wall:    No abnormalities observed   Abdomen:     Normal  bowel sounds, no masses, no organomegaly, soft        non-tender, non-distended, no guarding, no rebound                tenderness   Extremities:   Moves all extremities well, no edema, no cyanosis, no             redness   Pulses:   Pulses palpable and equal bilaterally   Skin:   No bleeding, bruising or rash   Lymph nodes:   No palpable adenopathy   Neurologic:   Cranial nerves 2 - 12 grossly intact, sensation intact, DTR       present and equal bilaterally       Discharge Disposition  Home or Self Care    Discharge Medications     Discharge Medications        New Medications        Instructions Start Date   hyoscyamine 0.125 MG SL tablet  Commonly known as: LEVSIN   125 mcg, Sublingual, Every 4 Hours PRN      ondansetron ODT 4 MG disintegrating tablet  Commonly known as: ZOFRAN-ODT   4 mg, Oral, Every 6 Hours PRN             Continue These Medications        Instructions Start Date   albuterol sulfate  (90 Base) MCG/ACT inhaler  Commonly known as: PROVENTIL HFA;VENTOLIN HFA;PROAIR HFA   2 puffs, Every 4 Hours PRN      amLODIPine 5 MG tablet  Commonly known as: NORVASC   5 mg, Daily      calcium acetate 667 MG capsule capsule  Commonly known as: PHOS BINDER)   667 mg, 2 Times Daily      carvedilol 3.125 MG tablet  Commonly known as: COREG   3.125 mg, 2 Times Daily With Meals      cholecalciferol 25 MCG (1000 UT) tablet  Commonly known as: VITAMIN D3   1,000 Units, Daily      lidocaine-prilocaine 2.5-2.5 % cream  Commonly known as: EMLA   1 Application, Topical, Daily PRN, Apply to left 5th finger      multivitamin tablet tablet   1 tablet, Daily      pantoprazole 20 MG EC tablet  Commonly known as: PROTONIX   20 mg, Daily      sevelamer 800 MG tablet  Commonly known as: RENAGEL   800 mg, 2 Times Daily With Meals      sodium bicarbonate 650 MG tablet   1,300 mg, 2 Times Daily      sucralfate 1 g tablet  Commonly known as: CARAFATE   1 g, 4 Times Daily PRN      temazepam 30 MG capsule  Commonly known as:  RESTORIL   30 mg, Nightly               Discharge Diet: [place], gradually resume regular diet    Activity at Discharge: No restrictions    Follow-up Appointments  Future Appointments   Date Time Provider Department Center   11/12/2024  8:30 AM [facility] E[facility]   1/7/2025  9:20 AM LAB MD [facility] NA  [facility]   1/7/2025  9:30 AM Anjum Cam MD [facility]   2/13/2025  8:00 AM [facility] CC CT [facility]   2/18/2025  8:30 AM Alcides Live MD [facility] None   4/15/2025 12:45 PM Mak Sosa MD [facility] NA     Additional Instructions for the Follow-ups that You Need to Schedule       Discharge Follow-up with PCP   As directed       Currently Documented PCP:    Raegan Gotti MD    PCP Phone Number:    [phone]     Follow Up Details: Call office to schedule a hospital follow up appointment.        Discharge Follow-up with Specified Provider: ASAP with [facility]   As directed      To: ASAP with [facility]                Test Results Pending at Discharge       Angelia Taylor MD  11/04/24  19:37 EST    Time: Discharge 25 min

## 2024-11-05 NOTE — CASE MANAGEMENT/SOCIAL WORK
Case Management Discharge Note      Final Note: Routine home         Selected Continued Care - Discharged on 11/4/2024 Admission date: 11/3/2024 - Discharge disposition: Home or Self Care         Transportation Services  Private: Car    Final Discharge Disposition Code: 01 - home or self-care

## 2024-11-05 NOTE — OUTREACH NOTE
Prep Survey      Flowsheet Row Responses   [facility] facility patient discharged from? [facility]   Is LACE score < 7 ? No   Eligibility Readm Mgmt   Discharge diagnosis Lower abdominal pain   Does the patient have one of the following disease processes/diagnoses(primary or secondary)? Other   Does the patient have Home health ordered? No   Is there a DME ordered? No   Medication alerts for this patient see avs   Prep survey completed? Yes            Helena HINDS - Registered Nurse

## 2024-11-07 ENCOUNTER — READMISSION MANAGEMENT (OUTPATIENT)
Dept: CALL CENTER | Facility: HOSPITAL | Age: 74
End: 2024-11-07
Payer: MEDICARE

## 2024-11-07 NOTE — OUTREACH NOTE
Medical Week 1 Survey      Flowsheet Row Responses   [facility] patient discharged from? [facility]   Does the patient have one of the following disease processes/diagnoses(primary or secondary)? Other   Week 1 attempt successful? Yes   Call start time 1032   Call end time 1037   List who call center can speak with pt   Medication alerts for this patient zofran   Meds reviewed with patient/caregiver? Yes   Is the patient having any side effects they believe may be caused by any medication additions or changes? No   Does the patient have all medications ordered at discharge? Yes   Is the patient taking all medications as directed (includes completed medication regime)? Yes   Comments regarding appointments Pt has had pcp f/u appointment.   Does the patient have a primary care provider?  Yes   Has the patient kept scheduled appointments due by today? Yes   Has home health visited the patient within 72 hours of discharge? N/A   Psychosocial issues? No   Did the patient receive a copy of their discharge instructions? Yes   Nursing interventions Reviewed instructions with patient   What is the patient's perception of their health status since discharge? Improving   Is the patient/caregiver able to teach back signs and symptoms related to disease process for when to call PCP? Yes   Is the patient/caregiver able to teach back signs and symptoms related to disease process for when to call 911? Yes   Is the patient/caregiver able to teach back the hierarchy of who to call/visit for symptoms/problems? PCP, Specialist, Home health nurse, Urgent Care, ED, 911 Yes   Week 1 call completed? Yes   Would this patient benefit from a Referral to Amb Social Work? No   Is the patient interested in additional calls from an ambulatory ? No   Wrap up additional comments Pt reports feeling some better. Pt reports having abdominal discomfort often. Pt has GI f/u appointment. Pt is attending dialysis.   Call end time 1037             Jami DONALD - Registered Nurse

## 2024-11-11 ENCOUNTER — HOSPITAL ENCOUNTER (OUTPATIENT)
Facility: HOSPITAL | Age: 74
Setting detail: OBSERVATION
Discharge: HOME | End: 2024-11-12
Attending: INTERNAL MEDICINE | Admitting: INTERNAL MEDICINE
Payer: MEDICARE

## 2024-11-11 ENCOUNTER — APPOINTMENT (OUTPATIENT)
Dept: GENERAL RADIOLOGY | Facility: HOSPITAL | Age: 74
End: 2024-11-11
Payer: MEDICARE

## 2024-11-11 ENCOUNTER — TELEPHONE (OUTPATIENT)
Dept: CARDIOLOGY | Facility: CLINIC | Age: 74
End: 2024-11-11
Payer: MEDICARE

## 2024-11-11 DIAGNOSIS — J18.9 PNEUMONIA OF LEFT LOWER LOBE DUE TO INFECTIOUS ORGANISM: Primary | ICD-10-CM

## 2024-11-11 DIAGNOSIS — R07.9 CHEST PAIN, UNSPECIFIED TYPE: ICD-10-CM

## 2024-11-11 LAB
ALBUMIN SERPL-MCNC: 4 G/DL (ref 3.5–5.2)
ALBUMIN/GLOB SERPL: 1.2 G/DL
ALP SERPL-CCNC: 137 U/L (ref 39–117)
ALT SERPL W P-5'-P-CCNC: 10 U/L (ref 1–33)
ANION GAP SERPL CALCULATED.3IONS-SCNC: 13.7 MMOL/L (ref 5–15)
ANION GAP SERPL CALCULATED.3IONS-SCNC: 14.5 MMOL/L (ref 5–15)
AST SERPL-CCNC: 18 U/L (ref 1–32)
BASOPHILS # BLD AUTO: 0.04 10*3/MM3 (ref 0–0.2)
BASOPHILS # BLD AUTO: 0.06 10*3/MM3 (ref 0–0.2)
BASOPHILS NFR BLD AUTO: 0.3 % (ref 0–1.5)
BASOPHILS NFR BLD AUTO: 0.6 % (ref 0–1.5)
BILIRUB SERPL-MCNC: 0.3 MG/DL (ref 0–1.2)
BUN SERPL-MCNC: 21 MG/DL (ref 8–23)
BUN SERPL-MCNC: 22 MG/DL (ref 8–23)
BUN/CREAT SERPL: 4.6 (ref 7–25)
BUN/CREAT SERPL: 4.7 (ref 7–25)
CALCIUM SPEC-SCNC: 9.3 MG/DL (ref 8.6–10.5)
CALCIUM SPEC-SCNC: 9.8 MG/DL (ref 8.6–10.5)
CHLORIDE SERPL-SCNC: 100 MMOL/L (ref 98–107)
CHLORIDE SERPL-SCNC: 99 MMOL/L (ref 98–107)
CO2 SERPL-SCNC: 23.3 MMOL/L (ref 22–29)
CO2 SERPL-SCNC: 24.5 MMOL/L (ref 22–29)
CREAT SERPL-MCNC: 4.48 MG/DL (ref 0.57–1)
CREAT SERPL-MCNC: 4.75 MG/DL (ref 0.57–1)
DEPRECATED RDW RBC AUTO: 60 FL (ref 37–54)
DEPRECATED RDW RBC AUTO: 60.2 FL (ref 37–54)
EGFRCR SERPLBLD CKD-EPI 2021: 9.1 ML/MIN/1.73
EGFRCR SERPLBLD CKD-EPI 2021: 9.7 ML/MIN/1.73
EOSINOPHIL # BLD AUTO: 0.13 10*3/MM3 (ref 0–0.4)
EOSINOPHIL # BLD AUTO: 0.17 10*3/MM3 (ref 0–0.4)
EOSINOPHIL NFR BLD AUTO: 1.1 % (ref 0.3–6.2)
EOSINOPHIL NFR BLD AUTO: 1.8 % (ref 0.3–6.2)
ERYTHROCYTE [DISTWIDTH] IN BLOOD BY AUTOMATED COUNT: 18 % (ref 12.3–15.4)
ERYTHROCYTE [DISTWIDTH] IN BLOOD BY AUTOMATED COUNT: 18.1 % (ref 12.3–15.4)
GEN 5 2HR TROPONIN T REFLEX: 47 NG/L
GLOBULIN UR ELPH-MCNC: 3.4 GM/DL
GLUCOSE SERPL-MCNC: 136 MG/DL (ref 65–99)
GLUCOSE SERPL-MCNC: 99 MG/DL (ref 65–99)
HCT VFR BLD AUTO: 38.1 % (ref 34–46.6)
HCT VFR BLD AUTO: 39.3 % (ref 34–46.6)
HGB BLD-MCNC: 11.7 G/DL (ref 12–15.9)
HGB BLD-MCNC: 12.1 G/DL (ref 12–15.9)
HOLD SPECIMEN: NORMAL
HOLD SPECIMEN: NORMAL
IMM GRANULOCYTES # BLD AUTO: 0.03 10*3/MM3 (ref 0–0.05)
IMM GRANULOCYTES # BLD AUTO: 0.07 10*3/MM3 (ref 0–0.05)
IMM GRANULOCYTES NFR BLD AUTO: 0.3 % (ref 0–0.5)
IMM GRANULOCYTES NFR BLD AUTO: 0.6 % (ref 0–0.5)
LYMPHOCYTES # BLD AUTO: 0.58 10*3/MM3 (ref 0.7–3.1)
LYMPHOCYTES # BLD AUTO: 0.74 10*3/MM3 (ref 0.7–3.1)
LYMPHOCYTES NFR BLD AUTO: 4.9 % (ref 19.6–45.3)
LYMPHOCYTES NFR BLD AUTO: 8 % (ref 19.6–45.3)
MAGNESIUM SERPL-MCNC: 1.6 MG/DL (ref 1.6–2.4)
MCH RBC QN AUTO: 29.3 PG (ref 26.6–33)
MCH RBC QN AUTO: 29.5 PG (ref 26.6–33)
MCHC RBC AUTO-ENTMCNC: 30.7 G/DL (ref 31.5–35.7)
MCHC RBC AUTO-ENTMCNC: 30.8 G/DL (ref 31.5–35.7)
MCV RBC AUTO: 95.3 FL (ref 79–97)
MCV RBC AUTO: 95.9 FL (ref 79–97)
MONOCYTES # BLD AUTO: 0.9 10*3/MM3 (ref 0.1–0.9)
MONOCYTES # BLD AUTO: 1.02 10*3/MM3 (ref 0.1–0.9)
MONOCYTES NFR BLD AUTO: 11 % (ref 5–12)
MONOCYTES NFR BLD AUTO: 7.6 % (ref 5–12)
NEUTROPHILS NFR BLD AUTO: 10.05 10*3/MM3 (ref 1.7–7)
NEUTROPHILS NFR BLD AUTO: 7.27 10*3/MM3 (ref 1.7–7)
NEUTROPHILS NFR BLD AUTO: 78.3 % (ref 42.7–76)
NEUTROPHILS NFR BLD AUTO: 85.5 % (ref 42.7–76)
NRBC BLD AUTO-RTO: 0 /100 WBC (ref 0–0.2)
NRBC BLD AUTO-RTO: 0 /100 WBC (ref 0–0.2)
PLATELET # BLD AUTO: 306 10*3/MM3 (ref 140–450)
PLATELET # BLD AUTO: 316 10*3/MM3 (ref 140–450)
PMV BLD AUTO: 10 FL (ref 6–12)
PMV BLD AUTO: 9.9 FL (ref 6–12)
POTASSIUM SERPL-SCNC: 4.8 MMOL/L (ref 3.5–5.2)
POTASSIUM SERPL-SCNC: 4.8 MMOL/L (ref 3.5–5.2)
PROT SERPL-MCNC: 7.4 G/DL (ref 6–8.5)
RBC # BLD AUTO: 4 10*6/MM3 (ref 3.77–5.28)
RBC # BLD AUTO: 4.1 10*6/MM3 (ref 3.77–5.28)
SODIUM SERPL-SCNC: 136 MMOL/L (ref 136–145)
SODIUM SERPL-SCNC: 139 MMOL/L (ref 136–145)
TROPONIN T DELTA: -2 NG/L
TROPONIN T SERPL HS-MCNC: 49 NG/L
TSH SERPL DL<=0.05 MIU/L-ACNC: 1.86 UIU/ML (ref 0.27–4.2)
WBC NRBC COR # BLD AUTO: 11.77 10*3/MM3 (ref 3.4–10.8)
WBC NRBC COR # BLD AUTO: 9.29 10*3/MM3 (ref 3.4–10.8)
WHOLE BLOOD HOLD COAG: NORMAL
WHOLE BLOOD HOLD SPECIMEN: NORMAL

## 2024-11-11 PROCEDURE — G0378 HOSPITAL OBSERVATION PER HR: HCPCS

## 2024-11-11 PROCEDURE — 84443 ASSAY THYROID STIM HORMONE: CPT

## 2024-11-11 PROCEDURE — 83735 ASSAY OF MAGNESIUM: CPT

## 2024-11-11 PROCEDURE — 93005 ELECTROCARDIOGRAM TRACING: CPT

## 2024-11-11 PROCEDURE — 84484 ASSAY OF TROPONIN QUANT: CPT

## 2024-11-11 PROCEDURE — 80053 COMPREHEN METABOLIC PANEL: CPT

## 2024-11-11 PROCEDURE — 93005 ELECTROCARDIOGRAM TRACING: CPT | Performed by: INTERNAL MEDICINE

## 2024-11-11 PROCEDURE — 85025 COMPLETE CBC W/AUTO DIFF WBC: CPT | Performed by: INTERNAL MEDICINE

## 2024-11-11 PROCEDURE — 25010000002 CEFTRIAXONE PER 250 MG

## 2024-11-11 PROCEDURE — 96365 THER/PROPH/DIAG IV INF INIT: CPT

## 2024-11-11 PROCEDURE — 99285 EMERGENCY DEPT VISIT HI MDM: CPT

## 2024-11-11 PROCEDURE — 71045 X-RAY EXAM CHEST 1 VIEW: CPT

## 2024-11-11 PROCEDURE — 85025 COMPLETE CBC W/AUTO DIFF WBC: CPT

## 2024-11-11 PROCEDURE — 36415 COLL VENOUS BLD VENIPUNCTURE: CPT

## 2024-11-11 PROCEDURE — 94640 AIRWAY INHALATION TREATMENT: CPT

## 2024-11-11 PROCEDURE — 84484 ASSAY OF TROPONIN QUANT: CPT | Performed by: INTERNAL MEDICINE

## 2024-11-11 PROCEDURE — 94799 UNLISTED PULMONARY SVC/PX: CPT

## 2024-11-11 RX ORDER — CHOLECALCIFEROL (VITAMIN D3) 25 MCG
1000 TABLET ORAL DAILY
Status: DISCONTINUED | OUTPATIENT
Start: 2024-11-12 | End: 2024-11-12 | Stop reason: HOSPADM

## 2024-11-11 RX ORDER — CALCIUM ACETATE 667 MG/1
667 CAPSULE ORAL 2 TIMES DAILY
Status: DISCONTINUED | OUTPATIENT
Start: 2024-11-11 | End: 2024-11-12 | Stop reason: HOSPADM

## 2024-11-11 RX ORDER — SUCRALFATE 1 G/1
1 TABLET ORAL
Status: DISCONTINUED | OUTPATIENT
Start: 2024-11-11 | End: 2024-11-12 | Stop reason: HOSPADM

## 2024-11-11 RX ORDER — SUCRALFATE 1 G/1
1 TABLET ORAL
Status: DISCONTINUED | OUTPATIENT
Start: 2024-11-11 | End: 2024-11-11

## 2024-11-11 RX ORDER — ASPIRIN 81 MG/1
324 TABLET, CHEWABLE ORAL ONCE
Status: COMPLETED | OUTPATIENT
Start: 2024-11-11 | End: 2024-11-11

## 2024-11-11 RX ORDER — SODIUM CHLORIDE 0.9 % (FLUSH) 0.9 %
10 SYRINGE (ML) INJECTION AS NEEDED
Status: DISCONTINUED | OUTPATIENT
Start: 2024-11-11 | End: 2024-11-12 | Stop reason: HOSPADM

## 2024-11-11 RX ORDER — SODIUM CHLORIDE 0.9 % (FLUSH) 0.9 %
10 SYRINGE (ML) INJECTION EVERY 12 HOURS SCHEDULED
Status: DISCONTINUED | OUTPATIENT
Start: 2024-11-11 | End: 2024-11-12 | Stop reason: HOSPADM

## 2024-11-11 RX ORDER — PANTOPRAZOLE SODIUM 40 MG/1
40 TABLET, DELAYED RELEASE ORAL DAILY
Status: DISCONTINUED | OUTPATIENT
Start: 2024-11-12 | End: 2024-11-12 | Stop reason: HOSPADM

## 2024-11-11 RX ORDER — AMLODIPINE BESYLATE 5 MG/1
5 TABLET ORAL DAILY
Status: DISCONTINUED | OUTPATIENT
Start: 2024-11-12 | End: 2024-11-12 | Stop reason: HOSPADM

## 2024-11-11 RX ORDER — SODIUM CHLORIDE 9 MG/ML
40 INJECTION, SOLUTION INTRAVENOUS AS NEEDED
Status: DISCONTINUED | OUTPATIENT
Start: 2024-11-11 | End: 2024-11-12 | Stop reason: HOSPADM

## 2024-11-11 RX ORDER — SEVELAMER CARBONATE 800 MG/1
800 TABLET, FILM COATED ORAL 2 TIMES DAILY WITH MEALS
Status: DISCONTINUED | OUTPATIENT
Start: 2024-11-11 | End: 2024-11-12 | Stop reason: HOSPADM

## 2024-11-11 RX ORDER — IPRATROPIUM BROMIDE AND ALBUTEROL SULFATE 2.5; .5 MG/3ML; MG/3ML
3 SOLUTION RESPIRATORY (INHALATION)
Status: DISCONTINUED | OUTPATIENT
Start: 2024-11-11 | End: 2024-11-12 | Stop reason: HOSPADM

## 2024-11-11 RX ORDER — CARVEDILOL 3.12 MG/1
3.12 TABLET ORAL 2 TIMES DAILY WITH MEALS
COMMUNITY
End: 2024-11-12 | Stop reason: HOSPADM

## 2024-11-11 RX ORDER — BISACODYL 10 MG
10 SUPPOSITORY, RECTAL RECTAL DAILY PRN
Status: DISCONTINUED | OUTPATIENT
Start: 2024-11-11 | End: 2024-11-12 | Stop reason: HOSPADM

## 2024-11-11 RX ORDER — CARVEDILOL 3.12 MG/1
3.12 TABLET ORAL 2 TIMES DAILY WITH MEALS
Status: DISCONTINUED | OUTPATIENT
Start: 2024-11-11 | End: 2024-11-12 | Stop reason: HOSPADM

## 2024-11-11 RX ORDER — TEMAZEPAM 15 MG/1
30 CAPSULE ORAL NIGHTLY
Status: DISCONTINUED | OUTPATIENT
Start: 2024-11-11 | End: 2024-11-12 | Stop reason: HOSPADM

## 2024-11-11 RX ORDER — BISACODYL 5 MG/1
5 TABLET, DELAYED RELEASE ORAL DAILY PRN
Status: DISCONTINUED | OUTPATIENT
Start: 2024-11-11 | End: 2024-11-12 | Stop reason: HOSPADM

## 2024-11-11 RX ORDER — DOXYCYCLINE 100 MG/1
100 CAPSULE ORAL ONCE
Status: COMPLETED | OUTPATIENT
Start: 2024-11-11 | End: 2024-11-11

## 2024-11-11 RX ORDER — SODIUM BICARBONATE 650 MG/1
1300 TABLET ORAL 2 TIMES DAILY
Status: DISCONTINUED | OUTPATIENT
Start: 2024-11-11 | End: 2024-11-11

## 2024-11-11 RX ORDER — ONDANSETRON 4 MG/1
4 TABLET, ORALLY DISINTEGRATING ORAL EVERY 6 HOURS PRN
Status: DISCONTINUED | OUTPATIENT
Start: 2024-11-11 | End: 2024-11-12 | Stop reason: HOSPADM

## 2024-11-11 RX ORDER — SEVELAMER CARBONATE 800 MG/1
800 TABLET, FILM COATED ORAL
Status: DISCONTINUED | OUTPATIENT
Start: 2024-11-11 | End: 2024-11-11

## 2024-11-11 RX ORDER — ONDANSETRON 4 MG/1
4 TABLET, ORALLY DISINTEGRATING ORAL EVERY 6 HOURS PRN
Status: DISCONTINUED | OUTPATIENT
Start: 2024-11-11 | End: 2024-11-11 | Stop reason: SDUPTHER

## 2024-11-11 RX ORDER — ONDANSETRON 2 MG/ML
4 INJECTION INTRAMUSCULAR; INTRAVENOUS EVERY 6 HOURS PRN
Status: DISCONTINUED | OUTPATIENT
Start: 2024-11-11 | End: 2024-11-12 | Stop reason: HOSPADM

## 2024-11-11 RX ORDER — AMOXICILLIN 250 MG
2 CAPSULE ORAL 2 TIMES DAILY PRN
Status: DISCONTINUED | OUTPATIENT
Start: 2024-11-11 | End: 2024-11-12 | Stop reason: HOSPADM

## 2024-11-11 RX ORDER — POLYETHYLENE GLYCOL 3350 17 G/17G
17 POWDER, FOR SOLUTION ORAL DAILY PRN
Status: DISCONTINUED | OUTPATIENT
Start: 2024-11-11 | End: 2024-11-12 | Stop reason: HOSPADM

## 2024-11-11 RX ORDER — METOPROLOL SUCCINATE 25 MG/1
25 TABLET, EXTENDED RELEASE ORAL 2 TIMES DAILY
Qty: 180 TABLET | Refills: 3 | Status: SHIPPED | OUTPATIENT
Start: 2024-11-11 | End: 2024-11-11

## 2024-11-11 RX ORDER — METOPROLOL SUCCINATE 25 MG/1
25 TABLET, EXTENDED RELEASE ORAL 2 TIMES DAILY
Status: DISCONTINUED | OUTPATIENT
Start: 2024-11-11 | End: 2024-11-11

## 2024-11-11 RX ORDER — SODIUM BICARBONATE 650 MG/1
1300 TABLET ORAL 3 TIMES DAILY
Status: DISCONTINUED | OUTPATIENT
Start: 2024-11-11 | End: 2024-11-12 | Stop reason: HOSPADM

## 2024-11-11 RX ADMIN — DOXYCYCLINE 100 MG: 100 CAPSULE ORAL at 16:53

## 2024-11-11 RX ADMIN — CALCIUM ACETATE 667 MG: 667 CAPSULE ORAL at 22:25

## 2024-11-11 RX ADMIN — CEFTRIAXONE 1000 MG: 1 INJECTION, POWDER, FOR SOLUTION INTRAMUSCULAR; INTRAVENOUS at 16:53

## 2024-11-11 RX ADMIN — ASPIRIN 81 MG CHEWABLE TABLET 324 MG: 81 TABLET CHEWABLE at 13:56

## 2024-11-11 RX ADMIN — TEMAZEPAM 30 MG: 15 CAPSULE ORAL at 22:25

## 2024-11-11 RX ADMIN — SODIUM BICARBONATE 1300 MG: 650 TABLET ORAL at 22:25

## 2024-11-11 RX ADMIN — SEVELAMER CARBONATE 800 MG: 800 TABLET, FILM COATED ORAL at 22:25

## 2024-11-11 RX ADMIN — IPRATROPIUM BROMIDE AND ALBUTEROL SULFATE 3 ML: .5; 3 SOLUTION RESPIRATORY (INHALATION) at 20:54

## 2024-11-11 RX ADMIN — Medication 10 ML: at 22:26

## 2024-11-11 RX ADMIN — CARVEDILOL 3.12 MG: 3.12 TABLET, FILM COATED ORAL at 22:25

## 2024-11-11 NOTE — TELEPHONE ENCOUNTER
Called and spoke to patient. Discussed results of recent Holter monitor. She will be switched from carvedilol to metoprolol succinate 25 mg twice daily. New prescription sent to her pharmacy.

## 2024-11-11 NOTE — Clinical Note
Level of Care: Med/Surg [1]   Admitting Physician: MIHAI OVALLE [[id number]]   Attending Physician: MIHAI OVALLE [[id number]]

## 2024-11-11 NOTE — ED PROVIDER NOTES
Subjective   History of Present Illness  Patient is a 74-year-old female presenting to the ED for chest pressure since last night.  Patient states that she was having chest pain in the center of her chest that started last night out of nowhere.  She states she did have a breathing treatment this morning after waking up and states the pressure turned into a sharp pain with no radiation.  She rates it at its worst a 10 out of 10, says it is much better now.  Patient had chest pain all throughout dialysis today, was given 2 nitro there which helped with the pressure.  She was able to complete her dialysis.  She states the pain is worse when she takes deep breaths.  She reports a slight cough that is not worse than her normal cough and weakness that is normal for her after dialysis.  She reports a headache yesterday the back of her neck that has not resolved.  Patient denies any fever, chills, dyspnea, palpitations, pain in her legs, changes in vision, confusion, dizziness, nausea, or vomiting.  Patient is scheduled to see Dr. Sosa tomorrow for an echo.        Review of Systems   Constitutional:  Negative for chills and fever.   Respiratory:  Positive for cough. Negative for shortness of breath.    Cardiovascular:  Positive for chest pain. Negative for palpitations and leg swelling.   Gastrointestinal:  Negative for nausea and vomiting.       Past Medical History:   Diagnosis Date    Lucas's esophagus     C. difficile colitis     Carotid artery disease     -50-74% left, 16-49% right (1/19)    COPD (chronic obstructive pulmonary disease)     DM2 (diabetes mellitus, type 2)     ESRD (end stage renal disease)     on HD    Gastric ulcer     at anastomatic site    Gastroparesis     unknown    GERD (gastroesophageal reflux disease)     Gout     not current    Hemodialysis patient     mwf    Hernia, incisional     abdomen    History of colonoscopy     UTD    History of degenerative disc disease     Hyperlipidemia      "Hypertension     Medicare annual wellness visit, subsequent 11/07/2020    Microscopic colitis     Nephrotic syndrome     Neuroendocrine tumor     of stomach    FABY (obstructive sleep apnea)     not treating    Pedal edema     ressolved    Proteinuria     Renal cell cancer, right     Rib pain on right side     chronic    Rotator cuff tear     bilateral    Stomach cancer 2016    Stroke     Uterine cancer     Vitamin B 12 deficiency     Vitamin D deficiency        Allergies   Allergen Reactions    Atorvastatin Calcium Other (See Comments)     \"felt like I was on fire\"    Gabapentin Dizziness     says was unable to walk    Niacin Other (See Comments)     \"FEELS LIKE SHE IS ON FIRE\"         Past Surgical History:   Procedure Laterality Date    APPENDECTOMY      ARTERIOVENOUS FISTULA Right     ARTERIOVENOUS FISTULA REPAIR      Clotted off and insert graft    ARTERIOVENOUS FISTULA/SHUNT SURGERY Left 12/30/2020    Procedure: ARTERIOVENOUS FISTULA FORMATION;  Surgeon: Jan Caicedo MD;  Location: [facility] MAIN OR;  Service: Vascular;  Laterality: Left;    ARTERIOVENOUS FISTULA/SHUNT SURGERY Left 4/23/2024    Procedure: FISTULOGRAM WITH SUBCLAVIAN ARTERY ANGIOPLASTY, fistula banding;  Surgeon: Franki Delgadillo II, MD;  Location: [facility] HYBRID OR;  Service: Vascular;  Laterality: Left;    BREAST BIOPSY      right nipple 1981    BRONCHOSCOPY N/A 6/11/2024    Procedure: BRONCHOSCOPY WITH RIGHT LUNG WASHING;  Surgeon: Aramis Barragan MD;  Location: [facility] ENDOSCOPY;  Service: Pulmonary;  Laterality: N/A;  RML atelectasis    COLONOSCOPY N/A 11/24/2020    Procedure: COLONOSCOPY with polypectomy x 7;  Surgeon: Jeffery White MD;  Location: [facility] ENDOSCOPY;  Service: Gastroenterology;  Laterality: N/A;  post op: polyps, diverticulosis, hemorrhoids    COLONOSCOPY N/A 6/1/2023    Procedure: COLONOSCOPY with polypectomy x 3 biopsy x 1;  Surgeon: Jeffery White MD;  Location: [facility] ENDOSCOPY;  Service: Gastroenterology;  " Laterality: N/A;  diverticulosis polyps    COLONOSCOPY N/A 9/4/2024    Procedure: COLONOSCOPY WITH POLYPECTOMY X 1;  Surgeon: Jeffery White MD;  Location: [facility] ENDOSCOPY;  Service: Gastroenterology;  Laterality: N/A;  POST- POLYP, DIVERTICULOSIS, INTERNAL AND EXTERNAL HEMORRHOIDS    ENDOSCOPY N/A 03/12/2022    Procedure: ESOPHAGOGASTRODUODENOSCOPY with biopsy x 1 area;  Surgeon: Javan Augustin MD;  Location: [facility] ENDOSCOPY;  Service: Gastroenterology;  Laterality: N/A;  post op: anastamosis    ENDOSCOPY N/A 10/9/2023    Procedure: ESOPHAGOGASTRODUODENOSCOPY with biopsy x3 areas;  Surgeon: Ace Campbell MD;  Location: [facility] ENDOSCOPY;  Service: Gastroenterology;  Laterality: N/A;  duodenal ulcers;irregular z line    ENDOSCOPY N/A 8/27/2024    Procedure: ESOPHAGOGASTRODUODENOSCOPY with argon plasma coagulation;  Surgeon: HILARIO Blackmon MD;  Location: [facility] ENDOSCOPY;  Service: Gastroenterology;  Laterality: N/A;  post op: AVM    ENTEROSCOPY SMALL BOWEL N/A 9/4/2024    Procedure: ESOPHAGOGASTRODUODENOSCOPY WITH SMALL BOWEL ENTEROSCOPY, ARGON PLASMA COAGULATION AND ENDOSCOPIC CLIPPING X 2 OF GASTRIC ANTRAL VASCULAR ECTASIA;  Surgeon: Jeffery White MD;  Location: [facility] ENDOSCOPY;  Service: Gastroenterology;  Laterality: N/A;  POST- GAVE    GASTRIC RESECTION      cancer    HYSTERECTOMY      LAPAROSCOPIC CHOLECYSTECTOMY      NEPHRECTOMY      right kidney removed     REDUCTION MAMMAPLASTY      TUMOR REMOVAL      boils    VENTRAL/INCISIONAL HERNIA REPAIR Right 08/28/2019    Procedure: VENTRAL/INCISIONAL HERNIA REPAIR;  Surgeon: Nilay Stevens MD;  Location: [facility] MAIN OR;  Service: General    VENTRAL/INCISIONAL HERNIA REPAIR N/A 10/01/2019    Procedure: OPEN VENTRAL/INCISIONAL HERNIA REPAIR;  Surgeon: Nilay Stevens MD;  Location: [facility] MAIN OR;  Service: General    VENTRAL/INCISIONAL HERNIA REPAIR N/A 10/11/2021    Procedure: VENTRAL/INCISIONAL HERNIA REPAIR LAPAROSCOPIC;  Surgeon: Nilay Stevens  MD BRICE;  Location: [facility] MAIN OR;  Service: General;  Laterality: N/A;    VENTRAL/INCISIONAL HERNIA REPAIR N/A 01/23/2023    Procedure: VENTRAL/INCISIONAL HERNIA REPAIR with MESH;  Surgeon: Nilay Stevens MD;  Location: [facility] MAIN OR;  Service: General;  Laterality: N/A;       Family History   Problem Relation Age of Onset    Heart disease Father        Social History     Socioeconomic History    Marital status: Single   Tobacco Use    Smoking status: Former     Current packs/day: 0.00     Average packs/day: 0.3 packs/day for 57.0 years (14.3 ttl pk-yrs)     Types: Cigarettes     Start date: 5/1/1964     Quit date: 5/1/2021     Years since quitting: 3.5     Passive exposure: Past    Smokeless tobacco: Never    Tobacco comments:     quit smoking 2 months ago   Vaping Use    Vaping status: Never Used   Substance and Sexual Activity    Alcohol use: No    Drug use: Never    Sexual activity: Defer           Objective   Physical Exam  Constitutional:       Appearance: She is well-developed.   HENT:      Head: Normocephalic and atraumatic.   Eyes:      Extraocular Movements: Extraocular movements intact.   Cardiovascular:      Rate and Rhythm: Normal rate and regular rhythm.      Heart sounds: Normal heart sounds.   Pulmonary:      Effort: Pulmonary effort is normal.      Breath sounds: Examination of the right-lower field reveals rhonchi. Examination of the left-lower field reveals rhonchi. Rhonchi present.   Abdominal:      Palpations: Abdomen is soft.      Tenderness: There is no abdominal tenderness.   Musculoskeletal:         General: Normal range of motion.      Cervical back: Normal range of motion.      Right lower leg: No tenderness. No edema.      Left lower leg: No tenderness. No edema.   Skin:     General: Skin is warm and dry.      Capillary Refill: Capillary refill takes less than 2 seconds.   Neurological:      General: No focal deficit present.      Mental Status: She is alert and oriented to person,  "place, and time.   Psychiatric:         Mood and Affect: Mood normal.         Behavior: Behavior normal.     Due to significant overcrowding in the emergency department patient was evaluated by myself in a hallway bed. This exam may be limited by privacy, noise level and the patient not wearing a hospital gown.  Explained to the patient our limitations and our overcrowding.  They were in agreement to continue the exam and treatment at this time.       Procedures           ED Course  ED Course as of 11/11/24 1740   Mon Nov 11, 2024   1435 Waiting for PICC team for IV access. [EC]   1638 Spoke with Dr. Henderson with [facility] who agrees to admit patient [EC]      ED Course User Index  [EC] Lisa Cha, ROSE    /77   Pulse 93   Temp 98.5 °F (36.9 °C)   Resp 16   Ht 152.4 cm (60\")   Wt 64.9 kg (143 lb 1.3 oz)   SpO2 99%   BMI 27.94 kg/m²   Labs Reviewed   COMPREHENSIVE METABOLIC PANEL - Abnormal; Notable for the following components:       Result Value    Creatinine 4.48 (*)     Alkaline Phosphatase 137 (*)     BUN/Creatinine Ratio 4.7 (*)     eGFR 9.7 (*)     All other components within normal limits    Narrative:     GFR Normal >60  Chronic Kidney Disease <60  Kidney Failure <15    The GFR formula is only valid for adults with stable renal function between ages 18 and 70.   TROPONIN - Abnormal; Notable for the following components:    HS Troponin T 49 (*)     All other components within normal limits    Narrative:     High Sensitive Troponin T Reference Range:  <14.0 ng/L- Negative Female for AMI  <22.0 ng/L- Negative Male for AMI  >=14 - Abnormal Female indicating possible myocardial injury.  >=22 - Abnormal Male indicating possible myocardial injury.   Clinicians would have to utilize clinical acumen, EKG, Troponin, and serial changes to determine if it is an Acute Myocardial Infarction or myocardial injury due to an underlying chronic condition.        CBC WITH AUTO DIFFERENTIAL - Abnormal; Notable for " the following components:    WBC 11.77 (*)     Hemoglobin 11.7 (*)     MCHC 30.7 (*)     RDW 18.0 (*)     RDW-SD 60.0 (*)     Neutrophil % 85.5 (*)     Lymphocyte % 4.9 (*)     Immature Grans % 0.6 (*)     Neutrophils, Absolute 10.05 (*)     Lymphocytes, Absolute 0.58 (*)     Immature Grans, Absolute 0.07 (*)     All other components within normal limits   HIGH SENSITIVITIY TROPONIN T 2HR - Abnormal; Notable for the following components:    HS Troponin T 47 (*)     All other components within normal limits    Narrative:     High Sensitive Troponin T Reference Range:  <14.0 ng/L- Negative Female for AMI  <22.0 ng/L- Negative Male for AMI  >=14 - Abnormal Female indicating possible myocardial injury.  >=22 - Abnormal Male indicating possible myocardial injury.   Clinicians would have to utilize clinical acumen, EKG, Troponin, and serial changes to determine if it is an Acute Myocardial Infarction or myocardial injury due to an underlying chronic condition.        BASIC METABOLIC PANEL - Abnormal; Notable for the following components:    Glucose 136 (*)     Creatinine 4.75 (*)     BUN/Creatinine Ratio 4.6 (*)     eGFR 9.1 (*)     All other components within normal limits    Narrative:     GFR Normal >60  Chronic Kidney Disease <60  Kidney Failure <15    The GFR formula is only valid for adults with stable renal function between ages 18 and 70.   TSH - Normal   MAGNESIUM - Normal   RAINBOW DRAW    Narrative:     The following orders were created for panel order [place] Draw.  Procedure                               Abnormality         Status                     ---------                               -----------         ------                     Green Top (Gel)[[id number]]                                  Final result               Lavender Top[[id number]]                                     Final result               Gold Top - SST[[id number]]                                   Final result               Light Blue  Top[[id number]]                                   Final result                 Please view results for these tests on the individual orders.   CBC WITH AUTO DIFFERENTIAL   CBC AND DIFFERENTIAL    Narrative:     The following orders were created for panel order CBC & Differential.  Procedure                               Abnormality         Status                     ---------                               -----------         ------                     CBC Auto Differential[[id number]]        Abnormal            Final result                 Please view results for these tests on the individual orders.   GREEN TOP   LAVENDER TOP   GOLD TOP - SST   LIGHT BLUE TOP   CBC AND DIFFERENTIAL    Narrative:     The following orders were created for panel order CBC & Differential.  Procedure                               Abnormality         Status                     ---------                               -----------         ------                     CBC Auto Differential[[id number]]                                                         Please view results for these tests on the individual orders.     Medications   sodium chloride 0.9 % flush 10 mL (has no administration in time range)   ipratropium-albuterol (DUO-NEB) nebulizer solution 3 mL (has no administration in time range)   amLODIPine (NORVASC) tablet 5 mg (has no administration in time range)   cholecalciferol (VITAMIN D3) tablet 1,000 Units (has no administration in time range)   hyoscyamine (LEVSIN) SL tablet 125 mcg (has no administration in time range)   metoprolol succinate XL (TOPROL-XL) 24 hr tablet 25 mg (has no administration in time range)   pantoprazole (PROTONIX) EC tablet 40 mg (has no administration in time range)   calcium acetate (PHOS BINDER)) capsule 667 mg (has no administration in time range)   sevelamer (RENVELA) tablet 800 mg (has no administration in time range)   sodium bicarbonate tablet 1,300 mg (has no administration in time range)    sucralfate (CARAFATE) tablet 1 g (has no administration in time range)   temazepam (RESTORIL) capsule 30 mg (has no administration in time range)   sodium chloride 0.9 % flush 10 mL (has no administration in time range)   sodium chloride 0.9 % flush 10 mL (has no administration in time range)   sodium chloride 0.9 % infusion 40 mL (has no administration in time range)   Potassium Replacement - Follow Nurse / BPA Driven Protocol (has no administration in time range)   Magnesium Standard Dose Replacement - Follow Nurse / BPA Driven Protocol (has no administration in time range)   Phosphorus Replacement - Follow Nurse / BPA Driven Protocol (has no administration in time range)   Calcium Replacement - Follow Nurse / BPA Driven Protocol (has no administration in time range)   sennosides-docusate (PERICOLACE) 8.6-50 MG per tablet 2 tablet (has no administration in time range)     And   polyethylene glycol (MIRALAX) packet 17 g (has no administration in time range)     And   bisacodyl (DULCOLAX) EC tablet 5 mg (has no administration in time range)     And   bisacodyl (DULCOLAX) suppository 10 mg (has no administration in time range)   ondansetron ODT (ZOFRAN-ODT) disintegrating tablet 4 mg (has no administration in time range)     Or   ondansetron (ZOFRAN) injection 4 mg (has no administration in time range)   aspirin chewable tablet 324 mg (324 mg Oral Given 11/11/24 1356)   cefTRIAXone (ROCEPHIN) 1,000 mg in sodium chloride 0.9 % 100 mL MBP (1,000 mg Intravenous New Bag 11/11/24 1653)   doxycycline (MONODOX) capsule 100 mg (100 mg Oral Given 11/11/24 1653)     XR Chest 1 View    Result Date: 11/11/2024  Impression: 1. Patchy consolidation in the lateral left lung base suspicious for pneumonia with a tiny pleural effusion. 2. Borderline cardiomegaly. Electronically Signed: Rey Jerome MD  11/11/2024 1:35 PM EST  Workstation ID: [id number]                   No data recorded                             Medical Decision  Making  Patient presented to the ED for the above complaint.    Patient underwent the above exam and evaluation.    While in the ED patient was placed in gown.  EKG, chest x-ray, blood work was obtained to assess for MI, arrhythmia, electrolyte abnormality.  Patient was given 324 mg aspirin.  Patient was started on IV Rocephin and p.o. doxycycline.  Upon reevaluation patient is resting comfortably, states her chest pain has resolved completely while she has been here.  Discussed findings with patient.  Recommend we admit her to treat her pneumonia and further cardiac evaluation.  Spoke with Dr. Henderson with [facility] who agreed to admit patient.  Patient  at bedside voiced understanding, agreeable with dispo plan.    EKG interpreted by Dr. Kwok showing sinus rhythm, rate 97, WI interval 176, QTc 441, compared to previous EKG 11/3/2024 sinus rhythm rate 94.  Labs were independently interpreted by myself and deemed remarkable for the following: BBC 11.77, hemoglobin 11.7, creatinine 4.48, troponin 49, mag 1.6  Chest x-ray independently interpreted by radiologist and reviewed by myself showing: Patchy consolidation in lateral left lung base suspicious for pneumonia with tiny pleural effusion, borderline cardiomegaly.    Appropriate PPE was worn during each patient encounter.    Discussed this patient with Dr. Jacobs who agrees with plan.      Problems Addressed:  Chest pain, unspecified type: complicated acute illness or injury  Pneumonia of left lower lobe due to infectious organism: complicated acute illness or injury    Risk  OTC drugs.  Prescription drug management.  Decision regarding hospitalization.        Final diagnoses:   Pneumonia of left lower lobe due to infectious organism   Chest pain, unspecified type       ED Disposition  ED Disposition       ED Disposition   Decision to Admit    Condition   --    Comment   Level of Care: Med/Surg [1]   Diagnosis: Pneumonia [237882]   Admitting Physician: JOSIE  AMY [[id number]]   Attending Physician: AMY GALINDO [[id number]]                 No follow-up provider specified.       Medication List      No changes were made to your prescriptions during this visit.            Lisa Cha PA-C  11/11/24 4944

## 2024-11-11 NOTE — CONSULTS
Peripheral IV placed in right forearm under ultrasound guidance. Blood drawn for labs. Patient tolerated well.

## 2024-11-12 ENCOUNTER — TELEPHONE (OUTPATIENT)
Dept: CARDIOLOGY | Facility: CLINIC | Age: 74
End: 2024-11-12
Payer: MEDICARE

## 2024-11-12 ENCOUNTER — APPOINTMENT (OUTPATIENT)
Dept: CARDIOLOGY | Facility: HOSPITAL | Age: 74
End: 2024-11-12
Payer: MEDICARE

## 2024-11-12 ENCOUNTER — READMISSION MANAGEMENT (OUTPATIENT)
Dept: CALL CENTER | Facility: HOSPITAL | Age: 74
End: 2024-11-12
Payer: MEDICARE

## 2024-11-12 VITALS
SYSTOLIC BLOOD PRESSURE: 128 MMHG | RESPIRATION RATE: 16 BRPM | WEIGHT: 143.06 LBS | DIASTOLIC BLOOD PRESSURE: 74 MMHG | HEIGHT: 60 IN | TEMPERATURE: 98.3 F | HEART RATE: 78 BPM | OXYGEN SATURATION: 97 % | BODY MASS INDEX: 28.09 KG/M2

## 2024-11-12 DIAGNOSIS — R07.9 ACUTE CHEST PAIN: Primary | ICD-10-CM

## 2024-11-12 DIAGNOSIS — R06.02 SOB (SHORTNESS OF BREATH): ICD-10-CM

## 2024-11-12 PROBLEM — R07.2 PRECORDIAL PAIN: Status: ACTIVE | Noted: 2024-11-12

## 2024-11-12 LAB
ANION GAP SERPL CALCULATED.3IONS-SCNC: 15.7 MMOL/L (ref 5–15)
BUN SERPL-MCNC: 23 MG/DL (ref 8–23)
BUN/CREAT SERPL: 4.9 (ref 7–25)
CALCIUM SPEC-SCNC: 8.2 MG/DL (ref 8.6–10.5)
CHLORIDE SERPL-SCNC: 105 MMOL/L (ref 98–107)
CO2 SERPL-SCNC: 18.3 MMOL/L (ref 22–29)
CREAT SERPL-MCNC: 4.66 MG/DL (ref 0.57–1)
DEPRECATED RDW RBC AUTO: 61.1 FL (ref 37–54)
EGFRCR SERPLBLD CKD-EPI 2021: 9.3 ML/MIN/1.73
EOSINOPHIL # BLD MANUAL: 0.08 10*3/MM3 (ref 0–0.4)
EOSINOPHIL NFR BLD MANUAL: 1 % (ref 0.3–6.2)
ERYTHROCYTE [DISTWIDTH] IN BLOOD BY AUTOMATED COUNT: 18.2 % (ref 12.3–15.4)
GLUCOSE SERPL-MCNC: 86 MG/DL (ref 65–99)
HCT VFR BLD AUTO: 36.6 % (ref 34–46.6)
HGB BLD-MCNC: 11 G/DL (ref 12–15.9)
LARGE PLATELETS: ABNORMAL
LYMPHOCYTES # BLD MANUAL: 1.09 10*3/MM3 (ref 0.7–3.1)
LYMPHOCYTES NFR BLD MANUAL: 10 % (ref 5–12)
MCH RBC QN AUTO: 29 PG (ref 26.6–33)
MCHC RBC AUTO-ENTMCNC: 30.1 G/DL (ref 31.5–35.7)
MCV RBC AUTO: 96.6 FL (ref 79–97)
MONOCYTES # BLD: 0.84 10*3/MM3 (ref 0.1–0.9)
NEUTROPHILS # BLD AUTO: 6.35 10*3/MM3 (ref 1.7–7)
NEUTROPHILS NFR BLD MANUAL: 75 % (ref 42.7–76)
NEUTS BAND NFR BLD MANUAL: 1 % (ref 0–5)
PLATELET # BLD AUTO: 300 10*3/MM3 (ref 140–450)
PMV BLD AUTO: 9.8 FL (ref 6–12)
POTASSIUM SERPL-SCNC: 4 MMOL/L (ref 3.5–5.2)
QT INTERVAL: 346 MS
QTC INTERVAL: 441 MS
RBC # BLD AUTO: 3.79 10*6/MM3 (ref 3.77–5.28)
RBC MORPH BLD: NORMAL
SMALL PLATELETS BLD QL SMEAR: ADEQUATE
SODIUM SERPL-SCNC: 139 MMOL/L (ref 136–145)
VARIANT LYMPHS NFR BLD MANUAL: 11 % (ref 19.6–45.3)
VARIANT LYMPHS NFR BLD MANUAL: 2 % (ref 0–5)
WBC MORPH BLD: NORMAL
WBC NRBC COR # BLD AUTO: 8.36 10*3/MM3 (ref 3.4–10.8)

## 2024-11-12 PROCEDURE — 80048 BASIC METABOLIC PNL TOTAL CA: CPT | Performed by: FAMILY MEDICINE

## 2024-11-12 PROCEDURE — 94664 DEMO&/EVAL PT USE INHALER: CPT

## 2024-11-12 PROCEDURE — 94799 UNLISTED PULMONARY SVC/PX: CPT

## 2024-11-12 PROCEDURE — 85007 BL SMEAR W/DIFF WBC COUNT: CPT | Performed by: INTERNAL MEDICINE

## 2024-11-12 PROCEDURE — 94761 N-INVAS EAR/PLS OXIMETRY MLT: CPT

## 2024-11-12 PROCEDURE — 85027 COMPLETE CBC AUTOMATED: CPT | Performed by: INTERNAL MEDICINE

## 2024-11-12 PROCEDURE — G0378 HOSPITAL OBSERVATION PER HR: HCPCS

## 2024-11-12 RX ORDER — METOPROLOL SUCCINATE 25 MG/1
25 TABLET, EXTENDED RELEASE ORAL 2 TIMES DAILY
Start: 2024-11-12

## 2024-11-12 RX ADMIN — Medication 1000 UNITS: at 09:42

## 2024-11-12 RX ADMIN — CALCIUM ACETATE 667 MG: 667 CAPSULE ORAL at 09:42

## 2024-11-12 RX ADMIN — SEVELAMER CARBONATE 800 MG: 800 TABLET, FILM COATED ORAL at 09:41

## 2024-11-12 RX ADMIN — IPRATROPIUM BROMIDE AND ALBUTEROL SULFATE 3 ML: .5; 3 SOLUTION RESPIRATORY (INHALATION) at 11:33

## 2024-11-12 RX ADMIN — IPRATROPIUM BROMIDE AND ALBUTEROL SULFATE 3 ML: .5; 3 SOLUTION RESPIRATORY (INHALATION) at 07:01

## 2024-11-12 RX ADMIN — AMLODIPINE BESYLATE 5 MG: 5 TABLET ORAL at 09:42

## 2024-11-12 RX ADMIN — SODIUM BICARBONATE 1300 MG: 650 TABLET ORAL at 09:41

## 2024-11-12 RX ADMIN — PANTOPRAZOLE SODIUM 40 MG: 40 TABLET, DELAYED RELEASE ORAL at 09:42

## 2024-11-12 RX ADMIN — Medication 10 ML: at 09:45

## 2024-11-12 RX ADMIN — CARVEDILOL 3.12 MG: 3.12 TABLET, FILM COATED ORAL at 09:42

## 2024-11-12 NOTE — PLAN OF CARE
Problem: Adult Inpatient Plan of Care  Goal: Absence of Hospital-Acquired Illness or Injury  Intervention: Identify and Manage Fall Risk  Intervention: Prevent Skin Injury  Intervention: Prevent and Manage VTE (Venous Thromboembolism) Risk  Goal: Optimal Comfort and Wellbeing  Intervention: Provide Person-Centered Care   Goal Outcome Evaluation:         Patient A&O will have ECHO outpatient.

## 2024-11-12 NOTE — OUTREACH NOTE
Prep Survey      Flowsheet Row Responses   [facility] facility patient discharged from? [facility]   Is LACE score < 7 ? No   Eligibility Readm Mgmt   Discharge diagnosis Precordial pain   Does the patient have one of the following disease processes/diagnoses(primary or secondary)? Other   Does the patient have Home health ordered? No   Is there a DME ordered? No   Prep survey completed? Yes            Cristina MORALES - Registered Nurse

## 2024-11-12 NOTE — CASE MANAGEMENT/SOCIAL WORK
Discharge Planning Assessment   [facility]     Patient Name: Sherry Lobato  MRN: [id number]  Today's Date: 11/12/2024    Admit Date: 11/11/2024    Plan: Routine home   Discharge Needs Assessment       Row Name 11/12/24 1337       Living Environment    People in Home significant other    Name(s) of People in Home significant other Derek    Current Living Arrangements home    Potentially Unsafe Housing Conditions none    In the past 12 months has the electric, gas, oil, or water company threatened to shut off services in your home? No    Primary Care Provided by self    Provides Primary Care For no one    Family Caregiver if Needed significant other    Family Caregiver Names Derek    Quality of Family Relationships helpful;involved;supportive    Able to Return to Prior Arrangements yes       Resource/Environmental Concerns    Resource/Environmental Concerns none    Transportation Concerns none       Transportation Needs    In the past 12 months, has lack of transportation kept you from medical appointments or from getting medications? no    In the past 12 months, has lack of transportation kept you from meetings, work, or from getting things needed for daily living? No       Food Insecurity    Within the past 12 months, you worried that your food would run out before you got the money to buy more. Never true    Within the past 12 months, the food you bought just didn't last and you didn't have money to get more. Never true       Transition Planning    Patient/Family Anticipates Transition to home with family    Patient/Family Anticipated Services at Transition none    Transportation Anticipated car, drives self;family or friend will provide       Discharge Needs Assessment    Readmission Within the Last 30 Days no previous admission in last 30 days    Equipment Currently Used at Home bp cuff;cane, straight    Concerns to be Addressed denies needs/concerns at this time    Anticipated Changes Related to Illness none     Equipment Needed After Discharge none                   Discharge Plan       Row Name 11/12/24 0331       Plan    Plan Routine home    Plan Comments CM met with patient and significant other Derek at the bedside. Confirmed PCP, insurance, and pharmacy. Patient declined M2B. Patient denies any difficulty affording medications. Patient is not current with any HHC/OPPT/OT services. Patient lives at home with her significant other, is indep with ADLS/IADLS, and drives. Patient's significant other can provide DC transport. DC Barriers: Nephrology following, likely able to discharge later today.                  Continued Care and Services - Admitted Since 11/11/2024    No active coordination exists for this encounter.       Expected Discharge Date and Time       Expected Discharge Date Expected Discharge Time    Nov 12, 2024            Demographic Summary       Row Name 11/12/24 1335       General Information    Admission Type observation    Arrived From emergency department    Required Notices Provided Observation Status Notice    Referral Source admission list    Reason for Consult discharge planning    Preferred Language English       Contact Information    Permission Granted to Share Info With     Contact Information Obtained for                    Functional Status       Row Name 11/12/24 0570       Functional Status    Usual Activity Tolerance good    Current Activity Tolerance good       Functional Status, IADL    Medications independent    Meal Preparation independent    Housekeeping independent    Laundry independent    Shopping independent           Oziel Claire RN     Cell number [phone]  Office number [phone]

## 2024-11-12 NOTE — PLAN OF CARE
Goal Outcome Evaluation:              Outcome Evaluation: Patient admitted to unit from ER. Head-to-toe assessment completed. No complaints at this time.

## 2024-11-12 NOTE — CONSULTS
[facility] CONSULT NOTE    Referring Provider: Kelli Henderson MD   Reason for Consultation: ESRD    Chief complaint chest pain    History of present illness: Patient is 74 years old female with history of ESRD, hypertension, diabetes, coronary disease, admitted to the hospital after dialysis due to chest pain which started during dialysis.  Patient initially refused to come to the hospital but came to ER after dialysis.  Chest pain was relieved with nitro.  Has some cough but without expectoration, fever, nausea, vomiting, abdominal pain.  Patient currently sitting comfortably.  Chest pain-free    History  Past Medical History:   Diagnosis Date    Lucas's esophagus     C. difficile colitis     Carotid artery disease     -50-74% left, 16-49% right (1/19)    COPD (chronic obstructive pulmonary disease)     DM2 (diabetes mellitus, type 2)     ESRD (end stage renal disease)     on HD    Gastric ulcer     at anastomatic site    Gastroparesis     unknown    GERD (gastroesophageal reflux disease)     Gout     not current    Hemodialysis patient     mwf    Hernia, incisional     abdomen    History of colonoscopy     UTD    History of degenerative disc disease     Hyperlipidemia     Hypertension     Medicare annual wellness visit, subsequent 11/07/2020    Microscopic colitis     Nephrotic syndrome     Neuroendocrine tumor     of stomach    FABY (obstructive sleep apnea)     not treating    Pedal edema     ressolved    Proteinuria     Renal cell cancer, right     Rib pain on right side     chronic    Rotator cuff tear     bilateral    Stomach cancer 2016    Stroke     Uterine cancer     Vitamin B 12 deficiency     Vitamin D deficiency      Past Surgical History:   Procedure Laterality Date    APPENDECTOMY      ARTERIOVENOUS FISTULA Right     ARTERIOVENOUS FISTULA REPAIR      Clotted off and insert graft    ARTERIOVENOUS FISTULA/SHUNT SURGERY Left 12/30/2020    Procedure: ARTERIOVENOUS FISTULA FORMATION;  Surgeon:  Jan Caicedo MD;  Location: [facility] MAIN OR;  Service: Vascular;  Laterality: Left;    ARTERIOVENOUS FISTULA/SHUNT SURGERY Left 4/23/2024    Procedure: FISTULOGRAM WITH SUBCLAVIAN ARTERY ANGIOPLASTY, fistula banding;  Surgeon: Franki Delgadillo II, MD;  Location: [facility] HYBRID OR;  Service: Vascular;  Laterality: Left;    BREAST BIOPSY      right nipple 1981    BRONCHOSCOPY N/A 6/11/2024    Procedure: BRONCHOSCOPY WITH RIGHT LUNG WASHING;  Surgeon: Aramis Barragan MD;  Location: [facility] ENDOSCOPY;  Service: Pulmonary;  Laterality: N/A;  RML atelectasis    COLONOSCOPY N/A 11/24/2020    Procedure: COLONOSCOPY with polypectomy x 7;  Surgeon: Jeffery White MD;  Location: [facility] ENDOSCOPY;  Service: Gastroenterology;  Laterality: N/A;  post op: polyps, diverticulosis, hemorrhoids    COLONOSCOPY N/A 6/1/2023    Procedure: COLONOSCOPY with polypectomy x 3 biopsy x 1;  Surgeon: Jeffery White MD;  Location: [facility] ENDOSCOPY;  Service: Gastroenterology;  Laterality: N/A;  diverticulosis polyps    COLONOSCOPY N/A 9/4/2024    Procedure: COLONOSCOPY WITH POLYPECTOMY X 1;  Surgeon: Jeffery White MD;  Location: [facility] ENDOSCOPY;  Service: Gastroenterology;  Laterality: N/A;  POST- POLYP, DIVERTICULOSIS, INTERNAL AND EXTERNAL HEMORRHOIDS    ENDOSCOPY N/A 03/12/2022    Procedure: ESOPHAGOGASTRODUODENOSCOPY with biopsy x 1 area;  Surgeon: Javan Augustin MD;  Location: [facility] ENDOSCOPY;  Service: Gastroenterology;  Laterality: N/A;  post op: anastamosis    ENDOSCOPY N/A 10/9/2023    Procedure: ESOPHAGOGASTRODUODENOSCOPY with biopsy x3 areas;  Surgeon: Ace Campbell MD;  Location: [facility] ENDOSCOPY;  Service: Gastroenterology;  Laterality: N/A;  duodenal ulcers;irregular z line    ENDOSCOPY N/A 8/27/2024    Procedure: ESOPHAGOGASTRODUODENOSCOPY with argon plasma coagulation;  Surgeon: HILARIO Blackmon MD;  Location: [facility] ENDOSCOPY;  Service: Gastroenterology;  Laterality: N/A;  post op: AVM    ENTEROSCOPY SMALL BOWEL  N/A 9/4/2024    Procedure: ESOPHAGOGASTRODUODENOSCOPY WITH SMALL BOWEL ENTEROSCOPY, ARGON PLASMA COAGULATION AND ENDOSCOPIC CLIPPING X 2 OF GASTRIC ANTRAL VASCULAR ECTASIA;  Surgeon: Jeffery White MD;  Location: [facility] ENDOSCOPY;  Service: Gastroenterology;  Laterality: N/A;  POST- GAVE    GASTRIC RESECTION      cancer    HYSTERECTOMY      LAPAROSCOPIC CHOLECYSTECTOMY      NEPHRECTOMY      right kidney removed     REDUCTION MAMMAPLASTY      TUMOR REMOVAL      boils    VENTRAL/INCISIONAL HERNIA REPAIR Right 08/28/2019    Procedure: VENTRAL/INCISIONAL HERNIA REPAIR;  Surgeon: Nilay Stevens MD;  Location: [facility] MAIN OR;  Service: General    VENTRAL/INCISIONAL HERNIA REPAIR N/A 10/01/2019    Procedure: OPEN VENTRAL/INCISIONAL HERNIA REPAIR;  Surgeon: Nilay Stevens MD;  Location: [facility] MAIN OR;  Service: General    VENTRAL/INCISIONAL HERNIA REPAIR N/A 10/11/2021    Procedure: VENTRAL/INCISIONAL HERNIA REPAIR LAPAROSCOPIC;  Surgeon: Nilay Stevens MD;  Location: [facility] MAIN OR;  Service: General;  Laterality: N/A;    VENTRAL/INCISIONAL HERNIA REPAIR N/A 01/23/2023    Procedure: VENTRAL/INCISIONAL HERNIA REPAIR with MESH;  Surgeon: Nilay Stevens MD;  Location: [facility] MAIN OR;  Service: General;  Laterality: N/A;     Social History     Tobacco Use    Smoking status: Former     Current packs/day: 0.00     Average packs/day: 0.3 packs/day for 57.0 years (14.3 ttl pk-yrs)     Types: Cigarettes     Start date: 5/1/1964     Quit date: 5/1/2021     Years since quitting: 3.5     Passive exposure: Past    Smokeless tobacco: Never    Tobacco comments:     quit smoking 2 months ago   Vaping Use    Vaping status: Never Used   Substance Use Topics    Alcohol use: No    Drug use: Never     Family History   Problem Relation Age of Onset    Heart disease Father        Review of Systems  ROS  As per HPI  Objective     Vital Signs  Temp:  [98.3 °F (36.8 °C)-98.7 °F (37.1 °C)] 98.3 °F (36.8 °C)  Heart Rate:  [78-95] 78  Resp:   [14-22] 16  BP: (119-151)/(63-98) 128/74    No intake/output data recorded.  I/O last 3 completed shifts:  In: 120 [P.O.:120]  Out: -     Physical Exam:  Physical Exam    General Appearance: alert, oriented x 3, no acute distress   Skin: warm and dry  HEENT: oral mucosa normal, nonicteric sclera  Neck: supple, no JVD  Lungs: CTA  Heart: RRR, normal S1 and S2  Abdomen: soft, nontender, nondistended  : no palpable bladder  Extremities: no edema, cyanosis or clubbing  Neuro: normal speech and mental status     Results Review:   I reviewed the patient's new clinical results.    Lab Results   Component Value Date    CALCIUM 8.2 (L) 11/12/2024    PHOS 4.5 10/22/2024     Results from last 7 days   Lab Units 11/12/24  0319 11/12/24  0035 11/11/24 2026 11/11/24  1713 11/11/24  1500   MAGNESIUM mg/dL  --   --   --   --  1.6   SODIUM mmol/L  --  139  --  139 136   POTASSIUM mmol/L  --  4.0  --  4.8 4.8   CHLORIDE mmol/L  --  105  --  100 99   CO2 mmol/L  --  18.3*  --  24.5 23.3   BUN mg/dL  --  23  --  22 21   CREATININE mg/dL  --  4.66*  --  4.75* 4.48*   GLUCOSE mg/dL  --  86  --  136* 99   CALCIUM mg/dL  --  8.2*  --  9.3 9.8   WBC 10*3/mm3 8.36  --  9.29  --  11.77*   HEMOGLOBIN g/dL 11.0*  --  12.1  --  11.7*   PLATELETS 10*3/mm3 300  --  306  --  316   ALT (SGPT) U/L  --   --   --   --  10   AST (SGOT) U/L  --   --   --   --  18     Lab Results   Component Value Date    TROPONINT 47 (H) 11/11/2024     Estimated Creatinine Clearance: 8.8 mL/min (A) (by C-G formula based on SCr of 4.66 mg/dL (H)).  Lab Results   Component Value Date    URICACID 4.7 07/10/2018       Brief Urine Lab Results       None            Prior to Admission medications    Medication Sig Start Date End Date Taking? Authorizing Provider   albuterol sulfate  (90 Base) MCG/ACT inhaler Inhale 2 puffs Every 4 (Four) Hours As Needed for Wheezing.   Yes Provider, MD Danica   amLODIPine (NORVASC) 5 MG tablet Take 1 tablet by mouth Daily.   Yes  Danica Murphy MD   calcium acetate (PHOS BINDER,) 667 MG capsule capsule Take 1 capsule by mouth 2 (Two) Times a Day.   Yes Danica Murphy MD   carvedilol (COREG) 3.125 MG tablet Take 1 tablet by mouth 2 (Two) Times a Day With Meals.   Yes Danica Murphy MD   Cholecalciferol 25 MCG (1000 UT) tablet Take 1 tablet by mouth Daily.   Yes Danica Murphy MD   hyoscyamine (LEVSIN) 0.125 MG SL tablet Place 1 tablet under the tongue Every 4 (Four) Hours As Needed for Cramping. 11/4/24  Yes Angelia Taylor MD   lidocaine-prilocaine (EMLA) 2.5-2.5 % cream Apply 1 Application topically to the appropriate area as directed Daily As Needed (severe pain, left 5th finger). Apply to left 5th finger 4/16/24  Yes Franki Delgadillo II, MD   multivitamin (MULTI-VITAMIN PO) Take 1 tablet by mouth Daily.   Yes Danica Murphy MD   pantoprazole (PROTONIX) 20 MG EC tablet Take 1 tablet by mouth Daily.   Yes Danica Murphy MD   sevelamer (RENAGEL) 800 MG tablet Take 1 tablet by mouth 2 (Two) Times a Day With Meals.   Yes Danica Murphy MD   sodium bicarbonate 650 MG tablet Take 2 tablets by mouth 3 (Three) Times a Day.   Yes Danica Murphy MD   sucralfate (CARAFATE) 1 g tablet Take 1 tablet by mouth 4 (Four) Times a Day As Needed.   Yes Danica Murphy MD   temazepam (RESTORIL) 30 MG capsule Take 1 capsule by mouth Every Night. 1/10/23  Yes Dancia Murphy MD   metoprolol succinate XL (Toprol XL) 25 MG 24 hr tablet Take 1 tablet by mouth 2 (Two) Times a Day. 11/12/24   Angelia Taylor MD       amLODIPine, 5 mg, Oral, Daily  calcium acetate, 667 mg, Oral, BID  carvedilol, 3.125 mg, Oral, BID With Meals  cholecalciferol, 1,000 Units, Oral, Daily  ipratropium-albuterol, 3 mL, Nebulization, 4x Daily - RT  pantoprazole, 40 mg, Oral, Daily  sevelamer, 800 mg, Oral, BID With Meals  sodium bicarbonate, 1,300 mg, Oral, TID  sodium chloride, 10 mL, Intravenous, Q12H  temazepam, 30 mg, Oral,  Nightly           Assessment & Plan       End-stage renal disease.  Patient gets dialysis Tuesday Thursday and Saturday as outpatient.  Electrolytes, volume status okay.  Patient received dialysis yesterday  Hypertension with chronic kidney disease.  Blood pressure well-controlled  Anemia and chronic kidney disease  Chest pain.  Management per primary team    Plan:  Hemodialysis TTS  Patient will resume dialysis as outpatient on discharge  Continue current anti hypertensives    I discussed the patients findings and my recommendations with patient and nursing staff    Faustino Buckley MD  11/12/24  13:30 EST

## 2024-11-12 NOTE — ED NOTES
Nursing report ED to floor  Sherry Lobato  74 y.o.  female    HPI :  HPI  Stated Reason for Visit: Chest pains that started while patient was in dialysis. patient was given 2 Nitro  History Obtained From: patient    Chief Complaint  Chief Complaint   Patient presents with    Chest Pain       Admitting doctor:   Angelia Taylor MD    Admitting diagnosis:   The primary encounter diagnosis was Pneumonia of left lower lobe due to infectious organism. A diagnosis of Chest pain, unspecified type was also pertinent to this visit.    Code status:   Current Code Status       Date Active Code Status Order ID Comments User Context       11/11/2024 1703 CPR (Attempt to Resuscitate) [id number]  Kelli Henderson MD ED        Question Answer    Code Status (Patient has no pulse and is not breathing) CPR (Attempt to Resuscitate)    Medical Interventions (Patient has pulse or is breathing) Full Support                    Allergies:   Atorvastatin calcium, Gabapentin, and Niacin    Isolation:   No active isolations    Intake and Output  No intake or output data in the 24 hours ending 11/11/24 1955    Weight:       11/11/24  1151   Weight: 64.9 kg (143 lb 1.3 oz)       Most recent vitals:   Vitals:    11/11/24 1526 11/11/24 1644 11/11/24 1718 11/11/24 1837   BP: 146/98 120/77 120/77 119/67   BP Location:       Pulse: 90 92 93 88   Resp: 16 16 16 16   Temp:       SpO2: 96% 98% 99% 99%   Weight:       Height:           Active LDAs/IV Access:   Lines, Drains & Airways       Active LDAs       Name Placement date Placement time Site Days    Peripheral IV 11/11/24 1515 Anterior;Right Forearm 11/11/24  1515  Forearm  less than 1                    Labs (abnormal labs have a star):   Labs Reviewed   COMPREHENSIVE METABOLIC PANEL - Abnormal; Notable for the following components:       Result Value    Creatinine 4.48 (*)     Alkaline Phosphatase 137 (*)     BUN/Creatinine Ratio 4.7 (*)     eGFR 9.7 (*)     All other components within normal  limits    Narrative:     GFR Normal >60  Chronic Kidney Disease <60  Kidney Failure <15    The GFR formula is only valid for adults with stable renal function between ages 18 and 70.   TROPONIN - Abnormal; Notable for the following components:    HS Troponin T 49 (*)     All other components within normal limits    Narrative:     High Sensitive Troponin T Reference Range:  <14.0 ng/L- Negative Female for AMI  <22.0 ng/L- Negative Male for AMI  >=14 - Abnormal Female indicating possible myocardial injury.  >=22 - Abnormal Male indicating possible myocardial injury.   Clinicians would have to utilize clinical acumen, EKG, Troponin, and serial changes to determine if it is an Acute Myocardial Infarction or myocardial injury due to an underlying chronic condition.        CBC WITH AUTO DIFFERENTIAL - Abnormal; Notable for the following components:    WBC 11.77 (*)     Hemoglobin 11.7 (*)     MCHC 30.7 (*)     RDW 18.0 (*)     RDW-SD 60.0 (*)     Neutrophil % 85.5 (*)     Lymphocyte % 4.9 (*)     Immature Grans % 0.6 (*)     Neutrophils, Absolute 10.05 (*)     Lymphocytes, Absolute 0.58 (*)     Immature Grans, Absolute 0.07 (*)     All other components within normal limits   HIGH SENSITIVITIY TROPONIN T 2HR - Abnormal; Notable for the following components:    HS Troponin T 47 (*)     All other components within normal limits    Narrative:     High Sensitive Troponin T Reference Range:  <14.0 ng/L- Negative Female for AMI  <22.0 ng/L- Negative Male for AMI  >=14 - Abnormal Female indicating possible myocardial injury.  >=22 - Abnormal Male indicating possible myocardial injury.   Clinicians would have to utilize clinical acumen, EKG, Troponin, and serial changes to determine if it is an Acute Myocardial Infarction or myocardial injury due to an underlying chronic condition.        BASIC METABOLIC PANEL - Abnormal; Notable for the following components:    Glucose 136 (*)     Creatinine 4.75 (*)     BUN/Creatinine Ratio 4.6  (*)     eGFR 9.1 (*)     All other components within normal limits    Narrative:     GFR Normal >60  Chronic Kidney Disease <60  Kidney Failure <15    The GFR formula is only valid for adults with stable renal function between ages 18 and 70.   TSH - Normal   MAGNESIUM - Normal   RAINBOW DRAW    Narrative:     The following orders were created for panel order [place] Draw.  Procedure                               Abnormality         Status                     ---------                               -----------         ------                     Green Top (Gel)[[id number]]                                  Final result               Lavender Top[[id number]]                                     Final result               Gold Top - SST[[id number]]                                   Final result               Light Blue Top[[id number]]                                   Final result                 Please view results for these tests on the individual orders.   CBC AND DIFFERENTIAL    Narrative:     The following orders were created for panel order CBC & Differential.  Procedure                               Abnormality         Status                     ---------                               -----------         ------                     CBC Auto Differential[[id number]]        Abnormal            Final result                 Please view results for these tests on the individual orders.   GREEN TOP   LAVENDER TOP   GOLD TOP - SST   LIGHT BLUE TOP       EKG:   ECG 12 Lead Chest Pain   Preliminary Result   HEART RATE=97  bpm   RR Qcpzslle=994  ms   OH Usjmabff=777  ms   P Horizontal Axis=10  deg   P Front Axis=59  deg   QRSD Interval=84  ms   QT Ippsiccf=716  ms   SJxN=627  ms   QRS Axis=-13  deg   T Wave Axis=174  deg   - ABNORMAL ECG -   Sinus rhythm   Atrial premature complex   LVH with secondary repolarization abnormality   Date and Time of Study:2024-11-11 11:48:39          Meds given in ED:   Medications   sodium chloride  0.9 % flush 10 mL (has no administration in time range)   ipratropium-albuterol (DUO-NEB) nebulizer solution 3 mL (has no administration in time range)   amLODIPine (NORVASC) tablet 5 mg (has no administration in time range)   cholecalciferol (VITAMIN D3) tablet 1,000 Units (has no administration in time range)   hyoscyamine (LEVSIN) SL tablet 125 mcg (has no administration in time range)   pantoprazole (PROTONIX) EC tablet 40 mg (has no administration in time range)   calcium acetate (PHOS BINDER)) capsule 667 mg (has no administration in time range)   temazepam (RESTORIL) capsule 30 mg (has no administration in time range)   sodium chloride 0.9 % flush 10 mL (has no administration in time range)   sodium chloride 0.9 % flush 10 mL (has no administration in time range)   sodium chloride 0.9 % infusion 40 mL (has no administration in time range)   Potassium Replacement - Follow Nurse / BPA Driven Protocol (has no administration in time range)   Magnesium Standard Dose Replacement - Follow Nurse / BPA Driven Protocol (has no administration in time range)   Phosphorus Replacement - Follow Nurse / BPA Driven Protocol (has no administration in time range)   Calcium Replacement - Follow Nurse / BPA Driven Protocol (has no administration in time range)   sennosides-docusate (PERICOLACE) 8.6-50 MG per tablet 2 tablet (has no administration in time range)     And   polyethylene glycol (MIRALAX) packet 17 g (has no administration in time range)     And   bisacodyl (DULCOLAX) EC tablet 5 mg (has no administration in time range)     And   bisacodyl (DULCOLAX) suppository 10 mg (has no administration in time range)   ondansetron ODT (ZOFRAN-ODT) disintegrating tablet 4 mg (has no administration in time range)     Or   ondansetron (ZOFRAN) injection 4 mg (has no administration in time range)   carvedilol (COREG) tablet 3.125 mg (has no administration in time range)   sucralfate (CARAFATE) tablet 1 g (has no administration in  time range)   sevelamer (RENVELA) tablet 800 mg (has no administration in time range)   sodium bicarbonate tablet 1,300 mg (has no administration in time range)   aspirin chewable tablet 324 mg (324 mg Oral Given 11/11/24 1356)   cefTRIAXone (ROCEPHIN) 1,000 mg in sodium chloride 0.9 % 100 mL MBP (0 mg Intravenous Stopped 11/11/24 1918)   doxycycline (MONODOX) capsule 100 mg (100 mg Oral Given 11/11/24 1653)       Imaging results:  XR Chest 1 View    Result Date: 11/11/2024  Impression: 1. Patchy consolidation in the lateral left lung base suspicious for pneumonia with a tiny pleural effusion. 2. Borderline cardiomegaly. Electronically Signed: Rey Jerome MD  11/11/2024 1:35 PM EST  Workstation ID: [id number]     Ambulatory status:   - 1 assist    Social issues:   Social History     Socioeconomic History    Marital status: Single   Tobacco Use    Smoking status: Former     Current packs/day: 0.00     Average packs/day: 0.3 packs/day for 57.0 years (14.3 ttl pk-yrs)     Types: Cigarettes     Start date: 5/1/1964     Quit date: 5/1/2021     Years since quitting: 3.5     Passive exposure: Past    Smokeless tobacco: Never    Tobacco comments:     quit smoking 2 months ago   Vaping Use    Vaping status: Never Used   Substance and Sexual Activity    Alcohol use: No    Drug use: Never    Sexual activity: Defer       Peripheral Neurovascular  Peripheral Neurovascular (Adult)  Peripheral Neurovascular WDL: WDL    Neuro Cognitive  Neuro Cognitive (Adult)  Cognitive/Neuro/Behavioral WDL: WDL    Learning  Learning Assessment  Learning Readiness and Ability: no barriers identified    Respiratory  Respiratory WDL  Respiratory WDL: WDL  Breath Sounds  All Lung Fields Breath Sounds: All Fields  All Lung Fields Breath Sounds: clear, equal bilaterally    Abdominal Pain       Pain Assessments  Pain (Adult)  (0-10) Pain Rating: Rest: 7  (0-10) Pain Rating: Activity: 7  Pain Location: chest  Response to Pain Interventions: interventions  effective per patient    NIH Stroke Scale       Moses Crain RN  11/11/24 19:55 EST

## 2024-11-13 NOTE — H&P
"    Patient Care Team:  Raegan Gotti MD as PCP - General (Family Medicine)  Nilay Stevens MD as Consulting Physician (General Surgery)  Stephen Perea MD as Surgeon (Thoracic Surgery)  Makayla Navarro RN as Nurse Navigator  Mak Sosa MD as Cardiologist (Cardiology)  Anjum Cam MD as Consulting Physician (Hematology and Oncology)    Chief complaint chest pain    Subjective     Patient is a 74 y.o. female with multiple chronic medical conditions listed below who presents from home after experiencing a brief episode of SSCP while at dialysis.  Symptoms resolved spontaneously and lasted a few minutes.  There was no associated SOA, diaphoresis or dizziness. Vitals were stable.  Dialysis nurse encouraged her to go to ER to be evaluated.  She drove herself home then had her SO drive her to ER.  She had no recurrence of symptoms and feels \"fine\" now.    Prior cardiac workup includes a normal heart cath as part of kidney transplant workup in the remote past.    Review of Systems   Constitutional:  Negative for activity change, appetite change, chills, diaphoresis and fatigue.   HENT:  Negative for facial swelling.    Eyes:  Negative for visual disturbance.   Respiratory:  Negative for cough, shortness of breath, wheezing and stridor.    Cardiovascular:  Positive for chest pain. Negative for palpitations and leg swelling.   Gastrointestinal:  Negative for abdominal pain, constipation, diarrhea and nausea.   Endocrine: Negative for polyuria.   Genitourinary:  Negative for dysuria.   Musculoskeletal:  Negative for arthralgias, back pain and gait problem.   Neurological:  Negative for syncope and weakness.   Psychiatric/Behavioral:  Negative for confusion.           History  Past Medical History:   Diagnosis Date    Lucas's esophagus     C. difficile colitis     Carotid artery disease     -50-74% left, 16-49% right (1/19)    COPD (chronic obstructive pulmonary disease)     DM2 (diabetes mellitus, type 2)  "    ESRD (end stage renal disease)     on HD    Gastric ulcer     at anastomatic site    Gastroparesis     unknown    GERD (gastroesophageal reflux disease)     Gout     not current    Hemodialysis patient     mwf    Hernia, incisional     abdomen    History of colonoscopy     UTD    History of degenerative disc disease     Hyperlipidemia     Hypertension     Medicare annual wellness visit, subsequent 11/07/2020    Microscopic colitis     Nephrotic syndrome     Neuroendocrine tumor     of stomach    FABY (obstructive sleep apnea)     not treating    Pedal edema     ressolved    Proteinuria     Renal cell cancer, right     Rib pain on right side     chronic    Rotator cuff tear     bilateral    Stomach cancer 2016    Stroke     Uterine cancer     Vitamin B 12 deficiency     Vitamin D deficiency      Past Surgical History:   Procedure Laterality Date    APPENDECTOMY      ARTERIOVENOUS FISTULA Right     ARTERIOVENOUS FISTULA REPAIR      Clotted off and insert graft    ARTERIOVENOUS FISTULA/SHUNT SURGERY Left 12/30/2020    Procedure: ARTERIOVENOUS FISTULA FORMATION;  Surgeon: Jan Caicedo MD;  Location: [facility] MAIN OR;  Service: Vascular;  Laterality: Left;    ARTERIOVENOUS FISTULA/SHUNT SURGERY Left 4/23/2024    Procedure: FISTULOGRAM WITH SUBCLAVIAN ARTERY ANGIOPLASTY, fistula banding;  Surgeon: Franki Delgadillo II, MD;  Location: [facility] HYBRID OR;  Service: Vascular;  Laterality: Left;    BREAST BIOPSY      right nipple 1981    BRONCHOSCOPY N/A 6/11/2024    Procedure: BRONCHOSCOPY WITH RIGHT LUNG WASHING;  Surgeon: Aramis Barragan MD;  Location: [facility] ENDOSCOPY;  Service: Pulmonary;  Laterality: N/A;  RML atelectasis    COLONOSCOPY N/A 11/24/2020    Procedure: COLONOSCOPY with polypectomy x 7;  Surgeon: Jeffery White MD;  Location: [facility] ENDOSCOPY;  Service: Gastroenterology;  Laterality: N/A;  post op: polyps, diverticulosis, hemorrhoids    COLONOSCOPY N/A 6/1/2023    Procedure: COLONOSCOPY with polypectomy x  3 biopsy x 1;  Surgeon: Jeffery White MD;  Location: [facility] ENDOSCOPY;  Service: Gastroenterology;  Laterality: N/A;  diverticulosis polyps    COLONOSCOPY N/A 9/4/2024    Procedure: COLONOSCOPY WITH POLYPECTOMY X 1;  Surgeon: Jeffery White MD;  Location: [facility] ENDOSCOPY;  Service: Gastroenterology;  Laterality: N/A;  POST- POLYP, DIVERTICULOSIS, INTERNAL AND EXTERNAL HEMORRHOIDS    ENDOSCOPY N/A 03/12/2022    Procedure: ESOPHAGOGASTRODUODENOSCOPY with biopsy x 1 area;  Surgeon: Javan Augustin MD;  Location: [facility] ENDOSCOPY;  Service: Gastroenterology;  Laterality: N/A;  post op: anastamosis    ENDOSCOPY N/A 10/9/2023    Procedure: ESOPHAGOGASTRODUODENOSCOPY with biopsy x3 areas;  Surgeon: Ace Campbell MD;  Location: [facility] ENDOSCOPY;  Service: Gastroenterology;  Laterality: N/A;  duodenal ulcers;irregular z line    ENDOSCOPY N/A 8/27/2024    Procedure: ESOPHAGOGASTRODUODENOSCOPY with argon plasma coagulation;  Surgeon: HILARIO Blackmon MD;  Location: [facility] ENDOSCOPY;  Service: Gastroenterology;  Laterality: N/A;  post op: AVM    ENTEROSCOPY SMALL BOWEL N/A 9/4/2024    Procedure: ESOPHAGOGASTRODUODENOSCOPY WITH SMALL BOWEL ENTEROSCOPY, ARGON PLASMA COAGULATION AND ENDOSCOPIC CLIPPING X 2 OF GASTRIC ANTRAL VASCULAR ECTASIA;  Surgeon: Jeffery White MD;  Location: [facility] ENDOSCOPY;  Service: Gastroenterology;  Laterality: N/A;  POST- GAVE    GASTRIC RESECTION      cancer    HYSTERECTOMY      LAPAROSCOPIC CHOLECYSTECTOMY      NEPHRECTOMY      right kidney removed     REDUCTION MAMMAPLASTY      TUMOR REMOVAL      boils    VENTRAL/INCISIONAL HERNIA REPAIR Right 08/28/2019    Procedure: VENTRAL/INCISIONAL HERNIA REPAIR;  Surgeon: Nilay Stevens MD;  Location: [facility] MAIN OR;  Service: General    VENTRAL/INCISIONAL HERNIA REPAIR N/A 10/01/2019    Procedure: OPEN VENTRAL/INCISIONAL HERNIA REPAIR;  Surgeon: Nilay Stevens MD;  Location: [facility] MAIN OR;  Service: General    VENTRAL/INCISIONAL HERNIA  REPAIR N/A 10/11/2021    Procedure: VENTRAL/INCISIONAL HERNIA REPAIR LAPAROSCOPIC;  Surgeon: Nilay Stevens MD;  Location: [facility] MAIN OR;  Service: General;  Laterality: N/A;    VENTRAL/INCISIONAL HERNIA REPAIR N/A 01/23/2023    Procedure: VENTRAL/INCISIONAL HERNIA REPAIR with MESH;  Surgeon: Nilay Stevens MD;  Location: [facility] MAIN OR;  Service: General;  Laterality: N/A;     Family History   Problem Relation Age of Onset    Heart disease Father      Social History     Tobacco Use    Smoking status: Former     Current packs/day: 0.00     Average packs/day: 0.3 packs/day for 57.0 years (14.3 ttl pk-yrs)     Types: Cigarettes     Start date: 5/1/1964     Quit date: 5/1/2021     Years since quitting: 3.5     Passive exposure: Past    Smokeless tobacco: Never    Tobacco comments:     quit smoking 2 months ago   Vaping Use    Vaping status: Never Used   Substance Use Topics    Alcohol use: No    Drug use: Never     No medications prior to admission.     Allergies:  Atorvastatin calcium, Gabapentin, and Niacin    Objective     Vital Signs  Heart Rate:  [78] 78  Resp:  [16] 16     Physical Exam:      General Appearance:    Alert, cooperative, in no acute distress   Head:    Normocephalic, without obvious abnormality, atraumatic   Eyes:            Lids and lashes normal, conjunctivae and sclerae normal, no   icterus, no pallor, corneas clear, PERRLA   Ears:    Ears appear intact with no abnormalities noted   Throat:   No oral lesions, no thrush, oral mucosa moist   Neck:   No adenopathy, supple, trachea midline, no thyromegaly, no   carotid bruit, no JVD   Lungs:     Clear to auscultation,respirations regular, even and                  unlabored    Heart:    Regular rhythm and normal rate, normal S1 and S2, no            murmur, no gallop, no rub, no click   Chest Wall:    No abnormalities observed   Abdomen:     Normal bowel sounds, no masses, no organomegaly, soft        non-tender, non-distended, no guarding, no  rebound                tenderness   Extremities:   Moves all extremities well, no edema, no cyanosis, no             redness   Pulses:   Pulses palpable and equal bilaterally   Skin:   No bleeding, bruising or rash   Lymph nodes:   No palpable adenopathy   Neurologic:   Cranial nerves 2 - 12 grossly intact, sensation intact, DTR       present and equal bilaterally       Results Review:     Imaging Results (Last 24 Hours)       ** No results found for the last 24 hours. **             Lab Results (last 24 hours)       Procedure Component Value Units Date/Time    CBC & Differential [[id number]]  (Abnormal) Collected: 11/12/24 0035    Specimen: Blood from Arm, Right Updated: 11/12/24 0412    Narrative:      The following orders were created for panel order CBC & Differential.  Procedure                               Abnormality         Status                     ---------                               -----------         ------                     Manual Differential[[id number]]          Abnormal            Final result               CBC Auto Differential[[id number]]                                                         Please view results for these tests on the individual orders.    Manual Differential [[id number]]  (Abnormal) Collected: 11/12/24 0319    Specimen: Blood from Arm, Right Updated: 11/12/24 0412     Neutrophil % 75.0 %      Lymphocyte % 11.0 %      Monocyte % 10.0 %      Eosinophil % 1.0 %      Bands %  1.0 %      Atypical Lymphocyte % 2.0 %      Neutrophils Absolute 6.35 10*3/mm3      Lymphocytes Absolute 1.09 10*3/mm3      Monocytes Absolute 0.84 10*3/mm3      Eosinophils Absolute 0.08 10*3/mm3      RBC Morphology Normal     WBC Morphology Normal     Platelet Estimate Adequate     Large Platelets Slight/1+    CBC & Differential [[id number]]  (Abnormal) Collected: 11/12/24 0319    Specimen: Blood from Arm, Right Updated: 11/12/24 0412    Narrative:      The following orders were created for panel order CBC  & Differential.  Procedure                               Abnormality         Status                     ---------                               -----------         ------                     CBC Auto Differential[[id number]]        Abnormal            Final result                 Please view results for these tests on the individual orders.    CBC Auto Differential [[id number]]  (Abnormal) Collected: 11/12/24 0319    Specimen: Blood from Arm, Right Updated: 11/12/24 0412     WBC 8.36 10*3/mm3      RBC 3.79 10*6/mm3      Hemoglobin 11.0 g/dL      Hematocrit 36.6 %      MCV 96.6 fL      MCH 29.0 pg      MCHC 30.1 g/dL      RDW 18.2 %      RDW-SD 61.1 fl      MPV 9.8 fL      Platelets 300 10*3/mm3     Basic Metabolic Panel [[id number]]  (Abnormal) Collected: 11/12/24 0035    Specimen: Blood from Arm, Right Updated: 11/12/24 0126     Glucose 86 mg/dL      BUN 23 mg/dL      Creatinine 4.66 mg/dL      Sodium 139 mmol/L      Potassium 4.0 mmol/L      Chloride 105 mmol/L      CO2 18.3 mmol/L      Calcium 8.2 mg/dL      BUN/Creatinine Ratio 4.9     Anion Gap 15.7 mmol/L      eGFR 9.3 mL/min/1.73      Comment: <15 Indicative of kidney failure       Narrative:      GFR Normal >60  Chronic Kidney Disease <60  Kidney Failure <15    The GFR formula is only valid for adults with stable renal function between ages 18 and 70.    CBC & Differential [[id number]]  (Abnormal) Collected: 11/11/24 2026    Specimen: Blood Updated: 11/11/24 2030    Narrative:      The following orders were created for panel order CBC & Differential.  Procedure                               Abnormality         Status                     ---------                               -----------         ------                     CBC Auto Differential[[id number]]        Abnormal            Final result                 Please view results for these tests on the individual orders.    CBC Auto Differential [[id number]]  (Abnormal) Collected: 11/11/24 2026    Specimen: Blood  Updated: 11/11/24 2030     WBC 9.29 10*3/mm3      RBC 4.10 10*6/mm3      Hemoglobin 12.1 g/dL      Hematocrit 39.3 %      MCV 95.9 fL      MCH 29.5 pg      MCHC 30.8 g/dL      RDW 18.1 %      RDW-SD 60.2 fl      MPV 9.9 fL      Platelets 306 10*3/mm3      Neutrophil % 78.3 %      Lymphocyte % 8.0 %      Monocyte % 11.0 %      Eosinophil % 1.8 %      Basophil % 0.6 %      Immature Grans % 0.3 %      Neutrophils, Absolute 7.27 10*3/mm3      Lymphocytes, Absolute 0.74 10*3/mm3      Monocytes, Absolute 1.02 10*3/mm3      Eosinophils, Absolute 0.17 10*3/mm3      Basophils, Absolute 0.06 10*3/mm3      Immature Grans, Absolute 0.03 10*3/mm3      nRBC 0.0 /100 WBC     High Sensitivity Troponin T 2Hr [[id number]]  (Abnormal) Collected: 11/11/24 1713    Specimen: Blood Updated: 11/11/24 1739     HS Troponin T 47 ng/L      Troponin T Delta -2 ng/L     Narrative:      High Sensitive Troponin T Reference Range:  <14.0 ng/L- Negative Female for AMI  <22.0 ng/L- Negative Male for AMI  >=14 - Abnormal Female indicating possible myocardial injury.  >=22 - Abnormal Male indicating possible myocardial injury.   Clinicians would have to utilize clinical acumen, EKG, Troponin, and serial changes to determine if it is an Acute Myocardial Infarction or myocardial injury due to an underlying chronic condition.         Basic Metabolic Panel [[id number]]  (Abnormal) Collected: 11/11/24 1713    Specimen: Blood Updated: 11/11/24 1739     Glucose 136 mg/dL      BUN 22 mg/dL      Creatinine 4.75 mg/dL      Sodium 139 mmol/L      Potassium 4.8 mmol/L      Chloride 100 mmol/L      CO2 24.5 mmol/L      Calcium 9.3 mg/dL      BUN/Creatinine Ratio 4.6     Anion Gap 14.5 mmol/L      eGFR 9.1 mL/min/1.73      Comment: <15 Indicative of kidney failure       Narrative:      GFR Normal >60  Chronic Kidney Disease <60  Kidney Failure <15    The GFR formula is only valid for adults with stable renal function between ages 18 and 70.    High Sensitivity  Troponin T [[id number]]  (Abnormal) Collected: 11/11/24 1500    Specimen: Blood Updated: 11/11/24 1553     HS Troponin T 49 ng/L     Narrative:      High Sensitive Troponin T Reference Range:  <14.0 ng/L- Negative Female for AMI  <22.0 ng/L- Negative Male for AMI  >=14 - Abnormal Female indicating possible myocardial injury.  >=22 - Abnormal Male indicating possible myocardial injury.   Clinicians would have to utilize clinical acumen, EKG, Troponin, and serial changes to determine if it is an Acute Myocardial Infarction or myocardial injury due to an underlying chronic condition.         TSH [[id number]]  (Normal) Collected: 11/11/24 1500    Specimen: Blood Updated: 11/11/24 1553     TSH 1.860 uIU/mL     Magnesium [[id number]]  (Normal) Collected: 11/11/24 1500    Specimen: Blood Updated: 11/11/24 1553     Magnesium 1.6 mg/dL     Comprehensive Metabolic Panel [[id number]]  (Abnormal) Collected: 11/11/24 1500    Specimen: Blood Updated: 11/11/24 1553     Glucose 99 mg/dL      BUN 21 mg/dL      Creatinine 4.48 mg/dL      Sodium 136 mmol/L      Potassium 4.8 mmol/L      Chloride 99 mmol/L      CO2 23.3 mmol/L      Calcium 9.8 mg/dL      Total Protein 7.4 g/dL      Albumin 4.0 g/dL      ALT (SGPT) 10 U/L      AST (SGOT) 18 U/L      Alkaline Phosphatase 137 U/L      Total Bilirubin 0.3 mg/dL      Globulin 3.4 gm/dL      A/G Ratio 1.2 g/dL      BUN/Creatinine Ratio 4.7     Anion Gap 13.7 mmol/L      eGFR 9.7 mL/min/1.73      Comment: <15 Indicative of kidney failure       Narrative:      GFR Normal >60  Chronic Kidney Disease <60  Kidney Failure <15    The GFR formula is only valid for adults with stable renal function between ages 18 and 70.    [place] Draw [[id number]] Collected: 11/11/24 1500    Specimen: Blood Updated: 11/11/24 1531    Narrative:      The following orders were created for panel order [place] Draw.  Procedure                               Abnormality         Status                     ---------                                -----------         ------                     Green Top (Gel)[[id number]]                                  Final result               Lavender Top[[id number]]                                     Final result               Gold Top - SST[[id number]]                                   Final result               Light Blue Top[[id number]]                                   Final result                 Please view results for these tests on the individual orders.    Lavender Top [[id number]] Collected: 11/11/24 1500    Specimen: Blood Updated: 11/11/24 1531     Extra Tube hold for add-on     Comment: Auto resulted       Gold Top - SST [[id number]] Collected: 11/11/24 1500    Specimen: Blood Updated: 11/11/24 1531     Extra Tube Hold for add-ons.     Comment: Auto resulted.       Light Blue Top [[id number]] Collected: 11/11/24 1500    Specimen: Blood Updated: 11/11/24 1531     Extra Tube Hold for add-ons.     Comment: Auto resulted       Green Top (Gel) [[id number]] Collected: 11/11/24 1500    Specimen: Blood Updated: 11/11/24 1531     Extra Tube Hold for add-ons.     Comment: Auto resulted.       CBC & Differential [[id number]]  (Abnormal) Collected: 11/11/24 1500    Specimen: Blood Updated: 11/11/24 1519    Narrative:      The following orders were created for panel order CBC & Differential.  Procedure                               Abnormality         Status                     ---------                               -----------         ------                     CBC Auto Differential[[id number]]        Abnormal            Final result                 Please view results for these tests on the individual orders.    CBC Auto Differential [[id number]]  (Abnormal) Collected: 11/11/24 1500    Specimen: Blood Updated: 11/11/24 1519     WBC 11.77 10*3/mm3      RBC 4.00 10*6/mm3      Hemoglobin 11.7 g/dL      Hematocrit 38.1 %      MCV 95.3 fL      MCH 29.3 pg      MCHC 30.7 g/dL      RDW 18.0 %      RDW-SD 60.0 fl       MPV 10.0 fL      Platelets 316 10*3/mm3      Neutrophil % 85.5 %      Lymphocyte % 4.9 %      Monocyte % 7.6 %      Eosinophil % 1.1 %      Basophil % 0.3 %      Immature Grans % 0.6 %      Neutrophils, Absolute 10.05 10*3/mm3      Lymphocytes, Absolute 0.58 10*3/mm3      Monocytes, Absolute 0.90 10*3/mm3      Eosinophils, Absolute 0.13 10*3/mm3      Basophils, Absolute 0.04 10*3/mm3      Immature Grans, Absolute 0.07 10*3/mm3      nRBC 0.0 /100 WBC              I reviewed the patient's new clinical results.    Assessment & Plan       Precordial pain    Barretts esophagus    Gastroesophageal reflux disease    ESRD (end stage renal disease)    Hyperlipidemia    Hypertension    Bilateral carotid artery stenosis    Chronic obstructive pulmonary disease    Secondary hyperparathyroidism of renal origin    Pt has ruled out of ACS and has been asymptomatic since admission.  She had beta-blocker and caffeine this morning, so can not complete stress test today.  She wants to return for stress test as outpatient, which is reasonable.  Discussed with cardiologist and nephrologist to facilitate outpatient stress test.    I discussed the patient's findings and my recommendations with patient.     Angelia Taylor MD  11/13/24  08:33 EST

## 2024-11-13 NOTE — DISCHARGE SUMMARY
Date of Discharge:  11/13/2024    Discharge Diagnosis:   **Precordial pain [R07.2]   Secondary hyperparathyroidism of renal origin [N25.81]   Chronic obstructive pulmonary disease [J44.9]   Bilateral carotid artery stenosis [I65.23]   ESRD (end stage renal disease) [N18.6]   Barretts esophagus [K22.70]   Gastroesophageal reflux disease [K21.9]   Hypertension [I10]   Hyperlipidemia [E78.5]       Presenting Problem/History of Present Illness  Active Hospital Problems    Diagnosis  POA    **Precordial pain [R07.2]  Yes    Secondary hyperparathyroidism of renal origin [N25.81]  Yes    Chronic obstructive pulmonary disease [J44.9]  Yes    Bilateral carotid artery stenosis [I65.23]  Yes    ESRD (end stage renal disease) [N18.6]  Yes    Barretts esophagus [K22.70]  Yes    Gastroesophageal reflux disease [K21.9]  Yes    Hypertension [I10]  Yes    Hyperlipidemia [E78.5]  Yes      Resolved Hospital Problems   No resolved problems to display.          Hospital Course  Patient is a 74 y.o. female presented with episode of SSCP that resolved prior to presentation to ER.  She ruled out for acs with non-trending troponins and no ischemic changes on EKG.  She was asymptomatic.  She requested outpatient stress test, which cardiologist will facilitate getting scheduled promptly.  She was provided a prescription for SL NTG.  She is stable with an unremarkable exam at discharge.      Procedures Performed         Consults:   Consults       Date and Time Order Name Status Description    11/11/2024  5:02 PM Inpatient Nephrology Consult Completed     11/4/2024  9:10 AM Inpatient Gastroenterology Consult Completed     11/3/2024 11:53 PM Inpatient Nephrology Consult Completed     10/20/2024  4:00 PM Nephrology (on -call MD unless specified) Completed             Pertinent Test Results:    Lab Results (most recent)       Procedure Component Value Units Date/Time    CBC & Differential [[id number]]  (Abnormal) Collected: 11/12/24 0035     Specimen: Blood from Arm, Right Updated: 11/12/24 0412    Narrative:      The following orders were created for panel order CBC & Differential.  Procedure                               Abnormality         Status                     ---------                               -----------         ------                     Manual Differential[[id number]]          Abnormal            Final result               CBC Auto Differential[[id number]]                                                         Please view results for these tests on the individual orders.    Manual Differential [[id number]]  (Abnormal) Collected: 11/12/24 0319    Specimen: Blood from Arm, Right Updated: 11/12/24 0412     Neutrophil % 75.0 %      Lymphocyte % 11.0 %      Monocyte % 10.0 %      Eosinophil % 1.0 %      Bands %  1.0 %      Atypical Lymphocyte % 2.0 %      Neutrophils Absolute 6.35 10*3/mm3      Lymphocytes Absolute 1.09 10*3/mm3      Monocytes Absolute 0.84 10*3/mm3      Eosinophils Absolute 0.08 10*3/mm3      RBC Morphology Normal     WBC Morphology Normal     Platelet Estimate Adequate     Large Platelets Slight/1+    CBC & Differential [[id number]]  (Abnormal) Collected: 11/12/24 0319    Specimen: Blood from Arm, Right Updated: 11/12/24 0412    Narrative:      The following orders were created for panel order CBC & Differential.  Procedure                               Abnormality         Status                     ---------                               -----------         ------                     CBC Auto Differential[[id number]]        Abnormal            Final result                 Please view results for these tests on the individual orders.    CBC Auto Differential [[id number]]  (Abnormal) Collected: 11/12/24 0319    Specimen: Blood from Arm, Right Updated: 11/12/24 0412     WBC 8.36 10*3/mm3      RBC 3.79 10*6/mm3      Hemoglobin 11.0 g/dL      Hematocrit 36.6 %      MCV 96.6 fL      MCH 29.0 pg      MCHC 30.1 g/dL      RDW 18.2 %       RDW-SD 61.1 fl      MPV 9.8 fL      Platelets 300 10*3/mm3     Basic Metabolic Panel [[id number]]  (Abnormal) Collected: 11/12/24 0035    Specimen: Blood from Arm, Right Updated: 11/12/24 0126     Glucose 86 mg/dL      BUN 23 mg/dL      Creatinine 4.66 mg/dL      Sodium 139 mmol/L      Potassium 4.0 mmol/L      Chloride 105 mmol/L      CO2 18.3 mmol/L      Calcium 8.2 mg/dL      BUN/Creatinine Ratio 4.9     Anion Gap 15.7 mmol/L      eGFR 9.3 mL/min/1.73      Comment: <15 Indicative of kidney failure       Narrative:      GFR Normal >60  Chronic Kidney Disease <60  Kidney Failure <15    The GFR formula is only valid for adults with stable renal function between ages 18 and 70.    CBC Auto Differential [[id number]]  (Abnormal) Collected: 11/11/24 2026    Specimen: Blood Updated: 11/11/24 2030     WBC 9.29 10*3/mm3      RBC 4.10 10*6/mm3      Hemoglobin 12.1 g/dL      Hematocrit 39.3 %      MCV 95.9 fL      MCH 29.5 pg      MCHC 30.8 g/dL      RDW 18.1 %      RDW-SD 60.2 fl      MPV 9.9 fL      Platelets 306 10*3/mm3      Neutrophil % 78.3 %      Lymphocyte % 8.0 %      Monocyte % 11.0 %      Eosinophil % 1.8 %      Basophil % 0.6 %      Immature Grans % 0.3 %      Neutrophils, Absolute 7.27 10*3/mm3      Lymphocytes, Absolute 0.74 10*3/mm3      Monocytes, Absolute 1.02 10*3/mm3      Eosinophils, Absolute 0.17 10*3/mm3      Basophils, Absolute 0.06 10*3/mm3      Immature Grans, Absolute 0.03 10*3/mm3      nRBC 0.0 /100 WBC     High Sensitivity Troponin T 2Hr [[id number]]  (Abnormal) Collected: 11/11/24 1713    Specimen: Blood Updated: 11/11/24 1739     HS Troponin T 47 ng/L      Troponin T Delta -2 ng/L     Narrative:      High Sensitive Troponin T Reference Range:  <14.0 ng/L- Negative Female for AMI  <22.0 ng/L- Negative Male for AMI  >=14 - Abnormal Female indicating possible myocardial injury.  >=22 - Abnormal Male indicating possible myocardial injury.   Clinicians would have to utilize clinical acumen, EKG,  Troponin, and serial changes to determine if it is an Acute Myocardial Infarction or myocardial injury due to an underlying chronic condition.         Basic Metabolic Panel [[id number]]  (Abnormal) Collected: 11/11/24 1713    Specimen: Blood Updated: 11/11/24 1739     Glucose 136 mg/dL      BUN 22 mg/dL      Creatinine 4.75 mg/dL      Sodium 139 mmol/L      Potassium 4.8 mmol/L      Chloride 100 mmol/L      CO2 24.5 mmol/L      Calcium 9.3 mg/dL      BUN/Creatinine Ratio 4.6     Anion Gap 14.5 mmol/L      eGFR 9.1 mL/min/1.73      Comment: <15 Indicative of kidney failure       Narrative:      GFR Normal >60  Chronic Kidney Disease <60  Kidney Failure <15    The GFR formula is only valid for adults with stable renal function between ages 18 and 70.    High Sensitivity Troponin T [[id number]]  (Abnormal) Collected: 11/11/24 1500    Specimen: Blood Updated: 11/11/24 1553     HS Troponin T 49 ng/L     Narrative:      High Sensitive Troponin T Reference Range:  <14.0 ng/L- Negative Female for AMI  <22.0 ng/L- Negative Male for AMI  >=14 - Abnormal Female indicating possible myocardial injury.  >=22 - Abnormal Male indicating possible myocardial injury.   Clinicians would have to utilize clinical acumen, EKG, Troponin, and serial changes to determine if it is an Acute Myocardial Infarction or myocardial injury due to an underlying chronic condition.         TSH [[id number]]  (Normal) Collected: 11/11/24 1500    Specimen: Blood Updated: 11/11/24 1553     TSH 1.860 uIU/mL     Magnesium [[id number]]  (Normal) Collected: 11/11/24 1500    Specimen: Blood Updated: 11/11/24 1553     Magnesium 1.6 mg/dL     Comprehensive Metabolic Panel [[id number]]  (Abnormal) Collected: 11/11/24 1500    Specimen: Blood Updated: 11/11/24 1553     Glucose 99 mg/dL      BUN 21 mg/dL      Creatinine 4.48 mg/dL      Sodium 136 mmol/L      Potassium 4.8 mmol/L      Chloride 99 mmol/L      CO2 23.3 mmol/L      Calcium 9.8 mg/dL      Total Protein 7.4 g/dL       Albumin 4.0 g/dL      ALT (SGPT) 10 U/L      AST (SGOT) 18 U/L      Alkaline Phosphatase 137 U/L      Total Bilirubin 0.3 mg/dL      Globulin 3.4 gm/dL      A/G Ratio 1.2 g/dL      BUN/Creatinine Ratio 4.7     Anion Gap 13.7 mmol/L      eGFR 9.7 mL/min/1.73      Comment: <15 Indicative of kidney failure       Narrative:      GFR Normal >60  Chronic Kidney Disease <60  Kidney Failure <15    The GFR formula is only valid for adults with stable renal function between ages 18 and 70.    [place] Draw [[id number]] Collected: 11/11/24 1500    Specimen: Blood Updated: 11/11/24 1531    Narrative:      The following orders were created for panel order [place] Draw.  Procedure                               Abnormality         Status                     ---------                               -----------         ------                     Green Top (Gel)[[id number]]                                  Final result               Lavender Top[[id number]]                                     Final result               Gold Top - SST[[id number]]                                   Final result               Light Blue Top[[id number]]                                   Final result                 Please view results for these tests on the individual orders.    Lavender Top [[id number]] Collected: 11/11/24 1500    Specimen: Blood Updated: 11/11/24 1531     Extra Tube hold for add-on     Comment: Auto resulted       Gold Top - SST [[id number]] Collected: 11/11/24 1500    Specimen: Blood Updated: 11/11/24 1531     Extra Tube Hold for add-ons.     Comment: Auto resulted.       Light Blue Top [[id number]] Collected: 11/11/24 1500    Specimen: Blood Updated: 11/11/24 1531     Extra Tube Hold for add-ons.     Comment: Auto resulted       Green Top (Gel) [[id number]] Collected: 11/11/24 1500    Specimen: Blood Updated: 11/11/24 1531     Extra Tube Hold for add-ons.     Comment: Auto resulted.                Results for orders placed during the  hospital encounter of 07/25/21    Adult Transthoracic Echo Complete W/ Cont if Necessary Per Protocol    Interpretation Summary  Normal LV size and contractility EF of 60%  Normal RV size  Normal atrial size  Aortic valve, mitral valve, tricuspid valve appears structurally normal, mild mitral regurgitation  seen.  No pericardial effusion seen.  Proximal aorta appears normal in size.              Condition on Discharge:  Stable    Vital Signs  Heart Rate:  [78] 78  Resp:  [16] 16    Physical Exam:     General Appearance:    Alert, cooperative, in no acute distress   Head:    Normocephalic, without obvious abnormality, atraumatic   Eyes:            Lids and lashes normal, conjunctivae and sclerae normal, no   icterus, no pallor, corneas clear, PERRLA   Ears:    Ears appear intact with no abnormalities noted   Throat:   No oral lesions, no thrush, oral mucosa moist   Neck:   No adenopathy, supple, trachea midline, no thyromegaly, no   carotid bruit, no JVD   Lungs:     Clear to auscultation,respirations regular, even and                  unlabored    Heart:    Regular rhythm and normal rate, normal S1 and S2, no            murmur, no gallop, no rub, no click   Chest Wall:    No abnormalities observed   Abdomen:     Normal bowel sounds, no masses, no organomegaly, soft        non-tender, non-distended, no guarding, no rebound                tenderness   Extremities:   Moves all extremities well, no edema, no cyanosis, no             redness   Pulses:   Pulses palpable and equal bilaterally   Skin:   No bleeding, bruising or rash   Lymph nodes:   No palpable adenopathy   Neurologic:   Cranial nerves 2 - 12 grossly intact, sensation intact, DTR       present and equal bilaterally       Discharge Disposition  Home or Self Care    Discharge Medications     Discharge Medications        New Medications        Instructions Start Date   metoprolol succinate XL 25 MG 24 hr tablet  Commonly known as: Toprol XL   25 mg, Oral, 2  Times Daily             Continue These Medications        Instructions Start Date   albuterol sulfate  (90 Base) MCG/ACT inhaler  Commonly known as: PROVENTIL HFA;VENTOLIN HFA;PROAIR HFA   2 puffs, Every 4 Hours PRN      amLODIPine 5 MG tablet  Commonly known as: NORVASC   5 mg, Daily      calcium acetate 667 MG capsule capsule  Commonly known as: PHOS BINDER)   667 mg, 2 Times Daily      cholecalciferol 25 MCG (1000 UT) tablet  Commonly known as: VITAMIN D3   1,000 Units, Daily      hyoscyamine 0.125 MG SL tablet  Commonly known as: LEVSIN   125 mcg, Sublingual, Every 4 Hours PRN      lidocaine-prilocaine 2.5-2.5 % cream  Commonly known as: EMLA   1 Application, Topical, Daily PRN, Apply to left 5th finger      multivitamin tablet tablet   1 tablet, Daily      pantoprazole 20 MG EC tablet  Commonly known as: PROTONIX   20 mg, Daily      sevelamer 800 MG tablet  Commonly known as: RENAGEL   800 mg, 2 Times Daily With Meals      sodium bicarbonate 650 MG tablet   1,300 mg, 3 Times Daily      sucralfate 1 g tablet  Commonly known as: CARAFATE   1 g, 4 Times Daily PRN      temazepam 30 MG capsule  Commonly known as: RESTORIL   30 mg, Nightly             Stop These Medications      carvedilol 3.125 MG tablet  Commonly known as: COREG              Discharge Diet: Healthy heart    Activity at Discharge: No restrictions    Follow-up Appointments  Future Appointments   Date Time Provider Department Center   1/7/2025  9:20 AM LAB MD [facility]   1/7/2025  9:30 AM Anjum Cam MD [facility]   2/13/2025  8:00 AM [facility]   2/18/2025  8:30 AM Alcides Live MD [facility] None   4/15/2025 12:45 PM Mak Sosa MD [facility]     Additional Instructions for the Follow-ups that You Need to Schedule       Discharge Follow-up with PCP   As directed       Currently Documented PCP:    Raegan Gotti MD    PCP Phone Number:    [phone]     Follow Up  Details: Keep next regularly scheduled appointment        Discharge Follow-up with Specified Provider: Dr. Sosa's office will schedule an outpatient stress test.   As directed      To: Dr. Sosa's office will schedule an outpatient stress test.                Test Results Pending at Discharge  Pending Results       None             Angelia Taylor MD  11/13/24  10:16 EST    Time: Discharge 25 min

## 2024-11-25 ENCOUNTER — TELEPHONE (OUTPATIENT)
Dept: CARDIOLOGY | Facility: CLINIC | Age: 74
End: 2024-11-25

## 2024-11-25 NOTE — TELEPHONE ENCOUNTER
"Hub staff attempted to follow warm transfer process and was unsuccessful     Caller: Sherry Lobato \"AMERICO\"    Relationship to patient: Self    Best call back number: [phone]    Patient is needing: PT IS CALLING TO GET INSTRUCTIONS ABOUT STRESS TEST TOMORROW MORNING. TRIED TO WT TO OFFICE BUT I RECEIVED THE VM, PT WANTED TO KNOW IF SHE COULD EAT BEFORE STRESS TEST TOMORROW. PLEASE ADVISE  "

## 2024-12-02 ENCOUNTER — READMISSION MANAGEMENT (OUTPATIENT)
Dept: CALL CENTER | Facility: HOSPITAL | Age: 74
End: 2024-12-02
Payer: MEDICARE

## 2024-12-02 NOTE — OUTREACH NOTE
"Medical Week 3 Survey      Flowsheet Row Responses   [facility] patient discharged from? [facility]   Does the patient have one of the following disease processes/diagnoses(primary or secondary)? Other   Week 3 attempt successful? Yes   Call start time 1651   Call end time 1654   Discharge diagnosis Precordial pain   Is the patient taking all medications as directed (includes completed medication regime)? Yes   Does the patient have a primary care provider?  Yes   Has the patient kept scheduled appointments due by today? Yes   Psychosocial issues? No   What is the patient's perception of their health status since discharge? Improving   Is the patient/caregiver able to teach back signs and symptoms related to disease process for when to call PCP? Yes   Is the patient/caregiver able to teach back signs and symptoms related to disease process for when to call 911? Yes   Is the patient/caregiver able to teach back the hierarchy of who to call/visit for symptoms/problems? PCP, Specialist, Home health nurse, Urgent Care, ED, 911 Yes   Additional teach back comments States she is doing \"ok\".  Will have stress test done on Thurs.  Has seen her PCP.   Week 3 Call Completed? Yes   Graduated Yes   Graduated/Revoked comments No questions or needs at this time.   Call end time 1654            Tawanna MEDINA - Licensed Nurse  "

## 2025-01-03 ENCOUNTER — TELEPHONE (OUTPATIENT)
Dept: ONCOLOGY | Facility: CLINIC | Age: 75
End: 2025-01-03
Payer: MEDICARE

## 2025-01-03 NOTE — TELEPHONE ENCOUNTER
PRISCILA on pt phone asking for call back to get her appt tomorrow r/s'd to after her scans in February.

## 2025-01-06 ENCOUNTER — TELEPHONE (OUTPATIENT)
Dept: ONCOLOGY | Facility: CLINIC | Age: 75
End: 2025-01-06
Payer: MEDICARE

## 2025-01-09 ENCOUNTER — TELEPHONE (OUTPATIENT)
Dept: ONCOLOGY | Facility: CLINIC | Age: 75
End: 2025-01-09
Payer: MEDICARE

## 2025-01-09 NOTE — TELEPHONE ENCOUNTER
I called pt to get her appt from 1/7 rescheduled. She did not understand why she needed to be seen by us and wanted to wait to schedule an appt until after she talks to Dr Live next month.

## 2025-01-16 DIAGNOSIS — I77.0 A-V FISTULA: ICD-10-CM

## 2025-01-16 DIAGNOSIS — N18.6 ESRD (END STAGE RENAL DISEASE): Primary | Chronic | ICD-10-CM

## 2025-02-13 ENCOUNTER — HOSPITAL ENCOUNTER (OUTPATIENT)
Dept: PET IMAGING | Facility: HOSPITAL | Age: 75
Discharge: HOME | End: 2025-02-13
Admitting: INTERNAL MEDICINE
Payer: MEDICARE

## 2025-02-13 DIAGNOSIS — C7A.8 NEUROENDOCRINE CARCINOMA: ICD-10-CM

## 2025-02-13 DIAGNOSIS — C34.11 ADENOCARCINOMA OF UPPER LOBE OF RIGHT LUNG: ICD-10-CM

## 2025-02-13 PROCEDURE — 71250 CT THORAX DX C-: CPT

## 2025-02-13 PROCEDURE — 74176 CT ABD & PELVIS W/O CONTRAST: CPT

## 2025-02-16 NOTE — PROGRESS NOTES
[facility] RADIATION ONCOLOGY  FOLLOW-UP NOTE    NAME: Sherry Lobato  YOB: 1950  MRN #: [id number]  DATE OF SERVICE: 2/19/2025  REFERRING PROVIDER: aRegan Gotti MD   PRIMARY CARE PROVIDER: Raegan Gotti MD    CHIEF COMPLAINT:  4-month follow-up, interval imaging review    DIAGNOSIS:   Encounter Diagnosis   Name Primary?    Adenocarcinoma of upper lobe of right lung Yes        RADIATION TREATMENT COURSE     First Treatment: 11/13/2023 Last Treatment: 11/30/2023 Elapsed Days: 17   Treating Physician: Dr. Matteo Merlos   Site: Right Upper Lobe  Total Dose: 1000 cGy  Dose per Fraction: 1000 cGy  Total Fractions: 5 Daily or BID:  QOD  Modality: Photon  Technique: SBRT (2-5fx)  Bolus: No       INTERVAL HISTORY     Sherry Lobato is a 74 y.o. female with ESRD on HD who was diagnosed with [id number], Stage IA2 adenocarcinoma of the RUL in October of 2023.  She was deemed not a surgical candidate and underwent radiation therapy with 5 fractions SBRT, completed 11/30/2023.  She also has a history of neuroendocrine cancer.  She was last seen in our office on 10/8/2024 for interval follow-up.  She returns to our clinic today for interval imaging review and routine follow-up.    2/13/2025: CT CAP showed stability of the right upper lobe pulmonary nodule measuring 7 mm (originally 16 mm prior to treatment).  No evidence of progression in the thorax or evidence of metastatic disease elsewhere.    Patient reports she is doing well. She continues on dialysis. She is here to review recent imaging.      IMAGING     CT CAP Without Contrast Diagnostic  [facility]  2/14/2025  Impression: 1.Stable 7 mm right upper lobe pulmonary nodule. No evidence of progression of disease within the thorax. 2.No evidence of progressive or metastatic disease within the abdomen or pelvis. 3.Additional findings as given above. Electronically Signed: Volodymyr Simpson MD  2/14/2025 2:52 PM EST  Workstation ID:  "[id number]    XR Chest 1 View  [facility]  11/11/2024  Impression: 1. Patchy consolidation in the lateral left lung base suspicious for pneumonia with a tiny pleural effusion. 2. Borderline cardiomegaly. Electronically Signed: Rey Jerome MD  11/11/2024 1:35 PM EST  Workstation ID: [id number]    CT Abdomen Pelvis Without Contrast  [facility]  11/4/2024  Addendum: The original report, in the body of report, states \"gallbladder is normal without evidence of radiopaque stones. The biliary tree is nondilated\". I believe the statement was an auto- populated statement which was not modified. However, the report also states \"postoperative changes from cholecystectomy are seen with intrahepatic and extra hepatic biliary ductal prominence consistent with reservoir effect.\". This latter statement is thought to be it does appear the patient has had prior cholecystectomy. Upon review of the patient's EPIC medical record, the patient has a documented history of laparoscopic cholecystectomy.   Impression: 1.Postoperative changes from partial gastrectomy. There is diffuse gastric wall thickening. 2.There is mild small bowel distention less than on prior study. 3.There is a moderate stool burden suggestive of constipation. 4.There is diverticulosis without diverticulitis. Electronically Signed: Volodymyr Ricardo MD  11/3/2024 10:12 PM EST  Workstation ID: [id number]    XR Chest PA & Lateral  [facility] 10/21/2024  Impression: Right greater than left basilar alveolar and interstitial disease is present, but improved on the right, since 10/20/2024. Findings are favored represent improving pulmonary edema. Suspected trace bibasilar pleural effusions. Electronically Signed: Florinda Davidson MD  10/21/2024 11:22 AM EDT  Workstation ID: [id number]    Surveillance [facility]  10/20/2024  Impression: Cardiomegaly with diffuse bilateral interstitial thickening and right mid and lower lung airspace disease. These findings may reflect " asymmetric alveolar edema versus pneumonia. Clinical correlation is recommended. Electronically Signed: June Hager MD  10/20/2024 2:08 PM EDT  Workstation ID: [id number]       PATHOLOGY     No new pathology to review.      LABS     HEMATOLOGY:  WBC   Date Value Ref Range Status   11/12/2024 8.36 3.40 - 10.80 10*3/mm3 Final   07/14/2022 9.33 4.5 - 11.0 10*3/uL Final     RBC   Date Value Ref Range Status   11/12/2024 3.79 3.77 - 5.28 10*6/mm3 Final   07/14/2022 3.72 (L) 4.0 - 5.2 10*6/uL Final     Hemoglobin   Date Value Ref Range Status   02/05/2025 11.5 (L) 12.0 - 16.0 g/dL Final   02/05/2025 34.5 (L) 36.0 - 48.0 % Final   07/14/2022 11.2 (L) 12.0 - 16.0 g/dL Final     Hematocrit   Date Value Ref Range Status   11/12/2024 36.6 34.0 - 46.6 % Final   07/14/2022 35.8 (L) 36.0 - 46.0 % Final     Platelets   Date Value Ref Range Status   11/12/2024 300 140 - 450 10*3/mm3 Final   07/14/2022 423 140 - 440 10*3/uL Final     CHEMISTRY:  Lab Results   Component Value Date    GLUCOSE 86 11/12/2024    BUN 23 11/12/2024    CREATININE 4.66 (H) 11/12/2024    EGFRIFNONA 6 (L) 10/08/2021    EGFRIFAFRI 5 (L) 07/14/2022    BCR 4.9 (L) 11/12/2024    K 4.8 02/05/2025    CO2 18.3 (L) 11/12/2024    CALCIUM 8.2 (L) 11/12/2024    ALBUMIN 4.0 11/11/2024    AST 18 11/11/2024    ALT 10 11/11/2024       PROBLEM LIST     Patient Active Problem List   Diagnosis    B12 deficiency    Barretts esophagus    Gastroesophageal reflux disease    Benign neuroendocrine tumor    Carcinoma, renal cell    ESRD (end stage renal disease)    Degeneration of intervertebral disc of lumbar region    Type 2 diabetes mellitus, without long-term current use of insulin    Gout    Hyperlipidemia    Hypertension    Insomnia    Nephrotic syndrome    Neuroendocrine carcinoma    Orthostatic hypotension    Sleep apnea, obstructive    Bilateral carotid artery stenosis    Vitamin D deficiency    Hordeolum externum of right lower eyelid    Pseudophakia of both eyes     "Diabetic peripheral neuropathy    Tobacco abuse    Congestive heart failure    Adenocarcinoma of upper lobe of right lung    Emphysema lung    Chronic obstructive pulmonary disease    Degeneration of lumbar intervertebral disc    Disorder of phosphorus metabolism    Hypertensive disorder    Arteriovenous fistula    Bilateral pseudophakia    Obstructive sleep apnea syndrome    Type 2 diabetes mellitus    Steal syndrome dialysis vascular access    Acute upper GI bleeding    Anemia due to GI blood loss    Non-small cell lung cancer    Warthin's tumor    Secondary hyperparathyroidism of renal origin    Anemia    Pain in the abdomen    Hypertensive crisis    Lower abdominal pain    Pneumonia    Precordial pain        CURRENT MEDICATIONS     Current Outpatient Medications   Medication Instructions    albuterol sulfate  (90 Base) MCG/ACT inhaler 2 puffs, Every 4 Hours PRN    amLODIPine (NORVASC) 5 mg, Daily    calcium acetate (PHOS BINDER)) 667 mg, 2 Times Daily    cholecalciferol (VITAMIN D3) 1,000 Units, Daily    hyoscyamine (LEVSIN) 125 mcg, Sublingual, Every 4 Hours PRN    lidocaine-prilocaine (EMLA) 2.5-2.5 % cream 1 Application, Topical, Daily PRN, Apply to left 5th finger    metoprolol succinate XL (TOPROL XL) 25 mg, Oral, 2 Times Daily    multivitamin (MULTI-VITAMIN PO) 1 tablet, Daily    pantoprazole (PROTONIX) 20 mg, Daily    sevelamer (RENAGEL) 800 mg, 2 Times Daily With Meals    sodium bicarbonate 1,300 mg, 3 Times Daily    sucralfate (CARAFATE) 1 g, 4 Times Daily PRN    temazepam (RESTORIL) 30 mg, Nightly        ALLERGIES     Allergies   Allergen Reactions    Atorvastatin Calcium Other (See Comments)     \"felt like I was on fire\"    Gabapentin Dizziness     says was unable to walk    Niacin Other (See Comments)     \"FEELS LIKE SHE IS ON FIRE\"           REVIEW OF SYSTEMS     Review of Systems   Constitutional:  Negative for activity change and fatigue.   Respiratory:  Negative for shortness of breath.  "      Vitals:    02/18/25 0836   BP: 162/71   Pulse: 86   Resp: 18   SpO2: 94%      Physical Exam  Constitutional:       General: She is not in acute distress.  HENT:      Head: Normocephalic and atraumatic.   Pulmonary:      Effort: Pulmonary effort is normal. No respiratory distress.   Neurological:      Mental Status: She is alert and oriented to person, place, and time. Mental status is at baseline.   Psychiatric:         Mood and Affect: Mood normal.         Behavior: Behavior normal.            ECOG:  Restricted in physically strenuous activity but ambulatory and able to carry out work of a light or sedentary nature, e.g., light house work, office work = 1        ASSESSMENT AND PLAN     ASSESSMENT:      Sherry Lobato is a 74 y.o. female with ESRD on HD who was diagnosed with [id number], Stage IA2 adenocarcinoma of the RUL in October of 2023.  She was deemed not a surgical candidate and underwent radiation therapy with 5 fractions SBRT, completed 11/30/2023.  She also has a history of neuroendocrine cancer.  She was last seen in our office on 10/8/2024 for interval follow-up.  She returns to our clinic today for interval imaging review and routine follow-up.      Interval CT imaging shows stability of the treated RUL lesion without any evidence of progression or metastatic disease.     Diagnoses and all orders for this visit:    1. Adenocarcinoma of upper lobe of right lung (Primary)  -     CT Chest Without Contrast Diagnostic; Future  -     CT Abdomen Pelvis Without Contrast; Future       PLAN:      Orders:  - CT chest, abdomen, and pelvis in 6 months  - Follow-up after imaging or sooner as clinically indicated    We reviewed the patient's recent imaging. She has no evidence or recurrence or progressive malignancy.  We discussed the plan for continued imaging surveillance with repeat imaging in 6 months or sooner as clinically indicated.  The patient is encouraged to reach out with any questions or concerns  prior to her next appointment.          FOLLOW UP     No follow-ups on file.     CC: Raegan Gotti MD Wooten, Candace R, MD

## 2025-02-18 ENCOUNTER — OFFICE VISIT (OUTPATIENT)
Dept: RADIATION ONCOLOGY | Facility: HOSPITAL | Age: 75
End: 2025-02-18
Payer: MEDICARE

## 2025-02-18 VITALS
HEART RATE: 86 BPM | OXYGEN SATURATION: 94 % | WEIGHT: 151.4 LBS | DIASTOLIC BLOOD PRESSURE: 71 MMHG | BODY MASS INDEX: 29.57 KG/M2 | RESPIRATION RATE: 18 BRPM | SYSTOLIC BLOOD PRESSURE: 162 MMHG

## 2025-02-18 DIAGNOSIS — C34.11 ADENOCARCINOMA OF UPPER LOBE OF RIGHT LUNG: Primary | ICD-10-CM

## 2025-02-18 PROCEDURE — G0463 HOSPITAL OUTPT CLINIC VISIT: HCPCS | Performed by: INTERNAL MEDICINE

## 2025-02-19 NOTE — PROGRESS NOTES
"[facility] VASCULAR SURGERY    Chief Complaint  Hemodialysis Access    Subjective          History of Present Illness  Sherry Lobato is a 74 y.o. female with ESRD. She presents to the office today for follow up of her AV fistula.  She is followed by Dr. Delgadillo.  She has had the following vascular interventions performed:    Right arm AV fistula by Dr. Hartman in 2015  Left brachial artery basilic vein fistula creation on 12/30/2020  Left arm fistulogram on 9/27/2022  Left arm fistulogram, balloon angioplasty of left subclavian artery, open revision of left arm fistula on 4/23/2024    She denies any hospitalizations or new diagnosis since we last saw her. She denies any symptoms of vascular steal syndrome.  She dialyzes on a MWF schedule. She tells me she has been having issues with clearance during dialysis and she is \"always in the yellow.\"  Has been going on for few weeks now.  She denies any difficulty with prolonged bleeding.    Review of Systems   Musculoskeletal:  Negative for myalgias.   Skin:  Negative for color change, pallor and wound.   Neurological:  Negative for numbness.        Allergies: Atorvastatin calcium, Gabapentin, and Niacin    Prior to Admission medications    Medication Sig Start Date End Date Taking? Authorizing Provider   albuterol sulfate  (90 Base) MCG/ACT inhaler Inhale 2 puffs Every 4 (Four) Hours As Needed for Wheezing.    ProviderDanica MD   amLODIPine (NORVASC) 5 MG tablet Take 1 tablet by mouth Daily.    ProviderDanica MD   calcium acetate (PHOS BINDER,) 667 MG capsule capsule Take 1 capsule by mouth 2 (Two) Times a Day.    ProviderDanica MD   Cholecalciferol 25 MCG (1000 UT) tablet Take 1 tablet by mouth Daily.    Danica Murphy MD   hyoscyamine (LEVSIN) 0.125 MG SL tablet Place 1 tablet under the tongue Every 4 (Four) Hours As Needed for Cramping. 11/4/24   Angelia Taylor MD   lidocaine-prilocaine (EMLA) 2.5-2.5 % cream Apply 1 " "Application topically to the appropriate area as directed Daily As Needed (severe pain, left 5th finger). Apply to left 5th finger 4/16/24   Franki Delgadillo II, MD   metoprolol succinate XL (Toprol XL) 25 MG 24 hr tablet Take 1 tablet by mouth 2 (Two) Times a Day. 11/12/24   Angelia Taylor MD   multivitamin (MULTI-VITAMIN PO) Take 1 tablet by mouth Daily.    Danica Murphy MD   pantoprazole (PROTONIX) 20 MG EC tablet Take 1 tablet by mouth Daily.    Danica Murphy MD   sevelamer (RENAGEL) 800 MG tablet Take 1 tablet by mouth 2 (Two) Times a Day With Meals.    Danica Murphy MD   sodium bicarbonate 650 MG tablet Take 2 tablets by mouth 3 (Three) Times a Day.    Danica Murphy MD   sucralfate (CARAFATE) 1 g tablet Take 1 tablet by mouth 4 (Four) Times a Day As Needed.    Danica Murphy MD   temazepam (RESTORIL) 30 MG capsule Take 1 capsule by mouth Every Night. 1/10/23   Danica Murphy MD         Objective   Vital Signs:  /74 (BP Location: Right arm)   Ht 152.4 cm (60\")   Wt 68.5 kg (151 lb)   BMI 29.49 kg/m²   Estimated body mass index is 29.49 kg/m² as calculated from the following:    Height as of this encounter: 152.4 cm (60\").    Weight as of this encounter: 68.5 kg (151 lb).       Physical Exam  Vitals reviewed.   Constitutional:       General: She is not in acute distress.     Appearance: She is not ill-appearing.   Cardiovascular:      Rate and Rhythm: Normal rate.      Pulses:           Radial pulses are 2+ on the right side and 2+ on the left side.      Arteriovenous access: Left arteriovenous access is present.     Comments: AV fistula with thrill and bruit present  Pulmonary:      Effort: Pulmonary effort is normal. No respiratory distress.   Skin:     General: Skin is warm and dry.   Neurological:      General: No focal deficit present.      Mental Status: She is alert and oriented to person, place, and time.      GCS: GCS eye subscore is 4. GCS verbal " subscore is 5. GCS motor subscore is 6.        Result Review :    The following data was reviewed by: MAEGAN Page on 02/27/2025:  Duplex Hemodialysis Access CAR (02/27/2025 14:34)   Patent left sided brachiobasilic fistula. Flow volumes appear adequate ranging between approximately 600 to 1500 mL/min. Diameter and depth appear appropriate.     I reviewed the office note by MAEGAN Vaughn from 2/24/2025   Discharge Summary by Angelia Taylor MD (11/12/2024 13:56)   Discharge Summary by Angelia Taylor MD (11/04/2024 19:37)        Assessment and Plan     Diagnoses and all orders for this visit:    1. Arteriovenous fistula (Primary)  -     Case request    2. ESRD (end stage renal disease)  -     Case request    3. Overweight (BMI 25.0-29.9)    BMI is >= 25 and <30. (Overweight) The following options were offered after discussion;: Information on healthy weight added to patient's after visit summary.         Sherry Lobato is a 74 y.o. female with ESRD. She denies any issues with dialysis. She denies any symptoms of steal syndrome. There is a thrill and bruit present in the AV fistula. Recent duplex shows arterial inflow of 920 mL/min with flow volumes ranging from 567 to 1537 ml/min. Diameter and depth are adequate.  Given that she is having issues with clearance during her dialysis sessions, I recommend proceeding with a fistulogram.  She was agreeable to this.  I have placed a case request. She was instructed to call our office if she had any questions or concerns.     Follow Up     Return for after fistulagram.    Patient was given instructions and counseling regarding her condition or for health maintenance advice. Please see specific information pulled into the AVS if appropriate.     MAEGAN Page

## 2025-02-27 ENCOUNTER — HOSPITAL ENCOUNTER (OUTPATIENT)
Dept: CARDIOLOGY | Facility: HOSPITAL | Age: 75
Discharge: HOME | End: 2025-02-27
Admitting: NURSE PRACTITIONER
Payer: MEDICARE

## 2025-02-27 ENCOUNTER — OFFICE VISIT (OUTPATIENT)
Age: 75
End: 2025-02-27
Payer: MEDICARE

## 2025-02-27 VITALS
BODY MASS INDEX: 29.64 KG/M2 | WEIGHT: 151 LBS | HEIGHT: 60 IN | DIASTOLIC BLOOD PRESSURE: 74 MMHG | SYSTOLIC BLOOD PRESSURE: 157 MMHG

## 2025-02-27 DIAGNOSIS — N18.6 ESRD (END STAGE RENAL DISEASE): Chronic | ICD-10-CM

## 2025-02-27 DIAGNOSIS — I77.0 A-V FISTULA: ICD-10-CM

## 2025-02-27 DIAGNOSIS — E66.3 OVERWEIGHT (BMI 25.0-29.9): ICD-10-CM

## 2025-02-27 DIAGNOSIS — I77.0 ARTERIOVENOUS FISTULA: Primary | ICD-10-CM

## 2025-02-27 LAB
BH CV VAS DIALYSIS ARTERIAL ANASTOMOSIS EDV: 62.9 CM/SEC
BH CV VAS DIALYSIS ARTERIAL ANASTOMOSIS PSV: 186 CM/SEC
BH CV VAS DIALYSIS CONDUIT DIST DEPTH: 0.4 CM
BH CV VAS DIALYSIS CONDUIT DIST DIAMETER: 0.8 CM
BH CV VAS DIALYSIS CONDUIT DIST EDV: 26.8 CM/SEC
BH CV VAS DIALYSIS CONDUIT DIST FLOW VOL: 567 ML/MIN
BH CV VAS DIALYSIS CONDUIT DIST PSV: 49.2 CM/SEC
BH CV VAS DIALYSIS CONDUIT MID DEPTH: 0.2 CM
BH CV VAS DIALYSIS CONDUIT MID DIAMETER: 1.4 CM
BH CV VAS DIALYSIS CONDUIT MID EDV: 21.7 CM/SEC
BH CV VAS DIALYSIS CONDUIT MID FLOW VOL: 1537 ML/MIN
BH CV VAS DIALYSIS CONDUIT MID PSV: 65.9 CM/SEC
BH CV VAS DIALYSIS CONDUIT PROX DEPTH: 0.1 CM
BH CV VAS DIALYSIS CONDUIT PROX DIAMETER: 0.8 CM
BH CV VAS DIALYSIS CONDUIT PROX EDV: 45.5 CM/SEC
BH CV VAS DIALYSIS CONDUIT PROX FLOW VOL: 926 ML/MIN
BH CV VAS DIALYSIS CONDUIT PROX PSV: 101 CM/SEC
BH CV VAS DIALYSIS CONDUIT PROX/MID DEPTH: 0.3 CM
BH CV VAS DIALYSIS CONDUIT PROX/MID DIAMETER: 0.8 CM
BH CV VAS DIALYSIS CONDUIT PROX/MID EDV: 96 CM/SEC
BH CV VAS DIALYSIS CONDUIT PROX/MID FLOW VOL: 1082 ML/MIN
BH CV VAS DIALYSIS CONDUIT PROX/MID PSV: 209 CM/SEC
BH CV VAS DIALYSIS PRE-INFLOW BRACHIAL DIAMETER: 0.6 CM
BH CV VAS DIALYSIS PRE-INFLOW BRACHIAL EDV: 52.2 CM/SEC
BH CV VAS DIALYSIS PRE-INFLOW BRACHIAL FLOW VOL: 920 ML/MIN
BH CV VAS DIALYSIS PRE-INFLOW BRACHIAL PSV: 124 CM/SEC
BH CV VAS DIALYSIS VENOUS OUTFLOW AXILLARY DIAMETER: 0.9 CM
BH CV VAS DIALYSIS VENOUS OUTFLOW AXILLARY EDV: 21.1 CM/SEC
BH CV VAS DIALYSIS VENOUS OUTFLOW AXILLARY PSV: 35.6 CM/SEC

## 2025-02-27 PROCEDURE — 93990 DOPPLER FLOW TESTING: CPT

## 2025-03-27 NOTE — PROGRESS NOTES
Encounter Date:04/01/2025        Patient ID: Sherry Lobato is a 74 y.o. female.      Chief Complaint:      History of Present Illness  74 years old pleasant woman with hypertension, hyperlipidemia, diabetes, renal cell carcinoma with end-stage renal disease on hemodialysis, gastric bypass, mood disorder, GI bleeding who comes to cardiology office for follow-up  She had a cardiac catheterization in 2021 by me at [facility].  Current cardiac medications include metoprolol XL.      The following portions of the patient's history were reviewed and updated as appropriate: allergies, current medications, past family history, past medical history, past social history, past surgical history, and problem list.    Review of Systems   Constitutional: Positive for malaise/fatigue.   Cardiovascular:  Negative for chest pain, dyspnea on exertion, leg swelling and palpitations.   Respiratory:  Negative for cough and shortness of breath.    Gastrointestinal:  Negative for abdominal pain, nausea and vomiting.   Neurological:  Negative for dizziness, focal weakness, headaches, light-headedness and numbness.   All other systems reviewed and are negative.        Current Outpatient Medications:     albuterol sulfate  (90 Base) MCG/ACT inhaler, Inhale 2 puffs Every 4 (Four) Hours As Needed for Wheezing., Disp: , Rfl:     ammonium lactate (AMLACTIN) 12 % cream, Apply 1 Application topically to the appropriate area as directed As Needed., Disp: , Rfl:     calcium acetate (PHOS BINDER,) 667 MG capsule capsule, Take 1 capsule by mouth 2 (Two) Times a Day., Disp: , Rfl:     Cholecalciferol 25 MCG (1000 UT) tablet, Take 1 tablet by mouth Daily., Disp: , Rfl:     lidocaine-prilocaine (EMLA) 2.5-2.5 % cream, Apply 1 Application topically to the appropriate area as directed Daily As Needed (severe pain, left 5th finger). Apply to left 5th finger, Disp: 5800 g, Rfl: 4    metoprolol succinate XL (Toprol XL) 25 MG 24 hr tablet, Take  "1 tablet by mouth 2 (Two) Times a Day., Disp: , Rfl:     multivitamin (MULTI-VITAMIN PO), Take 1 tablet by mouth Daily., Disp: , Rfl:     pantoprazole (PROTONIX) 20 MG EC tablet, Take 1 tablet by mouth Daily., Disp: , Rfl:     temazepam (RESTORIL) 30 MG capsule, Take 1 capsule by mouth Every Night., Disp: , Rfl:     hyoscyamine (LEVSIN) 0.125 MG SL tablet, Place 1 tablet under the tongue Every 4 (Four) Hours As Needed for Cramping. (Patient not taking: Reported on 4/1/2025), Disp: 60 tablet, Rfl: 1    sevelamer (RENAGEL) 800 MG tablet, Take 1 tablet by mouth 2 (Two) Times a Day With Meals. (Patient not taking: Reported on 4/1/2025), Disp: , Rfl:     sodium bicarbonate 650 MG tablet, Take 2 tablets by mouth 3 (Three) Times a Day. (Patient not taking: Reported on 4/1/2025), Disp: , Rfl:     sucralfate (CARAFATE) 1 g tablet, Take 1 tablet by mouth 4 (Four) Times a Day As Needed. (Patient not taking: Reported on 4/1/2025), Disp: , Rfl:     Current outpatient and discharge medications have been reconciled for the patient.  Reviewed by: Mak Sosa MD       Allergies   Allergen Reactions    Atorvastatin Calcium Other (See Comments)     \"felt like I was on fire\"    Gabapentin Dizziness     says was unable to walk    Niacin Other (See Comments)     \"FEELS LIKE SHE IS ON FIRE\"         Family History   Problem Relation Age of Onset    Heart disease Father        Past Surgical History:   Procedure Laterality Date    APPENDECTOMY      ARTERIOVENOUS FISTULA Right     ARTERIOVENOUS FISTULA REPAIR      Clotted off and insert graft    ARTERIOVENOUS FISTULA/SHUNT SURGERY Left 12/30/2020    Procedure: ARTERIOVENOUS FISTULA FORMATION;  Surgeon: Jan Caicedo MD;  Location: [facility] MAIN OR;  Service: Vascular;  Laterality: Left;    ARTERIOVENOUS FISTULA/SHUNT SURGERY Left 4/23/2024    Procedure: FISTULOGRAM WITH SUBCLAVIAN ARTERY ANGIOPLASTY, fistula banding;  Surgeon: Franki Delgadillo II, MD;  Location: [facility] HYBRID OR;  " Service: Vascular;  Laterality: Left;    BREAST BIOPSY      right nipple 1981    BRONCHOSCOPY N/A 6/11/2024    Procedure: BRONCHOSCOPY WITH RIGHT LUNG WASHING;  Surgeon: Aramis Barragan MD;  Location: [facility] ENDOSCOPY;  Service: Pulmonary;  Laterality: N/A;  RML atelectasis    COLONOSCOPY N/A 11/24/2020    Procedure: COLONOSCOPY with polypectomy x 7;  Surgeon: Jeffery White MD;  Location: [facility] ENDOSCOPY;  Service: Gastroenterology;  Laterality: N/A;  post op: polyps, diverticulosis, hemorrhoids    COLONOSCOPY N/A 6/1/2023    Procedure: COLONOSCOPY with polypectomy x 3 biopsy x 1;  Surgeon: Jeffery White MD;  Location: [facility] ENDOSCOPY;  Service: Gastroenterology;  Laterality: N/A;  diverticulosis polyps    COLONOSCOPY N/A 9/4/2024    Procedure: COLONOSCOPY WITH POLYPECTOMY X 1;  Surgeon: Jeffery White MD;  Location: [facility] ENDOSCOPY;  Service: Gastroenterology;  Laterality: N/A;  POST- POLYP, DIVERTICULOSIS, INTERNAL AND EXTERNAL HEMORRHOIDS    ENDOSCOPY N/A 03/12/2022    Procedure: ESOPHAGOGASTRODUODENOSCOPY with biopsy x 1 area;  Surgeon: Javan Augustin MD;  Location: [facility] ENDOSCOPY;  Service: Gastroenterology;  Laterality: N/A;  post op: anastamosis    ENDOSCOPY N/A 10/9/2023    Procedure: ESOPHAGOGASTRODUODENOSCOPY with biopsy x3 areas;  Surgeon: Ace Campbell MD;  Location: [facility] ENDOSCOPY;  Service: Gastroenterology;  Laterality: N/A;  duodenal ulcers;irregular z line    ENDOSCOPY N/A 8/27/2024    Procedure: ESOPHAGOGASTRODUODENOSCOPY with argon plasma coagulation;  Surgeon: HILARIO Blackmon MD;  Location: [facility] ENDOSCOPY;  Service: Gastroenterology;  Laterality: N/A;  post op: AVM    ENTEROSCOPY SMALL BOWEL N/A 9/4/2024    Procedure: ESOPHAGOGASTRODUODENOSCOPY WITH SMALL BOWEL ENTEROSCOPY, ARGON PLASMA COAGULATION AND ENDOSCOPIC CLIPPING X 2 OF GASTRIC ANTRAL VASCULAR ECTASIA;  Surgeon: Jeffery White MD;  Location: [facility] ENDOSCOPY;  Service: Gastroenterology;  Laterality: N/A;  POST-  GAVE    GASTRIC RESECTION      cancer    HYSTERECTOMY      LAPAROSCOPIC CHOLECYSTECTOMY      NEPHRECTOMY      right kidney removed     REDUCTION MAMMAPLASTY      TUMOR REMOVAL      boils    VENTRAL/INCISIONAL HERNIA REPAIR Right 08/28/2019    Procedure: VENTRAL/INCISIONAL HERNIA REPAIR;  Surgeon: Nilay Stevens MD;  Location: [facility] MAIN OR;  Service: General    VENTRAL/INCISIONAL HERNIA REPAIR N/A 10/01/2019    Procedure: OPEN VENTRAL/INCISIONAL HERNIA REPAIR;  Surgeon: Nilay Stevens MD;  Location: [facility] MAIN OR;  Service: General    VENTRAL/INCISIONAL HERNIA REPAIR N/A 10/11/2021    Procedure: VENTRAL/INCISIONAL HERNIA REPAIR LAPAROSCOPIC;  Surgeon: Nilay Stevens MD;  Location: [facility] MAIN OR;  Service: General;  Laterality: N/A;    VENTRAL/INCISIONAL HERNIA REPAIR N/A 01/23/2023    Procedure: VENTRAL/INCISIONAL HERNIA REPAIR with MESH;  Surgeon: Nilay Stevens MD;  Location: [facility] MAIN OR;  Service: General;  Laterality: N/A;       Past Medical History:   Diagnosis Date    Lucas's esophagus     C. difficile colitis     Carotid artery disease     -50-74% left, 16-49% right (1/19)    COPD (chronic obstructive pulmonary disease)     DM2 (diabetes mellitus, type 2)     ESRD (end stage renal disease)     on HD    Gastric ulcer     at anastomatic site    Gastroparesis     unknown    GERD (gastroesophageal reflux disease)     Gout     not current    Hemodialysis patient     mwf    Hernia, incisional     abdomen    History of colonoscopy     UTD    History of degenerative disc disease     Hyperlipidemia     Hypertension     Medicare annual wellness visit, subsequent 11/07/2020    Microscopic colitis     Nephrotic syndrome     Neuroendocrine tumor     of stomach    FABY (obstructive sleep apnea)     not treating    Pedal edema     ressolved    Proteinuria     Renal cell cancer, right     Rib pain on right side     chronic    Rotator cuff tear     bilateral    Stomach cancer 2016    Stroke     Uterine cancer   "   Vitamin B 12 deficiency     Vitamin D deficiency        Family History   Problem Relation Age of Onset    Heart disease Father        Social History     Socioeconomic History    Marital status: Single   Tobacco Use    Smoking status: Former     Current packs/day: 0.00     Average packs/day: 0.3 packs/day for 57.0 years (14.3 ttl pk-yrs)     Types: Cigarettes     Start date: 5/1/1964     Quit date: 5/1/2021     Years since quitting: 3.9     Passive exposure: Past    Smokeless tobacco: Never   Vaping Use    Vaping status: Never Used   Substance and Sexual Activity    Alcohol use: No    Drug use: Never    Sexual activity: Defer               Objective:       Physical Exam    /73 (BP Location: Right arm, Patient Position: Sitting, Cuff Size: Adult) Comment: right wrist - cannot take in either arm or left wrist  Pulse 84   Ht 152.4 cm (60\")   Wt 69.3 kg (152 lb 12.8 oz)   SpO2 96%   BMI 29.84 kg/m²   The patient is alert, oriented and in no distress.    Vital signs as noted above.    Head and neck revealed no carotid bruits or jugular venous distension.  No thyromegaly or lymphadenopathy is present.    Lungs clear.  No wheezing.  Breath sounds are normal bilaterally.    Heart normal first and second heart sounds.  No murmur..  No pericardial rub is present.  No gallop is present.    Abdomen soft and nontender.  No organomegaly is present.    Extremities revealed good peripheral pulses without any pedal edema.    Skin warm and dry.    Musculoskeletal system is grossly normal.    CNS grossly normal.           Diagnosis Plan   1. Primary hypertension        2. Mixed hyperlipidemia        3. Type 2 diabetes mellitus without complication, without long-term current use of insulin        4. SOB (shortness of breath)        5. ESRD (end stage renal disease)        6. Gastroesophageal reflux disease without esophagitis        7. Overweight (BMI 25.0-29.9)        8. Palpitations        9. B12 deficiency        10. " Bilateral carotid artery occlusion        LAB RESULTS (LAST 7 DAYS)    CBC  Results from last 7 days   Lab Units 03/26/25  0000   HEMOGLOBIN % 11.7*  35.1*       BMP  Results from last 7 days   Lab Units 03/26/25  0000   POTASSIUM mEq/L 4.4       CMP   Results from last 7 days   Lab Units 03/26/25  0000   POTASSIUM mEq/L 4.4         BNP        TROPONIN        CoAg        Creatinine Clearance  CrCl cannot be calculated (Patient's most recent lab result is older than the maximum 30 days allowed.).    ABG        Radiology  No radiology results for the last day    EKG  Procedures    Stress test      Echocardiogram  Results for orders placed during the hospital encounter of 07/25/21    Adult Transthoracic Echo Complete W/ Cont if Necessary Per Protocol    Interpretation Summary  Normal LV size and contractility EF of 60%  Normal RV size  Normal atrial size  Aortic valve, mitral valve, tricuspid valve appears structurally normal, mild mitral regurgitation  seen.  No pericardial effusion seen.  Proximal aorta appears normal in size.      Cardiac catheterization  Results for orders placed during the hospital encounter of 09/27/22    Cardiac Catheterization/Vascular Study    Narrative  Images from the original result were not included.    Today's Date:  09/27/22  Jan Caicedo MD  [facility] cardiac catheterization lab    Preoperative Diagnosis:  Poorly functioning hemodialysis fistula/shunt    Postoperative Diagnosis:  Same    Procedure Performed:    CPT:  99810 Fistulogram    Surgeon:  Jan Caicedo MD    Assistant:  None    Anesthesia:  Local    Estimated Blood Loss:  Minimal    Findings:  Normal fistulogram in brachial radial ulnar arteries, normal central venogram    Implants:  None    Specimen:  none    Complications:  none    Dispo:  to PACU    Indication for procedure:  72 y.o. female with pleasant female with pain left hand with hemodialysis.  They are able to use fistula for about 3 hours  but left hand becomes painful.  Patient with symptoms of right hand polyneuropathy with previous AV graft that was ligated.  Plan left arm fistulogram of brachial artery basilic vein fistula and left arm arteriogram.  Risk discussed with patient agrees to proceed.    Description of procedure:  Patient was taken to the operating room.  Anesthesia was administered and a monitored setting.  Surgical sites were prepped and draped in the usual sterile fashion.  A full surgical timeout was performed.  Under ultrasound guidance the fistula was accessed using a micropuncture system.  Wire placement was confirmed with fluoroscopy.  Micro-needle was exchanged for the micro-sheath.  Diagnostic arteriovenous fistulogram was performed.    Left arm fistula prepped and draped in usual fashion.  1% Xylocaine used for local anesthesia.  Fistula was accessed near axilla going retrograde towards the brachial artery anastomosis without difficulty using ultrasound guidance.  Micropuncture wire advanced at difficulty.  Catheter placed.  Left upper extremity fistulogram and central venogram performed with all widely patent.  Pressure held on basilic vein fistula centrally with fistulogram of the brachial anastomosis widely patent.  Left arm runoff arteriogram performed using same technique with brachial radial ulnar and interosseous arteries are widely patent.  Anastomosis of basilic vein to brachial artery widely patent.  There was a dilated portion of the fistula and then the mild narrowing of the proximal long segment of the basilic vein going towards the brachial artery but no significant stenoses.  Patient essentially with normal fistulogram.  Suspect pain with hemodialysis due to polyneuropathy.    Jan Caicedo MD  09/27/22          Assessment and Plan       Diagnoses and all orders for this visit:    1. Primary hypertension (Primary)    2. Mixed hyperlipidemia    3. Type 2 diabetes mellitus without complication, without  long-term current use of insulin  Overview:  Formatting of this note might be different from the original. Last Assessment & Plan: Formatting of this note might be different from the original. Diabetes is improving with lifestyle modifications. Reminded to bring in blood sugar diary at next visit. Dietary recommendations for ADA diet. Regular aerobic exercise. Discussed foot care. Diabetes will be reassessed 4 months.      4. SOB (shortness of breath)    5. ESRD (end stage renal disease)    6. Gastroesophageal reflux disease without esophagitis    7. Overweight (BMI 25.0-29.9)    8. Palpitations    9. B12 deficiency    10. Bilateral carotid artery occlusion         Primary hypertension (Primary)  Hypertensive today.  Systolic blood pressure in the 170s  She used to be on losartan, Coreg and amlodipine however they have been discontinued.  She is now on Toprol-XL  Hypotensive episodes during dialysis noted.  She reports home systolic blood pressure in the 100  Permissive hypertension     Shortness of breath/cardiac murmur  May be related to high flow from AV fistula.  I will obtain an echocardiogram  No need for stress test in the absence of angina     Palpitation  Already on beta-blocker  Can offer Holter monitor if palpitations worsens        ESRD (end stage renal disease)  On hemodialysis with AV fistula  Anemia with history of GI bleeding  Follow-up with Dr. Buckley     Gastroesophageal reflux disease without esophagitis  PPI as needed  History of GI bleed     Type 2 diabetes mellitus without complication, without long-term current use of insulin  A1c is 5.8     Mixed hyperlipidemia  Previous LDL 98 in 2020  Repeat lipid panel     Overweight (BMI 25.0-29.9)  Status post gastric bypass  BMI is 30.  She weighs 152 pounds.

## 2025-04-01 ENCOUNTER — OFFICE VISIT (OUTPATIENT)
Dept: CARDIOLOGY | Facility: CLINIC | Age: 75
End: 2025-04-01
Payer: MEDICARE

## 2025-04-01 VITALS
HEIGHT: 60 IN | OXYGEN SATURATION: 96 % | WEIGHT: 152.8 LBS | DIASTOLIC BLOOD PRESSURE: 73 MMHG | SYSTOLIC BLOOD PRESSURE: 173 MMHG | BODY MASS INDEX: 30 KG/M2 | HEART RATE: 84 BPM

## 2025-04-01 DIAGNOSIS — I10 PRIMARY HYPERTENSION: Primary | ICD-10-CM

## 2025-04-01 DIAGNOSIS — E53.8 B12 DEFICIENCY: Chronic | ICD-10-CM

## 2025-04-01 DIAGNOSIS — I65.23 BILATERAL CAROTID ARTERY OCCLUSION: ICD-10-CM

## 2025-04-01 DIAGNOSIS — E66.3 OVERWEIGHT (BMI 25.0-29.9): ICD-10-CM

## 2025-04-01 DIAGNOSIS — K21.9 GASTROESOPHAGEAL REFLUX DISEASE WITHOUT ESOPHAGITIS: ICD-10-CM

## 2025-04-01 DIAGNOSIS — R06.02 SOB (SHORTNESS OF BREATH): ICD-10-CM

## 2025-04-01 DIAGNOSIS — R00.2 PALPITATIONS: ICD-10-CM

## 2025-04-01 DIAGNOSIS — E78.2 MIXED HYPERLIPIDEMIA: ICD-10-CM

## 2025-04-01 DIAGNOSIS — N18.6 ESRD (END STAGE RENAL DISEASE): ICD-10-CM

## 2025-04-01 DIAGNOSIS — R01.1 HEART MURMUR: ICD-10-CM

## 2025-04-01 DIAGNOSIS — E11.9 TYPE 2 DIABETES MELLITUS WITHOUT COMPLICATION, WITHOUT LONG-TERM CURRENT USE OF INSULIN: ICD-10-CM

## 2025-04-01 RX ORDER — ASPIRIN 81 MG/1
81 TABLET, CHEWABLE ORAL DAILY
COMMUNITY

## 2025-04-01 RX ORDER — AMMONIUM LACTATE 12 G/100G
1 CREAM TOPICAL AS NEEDED
COMMUNITY
Start: 2025-03-06

## 2025-04-24 RX ORDER — LIDOCAINE AND PRILOCAINE 25; 25 MG/G; MG/G
CREAM TOPICAL
Qty: 60 G | Refills: 3 | Status: SHIPPED | OUTPATIENT
Start: 2025-04-24

## 2025-05-11 ENCOUNTER — HOSPITAL ENCOUNTER (INPATIENT)
Facility: HOSPITAL | Age: 75
LOS: 7 days | Discharge: HOME | DRG: 335 | End: 2025-05-18
Attending: EMERGENCY MEDICINE | Admitting: HOSPITALIST
Payer: MEDICARE

## 2025-05-11 ENCOUNTER — APPOINTMENT (OUTPATIENT)
Dept: CT IMAGING | Facility: HOSPITAL | Age: 75
DRG: 335 | End: 2025-05-11
Payer: MEDICARE

## 2025-05-11 DIAGNOSIS — E87.5 HYPERKALEMIA: Primary | ICD-10-CM

## 2025-05-11 DIAGNOSIS — K43.2 RECURRENT VENTRAL HERNIA: ICD-10-CM

## 2025-05-11 DIAGNOSIS — R10.13 EPIGASTRIC PAIN: ICD-10-CM

## 2025-05-11 LAB
ALBUMIN SERPL-MCNC: 4 G/DL (ref 3.5–5.2)
ALBUMIN/GLOB SERPL: 1.1 G/DL
ALP SERPL-CCNC: 98 U/L (ref 39–117)
ALT SERPL W P-5'-P-CCNC: 15 U/L (ref 1–33)
ANION GAP SERPL CALCULATED.3IONS-SCNC: 17.5 MMOL/L (ref 5–15)
ANION GAP SERPL CALCULATED.3IONS-SCNC: 20.1 MMOL/L (ref 5–15)
AST SERPL-CCNC: 16 U/L (ref 1–32)
BASOPHILS # BLD AUTO: 0.1 10*3/MM3 (ref 0–0.2)
BASOPHILS NFR BLD AUTO: 0.7 % (ref 0–1.5)
BILIRUB SERPL-MCNC: 0.2 MG/DL (ref 0–1.2)
BUN SERPL-MCNC: 53 MG/DL (ref 8–23)
BUN SERPL-MCNC: 55 MG/DL (ref 8–23)
BUN/CREAT SERPL: 6.3 (ref 7–25)
BUN/CREAT SERPL: 6.5 (ref 7–25)
CALCIUM SPEC-SCNC: 8.3 MG/DL (ref 8.6–10.5)
CALCIUM SPEC-SCNC: 9.5 MG/DL (ref 8.6–10.5)
CHLORIDE SERPL-SCNC: 102 MMOL/L (ref 98–107)
CHLORIDE SERPL-SCNC: 102 MMOL/L (ref 98–107)
CO2 SERPL-SCNC: 17.5 MMOL/L (ref 22–29)
CO2 SERPL-SCNC: 19.9 MMOL/L (ref 22–29)
CREAT SERPL-MCNC: 8.16 MG/DL (ref 0.57–1)
CREAT SERPL-MCNC: 8.74 MG/DL (ref 0.57–1)
D-LACTATE SERPL-SCNC: 1.3 MMOL/L (ref 0.3–2)
DEPRECATED RDW RBC AUTO: 56.2 FL (ref 37–54)
EGFRCR SERPLBLD CKD-EPI 2021: 4.4 ML/MIN/1.73
EGFRCR SERPLBLD CKD-EPI 2021: 4.7 ML/MIN/1.73
EOSINOPHIL # BLD AUTO: 0.22 10*3/MM3 (ref 0–0.4)
EOSINOPHIL NFR BLD AUTO: 1.5 % (ref 0.3–6.2)
ERYTHROCYTE [DISTWIDTH] IN BLOOD BY AUTOMATED COUNT: 15.5 % (ref 12.3–15.4)
GEN 5 1HR TROPONIN T REFLEX: 36 NG/L
GLOBULIN UR ELPH-MCNC: 3.5 GM/DL
GLUCOSE BLDC GLUCOMTR-MCNC: 233 MG/DL (ref 70–105)
GLUCOSE BLDC GLUCOMTR-MCNC: 53 MG/DL (ref 70–105)
GLUCOSE SERPL-MCNC: 71 MG/DL (ref 65–99)
GLUCOSE SERPL-MCNC: 97 MG/DL (ref 65–99)
HCT VFR BLD AUTO: 36.4 % (ref 34–46.6)
HGB BLD-MCNC: 11.2 G/DL (ref 12–15.9)
IMM GRANULOCYTES # BLD AUTO: 0.09 10*3/MM3 (ref 0–0.05)
IMM GRANULOCYTES NFR BLD AUTO: 0.6 % (ref 0–0.5)
LIPASE SERPL-CCNC: 32 U/L (ref 13–60)
LYMPHOCYTES # BLD AUTO: 1.83 10*3/MM3 (ref 0.7–3.1)
LYMPHOCYTES NFR BLD AUTO: 12.2 % (ref 19.6–45.3)
MCH RBC QN AUTO: 30.9 PG (ref 26.6–33)
MCHC RBC AUTO-ENTMCNC: 30.8 G/DL (ref 31.5–35.7)
MCV RBC AUTO: 100.6 FL (ref 79–97)
MONOCYTES # BLD AUTO: 1.06 10*3/MM3 (ref 0.1–0.9)
MONOCYTES NFR BLD AUTO: 7.1 % (ref 5–12)
NEUTROPHILS NFR BLD AUTO: 11.73 10*3/MM3 (ref 1.7–7)
NEUTROPHILS NFR BLD AUTO: 77.9 % (ref 42.7–76)
NRBC BLD AUTO-RTO: 0 /100 WBC (ref 0–0.2)
PLATELET # BLD AUTO: 376 10*3/MM3 (ref 140–450)
PMV BLD AUTO: 10.2 FL (ref 6–12)
POTASSIUM SERPL-SCNC: 6.1 MMOL/L (ref 3.5–5.2)
POTASSIUM SERPL-SCNC: 6.9 MMOL/L (ref 3.5–5.2)
PROT SERPL-MCNC: 7.5 G/DL (ref 6–8.5)
RBC # BLD AUTO: 3.62 10*6/MM3 (ref 3.77–5.28)
SODIUM SERPL-SCNC: 137 MMOL/L (ref 136–145)
SODIUM SERPL-SCNC: 142 MMOL/L (ref 136–145)
TROPONIN T % DELTA: -8
TROPONIN T NUMERIC DELTA: -3 NG/L
TROPONIN T SERPL HS-MCNC: 39 NG/L
WBC NRBC COR # BLD AUTO: 15.03 10*3/MM3 (ref 3.4–10.8)

## 2025-05-11 PROCEDURE — 83690 ASSAY OF LIPASE: CPT | Performed by: EMERGENCY MEDICINE

## 2025-05-11 PROCEDURE — 84484 ASSAY OF TROPONIN QUANT: CPT | Performed by: EMERGENCY MEDICINE

## 2025-05-11 PROCEDURE — 83605 ASSAY OF LACTIC ACID: CPT | Performed by: EMERGENCY MEDICINE

## 2025-05-11 PROCEDURE — 63710000001 INSULIN REGULAR HUMAN PER 5 UNITS: Performed by: EMERGENCY MEDICINE

## 2025-05-11 PROCEDURE — 25010000002 MORPHINE PER 10 MG: Performed by: EMERGENCY MEDICINE

## 2025-05-11 PROCEDURE — 82948 REAGENT STRIP/BLOOD GLUCOSE: CPT | Performed by: EMERGENCY MEDICINE

## 2025-05-11 PROCEDURE — 80053 COMPREHEN METABOLIC PANEL: CPT | Performed by: EMERGENCY MEDICINE

## 2025-05-11 PROCEDURE — 25010000002 CALCIUM GLUCONATE-NACL 1-0.675 GM/50ML-% SOLUTION: Performed by: EMERGENCY MEDICINE

## 2025-05-11 PROCEDURE — 25510000001 IOPAMIDOL PER 1 ML: Performed by: EMERGENCY MEDICINE

## 2025-05-11 PROCEDURE — 93005 ELECTROCARDIOGRAM TRACING: CPT | Performed by: EMERGENCY MEDICINE

## 2025-05-11 PROCEDURE — 74177 CT ABD & PELVIS W/CONTRAST: CPT

## 2025-05-11 PROCEDURE — 99285 EMERGENCY DEPT VISIT HI MDM: CPT

## 2025-05-11 PROCEDURE — 25010000002 ONDANSETRON PER 1 MG: Performed by: EMERGENCY MEDICINE

## 2025-05-11 PROCEDURE — 25010000002 HYDROMORPHONE PER 4 MG: Performed by: HOSPITALIST

## 2025-05-11 PROCEDURE — 85025 COMPLETE CBC W/AUTO DIFF WBC: CPT | Performed by: EMERGENCY MEDICINE

## 2025-05-11 PROCEDURE — 36415 COLL VENOUS BLD VENIPUNCTURE: CPT

## 2025-05-11 RX ORDER — SODIUM CHLORIDE 0.9 % (FLUSH) 0.9 %
10 SYRINGE (ML) INJECTION AS NEEDED
Status: DISCONTINUED | OUTPATIENT
Start: 2025-05-11 | End: 2025-05-18 | Stop reason: HOSPADM

## 2025-05-11 RX ORDER — POLYETHYLENE GLYCOL 3350 17 G/17G
17 POWDER, FOR SOLUTION ORAL DAILY PRN
Status: DISCONTINUED | OUTPATIENT
Start: 2025-05-11 | End: 2025-05-16

## 2025-05-11 RX ORDER — CALCIUM GLUCONATE 20 MG/ML
1000 INJECTION, SOLUTION INTRAVENOUS ONCE
Status: COMPLETED | OUTPATIENT
Start: 2025-05-11 | End: 2025-05-11

## 2025-05-11 RX ORDER — ACETAMINOPHEN 160 MG/5ML
650 SOLUTION ORAL EVERY 4 HOURS PRN
Status: DISCONTINUED | OUTPATIENT
Start: 2025-05-11 | End: 2025-05-12

## 2025-05-11 RX ORDER — BISACODYL 5 MG/1
5 TABLET, DELAYED RELEASE ORAL DAILY PRN
Status: DISCONTINUED | OUTPATIENT
Start: 2025-05-11 | End: 2025-05-16

## 2025-05-11 RX ORDER — AMOXICILLIN 250 MG
2 CAPSULE ORAL 2 TIMES DAILY PRN
Status: DISCONTINUED | OUTPATIENT
Start: 2025-05-11 | End: 2025-05-16

## 2025-05-11 RX ORDER — BISACODYL 10 MG
10 SUPPOSITORY, RECTAL RECTAL DAILY PRN
Status: DISCONTINUED | OUTPATIENT
Start: 2025-05-11 | End: 2025-05-16

## 2025-05-11 RX ORDER — DEXTROSE MONOHYDRATE 25 G/50ML
25 INJECTION, SOLUTION INTRAVENOUS ONCE
Status: COMPLETED | OUTPATIENT
Start: 2025-05-11 | End: 2025-05-11

## 2025-05-11 RX ORDER — MORPHINE SULFATE 2 MG/ML
2 INJECTION, SOLUTION INTRAMUSCULAR; INTRAVENOUS ONCE
Status: COMPLETED | OUTPATIENT
Start: 2025-05-11 | End: 2025-05-11

## 2025-05-11 RX ORDER — HEPARIN SODIUM 5000 [USP'U]/ML
5000 INJECTION, SOLUTION INTRAVENOUS; SUBCUTANEOUS EVERY 8 HOURS SCHEDULED
Status: DISCONTINUED | OUTPATIENT
Start: 2025-05-12 | End: 2025-05-18 | Stop reason: HOSPADM

## 2025-05-11 RX ORDER — NITROGLYCERIN 0.4 MG/1
0.4 TABLET SUBLINGUAL
Status: DISCONTINUED | OUTPATIENT
Start: 2025-05-11 | End: 2025-05-18 | Stop reason: HOSPADM

## 2025-05-11 RX ORDER — ONDANSETRON 4 MG/1
4 TABLET, ORALLY DISINTEGRATING ORAL EVERY 6 HOURS PRN
Status: DISCONTINUED | OUTPATIENT
Start: 2025-05-11 | End: 2025-05-18 | Stop reason: HOSPADM

## 2025-05-11 RX ORDER — IOPAMIDOL 755 MG/ML
100 INJECTION, SOLUTION INTRAVASCULAR
Status: COMPLETED | OUTPATIENT
Start: 2025-05-11 | End: 2025-05-11

## 2025-05-11 RX ORDER — IBUPROFEN 600 MG/1
1 TABLET ORAL
Status: DISCONTINUED | OUTPATIENT
Start: 2025-05-11 | End: 2025-05-18 | Stop reason: HOSPADM

## 2025-05-11 RX ORDER — ONDANSETRON 2 MG/ML
4 INJECTION INTRAMUSCULAR; INTRAVENOUS ONCE
Status: COMPLETED | OUTPATIENT
Start: 2025-05-11 | End: 2025-05-11

## 2025-05-11 RX ORDER — CALCIUM CARBONATE 500 MG/1
1 TABLET, CHEWABLE ORAL 2 TIMES DAILY PRN
Status: DISCONTINUED | OUTPATIENT
Start: 2025-05-11 | End: 2025-05-18 | Stop reason: HOSPADM

## 2025-05-11 RX ORDER — HYDROMORPHONE HYDROCHLORIDE 1 MG/ML
0.5 INJECTION, SOLUTION INTRAMUSCULAR; INTRAVENOUS; SUBCUTANEOUS EVERY 4 HOURS PRN
Status: DISCONTINUED | OUTPATIENT
Start: 2025-05-11 | End: 2025-05-12

## 2025-05-11 RX ORDER — PANTOPRAZOLE SODIUM 40 MG/1
40 TABLET, DELAYED RELEASE ORAL
Status: DISCONTINUED | OUTPATIENT
Start: 2025-05-12 | End: 2025-05-18 | Stop reason: HOSPADM

## 2025-05-11 RX ORDER — DEXTROSE MONOHYDRATE 25 G/50ML
25 INJECTION, SOLUTION INTRAVENOUS
Status: DISCONTINUED | OUTPATIENT
Start: 2025-05-11 | End: 2025-05-18 | Stop reason: HOSPADM

## 2025-05-11 RX ORDER — SODIUM CHLORIDE 0.9 % (FLUSH) 0.9 %
10 SYRINGE (ML) INJECTION EVERY 12 HOURS SCHEDULED
Status: DISCONTINUED | OUTPATIENT
Start: 2025-05-11 | End: 2025-05-18 | Stop reason: HOSPADM

## 2025-05-11 RX ORDER — HEPARIN SODIUM 5000 [USP'U]/ML
5000 INJECTION, SOLUTION INTRAVENOUS; SUBCUTANEOUS EVERY 8 HOURS SCHEDULED
Status: DISCONTINUED | OUTPATIENT
Start: 2025-05-11 | End: 2025-05-11

## 2025-05-11 RX ORDER — NICOTINE POLACRILEX 4 MG
15 LOZENGE BUCCAL
Status: DISCONTINUED | OUTPATIENT
Start: 2025-05-11 | End: 2025-05-18 | Stop reason: HOSPADM

## 2025-05-11 RX ORDER — ACETAMINOPHEN 650 MG/1
650 SUPPOSITORY RECTAL EVERY 4 HOURS PRN
Status: DISCONTINUED | OUTPATIENT
Start: 2025-05-11 | End: 2025-05-12

## 2025-05-11 RX ORDER — SODIUM CHLORIDE 9 MG/ML
40 INJECTION, SOLUTION INTRAVENOUS AS NEEDED
Status: DISCONTINUED | OUTPATIENT
Start: 2025-05-11 | End: 2025-05-18 | Stop reason: HOSPADM

## 2025-05-11 RX ORDER — ACETAMINOPHEN 325 MG/1
650 TABLET ORAL EVERY 4 HOURS PRN
Status: DISCONTINUED | OUTPATIENT
Start: 2025-05-11 | End: 2025-05-12

## 2025-05-11 RX ORDER — SUCRALFATE 1 G/1
1 TABLET ORAL
Status: DISCONTINUED | OUTPATIENT
Start: 2025-05-11 | End: 2025-05-12

## 2025-05-11 RX ADMIN — ONDANSETRON 4 MG: 2 INJECTION, SOLUTION INTRAMUSCULAR; INTRAVENOUS at 17:30

## 2025-05-11 RX ADMIN — SUCRALFATE 1 G: 1 TABLET ORAL at 22:23

## 2025-05-11 RX ADMIN — HYDROMORPHONE HYDROCHLORIDE 0.5 MG: 1 INJECTION, SOLUTION INTRAMUSCULAR; INTRAVENOUS; SUBCUTANEOUS at 22:26

## 2025-05-11 RX ADMIN — IOPAMIDOL 100 ML: 755 INJECTION, SOLUTION INTRAVENOUS at 18:36

## 2025-05-11 RX ADMIN — DEXTROSE MONOHYDRATE 25 G: 25 INJECTION, SOLUTION INTRAVENOUS at 18:54

## 2025-05-11 RX ADMIN — DEXTROSE MONOHYDRATE 25 G: 25 INJECTION, SOLUTION INTRAVENOUS at 21:08

## 2025-05-11 RX ADMIN — MORPHINE SULFATE 2 MG: 2 INJECTION, SOLUTION INTRAMUSCULAR; INTRAVENOUS at 17:30

## 2025-05-11 RX ADMIN — INSULIN HUMAN 5 UNITS: 100 INJECTION, SOLUTION PARENTERAL at 18:57

## 2025-05-11 RX ADMIN — Medication 10 ML: at 22:23

## 2025-05-11 RX ADMIN — CALCIUM GLUCONATE 1000 MG: 20 INJECTION, SOLUTION INTRAVENOUS at 18:58

## 2025-05-11 NOTE — H&P
[facility] Medicine Services  History & Physical    Patient Name: Sherry Lobato  : 1950  MRN: [id number]  Primary Care Physician:  Raegan Gotti MD  Date of admission: 2025  Date and Time of Service: 2025 at 7:45 PM    Subjective      Chief Complaint: Abdominal pain    History of Present Illness: Sherry Lobato is a 74 y.o. 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    She presented to ED with acute on chronic upper abdominal pain moderate crampy nature with radiation across abdomen, associate with nausea and loose stool.  Last received full dental session on Friday    In ED vital signs stable, labs significant for leukocytosis, chronic anemia and CKD, severe hyperkalemia 6.9 status post medical treatment and nephrology consulted for emergent dialysis      Review of Systems negative except as mention HPI    Personal History     Past Medical History:   Diagnosis Date    Lucas's esophagus     C. difficile colitis     Carotid artery disease     -50-74% left, 16-49% right ()    COPD (chronic obstructive pulmonary disease)     DM2 (diabetes mellitus, type 2)     ESRD (end stage renal disease)     on HD    Gastric ulcer     at anastomatic site    Gastroparesis     unknown    GERD (gastroesophageal reflux disease)     Gout     not current    Hemodialysis patient     mwf    Hernia, incisional     abdomen    History of colonoscopy     UTD    History of degenerative disc disease      Hyperlipidemia     Hypertension     Medicare annual wellness visit, subsequent 11/07/2020    Microscopic colitis     Nephrotic syndrome     Neuroendocrine tumor     of stomach    FABY (obstructive sleep apnea)     not treating    Pedal edema     ressolved    Proteinuria     Renal cell cancer, right     Rib pain on right side     chronic    Rotator cuff tear     bilateral    Stomach cancer 2016    Stroke     Uterine cancer     Vitamin B 12 deficiency     Vitamin D deficiency        Past Surgical History:   Procedure Laterality Date    APPENDECTOMY      ARTERIOVENOUS FISTULA Right     ARTERIOVENOUS FISTULA REPAIR      Clotted off and insert graft    ARTERIOVENOUS FISTULA/SHUNT SURGERY Left 12/30/2020    Procedure: ARTERIOVENOUS FISTULA FORMATION;  Surgeon: Jan Caicedo MD;  Location: [facility] MAIN OR;  Service: Vascular;  Laterality: Left;    ARTERIOVENOUS FISTULA/SHUNT SURGERY Left 4/23/2024    Procedure: FISTULOGRAM WITH SUBCLAVIAN ARTERY ANGIOPLASTY, fistula banding;  Surgeon: Franki Delgadillo II, MD;  Location: [facility] HYBRID OR;  Service: Vascular;  Laterality: Left;    BREAST BIOPSY      right nipple 1981    BRONCHOSCOPY N/A 6/11/2024    Procedure: BRONCHOSCOPY WITH RIGHT LUNG WASHING;  Surgeon: Aramis Barragan MD;  Location: [facility] ENDOSCOPY;  Service: Pulmonary;  Laterality: N/A;  RML atelectasis    COLONOSCOPY N/A 11/24/2020    Procedure: COLONOSCOPY with polypectomy x 7;  Surgeon: Jeffery White MD;  Location: [facility] ENDOSCOPY;  Service: Gastroenterology;  Laterality: N/A;  post op: polyps, diverticulosis, hemorrhoids    COLONOSCOPY N/A 6/1/2023    Procedure: COLONOSCOPY with polypectomy x 3 biopsy x 1;  Surgeon: Jeffery White MD;  Location: [facility] ENDOSCOPY;  Service: Gastroenterology;  Laterality: N/A;  diverticulosis polyps    COLONOSCOPY N/A 9/4/2024    Procedure: COLONOSCOPY WITH POLYPECTOMY X 1;  Surgeon: Jeffery White MD;  Location: [facility] ENDOSCOPY;  Service: Gastroenterology;  Laterality:  N/A;  POST- POLYP, DIVERTICULOSIS, INTERNAL AND EXTERNAL HEMORRHOIDS    ENDOSCOPY N/A 03/12/2022    Procedure: ESOPHAGOGASTRODUODENOSCOPY with biopsy x 1 area;  Surgeon: Javan Augustin MD;  Location: [facility] ENDOSCOPY;  Service: Gastroenterology;  Laterality: N/A;  post op: anastamosis    ENDOSCOPY N/A 10/9/2023    Procedure: ESOPHAGOGASTRODUODENOSCOPY with biopsy x3 areas;  Surgeon: Ace Campbell MD;  Location: [facility] ENDOSCOPY;  Service: Gastroenterology;  Laterality: N/A;  duodenal ulcers;irregular z line    ENDOSCOPY N/A 8/27/2024    Procedure: ESOPHAGOGASTRODUODENOSCOPY with argon plasma coagulation;  Surgeon: HILARIO Blackmon MD;  Location: [facility] ENDOSCOPY;  Service: Gastroenterology;  Laterality: N/A;  post op: AVM    ENTEROSCOPY SMALL BOWEL N/A 9/4/2024    Procedure: ESOPHAGOGASTRODUODENOSCOPY WITH SMALL BOWEL ENTEROSCOPY, ARGON PLASMA COAGULATION AND ENDOSCOPIC CLIPPING X 2 OF GASTRIC ANTRAL VASCULAR ECTASIA;  Surgeon: Jeffery White MD;  Location: [facility] ENDOSCOPY;  Service: Gastroenterology;  Laterality: N/A;  POST- GAVE    GASTRIC RESECTION      cancer    HYSTERECTOMY      LAPAROSCOPIC CHOLECYSTECTOMY      NEPHRECTOMY      right kidney removed     REDUCTION MAMMAPLASTY      TUMOR REMOVAL      boils    VENTRAL/INCISIONAL HERNIA REPAIR Right 08/28/2019    Procedure: VENTRAL/INCISIONAL HERNIA REPAIR;  Surgeon: Nilay Stevens MD;  Location: [facility] MAIN OR;  Service: General    VENTRAL/INCISIONAL HERNIA REPAIR N/A 10/01/2019    Procedure: OPEN VENTRAL/INCISIONAL HERNIA REPAIR;  Surgeon: Nilay Stevens MD;  Location: [facility] MAIN OR;  Service: General    VENTRAL/INCISIONAL HERNIA REPAIR N/A 10/11/2021    Procedure: VENTRAL/INCISIONAL HERNIA REPAIR LAPAROSCOPIC;  Surgeon: Nilay Stevens MD;  Location: [facility] MAIN OR;  Service: General;  Laterality: N/A;    VENTRAL/INCISIONAL HERNIA REPAIR N/A 01/23/2023    Procedure: VENTRAL/INCISIONAL HERNIA REPAIR with MESH;  Surgeon: Nilay Stevens MD;   Location: [facility] MAIN OR;  Service: General;  Laterality: N/A;       Family History: family history includes Heart disease in her father. Otherwise pertinent FHx was reviewed and not pertinent to current issue.    Social History:  reports that she quit smoking about 4 years ago. Her smoking use included cigarettes. She started smoking about 61 years ago. She has a 14.3 pack-year smoking history. She has been exposed to tobacco smoke. She has never used smokeless tobacco. She reports that she does not drink alcohol and does not use drugs.    Home Medications:  Prior to Admission Medications       Prescriptions Last Dose Informant Patient Reported? Taking?    albuterol sulfate  (90 Base) MCG/ACT inhaler   Yes No    Inhale 2 puffs Every 4 (Four) Hours As Needed for Wheezing.    ammonium lactate (AMLACTIN) 12 % cream   Yes No    Apply 1 Application topically to the appropriate area as directed As Needed.    aspirin 81 MG chewable tablet   Yes No    Chew 1 tablet Daily.    calcium acetate (PHOS BINDER,) 667 MG capsule capsule   Yes No    Take 1 capsule by mouth 2 (Two) Times a Day.    Cholecalciferol 25 MCG (1000 UT) tablet   Yes No    Take 1 tablet by mouth Daily.    hyoscyamine (LEVSIN) 0.125 MG SL tablet   No No    Place 1 tablet under the tongue Every 4 (Four) Hours As Needed for Cramping.    Patient not taking:  Reported on 4/1/2025    lidocaine-prilocaine (EMLA) 2.5-2.5 % cream   No No    APPLY TOPICALLY TO THE 5TH FINGER ON THE LEFT HAND AS NEEDED PAIN    metoprolol succinate XL (Toprol XL) 25 MG 24 hr tablet   No No    Take 1 tablet by mouth 2 (Two) Times a Day.    multivitamin (MULTI-VITAMIN PO)   Yes No    Take 1 tablet by mouth Daily.    pantoprazole (PROTONIX) 20 MG EC tablet  Self Yes No    Take 1 tablet by mouth Daily.    sevelamer (RENAGEL) 800 MG tablet   Yes No    Take 1 tablet by mouth 2 (Two) Times a Day With Meals.    Patient not taking:  Reported on 4/1/2025    sodium bicarbonate 650 MG tablet  "  Yes No    Take 2 tablets by mouth 3 (Three) Times a Day.    Patient not taking:  Reported on 4/1/2025    sucralfate (CARAFATE) 1 g tablet   Yes No    Take 1 tablet by mouth 4 (Four) Times a Day As Needed.    Patient not taking:  Reported on 4/1/2025    temazepam (RESTORIL) 30 MG capsule   Yes No    Take 1 capsule by mouth Every Night.              Allergies:  Allergies   Allergen Reactions    Atorvastatin Calcium Other (See Comments)     \"felt like I was on fire\"    Gabapentin Dizziness     says was unable to walk    Niacin Other (See Comments)     \"FEELS LIKE SHE IS ON FIRE\"         Objective      Vitals:   Temp:  [98.1 °F (36.7 °C)] 98.1 °F (36.7 °C)  Heart Rate:  [81-89] 88  Resp:  [20-24] 20  BP: (107-156)/(64-78) 107/64  Body mass index is 30.7 kg/m².  Physical Exam  Constitutional:       Appearance: Normal appearance.   HENT:      Head: Normocephalic and atraumatic.   Eyes:      Pupils: Pupils are equal, round, and reactive to light.   Cardiovascular:      Rate and Rhythm: Normal rate and regular rhythm.   Pulmonary:      Effort: Pulmonary effort is normal.      Breath sounds: Normal breath sounds.   Abdominal:      General: Bowel sounds are normal. There is distension.      Tenderness: There is abdominal tenderness.   Musculoskeletal:         General: Normal range of motion.      Cervical back: Normal range of motion.   Skin:     General: Skin is warm.      Capillary Refill: Capillary refill takes less than 2 seconds.   Neurological:      General: No focal deficit present.      Mental Status: She is alert and oriented to person, place, and time.   Psychiatric:         Mood and Affect: Mood normal.         Diagnostic Data:  Lab Results (last 24 hours)       Procedure Component Value Units Date/Time    Basic Metabolic Panel [[id number]]  (Abnormal) Collected: 05/11/25 1852    Specimen: Blood Updated: 05/11/25 1924     Glucose 71 mg/dL      BUN 53 mg/dL      Creatinine 8.16 mg/dL      Sodium 137 mmol/L      " Potassium 6.1 mmol/L      Comment: Specimen hemolyzed.  Result may be falsely elevated.        Chloride 102 mmol/L      CO2 17.5 mmol/L      Calcium 8.3 mg/dL      BUN/Creatinine Ratio 6.5     Anion Gap 17.5 mmol/L      eGFR 4.7 mL/min/1.73     Narrative:      GFR Categories in Chronic Kidney Disease (CKD)              GFR Category          GFR (mL/min/1.73)    Interpretation  G1                    90 or greater        Normal or high (1)  G2                    60-89                Mild decrease (1)  G3a                   45-59                Mild to moderate decrease  G3b                   30-44                Moderate to severe decrease  G4                    15-29                Severe decrease  G5                    14 or less           Kidney failure    (1)In the absence of evidence of kidney disease, neither GFR category G1 or G2 fulfill the criteria for CKD.    eGFR calculation 2021 CKD-EPI creatinine equation, which does not include race as a factor    High Sensitivity Troponin T 1Hr [[id number]]  (Abnormal) Collected: 05/11/25 1852    Specimen: Blood Updated: 05/11/25 1922     HS Troponin T 36 ng/L      Troponin T Numeric Delta -3 ng/L      Troponin T % Delta -8    Narrative:      High Sensitive Troponin T Reference Range:  <14.0 ng/L- Negative Female for AMI  <22.0 ng/L- Negative Male for AMI  >=14 - Abnormal Female indicating possible myocardial injury.  >=22 - Abnormal Male indicating possible myocardial injury.   Clinicians would have to utilize clinical acumen, EKG, Troponin, and serial changes to determine if it is an Acute Myocardial Infarction or myocardial injury due to an underlying chronic condition.         Comprehensive Metabolic Panel [[id number]]  (Abnormal) Collected: 05/11/25 1715    Specimen: Blood Updated: 05/11/25 1744     Glucose 97 mg/dL      BUN 55 mg/dL      Creatinine 8.74 mg/dL      Sodium 142 mmol/L      Potassium 6.9 mmol/L      Chloride 102 mmol/L      CO2 19.9 mmol/L      Calcium  9.5 mg/dL      Total Protein 7.5 g/dL      Albumin 4.0 g/dL      ALT (SGPT) 15 U/L      AST (SGOT) 16 U/L      Alkaline Phosphatase 98 U/L      Total Bilirubin 0.2 mg/dL      Globulin 3.5 gm/dL      A/G Ratio 1.1 g/dL      BUN/Creatinine Ratio 6.3     Anion Gap 20.1 mmol/L      eGFR 4.4 mL/min/1.73     Narrative:      GFR Categories in Chronic Kidney Disease (CKD)              GFR Category          GFR (mL/min/1.73)    Interpretation  G1                    90 or greater        Normal or high (1)  G2                    60-89                Mild decrease (1)  G3a                   45-59                Mild to moderate decrease  G3b                   30-44                Moderate to severe decrease  G4                    15-29                Severe decrease  G5                    14 or less           Kidney failure    (1)In the absence of evidence of kidney disease, neither GFR category G1 or G2 fulfill the criteria for CKD.    eGFR calculation 2021 CKD-EPI creatinine equation, which does not include race as a factor    High Sensitivity Troponin T [[id number]]  (Abnormal) Collected: 05/11/25 1715    Specimen: Blood Updated: 05/11/25 1740     HS Troponin T 39 ng/L     Narrative:      High Sensitive Troponin T Reference Range:  <14.0 ng/L- Negative Female for AMI  <22.0 ng/L- Negative Male for AMI  >=14 - Abnormal Female indicating possible myocardial injury.  >=22 - Abnormal Male indicating possible myocardial injury.   Clinicians would have to utilize clinical acumen, EKG, Troponin, and serial changes to determine if it is an Acute Myocardial Infarction or myocardial injury due to an underlying chronic condition.         Lipase [[id number]]  (Normal) Collected: 05/11/25 1715    Specimen: Blood Updated: 05/11/25 1740     Lipase 32 U/L     CBC & Differential [[id number]]  (Abnormal) Collected: 05/11/25 1715    Specimen: Blood Updated: 05/11/25 1720    Narrative:      The following orders were created for panel order CBC &  Differential.  Procedure                               Abnormality         Status                     ---------                               -----------         ------                     CBC Auto Differential[[id number]]        Abnormal            Final result                 Please view results for these tests on the individual orders.    CBC Auto Differential [[id number]]  (Abnormal) Collected: 05/11/25 1715    Specimen: Blood Updated: 05/11/25 1720     WBC 15.03 10*3/mm3      RBC 3.62 10*6/mm3      Hemoglobin 11.2 g/dL      Hematocrit 36.4 %      .6 fL      MCH 30.9 pg      MCHC 30.8 g/dL      RDW 15.5 %      RDW-SD 56.2 fl      MPV 10.2 fL      Platelets 376 10*3/mm3      Neutrophil % 77.9 %      Lymphocyte % 12.2 %      Monocyte % 7.1 %      Eosinophil % 1.5 %      Basophil % 0.7 %      Immature Grans % 0.6 %      Neutrophils, Absolute 11.73 10*3/mm3      Lymphocytes, Absolute 1.83 10*3/mm3      Monocytes, Absolute 1.06 10*3/mm3      Eosinophils, Absolute 0.22 10*3/mm3      Basophils, Absolute 0.10 10*3/mm3      Immature Grans, Absolute 0.09 10*3/mm3      nRBC 0.0 /100 WBC     POC Lactate [[id number]]  (Normal) Collected: 05/11/25 1719    Specimen: Blood Updated: 05/11/25 1720     Lactate 1.3 mmol/L      Comment: Serial Number: [id number]:  [id number]                Imaging Results (Last 24 Hours)       Procedure Component Value Units Date/Time    CT Abdomen Pelvis With Contrast [[id number]] Resulted: 05/11/25 1933     Updated: 05/11/25 1839              Assessment & Plan        Active and Resolved Problems  Severe life-threatening hyperkalemia  Acute on chronic abdominal pain  ESRD on hemodialysis  Anemia of CKD  Essential hypertension    Plan:  Status post medical treatment for hyperkalemia  Nephrology consulted for emergent dialysis  Placed on telemetry  I/O, daily weight, renal/low-carb diet  Daily BMP, electrolytes  History of cholecystectomy, lipase normal  CT abdomen pelvis with contrast is  pending  Continue PPI, Carafate  Daily CBC, transfuse as needed  GI to see if not better        VTE Prophylaxis:  Pharmacologic VTE prophylaxis orders are present.        The patient desires to be as follows:    CODE STATUS:    Code Status (Patient has no pulse and is not breathing): CPR (Attempt to Resuscitate)  Medical Interventions (Patient has pulse or is breathing): Full Support  Level Of Support Discussed With: Patient        Admission Status:  I believe this patient meets inpatient status.    Expected Length of Stay: Greater than 2 midnight stay    PDMP and Medication Dispenses via Sidebar reviewed and consistent with patient reported medications.    I discussed the patient's findings and my recommendations with patient and nursing staff.      Signature:     This document has been electronically signed by Bg Ramos MD on May 11, 2025 19:45 EDT   [facility]

## 2025-05-11 NOTE — ED PROVIDER NOTES
Subjective   History of Present Illness  Patient is a 75y/o female with a history of stomach cancer (status post resection in 2016), uterine cancer, kidney cancer, and end-stage renal disease on hemodialysis who presents with severe, persistent epigastric abdominal pain. The pain is described as similar to her prior episodes that have led to multiple hospital admissions and prior CT scans, which have not revealed a clear etiology. She reports associated nausea without vomiting, no diarrhea, and no bowel movement today (last bowel movement was yesterday). She attempted to take Tums at home for relief, but did not take any other pain medications due to a prescription issue. She has not missed any dialysis sessions this week and currently uses a left upper extremity fistula for dialysis.    Review of Systems  See HPI  Past Medical History:   Diagnosis Date    Lucas's esophagus     C. difficile colitis     Carotid artery disease     -50-74% left, 16-49% right (1/19)    COPD (chronic obstructive pulmonary disease)     DM2 (diabetes mellitus, type 2)     ESRD (end stage renal disease)     on HD    Gastric ulcer     at anastomatic site    Gastroparesis     unknown    GERD (gastroesophageal reflux disease)     Gout     not current    Hemodialysis patient     mwf    Hernia, incisional     abdomen    History of colonoscopy     UTD    History of degenerative disc disease     Hyperlipidemia     Hypertension     Medicare annual wellness visit, subsequent 11/07/2020    Microscopic colitis     Nephrotic syndrome     Neuroendocrine tumor     of stomach    FABY (obstructive sleep apnea)     not treating    Pedal edema     ressolved    Proteinuria     Renal cell cancer, right     Rib pain on right side     chronic    Rotator cuff tear     bilateral    Stomach cancer 2016    Stroke     Uterine cancer     Vitamin B 12 deficiency     Vitamin D deficiency        Allergies   Allergen Reactions    Atorvastatin Calcium Other (See Comments)  "    \"felt like I was on fire\"    Gabapentin Dizziness     says was unable to walk    Niacin Other (See Comments)     \"FEELS LIKE SHE IS ON FIRE\"         Past Surgical History:   Procedure Laterality Date    APPENDECTOMY      ARTERIOVENOUS FISTULA Right     ARTERIOVENOUS FISTULA REPAIR      Clotted off and insert graft    ARTERIOVENOUS FISTULA/SHUNT SURGERY Left 12/30/2020    Procedure: ARTERIOVENOUS FISTULA FORMATION;  Surgeon: Jan Caicedo MD;  Location: [facility] MAIN OR;  Service: Vascular;  Laterality: Left;    ARTERIOVENOUS FISTULA/SHUNT SURGERY Left 4/23/2024    Procedure: FISTULOGRAM WITH SUBCLAVIAN ARTERY ANGIOPLASTY, fistula banding;  Surgeon: Franki Delgadillo II, MD;  Location: [facility] HYBRID OR;  Service: Vascular;  Laterality: Left;    BREAST BIOPSY      right nipple 1981    BRONCHOSCOPY N/A 6/11/2024    Procedure: BRONCHOSCOPY WITH RIGHT LUNG WASHING;  Surgeon: Aramis Barragan MD;  Location: [facility] ENDOSCOPY;  Service: Pulmonary;  Laterality: N/A;  RML atelectasis    COLONOSCOPY N/A 11/24/2020    Procedure: COLONOSCOPY with polypectomy x 7;  Surgeon: Jeffery White MD;  Location: [facility] ENDOSCOPY;  Service: Gastroenterology;  Laterality: N/A;  post op: polyps, diverticulosis, hemorrhoids    COLONOSCOPY N/A 6/1/2023    Procedure: COLONOSCOPY with polypectomy x 3 biopsy x 1;  Surgeon: Jeffery White MD;  Location: [facility] ENDOSCOPY;  Service: Gastroenterology;  Laterality: N/A;  diverticulosis polyps    COLONOSCOPY N/A 9/4/2024    Procedure: COLONOSCOPY WITH POLYPECTOMY X 1;  Surgeon: Jeffery White MD;  Location: [facility] ENDOSCOPY;  Service: Gastroenterology;  Laterality: N/A;  POST- POLYP, DIVERTICULOSIS, INTERNAL AND EXTERNAL HEMORRHOIDS    ENDOSCOPY N/A 03/12/2022    Procedure: ESOPHAGOGASTRODUODENOSCOPY with biopsy x 1 area;  Surgeon: Javan Augustin MD;  Location: [facility] ENDOSCOPY;  Service: Gastroenterology;  Laterality: N/A;  post op: anastamosis    ENDOSCOPY N/A 10/9/2023    Procedure: " ESOPHAGOGASTRODUODENOSCOPY with biopsy x3 areas;  Surgeon: Ace Campbell MD;  Location: [facility] ENDOSCOPY;  Service: Gastroenterology;  Laterality: N/A;  duodenal ulcers;irregular z line    ENDOSCOPY N/A 8/27/2024    Procedure: ESOPHAGOGASTRODUODENOSCOPY with argon plasma coagulation;  Surgeon: HILARIO Blackmon MD;  Location: [facility] ENDOSCOPY;  Service: Gastroenterology;  Laterality: N/A;  post op: AVM    ENTEROSCOPY SMALL BOWEL N/A 9/4/2024    Procedure: ESOPHAGOGASTRODUODENOSCOPY WITH SMALL BOWEL ENTEROSCOPY, ARGON PLASMA COAGULATION AND ENDOSCOPIC CLIPPING X 2 OF GASTRIC ANTRAL VASCULAR ECTASIA;  Surgeon: Jeffery White MD;  Location: [facility] ENDOSCOPY;  Service: Gastroenterology;  Laterality: N/A;  POST- GAVE    GASTRIC RESECTION      cancer    HYSTERECTOMY      LAPAROSCOPIC CHOLECYSTECTOMY      NEPHRECTOMY      right kidney removed     REDUCTION MAMMAPLASTY      TUMOR REMOVAL      boils    VENTRAL/INCISIONAL HERNIA REPAIR Right 08/28/2019    Procedure: VENTRAL/INCISIONAL HERNIA REPAIR;  Surgeon: Nliay Stevens MD;  Location: [facility] MAIN OR;  Service: General    VENTRAL/INCISIONAL HERNIA REPAIR N/A 10/01/2019    Procedure: OPEN VENTRAL/INCISIONAL HERNIA REPAIR;  Surgeon: Nilay Stevens MD;  Location: [facility] MAIN OR;  Service: General    VENTRAL/INCISIONAL HERNIA REPAIR N/A 10/11/2021    Procedure: VENTRAL/INCISIONAL HERNIA REPAIR LAPAROSCOPIC;  Surgeon: Nilay Stevens MD;  Location: [facility] MAIN OR;  Service: General;  Laterality: N/A;    VENTRAL/INCISIONAL HERNIA REPAIR N/A 01/23/2023    Procedure: VENTRAL/INCISIONAL HERNIA REPAIR with MESH;  Surgeon: Nilay Stevens MD;  Location: [facility] MAIN OR;  Service: General;  Laterality: N/A;       Family History   Problem Relation Age of Onset    Heart disease Father        Social History     Socioeconomic History    Marital status: Single   Tobacco Use    Smoking status: Former     Current packs/day: 0.00     Average packs/day: 0.3 packs/day for 57.0 years  "(14.3 ttl pk-yrs)     Types: Cigarettes     Start date: 1964     Quit date: 2021     Years since quittin.0     Passive exposure: Past    Smokeless tobacco: Never   Vaping Use    Vaping status: Never Used   Substance and Sexual Activity    Alcohol use: No    Drug use: Never    Sexual activity: Defer           Objective   Physical Exam  No acute distress. Normocephalic. No scleral icterus. Moist oral mucosa. No observable neck masses on external visualization. No respiratory distress. No tachypnea or increased work of breathing. Normal heart rate. Intact distal pulses, with palpable pulses in feet. Abdomen soft with diffuse tenderness to palpation, without rebound or peritoneal signs. Left upper extremity AV fistula with palpable thrill. Alert. Normal speech.  Procedures           ED Course        /54   Pulse 66   Temp 98.1 °F (36.7 °C) (Oral)   Resp 13   Ht 152.4 cm (60\")   Wt 71.4 kg (157 lb 6.4 oz)   SpO2 98%   BMI 30.74 kg/m²   Labs Reviewed   COMPREHENSIVE METABOLIC PANEL - Abnormal; Notable for the following components:       Result Value    BUN 55 (*)     Creatinine 8.74 (*)     Potassium 6.9 (*)     CO2 19.9 (*)     BUN/Creatinine Ratio 6.3 (*)     Anion Gap 20.1 (*)     eGFR 4.4 (*)     All other components within normal limits    Narrative:     GFR Categories in Chronic Kidney Disease (CKD)              GFR Category          GFR (mL/min/1.73)    Interpretation  G1                    90 or greater        Normal or high (1)  G2                    60-89                Mild decrease (1)  G3a                   45-59                Mild to moderate decrease  G3b                   30-44                Moderate to severe decrease  G4                    15-29                Severe decrease  G5                    14 or less           Kidney failure    (1)In the absence of evidence of kidney disease, neither GFR category G1 or G2 fulfill the criteria for CKD.    eGFR calculation  CKD-EPI " creatinine equation, which does not include race as a factor   TROPONIN - Abnormal; Notable for the following components:    HS Troponin T 39 (*)     All other components within normal limits    Narrative:     High Sensitive Troponin T Reference Range:  <14.0 ng/L- Negative Female for AMI  <22.0 ng/L- Negative Male for AMI  >=14 - Abnormal Female indicating possible myocardial injury.  >=22 - Abnormal Male indicating possible myocardial injury.   Clinicians would have to utilize clinical acumen, EKG, Troponin, and serial changes to determine if it is an Acute Myocardial Infarction or myocardial injury due to an underlying chronic condition.        CBC WITH AUTO DIFFERENTIAL - Abnormal; Notable for the following components:    WBC 15.03 (*)     RBC 3.62 (*)     Hemoglobin 11.2 (*)     .6 (*)     MCHC 30.8 (*)     RDW 15.5 (*)     RDW-SD 56.2 (*)     Neutrophil % 77.9 (*)     Lymphocyte % 12.2 (*)     Immature Grans % 0.6 (*)     Neutrophils, Absolute 11.73 (*)     Monocytes, Absolute 1.06 (*)     Immature Grans, Absolute 0.09 (*)     All other components within normal limits   HIGH SENSITIVITIY TROPONIN T 1HR - Abnormal; Notable for the following components:    HS Troponin T 36 (*)     All other components within normal limits    Narrative:     High Sensitive Troponin T Reference Range:  <14.0 ng/L- Negative Female for AMI  <22.0 ng/L- Negative Male for AMI  >=14 - Abnormal Female indicating possible myocardial injury.  >=22 - Abnormal Male indicating possible myocardial injury.   Clinicians would have to utilize clinical acumen, EKG, Troponin, and serial changes to determine if it is an Acute Myocardial Infarction or myocardial injury due to an underlying chronic condition.        BASIC METABOLIC PANEL - Abnormal; Notable for the following components:    BUN 53 (*)     Creatinine 8.16 (*)     Potassium 6.1 (*)     CO2 17.5 (*)     Calcium 8.3 (*)     BUN/Creatinine Ratio 6.5 (*)     Anion Gap 17.5 (*)      eGFR 4.7 (*)     All other components within normal limits    Narrative:     GFR Categories in Chronic Kidney Disease (CKD)              GFR Category          GFR (mL/min/1.73)    Interpretation  G1                    90 or greater        Normal or high (1)  G2                    60-89                Mild decrease (1)  G3a                   45-59                Mild to moderate decrease  G3b                   30-44                Moderate to severe decrease  G4                    15-29                Severe decrease  G5                    14 or less           Kidney failure    (1)In the absence of evidence of kidney disease, neither GFR category G1 or G2 fulfill the criteria for CKD.    eGFR calculation 2021 CKD-EPI creatinine equation, which does not include race as a factor   POCT GLUCOSE FINGERSTICK - Abnormal; Notable for the following components:    Glucose 53 (*)     All other components within normal limits   POCT GLUCOSE FINGERSTICK - Abnormal; Notable for the following components:    Glucose 233 (*)     All other components within normal limits   LIPASE - Normal   POC LACTATE - Normal   HEPATITIS B SURFACE ANTIGEN   BASIC METABOLIC PANEL   PHOSPHORUS   MAGNESIUM   CBC WITH AUTO DIFFERENTIAL   POCT GLUCOSE FINGERSTICK   POCT GLUCOSE FINGERSTICK   CBC AND DIFFERENTIAL    Narrative:     The following orders were created for panel order CBC & Differential.  Procedure                               Abnormality         Status                     ---------                               -----------         ------                     CBC Auto Differential[[id number]]        Abnormal            Final result                 Please view results for these tests on the individual orders.   CBC AND DIFFERENTIAL    Narrative:     The following orders were created for panel order CBC & Differential.  Procedure                               Abnormality         Status                     ---------                                -----------         ------                     CBC Auto Differential[[id number]]                                                         Please view results for these tests on the individual orders.     Medications   sodium chloride 0.9 % flush 10 mL (has no administration in time range)   sodium chloride 0.9 % flush 10 mL (10 mL Intravenous Given 5/11/25 2223)   sodium chloride 0.9 % flush 10 mL (has no administration in time range)   sodium chloride 0.9 % infusion 40 mL (has no administration in time range)   nitroglycerin (NITROSTAT) SL tablet 0.4 mg (has no administration in time range)   Potassium Replacement - Follow Nurse / BPA Driven Protocol (has no administration in time range)   Magnesium Standard Dose Replacement - Follow Nurse / BPA Driven Protocol (has no administration in time range)   Phosphorus Replacement - Follow Nurse / BPA Driven Protocol (has no administration in time range)   Calcium Replacement - Follow Nurse / BPA Driven Protocol (has no administration in time range)   acetaminophen (TYLENOL) tablet 650 mg (has no administration in time range)     Or   acetaminophen (TYLENOL) 160 MG/5ML oral solution 650 mg (has no administration in time range)     Or   acetaminophen (TYLENOL) suppository 650 mg (has no administration in time range)   sennosides-docusate (PERICOLACE) 8.6-50 MG per tablet 2 tablet (has no administration in time range)     And   polyethylene glycol (MIRALAX) packet 17 g (has no administration in time range)     And   bisacodyl (DULCOLAX) EC tablet 5 mg (has no administration in time range)     And   bisacodyl (DULCOLAX) suppository 10 mg (has no administration in time range)   ondansetron ODT (ZOFRAN-ODT) disintegrating tablet 4 mg (has no administration in time range)   calcium carbonate (TUMS) chewable tablet 500 mg (200 mg elemental) (has no administration in time range)   sucralfate (CARAFATE) tablet 1 g (1 g Oral Given 5/11/25 2223)   pantoprazole (PROTONIX) EC tablet 40  mg (has no administration in time range)   dextrose (GLUTOSE) oral gel 15 g (has no administration in time range)   dextrose (D50W) (25 g/50 mL) IV injection 25 g (25 g Intravenous Given 5/11/25 2108)   glucagon (GLUCAGEN) injection 1 mg (has no administration in time range)   heparin (porcine) 5000 UNIT/ML injection 5,000 Units (has no administration in time range)   HYDROmorphone (DILAUDID) injection 0.5 mg (0.5 mg Intravenous Given 5/11/25 2226)   morphine injection 2 mg (2 mg Intravenous Given 5/11/25 1730)   ondansetron (ZOFRAN) injection 4 mg (4 mg Intravenous Given 5/11/25 1730)   calcium gluconate 1000 Mg/50ml 0.675% NaCl IV SOLN (0 mg Intravenous Stopped 5/11/25 2058)   insulin regular (humuLIN R,novoLIN R) injection 5 Units (5 Units Intravenous Given 5/11/25 1857)   dextrose (D50W) (25 g/50 mL) IV injection 25 g (25 g Intravenous Given 5/11/25 1854)   iopamidol (ISOVUE-370) 76 % injection 100 mL (100 mL Intravenous Given 5/11/25 1836)     CT Abdomen Pelvis With Contrast   Final Result   Impression:   1.Small right lower quadrant hernia containing a loop of small bowel with wall thickening suggesting inflammation/incarceration and there is dilation of proximal small bowel loops indicating obstruction. Surgical consultation is advised.   2.Status post distal gastrectomy. Diffuse wall thickening of the stomach, which could be related to gastritis or infiltrative process.   3.Status post right nephrectomy. Marked atrophy of the left kidney.   4.Findings as above with possible chronic nondistended segment of distal sigmoid colon with mild apparent wall thickening and hyperenhancement but no definite surrounding inflammatory changes. Correlate clinically. This could be evaluated with    colonoscopy.   5.Advanced calcific atherosclerosis of the aorta and branch vessels.   6.Diffuse osseous sclerosis which may be associated with renal osteodystrophy.                        Electronically Signed: Moses Bailey      5/11/2025 7:53 PM EDT     Workstation ID: [id number]                                                       Medical Decision Making  Problems Addressed:  Epigastric pain: complicated acute illness or injury  Hyperkalemia: complicated acute illness or injury    Amount and/or Complexity of Data Reviewed  Labs: ordered.  Radiology: ordered.  ECG/medicine tests: ordered.    Risk  OTC drugs.  Prescription drug management.  Decision regarding hospitalization.      Critical Care Time     The high probability of sudden, clinically significant deterioration in the patient's condition required the highest level of my preparedness to intervene urgently.  The services I provided to this patient were to treat and/or prevent clinically significant deterioration that could result in: Cardiovascular collapse and death. Services included the following: chart data review, reviewing nurses notes and/or old charts, documentation time, consultant collaboration regarding findings and treatment options, vital sign assessments and ordering, interpreting and reviewing diagnostic studies/lab tests.  Aggregate critical care time was 32 minutes, which includes only time during which I was engaged in work directly related to the patient's care, as described above, whether at the bedside or elsewhere in the Emergency Department. It did not include time spent performing other reported procedures or the services of residents, students, nurses or physician assistants.      My interpretation of CT abdomen pelvis is negative for free air.  See system for radiology interpretation.    EKG interpretation: 1724, rate 81, normal sinus rhythm, normal axis, nonspecific ST changes likely secondary to LVH.  Questionable peaked T waves in multiple leads.    Patient with elevated white count.  Potassium quite elevated.  Discussed with patient's nephrologist who placed emergent dialysis orders.      Discussed with Dr. Vallecillo with CT findings.  Pain is largely  resolved at this point she states.. Who will see patient after she gets out of OR.  Admitted to hospitalist service.    Final diagnoses:   Hyperkalemia   Epigastric pain       ED Disposition  ED Disposition       ED Disposition   Decision to Admit    Condition   --    Comment   Level of Care: Progressive Care [20]   Diagnosis: Hyperkalemia [911040]   Certification: I Certify That Inpatient Hospital Services Are Medically Necessary For Greater Than 2 Midnights                 No follow-up provider specified.       Medication List      No changes were made to your prescriptions during this visit.            Franki Kwok MD  05/12/25 0247

## 2025-05-11 NOTE — Clinical Note
Level of Care: Telemetry [5]   Admitting Physician: CELY SCHAEFER [[id number]]   Attending Physician: CELY SCHAEFER [[id number]]

## 2025-05-12 ENCOUNTER — ANESTHESIA EVENT (OUTPATIENT)
Dept: TELEMETRY | Facility: HOSPITAL | Age: 75
End: 2025-05-12

## 2025-05-12 ENCOUNTER — APPOINTMENT (OUTPATIENT)
Dept: NEPHROLOGY | Facility: HOSPITAL | Age: 75
DRG: 335 | End: 2025-05-12
Payer: MEDICARE

## 2025-05-12 LAB
ANION GAP SERPL CALCULATED.3IONS-SCNC: 14.7 MMOL/L (ref 5–15)
BASOPHILS # BLD AUTO: 0.07 10*3/MM3 (ref 0–0.2)
BASOPHILS NFR BLD AUTO: 0.5 % (ref 0–1.5)
BUN SERPL-MCNC: 20 MG/DL (ref 8–23)
BUN/CREAT SERPL: 4.9 (ref 7–25)
CALCIUM SPEC-SCNC: 9.4 MG/DL (ref 8.6–10.5)
CHLORIDE SERPL-SCNC: 99 MMOL/L (ref 98–107)
CHOLEST SERPL-MCNC: 176 MG/DL (ref 0–200)
CO2 SERPL-SCNC: 23.3 MMOL/L (ref 22–29)
CREAT SERPL-MCNC: 4.06 MG/DL (ref 0.57–1)
DEPRECATED RDW RBC AUTO: 55.4 FL (ref 37–54)
EGFRCR SERPLBLD CKD-EPI 2021: 11 ML/MIN/1.73
EOSINOPHIL # BLD AUTO: 0.32 10*3/MM3 (ref 0–0.4)
EOSINOPHIL NFR BLD AUTO: 2.1 % (ref 0.3–6.2)
ERYTHROCYTE [DISTWIDTH] IN BLOOD BY AUTOMATED COUNT: 15.6 % (ref 12.3–15.4)
GLUCOSE BLDC GLUCOMTR-MCNC: 111 MG/DL (ref 70–105)
GLUCOSE BLDC GLUCOMTR-MCNC: 116 MG/DL (ref 70–105)
GLUCOSE BLDC GLUCOMTR-MCNC: 193 MG/DL (ref 70–105)
GLUCOSE BLDC GLUCOMTR-MCNC: 64 MG/DL (ref 70–105)
GLUCOSE SERPL-MCNC: 66 MG/DL (ref 65–99)
HBA1C MFR BLD: 6.21 % (ref 4.8–5.6)
HBV SURFACE AG SERPL QL IA: NORMAL
HCT VFR BLD AUTO: 38 % (ref 34–46.6)
HDLC SERPL-MCNC: 52 MG/DL (ref 40–60)
HGB BLD-MCNC: 12 G/DL (ref 12–15.9)
IMM GRANULOCYTES # BLD AUTO: 0.24 10*3/MM3 (ref 0–0.05)
IMM GRANULOCYTES NFR BLD AUTO: 1.6 % (ref 0–0.5)
LDLC SERPL CALC-MCNC: 94 MG/DL (ref 0–100)
LDLC/HDLC SERPL: 1.71 {RATIO}
LYMPHOCYTES # BLD AUTO: 1.42 10*3/MM3 (ref 0.7–3.1)
LYMPHOCYTES NFR BLD AUTO: 9.2 % (ref 19.6–45.3)
MAGNESIUM SERPL-MCNC: 1.8 MG/DL (ref 1.6–2.4)
MCH RBC QN AUTO: 31.3 PG (ref 26.6–33)
MCHC RBC AUTO-ENTMCNC: 31.6 G/DL (ref 31.5–35.7)
MCV RBC AUTO: 99.2 FL (ref 79–97)
MONOCYTES # BLD AUTO: 1.17 10*3/MM3 (ref 0.1–0.9)
MONOCYTES NFR BLD AUTO: 7.6 % (ref 5–12)
NEUTROPHILS NFR BLD AUTO: 12.16 10*3/MM3 (ref 1.7–7)
NEUTROPHILS NFR BLD AUTO: 79 % (ref 42.7–76)
NRBC BLD AUTO-RTO: 0 /100 WBC (ref 0–0.2)
PHOSPHATE SERPL-MCNC: 3.2 MG/DL (ref 2.5–4.5)
PLATELET # BLD AUTO: 317 10*3/MM3 (ref 140–450)
PMV BLD AUTO: 10.1 FL (ref 6–12)
POTASSIUM SERPL-SCNC: 4 MMOL/L (ref 3.5–5.2)
QT INTERVAL: 388 MS
QTC INTERVAL: 451 MS
RBC # BLD AUTO: 3.83 10*6/MM3 (ref 3.77–5.28)
SODIUM SERPL-SCNC: 137 MMOL/L (ref 136–145)
TRIGL SERPL-MCNC: 175 MG/DL (ref 0–150)
VLDLC SERPL-MCNC: 30 MG/DL (ref 5–40)
WBC NRBC COR # BLD AUTO: 15.38 10*3/MM3 (ref 3.4–10.8)

## 2025-05-12 PROCEDURE — 87340 HEPATITIS B SURFACE AG IA: CPT | Performed by: INTERNAL MEDICINE

## 2025-05-12 PROCEDURE — 99222 1ST HOSP IP/OBS MODERATE 55: CPT | Performed by: INTERNAL MEDICINE

## 2025-05-12 PROCEDURE — 80061 LIPID PANEL: CPT | Performed by: INTERNAL MEDICINE

## 2025-05-12 PROCEDURE — 85025 COMPLETE CBC W/AUTO DIFF WBC: CPT | Performed by: HOSPITALIST

## 2025-05-12 PROCEDURE — 80048 BASIC METABOLIC PNL TOTAL CA: CPT | Performed by: HOSPITALIST

## 2025-05-12 PROCEDURE — 84100 ASSAY OF PHOSPHORUS: CPT | Performed by: HOSPITALIST

## 2025-05-12 PROCEDURE — 82948 REAGENT STRIP/BLOOD GLUCOSE: CPT

## 2025-05-12 PROCEDURE — 5A1D70Z PERFORMANCE OF URINARY FILTRATION, INTERMITTENT, LESS THAN 6 HOURS PER DAY: ICD-10-PCS | Performed by: STUDENT IN AN ORGANIZED HEALTH CARE EDUCATION/TRAINING PROGRAM

## 2025-05-12 PROCEDURE — 83735 ASSAY OF MAGNESIUM: CPT | Performed by: HOSPITALIST

## 2025-05-12 PROCEDURE — 83036 HEMOGLOBIN GLYCOSYLATED A1C: CPT | Performed by: INTERNAL MEDICINE

## 2025-05-12 PROCEDURE — 99222 1ST HOSP IP/OBS MODERATE 55: CPT | Performed by: SURGERY

## 2025-05-12 RX ORDER — OXYCODONE HYDROCHLORIDE 5 MG/1
10 TABLET ORAL EVERY 4 HOURS PRN
Status: DISCONTINUED | OUTPATIENT
Start: 2025-05-12 | End: 2025-05-15

## 2025-05-12 RX ORDER — TRAMADOL HYDROCHLORIDE 50 MG/1
50 TABLET ORAL EVERY 12 HOURS PRN
Status: DISCONTINUED | OUTPATIENT
Start: 2025-05-12 | End: 2025-05-15

## 2025-05-12 RX ORDER — SODIUM BICARBONATE 650 MG/1
1300 TABLET ORAL 3 TIMES DAILY
Status: DISCONTINUED | OUTPATIENT
Start: 2025-05-12 | End: 2025-05-14

## 2025-05-12 RX ORDER — OXYCODONE HYDROCHLORIDE 5 MG/1
5 TABLET ORAL EVERY 4 HOURS PRN
Status: DISCONTINUED | OUTPATIENT
Start: 2025-05-12 | End: 2025-05-15

## 2025-05-12 RX ORDER — SEVELAMER CARBONATE 800 MG/1
800 TABLET, FILM COATED ORAL
Status: DISCONTINUED | OUTPATIENT
Start: 2025-05-12 | End: 2025-05-18 | Stop reason: HOSPADM

## 2025-05-12 RX ORDER — ACETAMINOPHEN 325 MG/1
650 TABLET ORAL EVERY 6 HOURS PRN
Status: DISCONTINUED | OUTPATIENT
Start: 2025-05-12 | End: 2025-05-15

## 2025-05-12 RX ORDER — TEMAZEPAM 15 MG/1
30 CAPSULE ORAL NIGHTLY
Status: DISCONTINUED | OUTPATIENT
Start: 2025-05-12 | End: 2025-05-18 | Stop reason: HOSPADM

## 2025-05-12 RX ADMIN — PANTOPRAZOLE SODIUM 40 MG: 40 TABLET, DELAYED RELEASE ORAL at 08:32

## 2025-05-12 RX ADMIN — TRAMADOL HYDROCHLORIDE 50 MG: 50 TABLET, COATED ORAL at 12:08

## 2025-05-12 RX ADMIN — SODIUM BICARBONATE 1300 MG: 650 TABLET ORAL at 21:02

## 2025-05-12 RX ADMIN — Medication 10 ML: at 08:33

## 2025-05-12 RX ADMIN — TEMAZEPAM 30 MG: 15 CAPSULE ORAL at 21:02

## 2025-05-12 RX ADMIN — PANTOPRAZOLE SODIUM 40 MG: 40 TABLET, DELAYED RELEASE ORAL at 17:41

## 2025-05-12 RX ADMIN — SODIUM BICARBONATE 1300 MG: 650 TABLET ORAL at 17:41

## 2025-05-12 RX ADMIN — Medication 10 ML: at 21:02

## 2025-05-12 NOTE — PROGRESS NOTES
[facility] MEDICINE SERVICE  DAILY PROGRESS NOTE    NAME: Sherry Lobato  : 1950  MRN: [id number]      LOS: 1 day     PROVIDER OF SERVICE: Waylon Buchanan MD    Chief Complaint: <principal problem not specified>    Subjective:     Interval History: Patient doing well today.  She is tolerating p.o. intake.  She does have some abdominal pain when she coughs and her hernia pops out.        Review of Systems:   Review of Systems    Objective:     Vital Signs  Temp:  [97.8 °F (36.6 °C)-98.4 °F (36.9 °C)] 97.8 °F (36.6 °C)  Heart Rate:  [66-89] 87  Resp:  [11-26] 26  BP: ()/(30-86) 104/78   Body mass index is 31.04 kg/m².    Physical Exam  Physical Exam  General Appearance:  Alert, cooperative, no distress, appears stated age  Head:  Normocephalic, without obvious abnormality, atraumatic  Eyes:  PERRL, conjunctiva/corneas clear, EOM's intact, fundi benign, both eyes  Ears:  Normal TM's and external ear canals, both ears  Nose: Nares normal, septum midline, mucosa normal, no drainage or sinus tenderness  Throat: Lips, mucosa, and tongue normal; teeth and gums normal  Neck: Supple, symmetrical, trachea midline, no adenopathy, thyroid: not enlarged, symmetric, no tenderness/mass/nodules, no carotid bruit or JVD  Lungs:   Clear to auscultation bilaterally, respirations unlabored  Heart:  Regular rate and rhythm, S1, S2 normal, no murmur, rub or gallop  Abdomen:  Soft, non-tender, bowel sounds active all four quadrants,  no masses, no organomegaly, reducible left abdominal wall hernia  Extremities: Extremities normal, atraumatic, no cyanosis or edema  Pulses: 2+ and symmetric  Skin: Skin color, texture, turgor normal, no rashes or lesions  Neurologic: Normal         Diagnostic Data    Results from last 7 days   Lab Units 25  0643 25  1852 25  1715   WBC 10*3/mm3 15.38*  --  15.03*   HEMOGLOBIN g/dL 12.0  --  11.2*   HEMATOCRIT % 38.0  --  36.4   PLATELETS 10*3/mm3 317  --  376    GLUCOSE mg/dL 66   < > 97   CREATININE mg/dL 4.06*   < > 8.74*   BUN mg/dL 20   < > 55*   SODIUM mmol/L 137   < > 142   POTASSIUM mmol/L 4.0   < > 6.9*   AST (SGOT) U/L  --   --  16   ALT (SGPT) U/L  --   --  15   ALK PHOS U/L  --   --  98   BILIRUBIN mg/dL  --   --  0.2   ANION GAP mmol/L 14.7   < > 20.1*    < > = values in this interval not displayed.       CT Abdomen Pelvis With Contrast  Result Date: 5/11/2025  Impression: 1.Small right lower quadrant hernia containing a loop of small bowel with wall thickening suggesting inflammation/incarceration and there is dilation of proximal small bowel loops indicating obstruction. Surgical consultation is advised. 2.Status post distal gastrectomy. Diffuse wall thickening of the stomach, which could be related to gastritis or infiltrative process. 3.Status post right nephrectomy. Marked atrophy of the left kidney. 4.Findings as above with possible chronic nondistended segment of distal sigmoid colon with mild apparent wall thickening and hyperenhancement but no definite surrounding inflammatory changes. Correlate clinically. This could be evaluated with colonoscopy. 5.Advanced calcific atherosclerosis of the aorta and branch vessels. 6.Diffuse osseous sclerosis which may be associated with renal osteodystrophy. Electronically Signed: Moses Bailey  5/11/2025 7:53 PM EDT  Workstation ID: [id number]        I reviewed the patient's new clinical results.    Assessment/Plan:     Active and Resolved Problems  Active Hospital Problems    Diagnosis  POA    Hyperkalemia [E87.5]  Yes      Resolved Hospital Problems   No resolved problems to display.       Acute hyperkalemia  - Patient has underlying ESRD and underwent emergent HD on 5/11  - Continue HD schedule per nephrology on MWF schedule    Abdominal incisional hernia  - Patient has multiple hernias in the past requiring surgical intervention due to multiple abdominal surgeries  - Plan for hernia repair on 5/13  - Continue pain  control  - N.p.o. after midnight    ESRD on HD  - Continue HD schedule per nephrology    Anemia of CKD  - H&H stable  - Monitor hemoglobin postoperatively    Hypertension  - Holding home antihypertensives for now given patient was hypotensive on admission  - Resume home medications once BP improves    VTE Prophylaxis:  Pharmacologic VTE prophylaxis orders are present.             Disposition Planning:     Barriers to Discharge: Surgical intervention for hernia  Anticipated Date of Discharge: 5/14  Place of Discharge: Home      Time: 45 minutes     Code Status and Medical Interventions: CPR (Attempt to Resuscitate); Full Support   Ordered at: 05/11/25 1945     Code Status (Patient has no pulse and is not breathing):    CPR (Attempt to Resuscitate)     Medical Interventions (Patient has pulse or is breathing):    Full Support     Level Of Support Discussed With:    Patient       Signature: Electronically signed by Waylon Buchanan MD, 05/12/25, 12:09 EDT.  [facility] Hospitalist Team

## 2025-05-12 NOTE — PROGRESS NOTES
"General Surgery Progress Note    SUBJECTIVE:   Patient seen at bedside on floor.  Reports some right lower quadrant pain, but denies nausea or vomiting.  Similarly, patient denies obstipation or constipation.    VITALS:   Temp:  [97.3 °F (36.3 °C)-98.4 °F (36.9 °C)] 97.3 °F (36.3 °C)  Heart Rate:  [66-89] 78  Resp:  [11-26] 16  BP: ()/(30-87) 115/87    I & O:  I/O last 3 completed shifts:  In: 50 [IV Piggyback:50]  Out: 900   I/O this shift:  In: 120 [P.O.:120]  Out: -     PHYSICAL EXAM:  General: NAD, AAOx3  Respiratory: no respiratory distress  Abdomen: soft, NT, ND, dull to percussion; reducible RLQ hernia - NT, no skin changes     LABS:  No results found for: \"CBCDIF\"  CBC:      Lab 05/12/25  0643 05/11/25  1715   WBC 15.38* 15.03*   HEMOGLOBIN 12.0 11.2*   HEMATOCRIT 38.0 36.4   PLATELETS 317 376   NEUTROS ABS 12.16* 11.73*   IMMATURE GRANS (ABS) 0.24* 0.09*   LYMPHS ABS 1.42 1.83   MONOS ABS 1.17* 1.06*   EOS ABS 0.32 0.22   MCV 99.2* 100.6*      Lab Results   Component Value Date    GLUCOSE 66 05/12/2025    BUN 20 05/12/2025    CREATININE 4.06 (H) 05/12/2025     05/12/2025    K 4.0 05/12/2025    CL 99 05/12/2025    CALCIUM 9.4 05/12/2025    PROTEINTOT 7.5 05/11/2025    ALBUMIN 4.0 05/11/2025    ALT 15 05/11/2025    AST 16 05/11/2025    ALKPHOS 98 05/11/2025    BILITOT 0.2 05/11/2025    GLOB 3.5 05/11/2025    AGRATIO 1.1 05/11/2025    BCR 4.9 (L) 05/12/2025    ANIONGAP 14.7 05/12/2025    EGFR 11.0 (L) 05/12/2025     Lab Results   Component Value Date    INR 1.00 11/03/2024    INR 1.01 10/21/2024    INR 0.95 10/20/2024    PROTIME 10.9 11/03/2024    PROTIME 11.0 10/21/2024    PROTIME 10.4 10/20/2024       RADIOLOGY:  CT Abdomen Pelvis With Contrast  Result Date: 5/11/2025  Impression: 1.Small right lower quadrant hernia containing a loop of small bowel with wall thickening suggesting inflammation/incarceration and there is dilation of proximal small bowel loops indicating obstruction. Surgical " consultation is advised. 2.Status post distal gastrectomy. Diffuse wall thickening of the stomach, which could be related to gastritis or infiltrative process. 3.Status post right nephrectomy. Marked atrophy of the left kidney. 4.Findings as above with possible chronic nondistended segment of distal sigmoid colon with mild apparent wall thickening and hyperenhancement but no definite surrounding inflammatory changes. Correlate clinically. This could be evaluated with colonoscopy. 5.Advanced calcific atherosclerosis of the aorta and branch vessels. 6.Diffuse osseous sclerosis which may be associated with renal osteodystrophy. Electronically Signed: Moses Bailey  5/11/2025 7:53 PM EDT  Workstation ID: [id number]    ASSESSMENT:   74-year-old female, history of ESRD on hemodialysis, with abdominal pain due to recurrent right lower quadrant ventral hernia.  Due to prior consideration of a possible kidney transplant, patient has had this hernia fixed numerous times in the past without mesh.  Unfortunately, she has had several recurrences.  She is no longer considering kidney transplant.  Now that she is symptomatic and the hernia sac contains a loop of bowel, it would be reasonable to proceed with definitive hernia repair.    The risks (postoperative pain, nausea/vomiting, bleeding, hematoma/seroma, incisional hernia, injury to any underlying viscera, recurrent ventral hernia, need for bowel resection, need for conversion to open surgery, and complications associated with general anesthesia including stroke, heart attack, and death), benefits, and alternative therapies of robotic ventral hernia repair with mesh were discussed in great detail with the patient. The patient voiced understanding and wishes to proceed with surgery.     PLAN:   Cardiology consultation for preoperative clearance  Okay for diet, NPO at midnight  Rest of care per primary team     Keyon Espana MD   General Surgery  05/12/25   15:47 EDT

## 2025-05-12 NOTE — CONSULTS
[facility] CONSULT NOTE    Referring Provider: Franki Kwok MD   Reason for Consultation: End-stage renal disease, hyperkalemia    Chief complaint.  Abdominal pain    History of present illness: Patient is 74 years old female with history of ESRD, hypertension, diabetes, hyperlipidemia, GI bleed, lung cancer, right nephrectomy, GERD, presented to the hospital with abdominal pain with nausea.  Patient denied any fever, chills, headache, dizziness.  CT abdomen showed findings suggestive of bowel obstruction and right lower quadrant hernia.  Lab workup showed severe hypokalemia.  Patient received hemodialysis last night.  Potassium this morning.  Patient is being evaluated by general surgery    History  Past Medical History:   Diagnosis Date    Lucas's esophagus     C. difficile colitis     Carotid artery disease     -50-74% left, 16-49% right (1/19)    COPD (chronic obstructive pulmonary disease)     DM2 (diabetes mellitus, type 2)     ESRD (end stage renal disease)     on HD    Gastric ulcer     at anastomatic site    Gastroparesis     unknown    GERD (gastroesophageal reflux disease)     Gout     not current    Hemodialysis patient     mwf    Hernia, incisional     abdomen    History of colonoscopy     UTD    History of degenerative disc disease     Hyperlipidemia     Hypertension     Medicare annual wellness visit, subsequent 11/07/2020    Microscopic colitis     Nephrotic syndrome     Neuroendocrine tumor     of stomach    FABY (obstructive sleep apnea)     not treating    Pedal edema     ressolved    Proteinuria     Renal cell cancer, right     Rib pain on right side     chronic    Rotator cuff tear     bilateral    Stomach cancer 2016    Stroke     Uterine cancer     Vitamin B 12 deficiency     Vitamin D deficiency      Past Surgical History:   Procedure Laterality Date    APPENDECTOMY      ARTERIOVENOUS FISTULA Right     ARTERIOVENOUS FISTULA REPAIR      Clotted off and insert graft    ARTERIOVENOUS  FISTULA/SHUNT SURGERY Left 12/30/2020    Procedure: ARTERIOVENOUS FISTULA FORMATION;  Surgeon: Jan Caicedo MD;  Location: [facility] MAIN OR;  Service: Vascular;  Laterality: Left;    ARTERIOVENOUS FISTULA/SHUNT SURGERY Left 4/23/2024    Procedure: FISTULOGRAM WITH SUBCLAVIAN ARTERY ANGIOPLASTY, fistula banding;  Surgeon: Franki Delgadillo II, MD;  Location: [facility] HYBRID OR;  Service: Vascular;  Laterality: Left;    BREAST BIOPSY      right nipple 1981    BRONCHOSCOPY N/A 6/11/2024    Procedure: BRONCHOSCOPY WITH RIGHT LUNG WASHING;  Surgeon: Aramis Barragan MD;  Location: [facility] ENDOSCOPY;  Service: Pulmonary;  Laterality: N/A;  RML atelectasis    COLONOSCOPY N/A 11/24/2020    Procedure: COLONOSCOPY with polypectomy x 7;  Surgeon: Jeffery White MD;  Location: [facility] ENDOSCOPY;  Service: Gastroenterology;  Laterality: N/A;  post op: polyps, diverticulosis, hemorrhoids    COLONOSCOPY N/A 6/1/2023    Procedure: COLONOSCOPY with polypectomy x 3 biopsy x 1;  Surgeon: Jeffery White MD;  Location: [facility] ENDOSCOPY;  Service: Gastroenterology;  Laterality: N/A;  diverticulosis polyps    COLONOSCOPY N/A 9/4/2024    Procedure: COLONOSCOPY WITH POLYPECTOMY X 1;  Surgeon: Jeffery White MD;  Location: [facility] ENDOSCOPY;  Service: Gastroenterology;  Laterality: N/A;  POST- POLYP, DIVERTICULOSIS, INTERNAL AND EXTERNAL HEMORRHOIDS    ENDOSCOPY N/A 03/12/2022    Procedure: ESOPHAGOGASTRODUODENOSCOPY with biopsy x 1 area;  Surgeon: Javan Augustin MD;  Location: [facility] ENDOSCOPY;  Service: Gastroenterology;  Laterality: N/A;  post op: anastamosis    ENDOSCOPY N/A 10/9/2023    Procedure: ESOPHAGOGASTRODUODENOSCOPY with biopsy x3 areas;  Surgeon: Ace Campbell MD;  Location: [facility] ENDOSCOPY;  Service: Gastroenterology;  Laterality: N/A;  duodenal ulcers;irregular z line    ENDOSCOPY N/A 8/27/2024    Procedure: ESOPHAGOGASTRODUODENOSCOPY with argon plasma coagulation;  Surgeon: HILARIO Blackmon MD;  Location: [facility]  ENDOSCOPY;  Service: Gastroenterology;  Laterality: N/A;  post op: AVM    ENTEROSCOPY SMALL BOWEL N/A 2024    Procedure: ESOPHAGOGASTRODUODENOSCOPY WITH SMALL BOWEL ENTEROSCOPY, ARGON PLASMA COAGULATION AND ENDOSCOPIC CLIPPING X 2 OF GASTRIC ANTRAL VASCULAR ECTASIA;  Surgeon: Jeffery White MD;  Location: [facility] ENDOSCOPY;  Service: Gastroenterology;  Laterality: N/A;  POST- GAVE    GASTRIC RESECTION      cancer    HYSTERECTOMY      LAPAROSCOPIC CHOLECYSTECTOMY      NEPHRECTOMY      right kidney removed     REDUCTION MAMMAPLASTY      TUMOR REMOVAL      boils    VENTRAL/INCISIONAL HERNIA REPAIR Right 2019    Procedure: VENTRAL/INCISIONAL HERNIA REPAIR;  Surgeon: Nilay Stevens MD;  Location: [facility] MAIN OR;  Service: General    VENTRAL/INCISIONAL HERNIA REPAIR N/A 10/01/2019    Procedure: OPEN VENTRAL/INCISIONAL HERNIA REPAIR;  Surgeon: Nilay Stevens MD;  Location: [facility] MAIN OR;  Service: General    VENTRAL/INCISIONAL HERNIA REPAIR N/A 10/11/2021    Procedure: VENTRAL/INCISIONAL HERNIA REPAIR LAPAROSCOPIC;  Surgeon: Nilay Stevens MD;  Location: [facility] MAIN OR;  Service: General;  Laterality: N/A;    VENTRAL/INCISIONAL HERNIA REPAIR N/A 2023    Procedure: VENTRAL/INCISIONAL HERNIA REPAIR with MESH;  Surgeon: Nilay Stevens MD;  Location: [facility] MAIN OR;  Service: General;  Laterality: N/A;     Social History     Tobacco Use    Smoking status: Former     Current packs/day: 0.00     Average packs/day: 0.3 packs/day for 57.0 years (14.3 ttl pk-yrs)     Types: Cigarettes     Start date: 1964     Quit date: 2021     Years since quittin.0     Passive exposure: Past    Smokeless tobacco: Never   Vaping Use    Vaping status: Never Used   Substance Use Topics    Alcohol use: No    Drug use: Never     Family History   Problem Relation Age of Onset    Heart disease Father        Review of Systems  ROS  As per HPI  Objective     Vital Signs  Temp:  [97.8 °F (36.6 °C)-98.4 °F (36.9 °C)]  97.8 °F (36.6 °C)  Heart Rate:  [66-89] 87  Resp:  [11-26] 26  BP: ()/(30-86) 104/78    No intake/output data recorded.  I/O last 3 completed shifts:  In: 50 [IV Piggyback:50]  Out: 900     Physical Exam:  Physical Exam    General Appearance: alert, oriented x 3, no acute distress   Skin: warm and dry  HEENT: oral mucosa normal, nonicteric sclera  Neck: supple, no JVD  Lungs: CTA  Heart: RRR, normal S1 and S2  Abdomen: soft, mild tenderness   : no palpable bladder  Extremities: no edema, cyanosis or clubbing  Neuro: normal speech and mental status     Results Review:   I reviewed the patient's new clinical results.    Lab Results   Component Value Date    CALCIUM 9.4 05/12/2025    PHOS 3.2 05/12/2025     Results from last 7 days   Lab Units 05/12/25  0643 05/11/25  1852 05/11/25  1715 05/11/25  1715 05/07/25  0000   MAGNESIUM mg/dL 1.8  --   --   --   --    SODIUM mmol/L 137 137  --  142  --    POTASSIUM mmol/L 4.0 6.1*  --  6.9* 5.4*   CHLORIDE mmol/L 99 102  --  102  --    CO2 mmol/L 23.3 17.5*  --  19.9*  --    BUN mg/dL 20 53*  --  55*  --    CREATININE mg/dL 4.06* 8.16*  --  8.74*  --    GLUCOSE mg/dL 66 71   < > 97  --    CALCIUM mg/dL 9.4 8.3*  --  9.5  --    WBC 10*3/mm3 15.38*  --   --  15.03*  --    HEMOGLOBIN g/dL 12.0  --   --  11.2* 10*  30*   PLATELETS 10*3/mm3 317  --   --  376  --    ALT (SGPT) U/L  --   --   --  15  --    AST (SGOT) U/L  --   --   --  16  --     < > = values in this interval not displayed.     Lab Results   Component Value Date    TROPONINT 36 (H) 05/11/2025     Estimated Creatinine Clearance: 10.6 mL/min (A) (by C-G formula based on SCr of 4.06 mg/dL (H)).  Lab Results   Component Value Date    URICACID 4.7 07/10/2018       Brief Urine Lab Results       None            Prior to Admission medications    Medication Sig Start Date End Date Taking? Authorizing Provider   albuterol sulfate  (90 Base) MCG/ACT inhaler Inhale 2 puffs Every 4 (Four) Hours As Needed for  Wheezing.    Danica Murphy MD   ammonium lactate (AMLACTIN) 12 % cream Apply 1 Application topically to the appropriate area as directed As Needed. 3/6/25   Danica Murphy MD   aspirin 81 MG chewable tablet Chew 1 tablet Daily.    Danica Murphy MD   calcium acetate (PHOS BINDER,) 667 MG capsule capsule Take 1 capsule by mouth 2 (Two) Times a Day.    Danica Murphy MD   Cholecalciferol 25 MCG (1000 UT) tablet Take 1 tablet by mouth Daily.    Danica Murphy MD   hyoscyamine (LEVSIN) 0.125 MG SL tablet Place 1 tablet under the tongue Every 4 (Four) Hours As Needed for Cramping.  Patient not taking: Reported on 4/1/2025 11/4/24   Angelia Taylor MD   lidocaine-prilocaine (EMLA) 2.5-2.5 % cream APPLY TOPICALLY TO THE 5TH FINGER ON THE LEFT HAND AS NEEDED PAIN 4/24/25   Anastasia Thurman APRN   metoprolol succinate XL (Toprol XL) 25 MG 24 hr tablet Take 1 tablet by mouth 2 (Two) Times a Day. 11/12/24   Angelia Taylor MD   multivitamin (MULTI-VITAMIN PO) Take 1 tablet by mouth Daily.    Danica Murphy MD   pantoprazole (PROTONIX) 20 MG EC tablet Take 1 tablet by mouth Daily.    Danica Murphy MD   sevelamer (RENAGEL) 800 MG tablet Take 1 tablet by mouth 2 (Two) Times a Day With Meals.  Patient not taking: Reported on 4/1/2025    Danica Murphy MD   sodium bicarbonate 650 MG tablet Take 2 tablets by mouth 3 (Three) Times a Day.  Patient not taking: Reported on 4/1/2025    Danica Murphy MD   sucralfate (CARAFATE) 1 g tablet Take 1 tablet by mouth 4 (Four) Times a Day As Needed.  Patient not taking: Reported on 4/1/2025    Danica Murphy MD   temazepam (RESTORIL) 30 MG capsule Take 1 capsule by mouth Every Night. 1/10/23   Daniac Murphy MD       heparin (porcine), 5,000 Units, Subcutaneous, Q8H  pantoprazole, 40 mg, Oral, BID AC  sodium chloride, 10 mL, Intravenous, Q12H  sucralfate, 1 g, Oral, 4x Daily AC & at Bedtime           Assessment & Plan        End-stage renal disease.  Patient gets dialysis Monday Wednesday and Friday as outpatient.  Patient admitted with abdominal pain due to bowel obstruction and hyperkalemia status post hemodialysis last night.  Electrolytes, volume status okay  Hyperkalemia.  Improved with dialysis  Hypertension with chronic kidney disease.  Blood pressure on low side.  Off antihypertensives currently  Small bowel obstruction.  Secondary to incarcerated incisional hernia General Surgery evaluating    Plan:  Status post hemodialysis last night  Reevaluate in morning for need for dialysis  Keep off antihypertensives  General Surgery evaluating  Monitor electrolytes    I discussed the patients findings and my recommendations with patient and nursing staff    Faustino Buckley MD  05/12/25  09:21 EDT

## 2025-05-12 NOTE — NURSING NOTE
Initial skin assessment WDL. Skin intact with old fistula and new fistulas noted to upper extremities.

## 2025-05-12 NOTE — PLAN OF CARE
Problem: Adult Inpatient Plan of Care  Goal: Readiness for Transition of Care  Intervention: Mutually Develop Transition Plan  Recent Flowsheet Documentation  Taken 5/11/2025 2209 by Elmo Orosco, RN  Equipment Currently Used at Home: none  Transportation Anticipated: family or friend will provide  Patient/Family Anticipated Services at Transition: none  Patient/Family Anticipates Transition to: home   Goal Outcome Evaluation:

## 2025-05-12 NOTE — ED NOTES
Pt BG 53, pt alert, oriented, requesting to sit at the edge of bed. Pt denies dizziness with this. Pt reports 5/10 upper abdominal pain, intermittent, stabbing when it occurs. No orders in at that time for dextrose 50%, this writer gave pt ED lunch box and orange juice and sent Epic Secure Chat to Dr Ramos requesting order for dextrose 50%. After pt took sips of orange juice but before she ate from lunch box, Dr Kwok came to bedside to tell patient she is NPO due to possible incarcerated hernia. Food taken away from pt. Pt's abdomen soft, non distended.

## 2025-05-12 NOTE — CASE MANAGEMENT/SOCIAL WORK
Continued Stay Note  [facility]     Patient Name: Sherry Lobato  MRN: [id number]  Today's Date: 5/12/2025    Admit Date: 5/11/2025        Discharge Plan       Row Name 05/12/25 1627       Plan    Plan Comments CM attempted assessment and patient was unable to answer due to fatigue. Will reattempt at later time.             Cheyanne Angelo RN     [facility]  Office: [phone]  Cell: [phone]  Fax # [phone]

## 2025-05-12 NOTE — PLAN OF CARE
Problem: Adult Inpatient Plan of Care  Goal: Absence of Hospital-Acquired Illness or Injury  Intervention: Prevent Skin Injury  Recent Flowsheet Documentation  Taken 5/11/2025 2214 by Elmo Orosco, RN  Body Position: position changed independently   Goal Outcome Evaluation:

## 2025-05-12 NOTE — CASE MANAGEMENT/SOCIAL WORK
Continued Stay Note  [facility]     Patient Name: Sherry Lobato  MRN: [id number]  Today's Date: 5/12/2025    Admit Date: 5/11/2025        Discharge Plan       Row Name 05/12/25 1012       Plan    Plan Comments DC Barriers: NPO, Cr 4.06, WBC 15.38, gen sx considering hernia repair, K replacement             Cheyanne Angelo RN     [facility]  Office: [phone]  Cell: [phone]  Fax # [phone]

## 2025-05-12 NOTE — CONSULTS
Referring Provider: Waylon Buchanan MD    Reason for Consultation: Preop cardiovascular risk assessment      Patient Care Team:  Raegan Gotti MD as PCP - General (Family Medicine)  Nilay Stevens MD as Consulting Physician (General Surgery)  Stephen Perea MD as Surgeon (Thoracic Surgery)  Makayla Navarro, RN as Nurse Navigator  Mak Sosa MD as Cardiologist (Cardiology)  Anjum Cam MD as Consulting Physician (Hematology and Oncology)      SUBJECTIVE     Chief Complaint: Abdominal pain    History of present illness:  Sherry Lobato is a 74 y.o. female with hypertension, hyperlipidemia, diabetes, renal cell carcinoma with end-stage renal disease on hemodialysis, gastric bypass, mood disorder, GI bleeding who presents to the hospital with complaints of abdominal pain.  She was noted to have hyperkalemia with potassium of 6.9 requiring urgent dialysis.  CT abdomen showed incisional hernia with concerns for incarceration/obstruction.  Cardiology has been consulted for preop cardiovascular risk assessment prior to undergoing hernia repair surgery.    Of note she had a cardiac catheterization in 2021 by me at [facility].  Prior to hospital admission cardiac medications included metoprolol XL    Review of systems:    Constitutional: No weakness, fatigue, fever, rigors, chills   Eyes: No vision changes, eye pain   ENT/oropharynx: No difficulty swallowing, sore throat, epistaxis, changes in hearing   Cardiovascular: No chest pain, chest tightness, palpitations, paroxysmal nocturnal dyspnea, orthopnea, diaphoresis, dizziness / syncopal episode   Respiratory: No shortness of breath, dyspnea on exertion, cough, wheezing, hemoptysis   Gastrointestinal: + abdominal pain, nausea, vomiting, diarrhea, bloody stools   Genitourinary: No hematuria, dysuria   Neurological: No headache, tremors, numbness, one-sided weakness    Musculoskeletal: No cramps, myalgias, joint pain, joint swelling   Integument: No  rash, edema        Personal History:      Past Medical History:   Diagnosis Date    Lucas's esophagus     C. difficile colitis     Carotid artery disease     -50-74% left, 16-49% right (1/19)    COPD (chronic obstructive pulmonary disease)     DM2 (diabetes mellitus, type 2)     ESRD (end stage renal disease)     on HD    Gastric ulcer     at anastomatic site    Gastroparesis     unknown    GERD (gastroesophageal reflux disease)     Gout     not current    Hemodialysis patient     mwf    Hernia, incisional     abdomen    History of colonoscopy     UTD    History of degenerative disc disease     Hyperlipidemia     Hypertension     Medicare annual wellness visit, subsequent 11/07/2020    Microscopic colitis     Nephrotic syndrome     Neuroendocrine tumor     of stomach    FABY (obstructive sleep apnea)     not treating    Pedal edema     ressolved    Proteinuria     Renal cell cancer, right     Rib pain on right side     chronic    Rotator cuff tear     bilateral    Stomach cancer 2016    Stroke     Uterine cancer     Vitamin B 12 deficiency     Vitamin D deficiency        Past Surgical History:   Procedure Laterality Date    APPENDECTOMY      ARTERIOVENOUS FISTULA Right     ARTERIOVENOUS FISTULA REPAIR      Clotted off and insert graft    ARTERIOVENOUS FISTULA/SHUNT SURGERY Left 12/30/2020    Procedure: ARTERIOVENOUS FISTULA FORMATION;  Surgeon: Jan Caicedo MD;  Location: [facility] MAIN OR;  Service: Vascular;  Laterality: Left;    ARTERIOVENOUS FISTULA/SHUNT SURGERY Left 4/23/2024    Procedure: FISTULOGRAM WITH SUBCLAVIAN ARTERY ANGIOPLASTY, fistula banding;  Surgeon: Franki Delgadillo II, MD;  Location: [facility] HYBRID OR;  Service: Vascular;  Laterality: Left;    BREAST BIOPSY      right nipple 1981    BRONCHOSCOPY N/A 6/11/2024    Procedure: BRONCHOSCOPY WITH RIGHT LUNG WASHING;  Surgeon: Aramis Barragan MD;  Location: [facility] ENDOSCOPY;  Service: Pulmonary;  Laterality: N/A;  RML atelectasis    COLONOSCOPY N/A  11/24/2020    Procedure: COLONOSCOPY with polypectomy x 7;  Surgeon: Jeffery White MD;  Location: [facility] ENDOSCOPY;  Service: Gastroenterology;  Laterality: N/A;  post op: polyps, diverticulosis, hemorrhoids    COLONOSCOPY N/A 6/1/2023    Procedure: COLONOSCOPY with polypectomy x 3 biopsy x 1;  Surgeon: Jeffery White MD;  Location: [facility] ENDOSCOPY;  Service: Gastroenterology;  Laterality: N/A;  diverticulosis polyps    COLONOSCOPY N/A 9/4/2024    Procedure: COLONOSCOPY WITH POLYPECTOMY X 1;  Surgeon: Jeffery White MD;  Location: [facility] ENDOSCOPY;  Service: Gastroenterology;  Laterality: N/A;  POST- POLYP, DIVERTICULOSIS, INTERNAL AND EXTERNAL HEMORRHOIDS    ENDOSCOPY N/A 03/12/2022    Procedure: ESOPHAGOGASTRODUODENOSCOPY with biopsy x 1 area;  Surgeon: Javan Augustin MD;  Location: [facility] ENDOSCOPY;  Service: Gastroenterology;  Laterality: N/A;  post op: anastamosis    ENDOSCOPY N/A 10/9/2023    Procedure: ESOPHAGOGASTRODUODENOSCOPY with biopsy x3 areas;  Surgeon: cAe Campbell MD;  Location: [facility] ENDOSCOPY;  Service: Gastroenterology;  Laterality: N/A;  duodenal ulcers;irregular z line    ENDOSCOPY N/A 8/27/2024    Procedure: ESOPHAGOGASTRODUODENOSCOPY with argon plasma coagulation;  Surgeon: HILARIO Blackmon MD;  Location: [facility] ENDOSCOPY;  Service: Gastroenterology;  Laterality: N/A;  post op: AVM    ENTEROSCOPY SMALL BOWEL N/A 9/4/2024    Procedure: ESOPHAGOGASTRODUODENOSCOPY WITH SMALL BOWEL ENTEROSCOPY, ARGON PLASMA COAGULATION AND ENDOSCOPIC CLIPPING X 2 OF GASTRIC ANTRAL VASCULAR ECTASIA;  Surgeon: Jeffery White MD;  Location: [facility] ENDOSCOPY;  Service: Gastroenterology;  Laterality: N/A;  POST- GAVE    GASTRIC RESECTION      cancer    HYSTERECTOMY      LAPAROSCOPIC CHOLECYSTECTOMY      NEPHRECTOMY      right kidney removed     REDUCTION MAMMAPLASTY      TUMOR REMOVAL      boils    VENTRAL/INCISIONAL HERNIA REPAIR Right 08/28/2019    Procedure: VENTRAL/INCISIONAL HERNIA REPAIR;   Surgeon: Nilay Stevens MD;  Location: [facility] MAIN OR;  Service: General    VENTRAL/INCISIONAL HERNIA REPAIR N/A 10/01/2019    Procedure: OPEN VENTRAL/INCISIONAL HERNIA REPAIR;  Surgeon: Nilay Stevens MD;  Location: [facility] MAIN OR;  Service: General    VENTRAL/INCISIONAL HERNIA REPAIR N/A 10/11/2021    Procedure: VENTRAL/INCISIONAL HERNIA REPAIR LAPAROSCOPIC;  Surgeon: Nilay Stevens MD;  Location: [facility] MAIN OR;  Service: General;  Laterality: N/A;    VENTRAL/INCISIONAL HERNIA REPAIR N/A 2023    Procedure: VENTRAL/INCISIONAL HERNIA REPAIR with MESH;  Surgeon: Nilay Stevens MD;  Location: [facility] MAIN OR;  Service: General;  Laterality: N/A;       Family History   Problem Relation Age of Onset    Heart disease Father        Social History     Tobacco Use    Smoking status: Former     Current packs/day: 0.00     Average packs/day: 0.3 packs/day for 57.0 years (14.3 ttl pk-yrs)     Types: Cigarettes     Start date: 1964     Quit date: 2021     Years since quittin.0     Passive exposure: Past    Smokeless tobacco: Never   Vaping Use    Vaping status: Never Used   Substance Use Topics    Alcohol use: No    Drug use: Never        Home meds:  Prior to Admission medications    Medication Sig Start Date End Date Taking? Authorizing Provider   albuterol sulfate  (90 Base) MCG/ACT inhaler Inhale 2 puffs Every 4 (Four) Hours As Needed for Wheezing.    Danica Murphy MD   ammonium lactate (AMLACTIN) 12 % cream Apply 1 Application topically to the appropriate area as directed As Needed. 3/6/25   Danica Murphy MD   aspirin 81 MG chewable tablet Chew 1 tablet Daily.    Danica Murphy MD   calcium acetate (PHOS BINDER,) 667 MG capsule capsule Take 1 capsule by mouth 2 (Two) Times a Day.    Danica Murphy MD   Cholecalciferol 25 MCG (1000 UT) tablet Take 1 tablet by mouth Daily.    Danica Murphy MD   hyoscyamine (LEVSIN) 0.125 MG SL tablet Place 1 tablet under  the tongue Every 4 (Four) Hours As Needed for Cramping.  Patient not taking: Reported on 4/1/2025 11/4/24   Angelia Taylor MD   lidocaine-prilocaine (EMLA) 2.5-2.5 % cream APPLY TOPICALLY TO THE 5TH FINGER ON THE LEFT HAND AS NEEDED PAIN 4/24/25   Anastasia Thurman APRN   metoprolol succinate XL (Toprol XL) 25 MG 24 hr tablet Take 1 tablet by mouth 2 (Two) Times a Day. 11/12/24   nAgelia Taylor MD   multivitamin (MULTI-VITAMIN PO) Take 1 tablet by mouth Daily.    Danica Murphy MD   pantoprazole (PROTONIX) 20 MG EC tablet Take 1 tablet by mouth Daily.    Danica Murphy MD   sevelamer (RENAGEL) 800 MG tablet Take 1 tablet by mouth 2 (Two) Times a Day With Meals.  Patient not taking: Reported on 4/1/2025    Danica Murphy MD   sodium bicarbonate 650 MG tablet Take 2 tablets by mouth 3 (Three) Times a Day.  Patient not taking: Reported on 4/1/2025    Danica Murphy MD   sucralfate (CARAFATE) 1 g tablet Take 1 tablet by mouth 4 (Four) Times a Day As Needed.  Patient not taking: Reported on 4/1/2025    Danica Murphy MD   temazepam (RESTORIL) 30 MG capsule Take 1 capsule by mouth Every Night. 1/10/23   Danica Murphy MD       Allergies:     Atorvastatin calcium, Gabapentin, and Niacin    Scheduled Meds:heparin (porcine), 5,000 Units, Subcutaneous, Q8H  pantoprazole, 40 mg, Oral, BID AC  sodium chloride, 10 mL, Intravenous, Q12H  sucralfate, 1 g, Oral, 4x Daily AC & at Bedtime      Continuous Infusions:   PRN Meds:  acetaminophen **OR** acetaminophen **OR** acetaminophen    senna-docusate sodium **AND** polyethylene glycol **AND** bisacodyl **AND** bisacodyl    calcium carbonate    Calcium Replacement - Follow Nurse / BPA Driven Protocol    dextrose    dextrose    glucagon (human recombinant)    HYDROmorphone    Magnesium Standard Dose Replacement - Follow Nurse / BPA Driven Protocol    nitroglycerin    ondansetron ODT    Phosphorus Replacement - Follow Nurse / BPA Driven  "Protocol    Potassium Replacement - Follow Nurse / BPA Driven Protocol    [COMPLETED] Insert Peripheral IV **AND** sodium chloride    sodium chloride    sodium chloride      OBJECTIVE    Vital Signs  Vitals:    05/12/25 0530 05/12/25 0545 05/12/25 0601 05/12/25 0808   BP: 104/83 (!) 93/31 (!) 84/57 104/78   BP Location:    Left leg   Patient Position:    Sitting   Pulse: 85 79 87 87   Resp: 17 15 13 26   Temp:    97.8 °F (36.6 °C)   TempSrc:    Oral   SpO2:    94%   Weight:       Height:           Flowsheet Rows      Flowsheet Row First Filed Value   Admission Height 152.4 cm (60\") Documented at 05/11/2025 1614   Admission Weight 71.3 kg (157 lb 3 oz) Documented at 05/11/2025 1614              Intake/Output Summary (Last 24 hours) at 5/12/2025 0840  Last data filed at 5/12/2025 0548  Gross per 24 hour   Intake 50 ml   Output 900 ml   Net -850 ml        Telemetry: Sinus rhythm    Physical Exam:  The patient is alert, oriented and in no distress.  Vital signs as noted above.  Head and neck revealed no carotid bruits or jugular venous distention.  No thyromegaly or lymphadenopathy is present  Lungs clear.  No wheezing.  Breath sounds are normal bilaterally.  Heart: Normal first and second heart sounds. No murmur.  No precordial rub is present.  No gallop is present.  Abdomen: Soft and nontender.  No organomegaly is present.  Extremities with good peripheral pulses without any pedal edema.  Skin: Warm and dry.  Musculoskeletal system is grossly normal.  CNS grossly normal.       Results Review:  I have personally reviewed the results from the time of this admission to 5/12/2025 08:40 EDT and agree with these findings:  []  Laboratory  []  Microbiology  []  Radiology  []  EKG/Telemetry   []  Cardiology/Vascular   []  Pathology  []  Old records  []  Other:    Most notable findings include:     Lab Results (last 24 hours)       Procedure Component Value Units Date/Time    POC Glucose Once [[id number]]  (Abnormal) Collected: " 05/12/25 0807    Specimen: Blood Updated: 05/12/25 0808     Glucose 111 mg/dL      Comment: Serial Number: [id number]:  [id number]       POC Glucose Once [[id number]]  (Abnormal) Collected: 05/12/25 0733    Specimen: Blood Updated: 05/12/25 0736     Glucose 64 mg/dL      Comment: Serial Number: [id number]:  [id number]       Basic Metabolic Panel [[id number]]  (Abnormal) Collected: 05/12/25 0643    Specimen: Blood from Arm, Right Updated: 05/12/25 0711     Glucose 66 mg/dL      BUN 20 mg/dL      Comment: Result checked          Creatinine 4.06 mg/dL      Comment: Result checked            Sodium 137 mmol/L      Potassium 4.0 mmol/L      Comment: Result checked          Chloride 99 mmol/L      CO2 23.3 mmol/L      Calcium 9.4 mg/dL      BUN/Creatinine Ratio 4.9     Anion Gap 14.7 mmol/L      eGFR 11.0 mL/min/1.73     Narrative:      GFR Categories in Chronic Kidney Disease (CKD)              GFR Category          GFR (mL/min/1.73)    Interpretation  G1                    90 or greater        Normal or high (1)  G2                    60-89                Mild decrease (1)  G3a                   45-59                Mild to moderate decrease  G3b                   30-44                Moderate to severe decrease  G4                    15-29                Severe decrease  G5                    14 or less           Kidney failure    (1)In the absence of evidence of kidney disease, neither GFR category G1 or G2 fulfill the criteria for CKD.    eGFR calculation 2021 CKD-EPI creatinine equation, which does not include race as a factor    Phosphorus [[id number]]  (Normal) Collected: 05/12/25 0643    Specimen: Blood from Arm, Right Updated: 05/12/25 0711     Phosphorus 3.2 mg/dL     Magnesium [[id number]]  (Normal) Collected: 05/12/25 0643    Specimen: Blood from Arm, Right Updated: 05/12/25 0711     Magnesium 1.8 mg/dL     CBC & Differential [[id number]]  (Abnormal) Collected: 05/12/25 0643    Specimen: Blood from  Arm, Right Updated: 05/12/25 0649    Narrative:      The following orders were created for panel order CBC & Differential.  Procedure                               Abnormality         Status                     ---------                               -----------         ------                     CBC Auto Differential[[id number]]        Abnormal            Final result                 Please view results for these tests on the individual orders.    CBC Auto Differential [[id number]]  (Abnormal) Collected: 05/12/25 0643    Specimen: Blood from Arm, Right Updated: 05/12/25 0649     WBC 15.38 10*3/mm3      RBC 3.83 10*6/mm3      Hemoglobin 12.0 g/dL      Hematocrit 38.0 %      MCV 99.2 fL      MCH 31.3 pg      MCHC 31.6 g/dL      RDW 15.6 %      RDW-SD 55.4 fl      MPV 10.1 fL      Platelets 317 10*3/mm3      Neutrophil % 79.0 %      Lymphocyte % 9.2 %      Monocyte % 7.6 %      Eosinophil % 2.1 %      Basophil % 0.5 %      Immature Grans % 1.6 %      Neutrophils, Absolute 12.16 10*3/mm3      Lymphocytes, Absolute 1.42 10*3/mm3      Monocytes, Absolute 1.17 10*3/mm3      Eosinophils, Absolute 0.32 10*3/mm3      Basophils, Absolute 0.07 10*3/mm3      Immature Grans, Absolute 0.24 10*3/mm3      nRBC 0.0 /100 WBC     Hepatitis B Surface Antigen [[id number]]  (Normal) Collected: 05/12/25 0221    Specimen: Blood Updated: 05/12/25 0258     Hepatitis B Surface Ag Non-Reactive    POC Glucose Q1H [[id number]]  (Abnormal) Collected: 05/11/25 2136    Specimen: Blood Updated: 05/11/25 2138     Glucose 233 mg/dL      Comment: Serial Number: [id number]:  [id number]       POC Glucose Q1H [[id number]]  (Abnormal) Collected: 05/11/25 2057    Specimen: Blood Updated: 05/11/25 2059     Glucose 53 mg/dL      Comment: Serial Number: [id number]:  [id number]       Basic Metabolic Panel [[id number]]  (Abnormal) Collected: 05/11/25 1852    Specimen: Blood Updated: 05/11/25 1924     Glucose 71 mg/dL      BUN 53 mg/dL      Creatinine 8.16  mg/dL      Sodium 137 mmol/L      Potassium 6.1 mmol/L      Comment: Specimen hemolyzed.  Result may be falsely elevated.        Chloride 102 mmol/L      CO2 17.5 mmol/L      Calcium 8.3 mg/dL      BUN/Creatinine Ratio 6.5     Anion Gap 17.5 mmol/L      eGFR 4.7 mL/min/1.73     Narrative:      GFR Categories in Chronic Kidney Disease (CKD)              GFR Category          GFR (mL/min/1.73)    Interpretation  G1                    90 or greater        Normal or high (1)  G2                    60-89                Mild decrease (1)  G3a                   45-59                Mild to moderate decrease  G3b                   30-44                Moderate to severe decrease  G4                    15-29                Severe decrease  G5                    14 or less           Kidney failure    (1)In the absence of evidence of kidney disease, neither GFR category G1 or G2 fulfill the criteria for CKD.    eGFR calculation 2021 CKD-EPI creatinine equation, which does not include race as a factor    High Sensitivity Troponin T 1Hr [[id number]]  (Abnormal) Collected: 05/11/25 1852    Specimen: Blood Updated: 05/11/25 1922     HS Troponin T 36 ng/L      Troponin T Numeric Delta -3 ng/L      Troponin T % Delta -8    Narrative:      High Sensitive Troponin T Reference Range:  <14.0 ng/L- Negative Female for AMI  <22.0 ng/L- Negative Male for AMI  >=14 - Abnormal Female indicating possible myocardial injury.  >=22 - Abnormal Male indicating possible myocardial injury.   Clinicians would have to utilize clinical acumen, EKG, Troponin, and serial changes to determine if it is an Acute Myocardial Infarction or myocardial injury due to an underlying chronic condition.         Comprehensive Metabolic Panel [[id number]]  (Abnormal) Collected: 05/11/25 1715    Specimen: Blood Updated: 05/11/25 1744     Glucose 97 mg/dL      BUN 55 mg/dL      Creatinine 8.74 mg/dL      Sodium 142 mmol/L      Potassium 6.9 mmol/L      Chloride 102 mmol/L       CO2 19.9 mmol/L      Calcium 9.5 mg/dL      Total Protein 7.5 g/dL      Albumin 4.0 g/dL      ALT (SGPT) 15 U/L      AST (SGOT) 16 U/L      Alkaline Phosphatase 98 U/L      Total Bilirubin 0.2 mg/dL      Globulin 3.5 gm/dL      A/G Ratio 1.1 g/dL      BUN/Creatinine Ratio 6.3     Anion Gap 20.1 mmol/L      eGFR 4.4 mL/min/1.73     Narrative:      GFR Categories in Chronic Kidney Disease (CKD)              GFR Category          GFR (mL/min/1.73)    Interpretation  G1                    90 or greater        Normal or high (1)  G2                    60-89                Mild decrease (1)  G3a                   45-59                Mild to moderate decrease  G3b                   30-44                Moderate to severe decrease  G4                    15-29                Severe decrease  G5                    14 or less           Kidney failure    (1)In the absence of evidence of kidney disease, neither GFR category G1 or G2 fulfill the criteria for CKD.    eGFR calculation 2021 CKD-EPI creatinine equation, which does not include race as a factor    High Sensitivity Troponin T [[id number]]  (Abnormal) Collected: 05/11/25 1715    Specimen: Blood Updated: 05/11/25 1740     HS Troponin T 39 ng/L     Narrative:      High Sensitive Troponin T Reference Range:  <14.0 ng/L- Negative Female for AMI  <22.0 ng/L- Negative Male for AMI  >=14 - Abnormal Female indicating possible myocardial injury.  >=22 - Abnormal Male indicating possible myocardial injury.   Clinicians would have to utilize clinical acumen, EKG, Troponin, and serial changes to determine if it is an Acute Myocardial Infarction or myocardial injury due to an underlying chronic condition.         Lipase [[id number]]  (Normal) Collected: 05/11/25 1715    Specimen: Blood Updated: 05/11/25 1740     Lipase 32 U/L     CBC & Differential [[id number]]  (Abnormal) Collected: 05/11/25 1715    Specimen: Blood Updated: 05/11/25 1720    Narrative:      The following orders  were created for panel order CBC & Differential.  Procedure                               Abnormality         Status                     ---------                               -----------         ------                     CBC Auto Differential[[id number]]        Abnormal            Final result                 Please view results for these tests on the individual orders.    CBC Auto Differential [[id number]]  (Abnormal) Collected: 05/11/25 1715    Specimen: Blood Updated: 05/11/25 1720     WBC 15.03 10*3/mm3      RBC 3.62 10*6/mm3      Hemoglobin 11.2 g/dL      Hematocrit 36.4 %      .6 fL      MCH 30.9 pg      MCHC 30.8 g/dL      RDW 15.5 %      RDW-SD 56.2 fl      MPV 10.2 fL      Platelets 376 10*3/mm3      Neutrophil % 77.9 %      Lymphocyte % 12.2 %      Monocyte % 7.1 %      Eosinophil % 1.5 %      Basophil % 0.7 %      Immature Grans % 0.6 %      Neutrophils, Absolute 11.73 10*3/mm3      Lymphocytes, Absolute 1.83 10*3/mm3      Monocytes, Absolute 1.06 10*3/mm3      Eosinophils, Absolute 0.22 10*3/mm3      Basophils, Absolute 0.10 10*3/mm3      Immature Grans, Absolute 0.09 10*3/mm3      nRBC 0.0 /100 WBC     POC Lactate [[id number]]  (Normal) Collected: 05/11/25 1719    Specimen: Blood Updated: 05/11/25 1720     Lactate 1.3 mmol/L      Comment: Serial Number: [id number]:  [id number]               Imaging Results (Last 24 Hours)       Procedure Component Value Units Date/Time    CT Abdomen Pelvis With Contrast [[id number]] Collected: 05/11/25 1933     Updated: 05/11/25 1955    Narrative:      CT ABDOMEN PELVIS W CONTRAST    Date of Exam: 5/11/2025 6:30 PM EDT    Indication: abdominal pain.    Comparison: 2/13/2025, 11/3/2024, and prior.    Technique: Axial CT images were obtained of the abdomen and pelvis following the uneventful intravenous administration of iodinated contrast. Sagittal and coronal reconstructions were performed.  Automated exposure control and iterative reconstruction   methods  were used.        Findings:  Heart size appears within normal limits. No pericardial effusion. There is calcific atherosclerosis of the coronary arteries. No suspicious infiltrate in the lung bases.    Status post cholecystectomy. Mild dilatation of bile ducts, similar to prior exams, likely related to postcholecystectomy changes. Calcified granulomas in the liver and spleen. No definite suspicious hepatic or splenic lesion. Enlargement of the adrenal   glands, left greater than right, as before. No definite new adrenal abnormality. No acute pancreatic abnormality. Status post right nephrectomy. Marked atrophy of the left kidney. Small low-density left renal lesions, grossly similar to prior studies,   suspected to represent cysts. No left renal calculus. There is calcific atherosclerosis of the aorta and advanced calcific atherosclerosis of the aortic branch vessels. Mesenteric vessels appear to enhance as expected. There appears to be ectasia of the   infrarenal aorta estimated to measure up to 2.7 cm. No definite lymphadenopathy.    There appears to be diffuse wall thickening of the stomach. There are postoperative changes of distal gastrectomy and gastrojejunostomy.    There is a small right lower quadrant hernia containing a loop of small bowel as seen on axial images . The herniated loop demonstrates wall thickening as seen on axial mage 100 suspicious for inflammation/incarceration. There is dilatation of the   proximal small bowel with air-fluid levels indicating obstruction. The distal small bowel is nondilated.    The appendix is not visualized. There is some fluid distention of the proximal colon. There is diverticulosis of the distal colon. In the distal sigmoid colon, there is a segment of nondistention with mild wall thickening and enhancement as seen on axial   images  in the lower central pelvis. There does not appear to be significant surrounding inflammatory changes or definite  lymphadenopathy. There is mild fluid distention of the distal rectum. No other definite surrounding inflammatory changes are   seen. There does not appear to be distention of the segment of distal sigmoid colon on recent prior CTs. This could indicate a chronic stenosis.    Bladder is nondistended, limiting evaluation. Uterus appears absent. Dystrophic calcifications in the adnexa. No definite acute adnexal abnormality. No pelvic lymphadenopathy. No acute osseous abnormality. Mild to moderate bilateral hip osteoarthritis.   Degenerative changes in the thoracolumbar spine. Diffuse osseous sclerosis which may be associated with renal osteodystrophy.        Impression:      Impression:  1.Small right lower quadrant hernia containing a loop of small bowel with wall thickening suggesting inflammation/incarceration and there is dilation of proximal small bowel loops indicating obstruction. Surgical consultation is advised.  2.Status post distal gastrectomy. Diffuse wall thickening of the stomach, which could be related to gastritis or infiltrative process.  3.Status post right nephrectomy. Marked atrophy of the left kidney.  4.Findings as above with possible chronic nondistended segment of distal sigmoid colon with mild apparent wall thickening and hyperenhancement but no definite surrounding inflammatory changes. Correlate clinically. This could be evaluated with   colonoscopy.  5.Advanced calcific atherosclerosis of the aorta and branch vessels.  6.Diffuse osseous sclerosis which may be associated with renal osteodystrophy.                Electronically Signed: Moses Bailey    5/11/2025 7:53 PM EDT    Workstation ID: [id number]            LAB RESULTS (LAST 7 DAYS)    CBC  Results from last 7 days   Lab Units 05/12/25  0643 05/11/25  1715 05/07/25  0000   WBC 10*3/mm3 15.38* 15.03*  --    RBC 10*6/mm3 3.83 3.62*  --    HEMOGLOBIN g/dL 12.0 11.2* 10*  30*   HEMATOCRIT % 38.0 36.4  --    MCV fL 99.2* 100.6*  --    PLATELETS  10*3/mm3 317 376  --        BMP  Results from last 7 days   Lab Units 05/12/25 0643 05/11/25 1852 05/11/25 1715 05/07/25  0000   SODIUM mmol/L 137 137 142  --    POTASSIUM mmol/L 4.0 6.1* 6.9* 5.4*   CHLORIDE mmol/L 99 102 102  --    CO2 mmol/L 23.3 17.5* 19.9*  --    BUN mg/dL 20 53* 55*  --    CREATININE mg/dL 4.06* 8.16* 8.74*  --    GLUCOSE mg/dL 66 71 97  --    MAGNESIUM mg/dL 1.8  --   --   --    PHOSPHORUS mg/dL 3.2  --   --   --        CMP   Results from last 7 days   Lab Units 05/12/25 0643 05/11/25 1852 05/11/25 1715 05/07/25  0000   SODIUM mmol/L 137 137 142  --    POTASSIUM mmol/L 4.0 6.1* 6.9* 5.4*   CHLORIDE mmol/L 99 102 102  --    CO2 mmol/L 23.3 17.5* 19.9*  --    BUN mg/dL 20 53* 55*  --    CREATININE mg/dL 4.06* 8.16* 8.74*  --    GLUCOSE mg/dL 66 71 97  --    ALBUMIN g/dL  --   --  4.0  --    BILIRUBIN mg/dL  --   --  0.2  --    ALK PHOS U/L  --   --  98  --    AST (SGOT) U/L  --   --  16  --    ALT (SGPT) U/L  --   --  15  --    LIPASE U/L  --   --  32  --        BNP        TROPONIN  Results from last 7 days   Lab Units 05/11/25 1852   HSTROP T ng/L 36*       CoAg        Creatinine Clearance  Estimated Creatinine Clearance: 10.6 mL/min (A) (by C-G formula based on SCr of 4.06 mg/dL (H)).    ABG          Radiology  CT Abdomen Pelvis With Contrast  Result Date: 5/11/2025  Impression: 1.Small right lower quadrant hernia containing a loop of small bowel with wall thickening suggesting inflammation/incarceration and there is dilation of proximal small bowel loops indicating obstruction. Surgical consultation is advised. 2.Status post distal gastrectomy. Diffuse wall thickening of the stomach, which could be related to gastritis or infiltrative process. 3.Status post right nephrectomy. Marked atrophy of the left kidney. 4.Findings as above with possible chronic nondistended segment of distal sigmoid colon with mild apparent wall thickening and hyperenhancement but no definite surrounding  inflammatory changes. Correlate clinically. This could be evaluated with colonoscopy. 5.Advanced calcific atherosclerosis of the aorta and branch vessels. 6.Diffuse osseous sclerosis which may be associated with renal osteodystrophy. Electronically Signed: Moses Bailey  5/11/2025 7:53 PM EDT  Workstation ID: [id number]        EKG  I personally viewed and interpreted the patient's EKG/Telemetry data:  ECG 12 Lead Other; epigastric abdominal pain   Final Result   HEART RATE=81  bpm   RR Blxbnnaj=883  ms   NV Imussqxq=379  ms   P Horizontal Axis=2  deg   P Front Axis=Invalid  deg   QRSD Interval=90  ms   QT Petlybug=361  ms   LNdH=904  ms   QRS Axis=-19  deg   T Wave Axis=160  deg   - ABNORMAL ECG -   Sinus rhythm   LVH with secondary repolarization abnormality   When compared with ECG of 11-Nov-2024 11:48:39,   No significant change   Electronically Signed By: Franki Kwok (MERCEDES) 2025-05-12 06:51:41   Date and Time of Study:2025-05-11 17:24:02      Telemetry Scan   Final Result      Telemetry Scan   Final Result      Telemetry Scan   Final Result      Telemetry Scan   Final Result            Echocardiogram:    Results for orders placed during the hospital encounter of 07/25/21    Adult Transthoracic Echo Complete W/ Cont if Necessary Per Protocol    Interpretation Summary  Normal LV size and contractility EF of 60%  Normal RV size  Normal atrial size  Aortic valve, mitral valve, tricuspid valve appears structurally normal, mild mitral regurgitation  seen.  No pericardial effusion seen.  Proximal aorta appears normal in size.        Stress Test:        Cardiac Catheterization:  Results for orders placed during the hospital encounter of 09/27/22    Cardiac Catheterization/Vascular Study    Narrative  Images from the original result were not included.    Today's Date:  09/27/22  Jan Caicedo MD  [facility] cardiac catheterization lab    Preoperative Diagnosis:  Poorly functioning hemodialysis  fistula/shunt    Postoperative Diagnosis:  Same    Procedure Performed:    CPT:  80202 Fistulogram    Surgeon:  Jan Caicedo MD    Assistant:  None    Anesthesia:  Local    Estimated Blood Loss:  Minimal    Findings:  Normal fistulogram in brachial radial ulnar arteries, normal central venogram    Implants:  None    Specimen:  none    Complications:  none    Dispo:  to PACU    Indication for procedure:  72 y.o. female with pleasant female with pain left hand with hemodialysis.  They are able to use fistula for about 3 hours but left hand becomes painful.  Patient with symptoms of right hand polyneuropathy with previous AV graft that was ligated.  Plan left arm fistulogram of brachial artery basilic vein fistula and left arm arteriogram.  Risk discussed with patient agrees to proceed.    Description of procedure:  Patient was taken to the operating room.  Anesthesia was administered and a monitored setting.  Surgical sites were prepped and draped in the usual sterile fashion.  A full surgical timeout was performed.  Under ultrasound guidance the fistula was accessed using a micropuncture system.  Wire placement was confirmed with fluoroscopy.  Micro-needle was exchanged for the micro-sheath.  Diagnostic arteriovenous fistulogram was performed.    Left arm fistula prepped and draped in usual fashion.  1% Xylocaine used for local anesthesia.  Fistula was accessed near axilla going retrograde towards the brachial artery anastomosis without difficulty using ultrasound guidance.  Micropuncture wire advanced at difficulty.  Catheter placed.  Left upper extremity fistulogram and central venogram performed with all widely patent.  Pressure held on basilic vein fistula centrally with fistulogram of the brachial anastomosis widely patent.  Left arm runoff arteriogram performed using same technique with brachial radial ulnar and interosseous arteries are widely patent.  Anastomosis of basilic vein to brachial artery  widely patent.  There was a dilated portion of the fistula and then the mild narrowing of the proximal long segment of the basilic vein going towards the brachial artery but no significant stenoses.  Patient essentially with normal fistulogram.  Suspect pain with hemodialysis due to polyneuropathy.    Jan Caicedo MD  09/27/22        Other:      ASSESSMENT & PLAN:    Active Problems:    Hyperkalemia    Preop cardiovascular risk assessment  She presents with reducible hernia.  Plan for robotic ventral hernia repair by surgery  In the absence of angina, heart failure, arrhythmia she may proceed with hernia surgery with no further cardiac workup    Primary hypertension   Currently hypotensive  She used to be on losartan, Coreg and amlodipine however they have been discontinued.  Metoprolol is on hold     Shortness of breath/cardiac murmur  May be related to high flow from AV fistula.  Previous echocardiogram with preserved LV function and no significant valvular abnormalities      ESRD (end stage renal disease)  On hemodialysis with AV fistula  Potassium is now normal     Gastroesophageal reflux disease without esophagitis  PPI as needed  History of GI bleed     Type 2 diabetes mellitus without complication, without long-term current use of insulin  A1c is 5.8     Mixed hyperlipidemia  Previous LDL 94   Repeat lipid panel     Obesity  Status post gastric bypass  BMI is 31.  She weighs 158 pounds.    Mak Sosa MD  05/12/25  08:40 EDT

## 2025-05-12 NOTE — CONSULTS
General Surgery Consult Note      Name: Sherry Lobato ADMIT: 2025   : 1950  PCP: Raegan Gotti MD    MRN: [id number] LOS: 1 days   AGE/SEX: 74 y.o. female  ROOM:    [facility]      Patient Care Team:  Raegan Gotti MD as PCP - General (Family Medicine)  Nilay Stevens MD as Consulting Physician (General Surgery)  Stephen Perea MD as Surgeon (Thoracic Surgery)  Makayla Navarro RN as Nurse Navigator  Mak Sosa MD as Cardiologist (Cardiology)  Anjum Cam MD as Consulting Physician (Hematology and Oncology)  Chief Complaint   Patient presents with    Abdominal Pain       HPI  74 y.o. female with an extensive past medical history including end-stage renal disease on hemodialysis who presented to the emergency department with upper abdominal pain and nausea.  She reports she has had intermittent abdominal pain for the last several weeks however it was more severe today.  Labs in the emergency department were remarkable for hyperkalemia with a potassium of 6.9.  She was last dialyzed 2 days ago.  CT scan of the abdomen/pelvis showed an incisional hernia in the right lower abdomen with bowel in it concerning for incarceration and obstruction.  General surgery consulted for incarcerated hernia.    Past Medical History:   Diagnosis Date    Lucas's esophagus     C. difficile colitis     Carotid artery disease     -50-74% left, 16-49% right ()    COPD (chronic obstructive pulmonary disease)     DM2 (diabetes mellitus, type 2)     ESRD (end stage renal disease)     on HD    Gastric ulcer     at anastomatic site    Gastroparesis     unknown    GERD (gastroesophageal reflux disease)     Gout     not current    Hemodialysis patient     mwf    Hernia, incisional     abdomen    History of colonoscopy     UTD    History of degenerative disc disease     Hyperlipidemia     Hypertension     Medicare annual wellness visit, subsequent 2020    Microscopic colitis     Nephrotic  syndrome     Neuroendocrine tumor     of stomach    FABY (obstructive sleep apnea)     not treating    Pedal edema     ressolved    Proteinuria     Renal cell cancer, right     Rib pain on right side     chronic    Rotator cuff tear     bilateral    Stomach cancer 2016    Stroke     Uterine cancer     Vitamin B 12 deficiency     Vitamin D deficiency      Past Surgical History:   Procedure Laterality Date    APPENDECTOMY      ARTERIOVENOUS FISTULA Right     ARTERIOVENOUS FISTULA REPAIR      Clotted off and insert graft    ARTERIOVENOUS FISTULA/SHUNT SURGERY Left 12/30/2020    Procedure: ARTERIOVENOUS FISTULA FORMATION;  Surgeon: Jan Caicedo MD;  Location: [facility] MAIN OR;  Service: Vascular;  Laterality: Left;    ARTERIOVENOUS FISTULA/SHUNT SURGERY Left 4/23/2024    Procedure: FISTULOGRAM WITH SUBCLAVIAN ARTERY ANGIOPLASTY, fistula banding;  Surgeon: Franki Delgadillo II, MD;  Location: [facility] HYBRID OR;  Service: Vascular;  Laterality: Left;    BREAST BIOPSY      right nipple 1981    BRONCHOSCOPY N/A 6/11/2024    Procedure: BRONCHOSCOPY WITH RIGHT LUNG WASHING;  Surgeon: Aramis Barragan MD;  Location: [facility] ENDOSCOPY;  Service: Pulmonary;  Laterality: N/A;  RML atelectasis    COLONOSCOPY N/A 11/24/2020    Procedure: COLONOSCOPY with polypectomy x 7;  Surgeon: Jeffery White MD;  Location: [facility] ENDOSCOPY;  Service: Gastroenterology;  Laterality: N/A;  post op: polyps, diverticulosis, hemorrhoids    COLONOSCOPY N/A 6/1/2023    Procedure: COLONOSCOPY with polypectomy x 3 biopsy x 1;  Surgeon: Jeffery Wihte MD;  Location: [facility] ENDOSCOPY;  Service: Gastroenterology;  Laterality: N/A;  diverticulosis polyps    COLONOSCOPY N/A 9/4/2024    Procedure: COLONOSCOPY WITH POLYPECTOMY X 1;  Surgeon: Jeffery White MD;  Location: [facility] ENDOSCOPY;  Service: Gastroenterology;  Laterality: N/A;  POST- POLYP, DIVERTICULOSIS, INTERNAL AND EXTERNAL HEMORRHOIDS    ENDOSCOPY N/A 03/12/2022    Procedure:  ESOPHAGOGASTRODUODENOSCOPY with biopsy x 1 area;  Surgeon: Javan Augustin MD;  Location: [facility] ENDOSCOPY;  Service: Gastroenterology;  Laterality: N/A;  post op: anastamosis    ENDOSCOPY N/A 10/9/2023    Procedure: ESOPHAGOGASTRODUODENOSCOPY with biopsy x3 areas;  Surgeon: Ace Campbell MD;  Location: [facility] ENDOSCOPY;  Service: Gastroenterology;  Laterality: N/A;  duodenal ulcers;irregular z line    ENDOSCOPY N/A 8/27/2024    Procedure: ESOPHAGOGASTRODUODENOSCOPY with argon plasma coagulation;  Surgeon: HILARIO Blackmon MD;  Location: [facility] ENDOSCOPY;  Service: Gastroenterology;  Laterality: N/A;  post op: AVM    ENTEROSCOPY SMALL BOWEL N/A 9/4/2024    Procedure: ESOPHAGOGASTRODUODENOSCOPY WITH SMALL BOWEL ENTEROSCOPY, ARGON PLASMA COAGULATION AND ENDOSCOPIC CLIPPING X 2 OF GASTRIC ANTRAL VASCULAR ECTASIA;  Surgeon: Jeffery White MD;  Location: [facility] ENDOSCOPY;  Service: Gastroenterology;  Laterality: N/A;  POST- GAVE    GASTRIC RESECTION      cancer    HYSTERECTOMY      LAPAROSCOPIC CHOLECYSTECTOMY      NEPHRECTOMY      right kidney removed     REDUCTION MAMMAPLASTY      TUMOR REMOVAL      boils    VENTRAL/INCISIONAL HERNIA REPAIR Right 08/28/2019    Procedure: VENTRAL/INCISIONAL HERNIA REPAIR;  Surgeon: Nilay Stevens MD;  Location: [facility] MAIN OR;  Service: General    VENTRAL/INCISIONAL HERNIA REPAIR N/A 10/01/2019    Procedure: OPEN VENTRAL/INCISIONAL HERNIA REPAIR;  Surgeon: Nilay Stevens MD;  Location: [facility] MAIN OR;  Service: General    VENTRAL/INCISIONAL HERNIA REPAIR N/A 10/11/2021    Procedure: VENTRAL/INCISIONAL HERNIA REPAIR LAPAROSCOPIC;  Surgeon: Nilay Stevens MD;  Location: [facility] MAIN OR;  Service: General;  Laterality: N/A;    VENTRAL/INCISIONAL HERNIA REPAIR N/A 01/23/2023    Procedure: VENTRAL/INCISIONAL HERNIA REPAIR with MESH;  Surgeon: Nilay Stevens MD;  Location: [facility] MAIN OR;  Service: General;  Laterality: N/A;     Family History   Problem Relation Age of Onset     Heart disease Father        Social History     Tobacco Use    Smoking status: Former     Current packs/day: 0.00     Average packs/day: 0.3 packs/day for 57.0 years (14.3 ttl pk-yrs)     Types: Cigarettes     Start date: 1964     Quit date: 2021     Years since quittin.0     Passive exposure: Past    Smokeless tobacco: Never   Vaping Use    Vaping status: Never Used   Substance Use Topics    Alcohol use: No    Drug use: Never     Medications Prior to Admission   Medication Sig Dispense Refill Last Dose/Taking    albuterol sulfate  (90 Base) MCG/ACT inhaler Inhale 2 puffs Every 4 (Four) Hours As Needed for Wheezing.       ammonium lactate (AMLACTIN) 12 % cream Apply 1 Application topically to the appropriate area as directed As Needed.       aspirin 81 MG chewable tablet Chew 1 tablet Daily.       calcium acetate (PHOS BINDER,) 667 MG capsule capsule Take 1 capsule by mouth 2 (Two) Times a Day.       Cholecalciferol 25 MCG (1000 UT) tablet Take 1 tablet by mouth Daily.       hyoscyamine (LEVSIN) 0.125 MG SL tablet Place 1 tablet under the tongue Every 4 (Four) Hours As Needed for Cramping. (Patient not taking: Reported on 2025) 60 tablet 1     lidocaine-prilocaine (EMLA) 2.5-2.5 % cream APPLY TOPICALLY TO THE 5TH FINGER ON THE LEFT HAND AS NEEDED PAIN 60 g 3     metoprolol succinate XL (Toprol XL) 25 MG 24 hr tablet Take 1 tablet by mouth 2 (Two) Times a Day.       multivitamin (MULTI-VITAMIN PO) Take 1 tablet by mouth Daily.       pantoprazole (PROTONIX) 20 MG EC tablet Take 1 tablet by mouth Daily.       sevelamer (RENAGEL) 800 MG tablet Take 1 tablet by mouth 2 (Two) Times a Day With Meals. (Patient not taking: Reported on 2025)       sodium bicarbonate 650 MG tablet Take 2 tablets by mouth 3 (Three) Times a Day. (Patient not taking: Reported on 2025)       sucralfate (CARAFATE) 1 g tablet Take 1 tablet by mouth 4 (Four) Times a Day As Needed. (Patient not taking: Reported on  4/1/2025)       temazepam (RESTORIL) 30 MG capsule Take 1 capsule by mouth Every Night.        heparin (porcine), 5,000 Units, Subcutaneous, Q8H  pantoprazole, 40 mg, Oral, BID AC  sodium chloride, 10 mL, Intravenous, Q12H  sucralfate, 1 g, Oral, 4x Daily AC & at Bedtime           acetaminophen **OR** acetaminophen **OR** acetaminophen    senna-docusate sodium **AND** polyethylene glycol **AND** bisacodyl **AND** bisacodyl    calcium carbonate    Calcium Replacement - Follow Nurse / BPA Driven Protocol    dextrose    dextrose    glucagon (human recombinant)    HYDROmorphone    Magnesium Standard Dose Replacement - Follow Nurse / BPA Driven Protocol    nitroglycerin    ondansetron ODT    Phosphorus Replacement - Follow Nurse / BPA Driven Protocol    Potassium Replacement - Follow Nurse / BPA Driven Protocol    [COMPLETED] Insert Peripheral IV **AND** sodium chloride    sodium chloride    sodium chloride  Atorvastatin calcium, Gabapentin, and Niacin    Review of Systems:   As noted above HPI    Vitals:  Temp:  [98 °F (36.7 °C)-98.1 °F (36.7 °C)] 98 °F (36.7 °C)  Heart Rate:  [78-89] 83  Resp:  [12-24] 12  BP: (104-156)/(50-86) 112/78     Physical Exam:   No acute distress, alert  Nonlabored respirations  Abdomen soft, nontender, incisional hernia in the right lower abdomen with herniation of bowel with Valsalva but easily reducible  Extremities warm well-perfused with no gross deformities    Labs:  Results from last 7 days   Lab Units 05/11/25 1715 05/07/25  0000   WBC 10*3/mm3 15.03*  --    HEMOGLOBIN g/dL 11.2* 10*  30*   HEMATOCRIT % 36.4  --    PLATELETS 10*3/mm3 376  --      Results from last 7 days   Lab Units 05/11/25  1852 05/11/25  1715 05/07/25  0000   SODIUM mmol/L 137 142  --    POTASSIUM mmol/L 6.1* 6.9* 5.4*   CHLORIDE mmol/L 102 102  --    CO2 mmol/L 17.5* 19.9*  --    BUN mg/dL 53* 55*  --    CREATININE mg/dL 8.16* 8.74*  --    CALCIUM mg/dL 8.3* 9.5  --    BILIRUBIN mg/dL  --  0.2  --    ALK PHOS  U/L  --  98  --    ALT (SGPT) U/L  --  15  --    AST (SGOT) U/L  --  16  --    GLUCOSE mg/dL 71 97  --      Imaging:  CT abdomen/pelvis 5/11/2025  Impression:  1.Small right lower quadrant hernia containing a loop of small bowel with wall thickening suggesting inflammation/incarceration and there is dilation of proximal small bowel loops indicating obstruction. Surgical consultation is advised.  2.Status post distal gastrectomy. Diffuse wall thickening of the stomach, which could be related to gastritis or infiltrative process.  3.Status post right nephrectomy. Marked atrophy of the left kidney.  4.Findings as above with possible chronic nondistended segment of distal sigmoid colon with mild apparent wall thickening and hyperenhancement but no definite surrounding inflammatory changes. Correlate clinically. This could be evaluated with   colonoscopy.  5.Advanced calcific atherosclerosis of the aorta and branch vessels.  6.Diffuse osseous sclerosis which may be associated with renal osteodystrophy.    Assessment and Plan:  74 y.o. female with an extensive past medical history admitted for abdominal pain and hyperkalemia.  CT scan concerning for incarcerated incisional hernia involving small bowel.    - On exam the hernia is fully reducible and patient's abdominal pain has resolved  - Patient needs potassium corrected before any type of surgical intervention can take place  - Patient was started on a diet but is to be made n.p.o. given CT scan findings  - Consideration to be made for hernia repair given CT scan findings though does not need to be done on an urgent/emergent basis as the hernia is reducible    This note was created using Dragon Voice Recognition software.    Angelia Vallecillo MD  05/12/25  00:42 EDT

## 2025-05-13 ENCOUNTER — ANESTHESIA (OUTPATIENT)
Dept: TELEMETRY | Facility: HOSPITAL | Age: 75
End: 2025-05-13

## 2025-05-13 ENCOUNTER — APPOINTMENT (OUTPATIENT)
Dept: NEPHROLOGY | Facility: HOSPITAL | Age: 75
DRG: 335 | End: 2025-05-13
Payer: MEDICARE

## 2025-05-13 LAB
ALBUMIN SERPL-MCNC: 3.8 G/DL (ref 3.5–5.2)
ANION GAP SERPL CALCULATED.3IONS-SCNC: 15.3 MMOL/L (ref 5–15)
BASOPHILS # BLD AUTO: 0.08 10*3/MM3 (ref 0–0.2)
BASOPHILS NFR BLD AUTO: 1.1 % (ref 0–1.5)
BUN SERPL-MCNC: 30 MG/DL (ref 8–23)
BUN/CREAT SERPL: 5.1 (ref 7–25)
CALCIUM SPEC-SCNC: 9.6 MG/DL (ref 8.6–10.5)
CHLORIDE SERPL-SCNC: 99 MMOL/L (ref 98–107)
CO2 SERPL-SCNC: 22.7 MMOL/L (ref 22–29)
CREAT SERPL-MCNC: 5.94 MG/DL (ref 0.57–1)
DEPRECATED RDW RBC AUTO: 58.1 FL (ref 37–54)
EGFRCR SERPLBLD CKD-EPI 2021: 6.9 ML/MIN/1.73
EOSINOPHIL # BLD AUTO: 0.33 10*3/MM3 (ref 0–0.4)
EOSINOPHIL NFR BLD AUTO: 4.6 % (ref 0.3–6.2)
ERYTHROCYTE [DISTWIDTH] IN BLOOD BY AUTOMATED COUNT: 15.8 % (ref 12.3–15.4)
GLUCOSE BLDC GLUCOMTR-MCNC: 120 MG/DL (ref 70–105)
GLUCOSE BLDC GLUCOMTR-MCNC: 126 MG/DL (ref 70–105)
GLUCOSE BLDC GLUCOMTR-MCNC: 130 MG/DL (ref 70–105)
GLUCOSE BLDC GLUCOMTR-MCNC: 63 MG/DL (ref 70–105)
GLUCOSE BLDC GLUCOMTR-MCNC: 68 MG/DL (ref 70–105)
GLUCOSE BLDC GLUCOMTR-MCNC: 68 MG/DL (ref 70–105)
GLUCOSE BLDC GLUCOMTR-MCNC: 71 MG/DL (ref 70–105)
GLUCOSE BLDC GLUCOMTR-MCNC: 79 MG/DL (ref 70–105)
GLUCOSE BLDC GLUCOMTR-MCNC: 84 MG/DL (ref 70–105)
GLUCOSE SERPL-MCNC: 67 MG/DL (ref 65–99)
HCT VFR BLD AUTO: 37.6 % (ref 34–46.6)
HGB BLD-MCNC: 11.1 G/DL (ref 12–15.9)
IMM GRANULOCYTES # BLD AUTO: 0.05 10*3/MM3 (ref 0–0.05)
IMM GRANULOCYTES NFR BLD AUTO: 0.7 % (ref 0–0.5)
LYMPHOCYTES # BLD AUTO: 1.22 10*3/MM3 (ref 0.7–3.1)
LYMPHOCYTES NFR BLD AUTO: 17.1 % (ref 19.6–45.3)
MAGNESIUM SERPL-MCNC: 1.9 MG/DL (ref 1.6–2.4)
MCH RBC QN AUTO: 30.6 PG (ref 26.6–33)
MCHC RBC AUTO-ENTMCNC: 29.5 G/DL (ref 31.5–35.7)
MCV RBC AUTO: 103.6 FL (ref 79–97)
MONOCYTES # BLD AUTO: 0.63 10*3/MM3 (ref 0.1–0.9)
MONOCYTES NFR BLD AUTO: 8.8 % (ref 5–12)
NEUTROPHILS NFR BLD AUTO: 4.81 10*3/MM3 (ref 1.7–7)
NEUTROPHILS NFR BLD AUTO: 67.7 % (ref 42.7–76)
NRBC BLD AUTO-RTO: 0 /100 WBC (ref 0–0.2)
PHOSPHATE SERPL-MCNC: 6.7 MG/DL (ref 2.5–4.5)
PLATELET # BLD AUTO: 323 10*3/MM3 (ref 140–450)
PMV BLD AUTO: 10.1 FL (ref 6–12)
POTASSIUM SERPL-SCNC: 5.7 MMOL/L (ref 3.5–5.2)
POTASSIUM SERPL-SCNC: 5.9 MMOL/L (ref 3.5–5.2)
POTASSIUM SERPL-SCNC: 5.9 MMOL/L (ref 3.5–5.2)
RBC # BLD AUTO: 3.63 10*6/MM3 (ref 3.77–5.28)
SODIUM SERPL-SCNC: 137 MMOL/L (ref 136–145)
WBC NRBC COR # BLD AUTO: 7.12 10*3/MM3 (ref 3.4–10.8)

## 2025-05-13 PROCEDURE — 94640 AIRWAY INHALATION TREATMENT: CPT

## 2025-05-13 PROCEDURE — 82948 REAGENT STRIP/BLOOD GLUCOSE: CPT

## 2025-05-13 PROCEDURE — 85025 COMPLETE CBC W/AUTO DIFF WBC: CPT | Performed by: HOSPITALIST

## 2025-05-13 PROCEDURE — 83735 ASSAY OF MAGNESIUM: CPT | Performed by: HOSPITALIST

## 2025-05-13 PROCEDURE — 82948 REAGENT STRIP/BLOOD GLUCOSE: CPT | Performed by: INTERNAL MEDICINE

## 2025-05-13 PROCEDURE — 25010000002 HEPARIN (PORCINE) PER 1000 UNITS: Performed by: STUDENT IN AN ORGANIZED HEALTH CARE EDUCATION/TRAINING PROGRAM

## 2025-05-13 PROCEDURE — 25010000002 CALCIUM GLUCONATE-NACL 1-0.675 GM/50ML-% SOLUTION: Performed by: INTERNAL MEDICINE

## 2025-05-13 PROCEDURE — 94799 UNLISTED PULMONARY SVC/PX: CPT

## 2025-05-13 PROCEDURE — 99232 SBSQ HOSP IP/OBS MODERATE 35: CPT | Performed by: INTERNAL MEDICINE

## 2025-05-13 PROCEDURE — 99232 SBSQ HOSP IP/OBS MODERATE 35: CPT | Performed by: STUDENT IN AN ORGANIZED HEALTH CARE EDUCATION/TRAINING PROGRAM

## 2025-05-13 PROCEDURE — 63710000001 INSULIN REGULAR HUMAN PER 5 UNITS: Performed by: INTERNAL MEDICINE

## 2025-05-13 PROCEDURE — 84132 ASSAY OF SERUM POTASSIUM: CPT | Performed by: NURSE ANESTHETIST, CERTIFIED REGISTERED

## 2025-05-13 PROCEDURE — 84132 ASSAY OF SERUM POTASSIUM: CPT | Performed by: INTERNAL MEDICINE

## 2025-05-13 PROCEDURE — 80069 RENAL FUNCTION PANEL: CPT | Performed by: INTERNAL MEDICINE

## 2025-05-13 RX ORDER — DEXTROSE MONOHYDRATE 25 G/50ML
25 INJECTION, SOLUTION INTRAVENOUS ONCE
Status: COMPLETED | OUTPATIENT
Start: 2025-05-13 | End: 2025-05-13

## 2025-05-13 RX ORDER — IPRATROPIUM BROMIDE AND ALBUTEROL SULFATE 2.5; .5 MG/3ML; MG/3ML
3 SOLUTION RESPIRATORY (INHALATION)
Status: DISCONTINUED | OUTPATIENT
Start: 2025-05-13 | End: 2025-05-18 | Stop reason: HOSPADM

## 2025-05-13 RX ORDER — CALCIUM GLUCONATE 20 MG/ML
1000 INJECTION, SOLUTION INTRAVENOUS ONCE
Status: COMPLETED | OUTPATIENT
Start: 2025-05-13 | End: 2025-05-13

## 2025-05-13 RX ORDER — INDOMETHACIN 25 MG/1
50 CAPSULE ORAL ONCE
Status: COMPLETED | OUTPATIENT
Start: 2025-05-13 | End: 2025-05-13

## 2025-05-13 RX ADMIN — HEPARIN SODIUM 5000 UNITS: 5000 INJECTION INTRAVENOUS; SUBCUTANEOUS at 21:33

## 2025-05-13 RX ADMIN — INSULIN HUMAN 5 UNITS: 100 INJECTION, SOLUTION PARENTERAL at 07:09

## 2025-05-13 RX ADMIN — SODIUM ZIRCONIUM CYCLOSILICATE 10 G: 10 POWDER, FOR SUSPENSION ORAL at 12:07

## 2025-05-13 RX ADMIN — SEVELAMER CARBONATE 800 MG: 800 TABLET, FILM COATED ORAL at 12:07

## 2025-05-13 RX ADMIN — Medication 10 ML: at 08:20

## 2025-05-13 RX ADMIN — SODIUM BICARBONATE 50 MEQ: 84 INJECTION INTRAVENOUS at 07:10

## 2025-05-13 RX ADMIN — PANTOPRAZOLE SODIUM 40 MG: 40 TABLET, DELAYED RELEASE ORAL at 17:44

## 2025-05-13 RX ADMIN — SEVELAMER CARBONATE 800 MG: 800 TABLET, FILM COATED ORAL at 17:44

## 2025-05-13 RX ADMIN — TRAMADOL HYDROCHLORIDE 50 MG: 50 TABLET, COATED ORAL at 18:37

## 2025-05-13 RX ADMIN — Medication 10 ML: at 21:33

## 2025-05-13 RX ADMIN — CALCIUM GLUCONATE 1000 MG: 20 INJECTION, SOLUTION INTRAVENOUS at 07:10

## 2025-05-13 RX ADMIN — IPRATROPIUM BROMIDE AND ALBUTEROL SULFATE 3 ML: .5; 3 SOLUTION RESPIRATORY (INHALATION) at 22:44

## 2025-05-13 RX ADMIN — SODIUM BICARBONATE 1300 MG: 650 TABLET ORAL at 21:33

## 2025-05-13 RX ADMIN — DEXTROSE MONOHYDRATE 25 G: 25 INJECTION, SOLUTION INTRAVENOUS at 07:10

## 2025-05-13 RX ADMIN — TEMAZEPAM 30 MG: 15 CAPSULE ORAL at 21:33

## 2025-05-13 RX ADMIN — DEXTROSE MONOHYDRATE 25 G: 25 INJECTION, SOLUTION INTRAVENOUS at 10:15

## 2025-05-13 NOTE — PROGRESS NOTES
Referring Provider: Waylon Buchanan MD    Reason for follow-up: Perioperative cardiac management     Patient Care Team:  Raegan Gotti MD as PCP - General (Family Medicine)  Nilay Stevens MD as Consulting Physician (General Surgery)  Stephen Perea MD as Surgeon (Thoracic Surgery)  Makayla Navarro, RN as Nurse Navigator  Mak Sosa MD as Cardiologist (Cardiology)  Anjum Cam MD as Consulting Physician (Hematology and Oncology)      SUBJECTIVE  Resting comfortably.  Blood pressure and heart rate are normal and stable.  Anxious about surgery.     ROS  Review of all systems negative except as indicated.    Since I have last seen, the patient has been without any chest discomfort, shortness of breath, palpitations, dizziness or syncope.  Denies having any headache, abdominal pain, nausea, vomiting, diarrhea, constipation, loss of weight or loss of appetite.  Denies having any excessive bruising, hematuria or blood in the stool.        Personal History:    Past Medical History:   Diagnosis Date    Lucas's esophagus     C. difficile colitis     Carotid artery disease     -50-74% left, 16-49% right (1/19)    COPD (chronic obstructive pulmonary disease)     DM2 (diabetes mellitus, type 2)     ESRD (end stage renal disease)     on HD    Gastric ulcer     at anastomatic site    Gastroparesis     unknown    GERD (gastroesophageal reflux disease)     Gout     not current    Hemodialysis patient     mwf    Hernia, incisional     abdomen    History of colonoscopy     UTD    History of degenerative disc disease     Hyperlipidemia     Hypertension     Medicare annual wellness visit, subsequent 11/07/2020    Microscopic colitis     Nephrotic syndrome     Neuroendocrine tumor     of stomach    FABY (obstructive sleep apnea)     not treating    Pedal edema     ressolved    Proteinuria     Renal cell cancer, right     Rib pain on right side     chronic    Rotator cuff tear     bilateral    Stomach cancer 2016     Stroke     Uterine cancer     Vitamin B 12 deficiency     Vitamin D deficiency        Past Surgical History:   Procedure Laterality Date    APPENDECTOMY      ARTERIOVENOUS FISTULA Right     ARTERIOVENOUS FISTULA REPAIR      Clotted off and insert graft    ARTERIOVENOUS FISTULA/SHUNT SURGERY Left 12/30/2020    Procedure: ARTERIOVENOUS FISTULA FORMATION;  Surgeon: Jan Caicedo MD;  Location: [facility] MAIN OR;  Service: Vascular;  Laterality: Left;    ARTERIOVENOUS FISTULA/SHUNT SURGERY Left 4/23/2024    Procedure: FISTULOGRAM WITH SUBCLAVIAN ARTERY ANGIOPLASTY, fistula banding;  Surgeon: Franki Delgadillo II, MD;  Location: [facility] HYBRID OR;  Service: Vascular;  Laterality: Left;    BREAST BIOPSY      right nipple 1981    BRONCHOSCOPY N/A 6/11/2024    Procedure: BRONCHOSCOPY WITH RIGHT LUNG WASHING;  Surgeon: Aramis Barragan MD;  Location: [facility] ENDOSCOPY;  Service: Pulmonary;  Laterality: N/A;  RML atelectasis    COLONOSCOPY N/A 11/24/2020    Procedure: COLONOSCOPY with polypectomy x 7;  Surgeon: Jeffery White MD;  Location: [facility] ENDOSCOPY;  Service: Gastroenterology;  Laterality: N/A;  post op: polyps, diverticulosis, hemorrhoids    COLONOSCOPY N/A 6/1/2023    Procedure: COLONOSCOPY with polypectomy x 3 biopsy x 1;  Surgeon: Jeffery White MD;  Location: [facility] ENDOSCOPY;  Service: Gastroenterology;  Laterality: N/A;  diverticulosis polyps    COLONOSCOPY N/A 9/4/2024    Procedure: COLONOSCOPY WITH POLYPECTOMY X 1;  Surgeon: Jeffery White MD;  Location: [facility] ENDOSCOPY;  Service: Gastroenterology;  Laterality: N/A;  POST- POLYP, DIVERTICULOSIS, INTERNAL AND EXTERNAL HEMORRHOIDS    ENDOSCOPY N/A 03/12/2022    Procedure: ESOPHAGOGASTRODUODENOSCOPY with biopsy x 1 area;  Surgeon: Javan Augustin MD;  Location: [facility] ENDOSCOPY;  Service: Gastroenterology;  Laterality: N/A;  post op: anastamosis    ENDOSCOPY N/A 10/9/2023    Procedure: ESOPHAGOGASTRODUODENOSCOPY with biopsy x3 areas;  Surgeon: Shannan  MD Ace;  Location: [facility] ENDOSCOPY;  Service: Gastroenterology;  Laterality: N/A;  duodenal ulcers;irregular z line    ENDOSCOPY N/A 8/27/2024    Procedure: ESOPHAGOGASTRODUODENOSCOPY with argon plasma coagulation;  Surgeon: HILARIO Blackmon MD;  Location: [facility] ENDOSCOPY;  Service: Gastroenterology;  Laterality: N/A;  post op: AVM    ENTEROSCOPY SMALL BOWEL N/A 9/4/2024    Procedure: ESOPHAGOGASTRODUODENOSCOPY WITH SMALL BOWEL ENTEROSCOPY, ARGON PLASMA COAGULATION AND ENDOSCOPIC CLIPPING X 2 OF GASTRIC ANTRAL VASCULAR ECTASIA;  Surgeon: Jeffery White MD;  Location: [facility] ENDOSCOPY;  Service: Gastroenterology;  Laterality: N/A;  POST- GAVE    GASTRIC RESECTION      cancer    HYSTERECTOMY      LAPAROSCOPIC CHOLECYSTECTOMY      NEPHRECTOMY      right kidney removed     REDUCTION MAMMAPLASTY      TUMOR REMOVAL      boils    VENTRAL/INCISIONAL HERNIA REPAIR Right 08/28/2019    Procedure: VENTRAL/INCISIONAL HERNIA REPAIR;  Surgeon: Nilay Stevens MD;  Location: [facility] MAIN OR;  Service: General    VENTRAL/INCISIONAL HERNIA REPAIR N/A 10/01/2019    Procedure: OPEN VENTRAL/INCISIONAL HERNIA REPAIR;  Surgeon: Nilay Stevens MD;  Location: [facility] MAIN OR;  Service: General    VENTRAL/INCISIONAL HERNIA REPAIR N/A 10/11/2021    Procedure: VENTRAL/INCISIONAL HERNIA REPAIR LAPAROSCOPIC;  Surgeon: Nilay Stevens MD;  Location: [facility] MAIN OR;  Service: General;  Laterality: N/A;    VENTRAL/INCISIONAL HERNIA REPAIR N/A 01/23/2023    Procedure: VENTRAL/INCISIONAL HERNIA REPAIR with MESH;  Surgeon: Nilay Stevens MD;  Location: [facility] MAIN OR;  Service: General;  Laterality: N/A;       Family History   Problem Relation Age of Onset    Heart disease Father        Social History     Tobacco Use    Smoking status: Former     Current packs/day: 0.00     Average packs/day: 0.3 packs/day for 57.0 years (14.3 ttl pk-yrs)     Types: Cigarettes     Start date: 5/1/1964     Quit date: 5/1/2021     Years since quitting:  4.0     Passive exposure: Past    Smokeless tobacco: Never   Vaping Use    Vaping status: Never Used   Substance Use Topics    Alcohol use: No    Drug use: Never        Home meds:  Prior to Admission medications    Medication Sig Start Date End Date Taking? Authorizing Provider   albuterol sulfate  (90 Base) MCG/ACT inhaler Inhale 2 puffs Every 4 (Four) Hours As Needed for Wheezing.    Danica Murphy MD   ammonium lactate (AMLACTIN) 12 % cream Apply 1 Application topically to the appropriate area as directed As Needed. 3/6/25   Danica Murphy MD   aspirin 81 MG chewable tablet Chew 1 tablet Daily.    Danica Murphy MD   calcium acetate (PHOS BINDER,) 667 MG capsule capsule Take 1 capsule by mouth 2 (Two) Times a Day.    Danica Murphy MD   Cholecalciferol 25 MCG (1000 UT) tablet Take 1 tablet by mouth Daily.    Danica Murphy MD   hyoscyamine (LEVSIN) 0.125 MG SL tablet Place 1 tablet under the tongue Every 4 (Four) Hours As Needed for Cramping.  Patient not taking: Reported on 4/1/2025 11/4/24   Angelia Taylor MD   lidocaine-prilocaine (EMLA) 2.5-2.5 % cream APPLY TOPICALLY TO THE 5TH FINGER ON THE LEFT HAND AS NEEDED PAIN 4/24/25   Anastasia Thurman APRN   metoprolol succinate XL (Toprol XL) 25 MG 24 hr tablet Take 1 tablet by mouth 2 (Two) Times a Day. 11/12/24   Angelia Taylor MD   multivitamin (MULTI-VITAMIN PO) Take 1 tablet by mouth Daily.    Danica Murphy MD   pantoprazole (PROTONIX) 20 MG EC tablet Take 1 tablet by mouth Daily.    Danica Murphy MD   sevelamer (RENAGEL) 800 MG tablet Take 1 tablet by mouth 2 (Two) Times a Day With Meals.  Patient not taking: Reported on 4/1/2025    Danica Murphy MD   sodium bicarbonate 650 MG tablet Take 2 tablets by mouth 3 (Three) Times a Day.  Patient not taking: Reported on 4/1/2025    Danica Murphy MD   sucralfate (CARAFATE) 1 g tablet Take 1 tablet by mouth 4 (Four) Times a Day As  "Needed.  Patient not taking: Reported on 4/1/2025    ProviderDanica MD   temazepam (RESTORIL) 30 MG capsule Take 1 capsule by mouth Every Night. 1/10/23   Danica Murphy MD       Allergies:  Atorvastatin calcium, Gabapentin, and Niacin    Scheduled Meds:heparin (porcine), 5,000 Units, Subcutaneous, Q8H  pantoprazole, 40 mg, Oral, BID AC  sevelamer, 800 mg, Oral, TID With Meals  sodium bicarbonate, 1,300 mg, Oral, TID  sodium chloride, 10 mL, Intravenous, Q12H  temazepam, 30 mg, Oral, Nightly      Continuous Infusions:   PRN Meds:.  acetaminophen    senna-docusate sodium **AND** polyethylene glycol **AND** bisacodyl **AND** bisacodyl    calcium carbonate    Calcium Replacement - Follow Nurse / BPA Driven Protocol    dextrose    dextrose    glucagon (human recombinant)    Magnesium Standard Dose Replacement - Follow Nurse / BPA Driven Protocol    nitroglycerin    ondansetron ODT    oxyCODONE    oxyCODONE    Phosphorus Replacement - Follow Nurse / BPA Driven Protocol    Potassium Replacement - Follow Nurse / BPA Driven Protocol    [COMPLETED] Insert Peripheral IV **AND** sodium chloride    sodium chloride    sodium chloride    traMADol      OBJECTIVE    Vital Signs  Vitals:    05/12/25 1540 05/12/25 2043 05/13/25 0002 05/13/25 0508   BP: 115/87 124/84 126/95 136/90   BP Location: Left leg Left leg Left leg Left leg   Patient Position: Sitting Lying Lying Lying   Pulse: 78 82 83 80   Resp: 16 14 12 12   Temp: 97.3 °F (36.3 °C) 97.9 °F (36.6 °C) 97.8 °F (36.6 °C) 97.9 °F (36.6 °C)   TempSrc: Oral Oral Oral Oral   SpO2:  95% 94% 94%   Weight:    72.2 kg (159 lb 2.8 oz)   Height:           Flowsheet Rows      Flowsheet Row First Filed Value   Admission Height 152.4 cm (60\") Documented at 05/11/2025 1614   Admission Weight 71.3 kg (157 lb 3 oz) Documented at 05/11/2025 1614              Intake/Output Summary (Last 24 hours) at 5/13/2025 0743  Last data filed at 5/12/2025 1038  Gross per 24 hour   Intake 120 ml "   Output --   Net 120 ml          Telemetry: Sinus rhythm    Physical Exam:  The patient is alert, oriented and in no distress.  Vital signs as noted above.  Head and neck revealed no carotid bruits or jugular venous distention.  No thyromegaly or lymphadenopathy is present  Lungs clear.  No wheezing.  Breath sounds are normal bilaterally.  Heart normal first and second heart sounds.  No murmur. No precordial rub is present.  No gallop is present.  Abdomen soft and nontender.  No organomegaly is present.  Extremities with good peripheral pulses without any pedal edema.  Skin warm and dry.  Musculoskeletal system is grossly normal.  CNS grossly normal.       Results Review:  I have personally reviewed the results from the time of this admission to 5/13/2025 07:43 EDT and agree with these findings:  []  Laboratory  []  Microbiology  []  Radiology  []  EKG/Telemetry   []  Cardiology/Vascular   []  Pathology  []  Old records  []  Other:    Most notable findings include:    Lab Results (last 24 hours)       Procedure Component Value Units Date/Time    POC Glucose Once [[id number]]  (Abnormal) Collected: 05/12/25 2124    Specimen: Blood Updated: 05/13/25 0734     Glucose 126 mg/dL      Comment: Serial Number: [id number]:  [id number]       POC Glucose Once [[id number]]  (Abnormal) Collected: 05/12/25 2043    Specimen: Blood Updated: 05/13/25 0733     Glucose 68 mg/dL      Comment: Serial Number: [id number]:  [id number]       Magnesium [[id number]]  (Normal) Collected: 05/13/25 0522    Specimen: Blood Updated: 05/13/25 0631     Magnesium 1.9 mg/dL     Renal Function Panel [[id number]]  (Abnormal) Collected: 05/13/25 0522    Specimen: Blood Updated: 05/13/25 0631     Glucose 67 mg/dL      BUN 30 mg/dL      Creatinine 5.94 mg/dL      Sodium 137 mmol/L      Potassium 5.9 mmol/L      Chloride 99 mmol/L      CO2 22.7 mmol/L      Calcium 9.6 mg/dL      Albumin 3.8 g/dL      Phosphorus 6.7 mg/dL      Anion Gap 15.3 mmol/L       BUN/Creatinine Ratio 5.1     eGFR 6.9 mL/min/1.73     Narrative:      GFR Categories in Chronic Kidney Disease (CKD)              GFR Category          GFR (mL/min/1.73)    Interpretation  G1                    90 or greater        Normal or high (1)  G2                    60-89                Mild decrease (1)  G3a                   45-59                Mild to moderate decrease  G3b                   30-44                Moderate to severe decrease  G4                    15-29                Severe decrease  G5                    14 or less           Kidney failure    (1)In the absence of evidence of kidney disease, neither GFR category G1 or G2 fulfill the criteria for CKD.    eGFR calculation 2021 CKD-EPI creatinine equation, which does not include race as a factor    CBC & Differential [[id number]]  (Abnormal) Collected: 05/13/25 0522    Specimen: Blood Updated: 05/13/25 0543    Narrative:      The following orders were created for panel order CBC & Differential.  Procedure                               Abnormality         Status                     ---------                               -----------         ------                     CBC Auto Differential[[id number]]        Abnormal            Final result                 Please view results for these tests on the individual orders.    CBC Auto Differential [[id number]]  (Abnormal) Collected: 05/13/25 0522    Specimen: Blood Updated: 05/13/25 0543     WBC 7.12 10*3/mm3      RBC 3.63 10*6/mm3      Hemoglobin 11.1 g/dL      Hematocrit 37.6 %      .6 fL      MCH 30.6 pg      MCHC 29.5 g/dL      RDW 15.8 %      RDW-SD 58.1 fl      MPV 10.1 fL      Platelets 323 10*3/mm3      Neutrophil % 67.7 %      Lymphocyte % 17.1 %      Monocyte % 8.8 %      Eosinophil % 4.6 %      Basophil % 1.1 %      Immature Grans % 0.7 %      Neutrophils, Absolute 4.81 10*3/mm3      Lymphocytes, Absolute 1.22 10*3/mm3      Monocytes, Absolute 0.63 10*3/mm3      Eosinophils,  Absolute 0.33 10*3/mm3      Basophils, Absolute 0.08 10*3/mm3      Immature Grans, Absolute 0.05 10*3/mm3      nRBC 0.0 /100 WBC     POC Glucose Once [[id number]]  (Abnormal) Collected: 05/12/25 1624    Specimen: Blood Updated: 05/12/25 1659     Glucose 116 mg/dL      Comment: Serial Number: [id number]:  [id number]       POC Glucose Once [[id number]]  (Abnormal) Collected: 05/12/25 1156    Specimen: Blood Updated: 05/12/25 1158     Glucose 193 mg/dL      Comment: Serial Number: [id number]:  [id number]       Lipid Panel [[id number]]  (Abnormal) Collected: 05/12/25 0643    Specimen: Blood from Arm, Right Updated: 05/12/25 0929     Total Cholesterol 176 mg/dL      Triglycerides 175 mg/dL      HDL Cholesterol 52 mg/dL      LDL Cholesterol  94 mg/dL      VLDL Cholesterol 30 mg/dL      LDL/HDL Ratio 1.71    Narrative:      Cholesterol Reference Ranges  (U.S. Department of Health and Human Services ATP III Classifications)    Desirable          <200 mg/dL  Borderline High    200-239 mg/dL  High Risk          >240 mg/dL      Triglyceride Reference Ranges  (U.S. Department of Health and Human Services ATP III Classifications)    Normal           <150 mg/dL  Borderline High  150-199 mg/dL  High             200-499 mg/dL  Very High        >500 mg/dL    HDL Reference Ranges  (U.S. Department of Health and Human Services ATP III Classifications)    Low     <40 mg/dl (major risk factor for CHD)  High    >60 mg/dl ('negative' risk factor for CHD)        LDL Reference Ranges  (U.S. Department of Health and Human Services ATP III Classifications)    Optimal          <100 mg/dL  Near Optimal     100-129 mg/dL  Borderline High  130-159 mg/dL  High             160-189 mg/dL  Very High        >189 mg/dL    LDL is calculated using the NIH LDL-C calculation.      Hemoglobin A1c [[id number]]  (Abnormal) Collected: 05/12/25 0643    Specimen: Blood from Arm, Right Updated: 05/12/25 0913     Hemoglobin A1C 6.21 %     Narrative:       Hemoglobin A1C Ranges:    Increased Risk for Diabetes  5.7% to 6.4%  Diabetes                     >= 6.5%  Diabetic Goal                < 7.0%    POC Glucose Once [[id number]]  (Abnormal) Collected: 05/12/25 0807    Specimen: Blood Updated: 05/12/25 0808     Glucose 111 mg/dL      Comment: Serial Number: [id number]:  [id number]               Imaging Results (Last 24 Hours)       ** No results found for the last 24 hours. **            LAB RESULTS (LAST 7 DAYS)    CBC  Results from last 7 days   Lab Units 05/13/25 0522 05/12/25 0643 05/11/25 1715 05/07/25  0000   WBC 10*3/mm3 7.12 15.38* 15.03*  --    RBC 10*6/mm3 3.63* 3.83 3.62*  --    HEMOGLOBIN g/dL 11.1* 12.0 11.2* 10*  30*   HEMATOCRIT % 37.6 38.0 36.4  --    MCV fL 103.6* 99.2* 100.6*  --    PLATELETS 10*3/mm3 323 317 376  --        BMP  Results from last 7 days   Lab Units 05/13/25 0522 05/12/25 0643 05/11/25 1852 05/11/25 1715 05/07/25  0000   SODIUM mmol/L 137 137 137 142  --    POTASSIUM mmol/L 5.9* 4.0 6.1* 6.9* 5.4*   CHLORIDE mmol/L 99 99 102 102  --    CO2 mmol/L 22.7 23.3 17.5* 19.9*  --    BUN mg/dL 30* 20 53* 55*  --    CREATININE mg/dL 5.94* 4.06* 8.16* 8.74*  --    GLUCOSE mg/dL 67 66 71 97  --    MAGNESIUM mg/dL 1.9 1.8  --   --   --    PHOSPHORUS mg/dL 6.7* 3.2  --   --   --        CMP   Results from last 7 days   Lab Units 05/13/25 0522 05/12/25 0643 05/11/25 1852 05/11/25 1715 05/07/25  0000   SODIUM mmol/L 137 137 137 142  --    POTASSIUM mmol/L 5.9* 4.0 6.1* 6.9* 5.4*   CHLORIDE mmol/L 99 99 102 102  --    CO2 mmol/L 22.7 23.3 17.5* 19.9*  --    BUN mg/dL 30* 20 53* 55*  --    CREATININE mg/dL 5.94* 4.06* 8.16* 8.74*  --    GLUCOSE mg/dL 67 66 71 97  --    ALBUMIN g/dL 3.8  --   --  4.0  --    BILIRUBIN mg/dL  --   --   --  0.2  --    ALK PHOS U/L  --   --   --  98  --    AST (SGOT) U/L  --   --   --  16  --    ALT (SGPT) U/L  --   --   --  15  --    LIPASE U/L  --   --   --  32  --        BNP        TROPONIN  Results from  last 7 days   Lab Units 05/11/25  1852   HSTROP T ng/L 36*       CoAg        Creatinine Clearance  Estimated Creatinine Clearance: 7.3 mL/min (A) (by C-G formula based on SCr of 5.94 mg/dL (H)).    ABG        Radiology  CT Abdomen Pelvis With Contrast  Result Date: 5/11/2025  Impression: 1.Small right lower quadrant hernia containing a loop of small bowel with wall thickening suggesting inflammation/incarceration and there is dilation of proximal small bowel loops indicating obstruction. Surgical consultation is advised. 2.Status post distal gastrectomy. Diffuse wall thickening of the stomach, which could be related to gastritis or infiltrative process. 3.Status post right nephrectomy. Marked atrophy of the left kidney. 4.Findings as above with possible chronic nondistended segment of distal sigmoid colon with mild apparent wall thickening and hyperenhancement but no definite surrounding inflammatory changes. Correlate clinically. This could be evaluated with colonoscopy. 5.Advanced calcific atherosclerosis of the aorta and branch vessels. 6.Diffuse osseous sclerosis which may be associated with renal osteodystrophy. Electronically Signed: Moses Bailey  5/11/2025 7:53 PM EDT  Workstation ID: [id number]        EKG  I personally viewed and interpreted the patient's EKG/Telemetry data:  ECG 12 Lead Other; epigastric abdominal pain   Final Result   HEART RATE=81  bpm   RR Mpwkhfqh=883  ms   NC Ogkyumai=084  ms   P Horizontal Axis=2  deg   P Front Axis=Invalid  deg   QRSD Interval=90  ms   QT Uptfdgvm=978  ms   KArU=692  ms   QRS Axis=-19  deg   T Wave Axis=160  deg   - ABNORMAL ECG -   Sinus rhythm   LVH with secondary repolarization abnormality   When compared with ECG of 11-Nov-2024 11:48:39,   No significant change   Electronically Signed By: Franki Kwok (MERCEDES) 2025-05-12 06:51:41   Date and Time of Study:2025-05-11 17:24:02      Telemetry Scan   Final Result      Telemetry Scan   Final Result      Telemetry Scan    Final Result      Telemetry Scan   Final Result      Telemetry Scan   Final Result      Telemetry Scan   Final Result      Telemetry Scan   Final Result      Telemetry Scan   Final Result      Telemetry Scan   Final Result            Echocardiogram:    Results for orders placed during the hospital encounter of 07/25/21    Adult Transthoracic Echo Complete W/ Cont if Necessary Per Protocol    Interpretation Summary  Normal LV size and contractility EF of 60%  Normal RV size  Normal atrial size  Aortic valve, mitral valve, tricuspid valve appears structurally normal, mild mitral regurgitation  seen.  No pericardial effusion seen.  Proximal aorta appears normal in size.        Stress Test:         Cardiac Catheterization:  Results for orders placed during the hospital encounter of 09/27/22    Cardiac Catheterization/Vascular Study    Narrative  Images from the original result were not included.    Today's Date:  09/27/22  Jan Caicedo MD  [facility] cardiac catheterization lab    Preoperative Diagnosis:  Poorly functioning hemodialysis fistula/shunt    Postoperative Diagnosis:  Same    Procedure Performed:    CPT:  06775 Fistulogram    Surgeon:  Jan Caicedo MD    Assistant:  None    Anesthesia:  Local    Estimated Blood Loss:  Minimal    Findings:  Normal fistulogram in brachial radial ulnar arteries, normal central venogram    Implants:  None    Specimen:  none    Complications:  none    Dispo:  to PACU    Indication for procedure:  72 y.o. female with pleasant female with pain left hand with hemodialysis.  They are able to use fistula for about 3 hours but left hand becomes painful.  Patient with symptoms of right hand polyneuropathy with previous AV graft that was ligated.  Plan left arm fistulogram of brachial artery basilic vein fistula and left arm arteriogram.  Risk discussed with patient agrees to proceed.    Description of procedure:  Patient was taken to the operating room.   Anesthesia was administered and a monitored setting.  Surgical sites were prepped and draped in the usual sterile fashion.  A full surgical timeout was performed.  Under ultrasound guidance the fistula was accessed using a micropuncture system.  Wire placement was confirmed with fluoroscopy.  Micro-needle was exchanged for the micro-sheath.  Diagnostic arteriovenous fistulogram was performed.    Left arm fistula prepped and draped in usual fashion.  1% Xylocaine used for local anesthesia.  Fistula was accessed near axilla going retrograde towards the brachial artery anastomosis without difficulty using ultrasound guidance.  Micropuncture wire advanced at difficulty.  Catheter placed.  Left upper extremity fistulogram and central venogram performed with all widely patent.  Pressure held on basilic vein fistula centrally with fistulogram of the brachial anastomosis widely patent.  Left arm runoff arteriogram performed using same technique with brachial radial ulnar and interosseous arteries are widely patent.  Anastomosis of basilic vein to brachial artery widely patent.  There was a dilated portion of the fistula and then the mild narrowing of the proximal long segment of the basilic vein going towards the brachial artery but no significant stenoses.  Patient essentially with normal fistulogram.  Suspect pain with hemodialysis due to polyneuropathy.    Jan Caicedo MD  09/27/22         Other:         ASSESSMENT & PLAN:    Active Problems:    Hyperkalemia    Preop cardiovascular risk assessment  She presents with reducible hernia.  Plan for robotic ventral hernia repair by surgery today  In the absence of angina, heart failure, arrhythmia she may proceed with hernia surgery with no further cardiac workup.  We will follow postop     Primary hypertension   Blood pressure has now recovered  On hold losartan, Coreg and amlodipine due to hypotension     Shortness of breath/cardiac murmur  May be related to high  flow from AV fistula.  Previous echocardiogram with preserved LV function and no significant valvular abnormalities      ESRD (end stage renal disease)  On hemodialysis  Potassium 5.9  Nephrology is following     Gastroesophageal reflux disease without esophagitis  PPI as needed  History of GI bleed     Type 2 diabetes mellitus without complication, without long-term current use of insulin  A1c is 6.2  Insulin sliding scale     Mixed hyperlipidemia  LDL 94, HDL 52, triglycerides 175 and total cholesterol 176  Consider starting statin.  Goal LDL is less than 70 in the presence of diabetes     Obesity  Status post gastric bypass  BMI is 31.  She weighs 159 pounds.      Mak Sosa MD  05/13/25  07:43 EDT

## 2025-05-13 NOTE — PROGRESS NOTES
[facility] Progress Note      Patient Name: Sherry Lobato  : 1950  MRN: [id number]  Primary Care Physician:  Raegan Gotti MD  Date of admission: 2025    Subjective     Interval History:     Patient resting comfortably this morning  Surgery was canceled due to hyperkalemia  Abdominal pain little better    Review of Systems:   As noted above    Objective     Vitals:   Temp:  [97.3 °F (36.3 °C)-98.4 °F (36.9 °C)] 97.8 °F (36.6 °C)  Heart Rate:  [76-85] 76  Resp:  [12-17] 14  BP: (115-153)/() 143/60  No intake or output data in the 24 hours ending 25 1412    Physical Exam:    General Appearance: NAD  HEENT: oral mucosa normal, nonicteric sclera  Neck: supple, no JVD  Lungs: CTA  Heart: RRR, normal S1 and S2  Abdomen: soft, mild tenderness  Extremities: no edema  Neuro: Awake alert and moving all extremities    Scheduled Meds:     heparin (porcine), 5,000 Units, Subcutaneous, Q8H  pantoprazole, 40 mg, Oral, BID AC  sevelamer, 800 mg, Oral, TID With Meals  sodium bicarbonate, 1,300 mg, Oral, TID  sodium chloride, 10 mL, Intravenous, Q12H  sodium zirconium cyclosilicate, 10 g, Oral, Daily  temazepam, 30 mg, Oral, Nightly      IV Meds:        Results Reviewed:   I have personally reviewed the results from the time of this admission to 2025 14:12 EDT     Results from last 7 days   Lab Units 25  1015 25  0923 25  0522 25  0643 25  1852 25  1715   SODIUM mmol/L  --   --  137 137 137 142   POTASSIUM mmol/L 5.9* 5.7* 5.9* 4.0 6.1* 6.9*   CHLORIDE mmol/L  --   --  99 99 102 102   CO2 mmol/L  --   --  22.7 23.3 17.5* 19.9*   BUN mg/dL  --   --  30* 20 53* 55*   CREATININE mg/dL  --   --  5.94* 4.06* 8.16* 8.74*   CALCIUM mg/dL  --   --  9.6 9.4 8.3* 9.5   BILIRUBIN mg/dL  --   --   --   --   --  0.2   ALK PHOS U/L  --   --   --   --   --  98   ALT (SGPT) U/L  --   --   --   --   --  15   AST (SGOT) U/L  --   --   --   --   --   16   GLUCOSE mg/dL  --   --  67 66 71 97     Estimated Creatinine Clearance: 7.3 mL/min (A) (by C-G formula based on SCr of 5.94 mg/dL (H)).  Results from last 7 days   Lab Units 05/13/25  0522 05/12/25  0643   MAGNESIUM mg/dL 1.9 1.8   PHOSPHORUS mg/dL 6.7* 3.2         Results from last 7 days   Lab Units 05/13/25  0522 05/12/25  0643 05/11/25  1715 05/07/25  0000   WBC 10*3/mm3 7.12 15.38* 15.03*  --    HEMOGLOBIN g/dL 11.1* 12.0 11.2* 10*  30*   PLATELETS 10*3/mm3 323 317 376  --            Assessment / Plan     ASSESSMENT:    End-stage renal disease.  Patient gets dialysis Monday Wednesday and Friday as outpatient.  Volume status okay.  Potassium high again this morning   hyperkalemia.  Improved with dialysis  Hypertension with chronic kidney disease.  Blood pressure okay.    Small bowel obstruction.  Secondary to incarcerated incisional hernia General Surgery evaluating    PLAN:  Repeating hemodialysis today due to hypokalemia  Surgery was canceled  Also starting on Lokelma  Start low-dose beta-blockers  Reevaluate in morning for need for dialysis    Faustino Buckley MD  05/13/25  14:12 EDT    [facility]  [phone]

## 2025-05-13 NOTE — PROGRESS NOTES
"General Surgery Progress Note    SUBJECTIVE:   Patient seen at bedside on floor. No acute events o/n. K 5.9 today, so case cancelled.     VITALS:   Temp:  [97.3 °F (36.3 °C)-98.4 °F (36.9 °C)] 97.8 °F (36.6 °C)  Heart Rate:  [76-85] 77  Resp:  [12-17] 14  BP: (114-153)/() 114/90    I & O:  I/O last 3 completed shifts:  In: 170 [P.O.:120; IV Piggyback:50]  Out: 900   No intake/output data recorded.    PHYSICAL EXAM:  General: NAD, AAOx3  Respiratory: no respiratory distress  Abdomen: soft, NT, ND, dull to percussion; reducible RLQ ventral hernia     LABS:  No results found for: \"CBCDIF\"  CBC:      Lab 05/13/25  0522 05/12/25  0643 05/11/25  1715   WBC 7.12 15.38* 15.03*   HEMOGLOBIN 11.1* 12.0 11.2*   HEMATOCRIT 37.6 38.0 36.4   PLATELETS 323 317 376   NEUTROS ABS 4.81 12.16* 11.73*   IMMATURE GRANS (ABS) 0.05 0.24* 0.09*   LYMPHS ABS 1.22 1.42 1.83   MONOS ABS 0.63 1.17* 1.06*   EOS ABS 0.33 0.32 0.22   .6* 99.2* 100.6*      Lab Results   Component Value Date    GLUCOSE 67 05/13/2025    BUN 30 (H) 05/13/2025    CREATININE 5.94 (H) 05/13/2025     05/13/2025    K 5.9 (H) 05/13/2025    CL 99 05/13/2025    CALCIUM 9.6 05/13/2025    PROTEINTOT 7.5 05/11/2025    ALBUMIN 3.8 05/13/2025    ALT 15 05/11/2025    AST 16 05/11/2025    ALKPHOS 98 05/11/2025    BILITOT 0.2 05/11/2025    GLOB 3.5 05/11/2025    AGRATIO 1.1 05/11/2025    BCR 5.1 (L) 05/13/2025    ANIONGAP 15.3 (H) 05/13/2025    EGFR 6.9 (L) 05/13/2025     Lab Results   Component Value Date    INR 1.00 11/03/2024    INR 1.01 10/21/2024    INR 0.95 10/20/2024    PROTIME 10.9 11/03/2024    PROTIME 11.0 10/21/2024    PROTIME 10.4 10/20/2024       RADIOLOGY:  CT Abdomen Pelvis With Contrast  Result Date: 5/11/2025  Impression: 1.Small right lower quadrant hernia containing a loop of small bowel with wall thickening suggesting inflammation/incarceration and there is dilation of proximal small bowel loops indicating obstruction. Surgical consultation is " advised. 2.Status post distal gastrectomy. Diffuse wall thickening of the stomach, which could be related to gastritis or infiltrative process. 3.Status post right nephrectomy. Marked atrophy of the left kidney. 4.Findings as above with possible chronic nondistended segment of distal sigmoid colon with mild apparent wall thickening and hyperenhancement but no definite surrounding inflammatory changes. Correlate clinically. This could be evaluated with colonoscopy. 5.Advanced calcific atherosclerosis of the aorta and branch vessels. 6.Diffuse osseous sclerosis which may be associated with renal osteodystrophy. Electronically Signed: Moses Bailey  5/11/2025 7:53 PM EDT  Workstation ID: [id number]        ASSESSMENT:   74 y.o. female with recurrent RLQ ventral hernia. HD#2, in stable condition.  Given hyperkalemia, patient is not appropriate candidate for surgery today.    PLAN:   Okay for diet, NPO @ midnight on 5/15  Need K < 5 per anesthesia recs; defer HD to nephrology  Will tentatively plan for possible robotic ventral hernia repair on 5/15/2025     Keyno Espana MD   General Surgery  05/13/25   14:33 EDT

## 2025-05-13 NOTE — CASE MANAGEMENT/SOCIAL WORK
Discharge Planning Assessment   [facility]     Patient Name: Sherry Lobato  MRN: [id number]  Today's Date: 5/13/2025    Admit Date: 5/11/2025    Plan: Return home with S/O   Discharge Needs Assessment       Row Name 05/13/25 1505       Living Environment    People in Home significant other    Name(s) of People in Home S/O Derek    Current Living Arrangements home    Potentially Unsafe Housing Conditions none    In the past 12 months has the electric, gas, oil, or water company threatened to shut off services in your home? No    Primary Care Provided by self    Provides Primary Care For no one    Family Caregiver if Needed significant other    Family Caregiver Names S/O Derek    Quality of Family Relationships helpful;involved;supportive    Able to Return to Prior Arrangements yes       Resource/Environmental Concerns    Resource/Environmental Concerns none    Transportation Concerns none       Transportation Needs    In the past 12 months, has lack of transportation kept you from medical appointments or from getting medications? no    In the past 12 months, has lack of transportation kept you from meetings, work, or from getting things needed for daily living? No       Food Insecurity    Within the past 12 months, you worried that your food would run out before you got the money to buy more. Never true    Within the past 12 months, the food you bought just didn't last and you didn't have money to get more. Never true       Transition Planning    Patient/Family Anticipates Transition to home    Patient/Family Anticipated Services at Transition none    Transportation Anticipated family or friend will provide       Discharge Needs Assessment    Readmission Within the Last 30 Days no previous admission in last 30 days    Equipment Currently Used at Home walker, rolling;cane, straight;nebulizer;shower chair    Concerns to be Addressed no discharge needs identified;denies needs/concerns at this time    Do you want help  "finding or keeping work or a job? Patient declined    Do you want help with school or training? For example, starting or completing job training or getting a high school diploma, GED or equivalent Patient declined    Anticipated Changes Related to Illness none    Equipment Needed After Discharge none                   Discharge Plan       Row Name 05/13/25 1508       Plan    Plan Return home with S/O    Plan Comments CM met with patient at bedside to discuss discharge planning. Patient states she lives at home with her S/O Derek. She drives and is independent with ADLs typically. She states she \"about all equipment you can have except for oxygen\". Denies any current needs at home. PCP ([facility]) and pharmacy ([facility] [address]) verified, declines [facility]. Denies issues affording medications, utilities, and/or food. No current HHC or OPPT. S/O will transport at discharge.                Demographic Summary       Row Name 05/13/25 1504       General Information    Admission Type inpatient    Arrived From emergency department    Referral Source admission list    Reason for Consult care coordination/care conference;discharge planning    Preferred Language English       Contact Information    Permission Granted to Share Info With                    Functional Status       Row Name 05/13/25 1505       Functional Status    Usual Activity Tolerance good    Current Activity Tolerance good       Functional Status, IADL    Medications independent    Meal Preparation independent    Housekeeping independent    Laundry independent    Shopping independent       Mental Status Summary    Recent Changes in Mental Status/Cognitive Functioning no changes             Cheyanne Angelo RN      [facility]  Office: [phone]  Cell: [phone]  Fax # [phone]    "

## 2025-05-13 NOTE — PROGRESS NOTES
[facility] MEDICINE SERVICE  DAILY PROGRESS NOTE    NAME: Sherry Lobato  : 1950  MRN: [id number]      LOS: 2 days     PROVIDER OF SERVICE: Waylon Buchanan MD    Chief Complaint: <principal problem not specified>    Subjective:     Interval History: Patient is doing well today, states that her abdominal pain is well-controlled.  She is awaiting surgery later today.        Review of Systems:   Review of Systems    Objective:     Vital Signs  Temp:  [97.3 °F (36.3 °C)-98.2 °F (36.8 °C)] 97.3 °F (36.3 °C)  Heart Rate:  [78-83] 79  Resp:  [12-24] 14  BP: (115-143)/(84-95) 143/88   Body mass index is 31.09 kg/m².    Physical Exam  Physical Exam  General Appearance:  Alert, cooperative, no distress, appears stated age  Head:  Normocephalic, without obvious abnormality, atraumatic  Eyes:  PERRL, conjunctiva/corneas clear, EOM's intact, fundi benign, both eyes  Ears:  Normal TM's and external ear canals, both ears  Nose: Nares normal, septum midline, mucosa normal, no drainage or sinus tenderness  Throat: Lips, mucosa, and tongue normal; teeth and gums normal  Neck: Supple, symmetrical, trachea midline, no adenopathy, thyroid: not enlarged, symmetric, no tenderness/mass/nodules, no carotid bruit or JVD  Lungs:   Clear to auscultation bilaterally, respirations unlabored  Heart:  Regular rate and rhythm, S1, S2 normal, no murmur, rub or gallop  Abdomen:  Soft, non-tender, bowel sounds active all four quadrants,  no masses, no organomegaly, reducible left abdominal wall hernia  Extremities: Extremities normal, atraumatic, no cyanosis or edema  Pulses: 2+ and symmetric  Skin: Skin color, texture, turgor normal, no rashes or lesions  Neurologic: Normal         Diagnostic Data    Results from last 7 days   Lab Units 25  1015 25  0923 25  0522 25  1852 25  1715   WBC 10*3/mm3  --   --  7.12   < > 15.03*   HEMOGLOBIN g/dL  --   --  11.1*   < > 11.2*   HEMATOCRIT %  --   --  37.6   < >  36.4   PLATELETS 10*3/mm3  --   --  323   < > 376   GLUCOSE mg/dL  --   --  67   < > 97   CREATININE mg/dL  --   --  5.94*   < > 8.74*   BUN mg/dL  --   --  30*   < > 55*   SODIUM mmol/L  --   --  137   < > 142   POTASSIUM mmol/L 5.9*   < > 5.9*   < > 6.9*   AST (SGOT) U/L  --   --   --   --  16   ALT (SGPT) U/L  --   --   --   --  15   ALK PHOS U/L  --   --   --   --  98   BILIRUBIN mg/dL  --   --   --   --  0.2   ANION GAP mmol/L  --   --  15.3*   < > 20.1*    < > = values in this interval not displayed.       CT Abdomen Pelvis With Contrast  Result Date: 5/11/2025  Impression: 1.Small right lower quadrant hernia containing a loop of small bowel with wall thickening suggesting inflammation/incarceration and there is dilation of proximal small bowel loops indicating obstruction. Surgical consultation is advised. 2.Status post distal gastrectomy. Diffuse wall thickening of the stomach, which could be related to gastritis or infiltrative process. 3.Status post right nephrectomy. Marked atrophy of the left kidney. 4.Findings as above with possible chronic nondistended segment of distal sigmoid colon with mild apparent wall thickening and hyperenhancement but no definite surrounding inflammatory changes. Correlate clinically. This could be evaluated with colonoscopy. 5.Advanced calcific atherosclerosis of the aorta and branch vessels. 6.Diffuse osseous sclerosis which may be associated with renal osteodystrophy. Electronically Signed: Moses Bailey  5/11/2025 7:53 PM EDT  Workstation ID: [id number]        I reviewed the patient's new clinical results.    Assessment/Plan:     Active and Resolved Problems  Active Hospital Problems    Diagnosis  POA    Hyperkalemia [E87.5]  Yes      Resolved Hospital Problems   No resolved problems to display.       Acute hyperkalemia  - Patient has underlying ESRD and underwent emergent HD on 5/11  - Continue HD schedule per nephrology on [facility] schedule  - Patient did receive insulin and D50  dose today per hyperkalemia protocol prior to surgery as she did have elevated potassium once again today    Abdominal incisional hernia  - Patient has multiple hernias in the past requiring surgical intervention due to multiple abdominal surgeries  - Plan for hernia repair on 5/13 tentatively  - Continue pain control  - N.p.o. after midnight    ESRD on HD  - Continue HD schedule per nephrology    Anemia of CKD  - H&H stable  - Monitor hemoglobin postoperatively    Hypertension  - Holding home antihypertensives for now given patient was hypotensive on admission  - Resume home medications once BP improves    VTE Prophylaxis:  Pharmacologic VTE prophylaxis orders are present.             Disposition Planning:     Barriers to Discharge: Surgical intervention for hernia  Anticipated Date of Discharge: 5/14  Place of Discharge: Home      Time: 45 minutes     Code Status and Medical Interventions: CPR (Attempt to Resuscitate); Full Support   Ordered at: 05/11/25 1945     Code Status (Patient has no pulse and is not breathing):    CPR (Attempt to Resuscitate)     Medical Interventions (Patient has pulse or is breathing):    Full Support     Level Of Support Discussed With:    Patient       Signature: Electronically signed by Waylon Buchanan MD, 05/13/25, 11:19 EDT.  [facility] Hospitalist Team

## 2025-05-13 NOTE — PLAN OF CARE
Problem: Adult Inpatient Plan of Care  Goal: Absence of Hospital-Acquired Illness or Injury  Intervention: Identify and Manage Fall Risk  Recent Flowsheet Documentation  Taken 5/13/2025 0005 by Elmo Orosco, RN  Safety Promotion/Fall Prevention:   safety round/check completed   room organization consistent   fall prevention program maintained   clutter free environment maintained  Taken 5/12/2025 2000 by Elmo Orosco, RN  Safety Promotion/Fall Prevention:   safety round/check completed   room organization consistent   fall prevention program maintained   clutter free environment maintained  Intervention: Prevent Skin Injury  Recent Flowsheet Documentation  Taken 5/12/2025 2000 by Elmo Orosco, RN  Body Position: position changed independently   Goal Outcome Evaluation:

## 2025-05-13 NOTE — ANESTHESIA PREPROCEDURE EVALUATION
Anesthesia Evaluation     Patient summary reviewed and Nursing notes reviewed   NPO Solid Status: > 8 hours  NPO Liquid Status: > 8 hours           Airway   Mallampati: II  TM distance: >3 FB  Neck ROM: full  No difficulty expected  Dental - normal exam     Pulmonary - normal exam    breath sounds clear to auscultation  (+) pneumonia resolved , lung cancer, COPD moderate,sleep apnea  Cardiovascular - normal exam  Exercise tolerance: unable to assess    ECG reviewed  Rhythm: regular  Rate: normal    (+) hypertension, CHF Diastolic >=55%, hyperlipidemia    ROS comment: ECHO     Normal LV size and contractility EF of 60%  Normal RV size  Normal atrial size  Aortic valve, mitral valve, tricuspid valve appears structurally normal, mild mitral regurgitation  seen.  No pericardial effusion seen.  Proximal aorta appears normal in size.      Neuro/Psych  (+) CVA, numbness  GI/Hepatic/Renal/Endo    (+) obesity, GERD, PUD, GI bleeding , renal disease (Renal Cell)- ESRD and dialysis, diabetes mellitus    Musculoskeletal (-) negative ROS    Abdominal  - normal exam   Substance History - negative use     OB/GYN negative ob/gyn ROS         Other      history of cancer remission                  Anesthesia Plan    ASA 3     general and general with block     (TAP, Re check K+. Chronically elevated most likely, would like to see K+ 5.2-5.4, ? Last Dialysis)  intravenous induction     Anesthetic plan, risks, benefits, and alternatives have been provided, discussed and informed consent has been obtained with: patient.    CODE STATUS:    Code Status (Patient has no pulse and is not breathing): CPR (Attempt to Resuscitate)  Medical Interventions (Patient has pulse or is breathing): Full Support  Level Of Support Discussed With: Patient

## 2025-05-13 NOTE — NURSING NOTE
Patient chose to end dialysis at 3hrs 5min. Aox4,has no complaints.   Access is intact, pressure dressing in place.  Report given to primary RN  [id number].

## 2025-05-14 ENCOUNTER — APPOINTMENT (OUTPATIENT)
Dept: NEPHROLOGY | Facility: HOSPITAL | Age: 75
DRG: 335 | End: 2025-05-14
Payer: MEDICARE

## 2025-05-14 PROBLEM — C80.0: Status: ACTIVE | Noted: 2025-05-14

## 2025-05-14 PROBLEM — D11.9 WARTHIN'S TUMOR: Chronic | Status: ACTIVE | Noted: 2024-08-29

## 2025-05-14 PROBLEM — R10.9 PAIN IN THE ABDOMEN: Status: RESOLVED | Noted: 2024-09-14 | Resolved: 2025-05-14

## 2025-05-14 PROBLEM — R10.30 LOWER ABDOMINAL PAIN: Status: RESOLVED | Noted: 2024-11-03 | Resolved: 2025-05-14

## 2025-05-14 PROBLEM — R07.2 PRECORDIAL PAIN: Status: RESOLVED | Noted: 2024-11-12 | Resolved: 2025-05-14

## 2025-05-14 PROBLEM — Z72.0 TOBACCO ABUSE: Chronic | Status: RESOLVED | Noted: 2020-07-24 | Resolved: 2025-05-14

## 2025-05-14 PROBLEM — I95.1 ORTHOSTATIC HYPOTENSION: Status: RESOLVED | Noted: 2017-11-07 | Resolved: 2025-05-14

## 2025-05-14 PROBLEM — I16.9 HYPERTENSIVE CRISIS: Status: RESOLVED | Noted: 2024-10-20 | Resolved: 2025-05-14

## 2025-05-14 PROBLEM — N25.81 SECONDARY HYPERPARATHYROIDISM OF RENAL ORIGIN: Chronic | Status: ACTIVE | Noted: 2024-08-29

## 2025-05-14 PROBLEM — J44.9 CHRONIC OBSTRUCTIVE PULMONARY DISEASE: Chronic | Status: ACTIVE | Noted: 2019-02-05

## 2025-05-14 PROBLEM — E66.3 OVERWEIGHT (BMI 25.0-29.9): Status: RESOLVED | Noted: 2025-02-27 | Resolved: 2025-05-14

## 2025-05-14 PROBLEM — Z72.0 TOBACCO ABUSE: Chronic | Status: ACTIVE | Noted: 2020-07-24

## 2025-05-14 PROBLEM — K92.2 ACUTE UPPER GI BLEEDING: Status: RESOLVED | Noted: 2024-08-26 | Resolved: 2025-05-14

## 2025-05-14 PROBLEM — C34.90 NON-SMALL CELL LUNG CANCER: Status: ACTIVE | Noted: 2023-10-26

## 2025-05-14 PROBLEM — D50.0 ANEMIA DUE TO GI BLOOD LOSS: Status: RESOLVED | Noted: 2024-08-27 | Resolved: 2025-05-14

## 2025-05-14 PROBLEM — J18.9 PNEUMONIA: Status: RESOLVED | Noted: 2024-11-11 | Resolved: 2025-05-14

## 2025-05-14 PROBLEM — Z01.810 PREOPERATIVE CARDIOVASCULAR EXAMINATION: Status: ACTIVE | Noted: 2025-05-14

## 2025-05-14 PROBLEM — K43.9 VENTRAL HERNIA WITHOUT OBSTRUCTION OR GANGRENE: Status: ACTIVE | Noted: 2025-05-14

## 2025-05-14 PROBLEM — C7A.8 NEUROENDOCRINE CARCINOMA: Status: RESOLVED | Noted: 2017-08-15 | Resolved: 2025-05-14

## 2025-05-14 LAB
ALBUMIN SERPL-MCNC: 4 G/DL (ref 3.5–5.2)
ANION GAP SERPL CALCULATED.3IONS-SCNC: 12 MMOL/L (ref 5–15)
BASOPHILS # BLD AUTO: 0.06 10*3/MM3 (ref 0–0.2)
BASOPHILS NFR BLD AUTO: 0.7 % (ref 0–1.5)
BUN SERPL-MCNC: 16 MG/DL (ref 8–23)
BUN/CREAT SERPL: 4.1 (ref 7–25)
CALCIUM SPEC-SCNC: 9.3 MG/DL (ref 8.6–10.5)
CHLORIDE SERPL-SCNC: 98 MMOL/L (ref 98–107)
CO2 SERPL-SCNC: 27 MMOL/L (ref 22–29)
CREAT SERPL-MCNC: 3.93 MG/DL (ref 0.57–1)
DEPRECATED RDW RBC AUTO: 57.1 FL (ref 37–54)
EGFRCR SERPLBLD CKD-EPI 2021: 11.4 ML/MIN/1.73
EOSINOPHIL # BLD AUTO: 0.47 10*3/MM3 (ref 0–0.4)
EOSINOPHIL NFR BLD AUTO: 5.7 % (ref 0.3–6.2)
ERYTHROCYTE [DISTWIDTH] IN BLOOD BY AUTOMATED COUNT: 15.9 % (ref 12.3–15.4)
GLUCOSE BLDC GLUCOMTR-MCNC: 83 MG/DL (ref 70–105)
GLUCOSE BLDC GLUCOMTR-MCNC: 90 MG/DL (ref 70–105)
GLUCOSE BLDC GLUCOMTR-MCNC: 95 MG/DL (ref 70–105)
GLUCOSE SERPL-MCNC: 91 MG/DL (ref 65–99)
HCT VFR BLD AUTO: 38.2 % (ref 34–46.6)
HGB BLD-MCNC: 11.6 G/DL (ref 12–15.9)
IMM GRANULOCYTES # BLD AUTO: 0.04 10*3/MM3 (ref 0–0.05)
IMM GRANULOCYTES NFR BLD AUTO: 0.5 % (ref 0–0.5)
LYMPHOCYTES # BLD AUTO: 1.25 10*3/MM3 (ref 0.7–3.1)
LYMPHOCYTES NFR BLD AUTO: 15.2 % (ref 19.6–45.3)
MAGNESIUM SERPL-MCNC: 1.9 MG/DL (ref 1.6–2.4)
MCH RBC QN AUTO: 31 PG (ref 26.6–33)
MCHC RBC AUTO-ENTMCNC: 30.4 G/DL (ref 31.5–35.7)
MCV RBC AUTO: 102.1 FL (ref 79–97)
MONOCYTES # BLD AUTO: 0.73 10*3/MM3 (ref 0.1–0.9)
MONOCYTES NFR BLD AUTO: 8.9 % (ref 5–12)
NEUTROPHILS NFR BLD AUTO: 5.67 10*3/MM3 (ref 1.7–7)
NEUTROPHILS NFR BLD AUTO: 69 % (ref 42.7–76)
NRBC BLD AUTO-RTO: 0 /100 WBC (ref 0–0.2)
PHOSPHATE SERPL-MCNC: 5.3 MG/DL (ref 2.5–4.5)
PLATELET # BLD AUTO: 306 10*3/MM3 (ref 140–450)
PMV BLD AUTO: 10.2 FL (ref 6–12)
POTASSIUM SERPL-SCNC: 4.7 MMOL/L (ref 3.5–5.2)
RBC # BLD AUTO: 3.74 10*6/MM3 (ref 3.77–5.28)
SODIUM SERPL-SCNC: 137 MMOL/L (ref 136–145)
WBC NRBC COR # BLD AUTO: 8.22 10*3/MM3 (ref 3.4–10.8)

## 2025-05-14 PROCEDURE — 94799 UNLISTED PULMONARY SVC/PX: CPT

## 2025-05-14 PROCEDURE — 83735 ASSAY OF MAGNESIUM: CPT | Performed by: HOSPITALIST

## 2025-05-14 PROCEDURE — 82948 REAGENT STRIP/BLOOD GLUCOSE: CPT

## 2025-05-14 PROCEDURE — 99232 SBSQ HOSP IP/OBS MODERATE 35: CPT | Performed by: NURSE PRACTITIONER

## 2025-05-14 PROCEDURE — 94761 N-INVAS EAR/PLS OXIMETRY MLT: CPT

## 2025-05-14 PROCEDURE — 85025 COMPLETE CBC W/AUTO DIFF WBC: CPT | Performed by: HOSPITALIST

## 2025-05-14 PROCEDURE — 94664 DEMO&/EVAL PT USE INHALER: CPT

## 2025-05-14 PROCEDURE — 80069 RENAL FUNCTION PANEL: CPT | Performed by: INTERNAL MEDICINE

## 2025-05-14 PROCEDURE — 25010000002 HEPARIN (PORCINE) PER 1000 UNITS: Performed by: STUDENT IN AN ORGANIZED HEALTH CARE EDUCATION/TRAINING PROGRAM

## 2025-05-14 RX ORDER — SODIUM BICARBONATE 650 MG/1
650 TABLET ORAL 3 TIMES DAILY
Status: DISCONTINUED | OUTPATIENT
Start: 2025-05-14 | End: 2025-05-18 | Stop reason: HOSPADM

## 2025-05-14 RX ADMIN — IPRATROPIUM BROMIDE AND ALBUTEROL SULFATE 3 ML: .5; 3 SOLUTION RESPIRATORY (INHALATION) at 07:04

## 2025-05-14 RX ADMIN — Medication 10 ML: at 07:30

## 2025-05-14 RX ADMIN — IPRATROPIUM BROMIDE AND ALBUTEROL SULFATE 3 ML: .5; 3 SOLUTION RESPIRATORY (INHALATION) at 18:28

## 2025-05-14 RX ADMIN — SEVELAMER CARBONATE 800 MG: 800 TABLET, FILM COATED ORAL at 17:08

## 2025-05-14 RX ADMIN — SEVELAMER CARBONATE 800 MG: 800 TABLET, FILM COATED ORAL at 13:24

## 2025-05-14 RX ADMIN — Medication 10 ML: at 20:55

## 2025-05-14 RX ADMIN — HEPARIN SODIUM 5000 UNITS: 5000 INJECTION INTRAVENOUS; SUBCUTANEOUS at 05:46

## 2025-05-14 RX ADMIN — SODIUM BICARBONATE 650 MG: 650 TABLET ORAL at 20:55

## 2025-05-14 RX ADMIN — PANTOPRAZOLE SODIUM 40 MG: 40 TABLET, DELAYED RELEASE ORAL at 05:46

## 2025-05-14 RX ADMIN — PANTOPRAZOLE SODIUM 40 MG: 40 TABLET, DELAYED RELEASE ORAL at 17:07

## 2025-05-14 RX ADMIN — IPRATROPIUM BROMIDE AND ALBUTEROL SULFATE 3 ML: .5; 3 SOLUTION RESPIRATORY (INHALATION) at 14:29

## 2025-05-14 RX ADMIN — TEMAZEPAM 30 MG: 15 CAPSULE ORAL at 20:55

## 2025-05-14 RX ADMIN — SODIUM BICARBONATE 650 MG: 650 TABLET ORAL at 17:07

## 2025-05-14 RX ADMIN — SODIUM ZIRCONIUM CYCLOSILICATE 10 G: 10 POWDER, FOR SUSPENSION ORAL at 13:24

## 2025-05-14 NOTE — PROGRESS NOTES
[facility] Progress Note      Patient Name: Sherry Lobato  : 1950  MRN: [id number]  Primary Care Physician:  Raegan Gotti MD  Date of admission: 2025    Subjective     Interval History:     Patient resting comfortably this morning  Abdominal pain little better  Denies any nausea or vomiting    Review of Systems:   As noted above    Objective     Vitals:   Temp:  [97.5 °F (36.4 °C)-98.4 °F (36.9 °C)] 97.7 °F (36.5 °C)  Heart Rate:  [72-97] 72  Resp:  [10-20] 15  BP: ()/() 101/62    Intake/Output Summary (Last 24 hours) at 2025 0823  Last data filed at 2025 2131  Gross per 24 hour   Intake 480 ml   Output 500 ml   Net -20 ml       Physical Exam:    General Appearance: NAD  HEENT: oral mucosa normal, nonicteric sclera  Neck: supple, no JVD  Lungs: CTA  Heart: RRR, normal S1 and S2  Abdomen: soft, mild tenderness  Extremities: no edema  Neuro: Awake alert and moving all extremities    Scheduled Meds:     heparin (porcine), 5,000 Units, Subcutaneous, Q8H  ipratropium-albuterol, 3 mL, Nebulization, 4x Daily - RT  pantoprazole, 40 mg, Oral, BID AC  sevelamer, 800 mg, Oral, TID With Meals  sodium bicarbonate, 1,300 mg, Oral, TID  sodium chloride, 10 mL, Intravenous, Q12H  sodium zirconium cyclosilicate, 10 g, Oral, Daily  temazepam, 30 mg, Oral, Nightly      IV Meds:        Results Reviewed:   I have personally reviewed the results from the time of this admission to 2025 08:23 EDT     Results from last 7 days   Lab Units 25  0448 25  1015 25  0923 25  0522 25  0643 25  1852 25  1715   SODIUM mmol/L 137  --   --  137 137   < > 142   POTASSIUM mmol/L 4.7 5.9* 5.7* 5.9* 4.0   < > 6.9*   CHLORIDE mmol/L 98  --   --  99 99   < > 102   CO2 mmol/L 27.0  --   --  22.7 23.3   < > 19.9*   BUN mg/dL 16  --   --  30* 20   < > 55*   CREATININE mg/dL 3.93*  --   --  5.94* 4.06*   < > 8.74*   CALCIUM mg/dL 9.3  --   --   9.6 9.4   < > 9.5   BILIRUBIN mg/dL  --   --   --   --   --   --  0.2   ALK PHOS U/L  --   --   --   --   --   --  98   ALT (SGPT) U/L  --   --   --   --   --   --  15   AST (SGOT) U/L  --   --   --   --   --   --  16   GLUCOSE mg/dL 91  --   --  67 66   < > 97    < > = values in this interval not displayed.     Estimated Creatinine Clearance: 11 mL/min (A) (by C-G formula based on SCr of 3.93 mg/dL (H)).  Results from last 7 days   Lab Units 05/14/25 0448 05/13/25 0522 05/12/25  0643   MAGNESIUM mg/dL 1.9 1.9 1.8   PHOSPHORUS mg/dL 5.3* 6.7* 3.2         Results from last 7 days   Lab Units 05/14/25 0448 05/13/25 0522 05/12/25  0643 05/11/25  1715   WBC 10*3/mm3 8.22 7.12 15.38* 15.03*   HEMOGLOBIN g/dL 11.6* 11.1* 12.0 11.2*   PLATELETS 10*3/mm3 306 323 317 376           Assessment / Plan     ASSESSMENT:    End-stage renal disease.  Patient gets dialysis Monday Wednesday and Friday as outpatient.  Electrolytes, volume status okay  hyperkalemia.  Improved   Hypertension with chronic kidney disease.  Blood pressure okay.  Off antihypertensives currently  Small bowel obstruction.  Secondary to incarcerated incisional hernia General Surgery evaluating    PLAN:  Hemodialysis today  Continue [facility]  General Surgery following and scheduled for surgery tomorrow  Dietary potassium restriction    Faustino Buckley MD  05/14/25  08:23 EDT    [facility]  [phone]

## 2025-05-14 NOTE — PROGRESS NOTES
K 4.7 today. Pt received HD today. F/u AM BMP. If K appropriate, will proceed with robotic VHR tomorrow morning. NPO at midnight.     Keyon Espana MD   General Surgery  05/14/25   18:52 EDT

## 2025-05-14 NOTE — PROGRESS NOTES
[facility] MEDICINE SERVICE  DAILY PROGRESS NOTE    NAME: Sherry Lobato  : 1950  MRN: [id number]      LOS: 3 days     PROVIDER OF SERVICE: Waylon Buchanan MD    Chief Complaint: Acute hyperkalemia    Subjective:     Interval History: Patient doing well today, abdominal pain remains controlled.        Review of Systems:   Review of Systems    Objective:     Vital Signs  Temp:  [97.5 °F (36.4 °C)-98.4 °F (36.9 °C)] 98 °F (36.7 °C)  Heart Rate:  [70-97] 79  Resp:  [10-20] 13  BP: ()/() 94/45   Body mass index is 31.47 kg/m².    Physical Exam  Physical Exam  General Appearance:  Alert, cooperative, no distress, appears stated age  Head:  Normocephalic, without obvious abnormality, atraumatic  Eyes:  PERRL, conjunctiva/corneas clear, EOM's intact, fundi benign, both eyes  Ears:  Normal TM's and external ear canals, both ears  Nose: Nares normal, septum midline, mucosa normal, no drainage or sinus tenderness  Throat: Lips, mucosa, and tongue normal; teeth and gums normal  Neck: Supple, symmetrical, trachea midline, no adenopathy, thyroid: not enlarged, symmetric, no tenderness/mass/nodules, no carotid bruit or JVD  Lungs:   Clear to auscultation bilaterally, respirations unlabored  Heart:  Regular rate and rhythm, S1, S2 normal, no murmur, rub or gallop  Abdomen:  Soft, non-tender, bowel sounds active all four quadrants,  no masses, no organomegaly, reducible left abdominal wall hernia  Extremities: Extremities normal, atraumatic, no cyanosis or edema  Pulses: 2+ and symmetric  Skin: Skin color, texture, turgor normal, no rashes or lesions  Neurologic: Normal         Diagnostic Data    Results from last 7 days   Lab Units 25  0448 25  1852 25  1715   WBC 10*3/mm3 8.22   < > 15.03*   HEMOGLOBIN g/dL 11.6*   < > 11.2*   HEMATOCRIT % 38.2   < > 36.4   PLATELETS 10*3/mm3 306   < > 376   GLUCOSE mg/dL 91   < > 97   CREATININE mg/dL 3.93*   < > 8.74*   BUN mg/dL 16   < > 55*    SODIUM mmol/L 137   < > 142   POTASSIUM mmol/L 4.7   < > 6.9*   AST (SGOT) U/L  --   --  16   ALT (SGPT) U/L  --   --  15   ALK PHOS U/L  --   --  98   BILIRUBIN mg/dL  --   --  0.2   ANION GAP mmol/L 12.0   < > 20.1*    < > = values in this interval not displayed.       No radiology results for the last day        I reviewed the patient's new clinical results.    Assessment/Plan:     Active and Resolved Problems  Active Hospital Problems    Diagnosis  POA    **Acute hyperkalemia [E87.5]  Yes    Ventral hernia without obstruction or gangrene [K43.9]  Yes    Preoperative cardiovascular examination [Z01.810]  Not Applicable    Multiple malignancies [C80.0]  Yes    Obesity (BMI 30-39.9) [E66.9]  Yes    Chronic obstructive pulmonary disease [J44.9]  Yes    ESRD (end stage renal disease) [N18.6]  Yes    Obstructive sleep apnea syndrome [G47.33]  Yes    Hypertension [I10]  Yes    Mixed hyperlipidemia [E78.2]  Yes    Gastroesophageal reflux disease [K21.9]  Yes    Type 2 diabetes mellitus, without long-term current use of insulin [E11.9]  Yes      Resolved Hospital Problems   No resolved problems to display.       Acute hyperkalemia  - Patient has underlying ESRD and underwent emergent HD on 5/11  - Continue HD schedule per nephrology on [facility] schedule  - Potassium better today after receiving HD on 5/13 with plans for repeat HD on 5/14    Abdominal incisional hernia  - Patient has multiple hernias in the past requiring surgical intervention due to multiple abdominal surgeries  - Plan for hernia repair on 5/15 tentatively if potassium remains controlled  - Continue pain control  - N.p.o. after midnight    ESRD on HD  - Continue HD schedule per nephrology    Anemia of CKD  - H&H stable  - Monitor hemoglobin postoperatively    Hypertension  - Holding home antihypertensives for now given patient was hypotensive on admission  - Resume home medications once BP improves    VTE Prophylaxis:  Pharmacologic VTE prophylaxis orders  are present.             Disposition Planning:     Barriers to Discharge: Surgical intervention for hernia  Anticipated Date of Discharge: 5/16  Place of Discharge: Home      Time: 45 minutes     Code Status and Medical Interventions: CPR (Attempt to Resuscitate); Full Support   Ordered at: 05/11/25 1945     Code Status (Patient has no pulse and is not breathing):    CPR (Attempt to Resuscitate)     Medical Interventions (Patient has pulse or is breathing):    Full Support     Level Of Support Discussed With:    Patient       Signature: Electronically signed by Waylon Buchanan MD, 05/14/25, 11:27 EDT.  [facility] Hospitalist Team

## 2025-05-14 NOTE — PROGRESS NOTES
Referring Provider: Waylon Buchanan MD    Reason for follow-up: perioperative cardiac management     Patient Care Team:  Raegan Gotti MD as PCP - General (Family Medicine)  Nilay Stevens MD as Consulting Physician (General Surgery)  Stephen Perea MD as Surgeon (Thoracic Surgery)  Makayla Navarro, RN as Nurse Navigator  Mak Sosa MD as Cardiologist (Cardiology)  Anjum Cam MD as Consulting Physician (Hematology and Oncology)      SUBJECTIVE    The patient's surgery was canceled yesterday due to hyperkalemia. She has been tentatively rescheduled for 5/15/25. She is currently getting hemodialysis.        ROS  Review of all systems negative except as indicated.    Since I have last seen, the patient has been without any chest discomfort, shortness of breath, palpitations, dizziness or syncope.  Denies having any headache, abdominal pain, nausea, vomiting, diarrhea, constipation, loss of weight or loss of appetite.  Denies having any excessive bruising, hematuria or blood in the stool.        Personal History:    Past Medical History:   Diagnosis Date    Acute upper GI bleeding 08/26/2024    Anemia due to GI blood loss 08/27/2024    Lucas's esophagus     Barretts esophagus 04/10/2012    C. difficile colitis     Carcinoma, renal cell 04/10/2012    Carotid artery disease     -50-74% left, 16-49% right (1/19)    COPD (chronic obstructive pulmonary disease)     DM2 (diabetes mellitus, type 2)     ESRD (end stage renal disease)     on HD    Gastric ulcer     at anastomatic site    Gastroparesis     unknown    GERD (gastroesophageal reflux disease)     Gout     not current    Hemodialysis patient     mwf    Hernia, incisional     abdomen    History of colonoscopy     UTD    History of degenerative disc disease     Hyperlipidemia     Hypertension     Medicare annual wellness visit, subsequent 11/07/2020    Microscopic colitis     Nephrotic syndrome     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    Neuroendocrine  tumor     of stomach    Non-small cell lung cancer 10/26/2023    Orthostatic hypotension 11/07/2017    FABY (obstructive sleep apnea)     not treating    Pedal edema     ressolved    Pneumonia 11/11/2024    Proteinuria     Renal cell cancer, right     Rib pain on right side     chronic    Rotator cuff tear     bilateral    Secondary hyperparathyroidism of renal origin 08/29/2024    Steal syndrome dialysis vascular access 04/16/2024    Stomach cancer 2016    Stroke     Tobacco abuse 07/24/2020    Uterine cancer     Vitamin B 12 deficiency     Vitamin D deficiency     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       Past Surgical History:   Procedure Laterality Date    APPENDECTOMY      ARTERIOVENOUS FISTULA Right     ARTERIOVENOUS FISTULA REPAIR      Clotted off and insert graft    ARTERIOVENOUS FISTULA/SHUNT SURGERY Left 12/30/2020    Procedure: ARTERIOVENOUS FISTULA FORMATION;  Surgeon: Jan Caicedo MD;  Location: [facility] MAIN OR;  Service: Vascular;  Laterality: Left;    ARTERIOVENOUS FISTULA/SHUNT SURGERY Left 4/23/2024    Procedure: FISTULOGRAM WITH SUBCLAVIAN ARTERY ANGIOPLASTY, fistula banding;  Surgeon: Franki Delgadillo II, MD;  Location: [facility] HYBRID OR;  Service: Vascular;  Laterality: Left;    BREAST BIOPSY      right nipple 1981    BRONCHOSCOPY N/A 6/11/2024    Procedure: BRONCHOSCOPY WITH RIGHT LUNG WASHING;  Surgeon: Aramis Barragan MD;  Location: [facility] ENDOSCOPY;  Service: Pulmonary;  Laterality: N/A;  RML atelectasis    COLONOSCOPY N/A 11/24/2020    Procedure: COLONOSCOPY with polypectomy x 7;  Surgeon: Jeffery White MD;  Location: [facility] ENDOSCOPY;  Service: Gastroenterology;  Laterality: N/A;  post op: polyps, diverticulosis, hemorrhoids    COLONOSCOPY N/A 6/1/2023    Procedure: COLONOSCOPY with polypectomy x 3 biopsy x 1;  Surgeon: Jeffery White MD;  Location: [facility] ENDOSCOPY;  Service: Gastroenterology;  Laterality: N/A;  diverticulosis polyps    COLONOSCOPY N/A 9/4/2024    Procedure: COLONOSCOPY WITH  POLYPECTOMY X 1;  Surgeon: Jeffery White MD;  Location: [facility] ENDOSCOPY;  Service: Gastroenterology;  Laterality: N/A;  POST- POLYP, DIVERTICULOSIS, INTERNAL AND EXTERNAL HEMORRHOIDS    ENDOSCOPY N/A 03/12/2022    Procedure: ESOPHAGOGASTRODUODENOSCOPY with biopsy x 1 area;  Surgeon: Javan Augustin MD;  Location: [facility] ENDOSCOPY;  Service: Gastroenterology;  Laterality: N/A;  post op: anastamosis    ENDOSCOPY N/A 10/9/2023    Procedure: ESOPHAGOGASTRODUODENOSCOPY with biopsy x3 areas;  Surgeon: Ace Campbell MD;  Location: [facility] ENDOSCOPY;  Service: Gastroenterology;  Laterality: N/A;  duodenal ulcers;irregular z line    ENDOSCOPY N/A 8/27/2024    Procedure: ESOPHAGOGASTRODUODENOSCOPY with argon plasma coagulation;  Surgeon: HILARIO Blackmon MD;  Location: [facility] ENDOSCOPY;  Service: Gastroenterology;  Laterality: N/A;  post op: AVM    ENTEROSCOPY SMALL BOWEL N/A 9/4/2024    Procedure: ESOPHAGOGASTRODUODENOSCOPY WITH SMALL BOWEL ENTEROSCOPY, ARGON PLASMA COAGULATION AND ENDOSCOPIC CLIPPING X 2 OF GASTRIC ANTRAL VASCULAR ECTASIA;  Surgeon: Jeffery White MD;  Location: [facility] ENDOSCOPY;  Service: Gastroenterology;  Laterality: N/A;  POST- GAVE    GASTRIC RESECTION      cancer    HYSTERECTOMY      LAPAROSCOPIC CHOLECYSTECTOMY      NEPHRECTOMY      right kidney removed     REDUCTION MAMMAPLASTY      TUMOR REMOVAL      boils    VENTRAL/INCISIONAL HERNIA REPAIR Right 08/28/2019    Procedure: VENTRAL/INCISIONAL HERNIA REPAIR;  Surgeon: Nilay Stevens MD;  Location: [facility] MAIN OR;  Service: General    VENTRAL/INCISIONAL HERNIA REPAIR N/A 10/01/2019    Procedure: OPEN VENTRAL/INCISIONAL HERNIA REPAIR;  Surgeon: Nilay Stevens MD;  Location: [facility] MAIN OR;  Service: General    VENTRAL/INCISIONAL HERNIA REPAIR N/A 10/11/2021    Procedure: VENTRAL/INCISIONAL HERNIA REPAIR LAPAROSCOPIC;  Surgeon: Nilay Stevens MD;  Location: [facility] MAIN OR;  Service: General;  Laterality: N/A;    VENTRAL/INCISIONAL HERNIA  REPAIR N/A 2023    Procedure: VENTRAL/INCISIONAL HERNIA REPAIR with MESH;  Surgeon: Nilay Stevens MD;  Location: [facility] MAIN OR;  Service: General;  Laterality: N/A;       Family History   Problem Relation Age of Onset    Heart disease Father        Social History     Tobacco Use    Smoking status: Former     Current packs/day: 0.00     Average packs/day: 0.3 packs/day for 57.0 years (14.3 ttl pk-yrs)     Types: Cigarettes     Start date: 1964     Quit date: 2021     Years since quittin.0     Passive exposure: Past    Smokeless tobacco: Never   Vaping Use    Vaping status: Never Used   Substance Use Topics    Alcohol use: No    Drug use: Never        Home meds:  Prior to Admission medications    Medication Sig Start Date End Date Taking? Authorizing Provider   albuterol sulfate  (90 Base) MCG/ACT inhaler Inhale 2 puffs Every 4 (Four) Hours As Needed for Wheezing.    Danica Murphy MD   ammonium lactate (AMLACTIN) 12 % cream Apply 1 Application topically to the appropriate area as directed As Needed. 3/6/25   Danica Murphy MD   aspirin 81 MG chewable tablet Chew 1 tablet Daily.    Danica Murphy MD   calcium acetate (PHOS BINDER,) 667 MG capsule capsule Take 1 capsule by mouth 2 (Two) Times a Day.    Danica Murphy MD   Cholecalciferol 25 MCG (1000 UT) tablet Take 1 tablet by mouth Daily.    Danica Murphy MD   hyoscyamine (LEVSIN) 0.125 MG SL tablet Place 1 tablet under the tongue Every 4 (Four) Hours As Needed for Cramping.  Patient not taking: Reported on 2025   Angelia Taylor MD   lidocaine-prilocaine (EMLA) 2.5-2.5 % cream APPLY TOPICALLY TO THE 5TH FINGER ON THE LEFT HAND AS NEEDED PAIN 25   Anastasia Thurman APRN   metoprolol succinate XL (Toprol XL) 25 MG 24 hr tablet Take 1 tablet by mouth 2 (Two) Times a Day. 24   Angelia Taylor MD   multivitamin (MULTI-VITAMIN PO) Take 1 tablet by mouth Daily.    Danica Murphy  MD   pantoprazole (PROTONIX) 20 MG EC tablet Take 1 tablet by mouth Daily.    Danica Murphy MD   sevelamer (RENAGEL) 800 MG tablet Take 1 tablet by mouth 2 (Two) Times a Day With Meals.  Patient not taking: Reported on 4/1/2025    Danica Murphy MD   sodium bicarbonate 650 MG tablet Take 2 tablets by mouth 3 (Three) Times a Day.  Patient not taking: Reported on 4/1/2025    Danica Murphy MD   sucralfate (CARAFATE) 1 g tablet Take 1 tablet by mouth 4 (Four) Times a Day As Needed.  Patient not taking: Reported on 4/1/2025    Danica Murphy MD   temazepam (RESTORIL) 30 MG capsule Take 1 capsule by mouth Every Night. 1/10/23   Danica Murphy MD       Allergies:  Atorvastatin calcium, Gabapentin, and Niacin    Scheduled Meds:heparin (porcine), 5,000 Units, Subcutaneous, Q8H  ipratropium-albuterol, 3 mL, Nebulization, 4x Daily - RT  pantoprazole, 40 mg, Oral, BID AC  sevelamer, 800 mg, Oral, TID With Meals  sodium bicarbonate, 650 mg, Oral, TID  sodium chloride, 10 mL, Intravenous, Q12H  sodium zirconium cyclosilicate, 10 g, Oral, Daily  temazepam, 30 mg, Oral, Nightly      Continuous Infusions:   PRN Meds:.  acetaminophen    senna-docusate sodium **AND** polyethylene glycol **AND** bisacodyl **AND** bisacodyl    calcium carbonate    Calcium Replacement - Follow Nurse / BPA Driven Protocol    dextrose    dextrose    glucagon (human recombinant)    Magnesium Standard Dose Replacement - Follow Nurse / BPA Driven Protocol    nitroglycerin    ondansetron ODT    oxyCODONE    oxyCODONE    Phosphorus Replacement - Follow Nurse / BPA Driven Protocol    Potassium Replacement - Follow Nurse / BPA Driven Protocol    [COMPLETED] Insert Peripheral IV **AND** sodium chloride    sodium chloride    sodium chloride    traMADol      OBJECTIVE    Vital Signs  Vitals:    05/14/25 1030 05/14/25 1100 05/14/25 1105 05/14/25 1117   BP: 97/63 94/65 117/49 94/45   BP Location:       Patient Position:      "  Pulse: 81 77 72 79   Resp: 13 13 12 13   Temp:    98 °F (36.7 °C)   TempSrc:    Oral   SpO2:       Weight:       Height:           Flowsheet Rows      Flowsheet Row First Filed Value   Admission Height 152.4 cm (60\") Documented at 05/11/2025 1614   Admission Weight 71.3 kg (157 lb 3 oz) Documented at 05/11/2025 1614              Intake/Output Summary (Last 24 hours) at 5/14/2025 1126  Last data filed at 5/13/2025 2131  Gross per 24 hour   Intake 480 ml   Output 500 ml   Net -20 ml          Telemetry:  sinus rhythm     Physical Exam:  The patient is alert, oriented and in no distress.  Vital signs as noted above.  Head and neck revealed no carotid bruits or jugular venous distention.  No thyromegaly or lymphadenopathy is present  Lungs clear.  No wheezing.  Breath sounds are normal bilaterally. On room air.   Heart normal first and second heart sounds.  No murmur. No precordial rub is present.  No gallop is present.  Abdomen soft and nontender.  No organomegaly is present.  Extremities with good peripheral pulses without any pedal edema.  Skin warm and dry.  Musculoskeletal system is grossly normal.  CNS grossly normal.       Results Review:  I have personally reviewed the results from the time of this admission to 5/14/2025 11:26 EDT and agree with these findings:  [x]  Laboratory  [x]  Microbiology  [x]  Radiology  [x]  EKG/Telemetry   [x]  Cardiology/Vascular   []  Pathology  [x]  Old records  []  Other:    Most notable findings include:    Lab Results (last 24 hours)       Procedure Component Value Units Date/Time    POC Glucose Once [[id number]]  (Normal) Collected: 05/14/25 0742    Specimen: Blood Updated: 05/14/25 0744     Glucose 95 mg/dL      Comment: Serial Number: [id number]:  [id number]       Renal Function Panel [[id number]]  (Abnormal) Collected: 05/14/25 0448    Specimen: Blood from Arm, Right Updated: 05/14/25 0529     Glucose 91 mg/dL      BUN 16 mg/dL      Creatinine 3.93 mg/dL      Sodium 137 " mmol/L      Potassium 4.7 mmol/L      Comment: Specimen hemolyzed.  Result may be falsely elevated.        Chloride 98 mmol/L      CO2 27.0 mmol/L      Calcium 9.3 mg/dL      Albumin 4.0 g/dL      Phosphorus 5.3 mg/dL      Anion Gap 12.0 mmol/L      BUN/Creatinine Ratio 4.1     eGFR 11.4 mL/min/1.73     Narrative:      GFR Categories in Chronic Kidney Disease (CKD)              GFR Category          GFR (mL/min/1.73)    Interpretation  G1                    90 or greater        Normal or high (1)  G2                    60-89                Mild decrease (1)  G3a                   45-59                Mild to moderate decrease  G3b                   30-44                Moderate to severe decrease  G4                    15-29                Severe decrease  G5                    14 or less           Kidney failure    (1)In the absence of evidence of kidney disease, neither GFR category G1 or G2 fulfill the criteria for CKD.    eGFR calculation 2021 CKD-EPI creatinine equation, which does not include race as a factor    Magnesium [[id number]]  (Normal) Collected: 05/14/25 0448    Specimen: Blood from Arm, Right Updated: 05/14/25 0529     Magnesium 1.9 mg/dL     CBC & Differential [[id number]]  (Abnormal) Collected: 05/14/25 0448    Specimen: Blood from Arm, Right Updated: 05/14/25 0456    Narrative:      The following orders were created for panel order CBC & Differential.  Procedure                               Abnormality         Status                     ---------                               -----------         ------                     CBC Auto Differential[[id number]]        Abnormal            Final result                 Please view results for these tests on the individual orders.    CBC Auto Differential [[id number]]  (Abnormal) Collected: 05/14/25 0448    Specimen: Blood from Arm, Right Updated: 05/14/25 0456     WBC 8.22 10*3/mm3      RBC 3.74 10*6/mm3      Hemoglobin 11.6 g/dL      Hematocrit 38.2 %       .1 fL      MCH 31.0 pg      MCHC 30.4 g/dL      RDW 15.9 %      RDW-SD 57.1 fl      MPV 10.2 fL      Platelets 306 10*3/mm3      Neutrophil % 69.0 %      Lymphocyte % 15.2 %      Monocyte % 8.9 %      Eosinophil % 5.7 %      Basophil % 0.7 %      Immature Grans % 0.5 %      Neutrophils, Absolute 5.67 10*3/mm3      Lymphocytes, Absolute 1.25 10*3/mm3      Monocytes, Absolute 0.73 10*3/mm3      Eosinophils, Absolute 0.47 10*3/mm3      Basophils, Absolute 0.06 10*3/mm3      Immature Grans, Absolute 0.04 10*3/mm3      nRBC 0.0 /100 WBC     POC Glucose Once [[id number]]  (Normal) Collected: 05/14/25 0052    Specimen: Blood Updated: 05/14/25 0054     Glucose 90 mg/dL      Comment: Serial Number: [id number]:  [id number]       POC Glucose Once [[id number]]  (Normal) Collected: 05/13/25 2155    Specimen: Blood Updated: 05/13/25 2157     Glucose 84 mg/dL      Comment: Serial Number: [id number]:  [id number]       POC Glucose Once [[id number]]  (Abnormal) Collected: 05/13/25 2131    Specimen: Blood Updated: 05/13/25 2133     Glucose 68 mg/dL      Comment: Serial Number: [id number]:  [id number]               Imaging Results (Last 24 Hours)       ** No results found for the last 24 hours. **            LAB RESULTS (LAST 7 DAYS)    CBC  Results from last 7 days   Lab Units 05/14/25  0448 05/13/25  0522 05/12/25  0643 05/11/25  1715   WBC 10*3/mm3 8.22 7.12 15.38* 15.03*   RBC 10*6/mm3 3.74* 3.63* 3.83 3.62*   HEMOGLOBIN g/dL 11.6* 11.1* 12.0 11.2*   HEMATOCRIT % 38.2 37.6 38.0 36.4   MCV fL 102.1* 103.6* 99.2* 100.6*   PLATELETS 10*3/mm3 306 323 317 376       BMP  Results from last 7 days   Lab Units 05/14/25  0448 05/13/25  1015 05/13/25  0923 05/13/25  0522 05/12/25  0643 05/11/25  1852 05/11/25  1715   SODIUM mmol/L 137  --   --  137 137 137 142   POTASSIUM mmol/L 4.7 5.9* 5.7* 5.9* 4.0 6.1* 6.9*   CHLORIDE mmol/L 98  --   --  99 99 102 102   CO2 mmol/L 27.0  --   --  22.7 23.3 17.5* 19.9*   BUN mg/dL 16   --   --  30* 20 53* 55*   CREATININE mg/dL 3.93*  --   --  5.94* 4.06* 8.16* 8.74*   GLUCOSE mg/dL 91  --   --  67 66 71 97   MAGNESIUM mg/dL 1.9  --   --  1.9 1.8  --   --    PHOSPHORUS mg/dL 5.3*  --   --  6.7* 3.2  --   --        CMP   Results from last 7 days   Lab Units 05/14/25  0448 05/13/25  1015 05/13/25  0923 05/13/25  0522 05/12/25  0643 05/11/25  1852 05/11/25  1715   SODIUM mmol/L 137  --   --  137 137 137 142   POTASSIUM mmol/L 4.7 5.9* 5.7* 5.9* 4.0 6.1* 6.9*   CHLORIDE mmol/L 98  --   --  99 99 102 102   CO2 mmol/L 27.0  --   --  22.7 23.3 17.5* 19.9*   BUN mg/dL 16  --   --  30* 20 53* 55*   CREATININE mg/dL 3.93*  --   --  5.94* 4.06* 8.16* 8.74*   GLUCOSE mg/dL 91  --   --  67 66 71 97   ALBUMIN g/dL 4.0  --   --  3.8  --   --  4.0   BILIRUBIN mg/dL  --   --   --   --   --   --  0.2   ALK PHOS U/L  --   --   --   --   --   --  98   AST (SGOT) U/L  --   --   --   --   --   --  16   ALT (SGPT) U/L  --   --   --   --   --   --  15   LIPASE U/L  --   --   --   --   --   --  32       BNP        TROPONIN  Results from last 7 days   Lab Units 05/11/25  1852   HSTROP T ng/L 36*       CoAg        Creatinine Clearance  Estimated Creatinine Clearance: 11 mL/min (A) (by C-G formula based on SCr of 3.93 mg/dL (H)).    ABG        Radiology  No radiology results for the last day      EKG  I personally viewed and interpreted the patient's EKG/Telemetry data:  ECG 12 Lead Other; epigastric abdominal pain   Final Result   HEART RATE=81  bpm   RR Ywxagzmt=799  ms   TN Vwerffzv=819  ms   P Horizontal Axis=2  deg   P Front Axis=Invalid  deg   QRSD Interval=90  ms   QT Mujrfvds=726  ms   ZZvX=508  ms   QRS Axis=-19  deg   T Wave Axis=160  deg   - ABNORMAL ECG -   Sinus rhythm   LVH with secondary repolarization abnormality   When compared with ECG of 11-Nov-2024 11:48:39,   No significant change   Electronically Signed By: Franki Kwok (MERCEDES) 2025-05-12 06:51:41   Date and Time of Study:2025-05-11 17:24:02       Telemetry Scan   Final Result      Telemetry Scan   Final Result      Telemetry Scan   Final Result      Telemetry Scan   Final Result      Telemetry Scan   Final Result      Telemetry Scan   Final Result      Telemetry Scan   Final Result      Telemetry Scan   Final Result      Telemetry Scan   Final Result      Telemetry Scan   Final Result      Telemetry Scan   Final Result      Telemetry Scan   Final Result      Telemetry Scan   Final Result      Telemetry Scan   Final Result      Telemetry Scan   Final Result            Echocardiogram:    Results for orders placed during the hospital encounter of 07/25/21    Adult Transthoracic Echo Complete W/ Cont if Necessary Per Protocol    Interpretation Summary  Normal LV size and contractility EF of 60%  Normal RV size  Normal atrial size  Aortic valve, mitral valve, tricuspid valve appears structurally normal, mild mitral regurgitation  seen.  No pericardial effusion seen.  Proximal aorta appears normal in size.        Stress Test:         Cardiac Catheterization:  Results for orders placed during the hospital encounter of 09/27/22    Cardiac Catheterization/Vascular Study    Narrative  Images from the original result were not included.    Today's Date:  09/27/22  Jan Caicedo MD  [facility] cardiac catheterization lab    Preoperative Diagnosis:  Poorly functioning hemodialysis fistula/shunt    Postoperative Diagnosis:  Same    Procedure Performed:    CPT:  84207 Fistulogram    Surgeon:  Jan Caicedo MD    Assistant:  None    Anesthesia:  Local    Estimated Blood Loss:  Minimal    Findings:  Normal fistulogram in brachial radial ulnar arteries, normal central venogram    Implants:  None    Specimen:  none    Complications:  none    Dispo:  to PACU    Indication for procedure:  72 y.o. female with pleasant female with pain left hand with hemodialysis.  They are able to use fistula for about 3 hours but left hand becomes painful.  Patient with  symptoms of right hand polyneuropathy with previous AV graft that was ligated.  Plan left arm fistulogram of brachial artery basilic vein fistula and left arm arteriogram.  Risk discussed with patient agrees to proceed.    Description of procedure:  Patient was taken to the operating room.  Anesthesia was administered and a monitored setting.  Surgical sites were prepped and draped in the usual sterile fashion.  A full surgical timeout was performed.  Under ultrasound guidance the fistula was accessed using a micropuncture system.  Wire placement was confirmed with fluoroscopy.  Micro-needle was exchanged for the micro-sheath.  Diagnostic arteriovenous fistulogram was performed.    Left arm fistula prepped and draped in usual fashion.  1% Xylocaine used for local anesthesia.  Fistula was accessed near axilla going retrograde towards the brachial artery anastomosis without difficulty using ultrasound guidance.  Micropuncture wire advanced at difficulty.  Catheter placed.  Left upper extremity fistulogram and central venogram performed with all widely patent.  Pressure held on basilic vein fistula centrally with fistulogram of the brachial anastomosis widely patent.  Left arm runoff arteriogram performed using same technique with brachial radial ulnar and interosseous arteries are widely patent.  Anastomosis of basilic vein to brachial artery widely patent.  There was a dilated portion of the fistula and then the mild narrowing of the proximal long segment of the basilic vein going towards the brachial artery but no significant stenoses.  Patient essentially with normal fistulogram.  Suspect pain with hemodialysis due to polyneuropathy.    Jan Caicedo MD  09/27/22         Other:         ASSESSMENT & PLAN:    Principal Problem:    Acute hyperkalemia  Active Problems:    Gastroesophageal reflux disease    ESRD (end stage renal disease)    Type 2 diabetes mellitus, without long-term current use of insulin     Mixed hyperlipidemia    Hypertension    Obesity (BMI 30-39.9)    Chronic obstructive pulmonary disease    Obstructive sleep apnea syndrome    Ventral hernia without obstruction or gangrene    Preoperative cardiovascular examination    Multiple malignancies      Preop cardiovascular risk assessment  Ventral hernia  The patient presented to the hospital with a reducible ventral hernia.  Plan for robotic ventral hernia repair by general surgery.  Her surgery was canceled yesterday due to hyperkalemia. She has been tentatively rescheduled for 5/15/25.   In the absence of angina, heart failure, and arrhythmia she may proceed with hernia surgery with no further cardiac workup.  We will follow postop    Acute hyperkalemia  ESRD (end stage renal disease)  On hemodialysis MWF  Potassium was 6.9 on admission; down to 4.7 today  On Lokelma 10 g daily   Nephrology is following  Monitor BMP    Primary hypertension, chronic  Antihypertensives on hold due to hypotension  Continue to closely monitor blood pressure  She may need midodrine on dialysis days.     Mixed hyperlipidemia  LDL 94, HDL 52, triglycerides 175 and total cholesterol 176  Goal LDL is less than 70 in the presence of diabetes  She is intolerant to statins  Recommend starting PCSK9-I as outpatient      Anemia in chronic kidney disease  Hemoglobin is stable at 11.6    Gastroesophageal reflux disease without esophagitis  History of GI bleed  On PPI      Type 2 diabetes mellitus without complication, without long-term current use of insulin  A1c is 6.2%  Diet-controlled      Obesity  Status post gastric bypass  BMI is 31.47.  She weighs 161 pounds.  Lifestyle modifications recommended.      History of multiple malignancies  She had uterine cancer in 1984, status post partial hysterectomy.  She had renal cell carcinoma in 2005, status post radical right nephrectomy.   She had neuroendocrine tumor of the duodenum in 2017, status post ex lap with partial  duodenectomy.  She was diagnosed with stage 1 non-small cell lung cancer in 2024, status post SBRT.     COPD with emphysema   Former smoker  Stable without exacerbation  Continue bronchodilators      Electronically signed by MAEGAN Cerna, 05/14/25, 11:36 AM EDT.

## 2025-05-14 NOTE — PLAN OF CARE
Goal Outcome Evaluation:  Plan of Care Reviewed With: patient        Progress: no change        Pt resting comfortably in bed/chair overnight on RA. Abd pain controlled with tramadol, VSS. Pt with hypoglycemia episode overnigh, BG monitored Q4h as pt unable to tell when sugar is dropping. Plan for routine HD again today to prepare for ventral hernia repair surgery Thursday.

## 2025-05-15 ENCOUNTER — ANESTHESIA EVENT (OUTPATIENT)
Dept: PERIOP | Facility: HOSPITAL | Age: 75
End: 2025-05-15
Payer: MEDICARE

## 2025-05-15 ENCOUNTER — ANESTHESIA EVENT CONVERTED (OUTPATIENT)
Dept: ANESTHESIOLOGY | Facility: HOSPITAL | Age: 75
DRG: 335 | End: 2025-05-15
Payer: MEDICARE

## 2025-05-15 ENCOUNTER — ANESTHESIA (OUTPATIENT)
Dept: PERIOP | Facility: HOSPITAL | Age: 75
End: 2025-05-15
Payer: MEDICARE

## 2025-05-15 LAB
ABO GROUP BLD: NORMAL
ALBUMIN SERPL-MCNC: 3.7 G/DL (ref 3.5–5.2)
ANION GAP SERPL CALCULATED.3IONS-SCNC: 13.1 MMOL/L (ref 5–15)
BLD GP AB SCN SERPL QL: NEGATIVE
BUN SERPL-MCNC: 11 MG/DL (ref 8–23)
BUN/CREAT SERPL: 3.2 (ref 7–25)
CALCIUM SPEC-SCNC: 8.9 MG/DL (ref 8.6–10.5)
CHLORIDE SERPL-SCNC: 95 MMOL/L (ref 98–107)
CO2 SERPL-SCNC: 23.9 MMOL/L (ref 22–29)
CREAT SERPL-MCNC: 3.46 MG/DL (ref 0.57–1)
DEPRECATED RDW RBC AUTO: 56.2 FL (ref 37–54)
EGFRCR SERPLBLD CKD-EPI 2021: 13.3 ML/MIN/1.73
ERYTHROCYTE [DISTWIDTH] IN BLOOD BY AUTOMATED COUNT: 16 % (ref 12.3–15.4)
GLUCOSE BLDC GLUCOMTR-MCNC: 100 MG/DL (ref 70–105)
GLUCOSE BLDC GLUCOMTR-MCNC: 176 MG/DL (ref 70–105)
GLUCOSE BLDC GLUCOMTR-MCNC: 186 MG/DL (ref 70–105)
GLUCOSE BLDC GLUCOMTR-MCNC: 187 MG/DL (ref 70–105)
GLUCOSE BLDC GLUCOMTR-MCNC: 96 MG/DL (ref 70–105)
GLUCOSE SERPL-MCNC: 131 MG/DL (ref 65–99)
HCT VFR BLD AUTO: 30.4 % (ref 34–46.6)
HGB BLD-MCNC: 9.4 G/DL (ref 12–15.9)
MCH RBC QN AUTO: 31.4 PG (ref 26.6–33)
MCHC RBC AUTO-ENTMCNC: 30.9 G/DL (ref 31.5–35.7)
MCV RBC AUTO: 101.7 FL (ref 79–97)
PHOSPHATE SERPL-MCNC: 5.6 MG/DL (ref 2.5–4.5)
PLATELET # BLD AUTO: 274 10*3/MM3 (ref 140–450)
PMV BLD AUTO: 10.4 FL (ref 6–12)
POTASSIUM SERPL-SCNC: 4.1 MMOL/L (ref 3.5–5.2)
RBC # BLD AUTO: 2.99 10*6/MM3 (ref 3.77–5.28)
RH BLD: NEGATIVE
SODIUM SERPL-SCNC: 132 MMOL/L (ref 136–145)
T&S EXPIRATION DATE: NORMAL
WBC NRBC COR # BLD AUTO: 9.02 10*3/MM3 (ref 3.4–10.8)

## 2025-05-15 PROCEDURE — 25010000002 PHENYLEPHRINE 10 MG/ML SOLUTION 5 ML VIAL: Performed by: REGISTERED NURSE

## 2025-05-15 PROCEDURE — 82948 REAGENT STRIP/BLOOD GLUCOSE: CPT

## 2025-05-15 PROCEDURE — 94664 DEMO&/EVAL PT USE INHALER: CPT

## 2025-05-15 PROCEDURE — 25010000002 HYDROMORPHONE 1 MG/ML SOLUTION: Performed by: REGISTERED NURSE

## 2025-05-15 PROCEDURE — 94761 N-INVAS EAR/PLS OXIMETRY MLT: CPT

## 2025-05-15 PROCEDURE — 0DNW4ZZ RELEASE PERITONEUM, PERCUTANEOUS ENDOSCOPIC APPROACH: ICD-10-PCS | Performed by: STUDENT IN AN ORGANIZED HEALTH CARE EDUCATION/TRAINING PROGRAM

## 2025-05-15 PROCEDURE — 86850 RBC ANTIBODY SCREEN: CPT | Performed by: STUDENT IN AN ORGANIZED HEALTH CARE EDUCATION/TRAINING PROGRAM

## 2025-05-15 PROCEDURE — 25010000002 CEFAZOLIN PER 500 MG: Performed by: STUDENT IN AN ORGANIZED HEALTH CARE EDUCATION/TRAINING PROGRAM

## 2025-05-15 PROCEDURE — 94799 UNLISTED PULMONARY SVC/PX: CPT

## 2025-05-15 PROCEDURE — 86900 BLOOD TYPING SEROLOGIC ABO: CPT | Performed by: STUDENT IN AN ORGANIZED HEALTH CARE EDUCATION/TRAINING PROGRAM

## 2025-05-15 PROCEDURE — 8E0W4CZ ROBOTIC ASSISTED PROCEDURE OF TRUNK REGION, PERCUTANEOUS ENDOSCOPIC APPROACH: ICD-10-PCS | Performed by: STUDENT IN AN ORGANIZED HEALTH CARE EDUCATION/TRAINING PROGRAM

## 2025-05-15 PROCEDURE — C1781 MESH (IMPLANTABLE): HCPCS | Performed by: STUDENT IN AN ORGANIZED HEALTH CARE EDUCATION/TRAINING PROGRAM

## 2025-05-15 PROCEDURE — 25010000002 FENTANYL CITRATE (PF) 50 MCG/ML SOLUTION: Performed by: REGISTERED NURSE

## 2025-05-15 PROCEDURE — 25010000002 LIDOCAINE PF 2% 2 % SOLUTION: Performed by: REGISTERED NURSE

## 2025-05-15 PROCEDURE — 25010000002 DEXAMETHASONE PER 1 MG: Performed by: REGISTERED NURSE

## 2025-05-15 PROCEDURE — 25810000003 SODIUM CHLORIDE 0.9 % SOLUTION 250 ML FLEX CONT: Performed by: REGISTERED NURSE

## 2025-05-15 PROCEDURE — 25010000002 BUPIVACAINE (PF) 0.25 % SOLUTION: Performed by: ANESTHESIOLOGY

## 2025-05-15 PROCEDURE — 25010000002 SUGAMMADEX 200 MG/2ML SOLUTION: Performed by: REGISTERED NURSE

## 2025-05-15 PROCEDURE — 25010000002 PROPOFOL 1000 MG/100ML EMULSION: Performed by: REGISTERED NURSE

## 2025-05-15 PROCEDURE — 63710000001 ONDANSETRON ODT 4 MG TABLET DISPERSIBLE: Performed by: STUDENT IN AN ORGANIZED HEALTH CARE EDUCATION/TRAINING PROGRAM

## 2025-05-15 PROCEDURE — 85027 COMPLETE CBC AUTOMATED: CPT | Performed by: INTERNAL MEDICINE

## 2025-05-15 PROCEDURE — 49618 RPR AA HRN RCR > 10 NCR/STRN: CPT | Performed by: STUDENT IN AN ORGANIZED HEALTH CARE EDUCATION/TRAINING PROGRAM

## 2025-05-15 PROCEDURE — 0WUF4JZ SUPPLEMENT ABDOMINAL WALL WITH SYNTHETIC SUBSTITUTE, PERCUTANEOUS ENDOSCOPIC APPROACH: ICD-10-PCS | Performed by: STUDENT IN AN ORGANIZED HEALTH CARE EDUCATION/TRAINING PROGRAM

## 2025-05-15 PROCEDURE — 80069 RENAL FUNCTION PANEL: CPT | Performed by: INTERNAL MEDICINE

## 2025-05-15 PROCEDURE — 25010000002 FENTANYL CITRATE (PF) 100 MCG/2ML SOLUTION: Performed by: REGISTERED NURSE

## 2025-05-15 PROCEDURE — 99233 SBSQ HOSP IP/OBS HIGH 50: CPT | Performed by: STUDENT IN AN ORGANIZED HEALTH CARE EDUCATION/TRAINING PROGRAM

## 2025-05-15 PROCEDURE — 25010000002 HYDROMORPHONE PER 4 MG: Performed by: REGISTERED NURSE

## 2025-05-15 PROCEDURE — 82948 REAGENT STRIP/BLOOD GLUCOSE: CPT | Performed by: INTERNAL MEDICINE

## 2025-05-15 PROCEDURE — 25010000002 BUPIVACAINE LIPOSOME 1.3 % SUSPENSION: Performed by: ANESTHESIOLOGY

## 2025-05-15 PROCEDURE — 25810000003 SODIUM CHLORIDE 0.9 % SOLUTION: Performed by: STUDENT IN AN ORGANIZED HEALTH CARE EDUCATION/TRAINING PROGRAM

## 2025-05-15 PROCEDURE — 25010000002 HYDROMORPHONE PER 4 MG: Performed by: STUDENT IN AN ORGANIZED HEALTH CARE EDUCATION/TRAINING PROGRAM

## 2025-05-15 PROCEDURE — 25810000003 SODIUM CHLORIDE 0.9 % SOLUTION: Performed by: REGISTERED NURSE

## 2025-05-15 PROCEDURE — 25010000002 ONDANSETRON PER 1 MG: Performed by: REGISTERED NURSE

## 2025-05-15 PROCEDURE — 99232 SBSQ HOSP IP/OBS MODERATE 35: CPT | Performed by: NURSE PRACTITIONER

## 2025-05-15 PROCEDURE — 86901 BLOOD TYPING SEROLOGIC RH(D): CPT | Performed by: STUDENT IN AN ORGANIZED HEALTH CARE EDUCATION/TRAINING PROGRAM

## 2025-05-15 DEVICE — ABSORBABLE WOUND CLOSURE DEVICE
Type: IMPLANTABLE DEVICE | Site: ABDOMEN | Status: FUNCTIONAL
Brand: V-LOC 180

## 2025-05-15 DEVICE — VENTRALIGHT ST MESH WITH ECHO 2 POSITIONING SYSTEM, 6" X 8" (15 X 20 CM), ELLIPSE
Type: IMPLANTABLE DEVICE | Site: ABDOMEN | Status: FUNCTIONAL
Brand: VENTRALIGHT

## 2025-05-15 DEVICE — ABSORBABLE WOUND CLOSURE DEVICE
Type: IMPLANTABLE DEVICE | Site: ABDOMEN | Status: FUNCTIONAL
Brand: SYNETURE

## 2025-05-15 RX ORDER — ONDANSETRON 2 MG/ML
4 INJECTION INTRAMUSCULAR; INTRAVENOUS ONCE AS NEEDED
Status: DISCONTINUED | OUTPATIENT
Start: 2025-05-15 | End: 2025-05-15 | Stop reason: HOSPADM

## 2025-05-15 RX ORDER — SODIUM CHLORIDE 9 MG/ML
1000 INJECTION, SOLUTION INTRAVENOUS ONCE
Status: DISCONTINUED | OUTPATIENT
Start: 2025-05-15 | End: 2025-05-15

## 2025-05-15 RX ORDER — METHOCARBAMOL 500 MG/1
500 TABLET, FILM COATED ORAL 4 TIMES DAILY
Status: DISCONTINUED | OUTPATIENT
Start: 2025-05-15 | End: 2025-05-17

## 2025-05-15 RX ORDER — NALOXONE HCL 0.4 MG/ML
0.4 VIAL (ML) INJECTION AS NEEDED
Status: DISCONTINUED | OUTPATIENT
Start: 2025-05-15 | End: 2025-05-15 | Stop reason: HOSPADM

## 2025-05-15 RX ORDER — DEXAMETHASONE SODIUM PHOSPHATE 4 MG/ML
INJECTION, SOLUTION INTRA-ARTICULAR; INTRALESIONAL; INTRAMUSCULAR; INTRAVENOUS; SOFT TISSUE AS NEEDED
Status: DISCONTINUED | OUTPATIENT
Start: 2025-05-15 | End: 2025-05-15 | Stop reason: SURG

## 2025-05-15 RX ORDER — SODIUM CHLORIDE 9 MG/ML
INJECTION, SOLUTION INTRAVENOUS CONTINUOUS PRN
Status: DISCONTINUED | OUTPATIENT
Start: 2025-05-15 | End: 2025-05-15 | Stop reason: SURG

## 2025-05-15 RX ORDER — HYDROMORPHONE HYDROCHLORIDE 1 MG/ML
0.5 INJECTION, SOLUTION INTRAMUSCULAR; INTRAVENOUS; SUBCUTANEOUS
Status: DISCONTINUED | OUTPATIENT
Start: 2025-05-15 | End: 2025-05-15 | Stop reason: HOSPADM

## 2025-05-15 RX ORDER — ONDANSETRON 2 MG/ML
INJECTION INTRAMUSCULAR; INTRAVENOUS AS NEEDED
Status: DISCONTINUED | OUTPATIENT
Start: 2025-05-15 | End: 2025-05-15 | Stop reason: SURG

## 2025-05-15 RX ORDER — SODIUM CHLORIDE 9 MG/ML
20 INJECTION, SOLUTION INTRAVENOUS ONCE
Status: COMPLETED | OUTPATIENT
Start: 2025-05-15 | End: 2025-05-15

## 2025-05-15 RX ORDER — LIDOCAINE HYDROCHLORIDE 20 MG/ML
INJECTION, SOLUTION EPIDURAL; INFILTRATION; INTRACAUDAL; PERINEURAL AS NEEDED
Status: DISCONTINUED | OUTPATIENT
Start: 2025-05-15 | End: 2025-05-15 | Stop reason: SURG

## 2025-05-15 RX ORDER — OXYCODONE HYDROCHLORIDE 5 MG/1
10 TABLET ORAL EVERY 4 HOURS PRN
Status: DISCONTINUED | OUTPATIENT
Start: 2025-05-15 | End: 2025-05-18 | Stop reason: HOSPADM

## 2025-05-15 RX ORDER — LABETALOL HYDROCHLORIDE 5 MG/ML
5 INJECTION, SOLUTION INTRAVENOUS
Status: DISCONTINUED | OUTPATIENT
Start: 2025-05-15 | End: 2025-05-15 | Stop reason: HOSPADM

## 2025-05-15 RX ORDER — FENTANYL CITRATE 50 UG/ML
INJECTION, SOLUTION INTRAMUSCULAR; INTRAVENOUS AS NEEDED
Status: DISCONTINUED | OUTPATIENT
Start: 2025-05-15 | End: 2025-05-15 | Stop reason: SURG

## 2025-05-15 RX ORDER — OXYCODONE HYDROCHLORIDE 5 MG/1
10 TABLET ORAL EVERY 4 HOURS PRN
Status: DISCONTINUED | OUTPATIENT
Start: 2025-05-15 | End: 2025-05-15 | Stop reason: HOSPADM

## 2025-05-15 RX ORDER — IPRATROPIUM BROMIDE AND ALBUTEROL SULFATE 2.5; .5 MG/3ML; MG/3ML
3 SOLUTION RESPIRATORY (INHALATION) ONCE AS NEEDED
Status: DISCONTINUED | OUTPATIENT
Start: 2025-05-15 | End: 2025-05-15 | Stop reason: HOSPADM

## 2025-05-15 RX ORDER — LIDOCAINE HYDROCHLORIDE 10 MG/ML
0.5 INJECTION, SOLUTION EPIDURAL; INFILTRATION; INTRACAUDAL; PERINEURAL ONCE AS NEEDED
Status: DISCONTINUED | OUTPATIENT
Start: 2025-05-15 | End: 2025-05-15 | Stop reason: HOSPADM

## 2025-05-15 RX ORDER — BUPIVACAINE HYDROCHLORIDE 2.5 MG/ML
INJECTION, SOLUTION EPIDURAL; INFILTRATION; INTRACAUDAL; PERINEURAL
Status: COMPLETED | OUTPATIENT
Start: 2025-05-15 | End: 2025-05-15

## 2025-05-15 RX ORDER — PROPOFOL 10 MG/ML
INJECTION, EMULSION INTRAVENOUS AS NEEDED
Status: DISCONTINUED | OUTPATIENT
Start: 2025-05-15 | End: 2025-05-15 | Stop reason: SURG

## 2025-05-15 RX ORDER — HYDRALAZINE HYDROCHLORIDE 20 MG/ML
5 INJECTION INTRAMUSCULAR; INTRAVENOUS
Status: DISCONTINUED | OUTPATIENT
Start: 2025-05-15 | End: 2025-05-15 | Stop reason: HOSPADM

## 2025-05-15 RX ORDER — HYDROMORPHONE HYDROCHLORIDE 1 MG/ML
0.5 INJECTION, SOLUTION INTRAMUSCULAR; INTRAVENOUS; SUBCUTANEOUS EVERY 4 HOURS PRN
Status: DISCONTINUED | OUTPATIENT
Start: 2025-05-15 | End: 2025-05-18 | Stop reason: HOSPADM

## 2025-05-15 RX ORDER — EPHEDRINE SULFATE 5 MG/ML
5 INJECTION INTRAVENOUS ONCE AS NEEDED
Status: DISCONTINUED | OUTPATIENT
Start: 2025-05-15 | End: 2025-05-15 | Stop reason: HOSPADM

## 2025-05-15 RX ORDER — ACETAMINOPHEN 500 MG
1000 TABLET ORAL EVERY 6 HOURS
Status: DISCONTINUED | OUTPATIENT
Start: 2025-05-15 | End: 2025-05-18 | Stop reason: HOSPADM

## 2025-05-15 RX ORDER — OXYCODONE HYDROCHLORIDE 5 MG/1
5 TABLET ORAL EVERY 4 HOURS PRN
Status: DISCONTINUED | OUTPATIENT
Start: 2025-05-15 | End: 2025-05-18 | Stop reason: HOSPADM

## 2025-05-15 RX ORDER — OXYCODONE HYDROCHLORIDE 5 MG/1
5 TABLET ORAL ONCE AS NEEDED
Status: DISCONTINUED | OUTPATIENT
Start: 2025-05-15 | End: 2025-05-15 | Stop reason: HOSPADM

## 2025-05-15 RX ORDER — FENTANYL CITRATE 50 UG/ML
50 INJECTION, SOLUTION INTRAMUSCULAR; INTRAVENOUS
Status: DISCONTINUED | OUTPATIENT
Start: 2025-05-15 | End: 2025-05-15 | Stop reason: HOSPADM

## 2025-05-15 RX ORDER — ROCURONIUM BROMIDE 10 MG/ML
INJECTION, SOLUTION INTRAVENOUS AS NEEDED
Status: DISCONTINUED | OUTPATIENT
Start: 2025-05-15 | End: 2025-05-15 | Stop reason: SURG

## 2025-05-15 RX ORDER — DIPHENHYDRAMINE HYDROCHLORIDE 50 MG/ML
12.5 INJECTION, SOLUTION INTRAMUSCULAR; INTRAVENOUS
Status: DISCONTINUED | OUTPATIENT
Start: 2025-05-15 | End: 2025-05-15 | Stop reason: HOSPADM

## 2025-05-15 RX ORDER — SODIUM CHLORIDE 0.9 % (FLUSH) 0.9 %
10 SYRINGE (ML) INJECTION AS NEEDED
Status: DISCONTINUED | OUTPATIENT
Start: 2025-05-15 | End: 2025-05-15 | Stop reason: HOSPADM

## 2025-05-15 RX ORDER — DIPHENHYDRAMINE HYDROCHLORIDE 50 MG/ML
12.5 INJECTION, SOLUTION INTRAMUSCULAR; INTRAVENOUS ONCE AS NEEDED
Status: DISCONTINUED | OUTPATIENT
Start: 2025-05-15 | End: 2025-05-15 | Stop reason: HOSPADM

## 2025-05-15 RX ADMIN — SUGAMMADEX 300 MG: 100 INJECTION, SOLUTION INTRAVENOUS at 14:53

## 2025-05-15 RX ADMIN — PANTOPRAZOLE SODIUM 40 MG: 40 TABLET, DELAYED RELEASE ORAL at 17:03

## 2025-05-15 RX ADMIN — HYDROMORPHONE HYDROCHLORIDE 0.5 MG: 1 INJECTION, SOLUTION INTRAMUSCULAR; INTRAVENOUS; SUBCUTANEOUS at 15:51

## 2025-05-15 RX ADMIN — Medication 10 ML: at 08:01

## 2025-05-15 RX ADMIN — ROCURONIUM BROMIDE 10 MG: 10 INJECTION, SOLUTION INTRAVENOUS at 11:49

## 2025-05-15 RX ADMIN — PHENYLEPHRINE HYDROCHLORIDE 100 MCG: 10 INJECTION INTRAVENOUS at 12:48

## 2025-05-15 RX ADMIN — ROCURONIUM BROMIDE 50 MG: 10 INJECTION, SOLUTION INTRAVENOUS at 10:01

## 2025-05-15 RX ADMIN — PHENYLEPHRINE HYDROCHLORIDE 100 MCG: 10 INJECTION INTRAVENOUS at 14:29

## 2025-05-15 RX ADMIN — BUPIVACAINE HYDROCHLORIDE 20 ML: 2.5 INJECTION, SOLUTION EPIDURAL; INFILTRATION; INTRACAUDAL; PERINEURAL at 10:09

## 2025-05-15 RX ADMIN — FENTANYL CITRATE 50 MCG: 50 INJECTION, SOLUTION INTRAMUSCULAR; INTRAVENOUS at 15:17

## 2025-05-15 RX ADMIN — FENTANYL CITRATE 25 MCG: 50 INJECTION, SOLUTION INTRAMUSCULAR; INTRAVENOUS at 10:34

## 2025-05-15 RX ADMIN — ACETAMINOPHEN 1000 MG: 500 TABLET ORAL at 17:03

## 2025-05-15 RX ADMIN — FENTANYL CITRATE 25 MCG: 50 INJECTION, SOLUTION INTRAMUSCULAR; INTRAVENOUS at 12:05

## 2025-05-15 RX ADMIN — METHOCARBAMOL 500 MG: 500 TABLET ORAL at 21:56

## 2025-05-15 RX ADMIN — OXYCODONE 10 MG: 5 TABLET ORAL at 17:03

## 2025-05-15 RX ADMIN — SODIUM CHLORIDE: 9 INJECTION, SOLUTION INTRAVENOUS at 09:55

## 2025-05-15 RX ADMIN — ONDANSETRON 4 MG: 4 TABLET, ORALLY DISINTEGRATING ORAL at 20:48

## 2025-05-15 RX ADMIN — PHENYLEPHRINE HYDROCHLORIDE 100 MCG: 10 INJECTION INTRAVENOUS at 13:37

## 2025-05-15 RX ADMIN — DEXAMETHASONE SODIUM PHOSPHATE 4 MG: 4 INJECTION, SOLUTION INTRAMUSCULAR; INTRAVENOUS at 10:22

## 2025-05-15 RX ADMIN — SEVELAMER CARBONATE 800 MG: 800 TABLET, FILM COATED ORAL at 17:03

## 2025-05-15 RX ADMIN — PROPOFOL 150 MG: 10 INJECTION, EMULSION INTRAVENOUS at 10:01

## 2025-05-15 RX ADMIN — BUPIVACAINE 10 ML: 13.3 INJECTION, SUSPENSION, LIPOSOMAL INFILTRATION at 10:09

## 2025-05-15 RX ADMIN — FENTANYL CITRATE 25 MCG: 50 INJECTION, SOLUTION INTRAMUSCULAR; INTRAVENOUS at 11:06

## 2025-05-15 RX ADMIN — SODIUM BICARBONATE 650 MG: 650 TABLET ORAL at 20:49

## 2025-05-15 RX ADMIN — ROCURONIUM BROMIDE 10 MG: 10 INJECTION, SOLUTION INTRAVENOUS at 13:07

## 2025-05-15 RX ADMIN — ROCURONIUM BROMIDE 10 MG: 10 INJECTION, SOLUTION INTRAVENOUS at 10:56

## 2025-05-15 RX ADMIN — IPRATROPIUM BROMIDE AND ALBUTEROL SULFATE 3 ML: .5; 3 SOLUTION RESPIRATORY (INHALATION) at 06:43

## 2025-05-15 RX ADMIN — CEFAZOLIN 2000 MG: 2 INJECTION, POWDER, FOR SOLUTION INTRAMUSCULAR; INTRAVENOUS at 09:28

## 2025-05-15 RX ADMIN — TEMAZEPAM 30 MG: 15 CAPSULE ORAL at 21:56

## 2025-05-15 RX ADMIN — SODIUM BICARBONATE 650 MG: 650 TABLET ORAL at 17:03

## 2025-05-15 RX ADMIN — SODIUM CHLORIDE 20 ML/HR: 9 INJECTION, SOLUTION INTRAVENOUS at 09:00

## 2025-05-15 RX ADMIN — ACETAMINOPHEN 1000 MG: 500 TABLET ORAL at 23:32

## 2025-05-15 RX ADMIN — HYDROMORPHONE HYDROCHLORIDE 0.5 MG: 1 INJECTION, SOLUTION INTRAMUSCULAR; INTRAVENOUS; SUBCUTANEOUS at 20:48

## 2025-05-15 RX ADMIN — PHENYLEPHRINE HYDROCHLORIDE 0.5 MCG/KG/MIN: 10 INJECTION INTRAVENOUS at 10:03

## 2025-05-15 RX ADMIN — PHENYLEPHRINE HYDROCHLORIDE 200 MCG: 10 INJECTION INTRAVENOUS at 10:02

## 2025-05-15 RX ADMIN — PHENYLEPHRINE HYDROCHLORIDE 200 MCG: 10 INJECTION INTRAVENOUS at 10:21

## 2025-05-15 RX ADMIN — HYDROMORPHONE HYDROCHLORIDE 0.5 MG: 1 INJECTION, SOLUTION INTRAMUSCULAR; INTRAVENOUS; SUBCUTANEOUS at 13:06

## 2025-05-15 RX ADMIN — Medication 10 ML: at 20:50

## 2025-05-15 RX ADMIN — FENTANYL CITRATE 25 MCG: 50 INJECTION, SOLUTION INTRAMUSCULAR; INTRAVENOUS at 10:22

## 2025-05-15 RX ADMIN — ONDANSETRON 4 MG: 2 INJECTION, SOLUTION INTRAMUSCULAR; INTRAVENOUS at 10:22

## 2025-05-15 RX ADMIN — ROCURONIUM BROMIDE 20 MG: 10 INJECTION, SOLUTION INTRAVENOUS at 13:49

## 2025-05-15 RX ADMIN — METHOCARBAMOL 500 MG: 500 TABLET ORAL at 17:03

## 2025-05-15 RX ADMIN — HYDROMORPHONE HYDROCHLORIDE 0.5 MG: 1 INJECTION, SOLUTION INTRAMUSCULAR; INTRAVENOUS; SUBCUTANEOUS at 13:47

## 2025-05-15 RX ADMIN — SODIUM CHLORIDE: 9 INJECTION, SOLUTION INTRAVENOUS at 13:57

## 2025-05-15 RX ADMIN — LIDOCAINE HYDROCHLORIDE 100 MG: 20 INJECTION, SOLUTION EPIDURAL; INFILTRATION; INTRACAUDAL; PERINEURAL at 10:01

## 2025-05-15 NOTE — BRIEF OP NOTE
VENTRAL / INCISIONAL HERNIA REPAIR LAPAROSCOPIC WITH DAVINCI ROBOT  Progress Note    Sherry SANDERS Cheryle  5/15/2025    Pre-op Diagnosis:   recurrent ventral hernia        Post-Op Diagnosis Codes:   same    Procedure(s):      Procedure(s):  VENTRAL  HERNIA REPAIR LAPAROSCOPIC WITH DAVINCI ROBOT WITH MESH, EXTENSIVE LYSIS OF ADHESION              Surgeon(s):  Keyon Espana MD    Anesthesia: General with Block    Staff:   Circulator: Rolando Live RN; Amna Clay RN; Denise Kamara RN  Scrub Person: April Villasenor; Shannan Montes  Assistant: Minnie Barger APRN; Monica Daniel RNFA  Assistant: Minnie Barger APRN; Monica Daniel RNFA    Estimated Blood Loss: minimal    Urine Voided: * No values recorded between 5/15/2025  9:35 AM and 5/15/2025  3:05 PM *    Specimens:                None      Drains: * No LDAs found *    Findings: as above       Complications: none    Assistant: Minnie Barger APRN; Monica Daniel RNFA  was responsible for performing the following activities: Retraction, Suction, Irrigation, Suturing, and Closing and their skilled assistance was necessary for the success of this case.    Keyon Espana MD     Date: 5/15/2025  Time: 15:05 EDT

## 2025-05-15 NOTE — ANESTHESIA PREPROCEDURE EVALUATION
Anesthesia Evaluation     NPO Solid Status: > 8 hours  NPO Liquid Status: > 8 hours           Airway   Mallampati: I  TM distance: >3 FB  Neck ROM: full  No difficulty expected  Dental - normal exam   (+) edentulous    Pulmonary - normal exam   (+) lung cancer, COPD,sleep apnea on CPAP  Cardiovascular - normal exam    (+) hypertension well controlled, hyperlipidemia,  carotid artery disease      Neuro/Psych  (+) CVA, numbness  GI/Hepatic/Renal/Endo    (+) obesity, GERD, PUD, renal disease- dialysis, diabetes mellitus type 2    Musculoskeletal     Abdominal  - normal exam    Bowel sounds: normal.   Substance History      OB/GYN          Other      history of cancer remission                      Anesthesia Plan    ASA 4     general with block     intravenous induction     Anesthetic plan, risks, benefits, and alternatives have been provided, discussed and informed consent has been obtained with: patient.  Pre-procedure education provided  Plan discussed with CRNA.        CODE STATUS:    Code Status (Patient has no pulse and is not breathing): CPR (Attempt to Resuscitate)  Medical Interventions (Patient has pulse or is breathing): Full Support  Level Of Support Discussed With: Patient

## 2025-05-15 NOTE — ANESTHESIA PROCEDURE NOTES
Peripheral Block    Pre-sedation assessment completed: 5/15/2025 10:05 AM    Start time: 5/15/2025 10:05 AM  Stop time: 5/15/2025 10:09 AM  Reason for block: at surgeon's request and post-op pain management  Performed by  Anesthesiologist: Delmar Garcia MD  Preanesthetic Checklist  Completed: patient identified, IV checked, site marked, risks and benefits discussed, surgical consent, monitors and equipment checked, pre-op evaluation and timeout performed  Prep:  Pt Position: supine  Sterile barriers:cap, gloves, mask and washed/disinfected hands  Prep: ChloraPrep  Patient monitoring: continuous pulse oximetry, EKG and blood pressure monitoring  Procedure    Guidance:ultrasound guided    ULTRASOUND INTERPRETATION.  Using ultrasound guidance a 20 G gauge needle was placed in close proximity to the nerve, at which point, under ultrasound guidance anesthetic was injected in the area of the nerve and spread of the anesthesia was seen on ultrasound in close proximity thereto.  There were no abnormalities seen on ultrasound; a digital image was taken; and the patient tolerated the procedure with no complications. Images:still images obtained, printed/placed on chart    Laterality:Bilateral  Block Type:TAP  Injection Technique:single-shot  Needle Type:echogenic  Needle Gauge:20 G  Resistance on Injection: none    Medications Used: bupivacaine liposome (EXPAREL) injection 1.3% - Perineural   10 mL - 5/15/2025 10:09:00 AM  bupivacaine PF (MARCAINE) 0.25 % injection - Perineural   20 mL - 5/15/2025 10:09:00 AM      Post Assessment  Injection Assessment: negative aspiration for heme, no paresthesia on injection and incremental injection  Patient Tolerance:comfortable throughout block  Complications:no  Performed by: Delmar Garcia MD

## 2025-05-15 NOTE — PROGRESS NOTES
"General Surgery Progress Note    SUBJECTIVE:   Patient seen at bedside on floor. No acute events o/n.     VITALS:   Temp:  [97.4 °F (36.3 °C)-98.6 °F (37 °C)] 98.1 °F (36.7 °C)  Heart Rate:  [] 90  Resp:  [12-26] 15  BP: ()/(45-89) 81/45    I & O:  I/O last 3 completed shifts:  In: 840 [P.O.:840]  Out: -   No intake/output data recorded.    PHYSICAL EXAM:  General: NAD, AAOx3  Respiratory: no respiratory distress  Abdomen: soft, NT, ND, dull to percussion; reducible RLQ ventral hernia     LABS:  No results found for: \"CBCDIF\"  CBC:      Lab 05/15/25  0125 05/14/25  0448 05/13/25  0522 05/12/25  0643 05/11/25  1715   WBC 9.02 8.22 7.12 15.38* 15.03*   HEMOGLOBIN 9.4* 11.6* 11.1* 12.0 11.2*   HEMATOCRIT 30.4* 38.2 37.6 38.0 36.4   PLATELETS 274 306 323 317 376   NEUTROS ABS  --  5.67 4.81 12.16* 11.73*   IMMATURE GRANS (ABS)  --  0.04 0.05 0.24* 0.09*   LYMPHS ABS  --  1.25 1.22 1.42 1.83   MONOS ABS  --  0.73 0.63 1.17* 1.06*   EOS ABS  --  0.47* 0.33 0.32 0.22   .7* 102.1* 103.6* 99.2* 100.6*      Lab Results   Component Value Date    GLUCOSE 131 (H) 05/15/2025    BUN 11 05/15/2025    CREATININE 3.46 (H) 05/15/2025     (L) 05/15/2025    K 4.1 05/15/2025    CL 95 (L) 05/15/2025    CALCIUM 8.9 05/15/2025    PROTEINTOT 7.5 05/11/2025    ALBUMIN 3.7 05/15/2025    ALT 15 05/11/2025    AST 16 05/11/2025    ALKPHOS 98 05/11/2025    BILITOT 0.2 05/11/2025    GLOB 3.5 05/11/2025    AGRATIO 1.1 05/11/2025    BCR 3.2 (L) 05/15/2025    ANIONGAP 13.1 05/15/2025    EGFR 13.3 (L) 05/15/2025     Lab Results   Component Value Date    INR 1.00 11/03/2024    INR 1.01 10/21/2024    INR 0.95 10/20/2024    PROTIME 10.9 11/03/2024    PROTIME 11.0 10/21/2024    PROTIME 10.4 10/20/2024       RADIOLOGY:  CT Abdomen Pelvis With Contrast  Result Date: 5/11/2025  Impression: 1.Small right lower quadrant hernia containing a loop of small bowel with wall thickening suggesting inflammation/incarceration and there is " dilation of proximal small bowel loops indicating obstruction. Surgical consultation is advised. 2.Status post distal gastrectomy. Diffuse wall thickening of the stomach, which could be related to gastritis or infiltrative process. 3.Status post right nephrectomy. Marked atrophy of the left kidney. 4.Findings as above with possible chronic nondistended segment of distal sigmoid colon with mild apparent wall thickening and hyperenhancement but no definite surrounding inflammatory changes. Correlate clinically. This could be evaluated with colonoscopy. 5.Advanced calcific atherosclerosis of the aorta and branch vessels. 6.Diffuse osseous sclerosis which may be associated with renal osteodystrophy. Electronically Signed: Moses Bailey  5/11/2025 7:53 PM EDT  Workstation ID: [id number]        ASSESSMENT:   74 y.o. female with recurrent RLQ ventral hernia. HD#6, in stable condition.  K kathryn, appropriate for surgery.     The risks (postoperative pain, nausea/vomiting, bleeding, hematoma/seroma, incisional hernia, injury to any underlying viscera, recurrent ventral hernia, need for conversion to open surgery, and complications associated with general anesthesia including stroke, heart attack, and death), benefits, and alternative therapies of robotic VHR were discussed in great detail with the patient and her . The patient voiced understanding and wishes to proceed with surgery.     PLAN:   OR today  NPO for now     Keyon Espana MD   General Surgery  05/15/25   09:32 EDT

## 2025-05-15 NOTE — PLAN OF CARE
Goal Outcome Evaluation:         Stable vital signs, no noted events of hypoglycemia, pleasant to care. Current K today is 4.1. Maintained on NPO post midnight for ventral hernia repair, consent was signed and available in the chart. Dentures, jewelry, underwear removed. Slept and rested in the recliner chair the whole night. Call bell within reach, chair alarm on. Continue to monitor.

## 2025-05-15 NOTE — ANESTHESIA PROCEDURE NOTES
Peripheral IV    Patient location during procedure: OR  Start time: 5/15/2025 9:55 AM  End time: 5/15/2025 10:00 AM  Line placed for Difficult Access and Fluids/Medication Admin.  Performed By   Anesthesiologist: Delmar Garcia MD  Preanesthetic Checklist  Completed: patient identified, IV checked, site marked and risks and benefits discussed  Peripheral IV Prep   Patient position: supine   Prep: alcohol swabs  Patient monitoring: cardiac monitor, continuous pulse ox and heart rate  Peripheral IV Procedure   Laterality:right  Location:  Hand  Catheter size: 22 G          Post Assessment   Dressing Type: tape and transparent.    IV Dressing/Site: dry, clean and intact

## 2025-05-15 NOTE — CASE MANAGEMENT/SOCIAL WORK
Continued Stay Note  [facility]     Patient Name: Sherry Lobato  MRN: [id number]  Today's Date: 5/15/2025    Admit Date: 5/11/2025    Plan: Return home with S/O   Discharge Plan       Row Name 05/15/25 1628       Plan    Plan Comments DC Barriers: hernia repair today, Cr 3.46, PT/OT             Cheyanne Angelo RN      [facility]  Office: [phone]  Cell: [phone]  Fax # [phone]

## 2025-05-15 NOTE — OP NOTE
General Surgery Operative Report  Date: 05/15/25   Patient: Sherry Lobato  Surgeon: Keyon Espana MD   Assistant:   SHAUN Bates APRN  This surgery was assisted and facilitated by a certified surgical first assist, who directly resulted in decreased operative time, anesthetic time, exposure of surgical field and possibly of an operative wound infection thereby decreasing patient morbidity and ultimately total expenditures.   Pre-operative Diagnosis: Recurrent ventral hernia  Post-operative Diagnosis:   Incarcerated, recurrent ventral hernias  Intra-abdominal adhesions  Procedure:   Robotic (IPUM+) repair of incarcerated, recurrent ventral hernias   Robotic lysis of adhesions  Findings:   Dense intra-abdominal adhesions between the omentum, small bowel, and anterior abdominal wall obscuring the entirety of the operative plan requiring extensive adhesiolysis lasting over 150 minutes   Prior IPUM mesh noted at the level of the umbilicus with dense adhesions to the small bowel  Multiple recurrent right lower quadrant hernias lateral to the mesh containing incarcerated loop of small bowel and omentum - total defect size: 3x5 cm   Recurrent right subcostal hernia abutting the ribs containing incarcerated omentum - total defect size: 15 x 7 cm  Wound Class: I  Estimated blood loss: 30 cc  Specimen: None  Implants: 15 x 20 cm Ventralight ST mesh  Complications: None    Indications:  Sherry Lobato is a 74-year-old female, history of multiple abdominal surgeries including multiple open and laparoscopic ventral hernia repairs, who resented to [facility] on 5/11/2025 with significant right lower quadrant abdominal pain.  Workup was consistent with an incarcerated right lower quadrant ventral hernia.  Fortunately, this was able to be reduced at bedside and patient was advised undergo a minimally invasive ventral hernia repair with mesh.    Consent:   The risks (postoperative pain, nausea/vomiting,  bleeding, hematoma/seroma, incisional hernia, injury to any underlying viscera, recurrent ventral hernia, need for conversion to open surgery, and complications associated with general anesthesia including stroke, heart attack, and death), benefits, and alternative therapies of robotic VHR were discussed in great detail with the patient and her . The patient voiced understanding and wishes to proceed with surgery.     Operative details:   The patient was brought to the operating room and placed in supine position. General anesthesia was induced and the patient was successfully intubated by Anesthesiology.  The patient underwent a TAP block, which was performed by Anesthesiology. The abdomen was prepped and draped in the usual sterile fashion, after which a brief time-out was held in which the patient, the procedure, preoperative antibiotics, and fire risk were identified and agreed upon by all available members of the operating room staff.    Using a #15 blade, a 12 mm transverse incision was made at Walker's point.  Utilizing Optiview technique, the peritoneal cavity was accessed and pneumoperitoneum was established to a pressure of 15 mmHg.  The abdomen was explored for evidence of entry trauma; none was identified.  The table was flexed and tilted slightly towards the patient's left side.  Under direct visualization, 8 mm robotic ports were placed in the left lateral abdominal area along the anterior axillary line.  The robot was then brought to the patient's left side and appropriately docked.    The patient had a preponderance of dense adhesions between the small bowel, omentum, and anterior abdominal wall, which completely obscured all visualization of the target anatomy.  Therefore, extensive adhesiolysis was performed using mixture of blunt and sharp dissection.  This maneuver required over 150 minutes mostly due to the intermittent nature of the adhesions between the small bowel and the prior  intra-abdominal mesh near the umbilicus.     Eventually, the right lower quadrant ventral hernia was identified.  This contained an incarcerated loop of small bowel and omentum.  The contents of the hernia sac were carefully reduced.  Further adhesiolysis in the right upper quadrant revealed a recurrent right subcostal hernia containing incarcerated omentum.  The contents of this hernia sac were reduced in a similar manner.  The right lower quadrant hernia was actually x2 defects  by thin bridge of fascia.  Total defect measurement was 3 x 5 cm.  The right subcostal hernia was 15 x 7 cm.      Pneumoperitoneum was dropped to a pressure of 8 mmHg.  Both fascial defects were closed using multiple running absorbable #0 V-Loc sutures.  This repair was buttressed in a IPUM configuration using a 15 x 20 cm Ventralight ST mesh, which was carried to the abdominal wall using multiple running absorbable 2-0 V-Loc sutures.  There was at least 5 cm of circumferential coverage of the right lower quadrant fascial defect.  There was at least 3 cm of lateral coverage of the right subcostal area, but the mesh abutted the superior aspect of this fascial closure due to the presence of the ribs.  Of note, the mesh overlapped the patient's previous mesh at the left lower aspect.    At this point, robot was undocked.  The left upper quadrant fascial defect was closed laparoscopically using a interrupted #0 Vicryl suture.  All instruments removed from the abdomen and pneumoperitoneum was released.  Each incision was closed using buried erupted 4-0 Monocryl sutures.  Exofin skin glue was then applied to all incisions.    All instruments, sponge, and needle counts were correct at the end of the case.  The patient tolerated the procedure without any immediate complications. The patient was extubated in the operating room and was transported to the PACU in stable condition.     Disposition:   Return to floor  Okay for diet  Multimodal  pain regimen  no heavy lifting (>10-15 lbs) for at least 6 weeks   Okay for discharge home in the morning if no issues    Keyon Espana MD   General Surgery  05/15/25   15:06 EDT     Much of this encounter note is an electronic transcription/translation of spoken language to printed text.  The electronic translation of spoken language may permit erroneous, or at times, nonsensical words or phrases to be inadvertently transcribed.  Although I have reviewed the note for such errors, some may still exist.

## 2025-05-15 NOTE — ANESTHESIA PROCEDURE NOTES
Airway  Reason: elective    Date/Time: 5/15/2025 10:02 AM    General Information and Staff    Patient location during procedure: OR  CRNA/CAA: Charlotte Danielle CRNA    Indications and Patient Condition  Indications for airway management: airway protection    Preoxygenated: yes  MILS not maintained throughout    Mask difficulty assessment: 2 - vent by mask + OA or adjuvant +/- NMBA    Final Airway Details    Final airway type: endotracheal airway      Successful airway: ETT  Cuffed: yes   Successful intubation technique: direct laryngoscopy  Adjuncts used in placement: intubating stylet  Endotracheal tube insertion site: oral  Blade: Lisa  Blade size: 3  ETT size (mm): 7.0  Cormack-Lehane Classification: grade I - full view of glottis  Placement verified by: chest auscultation and capnometry   Measured from: gums  ETT/EBT to gums (cm): 21  Number of attempts at approach: 1  Assessment: lips, teeth, and gum same as pre-op and atraumatic intubation

## 2025-05-15 NOTE — PROGRESS NOTES
Referring Provider: Waylon Buchanan MD    Reason for follow-up: perioperative cardiac management     Patient Care Team:  Raegan Gotti MD as PCP - General (Family Medicine)  Nilay Stevens MD as Consulting Physician (General Surgery)  Stephen Perea MD as Surgeon (Thoracic Surgery)  Makayla Navarro, RN as Nurse Navigator  Mak Sosa MD as Cardiologist (Cardiology)  Anjum Cam MD as Consulting Physician (Hematology and Oncology)      SUBJECTIVE    The patient denies any new complaints. She is scheduled for surgery today.        ROS  Review of all systems negative except as indicated.    Since I have last seen, the patient has been without any chest discomfort, shortness of breath, palpitations, dizziness or syncope.  Denies having any headache, abdominal pain, nausea, vomiting, diarrhea, constipation, loss of weight or loss of appetite.  Denies having any excessive bruising, hematuria or blood in the stool.        Personal History:    Past Medical History:   Diagnosis Date    Acute upper GI bleeding 08/26/2024    Anemia due to GI blood loss 08/27/2024    Lucas's esophagus     Barretts esophagus 04/10/2012    C. difficile colitis     Carcinoma, renal cell 04/10/2012    Carotid artery disease     -50-74% left, 16-49% right (1/19)    COPD (chronic obstructive pulmonary disease)     DM2 (diabetes mellitus, type 2)     ESRD (end stage renal disease)     on HD    Gastric ulcer     at anastomatic site    Gastroparesis     unknown    GERD (gastroesophageal reflux disease)     Gout     not current    Hemodialysis patient     mwf    Hernia, incisional     abdomen    History of colonoscopy     UTD    History of degenerative disc disease     Hyperlipidemia     Hypertension     Medicare annual wellness visit, subsequent 11/07/2020    Microscopic colitis     Nephrotic syndrome     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    Neuroendocrine tumor     of stomach    Non-small cell lung cancer 10/26/2023    Orthostatic  hypotension 11/07/2017    FABY (obstructive sleep apnea)     not treating    Pedal edema     ressolved    Pneumonia 11/11/2024    Proteinuria     Renal cell cancer, right     Rib pain on right side     chronic    Rotator cuff tear     bilateral    Secondary hyperparathyroidism of renal origin 08/29/2024    Steal syndrome dialysis vascular access 04/16/2024    Stomach cancer 2016    Stroke     Tobacco abuse 07/24/2020    Uterine cancer     Vitamin B 12 deficiency     Vitamin D deficiency     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       Past Surgical History:   Procedure Laterality Date    APPENDECTOMY      ARTERIOVENOUS FISTULA Right     ARTERIOVENOUS FISTULA REPAIR      Clotted off and insert graft    ARTERIOVENOUS FISTULA/SHUNT SURGERY Left 12/30/2020    Procedure: ARTERIOVENOUS FISTULA FORMATION;  Surgeon: Jan Caicedo MD;  Location: [facility] MAIN OR;  Service: Vascular;  Laterality: Left;    ARTERIOVENOUS FISTULA/SHUNT SURGERY Left 4/23/2024    Procedure: FISTULOGRAM WITH SUBCLAVIAN ARTERY ANGIOPLASTY, fistula banding;  Surgeon: Franki Delgadillo II, MD;  Location: [facility] HYBRID OR;  Service: Vascular;  Laterality: Left;    BREAST BIOPSY      right nipple 1981    BRONCHOSCOPY N/A 6/11/2024    Procedure: BRONCHOSCOPY WITH RIGHT LUNG WASHING;  Surgeon: Aramis Barragan MD;  Location: [facility] ENDOSCOPY;  Service: Pulmonary;  Laterality: N/A;  RML atelectasis    COLONOSCOPY N/A 11/24/2020    Procedure: COLONOSCOPY with polypectomy x 7;  Surgeon: Jeffery White MD;  Location: [facility] ENDOSCOPY;  Service: Gastroenterology;  Laterality: N/A;  post op: polyps, diverticulosis, hemorrhoids    COLONOSCOPY N/A 6/1/2023    Procedure: COLONOSCOPY with polypectomy x 3 biopsy x 1;  Surgeon: Jeffery White MD;  Location: [facility] ENDOSCOPY;  Service: Gastroenterology;  Laterality: N/A;  diverticulosis polyps    COLONOSCOPY N/A 9/4/2024    Procedure: COLONOSCOPY WITH POLYPECTOMY X 1;  Surgeon: Jeffery White MD;  Location: [facility] ENDOSCOPY;   Service: Gastroenterology;  Laterality: N/A;  POST- POLYP, DIVERTICULOSIS, INTERNAL AND EXTERNAL HEMORRHOIDS    ENDOSCOPY N/A 03/12/2022    Procedure: ESOPHAGOGASTRODUODENOSCOPY with biopsy x 1 area;  Surgeon: Javan Augustin MD;  Location: [facility] ENDOSCOPY;  Service: Gastroenterology;  Laterality: N/A;  post op: anastamosis    ENDOSCOPY N/A 10/9/2023    Procedure: ESOPHAGOGASTRODUODENOSCOPY with biopsy x3 areas;  Surgeon: Ace Campbell MD;  Location: [facility] ENDOSCOPY;  Service: Gastroenterology;  Laterality: N/A;  duodenal ulcers;irregular z line    ENDOSCOPY N/A 8/27/2024    Procedure: ESOPHAGOGASTRODUODENOSCOPY with argon plasma coagulation;  Surgeon: HILARIO Blackmon MD;  Location: [facility] ENDOSCOPY;  Service: Gastroenterology;  Laterality: N/A;  post op: AVM    ENTEROSCOPY SMALL BOWEL N/A 9/4/2024    Procedure: ESOPHAGOGASTRODUODENOSCOPY WITH SMALL BOWEL ENTEROSCOPY, ARGON PLASMA COAGULATION AND ENDOSCOPIC CLIPPING X 2 OF GASTRIC ANTRAL VASCULAR ECTASIA;  Surgeon: Jeffery White MD;  Location: [facility] ENDOSCOPY;  Service: Gastroenterology;  Laterality: N/A;  POST- GAVE    GASTRIC RESECTION      cancer    HYSTERECTOMY      LAPAROSCOPIC CHOLECYSTECTOMY      NEPHRECTOMY      right kidney removed     REDUCTION MAMMAPLASTY      TUMOR REMOVAL      boils    VENTRAL/INCISIONAL HERNIA REPAIR Right 08/28/2019    Procedure: VENTRAL/INCISIONAL HERNIA REPAIR;  Surgeon: Nilay Stevens MD;  Location: [facility] MAIN OR;  Service: General    VENTRAL/INCISIONAL HERNIA REPAIR N/A 10/01/2019    Procedure: OPEN VENTRAL/INCISIONAL HERNIA REPAIR;  Surgeon: Nilay Stevens MD;  Location: [facility] MAIN OR;  Service: General    VENTRAL/INCISIONAL HERNIA REPAIR N/A 10/11/2021    Procedure: VENTRAL/INCISIONAL HERNIA REPAIR LAPAROSCOPIC;  Surgeon: Nilay Stevens MD;  Location: [facility] MAIN OR;  Service: General;  Laterality: N/A;    VENTRAL/INCISIONAL HERNIA REPAIR N/A 01/23/2023    Procedure: VENTRAL/INCISIONAL HERNIA REPAIR with  MESH;  Surgeon: Nilay Stevens MD;  Location: [facility] MAIN OR;  Service: General;  Laterality: N/A;       Family History   Problem Relation Age of Onset    Heart disease Father        Social History     Tobacco Use    Smoking status: Former     Current packs/day: 0.00     Average packs/day: 0.3 packs/day for 57.0 years (14.3 ttl pk-yrs)     Types: Cigarettes     Start date: 1964     Quit date: 2021     Years since quittin.0     Passive exposure: Past    Smokeless tobacco: Never   Vaping Use    Vaping status: Never Used   Substance Use Topics    Alcohol use: No    Drug use: Never        Home meds:  Prior to Admission medications    Medication Sig Start Date End Date Taking? Authorizing Provider   albuterol sulfate  (90 Base) MCG/ACT inhaler Inhale 2 puffs Every 4 (Four) Hours As Needed for Wheezing.    Danica Murphy MD   ammonium lactate (AMLACTIN) 12 % cream Apply 1 Application topically to the appropriate area as directed As Needed. 3/6/25   Danica Murphy MD   aspirin 81 MG chewable tablet Chew 1 tablet Daily.    Danica Murphy MD   calcium acetate (PHOS BINDER,) 667 MG capsule capsule Take 1 capsule by mouth 2 (Two) Times a Day.    Danica Murphy MD   Cholecalciferol 25 MCG (1000 UT) tablet Take 1 tablet by mouth Daily.    Danica Murphy MD   hyoscyamine (LEVSIN) 0.125 MG SL tablet Place 1 tablet under the tongue Every 4 (Four) Hours As Needed for Cramping.  Patient not taking: Reported on 2025   Angelia Taylor MD   lidocaine-prilocaine (EMLA) 2.5-2.5 % cream APPLY TOPICALLY TO THE 5TH FINGER ON THE LEFT HAND AS NEEDED PAIN 25   Anastasia Thurman APRN   metoprolol succinate XL (Toprol XL) 25 MG 24 hr tablet Take 1 tablet by mouth 2 (Two) Times a Day. 24   Angelia Taylor MD   multivitamin (MULTI-VITAMIN PO) Take 1 tablet by mouth Daily.    Danica Murphy MD   pantoprazole (PROTONIX) 20 MG EC tablet Take 1 tablet by mouth Daily.     Danica Murphy MD   sevelamer (RENAGEL) 800 MG tablet Take 1 tablet by mouth 2 (Two) Times a Day With Meals.  Patient not taking: Reported on 4/1/2025    Danica Murphy MD   sodium bicarbonate 650 MG tablet Take 2 tablets by mouth 3 (Three) Times a Day.  Patient not taking: Reported on 4/1/2025    Danica Murphy MD   sucralfate (CARAFATE) 1 g tablet Take 1 tablet by mouth 4 (Four) Times a Day As Needed.  Patient not taking: Reported on 4/1/2025    Danica Murphy MD   temazepam (RESTORIL) 30 MG capsule Take 1 capsule by mouth Every Night. 1/10/23   Danica Murphy MD       Allergies:  Atorvastatin calcium, Gabapentin, and Niacin    Scheduled Meds:ceFAZolin, 2,000 mg, Intravenous, Once  [Held by provider] heparin (porcine), 5,000 Units, Subcutaneous, Q8H  [Transfer Hold] ipratropium-albuterol, 3 mL, Nebulization, 4x Daily - RT  [Transfer Hold] pantoprazole, 40 mg, Oral, BID AC  [Transfer Hold] sevelamer, 800 mg, Oral, TID With Meals  [Transfer Hold] sodium bicarbonate, 650 mg, Oral, TID  [Transfer Hold] sodium chloride, 10 mL, Intravenous, Q12H  [Transfer Hold] sodium zirconium cyclosilicate, 10 g, Oral, Daily  [Transfer Hold] temazepam, 30 mg, Oral, Nightly      Continuous Infusions:   PRN Meds:.  [Transfer Hold] acetaminophen    [Transfer Hold] senna-docusate sodium **AND** [Transfer Hold] polyethylene glycol **AND** [Transfer Hold] bisacodyl **AND** [Transfer Hold] bisacodyl    [Transfer Hold] calcium carbonate    [Transfer Hold] Calcium Replacement - Follow Nurse / BPA Driven Protocol    [Transfer Hold] dextrose    [Transfer Hold] dextrose    [Transfer Hold] glucagon (human recombinant)    [Transfer Hold] Magnesium Standard Dose Replacement - Follow Nurse / BPA Driven Protocol    [Transfer Hold] nitroglycerin    [Transfer Hold] ondansetron ODT    [Transfer Hold] oxyCODONE    [Transfer Hold] oxyCODONE    [Transfer Hold] Phosphorus Replacement - Follow Nurse / BPA Driven  "Protocol    Potassium Replacement - Follow Nurse / BPA Driven Protocol    [COMPLETED] Insert Peripheral IV **AND** [Transfer Hold] sodium chloride    [Transfer Hold] sodium chloride    [Transfer Hold] sodium chloride    [Transfer Hold] traMADol      OBJECTIVE    Vital Signs  Vitals:    05/15/25 0431 05/15/25 0643 05/15/25 0649 05/15/25 0728   BP: 131/83   119/81   BP Location: Right leg      Patient Position: Lying      Pulse: 81 90 83 85   Resp: 16 16 16 15   Temp: 97.9 °F (36.6 °C)   97.4 °F (36.3 °C)   TempSrc: Oral   Oral   SpO2: 91% 95% 95%    Weight: 71.4 kg (157 lb 6.4 oz)      Height:           Flowsheet Rows      Flowsheet Row First Filed Value   Admission Height 152.4 cm (60\") Documented at 05/11/2025 1614   Admission Weight 71.3 kg (157 lb 3 oz) Documented at 05/11/2025 1614              Intake/Output Summary (Last 24 hours) at 5/15/2025 0854  Last data filed at 5/14/2025 1637  Gross per 24 hour   Intake 360 ml   Output --   Net 360 ml          Telemetry:  sinus rhythm     Physical Exam:  The patient is alert, oriented and in no distress.  Vital signs as noted above.  Head and neck revealed no carotid bruits or jugular venous distention.  No thyromegaly or lymphadenopathy is present  Lungs clear.  No wheezing.  Breath sounds are normal bilaterally. On room air.   Heart normal first and second heart sounds.  No murmur. No precordial rub is present.  No gallop is present.  Abdomen soft and nontender.  No organomegaly is present.  Extremities with good peripheral pulses without any pedal edema.  Skin warm and dry.  Musculoskeletal system is grossly normal.  CNS grossly normal.       Results Review:  I have personally reviewed the results from the time of this admission to 5/15/2025 08:54 EDT and agree with these findings:  [x]  Laboratory  [x]  Microbiology  [x]  Radiology  [x]  EKG/Telemetry   [x]  Cardiology/Vascular   []  Pathology  [x]  Old records  []  Other:    Most notable findings include:    Lab " Results (last 24 hours)       Procedure Component Value Units Date/Time    POC Glucose Once [[id number]]  (Normal) Collected: 05/15/25 0852    Specimen: Blood Updated: 05/15/25 0854     Glucose 96 mg/dL      Comment: Serial Number: [id number]:  [id number]       POC Glucose Finger Q6H [[id number]]  (Normal) Collected: 05/15/25 0644    Specimen: Blood from Finger Updated: 05/15/25 0645     Glucose 100 mg/dL      Comment: Serial Number: [id number]:  [id number]       Renal Function Panel [[id number]]  (Abnormal) Collected: 05/15/25 0125    Specimen: Blood from Arm, Right Updated: 05/15/25 0152     Glucose 131 mg/dL      BUN 11 mg/dL      Creatinine 3.46 mg/dL      Sodium 132 mmol/L      Potassium 4.1 mmol/L      Chloride 95 mmol/L      CO2 23.9 mmol/L      Calcium 8.9 mg/dL      Albumin 3.7 g/dL      Phosphorus 5.6 mg/dL      Anion Gap 13.1 mmol/L      BUN/Creatinine Ratio 3.2     eGFR 13.3 mL/min/1.73     Narrative:      GFR Categories in Chronic Kidney Disease (CKD)              GFR Category          GFR (mL/min/1.73)    Interpretation  G1                    90 or greater        Normal or high (1)  G2                    60-89                Mild decrease (1)  G3a                   45-59                Mild to moderate decrease  G3b                   30-44                Moderate to severe decrease  G4                    15-29                Severe decrease  G5                    14 or less           Kidney failure    (1)In the absence of evidence of kidney disease, neither GFR category G1 or G2 fulfill the criteria for CKD.    eGFR calculation 2021 CKD-EPI creatinine equation, which does not include race as a factor    CBC (No Diff) [[id number]]  (Abnormal) Collected: 05/15/25 0125    Specimen: Blood from Arm, Right Updated: 05/15/25 0137     WBC 9.02 10*3/mm3      RBC 2.99 10*6/mm3      Hemoglobin 9.4 g/dL      Hematocrit 30.4 %      .7 fL      MCH 31.4 pg      MCHC 30.9 g/dL      RDW 16.0 %       RDW-SD 56.2 fl      MPV 10.4 fL      Platelets 274 10*3/mm3     POC Glucose Once [[id number]]  (Abnormal) Collected: 05/15/25 0026    Specimen: Blood Updated: 05/15/25 0028     Glucose 186 mg/dL      Comment: Serial Number: [id number]:  [id number]       POC Glucose Once [[id number]]  (Normal) Collected: 05/14/25 1228    Specimen: Blood Updated: 05/14/25 1229     Glucose 83 mg/dL      Comment: Serial Number: [id number]:  [id number]               Imaging Results (Last 24 Hours)       ** No results found for the last 24 hours. **            LAB RESULTS (LAST 7 DAYS)    CBC  Results from last 7 days   Lab Units 05/15/25  0125 05/14/25  0448 05/13/25  0522 05/12/25  0643 05/11/25  1715   WBC 10*3/mm3 9.02 8.22 7.12 15.38* 15.03*   RBC 10*6/mm3 2.99* 3.74* 3.63* 3.83 3.62*   HEMOGLOBIN g/dL 9.4* 11.6* 11.1* 12.0 11.2*   HEMATOCRIT % 30.4* 38.2 37.6 38.0 36.4   MCV fL 101.7* 102.1* 103.6* 99.2* 100.6*   PLATELETS 10*3/mm3 274 306 323 317 376       BMP  Results from last 7 days   Lab Units 05/15/25  0125 05/14/25 0448 05/13/25  1015 05/13/25  0923 05/13/25  0522 05/12/25  0643 05/11/25  1852 05/11/25  1715   SODIUM mmol/L 132* 137  --   --  137 137 137 142   POTASSIUM mmol/L 4.1 4.7 5.9* 5.7* 5.9* 4.0 6.1* 6.9*   CHLORIDE mmol/L 95* 98  --   --  99 99 102 102   CO2 mmol/L 23.9 27.0  --   --  22.7 23.3 17.5* 19.9*   BUN mg/dL 11 16  --   --  30* 20 53* 55*   CREATININE mg/dL 3.46* 3.93*  --   --  5.94* 4.06* 8.16* 8.74*   GLUCOSE mg/dL 131* 91  --   --  67 66 71 97   MAGNESIUM mg/dL  --  1.9  --   --  1.9 1.8  --   --    PHOSPHORUS mg/dL 5.6* 5.3*  --   --  6.7* 3.2  --   --        CMP   Results from last 7 days   Lab Units 05/15/25  0125 05/14/25  0448 05/13/25  1015 05/13/25  0923 05/13/25  0522 05/12/25  0643 05/11/25  1852 05/11/25  1715   SODIUM mmol/L 132* 137  --   --  137 137 137 142   POTASSIUM mmol/L 4.1 4.7 5.9* 5.7* 5.9* 4.0 6.1* 6.9*   CHLORIDE mmol/L 95* 98  --   --  99 99 102 102   CO2 mmol/L 23.9  27.0  --   --  22.7 23.3 17.5* 19.9*   BUN mg/dL 11 16  --   --  30* 20 53* 55*   CREATININE mg/dL 3.46* 3.93*  --   --  5.94* 4.06* 8.16* 8.74*   GLUCOSE mg/dL 131* 91  --   --  67 66 71 97   ALBUMIN g/dL 3.7 4.0  --   --  3.8  --   --  4.0   BILIRUBIN mg/dL  --   --   --   --   --   --   --  0.2   ALK PHOS U/L  --   --   --   --   --   --   --  98   AST (SGOT) U/L  --   --   --   --   --   --   --  16   ALT (SGPT) U/L  --   --   --   --   --   --   --  15   LIPASE U/L  --   --   --   --   --   --   --  32       BNP        TROPONIN  Results from last 7 days   Lab Units 05/11/25  1852   HSTROP T ng/L 36*       CoAg        Creatinine Clearance  Estimated Creatinine Clearance: 12.4 mL/min (A) (by C-G formula based on SCr of 3.46 mg/dL (H)).    ABG        Radiology  No radiology results for the last day      EKG  I personally viewed and interpreted the patient's EKG/Telemetry data:  ECG 12 Lead Other; epigastric abdominal pain   Final Result   HEART RATE=81  bpm   RR Aaqygibc=982  ms   NV Lhsgwjaj=909  ms   P Horizontal Axis=2  deg   P Front Axis=Invalid  deg   QRSD Interval=90  ms   QT Sivxuilj=564  ms   SIbK=662  ms   QRS Axis=-19  deg   T Wave Axis=160  deg   - ABNORMAL ECG -   Sinus rhythm   LVH with secondary repolarization abnormality   When compared with ECG of 11-Nov-2024 11:48:39,   No significant change   Electronically Signed By: Franki Kwok (MERCEDES) 2025-05-12 06:51:41   Date and Time of Study:2025-05-11 17:24:02      Telemetry Scan   Final Result      Telemetry Scan   Final Result      Telemetry Scan   Final Result      Telemetry Scan   Final Result      Telemetry Scan   Final Result      Telemetry Scan   Final Result      Telemetry Scan   Final Result      Telemetry Scan   Final Result      Telemetry Scan   Final Result      Telemetry Scan   Final Result      Telemetry Scan   Final Result      Telemetry Scan   Final Result      Telemetry Scan   Final Result      Telemetry Scan   Final Result       Telemetry Scan   Final Result      Telemetry Scan   Final Result      Telemetry Scan   Final Result      Telemetry Scan   Final Result      Telemetry Scan   Final Result            Echocardiogram:    Results for orders placed during the hospital encounter of 07/25/21    Adult Transthoracic Echo Complete W/ Cont if Necessary Per Protocol    Interpretation Summary  Normal LV size and contractility EF of 60%  Normal RV size  Normal atrial size  Aortic valve, mitral valve, tricuspid valve appears structurally normal, mild mitral regurgitation  seen.  No pericardial effusion seen.  Proximal aorta appears normal in size.        Stress Test:         Cardiac Catheterization:  Results for orders placed during the hospital encounter of 09/27/22    Cardiac Catheterization/Vascular Study    Narrative  Images from the original result were not included.    Today's Date:  09/27/22  Jan Caicedo MD  [facility] cardiac catheterization lab    Preoperative Diagnosis:  Poorly functioning hemodialysis fistula/shunt    Postoperative Diagnosis:  Same    Procedure Performed:    CPT:  02717 Fistulogram    Surgeon:  Jan Caicedo MD    Assistant:  None    Anesthesia:  Local    Estimated Blood Loss:  Minimal    Findings:  Normal fistulogram in brachial radial ulnar arteries, normal central venogram    Implants:  None    Specimen:  none    Complications:  none    Dispo:  to PACU    Indication for procedure:  72 y.o. female with pleasant female with pain left hand with hemodialysis.  They are able to use fistula for about 3 hours but left hand becomes painful.  Patient with symptoms of right hand polyneuropathy with previous AV graft that was ligated.  Plan left arm fistulogram of brachial artery basilic vein fistula and left arm arteriogram.  Risk discussed with patient agrees to proceed.    Description of procedure:  Patient was taken to the operating room.  Anesthesia was administered and a monitored setting.   Surgical sites were prepped and draped in the usual sterile fashion.  A full surgical timeout was performed.  Under ultrasound guidance the fistula was accessed using a micropuncture system.  Wire placement was confirmed with fluoroscopy.  Micro-needle was exchanged for the micro-sheath.  Diagnostic arteriovenous fistulogram was performed.    Left arm fistula prepped and draped in usual fashion.  1% Xylocaine used for local anesthesia.  Fistula was accessed near axilla going retrograde towards the brachial artery anastomosis without difficulty using ultrasound guidance.  Micropuncture wire advanced at difficulty.  Catheter placed.  Left upper extremity fistulogram and central venogram performed with all widely patent.  Pressure held on basilic vein fistula centrally with fistulogram of the brachial anastomosis widely patent.  Left arm runoff arteriogram performed using same technique with brachial radial ulnar and interosseous arteries are widely patent.  Anastomosis of basilic vein to brachial artery widely patent.  There was a dilated portion of the fistula and then the mild narrowing of the proximal long segment of the basilic vein going towards the brachial artery but no significant stenoses.  Patient essentially with normal fistulogram.  Suspect pain with hemodialysis due to polyneuropathy.    Jan Caicedo MD  09/27/22         Other:         ASSESSMENT & PLAN:    Principal Problem:    Acute hyperkalemia  Active Problems:    Gastroesophageal reflux disease    ESRD (end stage renal disease)    Type 2 diabetes mellitus, without long-term current use of insulin    Mixed hyperlipidemia    Hypertension    Obesity (BMI 30-39.9)    Chronic obstructive pulmonary disease    Obstructive sleep apnea syndrome    Ventral hernia without obstruction or gangrene    Preoperative cardiovascular examination    Multiple malignancies      Preop cardiovascular risk assessment  Ventral hernia  The patient presented to the  hospital with a reducible ventral hernia.  Plan for robotic ventral hernia repair by general surgery today.   In the absence of angina, heart failure, and arrhythmia she may proceed with hernia surgery with no further cardiac workup.  We will follow postop    Acute hyperkalemia  ESRD (end stage renal disease)  On hemodialysis MWF  Potassium was 6.9 on admission  Potassium has now normalized   On Lokelma 10 g daily   Nephrology is following  Monitor BMP    Primary hypertension, chronic  Antihypertensives on hold due to hypotension  Continue to closely monitor blood pressure  She may need midodrine on dialysis days.     Mixed hyperlipidemia  LDL 94, HDL 52, triglycerides 175 and total cholesterol 176  Goal LDL is less than 70 in the presence of diabetes  She is intolerant to statins  Recommend starting PCSK9-I as outpatient      Anemia in chronic kidney disease  Hemoglobin is 9.4 today  Monitor H&H    Gastroesophageal reflux disease without esophagitis  History of GI bleed  On PPI      Type 2 diabetes mellitus without complication, without long-term current use of insulin  A1c is 6.2%  Diet-controlled      Obesity  Status post gastric bypass  BMI is 31.47.  She weighs 161 pounds.  Lifestyle modifications recommended.      History of multiple malignancies  She had uterine cancer in 1984, status post partial hysterectomy.  She had renal cell carcinoma in 2005, status post radical right nephrectomy.   She had neuroendocrine tumor of the duodenum in 2017, status post ex lap with partial duodenectomy.  She was diagnosed with stage 1 non-small cell lung cancer in 2024, status post SBRT.     COPD with emphysema   Former smoker  Stable without exacerbation  Continue bronchodilators      Electronically signed by MAEGAN Cerna, 05/15/25, 6:32 PM EDT.

## 2025-05-15 NOTE — PROGRESS NOTES
Could not see the patient today as she is in OR.  Labs reviewed.  I will reevaluate in morning and order dialysis depending upon her volume status and electrolytes

## 2025-05-15 NOTE — ANESTHESIA POSTPROCEDURE EVALUATION
Patient: Sherry Lobato    Procedure Summary       Date: 05/15/25 Room / Location: [facility] OR 09 / [facility] MAIN OR    Anesthesia Start: 0935 Anesthesia Stop: 1510    Procedure: VENTRAL  HERNIA REPAIR LAPAROSCOPIC WITH DAVINCI ROBOT WITH MESH, EXTENSIVE LYSIS OF ADHESION (Abdomen) Diagnosis:     Surgeons: Keyon Espana MD Provider: Delmar Garcia MD    Anesthesia Type: general with block ASA Status: 4            Anesthesia Type: general with block    Vitals  Vitals Value Taken Time   /84 05/15/25 16:11   Temp 98.1 °F (36.7 °C) 05/15/25 16:10   Pulse 90 05/15/25 16:11   Resp 12 05/15/25 16:10   SpO2 88 % 05/15/25 16:11   Vitals shown include unfiled device data.        Post Anesthesia Care and Evaluation    Patient location during evaluation: PACU  Patient participation: complete - patient participated  Level of consciousness: awake  Pain scale: See nurse's notes for pain score.  Pain management: adequate    Airway patency: patent  Anesthetic complications: No anesthetic complications  PONV Status: none  Cardiovascular status: acceptable  Respiratory status: acceptable and spontaneous ventilation  Hydration status: acceptable    Comments: Patient seen and examined postoperatively; vital signs stable; SpO2 greater than or equal to 90%; cardiopulmonary status stable; nausea/vomiting adequately controlled; pain adequately controlled; no apparent anesthesia complications; patient discharged from anesthesia care when discharge criteria were met

## 2025-05-15 NOTE — ANESTHESIA PROCEDURE NOTES
Peripheral IV    Patient location during procedure: OR  Start time: 5/15/2025 9:37 AM  End time: 5/15/2025 9:45 AM  Line placed for Difficult Access, Fluids/Medication Admin and Sedation.  Performed By   Anesthesiologist: Delmar Garcia MD  Preanesthetic Checklist  Completed: patient identified, IV checked, site marked and risks and benefits discussed  Peripheral IV Prep   Patient position: supine   Prep: ChloraPrep  Peripheral IV Procedure   Laterality:right  Location:  Foot  Catheter size: 20 G          Post Assessment   Dressing Type: transparent and tape.    IV Dressing/Site: clean, dry and intact

## 2025-05-15 NOTE — PROGRESS NOTES
[facility] MEDICINE SERVICE  DAILY PROGRESS NOTE    NAME: Sherry Lobato  : 1950  MRN: [id number]      LOS: 4 days     PROVIDER OF SERVICE: Waylon Buchanan MD    Chief Complaint: Acute hyperkalemia    Subjective:     Interval History: Patient seen postoperatively in recovery.  She was drowsy.         Review of Systems:   Review of Systems    Objective:     Vital Signs  Temp:  [97.4 °F (36.3 °C)-98.6 °F (37 °C)] 98.1 °F (36.7 °C)  Heart Rate:  [] 90  Resp:  [15-26] 15  BP: ()/(45-89) 81/45   Body mass index is 30.74 kg/m².    Physical Exam  Physical Exam  General Appearance:  Alert, cooperative, no distress, appears stated age  Head:  Normocephalic, without obvious abnormality, atraumatic  Eyes:  PERRL, conjunctiva/corneas clear, EOM's intact, fundi benign, both eyes  Ears:  Normal TM's and external ear canals, both ears  Nose: Nares normal, septum midline, mucosa normal, no drainage or sinus tenderness  Throat: Lips, mucosa, and tongue normal; teeth and gums normal  Neck: Supple, symmetrical, trachea midline, no adenopathy, thyroid: not enlarged, symmetric, no tenderness/mass/nodules, no carotid bruit or JVD  Lungs:   Clear to auscultation bilaterally, respirations unlabored  Heart:  Regular rate and rhythm, S1, S2 normal, no murmur, rub or gallop  Abdomen:  Soft, non-tender, bowel sounds active all four quadrants,  no masses, no organomegaly, reducible left abdominal wall hernia  Extremities: Extremities normal, atraumatic, no cyanosis or edema  Pulses: 2+ and symmetric  Skin: Skin color, texture, turgor normal, no rashes or lesions  Neurologic: Normal         Diagnostic Data    Results from last 7 days   Lab Units 05/15/25  0125 25  1852 25  1715   WBC 10*3/mm3 9.02   < > 15.03*   HEMOGLOBIN g/dL 9.4*   < > 11.2*   HEMATOCRIT % 30.4*   < > 36.4   PLATELETS 10*3/mm3 274   < > 376   GLUCOSE mg/dL 131*   < > 97   CREATININE mg/dL 3.46*   < > 8.74*   BUN mg/dL 11   < > 55*    SODIUM mmol/L 132*   < > 142   POTASSIUM mmol/L 4.1   < > 6.9*   AST (SGOT) U/L  --   --  16   ALT (SGPT) U/L  --   --  15   ALK PHOS U/L  --   --  98   BILIRUBIN mg/dL  --   --  0.2   ANION GAP mmol/L 13.1   < > 20.1*    < > = values in this interval not displayed.       No radiology results for the last day        I reviewed the patient's new clinical results.    Assessment/Plan:     Active and Resolved Problems  Active Hospital Problems    Diagnosis  POA    **Acute hyperkalemia [E87.5]  Yes    Ventral hernia without obstruction or gangrene [K43.9]  Yes    Preoperative cardiovascular examination [Z01.810]  Not Applicable    Multiple malignancies [C80.0]  Yes    Obesity (BMI 30-39.9) [E66.9]  Yes    Chronic obstructive pulmonary disease [J44.9]  Yes    ESRD (end stage renal disease) [N18.6]  Yes    Obstructive sleep apnea syndrome [G47.33]  Yes    Hypertension [I10]  Yes    Mixed hyperlipidemia [E78.2]  Yes    Gastroesophageal reflux disease [K21.9]  Yes    Type 2 diabetes mellitus, without long-term current use of insulin [E11.9]  Yes      Resolved Hospital Problems   No resolved problems to display.       Acute hyperkalemia  - Patient has underlying ESRD and underwent emergent HD on 5/11  - Continue HD schedule per nephrology on F schedule  - Potassium better today after receiving HD on 5/13 and 5/14    Abdominal incisional hernia  - Patient has multiple hernias in the past requiring surgical intervention due to multiple abdominal surgeries  - Underwent hernia repair on 5/15  - Continue pain control    ESRD on HD  - Continue HD schedule per nephrology    Anemia of CKD  - H&H stable  - Monitor hemoglobin postoperatively    Hypertension  - Holding home antihypertensives for now given patient was hypotensive on admission  - Resume home medications once BP improves    VTE Prophylaxis:  Pharmacologic VTE prophylaxis orders are present.             Disposition Planning:     Barriers to Discharge: Surgical  intervention for hernia  Anticipated Date of Discharge: 5/16  Place of Discharge: Home      Time: 45 minutes     Code Status and Medical Interventions: CPR (Attempt to Resuscitate); Full Support   Ordered at: 05/11/25 1945     Code Status (Patient has no pulse and is not breathing):    CPR (Attempt to Resuscitate)     Medical Interventions (Patient has pulse or is breathing):    Full Support     Level Of Support Discussed With:    Patient       Signature: Electronically signed by Waylon Buchanan MD, 05/15/25, 12:50 EDT.  [facility] ([facility]

## 2025-05-16 ENCOUNTER — APPOINTMENT (OUTPATIENT)
Dept: NEPHROLOGY | Facility: HOSPITAL | Age: 75
DRG: 335 | End: 2025-05-16
Payer: MEDICARE

## 2025-05-16 LAB
GLUCOSE BLDC GLUCOMTR-MCNC: 69 MG/DL (ref 70–105)
GLUCOSE BLDC GLUCOMTR-MCNC: 70 MG/DL (ref 70–105)
GLUCOSE BLDC GLUCOMTR-MCNC: 83 MG/DL (ref 70–105)
GLUCOSE BLDC GLUCOMTR-MCNC: 85 MG/DL (ref 70–105)
GLUCOSE BLDC GLUCOMTR-MCNC: 90 MG/DL (ref 70–105)
GLUCOSE BLDC GLUCOMTR-MCNC: 99 MG/DL (ref 70–105)

## 2025-05-16 PROCEDURE — 25010000002 ALBUMIN HUMAN 25% PER 50 ML: Performed by: INTERNAL MEDICINE

## 2025-05-16 PROCEDURE — 25010000002 HYDROMORPHONE PER 4 MG: Performed by: STUDENT IN AN ORGANIZED HEALTH CARE EDUCATION/TRAINING PROGRAM

## 2025-05-16 PROCEDURE — 94761 N-INVAS EAR/PLS OXIMETRY MLT: CPT

## 2025-05-16 PROCEDURE — 94799 UNLISTED PULMONARY SVC/PX: CPT

## 2025-05-16 PROCEDURE — 99232 SBSQ HOSP IP/OBS MODERATE 35: CPT | Performed by: NURSE PRACTITIONER

## 2025-05-16 PROCEDURE — P9047 ALBUMIN (HUMAN), 25%, 50ML: HCPCS | Performed by: INTERNAL MEDICINE

## 2025-05-16 PROCEDURE — 82948 REAGENT STRIP/BLOOD GLUCOSE: CPT | Performed by: STUDENT IN AN ORGANIZED HEALTH CARE EDUCATION/TRAINING PROGRAM

## 2025-05-16 PROCEDURE — 82948 REAGENT STRIP/BLOOD GLUCOSE: CPT

## 2025-05-16 PROCEDURE — 94664 DEMO&/EVAL PT USE INHALER: CPT

## 2025-05-16 PROCEDURE — 99232 SBSQ HOSP IP/OBS MODERATE 35: CPT | Performed by: STUDENT IN AN ORGANIZED HEALTH CARE EDUCATION/TRAINING PROGRAM

## 2025-05-16 PROCEDURE — 97166 OT EVAL MOD COMPLEX 45 MIN: CPT

## 2025-05-16 PROCEDURE — 97162 PT EVAL MOD COMPLEX 30 MIN: CPT

## 2025-05-16 RX ORDER — ALBUMIN (HUMAN) 12.5 G/50ML
SOLUTION INTRAVENOUS
Status: ACTIVE
Start: 2025-05-16 | End: 2025-05-17

## 2025-05-16 RX ORDER — POLYETHYLENE GLYCOL 3350 17 G/17G
17 POWDER, FOR SOLUTION ORAL DAILY
Status: DISCONTINUED | OUTPATIENT
Start: 2025-05-16 | End: 2025-05-18 | Stop reason: HOSPADM

## 2025-05-16 RX ORDER — BISACODYL 10 MG
10 SUPPOSITORY, RECTAL RECTAL DAILY PRN
Status: DISCONTINUED | OUTPATIENT
Start: 2025-05-16 | End: 2025-05-18 | Stop reason: HOSPADM

## 2025-05-16 RX ORDER — ACETAMINOPHEN 325 MG/1
650 TABLET ORAL EVERY 4 HOURS PRN
Qty: 30 TABLET | Refills: 0 | Status: SHIPPED | OUTPATIENT
Start: 2025-05-16 | End: 2025-05-22

## 2025-05-16 RX ORDER — ALBUMIN (HUMAN) 12.5 G/50ML
12.5 SOLUTION INTRAVENOUS AS NEEDED
Status: DISCONTINUED | OUTPATIENT
Start: 2025-05-16 | End: 2025-05-18 | Stop reason: HOSPADM

## 2025-05-16 RX ORDER — AMOXICILLIN 250 MG
2 CAPSULE ORAL 2 TIMES DAILY PRN
Status: DISCONTINUED | OUTPATIENT
Start: 2025-05-16 | End: 2025-05-18 | Stop reason: HOSPADM

## 2025-05-16 RX ORDER — BISACODYL 5 MG/1
5 TABLET, DELAYED RELEASE ORAL DAILY PRN
Status: DISCONTINUED | OUTPATIENT
Start: 2025-05-16 | End: 2025-05-18 | Stop reason: HOSPADM

## 2025-05-16 RX ORDER — OXYCODONE HYDROCHLORIDE 5 MG/1
5 TABLET ORAL EVERY 8 HOURS PRN
Qty: 15 TABLET | Refills: 0 | Status: SHIPPED | OUTPATIENT
Start: 2025-05-16 | End: 2025-05-19

## 2025-05-16 RX ORDER — ONDANSETRON 4 MG/1
4 TABLET, FILM COATED ORAL DAILY PRN
Qty: 30 TABLET | Refills: 0 | Status: SHIPPED | OUTPATIENT
Start: 2025-05-16 | End: 2026-05-16

## 2025-05-16 RX ADMIN — SODIUM BICARBONATE 650 MG: 650 TABLET ORAL at 21:05

## 2025-05-16 RX ADMIN — HYDROMORPHONE HYDROCHLORIDE 0.5 MG: 1 INJECTION, SOLUTION INTRAMUSCULAR; INTRAVENOUS; SUBCUTANEOUS at 10:48

## 2025-05-16 RX ADMIN — OXYCODONE 10 MG: 5 TABLET ORAL at 08:50

## 2025-05-16 RX ADMIN — PANTOPRAZOLE SODIUM 40 MG: 40 TABLET, DELAYED RELEASE ORAL at 08:50

## 2025-05-16 RX ADMIN — METHOCARBAMOL 500 MG: 500 TABLET ORAL at 12:56

## 2025-05-16 RX ADMIN — ACETAMINOPHEN 1000 MG: 500 TABLET ORAL at 04:26

## 2025-05-16 RX ADMIN — OXYCODONE 10 MG: 5 TABLET ORAL at 18:07

## 2025-05-16 RX ADMIN — HYDROMORPHONE HYDROCHLORIDE 0.5 MG: 1 INJECTION, SOLUTION INTRAMUSCULAR; INTRAVENOUS; SUBCUTANEOUS at 16:45

## 2025-05-16 RX ADMIN — OXYCODONE 10 MG: 5 TABLET ORAL at 12:56

## 2025-05-16 RX ADMIN — IPRATROPIUM BROMIDE AND ALBUTEROL SULFATE 3 ML: .5; 3 SOLUTION RESPIRATORY (INHALATION) at 06:50

## 2025-05-16 RX ADMIN — METHOCARBAMOL 500 MG: 500 TABLET ORAL at 17:58

## 2025-05-16 RX ADMIN — METHOCARBAMOL 500 MG: 500 TABLET ORAL at 08:50

## 2025-05-16 RX ADMIN — IPRATROPIUM BROMIDE AND ALBUTEROL SULFATE 3 ML: .5; 3 SOLUTION RESPIRATORY (INHALATION) at 19:15

## 2025-05-16 RX ADMIN — ALBUMIN (HUMAN) 12.5 G: 0.25 INJECTION, SOLUTION INTRAVENOUS at 16:08

## 2025-05-16 RX ADMIN — ACETAMINOPHEN 1000 MG: 500 TABLET ORAL at 12:55

## 2025-05-16 RX ADMIN — TEMAZEPAM 30 MG: 15 CAPSULE ORAL at 21:05

## 2025-05-16 RX ADMIN — Medication 10 ML: at 21:07

## 2025-05-16 RX ADMIN — SODIUM BICARBONATE 650 MG: 650 TABLET ORAL at 08:50

## 2025-05-16 RX ADMIN — SODIUM BICARBONATE 650 MG: 650 TABLET ORAL at 15:46

## 2025-05-16 RX ADMIN — SEVELAMER CARBONATE 800 MG: 800 TABLET, FILM COATED ORAL at 08:50

## 2025-05-16 RX ADMIN — PANTOPRAZOLE SODIUM 40 MG: 40 TABLET, DELAYED RELEASE ORAL at 16:37

## 2025-05-16 RX ADMIN — Medication 10 ML: at 08:49

## 2025-05-16 RX ADMIN — SEVELAMER CARBONATE 800 MG: 800 TABLET, FILM COATED ORAL at 12:56

## 2025-05-16 RX ADMIN — ALBUMIN (HUMAN) 12.5 G: 0.25 INJECTION, SOLUTION INTRAVENOUS at 15:42

## 2025-05-16 RX ADMIN — SEVELAMER CARBONATE 800 MG: 800 TABLET, FILM COATED ORAL at 17:58

## 2025-05-16 RX ADMIN — OXYCODONE 10 MG: 5 TABLET ORAL at 04:27

## 2025-05-16 RX ADMIN — ACETAMINOPHEN 1000 MG: 500 TABLET ORAL at 16:17

## 2025-05-16 RX ADMIN — HYDROMORPHONE HYDROCHLORIDE 0.5 MG: 1 INJECTION, SOLUTION INTRAMUSCULAR; INTRAVENOUS; SUBCUTANEOUS at 06:26

## 2025-05-16 RX ADMIN — POLYETHYLENE GLYCOL 3350 17 G: 17 POWDER, FOR SOLUTION ORAL at 08:50

## 2025-05-16 RX ADMIN — METHOCARBAMOL 500 MG: 500 TABLET ORAL at 21:04

## 2025-05-16 NOTE — PROGRESS NOTES
Referring Provider: Waylon Buchanan MD    Reason for follow-up: perioperative cardiac management     Patient Care Team:  Raegan Gotti MD as PCP - General (Family Medicine)  Nilay Stevens MD as Consulting Physician (General Surgery)  Stephen Perea MD as Surgeon (Thoracic Surgery)  Makayla Navarro, RN as Nurse Navigator  Mak Sosa MD as Cardiologist (Cardiology)  Anjum Cam MD as Consulting Physician (Hematology and Oncology)      SUBJECTIVE    The patient is complaining of abdominal pain. She denies any chest pain or shortness of breath. She states she is going to have dialysis again today. She reports poor appetite.        ROS  Review of all systems negative except as indicated.    Since I have last seen, the patient has been without any chest discomfort, shortness of breath, palpitations, dizziness or syncope.  Denies having any headache, abdominal pain, nausea, vomiting, diarrhea, constipation, loss of weight or loss of appetite.  Denies having any excessive bruising, hematuria or blood in the stool.        Personal History:    Past Medical History:   Diagnosis Date    Acute upper GI bleeding 08/26/2024    Anemia due to GI blood loss 08/27/2024    Lucas's esophagus     Barretts esophagus 04/10/2012    C. difficile colitis     Carcinoma, renal cell 04/10/2012    Carotid artery disease     -50-74% left, 16-49% right (1/19)    COPD (chronic obstructive pulmonary disease)     DM2 (diabetes mellitus, type 2)     ESRD (end stage renal disease)     on HD    Gastric ulcer     at anastomatic site    Gastroparesis     unknown    GERD (gastroesophageal reflux disease)     Gout     not current    Hemodialysis patient     mwf    Hernia, incisional     abdomen    History of colonoscopy     UTD    History of degenerative disc disease     Hyperlipidemia     Hypertension     Medicare annual wellness visit, subsequent 11/07/2020    Microscopic colitis     Nephrotic syndrome     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    Neuroendocrine tumor     of stomach    Non-small cell lung cancer 10/26/2023    Orthostatic hypotension 11/07/2017    FABY (obstructive sleep apnea)     not treating    Pedal edema     ressolved    Pneumonia 11/11/2024    Proteinuria     Renal cell cancer, right     Rib pain on right side     chronic    Rotator cuff tear     bilateral    Secondary hyperparathyroidism of renal origin 08/29/2024    Steal syndrome dialysis vascular access 04/16/2024    Stomach cancer 2016    Stroke     Tobacco abuse 07/24/2020    Uterine cancer     Vitamin B 12 deficiency     Vitamin D deficiency     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       Past Surgical History:   Procedure Laterality Date    APPENDECTOMY      ARTERIOVENOUS FISTULA Right     ARTERIOVENOUS FISTULA REPAIR      Clotted off and insert graft    ARTERIOVENOUS FISTULA/SHUNT SURGERY Left 12/30/2020    Procedure: ARTERIOVENOUS FISTULA FORMATION;  Surgeon: Jan Caicedo MD;  Location: [facility] MAIN OR;  Service: Vascular;  Laterality: Left;    ARTERIOVENOUS FISTULA/SHUNT SURGERY Left 4/23/2024    Procedure: FISTULOGRAM WITH SUBCLAVIAN ARTERY ANGIOPLASTY, fistula banding;  Surgeon: Franki Delgadillo II, MD;  Location: [facility] HYBRID OR;  Service: Vascular;  Laterality: Left;    BREAST BIOPSY      right nipple 1981    BRONCHOSCOPY N/A 6/11/2024    Procedure: BRONCHOSCOPY WITH RIGHT LUNG WASHING;  Surgeon: Aramis Barragan MD;  Location: [facility] ENDOSCOPY;  Service: Pulmonary;  Laterality: N/A;  RML atelectasis    COLONOSCOPY N/A 11/24/2020    Procedure: COLONOSCOPY with polypectomy x 7;  Surgeon: Jeffery White MD;  Location: [facility] ENDOSCOPY;  Service: Gastroenterology;  Laterality: N/A;  post op: polyps, diverticulosis, hemorrhoids    COLONOSCOPY N/A 6/1/2023    Procedure: COLONOSCOPY with polypectomy x 3 biopsy x 1;  Surgeon: Jeffery White MD;  Location: [facility] ENDOSCOPY;  Service: Gastroenterology;  Laterality: N/A;  diverticulosis polyps    COLONOSCOPY N/A 9/4/2024     Procedure: COLONOSCOPY WITH POLYPECTOMY X 1;  Surgeon: Jeffery White MD;  Location: [facility] ENDOSCOPY;  Service: Gastroenterology;  Laterality: N/A;  POST- POLYP, DIVERTICULOSIS, INTERNAL AND EXTERNAL HEMORRHOIDS    ENDOSCOPY N/A 03/12/2022    Procedure: ESOPHAGOGASTRODUODENOSCOPY with biopsy x 1 area;  Surgeon: Javan Augustin MD;  Location: [facility] ENDOSCOPY;  Service: Gastroenterology;  Laterality: N/A;  post op: anastamosis    ENDOSCOPY N/A 10/9/2023    Procedure: ESOPHAGOGASTRODUODENOSCOPY with biopsy x3 areas;  Surgeon: Ace Campbell MD;  Location: [facility] ENDOSCOPY;  Service: Gastroenterology;  Laterality: N/A;  duodenal ulcers;irregular z line    ENDOSCOPY N/A 8/27/2024    Procedure: ESOPHAGOGASTRODUODENOSCOPY with argon plasma coagulation;  Surgeon: HILARIO Blackmon MD;  Location: [facility] ENDOSCOPY;  Service: Gastroenterology;  Laterality: N/A;  post op: AVM    ENTEROSCOPY SMALL BOWEL N/A 9/4/2024    Procedure: ESOPHAGOGASTRODUODENOSCOPY WITH SMALL BOWEL ENTEROSCOPY, ARGON PLASMA COAGULATION AND ENDOSCOPIC CLIPPING X 2 OF GASTRIC ANTRAL VASCULAR ECTASIA;  Surgeon: Jeffery White MD;  Location: [facility] ENDOSCOPY;  Service: Gastroenterology;  Laterality: N/A;  POST- GAVE    GASTRIC RESECTION      cancer    HYSTERECTOMY      LAPAROSCOPIC CHOLECYSTECTOMY      NEPHRECTOMY      right kidney removed     REDUCTION MAMMAPLASTY      TUMOR REMOVAL      boils    VENTRAL/INCISIONAL HERNIA REPAIR Right 08/28/2019    Procedure: VENTRAL/INCISIONAL HERNIA REPAIR;  Surgeon: Nilay Stevens MD;  Location: [facility] MAIN OR;  Service: General    VENTRAL/INCISIONAL HERNIA REPAIR N/A 10/01/2019    Procedure: OPEN VENTRAL/INCISIONAL HERNIA REPAIR;  Surgeon: Nilay Stevens MD;  Location: [facility] MAIN OR;  Service: General    VENTRAL/INCISIONAL HERNIA REPAIR N/A 10/11/2021    Procedure: VENTRAL/INCISIONAL HERNIA REPAIR LAPAROSCOPIC;  Surgeon: Nilay Stevens MD;  Location: [facility] MAIN OR;  Service: General;  Laterality: N/A;     VENTRAL/INCISIONAL HERNIA REPAIR N/A 2023    Procedure: VENTRAL/INCISIONAL HERNIA REPAIR with MESH;  Surgeon: Nilay Stevens MD;  Location: [facility] MAIN OR;  Service: General;  Laterality: N/A;       Family History   Problem Relation Age of Onset    Heart disease Father        Social History     Tobacco Use    Smoking status: Former     Current packs/day: 0.00     Average packs/day: 0.3 packs/day for 57.0 years (14.3 ttl pk-yrs)     Types: Cigarettes     Start date: 1964     Quit date: 2021     Years since quittin.0     Passive exposure: Past    Smokeless tobacco: Never   Vaping Use    Vaping status: Never Used   Substance Use Topics    Alcohol use: No    Drug use: Never        Home meds:  Prior to Admission medications    Medication Sig Start Date End Date Taking? Authorizing Provider   albuterol sulfate  (90 Base) MCG/ACT inhaler Inhale 2 puffs Every 4 (Four) Hours As Needed for Wheezing.    Danica Murphy MD   ammonium lactate (AMLACTIN) 12 % cream Apply 1 Application topically to the appropriate area as directed As Needed. 3/6/25   Danica Murphy MD   aspirin 81 MG chewable tablet Chew 1 tablet Daily.    Danica Murphy MD   calcium acetate (PHOS BINDER,) 667 MG capsule capsule Take 1 capsule by mouth 2 (Two) Times a Day.    Danica Murphy MD   Cholecalciferol 25 MCG (1000 UT) tablet Take 1 tablet by mouth Daily.    Danica Murphy MD   hyoscyamine (LEVSIN) 0.125 MG SL tablet Place 1 tablet under the tongue Every 4 (Four) Hours As Needed for Cramping.  Patient not taking: Reported on 2025   Angelia Taylor MD   lidocaine-prilocaine (EMLA) 2.5-2.5 % cream APPLY TOPICALLY TO THE 5TH FINGER ON THE LEFT HAND AS NEEDED PAIN 25   Anastasia Thurman APRN   metoprolol succinate XL (Toprol XL) 25 MG 24 hr tablet Take 1 tablet by mouth 2 (Two) Times a Day. 24   Angelia Taylor MD   multivitamin (MULTI-VITAMIN PO) Take 1 tablet by mouth  Daily.    Danica Murphy MD   pantoprazole (PROTONIX) 20 MG EC tablet Take 1 tablet by mouth Daily.    Danica Murphy MD   sevelamer (RENAGEL) 800 MG tablet Take 1 tablet by mouth 2 (Two) Times a Day With Meals.  Patient not taking: Reported on 4/1/2025    Danica Murphy MD   sodium bicarbonate 650 MG tablet Take 2 tablets by mouth 3 (Three) Times a Day.  Patient not taking: Reported on 4/1/2025    Danica Murphy MD   sucralfate (CARAFATE) 1 g tablet Take 1 tablet by mouth 4 (Four) Times a Day As Needed.  Patient not taking: Reported on 4/1/2025    Danica Murphy MD   temazepam (RESTORIL) 30 MG capsule Take 1 capsule by mouth Every Night. 1/10/23   Danica Murphy MD       Allergies:  Atorvastatin calcium, Gabapentin, and Niacin    Scheduled Meds:acetaminophen, 1,000 mg, Oral, Q6H  [Held by provider] heparin (porcine), 5,000 Units, Subcutaneous, Q8H  ipratropium-albuterol, 3 mL, Nebulization, 4x Daily - RT  methocarbamol, 500 mg, Oral, 4x Daily  pantoprazole, 40 mg, Oral, BID AC  sevelamer, 800 mg, Oral, TID With Meals  sodium bicarbonate, 650 mg, Oral, TID  sodium chloride, 10 mL, Intravenous, Q12H  sodium zirconium cyclosilicate, 10 g, Oral, Daily  temazepam, 30 mg, Oral, Nightly      Continuous Infusions:   PRN Meds:.  senna-docusate sodium **AND** polyethylene glycol **AND** bisacodyl **AND** bisacodyl    calcium carbonate    Calcium Replacement - Follow Nurse / BPA Driven Protocol    dextrose    dextrose    glucagon (human recombinant)    HYDROmorphone    Magnesium Standard Dose Replacement - Follow Nurse / BPA Driven Protocol    nitroglycerin    ondansetron ODT    oxyCODONE    oxyCODONE    Phosphorus Replacement - Follow Nurse / BPA Driven Protocol    Potassium Replacement - Follow Nurse / BPA Driven Protocol    [COMPLETED] Insert Peripheral IV **AND** sodium chloride    sodium chloride    sodium chloride      OBJECTIVE    Vital Signs  Vitals:    05/16/25 0422 05/16/25  "0503 05/16/25 0650 05/16/25 0653   BP: 123/77      BP Location: Left leg      Patient Position: Lying      Pulse: 90  83 80   Resp: 15  22 22   Temp: 97.7 °F (36.5 °C)      TempSrc: Oral      SpO2: 92%  95% 94%   Weight:  72.6 kg (160 lb)     Height:           Flowsheet Rows      Flowsheet Row First Filed Value   Admission Height 152.4 cm (60\") Documented at 05/11/2025 1614   Admission Weight 71.3 kg (157 lb 3 oz) Documented at 05/11/2025 1614              Intake/Output Summary (Last 24 hours) at 5/16/2025 0704  Last data filed at 5/15/2025 1620  Gross per 24 hour   Intake 550 ml   Output 50 ml   Net 500 ml          Telemetry:  sinus rhythm     Physical Exam:  The patient is alert, oriented and in no distress.  Vital signs as noted above.  Head and neck revealed no carotid bruits or jugular venous distention.  No thyromegaly or lymphadenopathy is present  Lungs clear with diminished bases. On oxygen at 3.5 liters per nasal cannula.   Heart normal first and second heart sounds.  No murmur. No precordial rub is present.  No gallop is present.  Abdomen soft, but tender.    Extremities with good peripheral pulses with trace BLE edema.   Skin warm and dry.  Musculoskeletal system is grossly normal.  CNS grossly normal.       Results Review:  I have personally reviewed the results from the time of this admission to 5/16/2025 07:04 EDT and agree with these findings:  [x]  Laboratory  [x]  Microbiology  [x]  Radiology  [x]  EKG/Telemetry   [x]  Cardiology/Vascular   []  Pathology  [x]  Old records  []  Other:    Most notable findings include:    Lab Results (last 24 hours)       Procedure Component Value Units Date/Time    POC Glucose Once [[id number]]  (Normal) Collected: 05/16/25 0555    Specimen: Blood Updated: 05/16/25 0556     Glucose 70 mg/dL      Comment: Serial Number: [id number]:  [id number]       POC Glucose Finger Q6H [[id number]]  (Normal) Collected: 05/16/25 0004    Specimen: Blood from Finger Updated: " 05/16/25 0006     Glucose 85 mg/dL      Comment: Serial Number: [id number]:  [id number]       POC Glucose Once [[id number]]  (Abnormal) Collected: 05/15/25 1636    Specimen: Blood Updated: 05/15/25 1637     Glucose 176 mg/dL      Comment: Serial Number: [id number]:  [id number]       POC Glucose Once [[id number]]  (Abnormal) Collected: 05/15/25 1511    Specimen: Blood Updated: 05/15/25 1512     Glucose 187 mg/dL      Comment: Serial Number: [id number]:  [id number]       POC Glucose Once [[id number]]  (Normal) Collected: 05/15/25 0852    Specimen: Blood Updated: 05/15/25 0854     Glucose 96 mg/dL      Comment: Serial Number: [id number]:  [id number]               Imaging Results (Last 24 Hours)       ** No results found for the last 24 hours. **            LAB RESULTS (LAST 7 DAYS)    CBC  Results from last 7 days   Lab Units 05/15/25  0125 05/14/25 0448 05/13/25 0522 05/12/25 0643 05/11/25  1715   WBC 10*3/mm3 9.02 8.22 7.12 15.38* 15.03*   RBC 10*6/mm3 2.99* 3.74* 3.63* 3.83 3.62*   HEMOGLOBIN g/dL 9.4* 11.6* 11.1* 12.0 11.2*   HEMATOCRIT % 30.4* 38.2 37.6 38.0 36.4   MCV fL 101.7* 102.1* 103.6* 99.2* 100.6*   PLATELETS 10*3/mm3 274 306 323 317 376       BMP  Results from last 7 days   Lab Units 05/15/25  0125 05/14/25  0448 05/13/25  1015 05/13/25  0923 05/13/25 0522 05/12/25  0643 05/11/25  1852 05/11/25  1715   SODIUM mmol/L 132* 137  --   --  137 137 137 142   POTASSIUM mmol/L 4.1 4.7 5.9* 5.7* 5.9* 4.0 6.1* 6.9*   CHLORIDE mmol/L 95* 98  --   --  99 99 102 102   CO2 mmol/L 23.9 27.0  --   --  22.7 23.3 17.5* 19.9*   BUN mg/dL 11 16  --   --  30* 20 53* 55*   CREATININE mg/dL 3.46* 3.93*  --   --  5.94* 4.06* 8.16* 8.74*   GLUCOSE mg/dL 131* 91  --   --  67 66 71 97   MAGNESIUM mg/dL  --  1.9  --   --  1.9 1.8  --   --    PHOSPHORUS mg/dL 5.6* 5.3*  --   --  6.7* 3.2  --   --        CMP   Results from last 7 days   Lab Units 05/15/25  0125 05/14/25  0448 05/13/25  1015 05/13/25  0923  05/13/25  0522 05/12/25  0643 05/11/25  1852 05/11/25  1715   SODIUM mmol/L 132* 137  --   --  137 137 137 142   POTASSIUM mmol/L 4.1 4.7 5.9* 5.7* 5.9* 4.0 6.1* 6.9*   CHLORIDE mmol/L 95* 98  --   --  99 99 102 102   CO2 mmol/L 23.9 27.0  --   --  22.7 23.3 17.5* 19.9*   BUN mg/dL 11 16  --   --  30* 20 53* 55*   CREATININE mg/dL 3.46* 3.93*  --   --  5.94* 4.06* 8.16* 8.74*   GLUCOSE mg/dL 131* 91  --   --  67 66 71 97   ALBUMIN g/dL 3.7 4.0  --   --  3.8  --   --  4.0   BILIRUBIN mg/dL  --   --   --   --   --   --   --  0.2   ALK PHOS U/L  --   --   --   --   --   --   --  98   AST (SGOT) U/L  --   --   --   --   --   --   --  16   ALT (SGPT) U/L  --   --   --   --   --   --   --  15   LIPASE U/L  --   --   --   --   --   --   --  32       BNP        TROPONIN  Results from last 7 days   Lab Units 05/11/25  1852   HSTROP T ng/L 36*       CoAg        Creatinine Clearance  Estimated Creatinine Clearance: 12.5 mL/min (A) (by C-G formula based on SCr of 3.46 mg/dL (H)).    ABG        Radiology  No radiology results for the last day      EKG  I personally viewed and interpreted the patient's EKG/Telemetry data:  ECG 12 Lead Other; epigastric abdominal pain   Final Result   HEART RATE=81  bpm   RR Bnjhusrz=097  ms   CO Pangenbb=885  ms   P Horizontal Axis=2  deg   P Front Axis=Invalid  deg   QRSD Interval=90  ms   QT Uflrdogl=314  ms   IWcR=487  ms   QRS Axis=-19  deg   T Wave Axis=160  deg   - ABNORMAL ECG -   Sinus rhythm   LVH with secondary repolarization abnormality   When compared with ECG of 11-Nov-2024 11:48:39,   No significant change   Electronically Signed By: Franki Kwok (MERCEDES) 2025-05-12 06:51:41   Date and Time of Study:2025-05-11 17:24:02      Telemetry Scan   Final Result      Telemetry Scan   Final Result      Telemetry Scan   Final Result      Telemetry Scan   Final Result      Telemetry Scan   Final Result      Telemetry Scan   Final Result      Telemetry Scan   Final Result      Telemetry Scan    Final Result      Telemetry Scan   Final Result      Telemetry Scan   Final Result      Telemetry Scan   Final Result      Telemetry Scan   Final Result      Telemetry Scan   Final Result      Telemetry Scan   Final Result      Telemetry Scan   Final Result      Telemetry Scan   Final Result      Telemetry Scan   Final Result      Telemetry Scan   Final Result      Telemetry Scan   Final Result      Telemetry Scan   Final Result      Telemetry Scan   Final Result      Telemetry Scan   Final Result      Telemetry Scan   Final Result            Echocardiogram:    Results for orders placed during the hospital encounter of 07/25/21    Adult Transthoracic Echo Complete W/ Cont if Necessary Per Protocol    Interpretation Summary  Normal LV size and contractility EF of 60%  Normal RV size  Normal atrial size  Aortic valve, mitral valve, tricuspid valve appears structurally normal, mild mitral regurgitation  seen.  No pericardial effusion seen.  Proximal aorta appears normal in size.        Stress Test:         Cardiac Catheterization:  Results for orders placed during the hospital encounter of 09/27/22    Cardiac Catheterization/Vascular Study    Narrative  Images from the original result were not included.    Today's Date:  09/27/22  Jan Caicedo MD  [facility] cardiac catheterization lab    Preoperative Diagnosis:  Poorly functioning hemodialysis fistula/shunt    Postoperative Diagnosis:  Same    Procedure Performed:    CPT:  73561 Fistulogram    Surgeon:  Jan Caicedo MD    Assistant:  None    Anesthesia:  Local    Estimated Blood Loss:  Minimal    Findings:  Normal fistulogram in brachial radial ulnar arteries, normal central venogram    Implants:  None    Specimen:  none    Complications:  none    Dispo:  to PACU    Indication for procedure:  72 y.o. female with pleasant female with pain left hand with hemodialysis.  They are able to use fistula for about 3 hours but left hand becomes  painful.  Patient with symptoms of right hand polyneuropathy with previous AV graft that was ligated.  Plan left arm fistulogram of brachial artery basilic vein fistula and left arm arteriogram.  Risk discussed with patient agrees to proceed.    Description of procedure:  Patient was taken to the operating room.  Anesthesia was administered and a monitored setting.  Surgical sites were prepped and draped in the usual sterile fashion.  A full surgical timeout was performed.  Under ultrasound guidance the fistula was accessed using a micropuncture system.  Wire placement was confirmed with fluoroscopy.  Micro-needle was exchanged for the micro-sheath.  Diagnostic arteriovenous fistulogram was performed.    Left arm fistula prepped and draped in usual fashion.  1% Xylocaine used for local anesthesia.  Fistula was accessed near axilla going retrograde towards the brachial artery anastomosis without difficulty using ultrasound guidance.  Micropuncture wire advanced at difficulty.  Catheter placed.  Left upper extremity fistulogram and central venogram performed with all widely patent.  Pressure held on basilic vein fistula centrally with fistulogram of the brachial anastomosis widely patent.  Left arm runoff arteriogram performed using same technique with brachial radial ulnar and interosseous arteries are widely patent.  Anastomosis of basilic vein to brachial artery widely patent.  There was a dilated portion of the fistula and then the mild narrowing of the proximal long segment of the basilic vein going towards the brachial artery but no significant stenoses.  Patient essentially with normal fistulogram.  Suspect pain with hemodialysis due to polyneuropathy.    Jan Caicedo MD  09/27/22         Other:         ASSESSMENT & PLAN:    Principal Problem:    Acute hyperkalemia  Active Problems:    Gastroesophageal reflux disease    ESRD (end stage renal disease)    Type 2 diabetes mellitus, without long-term  current use of insulin    Mixed hyperlipidemia    Hypertension    Obesity (BMI 30-39.9)    Chronic obstructive pulmonary disease    Obstructive sleep apnea syndrome    Ventral hernia without obstruction or gangrene    Preoperative cardiovascular examination    Multiple malignancies      Perioperative cardiac management  Ventral hernia  The patient presented to the hospital with a reducible ventral hernia.  She was cleared by cardiology to proceed with surgery given the absence of angina, heart failure, and arrhythmia.     Status post robotic repair of incarcerated, recurrent ventral hernia and lysis of adhesions on 5/15/2025  Post-op care, diet, and PRN analgesia per surgery.    Acute hyperkalemia  ESRD (end stage renal disease)  On hemodialysis MWF  Potassium was 6.9 on admission  Potassium has now normalized   On Lokelma 10 g daily   Nephrology is following  Monitor BMP    Primary hypertension, chronic  Antihypertensives on hold due to hypotension  Continue to closely monitor blood pressure  She may need midodrine on dialysis days.     Mixed hyperlipidemia  LDL 94, HDL 52, triglycerides 175 and total cholesterol 176  Goal LDL is less than 70 in the presence of diabetes  She is intolerant to statins  Recommend starting PCSK9-I as outpatient      Anemia in chronic kidney disease  Hemoglobin is 9.4 today  Monitor H&H    Gastroesophageal reflux disease without esophagitis  History of GI bleed  On PPI      Type 2 diabetes mellitus without complication, without long-term current use of insulin  A1c is 6.2%  Diet-controlled      Obesity  Status post gastric bypass  BMI is 31.25.  She weighs 160 lbs.  Lifestyle modifications recommended.      History of multiple malignancies  She had uterine cancer in 1984, status post partial hysterectomy.  She had renal cell carcinoma in 2005, status post radical right nephrectomy.   She had neuroendocrine tumor of the duodenum in 2017, status post ex lap with partial duodenectomy.  She  was diagnosed with stage 1 non-small cell lung cancer in 2024, status post SBRT.     COPD with emphysema   Former smoker  Stable without exacerbation  Continue bronchodilators      Electronically signed by MAEGAN Cerna, 05/16/25, 1:37 PM EDT.

## 2025-05-16 NOTE — THERAPY EVALUATION
Patient Name: Sherry Lobato  : 1950    MRN: [id number]                              Today's Date: 2025       Admit Date: 2025    Visit Dx:     ICD-10-CM ICD-9-CM   1. Hyperkalemia  E87.5 276.7   2. Epigastric pain  R10.13 789.06     Patient Active Problem List   Diagnosis    B12 deficiency    Barretts esophagus    Gastroesophageal reflux disease    ESRD (end stage renal disease)    Degeneration of intervertebral disc of lumbar region    Type 2 diabetes mellitus, without long-term current use of insulin    Gout    Mixed hyperlipidemia    Hypertension    Insomnia    Bilateral carotid artery stenosis    Vitamin D deficiency    Hordeolum externum of right lower eyelid    Obesity (BMI 30-39.9)    Diabetic peripheral neuropathy    Chronic obstructive pulmonary disease    Degeneration of lumbar intervertebral disc    Disorder of phosphorus metabolism    Arteriovenous fistula    Bilateral pseudophakia    Obstructive sleep apnea syndrome    Steal syndrome dialysis vascular access    Non-small cell lung cancer    Warthin's tumor    Secondary hyperparathyroidism of renal origin    Acute hyperkalemia    Ventral hernia without obstruction or gangrene    Preoperative cardiovascular examination    Multiple malignancies     Past Medical History:   Diagnosis Date    Acute upper GI bleeding 2024    Anemia due to GI blood loss 2024    Lucas's esophagus     Barretts esophagus 04/10/2012    C. difficile colitis     Carcinoma, renal cell 04/10/2012    Carotid artery disease     -50-74% left, 16-49% right ()    COPD (chronic obstructive pulmonary disease)     DM2 (diabetes mellitus, type 2)     ESRD (end stage renal disease)     on HD    Gastric ulcer     at anastomatic site    Gastroparesis     unknown    GERD (gastroesophageal reflux disease)     Gout     not current    Hemodialysis patient     mwf    Hernia, incisional     abdomen    History of colonoscopy     UTD    History of degenerative disc  disease     Hyperlipidemia     Hypertension     Medicare annual wellness visit, subsequent 11/07/2020    Microscopic colitis     Nephrotic syndrome     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    Neuroendocrine tumor     of stomach    Non-small cell lung cancer 10/26/2023    Orthostatic hypotension 11/07/2017    FABY (obstructive sleep apnea)     not treating    Pedal edema     ressolved    Pneumonia 11/11/2024    Proteinuria     Renal cell cancer, right     Rib pain on right side     chronic    Rotator cuff tear     bilateral    Secondary hyperparathyroidism of renal origin 08/29/2024    Steal syndrome dialysis vascular access 04/16/2024    Stomach cancer 2016    Stroke     Tobacco abuse 07/24/2020    Uterine cancer     Vitamin B 12 deficiency     Vitamin D deficiency     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     Past Surgical History:   Procedure Laterality Date    APPENDECTOMY      ARTERIOVENOUS FISTULA Right     ARTERIOVENOUS FISTULA REPAIR      Clotted off and insert graft    ARTERIOVENOUS FISTULA/SHUNT SURGERY Left 12/30/2020    Procedure: ARTERIOVENOUS FISTULA FORMATION;  Surgeon: Jan Caicedo MD;  Location: [facility] MAIN OR;  Service: Vascular;  Laterality: Left;    ARTERIOVENOUS FISTULA/SHUNT SURGERY Left 4/23/2024    Procedure: FISTULOGRAM WITH SUBCLAVIAN ARTERY ANGIOPLASTY, fistula banding;  Surgeon: Franki Delgadillo II, MD;  Location: [facility] HYBRID OR;  Service: Vascular;  Laterality: Left;    BREAST BIOPSY      right nipple 1981    BRONCHOSCOPY N/A 6/11/2024    Procedure: BRONCHOSCOPY WITH RIGHT LUNG WASHING;  Surgeon: Aramis Barragan MD;  Location: [facility] ENDOSCOPY;  Service: Pulmonary;  Laterality: N/A;  RML atelectasis    COLONOSCOPY N/A 11/24/2020    Procedure: COLONOSCOPY with polypectomy x 7;  Surgeon: Jeffery White MD;  Location: [facility] ENDOSCOPY;  Service: Gastroenterology;  Laterality: N/A;  post op: polyps, diverticulosis, hemorrhoids    COLONOSCOPY N/A 6/1/2023    Procedure: COLONOSCOPY with polypectomy x  3 biopsy x 1;  Surgeon: Jeffery White MD;  Location: [facility] ENDOSCOPY;  Service: Gastroenterology;  Laterality: N/A;  diverticulosis polyps    COLONOSCOPY N/A 9/4/2024    Procedure: COLONOSCOPY WITH POLYPECTOMY X 1;  Surgeon: Jeffery White MD;  Location: [facility] ENDOSCOPY;  Service: Gastroenterology;  Laterality: N/A;  POST- POLYP, DIVERTICULOSIS, INTERNAL AND EXTERNAL HEMORRHOIDS    ENDOSCOPY N/A 03/12/2022    Procedure: ESOPHAGOGASTRODUODENOSCOPY with biopsy x 1 area;  Surgeon: Javan Augustin MD;  Location: [facility] ENDOSCOPY;  Service: Gastroenterology;  Laterality: N/A;  post op: anastamosis    ENDOSCOPY N/A 10/9/2023    Procedure: ESOPHAGOGASTRODUODENOSCOPY with biopsy x3 areas;  Surgeon: Ace Campbell MD;  Location: [facility] ENDOSCOPY;  Service: Gastroenterology;  Laterality: N/A;  duodenal ulcers;irregular z line    ENDOSCOPY N/A 8/27/2024    Procedure: ESOPHAGOGASTRODUODENOSCOPY with argon plasma coagulation;  Surgeon: HILARIO Blackmon MD;  Location: [facility] ENDOSCOPY;  Service: Gastroenterology;  Laterality: N/A;  post op: AVM    ENTEROSCOPY SMALL BOWEL N/A 9/4/2024    Procedure: ESOPHAGOGASTRODUODENOSCOPY WITH SMALL BOWEL ENTEROSCOPY, ARGON PLASMA COAGULATION AND ENDOSCOPIC CLIPPING X 2 OF GASTRIC ANTRAL VASCULAR ECTASIA;  Surgeon: Jeffery White MD;  Location: [facility] ENDOSCOPY;  Service: Gastroenterology;  Laterality: N/A;  POST- GAVE    GASTRIC RESECTION      cancer    HYSTERECTOMY      LAPAROSCOPIC CHOLECYSTECTOMY      NEPHRECTOMY      right kidney removed     REDUCTION MAMMAPLASTY      TUMOR REMOVAL      boils    VENTRAL/INCISIONAL HERNIA REPAIR Right 08/28/2019    Procedure: VENTRAL/INCISIONAL HERNIA REPAIR;  Surgeon: Nilay Stevens MD;  Location: [facility] MAIN OR;  Service: General    VENTRAL/INCISIONAL HERNIA REPAIR N/A 10/01/2019    Procedure: OPEN VENTRAL/INCISIONAL HERNIA REPAIR;  Surgeon: Nilay Stevens MD;  Location: [facility] MAIN OR;  Service: General    VENTRAL/INCISIONAL HERNIA  REPAIR N/A 10/11/2021    Procedure: VENTRAL/INCISIONAL HERNIA REPAIR LAPAROSCOPIC;  Surgeon: Nilay Stevens MD;  Location: [facility] MAIN OR;  Service: General;  Laterality: N/A;    VENTRAL/INCISIONAL HERNIA REPAIR N/A 01/23/2023    Procedure: VENTRAL/INCISIONAL HERNIA REPAIR with MESH;  Surgeon: Nilay Stevens MD;  Location: [facility] MAIN OR;  Service: General;  Laterality: N/A;      General Information       Row Name 05/16/25 1013          Physical Therapy Time and Intention    Document Type evaluation  -CM     Mode of Treatment physical therapy  -CM       Row Name 05/16/25 1015 05/16/25 1013       General Information    Patient Profile Reviewed -- yes  -CM    Prior Level of Function --  uses scooter in large stores; does not use asst device normally for gait; no home O2  -CM independent:;community mobility;gait  uses scooter in large stores; does not use asst device normally for gait  -CM    Existing Precautions/Restrictions -- fall;other (see comments);oxygen therapy device and L/min  abdominal incision  -CM    Barriers to Rehab -- medically complex  -CM      Row Name 05/16/25 1013          Living Environment    Current Living Arrangements home  -CM     People in Home significant other  -CM       Row Name 05/16/25 1013          Home Main Entrance    Number of Stairs, Main Entrance two  -CM     Stair Railings, Main Entrance railings safe and in good condition  -CM       Row Name 05/16/25 1013          Stairs Within Home, Primary    Stairs, Within Home, Primary single level function  -CM     Number of Stairs, Within Home, Primary none  -CM       Row Name 05/16/25 1013          Cognition    Orientation Status (Cognition) oriented x 4  -CM       Row Name 05/16/25 1013          Safety Issues/Impairments Affecting Functional Mobility    Impairments Affecting Function (Mobility) pain;endurance/activity tolerance;shortness of breath  -CM               User Key  (r) = Recorded By, (t) = Taken By, (c) = Cosigned By       Initials Name Provider Type    Jessica Perez, PT Physical Therapist                   Mobility       Row Name 05/16/25 1015          Bed Mobility    Bed Mobility bed mobility (all) activities  -CM     All Activities, [place] (Bed Mobility) not tested  -CM     Comment, (Bed Mobility) up in chair when PT arrived  -       Row Name 05/16/25 1016          Sit-Stand Transfer    Sit-Stand [place] (Transfers) contact guard  -CM     Assistive Device (Sit-Stand Transfers) walker, front-wheeled  -       Row Name 05/16/25 1016          Gait/Stairs (Locomotion)    Patient was able to Ambulate no, other medical factors prevent ambulation  -CM     Reason Patient was unable to Ambulate Dizziness;Hypotension  -CM     Distance in Feet (Gait) 0  -CM               User Key  (r) = Recorded By, (t) = Taken By, (c) = Cosigned By      Initials Name Provider Type    Jessica Perez, PT Physical Therapist                   Obj/Interventions       Row Name 05/16/25 1016          Range of Motion Comprehensive    General Range of Motion no range of motion deficits identified  -       Row Name 05/16/25 1016          Strength Comprehensive (MMT)    General Manual Muscle Testing (MMT) Assessment no strength deficits identified  -CM     Comment, General Manual Muscle Testing (MMT) Assessment BLE's 5/5 (no resistance given at hips 2* abdominal incision)  -       Row Name 05/16/25 1016          Balance    Balance Assessment sitting static balance;sitting dynamic balance;standing static balance;standing dynamic balance  -CM     Static Sitting Balance independent  -CM     Dynamic Sitting Balance independent  -CM     Position, Sitting Balance sitting in chair  -CM     Static Standing Balance contact guard  -CM     Dynamic Standing Balance contact guard  -CM     Position/Device Used, Standing Balance supported;walker, rolling  -       Row Name 05/16/25 1016          Sensory Assessment (Somatosensory)    Sensory Assessment  (Somatosensory) sensation intact  -CM               User Key  (r) = Recorded By, (t) = Taken By, (c) = Cosigned By      Initials Name Provider Type    Jessica Perez, PT Physical Therapist                   Goals/Plan       Row Name 05/16/25 1031          Bed Mobility Goal 1 (PT)    Activity/Assistive Device (Bed Mobility Goal 1, PT) bed mobility activities, all  -CM     [place] Level/Cues Needed (Bed Mobility Goal 1, PT) modified independence  -CM     Time Frame (Bed Mobility Goal 1, PT) 2 weeks  -CM       Row Name 05/16/25 1031          Transfer Goal 1 (PT)    Activity/Assistive Device (Transfer Goal 1, PT) transfers, all;walker, rolling  -CM     [place] Level/Cues Needed (Transfer Goal 1, PT) modified independence  -CM     Time Frame (Transfer Goal 1, PT) 2 weeks  -CM       [facility] Name 05/16/25 1031          Gait Training Goal 1 (PT)    Activity/Assistive Device (Gait Training Goal 1, PT) gait (walking locomotion);walker, rolling  -CM     [place] Level (Gait Training Goal 1, PT) modified independence  -CM     Distance (Gait Training Goal 1, PT) 200 ft  -CM     Time Frame (Gait Training Goal 1, PT) 2 weeks  -CM       [facility] Name 05/16/25 1031          Therapy Assessment/Plan (PT)    Planned Therapy Interventions (PT) balance training;bed mobility training;gait training;home exercise program;stair training;ROM (range of motion);postural re-education;patient/family education;transfer training;strengthening;neuromuscular re-education  -CM               User Key  (r) = Recorded By, (t) = Taken By, (c) = Cosigned By      Initials Name Provider Type    Jessica Perez, PT Physical Therapist                   Clinical Impression       Row Name 05/16/25 1017          Pain    Pretreatment Pain Rating 9/10  -CM     Posttreatment Pain Rating 9/10  -CM     Pain Location abdomen  -CM     Pain Management Interventions exercise or physical activity utilized;nursing notified;activity modification encouraged   -CM     Response to Pain Interventions activity level improved;functional ability improved;activity participation with increased pain  -CM       Row Name 05/16/25 1017          Plan of Care Review    Plan of Care Reviewed With patient  -CM     Outcome Evaluation 73 yo female adm 5/11/25 for abdominal pain, hyperkalemia (life-threatening level). Underwent emergent HD on 5/11 due to severe hyperkalemia. Pt then underwent ventral hernia repair w/ lysis of adhesions on 5/15. Pt now POD1. PMH: gout, esrd/hd, lung ca, dm, multiple malignancies, XRT. At baseline, pt lives w/ her significant other in single level function home w/ 2 stairs and handrail to enter. She does not use home O2 and is able to amb around her home and for short community distances w/o assistive device. Today, pt is alert and oriented, but she is soa at rest. Her O2 sats are 100% on 3.5L. She demonstrates normal strength. Pt is hoarse and noting she is having difficulty coughing anything loose. She is able to come to stand w/ cga at rw. However, pt has iv in her R ankle and became dizzy and painful in standing. Therefore she was not able to amb more than a few steps. Expect that pt will progress well and be safe for home w/ family at d/c. Will follow.  -CM       Row Name 05/16/25 1030          Therapy Assessment/Plan (PT)    Rehab Potential (PT) good  -CM     Criteria for Skilled Interventions Met (PT) yes;meets criteria  -CM     Therapy Frequency (PT) 5 times/wk  -CM     Predicted Duration of Therapy Intervention (PT) until d/c  -CM       Row Name 05/16/25 1030          Vital Signs    Pre SpO2 (%) 100  -CM     O2 Delivery Pre Treatment supplemental O2  3.5L  -CM     Intra SpO2 (%) 99  -CM     O2 Delivery Intra Treatment supplemental O2  -CM     Post SpO2 (%) 99  -CM     O2 Delivery Post Treatment supplemental O2  -CM       Row Name 05/16/25 1030          Positioning and Restraints    Pre-Treatment Position sitting in chair/recliner  -CM     Post  Treatment Position chair  -CM     In Chair notified nsg;sitting;call light within reach;encouraged to call for assist;exit alarm on  blankets used to provide footrest, as pt is very short and did not want to elevate her feet  -CM               User Key  (r) = Recorded By, (t) = Taken By, (c) = Cosigned By      Initials Name Provider Type    Jessica Perez, PT Physical Therapist                   Outcome Measures       Row Name 05/16/25 1032 05/16/25 0800       How much help from another person do you currently need...    Turning from your back to your side while in flat bed without using bedrails? 2  -CM 4  -HI    Moving from lying on back to sitting on the side of a flat bed without bedrails? 2  -CM 4  -HI    Moving to and from a bed to a chair (including a wheelchair)? 3  -CM 4  -HI    Standing up from a chair using your arms (e.g., wheelchair, bedside chair)? 3  -CM 4  -HI    Climbing 3-5 steps with a railing? 1  -CM 3  -HI    To walk in hospital room? 2  -CM 3  -HI    AM-PAC 6 Clicks Score (PT) 13  -CM 22  -HI      Row Name 05/16/25 0400 05/16/25 0000       How much help from another person do you currently need...    Turning from your back to your side while in flat bed without using bedrails? 4  -CS 4  -CS    Moving from lying on back to sitting on the side of a flat bed without bedrails? 4  -CS 4  -CS    Moving to and from a bed to a chair (including a wheelchair)? 4  -CS 4  -CS    Standing up from a chair using your arms (e.g., wheelchair, bedside chair)? 4  -CS 4  -CS    Climbing 3-5 steps with a railing? 3  -CS 3  -CS    To walk in hospital room? 3  -CS 3  -CS    AM-PAC 6 Clicks Score (PT) 22  -CS 22  -CS              User Key  (r) = Recorded By, (t) = Taken By, (c) = Cosigned By      Initials Name Provider Type    Jessica Perez, PT Physical Therapist    HI Concetta Pina RN Registered Nurse    Jessica Carmen RN Registered Nurse                                 Physical Therapy Education        Title: PT OT SLP Therapies (Done)       Topic: Physical Therapy (Done)       Point: Mobility training (Done)       Learning Progress Summary            Patient Acceptance, E,TB, VU by CM at 5/16/2025 1032                      Point: Home exercise program (Done)       Learning Progress Summary            Patient Acceptance, E,TB, VU by CM at 5/16/2025 1032                      Point: Body mechanics (Done)       Learning Progress Summary            Patient Acceptance, E,TB, VU by CM at 5/16/2025 1032                      Point: Precautions (Done)       Learning Progress Summary            Patient Acceptance, E,TB, VU by CM at 5/16/2025 1032                                      User Key       Initials Effective Dates Name Provider Type Discipline    ISAAC 06/16/21 -  Jessica Petersen, PT Physical Therapist PT                  PT Recommendation and Plan  Planned Therapy Interventions (PT): balance training, bed mobility training, gait training, home exercise program, stair training, ROM (range of motion), postural re-education, patient/family education, transfer training, strengthening, neuromuscular re-education  Outcome Evaluation: 75 yo female adm 5/11/25 for abdominal pain, hyperkalemia (life-threatening level). Underwent emergent HD on 5/11 due to severe hyperkalemia. Pt then underwent ventral hernia repair w/ lysis of adhesions on 5/15. Pt now POD1. PMH: gout, esrd/hd, lung ca, dm, multiple malignancies, XRT. At baseline, pt lives w/ her significant other in single level function home w/ 2 stairs and handrail to enter. She does not use home O2 and is able to amb around her home and for short community distances w/o assistive device. Today, pt is alert and oriented, but she is soa at rest. Her O2 sats are 100% on 3.5L. She demonstrates normal strength. Pt is hoarse and noting she is having difficulty coughing anything loose. She is able to come to stand w/ cga at rw. However, pt has iv in her R ankle and became  dizzy and painful in standing. Therefore she was not able to amb more than a few steps. Expect that pt will progress well and be safe for home w/ family at d/c. Will follow.     Time Calculation:   PT Evaluation Complexity  History, PT Evaluation Complexity: 3 or more personal factors and/or comorbidities  Examination of Body Systems (PT Eval Complexity): total of 4 or more elements  Clinical Presentation (PT Evaluation Complexity): evolving  Clinical Decision Making (PT Evaluation Complexity): moderate complexity  Overall Complexity (PT Evaluation Complexity): moderate complexity     PT Charges       Row Name 05/16/25 1033             Time Calculation    Start Time 0920  -CM      Stop Time 0938  -CM      Time Calculation (min) 18 min  -CM      PT Received On 05/16/25  -CM      PT - Next Appointment 05/18/25  -CM      PT Goal Re-Cert Due Date 05/30/25  -CM         Time Calculation- PT    Total Timed Code Minutes- PT 0 minute(s)  -CM                User Key  (r) = Recorded By, (t) = Taken By, (c) = Cosigned By      Initials Name Provider Type     Jessica Petersen, PT Physical Therapist                  Therapy Charges for Today       Code Description Service Date Service Provider Modifiers Qty    [id number]  PT EVAL MOD COMPLEXITY 4 5/16/2025 Jessica Petersen, PT GP 1            PT G-Codes  AM-PAC 6 Clicks Score (PT): 13  PT Discharge Summary  Anticipated Discharge Disposition (PT): home with assist    Jessica Petersen, JARON  5/16/2025

## 2025-05-16 NOTE — PROGRESS NOTES
[facility] Progress Note      Patient Name: Sherry Lobato  : 1950  MRN: [id number]  Primary Care Physician:  Raegan Gotti MD  Date of admission: 2025    Subjective     Interval History:     Status post hernia repair yesterday.  Patient complaining of some dyspnea and cough    Review of Systems:   As noted above    Objective     Vitals:   Temp:  [97.7 °F (36.5 °C)-98.5 °F (36.9 °C)] 98 °F (36.7 °C)  Heart Rate:  [78-99] 82  Resp:  [10-22] 21  BP: ()/(45-87) 122/87  Flow (L/min) (Oxygen Therapy):  [2-6] 3.5    Intake/Output Summary (Last 24 hours) at 2025 0755  Last data filed at 5/15/2025 1620  Gross per 24 hour   Intake 550 ml   Output 50 ml   Net 500 ml       Physical Exam:    General Appearance: Chronically ill-appearing, NAD  HEENT: oral mucosa normal, nonicteric sclera  Neck: supple, no JVD  Lungs: Bilateral rhonchi  Heart: RRR, normal S1 and S2  Abdomen: soft, mild tenderness  Extremities: no edema  Neuro: Awake alert and moving all extremities    Scheduled Meds:     acetaminophen, 1,000 mg, Oral, Q6H  [Held by provider] heparin (porcine), 5,000 Units, Subcutaneous, Q8H  ipratropium-albuterol, 3 mL, Nebulization, 4x Daily - RT  methocarbamol, 500 mg, Oral, 4x Daily  pantoprazole, 40 mg, Oral, BID AC  polyethylene glycol, 17 g, Oral, Daily  sevelamer, 800 mg, Oral, TID With Meals  sodium bicarbonate, 650 mg, Oral, TID  sodium chloride, 10 mL, Intravenous, Q12H  sodium zirconium cyclosilicate, 10 g, Oral, Daily  temazepam, 30 mg, Oral, Nightly      IV Meds:        Results Reviewed:   I have personally reviewed the results from the time of this admission to 2025 07:55 EDT     Results from last 7 days   Lab Units 05/15/25  0125 25  0448 25  1015 25  0923 25  0522 25  1852 25  1715   SODIUM mmol/L 132* 137  --   --  137   < > 142   POTASSIUM mmol/L 4.1 4.7 5.9*   < > 5.9*   < > 6.9*   CHLORIDE mmol/L 95* 98  --    --  99   < > 102   CO2 mmol/L 23.9 27.0  --   --  22.7   < > 19.9*   BUN mg/dL 11 16  --   --  30*   < > 55*   CREATININE mg/dL 3.46* 3.93*  --   --  5.94*   < > 8.74*   CALCIUM mg/dL 8.9 9.3  --   --  9.6   < > 9.5   BILIRUBIN mg/dL  --   --   --   --   --   --  0.2   ALK PHOS U/L  --   --   --   --   --   --  98   ALT (SGPT) U/L  --   --   --   --   --   --  15   AST (SGOT) U/L  --   --   --   --   --   --  16   GLUCOSE mg/dL 131* 91  --   --  67   < > 97    < > = values in this interval not displayed.     Estimated Creatinine Clearance: 12.5 mL/min (A) (by C-G formula based on SCr of 3.46 mg/dL (H)).  Results from last 7 days   Lab Units 05/15/25  0125 05/14/25  0448 05/13/25  0522 05/12/25  0643   MAGNESIUM mg/dL  --  1.9 1.9 1.8   PHOSPHORUS mg/dL 5.6* 5.3* 6.7* 3.2         Results from last 7 days   Lab Units 05/15/25  0125 05/14/25  0448 05/13/25  0522 05/12/25  0643 05/11/25  1715   WBC 10*3/mm3 9.02 8.22 7.12 15.38* 15.03*   HEMOGLOBIN g/dL 9.4* 11.6* 11.1* 12.0 11.2*   PLATELETS 10*3/mm3 274 306 323 317 376           Assessment / Plan     ASSESSMENT:    End-stage renal disease.  Patient gets dialysis Monday Wednesday and Friday as outpatient.  Electrolytes,   hyperkalemia.  Improved   Hypertension with chronic kidney disease.  Blood pressure okay.  Off antihypertensives currently  Small bowel obstruction.  Secondary to incarcerated incisional hernia.  Status post hernia repair.  Surgery following    PLAN:  Hemodialysis today  Discontinue Lokelma now  Monitor blood pressure and restart antihypertensives if needed  Surveillance labs    Faustino Buckley MD  05/16/25  07:55 EDT    [facility]  [phone]

## 2025-05-16 NOTE — NURSING NOTE
Prior to HD treatment, patient requested to see about having HD in her room, this RN stated that due to scheduling of other patients, we would need to do her treatment in unit. Her main concern was pain. She was informed that we can make sure she gets her medication when she needs it. She was okay with that and came to unit for treatment. During HD the RN from floor brought her pain medicine to ease her pain. HD completed. 1.4 liters removed. Patient requested to end treatment at running only 3 hours.  BP decreased during treatment. Albumin was given. UF turned off during treatment.Patient remained asymptomatic. Dr. Buckley and primary RN notified

## 2025-05-16 NOTE — PROGRESS NOTES
General Surgery Progress Note    Name: Sherry Lobato ADMIT: 2025   : 1950  PCP: Raegan Gotti MD    MRN: [id number] LOS: 5 days   AGE/SEX: 74 y.o. female  ROOM:    [facility]    Chief Complaint   Patient presents with    Abdominal Pain     Subjective     Patient seen and examined.  Vital signs stable, afebrile.  Reports quite a bit of abdominal pain, states pain is controlled with IV Dilaudid.  Also reports throat soreness.  Not much of an appetite but is tolerating regular diet without nausea or vomiting.  Has not had a bowel movement over the last few days.    Objective     Scheduled Medications:   acetaminophen, 1,000 mg, Oral, Q6H  [Held by provider] heparin (porcine), 5,000 Units, Subcutaneous, Q8H  ipratropium-albuterol, 3 mL, Nebulization, 4x Daily - RT  methocarbamol, 500 mg, Oral, 4x Daily  pantoprazole, 40 mg, Oral, BID AC  polyethylene glycol, 17 g, Oral, Daily  sevelamer, 800 mg, Oral, TID With Meals  sodium bicarbonate, 650 mg, Oral, TID  sodium chloride, 10 mL, Intravenous, Q12H  temazepam, 30 mg, Oral, Nightly        Active Infusions:       As Needed Medications:    senna-docusate sodium **AND** polyethylene glycol **AND** bisacodyl **AND** bisacodyl    calcium carbonate    Calcium Replacement - Follow Nurse / BPA Driven Protocol    dextrose    dextrose    glucagon (human recombinant)    HYDROmorphone    Magnesium Standard Dose Replacement - Follow Nurse / BPA Driven Protocol    nitroglycerin    ondansetron ODT    oxyCODONE    oxyCODONE    phenol    Phosphorus Replacement - Follow Nurse / BPA Driven Protocol    Potassium Replacement - Follow Nurse / BPA Driven Protocol    [COMPLETED] Insert Peripheral IV **AND** sodium chloride    sodium chloride    sodium chloride    Vital Signs  Vital Signs Patient Vitals for the past 24 hrs:   BP Temp Temp src Pulse Resp SpO2 Weight   25 0717 122/87 98 °F (36.7 °C) Oral 82 21 -- --   25 0653 -- -- -- 80 22 94 % --   25  0650 -- -- -- 83 22 95 % --   05/16/25 0503 -- -- -- -- -- -- 72.6 kg (160 lb)   05/16/25 0422 123/77 97.7 °F (36.5 °C) Oral 90 15 92 % --   05/15/25 2329 102/74 98.3 °F (36.8 °C) Oral 83 12 93 % --   05/15/25 1924 108/64 98.3 °F (36.8 °C) Oral 99 13 99 % --   05/15/25 1634 115/85 97.9 °F (36.6 °C) Oral 85 17 99 % --   05/15/25 1610 133/84 98.1 °F (36.7 °C) Oral 88 12 94 % --   05/15/25 1549 140/80 -- -- 85 12 99 % --   05/15/25 1534 132/87 -- -- 93 10 96 % --   05/15/25 1519 127/77 -- -- 87 11 94 % --   05/15/25 1514 125/77 -- -- 83 12 90 % --   05/15/25 1509 122/77 -- -- 79 11 98 % --   05/15/25 1504 136/71 98.5 °F (36.9 °C) Oral 78 15 92 % --     I/O:  I/O last 3 completed shifts:  In: 550 [I.V.:550]  Out: 50 [Emesis/NG output:50]    Physical Exam:  Physical Exam  Constitutional:       General: She is not in acute distress.  Cardiovascular:      Rate and Rhythm: Normal rate.   Pulmonary:      Effort: Pulmonary effort is normal. No respiratory distress.   Abdominal:      General: There is no distension.      Palpations: Abdomen is soft.      Tenderness: There is abdominal tenderness. There is no guarding.      Comments: Incisions clean dry and intact with skin glue.   Neurological:      Mental Status: She is alert. Mental status is at baseline.   Psychiatric:         Mood and Affect: Mood normal.         Behavior: Behavior normal.         Results Review:     CBC    Results from last 7 days   Lab Units 05/15/25  0125 05/14/25  0448 05/13/25  0522 05/12/25  0643 05/11/25  1715   WBC 10*3/mm3 9.02 8.22 7.12 15.38* 15.03*   HEMOGLOBIN g/dL 9.4* 11.6* 11.1* 12.0 11.2*   PLATELETS 10*3/mm3 274 306 323 317 376     BMP   Results from last 7 days   Lab Units 05/15/25  0125 05/14/25  0448 05/13/25  1015 05/13/25  0923 05/13/25  0522 05/12/25  0643 05/11/25  1852 05/11/25  1715   SODIUM mmol/L 132* 137  --   --  137 137 137 142   POTASSIUM mmol/L 4.1 4.7 5.9* 5.7* 5.9* 4.0 6.1* 6.9*   CHLORIDE mmol/L 95* 98  --   --  99 99  102 102   CO2 mmol/L 23.9 27.0  --   --  22.7 23.3 17.5* 19.9*   BUN mg/dL 11 16  --   --  30* 20 53* 55*   CREATININE mg/dL 3.46* 3.93*  --   --  5.94* 4.06* 8.16* 8.74*   GLUCOSE mg/dL 131* 91  --   --  67 66 71 97   MAGNESIUM mg/dL  --  1.9  --   --  1.9 1.8  --   --    PHOSPHORUS mg/dL 5.6* 5.3*  --   --  6.7* 3.2  --   --      Radiology(recent) No radiology results for the last day    I reviewed the patient's new clinical results.    Assessment & Plan       Acute hyperkalemia    Gastroesophageal reflux disease    ESRD (end stage renal disease)    Type 2 diabetes mellitus, without long-term current use of insulin    Mixed hyperlipidemia    Hypertension    Obesity (BMI 30-39.9)    Chronic obstructive pulmonary disease    Obstructive sleep apnea syndrome    Ventral hernia without obstruction or gangrene    Preoperative cardiovascular examination    Multiple malignancies      74 y.o. female POD 1 status post robotic ventral hernia repair with mesh and extensive lysis of adhesions    Diet as tolerated  Discussed throat soreness was likely from ET tube placement, will add throat spray as needed  Antiemetics and pain medication as needed  Use incentive spirometer bedside 10 times an hour while awake  Discussed increasing mobilization as tolerated, out of bed to chair.  Can ambulate in hallways  Continue bowel regimen  Anticipate discharging once pain is controlled.  Will continue to follow        This note was created using Dragon Voice Recognition software.    JAMILA Herman  05/16/25  09:31 EDT

## 2025-05-16 NOTE — CASE MANAGEMENT/SOCIAL WORK
Continued Stay Note  [facility]     Patient Name: Sherry Lobato  MRN: [id number]  Today's Date: 5/16/2025    Admit Date: 5/11/2025    Plan: Return home with S/O   Discharge Plan       Row Name 05/16/25 1056       Plan    Plan Comments DC Barriers: hernia repair yesterday, 3.5L O2, PT/OT             Cheyanne Angelo RN     [facility]  Office: [phone]  Cell: [phone]  Fax # [phone]

## 2025-05-16 NOTE — PLAN OF CARE
Goal Outcome Evaluation:              Outcome Evaluation: 74 y.o. 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  Found to have recurrent ventral hernia.  She underwent hernia repair on 5/15.  At baseline, pt lives w/ her significant other in single level function home w/ 2 stairs and handrail to enter. She does not use home O2 and is able to amb around her home and for short community distances w/o assistive device.  This date pt requires assist for lower body ADLs due to difficulty reaching to feet with abdominal incisions.  She is SOA at rest and has difficulty speaking this date, though O2 sats are WFL throughout therapy evaluation.  She was able to stand with CGA, though became dizzy and unable to progress past standing this date.  Anticipate she will be safe to return home with assist from family at discharge.    Anticipated Discharge Disposition (OT): skilled nursing facility

## 2025-05-16 NOTE — PROGRESS NOTES
[facility] MEDICINE SERVICE  DAILY PROGRESS NOTE    NAME: Sherry Lobato  : 1950  MRN: [id number]      LOS: 5 days     PROVIDER OF SERVICE: Waylon Buchanan MD    Chief Complaint: Acute hyperkalemia    Subjective:     Interval History: Patient still with some abdominal pain today.  She also complains of some sore throat.  She denies any flatus or bowel movement yet.        Review of Systems:   Review of Systems    Objective:     Vital Signs  Temp:  [97.7 °F (36.5 °C)-98.5 °F (36.9 °C)] 98.3 °F (36.8 °C)  Heart Rate:  [78-99] 90  Resp:  [10-22] 17  BP: (102-140)/(64-96) 132/96  Flow (L/min) (Oxygen Therapy):  [2-6] 3.5   Body mass index is 31.25 kg/m².    Physical Exam  Physical Exam  General Appearance:  Alert, cooperative, no distress, appears stated age  Head:  Normocephalic, without obvious abnormality, atraumatic  Eyes:  PERRL, conjunctiva/corneas clear, EOM's intact, fundi benign, both eyes  Ears:  Normal TM's and external ear canals, both ears  Nose: Nares normal, septum midline, mucosa normal, no drainage or sinus tenderness  Throat: Lips, mucosa, and tongue normal; teeth and gums normal  Neck: Supple, symmetrical, trachea midline, no adenopathy, thyroid: not enlarged, symmetric, no tenderness/mass/nodules, no carotid bruit or JVD  Lungs:   Clear to auscultation bilaterally, respirations unlabored  Heart:  Regular rate and rhythm, S1, S2 normal, no murmur, rub or gallop  Abdomen:  Soft, non-tender, bowel sounds active all four quadrants,  no masses, no organomegaly, abdominal surgical incisions intact  Extremities: Extremities normal, atraumatic, no cyanosis or edema  Pulses: 2+ and symmetric  Skin: Skin color, texture, turgor normal, no rashes or lesions  Neurologic: Normal         Diagnostic Data    Results from last 7 days   Lab Units 05/15/25  0125 25  1852 25  1715   WBC 10*3/mm3 9.02   < > 15.03*   HEMOGLOBIN g/dL 9.4*   < > 11.2*   HEMATOCRIT % 30.4*   < > 36.4   PLATELETS  10*3/mm3 274   < > 376   GLUCOSE mg/dL 131*   < > 97   CREATININE mg/dL 3.46*   < > 8.74*   BUN mg/dL 11   < > 55*   SODIUM mmol/L 132*   < > 142   POTASSIUM mmol/L 4.1   < > 6.9*   AST (SGOT) U/L  --   --  16   ALT (SGPT) U/L  --   --  15   ALK PHOS U/L  --   --  98   BILIRUBIN mg/dL  --   --  0.2   ANION GAP mmol/L 13.1   < > 20.1*    < > = values in this interval not displayed.       No radiology results for the last day        I reviewed the patient's new clinical results.    Assessment/Plan:     Active and Resolved Problems  Active Hospital Problems    Diagnosis  POA    **Acute hyperkalemia [E87.5]  Yes    Ventral hernia without obstruction or gangrene [K43.9]  Yes    Preoperative cardiovascular examination [Z01.810]  Not Applicable    Multiple malignancies [C80.0]  Yes    Obesity (BMI 30-39.9) [E66.9]  Yes    Chronic obstructive pulmonary disease [J44.9]  Yes    ESRD (end stage renal disease) [N18.6]  Yes    Obstructive sleep apnea syndrome [G47.33]  Yes    Hypertension [I10]  Yes    Mixed hyperlipidemia [E78.2]  Yes    Gastroesophageal reflux disease [K21.9]  Yes    Type 2 diabetes mellitus, without long-term current use of insulin [E11.9]  Yes      Resolved Hospital Problems   No resolved problems to display.       Acute hyperkalemia  - Patient has underlying ESRD and underwent emergent HD on 5/11  - Continue HD schedule per nephrology on [facility] schedule  - Potassium better today after receiving HD on 5/13 and 5/14    Abdominal incisional hernia  - Patient has multiple hernias in the past requiring surgical intervention due to multiple abdominal surgeries  - Underwent hernia repair on 5/15  - Continue pain control  - Encourage ambulation  - Awaiting return of bowel function and advance diet as tolerated per general surgery  - Chloraseptic spray for sore throat, likely related to intubation    ESRD on HD  - Continue HD schedule per nephrology    Anemia of CKD  - H&H stable  - Monitor hemoglobin  postoperatively    Hypertension  - Holding home antihypertensives for now given patient was hypotensive on admission  - Resume home medications once BP improves    VTE Prophylaxis:  Pharmacologic VTE prophylaxis orders are present.             Disposition Planning:     Barriers to Discharge: Surgical intervention for hernia  Anticipated Date of Discharge: 5/19  Place of Discharge: Home      Time: 45 minutes     Code Status and Medical Interventions: CPR (Attempt to Resuscitate); Full Support   Ordered at: 05/11/25 1945     Code Status (Patient has no pulse and is not breathing):    CPR (Attempt to Resuscitate)     Medical Interventions (Patient has pulse or is breathing):    Full Support     Level Of Support Discussed With:    Patient       Signature: Electronically signed by Waylon Buchanan MD, 05/16/25, 12:21 EDT.  [facility] ([facility]ist Team

## 2025-05-16 NOTE — PLAN OF CARE
Goal Outcome Evaluation:  Plan of Care Reviewed With: patient           Outcome Evaluation: 75 yo female adm 5/11/25 for abdominal pain, hyperkalemia (life-threatening level). Underwent emergent HD on 5/11 due to severe hyperkalemia. Pt then underwent ventral hernia repair w/ lysis of adhesions on 5/15. Pt now POD1. PMH: gout, esrd/hd, lung ca, dm, multiple malignancies, XRT. At baseline, pt lives w/ her significant other in single level function home w/ 2 stairs and handrail to enter. She does not use home O2 and is able to amb around her home and for short community distances w/o assistive device. Today, pt is alert and oriented, but she is soa at rest. Her O2 sats are 100% on 3.5L. She demonstrates normal strength. Pt is hoarse and noting she is having difficulty coughing anything loose. She is able to come to stand w/ cga at rw. However, pt has iv in her R ankle and became dizzy and painful in standing. Therefore she was not able to amb more than a few steps. Expect that pt will progress well and be safe for home w/ family at d/c. Will follow.

## 2025-05-16 NOTE — THERAPY EVALUATION
Patient Name: Sherry Lobato  : 1950    MRN: [id number]                              Today's Date: 2025       Admit Date: 2025    Visit Dx:     ICD-10-CM ICD-9-CM   1. Hyperkalemia  E87.5 276.7   2. Epigastric pain  R10.13 789.06     Patient Active Problem List   Diagnosis    B12 deficiency    Barretts esophagus    Gastroesophageal reflux disease    ESRD (end stage renal disease)    Degeneration of intervertebral disc of lumbar region    Type 2 diabetes mellitus, without long-term current use of insulin    Gout    Mixed hyperlipidemia    Hypertension    Insomnia    Bilateral carotid artery stenosis    Vitamin D deficiency    Hordeolum externum of right lower eyelid    Obesity (BMI 30-39.9)    Diabetic peripheral neuropathy    Chronic obstructive pulmonary disease    Degeneration of lumbar intervertebral disc    Disorder of phosphorus metabolism    Arteriovenous fistula    Bilateral pseudophakia    Obstructive sleep apnea syndrome    Steal syndrome dialysis vascular access    Non-small cell lung cancer    Warthin's tumor    Secondary hyperparathyroidism of renal origin    Acute hyperkalemia    Ventral hernia without obstruction or gangrene    Preoperative cardiovascular examination    Multiple malignancies     Past Medical History:   Diagnosis Date    Acute upper GI bleeding 2024    Anemia due to GI blood loss 2024    Lucas's esophagus     Barretts esophagus 04/10/2012    C. difficile colitis     Carcinoma, renal cell 04/10/2012    Carotid artery disease     -50-74% left, 16-49% right ()    COPD (chronic obstructive pulmonary disease)     DM2 (diabetes mellitus, type 2)     ESRD (end stage renal disease)     on HD    Gastric ulcer     at anastomatic site    Gastroparesis     unknown    GERD (gastroesophageal reflux disease)     Gout     not current    Hemodialysis patient     mwf    Hernia, incisional     abdomen    History of colonoscopy     UTD    History of degenerative disc  disease     Hyperlipidemia     Hypertension     Medicare annual wellness visit, subsequent 11/07/2020    Microscopic colitis     Nephrotic syndrome     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    Neuroendocrine tumor     of stomach    Non-small cell lung cancer 10/26/2023    Orthostatic hypotension 11/07/2017    FABY (obstructive sleep apnea)     not treating    Pedal edema     ressolved    Pneumonia 11/11/2024    Proteinuria     Renal cell cancer, right     Rib pain on right side     chronic    Rotator cuff tear     bilateral    Secondary hyperparathyroidism of renal origin 08/29/2024    Steal syndrome dialysis vascular access 04/16/2024    Stomach cancer 2016    Stroke     Tobacco abuse 07/24/2020    Uterine cancer     Vitamin B 12 deficiency     Vitamin D deficiency     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     Past Surgical History:   Procedure Laterality Date    APPENDECTOMY      ARTERIOVENOUS FISTULA Right     ARTERIOVENOUS FISTULA REPAIR      Clotted off and insert graft    ARTERIOVENOUS FISTULA/SHUNT SURGERY Left 12/30/2020    Procedure: ARTERIOVENOUS FISTULA FORMATION;  Surgeon: Jan Caicedo MD;  Location: [facility] MAIN OR;  Service: Vascular;  Laterality: Left;    ARTERIOVENOUS FISTULA/SHUNT SURGERY Left 4/23/2024    Procedure: FISTULOGRAM WITH SUBCLAVIAN ARTERY ANGIOPLASTY, fistula banding;  Surgeon: Franki Delgadillo II, MD;  Location: [facility] HYBRID OR;  Service: Vascular;  Laterality: Left;    BREAST BIOPSY      right nipple 1981    BRONCHOSCOPY N/A 6/11/2024    Procedure: BRONCHOSCOPY WITH RIGHT LUNG WASHING;  Surgeon: Aramis Barragan MD;  Location: [facility] ENDOSCOPY;  Service: Pulmonary;  Laterality: N/A;  RML atelectasis    COLONOSCOPY N/A 11/24/2020    Procedure: COLONOSCOPY with polypectomy x 7;  Surgeon: Jeffery White MD;  Location: [facility] ENDOSCOPY;  Service: Gastroenterology;  Laterality: N/A;  post op: polyps, diverticulosis, hemorrhoids    COLONOSCOPY N/A 6/1/2023    Procedure: COLONOSCOPY with polypectomy x  3 biopsy x 1;  Surgeon: Jeffery White MD;  Location: [facility] ENDOSCOPY;  Service: Gastroenterology;  Laterality: N/A;  diverticulosis polyps    COLONOSCOPY N/A 9/4/2024    Procedure: COLONOSCOPY WITH POLYPECTOMY X 1;  Surgeon: Jeffery White MD;  Location: [facility] ENDOSCOPY;  Service: Gastroenterology;  Laterality: N/A;  POST- POLYP, DIVERTICULOSIS, INTERNAL AND EXTERNAL HEMORRHOIDS    ENDOSCOPY N/A 03/12/2022    Procedure: ESOPHAGOGASTRODUODENOSCOPY with biopsy x 1 area;  Surgeon: Javan Augustin MD;  Location: [facility] ENDOSCOPY;  Service: Gastroenterology;  Laterality: N/A;  post op: anastamosis    ENDOSCOPY N/A 10/9/2023    Procedure: ESOPHAGOGASTRODUODENOSCOPY with biopsy x3 areas;  Surgeon: Ace Campbell MD;  Location: [facility] ENDOSCOPY;  Service: Gastroenterology;  Laterality: N/A;  duodenal ulcers;irregular z line    ENDOSCOPY N/A 8/27/2024    Procedure: ESOPHAGOGASTRODUODENOSCOPY with argon plasma coagulation;  Surgeon: HILARIO Blackmon MD;  Location: [facility] ENDOSCOPY;  Service: Gastroenterology;  Laterality: N/A;  post op: AVM    ENTEROSCOPY SMALL BOWEL N/A 9/4/2024    Procedure: ESOPHAGOGASTRODUODENOSCOPY WITH SMALL BOWEL ENTEROSCOPY, ARGON PLASMA COAGULATION AND ENDOSCOPIC CLIPPING X 2 OF GASTRIC ANTRAL VASCULAR ECTASIA;  Surgeon: Jeffery White MD;  Location: [facility] ENDOSCOPY;  Service: Gastroenterology;  Laterality: N/A;  POST- GAVE    GASTRIC RESECTION      cancer    HYSTERECTOMY      LAPAROSCOPIC CHOLECYSTECTOMY      NEPHRECTOMY      right kidney removed     REDUCTION MAMMAPLASTY      TUMOR REMOVAL      boils    VENTRAL/INCISIONAL HERNIA REPAIR Right 08/28/2019    Procedure: VENTRAL/INCISIONAL HERNIA REPAIR;  Surgeon: Nilay Stevens MD;  Location: [facility] MAIN OR;  Service: General    VENTRAL/INCISIONAL HERNIA REPAIR N/A 10/01/2019    Procedure: OPEN VENTRAL/INCISIONAL HERNIA REPAIR;  Surgeon: Nilay Stevens MD;  Location: [facility] MAIN OR;  Service: General    VENTRAL/INCISIONAL HERNIA  REPAIR N/A 10/11/2021    Procedure: VENTRAL/INCISIONAL HERNIA REPAIR LAPAROSCOPIC;  Surgeon: Nilay Stevens MD;  Location: [facility] MAIN OR;  Service: General;  Laterality: N/A;    VENTRAL/INCISIONAL HERNIA REPAIR N/A 01/23/2023    Procedure: VENTRAL/INCISIONAL HERNIA REPAIR with MESH;  Surgeon: Nilay Stevens MD;  Location: [facility] MAIN OR;  Service: General;  Laterality: N/A;      General Information       Row Name 05/16/25 1408          OT Time and Intention    Document Type evaluation  -SR     Mode of Treatment occupational therapy  -SR       Row Name 05/16/25 1408          Occupational Profile    Reason for Services/Referral (Occupational Profile) 74 y.o. female with a CMH of morbid obesity, FABY, former smoker, hypertension, hyperlipidemia, type 2 diabetes mellitus diet controlled, GI bleed due to gastric angiectasia, adenocarcinoma of the lung right upper lobe status post radiation, RCC status post right total nephrectomy, end-stage renal disease on hemodialysis Monday Wednesday Friday, anemia of CKD, GERD/Lucas's esophagus, low vitamin B-12/D deficiency, history of benign endocrine tumor status post partial gastrectomy who presented to [facility] on 5/11/2025 with abdominal pain.  Found to have recurrent ventral hernia.  She underwent hernia repair on 5/15.  At baseline, pt lives w/ her significant other in single level function home w/ 2 stairs and handrail to enter. She does not use home O2 and is able to amb around her home and for short community distances w/o assistive device.  -SR       Row Name 05/16/25 1408          Living Environment    Current Living Arrangements home  -SR       Row Name 05/16/25 1408          Safety Issues/Impairments Affecting Functional Mobility    Impairments Affecting Function (Mobility) endurance/activity tolerance;shortness of breath  -SR               User Key  (r) = Recorded By, (t) = Taken By, (c) = Cosigned By      Initials Name Provider Type    SR  Julee Morris OT Occupational Therapist                     Mobility/ADL's       Row Name 05/16/25 1409          Sit-Stand Transfer    Sit-Stand Mower (Transfers) contact guard  -SR     Assistive Device (Sit-Stand Transfers) walker, front-wheeled  -SR       Row Name 05/16/25 1409          Functional Mobility    Functional Mobility- Comment Did not attempt due to dizziness this date  -SR       Row Name 05/16/25 1409          Activities of Daily Living    BADL Assessment/Intervention lower body dressing  -SR       Row Name 05/16/25 1409          Lower Body Dressing Assessment/Training    Mower Level (Lower Body Dressing) don;socks;dependent (less than 25% patient effort)  -SR     Comment, (Lower Body Dressing) Unable to currently reach feet.  Will have assist from s/o as needed.  -SR               User Key  (r) = Recorded By, (t) = Taken By, (c) = Cosigned By      Initials Name Provider Type    SR Julee Morris OT Occupational Therapist                   Obj/Interventions       Row Name 05/16/25 1411          Range of Motion Comprehensive    General Range of Motion no range of motion deficits identified  -SR       Row Name 05/16/25 1411          Strength Comprehensive (MMT)    Comment, General Manual Muscle Testing (MMT) Assessment Did not MMT proximally due to pain in abdomen.  Good  strength.  -SR       Row Name 05/16/25 1411          Balance    Dynamic Sitting Balance independent  -SR     Static Standing Balance contact guard  -SR     Dynamic Standing Balance contact guard  -SR     Balance Interventions sitting;standing;sit to stand;supported;static;dynamic;minimal challenge  -SR               User Key  (r) = Recorded By, (t) = Taken By, (c) = Cosigned By      Initials Name Provider Type    SR Julee Morris OT Occupational Therapist                   Goals/Plan       Row Name 05/16/25 1418          Bathing Goal 1 (OT)    Activity/Device (Bathing Goal 1, OT) bathing  skills, all  -SR     [place] Level/Cues Needed (Bathing Goal 1, OT) modified independence  -SR     Time Frame (Bathing Goal 1, OT) 2 weeks  -SR       Row Name 05/16/25 1418          Toileting Goal 1 (OT)    Activity/Device (Toileting Goal 1, OT) toileting skills, all  -SR     [place] Level/Cues Needed (Toileting Goal 1, OT) modified independence  -SR     Time Frame (Toileting Goal 1, OT) 2 weeks  -SR       Row Name 05/16/25 1418          Therapy Assessment/Plan (OT)    Planned Therapy Interventions (OT) activity tolerance training;BADL retraining;IADL retraining;functional balance retraining;neuromuscular control/coordination retraining;ROM/therapeutic exercise;transfer/mobility retraining;strengthening exercise;occupation/activity based interventions  -SR               User Key  (r) = Recorded By, (t) = Taken By, (c) = Cosigned By      Initials Name Provider Type    SR Julee Morris, OT Occupational Therapist                   Clinical Impression       Row Name 05/16/25 1413          Pain Assessment    Pretreatment Pain Rating 9/10  -SR     Posttreatment Pain Rating 9/10  -SR     Pain Location abdomen  -SR       Row Name 05/16/25 1413          Plan of Care Review    Outcome Evaluation 74 y.o. female with a CMH of morbid obesity, FABY, former smoker, hypertension, hyperlipidemia, type 2 diabetes mellitus diet controlled, GI bleed due to gastric angiectasia, adenocarcinoma of the lung right upper lobe status post radiation, RCC status post right total nephrectomy, end-stage renal disease on hemodialysis Monday Wednesday Friday, anemia of CKD, GERD/Lucas's esophagus, low vitamin B-12/D deficiency, history of benign endocrine tumor status post partial gastrectomy who presented to [facility] on 5/11/2025 with abdominal pain.  Found to have recurrent ventral hernia.  She underwent hernia repair on 5/15.  At baseline, pt lives w/ her significant other in single level function home w/ 2 stairs  and handrail to enter. She does not use home O2 and is able to amb around her home and for short community distances w/o assistive device.  This date pt requires assist for lower body ADLs due to difficulty reaching to feet with abdominal incisions.  She is SOA at rest and has difficulty speaking this date, though O2 sats are WFL throughout therapy evaluation.  She was able to stand with CGA, though became dizzy and unable to progress past standing this date.  Anticipate she will be safe to return home with assist from family at discharge.  -SR       Row Name 05/16/25 1413          Therapy Assessment/Plan (OT)    Rehab Potential (OT) good  -SR     Criteria for Skilled Therapeutic Interventions Met (OT) yes  -SR     Therapy Frequency (OT) 5 times/wk  -SR       Row Name 05/16/25 1413          Therapy Plan Review/Discharge Plan (OT)    Anticipated Discharge Disposition (OT) skilled nursing facility  -SR       Row Name 05/16/25 1413          Positioning and Restraints    Pre-Treatment Position sitting in chair/recliner  -SR     Post Treatment Position chair  -SR     In Chair call light within reach;encouraged to call for assist;exit alarm on  -SR               User Key  (r) = Recorded By, (t) = Taken By, (c) = Cosigned By      Initials Name Provider Type    SR Julee Morris, OT Occupational Therapist                   Outcome Measures       Row Name 05/16/25 1032 05/16/25 0800       How much help from another person do you currently need...    Turning from your back to your side while in flat bed without using bedrails? 2  -CM 4  -HI    Moving from lying on back to sitting on the side of a flat bed without bedrails? 2  -CM 4  -HI    Moving to and from a bed to a chair (including a wheelchair)? 3  -CM 4  -HI    Standing up from a chair using your arms (e.g., wheelchair, bedside chair)? 3  -CM 4  -HI    Climbing 3-5 steps with a railing? 1  -CM 3  -HI    To walk in hospital room? 2  -CM 3  -HI    AM-PAC 6 Clicks  Score (PT) 13  -CM 22  -HI      Row Name 05/16/25 0400          How much help from another person do you currently need...    Turning from your back to your side while in flat bed without using bedrails? 4  -CS     Moving from lying on back to sitting on the side of a flat bed without bedrails? 4  -CS     Moving to and from a bed to a chair (including a wheelchair)? 4  -CS     Standing up from a chair using your arms (e.g., wheelchair, bedside chair)? 4  -CS     Climbing 3-5 steps with a railing? 3  -CS     To walk in hospital room? 3  -CS     AM-PAC 6 Clicks Score (PT) 22  -CS               User Key  (r) = Recorded By, (t) = Taken By, (c) = Cosigned By      Initials Name Provider Type    CM Jessica Petersen, PT Physical Therapist    Concetta Alexander RN Registered Nurse    Jessica Carmen RN Registered Nurse                    Occupational Therapy Education       Title: PT OT SLP Therapies (In Progress)       Topic: Occupational Therapy (In Progress)       Point: ADL training (In Progress)       Learning Progress Summary            Patient Acceptance, E,TB, NR by SR at 5/16/2025 1418                      Point: Body mechanics (In Progress)       Learning Progress Summary            Patient Acceptance, E,TB, NR by SR at 5/16/2025 1418                                      User Key       Initials Effective Dates Name Provider Type Discipline     06/16/21 -  Julee Morris OT Occupational Therapist OT                  OT Recommendation and Plan  Planned Therapy Interventions (OT): activity tolerance training, BADL retraining, IADL retraining, functional balance retraining, neuromuscular control/coordination retraining, ROM/therapeutic exercise, transfer/mobility retraining, strengthening exercise, occupation/activity based interventions  Therapy Frequency (OT): 5 times/wk  Plan of Care Review  Outcome Evaluation: 74 y.o. female with a CMH of morbid obesity, FABY, former smoker, hypertension,  hyperlipidemia, type 2 diabetes mellitus diet controlled, GI bleed due to gastric angiectasia, adenocarcinoma of the lung right upper lobe status post radiation, RCC status post right total nephrectomy, end-stage renal disease on hemodialysis Monday Wednesday Friday, anemia of CKD, GERD/Lucas's esophagus, low vitamin B-12/D deficiency, history of benign endocrine tumor status post partial gastrectomy who presented to [facility] on 5/11/2025 with abdominal pain.  Found to have recurrent ventral hernia.  She underwent hernia repair on 5/15.  At baseline, pt lives w/ her significant other in single level function home w/ 2 stairs and handrail to enter. She does not use home O2 and is able to amb around her home and for short community distances w/o assistive device.  This date pt requires assist for lower body ADLs due to difficulty reaching to feet with abdominal incisions.  She is SOA at rest and has difficulty speaking this date, though O2 sats are WFL throughout therapy evaluation.  She was able to stand with CGA, though became dizzy and unable to progress past standing this date.  Anticipate she will be safe to return home with assist from family at discharge.     Time Calculation:         Time Calculation- OT       Row Name 05/16/25 1419             Time Calculation- OT    OT Start Time 0920  -SR      OT Stop Time 0938  -SR      OT Time Calculation (min) 18 min  -SR      Total Timed Code Minutes- OT 0 minute(s)  -SR      OT Received On 05/16/25  -SR      OT - Next Appointment 05/17/25  -SR      OT Goal Re-Cert Due Date 05/30/25  -SR                User Key  (r) = Recorded By, (t) = Taken By, (c) = Cosigned By      Initials Name Provider Type    SR Julee Morris OT Occupational Therapist                  Therapy Charges for Today       Code Description Service Date Service Provider Modifiers Qty    10908259724 HC OT EVAL MOD COMPLEXITY 4 5/16/2025 Julee Morris OT GO 1                  Julee Morris, OT  5/16/2025

## 2025-05-17 ENCOUNTER — APPOINTMENT (OUTPATIENT)
Dept: CT IMAGING | Facility: HOSPITAL | Age: 75
DRG: 335 | End: 2025-05-17
Payer: MEDICARE

## 2025-05-17 LAB
ALBUMIN SERPL-MCNC: 3.9 G/DL (ref 3.5–5.2)
ANION GAP SERPL CALCULATED.3IONS-SCNC: 10.2 MMOL/L (ref 5–15)
BUN SERPL-MCNC: 13 MG/DL (ref 8–23)
BUN/CREAT SERPL: 3.1 (ref 7–25)
CALCIUM SPEC-SCNC: 9.2 MG/DL (ref 8.6–10.5)
CHLORIDE SERPL-SCNC: 94 MMOL/L (ref 98–107)
CO2 SERPL-SCNC: 24.8 MMOL/L (ref 22–29)
CREAT SERPL-MCNC: 4.21 MG/DL (ref 0.57–1)
DEPRECATED RDW RBC AUTO: 59.2 FL (ref 37–54)
EGFRCR SERPLBLD CKD-EPI 2021: 10.5 ML/MIN/1.73
ERYTHROCYTE [DISTWIDTH] IN BLOOD BY AUTOMATED COUNT: 16.7 % (ref 12.3–15.4)
GLUCOSE BLDC GLUCOMTR-MCNC: 112 MG/DL (ref 70–105)
GLUCOSE BLDC GLUCOMTR-MCNC: 144 MG/DL (ref 70–105)
GLUCOSE BLDC GLUCOMTR-MCNC: 59 MG/DL (ref 70–105)
GLUCOSE BLDC GLUCOMTR-MCNC: 89 MG/DL (ref 70–105)
GLUCOSE SERPL-MCNC: 66 MG/DL (ref 65–99)
HCT VFR BLD AUTO: 32 % (ref 34–46.6)
HGB BLD-MCNC: 10 G/DL (ref 12–15.9)
MCH RBC QN AUTO: 32.1 PG (ref 26.6–33)
MCHC RBC AUTO-ENTMCNC: 31.3 G/DL (ref 31.5–35.7)
MCV RBC AUTO: 102.6 FL (ref 79–97)
PHOSPHATE SERPL-MCNC: 5.3 MG/DL (ref 2.5–4.5)
PLATELET # BLD AUTO: 255 10*3/MM3 (ref 140–450)
PMV BLD AUTO: 10.4 FL (ref 6–12)
POTASSIUM SERPL-SCNC: 4.4 MMOL/L (ref 3.5–5.2)
RBC # BLD AUTO: 3.12 10*6/MM3 (ref 3.77–5.28)
SODIUM SERPL-SCNC: 129 MMOL/L (ref 136–145)
WBC NRBC COR # BLD AUTO: 10.71 10*3/MM3 (ref 3.4–10.8)

## 2025-05-17 PROCEDURE — 94799 UNLISTED PULMONARY SVC/PX: CPT

## 2025-05-17 PROCEDURE — 97112 NEUROMUSCULAR REEDUCATION: CPT

## 2025-05-17 PROCEDURE — 97116 GAIT TRAINING THERAPY: CPT

## 2025-05-17 PROCEDURE — 82948 REAGENT STRIP/BLOOD GLUCOSE: CPT

## 2025-05-17 PROCEDURE — 85027 COMPLETE CBC AUTOMATED: CPT | Performed by: INTERNAL MEDICINE

## 2025-05-17 PROCEDURE — 80069 RENAL FUNCTION PANEL: CPT | Performed by: INTERNAL MEDICINE

## 2025-05-17 PROCEDURE — 63710000001 ONDANSETRON ODT 4 MG TABLET DISPERSIBLE: Performed by: STUDENT IN AN ORGANIZED HEALTH CARE EDUCATION/TRAINING PROGRAM

## 2025-05-17 PROCEDURE — 94761 N-INVAS EAR/PLS OXIMETRY MLT: CPT

## 2025-05-17 PROCEDURE — 25010000002 HYDROMORPHONE PER 4 MG: Performed by: STUDENT IN AN ORGANIZED HEALTH CARE EDUCATION/TRAINING PROGRAM

## 2025-05-17 PROCEDURE — 74176 CT ABD & PELVIS W/O CONTRAST: CPT

## 2025-05-17 RX ORDER — CALCIUM ACETATE 667 MG/1
667 CAPSULE ORAL 2 TIMES DAILY
Status: DISCONTINUED | OUTPATIENT
Start: 2025-05-17 | End: 2025-05-17

## 2025-05-17 RX ORDER — ALUMINA, MAGNESIA, AND SIMETHICONE 2400; 2400; 240 MG/30ML; MG/30ML; MG/30ML
15 SUSPENSION ORAL EVERY 6 HOURS PRN
Status: DISCONTINUED | OUTPATIENT
Start: 2025-05-17 | End: 2025-05-18 | Stop reason: HOSPADM

## 2025-05-17 RX ORDER — AMOXICILLIN 250 MG
2 CAPSULE ORAL 2 TIMES DAILY
Status: DISCONTINUED | OUTPATIENT
Start: 2025-05-17 | End: 2025-05-18 | Stop reason: HOSPADM

## 2025-05-17 RX ORDER — METOPROLOL SUCCINATE 25 MG/1
25 TABLET, EXTENDED RELEASE ORAL 2 TIMES DAILY
Status: DISCONTINUED | OUTPATIENT
Start: 2025-05-17 | End: 2025-05-18 | Stop reason: HOSPADM

## 2025-05-17 RX ORDER — METHOCARBAMOL 500 MG/1
500 TABLET, FILM COATED ORAL EVERY 8 HOURS SCHEDULED
Status: DISCONTINUED | OUTPATIENT
Start: 2025-05-17 | End: 2025-05-18 | Stop reason: HOSPADM

## 2025-05-17 RX ORDER — CHOLECALCIFEROL (VITAMIN D3) 25 MCG
1000 TABLET ORAL DAILY
Status: DISCONTINUED | OUTPATIENT
Start: 2025-05-17 | End: 2025-05-18 | Stop reason: HOSPADM

## 2025-05-17 RX ORDER — ASPIRIN 81 MG/1
81 TABLET, CHEWABLE ORAL DAILY
Status: DISCONTINUED | OUTPATIENT
Start: 2025-05-17 | End: 2025-05-18 | Stop reason: HOSPADM

## 2025-05-17 RX ADMIN — IPRATROPIUM BROMIDE AND ALBUTEROL SULFATE 3 ML: .5; 3 SOLUTION RESPIRATORY (INHALATION) at 12:23

## 2025-05-17 RX ADMIN — ACETAMINOPHEN 1000 MG: 500 TABLET ORAL at 00:15

## 2025-05-17 RX ADMIN — SEVELAMER CARBONATE 800 MG: 800 TABLET, FILM COATED ORAL at 17:20

## 2025-05-17 RX ADMIN — SEVELAMER CARBONATE 800 MG: 800 TABLET, FILM COATED ORAL at 12:46

## 2025-05-17 RX ADMIN — ACETAMINOPHEN 1000 MG: 500 TABLET ORAL at 14:24

## 2025-05-17 RX ADMIN — ASPIRIN 81 MG CHEWABLE TABLET 81 MG: 81 TABLET CHEWABLE at 12:46

## 2025-05-17 RX ADMIN — OXYCODONE 10 MG: 5 TABLET ORAL at 08:22

## 2025-05-17 RX ADMIN — PANTOPRAZOLE SODIUM 40 MG: 40 TABLET, DELAYED RELEASE ORAL at 17:20

## 2025-05-17 RX ADMIN — METHOCARBAMOL 500 MG: 500 TABLET ORAL at 08:23

## 2025-05-17 RX ADMIN — METHOCARBAMOL TABLETS 500 MG: 500 TABLET, COATED ORAL at 14:24

## 2025-05-17 RX ADMIN — Medication 10 ML: at 20:43

## 2025-05-17 RX ADMIN — TEMAZEPAM 30 MG: 15 CAPSULE ORAL at 20:58

## 2025-05-17 RX ADMIN — ALUMINUM HYDROXIDE, MAGNESIUM HYDROXIDE, AND DIMETHICONE 15 ML: 400; 400; 40 SUSPENSION ORAL at 12:46

## 2025-05-17 RX ADMIN — POLYETHYLENE GLYCOL 3350 17 G: 17 POWDER, FOR SOLUTION ORAL at 08:23

## 2025-05-17 RX ADMIN — SODIUM BICARBONATE 650 MG: 650 TABLET ORAL at 17:20

## 2025-05-17 RX ADMIN — SEVELAMER CARBONATE 800 MG: 800 TABLET, FILM COATED ORAL at 08:23

## 2025-05-17 RX ADMIN — SODIUM BICARBONATE 650 MG: 650 TABLET ORAL at 08:23

## 2025-05-17 RX ADMIN — METHOCARBAMOL TABLETS 500 MG: 500 TABLET, COATED ORAL at 20:42

## 2025-05-17 RX ADMIN — Medication 10 ML: at 08:23

## 2025-05-17 RX ADMIN — ACETAMINOPHEN 1000 MG: 500 TABLET ORAL at 20:43

## 2025-05-17 RX ADMIN — ACETAMINOPHEN 1000 MG: 500 TABLET ORAL at 04:31

## 2025-05-17 RX ADMIN — HYDROMORPHONE HYDROCHLORIDE 0.5 MG: 1 INJECTION, SOLUTION INTRAMUSCULAR; INTRAVENOUS; SUBCUTANEOUS at 10:47

## 2025-05-17 RX ADMIN — METOPROLOL SUCCINATE 25 MG: 25 TABLET, EXTENDED RELEASE ORAL at 12:46

## 2025-05-17 RX ADMIN — PANTOPRAZOLE SODIUM 40 MG: 40 TABLET, DELAYED RELEASE ORAL at 08:23

## 2025-05-17 RX ADMIN — METOPROLOL SUCCINATE 25 MG: 25 TABLET, EXTENDED RELEASE ORAL at 20:43

## 2025-05-17 RX ADMIN — SODIUM BICARBONATE 650 MG: 650 TABLET ORAL at 20:43

## 2025-05-17 RX ADMIN — Medication 1000 UNITS: at 12:46

## 2025-05-17 RX ADMIN — ONDANSETRON 4 MG: 4 TABLET, ORALLY DISINTEGRATING ORAL at 08:23

## 2025-05-17 RX ADMIN — SENNOSIDES AND DOCUSATE SODIUM 2 TABLET: 50; 8.6 TABLET ORAL at 20:42

## 2025-05-17 RX ADMIN — SENNOSIDES AND DOCUSATE SODIUM 2 TABLET: 50; 8.6 TABLET ORAL at 12:45

## 2025-05-17 NOTE — PROGRESS NOTES
[facility] Progress Note      Patient Name: Sherry Lobato  : 1950  MRN: [id number]  Primary Care Physician:  Raegan Gotti MD  Date of admission: 2025    Subjective     Interval History:     Dialyzed yesterday without incident  Patient complaining of some dyspnea and cough  Abdominal pain is better    Review of Systems:   As noted above    Objective     Vitals:   Temp:  [97.9 °F (36.6 °C)-98.4 °F (36.9 °C)] 98.1 °F (36.7 °C)  Heart Rate:  [] 90  Resp:  [12-26] 16  BP: ()/() 145/91  Flow (L/min) (Oxygen Therapy):  [2] 2    Intake/Output Summary (Last 24 hours) at 2025 1255  Last data filed at 2025 1100  Gross per 24 hour   Intake 720 ml   Output 1400 ml   Net -680 ml       Physical Exam:    General Appearance: Chronically ill-appearing, NAD  HEENT: oral mucosa normal, nonicteric sclera  Neck: supple, no JVD  Lungs: Bilateral rhonchi  Heart: RRR, normal S1 and S2  Abdomen: soft, mild tenderness  Extremities: trace edema  Neuro: Awake alert and moving all extremities    Scheduled Meds:     acetaminophen, 1,000 mg, Oral, Q6H  aspirin, 81 mg, Oral, Daily  cholecalciferol, 1,000 Units, Oral, Daily  [Held by provider] heparin (porcine), 5,000 Units, Subcutaneous, Q8H  ipratropium-albuterol, 3 mL, Nebulization, 4x Daily - RT  methocarbamol, 500 mg, Oral, Q8H  metoprolol succinate XL, 25 mg, Oral, BID  pantoprazole, 40 mg, Oral, BID AC  polyethylene glycol, 17 g, Oral, Daily  senna-docusate sodium, 2 tablet, Oral, BID  sevelamer, 800 mg, Oral, TID With Meals  sodium bicarbonate, 650 mg, Oral, TID  sodium chloride, 10 mL, Intravenous, Q12H  temazepam, 30 mg, Oral, Nightly      IV Meds:        Results Reviewed:   I have personally reviewed the results from the time of this admission to 2025 12:55 EDT     Results from last 7 days   Lab Units 25  4245 05/15/25  0125 25  0448 25  1852 25  1715   SODIUM mmol/L 129* 132* 137    < > 142   POTASSIUM mmol/L 4.4 4.1 4.7   < > 6.9*   CHLORIDE mmol/L 94* 95* 98   < > 102   CO2 mmol/L 24.8 23.9 27.0   < > 19.9*   BUN mg/dL 13 11 16   < > 55*   CREATININE mg/dL 4.21* 3.46* 3.93*   < > 8.74*   CALCIUM mg/dL 9.2 8.9 9.3   < > 9.5   BILIRUBIN mg/dL  --   --   --   --  0.2   ALK PHOS U/L  --   --   --   --  98   ALT (SGPT) U/L  --   --   --   --  15   AST (SGOT) U/L  --   --   --   --  16   GLUCOSE mg/dL 66 131* 91   < > 97    < > = values in this interval not displayed.     Estimated Creatinine Clearance: 10.4 mL/min (A) (by C-G formula based on SCr of 4.21 mg/dL (H)).  Results from last 7 days   Lab Units 05/17/25  0335 05/15/25  0125 05/14/25  0448 05/13/25  0522 05/12/25  0643   MAGNESIUM mg/dL  --   --  1.9 1.9 1.8   PHOSPHORUS mg/dL 5.3* 5.6* 5.3* 6.7* 3.2         Results from last 7 days   Lab Units 05/17/25  0335 05/15/25  0125 05/14/25  0448 05/13/25  0522 05/12/25  0643   WBC 10*3/mm3 10.71 9.02 8.22 7.12 15.38*   HEMOGLOBIN g/dL 10.0* 9.4* 11.6* 11.1* 12.0   PLATELETS 10*3/mm3 255 274 306 323 317           Assessment / Plan     ASSESSMENT:    End-stage renal disease.  Patient gets dialysis Monday Wednesday and Friday as outpatient.  Electrolytes,   hyperkalemia.  Improved   Hypertension with chronic kidney disease.  Blood pressure okay.  Off antihypertensives currently  Small bowel obstruction.  Secondary to incarcerated incisional hernia.  Status post hernia repair.  Surgery following    PLAN:  Hemodialysis monday  Liberalize diet  Monitor blood pressure and restart antihypertensives if needed  Surveillance labs    Elmo Dodd MD  05/17/25  12:55 EDT    [facility]  [phone]

## 2025-05-17 NOTE — PLAN OF CARE
Goal Outcome Evaluation:           Progress: improving  Outcome Evaluation: Pt resting this shift, denies any pain since this nurse took over care. Call light within reach, bed alarm in place.

## 2025-05-17 NOTE — PROGRESS NOTES
[facility] MEDICINE SERVICE  DAILY PROGRESS NOTE    NAME: Sherry Lobato  : 1950  MRN: [id number]      LOS: 6 days     PROVIDER OF SERVICE: Pancho Ibarra MD    Chief Complaint: Acute hyperkalemia    Subjective:   Patient complaining of abdominal pain.  Sitting in chair.  Denies chest pain nausea vomiting or diarrhea  Interval History:    Patient seen and evaluated at bedside.   Patient lives with her boyfriend.  Treatment plan discussed with patient. All questions addressed.     Review of Systems:   All 21 ROS were negative except mentioned above.    Objective:     Vital Signs  Temp:  [97.9 °F (36.6 °C)-98.4 °F (36.9 °C)] 98.1 °F (36.7 °C)  Heart Rate:  [] 85  Resp:  [12-26] 15  BP: ()/() 149/100  Flow (L/min) (Oxygen Therapy):  [2] 2   Body mass index is 32.26 kg/m².    Physical Exam   General: No acute distress, appears stated age  Neuro: Awake and alert, oriented x3, no focal deficits appreciated  Head: Atraumatic, normocephalic  HEENT: EOMI, anicteric, normal sclerae and conjunctivae, moist mucus membranes  Neck: supple, no lymphadenopathy  CV: RRR, soft heart sounds, no murmurs appreciated, tr  peripheral edema  Pulm: Decreased breath sounds, no increased work of breathing, no adventitious sounds  Abd: Soft, generalized abdominal tenderness, nondistended, abdominal incision clean  Skin: Warm, dry and  Psych: Appropriate mood and affect    Scheduled Meds   acetaminophen, 1,000 mg, Oral, Q6H  [Held by provider] heparin (porcine), 5,000 Units, Subcutaneous, Q8H  ipratropium-albuterol, 3 mL, Nebulization, 4x Daily - RT  methocarbamol, 500 mg, Oral, 4x Daily  pantoprazole, 40 mg, Oral, BID AC  polyethylene glycol, 17 g, Oral, Daily  sevelamer, 800 mg, Oral, TID With Meals  sodium bicarbonate, 650 mg, Oral, TID  sodium chloride, 10 mL, Intravenous, Q12H  temazepam, 30 mg, Oral, Nightly       PRN Meds     albumin human    senna-docusate sodium **AND** polyethylene glycol **AND**  bisacodyl **AND** bisacodyl    calcium carbonate    Calcium Replacement - Follow Nurse / BPA Driven Protocol    dextrose    dextrose    glucagon (human recombinant)    HYDROmorphone    Magnesium Standard Dose Replacement - Follow Nurse / BPA Driven Protocol    nitroglycerin    ondansetron ODT    oxyCODONE    oxyCODONE    phenol    Phosphorus Replacement - Follow Nurse / BPA Driven Protocol    Potassium Replacement - Follow Nurse / BPA Driven Protocol    [COMPLETED] Insert Peripheral IV **AND** sodium chloride    sodium chloride    sodium chloride   Infusions         Diagnostic Data    Results from last 7 days   Lab Units 05/17/25  0335 05/11/25  1852 05/11/25  1715   WBC 10*3/mm3 10.71   < > 15.03*   HEMOGLOBIN g/dL 10.0*   < > 11.2*   HEMATOCRIT % 32.0*   < > 36.4   PLATELETS 10*3/mm3 255   < > 376   GLUCOSE mg/dL 66   < > 97   CREATININE mg/dL 4.21*   < > 8.74*   BUN mg/dL 13   < > 55*   SODIUM mmol/L 129*   < > 142   POTASSIUM mmol/L 4.4   < > 6.9*   AST (SGOT) U/L  --   --  16   ALT (SGPT) U/L  --   --  15   ALK PHOS U/L  --   --  98   BILIRUBIN mg/dL  --   --  0.2   ANION GAP mmol/L 10.2   < > 20.1*    < > = values in this interval not displayed.       CT Abdomen Pelvis Without Contrast  Result Date: 5/17/2025  Impression: 1.Postsurgical changes of recent ventral hernia repair with small foci of gas within the anterior abdominal wall. No organized fluid collection. 2.Postsurgical changes of gastrojejunostomy. No evidence of bowel obstruction. 3.Right lower lobe bronchial wall thickening likely related to bronchitis. Small amount of right lower lobe atelectasis noted. 4.Additional ancillary findings as detailed above. Electronically Signed: Juan Antonio Han MD  5/17/2025 10:11 AM EDT  Workstation ID: [id number]      Interval results reviewed.    Assessment/Plan:   Abdominal pain  Acute bronchitis  Acute hyperkalemia-resolved  Abdominal incisional hernia  End-stage renal disease on hemodialysis  Anemia of chronic  diseases macrocytic anemia  Hypertension  COPD  Obstructive sleep apnea  GERD  Type 2 diabetes  Morbid obesity    Patient has back to normal,, patient on hemodialysis.  Hemodialysis on Monday Wednesday Friday.    CT abdomen obtained for abdominal pain with no acute finding.    Status post abdominal hernia repair on 5/15/2025.  Continue pain control, ambulation.    Will check with surgery if okay to give some laxatives as abdominal pain could be due to constipation.    Start doxycycline 100 mg twice daily for acute bronchitis    Treatment plan discussed with RN.     VTE Prophylaxis:  Pharmacologic VTE prophylaxis orders are present.         Code status is   Code Status and Medical Interventions: CPR (Attempt to Resuscitate); Full Support   Ordered at: 05/11/25 1945     Code Status (Patient has no pulse and is not breathing):    CPR (Attempt to Resuscitate)     Medical Interventions (Patient has pulse or is breathing):    Full Support     Level Of Support Discussed With:    Patient       Plan for disposition:   Barriers to Discharge: Abdominal pain  Anticipated Date of Discharge: 5/19  Place of Discharge: Home      Time: 40 minutes     Signature: Electronically signed by Pancho Ibarra MD, 05/17/25, 11:08 EDT.  [facility] Hospitalist Team

## 2025-05-17 NOTE — THERAPY TREATMENT NOTE
Subjective: Pt agreeable to therapeutic plan of care. RN stated pt on O2 now due to sats dropping on RA and is a mouth breather    Objective:     Precautions - falls, abdominal sparing, O2    Bed mobility - N/A or Not attempted.  Transfers - CGA and with rolling walker  Ambulation - 50 feet CGA and with rolling walker      Vitals:  On 2L O2, sats 96% but HR incr from 87>102 with amb and pt felt SOA    Pain: 4 VAS Location: abdomen  Intervention for pain: Repositioned, Increased Activity, and Therapeutic Presence    Education: Provided education on the importance of mobility in the acute care setting, Verbal/Tactile Cues, Transfer Training, and Gait Training    Assessment: Sherry Lobato presents with functional mobility impairments which indicate the need for skilled intervention. Tolerating session today without incident. Pt req vc's to PLB and not shallow breathe. Amb with slow, guarded mell. Plans on home with assist.Will continue to follow and progress as tolerated.     Plan/Recommendations:   If medically appropriate, No ongoing therapy recommended post-acute care. No therapy needs. Pt requires no DME at discharge.     Pt desires Home with family assist at discharge. Pt cooperative; agreeable to therapeutic recommendations and plan of care.         Basic Mobility 6-click:  Rollin = Total, A lot = 2, A little = 3; 4 = None  Supine>Sit:   1 = Total, A lot = 2, A little = 3; 4 = None   Sit>Stand with arms:  1 = Total, A lot = 2, A little = 3; 4 = None  Bed>Chair:   1 = Total, A lot = 2, A little = 3; 4 = None  Ambulate in room:  1 = Total, A lot = 2, A little = 3; 4 = None  3-5 Steps with railin = Total, A lot = 2, A little = 3; 4 = None  Score: 17    Post-Tx Position: Up in Chair, Alarms activated, and Call light and personal items within reach  PPE: gloves    Therapy Charges for Today       Code Description Service Date Service Provider Modifiers Qty    45056094999 HC PT NEUROMUSC RE  EDUCATION EA 15 MIN 5/17/2025 Destiny Guerrero PTA GP 1    04241780539 HC GAIT TRAINING EA 15 MIN 5/17/2025 Destiny Guerrero PTA GP 1           PT Charges       Row Name 05/17/25 1710             Time Calculation    Start Time 1435  -      Stop Time 1451  -      Time Calculation (min) 16 min  -      PT Received On 05/17/25  -      PT - Next Appointment 05/19/25  -         Time Calculation- PT    Total Timed Code Minutes- PT 16 minute(s)  -                User Key  (r) = Recorded By, (t) = Taken By, (c) = Cosigned By      Initials Name Provider Type     Destiny Guerrero PTA Physical Therapist Assistant

## 2025-05-17 NOTE — PLAN OF CARE
Assessment: Sherry Lobato presents with functional mobility impairments which indicate the need for skilled intervention. Tolerating session today without incident. Pt req vc's to PLB and not shallow breathe. Amb with slow, guarded mell. Plans on home with assist.Will continue to follow and progress as tolerated.

## 2025-05-17 NOTE — DISCHARGE INSTRUCTIONS
no heavy lifting (>10-15 lbs) for at least 6 weeks   no driving while under the influence of narcotics   ok to shower, no bathing/swimming for 2 weeks   call with any questions or concerns

## 2025-05-17 NOTE — PLAN OF CARE
Goal Outcome Evaluation:  Plan of Care Reviewed With: patient        Progress: improving  Outcome Evaluation: Pt resting this shift, c/o abd pain. Pt was given scheduled Tylenol/Robaxin and prn Oxy with barely any relief. Pt had to be given IV pain medication and brought down her pain from 10 to 4.Pt expressed not having a bm since surgery, there is documentation that pt hada small bm on 5/14. Pt said she cannot remember. CT scan showed nothing acute, more meds were added and ambulation was promoted. Pt worked with therapy, able to ambulate around her room with walker and SBA.  at bedside and very supportive. Pt has been heavily encouraged to use Incentive Spirometer. Pt has been up in chair all day, call light within reach, chair alarm in place.

## 2025-05-18 ENCOUNTER — READMISSION MANAGEMENT (OUTPATIENT)
Dept: CALL CENTER | Facility: HOSPITAL | Age: 75
End: 2025-05-18
Payer: MEDICARE

## 2025-05-18 VITALS
SYSTOLIC BLOOD PRESSURE: 120 MMHG | DIASTOLIC BLOOD PRESSURE: 79 MMHG | RESPIRATION RATE: 15 BRPM | HEIGHT: 60 IN | TEMPERATURE: 98.6 F | BODY MASS INDEX: 32.72 KG/M2 | HEART RATE: 77 BPM | WEIGHT: 166.67 LBS | OXYGEN SATURATION: 94 %

## 2025-05-18 LAB
ALBUMIN SERPL-MCNC: 3.6 G/DL (ref 3.5–5.2)
ALBUMIN/GLOB SERPL: 1.3 G/DL
ALP SERPL-CCNC: 88 U/L (ref 39–117)
ALT SERPL W P-5'-P-CCNC: <5 U/L (ref 1–33)
ANION GAP SERPL CALCULATED.3IONS-SCNC: 15.2 MMOL/L (ref 5–15)
AST SERPL-CCNC: 28 U/L (ref 1–32)
BASOPHILS # BLD AUTO: 0.05 10*3/MM3 (ref 0–0.2)
BASOPHILS NFR BLD AUTO: 0.5 % (ref 0–1.5)
BILIRUB SERPL-MCNC: 0.2 MG/DL (ref 0–1.2)
BUN SERPL-MCNC: 25 MG/DL (ref 8–23)
BUN/CREAT SERPL: 4 (ref 7–25)
CALCIUM SPEC-SCNC: 9.1 MG/DL (ref 8.6–10.5)
CHLORIDE SERPL-SCNC: 93 MMOL/L (ref 98–107)
CO2 SERPL-SCNC: 23.8 MMOL/L (ref 22–29)
CREAT SERPL-MCNC: 6.26 MG/DL (ref 0.57–1)
DEPRECATED RDW RBC AUTO: 59.6 FL (ref 37–54)
EGFRCR SERPLBLD CKD-EPI 2021: 6.5 ML/MIN/1.73
EOSINOPHIL # BLD AUTO: 0.68 10*3/MM3 (ref 0–0.4)
EOSINOPHIL NFR BLD AUTO: 6.3 % (ref 0.3–6.2)
ERYTHROCYTE [DISTWIDTH] IN BLOOD BY AUTOMATED COUNT: 16.2 % (ref 12.3–15.4)
GLOBULIN UR ELPH-MCNC: 2.7 GM/DL
GLUCOSE BLDC GLUCOMTR-MCNC: 65 MG/DL (ref 70–105)
GLUCOSE BLDC GLUCOMTR-MCNC: 76 MG/DL (ref 70–105)
GLUCOSE BLDC GLUCOMTR-MCNC: 93 MG/DL (ref 70–105)
GLUCOSE SERPL-MCNC: 78 MG/DL (ref 65–99)
HCT VFR BLD AUTO: 32.8 % (ref 34–46.6)
HGB BLD-MCNC: 10 G/DL (ref 12–15.9)
IMM GRANULOCYTES # BLD AUTO: 0.05 10*3/MM3 (ref 0–0.05)
IMM GRANULOCYTES NFR BLD AUTO: 0.5 % (ref 0–0.5)
LYMPHOCYTES # BLD AUTO: 1 10*3/MM3 (ref 0.7–3.1)
LYMPHOCYTES NFR BLD AUTO: 9.3 % (ref 19.6–45.3)
MCH RBC QN AUTO: 31.3 PG (ref 26.6–33)
MCHC RBC AUTO-ENTMCNC: 30.5 G/DL (ref 31.5–35.7)
MCV RBC AUTO: 102.8 FL (ref 79–97)
MONOCYTES # BLD AUTO: 1.18 10*3/MM3 (ref 0.1–0.9)
MONOCYTES NFR BLD AUTO: 10.9 % (ref 5–12)
NEUTROPHILS NFR BLD AUTO: 7.84 10*3/MM3 (ref 1.7–7)
NEUTROPHILS NFR BLD AUTO: 72.5 % (ref 42.7–76)
NRBC BLD AUTO-RTO: 0 /100 WBC (ref 0–0.2)
PLATELET # BLD AUTO: 256 10*3/MM3 (ref 140–450)
PMV BLD AUTO: 10.8 FL (ref 6–12)
POTASSIUM SERPL-SCNC: 5.5 MMOL/L (ref 3.5–5.2)
PROT SERPL-MCNC: 6.3 G/DL (ref 6–8.5)
RBC # BLD AUTO: 3.19 10*6/MM3 (ref 3.77–5.28)
SODIUM SERPL-SCNC: 132 MMOL/L (ref 136–145)
WBC NRBC COR # BLD AUTO: 10.8 10*3/MM3 (ref 3.4–10.8)

## 2025-05-18 PROCEDURE — 94761 N-INVAS EAR/PLS OXIMETRY MLT: CPT

## 2025-05-18 PROCEDURE — 94799 UNLISTED PULMONARY SVC/PX: CPT

## 2025-05-18 PROCEDURE — 82948 REAGENT STRIP/BLOOD GLUCOSE: CPT | Performed by: STUDENT IN AN ORGANIZED HEALTH CARE EDUCATION/TRAINING PROGRAM

## 2025-05-18 PROCEDURE — 85025 COMPLETE CBC W/AUTO DIFF WBC: CPT | Performed by: INTERNAL MEDICINE

## 2025-05-18 PROCEDURE — 82948 REAGENT STRIP/BLOOD GLUCOSE: CPT

## 2025-05-18 PROCEDURE — 94664 DEMO&/EVAL PT USE INHALER: CPT

## 2025-05-18 PROCEDURE — 80053 COMPREHEN METABOLIC PANEL: CPT | Performed by: INTERNAL MEDICINE

## 2025-05-18 RX ORDER — DOXYCYCLINE 100 MG/1
100 CAPSULE ORAL 2 TIMES DAILY
Qty: 14 CAPSULE | Refills: 0 | Status: SHIPPED | OUTPATIENT
Start: 2025-05-18 | End: 2025-05-26 | Stop reason: HOSPADM

## 2025-05-18 RX ADMIN — METOPROLOL SUCCINATE 25 MG: 25 TABLET, EXTENDED RELEASE ORAL at 08:31

## 2025-05-18 RX ADMIN — ASPIRIN 81 MG CHEWABLE TABLET 81 MG: 81 TABLET CHEWABLE at 08:31

## 2025-05-18 RX ADMIN — SODIUM ZIRCONIUM CYCLOSILICATE 10 G: 10 POWDER, FOR SUSPENSION ORAL at 12:50

## 2025-05-18 RX ADMIN — METHOCARBAMOL TABLETS 500 MG: 500 TABLET, COATED ORAL at 05:18

## 2025-05-18 RX ADMIN — POLYETHYLENE GLYCOL 3350 17 G: 17 POWDER, FOR SOLUTION ORAL at 08:31

## 2025-05-18 RX ADMIN — ACETAMINOPHEN 1000 MG: 500 TABLET ORAL at 08:31

## 2025-05-18 RX ADMIN — SODIUM BICARBONATE 650 MG: 650 TABLET ORAL at 08:31

## 2025-05-18 RX ADMIN — PANTOPRAZOLE SODIUM 40 MG: 40 TABLET, DELAYED RELEASE ORAL at 08:31

## 2025-05-18 RX ADMIN — SENNOSIDES AND DOCUSATE SODIUM 2 TABLET: 50; 8.6 TABLET ORAL at 08:31

## 2025-05-18 RX ADMIN — ACETAMINOPHEN 1000 MG: 500 TABLET ORAL at 02:50

## 2025-05-18 RX ADMIN — SEVELAMER CARBONATE 800 MG: 800 TABLET, FILM COATED ORAL at 12:50

## 2025-05-18 RX ADMIN — SEVELAMER CARBONATE 800 MG: 800 TABLET, FILM COATED ORAL at 08:31

## 2025-05-18 RX ADMIN — IPRATROPIUM BROMIDE AND ALBUTEROL SULFATE 3 ML: .5; 3 SOLUTION RESPIRATORY (INHALATION) at 06:15

## 2025-05-18 RX ADMIN — Medication 1000 UNITS: at 08:30

## 2025-05-18 RX ADMIN — Medication 10 ML: at 08:32

## 2025-05-18 NOTE — DISCHARGE SUMMARY
"[facility] Medicine Services  Discharge Summary    Date of Service: 2025  Patient Name: Sherry Lobato  : 1950  MRN: [id number]    Date of Admission: 2025  Discharge Diagnosis: Acute hyperkalemia  Date of Discharge: 2025  Primary Care Physician: Raegan Gotti MD    Presenting Problem:   Hyperkalemia [E87.5]  Epigastric pain [R10.13]    Active and Resolved Hospital Problems:  Active Hospital Problems    Diagnosis POA    **Acute hyperkalemia [E87.5] Yes    Ventral hernia without obstruction or gangrene [K43.9] Yes    Preoperative cardiovascular examination [Z01.810] Not Applicable    Multiple malignancies [C80.0] Yes    Obesity (BMI 30-39.9) [E66.9] Yes    Chronic obstructive pulmonary disease [J44.9] Yes    ESRD (end stage renal disease) [N18.6] Yes    Obstructive sleep apnea syndrome [G47.33] Yes    Hypertension [I10] Yes    Mixed hyperlipidemia [E78.2] Yes    Gastroesophageal reflux disease [K21.9] Yes    Type 2 diabetes mellitus, without long-term current use of insulin [E11.9] Yes      Resolved Hospital Problems   No resolved problems to display.         Hospital Course     HPI:  \"Sherry Lobato is a 74 y.o. 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     She presented to ED with acute on chronic upper abdominal pain moderate crampy nature with radiation across abdomen, associate with nausea and loose stool.  Last received full dental session on Friday     In ED " "vital signs stable, labs significant for leukocytosis, chronic anemia and CKD, severe hyperkalemia 6.9 status post medical treatment and nephrology consulted for emergent dialysis\"    Hospital Course:  Patient was admitted to the hospital surgery consult was obtained..  Nephrology was also consulted for emergent dialysis.  Potassium was better after he receiving hemodialysis.  Patient had multiple hernias on CT abdomen.  She under went hernia repair on 5/15/2025.  Surgery will follow-up as an outpatient and patient needs to see them in 2 weeks.  Nephrology also followed the patient and patient received hemodialysis during hospitalization.  Patient stable for discharge.    DISCHARGE Follow Up Recommendations for labs and diagnostics:   Follow-up with PCP in 2 to 3 days  Follow-up with surgery in 2 weeks    Reasons For Change In Medications and Indications for New Medications:  Doxycycline 100 mg twice daily for acute bronchitis    Day of Discharge     Vital Signs:  Temp:  [98 °F (36.7 °C)-99 °F (37.2 °C)] 98.6 °F (37 °C)  Heart Rate:  [64-99] 77  Resp:  [9-20] 15  BP: (117-147)/() 120/79  Flow (L/min) (Oxygen Therapy):  [2] 2    Physical Exam:  Vitals and nursing note reviewed.   Constitutional:       Appearance: Normal appearance.   HENT:      Head: Normocephalic and atraumatic.   Cardiovascular:      Rate and Rhythm: Normal rate and rhythm.      Heart sounds: Normal heart sounds.   Pulmonary:      Effort: Pulmonary effort is normal.      Breath sounds: Decreased breath sounds.   Abdominal:      Palpations: Abdomen is soft.   Musculoskeletal:      Cervical back: Neck supple.   Neurological:      Mental Status: Mental status is at baseline.     Pertinent  and/or Most Recent Results     LAB RESULTS:      Lab 05/18/25  0845 05/17/25  0335 05/15/25  0125 05/14/25  0448 05/13/25  0522 05/12/25  0643 05/11/25  1719 05/11/25  1715   WBC 10.80 10.71 9.02 8.22 7.12 15.38*  --  15.03*   HEMOGLOBIN 10.0* 10.0* 9.4* 11.6* " 11.1* 12.0  --  11.2*   HEMATOCRIT 32.8* 32.0* 30.4* 38.2 37.6 38.0  --  36.4   PLATELETS 256 255 274 306 323 317  --  376   NEUTROS ABS 7.84*  --   --  5.67 4.81 12.16*  --  11.73*   IMMATURE GRANS (ABS) 0.05  --   --  0.04 0.05 0.24*  --  0.09*   LYMPHS ABS 1.00  --   --  1.25 1.22 1.42  --  1.83   MONOS ABS 1.18*  --   --  0.73 0.63 1.17*  --  1.06*   EOS ABS 0.68*  --   --  0.47* 0.33 0.32  --  0.22   .8* 102.6* 101.7* 102.1* 103.6* 99.2*  --  100.6*   LACTATE  --   --   --   --   --   --  1.3  --          Lab 05/18/25  0845 05/17/25  0335 05/15/25  0125 05/14/25  0448 05/13/25  1015 05/13/25  0923 05/13/25  0522 05/12/25  0643   SODIUM 132* 129* 132* 137  --   --  137 137   POTASSIUM 5.5* 4.4 4.1 4.7 5.9*   < > 5.9* 4.0   CHLORIDE 93* 94* 95* 98  --   --  99 99   CO2 23.8 24.8 23.9 27.0  --   --  22.7 23.3   ANION GAP 15.2* 10.2 13.1 12.0  --   --  15.3* 14.7   BUN 25* 13 11 16  --   --  30* 20   CREATININE 6.26* 4.21* 3.46* 3.93*  --   --  5.94* 4.06*   EGFR 6.5* 10.5* 13.3* 11.4*  --   --  6.9* 11.0*   GLUCOSE 78 66 131* 91  --   --  67 66   CALCIUM 9.1 9.2 8.9 9.3  --   --  9.6 9.4   MAGNESIUM  --   --   --  1.9  --   --  1.9 1.8   PHOSPHORUS  --  5.3* 5.6* 5.3*  --   --  6.7* 3.2   HEMOGLOBIN A1C  --   --   --   --   --   --   --  6.21*    < > = values in this interval not displayed.         Lab 05/18/25  0845 05/17/25  0335 05/15/25  0125 05/14/25  0448 05/13/25  0522 05/11/25  1715   TOTAL PROTEIN 6.3  --   --   --   --  7.5   ALBUMIN 3.6 3.9 3.7 4.0 3.8 4.0   GLOBULIN 2.7  --   --   --   --  3.5   ALT (SGPT) <5  --   --   --   --  15   AST (SGOT) 28  --   --   --   --  16   BILIRUBIN 0.2  --   --   --   --  0.2   ALK PHOS 88  --   --   --   --  98   LIPASE  --   --   --   --   --  32         Lab 05/11/25  1852 05/11/25  1715   HSTROP T 36* 39*         Lab 05/12/25  0643   CHOLESTEROL 176   LDL CHOL 94   HDL CHOL 52   TRIGLYCERIDES 175*         Lab 05/15/25  0809   ABO TYPING O   RH TYPING Negative    ANTIBODY SCREEN Negative         Brief Urine Lab Results       None          Microbiology Results (last 10 days)       ** No results found for the last 240 hours. **            CT Abdomen Pelvis Without Contrast  Result Date: 5/17/2025  Impression: Impression: 1.Postsurgical changes of recent ventral hernia repair with small foci of gas within the anterior abdominal wall. No organized fluid collection. 2.Postsurgical changes of gastrojejunostomy. No evidence of bowel obstruction. 3.Right lower lobe bronchial wall thickening likely related to bronchitis. Small amount of right lower lobe atelectasis noted. 4.Additional ancillary findings as detailed above. Electronically Signed: Juan Antonio Han MD  5/17/2025 10:11 AM EDT  Workstation ID: [id number]    CT Abdomen Pelvis With Contrast  Result Date: 5/11/2025  Impression: Impression: 1.Small right lower quadrant hernia containing a loop of small bowel with wall thickening suggesting inflammation/incarceration and there is dilation of proximal small bowel loops indicating obstruction. Surgical consultation is advised. 2.Status post distal gastrectomy. Diffuse wall thickening of the stomach, which could be related to gastritis or infiltrative process. 3.Status post right nephrectomy. Marked atrophy of the left kidney. 4.Findings as above with possible chronic nondistended segment of distal sigmoid colon with mild apparent wall thickening and hyperenhancement but no definite surrounding inflammatory changes. Correlate clinically. This could be evaluated with colonoscopy. 5.Advanced calcific atherosclerosis of the aorta and branch vessels. 6.Diffuse osseous sclerosis which may be associated with renal osteodystrophy. Electronically Signed: Moses Bailey  5/11/2025 7:53 PM EDT  Workstation ID: [id number]      Results for orders placed during the hospital encounter of 02/27/25    Duplex Hemodialysis Access CAR    Interpretation Summary    Patent left sided brachiobasilic  fistula.  Flow volumes appear adequate ranging between approximately 600 to 1500 mL/min.  Diameter and depth appear appropriate.      Results for orders placed during the hospital encounter of 02/27/25    Duplex Hemodialysis Access CAR    Interpretation Summary    Patent left sided brachiobasilic fistula.  Flow volumes appear adequate ranging between approximately 600 to 1500 mL/min.  Diameter and depth appear appropriate.      Results for orders placed during the hospital encounter of 07/25/21    Adult Transthoracic Echo Complete W/ Cont if Necessary Per Protocol    Interpretation Summary  Normal LV size and contractility EF of 60%  Normal RV size  Normal atrial size  Aortic valve, mitral valve, tricuspid valve appears structurally normal, mild mitral regurgitation  seen.  No pericardial effusion seen.  Proximal aorta appears normal in size.      Labs Pending at Discharge:  Pending Results       None            Procedures Performed  Procedure(s):  VENTRAL  HERNIA REPAIR LAPAROSCOPIC WITH DAVINCI ROBOT WITH MESH, EXTENSIVE LYSIS OF ADHESION       Consults:   Consults       Date and Time Order Name Status Description    5/12/2025  8:00 AM Inpatient Cardiology Consult Completed     5/11/2025  9:15 PM Surgery (on-call MD unless specified) Completed     5/11/2025  6:38 PM Nephrology (on -call MD unless specified) Completed             Discharge Details        Discharge Medications        New Medications        Instructions Start Date   acetaminophen 325 MG tablet  Commonly known as: Tylenol   650 mg, Oral, Every 4 Hours PRN      doxycycline 100 MG capsule  Commonly known as: VIBRAMYCIN   100 mg, Oral, 2 Times Daily      ondansetron 4 MG tablet  Commonly known as: Zofran   4 mg, Oral, Daily PRN      oxyCODONE 5 MG immediate release tablet  Commonly known as: Roxicodone   5 mg, Oral, Every 8 Hours PRN      sevelamer 800 MG tablet  Commonly known as: RENAGEL   800 mg, Oral, 2 Times Daily With Meals      sodium bicarbonate  "650 MG tablet   1,300 mg, Oral, 3 Times Daily      sucralfate 1 g tablet  Commonly known as: CARAFATE   1 g, Oral, 4 Times Daily PRN             Continue These Medications        Instructions Start Date   albuterol sulfate  (90 Base) MCG/ACT inhaler  Commonly known as: PROVENTIL HFA;VENTOLIN HFA;PROAIR HFA   2 puffs, Every 4 Hours PRN      ammonium lactate 12 % cream  Commonly known as: AMLACTIN   1 Application, As Needed      aspirin 81 MG chewable tablet   81 mg, Daily      calcium acetate 667 MG capsule capsule  Commonly known as: PHOS BINDER)   667 mg, 2 Times Daily      cholecalciferol 25 MCG (1000 UT) tablet  Commonly known as: VITAMIN D3   1,000 Units, Daily      hyoscyamine 0.125 MG SL tablet  Commonly known as: LEVSIN   125 mcg, Sublingual, Every 4 Hours PRN      lidocaine-prilocaine 2.5-2.5 % cream  Commonly known as: EMLA   APPLY TOPICALLY TO THE 5TH FINGER ON THE LEFT HAND AS NEEDED PAIN      metoprolol succinate XL 25 MG 24 hr tablet  Commonly known as: Toprol XL   25 mg, Oral, 2 Times Daily      multivitamin tablet tablet   1 tablet, Daily      pantoprazole 20 MG EC tablet  Commonly known as: PROTONIX   20 mg, Daily      temazepam 30 MG capsule  Commonly known as: RESTORIL   30 mg, Nightly               Allergies   Allergen Reactions    Atorvastatin Calcium Other (See Comments)     \"felt like I was on fire\"    Gabapentin Dizziness     says was unable to walk    Niacin Other (See Comments)     \"FEELS LIKE SHE IS ON FIRE\"         Discharge Disposition:   Home or Self Care    Diet:  Diet Instructions       Diet: Cardiac Diets, Renal Diets, Fluid Restriction (240 mL/tray) Diets; Low Sodium (2g); Regular (IDDSI 7); Thin (IDDSI 0); Low Sodium (2-3g), Low Potassium, Low Phosphorus; 2000 mL/day      Discharge Diet:  Cardiac Diets  Renal Diets  Fluid Restriction (240 mL/tray) Diets       Cardiac Diet: Low Sodium (2g)    Texture: Regular (IDDSI 7)    Fluid Consistency: Thin (IDDSI 0)    Renal Diet:  Low " Sodium (2-3g)  Low Potassium  Low Phosphorus       Fluid Restriction Diet (240 mL/tray): 2000 mL/day            Discharge Activity:   Activity Instructions       Activity as Tolerated      Lifting Restrictions      Type of Restriction: Lifting    Lifting Restrictions: Lifting Restriction (Indicate Limit)    Weight Limit (Pounds): 5    Length of Lifting Restriction: Until seen by surgery            CODE STATUS:  Code Status and Medical Interventions: CPR (Attempt to Resuscitate); Full Support   Ordered at: 05/11/25 1945     Code Status (Patient has no pulse and is not breathing):    CPR (Attempt to Resuscitate)     Medical Interventions (Patient has pulse or is breathing):    Full Support     Level Of Support Discussed With:    Patient       Future Appointments   Date Time Provider Department Center   8/14/2025  8:00 AM [facility]   8/19/2025  8:30 AM Alcides Live MD [facility] None   10/2/2025 11:00 AM Lisa Baca APRN [facility]       Additional Instructions for the Follow-ups that You Need to Schedule       Discharge Follow-up with PCP   As directed       Currently Documented PCP:    Raegan Gotti MD    PCP Phone Number:    [phone]     Follow Up Details: 2-3 days        Discharge Follow-up with Specified Provider: surgery; 2 Weeks   As directed      To: surgery   Follow Up: 2 Weeks   Follow Up Details: f/u on hernia repair                Time spent on Discharge including face to face service:  >30 minutes    Signature: Electronically signed by Pancho Ibarra MD, 05/18/25, 10:39 EDT.  [facility] Hospitalist Team

## 2025-05-18 NOTE — PLAN OF CARE
Goal Outcome Evaluation:  Plan of Care Reviewed With: patient        Progress: improving  Outcome Evaluation: Pt resting this shift, denies any pain. Pt is cleared to discharge home, pt  will . Call light within reach.

## 2025-05-19 ENCOUNTER — TELEPHONE (OUTPATIENT)
Dept: CARDIOLOGY | Facility: CLINIC | Age: 75
End: 2025-05-19
Payer: MEDICARE

## 2025-05-19 ENCOUNTER — TELEPHONE (OUTPATIENT)
Dept: SURGERY | Facility: CLINIC | Age: 75
End: 2025-05-19
Payer: MEDICARE

## 2025-05-19 NOTE — OUTREACH NOTE
Prep Survey      Flowsheet Row Responses   [facility] facility patient discharged from? [facility]   Is LACE score < 7 ? No   Eligibility Readm Mgmt   Discharge diagnosis Acute hyperkalemia, recurrent ventral hernia,   VENTRAL  HERNIA REPAIR LAPAROSCOPIC WITH MESH, EXTENSIVE LYSIS OF ADHESION   Does the patient have one of the following disease processes/diagnoses(primary or secondary)? General Surgery   Does the patient have Home health ordered? No   Is there a DME ordered? No   Prep survey completed? Yes            Cristina Esparza Registered Nurse

## 2025-05-19 NOTE — TELEPHONE ENCOUNTER
"Caller: Sherry Lobato \"Jerry\"    Relationship to patient: Self    Best call back number: [phone]    Patient is needing: PT NEEDS TO [facility] ECHO. PT WAS IN HOSPITAL AT THE TIME. HAD SURGERY AS WELL. PLEASE CALL TO [facility] ECHO. CAN LEAVE VM IF NO ANSWER. PT WILL CALL BACK          "

## 2025-05-19 NOTE — TELEPHONE ENCOUNTER
patient at Dialysis right now. nurse blue called [phone] states no bowel sounds on the left upper and lower quadrant. no BM in 10 days. not passing gas. the NP advised patient go home and take miralax BID and colace 100mg BID. Nurse asked me to let you know then call her back and let her know if you agree. thanks   Spoke to dr. Espana and agrees but would also like to add mag citrate. Nurse blue with patient informed and will inform patient.

## 2025-05-20 ENCOUNTER — TELEPHONE (OUTPATIENT)
Dept: SURGERY | Facility: CLINIC | Age: 75
End: 2025-05-20
Payer: MEDICARE

## 2025-05-20 NOTE — TELEPHONE ENCOUNTER
I called Sherry Lobato to check on them post operatively. We discussed instructions and post op office visit. She is really fatigued and losing her voice. Message sent to dr. Espana. Encouraged to call the office with any other questions.   Addressed with dr. Espana. Dr. Sosa or dialysis can address the swelling in feet. Dr. Espana will follow up on post op visit for the fatigue

## 2025-05-21 NOTE — CASE MANAGEMENT/SOCIAL WORK
Case Management Discharge Note      Final Note: routine home         Selected Continued Care - Discharged on 5/18/2025 Admission date: 5/11/2025 - Discharge disposition: Home or Self Care       Transportation Services  Private: Car    Final Discharge Disposition Code: 01 - home or self-care

## 2025-05-22 ENCOUNTER — APPOINTMENT (OUTPATIENT)
Dept: CT IMAGING | Facility: HOSPITAL | Age: 75
End: 2025-05-22
Payer: MEDICARE

## 2025-05-22 ENCOUNTER — HOSPITAL ENCOUNTER (OUTPATIENT)
Facility: HOSPITAL | Age: 75
Setting detail: OBSERVATION
Discharge: HOME | End: 2025-05-26
Attending: EMERGENCY MEDICINE | Admitting: INTERNAL MEDICINE
Payer: MEDICARE

## 2025-05-22 ENCOUNTER — READMISSION MANAGEMENT (OUTPATIENT)
Dept: CALL CENTER | Facility: HOSPITAL | Age: 75
End: 2025-05-22
Payer: MEDICARE

## 2025-05-22 ENCOUNTER — APPOINTMENT (OUTPATIENT)
Dept: GENERAL RADIOLOGY | Facility: HOSPITAL | Age: 75
End: 2025-05-22
Payer: MEDICARE

## 2025-05-22 DIAGNOSIS — R53.1 WEAKNESS: ICD-10-CM

## 2025-05-22 DIAGNOSIS — K43.9 VENTRAL HERNIA WITHOUT OBSTRUCTION OR GANGRENE: Primary | ICD-10-CM

## 2025-05-22 DIAGNOSIS — R41.82 ALTERED MENTAL STATUS, UNSPECIFIED ALTERED MENTAL STATUS TYPE: ICD-10-CM

## 2025-05-22 LAB
ALBUMIN SERPL-MCNC: 3.8 G/DL (ref 3.5–5.2)
ALBUMIN/GLOB SERPL: 1.2 G/DL
ALP SERPL-CCNC: 107 U/L (ref 39–117)
ALT SERPL W P-5'-P-CCNC: <5 U/L (ref 1–33)
AMMONIA BLD-SCNC: 17 UMOL/L (ref 11–51)
ANION GAP SERPL CALCULATED.3IONS-SCNC: 15.3 MMOL/L (ref 5–15)
AST SERPL-CCNC: 23 U/L (ref 1–32)
BASOPHILS # BLD AUTO: 0.1 10*3/MM3 (ref 0–0.2)
BASOPHILS NFR BLD AUTO: 1 % (ref 0–1.5)
BILIRUB SERPL-MCNC: 0.3 MG/DL (ref 0–1.2)
BUN SERPL-MCNC: 25 MG/DL (ref 8–23)
BUN/CREAT SERPL: 3.6 (ref 7–25)
CALCIUM SPEC-SCNC: 9.5 MG/DL (ref 8.6–10.5)
CHLORIDE SERPL-SCNC: 94 MMOL/L (ref 98–107)
CO2 SERPL-SCNC: 22.7 MMOL/L (ref 22–29)
CREAT SERPL-MCNC: 6.87 MG/DL (ref 0.57–1)
DEPRECATED RDW RBC AUTO: 57.2 FL (ref 37–54)
EGFRCR SERPLBLD CKD-EPI 2021: 5.8 ML/MIN/1.73
EOSINOPHIL # BLD AUTO: 0.74 10*3/MM3 (ref 0–0.4)
EOSINOPHIL NFR BLD AUTO: 7.1 % (ref 0.3–6.2)
ERYTHROCYTE [DISTWIDTH] IN BLOOD BY AUTOMATED COUNT: 15.9 % (ref 12.3–15.4)
GLOBULIN UR ELPH-MCNC: 3.1 GM/DL
GLUCOSE SERPL-MCNC: 85 MG/DL (ref 65–99)
HCT VFR BLD AUTO: 34.7 % (ref 34–46.6)
HGB BLD-MCNC: 10.7 G/DL (ref 12–15.9)
IMM GRANULOCYTES # BLD AUTO: 0.08 10*3/MM3 (ref 0–0.05)
IMM GRANULOCYTES NFR BLD AUTO: 0.8 % (ref 0–0.5)
LIPASE SERPL-CCNC: 11 U/L (ref 13–60)
LYMPHOCYTES # BLD AUTO: 1.55 10*3/MM3 (ref 0.7–3.1)
LYMPHOCYTES NFR BLD AUTO: 14.8 % (ref 19.6–45.3)
MAGNESIUM SERPL-MCNC: 3 MG/DL (ref 1.6–2.4)
MCH RBC QN AUTO: 31.2 PG (ref 26.6–33)
MCHC RBC AUTO-ENTMCNC: 30.8 G/DL (ref 31.5–35.7)
MCV RBC AUTO: 101.2 FL (ref 79–97)
MONOCYTES # BLD AUTO: 1.03 10*3/MM3 (ref 0.1–0.9)
MONOCYTES NFR BLD AUTO: 9.8 % (ref 5–12)
NEUTROPHILS NFR BLD AUTO: 6.98 10*3/MM3 (ref 1.7–7)
NEUTROPHILS NFR BLD AUTO: 66.5 % (ref 42.7–76)
NRBC BLD AUTO-RTO: 0 /100 WBC (ref 0–0.2)
PLATELET # BLD AUTO: 379 10*3/MM3 (ref 140–450)
PMV BLD AUTO: 10.5 FL (ref 6–12)
POTASSIUM SERPL-SCNC: 4.8 MMOL/L (ref 3.5–5.2)
PROT SERPL-MCNC: 6.9 G/DL (ref 6–8.5)
RBC # BLD AUTO: 3.43 10*6/MM3 (ref 3.77–5.28)
SODIUM SERPL-SCNC: 132 MMOL/L (ref 136–145)
TSH SERPL DL<=0.05 MIU/L-ACNC: 2.01 UIU/ML (ref 0.27–4.2)
WBC NRBC COR # BLD AUTO: 10.48 10*3/MM3 (ref 3.4–10.8)

## 2025-05-22 PROCEDURE — 71045 X-RAY EXAM CHEST 1 VIEW: CPT

## 2025-05-22 PROCEDURE — 80053 COMPREHEN METABOLIC PANEL: CPT | Performed by: EMERGENCY MEDICINE

## 2025-05-22 PROCEDURE — 85025 COMPLETE CBC W/AUTO DIFF WBC: CPT | Performed by: EMERGENCY MEDICINE

## 2025-05-22 PROCEDURE — 25010000002 ONDANSETRON PER 1 MG: Performed by: PHYSICIAN ASSISTANT

## 2025-05-22 PROCEDURE — 36415 COLL VENOUS BLD VENIPUNCTURE: CPT

## 2025-05-22 PROCEDURE — 99285 EMERGENCY DEPT VISIT HI MDM: CPT

## 2025-05-22 PROCEDURE — G0378 HOSPITAL OBSERVATION PER HR: HCPCS

## 2025-05-22 PROCEDURE — 84443 ASSAY THYROID STIM HORMONE: CPT | Performed by: EMERGENCY MEDICINE

## 2025-05-22 PROCEDURE — 84100 ASSAY OF PHOSPHORUS: CPT | Performed by: PHYSICIAN ASSISTANT

## 2025-05-22 PROCEDURE — 93005 ELECTROCARDIOGRAM TRACING: CPT | Performed by: EMERGENCY MEDICINE

## 2025-05-22 PROCEDURE — 96374 THER/PROPH/DIAG INJ IV PUSH: CPT

## 2025-05-22 PROCEDURE — 25010000002 MORPHINE PER 10 MG: Performed by: PHYSICIAN ASSISTANT

## 2025-05-22 PROCEDURE — 74176 CT ABD & PELVIS W/O CONTRAST: CPT

## 2025-05-22 PROCEDURE — 96375 TX/PRO/DX INJ NEW DRUG ADDON: CPT

## 2025-05-22 PROCEDURE — 82140 ASSAY OF AMMONIA: CPT | Performed by: EMERGENCY MEDICINE

## 2025-05-22 PROCEDURE — 83735 ASSAY OF MAGNESIUM: CPT | Performed by: EMERGENCY MEDICINE

## 2025-05-22 PROCEDURE — 70450 CT HEAD/BRAIN W/O DYE: CPT

## 2025-05-22 PROCEDURE — 83690 ASSAY OF LIPASE: CPT | Performed by: EMERGENCY MEDICINE

## 2025-05-22 RX ORDER — TEMAZEPAM 15 MG/1
30 CAPSULE ORAL NIGHTLY
Status: DISCONTINUED | OUTPATIENT
Start: 2025-05-22 | End: 2025-05-25

## 2025-05-22 RX ORDER — AMOXICILLIN 250 MG
2 CAPSULE ORAL 2 TIMES DAILY PRN
Status: DISCONTINUED | OUTPATIENT
Start: 2025-05-22 | End: 2025-05-26 | Stop reason: HOSPADM

## 2025-05-22 RX ORDER — LIDOCAINE 40 MG/G
CREAM TOPICAL AS NEEDED
Status: DISCONTINUED | OUTPATIENT
Start: 2025-05-22 | End: 2025-05-26 | Stop reason: HOSPADM

## 2025-05-22 RX ORDER — NITROGLYCERIN 0.4 MG/1
0.4 TABLET SUBLINGUAL
Status: DISCONTINUED | OUTPATIENT
Start: 2025-05-22 | End: 2025-05-26 | Stop reason: HOSPADM

## 2025-05-22 RX ORDER — ONDANSETRON 4 MG/1
4 TABLET, ORALLY DISINTEGRATING ORAL EVERY 6 HOURS PRN
Status: DISCONTINUED | OUTPATIENT
Start: 2025-05-22 | End: 2025-05-26 | Stop reason: HOSPADM

## 2025-05-22 RX ORDER — SODIUM CHLORIDE 0.9 % (FLUSH) 0.9 %
10 SYRINGE (ML) INJECTION AS NEEDED
Status: DISCONTINUED | OUTPATIENT
Start: 2025-05-22 | End: 2025-05-26 | Stop reason: HOSPADM

## 2025-05-22 RX ORDER — HYOSCYAMINE SULFATE 0.12 MG/1
125 TABLET SUBLINGUAL EVERY 4 HOURS PRN
Status: DISCONTINUED | OUTPATIENT
Start: 2025-05-22 | End: 2025-05-23

## 2025-05-22 RX ORDER — ALBUTEROL SULFATE 0.83 MG/ML
2.5 SOLUTION RESPIRATORY (INHALATION) EVERY 4 HOURS PRN
Status: DISCONTINUED | OUTPATIENT
Start: 2025-05-22 | End: 2025-05-26 | Stop reason: HOSPADM

## 2025-05-22 RX ORDER — BISACODYL 5 MG/1
5 TABLET, DELAYED RELEASE ORAL DAILY PRN
Status: DISCONTINUED | OUTPATIENT
Start: 2025-05-22 | End: 2025-05-26 | Stop reason: HOSPADM

## 2025-05-22 RX ORDER — DOXYCYCLINE 100 MG/1
100 CAPSULE ORAL EVERY 12 HOURS SCHEDULED
Status: COMPLETED | OUTPATIENT
Start: 2025-05-22 | End: 2025-05-25

## 2025-05-22 RX ORDER — SODIUM CHLORIDE 0.9 % (FLUSH) 0.9 %
10 SYRINGE (ML) INJECTION EVERY 12 HOURS SCHEDULED
Status: DISCONTINUED | OUTPATIENT
Start: 2025-05-22 | End: 2025-05-26 | Stop reason: HOSPADM

## 2025-05-22 RX ORDER — CHOLECALCIFEROL (VITAMIN D3) 25 MCG
1000 TABLET ORAL DAILY
Status: DISCONTINUED | OUTPATIENT
Start: 2025-05-23 | End: 2025-05-26 | Stop reason: HOSPADM

## 2025-05-22 RX ORDER — CALCIUM ACETATE 667 MG/1
667 CAPSULE ORAL 2 TIMES DAILY
Status: DISCONTINUED | OUTPATIENT
Start: 2025-05-22 | End: 2025-05-23

## 2025-05-22 RX ORDER — BISACODYL 10 MG
10 SUPPOSITORY, RECTAL RECTAL DAILY PRN
Status: DISCONTINUED | OUTPATIENT
Start: 2025-05-22 | End: 2025-05-26 | Stop reason: HOSPADM

## 2025-05-22 RX ORDER — METOPROLOL SUCCINATE 25 MG/1
25 TABLET, EXTENDED RELEASE ORAL 2 TIMES DAILY
Status: DISCONTINUED | OUTPATIENT
Start: 2025-05-22 | End: 2025-05-26

## 2025-05-22 RX ORDER — SEVELAMER CARBONATE 800 MG/1
3200 TABLET, FILM COATED ORAL 2 TIMES DAILY
Status: DISCONTINUED | OUTPATIENT
Start: 2025-05-22 | End: 2025-05-26 | Stop reason: HOSPADM

## 2025-05-22 RX ORDER — SODIUM CHLORIDE 9 MG/ML
40 INJECTION, SOLUTION INTRAVENOUS AS NEEDED
Status: DISCONTINUED | OUTPATIENT
Start: 2025-05-22 | End: 2025-05-26 | Stop reason: HOSPADM

## 2025-05-22 RX ORDER — PANTOPRAZOLE SODIUM 40 MG/1
40 TABLET, DELAYED RELEASE ORAL
Status: DISCONTINUED | OUTPATIENT
Start: 2025-05-23 | End: 2025-05-26 | Stop reason: HOSPADM

## 2025-05-22 RX ORDER — SODIUM BICARBONATE 650 MG/1
1300 TABLET ORAL 3 TIMES DAILY
Status: DISCONTINUED | OUTPATIENT
Start: 2025-05-22 | End: 2025-05-26 | Stop reason: HOSPADM

## 2025-05-22 RX ORDER — POLYETHYLENE GLYCOL 3350 17 G/17G
17 POWDER, FOR SOLUTION ORAL DAILY PRN
Status: DISCONTINUED | OUTPATIENT
Start: 2025-05-22 | End: 2025-05-26 | Stop reason: HOSPADM

## 2025-05-22 RX ORDER — ONDANSETRON 2 MG/ML
4 INJECTION INTRAMUSCULAR; INTRAVENOUS ONCE
Status: COMPLETED | OUTPATIENT
Start: 2025-05-22 | End: 2025-05-22

## 2025-05-22 RX ORDER — DIPHENOXYLATE HYDROCHLORIDE AND ATROPINE SULFATE 2.5; .025 MG/1; MG/1
1 TABLET ORAL DAILY
Status: DISCONTINUED | OUTPATIENT
Start: 2025-05-23 | End: 2025-05-25

## 2025-05-22 RX ORDER — AMMONIUM LACTATE 12 G/100G
1 CREAM TOPICAL AS NEEDED
Status: DISCONTINUED | OUTPATIENT
Start: 2025-05-22 | End: 2025-05-26 | Stop reason: HOSPADM

## 2025-05-22 RX ORDER — ASPIRIN 81 MG/1
81 TABLET, CHEWABLE ORAL DAILY
Status: DISCONTINUED | OUTPATIENT
Start: 2025-05-22 | End: 2025-05-26 | Stop reason: HOSPADM

## 2025-05-22 RX ORDER — SUCRALFATE 1 G/1
1 TABLET ORAL 4 TIMES DAILY PRN
Status: DISCONTINUED | OUTPATIENT
Start: 2025-05-22 | End: 2025-05-23

## 2025-05-22 RX ADMIN — MORPHINE SULFATE 4 MG: 4 INJECTION, SOLUTION INTRAMUSCULAR; INTRAVENOUS at 18:48

## 2025-05-22 RX ADMIN — ONDANSETRON 4 MG: 2 INJECTION, SOLUTION INTRAMUSCULAR; INTRAVENOUS at 18:48

## 2025-05-22 NOTE — ED PROVIDER NOTES
Subjective   History of Present Illness  Chief complaint disorientation weakness abdominal pain    History of present illness a 74-year-old female with multiple health problems was in the hospital had ventral hernia repairs on 15 May this year she went home on the 18th she states she initially did well but over the last couple days she has had some increasing disorientation confusion cannot remember things general weakness abdominal pain nausea poor appetite.  She denies any black or bloody stool bowel movements been okay.  No chest pain neck arm jaw pain no shortness of breath cough no vision disturbance speech difficulty or weakness to one-sided the other.  Patient is a dialysis patient does not make any urine.  She did receive dialysis yesterday      Review of Systems   Constitutional:  Negative for chills and fever.   Respiratory:  Negative for chest tightness and shortness of breath.    Cardiovascular:  Negative for chest pain.   Gastrointestinal:  Positive for abdominal pain and nausea. Negative for blood in stool and vomiting.   Skin:  Positive for wound.   Neurological:  Positive for weakness. Negative for dizziness, seizures, speech difficulty and headaches.   Psychiatric/Behavioral:  Positive for confusion.        Past Medical History:   Diagnosis Date    Acute upper GI bleeding 08/26/2024    Anemia due to GI blood loss 08/27/2024    Lucas's esophagus     Barretts esophagus 04/10/2012    C. difficile colitis     Carcinoma, renal cell 04/10/2012    Carotid artery disease     -50-74% left, 16-49% right (1/19)    COPD (chronic obstructive pulmonary disease)     DM2 (diabetes mellitus, type 2)     ESRD (end stage renal disease)     on HD    Gastric ulcer     at anastomatic site    Gastroparesis     unknown    GERD (gastroesophageal reflux disease)     Gout     not current    Hemodialysis patient     mwf    Hernia, incisional     abdomen    History of colonoscopy     UTD    History of degenerative disc  "disease     Hyperlipidemia     Hypertension     Medicare annual wellness visit, subsequent 11/07/2020    Microscopic colitis     Nephrotic syndrome     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    Neuroendocrine tumor     of stomach    Non-small cell lung cancer 10/26/2023    Orthostatic hypotension 11/07/2017    FABY (obstructive sleep apnea)     not treating    Pedal edema     ressolved    Pneumonia 11/11/2024    Proteinuria     Renal cell cancer, right     Rib pain on right side     chronic    Rotator cuff tear     bilateral    Secondary hyperparathyroidism of renal origin 08/29/2024    Steal syndrome dialysis vascular access 04/16/2024    Stomach cancer 2016    Stroke     Tobacco abuse 07/24/2020    Uterine cancer     Vitamin B 12 deficiency     Vitamin D deficiency     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 MAIN OR;  Service: Vascular;  Laterality: Left;    ARTERIOVENOUS FISTULA/SHUNT SURGERY Left 4/23/2024    Procedure: FISTULOGRAM WITH SUBCLAVIAN ARTERY ANGIOPLASTY, fistula banding;  Surgeon: Franki Delgadillo II, MD;  Location: [facility] HYBRID OR;  Service: Vascular;  Laterality: Left;    BREAST BIOPSY      right nipple 1981    BRONCHOSCOPY N/A 6/11/2024    Procedure: BRONCHOSCOPY WITH RIGHT LUNG WASHING;  Surgeon: Aramis Barragan MD;  Location: [facility] ENDOSCOPY;  Service: Pulmonary;  Laterality: N/A;  RML atelectasis    COLONOSCOPY N/A 11/24/2020    Procedure: COLONOSCOPY with " polypectomy x 7;  Surgeon: Jeffery White MD;  Location: [facility] ENDOSCOPY;  Service: Gastroenterology;  Laterality: N/A;  post op: polyps, diverticulosis, hemorrhoids    COLONOSCOPY N/A 6/1/2023    Procedure: COLONOSCOPY with polypectomy x 3 biopsy x 1;  Surgeon: Jeffery White MD;  Location: [facility] ENDOSCOPY;  Service: Gastroenterology;  Laterality: N/A;  diverticulosis polyps    COLONOSCOPY N/A 9/4/2024    Procedure: COLONOSCOPY WITH POLYPECTOMY X 1;  Surgeon: Jeffery White MD;  Location: [facility] ENDOSCOPY;  Service: Gastroenterology;  Laterality: N/A;  POST- POLYP, DIVERTICULOSIS, INTERNAL AND EXTERNAL HEMORRHOIDS    ENDOSCOPY N/A 03/12/2022    Procedure: ESOPHAGOGASTRODUODENOSCOPY with biopsy x 1 area;  Surgeon: Javan Augustin MD;  Location: [facility] ENDOSCOPY;  Service: Gastroenterology;  Laterality: N/A;  post op: anastamosis    ENDOSCOPY N/A 10/9/2023    Procedure: ESOPHAGOGASTRODUODENOSCOPY with biopsy x3 areas;  Surgeon: Ace Campbell MD;  Location: [facility] ENDOSCOPY;  Service: Gastroenterology;  Laterality: N/A;  duodenal ulcers;irregular z line    ENDOSCOPY N/A 8/27/2024    Procedure: ESOPHAGOGASTRODUODENOSCOPY with argon plasma coagulation;  Surgeon: HILARIO Blackmon MD;  Location: [facility] ENDOSCOPY;  Service: Gastroenterology;  Laterality: N/A;  post op: AVM    ENTEROSCOPY SMALL BOWEL N/A 9/4/2024    Procedure: ESOPHAGOGASTRODUODENOSCOPY WITH SMALL BOWEL ENTEROSCOPY, ARGON PLASMA COAGULATION AND ENDOSCOPIC CLIPPING X 2 OF GASTRIC ANTRAL VASCULAR ECTASIA;  Surgeon: Jeffery White MD;  Location: [facility] ENDOSCOPY;  Service: Gastroenterology;  Laterality: N/A;  POST- GAVE    GASTRIC RESECTION      cancer    HYSTERECTOMY      LAPAROSCOPIC CHOLECYSTECTOMY      NEPHRECTOMY      right kidney removed     REDUCTION MAMMAPLASTY      TUMOR REMOVAL      boils    VENTRAL HERNIA REPAIR N/A 5/15/2025    Procedure: VENTRAL  HERNIA REPAIR LAPAROSCOPIC WITH DAVINCI ROBOT WITH MESH, EXTENSIVE LYSIS OF ADHESION;   Surgeon: Keyon Espana MD;  Location: [facility] MAIN OR;  Service: Robotics - DaVinci;  Laterality: N/A;    VENTRAL/INCISIONAL HERNIA REPAIR Right 2019    Procedure: VENTRAL/INCISIONAL HERNIA REPAIR;  Surgeon: Nilay Stevens MD;  Location: [facility] MAIN OR;  Service: General    VENTRAL/INCISIONAL HERNIA REPAIR N/A 10/01/2019    Procedure: OPEN VENTRAL/INCISIONAL HERNIA REPAIR;  Surgeon: Nilay Stevens MD;  Location: [facility] MAIN OR;  Service: General    VENTRAL/INCISIONAL HERNIA REPAIR N/A 10/11/2021    Procedure: VENTRAL/INCISIONAL HERNIA REPAIR LAPAROSCOPIC;  Surgeon: Nilay Stevens MD;  Location: [facility] MAIN OR;  Service: General;  Laterality: N/A;    VENTRAL/INCISIONAL HERNIA REPAIR N/A 2023    Procedure: VENTRAL/INCISIONAL HERNIA REPAIR with MESH;  Surgeon: Nilay Stevens MD;  Location: [facility] MAIN OR;  Service: General;  Laterality: N/A;       Family History   Problem Relation Age of Onset    Heart disease Father        Social History     Socioeconomic History    Marital status: Single   Tobacco Use    Smoking status: Former     Current packs/day: 0.00     Average packs/day: 0.3 packs/day for 57.0 years (14.3 ttl pk-yrs)     Types: Cigarettes     Start date: 1964     Quit date: 2021     Years since quittin.0     Passive exposure: Past    Smokeless tobacco: Never   Vaping Use    Vaping status: Never Used   Substance and Sexual Activity    Alcohol use: No    Drug use: Never    Sexual activity: Defer     Prior to Admission medications    Medication Sig Start Date End Date Taking? Authorizing Provider   B Complex-C-Folic Acid (RENAL VITAMIN PO) Take 1 tablet by mouth Daily.   Yes Danica Murphy MD   calcium acetate (PHOS BINDER,) 667 MG capsule capsule Take 2 capsules by mouth 2 (Two) Times a Day.   Yes Danica Murphy MD   Cholecalciferol 25 MCG (1000 UT) tablet Take 1 tablet by mouth Daily.   Yes Danica Murphy MD   doxycycline (VIBRAMYCIN) 100 MG capsule Take 1  capsule by mouth 2 (Two) Times a Day for 7 days. 5/18/25 5/25/25 Yes Pancho Ibarra MD   metoprolol succinate XL (Toprol XL) 25 MG 24 hr tablet Take 1 tablet by mouth 2 (Two) Times a Day. 11/12/24  Yes Angelia Taylor MD   pantoprazole (PROTONIX) 20 MG EC tablet Take 1 tablet by mouth Daily.   Yes Danica Murphy MD   sevelamer (RENAGEL) 800 MG tablet Take 4 tablets by mouth 2 (Two) Times a Day With Meals.   Yes Danica Murphy MD   sodium bicarbonate 650 MG tablet Take 2 tablets by mouth 3 (Three) Times a Day.   Yes Danica Murphy MD   temazepam (RESTORIL) 30 MG capsule Take 1 capsule by mouth Every Night. 1/10/23  Yes Danica Murphy MD   albuterol sulfate  (90 Base) MCG/ACT inhaler Inhale 2 puffs Every 4 (Four) Hours As Needed for Wheezing.    Danica Murphy MD   lidocaine-prilocaine (EMLA) 2.5-2.5 % cream APPLY TOPICALLY TO THE 5TH FINGER ON THE LEFT HAND AS NEEDED PAIN 4/24/25   Anastasia Thurman APRN   ondansetron (Zofran) 4 MG tablet Take 1 tablet by mouth Daily As Needed for Nausea or Vomiting. 5/16/25 5/16/26  Keyon Espana MD   acetaminophen (Tylenol) 325 MG tablet Take 2 tablets by mouth Every 4 (Four) Hours As Needed for Moderate Pain, Mild Pain or Fever for up to 7 days. 5/16/25 5/22/25  Keyon Espana MD   ammonium lactate (AMLACTIN) 12 % cream Apply 1 Application topically to the appropriate area as directed As Needed. 3/6/25 5/22/25  Danica Murphy MD   aspirin 81 MG chewable tablet Chew 1 tablet Daily.  5/22/25  Danica Murphy MD   hyoscyamine (LEVSIN) 0.125 MG SL tablet Place 1 tablet under the tongue Every 4 (Four) Hours As Needed for Cramping. 11/4/24 5/22/25  Angelia Taylor MD   multivitamin (MULTI-VITAMIN PO) Take 1 tablet by mouth Daily.  5/22/25  Danica Murphy MD   sucralfate (CARAFATE) 1 g tablet Take 1 tablet by mouth 4 (Four) Times a Day As Needed.  5/22/25  Provider, MD Danica          Objective   Physical  Exam  Constitutional is a 74-year-old female awake alert no acute distress triage vital signs reviewed.  HEENT extraocular muscles are intact pupils equal round reactive nose no photophobia mouth clear neck supple no adenopathy no JV no bruits lungs mostly clear no retractions heart regular without murmur abdomen soft nontender no pulsatile masses wounds are well-healing without infection extremities pulses equal in all extremities no edema cords or Homans' sign no evidence of DVT skin is warm and dry without rashes no cellulitic changes no evidence of cords or Homans' sign or DVT neurologic awake alert orientated x 3 no face asymmetry speech normal no drift the arms legs no focal weakness  Procedures           ED Course      Results for orders placed or performed during the hospital encounter of 05/22/25   ECG 12 Lead Altered Mental Status    Collection Time: 05/22/25  2:46 PM   Result Value Ref Range    QT Interval 409 ms    QTC Interval 440 ms   Comprehensive Metabolic Panel    Collection Time: 05/22/25  3:08 PM    Specimen: Blood   Result Value Ref Range    Glucose 85 65 - 99 mg/dL    BUN 25 (H) 8 - 23 mg/dL    Creatinine 6.87 (H) 0.57 - 1.00 mg/dL    Sodium 132 (L) 136 - 145 mmol/L    Potassium 4.8 3.5 - 5.2 mmol/L    Chloride 94 (L) 98 - 107 mmol/L    CO2 22.7 22.0 - 29.0 mmol/L    Calcium 9.5 8.6 - 10.5 mg/dL    Total Protein 6.9 6.0 - 8.5 g/dL    Albumin 3.8 3.5 - 5.2 g/dL    ALT (SGPT) <5 1 - 33 U/L    AST (SGOT) 23 1 - 32 U/L    Alkaline Phosphatase 107 39 - 117 U/L    Total Bilirubin 0.3 0.0 - 1.2 mg/dL    Globulin 3.1 gm/dL    A/G Ratio 1.2 g/dL    BUN/Creatinine Ratio 3.6 (L) 7.0 - 25.0    Anion Gap 15.3 (H) 5.0 - 15.0 mmol/L    eGFR 5.8 (L) >60.0 mL/min/1.73   TSH Rfx On Abnormal To Free T4    Collection Time: 05/22/25  3:08 PM    Specimen: Blood   Result Value Ref Range    TSH 2.010 0.270 - 4.200 uIU/mL   Magnesium    Collection Time: 05/22/25  3:08 PM    Specimen: Blood   Result Value Ref Range     Magnesium 3.0 (H) 1.6 - 2.4 mg/dL   Lipase    Collection Time: 05/22/25  3:08 PM    Specimen: Blood   Result Value Ref Range    Lipase 11 (L) 13 - 60 U/L   CBC Auto Differential    Collection Time: 05/22/25  3:08 PM    Specimen: Blood   Result Value Ref Range    WBC 10.48 3.40 - 10.80 10*3/mm3    RBC 3.43 (L) 3.77 - 5.28 10*6/mm3    Hemoglobin 10.7 (L) 12.0 - 15.9 g/dL    Hematocrit 34.7 34.0 - 46.6 %    .2 (H) 79.0 - 97.0 fL    MCH 31.2 26.6 - 33.0 pg    MCHC 30.8 (L) 31.5 - 35.7 g/dL    RDW 15.9 (H) 12.3 - 15.4 %    RDW-SD 57.2 (H) 37.0 - 54.0 fl    MPV 10.5 6.0 - 12.0 fL    Platelets 379 140 - 450 10*3/mm3    Neutrophil % 66.5 42.7 - 76.0 %    Lymphocyte % 14.8 (L) 19.6 - 45.3 %    Monocyte % 9.8 5.0 - 12.0 %    Eosinophil % 7.1 (H) 0.3 - 6.2 %    Basophil % 1.0 0.0 - 1.5 %    Immature Grans % 0.8 (H) 0.0 - 0.5 %    Neutrophils, Absolute 6.98 1.70 - 7.00 10*3/mm3    Lymphocytes, Absolute 1.55 0.70 - 3.10 10*3/mm3    Monocytes, Absolute 1.03 (H) 0.10 - 0.90 10*3/mm3    Eosinophils, Absolute 0.74 (H) 0.00 - 0.40 10*3/mm3    Basophils, Absolute 0.10 0.00 - 0.20 10*3/mm3    Immature Grans, Absolute 0.08 (H) 0.00 - 0.05 10*3/mm3    nRBC 0.0 0.0 - 0.2 /100 WBC   Ammonia    Collection Time: 05/22/25  4:21 PM    Specimen: Blood   Result Value Ref Range    Ammonia 17 11 - 51 umol/L     CT Head Without Contrast  Result Date: 5/22/2025  Impression: No acute intracranial findings. Chronic appearing left parietal infarct. Electronically Signed: Robert Avila MD  5/22/2025 3:52 PM EDT  Workstation ID: [id number]    XR Chest 1 View  Result Date: 5/22/2025  Impression: Stable cardiac enlargement. No acute chest findings. Electronically Signed: Florinda Davidson MD  5/22/2025 3:45 PM EDT  Workstation ID: [id number]    CT Abdomen Pelvis Without Contrast  Result Date: 5/22/2025  Impression: 1.No acute findings within the abdomen or pelvis. 2.Surgical changes of recent ventral abdominal hernia repair. Small foci of air within the  anterior abdominal wall are diminished since the prior examination. No large volume free intraperitoneal air is evident. 3.Stable thin fluid collection within the extraperitoneal anterior abdomen, right of midline, thought to represent a postoperative seroma. 4.Uncomplicated sigmoid diverticulosis. 5.Right nephrectomy. Severe left renal atrophy. 6.Cholecystectomy. Stable intrahepatic and extrahepatic biliary ductal dilation. 7.New bandlike atelectasis in the left lower lobe. Right lower lobe atelectasis appears reexpanded. 8.Additional chronic findings as described above. Electronically Signed: Florinda Davidson MD  5/22/2025 3:28 PM EDT  Workstation ID: [id number]    Medications   sodium chloride 0.9 % flush 10 mL (has no administration in time range)   albuterol (PROVENTIL) nebulizer solution 0.083% 2.5 mg/3mL (has no administration in time range)   ammonium lactate (AMLACTIN) 12 % cream 1 Application (has no administration in time range)   aspirin chewable tablet 81 mg (has no administration in time range)   calcium acetate (PHOS BINDER)) capsule 667 mg (has no administration in time range)   cholecalciferol (VITAMIN D3) tablet 1,000 Units (has no administration in time range)   doxycycline (MONODOX) capsule 100 mg (has no administration in time range)   hyoscyamine (LEVSIN) SL tablet 125 mcg (has no administration in time range)   lidocaine (LMX) 4 % cream (has no administration in time range)   metoprolol succinate XL (TOPROL-XL) 24 hr tablet 25 mg (25 mg Oral Not Given 5/22/25 2118)   multivitamin (THERAGRAN) tablet 1 tablet (has no administration in time range)   pantoprazole (PROTONIX) EC tablet 40 mg (has no administration in time range)   sevelamer (RENVELA) tablet 800 mg (has no administration in time range)   sodium bicarbonate tablet 1,300 mg (has no administration in time range)   sucralfate (CARAFATE) tablet 1 g (has no administration in time range)   temazepam (RESTORIL) capsule 30 mg (has no  administration in time range)   sodium chloride 0.9 % flush 10 mL (has no administration in time range)   sodium chloride 0.9 % flush 10 mL (has no administration in time range)   sodium chloride 0.9 % infusion 40 mL (has no administration in time range)   nitroglycerin (NITROSTAT) SL tablet 0.4 mg (has no administration in time range)   Potassium Replacement - Follow Nurse / BPA Driven Protocol (has no administration in time range)   Magnesium Standard Dose Replacement - Follow Nurse / BPA Driven Protocol (has no administration in time range)   Phosphorus Replacement - Follow Nurse / BPA Driven Protocol (has no administration in time range)   Calcium Replacement - Follow Nurse / BPA Driven Protocol (has no administration in time range)   sennosides-docusate (PERICOLACE) 8.6-50 MG per tablet 2 tablet (has no administration in time range)     And   polyethylene glycol (MIRALAX) packet 17 g (has no administration in time range)     And   bisacodyl (DULCOLAX) EC tablet 5 mg (has no administration in time range)     And   bisacodyl (DULCOLAX) suppository 10 mg (has no administration in time range)   ondansetron ODT (ZOFRAN-ODT) disintegrating tablet 4 mg (has no administration in time range)   morphine injection 4 mg (4 mg Intravenous Given 5/22/25 1848)   ondansetron (ZOFRAN) injection 4 mg (4 mg Intravenous Given 5/22/25 1848)                                         EKG my interpretation normal sinus rhythm rate of 69 LVH QTc of 440 nonspecific T wave changes no diffusely but unchanged from 5/11/2025 abnormal              Medical Decision Making  Medical Decision Making IV established monitor placed by review of sinus rhythm EKG obtained my interpretation normal sinus rhythm rate 69 some LVH QTc of 440 nonspecific T wave changes no diffusely but no change from 5/11/2025.  Normal EKG.  Chest x-ray my independent review I do not see evidence of pneumonia pneumothorax or failure radiology review the same CT head without  my independent review I do not see any tumors masses hemorrhage or acute findings old infarct noted radiology review the same CT abdomen pelvis my dependent review I do not see any bowel obstruction or any evidence of significant free air and no evidence suggest ischemic bowel.  Radiology review shows no acute findings in the abdomen pelvis postsurgical changes are noted.  The patient has small foci of air within the anterior abdomen wall diminished since prior examination no large volume free air is present stable thin fluid collection in the extraperitoneal anterior abdomen right of midline thought to represent a seroma.  Diverticulosis right nephrectomy left renal atrophy cholecystectomy some ductal dilatation some atelectasis in the left lower lobe and right lower lobe.  Labs obtained by independent review comprehensive metabolic profile remarkable in a BUN of 25 and a creatinine of 6.8 sodium 132 patient got dialysis yesterday potassium 4.8 magnesium was 3 TSH was normal lipase is 11 CBC unremarkable other than a hemoglobin of 10.7 ammonia level was 17 the patient repeat exam is resting company no distress.  I do not see any evidence of acute stroke or meningitis or encephalitis or intracerebral hemorrhage or acute intra-abdominal process such as ischemic bowel aneurysm or any evidence of abscess or infection I do not see any evidence that suggest acute pneumonia based on the history and physical clinical findings although not a complete list of all possibilities.  I talked to the nurse practitioner for Dr. Church and the case was discussed and patient be admitted for further care stable unremarkable ER course reviewed.  Patient had ventral hernia repair on 15 May.  Patient went home on the 18th after this repair.  Patient was given morphine 4 IV and Zofran 4 IV.    Problems Addressed:  Altered mental status, unspecified altered mental status type: complicated acute illness or injury  Weakness: complicated acute  illness or injury    Amount and/or Complexity of Data Reviewed  Labs: ordered. Decision-making details documented in ED Course.  Radiology: ordered and independent interpretation performed. Decision-making details documented in ED Course.  ECG/medicine tests: ordered and independent interpretation performed. Decision-making details documented in ED Course.    Risk  Parenteral controlled substances.  Decision regarding hospitalization.        Final diagnoses:   Altered mental status, unspecified altered mental status type   Weakness       ED Disposition  ED Disposition       ED Disposition   Decision to Admit    Condition   --    Comment   Level of Care: Telemetry [5]   Admitting Physician: AMY GALINDO [[id number]]   Attending Physician: AMY GALINDO [[id number]]                 No follow-up provider specified.       Medication List      No changes were made to your prescriptions during this visit.            Matteo Ayala MD  05/22/25 [id number]

## 2025-05-22 NOTE — OUTREACH NOTE
General Surgery Week 1 Survey      Flowsheet Row Responses   [facility] patient discharged from? [facility]   Does the patient have one of the following disease processes/diagnoses(primary or secondary)? General Surgery   Week 1 attempt successful? Yes   Call start time 1321   Revoke Decline to participate  [spoke briefly to patients sig other. He reports that they are bringing patient back to the hospital at time of call.]   Call end time 1323   Discharge diagnosis Acute hyperkalemia, recurrent ventral hernia,   VENTRAL  HERNIA REPAIR LAPAROSCOPIC WITH MESH, EXTENSIVE LYSIS OF ADHESION   Is patient permission given to speak with other caregiver? Yes   Person spoke with today (if not patient) and relationship sig other, Ari Connor   Call end time 1323            JEANINE SHI - Registered Nurse

## 2025-05-23 ENCOUNTER — APPOINTMENT (OUTPATIENT)
Dept: NEPHROLOGY | Facility: HOSPITAL | Age: 75
End: 2025-05-23
Payer: MEDICARE

## 2025-05-23 LAB
PHOSPHATE SERPL-MCNC: 4.6 MG/DL (ref 2.5–4.5)
QT INTERVAL: 409 MS
QTC INTERVAL: 440 MS

## 2025-05-23 PROCEDURE — 97162 PT EVAL MOD COMPLEX 30 MIN: CPT

## 2025-05-23 PROCEDURE — 25810000003 SODIUM CHLORIDE 0.9 % SOLUTION: Performed by: FAMILY MEDICINE

## 2025-05-23 PROCEDURE — G0378 HOSPITAL OBSERVATION PER HR: HCPCS

## 2025-05-23 PROCEDURE — G0257 UNSCHED DIALYSIS ESRD PT HOS: HCPCS

## 2025-05-23 RX ORDER — CALCIUM ACETATE 667 MG/1
667 CAPSULE ORAL 2 TIMES DAILY WITH MEALS
Status: DISCONTINUED | OUTPATIENT
Start: 2025-05-23 | End: 2025-05-26 | Stop reason: HOSPADM

## 2025-05-23 RX ORDER — ACETAMINOPHEN 325 MG/1
650 TABLET ORAL EVERY 6 HOURS PRN
Status: DISCONTINUED | OUTPATIENT
Start: 2025-05-23 | End: 2025-05-26 | Stop reason: HOSPADM

## 2025-05-23 RX ORDER — ONDANSETRON 2 MG/ML
4 INJECTION INTRAMUSCULAR; INTRAVENOUS EVERY 6 HOURS PRN
Status: DISCONTINUED | OUTPATIENT
Start: 2025-05-23 | End: 2025-05-26 | Stop reason: HOSPADM

## 2025-05-23 RX ORDER — ACETAMINOPHEN 650 MG/1
650 SUPPOSITORY RECTAL EVERY 4 HOURS PRN
Status: DISCONTINUED | OUTPATIENT
Start: 2025-05-23 | End: 2025-05-26 | Stop reason: HOSPADM

## 2025-05-23 RX ORDER — POLYETHYLENE GLYCOL 3350 17 G/17G
17 POWDER, FOR SOLUTION ORAL DAILY
Status: DISCONTINUED | OUTPATIENT
Start: 2025-05-23 | End: 2025-05-26 | Stop reason: HOSPADM

## 2025-05-23 RX ORDER — DOCUSATE SODIUM 100 MG/1
100 CAPSULE, LIQUID FILLED ORAL 2 TIMES DAILY
Status: DISCONTINUED | OUTPATIENT
Start: 2025-05-23 | End: 2025-05-26 | Stop reason: HOSPADM

## 2025-05-23 RX ORDER — TRAMADOL HYDROCHLORIDE 50 MG/1
50 TABLET ORAL EVERY 12 HOURS PRN
Status: DISCONTINUED | OUTPATIENT
Start: 2025-05-23 | End: 2025-05-25

## 2025-05-23 RX ORDER — DIAZEPAM 10 MG/2ML
5 INJECTION, SOLUTION INTRAMUSCULAR; INTRAVENOUS EVERY 4 HOURS PRN
Status: DISCONTINUED | OUTPATIENT
Start: 2025-05-23 | End: 2025-05-25

## 2025-05-23 RX ADMIN — PANTOPRAZOLE SODIUM 40 MG: 40 TABLET, DELAYED RELEASE ORAL at 08:12

## 2025-05-23 RX ADMIN — TEMAZEPAM 30 MG: 15 CAPSULE ORAL at 21:54

## 2025-05-23 RX ADMIN — SEVELAMER CARBONATE 3200 MG: 800 TABLET, FILM COATED ORAL at 17:40

## 2025-05-23 RX ADMIN — SODIUM BICARBONATE 1300 MG: 650 TABLET ORAL at 08:12

## 2025-05-23 RX ADMIN — DOXYCYCLINE 100 MG: 100 CAPSULE ORAL at 08:13

## 2025-05-23 RX ADMIN — ASPIRIN 81 MG CHEWABLE TABLET 81 MG: 81 TABLET CHEWABLE at 00:39

## 2025-05-23 RX ADMIN — POLYETHYLENE GLYCOL 3350 17 G: 17 POWDER, FOR SOLUTION ORAL at 08:11

## 2025-05-23 RX ADMIN — SODIUM BICARBONATE 1300 MG: 650 TABLET ORAL at 17:40

## 2025-05-23 RX ADMIN — Medication 1000 UNITS: at 08:12

## 2025-05-23 RX ADMIN — THERA TABS 1 TABLET: TAB at 08:12

## 2025-05-23 RX ADMIN — Medication 10 ML: at 08:13

## 2025-05-23 RX ADMIN — Medication 10 ML: at 22:02

## 2025-05-23 RX ADMIN — SODIUM CHLORIDE 250 ML: 9 INJECTION, SOLUTION INTRAVENOUS at 21:49

## 2025-05-23 RX ADMIN — METOPROLOL SUCCINATE 25 MG: 25 TABLET, EXTENDED RELEASE ORAL at 08:12

## 2025-05-23 RX ADMIN — ASPIRIN 81 MG CHEWABLE TABLET 81 MG: 81 TABLET CHEWABLE at 08:12

## 2025-05-23 RX ADMIN — DOXYCYCLINE 100 MG: 100 CAPSULE ORAL at 00:39

## 2025-05-23 RX ADMIN — TEMAZEPAM 30 MG: 15 CAPSULE ORAL at 01:52

## 2025-05-23 RX ADMIN — SEVELAMER CARBONATE 3200 MG: 800 TABLET, FILM COATED ORAL at 08:12

## 2025-05-23 RX ADMIN — DOCUSATE SODIUM 100 MG: 100 CAPSULE, LIQUID FILLED ORAL at 21:54

## 2025-05-23 RX ADMIN — TRAMADOL HYDROCHLORIDE 50 MG: 50 TABLET, FILM COATED ORAL at 00:39

## 2025-05-23 RX ADMIN — SODIUM BICARBONATE 1300 MG: 650 TABLET ORAL at 00:39

## 2025-05-23 RX ADMIN — DOCUSATE SODIUM 100 MG: 100 CAPSULE, LIQUID FILLED ORAL at 08:12

## 2025-05-23 RX ADMIN — DOXYCYCLINE 100 MG: 100 CAPSULE ORAL at 21:55

## 2025-05-23 RX ADMIN — SODIUM BICARBONATE 1300 MG: 650 TABLET ORAL at 21:54

## 2025-05-23 RX ADMIN — Medication 10 ML: at 00:41

## 2025-05-23 RX ADMIN — CALCIUM ACETATE 667 MG: 667 CAPSULE ORAL at 21:54

## 2025-05-23 NOTE — CASE MANAGEMENT/SOCIAL WORK
Discharge Planning Assessment   Derick     Patient Name: Sherry Lobato  MRN: [id number]  Today's Date: 5/23/2025    Admit Date: 5/22/2025    Plan: Return home with  and [facility] (pending acceptance- will need order.)   Discharge Needs Assessment       Row Name 05/23/25 1156       Living Environment    People in Home significant other    Name(s) of People in Home SO Derek    Current Living Arrangements home    Potentially Unsafe Housing Conditions none    In the past 12 months has the electric, gas, oil, or water company threatened to shut off services in your home? No    Primary Care Provided by self    Provides Primary Care For no one    Family Caregiver if Needed significant other    Family Caregiver Names DO Derek    Quality of Family Relationships helpful;involved;supportive    Able to Return to Prior Arrangements yes       Resource/Environmental Concerns    Resource/Environmental Concerns none    Transportation Concerns none       Transportation Needs    In the past 12 months, has lack of transportation kept you from medical appointments or from getting medications? no    In the past 12 months, has lack of transportation kept you from meetings, work, or from getting things needed for daily living? No       Food Insecurity    Within the past 12 months, you worried that your food would run out before you got the money to buy more. Never true    Within the past 12 months, the food you bought just didn't last and you didn't have money to get more. Never true       Transition Planning    Patient/Family Anticipates Transition to home with family;home with help/services    Patient/Family Anticipated Services at Transition none    Transportation Anticipated family or friend will provide       Discharge Needs Assessment    Readmission Within the Last 30 Days no previous admission in last 30 days    Equipment Currently Used at Home walker, standard;nebulizer    Concerns to be Addressed discharge planning    Do you  want help finding or keeping work or a job? Patient declined    Do you want help with school or training? For example, starting or completing job training or getting a high school diploma, GED or equivalent Patient declined    Anticipated Changes Related to Illness none    Equipment Needed After Discharge none    Discharge Facility/Level of Care Needs home with home health    Current Discharge Risk dependent with mobility/activities of daily living                   Discharge Plan       Row Name 05/23/25 1554       Plan    Plan Return home with  and [facility] (pending acceptance- will need order.)    Patient/Family in Agreement with Plan yes    Plan Comments CM met with pt at bedside to discuss discharge needs. Pt is A&O, lives with SO Derek, does not drive and is assist with ADLS. PCP and pharmacy verified- pt declined [facility]. No issues stated affording food or medications, and home environment is safe. Current DME: walker and nebulizer. Pt requesting [facility]-referral placed to [facility] per pt request and liaison contacted (pending acceptance.) Family will transport at discharge.                  Continued Care and Services - Admitted Since 5/22/2025       Home Medical Care       Service Provider Request Status Services Address Phone Fax Patient Preferred    [facility] - Request Sent -- [address] [facility] [place] [address] [phone] --                Demographic Summary       Row Name 05/23/25 0044       General Information    Admission Type observation    Arrived From emergency department    Required Notices Provided Observation Status Notice    Referral Source admission list    Reason for Consult discharge planning    Preferred Language English       Contact Information    Permission Granted to Share Info With                    Functional Status       Row Name 05/23/25 1158       Functional Status    Usual Activity Tolerance moderate    Current Activity Tolerance fair        Functional Status, IADL    Medications independent    Meal Preparation assistive person    Housekeeping assistive person    Laundry assistive person    Shopping assistive person    If for any reason you need help with day-to-day activities such as bathing, preparing meals, shopping, managing finances, etc., do you get the help you need? I get all the help I need       Mental Status    General Appearance WDL WDL       Mental Status Summary    Recent Changes in Mental Status/Cognitive Functioning no changes       Employment/    Current or Previous Occupation not applicable                Patient Forms       Row Name 05/23/25 1153       Patient Forms    Important Message from Medicare ([facility]) Delivered  [place] 5/23 per CM    Delivered to Patient    Method of delivery In person                      June Goode RN     Office phone: [phone]  Office fax: [phone]

## 2025-05-23 NOTE — PROGRESS NOTES
"     LOS: 0 days   Patient Care Team:  Raegan Gotti MD as PCP - General (Family Medicine)  Nilay Stevens MD as Consulting Physician (General Surgery)  Stephen Perea MD as Surgeon (Thoracic Surgery)  Makayla Navarro RN as Nurse Navigator  Mak Sosa MD as Cardiologist (Cardiology)  Anjum Cam MD as Consulting Physician (Hematology and Oncology)    Subjective     Interval History: Stable overnight; brief episode of hypotension    Patient Complaints: Feels off balance when ambulating.  Feels \"foggy headed\" but is not actually confused.  Symptoms were not present when she was discharged from the hospital after recent complicated hernia repair.  This started yesterday morning.  She does not feel that she has been taking frequent pain medications.  She has not missed any dialysis sessions.    History taken from: patient    Review of Systems   Constitutional:  Positive for activity change and appetite change. Negative for chills, diaphoresis, fatigue and fever.   HENT:  Negative for congestion and facial swelling.    Eyes:  Negative for visual disturbance.   Respiratory:  Negative for apnea, cough, shortness of breath and stridor.    Cardiovascular:  Negative for chest pain, palpitations and leg swelling.   Gastrointestinal:  Positive for abdominal pain and constipation. Negative for diarrhea, nausea and vomiting.   Genitourinary:  Negative for dysuria and urgency.   Musculoskeletal:  Negative for arthralgias, back pain and gait problem.   Skin:  Positive for wound. Negative for rash.   Neurological:  Negative for tremors, weakness, numbness and headaches.   Psychiatric/Behavioral:  Positive for confusion.            Objective     Vital Signs  Temp:  [98 °F (36.7 °C)-98.1 °F (36.7 °C)] 98.1 °F (36.7 °C)  Heart Rate:  [] 70  Resp:  [7-18] 12  BP: ()/() 161/83    Physical Exam:     General Appearance:    Alert, cooperative, in no acute distress, fully oriented, good historian   Head:    " Normocephalic, without obvious abnormality, atraumatic   Eyes:            Lids and lashes normal, conjunctivae and sclerae normal, no   icterus, no pallor, corneas clear, PERRLA   Ears:    Ears appear intact with no abnormalities noted   Throat:   No oral lesions, no thrush, oral mucosa moist   Neck:   No adenopathy, supple, trachea midline, no thyromegaly, no   carotid bruit, no JVD   Lungs:     Clear to auscultation,respirations regular, even and                  unlabored    Heart:    Regular rhythm and normal rate, normal S1 and S2, no            murmur, no gallop, no rub, no click   Chest Wall:    No abnormalities observed   Abdomen:     Normal bowel sounds, no masses, no organomegaly, soft        Non-tender non-distended, no guarding,   Extremities:   Moves all extremities well, no edema, no cyanosis, no             Redness   Pulses:   Pulses palpable and equal bilaterally   Skin:   No bleeding, bruising or rash   Lymph nodes:   No palpable adenopathy   Neurologic:   Cranial nerves 2 - 12 grossly intact, sensation intact, DTR       present and equal bilaterally        Results Review:    Lab Results (last 24 hours)       Procedure Component Value Units Date/Time    Phosphorus [[id number]]  (Abnormal) Collected: 05/22/25 1508    Specimen: Blood Updated: 05/23/25 0443     Phosphorus 4.6 mg/dL     Ammonia [[id number]]  (Normal) Collected: 05/22/25 1621    Specimen: Blood Updated: 05/22/25 1647     Ammonia 17 umol/L     Comprehensive Metabolic Panel [[id number]]  (Abnormal) Collected: 05/22/25 1508    Specimen: Blood Updated: 05/22/25 1557     Glucose 85 mg/dL      BUN 25 mg/dL      Creatinine 6.87 mg/dL      Sodium 132 mmol/L      Potassium 4.8 mmol/L      Chloride 94 mmol/L      CO2 22.7 mmol/L      Calcium 9.5 mg/dL      Total Protein 6.9 g/dL      Albumin 3.8 g/dL      ALT (SGPT) <5 U/L      Comment: Result checked          AST (SGOT) 23 U/L      Alkaline Phosphatase 107 U/L      Total Bilirubin 0.3 mg/dL       Globulin 3.1 gm/dL      A/G Ratio 1.2 g/dL      BUN/Creatinine Ratio 3.6     Anion Gap 15.3 mmol/L      eGFR 5.8 mL/min/1.73     Narrative:      GFR Categories in Chronic Kidney Disease (CKD)              GFR Category          GFR (mL/min/1.73)    Interpretation  G1                    90 or greater        Normal or high (1)  G2                    60-89                Mild decrease (1)  G3a                   45-59                Mild to moderate decrease  G3b                   30-44                Moderate to severe decrease  G4                    15-29                Severe decrease  G5                    14 or less           Kidney failure    (1)In the absence of evidence of kidney disease, neither GFR category G1 or G2 fulfill the criteria for CKD.    eGFR calculation 2021 CKD-EPI creatinine equation, which does not include race as a factor    Magnesium [[id number]]  (Abnormal) Collected: 05/22/25 1508    Specimen: Blood Updated: 05/22/25 1550     Magnesium 3.0 mg/dL     TSH Rfx On Abnormal To Free T4 [[id number]]  (Normal) Collected: 05/22/25 1508    Specimen: Blood Updated: 05/22/25 1549     TSH 2.010 uIU/mL     Lipase [[id number]]  (Abnormal) Collected: 05/22/25 1508    Specimen: Blood Updated: 05/22/25 1548     Lipase 11 U/L     CBC & Differential [[id number]]  (Abnormal) Collected: 05/22/25 1508    Specimen: Blood Updated: 05/22/25 1523    Narrative:      The following orders were created for panel order CBC & Differential.  Procedure                               Abnormality         Status                     ---------                               -----------         ------                     CBC Auto Differential[[id number]]        Abnormal            Final result                 Please view results for these tests on the individual orders.    CBC Auto Differential [[id number]]  (Abnormal) Collected: 05/22/25 1508    Specimen: Blood Updated: 05/22/25 1523     WBC 10.48 10*3/mm3      RBC 3.43 10*6/mm3       Hemoglobin 10.7 g/dL      Hematocrit 34.7 %      .2 fL      MCH 31.2 pg      MCHC 30.8 g/dL      RDW 15.9 %      RDW-SD 57.2 fl      MPV 10.5 fL      Platelets 379 10*3/mm3      Neutrophil % 66.5 %      Lymphocyte % 14.8 %      Monocyte % 9.8 %      Eosinophil % 7.1 %      Basophil % 1.0 %      Immature Grans % 0.8 %      Neutrophils, Absolute 6.98 10*3/mm3      Lymphocytes, Absolute 1.55 10*3/mm3      Monocytes, Absolute 1.03 10*3/mm3      Eosinophils, Absolute 0.74 10*3/mm3      Basophils, Absolute 0.10 10*3/mm3      Immature Grans, Absolute 0.08 10*3/mm3      nRBC 0.0 /100 WBC              Imaging Results (Last 24 Hours)       Procedure Component Value Units Date/Time    CT Head Without Contrast [[id number]] Collected: 05/22/25 1549     Updated: 05/22/25 1555    Narrative:      CT HEAD WO CONTRAST    Date of Exam: 5/22/2025 3:16 PM EDT    Indication: Fall head injury altered mental status.    Comparison: Head CT 8/26/2024.    Technique: Axial CT images were obtained of the head without contrast administration.  Coronal reconstructions were performed.  Automated exposure control and iterative reconstruction methods were used.      Findings:  No acute intracranial hemorrhage.Intact appearing gray-white differentiation.No extra-axial fluid collection.No significant mass effect.    Unchanged chronic appearing left parietal infarct.    No hydrocephalus.    Brain volume appears mildly diminished, but likely age-appropriate.    Scattered areas of periventricular and subcortical white matter hypoattenuation, nonspecific, perhaps from small vessel ischemic/hypertensive changes in a patient of this age.There are intracranial atherosclerotic calcifications.    Mild scattered mucosal thickening in the paranasal sinuses.Mastoid air cells are essentially clear.. Bilateral lens replacements.    No acute or aggressive appearing bony or extracranial soft tissue process.      Impression:      Impression:  No acute  intracranial findings.    Chronic appearing left parietal infarct.      Electronically Signed: Robert Avila MD    5/22/2025 3:52 PM EDT    Workstation ID: [id number]    XR Chest 1 View [[id number]] Collected: 05/22/25 1518     Updated: 05/22/25 1547    Narrative:      XR CHEST 1 VW    Date of Exam: 5/22/2025 3:04 PM EDT    Indication: Altered mental status    Comparison: CT chest 2/13/2025    Findings:  Heart size appears mild to moderately enlarged but stable in appearance since 11/11/2024. No acute airspace disease, pleural effusion or pneumothorax or acute osseous abnormalities are identified. Stent material is seen within each axillary region.   Surgical anchor is seen in the left humeral head.      Impression:      Impression:  Stable cardiac enlargement.  No acute chest findings.        Electronically Signed: Florinda Davidson MD    5/22/2025 3:45 PM EDT    Workstation ID: [id number]    CT Abdomen Pelvis Without Contrast [[id number]] Collected: 05/22/25 1523     Updated: 05/22/25 1530    Narrative:      CT ABDOMEN PELVIS WO CONTRAST    Date of Exam: 5/22/2025 3:20 PM EDT    Indication: Recent hernia surgery abdominal pain renal failure dialysis patient.    Comparison: CT abdomen pelvis without contrast 5/17/2025    Technique: Axial CT images were obtained of the abdomen and pelvis without the administration of contrast. Sagittal and coronal reconstructions were performed.  Automated exposure control and iterative reconstruction methods were used.      Findings:  Surgical changes of recent ventral abdominal hernia repair are again noted. Small foci of air are in seen within the anterior abdominal wall, diminished. No large volume free intraperitoneal air is evident. Thin fluid collection is demonstrated within   the extraperitoneal anterior abdomen right of midline measuring 1.6 cm thickness, unchanged, thought to represent a postoperative seroma.    Uncomplicated sigmoid diverticular changes are present. There are  surgical changes of gastroenteric anastomosis. No indication of active bowel inflammation or bowel obstruction.    Cholecystectomy. Stable intrahepatic and extrahepatic biliary ductal dilation. Common bile duct measures up to 12 mm. Moderate to advanced pancreatic parenchymal atrophy without active inflammatory change. Spleen is normal. Bilateral low-density adrenal   nodular thickening consistent with adenomatous hyperplasia. Right nephrectomy. Severe left renal atrophy.    Advanced calcific atherosclerosis within the aorta, iliac and femoral vessels and mesenteric vessels. Patency of vasculature is not assessed without the benefit of IV contrast.    Urinary bladder is decompressed. Hysterectomy. Rectum is within normal limits.    There is mild generalized muscular atrophy. Severe facet arthropathy bilaterally at L4-5 and L5-S1. Prominent anterolateral osteophyte formation in the lower thoracic spine. No acute or suspicious osseous abnormalities    Bandlike atelectatic changes in the left lower lobe are new. Right lower lobe atelectasis appears reexpanded. Mosaic attenuation of the lung bases may reflect areas of air trapping. Benign calcified nodules in the right lower lobe. Heart size is upper   limits of normal. Dense coronary artery calcifications are present.      Impression:      Impression:  1.No acute findings within the abdomen or pelvis.  2.Surgical changes of recent ventral abdominal hernia repair. Small foci of air within the anterior abdominal wall are diminished since the prior examination. No large volume free intraperitoneal air is evident.  3.Stable thin fluid collection within the extraperitoneal anterior abdomen, right of midline, thought to represent a postoperative seroma.  4.Uncomplicated sigmoid diverticulosis.  5.Right nephrectomy. Severe left renal atrophy.  6.Cholecystectomy. Stable intrahepatic and extrahepatic biliary ductal dilation.  7.New bandlike atelectasis in the left lower lobe.  Right lower lobe atelectasis appears reexpanded.  8.Additional chronic findings as described above.        Electronically Signed: Florinda Davidson MD    5/22/2025 3:28 PM EDT    Workstation ID: [id number]                 I reviewed the patient's new clinical results.    Medication Review:   Scheduled Meds:aspirin, 81 mg, Oral, Daily  calcium acetate, 667 mg, Oral, BID With Meals  cholecalciferol, 1,000 Units, Oral, Daily  docusate sodium, 100 mg, Oral, BID  doxycycline, 100 mg, Oral, Q12H  metoprolol succinate XL, 25 mg, Oral, BID  multivitamin, 1 tablet, Oral, Daily  pantoprazole, 40 mg, Oral, QAM AC  polyethylene glycol, 17 g, Oral, Daily  sevelamer, 3,200 mg, Oral, BID  sodium bicarbonate, 1,300 mg, Oral, TID  sodium chloride, 10 mL, Intravenous, Q12H  temazepam, 30 mg, Oral, Nightly      Continuous Infusions:   PRN Meds:.  acetaminophen **OR** acetaminophen    albuterol    ammonium lactate    senna-docusate sodium **AND** polyethylene glycol **AND** bisacodyl **AND** bisacodyl    Calcium Replacement - Follow Nurse / BPA Driven Protocol    diazePAM    lidocaine    Magnesium Standard Dose Replacement - Follow Nurse / BPA Driven Protocol    nitroglycerin    ondansetron    ondansetron ODT    Phosphorus Replacement - Follow Nurse / BPA Driven Protocol    Potassium Replacement - Follow Nurse / BPA Driven Protocol    [COMPLETED] Insert Peripheral IV **AND** sodium chloride    sodium chloride    sodium chloride    traMADol     Assessment & Plan       Altered mental status    Barretts esophagus    Gastroesophageal reflux disease    ESRD (end stage renal disease)    Type 2 diabetes mellitus, without long-term current use of insulin    Mixed hyperlipidemia    Hypertension    Obesity (BMI 30-39.9)    Diabetic peripheral neuropathy    Chronic obstructive pulmonary disease    Arteriovenous fistula    Non-small cell lung cancer    Secondary hyperparathyroidism of renal origin    - balance/brain fog concerning for subacute cva in  patient with previous CVA and multiple CV risk factors - MRI of brain pending  -continue routine dialysis session  - continue home medications for   -scheduled stool softeners and laxatives for narcotic induced constipation  -post-op wounds are healing well    CODE Status:    Code status (Patient has no pulse and is not breathing):  CPR (Attempt to Resuscitate)  Medical Interventions (Patient has pulse or is breathing):  Full support  Level of support discussed with:  Patient    Admission status:  I believe this patient meets observation status  Expected length of stay:  1 midnights or greater  I discussed the patient's findings and my recommendations with the patient.    Plan for disposition:Home at discharge    Angelia Taylor MD  05/23/25  11:53 EDT

## 2025-05-23 NOTE — ED NOTES
Nursing report ED to floor  Sherry Lobato  74 y.o.  female    HPI:   Chief Complaint   Patient presents with    Post-op Problem       Admitting doctor:   Angelia Taylor MD    Admitting diagnosis:   The primary encounter diagnosis was Altered mental status, unspecified altered mental status type. A diagnosis of Weakness was also pertinent to this visit.    Code status:   Current Code Status       Date Active Code Status Order ID Comments User Context       5/22/2025 1640 CPR (Attempt to Resuscitate) [id number]  April Mejia, [facility]        Question Answer    Code Status (Patient has no pulse and is not breathing) CPR (Attempt to Resuscitate)    Medical Interventions (Patient has pulse or is breathing) Full Support                    Allergies:   Atorvastatin calcium, Gabapentin, and Niacin    Isolation:  No active isolations     Fall Risk:  Fall Risk Assessment was completed, and patient is at low risk for falls.   Predictive Model Details         13 (Low) Factor Value    Calculated 5/22/2025 21:08 Age 74    Risk of Fall Model Active Peripheral IV Present     Imaging order in this encounter Present     Diastolic BP 44     Magnesium 3 mg/dL     Chloride 94 mmol/L     Teodoro Scale not on file     Number of Distinct Medication Classes administered 2     Creatinine 6.87 mg/dL     Albumin 3.8 g/dL     Number of administrations of Analgesic Narcotics 1     Duration of Current Encounter 0.294 days     Potassium 4.8 mmol/L     Tobacco Use Quit     Calcium 9.5 mg/dL     Total Bilirubin 0.3 mg/dL     Respiratory Rate 15     ALT <5 U/L         Weight:       05/22/25  1353   Weight: 71.3 kg (157 lb 3 oz)       Intake and Output  No intake or output data in the 24 hours ending 05/22/25 2112    Diet:   Dietary Orders (From admission, onward)       Start     Ordered    05/22/25 1641  Diet: Regular/House; Fluid Consistency: Thin (IDDSI 0)  Diet Effective Now        References:    Diet Order Definitions   Question Answer Comment    Diets: Regular/House    Fluid Consistency: Thin (IDDSI 0)        05/22/25 1640                     Most recent vitals:   Vitals:    05/22/25 2015 05/22/25 2020 05/22/25 2050 05/22/25 2100   BP: (!) 80/48 91/49 (!) 115/37 103/44   Pulse: 85 85 72 76   Resp:   15 15   Temp:       TempSrc:       SpO2: 92% 94% 93% 92%   Weight:       Height:           Active LDAs/IV Access:   Lines, Drains & Airways       Active LDAs       Name Placement date Placement time Site Days    Peripheral IV 05/22/25 1508 22 G Anterior;Right Wrist 05/22/25  1508  Wrist  less than 1                    Skin Condition:   Skin Assessments (last day)       None             Labs (abnormal labs have a star):   Labs Reviewed   COMPREHENSIVE METABOLIC PANEL - Abnormal; Notable for the following components:       Result Value    BUN 25 (*)     Creatinine 6.87 (*)     Sodium 132 (*)     Chloride 94 (*)     BUN/Creatinine Ratio 3.6 (*)     Anion Gap 15.3 (*)     eGFR 5.8 (*)     All other components within normal limits    Narrative:     GFR Categories in Chronic Kidney Disease (CKD)              GFR Category          GFR (mL/min/1.73)    Interpretation  G1                    90 or greater        Normal or high (1)  G2                    60-89                Mild decrease (1)  G3a                   45-59                Mild to moderate decrease  G3b                   30-44                Moderate to severe decrease  G4                    15-29                Severe decrease  G5                    14 or less           Kidney failure    (1)In the absence of evidence of kidney disease, neither GFR category G1 or G2 fulfill the criteria for CKD.    eGFR calculation 2021 CKD-EPI creatinine equation, which does not include race as a factor   MAGNESIUM - Abnormal; Notable for the following components:    Magnesium 3.0 (*)     All other components within normal limits   LIPASE - Abnormal; Notable for the following components:    Lipase 11 (*)     All other  components within normal limits   CBC WITH AUTO DIFFERENTIAL - Abnormal; Notable for the following components:    RBC 3.43 (*)     Hemoglobin 10.7 (*)     .2 (*)     MCHC 30.8 (*)     RDW 15.9 (*)     RDW-SD 57.2 (*)     Lymphocyte % 14.8 (*)     Eosinophil % 7.1 (*)     Immature Grans % 0.8 (*)     Monocytes, Absolute 1.03 (*)     Eosinophils, Absolute 0.74 (*)     Immature Grans, Absolute 0.08 (*)     All other components within normal limits   AMMONIA - Normal   TSH RFX ON ABNORMAL TO FREE T4 - Normal   CBC AND DIFFERENTIAL    Narrative:     The following orders were created for panel order CBC & Differential.  Procedure                               Abnormality         Status                     ---------                               -----------         ------                     CBC Auto Differential[[id number]]        Abnormal            Final result                 Please view results for these tests on the individual orders.       LOC: Person, Place, Time, and Situation    Telemetry:  Telemetry    Cardiac Monitoring Ordered: yes    EKG:   ECG 12 Lead Altered Mental Status   Preliminary Result   HEART RATE=69  bpm   RR Mkgwjowe=732  ms   AZ Brlinelm=930  ms   P Horizontal Axis=-21  deg   P Front Axis=40  deg   QRSD Interval=98  ms   QT Bugyxrco=750  ms   YUlJ=551  ms   QRS Axis=-18  deg   T Wave Axis=168  deg   - ABNORMAL ECG -   Sinus rhythm   Probable LVH with secondary repol abnrm   Date and Time of Study:2025-05-22 14:46:00          Medications Given in the ED:   Medications   sodium chloride 0.9 % flush 10 mL (has no administration in time range)   albuterol sulfate HFA (PROVENTIL HFA;VENTOLIN HFA;PROAIR HFA) inhaler 2 puff (has no administration in time range)   ammonium lactate (AMLACTIN) 12 % cream 1 Application (has no administration in time range)   aspirin chewable tablet 81 mg (has no administration in time range)   calcium acetate (PHOS BINDER)) capsule 667 mg (has no administration in  time range)   cholecalciferol (VITAMIN D3) tablet 1,000 Units (has no administration in time range)   doxycycline (MONODOX) capsule 100 mg (has no administration in time range)   hyoscyamine (LEVSIN) SL tablet 125 mcg (has no administration in time range)   lidocaine (LMX) 4 % cream (has no administration in time range)   metoprolol succinate XL (TOPROL-XL) 24 hr tablet 25 mg (has no administration in time range)   multivitamin (THERAGRAN) tablet 1 tablet (has no administration in time range)   pantoprazole (PROTONIX) EC tablet 40 mg (has no administration in time range)   sevelamer (RENVELA) tablet 800 mg (has no administration in time range)   sodium bicarbonate tablet 1,300 mg (has no administration in time range)   sucralfate (CARAFATE) tablet 1 g (has no administration in time range)   temazepam (RESTORIL) capsule 30 mg (has no administration in time range)   sodium chloride 0.9 % flush 10 mL (has no administration in time range)   sodium chloride 0.9 % flush 10 mL (has no administration in time range)   sodium chloride 0.9 % infusion 40 mL (has no administration in time range)   nitroglycerin (NITROSTAT) SL tablet 0.4 mg (has no administration in time range)   Potassium Replacement - Follow Nurse / BPA Driven Protocol (has no administration in time range)   Magnesium Standard Dose Replacement - Follow Nurse / BPA Driven Protocol (has no administration in time range)   Phosphorus Replacement - Follow Nurse / BPA Driven Protocol (has no administration in time range)   Calcium Replacement - Follow Nurse / BPA Driven Protocol (has no administration in time range)   sennosides-docusate (PERICOLACE) 8.6-50 MG per tablet 2 tablet (has no administration in time range)     And   polyethylene glycol (MIRALAX) packet 17 g (has no administration in time range)     And   bisacodyl (DULCOLAX) EC tablet 5 mg (has no administration in time range)     And   bisacodyl (DULCOLAX) suppository 10 mg (has no administration in time  range)   ondansetron ODT (ZOFRAN-ODT) disintegrating tablet 4 mg (has no administration in time range)   morphine injection 4 mg (4 mg Intravenous Given 25)   ondansetron (ZOFRAN) injection 4 mg (4 mg Intravenous Given 25)       Imaging results:  CT Head Without Contrast  Result Date: 2025  Impression: No acute intracranial findings. Chronic appearing left parietal infarct. Electronically Signed: Robert Avila MD  2025 3:52 PM EDT  Workstation ID: [id number]    XR Chest 1 View  Result Date: 2025  Impression: Stable cardiac enlargement. No acute chest findings. Electronically Signed: Florinda Davidson MD  2025 3:45 PM EDT  Workstation ID: [id number]    CT Abdomen Pelvis Without Contrast  Result Date: 2025  Impression: 1.No acute findings within the abdomen or pelvis. 2.Surgical changes of recent ventral abdominal hernia repair. Small foci of air within the anterior abdominal wall are diminished since the prior examination. No large volume free intraperitoneal air is evident. 3.Stable thin fluid collection within the extraperitoneal anterior abdomen, right of midline, thought to represent a postoperative seroma. 4.Uncomplicated sigmoid diverticulosis. 5.Right nephrectomy. Severe left renal atrophy. 6.Cholecystectomy. Stable intrahepatic and extrahepatic biliary ductal dilation. 7.New bandlike atelectasis in the left lower lobe. Right lower lobe atelectasis appears reexpanded. 8.Additional chronic findings as described above. Electronically Signed: Florinda Davidson MD  2025 3:28 PM EDT  Workstation ID: [id number]      Social issues:   Social History     Socioeconomic History    Marital status: Single   Tobacco Use    Smoking status: Former     Current packs/day: 0.00     Average packs/day: 0.3 packs/day for 57.0 years (14.3 ttl pk-yrs)     Types: Cigarettes     Start date: 1964     Quit date: 2021     Years since quittin.0     Passive exposure: Past    Smokeless  tobacco: Never   Vaping Use    Vaping status: Never Used   Substance and Sexual Activity    Alcohol use: No    Drug use: Never    Sexual activity: Defer       NIH Stroke Scale:  Interval: (not recorded)  1a. Level of Consciousness: (not recorded)  1b. LOC Questions: (not recorded)  1c. LOC Commands: (not recorded)  2. Best Gaze: (not recorded)  3. Visual: (not recorded)  4. Facial Palsy: (not recorded)  5a. Motor Arm, Left: (not recorded)  5b. Motor Arm, Right: (not recorded)  6a. Motor Leg, Left: (not recorded)  6b. Motor Leg, Right: (not recorded)  7. Limb Ataxia: (not recorded)  8. Sensory: (not recorded)  9. Best Language: (not recorded)  10. Dysarthria: (not recorded)  11. Extinction and Inattention (formerly Neglect): (not recorded)    Total (NIH Stroke Scale): (not recorded)     Additional notable assessment information:.     Nursing report ED to floor:  Katlin Fowler RN   05/22/25 21:12 EDT

## 2025-05-23 NOTE — PLAN OF CARE
"Goal Outcome Evaluation:              Outcome Evaluation: Pt is a 73 y/o female presenting to [facility] on 5/22/25 with PMH recent hernia repair with mesh and lysis of adhesions last week, hx of RUL adenocarcinoma, CKD on dialysis, DM well controlled, HTN, HLD, GERD who presents with weakness and AMS and self-reporting that she was seeing \"frogs\".  She reported that she fell earlier this week and slightly bumped her head and hit her belly. Pt believes AMS related to bad reaction to her post-op oxycodone.   5/22/25: CT head: No acute intracranial findings.  CT abdomen:  No acute findings within the abdomen or pelvis.  Chest XR: No acute chest findings.  Pt A&Ox4. Pt reports PLOF of living with boyfriend in [facility] with basement (does not access) with 2 VARGAS with single railing; pt (I) with all household/community mobility/ambulation and driving and assisting her elderly mother. Pt with HD M-W-F. Pt owns Rwx.   At time of therapy eval, pt completes bed mobility  with Mod(I) and transfers with supervision, gait x160 ft with CGA and pt reaching for environmental surfaces throughout, but no overt LOB. Due to pt impaired stride and pt reaching for environmental surfaces, pt educated to use Rwx in home; pt agreeable. Due to decline in function, and new reliance on AD, follow-up HHPT recommended at time of d/c from [facility].    Anticipated Discharge Disposition (PT): home with home health                        "

## 2025-05-23 NOTE — THERAPY EVALUATION
Patient Name: Sherry Lobato  : 1950    MRN: [id number]                              Today's Date: 2025       Admit Date: 2025    Visit Dx:     ICD-10-CM ICD-9-CM   1. Altered mental status, unspecified altered mental status type  R41.82 780.97   2. Weakness  R53.1 780.79     Patient Active Problem List   Diagnosis    B12 deficiency    Barretts esophagus    Gastroesophageal reflux disease    ESRD (end stage renal disease)    Degeneration of intervertebral disc of lumbar region    Type 2 diabetes mellitus, without long-term current use of insulin    Gout    Mixed hyperlipidemia    Hypertension    Insomnia    Bilateral carotid artery stenosis    Vitamin D deficiency    Hordeolum externum of right lower eyelid    Obesity (BMI 30-39.9)    Diabetic peripheral neuropathy    Chronic obstructive pulmonary disease    Degeneration of lumbar intervertebral disc    Disorder of phosphorus metabolism    Arteriovenous fistula    Bilateral pseudophakia    Obstructive sleep apnea syndrome    Steal syndrome dialysis vascular access    Non-small cell lung cancer    Warthin's tumor    Secondary hyperparathyroidism of renal origin    Acute hyperkalemia    Ventral hernia without obstruction or gangrene    Preoperative cardiovascular examination    Multiple malignancies    Altered mental status     Past Medical History:   Diagnosis Date    Acute upper GI bleeding 2024    Anemia due to GI blood loss 2024    Lucas's esophagus     Barretts esophagus 04/10/2012    C. difficile colitis     Carcinoma, renal cell 04/10/2012    Carotid artery disease     -50-74% left, 16-49% right ()    COPD (chronic obstructive pulmonary disease)     DM2 (diabetes mellitus, type 2)     ESRD (end stage renal disease)     on HD    Gastric ulcer     at anastomatic site    Gastroparesis     unknown    GERD (gastroesophageal reflux disease)     Gout     not current    Hemodialysis patient     mwf    Hernia, incisional      abdomen    History of colonoscopy     UTD    History of degenerative disc disease     Hyperlipidemia     Hypertension     Medicare annual wellness visit, subsequent 11/07/2020    Microscopic colitis     Nephrotic syndrome     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    Neuroendocrine tumor     of stomach    Non-small cell lung cancer 10/26/2023    Orthostatic hypotension 11/07/2017    FABY (obstructive sleep apnea)     not treating    Pedal edema     ressolved    Pneumonia 11/11/2024    Proteinuria     Renal cell cancer, right     Rib pain on right side     chronic    Rotator cuff tear     bilateral    Secondary hyperparathyroidism of renal origin 08/29/2024    Steal syndrome dialysis vascular access 04/16/2024    Stomach cancer 2016    Stroke     Tobacco abuse 07/24/2020    Uterine cancer     Vitamin B 12 deficiency     Vitamin D deficiency     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     Past Surgical History:   Procedure Laterality Date    APPENDECTOMY      ARTERIOVENOUS FISTULA Right     ARTERIOVENOUS FISTULA REPAIR      Clotted off and insert graft    ARTERIOVENOUS FISTULA/SHUNT SURGERY Left 12/30/2020    Procedure: ARTERIOVENOUS FISTULA FORMATION;  Surgeon: Jan Caicedo MD;  Location: [facility] MAIN OR;  Service: Vascular;  Laterality: Left;    ARTERIOVENOUS FISTULA/SHUNT SURGERY Left 4/23/2024    Procedure: FISTULOGRAM WITH SUBCLAVIAN ARTERY ANGIOPLASTY, fistula banding;  Surgeon: Franki Delgadillo II, MD;  Location: [facility] HYBRID OR;  Service: Vascular;  Laterality: Left;    BREAST BIOPSY      right nipple 1981    BRONCHOSCOPY N/A 6/11/2024    Procedure: BRONCHOSCOPY WITH RIGHT LUNG WASHING;  Surgeon: Aramis Barragan MD;  Location: [facility] ENDOSCOPY;  Service: Pulmonary;  Laterality: N/A;  RML atelectasis    COLONOSCOPY N/A 11/24/2020    Procedure: COLONOSCOPY with polypectomy x 7;  Surgeon: Jeffery White MD;  Location: [facility] ENDOSCOPY;  Service: Gastroenterology;  Laterality: N/A;  post op: polyps, diverticulosis,  hemorrhoids    COLONOSCOPY N/A 6/1/2023    Procedure: COLONOSCOPY with polypectomy x 3 biopsy x 1;  Surgeon: Jeffery White MD;  Location: [facility] ENDOSCOPY;  Service: Gastroenterology;  Laterality: N/A;  diverticulosis polyps    COLONOSCOPY N/A 9/4/2024    Procedure: COLONOSCOPY WITH POLYPECTOMY X 1;  Surgeon: Jeffery White MD;  Location: [facility] ENDOSCOPY;  Service: Gastroenterology;  Laterality: N/A;  POST- POLYP, DIVERTICULOSIS, INTERNAL AND EXTERNAL HEMORRHOIDS    ENDOSCOPY N/A 03/12/2022    Procedure: ESOPHAGOGASTRODUODENOSCOPY with biopsy x 1 area;  Surgeon: Javan Augustin MD;  Location: [facility] ENDOSCOPY;  Service: Gastroenterology;  Laterality: N/A;  post op: anastamosis    ENDOSCOPY N/A 10/9/2023    Procedure: ESOPHAGOGASTRODUODENOSCOPY with biopsy x3 areas;  Surgeon: Ace Campbell MD;  Location: [facility] ENDOSCOPY;  Service: Gastroenterology;  Laterality: N/A;  duodenal ulcers;irregular z line    ENDOSCOPY N/A 8/27/2024    Procedure: ESOPHAGOGASTRODUODENOSCOPY with argon plasma coagulation;  Surgeon: HILARIO Blackmon MD;  Location: [facility] ENDOSCOPY;  Service: Gastroenterology;  Laterality: N/A;  post op: AVM    ENTEROSCOPY SMALL BOWEL N/A 9/4/2024    Procedure: ESOPHAGOGASTRODUODENOSCOPY WITH SMALL BOWEL ENTEROSCOPY, ARGON PLASMA COAGULATION AND ENDOSCOPIC CLIPPING X 2 OF GASTRIC ANTRAL VASCULAR ECTASIA;  Surgeon: Jeffery White MD;  Location: [facility] ENDOSCOPY;  Service: Gastroenterology;  Laterality: N/A;  POST- GAVE    GASTRIC RESECTION      cancer    HYSTERECTOMY      LAPAROSCOPIC CHOLECYSTECTOMY      NEPHRECTOMY      right kidney removed     REDUCTION MAMMAPLASTY      TUMOR REMOVAL      boils    VENTRAL HERNIA REPAIR N/A 5/15/2025    Procedure: VENTRAL  HERNIA REPAIR LAPAROSCOPIC WITH DAVINCI ROBOT WITH MESH, EXTENSIVE LYSIS OF ADHESION;  Surgeon: Keyon Espana MD;  Location: [facility] MAIN OR;  Service: Robotics - DaVinci;  Laterality: N/A;    VENTRAL/INCISIONAL HERNIA REPAIR Right  08/28/2019    Procedure: VENTRAL/INCISIONAL HERNIA REPAIR;  Surgeon: Nilay Stevens MD;  Location: [facility] MAIN OR;  Service: General    VENTRAL/INCISIONAL HERNIA REPAIR N/A 10/01/2019    Procedure: OPEN VENTRAL/INCISIONAL HERNIA REPAIR;  Surgeon: Nilay Stevens MD;  Location: [facility] MAIN OR;  Service: General    VENTRAL/INCISIONAL HERNIA REPAIR N/A 10/11/2021    Procedure: VENTRAL/INCISIONAL HERNIA REPAIR LAPAROSCOPIC;  Surgeon: Nilay Stevens MD;  Location: [facility] MAIN OR;  Service: General;  Laterality: N/A;    VENTRAL/INCISIONAL HERNIA REPAIR N/A 01/23/2023    Procedure: VENTRAL/INCISIONAL HERNIA REPAIR with MESH;  Surgeon: Nilay Stevens MD;  Location: [facility] MAIN OR;  Service: General;  Laterality: N/A;      General Information       Row Name 05/23/25 1304          Physical Therapy Time and Intention    Document Type evaluation  -JV     Mode of Treatment physical therapy  -JV       Row Name 05/23/25 1304          General Information    Patient Profile Reviewed yes  -JV     Prior Level of Function independent:;all household mobility;community mobility;ADL's;driving  -JV     Existing Precautions/Restrictions no known precautions/restrictions  -JV     Barriers to Rehab medically complex  -JV       Row Name 05/23/25 1304          Living Environment    Current Living Arrangements home  -JV     People in Home significant other  -JV       Row Name 05/23/25 1304          Home Main Entrance    Number of Stairs, Main Entrance two  -JV     Stair Railings, Main Entrance railing on left side (ascending)  -JV       Row Name 05/23/25 1304          Stairs Within Home, Primary    Stairs, Within Home, Primary does not access basement  -JV     Number of Stairs, Within Home, Primary none  -JV       Row Name 05/23/25 1304          Cognition    Orientation Status (Cognition) oriented x 4  -JV       Row Name 05/23/25 1304          Safety Issues/Impairments Affecting Functional Mobility    Safety Issues Affecting Function  (Mobility) insight into deficits/self-awareness  -JV     Impairments Affecting Function (Mobility) balance;endurance/activity tolerance;shortness of breath;strength  -JV               User Key  (r) = Recorded By, (t) = Taken By, (c) = Cosigned By      Initials Name Provider Type    Shannan Mcdonald Physical Therapist                   Mobility       Row Name 05/23/25 1305          Bed Mobility    Bed Mobility bed mobility (all) activities  -JV     All Activities, [place] (Bed Mobility) modified independence  -JV       Row Name 05/23/25 1305          Bed-Chair Transfer    Bed-Chair [place] (Transfers) supervision;verbal cues  -JV       Row Name 05/23/25 1305          Sit-Stand Transfer    Sit-Stand [place] (Transfers) supervision;verbal cues  -JV       Row Name 05/23/25 1305          Gait/Stairs (Locomotion)    [place] Level (Gait) contact guard;verbal cues;nonverbal cues (demo/gesture)  -JV     Distance in Feet (Gait) 160  -JV     Deviations/Abnormal Patterns (Gait) base of support, wide;mell decreased;stride length decreased;gait speed decreased  -JV     Bilateral Gait Deviations forward flexed posture;decreased arm swing  -JV     Comment, (Gait/Stairs) pt reaching for enviromental surfaces throughout  -JV               User Key  (r) = Recorded By, (t) = Taken By, (c) = Cosigned By      Initials Name Provider Type    Shannan Mcdonald Physical Therapist                   Obj/Interventions       Row Name 05/23/25 1307          Range of Motion Comprehensive    General Range of Motion bilateral lower extremity ROM WFL  -JV       Row Name 05/23/25 1307          Strength Comprehensive (MMT)    Comment, General Manual Muscle Testing (MMT) Assessment BLE grossly 4/5  -JV       Row Name 05/23/25 1307          Balance    Balance Assessment standing static balance;standing dynamic balance  -JV     Static Standing Balance contact guard  -JV     Dynamic Standing Balance contact guard  -JV      Position/Device Used, Standing Balance unsupported  -JV       Row Name 05/23/25 1307          Sensory Assessment (Somatosensory)    Sensory Assessment (Somatosensory) LE sensation intact  -JV               User Key  (r) = Recorded By, (t) = Taken By, (c) = Cosigned By      Initials Name Provider Type    Shannan Mcdonald Physical Therapist                   Goals/Plan       Row Name 05/23/25 1310          Transfer Goal 1 (PT)    Activity/Assistive Device (Transfer Goal 1, PT) sit-to-stand/stand-to-sit;bed-to-chair/chair-to-bed;walker, rolling  -JV     [place] Level/Cues Needed (Transfer Goal 1, PT) modified independence  -JV     Time Frame (Transfer Goal 1, PT) long term goal (LTG);2 weeks  -JV       Row Name 05/23/25 1310          Gait Training Goal 1 (PT)    Activity/Assistive Device (Gait Training Goal 1, PT) gait (walking locomotion);assistive device use;improve balance and speed;increase endurance/gait distance  -JV     [place] Level (Gait Training Goal 1, PT) modified independence  -JV     Distance (Gait Training Goal 1, PT) 200 ft  -JV     Time Frame (Gait Training Goal 1, PT) long term goal (LTG);2 weeks  -JV       Row Name 05/23/25 1310          Stairs Goal 1 (PT)    Activity/Assistive Device (Stairs Goal 1, PT) stairs, all skills;using handrail, left  -JV     [place] Level/Cues Needed (Stairs Goal 1, PT) modified independence  -JV     Number of Stairs (Stairs Goal 1, PT) 2  -JV     Time Frame (Stairs Goal 1, PT) long term goal (LTG);2 weeks  -JV       Row Name 05/23/25 1310          Therapy Assessment/Plan (PT)    Planned Therapy Interventions (PT) balance training;gait training;home exercise program;strengthening;patient/family education;transfer training;stair training  -JV               User Key  (r) = Recorded By, (t) = Taken By, (c) = Cosigned By      Initials Name Provider Type    Shannan Mcdonald Physical Therapist                   Clinical Impression       Row Name 05/23/25  "0950          Pain    Pretreatment Pain Rating 6/10  -JV     Posttreatment Pain Rating 6/10  -JV     Pain Location abdomen  -JV     Pain Management Interventions nursing notified  -JV       Row Name 05/23/25 0950          Plan of Care Review    Outcome Evaluation Pt is a 75 y/o female presenting to [facility] on 5/22/25 with PMH recent hernia repair with mesh and lysis of adhesions last week, hx of RUL adenocarcinoma, CKD on dialysis, DM well controlled, HTN, HLD, GERD who presents with weakness and AMS and self-reporting that she was seeing \"frogs\".  She reported that she fell earlier this week and slightly bumped her head and hit her belly. Pt believes AMS related to bad reaction to her post-op oxycodone.   5/22/25: CT head: No acute intracranial findings.  CT abdomen:  No acute findings within the abdomen or pelvis.  Chest XR: No acute chest findings.  Pt A&Ox4. Pt reports PLOF of living with boyfriend in [facility] with basement (does not access) with 2 VARGAS with single railing; pt (I) with all household/community mobility/ambulation and driving and assisting her elderly mother. Pt with HD M-W-F. Pt owns Rwx.   At time of therapy eval, pt completes bed mobility  with Mod(I) and transfers with supervision, gait x160 ft with CGA and pt reaching for environmental surfaces throughout, but no overt LOB. Due to pt impaired stride and pt reaching for environmental surfaces, pt educated to use Rwx in home; pt agreeable. Due to decline in function, and new reliance on AD, follow-up HHPT recommended at time of d/c from [facility].  -JV       Row Name 05/23/25 0950          Therapy Assessment/Plan (PT)    Criteria for Skilled Interventions Met (PT) yes;meets criteria;skilled treatment is necessary  -JV     Therapy Frequency (PT) 3 times/wk  -JV     Predicted Duration of Therapy Intervention (PT) until d/c  -JV       Row Name 05/23/25 0950          Vital Signs    Pre Systolic BP Rehab 126  -JV     Pre Treatment Diastolic BP 99  -JV     Post " Systolic BP Rehab 143  -JV     Post Treatment Diastolic BP 77  -JV     Pre Patient Position Sitting  -JV     Post Patient Position Standing  -JV       Row Name 05/23/25 0950          Positioning and Restraints    Pre-Treatment Position other (comment)  sitting EOB  -JV     Post Treatment Position bed  -JV     In Bed notified nsg;sitting EOB;call light within reach;encouraged to call for assist  -JV               User Key  (r) = Recorded By, (t) = Taken By, (c) = Cosigned By      Initials Name Provider Type    Shannan Mcdonald Physical Therapist                   Outcome Measures       Row Name 05/23/25 0812          How much help from another person do you currently need...    Turning from your back to your side while in flat bed without using bedrails? 3  -SR     Moving from lying on back to sitting on the side of a flat bed without bedrails? 3  -SR     Moving to and from a bed to a chair (including a wheelchair)? 3  -SR     Standing up from a chair using your arms (e.g., wheelchair, bedside chair)? 3  -SR     Climbing 3-5 steps with a railing? 3  -SR     To walk in hospital room? 3  -SR     AM-PAC 6 Clicks Score (PT) 18  -SR               User Key  (r) = Recorded By, (t) = Taken By, (c) = Cosigned By      Initials Name Provider Type    Kezia Campos, RN Registered Nurse                                 Physical Therapy Education       Title: PT OT SLP Therapies (In Progress)       Topic: Physical Therapy (Done)       Point: Mobility training (Done)       Learning Progress Summary            Patient Acceptance, E,TB, VU by JERRI at 5/23/2025 1311                      Point: Home exercise program (Done)       Learning Progress Summary            Patient Acceptance, E,TB, VU by JERRI at 5/23/2025 1311                      Point: Body mechanics (Done)       Learning Progress Summary            Patient Acceptance, E,TB, VU by JERRI at 5/23/2025 1311                      Point: Precautions (Done)       Learning  "Progress Summary            Patient Acceptance, E,TB, VU by JERRI at 5/23/2025 1311                                      User Key       Initials Effective Dates Name Provider Type Discipline    JERRI 03/30/21 -  Shannan Clark Physical Therapist PT                  PT Recommendation and Plan  Planned Therapy Interventions (PT): balance training, gait training, home exercise program, strengthening, patient/family education, transfer training, stair training  Outcome Evaluation: Pt is a 75 y/o female presenting to [facility] on 5/22/25 with PMH recent hernia repair with mesh and lysis of adhesions last week, hx of RUL adenocarcinoma, CKD on dialysis, DM well controlled, HTN, HLD, GERD who presents with weakness and AMS and self-reporting that she was seeing \"frogs\".  She reported that she fell earlier this week and slightly bumped her head and hit her belly. Pt believes AMS related to bad reaction to her post-op oxycodone.   5/22/25: CT head: No acute intracranial findings.  CT abdomen:  No acute findings within the abdomen or pelvis.  Chest XR: No acute chest findings.  Pt A&Ox4. Pt reports PLOF of living with boyfriend in [facility] with basement (does not access) with 2 VARGAS with single railing; pt (I) with all household/community mobility/ambulation and driving and assisting her elderly mother. Pt with HD M-W-F. Pt owns Rwx.   At time of therapy eval, pt completes bed mobility  with Mod(I) and transfers with supervision, gait x160 ft with CGA and pt reaching for environmental surfaces throughout, but no overt LOB. Due to pt impaired stride and pt reaching for environmental surfaces, pt educated to use Rwx in home; pt agreeable. Due to decline in function, and new reliance on AD, follow-up HHPT recommended at time of d/c from [facility].     Time Calculation:         PT Charges       Row Name 05/23/25 1322             Time Calculation    Start Time 0932  -JV      Stop Time 0954  -JV      Time Calculation (min) 22 min  -JV      PT Received " On 05/23/25  -JORDIV      PT - Next Appointment 05/25/25  -JLASHON      PT Goal Re-Cert Due Date 06/06/25  -JERRI         Time Calculation- PT    Total Timed Code Minutes- PT 0 minute(s)  -JERRI                User Key  (r) = Recorded By, (t) = Taken By, (c) = Cosigned By      Initials Name Provider Type    Shannan Mcdonald Physical Therapist                  Therapy Charges for Today       Code Description Service Date Service Provider Modifiers Qty    72698927153 HC PT EVAL MOD COMPLEXITY 4 5/23/2025 Shannan Clark GP 1            PT G-Codes  AM-PAC 6 Clicks Score (PT): 18  PT Discharge Summary  Anticipated Discharge Disposition (PT): home with home health    Shannan Clark  5/23/2025

## 2025-05-23 NOTE — PLAN OF CARE
Problem: Adult Inpatient Plan of Care  Goal: Plan of Care Review  Outcome: Progressing  Goal: Patient-Specific Goal (Individualized)  Outcome: Progressing  Goal: Absence of Hospital-Acquired Illness or Injury  Outcome: Progressing  Intervention: Identify and Manage Fall Risk  Recent Flowsheet Documentation  Taken 5/23/2025 1046 by Kezia Cuenca RN  Safety Promotion/Fall Prevention:   safety round/check completed   fall prevention program maintained  Taken 5/23/2025 0812 by Kezia Cuenca RN  Safety Promotion/Fall Prevention:   safety round/check completed   fall prevention program maintained  Intervention: Prevent Skin Injury  Recent Flowsheet Documentation  Taken 5/23/2025 0812 by Kezia Cuenca RN  Body Position: position changed independently  Skin Protection: incontinence pads utilized  Intervention: Prevent and Manage VTE (Venous Thromboembolism) Risk  Recent Flowsheet Documentation  Taken 5/23/2025 0812 by Kezia Cuenca RN  VTE Prevention/Management:   bilateral   SCDs (sequential compression devices) off  Intervention: Prevent Infection  Recent Flowsheet Documentation  Taken 5/23/2025 0812 by Kezia Cuecna RN  Infection Prevention: single patient room provided  Goal: Optimal Comfort and Wellbeing  Outcome: Progressing  Intervention: Provide Person-Centered Care  Recent Flowsheet Documentation  Taken 5/23/2025 0812 by Kezia Cuenca RN  Trust Relationship/Rapport:   care explained   choices provided   emotional support provided   empathic listening provided   questions answered  Goal: Readiness for Transition of Care  Outcome: Progressing     Problem: Fall Injury Risk  Goal: Absence of Fall and Fall-Related Injury  Outcome: Progressing  Intervention: Identify and Manage Contributors  Recent Flowsheet Documentation  Taken 5/23/2025 0812 by Kezia Cuenca RN  Medication Review/Management: medications reviewed  Intervention: Promote Injury-Free Environment  Recent  Flowsheet Documentation  Taken 5/23/2025 1046 by Kezia Cuenca RN  Safety Promotion/Fall Prevention:   safety round/check completed   fall prevention program maintained  Taken 5/23/2025 0812 by Kezia Cuenca RN  Safety Promotion/Fall Prevention:   safety round/check completed   fall prevention program maintained     Problem: Pain Acute  Goal: Optimal Pain Control and Function  Outcome: Progressing  Intervention: Optimize Psychosocial Wellbeing  Recent Flowsheet Documentation  Taken 5/23/2025 0812 by Kezia Cuenca RN  Diversional Activities:   smartphone   television  Intervention: Prevent or Manage Pain  Recent Flowsheet Documentation  Taken 5/23/2025 0812 by Kezia Cuenca RN  Medication Review/Management: medications reviewed     Problem: Gas Exchange Impaired  Goal: Optimal Gas Exchange  Outcome: Progressing     Problem: Hemodialysis  Goal: Safe, Effective Therapy Delivery  Outcome: Progressing  Intervention: Optimize Device Care and Function  Recent Flowsheet Documentation  Taken 5/23/2025 0812 by Kezia Cuenca RN  Medication Review/Management: medications reviewed  Goal: Effective Tissue Perfusion  Outcome: Progressing  Goal: Absence of Infection Signs and Symptoms  Outcome: Progressing  Intervention: Prevent or Manage Infection  Recent Flowsheet Documentation  Taken 5/23/2025 0812 by Kezia Cuenca RN  Infection Prevention: single patient room provided     Problem: Comorbidity Management  Goal: Blood Glucose Level Within Target Range  Outcome: Progressing  Intervention: Monitor and Manage Glycemia  Recent Flowsheet Documentation  Taken 5/23/2025 0812 by Kezia Cuenca RN  Medication Review/Management: medications reviewed  Goal: Blood Pressure in Desired Range  Outcome: Progressing  Intervention: Maintain Blood Pressure Management  Recent Flowsheet Documentation  Taken 5/23/2025 0812 by Kezia Cuenca RN  Medication Review/Management: medications reviewed      Problem: Skin Injury Risk Increased  Goal: Skin Health and Integrity  Outcome: Progressing  Intervention: Optimize Skin Protection  Recent Flowsheet Documentation  Taken 5/23/2025 0812 by Kezia Cuenca RN  Activity Management:   ambulated outside room   ambulated in room   ambulated to bathroom  Pressure Reduction Techniques: frequent weight shift encouraged  Pressure Reduction Devices: pressure-redistributing mattress utilized  Skin Protection: incontinence pads utilized   Goal Outcome Evaluation:   Pt has been relaxing in bed with her boyfriend at bedside throughout the day. She did feel nauseated this morning but it resolved on it's own; anti-nausea medication was added on per provider. She also completed dialysis today. She also remains on NC PRN. No concerns at this time and call light within reach.

## 2025-05-23 NOTE — PLAN OF CARE
Problem: Adult Inpatient Plan of Care  Goal: Plan of Care Review  Outcome: Not Progressing  Flowsheets (Taken 5/23/2025 0417)  Progress: no change  Plan of Care Reviewed With: patient  Goal: Patient-Specific Goal (Individualized)  Outcome: Not Progressing  Goal: Absence of Hospital-Acquired Illness or Injury  Outcome: Not Progressing  Intervention: Identify and Manage Fall Risk  Recent Flowsheet Documentation  Taken 5/23/2025 0406 by Katlin Irving RN  Safety Promotion/Fall Prevention:   assistive device/personal items within reach   clutter free environment maintained   nonskid shoes/slippers when out of bed   room organization consistent   safety round/check completed  Taken 5/23/2025 0201 by Katlin Irving RN  Safety Promotion/Fall Prevention:   assistive device/personal items within reach   clutter free environment maintained   nonskid shoes/slippers when out of bed   room organization consistent   safety round/check completed  Taken 5/23/2025 0039 by Katlin Irving RN  Safety Promotion/Fall Prevention:   assistive device/personal items within reach   clutter free environment maintained   nonskid shoes/slippers when out of bed   room organization consistent   safety round/check completed  Taken 5/22/2025 2237 by Katlin Irving RN  Safety Promotion/Fall Prevention:   assistive device/personal items within reach   clutter free environment maintained   nonskid shoes/slippers when out of bed   room organization consistent   safety round/check completed  Intervention: Prevent Skin Injury  Recent Flowsheet Documentation  Taken 5/22/2025 2237 by Katlin Irving RN  Body Position:   position changed independently   supine  Intervention: Prevent and Manage VTE (Venous Thromboembolism) Risk  Recent Flowsheet Documentation  Taken 5/22/2025 2237 by Katlin Irving RN  VTE Prevention/Management:   SCDs (sequential compression devices) off   patient refused intervention  Intervention: Prevent Infection  Recent Flowsheet  Documentation  Taken 5/23/2025 0406 by Katlin Irivng RN  Infection Prevention:   environmental surveillance performed   hand hygiene promoted   rest/sleep promoted   single patient room provided  Taken 5/23/2025 0201 by Katlin Irving RN  Infection Prevention:   environmental surveillance performed   hand hygiene promoted   single patient room provided  Taken 5/23/2025 0039 by Katlin Irving RN  Infection Prevention:   environmental surveillance performed   hand hygiene promoted   rest/sleep promoted   single patient room provided  Taken 5/22/2025 2237 by Katlin Irving RN  Infection Prevention:   environmental surveillance performed   hand hygiene promoted   single patient room provided  Goal: Optimal Comfort and Wellbeing  Outcome: Not Progressing  Intervention: Monitor Pain and Promote Comfort  Recent Flowsheet Documentation  Taken 5/23/2025 0109 by Katlin Irving RN  Pain Management Interventions:   care clustered   diversional activity provided  Taken 5/23/2025 0039 by Katlin Irving RN  Pain Management Interventions: pain medication given  Taken 5/22/2025 2237 by Katlin Irving RN  Pain Management Interventions:   care clustered   breathing exercises   diversional activity provided  Intervention: Provide Person-Centered Care  Recent Flowsheet Documentation  Taken 5/22/2025 2237 by Katlin Irving RN  Trust Relationship/Rapport:   care explained   choices provided  Goal: Readiness for Transition of Care  Outcome: Not Progressing  Intervention: Mutually Develop Transition Plan  Recent Flowsheet Documentation  Taken 5/22/2025 2254 by Katlin Ivring RN  Equipment Currently Used at Home:   bath bench   walker, standard  Transportation Anticipated: family or friend will provide  Transportation Concerns: none  Patient/Family Anticipated Services at Transition: none  Patient/Family Anticipates Transition to: home with family   Goal Outcome Evaluation:  Plan of Care Reviewed With: patient        Progress: no  change     Alert and oriented x 4. Able to verbalize needs and wants. Takes medication whole and tolerates well. Independent for transfers. Continent of bowel. Does not produce urine, is a hemodialysis patient. Does not require O2 therapy at baseline. O2 therapy initiated at 2 LPM via NC due to decreased O2 saturation. Patient does have history of FABY, but does not use CPAP at home. Nephrology consulted. PO Doxycycline initiated for skin/soft tissue infection. Tolerating abt well, no s/s of adverse/allergic reaction noted. C/O abdominal pain, PRN Ultram administered with positive effect noted. Currently in bed, eyes closed. Rise and fall of chest observed. Call bell in reach. From home.

## 2025-05-23 NOTE — H&P
"    Patient Care Team:  Raegan Gotti MD as PCP - General (Family Medicine)  Nilay Stevens MD as Consulting Physician (General Surgery)  Stephen Perea MD as Surgeon (Thoracic Surgery)  Makayla Navarro RN as Nurse Navigator  Mak Sosa MD as Cardiologist (Cardiology)  Anjum Cam MD as Consulting Physician (Hematology and Oncology)    Chief complaint hallucinations, AMS    Subjective     Patient is a 74 y.o. female with pmh of recent hernia repair with mesh and lysis of adhesions last week, hx of RUL adenocarcinoma, CKD on dialysis, DM well controlled, HTN, HLD, GERD who presents with weakness and altered mental status and self-reporting that she was seeing \"frogs\".  She reported that she fell earlier this week and slightly bumped her head and hit her belly.  She has since taken a few of her post-op oxycodone.  She believes that in the past she has reacted badly to oxycodone and believes that is the cause again.    She reports she has struggled with post-op constipation, but has started to improve with miralax and colace.   She is compliant with dialysis on M-W-F.      In the Er, CT head was negative for acute process.  , CL 94, Creatine 6.87, GFR 5.8. Lipase negative.  WBC 10.48, with stable HGB at 10.7.  CXR without acute finding, known cardiomegaly.  CT abdomen also without acute.  New atelectasis in left lower lobe.      Review of Systems   Gastrointestinal:  Positive for constipation.   Neurological:  Positive for weakness.   Psychiatric/Behavioral:  Positive for confusion and hallucinations.           History  Past Medical History:   Diagnosis Date    Acute upper GI bleeding 08/26/2024    Anemia due to GI blood loss 08/27/2024    Lucas's esophagus     Barretts esophagus 04/10/2012    C. difficile colitis     Carcinoma, renal cell 04/10/2012    Carotid artery disease     -50-74% left, 16-49% right (1/19)    COPD (chronic obstructive pulmonary disease)     DM2 (diabetes mellitus, type " 2)     ESRD (end stage renal disease)     on HD    Gastric ulcer     at anastomatic site    Gastroparesis     unknown    GERD (gastroesophageal reflux disease)     Gout     not current    Hemodialysis patient     mwf    Hernia, incisional     abdomen    History of colonoscopy     UTD    History of degenerative disc disease     Hyperlipidemia     Hypertension     Medicare annual wellness visit, subsequent 11/07/2020    Microscopic colitis     Nephrotic syndrome     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    Neuroendocrine tumor     of stomach    Non-small cell lung cancer 10/26/2023    Orthostatic hypotension 11/07/2017    FABY (obstructive sleep apnea)     not treating    Pedal edema     ressolved    Pneumonia 11/11/2024    Proteinuria     Renal cell cancer, right     Rib pain on right side     chronic    Rotator cuff tear     bilateral    Secondary hyperparathyroidism of renal origin 08/29/2024    Steal syndrome dialysis vascular access 04/16/2024    Stomach cancer 2016    Stroke     Tobacco abuse 07/24/2020    Uterine cancer     Vitamin B 12 deficiency     Vitamin D deficiency     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     Past Surgical History:   Procedure Laterality Date    APPENDECTOMY      ARTERIOVENOUS FISTULA Right     ARTERIOVENOUS FISTULA REPAIR      Clotted off and insert graft    ARTERIOVENOUS FISTULA/SHUNT SURGERY Left 12/30/2020    Procedure: ARTERIOVENOUS FISTULA FORMATION;  Surgeon: Jan Caicedo MD;  Location: [facility] MAIN OR;  Service: Vascular;  Laterality: Left;    ARTERIOVENOUS FISTULA/SHUNT SURGERY Left 4/23/2024    Procedure: FISTULOGRAM WITH SUBCLAVIAN ARTERY ANGIOPLASTY, fistula banding;  Surgeon: Franki Delgadillo II, MD;  Location: [facility] HYBRID OR;  Service: Vascular;  Laterality: Left;    BREAST BIOPSY      right nipple 1981    BRONCHOSCOPY N/A 6/11/2024    Procedure: BRONCHOSCOPY WITH RIGHT LUNG WASHING;  Surgeon: Aramis Barragan MD;  Location: [facility] ENDOSCOPY;  Service: Pulmonary;  Laterality: N/A;   RML atelectasis    COLONOSCOPY N/A 11/24/2020    Procedure: COLONOSCOPY with polypectomy x 7;  Surgeon: Jeffery White MD;  Location: [facility] ENDOSCOPY;  Service: Gastroenterology;  Laterality: N/A;  post op: polyps, diverticulosis, hemorrhoids    COLONOSCOPY N/A 6/1/2023    Procedure: COLONOSCOPY with polypectomy x 3 biopsy x 1;  Surgeon: Jeffery White MD;  Location: [facility] ENDOSCOPY;  Service: Gastroenterology;  Laterality: N/A;  diverticulosis polyps    COLONOSCOPY N/A 9/4/2024    Procedure: COLONOSCOPY WITH POLYPECTOMY X 1;  Surgeon: Jeffery White MD;  Location: [facility] ENDOSCOPY;  Service: Gastroenterology;  Laterality: N/A;  POST- POLYP, DIVERTICULOSIS, INTERNAL AND EXTERNAL HEMORRHOIDS    ENDOSCOPY N/A 03/12/2022    Procedure: ESOPHAGOGASTRODUODENOSCOPY with biopsy x 1 area;  Surgeon: Javan Augustin MD;  Location: [facility] ENDOSCOPY;  Service: Gastroenterology;  Laterality: N/A;  post op: anastamosis    ENDOSCOPY N/A 10/9/2023    Procedure: ESOPHAGOGASTRODUODENOSCOPY with biopsy x3 areas;  Surgeon: Ace Campbell MD;  Location: [facility] ENDOSCOPY;  Service: Gastroenterology;  Laterality: N/A;  duodenal ulcers;irregular z line    ENDOSCOPY N/A 8/27/2024    Procedure: ESOPHAGOGASTRODUODENOSCOPY with argon plasma coagulation;  Surgeon: HILARIO Blackmon MD;  Location: [facility] ENDOSCOPY;  Service: Gastroenterology;  Laterality: N/A;  post op: AVM    ENTEROSCOPY SMALL BOWEL N/A 9/4/2024    Procedure: ESOPHAGOGASTRODUODENOSCOPY WITH SMALL BOWEL ENTEROSCOPY, ARGON PLASMA COAGULATION AND ENDOSCOPIC CLIPPING X 2 OF GASTRIC ANTRAL VASCULAR ECTASIA;  Surgeon: Jeffery White MD;  Location: [facility] ENDOSCOPY;  Service: Gastroenterology;  Laterality: N/A;  POST- GAVE    GASTRIC RESECTION      cancer    HYSTERECTOMY      LAPAROSCOPIC CHOLECYSTECTOMY      NEPHRECTOMY      right kidney removed     REDUCTION MAMMAPLASTY      TUMOR REMOVAL      boils    VENTRAL HERNIA REPAIR N/A 5/15/2025    Procedure: VENTRAL  HERNIA  REPAIR LAPAROSCOPIC WITH DAVINCI ROBOT WITH MESH, EXTENSIVE LYSIS OF ADHESION;  Surgeon: Keyon Espana MD;  Location: [facility] MAIN OR;  Service: Robotics - DaVinci;  Laterality: N/A;    VENTRAL/INCISIONAL HERNIA REPAIR Right 2019    Procedure: VENTRAL/INCISIONAL HERNIA REPAIR;  Surgeon: Nilay Stevens MD;  Location: [facility] MAIN OR;  Service: General    VENTRAL/INCISIONAL HERNIA REPAIR N/A 10/01/2019    Procedure: OPEN VENTRAL/INCISIONAL HERNIA REPAIR;  Surgeon: Nilay Stevens MD;  Location: [facility] MAIN OR;  Service: General    VENTRAL/INCISIONAL HERNIA REPAIR N/A 10/11/2021    Procedure: VENTRAL/INCISIONAL HERNIA REPAIR LAPAROSCOPIC;  Surgeon: Nilay Stevens MD;  Location: [facility] MAIN OR;  Service: General;  Laterality: N/A;    VENTRAL/INCISIONAL HERNIA REPAIR N/A 2023    Procedure: VENTRAL/INCISIONAL HERNIA REPAIR with MESH;  Surgeon: Nilay Stevens MD;  Location: [facility] MAIN OR;  Service: General;  Laterality: N/A;     Family History   Problem Relation Age of Onset    Heart disease Father      Social History     Tobacco Use    Smoking status: Former     Current packs/day: 0.00     Average packs/day: 0.3 packs/day for 57.0 years (14.3 ttl pk-yrs)     Types: Cigarettes     Start date: 1964     Quit date: 2021     Years since quittin.0     Passive exposure: Past    Smokeless tobacco: Never   Vaping Use    Vaping status: Never Used   Substance Use Topics    Alcohol use: No    Drug use: Never     Medications Prior to Admission   Medication Sig Dispense Refill Last Dose/Taking    B Complex-C-Folic Acid (RENAL VITAMIN PO) Take 1 tablet by mouth Daily.   2025    calcium acetate (PHOS BINDER,) 667 MG capsule capsule Take 2 capsules by mouth 2 (Two) Times a Day.   2025    Cholecalciferol 25 MCG (1000 UT) tablet Take 1 tablet by mouth Daily.   Taking    doxycycline (VIBRAMYCIN) 100 MG capsule Take 1 capsule by mouth 2 (Two) Times a Day for 7 days. 14 capsule 0 Morning    metoprolol  succinate XL (Toprol XL) 25 MG 24 hr tablet Take 1 tablet by mouth 2 (Two) Times a Day.   5/22/2025    pantoprazole (PROTONIX) 20 MG EC tablet Take 1 tablet by mouth Daily.   5/22/2025    sevelamer (RENAGEL) 800 MG tablet Take 4 tablets by mouth 2 (Two) Times a Day With Meals.   Taking    sodium bicarbonate 650 MG tablet Take 2 tablets by mouth 3 (Three) Times a Day.   Taking    temazepam (RESTORIL) 30 MG capsule Take 1 capsule by mouth Every Night.   Taking    albuterol sulfate  (90 Base) MCG/ACT inhaler Inhale 2 puffs Every 4 (Four) Hours As Needed for Wheezing.       lidocaine-prilocaine (EMLA) 2.5-2.5 % cream APPLY TOPICALLY TO THE 5TH FINGER ON THE LEFT HAND AS NEEDED PAIN 60 g 3     ondansetron (Zofran) 4 MG tablet Take 1 tablet by mouth Daily As Needed for Nausea or Vomiting. 30 tablet 0      Allergies:  Atorvastatin calcium, Gabapentin, Niacin, and Oxycodone    Objective     Vital Signs  Temp:  [98 °F (36.7 °C)] 98 °F (36.7 °C)  Heart Rate:  [] 65  Resp:  [15-18] 16  BP: ()/() 118/44     Physical Exam:      General Appearance:    Alert, cooperative, in no acute distress   Head:    Normocephalic, without obvious abnormality, atraumatic   Eyes:            Lids and lashes normal, conjunctivae and sclerae normal, no   icterus, no pallor, corneas clear, PERRLA   Ears:    Ears appear intact with no abnormalities noted   Throat:   No oral lesions, no thrush, oral mucosa moist   Neck:   No adenopathy, supple, trachea midline, no thyromegaly, no   carotid bruit, no JVD   Lungs:     Clear to auscultation,respirations regular, even and                  unlabored    Heart:    Regular rhythm and normal rate, normal S1 and S2, no            murmur, no gallop, no rub, no click   Chest Wall:    No abnormalities observed   Abdomen:     Normal bowel sounds, no masses, no organomegaly, soft        non-tender, non-distended, no guarding, no rebound                tenderness   Extremities:   Moves all  extremities well, no edema, no cyanosis, no             redness   Pulses:   Pulses palpable and equal bilaterally   Skin:   No bleeding, bruising or rash   Lymph nodes:   No palpable adenopathy   Neurologic:   Cranial nerves 2 - 12 grossly intact, sensation intact, DTR       present and equal bilaterally       Results Review:     Imaging Results (Last 24 Hours)       Procedure Component Value Units Date/Time    CT Head Without Contrast [[id number]] Collected: 05/22/25 1549     Updated: 05/22/25 1555    Narrative:      CT HEAD WO CONTRAST    Date of Exam: 5/22/2025 3:16 PM EDT    Indication: Fall head injury altered mental status.    Comparison: Head CT 8/26/2024.    Technique: Axial CT images were obtained of the head without contrast administration.  Coronal reconstructions were performed.  Automated exposure control and iterative reconstruction methods were used.      Findings:  No acute intracranial hemorrhage.Intact appearing gray-white differentiation.No extra-axial fluid collection.No significant mass effect.    Unchanged chronic appearing left parietal infarct.    No hydrocephalus.    Brain volume appears mildly diminished, but likely age-appropriate.    Scattered areas of periventricular and subcortical white matter hypoattenuation, nonspecific, perhaps from small vessel ischemic/hypertensive changes in a patient of this age.There are intracranial atherosclerotic calcifications.    Mild scattered mucosal thickening in the paranasal sinuses.Mastoid air cells are essentially clear.. Bilateral lens replacements.    No acute or aggressive appearing bony or extracranial soft tissue process.      Impression:      Impression:  No acute intracranial findings.    Chronic appearing left parietal infarct.      Electronically Signed: Robert Avila MD    5/22/2025 3:52 PM EDT    Workstation ID: [id number]    XR Chest 1 View [[id number]] Collected: 05/22/25 1518     Updated: 05/22/25 1547    Narrative:      XR CHEST 1  VW    Date of Exam: 5/22/2025 3:04 PM EDT    Indication: Altered mental status    Comparison: CT chest 2/13/2025    Findings:  Heart size appears mild to moderately enlarged but stable in appearance since 11/11/2024. No acute airspace disease, pleural effusion or pneumothorax or acute osseous abnormalities are identified. Stent material is seen within each axillary region.   Surgical anchor is seen in the left humeral head.      Impression:      Impression:  Stable cardiac enlargement.  No acute chest findings.        Electronically Signed: Flroinda Davidson MD    5/22/2025 3:45 PM EDT    Workstation ID: [id number]    CT Abdomen Pelvis Without Contrast [[id number]] Collected: 05/22/25 1523     Updated: 05/22/25 1530    Narrative:      CT ABDOMEN PELVIS WO CONTRAST    Date of Exam: 5/22/2025 3:20 PM EDT    Indication: Recent hernia surgery abdominal pain renal failure dialysis patient.    Comparison: CT abdomen pelvis without contrast 5/17/2025    Technique: Axial CT images were obtained of the abdomen and pelvis without the administration of contrast. Sagittal and coronal reconstructions were performed.  Automated exposure control and iterative reconstruction methods were used.      Findings:  Surgical changes of recent ventral abdominal hernia repair are again noted. Small foci of air are in seen within the anterior abdominal wall, diminished. No large volume free intraperitoneal air is evident. Thin fluid collection is demonstrated within   the extraperitoneal anterior abdomen right of midline measuring 1.6 cm thickness, unchanged, thought to represent a postoperative seroma.    Uncomplicated sigmoid diverticular changes are present. There are surgical changes of gastroenteric anastomosis. No indication of active bowel inflammation or bowel obstruction.    Cholecystectomy. Stable intrahepatic and extrahepatic biliary ductal dilation. Common bile duct measures up to 12 mm. Moderate to advanced pancreatic parenchymal  atrophy without active inflammatory change. Spleen is normal. Bilateral low-density adrenal   nodular thickening consistent with adenomatous hyperplasia. Right nephrectomy. Severe left renal atrophy.    Advanced calcific atherosclerosis within the aorta, iliac and femoral vessels and mesenteric vessels. Patency of vasculature is not assessed without the benefit of IV contrast.    Urinary bladder is decompressed. Hysterectomy. Rectum is within normal limits.    There is mild generalized muscular atrophy. Severe facet arthropathy bilaterally at L4-5 and L5-S1. Prominent anterolateral osteophyte formation in the lower thoracic spine. No acute or suspicious osseous abnormalities    Bandlike atelectatic changes in the left lower lobe are new. Right lower lobe atelectasis appears reexpanded. Mosaic attenuation of the lung bases may reflect areas of air trapping. Benign calcified nodules in the right lower lobe. Heart size is upper   limits of normal. Dense coronary artery calcifications are present.      Impression:      Impression:  1.No acute findings within the abdomen or pelvis.  2.Surgical changes of recent ventral abdominal hernia repair. Small foci of air within the anterior abdominal wall are diminished since the prior examination. No large volume free intraperitoneal air is evident.  3.Stable thin fluid collection within the extraperitoneal anterior abdomen, right of midline, thought to represent a postoperative seroma.  4.Uncomplicated sigmoid diverticulosis.  5.Right nephrectomy. Severe left renal atrophy.  6.Cholecystectomy. Stable intrahepatic and extrahepatic biliary ductal dilation.  7.New bandlike atelectasis in the left lower lobe. Right lower lobe atelectasis appears reexpanded.  8.Additional chronic findings as described above.        Electronically Signed: Florinda Davidson MD    5/22/2025 3:28 PM EDT    Workstation ID: [id number]             Lab Results (last 24 hours)       Procedure Component Value Units  Date/Time    Phosphorus [[id number]] Collected: 05/22/25 1508    Specimen: Blood Updated: 05/23/25 0111    Ammonia [[id number]]  (Normal) Collected: 05/22/25 1621    Specimen: Blood Updated: 05/22/25 1647     Ammonia 17 umol/L     Comprehensive Metabolic Panel [[id number]]  (Abnormal) Collected: 05/22/25 1508    Specimen: Blood Updated: 05/22/25 1557     Glucose 85 mg/dL      BUN 25 mg/dL      Creatinine 6.87 mg/dL      Sodium 132 mmol/L      Potassium 4.8 mmol/L      Chloride 94 mmol/L      CO2 22.7 mmol/L      Calcium 9.5 mg/dL      Total Protein 6.9 g/dL      Albumin 3.8 g/dL      ALT (SGPT) <5 U/L      Comment: Result checked          AST (SGOT) 23 U/L      Alkaline Phosphatase 107 U/L      Total Bilirubin 0.3 mg/dL      Globulin 3.1 gm/dL      A/G Ratio 1.2 g/dL      BUN/Creatinine Ratio 3.6     Anion Gap 15.3 mmol/L      eGFR 5.8 mL/min/1.73     Narrative:      GFR Categories in Chronic Kidney Disease (CKD)              GFR Category          GFR (mL/min/1.73)    Interpretation  G1                    90 or greater        Normal or high (1)  G2                    60-89                Mild decrease (1)  G3a                   45-59                Mild to moderate decrease  G3b                   30-44                Moderate to severe decrease  G4                    15-29                Severe decrease  G5                    14 or less           Kidney failure    (1)In the absence of evidence of kidney disease, neither GFR category G1 or G2 fulfill the criteria for CKD.    eGFR calculation 2021 CKD-EPI creatinine equation, which does not include race as a factor    Magnesium [[id number]]  (Abnormal) Collected: 05/22/25 1508    Specimen: Blood Updated: 05/22/25 1550     Magnesium 3.0 mg/dL     TSH Rfx On Abnormal To Free T4 [[id number]]  (Normal) Collected: 05/22/25 1508    Specimen: Blood Updated: 05/22/25 1549     TSH 2.010 uIU/mL     Lipase [[id number]]  (Abnormal) Collected: 05/22/25 1508    Specimen: Blood Updated:  05/22/25 1548     Lipase 11 U/L     CBC & Differential [[id number]]  (Abnormal) Collected: 05/22/25 1508    Specimen: Blood Updated: 05/22/25 1523    Narrative:      The following orders were created for panel order CBC & Differential.  Procedure                               Abnormality         Status                     ---------                               -----------         ------                     CBC Auto Differential[[id number]]        Abnormal            Final result                 Please view results for these tests on the individual orders.    CBC Auto Differential [[id number]]  (Abnormal) Collected: 05/22/25 1508    Specimen: Blood Updated: 05/22/25 1523     WBC 10.48 10*3/mm3      RBC 3.43 10*6/mm3      Hemoglobin 10.7 g/dL      Hematocrit 34.7 %      .2 fL      MCH 31.2 pg      MCHC 30.8 g/dL      RDW 15.9 %      RDW-SD 57.2 fl      MPV 10.5 fL      Platelets 379 10*3/mm3      Neutrophil % 66.5 %      Lymphocyte % 14.8 %      Monocyte % 9.8 %      Eosinophil % 7.1 %      Basophil % 1.0 %      Immature Grans % 0.8 %      Neutrophils, Absolute 6.98 10*3/mm3      Lymphocytes, Absolute 1.55 10*3/mm3      Monocytes, Absolute 1.03 10*3/mm3      Eosinophils, Absolute 0.74 10*3/mm3      Basophils, Absolute 0.10 10*3/mm3      Immature Grans, Absolute 0.08 10*3/mm3      nRBC 0.0 /100 WBC              I reviewed the patient's new clinical results.    Assessment & Plan       Altered mental status    Barretts esophagus    Gastroesophageal reflux disease    ESRD (end stage renal disease)    Type 2 diabetes mellitus, without long-term current use of insulin    Mixed hyperlipidemia    Hypertension    Obesity (BMI 30-39.9)    Diabetic peripheral neuropathy    Chronic obstructive pulmonary disease    Arteriovenous fistula    Non-small cell lung cancer    Secondary hyperparathyroidism of renal origin    AMS   -stop oxycodone. Listed as an allergy    Weakness   -Consult PT    CKD on dialysis   -consult  nephrology       Constipation   -scheduled colace and Miralax    Atelectasis   -IS   -out of bed with meals.    HTN-metoprolol    VTE: SCD  GI: protonix    CODE STATUS:  Code status (Patient has no pulse and is not breathing):  CPR (Attempt to Resuscitate)  Medical Interventions (Patient has pulse or is breathing):  Full Support  Level of Support Discussed with:  Patient    Admission Status:  I believe this patient meets observation status    Expected length of stay:  1 midnights or greater    I discussed the patient's findings and my recommendations with patient.     Amna Mendoza, APRN  05/23/25  03:44 EDT

## 2025-05-23 NOTE — CONSULTS
[facility] Consult Note      Patient Name: Sherry Lobato  : 1950  MRN: [id number]  Primary Care Physician:  Raegan Gotti MD  Referring Physician: No Known Provider  Date of admission: 2025    Subjective     Reason for Consult: ESRD         HPI:     Patient 74-year-old female known to have history of end-stage renal disease on maintenance hemodialysis on  and Friday history of type 2 diabetes mellitus, hypertension, hyperlipidemia history renal cancer status post right nephrectomy who presents to the hospital with weakness and altered mental status.  Reported falling earlier this week with possible head trauma.  CT scan in the ER was negative for acute process.  Currently well.  Denies any nausea or vomiting.  No fever or chills.  No chest pain or shortness of breath.  Nephrology was consulted management of dialysis  Review of Systems:   14 point review of systems is otherwise negative except for mentioned above on HPI    Personal History     Past Medical History:   Diagnosis Date    Acute upper GI bleeding 2024    Anemia due to GI blood loss 2024    Lucas's esophagus     Barretts esophagus 04/10/2012    C. difficile colitis     Carcinoma, renal cell 04/10/2012    Carotid artery disease     -50-74% left, 16-49% right ()    COPD (chronic obstructive pulmonary disease)     DM2 (diabetes mellitus, type 2)     ESRD (end stage renal disease)     on HD    Gastric ulcer     at anastomatic site    Gastroparesis     unknown    GERD (gastroesophageal reflux disease)     Gout     not current    Hemodialysis patient     mwf    Hernia, incisional     abdomen    History of colonoscopy     UTD    History of degenerative disc disease     Hyperlipidemia     Hypertension     Medicare annual wellness visit, subsequent 2020    Microscopic colitis     Nephrotic syndrome     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    Neuroendocrine tumor     of stomach     Non-small cell lung cancer 10/26/2023    Orthostatic hypotension 11/07/2017    FABY (obstructive sleep apnea)     not treating    Pedal edema     ressolved    Pneumonia 11/11/2024    Proteinuria     Renal cell cancer, right     Rib pain on right side     chronic    Rotator cuff tear     bilateral    Secondary hyperparathyroidism of renal origin 08/29/2024    Steal syndrome dialysis vascular access 04/16/2024    Stomach cancer 2016    Stroke     Tobacco abuse 07/24/2020    Uterine cancer     Vitamin B 12 deficiency     Vitamin D deficiency     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       Past Surgical History:   Procedure Laterality Date    APPENDECTOMY      ARTERIOVENOUS FISTULA Right     ARTERIOVENOUS FISTULA REPAIR      Clotted off and insert graft    ARTERIOVENOUS FISTULA/SHUNT SURGERY Left 12/30/2020    Procedure: ARTERIOVENOUS FISTULA FORMATION;  Surgeon: Jan Caicedo MD;  Location: [facility] MAIN OR;  Service: Vascular;  Laterality: Left;    ARTERIOVENOUS FISTULA/SHUNT SURGERY Left 4/23/2024    Procedure: FISTULOGRAM WITH SUBCLAVIAN ARTERY ANGIOPLASTY, fistula banding;  Surgeon: Franki Delgadillo II, MD;  Location: [facility] HYBRID OR;  Service: Vascular;  Laterality: Left;    BREAST BIOPSY      right nipple 1981    BRONCHOSCOPY N/A 6/11/2024    Procedure: BRONCHOSCOPY WITH RIGHT LUNG WASHING;  Surgeon: Aramis Barragan MD;  Location: [facility] ENDOSCOPY;  Service: Pulmonary;  Laterality: N/A;  RML atelectasis    COLONOSCOPY N/A 11/24/2020    Procedure: COLONOSCOPY with polypectomy x 7;  Surgeon: Jeffery White MD;  Location: [facility] ENDOSCOPY;  Service: Gastroenterology;  Laterality: N/A;  post op: polyps, diverticulosis, hemorrhoids    COLONOSCOPY N/A 6/1/2023    Procedure: COLONOSCOPY with polypectomy x 3 biopsy x 1;  Surgeon: Jeffery White MD;  Location: [facility] ENDOSCOPY;  Service: Gastroenterology;  Laterality: N/A;  diverticulosis polyps    COLONOSCOPY N/A 9/4/2024    Procedure: COLONOSCOPY WITH POLYPECTOMY X 1;   Surgeon: Jeffery White MD;  Location: [facility] ENDOSCOPY;  Service: Gastroenterology;  Laterality: N/A;  POST- POLYP, DIVERTICULOSIS, INTERNAL AND EXTERNAL HEMORRHOIDS    ENDOSCOPY N/A 03/12/2022    Procedure: ESOPHAGOGASTRODUODENOSCOPY with biopsy x 1 area;  Surgeon: Javan Augustin MD;  Location: [facility] ENDOSCOPY;  Service: Gastroenterology;  Laterality: N/A;  post op: anastamosis    ENDOSCOPY N/A 10/9/2023    Procedure: ESOPHAGOGASTRODUODENOSCOPY with biopsy x3 areas;  Surgeon: Ace Campbell MD;  Location: [facility] ENDOSCOPY;  Service: Gastroenterology;  Laterality: N/A;  duodenal ulcers;irregular z line    ENDOSCOPY N/A 8/27/2024    Procedure: ESOPHAGOGASTRODUODENOSCOPY with argon plasma coagulation;  Surgeon: HILARIO Blackmon MD;  Location: [facility] ENDOSCOPY;  Service: Gastroenterology;  Laterality: N/A;  post op: AVM    ENTEROSCOPY SMALL BOWEL N/A 9/4/2024    Procedure: ESOPHAGOGASTRODUODENOSCOPY WITH SMALL BOWEL ENTEROSCOPY, ARGON PLASMA COAGULATION AND ENDOSCOPIC CLIPPING X 2 OF GASTRIC ANTRAL VASCULAR ECTASIA;  Surgeon: Jeffery White MD;  Location: [facility] ENDOSCOPY;  Service: Gastroenterology;  Laterality: N/A;  POST- GAVE    GASTRIC RESECTION      cancer    HYSTERECTOMY      LAPAROSCOPIC CHOLECYSTECTOMY      NEPHRECTOMY      right kidney removed     REDUCTION MAMMAPLASTY      TUMOR REMOVAL      boils    VENTRAL HERNIA REPAIR N/A 5/15/2025    Procedure: VENTRAL  HERNIA REPAIR LAPAROSCOPIC WITH DAVINCI ROBOT WITH MESH, EXTENSIVE LYSIS OF ADHESION;  Surgeon: Keyon Espana MD;  Location: [facility] MAIN OR;  Service: Robotics - DaVinci;  Laterality: N/A;    VENTRAL/INCISIONAL HERNIA REPAIR Right 08/28/2019    Procedure: VENTRAL/INCISIONAL HERNIA REPAIR;  Surgeon: Nilay Stevens MD;  Location: [facility] MAIN OR;  Service: General    VENTRAL/INCISIONAL HERNIA REPAIR N/A 10/01/2019    Procedure: OPEN VENTRAL/INCISIONAL HERNIA REPAIR;  Surgeon: Nilay Stevens MD;  Location: [facility] MAIN OR;  Service: General     VENTRAL/INCISIONAL HERNIA REPAIR N/A 10/11/2021    Procedure: VENTRAL/INCISIONAL HERNIA REPAIR LAPAROSCOPIC;  Surgeon: Nilay Stevens MD;  Location: [facility] MAIN OR;  Service: General;  Laterality: N/A;    VENTRAL/INCISIONAL HERNIA REPAIR N/A 01/23/2023    Procedure: VENTRAL/INCISIONAL HERNIA REPAIR with MESH;  Surgeon: Nilay Stevens MD;  Location: [facility] MAIN OR;  Service: General;  Laterality: N/A;       Family History: family history includes Heart disease in her father.    Social History:  reports that she quit smoking about 4 years ago. Her smoking use included cigarettes. She started smoking about 61 years ago. She has a 14.3 pack-year smoking history. She has been exposed to tobacco smoke. She has never used smokeless tobacco. She reports that she does not drink alcohol and does not use drugs.    Home Medications:  Prior to Admission medications    Medication Sig Start Date End Date Taking? Authorizing Provider   B Complex-C-Folic Acid (RENAL VITAMIN PO) Take 1 tablet by mouth Daily.   Yes Danica Murphy MD   calcium acetate (PHOS BINDER,) 667 MG capsule capsule Take 2 capsules by mouth 2 (Two) Times a Day.   Yes Danica Murphy MD   Cholecalciferol 25 MCG (1000 UT) tablet Take 1 tablet by mouth Daily.   Yes Danica Murphy MD   doxycycline (VIBRAMYCIN) 100 MG capsule Take 1 capsule by mouth 2 (Two) Times a Day for 7 days. 5/18/25 5/25/25 Yes Pancho Ibarra MD   metoprolol succinate XL (Toprol XL) 25 MG 24 hr tablet Take 1 tablet by mouth 2 (Two) Times a Day. 11/12/24  Yes Angelia Taylor MD   pantoprazole (PROTONIX) 20 MG EC tablet Take 1 tablet by mouth Daily.   Yes Danica Murphy MD   sevelamer (RENAGEL) 800 MG tablet Take 4 tablets by mouth 2 (Two) Times a Day With Meals.   Yes Danica Murphy MD   sodium bicarbonate 650 MG tablet Take 2 tablets by mouth 3 (Three) Times a Day.   Yes Danica Murphy MD   temazepam (RESTORIL) 30 MG capsule Take 1 capsule by mouth  "Every Night. 1/10/23  Yes Provider, MD Danica   albuterol sulfate  (90 Base) MCG/ACT inhaler Inhale 2 puffs Every 4 (Four) Hours As Needed for Wheezing.    Provider, MD Danica   lidocaine-prilocaine (EMLA) 2.5-2.5 % cream APPLY TOPICALLY TO THE 5TH FINGER ON THE LEFT HAND AS NEEDED PAIN 4/24/25   Anastasia Thurman APRN   ondansetron (Zofran) 4 MG tablet Take 1 tablet by mouth Daily As Needed for Nausea or Vomiting. 5/16/25 5/16/26  Keyon Espana MD       Allergies:  Allergies   Allergen Reactions    Atorvastatin Calcium Other (See Comments)     \"felt like I was on fire\"    Gabapentin Dizziness     says was unable to walk    Niacin Other (See Comments)     \"FEELS LIKE SHE IS ON FIRE\"      Oxycodone Hallucinations       Objective     Vitals:   Temp:  [97.8 °F (36.6 °C)-98.1 °F (36.7 °C)] 97.8 °F (36.6 °C)  Heart Rate:  [57-88] 62  Resp:  [7-21] 12  BP: ()/() 99/59  Flow (L/min) (Oxygen Therapy):  [2] 2    Intake/Output Summary (Last 24 hours) at 5/23/2025 1637  Last data filed at 5/23/2025 1520  Gross per 24 hour   Intake --   Output 2000 ml   Net -2000 ml       Physical Exam:   Constitutional: Awake, alert, no acute distress.  HEENT: Sclera anicteric, no conjunctival injection  Neck: Supple, no thyromegaly, no lymphadenopathy, trachea at midline, no JVD  Respiratory: Clear to auscultation bilaterally, nonlabored respiration  Cardiovascular: RRR, no murmurs, no rubs or gallops, no carotid bruit  Gastrointestinal: Positive bowel sounds, abdomen is soft, nontender and nondistended  : No palpable bladder  Musculoskeletal: No edema, no clubbing or cyanosis  Psychiatric: Appropriate affect, cooperative  Neurologic: Oriented x3, moving all extremities, normal speech and mental status  Skin: Warm and dry   Left upper extremity AV fistula    Scheduled Meds:     aspirin, 81 mg, Oral, Daily  calcium acetate, 667 mg, Oral, BID With Meals  cholecalciferol, 1,000 Units, Oral, Daily  docusate " sodium, 100 mg, Oral, BID  doxycycline, 100 mg, Oral, Q12H  metoprolol succinate XL, 25 mg, Oral, BID  multivitamin, 1 tablet, Oral, Daily  pantoprazole, 40 mg, Oral, QAM AC  polyethylene glycol, 17 g, Oral, Daily  sevelamer, 3,200 mg, Oral, BID  sodium bicarbonate, 1,300 mg, Oral, TID  sodium chloride, 10 mL, Intravenous, Q12H  temazepam, 30 mg, Oral, Nightly      IV Meds:        Results Reviewed:   I have personally reviewed the results from the time of this admission to 5/23/2025 16:37 EDT     Lab Results   Component Value Date    GLUCOSE 85 05/22/2025    CALCIUM 9.5 05/22/2025     (L) 05/22/2025    K 4.8 05/22/2025    CO2 22.7 05/22/2025    CL 94 (L) 05/22/2025    BUN 25 (H) 05/22/2025    CREATININE 6.87 (H) 05/22/2025    EGFRIFAFRI 5 (L) 07/14/2022    EGFRIFNONA 6 (L) 10/08/2021    BCR 3.6 (L) 05/22/2025    ANIONGAP 15.3 (H) 05/22/2025      Lab Results   Component Value Date    MG 3.0 (H) 05/22/2025    PHOS 4.6 (H) 05/22/2025    ALBUMIN 3.8 05/22/2025           Assessment / Plan     ASSESSMENT:  End-stage renal disease on maintenance hemodialysis on Monday Wednesday and Friday dialyzing today per schedule, Via left upper extremity AV for  Hypertension with CKD: Blood pressure is well-controlled  Type 2 diabetes mellitus with CKD  History of renal cell cancer status post right nephrectomy.  Altered mental status improved.  MRI of the brain is pending   Metabolic acidosis on sodium bicarbonate   hyperphosphatemia on binders        PLAN:  will continue dialysis on Monday Wednesday Friday with plan for dialysis today   neurological workup per primary  Surveillance labs        Thank you for involving us in the care of Sherry Lobato.  Please feel free to call with any questions.    Marcial Kumar MD  05/23/25  16:37 EDT    [facility]  [phone]    Parts of this note may be an electronic transcription/translation of spoken language to printed text using the Dragon dictation  system.

## 2025-05-24 ENCOUNTER — APPOINTMENT (OUTPATIENT)
Dept: MRI IMAGING | Facility: HOSPITAL | Age: 75
End: 2025-05-24
Payer: MEDICARE

## 2025-05-24 PROBLEM — R40.4 TRANSIENT ALTERATION OF AWARENESS: Status: ACTIVE | Noted: 2025-05-24

## 2025-05-24 LAB
ALBUMIN SERPL-MCNC: 3.2 G/DL (ref 3.5–5.2)
ANION GAP SERPL CALCULATED.3IONS-SCNC: 13.5 MMOL/L (ref 5–15)
BUN SERPL-MCNC: 15 MG/DL (ref 8–23)
BUN/CREAT SERPL: 2.8 (ref 7–25)
CALCIUM SPEC-SCNC: 8.9 MG/DL (ref 8.6–10.5)
CHLORIDE SERPL-SCNC: 96 MMOL/L (ref 98–107)
CO2 SERPL-SCNC: 23.5 MMOL/L (ref 22–29)
CREAT SERPL-MCNC: 5.3 MG/DL (ref 0.57–1)
EGFRCR SERPLBLD CKD-EPI 2021: 8 ML/MIN/1.73
GLUCOSE BLDC GLUCOMTR-MCNC: 68 MG/DL (ref 70–105)
GLUCOSE BLDC GLUCOMTR-MCNC: 72 MG/DL (ref 70–105)
GLUCOSE BLDC GLUCOMTR-MCNC: 83 MG/DL (ref 70–105)
GLUCOSE SERPL-MCNC: 60 MG/DL (ref 65–99)
PHOSPHATE SERPL-MCNC: 4.8 MG/DL (ref 2.5–4.5)
POTASSIUM SERPL-SCNC: 4.8 MMOL/L (ref 3.5–5.2)
SODIUM SERPL-SCNC: 133 MMOL/L (ref 136–145)

## 2025-05-24 PROCEDURE — 25010000002 DIAZEPAM PER 5 MG: Performed by: INTERNAL MEDICINE

## 2025-05-24 PROCEDURE — 80069 RENAL FUNCTION PANEL: CPT | Performed by: HOSPITALIST

## 2025-05-24 PROCEDURE — G0378 HOSPITAL OBSERVATION PER HR: HCPCS

## 2025-05-24 PROCEDURE — 96375 TX/PRO/DX INJ NEW DRUG ADDON: CPT

## 2025-05-24 PROCEDURE — 82948 REAGENT STRIP/BLOOD GLUCOSE: CPT

## 2025-05-24 PROCEDURE — 70551 MRI BRAIN STEM W/O DYE: CPT

## 2025-05-24 RX ORDER — MIDODRINE HYDROCHLORIDE 2.5 MG/1
TABLET ORAL
Status: CANCELLED | OUTPATIENT
Start: 2025-05-24

## 2025-05-24 RX ORDER — IBUPROFEN 600 MG/1
1 TABLET ORAL
Status: DISCONTINUED | OUTPATIENT
Start: 2025-05-24 | End: 2025-05-26 | Stop reason: HOSPADM

## 2025-05-24 RX ORDER — NICOTINE POLACRILEX 4 MG
15 LOZENGE BUCCAL
Status: DISCONTINUED | OUTPATIENT
Start: 2025-05-24 | End: 2025-05-26 | Stop reason: HOSPADM

## 2025-05-24 RX ORDER — ASPIRIN 81 MG/1
81 TABLET, CHEWABLE ORAL DAILY
Qty: 30 TABLET | Refills: 3 | Status: SHIPPED | OUTPATIENT
Start: 2025-05-24

## 2025-05-24 RX ORDER — DEXTROSE MONOHYDRATE 25 G/50ML
25 INJECTION, SOLUTION INTRAVENOUS
Status: DISCONTINUED | OUTPATIENT
Start: 2025-05-24 | End: 2025-05-26 | Stop reason: HOSPADM

## 2025-05-24 RX ADMIN — PANTOPRAZOLE SODIUM 40 MG: 40 TABLET, DELAYED RELEASE ORAL at 08:35

## 2025-05-24 RX ADMIN — CALCIUM ACETATE 667 MG: 667 CAPSULE ORAL at 17:34

## 2025-05-24 RX ADMIN — SEVELAMER CARBONATE 3200 MG: 800 TABLET, FILM COATED ORAL at 08:36

## 2025-05-24 RX ADMIN — ASPIRIN 81 MG CHEWABLE TABLET 81 MG: 81 TABLET CHEWABLE at 08:36

## 2025-05-24 RX ADMIN — SODIUM BICARBONATE 1300 MG: 650 TABLET ORAL at 17:34

## 2025-05-24 RX ADMIN — SEVELAMER CARBONATE 3200 MG: 800 TABLET, FILM COATED ORAL at 17:33

## 2025-05-24 RX ADMIN — TRAMADOL HYDROCHLORIDE 50 MG: 50 TABLET, FILM COATED ORAL at 12:45

## 2025-05-24 RX ADMIN — DIAZEPAM 5 MG: 10 INJECTION, SOLUTION INTRAMUSCULAR; INTRAVENOUS at 09:11

## 2025-05-24 RX ADMIN — DOXYCYCLINE 100 MG: 100 CAPSULE ORAL at 20:56

## 2025-05-24 RX ADMIN — POLYETHYLENE GLYCOL 3350 17 G: 17 POWDER, FOR SOLUTION ORAL at 08:35

## 2025-05-24 RX ADMIN — METOPROLOL SUCCINATE 25 MG: 25 TABLET, EXTENDED RELEASE ORAL at 20:56

## 2025-05-24 RX ADMIN — THERA TABS 1 TABLET: TAB at 08:36

## 2025-05-24 RX ADMIN — CALCIUM ACETATE 667 MG: 667 CAPSULE ORAL at 08:40

## 2025-05-24 RX ADMIN — DOCUSATE SODIUM 100 MG: 100 CAPSULE, LIQUID FILLED ORAL at 20:57

## 2025-05-24 RX ADMIN — DEXTROSE 15 G: 15 GEL ORAL at 05:18

## 2025-05-24 RX ADMIN — SODIUM BICARBONATE 1300 MG: 650 TABLET ORAL at 20:56

## 2025-05-24 RX ADMIN — Medication 1000 UNITS: at 08:36

## 2025-05-24 RX ADMIN — Medication 10 ML: at 08:41

## 2025-05-24 RX ADMIN — SODIUM BICARBONATE 1300 MG: 650 TABLET ORAL at 08:36

## 2025-05-24 RX ADMIN — Medication 10 ML: at 20:57

## 2025-05-24 RX ADMIN — DOXYCYCLINE 100 MG: 100 CAPSULE ORAL at 08:36

## 2025-05-24 RX ADMIN — DOCUSATE SODIUM 100 MG: 100 CAPSULE, LIQUID FILLED ORAL at 08:36

## 2025-05-24 NOTE — NURSING NOTE
Patient became hypoglycemia blood sugar was 68 notified provider on call Allan Jackson MD to get hypoglycemia standing orders.     Also notified provider Allan Jackson MD about patient BP low 129/47. Allan Jackson mD was okay with patient BP and no order were given

## 2025-05-24 NOTE — CASE MANAGEMENT/SOCIAL WORK
Start PACC Note    Home Health Referral    Evaluated patient and on Home Care and services available. Patient offered choice of available HHC and agreeable to PT services with [facility].    Isolation Precautions: No active isolations    START PATIENT REGISTRATION INFORMATION  Order Information  Order Signing Physician: Angelia Taylor MD  Service Ordered RN?: No  Service Ordered PT?: Yes  Service Ordered OT?: No  Service Ordered ST?: No  Service Ordered MSW?: No  Service Ordered HHA?: No  Following Physician: Raegan Gotti MD  Following Physician Phone: [phone]  Overseeing Physician: Raegan Gotti MD  (Required for Residents Only)  Agreeable to Follow ? Yes  Date/Time of Call 05/24/25 15:22 EDT, Spoke with: orders in epic    Care Coordination  Same Day SOC?: No  Primary Care Physician: Raegan Gotti MD  Primary Care Physician Phone: [phone]  Primary Care Physician Address: [address] [place] [address] [place] [address]  Visit Instructions: N/A  Service Discharge Location Type: Home  Service Facility Name: N/A  Service Floor Facility: N/A  Service Room No: N/A    Demographics  Patient Last Name: Cheryle  Patient First Name: Sherry  Language/Communication Barrier: none  Service Address: [facility] [address]  Service City: [place]  Service State: [place]  Service Zip: [address]  [facility] Home Phone: [phone]  Other Phone Numbers:   Telephone Information:   Mobile [phone]     Emergency Contact:   Extended Emergency Contact Information  Primary Emergency Contact: Ari Connor  Address: [facility] [address]           [facility] [place] [address] [place] [address]  Home Phone: [phone]  Mobile Phone: [phone]  Relation: Significant Other  Preferred language: English   needed? No  Secondary Emergency Contact: joy fowler  Home Phone: [phone]  Mobile Phone: [phone]  Relation: Relative  Hearing or visual needs: None  Other needs: None  Preferred language:  English   needed? No    Admission Information  Admit Date: 5/22/2025  Patient Status at Discharge: Observation  Admitting Diagnosis: Weakness [R53.1]  Altered mental status, unspecified altered mental status type [R41.82]  Altered mental status [R41.82]    Caregiver Information  Caregiver First Name: Nikolas  Caregiver Last Name: Ari  Caregiver Relationship to Patient: significant other  Caregiver Phone Number: [phone]  Caregiver Notes: N/A    [facility]  [employer] [facility]  No      END PATIENT REGISTRATION INFORMATION    Start PACC Summary    Additional Comments: none    END PACC Summary    Discharge Date: Pending    Referral Source: [facility]    Signed By: Lisa Casillas RN, 5/24/2025, 15:22 EDT     Date/Time: 05/24/25 15:22 EDT    End PACC Note

## 2025-05-24 NOTE — PLAN OF CARE
Goal Outcome Evaluation:              Outcome Evaluation: alert and able to make needs known, call light in reach.

## 2025-05-24 NOTE — DISCHARGE SUMMARY
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0]  Yes    Non-small cell lung cancer [C34.90]  Yes    Diabetic peripheral neuropathy [E11.42]  Yes    Obesity (BMI 30-39.9) [E66.9]  Yes    Chronic obstructive pulmonary disease [J44.9]  Yes    Mixed hyperlipidemia [E78.2]  Yes    Gastroesophageal reflux disease [K21.9]  Yes    Hypertension [I10]  Yes    Barretts esophagus [K22.70]  Yes    Type 2 diabetes mellitus, without long-term current use of insulin [E11.9]  Yes      Resolved Hospital Problems   No resolved problems to display.        Hospital Course  Patient is a 74 y.o. female who presented with acute mental status changes.  She was admitted and medications were modified specifically with the ship of her sedative hypnotic therapy.  She improved rather dramatically and did undergo extensive evaluation including serologic workup and scanning of the brain including an MRI which were unremarkable save for evidence of previous cerebrovascular disease.  She improved and was deemed safe for discharge home with  medications per the discharge reconciliation.  She will follow-up for her H&P as directed and will follow-up with us in the office within 1 week's time for reevaluation and to continue workup    Procedures Performed         Consults:   Consults       Date and Time Order Name Status Description    5/22/2025  9:32 PM Inpatient Nephrology Consult Completed     5/22/2025  4:22 PM Family Medicine Consult      5/12/2025  8:00 AM Inpatient Cardiology Consult Completed     5/11/2025  9:15 PM Surgery (on-call MD unless specified) Completed     5/11/2025  6:38 PM Nephrology (on -call MD unless specified) Completed             Pertinent Test Results:MRI Brain Without Contrast  Result Date: 5/24/2025  Impression: 1.There are white matter changes involving both cerebral hemispheres which could reflect more chronic small vessel ischemic change. 2.Old insult left parietal area. Electronically Signed: Candelario Kirkland MD  5/24/2025 9:59 AM EDT  Workstation ID: [id number]    CT Head Without Contrast  Result Date: 5/22/2025  Impression: No acute intracranial findings. Chronic appearing left parietal infarct. Electronically Signed: Robert Avila MD  5/22/2025 3:52 PM EDT  Workstation ID: [id number]    XR Chest 1 View  Result Date: 5/22/2025  Impression: Stable cardiac enlargement. No acute chest findings. Electronically Signed: Florinda Davidson MD  5/22/2025 3:45 PM EDT  Workstation ID: [id number]    CT Abdomen Pelvis Without Contrast  Result Date: 5/22/2025  Impression: 1.No acute findings within the abdomen or pelvis. 2.Surgical changes of recent ventral abdominal hernia repair. Small foci of air within the anterior abdominal wall are diminished since the prior examination. No large volume free intraperitoneal air is evident. 3.Stable thin fluid collection within the extraperitoneal anterior abdomen, right of midline, thought to represent a postoperative seroma. 4.Uncomplicated sigmoid diverticulosis. 5.Right nephrectomy. Severe left renal  atrophy. 6.Cholecystectomy. Stable intrahepatic and extrahepatic biliary ductal dilation. 7.New bandlike atelectasis in the left lower lobe. Right lower lobe atelectasis appears reexpanded. 8.Additional chronic findings as described above. Electronically Signed: Florinda Davidson MD  5/22/2025 3:28 PM EDT  Workstation ID: [id number]    CT Abdomen Pelvis Without Contrast  Result Date: 5/17/2025  Impression: 1.Postsurgical changes of recent ventral hernia repair with small foci of gas within the anterior abdominal wall. No organized fluid collection. 2.Postsurgical changes of gastrojejunostomy. No evidence of bowel obstruction. 3.Right lower lobe bronchial wall thickening likely related to bronchitis. Small amount of right lower lobe atelectasis noted. 4.Additional ancillary findings as detailed above. Electronically Signed: Juan Antonio Han MD  5/17/2025 10:11 AM EDT  Workstation ID: [id number]      Imaging Results (Last 7 Days)       Procedure Component Value Units Date/Time    MRI Brain Without Contrast [[id number]] Collected: 05/24/25 0956     Updated: 05/24/25 1001    Narrative:      MRI BRAIN WO CONTRAST    Date of Exam: 5/24/2025 9:30 AM EDT    Indication: dizziness.     Comparison: CT head May 22, 2025    Technique:  Routine multiplanar/multisequence sequence images of the brain were obtained without contrast administration.      Findings:  There is no restricted diffusion. There are periventricular and deep white matter changes which could reflect more chronic small vessel ischemic change. There is evidence for an old insult in the left parietal area. Pituitary is not enlarged. There is no   definite orbital abnormality. There is no unusual blooming on susceptibility imaging.        Impression:      Impression:  1.There are white matter changes involving both cerebral hemispheres which could reflect more chronic small vessel ischemic change.  2.Old insult left parietal area.        Electronically Signed: Candelario Kirkland  MD    5/24/2025 9:59 AM EDT    Workstation ID: [id number]    CT Head Without Contrast [[id number]] Collected: 05/22/25 1549     Updated: 05/22/25 1555    Narrative:      CT HEAD WO CONTRAST    Date of Exam: 5/22/2025 3:16 PM EDT    Indication: Fall head injury altered mental status.    Comparison: Head CT 8/26/2024.    Technique: Axial CT images were obtained of the head without contrast administration.  Coronal reconstructions were performed.  Automated exposure control and iterative reconstruction methods were used.      Findings:  No acute intracranial hemorrhage.Intact appearing gray-white differentiation.No extra-axial fluid collection.No significant mass effect.    Unchanged chronic appearing left parietal infarct.    No hydrocephalus.    Brain volume appears mildly diminished, but likely age-appropriate.    Scattered areas of periventricular and subcortical white matter hypoattenuation, nonspecific, perhaps from small vessel ischemic/hypertensive changes in a patient of this age.There are intracranial atherosclerotic calcifications.    Mild scattered mucosal thickening in the paranasal sinuses.Mastoid air cells are essentially clear.. Bilateral lens replacements.    No acute or aggressive appearing bony or extracranial soft tissue process.      Impression:      Impression:  No acute intracranial findings.    Chronic appearing left parietal infarct.      Electronically Signed: Robert Avila MD    5/22/2025 3:52 PM EDT    Workstation ID: [id number]    XR Chest 1 View [[id number]] Collected: 05/22/25 1518     Updated: 05/22/25 1547    Narrative:      XR CHEST 1 VW    Date of Exam: 5/22/2025 3:04 PM EDT    Indication: Altered mental status    Comparison: CT chest 2/13/2025    Findings:  Heart size appears mild to moderately enlarged but stable in appearance since 11/11/2024. No acute airspace disease, pleural effusion or pneumothorax or acute osseous abnormalities are identified. Stent material is seen within each  axillary region.   Surgical anchor is seen in the left humeral head.      Impression:      Impression:  Stable cardiac enlargement.  No acute chest findings.        Electronically Signed: Florinda Davidson MD    5/22/2025 3:45 PM EDT    Workstation ID: [id number]    CT Abdomen Pelvis Without Contrast [[id number]] Collected: 05/22/25 1523     Updated: 05/22/25 1530    Narrative:      CT ABDOMEN PELVIS WO CONTRAST    Date of Exam: 5/22/2025 3:20 PM EDT    Indication: Recent hernia surgery abdominal pain renal failure dialysis patient.    Comparison: CT abdomen pelvis without contrast 5/17/2025    Technique: Axial CT images were obtained of the abdomen and pelvis without the administration of contrast. Sagittal and coronal reconstructions were performed.  Automated exposure control and iterative reconstruction methods were used.      Findings:  Surgical changes of recent ventral abdominal hernia repair are again noted. Small foci of air are in seen within the anterior abdominal wall, diminished. No large volume free intraperitoneal air is evident. Thin fluid collection is demonstrated within   the extraperitoneal anterior abdomen right of midline measuring 1.6 cm thickness, unchanged, thought to represent a postoperative seroma.    Uncomplicated sigmoid diverticular changes are present. There are surgical changes of gastroenteric anastomosis. No indication of active bowel inflammation or bowel obstruction.    Cholecystectomy. Stable intrahepatic and extrahepatic biliary ductal dilation. Common bile duct measures up to 12 mm. Moderate to advanced pancreatic parenchymal atrophy without active inflammatory change. Spleen is normal. Bilateral low-density adrenal   nodular thickening consistent with adenomatous hyperplasia. Right nephrectomy. Severe left renal atrophy.    Advanced calcific atherosclerosis within the aorta, iliac and femoral vessels and mesenteric vessels. Patency of vasculature is not assessed without the benefit  of IV contrast.    Urinary bladder is decompressed. Hysterectomy. Rectum is within normal limits.    There is mild generalized muscular atrophy. Severe facet arthropathy bilaterally at L4-5 and L5-S1. Prominent anterolateral osteophyte formation in the lower thoracic spine. No acute or suspicious osseous abnormalities    Bandlike atelectatic changes in the left lower lobe are new. Right lower lobe atelectasis appears reexpanded. Mosaic attenuation of the lung bases may reflect areas of air trapping. Benign calcified nodules in the right lower lobe. Heart size is upper   limits of normal. Dense coronary artery calcifications are present.      Impression:      Impression:  1.No acute findings within the abdomen or pelvis.  2.Surgical changes of recent ventral abdominal hernia repair. Small foci of air within the anterior abdominal wall are diminished since the prior examination. No large volume free intraperitoneal air is evident.  3.Stable thin fluid collection within the extraperitoneal anterior abdomen, right of midline, thought to represent a postoperative seroma.  4.Uncomplicated sigmoid diverticulosis.  5.Right nephrectomy. Severe left renal atrophy.  6.Cholecystectomy. Stable intrahepatic and extrahepatic biliary ductal dilation.  7.New bandlike atelectasis in the left lower lobe. Right lower lobe atelectasis appears reexpanded.  8.Additional chronic findings as described above.        Electronically Signed: Florinda Davidson MD    5/22/2025 3:28 PM EDT    Workstation ID: [id number]                Condition on Discharge:  Fair    Vital Signs  Temp:  [97.8 °F (36.6 °C)-98.4 °F (36.9 °C)] 98.2 °F (36.8 °C)  Heart Rate:  [57-70] 70  Resp:  [8-21] 8  BP: ()/(40-84) 106/40    Physical Exam:     General Appearance:    Alert, cooperative, in no acute distress   Head:    Normocephalic, without obvious abnormality, atraumatic   Eyes:           Conjunctivae and sclerae normal, no   icterus, no pallor, corneas clear,  PERRLA   Throat:   No oral lesions, no thrush, oral mucosa moist   Neck:   No adenopathy, supple, trachea midline, no thyromegaly, no   carotid bruit, no JVD   Lungs:     Clear to auscultation,respirations regular, even and                  unlabored    Heart:    Regular rhythm and normal rate, normal S1 and S2, no            murmur, no gallop, no rub, no click   Chest Wall:    No abnormalities observed   Abdomen:     Normal bowel sounds, no masses, no organomegaly, soft        non-tender, non-distended, no guarding, no rebound                tenderness   Rectal:     Deferred   Extremities:   Moves all extremities well, no edema, no cyanosis, no             redness   Pulses:   Pulses palpable and equal bilaterally   Skin:   No bleeding, bruising or rash   Lymph nodes:   No palpable adenopathy   Neurologic:   Cranial nerves 2 - 12 grossly intact, sensation intact, DTR       present and equal bilaterally         Discharge Disposition  Home or Self Care    Discharge Medications     Discharge Medications        Continue These Medications        Instructions Start Date   albuterol sulfate  (90 Base) MCG/ACT inhaler  Commonly known as: PROVENTIL HFA;VENTOLIN HFA;PROAIR HFA   2 puffs, Every 4 Hours PRN      aspirin 81 MG chewable tablet   81 mg, Oral, Daily      calcium acetate 667 MG capsule capsule  Commonly known as: PHOS BINDER)   1,334 mg, 2 Times Daily      cholecalciferol 25 MCG (1000 UT) tablet  Commonly known as: VITAMIN D3   1,000 Units, Daily      lidocaine-prilocaine 2.5-2.5 % cream  Commonly known as: EMLA   APPLY TOPICALLY TO THE 5TH FINGER ON THE LEFT HAND AS NEEDED PAIN      metoprolol succinate XL 25 MG 24 hr tablet  Commonly known as: Toprol XL   25 mg, Oral, 2 Times Daily      ondansetron 4 MG tablet  Commonly known as: Zofran   4 mg, Oral, Daily PRN      pantoprazole 20 MG EC tablet  Commonly known as: PROTONIX   20 mg, Daily      RENAL VITAMIN PO   1 tablet, Daily      sevelamer 800 MG  tablet  Commonly known as: RENAGEL   3,200 mg, 2 Times Daily With Meals      sodium bicarbonate 650 MG tablet   1,300 mg, 3 Times Daily             Stop These Medications      doxycycline 100 MG capsule  Commonly known as: VIBRAMYCIN     temazepam 30 MG capsule  Commonly known as: RESTORIL              Discharge Diet:   2 Gram sodium 2 g potassium  Activity at Discharge:   As tolerated  Follow-up Appointments  Future Appointments   Date Time Provider Department Center   5/27/2025  2:40 PM Keyon Espana MD [facility]   8/14/2025  8:00 AM [facility]   8/19/2025  8:30 AM Alcides Live MD [facility] None   10/2/2025 11:00 AM Lisa Baca APRN [facility]         Test Results Pending at Discharge  Pending Results       None             Rey Jackson MD  05/24/25  11:32 EDT

## 2025-05-24 NOTE — NURSING NOTE
Patient BP was 91/49 notified provider on call and call for claustrophobic meds for MRI of the brain. Per Heimer,Brain MD wanted to hold the MRI for tonight until he sees patient in the morning and given orders for a 250ml bolus for patient low BP

## 2025-05-24 NOTE — PLAN OF CARE
Problem: Adult Inpatient Plan of Care  Goal: Plan of Care Review  Outcome: Progressing  Flowsheets  Taken 5/24/2025 0538 by Ronnie Connor LPN  Progress: no change  Taken 5/23/2025 0417 by Katlin Irving RN  Plan of Care Reviewed With: patient  Goal: Patient-Specific Goal (Individualized)  Outcome: Progressing  Goal: Absence of Hospital-Acquired Illness or Injury  Outcome: Progressing  Intervention: Identify and Manage Fall Risk  Description: Perform standard risk assessment on admission using a validated tool or comprehensive approach appropriate to the patient; reassess fall risk frequently, with change in status or transfer to another level of care.Communicate risk to interprofessional healthcare team; ensure fall risk visible cue.Determine need for increased observation, equipment and environmental modification, as well as use of supportive, nonskid footwear.Adjust safety measures to individual needs and identified risk factors.Reinforce the importance of active participation with fall risk prevention, safety, and physical activity with the patient and family.Perform regular intentional rounding to assess need for position change, pain assessment and personal needs, including assistance with toileting.  Recent Flowsheet Documentation  Taken 5/24/2025 0244 by Ronnie Connor LPN  Safety Promotion/Fall Prevention:   assistive device/personal items within reach   clutter free environment maintained   room organization consistent   safety round/check completed  Taken 5/24/2025 0021 by Ronnie Connor LPN  Safety Promotion/Fall Prevention:   assistive device/personal items within reach   clutter free environment maintained   room organization consistent   safety round/check completed  Taken 5/23/2025 2200 by Ronnie Connor LPN  Safety Promotion/Fall Prevention:   assistive device/personal items within reach   clutter free environment maintained   room organization consistent   safety round/check  completed  Taken 5/23/2025 2154 by Ronnie Connor LPN  Safety Promotion/Fall Prevention:   assistive device/personal items within reach   clutter free environment maintained   room organization consistent   safety round/check completed  Intervention: Prevent Skin Injury  Description: Perform a screening for skin injury risk, such as pressure or moisture-associated skin damage on admission and at regular intervals throughout hospital stay.Keep all areas of skin (especially folds) clean and dry.Maintain adequate skin hydration.Relieve and redistribute pressure and protect bony prominences and skin at risk for injury; implement measures based on patient-specific risk factors.Match turning and repositioning schedule to clinical condition.Encourage weight shift frequently; assist with reposition if unable to complete independently.Float heels off bed; avoid pressure on the Achilles tendon.Keep skin free from extended contact with medical devices.Optimize nutrition and hydration.Encourage functional activity and mobility, as early as tolerated.Use aids (e.g., slide boards, mechanical lift) during transfer.  Recent Flowsheet Documentation  Taken 5/24/2025 0244 by Ronnie Connor LPN  Body Position:   position changed independently   left  Taken 5/24/2025 0021 by Ronnie Connor LPN  Body Position:   position changed independently   left  Taken 5/23/2025 2200 by Ronnie Connor LPN  Body Position:   position changed independently   right  Taken 5/23/2025 2154 by Ronnie Connor LPN  Body Position:   position changed independently   left  Skin Protection: incontinence pads utilized  Intervention: Prevent and Manage VTE (Venous Thromboembolism) Risk  Description: Assess for VTE (venous thromboembolism) risk.Promote early mobilization; encourage both active and passive leg exercises, if unable to ambulate.Initiate and maintain compression or other therapy, as indicated, based on identified risk in accordance with  organizational protocol and provider order.Recognize the patient's individual risk for bleeding before initiating pharmacologic thromboprophylaxis.  Recent Flowsheet Documentation  Taken 5/23/2025 2154 by Ronnie Connor LPN  VTE Prevention/Management:   SCDs (sequential compression devices) off   patient refused intervention  Intervention: Prevent Infection  Description: Maintain skin and mucous membrane integrity; promote hand, oral and pulmonary hygiene.Optimize fluid balance, nutrition, sleep and glycemic control to maximize infection resistance.Identify potential sources of infection early to prevent or mitigate progression of infection (e.g., wound, lines, devices).Evaluate ongoing need for invasive devices; remove promptly when no longer indicated.Review vaccination status.  Recent Flowsheet Documentation  Taken 5/24/2025 0244 by Ronnie Connor LPN  Infection Prevention: environmental surveillance performed  Taken 5/23/2025 2154 by Ronnie Connor LPN  Infection Prevention:   environmental surveillance performed   equipment surfaces disinfected   single patient room provided  Goal: Optimal Comfort and Wellbeing  Outcome: Progressing  Intervention: Provide Person-Centered Care  Description: Use a family-focused approach to care; encourage support system presence and participation.Develop trust and rapport by proactively providing information, encouraging questions, addressing concerns and offering reassurance.Acknowledge emotional response to hospitalization.Recognize and utilize personal coping strategies and strengths; develop goals via shared decision-making.Honor spiritual and cultural preferences.  Recent Flowsheet Documentation  Taken 5/23/2025 2154 by Ronnie Connor LPN  Trust Relationship/Rapport: care explained  Goal: Readiness for Transition of Care  Outcome: Progressing     Problem: Fall Injury Risk  Goal: Absence of Fall and Fall-Related Injury  Outcome: Progressing  Intervention: Identify  and Manage Contributors  Description: Develop a fall prevention plan, considering patient-centered interventions and family/caregiver involvement; identify and address patient's facilitators and barriers.Provide reorientation, appropriate sensory stimulation and routines with changes in mental status to decrease risk of fall.Promote use of personal vision and auditory aids.Assess assistance level required for safe and effective self-care; provide support as needed, such as toileting and mobilization. For age 65 and older, implement timed toileting with assistance.Encourage physical activity, such as performance of mobility and self-care at highest level of patient ability, multicomponent exercise program and provision of appropriate assistive devices.If fall occurs, assess the severity of injury; implement fall injury protocol. Determine the cause and revise fall injury prevention plan.Regularly review and advocate for medication adjustment to decrease fall risk; consider administration times, polypharmacy and age.Balance adequate pain management with potential for oversedation.  Recent Flowsheet Documentation  Taken 5/24/2025 0021 by Ronnie Connor LPN  Medication Review/Management: medications reviewed  Taken 5/23/2025 2154 by Ronnie Connor LPN  Medication Review/Management: medications reviewed  Intervention: Promote Injury-Free Environment  Description: Provide a safe, barrier-free environment that encourages independent activity.Keep care area uncluttered and well-lighted.Determine need for increased observation or monitoring.Avoid use of devices that minimize mobility, such as restraints or indwelling urinary catheter.  Recent Flowsheet Documentation  Taken 5/24/2025 0244 by Ronnie Connor LPN  Safety Promotion/Fall Prevention:   assistive device/personal items within reach   clutter free environment maintained   room organization consistent   safety round/check completed  Taken 5/24/2025 0021 by  Nikolas, Ronnie, LPN  Safety Promotion/Fall Prevention:   assistive device/personal items within reach   clutter free environment maintained   room organization consistent   safety round/check completed  Taken 5/23/2025 2200 by Ronnie Connor LPN  Safety Promotion/Fall Prevention:   assistive device/personal items within reach   clutter free environment maintained   room organization consistent   safety round/check completed  Taken 5/23/2025 2154 by Ronnie Connor LPN  Safety Promotion/Fall Prevention:   assistive device/personal items within reach   clutter free environment maintained   room organization consistent   safety round/check completed     Problem: Pain Acute  Goal: Optimal Pain Control and Function  Outcome: Progressing  Intervention: Optimize Psychosocial Wellbeing  Description: Facilitate patient's self-control over pain by providing pain information and allowing choices in treatment.Consider and address emotional response to pain; express compassion and reassurance.Explore and promote use of coping strategies; address barriers to successful coping.Evaluate and assist with psychosocial, cultural and spiritual factors impacting pain.Assess for risk factors for developing chronic pain, such as depression, fear, pain avoidance and pain catastrophizing.Modify pain perception using techniques, such as distraction, mindfulness, guided imagery, meditation or music.Consider referral for ongoing coping support, such as education, relaxation training and role of thoughts.  Recent Flowsheet Documentation  Taken 5/23/2025 2154 by Ronnie Connor LPN  Diversional Activities:   smartphone   television  Intervention: Prevent or Manage Pain  Description: Evaluate pain level, effect of treatment and patient response at regular intervals.Minimize painful stimuli; coordinate care and adjust environment (e.g., light, noise, unnecessary movement); promote sleep/rest.Match pharmacologic analgesia to severity and type  of pain mechanism (e.g., neuropathic, muscle, inflammatory); consider multimodal approach.Provide medication at regular intervals; titrate to patient response; premedicate for painful procedures.Manage breakthrough pain with additional doses; consider rotation or switching medication.Monitor for signs of substance tolerance (increased dose to reach desired effect, decreased effect with same dose).Manage medication-induced adverse events and side effects.Provide multimodal interventions, such as physical activity, therapeutic exercise, yoga, TENS (transcutaneous electrical nerve stimulation) and manual therapy.Train in functional activity modifications, such as body mechanics, posture, ergonomics, energy conservation and activity pacing.Consider addition of complementary or alternative therapy, such as acupuncture, hypnosis or therapeutic touch.  Recent Flowsheet Documentation  Taken 5/24/2025 0021 by Ronnie Connor LPN  Medication Review/Management: medications reviewed  Taken 5/23/2025 2154 by Ronnie Connor LPN  Medication Review/Management: medications reviewed     Problem: Gas Exchange Impaired  Goal: Optimal Gas Exchange  Outcome: Progressing  Intervention: Optimize Oxygenation and Ventilation  Description: Assess and monitor airway, breathing and circulation for effective oxygenation and ventilation; consider oxygenation and ventilation parameters and goal.Anticipate noninvasive and invasive monitoring, such as pulse oximetry, end-tidal carbon dioxide, blood gases and cardiovascular.Maintain head of bed elevation with regular position changes to minimize ventilation-perfusion mismatch and breathlessness; consider prone positioning to maximize alveolar recruitment.Provide oxygen therapy judiciously to avoid hyperoxemia; adjust to achieve oxygenation goal.Monitor fluid balance closely to minimize the risk of fluid overload.Consider positive pressure ventilation to enhance oxygenation, ventilation and reduce  work of breathing.  Recent Flowsheet Documentation  Taken 5/24/2025 0244 by Ronnie Connor LPN  Head of Bed (HOB) Positioning: HOB at 20-30 degrees  Taken 5/24/2025 0021 by Ronnie Connor LPN  Head of Bed (HOB) Positioning: HOB at 20-30 degrees  Taken 5/23/2025 2200 by Ronnie Connor LPN  Head of Bed (HOB) Positioning: HOB at 20-30 degrees  Taken 5/23/2025 2154 by Ronnie Connor LPN  Head of Bed (HOB) Positioning: HOB at 30-45 degrees     Problem: Hemodialysis  Goal: Safe, Effective Therapy Delivery  Outcome: Progressing  Intervention: Optimize Device Care and Function  Description: Maintain flow rate, anticoagulation parameters, pressure ranges and prescribed fluid balance with gradual adjustments to maintain hemodynamic stability and achieve therapy goals.Monitor laboratory results (e.g., electrolytes, glucose, albumin, coagulation studies, hemoglobin, hematocrit) and clinical status (e.g., cramping; nausea, vomiting, blood pressure fluctuations) for response to therapy; advocate for treatment or adjuAssess vascular access site for patency, securement and position to meet flow demands. Assess perfusion distal to access site to ensure adequate tissue oxygenation.For arteriovenous fistula, assess presence of thrill to ensure presence of blood flow. Avoid blood pressure readings, lab draws, tight clothing or jewelry on the AV (arteriovenous) fistula extremity to prevent trauma.Maintain circuit monitoring (e.g., arterial, venous and transmembrane pressure; ultrafiltrate removal; dialysate flow, conductivity and temperature); address alarms promptly to decrease risk to patient and preserve circuit function.Evaluate circuit for disconnections, cracks, clotting, malfunction, leaks or rupture of hemofilter; intervene promptly to prevent risk to patient.Consider distal vascular access for antibiotic or vasoactive medication administration to prevent filtration of medication effect prior to being delivered  systemically to the patient.Maintain infusion of anticoagulation; adjust to keep laboratory values within ordered range.Provide emergency equipment, such as clamps, replacement devices and resuscitative supplies, if malfunction, tubing rupture, clot formation or migration occur.  Recent Flowsheet Documentation  Taken 5/24/2025 0021 by Ronnie Connor LPN  Medication Review/Management: medications reviewed  Taken 5/23/2025 2154 by Ronnie Connor LPN  Medication Review/Management: medications reviewed  Goal: Effective Tissue Perfusion  Outcome: Progressing  Goal: Absence of Infection Signs and Symptoms  Outcome: Progressing  Intervention: Prevent or Manage Infection  Description: Implement transmission-based precautions and isolation, as indicated, to prevent spread of infection.Obtain cultures prior to initiating antimicrobial therapy when possible. Do not delay treatment for laboratory results in the presence of high suspicion or clinical indicators.Identify potential sources of infection, such as respiratory, wound, lines and devices, early to prevent or mitigate progression of infection.Administer ordered antimicrobial therapy promptly; reassess need regularly.Monitor laboratory value, diagnostic test and clinical status trends for signs of infection progression.Identify early signs of sepsis, such as increased heart rate and decreased blood pressure, as well as changes in mental state, respiratory pattern or peripheral perfusion.Prepare for rapid sepsis management, including lactate level, intravenous access, fluid administration and oxygen therapy.Provide fever-reduction and comfort measures.  Recent Flowsheet Documentation  Taken 5/24/2025 0244 by Ronnie Connor LPN  Infection Prevention: environmental surveillance performed  Taken 5/23/2025 2154 by Ronnie Connor LPN  Infection Prevention:   environmental surveillance performed   equipment surfaces disinfected   single patient room provided     Problem:  Comorbidity Management  Goal: Blood Glucose Level Within Target Range  Outcome: Progressing  Intervention: Monitor and Manage Glycemia  Description: Establish target blood glucose levels based on patient-specific factors, such as age, diabetes-related complications and illness severity.Document blood glucose levels and monitor trend.Provide antihyperglycemic pharmacologic therapy to maintain blood glucose levels within targeted range.Advocate for patient use of home devices such as insulin pump, continuous glucose monitor; assess for safe and competent participation in care.Check blood glucose level if there is a change in mental or cognitive status.Recognize, treat and document hypoglycemia event and potential cause.Assess current lifestyle patterns; acknowledging positive patterns supporting wellbeing.Evaluate effectiveness of coping skills; encourage expression of feelings, expectations and concerns related to disease management and quality of life; reinforce education to enhance management plan and wellbeing.  Recent Flowsheet Documentation  Taken 5/24/2025 0021 by Ronnie Connor LPN  Medication Review/Management: medications reviewed  Taken 5/23/2025 2154 by Ronnie Connor LPN  Medication Review/Management: medications reviewed  Goal: Blood Pressure in Desired Range  Outcome: Progressing  Intervention: Maintain Blood Pressure Management  Description: Evaluate adherence to home antihypertensive regimen (e.g., exercise and activity, diet modification, medication).Provide scheduled antihypertensive medication; consider administration time and effects (e.g., avoid giving diuretic prior to bedtime).Monitor response to antihypertensive medication therapy (e.g., blood pressure, electrolyte levels, medication effects).Minimize risk of orthostatic hypotension; encourage caution with position changes, particularly if elderly.  Recent Flowsheet Documentation  Taken 5/24/2025 0021 by Ronnie Connor LPN  Medication  Review/Management: medications reviewed  Taken 5/23/2025 2154 by Ronnie Connor LPN  Medication Review/Management: medications reviewed     Problem: Skin Injury Risk Increased  Goal: Skin Health and Integrity  Outcome: Progressing  Intervention: Optimize Skin Protection  Description: Perform a full pressure injury risk assessment, as indicated by screening, upon admission to care unit.Reassess skin (full inspection and injury risk, including skin temperature, consistency and color) frequently (e.g., scheduled interval, with change in condition) to provide optimal early detection and prevention.Maintain adequate tissue perfusion (e.g., encourage fluid balance; avoid crossing legs, constrictive clothing or devices) to promote tissue oxygenation.Maintain head of bed at lowest degree of elevation tolerated, considering medical condition and other restrictions. Use positioning supports to prevent sliding and friction. Consider low friction textiles.Avoid positioning onto an area that remains reddened or on bony prominences.Minimize incontinence and moisture (e.g., toileting schedule; moisture-wicking pad, diaper or incontinence collection device; skin moisture barrier).Cleanse skin promptly and gently, when soiled, utilizing a pH-balanced cleanser.Relieve and redistribute pressure (e.g., scheduled position changes, weight shifts, use of support surface, medical device repositioning, protective dressing application, use of positioning device, microclimate control, use of pressure-injury-monitorEncourage increased activity, such as sitting in a chair at the bedside or early mobilization, when able to tolerate. Avoid prolonged sitting.  Recent Flowsheet Documentation  Taken 5/24/2025 0244 by Ronnie Connor LPN  Activity Management: activity encouraged  Head of Bed (HOB) Positioning: HOB at 20-30 degrees  Taken 5/24/2025 0021 by Ronnie Connor LPN  Activity Management: activity encouraged  Head of Bed (HOB)  Positioning: HOB at 20-30 degrees  Taken 5/23/2025 2200 by Ronnie Connor LPN  Activity Management: activity encouraged  Head of Bed (HOB) Positioning: HOB at 20-30 degrees  Taken 5/23/2025 2154 by Ronnie Connor LPN  Activity Management: activity encouraged  Pressure Reduction Techniques: frequent weight shift encouraged  Head of Bed (HOB) Positioning: HOB at 30-45 degrees  Pressure Reduction Devices: pressure-redistributing mattress utilized  Skin Protection: incontinence pads utilized   Goal Outcome Evaluation:           Progress: no change

## 2025-05-24 NOTE — PROGRESS NOTES
[facility] Progress Note      Patient Name: Sherry Lobato  : 1950  MRN: [id number]  Primary Care Physician:  Raegan Gotti MD  Date of admission: 2025    Subjective     Interval History:   Follow-up ESRD.    Review of Systems:   No acute events overnight.    Objective     Vitals:   Temp:  [97.8 °F (36.6 °C)-98.5 °F (36.9 °C)] 98.5 °F (36.9 °C)  Heart Rate:  [60-76] 76  Resp:  [8-14] 14  BP: ()/(40-71) 101/71  Flow (L/min) (Oxygen Therapy):  [2] 2    Intake/Output Summary (Last 24 hours) at 2025 1441  Last data filed at 2025 1520  Gross per 24 hour   Intake --   Output 2000 ml   Net -2000 ml       Physical Exam:    General Appearance: alert, oriented x 3, no acute distress   Skin: warm and dry  HEENT: oral mucosa normal, nonicteric sclera  Neck: supple, no JVD  Lungs: CTA  Heart: RRR, normal S1 and S2  Abdomen: soft, nontender, nondistended  Extremities: no edema, cyanosis or clubbing  Neuro: normal speech and mental status     Scheduled Meds:     aspirin, 81 mg, Oral, Daily  calcium acetate, 667 mg, Oral, BID With Meals  cholecalciferol, 1,000 Units, Oral, Daily  docusate sodium, 100 mg, Oral, BID  doxycycline, 100 mg, Oral, Q12H  metoprolol succinate XL, 25 mg, Oral, BID  multivitamin, 1 tablet, Oral, Daily  pantoprazole, 40 mg, Oral, QAM AC  polyethylene glycol, 17 g, Oral, Daily  sevelamer, 3,200 mg, Oral, BID  sodium bicarbonate, 1,300 mg, Oral, TID  sodium chloride, 10 mL, Intravenous, Q12H  temazepam, 30 mg, Oral, Nightly      IV Meds:        Results Reviewed:   I have personally reviewed the results from the time of this admission to 2025 14:41 EDT     Results from last 7 days   Lab Units 25  0151 25  1508 25  0000 25  0845   SODIUM mmol/L 133* 132*  --  132*   POTASSIUM mmol/L 4.8 4.8 5.2* 5.5*   CHLORIDE mmol/L 96* 94*  --  93*   CO2 mmol/L 23.5 22.7  --  23.8   BUN mg/dL 15 25* 33* 25*   CREATININE mg/dL 5.30*  6.87*  --  6.26*   CALCIUM mg/dL 8.9 9.5  --  9.1   BILIRUBIN mg/dL  --  0.3  --  0.2   ALK PHOS U/L  --  107  --  88   ALT (SGPT) U/L  --  <5  --  <5   AST (SGOT) U/L  --  23  --  28   GLUCOSE mg/dL 60* 85  --  78       Estimated Creatinine Clearance: 8.1 mL/min (A) (by C-G formula based on SCr of 5.3 mg/dL (H)).    Results from last 7 days   Lab Units 05/24/25  0151 05/22/25  1508   MAGNESIUM mg/dL  --  3.0*   PHOSPHORUS mg/dL 4.8* 4.6*             Results from last 7 days   Lab Units 05/22/25  1508 05/21/25  0000 05/18/25  0845   WBC 10*3/mm3 10.48  --  10.80   HEMOGLOBIN g/dL 10.7* 9.3*  27.9* 10.0*   PLATELETS 10*3/mm3 379  --  256             Assessment / Plan     ASSESSMENT:  End-stage renal disease on maintenance hemodialysis on Monday Wednesday and Friday dialyzing today per schedule, Via left upper extremity AVF  Hypertension with CKD: Blood pressure is well-controlled  Type 2 diabetes mellitus with CKD  History of renal cell cancer status post right nephrectomy.  Altered mental status improved.  MRI of the brain is pending   Metabolic acidosis on sodium bicarbonate   hyperphosphatemia on binders    PLAN:  Unable to find dialysis note.  Will continue dialysis on a MWF schedule    Thank you for involving us in the care of Sherry Lobato.  Please feel free to call with any questions.    Franky Goncalves MD  05/24/25  14:41 EDT    [facility]  [phone]    Please note that portions of this note were completed with a voice recognition program.

## 2025-05-24 NOTE — CASE MANAGEMENT/SOCIAL WORK
Continued Stay Note  [facility]     Patient Name: Sherry Lobato  MRN: [id number]  Today's Date: 5/24/2025    Admit Date: 5/22/2025    Plan: Return home with family and F HH - accepted.   Discharge Plan       Row Name 05/24/25 1326       Plan    Plan Return home with family and F HH - accepted.    Plan Comments CM requested HH PT order from attending and confirmed with [facility]  liaison that patient is accepted. Liaison notified that patient is discharging today.                Janina Stone RN    Office: [phone]  Fax: [phone]

## 2025-05-25 PROCEDURE — G0378 HOSPITAL OBSERVATION PER HR: HCPCS

## 2025-05-25 RX ADMIN — SODIUM BICARBONATE 1300 MG: 650 TABLET ORAL at 20:46

## 2025-05-25 RX ADMIN — DOXYCYCLINE 100 MG: 100 CAPSULE ORAL at 08:47

## 2025-05-25 RX ADMIN — SEVELAMER CARBONATE 3200 MG: 800 TABLET, FILM COATED ORAL at 08:46

## 2025-05-25 RX ADMIN — SEVELAMER CARBONATE 3200 MG: 800 TABLET, FILM COATED ORAL at 17:05

## 2025-05-25 RX ADMIN — CALCIUM ACETATE 667 MG: 667 CAPSULE ORAL at 08:47

## 2025-05-25 RX ADMIN — Medication 10 ML: at 08:49

## 2025-05-25 RX ADMIN — ASPIRIN 81 MG CHEWABLE TABLET 81 MG: 81 TABLET CHEWABLE at 08:47

## 2025-05-25 RX ADMIN — METOPROLOL SUCCINATE 25 MG: 25 TABLET, EXTENDED RELEASE ORAL at 08:47

## 2025-05-25 RX ADMIN — THERA TABS 1 TABLET: TAB at 08:47

## 2025-05-25 RX ADMIN — SODIUM BICARBONATE 1300 MG: 650 TABLET ORAL at 08:47

## 2025-05-25 RX ADMIN — PANTOPRAZOLE SODIUM 40 MG: 40 TABLET, DELAYED RELEASE ORAL at 08:47

## 2025-05-25 RX ADMIN — CALCIUM ACETATE 667 MG: 667 CAPSULE ORAL at 17:05

## 2025-05-25 RX ADMIN — Medication 1000 UNITS: at 08:47

## 2025-05-25 RX ADMIN — DOCUSATE SODIUM 100 MG: 100 CAPSULE, LIQUID FILLED ORAL at 08:47

## 2025-05-25 RX ADMIN — SODIUM BICARBONATE 1300 MG: 650 TABLET ORAL at 17:05

## 2025-05-25 RX ADMIN — POLYETHYLENE GLYCOL 3350 17 G: 17 POWDER, FOR SOLUTION ORAL at 08:46

## 2025-05-25 RX ADMIN — Medication 10 ML: at 20:49

## 2025-05-25 NOTE — PROGRESS NOTES
LOS: 0 days   Patient Care Team:  Raegan Gotti MD as PCP - General (Family Medicine)  Nilay Stevens MD as Consulting Physician (General Surgery)  Stephen Perea MD as Surgeon (Thoracic Surgery)  Makayla Navarro, RN as Nurse Navigator  Mak Sosa MD as Cardiologist (Cardiology)  Anjum Cam MD as Consulting Physician (Hematology and Oncology)    Subjective:  Vascilatorymental status but improved overall    Objective:   Afebrile      Review of Systems:   Review of Systems   Constitutional:  Positive for activity change.   Neurological:  Positive for weakness.   Psychiatric/Behavioral:  Positive for decreased concentration.            Vital Signs  Temp:  [98 °F (36.7 °C)-98.5 °F (36.9 °C)] 98.2 °F (36.8 °C)  Heart Rate:  [68-77] 72  Resp:  [10-16] 16  BP: ()/(50-71) 131/68    Physical Exam:  Physical Exam  Vitals and nursing note reviewed.   Constitutional:       Appearance: Normal appearance.   Cardiovascular:      Rate and Rhythm: Rhythm irregular.      Heart sounds: Normal heart sounds.   Skin:     General: Skin is warm.   Neurological:      Mental Status: She is alert.          Radiology:  MRI Brain Without Contrast  Result Date: 5/24/2025  Impression: 1.There are white matter changes involving both cerebral hemispheres which could reflect more chronic small vessel ischemic change. 2.Old insult left parietal area. Electronically Signed: Candelario Kirkland MD  5/24/2025 9:59 AM EDT  Workstation ID: [id number]    CT Head Without Contrast  Result Date: 5/22/2025  Impression: No acute intracranial findings. Chronic appearing left parietal infarct. Electronically Signed: Robert Avila MD  5/22/2025 3:52 PM EDT  Workstation ID: [id number]    XR Chest 1 View  Result Date: 5/22/2025  Impression: Stable cardiac enlargement. No acute chest findings. Electronically Signed: Florinda Davidson MD  5/22/2025 3:45 PM EDT  Workstation ID: [id number]    CT Abdomen Pelvis Without Contrast  Result Date:  5/22/2025  Impression: 1.No acute findings within the abdomen or pelvis. 2.Surgical changes of recent ventral abdominal hernia repair. Small foci of air within the anterior abdominal wall are diminished since the prior examination. No large volume free intraperitoneal air is evident. 3.Stable thin fluid collection within the extraperitoneal anterior abdomen, right of midline, thought to represent a postoperative seroma. 4.Uncomplicated sigmoid diverticulosis. 5.Right nephrectomy. Severe left renal atrophy. 6.Cholecystectomy. Stable intrahepatic and extrahepatic biliary ductal dilation. 7.New bandlike atelectasis in the left lower lobe. Right lower lobe atelectasis appears reexpanded. 8.Additional chronic findings as described above. Electronically Signed: Florinda Davidson MD  5/22/2025 3:28 PM EDT  Workstation ID: [id number]         Results Review:     I reviewed the patient's new clinical results.  I reviewed the patient's new imaging results and agree with the interpretation.    Medication Review:   Scheduled Meds:aspirin, 81 mg, Oral, Daily  calcium acetate, 667 mg, Oral, BID With Meals  cholecalciferol, 1,000 Units, Oral, Daily  docusate sodium, 100 mg, Oral, BID  metoprolol succinate XL, 25 mg, Oral, BID  pantoprazole, 40 mg, Oral, QAM AC  polyethylene glycol, 17 g, Oral, Daily  sevelamer, 3,200 mg, Oral, BID  sodium bicarbonate, 1,300 mg, Oral, TID  sodium chloride, 10 mL, Intravenous, Q12H      Continuous Infusions:   PRN Meds:.  acetaminophen **OR** acetaminophen    albuterol    ammonium lactate    senna-docusate sodium **AND** polyethylene glycol **AND** bisacodyl **AND** bisacodyl    Calcium Replacement - Follow Nurse / BPA Driven Protocol    dextrose    dextrose    glucagon (human recombinant)    lidocaine    Magnesium Standard Dose Replacement - Follow Nurse / BPA Driven Protocol    nitroglycerin    ondansetron    ondansetron ODT    Phosphorus Replacement - Follow Nurse / BPA Driven Protocol    Potassium  Replacement - Follow Nurse / BPA Driven Protocol    [COMPLETED] Insert Peripheral IV **AND** sodium chloride    sodium chloride    sodium chloride    Labs:    CBC    Results from last 7 days   Lab Units 05/22/25  1508 05/21/25  0000   WBC 10*3/mm3 10.48  --    HEMOGLOBIN g/dL 10.7* 9.3*  27.9*   PLATELETS 10*3/mm3 379  --      BMP   Results from last 7 days   Lab Units 05/24/25  0151 05/22/25  1508 05/21/25  0000   SODIUM mmol/L 133* 132*  --    POTASSIUM mmol/L 4.8 4.8 5.2*   CHLORIDE mmol/L 96* 94*  --    CO2 mmol/L 23.5 22.7  --    BUN mg/dL 15 25* 33*   CREATININE mg/dL 5.30* 6.87*  --    GLUCOSE mg/dL 60* 85  --    MAGNESIUM mg/dL  --  3.0*  --    PHOSPHORUS mg/dL 4.8* 4.6*  --      Cr Clearance Estimated Creatinine Clearance: 8.1 mL/min (A) (by C-G formula based on SCr of 5.3 mg/dL (H)).  Coag     HbA1C   Lab Results   Component Value Date    HGBA1C 6.21 (H) 05/12/2025    HGBA1C 5.86 (H) 08/26/2024    HGBA1C 5.5 11/07/2020     Blood Glucose   Glucose   Date/Time Value Ref Range Status   05/24/2025 0533 83 70 - 105 mg/dL Final     Comment:     Serial Number: [id number]:  [id number]   05/24/2025 0453 72 70 - 105 mg/dL Final     Comment:     Serial Number: [id number]:  [id number]   05/24/2025 0432 68 (L) 70 - 105 mg/dL Final     Comment:     Serial Number: [id number]:  [id number]   05/24/2025 0415 68 (L) 70 - 105 mg/dL Final     Comment:     Serial Number: [id number]:  [id number]   05/24/2025 0350 68 (L) 70 - 105 mg/dL Final     Comment:     Serial Number: [id number]:  [id number]     Infection     CMP   Results from last 7 days   Lab Units 05/24/25  0151 05/22/25  1621 05/22/25  1508 05/21/25  0000   SODIUM mmol/L 133*  --  132*  --    POTASSIUM mmol/L 4.8  --  4.8 5.2*   CHLORIDE mmol/L 96*  --  94*  --    CO2 mmol/L 23.5  --  22.7  --    BUN mg/dL 15  --  25* 33*   CREATININE mg/dL 5.30*  --  6.87*  --    GLUCOSE mg/dL 60*  --  85  --    ALBUMIN g/dL 3.2*  --  3.8  --    BILIRUBIN  mg/dL  --   --  0.3  --    ALK PHOS U/L  --   --  107  --    AST (SGOT) U/L  --   --  23  --    ALT (SGPT) U/L  --   --  <5  --    LIPASE U/L  --   --  11*  --    AMMONIA umol/L  --  17  --   --      UA      Radiology(recent) MRI Brain Without Contrast  Result Date: 5/24/2025  Impression: 1.There are white matter changes involving both cerebral hemispheres which could reflect more chronic small vessel ischemic change. 2.Old insult left parietal area. Electronically Signed: Candelario Kirkland MD  5/24/2025 9:59 AM EDT  Workstation ID: [id number]     Assessment:    Acute mental status changes  ESRD  Anemia of ESRD  Secondary hyperparathyroidism  Secondary hyperaldosteronism  Gastroesophageal reflux disease with esophagitis  History of Lucas's esophageal change  Diabetes mellitus type 2 with renal manifestations  Diabetes mellitus type 2 with neuropathic manifestations  Diabetic dyslipidemia  Hypertension with ESRD  History of non-small cell lung cancer  Exogenous obesity  Panlobular COPD with emphysema  Chronic mucopurulent bronchitis  Chronic constipation  Cerebrovascular disease with history of CVA    Plan:  Modification medication//ambulation today//discussed concerns regarding narcotics including tramadol and temazepam with progressive difficulty with absorption metabolization and elimination of medications with increasing age.  Observe overnight with changes as outlined        Rey Jackson MD  05/25/25  11:27 EDT

## 2025-05-25 NOTE — PROGRESS NOTES
[facility] Progress Note      Patient Name: Sherry Lobato  : 1950  MRN: [id number]  Primary Care Physician:  Raegan Gotti MD  Date of admission: 2025    Subjective     Interval History:   Follow-up ESRD.    Review of Systems:   No acute events overnight.    Feels better.  Denies chest pain or shortness of breath.    Objective     Vitals:   Temp:  [98 °F (36.7 °C)-98.5 °F (36.9 °C)] 98.5 °F (36.9 °C)  Heart Rate:  [66-77] 66  Resp:  [10-16] 11  BP: ()/(50-68) 118/50  Flow (L/min) (Oxygen Therapy):  [2] 2    Intake/Output Summary (Last 24 hours) at 2025 1427  Last data filed at 2025  Gross per 24 hour   Intake 240 ml   Output --   Net 240 ml       Physical Exam:    General Appearance: alert, oriented x 3, no acute distress   Skin: warm and dry  HEENT: oral mucosa normal, nonicteric sclera  Neck: supple, no JVD  Lungs: CTA  Heart: RRR, normal S1 and S2  Abdomen: soft, nontender, nondistended  Extremities: no edema, cyanosis or clubbing  Neuro: normal speech and mental status     Scheduled Meds:     aspirin, 81 mg, Oral, Daily  calcium acetate, 667 mg, Oral, BID With Meals  cholecalciferol, 1,000 Units, Oral, Daily  docusate sodium, 100 mg, Oral, BID  metoprolol succinate XL, 25 mg, Oral, BID  pantoprazole, 40 mg, Oral, QAM AC  polyethylene glycol, 17 g, Oral, Daily  sevelamer, 3,200 mg, Oral, BID  sodium bicarbonate, 1,300 mg, Oral, TID  sodium chloride, 10 mL, Intravenous, Q12H      IV Meds:        Results Reviewed:   I have personally reviewed the results from the time of this admission to 2025 14:27 EDT     Results from last 7 days   Lab Units 25  0151 25  1508 25  0000   SODIUM mmol/L 133* 132*  --    POTASSIUM mmol/L 4.8 4.8 5.2*   CHLORIDE mmol/L 96* 94*  --    CO2 mmol/L 23.5 22.7  --    BUN mg/dL 15 25* 33*   CREATININE mg/dL 5.30* 6.87*  --    CALCIUM mg/dL 8.9 9.5  --    BILIRUBIN mg/dL  --  0.3  --    ALK PHOS  U/L  --  107  --    ALT (SGPT) U/L  --  <5  --    AST (SGOT) U/L  --  23  --    GLUCOSE mg/dL 60* 85  --        Estimated Creatinine Clearance: 8.1 mL/min (A) (by C-G formula based on SCr of 5.3 mg/dL (H)).    Results from last 7 days   Lab Units 05/24/25  0151 05/22/25  1508   MAGNESIUM mg/dL  --  3.0*   PHOSPHORUS mg/dL 4.8* 4.6*             Results from last 7 days   Lab Units 05/22/25  1508 05/21/25  0000   WBC 10*3/mm3 10.48  --    HEMOGLOBIN g/dL 10.7* 9.3*  27.9*   PLATELETS 10*3/mm3 379  --              Assessment / Plan     ASSESSMENT:  End-stage renal disease on maintenance hemodialysis on Monday Wednesday and Friday dialyzing today per schedule, Via left upper extremity AVF  Hypertension with CKD: Blood pressure is well-controlled  Type 2 diabetes mellitus with CKD  History of renal cell cancer status post right nephrectomy.  Altered mental status improved.  MRI of the brain is pending   Metabolic acidosis on sodium bicarbonate   hyperphosphatemia on binders    PLAN:  Will continue dialysis on a MWF schedule  Labs in AM.    Thank you for involving us in the care of Sherry Lobato.  Please feel free to call with any questions.    Franky Goncalves MD  05/25/25  14:27 EDT    [facility]  [phone]    Please note that portions of this note were completed with a voice recognition program.

## 2025-05-25 NOTE — PLAN OF CARE
Goal Outcome Evaluation:  Plan of Care Reviewed With: patient, family        Progress: improving  Outcome Evaluation: Patient is alert and able to communicate her needs. Patients complaint at this time is fear of going home due to weakness and unsteady gait. Pt also requested second dose of Tramadol prior to next available dose and stated she takes it as needed at home.                        Problem: Adult Inpatient Plan of Care  Goal: Plan of Care Review  5/25/2025 0400 by Kim Alvarado RN  Outcome: Progressing  Flowsheets (Taken 5/25/2025 0400)  Progress: improving  Outcome Evaluation: Patient is alert and able to communicate her needs. Patients complaint at this time is fear of going home due to weakness and unsteady gait. Pt also requested second dose of Tramadol prior to next available dose and stated she takes it as needed at home.Pain management and ability to ambulate and perform independent daily needs is a possible concern to address prior to discharge. Son is at bedside and attentative to patient. Patient slept well through the night, VS stable and call light within reach.  Plan of Care Reviewed With:   patient   family  5/25/2025 0356 by Kim Alvarado RN  Outcome: Progressing  Flowsheets (Taken 5/25/2025 0356)  Progress: improving  Outcome Evaluation: Patient is alert and able to communicate her needs. Patients complaint at this time is fear of going home due to weakness and unsteady gait. Pt also requested second dose of Tramadol prior to next available dose and stated she takes it as needed at home.  Plan of Care Reviewed With:   patient   family  Goal: Patient-Specific Goal (Individualized)  5/25/2025 0400 by Kim Alvarado RN  Outcome: Progressing  5/25/2025 0356 by Kim Alvarado RN  Outcome: Progressing  Goal: Absence of Hospital-Acquired Illness or Injury  5/25/2025 0400 by Kim Alvarado RN  Outcome: Progressing  5/25/2025 0356 by Kim Alvarado RN  Outcome: Progressing  Intervention:  Identify and Manage Fall Risk  Recent Flowsheet Documentation  Taken 5/25/2025 0200 by Kim Alvarado RN  Safety Promotion/Fall Prevention:   safety round/check completed   room organization consistent   clutter free environment maintained   assistive device/personal items within reach   nonskid shoes/slippers when out of bed   fall prevention program maintained   activity supervised  Taken 5/25/2025 0000 by Kim Alvarado RN  Safety Promotion/Fall Prevention:   safety round/check completed   room organization consistent   clutter free environment maintained   assistive device/personal items within reach   nonskid shoes/slippers when out of bed   fall prevention program maintained  Taken 5/24/2025 2200 by Kim Alvarado RN  Safety Promotion/Fall Prevention:   safety round/check completed   room organization consistent   clutter free environment maintained   assistive device/personal items within reach   nonskid shoes/slippers when out of bed   fall prevention program maintained   activity supervised  Taken 5/24/2025 2000 by Kim Alvarado RN  Safety Promotion/Fall Prevention:   safety round/check completed   room organization consistent   clutter free environment maintained   assistive device/personal items within reach   nonskid shoes/slippers when out of bed   fall prevention program maintained   activity supervised  Intervention: Prevent Skin Injury  Recent Flowsheet Documentation  Taken 5/24/2025 2000 by Kim Alvarado RN  Body Position: position changed independently  Intervention: Prevent and Manage VTE (Venous Thromboembolism) Risk  Recent Flowsheet Documentation  Taken 5/24/2025 2000 by Kim Alvarado RN  VTE Prevention/Management: patient refused intervention  Intervention: Prevent Infection  Recent Flowsheet Documentation  Taken 5/25/2025 0200 by Kim Alvarado RN  Infection Prevention:   single patient room provided   rest/sleep promoted  Taken 5/25/2025 0000 by Kim Alvarado RN  Infection  Prevention:   single patient room provided   rest/sleep promoted  Taken 5/24/2025 2200 by Kim Alvarado RN  Infection Prevention:   single patient room provided   rest/sleep promoted  Taken 5/24/2025 2000 by Kim Alvarado RN  Infection Prevention:   single patient room provided   rest/sleep promoted  Goal: Optimal Comfort and Wellbeing  5/25/2025 0400 by Kim Alvarado RN  Outcome: Progressing  5/25/2025 0356 by Kim Alvarado RN  Outcome: Progressing  Intervention: Monitor Pain and Promote Comfort  Recent Flowsheet Documentation  Taken 5/24/2025 2000 by Kim Alvarado RN  Pain Management Interventions:   pain management plan reviewed with patient/caregiver   quiet environment facilitated   position adjusted  Intervention: Provide Person-Centered Care  Recent Flowsheet Documentation  Taken 5/24/2025 2000 by Kim Alvarado RN  Trust Relationship/Rapport:   care explained   questions encouraged   questions answered   thoughts/feelings acknowledged  Goal: Readiness for Transition of Care  5/25/2025 0400 by Kim Alvarado RN  Outcome: Progressing  5/25/2025 0356 by Kim Alvarado RN  Outcome: Progressing

## 2025-05-26 ENCOUNTER — READMISSION MANAGEMENT (OUTPATIENT)
Dept: CALL CENTER | Facility: HOSPITAL | Age: 75
End: 2025-05-26
Payer: MEDICARE

## 2025-05-26 ENCOUNTER — APPOINTMENT (OUTPATIENT)
Dept: NEPHROLOGY | Facility: HOSPITAL | Age: 75
End: 2025-05-26
Payer: MEDICARE

## 2025-05-26 VITALS
HEIGHT: 60 IN | RESPIRATION RATE: 15 BRPM | TEMPERATURE: 98.1 F | SYSTOLIC BLOOD PRESSURE: 148 MMHG | DIASTOLIC BLOOD PRESSURE: 76 MMHG | WEIGHT: 157.85 LBS | HEART RATE: 73 BPM | OXYGEN SATURATION: 96 % | BODY MASS INDEX: 30.99 KG/M2

## 2025-05-26 PROCEDURE — G0378 HOSPITAL OBSERVATION PER HR: HCPCS

## 2025-05-26 PROCEDURE — G0257 UNSCHED DIALYSIS ESRD PT HOS: HCPCS

## 2025-05-26 RX ORDER — METOPROLOL SUCCINATE 25 MG/1
25 TABLET, EXTENDED RELEASE ORAL
Status: DISCONTINUED | OUTPATIENT
Start: 2025-05-27 | End: 2025-05-26 | Stop reason: HOSPADM

## 2025-05-26 RX ADMIN — Medication 1000 UNITS: at 10:19

## 2025-05-26 RX ADMIN — PANTOPRAZOLE SODIUM 40 MG: 40 TABLET, DELAYED RELEASE ORAL at 10:19

## 2025-05-26 RX ADMIN — SEVELAMER CARBONATE 3200 MG: 800 TABLET, FILM COATED ORAL at 10:19

## 2025-05-26 RX ADMIN — CALCIUM ACETATE 667 MG: 667 CAPSULE ORAL at 10:25

## 2025-05-26 RX ADMIN — SODIUM BICARBONATE 1300 MG: 650 TABLET ORAL at 10:19

## 2025-05-26 RX ADMIN — Medication 10 ML: at 10:22

## 2025-05-26 NOTE — OUTREACH NOTE
Prep Survey      Flowsheet Row Responses   [facility] patient discharged from? [facility]   Is LACE score < 7 ? No   Eligibility Readm Mgmt   Discharge diagnosis Transient alteration of awareness   Does the patient have one of the following disease processes/diagnoses(primary or secondary)? Other   Does the patient have Home health ordered? Yes   What is the Home health agency?  [facility]   Is there a DME ordered? No   Prep survey completed? Yes            Cristina MORALES - Registered Nurse

## 2025-05-26 NOTE — PROGRESS NOTES
[facility] Progress Note      Patient Name: Sherry Lobato  : 1950  MRN: [id number]  Primary Care Physician:  Raegan Gotti MD  Date of admission: 2025    Subjective     Interval History:   Follow-up ESRD.    Patient seen during dialysis, tolerating okay  Feeling better    Objective     Vitals:   Temp:  [97.8 °F (36.6 °C)-98.5 °F (36.9 °C)] 98 °F (36.7 °C)  Heart Rate:  [60-99] 63  Resp:  [10-15] 14  BP: ()/(50-70) 92/59  Flow (L/min) (Oxygen Therapy):  [2] 2    Intake/Output Summary (Last 24 hours) at 2025 1030  Last data filed at 2025 1005  Gross per 24 hour   Intake --   Output 1200 ml   Net -1200 ml       Physical Exam:    General Appearance: alert, oriented x 3, no acute distress   Skin: warm and dry  HEENT: oral mucosa normal, nonicteric sclera  Neck: supple, no JVD  Lungs: CTA  Heart: RRR, normal S1 and S2  Abdomen: soft, nontender, nondistended  Extremities: no edema, cyanosis or clubbing  Neuro: normal speech and mental status     Scheduled Meds:     aspirin, 81 mg, Oral, Daily  calcium acetate, 667 mg, Oral, BID With Meals  cholecalciferol, 1,000 Units, Oral, Daily  docusate sodium, 100 mg, Oral, BID  metoprolol succinate XL, 25 mg, Oral, BID  pantoprazole, 40 mg, Oral, QAM AC  polyethylene glycol, 17 g, Oral, Daily  sevelamer, 3,200 mg, Oral, BID  sodium bicarbonate, 1,300 mg, Oral, TID  sodium chloride, 10 mL, Intravenous, Q12H      IV Meds:        Results Reviewed:   I have personally reviewed the results from the time of this admission to 2025 10:30 EDT     Results from last 7 days   Lab Units 25  0151 25  1508 25  0000   SODIUM mmol/L 133* 132*  --    POTASSIUM mmol/L 4.8 4.8 5.2*   CHLORIDE mmol/L 96* 94*  --    CO2 mmol/L 23.5 22.7  --    BUN mg/dL 15 25* 33*   CREATININE mg/dL 5.30* 6.87*  --    CALCIUM mg/dL 8.9 9.5  --    BILIRUBIN mg/dL  --  0.3  --    ALK PHOS U/L  --  107  --    ALT (SGPT) U/L  --  <5  --     AST (SGOT) U/L  --  23  --    GLUCOSE mg/dL 60* 85  --        Estimated Creatinine Clearance: 8.1 mL/min (A) (by C-G formula based on SCr of 5.3 mg/dL (H)).    Results from last 7 days   Lab Units 05/24/25  0151 05/22/25  1508   MAGNESIUM mg/dL  --  3.0*   PHOSPHORUS mg/dL 4.8* 4.6*             Results from last 7 days   Lab Units 05/22/25  1508 05/21/25  0000   WBC 10*3/mm3 10.48  --    HEMOGLOBIN g/dL 10.7* 9.3*  27.9*   PLATELETS 10*3/mm3 379  --              Assessment / Plan     ASSESSMENT:  End-stage renal disease on maintenance hemodialysis on Monday Wednesday and Friday dialyzing today per schedule, Via left upper extremity AVF  Hypertension with CKD: Blood pressure low this morning   type 2 diabetes mellitus with CKD  History of renal cell cancer status post right nephrectomy.  Altered mental status improved.    Metabolic acidosis on sodium bicarbonate   hyperphosphatemia on binders    PLAN:  Continue hemodialysis Monday Wednesday Friday  Decrease metoprolol dose  Surveillance labs    Thank you for involving us in the care of Sherry Lobato.  Please feel free to call with any questions.    Faustino Buckley MD  05/26/25  10:30 EDT    [facility]  [phone]    Please note that portions of this note were completed with a voice recognition program.

## 2025-05-26 NOTE — DISCHARGE SUMMARY
Date of Discharge:  5/26/2025    Discharge Diagnosis:   Active Hospital Problems    Diagnosis  POA    **Transient alteration of awareness [R40.4]  Yes    Altered mental status [R41.82]  Yes    Secondary hyperparathyroidism of renal origin [N25.81]  Yes    Arteriovenous fistula [I77.0]  Yes    Non-small cell lung cancer [C34.90]  Yes    Diabetic peripheral neuropathy [E11.42]  Yes    Obesity (BMI 30-39.9) [E66.9]  Yes    Chronic obstructive pulmonary disease [J44.9]  Yes    ESRD (end stage renal disease) [N18.6]  Yes    Mixed hyperlipidemia [E78.2]  Yes    Gastroesophageal reflux disease [K21.9]  Yes    Hypertension [I10]  Yes    Barretts esophagus [K22.70]  Yes    Type 2 diabetes mellitus, without long-term current use of insulin [E11.9]  Yes      Resolved Hospital Problems   No resolved problems to display.       Presenting Problem/History of Present Illness  Active Hospital Problems    Diagnosis  POA    **Transient alteration of awareness [R40.4]  Yes    Altered mental status [R41.82]  Yes    Secondary hyperparathyroidism of renal origin [N25.81]  Yes    Arteriovenous fistula [I77.0]  Yes    Non-small cell lung cancer [C34.90]  Yes    Diabetic peripheral neuropathy [E11.42]  Yes    Obesity (BMI 30-39.9) [E66.9]  Yes    Chronic obstructive pulmonary disease [J44.9]  Yes    ESRD (end stage renal disease) [N18.6]  Yes    Mixed hyperlipidemia [E78.2]  Yes    Gastroesophageal reflux disease [K21.9]  Yes    Hypertension [I10]  Yes    Barretts esophagus [K22.70]  Yes    Type 2 diabetes mellitus, without long-term current use of insulin [E11.9]  Yes      Resolved Hospital Problems   No resolved problems to display.          Hospital Course  Pleasant 75 yo female with history of ESRD, non-small cell lung cancer, type 2 diabetes mellitus, COPD, and HTN presented with acute mental status changes.  All blood work and serological workup was negative. The MRI of her brain was unremarkable other than previous cerebrovascular  disease. She did improve while inpatient and was deemed safe for discharge home.  She will follow-up with her primary care physician in 1 to 2 weeks.    Procedures Performed         Consults:   Consults       Date and Time Order Name Status Description    5/22/2025  9:32 PM Inpatient Nephrology Consult Completed     5/22/2025  4:22 PM Family Medicine Consult      5/12/2025  8:00 AM Inpatient Cardiology Consult Completed     5/11/2025  9:15 PM Surgery (on-call MD unless specified) Completed     5/11/2025  6:38 PM Nephrology (on -call MD unless specified) Completed             Pertinent Test Results:    Lab Results (most recent)       Procedure Component Value Units Date/Time    POC Glucose Once [[id number]]  (Normal) Collected: 05/24/25 0533    Specimen: Blood Updated: 05/24/25 0535     Glucose 83 mg/dL      Comment: Serial Number: [id number]:  [id number]       POC Glucose Once [[id number]]  (Normal) Collected: 05/24/25 0453    Specimen: Blood Updated: 05/24/25 0455     Glucose 72 mg/dL      Comment: Serial Number: [id number]:  [id number]       Renal Function Panel [[id number]]  (Abnormal) Collected: 05/24/25 0151    Specimen: Blood Updated: 05/24/25 0347     Glucose 60 mg/dL      BUN 15 mg/dL      Creatinine 5.30 mg/dL      Sodium 133 mmol/L      Potassium 4.8 mmol/L      Comment: Specimen hemolyzed.  Result may be falsely elevated.        Chloride 96 mmol/L      CO2 23.5 mmol/L      Calcium 8.9 mg/dL      Albumin 3.2 g/dL      Phosphorus 4.8 mg/dL      Anion Gap 13.5 mmol/L      BUN/Creatinine Ratio 2.8     eGFR 8.0 mL/min/1.73     Narrative:      GFR Categories in Chronic Kidney Disease (CKD)              GFR Category          GFR (mL/min/1.73)    Interpretation  G1                    90 or greater        Normal or high (1)  G2                    60-89                Mild decrease (1)  G3a                   45-59                Mild to moderate decrease  G3b                   30-44                Moderate  to severe decrease  G4                    15-29                Severe decrease  G5                    14 or less           Kidney failure    (1)In the absence of evidence of kidney disease, neither GFR category G1 or G2 fulfill the criteria for CKD.    eGFR calculation 2021 CKD-EPI creatinine equation, which does not include race as a factor    Phosphorus [[id number]]  (Abnormal) Collected: 05/22/25 1508    Specimen: Blood Updated: 05/23/25 0443     Phosphorus 4.6 mg/dL     Ammonia [[id number]]  (Normal) Collected: 05/22/25 1621    Specimen: Blood Updated: 05/22/25 1647     Ammonia 17 umol/L     Comprehensive Metabolic Panel [[id number]]  (Abnormal) Collected: 05/22/25 1508    Specimen: Blood Updated: 05/22/25 1557     Glucose 85 mg/dL      BUN 25 mg/dL      Creatinine 6.87 mg/dL      Sodium 132 mmol/L      Potassium 4.8 mmol/L      Chloride 94 mmol/L      CO2 22.7 mmol/L      Calcium 9.5 mg/dL      Total Protein 6.9 g/dL      Albumin 3.8 g/dL      ALT (SGPT) <5 U/L      Comment: Result checked          AST (SGOT) 23 U/L      Alkaline Phosphatase 107 U/L      Total Bilirubin 0.3 mg/dL      Globulin 3.1 gm/dL      A/G Ratio 1.2 g/dL      BUN/Creatinine Ratio 3.6     Anion Gap 15.3 mmol/L      eGFR 5.8 mL/min/1.73     Narrative:      GFR Categories in Chronic Kidney Disease (CKD)              GFR Category          GFR (mL/min/1.73)    Interpretation  G1                    90 or greater        Normal or high (1)  G2                    60-89                Mild decrease (1)  G3a                   45-59                Mild to moderate decrease  G3b                   30-44                Moderate to severe decrease  G4                    15-29                Severe decrease  G5                    14 or less           Kidney failure    (1)In the absence of evidence of kidney disease, neither GFR category G1 or G2 fulfill the criteria for CKD.    eGFR calculation 2021 CKD-EPI creatinine equation, which does not include race as  a factor    Magnesium [[id number]]  (Abnormal) Collected: 05/22/25 1508    Specimen: Blood Updated: 05/22/25 1550     Magnesium 3.0 mg/dL     TSH Rfx On Abnormal To Free T4 [[id number]]  (Normal) Collected: 05/22/25 1508    Specimen: Blood Updated: 05/22/25 1549     TSH 2.010 uIU/mL     Lipase [[id number]]  (Abnormal) Collected: 05/22/25 1508    Specimen: Blood Updated: 05/22/25 1548     Lipase 11 U/L     CBC & Differential [[id number]]  (Abnormal) Collected: 05/22/25 1508    Specimen: Blood Updated: 05/22/25 1523    Narrative:      The following orders were created for panel order CBC & Differential.  Procedure                               Abnormality         Status                     ---------                               -----------         ------                     CBC Auto Differential[[id number]]        Abnormal            Final result                 Please view results for these tests on the individual orders.    CBC Auto Differential [[id number]]  (Abnormal) Collected: 05/22/25 1508    Specimen: Blood Updated: 05/22/25 1523     WBC 10.48 10*3/mm3      RBC 3.43 10*6/mm3      Hemoglobin 10.7 g/dL      Hematocrit 34.7 %      .2 fL      MCH 31.2 pg      MCHC 30.8 g/dL      RDW 15.9 %      RDW-SD 57.2 fl      MPV 10.5 fL      Platelets 379 10*3/mm3      Neutrophil % 66.5 %      Lymphocyte % 14.8 %      Monocyte % 9.8 %      Eosinophil % 7.1 %      Basophil % 1.0 %      Immature Grans % 0.8 %      Neutrophils, Absolute 6.98 10*3/mm3      Lymphocytes, Absolute 1.55 10*3/mm3      Monocytes, Absolute 1.03 10*3/mm3      Eosinophils, Absolute 0.74 10*3/mm3      Basophils, Absolute 0.10 10*3/mm3      Immature Grans, Absolute 0.08 10*3/mm3      nRBC 0.0 /100 WBC              Results for orders placed during the hospital encounter of 07/25/21    Adult Transthoracic Echo Complete W/ Cont if Necessary Per Protocol    Interpretation Summary  Normal LV size and contractility EF of 60%  Normal RV size  Normal atrial  size  Aortic valve, mitral valve, tricuspid valve appears structurally normal, mild mitral regurgitation  seen.  No pericardial effusion seen.  Proximal aorta appears normal in size.              Condition on Discharge:  Fair    Vital Signs  Temp:  [97.8 °F (36.6 °C)-98.5 °F (36.9 °C)] 98.1 °F (36.7 °C)  Heart Rate:  [60-99] 69  Resp:  [10-15] 15  BP: ()/(50-70) 92/59    Physical Exam:     General Appearance:    Alert, cooperative, in no acute distress   Head:    Normocephalic, without obvious abnormality, atraumatic   Eyes:            Lids and lashes normal, conjunctivae and sclerae normal, no   icterus, no pallor, corneas clear   Ears:    Ears appear intact with no abnormalities noted   Throat:   No oral lesions, no thrush, oral mucosa moist   Neck:   No adenopathy, supple, trachea midline, no thyromegaly, no   carotid bruit, no JVD   Lungs:     Clear to auscultation,respirations regular, even and  unlabored    Heart:    Regular rhythm and normal rate, normal S1 and S2, no murmur, no gallop, no rub, no click   Chest Wall:    No abnormalities observed   Abdomen:     Normal bowel sounds, no masses, no organomegaly, soft  non-tender, non-distended, no guarding, no rebound tenderness   Extremities:   Moves all extremities well, no edema, no cyanosis, no redness   Pulses:   Pulses palpable and equal bilaterally   Skin:   No bleeding, bruising or rash   Lymph nodes:   No palpable adenopathy           Discharge Disposition  Home or Self Care    Discharge Medications     Discharge Medications        Continue These Medications        Instructions Start Date   albuterol sulfate  (90 Base) MCG/ACT inhaler  Commonly known as: PROVENTIL HFA;VENTOLIN HFA;PROAIR HFA   2 puffs, Every 4 Hours PRN      aspirin 81 MG chewable tablet   81 mg, Oral, Daily      calcium acetate 667 MG capsule capsule  Commonly known as: PHOS BINDER)   1,334 mg, 2 Times Daily      cholecalciferol 25 MCG (1000 UT) tablet  Commonly known as:  VITAMIN D3   1,000 Units, Daily      lidocaine-prilocaine 2.5-2.5 % cream  Commonly known as: EMLA   APPLY TOPICALLY TO THE 5TH FINGER ON THE LEFT HAND AS NEEDED PAIN      metoprolol succinate XL 25 MG 24 hr tablet  Commonly known as: Toprol XL   25 mg, Oral, 2 Times Daily      ondansetron 4 MG tablet  Commonly known as: Zofran   4 mg, Oral, Daily PRN      pantoprazole 20 MG EC tablet  Commonly known as: PROTONIX   20 mg, Daily      RENAL VITAMIN PO   1 tablet, Daily      sevelamer 800 MG tablet  Commonly known as: RENAGEL   3,200 mg, 2 Times Daily With Meals      sodium bicarbonate 650 MG tablet   1,300 mg, 3 Times Daily             Stop These Medications      doxycycline 100 MG capsule  Commonly known as: VIBRAMYCIN     temazepam 30 MG capsule  Commonly known as: RESTORIL              Discharge Diet:   Diet Instructions       Diet: Renal Diets; Low Sodium (2-3g); Regular (IDDSI 7); Thin (IDDSI 0)      Discharge Diet: Renal Diets    Renal Diet: Low Sodium (2-3g)    Texture: Regular (IDDSI 7)    Fluid Consistency: Thin (IDDSI 0)            Activity at Discharge:   Activity Instructions       Activity as Tolerated              Follow-up Appointments  Future Appointments   Date Time Provider Department Center   5/27/2025  2:40 PM Keyon Espana MD [facility]   8/14/2025  8:00 AM [facility]   8/19/2025  8:30 AM Alcides Live MD [facility] None   10/2/2025 11:00 AM Lisa Baca APRN [facility]     Additional Instructions for the Follow-ups that You Need to Schedule       Ambulatory Referral to Home Health   As directed      Face to Face Visit Date: 5/24/2025   Follow-up provider for Plan of Care?: I treated the patient in an acute care facility and will not continue treatment after discharge.   Follow-up provider: RADHA FAN [[id number]]   Reason/Clinical Findings: s/p hernia repair   Describe mobility limitations that make leaving home difficult: impairaed mobility    Nursing/Therapeutic Services Requested: Physical Therapy   PT orders: Therapeutic exercise   Frequency: 1 Week 1        Discharge Follow-up with PCP   As directed       Currently Documented PCP:    Raegan Gotti MD    PCP Phone Number:    [phone]     Follow Up Details: 1-2 weeks                Test Results Pending at Discharge  Pending Results       None             April Mejia PA-C  05/26/25  12:43 EDT    Time: Discharge 25 min

## 2025-05-26 NOTE — PLAN OF CARE
Goal Outcome Evaluation:              Outcome Evaluation: Patient pleasant and cooperative. No complaints at this time. Currently resting.

## 2025-05-26 NOTE — PLAN OF CARE
Goal Outcome Evaluation:                 Patient has discharge orders. Discharge instructions provided.

## 2025-05-27 ENCOUNTER — TELEPHONE (OUTPATIENT)
Dept: SURGERY | Facility: CLINIC | Age: 75
End: 2025-05-27

## 2025-05-27 NOTE — CASE MANAGEMENT/SOCIAL WORK
Case Management Discharge Note      Final Note: Home with [facility]         Selected Continued Care - Discharged on 5/26/2025 Admission date: 5/22/2025 - Discharge disposition: Home or Self Care        Home Medical Care Coordination complete.      Service Provider Services Address Phone Fax Patient Preferred    [facility] [address] [facility] [place] [address] [phone] --                 Transportation Services  Private: Car    Final Discharge Disposition Code: 06 - home with home health care

## 2025-05-27 NOTE — TELEPHONE ENCOUNTER
"Caller: Sherry Lobato \"Jerry\"    Relationship:  Self    Best call back number: [phone]     PATIENT CALLED REQUESTING TO CANCEL SAME DAY APPT.    Did the patient call AFTER the start time of their scheduled appointment?  []YES  [x]NO    Was the patient's appointment rescheduled? [x]YES  []NO    Any additional information: PT JUST GOT OUT OF HOSPITAL AGAIN YESTERDAY - RESCHEDULED       "

## 2025-05-30 NOTE — PROGRESS NOTES
"General Surgery Post-Operative Clinic Note    Subjective:  Sherry Lobato is a 74 y.o. female who presents today for post-op visit. S/p robotic IPUM+ VHR with extensive ANALISA performed on 5/15/2025. Uneventful post-op course. Discharged home on POD#3. Presented to the ED on 5/22/25 for AMS a/w abdominal pain and weakness. Labs and CT were normal. MRI head was normal. Pt improve with conservative management and was discharged home on 5/26/25.     Today, patient reports her biggest issue is insomnia, which she attributes to \"being off my sleep medicine\".  Does report some abdominal pain, but reports this is bearable.  Denies nausea or vomiting.  Does report some diarrhea for which she is on a \"anti-diarrhea pill\", which she reports has helped.      Unrelated to her surgery, patient reports acute on chronic coccygeal pain.  She reports this makes her hemodialysis sessions unbearable, she has to sit for an extended period of time.  She reports she has been seen by multiple physicians in the past for this issue, including orthopedic surgery, who have recommended various conservative methods including specialized questions.  She was told that her pain is attributed to \"no meat on the spine\".    Past Surgical History:   Procedure Laterality Date    APPENDECTOMY      ARTERIOVENOUS FISTULA Right     ARTERIOVENOUS FISTULA REPAIR      Clotted off and insert graft    ARTERIOVENOUS FISTULA/SHUNT SURGERY Left 12/30/2020    Procedure: ARTERIOVENOUS FISTULA FORMATION;  Surgeon: Jan Caicedo MD;  Location: [facility] MAIN OR;  Service: Vascular;  Laterality: Left;    ARTERIOVENOUS FISTULA/SHUNT SURGERY Left 04/23/2024    Procedure: FISTULOGRAM WITH SUBCLAVIAN ARTERY ANGIOPLASTY, fistula banding;  Surgeon: Franki Delgadillo II, MD;  Location: [facility] HYBRID OR;  Service: Vascular;  Laterality: Left;    BREAST BIOPSY      right nipple 1981    BRONCHOSCOPY N/A 06/11/2024    Procedure: BRONCHOSCOPY WITH RIGHT LUNG WASHING;  Surgeon: " Aramis Barragan MD;  Location: [facility] ENDOSCOPY;  Service: Pulmonary;  Laterality: N/A;  RML atelectasis    COLONOSCOPY N/A 11/24/2020    Procedure: COLONOSCOPY with polypectomy x 7;  Surgeon: Jeffery White MD;  Location: [facility] ENDOSCOPY;  Service: Gastroenterology;  Laterality: N/A;  post op: polyps, diverticulosis, hemorrhoids    COLONOSCOPY N/A 06/01/2023    Procedure: COLONOSCOPY with polypectomy x 3 biopsy x 1;  Surgeon: Jeffery White MD;  Location: [facility] ENDOSCOPY;  Service: Gastroenterology;  Laterality: N/A;  diverticulosis polyps    COLONOSCOPY N/A 09/04/2024    Procedure: COLONOSCOPY WITH POLYPECTOMY X 1;  Surgeon: Jeffery White MD;  Location: [facility] ENDOSCOPY;  Service: Gastroenterology;  Laterality: N/A;  POST- POLYP, DIVERTICULOSIS, INTERNAL AND EXTERNAL HEMORRHOIDS    ENDOSCOPY N/A 03/12/2022    Procedure: ESOPHAGOGASTRODUODENOSCOPY with biopsy x 1 area;  Surgeon: Javan Augustin MD;  Location: [facility] ENDOSCOPY;  Service: Gastroenterology;  Laterality: N/A;  post op: anastamosis    ENDOSCOPY N/A 10/09/2023    Procedure: ESOPHAGOGASTRODUODENOSCOPY with biopsy x3 areas;  Surgeon: Ace Campbell MD;  Location: [facility] ENDOSCOPY;  Service: Gastroenterology;  Laterality: N/A;  duodenal ulcers;irregular z line    ENDOSCOPY N/A 08/27/2024    Procedure: ESOPHAGOGASTRODUODENOSCOPY with argon plasma coagulation;  Surgeon: HILARIO Blackmon MD;  Location: [facility] ENDOSCOPY;  Service: Gastroenterology;  Laterality: N/A;  post op: AVM    ENTEROSCOPY SMALL BOWEL N/A 09/04/2024    Procedure: ESOPHAGOGASTRODUODENOSCOPY WITH SMALL BOWEL ENTEROSCOPY, ARGON PLASMA COAGULATION AND ENDOSCOPIC CLIPPING X 2 OF GASTRIC ANTRAL VASCULAR ECTASIA;  Surgeon: Jeffery White MD;  Location: [facility] ENDOSCOPY;  Service: Gastroenterology;  Laterality: N/A;  POST- GAVE    GASTRIC RESECTION      cancer    HYSTERECTOMY      LAPAROSCOPIC CHOLECYSTECTOMY      NEPHRECTOMY      right kidney removed     REDUCTION MAMMAPLASTY       TUMOR REMOVAL      boils    VENTRAL HERNIA REPAIR N/A 05/15/2025    Procedure: VENTRAL  HERNIA REPAIR LAPAROSCOPIC WITH DAVINCI ROBOT WITH MESH, EXTENSIVE LYSIS OF ADHESION;  Surgeon: Keyon Espana MD;  Location: [facility] MAIN OR;  Service: Robotics - DaVinci;  Laterality: N/A;    VENTRAL/INCISIONAL HERNIA REPAIR Right 08/28/2019    Procedure: VENTRAL/INCISIONAL HERNIA REPAIR;  Surgeon: Nilay Stevens MD;  Location: [facility] MAIN OR;  Service: General    VENTRAL/INCISIONAL HERNIA REPAIR N/A 10/01/2019    Procedure: OPEN VENTRAL/INCISIONAL HERNIA REPAIR;  Surgeon: Nilay Stevens MD;  Location: [facility] MAIN OR;  Service: General    VENTRAL/INCISIONAL HERNIA REPAIR N/A 10/11/2021    Procedure: VENTRAL/INCISIONAL HERNIA REPAIR LAPAROSCOPIC;  Surgeon: Nilay Stevens MD;  Location: [facility] MAIN OR;  Service: General;  Laterality: N/A;    VENTRAL/INCISIONAL HERNIA REPAIR N/A 01/23/2023    Procedure: VENTRAL/INCISIONAL HERNIA REPAIR with MESH;  Surgeon: Nilay Stevens MD;  Location: [facility] MAIN OR;  Service: General;  Laterality: N/A;       Patient Active Problem List   Diagnosis    B12 deficiency    Barretts esophagus    Gastroesophageal reflux disease    ESRD (end stage renal disease)    Degeneration of intervertebral disc of lumbar region    Type 2 diabetes mellitus, without long-term current use of insulin    Gout    Mixed hyperlipidemia    Hypertension    Insomnia    Bilateral carotid artery stenosis    Vitamin D deficiency    Hordeolum externum of right lower eyelid    Obesity (BMI 30-39.9)    Diabetic peripheral neuropathy    Chronic obstructive pulmonary disease    Degeneration of lumbar intervertebral disc    Disorder of phosphorus metabolism    Arteriovenous fistula    Bilateral pseudophakia    Obstructive sleep apnea syndrome    Steal syndrome dialysis vascular access    Non-small cell lung cancer    Warthin's tumor    Secondary hyperparathyroidism of renal origin    Acute hyperkalemia    Ventral hernia  without obstruction or gangrene    Preoperative cardiovascular examination    Multiple malignancies    Altered mental status    Transient alteration of awareness     Past Medical History:   Diagnosis Date    Acute upper GI bleeding 08/26/2024    Anemia due to GI blood loss 08/27/2024    Lucas's esophagus     Barretts esophagus 04/10/2012    C. difficile colitis     Carcinoma, renal cell 04/10/2012    Carotid artery disease     -50-74% left, 16-49% right (1/19)    COPD (chronic obstructive pulmonary disease)     DM2 (diabetes mellitus, type 2)     ESRD (end stage renal disease)     on HD    Gastric ulcer     at anastomatic site    Gastroparesis     unknown    GERD (gastroesophageal reflux disease)     Gout     not current    Hemodialysis patient     mwf    Hernia, incisional     abdomen    History of colonoscopy     UTD    History of degenerative disc disease     Hyperlipidemia     Hypertension     Medicare annual wellness visit, subsequent 11/07/2020    Microscopic colitis     Nephrotic syndrome     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    Neuroendocrine tumor     of stomach    Non-small cell lung cancer 10/26/2023    Orthostatic hypotension 11/07/2017    FABY (obstructive sleep apnea)     not treating    Pedal edema     ressolved    Pneumonia 11/11/2024    Proteinuria     Renal cell cancer, right     Rib pain on right side     chronic    Rotator cuff tear     bilateral    Secondary hyperparathyroidism of renal origin 08/29/2024    Steal syndrome dialysis vascular access 04/16/2024    Stomach cancer 2016    Stroke     Tobacco abuse 07/24/2020    Uterine cancer     Vitamin B 12 deficiency     Vitamin D deficiency     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     Outpatient Encounter Medications as of 6/3/2025   Medication Sig Dispense Refill    albuterol sulfate  (90 Base) MCG/ACT inhaler Inhale 2 puffs Every 4 (Four) Hours As Needed for Wheezing.      aspirin 81 MG chewable tablet Chew 1 tablet Daily. 30 tablet 3    B  "Complex-C-Folic Acid (RENAL VITAMIN PO) Take 1 tablet by mouth Daily.      calcium acetate (PHOS BINDER,) 667 MG capsule capsule Take 2 capsules by mouth 2 (Two) Times a Day.      Cholecalciferol 25 MCG (1000 UT) tablet Take 1 tablet by mouth Daily.      lidocaine-prilocaine (EMLA) 2.5-2.5 % cream APPLY TOPICALLY TO THE 5TH FINGER ON THE LEFT HAND AS NEEDED PAIN 60 g 3    metoprolol succinate XL (Toprol XL) 25 MG 24 hr tablet Take 1 tablet by mouth 2 (Two) Times a Day.      ondansetron (Zofran) 4 MG tablet Take 1 tablet by mouth Daily As Needed for Nausea or Vomiting. 30 tablet 0    pantoprazole (PROTONIX) 20 MG EC tablet Take 1 tablet by mouth Daily.      sevelamer (RENAGEL) 800 MG tablet Take 4 tablets by mouth 2 (Two) Times a Day With Meals.      sodium bicarbonate 650 MG tablet Take 2 tablets by mouth 3 (Three) Times a Day.       No facility-administered encounter medications on file as of 6/3/2025.     Allergies   Allergen Reactions    Atorvastatin Calcium Other (See Comments)     \"felt like I was on fire\"    Gabapentin Dizziness     says was unable to walk    Niacin Other (See Comments)     \"FEELS LIKE SHE IS ON FIRE\"      Oxycodone Hallucinations       Objective:  There were no vitals filed for this visit.  There is no height or weight on file to calculate BMI.   Physical Exam   General: NAD, AAOx3  Respiratory: no respiratory distress  Abdomen: soft, appropriate felipe-incisional tenderness, ND, dull to percussion; incisions - CDI; no appreciable abdominal wall fascial defects, incisions - well healed   MSK: Pinpoint wound over the tip of the coccyx with point tenderness over the sacrococcygeal area - entire to the coccyx is visible and palpable    Assessment:  Diagnoses and all orders for this visit:    1. S/P recurrent ventral herniorrhaphy (Primary)    74 y.o. female s/p robotic repair (IPUM+) of recurrent ventral hernias. Progressing as expected.  No evidence of recurrent ventral hernia.  Recent admission " for weakness appears to be unrelated to her surgeries, but may be the results of postop deconditioning versus recovering from prolonged general anesthesia.    Completely unrelated to her surgery, patient's acute on chronic coccygeal pain appears to be due to pressure from the coccyx.  Examination reveals that patient has no appreciable tissue covering this bone, which essentially has allowed her to easily develop pressure ulcers, she has to sit for the entirety of her hemodialysis sessions.  I offered her a referral to either orthopedic or plastic surgery for consideration of coccygectomy v. fat graft, respectively, which she, declined.    Plan:  no heavy lifting (>10-15 lbs) for at least 4 weeks   tramadol refilled   RTC PRN    Keyon Espana MD  General Surgery  05/29/25   21:11 EDT    Much of this encounter note is an electronic transcription/translation of spoken language to printed text.  The electronic translation of spoken language may permit erroneous, or at times, nonsensical words or phrases to be inadvertently transcribed.  Although I have reviewed the note for such errors, some may still exist.

## 2025-06-02 NOTE — ANESTHESIA PREPROCEDURE EVALUATION
Anesthesia Evaluation     Patient summary reviewed and Nursing notes reviewed   NPO Solid Status: > 8 hours  NPO Liquid Status: > 2 hours           Airway   Mallampati: III  TM distance: >3 FB  Neck ROM: full  Dental    (+) edentulous    Pulmonary    (+) pneumonia , lung cancer, COPD,shortness of breath, sleep apnea, decreased breath sounds  Cardiovascular   Exercise tolerance: poor (<4 METS)    ECG reviewed    (+) hypertension well controlled, CHF Systolic <55%, PVD, hyperlipidemia,  carotid artery disease left carotid      Neuro/Psych  (+) CVA, numbness  GI/Hepatic/Renal/Endo    (+) obesity, GERD well controlled, renal disease- ESRD, diabetes mellitus type 2 well controlled    Musculoskeletal     Abdominal   (+) obese   Substance History      OB/GYN          Other   arthritis,   history of cancer    ROS/Med Hx Other: 2021  Normal LV size and contractility EF of 60%  Normal RV size  Normal atrial size  Aortic valve, mitral valve, tricuspid valve appears structurally normal, mild mitral regurgitation  seen.  No pericardial effusion seen.  Proximal aorta appears normal in size.                Anesthesia Plan    ASA 3     general     intravenous induction     Anesthetic plan, risks, benefits, and alternatives have been provided, discussed and informed consent has been obtained with: patient.    Use of blood products discussed with patient .    Plan discussed with CRNA and attending.    CODE STATUS:          Name: Oksana Hitchcock      : 2017      MRN: [id number]  Encounter Provider: Emy Escobar DO  Encounter Date: 2025   Encounter department: [facility] [place]  :  Assessment & Plan  Upper respiratory tract infection, unspecified type  6 days of URI symptoms. Highest fever 103.8F. Headaches with the fevers  Coughing a lot. Rhinorrhea.   Has history of seasonal allergies - not bad this year.  Having chills as well. Aches/pains everywhere.  Inspiratory chest pain from coughing.  Not chest pain that radiates somewhere, no sense of doom.  No shortness of breath, ab pain,  1x diarrhea but resolved.  Has been using Children ibuprofen & tylenol  Day 4 Went to urgent care Saturday - did strep culture, have not called.   Sick contacts school - diarrhea, coughing.  No ear pain  Feeling better today. 101-102F now.  Advised: Honey in water, salt water gargles  More than 10 days symptoms, worsening fever curve, symptoms worsen =return to office  Return if having febrile seizures, worsening fever/chills  Consider CXR if symptoms of chest congestion  Advised - NO aspirin  Return to school when fevers are gone.  Rapid strep A test negative. Will send out for cultures and contact later with results.  Orders:    sodium chloride (Ocean Nasal Spray) 0.65 % nasal spray; 1 spray into each nostril as needed for congestion    POCT rapid ANTIGEN strepA    Throat culture        History of Present Illness   Oksana Hitchcock is a 7 y.o. female who presents here with viral URI. See above assess/plan for more details    URI  Associated symptoms include chest pain (pleuritic only), chills, coughing, a fever and a sore throat. Pertinent negatives include no abdominal pain, rash or vomiting.     Oksana Hitchcock is a 7 y.o. female who presents here with viral URI.       Review of Systems   Constitutional:  Positive for chills and fever.   HENT:  Positive for sore throat. Negative for ear pain.    Eyes:  Negative for pain  "and visual disturbance.   Respiratory:  Positive for cough. Negative for shortness of breath.    Cardiovascular:  Positive for chest pain (pleuritic only). Negative for palpitations.   Gastrointestinal:  Negative for abdominal pain and vomiting.   Genitourinary:  Negative for dysuria and hematuria.   Musculoskeletal:  Negative for back pain and gait problem.   Skin:  Negative for color change and rash.   Neurological:  Negative for seizures and syncope.   Hematological:  Negative for adenopathy.   Psychiatric/Behavioral:  Negative for confusion.    All other systems reviewed and are negative.         Objective   BP (!) 100/56 (BP Location: Left arm, Patient Position: Sitting)   Pulse 94   Temp 98 °F (36.7 °C) (Tympanic)   Ht 4' 3.89\" (1.318 m)   Wt 29.8 kg (65 lb 11.2 oz)   SpO2 99%   BMI 17.16 kg/m²      Physical Exam  Vitals and nursing note reviewed.   Constitutional:       General: She is active. She is not in acute distress.  HENT:      Right Ear: Tympanic membrane normal.      Left Ear: Tympanic membrane normal.      Mouth/Throat:      Mouth: Mucous membranes are moist.      Pharynx: Posterior oropharyngeal erythema present. No oropharyngeal exudate.     Eyes:      General:         Right eye: No discharge.         Left eye: No discharge.      Conjunctiva/sclera: Conjunctivae normal.       Cardiovascular:      Rate and Rhythm: Normal rate and regular rhythm.      Heart sounds: S1 normal and S2 normal. No murmur heard.  Pulmonary:      Effort: Pulmonary effort is normal. No respiratory distress.      Breath sounds: Normal breath sounds. No wheezing, rhonchi or rales.   Abdominal:      General: Bowel sounds are normal.      Palpations: Abdomen is soft.      Tenderness: There is no abdominal tenderness.     Musculoskeletal:         General: No swelling. Normal range of motion.      Cervical back: Neck supple.   Lymphadenopathy:      Cervical: No cervical adenopathy.     Skin:     General: Skin is warm and " dry.      Findings: No rash.     Neurological:      Mental Status: She is alert and oriented for age.     Psychiatric:         Mood and Affect: Mood normal.

## 2025-06-03 ENCOUNTER — OFFICE VISIT (OUTPATIENT)
Dept: SURGERY | Facility: CLINIC | Age: 75
End: 2025-06-03
Payer: MEDICARE

## 2025-06-03 ENCOUNTER — READMISSION MANAGEMENT (OUTPATIENT)
Dept: CALL CENTER | Facility: HOSPITAL | Age: 75
End: 2025-06-03
Payer: MEDICARE

## 2025-06-03 VITALS
HEART RATE: 80 BPM | WEIGHT: 156 LBS | BODY MASS INDEX: 30.63 KG/M2 | SYSTOLIC BLOOD PRESSURE: 117 MMHG | DIASTOLIC BLOOD PRESSURE: 95 MMHG | HEIGHT: 60 IN | TEMPERATURE: 97.7 F | OXYGEN SATURATION: 94 %

## 2025-06-03 DIAGNOSIS — Z87.19 S/P RECURRENT VENTRAL HERNIORRHAPHY: Primary | ICD-10-CM

## 2025-06-03 DIAGNOSIS — Z98.890 S/P RECURRENT VENTRAL HERNIORRHAPHY: Primary | ICD-10-CM

## 2025-06-03 RX ORDER — TRAMADOL HYDROCHLORIDE 50 MG/1
50 TABLET ORAL EVERY 6 HOURS PRN
Qty: 30 TABLET | Refills: 0 | Status: SHIPPED | OUTPATIENT
Start: 2025-06-03 | End: 2025-06-10

## 2025-06-03 NOTE — OUTREACH NOTE
Medical Week 1 Survey      Flowsheet Row Responses   [facility] patient discharged from? [facility]   Does the patient have one of the following disease processes/diagnoses(primary or secondary)? Other   Week 1 attempt successful? Yes   Call start time 1046   Call end time 1048   Discharge diagnosis Transient alteration of awareness   Person spoke with today (if not patient) and relationship Patient- Ari   Meds reviewed with patient/caregiver? Yes   Does the patient have all medications ordered at discharge? Yes   Prescription comments Unable to sleep without pain medication or sleeping meds.   Is the patient taking all medications as directed (includes completed medication regime)? Yes   Does the patient have a primary care provider?  Yes   Comments regarding PCP Sees surgeon today and pcp next week   What is the Home health agency?  [facility]   Has home health visited the patient within 72 hours of discharge? Yes   Psychosocial issues? No   Did the patient receive a copy of their discharge instructions? Yes   Nursing interventions Reviewed instructions with patient   What is the patient's perception of their health status since discharge? Improving   Is the patient/caregiver able to teach back signs and symptoms related to disease process for when to call PCP? Yes   Is the patient/caregiver able to teach back signs and symptoms related to disease process for when to call 911? Yes   Is the patient/caregiver able to teach back the hierarchy of who to call/visit for symptoms/problems? PCP, Specialist, Home health nurse, Urgent Care, ED, 911 Yes   Week 1 call completed? Yes   Graduated Yes   Wrap up additional comments Patient reports overall doing well. Sees surgeon today and will discuss the sleeping concerns without pain medication and sleeping meds. No other concerns or questions noted.   Call end time 1048            Jami CRESPO - Registered Nurse

## 2025-06-23 ENCOUNTER — TELEPHONE (OUTPATIENT)
Age: 75
End: 2025-06-23
Payer: MEDICARE

## 2025-06-23 DIAGNOSIS — N18.6 ESRD (END STAGE RENAL DISEASE): Primary | Chronic | ICD-10-CM

## 2025-06-23 DIAGNOSIS — T82.590A MALFUNCTION OF ARTERIOVENOUS DIALYSIS FISTULA, INITIAL ENCOUNTER: ICD-10-CM

## 2025-06-23 NOTE — TELEPHONE ENCOUNTER
Rhonda with [facility] called and stated that they are having issues with patients access. She stated that about an hour through treatment, patient is having pain. She stated that it is not infiltrated. She stated that they also did an access blow and that it was low in the 400's. Rhonda would like to discuss next steps.       Rhonda [phone]

## 2025-06-27 DIAGNOSIS — T82.590A MALFUNCTION OF ARTERIOVENOUS DIALYSIS FISTULA, INITIAL ENCOUNTER: Primary | ICD-10-CM

## 2025-06-30 ENCOUNTER — APPOINTMENT (OUTPATIENT)
Dept: GENERAL RADIOLOGY | Facility: HOSPITAL | Age: 75
End: 2025-06-30
Payer: MEDICARE

## 2025-06-30 ENCOUNTER — APPOINTMENT (OUTPATIENT)
Dept: NEPHROLOGY | Facility: HOSPITAL | Age: 75
End: 2025-06-30
Payer: MEDICARE

## 2025-06-30 ENCOUNTER — HOSPITAL ENCOUNTER (OUTPATIENT)
Facility: HOSPITAL | Age: 75
Setting detail: OBSERVATION
Discharge: HOME | End: 2025-07-01
Attending: INTERNAL MEDICINE | Admitting: INTERNAL MEDICINE
Payer: MEDICARE

## 2025-06-30 DIAGNOSIS — Z99.2 ESRD (END STAGE RENAL DISEASE) ON DIALYSIS: ICD-10-CM

## 2025-06-30 DIAGNOSIS — E87.5 HYPERKALEMIA: ICD-10-CM

## 2025-06-30 DIAGNOSIS — I51.7 CARDIOMEGALY: ICD-10-CM

## 2025-06-30 DIAGNOSIS — J90 PLEURAL EFFUSION: ICD-10-CM

## 2025-06-30 DIAGNOSIS — R06.00 DYSPNEA, UNSPECIFIED TYPE: Primary | ICD-10-CM

## 2025-06-30 DIAGNOSIS — N18.6 ESRD (END STAGE RENAL DISEASE) ON DIALYSIS: ICD-10-CM

## 2025-06-30 DIAGNOSIS — R05.8 NONPRODUCTIVE COUGH: ICD-10-CM

## 2025-06-30 PROBLEM — J44.1 COPD EXACERBATION: Status: ACTIVE | Noted: 2025-06-30

## 2025-06-30 LAB
ALBUMIN SERPL-MCNC: 3.9 G/DL (ref 3.5–5.2)
ALBUMIN/GLOB SERPL: 1.3 G/DL
ALP SERPL-CCNC: 90 U/L (ref 39–117)
ALT SERPL W P-5'-P-CCNC: 10 U/L (ref 1–33)
ANION GAP SERPL CALCULATED.3IONS-SCNC: 17.5 MMOL/L (ref 5–15)
ARTERIAL PATENCY WRIST A: POSITIVE
AST SERPL-CCNC: 18 U/L (ref 1–32)
B PARAPERT DNA SPEC QL NAA+PROBE: NOT DETECTED
B PERT DNA SPEC QL NAA+PROBE: NOT DETECTED
BASE DEFICIT: -0.6 MEQ/LITER
BASE EXCESS BLDA CALC-SCNC: -0.4 MMOL/L (ref 0–3)
BASOPHILS # BLD AUTO: 0.08 10*3/MM3 (ref 0–0.2)
BASOPHILS NFR BLD AUTO: 0.7 % (ref 0–1.5)
BDY SITE: ABNORMAL
BILIRUB SERPL-MCNC: 0.4 MG/DL (ref 0–1.2)
BUN SERPL-MCNC: 44.8 MG/DL (ref 8–23)
BUN/CREAT SERPL: 4.6 (ref 7–25)
C PNEUM DNA NPH QL NAA+NON-PROBE: NOT DETECTED
CALCIUM SPEC-SCNC: 9.7 MG/DL (ref 8.6–10.5)
CHLORIDE SERPL-SCNC: 103 MMOL/L (ref 98–107)
CO2 BLDA-SCNC: 25.1 MMOL/L (ref 22–29)
CO2 SERPL-SCNC: 22.5 MMOL/L (ref 22–29)
CREAT SERPL-MCNC: 9.7 MG/DL (ref 0.57–1)
DEPRECATED RDW RBC AUTO: 57.3 FL (ref 37–54)
EGFRCR SERPLBLD CKD-EPI 2021: 3.9 ML/MIN/1.73
EOSINOPHIL # BLD AUTO: 0.24 10*3/MM3 (ref 0–0.4)
EOSINOPHIL NFR BLD AUTO: 2.2 % (ref 0.3–6.2)
ERYTHROCYTE [DISTWIDTH] IN BLOOD BY AUTOMATED COUNT: 15.8 % (ref 12.3–15.4)
FLUAV SUBTYP SPEC NAA+PROBE: NOT DETECTED
FLUBV RNA NPH QL NAA+NON-PROBE: NOT DETECTED
GEN 5 1HR TROPONIN T REFLEX: 53 NG/L
GLOBULIN UR ELPH-MCNC: 3.1 GM/DL
GLUCOSE BLDC GLUCOMTR-MCNC: 92 MG/DL (ref 74–100)
GLUCOSE SERPL-MCNC: 90 MG/DL (ref 65–99)
HADV DNA SPEC NAA+PROBE: NOT DETECTED
HBV SURFACE AB SER RIA-ACNC: REACTIVE
HBV SURFACE AG SERPL QL IA: NORMAL
HCO3 MIXED: 23.9 MMOL/L (ref 21–29)
HCOV 229E RNA SPEC QL NAA+PROBE: NOT DETECTED
HCOV HKU1 RNA SPEC QL NAA+PROBE: NOT DETECTED
HCOV NL63 RNA SPEC QL NAA+PROBE: NOT DETECTED
HCOV OC43 RNA SPEC QL NAA+PROBE: NOT DETECTED
HCT VFR BLD AUTO: 33.9 % (ref 34–46.6)
HEMODILUTION: NO
HGB BLD-MCNC: 10.3 G/DL (ref 12–15.9)
HMPV RNA NPH QL NAA+NON-PROBE: NOT DETECTED
HOLD SPECIMEN: NORMAL
HOLD SPECIMEN: NORMAL
HPIV1 RNA ISLT QL NAA+PROBE: NOT DETECTED
HPIV2 RNA SPEC QL NAA+PROBE: NOT DETECTED
HPIV3 RNA NPH QL NAA+PROBE: NOT DETECTED
HPIV4 P GENE NPH QL NAA+PROBE: NOT DETECTED
IMM GRANULOCYTES # BLD AUTO: 0.05 10*3/MM3 (ref 0–0.05)
IMM GRANULOCYTES NFR BLD AUTO: 0.5 % (ref 0–0.5)
INHALED O2 CONCENTRATION: 21 %
LYMPHOCYTES # BLD AUTO: 0.71 10*3/MM3 (ref 0.7–3.1)
LYMPHOCYTES NFR BLD AUTO: 6.6 % (ref 19.6–45.3)
M PNEUMO IGG SER IA-ACNC: NOT DETECTED
MAGNESIUM SERPL-MCNC: 1.7 MG/DL (ref 1.6–2.4)
MCH RBC QN AUTO: 30.1 PG (ref 26.6–33)
MCHC RBC AUTO-ENTMCNC: 30.4 G/DL (ref 31.5–35.7)
MCV RBC AUTO: 99.1 FL (ref 79–97)
MODALITY: ABNORMAL
MONOCYTES # BLD AUTO: 0.81 10*3/MM3 (ref 0.1–0.9)
MONOCYTES NFR BLD AUTO: 7.5 % (ref 5–12)
NEUTROPHILS NFR BLD AUTO: 8.89 10*3/MM3 (ref 1.7–7)
NEUTROPHILS NFR BLD AUTO: 82.5 % (ref 42.7–76)
NRBC BLD AUTO-RTO: 0 /100 WBC (ref 0–0.2)
NT-PROBNP SERPL-MCNC: ABNORMAL PG/ML (ref 0–1800)
O2 SATURATION MIXED: 75.7 %
PCO2 MIXED: 37.3 MMHG (ref 35–51)
PH MIXED: 7.42 PH UNITS (ref 7.32–7.45)
PLATELET # BLD AUTO: 338 10*3/MM3 (ref 140–450)
PMV BLD AUTO: 10.9 FL (ref 6–12)
PO2 MIXED: 39.8 MMHG
POTASSIUM SERPL-SCNC: 5.6 MMOL/L (ref 3.5–5.2)
PROCALCITONIN SERPL-MCNC: 0.26 NG/ML (ref 0–0.25)
PROT SERPL-MCNC: 7 G/DL (ref 6–8.5)
RBC # BLD AUTO: 3.42 10*6/MM3 (ref 3.77–5.28)
RHINOVIRUS RNA SPEC NAA+PROBE: NOT DETECTED
RSV RNA NPH QL NAA+NON-PROBE: NOT DETECTED
SARS-COV-2 RNA RESP QL NAA+PROBE: NOT DETECTED
SODIUM SERPL-SCNC: 143 MMOL/L (ref 136–145)
TROPONIN T % DELTA: -5
TROPONIN T NUMERIC DELTA: -3 NG/L
TROPONIN T SERPL HS-MCNC: 56 NG/L
WBC NRBC COR # BLD AUTO: 10.78 10*3/MM3 (ref 3.4–10.8)
WHOLE BLOOD HOLD COAG: NORMAL
WHOLE BLOOD HOLD SPECIMEN: NORMAL

## 2025-06-30 PROCEDURE — 94761 N-INVAS EAR/PLS OXIMETRY MLT: CPT

## 2025-06-30 PROCEDURE — 25010000002 METHYLPREDNISOLONE PER 125 MG

## 2025-06-30 PROCEDURE — 87340 HEPATITIS B SURFACE AG IA: CPT | Performed by: INTERNAL MEDICINE

## 2025-06-30 PROCEDURE — 80053 COMPREHEN METABOLIC PANEL: CPT

## 2025-06-30 PROCEDURE — 85025 COMPLETE CBC W/AUTO DIFF WBC: CPT

## 2025-06-30 PROCEDURE — 94799 UNLISTED PULMONARY SVC/PX: CPT

## 2025-06-30 PROCEDURE — 93005 ELECTROCARDIOGRAM TRACING: CPT

## 2025-06-30 PROCEDURE — 94640 AIRWAY INHALATION TREATMENT: CPT

## 2025-06-30 PROCEDURE — 82803 BLOOD GASES ANY COMBINATION: CPT

## 2025-06-30 PROCEDURE — 84145 PROCALCITONIN (PCT): CPT

## 2025-06-30 PROCEDURE — 83735 ASSAY OF MAGNESIUM: CPT

## 2025-06-30 PROCEDURE — 71046 X-RAY EXAM CHEST 2 VIEWS: CPT

## 2025-06-30 PROCEDURE — 82948 REAGENT STRIP/BLOOD GLUCOSE: CPT

## 2025-06-30 PROCEDURE — 96374 THER/PROPH/DIAG INJ IV PUSH: CPT

## 2025-06-30 PROCEDURE — G0378 HOSPITAL OBSERVATION PER HR: HCPCS

## 2025-06-30 PROCEDURE — G0257 UNSCHED DIALYSIS ESRD PT HOS: HCPCS

## 2025-06-30 PROCEDURE — 93005 ELECTROCARDIOGRAM TRACING: CPT | Performed by: INTERNAL MEDICINE

## 2025-06-30 PROCEDURE — 0202U NFCT DS 22 TRGT SARS-COV-2: CPT

## 2025-06-30 PROCEDURE — 83880 ASSAY OF NATRIURETIC PEPTIDE: CPT

## 2025-06-30 PROCEDURE — 96376 TX/PRO/DX INJ SAME DRUG ADON: CPT

## 2025-06-30 PROCEDURE — 36415 COLL VENOUS BLD VENIPUNCTURE: CPT

## 2025-06-30 PROCEDURE — 86706 HEP B SURFACE ANTIBODY: CPT | Performed by: INTERNAL MEDICINE

## 2025-06-30 PROCEDURE — 84484 ASSAY OF TROPONIN QUANT: CPT

## 2025-06-30 PROCEDURE — 99285 EMERGENCY DEPT VISIT HI MDM: CPT

## 2025-06-30 PROCEDURE — 25010000002 METHYLPREDNISOLONE PER 125 MG: Performed by: INTERNAL MEDICINE

## 2025-06-30 RX ORDER — SODIUM CHLORIDE 0.9 % (FLUSH) 0.9 %
10 SYRINGE (ML) INJECTION AS NEEDED
Status: DISCONTINUED | OUTPATIENT
Start: 2025-06-30 | End: 2025-07-01 | Stop reason: HOSPADM

## 2025-06-30 RX ORDER — LIDOCAINE AND PRILOCAINE 25; 25 MG/G; MG/G
1 CREAM TOPICAL
COMMUNITY

## 2025-06-30 RX ORDER — ALBUMIN (HUMAN) 12.5 G/50ML
12.5 SOLUTION INTRAVENOUS AS NEEDED
Status: DISCONTINUED | OUTPATIENT
Start: 2025-06-30 | End: 2025-07-01 | Stop reason: HOSPADM

## 2025-06-30 RX ORDER — ACETAMINOPHEN 160 MG/5ML
650 SOLUTION ORAL EVERY 4 HOURS PRN
Status: DISCONTINUED | OUTPATIENT
Start: 2025-06-30 | End: 2025-07-01 | Stop reason: HOSPADM

## 2025-06-30 RX ORDER — TEMAZEPAM 15 MG/1
30 CAPSULE ORAL NIGHTLY PRN
Status: DISCONTINUED | OUTPATIENT
Start: 2025-06-30 | End: 2025-07-01 | Stop reason: HOSPADM

## 2025-06-30 RX ORDER — IPRATROPIUM BROMIDE AND ALBUTEROL SULFATE 2.5; .5 MG/3ML; MG/3ML
3 SOLUTION RESPIRATORY (INHALATION) ONCE
Status: COMPLETED | OUTPATIENT
Start: 2025-06-30 | End: 2025-06-30

## 2025-06-30 RX ORDER — SODIUM BICARBONATE 650 MG/1
1300 TABLET ORAL 3 TIMES DAILY
Status: DISCONTINUED | OUTPATIENT
Start: 2025-06-30 | End: 2025-07-01 | Stop reason: HOSPADM

## 2025-06-30 RX ORDER — METHYLPREDNISOLONE SODIUM SUCCINATE 125 MG/2ML
125 INJECTION, POWDER, LYOPHILIZED, FOR SOLUTION INTRAMUSCULAR; INTRAVENOUS ONCE
Status: COMPLETED | OUTPATIENT
Start: 2025-06-30 | End: 2025-06-30

## 2025-06-30 RX ORDER — HYDRALAZINE HYDROCHLORIDE 20 MG/ML
10 INJECTION INTRAMUSCULAR; INTRAVENOUS EVERY 6 HOURS PRN
Status: DISCONTINUED | OUTPATIENT
Start: 2025-06-30 | End: 2025-07-01 | Stop reason: HOSPADM

## 2025-06-30 RX ORDER — TEMAZEPAM 30 MG/1
30 CAPSULE ORAL NIGHTLY PRN
COMMUNITY

## 2025-06-30 RX ORDER — BENZONATATE 100 MG/1
200 CAPSULE ORAL 3 TIMES DAILY PRN
Status: DISCONTINUED | OUTPATIENT
Start: 2025-06-30 | End: 2025-07-01 | Stop reason: HOSPADM

## 2025-06-30 RX ORDER — SODIUM CHLORIDE 9 MG/ML
40 INJECTION, SOLUTION INTRAVENOUS AS NEEDED
Status: DISCONTINUED | OUTPATIENT
Start: 2025-06-30 | End: 2025-07-01 | Stop reason: HOSPADM

## 2025-06-30 RX ORDER — IPRATROPIUM BROMIDE AND ALBUTEROL SULFATE 2.5; .5 MG/3ML; MG/3ML
3 SOLUTION RESPIRATORY (INHALATION)
Status: DISCONTINUED | OUTPATIENT
Start: 2025-06-30 | End: 2025-07-01 | Stop reason: HOSPADM

## 2025-06-30 RX ORDER — PANTOPRAZOLE SODIUM 40 MG/1
40 TABLET, DELAYED RELEASE ORAL
Status: DISCONTINUED | OUTPATIENT
Start: 2025-06-30 | End: 2025-07-01 | Stop reason: HOSPADM

## 2025-06-30 RX ORDER — BISACODYL 10 MG
10 SUPPOSITORY, RECTAL RECTAL DAILY PRN
Status: DISCONTINUED | OUTPATIENT
Start: 2025-06-30 | End: 2025-07-01 | Stop reason: HOSPADM

## 2025-06-30 RX ORDER — AMOXICILLIN 250 MG
2 CAPSULE ORAL 2 TIMES DAILY PRN
Status: DISCONTINUED | OUTPATIENT
Start: 2025-06-30 | End: 2025-07-01 | Stop reason: HOSPADM

## 2025-06-30 RX ORDER — ONDANSETRON 4 MG/1
4 TABLET, ORALLY DISINTEGRATING ORAL EVERY 6 HOURS PRN
Status: DISCONTINUED | OUTPATIENT
Start: 2025-06-30 | End: 2025-07-01 | Stop reason: HOSPADM

## 2025-06-30 RX ORDER — CHOLECALCIFEROL (VITAMIN D3) 25 MCG
1000 TABLET ORAL DAILY
Status: DISCONTINUED | OUTPATIENT
Start: 2025-06-30 | End: 2025-07-01 | Stop reason: HOSPADM

## 2025-06-30 RX ORDER — POLYETHYLENE GLYCOL 3350 17 G/17G
17 POWDER, FOR SOLUTION ORAL DAILY PRN
Status: DISCONTINUED | OUTPATIENT
Start: 2025-06-30 | End: 2025-07-01 | Stop reason: HOSPADM

## 2025-06-30 RX ORDER — BISACODYL 5 MG/1
5 TABLET, DELAYED RELEASE ORAL DAILY PRN
Status: DISCONTINUED | OUTPATIENT
Start: 2025-06-30 | End: 2025-07-01 | Stop reason: HOSPADM

## 2025-06-30 RX ORDER — ACETAMINOPHEN 650 MG/1
650 SUPPOSITORY RECTAL EVERY 4 HOURS PRN
Status: DISCONTINUED | OUTPATIENT
Start: 2025-06-30 | End: 2025-07-01 | Stop reason: HOSPADM

## 2025-06-30 RX ORDER — SODIUM CHLORIDE 0.9 % (FLUSH) 0.9 %
10 SYRINGE (ML) INJECTION EVERY 12 HOURS SCHEDULED
Status: DISCONTINUED | OUTPATIENT
Start: 2025-06-30 | End: 2025-07-01 | Stop reason: HOSPADM

## 2025-06-30 RX ORDER — BUDESONIDE 0.5 MG/2ML
0.5 INHALANT ORAL
Status: DISCONTINUED | OUTPATIENT
Start: 2025-06-30 | End: 2025-07-01 | Stop reason: HOSPADM

## 2025-06-30 RX ORDER — METOPROLOL SUCCINATE 50 MG/1
50 TABLET, EXTENDED RELEASE ORAL 2 TIMES DAILY
Status: DISCONTINUED | OUTPATIENT
Start: 2025-06-30 | End: 2025-07-01

## 2025-06-30 RX ORDER — ACETAMINOPHEN 325 MG/1
650 TABLET ORAL EVERY 4 HOURS PRN
Status: DISCONTINUED | OUTPATIENT
Start: 2025-06-30 | End: 2025-07-01 | Stop reason: HOSPADM

## 2025-06-30 RX ORDER — METOPROLOL SUCCINATE 25 MG/1
25 TABLET, EXTENDED RELEASE ORAL 2 TIMES DAILY
Status: DISCONTINUED | OUTPATIENT
Start: 2025-06-30 | End: 2025-06-30

## 2025-06-30 RX ORDER — SEVELAMER CARBONATE 800 MG/1
1600 TABLET, FILM COATED ORAL
Status: DISCONTINUED | OUTPATIENT
Start: 2025-06-30 | End: 2025-07-01 | Stop reason: HOSPADM

## 2025-06-30 RX ORDER — GUAIFENESIN 600 MG/1
1200 TABLET, EXTENDED RELEASE ORAL EVERY 12 HOURS SCHEDULED
Status: DISCONTINUED | OUTPATIENT
Start: 2025-06-30 | End: 2025-07-01 | Stop reason: HOSPADM

## 2025-06-30 RX ORDER — GUAIFENESIN/DEXTROMETHORPHAN 100-10MG/5
5 SYRUP ORAL EVERY 4 HOURS PRN
Status: DISCONTINUED | OUTPATIENT
Start: 2025-06-30 | End: 2025-07-01 | Stop reason: HOSPADM

## 2025-06-30 RX ORDER — NITROGLYCERIN 0.4 MG/1
0.4 TABLET SUBLINGUAL
Status: DISCONTINUED | OUTPATIENT
Start: 2025-06-30 | End: 2025-07-01 | Stop reason: HOSPADM

## 2025-06-30 RX ORDER — ONDANSETRON 2 MG/ML
4 INJECTION INTRAMUSCULAR; INTRAVENOUS EVERY 6 HOURS PRN
Status: DISCONTINUED | OUTPATIENT
Start: 2025-06-30 | End: 2025-07-01 | Stop reason: HOSPADM

## 2025-06-30 RX ORDER — METHYLPREDNISOLONE SODIUM SUCCINATE 125 MG/2ML
60 INJECTION, POWDER, LYOPHILIZED, FOR SOLUTION INTRAMUSCULAR; INTRAVENOUS EVERY 12 HOURS
Status: DISCONTINUED | OUTPATIENT
Start: 2025-06-30 | End: 2025-07-01

## 2025-06-30 RX ORDER — TRAMADOL HYDROCHLORIDE 50 MG/1
50 TABLET ORAL EVERY 6 HOURS PRN
COMMUNITY

## 2025-06-30 RX ORDER — CALCIUM ACETATE 667 MG/1
1334 CAPSULE ORAL 2 TIMES DAILY
Status: DISCONTINUED | OUTPATIENT
Start: 2025-06-30 | End: 2025-07-01 | Stop reason: HOSPADM

## 2025-06-30 RX ADMIN — IPRATROPIUM BROMIDE AND ALBUTEROL SULFATE 3 ML: .5; 3 SOLUTION RESPIRATORY (INHALATION) at 07:07

## 2025-06-30 RX ADMIN — BENZONATATE 200 MG: 100 CAPSULE ORAL at 18:11

## 2025-06-30 RX ADMIN — GUAIFENESIN 1200 MG: 600 TABLET, EXTENDED RELEASE ORAL at 09:32

## 2025-06-30 RX ADMIN — SODIUM BICARBONATE 1300 MG: 650 TABLET ORAL at 21:03

## 2025-06-30 RX ADMIN — CALCIUM ACETATE 1334 MG: 667 CAPSULE ORAL at 21:03

## 2025-06-30 RX ADMIN — SODIUM BICARBONATE 1300 MG: 650 TABLET ORAL at 09:32

## 2025-06-30 RX ADMIN — Medication 10 ML: at 10:55

## 2025-06-30 RX ADMIN — Medication 10 ML: at 21:03

## 2025-06-30 RX ADMIN — METHYLPREDNISOLONE SODIUM SUCCINATE 60 MG: 125 INJECTION, POWDER, FOR SOLUTION INTRAMUSCULAR; INTRAVENOUS at 17:50

## 2025-06-30 RX ADMIN — Medication 1000 UNITS: at 09:32

## 2025-06-30 RX ADMIN — BUDESONIDE 0.5 MG: 0.5 SUSPENSION RESPIRATORY (INHALATION) at 19:55

## 2025-06-30 RX ADMIN — SEVELAMER CARBONATE 1600 MG: 800 TABLET, FILM COATED ORAL at 09:32

## 2025-06-30 RX ADMIN — GUAIFENESIN 1200 MG: 600 TABLET, EXTENDED RELEASE ORAL at 21:03

## 2025-06-30 RX ADMIN — METOPROLOL SUCCINATE 25 MG: 25 TABLET, EXTENDED RELEASE ORAL at 09:32

## 2025-06-30 RX ADMIN — METHYLPREDNISOLONE SODIUM SUCCINATE 125 MG: 125 INJECTION, POWDER, FOR SOLUTION INTRAMUSCULAR; INTRAVENOUS at 07:08

## 2025-06-30 RX ADMIN — TEMAZEPAM 30 MG: 15 CAPSULE ORAL at 21:03

## 2025-06-30 RX ADMIN — IPRATROPIUM BROMIDE AND ALBUTEROL SULFATE 3 ML: .5; 3 SOLUTION RESPIRATORY (INHALATION) at 19:55

## 2025-06-30 RX ADMIN — CALCIUM ACETATE 1334 MG: 667 CAPSULE ORAL at 09:32

## 2025-06-30 RX ADMIN — IPRATROPIUM BROMIDE AND ALBUTEROL SULFATE 3 ML: .5; 3 SOLUTION RESPIRATORY (INHALATION) at 15:07

## 2025-06-30 RX ADMIN — SODIUM BICARBONATE 1300 MG: 650 TABLET ORAL at 17:50

## 2025-06-30 RX ADMIN — Medication 10 ML: at 10:54

## 2025-06-30 RX ADMIN — PANTOPRAZOLE SODIUM 40 MG: 40 TABLET, DELAYED RELEASE ORAL at 09:32

## 2025-06-30 RX ADMIN — SEVELAMER CARBONATE 1600 MG: 800 TABLET, FILM COATED ORAL at 17:50

## 2025-06-30 NOTE — CONSULTS
[facility] CONSULT NOTE    Referring Provider: Cindy Santiago APRN   Reason for Consultation: ESRD    Chief complaint.  Cough, shortness of breath    History of present illness: Patient is 74 years old female with history of ESRD, hypertension, anemia, COPD, presented to the hospital cough and shortness of breath.  Patient did not have any fever, chest pain, expectoration, chills, nausea or vomiting.  She has diarrhea this morning.    History  Past Medical History:   Diagnosis Date    Acute upper GI bleeding 08/26/2024    Anemia due to GI blood loss 08/27/2024    Lucas's esophagus     Barretts esophagus 04/10/2012    C. difficile colitis     Carcinoma, renal cell 04/10/2012    Carotid artery disease     -50-74% left, 16-49% right (1/19)    COPD (chronic obstructive pulmonary disease)     DM2 (diabetes mellitus, type 2)     ESRD (end stage renal disease)     on HD    Gastric ulcer     at anastomatic site    Gastroparesis     unknown    GERD (gastroesophageal reflux disease)     Gout     not current    Hemodialysis patient     mwf    Hernia, incisional     abdomen    History of colonoscopy     UTD    History of degenerative disc disease     Hyperlipidemia     Hypertension     Medicare annual wellness visit, subsequent 11/07/2020    Microscopic colitis     Nephrotic syndrome     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    Neuroendocrine tumor     of stomach    Non-small cell lung cancer 10/26/2023    Orthostatic hypotension 11/07/2017    FABY (obstructive sleep apnea)     not treating    Pedal edema     ressolved    Pneumonia 11/11/2024    Proteinuria     Renal cell cancer, right     Rib pain on right side     chronic    Rotator cuff tear     bilateral    Secondary hyperparathyroidism of renal origin 08/29/2024    Steal syndrome dialysis vascular access 04/16/2024    Stomach cancer 2016    Stroke     Tobacco abuse 07/24/2020    Uterine cancer     Vitamin B 12 deficiency     Vitamin D deficiency     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     Past Surgical History:   Procedure Laterality Date    APPENDECTOMY      ARTERIOVENOUS FISTULA Right     ARTERIOVENOUS FISTULA REPAIR      Clotted off and insert graft    ARTERIOVENOUS FISTULA/SHUNT SURGERY Left 12/30/2020    Procedure: ARTERIOVENOUS FISTULA FORMATION;  Surgeon: Jan Caicedo MD;  Location: [facility] MAIN OR;  Service: Vascular;  Laterality: Left;    ARTERIOVENOUS FISTULA/SHUNT SURGERY Left 04/23/2024    Procedure: FISTULOGRAM WITH SUBCLAVIAN ARTERY ANGIOPLASTY, fistula banding;  Surgeon: Franki Delgadillo II, MD;  Location: [facility] HYBRID OR;  Service: Vascular;  Laterality: Left;    BREAST BIOPSY      right nipple 1981    BRONCHOSCOPY N/A 06/11/2024    Procedure: BRONCHOSCOPY WITH RIGHT LUNG WASHING;  Surgeon: Aramis Barragan MD;  Location: [facility] ENDOSCOPY;  Service: Pulmonary;  Laterality: N/A;  RML atelectasis    COLONOSCOPY N/A 11/24/2020    Procedure: COLONOSCOPY with polypectomy x 7;  Surgeon: Jeffery White MD;  Location: [facility] ENDOSCOPY;  Service: Gastroenterology;  Laterality: N/A;  post op: polyps, diverticulosis, hemorrhoids    COLONOSCOPY N/A 06/01/2023    Procedure: COLONOSCOPY with polypectomy x 3 biopsy x 1;  Surgeon: Jeffery White MD;  Location: [facility] ENDOSCOPY;  Service: Gastroenterology;  Laterality: N/A;  diverticulosis polyps    COLONOSCOPY N/A 09/04/2024    Procedure: COLONOSCOPY WITH POLYPECTOMY X 1;  Surgeon: Jeffery White MD;  Location: [facility] ENDOSCOPY;  Service: Gastroenterology;  Laterality: N/A;  POST- POLYP, DIVERTICULOSIS, INTERNAL AND EXTERNAL HEMORRHOIDS    ENDOSCOPY N/A 03/12/2022    Procedure: ESOPHAGOGASTRODUODENOSCOPY with biopsy x 1 area;  Surgeon: Javan Augustin MD;  Location: [facility] ENDOSCOPY;  Service: Gastroenterology;  Laterality: N/A;  post op: anastamosis    ENDOSCOPY N/A 10/09/2023    Procedure: ESOPHAGOGASTRODUODENOSCOPY with biopsy x3 areas;  Surgeon: Ace Campbell MD;  Location: [facility] ENDOSCOPY;  Service: Gastroenterology;   Laterality: N/A;  duodenal ulcers;irregular z line    ENDOSCOPY N/A 08/27/2024    Procedure: ESOPHAGOGASTRODUODENOSCOPY with argon plasma coagulation;  Surgeon: HILARIO Blackmon MD;  Location: [facility] ENDOSCOPY;  Service: Gastroenterology;  Laterality: N/A;  post op: AVM    ENTEROSCOPY SMALL BOWEL N/A 09/04/2024    Procedure: ESOPHAGOGASTRODUODENOSCOPY WITH SMALL BOWEL ENTEROSCOPY, ARGON PLASMA COAGULATION AND ENDOSCOPIC CLIPPING X 2 OF GASTRIC ANTRAL VASCULAR ECTASIA;  Surgeon: Jeffery White MD;  Location: [facility] ENDOSCOPY;  Service: Gastroenterology;  Laterality: N/A;  POST- GAVE    GASTRIC RESECTION      cancer    HYSTERECTOMY      LAPAROSCOPIC CHOLECYSTECTOMY      NEPHRECTOMY      right kidney removed     REDUCTION MAMMAPLASTY      TUMOR REMOVAL      boils    VENTRAL HERNIA REPAIR N/A 05/15/2025    Procedure: VENTRAL  HERNIA REPAIR LAPAROSCOPIC WITH DAVINCI ROBOT WITH MESH, EXTENSIVE LYSIS OF ADHESION;  Surgeon: Keyon Espana MD;  Location: [facility] MAIN OR;  Service: Robotics - DaVinci;  Laterality: N/A;    VENTRAL/INCISIONAL HERNIA REPAIR Right 08/28/2019    Procedure: VENTRAL/INCISIONAL HERNIA REPAIR;  Surgeon: Nilay Stevens MD;  Location: [facility] MAIN OR;  Service: General    VENTRAL/INCISIONAL HERNIA REPAIR N/A 10/01/2019    Procedure: OPEN VENTRAL/INCISIONAL HERNIA REPAIR;  Surgeon: Nilay Stevens MD;  Location: [facility] MAIN OR;  Service: General    VENTRAL/INCISIONAL HERNIA REPAIR N/A 10/11/2021    Procedure: VENTRAL/INCISIONAL HERNIA REPAIR LAPAROSCOPIC;  Surgeon: Nilay Stevens MD;  Location: [facility] MAIN OR;  Service: General;  Laterality: N/A;    VENTRAL/INCISIONAL HERNIA REPAIR N/A 01/23/2023    Procedure: VENTRAL/INCISIONAL HERNIA REPAIR with MESH;  Surgeon: Nilay Stevens MD;  Location: [facility] MAIN OR;  Service: General;  Laterality: N/A;     Social History     Tobacco Use    Smoking status: Former     Current packs/day: 0.00     Average packs/day: 0.3 packs/day for 57.0 years (14.3 ttl  pk-yrs)     Types: Cigarettes     Start date: 1964     Quit date: 2021     Years since quittin.1     Passive exposure: Past    Smokeless tobacco: Never   Vaping Use    Vaping status: Never Used   Substance Use Topics    Alcohol use: No    Drug use: Never     Family History   Problem Relation Age of Onset    Heart disease Father        Review of Systems  ROS  As per HPI  Objective     Vital Signs  Temp:  [98.2 °F (36.8 °C)] 98.2 °F (36.8 °C)  Heart Rate:  [89-98] 96  Resp:  [20-22] 22  BP: (152-191)/() 190/81    No intake/output data recorded.  No intake/output data recorded.    Physical Exam:  Physical Exam    General Appearance: Chronically ill-appearing, no acute distress   Skin: warm and dry  HEENT: oral mucosa normal, nonicteric sclera  Neck: supple, no JVD  Lungs: Bilateral wheezing  Heart: RRR, normal S1 and S2  Abdomen: soft, nontender, nondistended  : no palpable bladder  Extremities: no edema, cyanosis or clubbing  Neuro: normal speech and mental status     Results Review:   I reviewed the patient's new clinical results.    Lab Results   Component Value Date    CALCIUM 9.7 2025    PHOS 4.8 (H) 2025     Results from last 7 days   Lab Units 25  0631 25  0000   MAGNESIUM mg/dL 1.7  --    SODIUM mmol/L 143  --    POTASSIUM mmol/L 5.6* 5.8*   CHLORIDE mmol/L 103  --    CO2 mmol/L 22.5  --    BUN mg/dL 44.8*  --    CREATININE mg/dL 9.70*  --    GLUCOSE mg/dL 90  --    CALCIUM mg/dL 9.7  --    WBC 10*3/mm3 10.78  --    HEMOGLOBIN g/dL 10.3* 10.9*  32.7*   PLATELETS 10*3/mm3 338  --    ALT (SGPT) U/L 10  --    AST (SGOT) U/L 18  --      Lab Results   Component Value Date    TROPONINT 53 (C) 2025     Estimated Creatinine Clearance: 4.4 mL/min (A) (by C-G formula based on SCr of 9.7 mg/dL (H)).  Lab Results   Component Value Date    URICACID 4.7 07/10/2018       Brief Urine Lab Results       None            Prior to Admission medications    Medication Sig Start Date  End Date Taking? Authorizing Provider   B Complex-C-Folic Acid (RENAL VITAMIN PO) Take 1 tablet by mouth Daily.   Yes Danica Murphy MD   Cholecalciferol 25 MCG (1000 UT) tablet Take 1 tablet by mouth Daily.   Yes Danica Murphy MD   metoprolol succinate XL (Toprol XL) 25 MG 24 hr tablet Take 1 tablet by mouth 2 (Two) Times a Day. 11/12/24  Yes Angelia Taylor MD   sevelamer (RENAGEL) 800 MG tablet Take 4 tablets by mouth Daily.   Yes Danica Murphy MD   sodium bicarbonate 650 MG tablet Take 2 tablets by mouth 3 (Three) Times a Day.   Yes Danica Murphy MD   albuterol sulfate  (90 Base) MCG/ACT inhaler Inhale 2 puffs Every 4 (Four) Hours As Needed for Wheezing.    Danica Murphy MD   lidocaine-prilocaine (EMLA) 2.5-2.5 % cream Apply 1 Application topically to the appropriate area as directed Every 2 (Two) Hours As Needed for Mild Pain.    Danica Murphy MD   ondansetron (Zofran) 4 MG tablet Take 1 tablet by mouth Daily As Needed for Nausea or Vomiting. 5/16/25 5/16/26  Keyon Espana MD   temazepam (RESTORIL) 30 MG capsule Take 1 capsule by mouth At Night As Needed for Sleep.    Danica Murphy MD   traMADol (ULTRAM) 50 MG tablet Take 1 tablet by mouth Every 6 (Six) Hours As Needed for Moderate Pain.    Danica Murphy MD   aspirin 81 MG chewable tablet Chew 1 tablet Daily.  Patient not taking: Reported on 6/3/2025 5/24/25 6/30/25  Rey Jackson MD   calcium acetate (PHOS BINDER,) 667 MG capsule capsule Take 2 capsules by mouth 2 (Two) Times a Day.  6/30/25  Danica Murphy MD   lidocaine-prilocaine (EMLA) 2.5-2.5 % cream APPLY TOPICALLY TO THE 5TH FINGER ON THE LEFT HAND AS NEEDED PAIN  Patient taking differently: Apply 1 Application topically to the appropriate area as directed. 4/24/25 6/30/25  Anastasia Thurman APRN   pantoprazole (PROTONIX) 20 MG EC tablet Take 1 tablet by mouth Daily.  6/30/25  Danica Murphy MD       budesonide,  0.5 mg, Nebulization, BID - RT  calcium acetate, 1,334 mg, Oral, BID  cholecalciferol, 1,000 Units, Oral, Daily  guaiFENesin, 1,200 mg, Oral, Q12H  ipratropium-albuterol, 3 mL, Nebulization, 4x Daily - RT  methylPREDNISolone sodium succinate, 60 mg, Intravenous, Q12H  metoprolol succinate XL, 25 mg, Oral, BID  pantoprazole, 40 mg, Oral, Q AM  sevelamer, 1,600 mg, Oral, BID AC  sodium bicarbonate, 1,300 mg, Oral, TID  sodium chloride, 10 mL, Intravenous, Q12H  sodium chloride, 10 mL, Intravenous, Q12H           Assessment & Plan       ESRD.  Patient gets dialysis Monday Wednesday and Friday as outpatient.  Hyperkalemia per morning labs.  Volume status seems to be generous.  Mild pulm edema on chest x-ray  Hyperkalemia  Hypertension with chronic kidney disease.  Blood pressure running high  Anemia in chronic kidney disease.  Hemoglobin acceptable  COPD exacerbation.  Patient started on IV steroids    Plan:  Hemodialysis today  Fluid removal with dialysis as tolerated  Increase metoprolol dose  I will add ARB if blood pressure stays high and surveillance lab    I discussed the patients findings and my recommendations with patient and nursing staff    Faustino Buckley MD  06/30/25  10:30 EDT

## 2025-06-30 NOTE — CASE MANAGEMENT/SOCIAL WORK
Discharge Planning Assessment   [facility]     Patient Name: Sherry Lobato  MRN: [id number]  Today's Date: 6/30/2025    Admit Date: 6/30/2025    Plan: Home with s.o. Derek   Discharge Needs Assessment       Row Name 06/30/25 0833       Living Environment    People in Home significant other    Name(s) of People in Home S.O. Derek    Current Living Arrangements home    Potentially Unsafe Housing Conditions none    In the past 12 months has the electric, gas, oil, or water company threatened to shut off services in your home? No    Primary Care Provided by self    Provides Primary Care For no one    Family Caregiver if Needed significant other    Family Caregiver Names S.O. Derek    Quality of Family Relationships helpful;involved;supportive    Able to Return to Prior Arrangements yes       Resource/Environmental Concerns    Resource/Environmental Concerns none    Transportation Concerns none       Transportation Needs    In the past 12 months, has lack of transportation kept you from medical appointments or from getting medications? no    In the past 12 months, has lack of transportation kept you from meetings, work, or from getting things needed for daily living? No       Food Insecurity    Within the past 12 months, you worried that your food would run out before you got the money to buy more. Never true    Within the past 12 months, the food you bought just didn't last and you didn't have money to get more. Never true       Transition Planning    Patient/Family Anticipates Transition to home with family    Transportation Anticipated family or friend will provide  S.O. Derek or son Raúl can transport at d/c.       Discharge Needs Assessment    Readmission Within the Last 30 Days no previous admission in last 30 days    Equipment Currently Used at Home cane, straight;wheelchair;walker, rolling;rollator;shower chair;nebulizer;bp cuff;pulse ox    Do you want help finding or keeping work or a job? I do not need or want  help    Do you want help with school or training? For example, starting or completing job training or getting a high school diploma, GED or equivalent No                Discharge Plan       Row Name 06/30/25 0836       Plan    Plan Home with s.o. Derek    Patient/Family in Agreement with Plan yes    Plan Comments CM spoke to pt. Jerry at bedside. PCP and pharmacy confirmed. Pt. denies financial, transportation, or medication issues. Pt.'s S.OKeerthi Reed or son Raúl can transport at d.c. Pt.'s stated she is current with [facility] on [address]. DC barriers: IV meds, Attending MD pending.                Demographic Summary       Row Name 06/30/25 0831       General Information    Arrived From home    Referral Source admission list    Reason for Consult discharge planning    Preferred Language English       Contact Information    Permission Granted to Share Info With     Contact Information Obtained for                    Functional Status       Row Name 06/30/25 0832       Functional Status    Usual Activity Tolerance moderate    Current Activity Tolerance moderate       Physical Activity    On average, how many days per week do you engage in moderate to strenuous exercise (like a brisk walk)? 0 days    On average, how many minutes do you engage in exercise at this level? 0 min    Number of minutes of exercise per week 0       Functional Status, IADL    Medications independent    Meal Preparation assistive person  S.O. Derek    Housekeeping assistive person  S.O. Derek    Laundry assistive person  S.O. Derek    Shopping assistive person  S.O. Derek    If for any reason you need help with day-to-day activities such as bathing, preparing meals, shopping, managing finances, etc., do you get the help you need? I don't need any help                 Taniya Crowe RN    Office: [phone]  Fax: [phone]  [email or web address]

## 2025-06-30 NOTE — Clinical Note
Level of Care: Telemetry [5]   Admitting Physician: AMY GALINDO [[id number]]   Attending Physician: AMY GALINDO [[id number]]

## 2025-06-30 NOTE — H&P
Patient Care Team:  Raegan Gotti MD as PCP - General (Family Medicine)  Nilay Stevens MD as Consulting Physician (General Surgery)  Stephen Perea MD as Surgeon (Thoracic Surgery)  Makayla Navarro RN as Nurse Navigator  Mak Sosa MD as Cardiologist (Cardiology)  Anjum Cam MD as Consulting Physician (Hematology and Oncology)    Chief complaint cough, wheezing, SOA    Subjective     Patient is a 74 y.o. female with history of COPD and end-stage renal disease on dialysis who presents with cough, shortness of breath, wheezing that started last night.  She denies any fever or chills.  Cough is nonproductive.  There have been no new exposures.  She is unsure what might have triggered this as she has not been outside and not been around any ill people.  She has had a runny nose but no sore throat.  In the ER she was given IV steroids and 1 DuoNeb.  She does not have an oxygen requirement.  She is due for dialysis today and has not missed any treatments.  She did not does not have any peripheral edema.  She denies any chest pain.    Review of Systems   Constitutional:  Positive for activity change. Negative for appetite change, chills, diaphoresis, fatigue and fever.   HENT:  Positive for congestion. Negative for facial swelling and nosebleeds.    Eyes:  Negative for visual disturbance.   Respiratory:  Positive for cough, shortness of breath and wheezing.    Cardiovascular:  Negative for chest pain, palpitations and leg swelling.   Gastrointestinal:  Negative for abdominal pain, diarrhea and nausea.   Genitourinary:  Negative for dysuria.   Musculoskeletal:  Positive for back pain. Negative for arthralgias, gait problem and joint swelling.   Neurological:  Negative for dizziness, light-headedness and headaches.   Psychiatric/Behavioral:  Negative for confusion.           History  Past Medical History:   Diagnosis Date    Acute upper GI bleeding 08/26/2024    Anemia due to GI blood loss 08/27/2024     Lucas's esophagus     Barretts esophagus 04/10/2012    C. difficile colitis     Carcinoma, renal cell 04/10/2012    Carotid artery disease     -50-74% left, 16-49% right (1/19)    COPD (chronic obstructive pulmonary disease)     DM2 (diabetes mellitus, type 2)     ESRD (end stage renal disease)     on HD    Gastric ulcer     at anastomatic site    Gastroparesis     unknown    GERD (gastroesophageal reflux disease)     Gout     not current    Hemodialysis patient     mwf    Hernia, incisional     abdomen    History of colonoscopy     UTD    History of degenerative disc disease     Hyperlipidemia     Hypertension     Medicare annual wellness visit, subsequent 11/07/2020    Microscopic colitis     Nephrotic syndrome     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    Neuroendocrine tumor     of stomach    Non-small cell lung cancer 10/26/2023    Orthostatic hypotension 11/07/2017    FABY (obstructive sleep apnea)     not treating    Pedal edema     ressolved    Pneumonia 11/11/2024    Proteinuria     Renal cell cancer, right     Rib pain on right side     chronic    Rotator cuff tear     bilateral    Secondary hyperparathyroidism of renal origin 08/29/2024    Steal syndrome dialysis vascular access 04/16/2024    Stomach cancer 2016    Stroke     Tobacco abuse 07/24/2020    Uterine cancer     Vitamin B 12 deficiency     Vitamin D deficiency     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     Past Surgical History:   Procedure Laterality Date    APPENDECTOMY      ARTERIOVENOUS FISTULA Right     ARTERIOVENOUS FISTULA REPAIR      Clotted off and insert graft    ARTERIOVENOUS FISTULA/SHUNT SURGERY Left 12/30/2020    Procedure: ARTERIOVENOUS FISTULA FORMATION;  Surgeon: Jan Caicedo MD;  Location: [facility] MAIN OR;  Service: Vascular;  Laterality: Left;    ARTERIOVENOUS FISTULA/SHUNT SURGERY Left 04/23/2024    Procedure: FISTULOGRAM WITH SUBCLAVIAN ARTERY ANGIOPLASTY, fistula banding;  Surgeon: Franki Delgadillo II, MD;  Location: [facility]  HYBRID OR;  Service: Vascular;  Laterality: Left;    BREAST BIOPSY      right nipple 1981    BRONCHOSCOPY N/A 06/11/2024    Procedure: BRONCHOSCOPY WITH RIGHT LUNG WASHING;  Surgeon: Aramis Barragan MD;  Location: [facility] ENDOSCOPY;  Service: Pulmonary;  Laterality: N/A;  RML atelectasis    COLONOSCOPY N/A 11/24/2020    Procedure: COLONOSCOPY with polypectomy x 7;  Surgeon: Jeffery White MD;  Location: [facility] ENDOSCOPY;  Service: Gastroenterology;  Laterality: N/A;  post op: polyps, diverticulosis, hemorrhoids    COLONOSCOPY N/A 06/01/2023    Procedure: COLONOSCOPY with polypectomy x 3 biopsy x 1;  Surgeon: Jeffery White MD;  Location: [facility] ENDOSCOPY;  Service: Gastroenterology;  Laterality: N/A;  diverticulosis polyps    COLONOSCOPY N/A 09/04/2024    Procedure: COLONOSCOPY WITH POLYPECTOMY X 1;  Surgeon: Jeffery White MD;  Location: [facility] ENDOSCOPY;  Service: Gastroenterology;  Laterality: N/A;  POST- POLYP, DIVERTICULOSIS, INTERNAL AND EXTERNAL HEMORRHOIDS    ENDOSCOPY N/A 03/12/2022    Procedure: ESOPHAGOGASTRODUODENOSCOPY with biopsy x 1 area;  Surgeon: Javan Augustin MD;  Location: [facility] ENDOSCOPY;  Service: Gastroenterology;  Laterality: N/A;  post op: anastamosis    ENDOSCOPY N/A 10/09/2023    Procedure: ESOPHAGOGASTRODUODENOSCOPY with biopsy x3 areas;  Surgeon: Ace Campbell MD;  Location: [facility] ENDOSCOPY;  Service: Gastroenterology;  Laterality: N/A;  duodenal ulcers;irregular z line    ENDOSCOPY N/A 08/27/2024    Procedure: ESOPHAGOGASTRODUODENOSCOPY with argon plasma coagulation;  Surgeon: HILARIO Blackmon MD;  Location: [facility] ENDOSCOPY;  Service: Gastroenterology;  Laterality: N/A;  post op: AVM    ENTEROSCOPY SMALL BOWEL N/A 09/04/2024    Procedure: ESOPHAGOGASTRODUODENOSCOPY WITH SMALL BOWEL ENTEROSCOPY, ARGON PLASMA COAGULATION AND ENDOSCOPIC CLIPPING X 2 OF GASTRIC ANTRAL VASCULAR ECTASIA;  Surgeon: Jeffery White MD;  Location: [facility] ENDOSCOPY;  Service: Gastroenterology;   Laterality: N/A;  POST- GAVE    GASTRIC RESECTION      cancer    HYSTERECTOMY      LAPAROSCOPIC CHOLECYSTECTOMY      NEPHRECTOMY      right kidney removed     REDUCTION MAMMAPLASTY      TUMOR REMOVAL      boils    VENTRAL HERNIA REPAIR N/A 05/15/2025    Procedure: VENTRAL  HERNIA REPAIR LAPAROSCOPIC WITH DAVINCI ROBOT WITH MESH, EXTENSIVE LYSIS OF ADHESION;  Surgeon: Keyon Espana MD;  Location: [facility] MAIN OR;  Service: Robotics - DaVinci;  Laterality: N/A;    VENTRAL/INCISIONAL HERNIA REPAIR Right 2019    Procedure: VENTRAL/INCISIONAL HERNIA REPAIR;  Surgeon: Nilay Stevens MD;  Location: [facility] MAIN OR;  Service: General    VENTRAL/INCISIONAL HERNIA REPAIR N/A 10/01/2019    Procedure: OPEN VENTRAL/INCISIONAL HERNIA REPAIR;  Surgeon: Nilay Stevens MD;  Location: [facility] MAIN OR;  Service: General    VENTRAL/INCISIONAL HERNIA REPAIR N/A 10/11/2021    Procedure: VENTRAL/INCISIONAL HERNIA REPAIR LAPAROSCOPIC;  Surgeon: Nilay Stevens MD;  Location: [facility] MAIN OR;  Service: General;  Laterality: N/A;    VENTRAL/INCISIONAL HERNIA REPAIR N/A 2023    Procedure: VENTRAL/INCISIONAL HERNIA REPAIR with MESH;  Surgeon: Nilay Stevens MD;  Location: [facility] MAIN OR;  Service: General;  Laterality: N/A;     Family History   Problem Relation Age of Onset    Heart disease Father      Social History     Tobacco Use    Smoking status: Former     Current packs/day: 0.00     Average packs/day: 0.3 packs/day for 57.0 years (14.3 ttl pk-yrs)     Types: Cigarettes     Start date: 1964     Quit date: 2021     Years since quittin.1     Passive exposure: Past    Smokeless tobacco: Never   Vaping Use    Vaping status: Never Used   Substance Use Topics    Alcohol use: No    Drug use: Never     (Not in a hospital admission)    Allergies:  Atorvastatin calcium, Gabapentin, Niacin, and Oxycodone    Objective     Vital Signs  Temp:  [98.2 °F (36.8 °C)] 98.2 °F (36.8 °C)  Heart Rate:  [89-98] 89  Resp:  [20-22]  22  BP: (155-186)/() 155/85     Physical Exam:      General Appearance:    Alert, cooperative, in no acute distress   Head:    Normocephalic, without obvious abnormality, atraumatic   Eyes:            Lids and lashes normal, conjunctivae and sclerae normal, no   icterus, no pallor, corneas clear, PERRLA   Ears:    Ears appear intact with no abnormalities noted   Throat:   No oral lesions, no thrush, oral mucosa moist   Neck:   No adenopathy, supple, trachea midline, no thyromegaly, no   carotid bruit, no JVD   Lungs:   Diffuse wheezing    Heart:    Regular rhythm and normal rate, normal S1 and S2, no            murmur, no gallop, no rub, no click   Chest Wall:    No abnormalities observed   Abdomen:     Normal bowel sounds, no masses, no organomegaly, soft        non-tender, non-distended, no guarding, no rebound                tenderness   Extremities:   Moves all extremities well, no edema, no cyanosis, no             redness   Pulses:   Pulses palpable and equal bilaterally   Skin:   No bleeding, bruising or rash   Lymph nodes:   No palpable adenopathy   Neurologic:   Cranial nerves 2 - 12 grossly intact, sensation intact, DTR       present and equal bilaterally       Results Review:     Imaging Results (Last 24 Hours)       Procedure Component Value Units Date/Time    XR Chest 2 View [[id number]] Collected: 06/30/25 0724     Updated: 06/30/25 0730    Narrative:      XR CHEST 2 VW    Date of Exam: 6/30/2025 7:23 AM EDT    Indication: SOA, cough, wheezing    Comparison: 5/22/2025, chest CT 2/13/2025    Findings:  The heart appears mildly enlarged, as before. There is calcific atherosclerosis of the aorta. Mediastinal contour appears within normal limits. No pneumothorax.    There are new airspace opacities in the right lung base. There is a suspected small right pleural effusion. There may also be mild airspace opacities in the left base with trace left pleural effusion. Stent grafts are seen in the right  "upper arm.      Impression:      Impression:  1.New airspace opacities in the right lung base with suspected small right pleural effusion. Possible mild left basilar opacities and trace left pleural effusion. Findings could represent pneumonia and/or pulmonary edema.  2.Stable cardiomegaly.          Electronically Signed: Moses Leo    6/30/2025 7:28 AM EDT    Workstation ID: [id number]             Lab Results (last 24 hours)       Procedure Component Value Units Date/Time    High Sensitivity Troponin T 1Hr [[id number]]  (Abnormal) Collected: 06/30/25 0750    Specimen: Blood from Arm, Right Updated: 06/30/25 0821     HS Troponin T 53 ng/L      Troponin T Numeric Delta -3 ng/L      Troponin T % Delta -5    Narrative:      High Sensitive Troponin T Reference Range:  <14.0 ng/L- Negative Female for AMI  <22.0 ng/L- Negative Male for AMI  >=14 - Abnormal Female indicating possible myocardial injury.  >=22 - Abnormal Male indicating possible myocardial injury.   Clinicians would have to utilize clinical acumen, EKG, Troponin, and serial changes to determine if it is an Acute Myocardial Infarction or myocardial injury due to an underlying chronic condition.         Procalcitonin [[id number]]  (Abnormal) Collected: 06/30/25 0631    Specimen: Blood Updated: 06/30/25 0819     Procalcitonin 0.26 ng/mL     Narrative:      As a Marker for Sepsis (Non-Neonates):    1. <0.5 ng/mL represents a low risk of severe sepsis and/or septic shock.  2. >2 ng/mL represents a high risk of severe sepsis and/or septic shock.    As a Marker for Lower Respiratory Tract Infections that require antibiotic therapy:    PCT on Admission    Antibiotic Therapy       6-12 Hrs later    >0.5                Strongly Recommended  >0.25 - <0.5        Recommended   0.1 - 0.25          Discouraged              Remeasure/reassess PCT  <0.1                Strongly Discouraged     Remeasure/reassess PCT    As 28 day mortality risk marker: \"Change in Procalcitonin " "Result\" (>80% or <=80%) if Day 0 (or Day 1) and Day 4 values are available. Refer to http://[email or web address]-pct-calculator.com    Change in PCT <=80%  A decrease of PCT levels below or equal to 80% defines a positive change in PCT test result representing a higher risk for 28-day all-cause mortality of patients diagnosed with severe sepsis for septic shock.    Change in PCT >80%  A decrease of PCT levels of more than 80% defines a negative change in PCT result representing a lower risk for 28-day all-cause mortality of patients diagnosed with severe sepsis or septic shock.       Respiratory Panel PCR w/COVID-19(SARS-CoV-2) [facility]/ANTELMO/MERCEDES/[facility]RO[facility] In-House, NP Swab in UTM/VTM, 2 HR TAT - Swab, Nasopharynx [[id number]]  (Normal) Collected: 06/30/25 0648    Specimen: Swab from Nasopharynx Updated: 06/30/25 0742     ADENOVIRUS, PCR Not Detected     Coronavirus 229E Not Detected     Coronavirus HKU1 Not Detected     Coronavirus NL63 Not Detected     Coronavirus OC43 Not Detected     COVID19 Not Detected     Human Metapneumovirus Not Detected     Human Rhinovirus/Enterovirus Not Detected     Influenza A PCR Not Detected     Influenza B PCR Not Detected     Parainfluenza Virus 1 Not Detected     Parainfluenza Virus 2 Not Detected     Parainfluenza Virus 3 Not Detected     Parainfluenza Virus 4 Not Detected     RSV, PCR Not Detected     Bordetella pertussis pcr Not Detected     Bordetella parapertussis PCR Not Detected     Chlamydophila pneumoniae PCR Not Detected     Mycoplasma pneumo by PCR Not Detected    Narrative:      In the setting of a positive respiratory panel with a viral infection PLUS a negative procalcitonin without other underlying concern for bacterial infection, consider observing off antibiotics or discontinuation of antibiotics and continue supportive care. If the respiratory panel is positive for atypical bacterial infection (Bordetella pertussis, Chlamydophila pneumoniae, or Mycoplasma pneumoniae), consider " antibiotic de-escalation to target atypical bacterial infection.    BNP [[id number]]  (Abnormal) Collected: 06/30/25 0631    Specimen: Blood Updated: 06/30/25 0721     proBNP >70,000.0 pg/mL     Narrative:      This assay is used as an aid in the diagnosis of individuals suspected of having heart failure. It can be used as an aid in the diagnosis of acute decompensated heart failure (ADHF) in patients presenting with signs and symptoms of ADHF to the emergency department (ED). In addition, NT-proBNP of <300 pg/mL indicates ADHF is not likely.    Age Range Result Interpretation  NT-proBNP Concentration (pg/mL:      <50             Positive            >450                   Gray                 300-450                    Negative             <300    50-75           Positive            >900                  Gray                300-900                  Negative            <300      >75             Positive            >1800                  Gray                300-1800                  Negative            <300    Blood Gas, Mixed [[id number]]  (Abnormal) Collected: 06/30/25 0706    Specimen: Blood Updated: 06/30/25 0712     pH, mixed 7.42 pH units      PCO2 MIXED 37.3 mmHg      PO2 MIXED 39.8 mmHg      HCO3 MIXED 23.9 mmol/L      CO2 Content 25.1 mmol/L      Base Excess, Arterial -0.4 mmol/L      Comment: Serial Number: [id number]:  [id number]        O2 SATURATION MIXED 75.7 %      Hemodilution No     Site Left Radial     Cristóbal's Test Positive     Modality Room Air     FIO2 21 %      Base Deficit -0.6 mEq/liter     POC Glucose Once [[id number]]  (Normal) Collected: 06/30/25 0706    Specimen: Blood Updated: 06/30/25 0712     Glucose 92 mg/dL      Comment: Serial Number: [id number]:  [id number]       Comprehensive Metabolic Panel [[id number]]  (Abnormal) Collected: 06/30/25 0631    Specimen: Blood Updated: 06/30/25 0709     Glucose 90 mg/dL      BUN 44.8 mg/dL      Creatinine 9.70 mg/dL      Sodium 143 mmol/L      Potassium 5.6  mmol/L      Comment: Specimen hemolyzed.  Result may be falsely elevated.        Chloride 103 mmol/L      CO2 22.5 mmol/L      Calcium 9.7 mg/dL      Total Protein 7.0 g/dL      Albumin 3.9 g/dL      ALT (SGPT) 10 U/L      AST (SGOT) 18 U/L      Alkaline Phosphatase 90 U/L      Total Bilirubin 0.4 mg/dL      Globulin 3.1 gm/dL      A/G Ratio 1.3 g/dL      BUN/Creatinine Ratio 4.6     Anion Gap 17.5 mmol/L      eGFR 3.9 mL/min/1.73     Narrative:      GFR Categories in Chronic Kidney Disease (CKD)              GFR Category          GFR (mL/min/1.73)    Interpretation  G1                    90 or greater        Normal or high (1)  G2                    60-89                Mild decrease (1)  G3a                   45-59                Mild to moderate decrease  G3b                   30-44                Moderate to severe decrease  G4                    15-29                Severe decrease  G5                    14 or less           Kidney failure    (1)In the absence of evidence of kidney disease, neither GFR category G1 or G2 fulfill the criteria for CKD.    eGFR calculation 2021 CKD-EPI creatinine equation, which does not include race as a factor    Magnesium [[id number]]  (Normal) Collected: 06/30/25 0631    Specimen: Blood Updated: 06/30/25 0709     Magnesium 1.7 mg/dL     High Sensitivity Troponin T [[id number]]  (Abnormal) Collected: 06/30/25 0631    Specimen: Blood Updated: 06/30/25 0709     HS Troponin T 56 ng/L     Narrative:      High Sensitive Troponin T Reference Range:  <14.0 ng/L- Negative Female for AMI  <22.0 ng/L- Negative Male for AMI  >=14 - Abnormal Female indicating possible myocardial injury.  >=22 - Abnormal Male indicating possible myocardial injury.   Clinicians would have to utilize clinical acumen, EKG, Troponin, and serial changes to determine if it is an Acute Myocardial Infarction or myocardial injury due to an underlying chronic condition.         CBC & Differential [[id number]]  (Abnormal)  Collected: 06/30/25 0631    Specimen: Blood Updated: 06/30/25 0655    Narrative:      The following orders were created for panel order CBC & Differential.  Procedure                               Abnormality         Status                     ---------                               -----------         ------                     CBC Auto Differential[[id number]]        Abnormal            Final result                 Please view results for these tests on the individual orders.    CBC Auto Differential [[id number]]  (Abnormal) Collected: 06/30/25 0631    Specimen: Blood Updated: 06/30/25 0655     WBC 10.78 10*3/mm3      RBC 3.42 10*6/mm3      Hemoglobin 10.3 g/dL      Hematocrit 33.9 %      MCV 99.1 fL      MCH 30.1 pg      MCHC 30.4 g/dL      RDW 15.8 %      RDW-SD 57.3 fl      MPV 10.9 fL      Platelets 338 10*3/mm3      Neutrophil % 82.5 %      Lymphocyte % 6.6 %      Monocyte % 7.5 %      Eosinophil % 2.2 %      Basophil % 0.7 %      Immature Grans % 0.5 %      Neutrophils, Absolute 8.89 10*3/mm3      Lymphocytes, Absolute 0.71 10*3/mm3      Monocytes, Absolute 0.81 10*3/mm3      Eosinophils, Absolute 0.24 10*3/mm3      Basophils, Absolute 0.08 10*3/mm3      Immature Grans, Absolute 0.05 10*3/mm3      nRBC 0.0 /100 WBC     Omar Draw [[id number]] Collected: 06/30/25 0631    Specimen: Blood Updated: 06/30/25 0645    Narrative:      The following orders were created for panel order Omar Draw.  Procedure                               Abnormality         Status                     ---------                               -----------         ------                     Green Top (Gel)[[id number]]                                  Final result               Lavender Top[[id number]]                                     Final result               Gold Top - SST[[id number]]                                   Final result               Light Blue Top[[id number]]                                   Final result                  Please view results for these tests on the individual orders.    Green Top (Gel) [[id number]] Collected: 06/30/25 0631    Specimen: Blood Updated: 06/30/25 0645     Extra Tube Hold for add-ons.     Comment: Auto resulted.       Lavender Top [[id number]] Collected: 06/30/25 0631    Specimen: Blood Updated: 06/30/25 0645     Extra Tube hold for add-on     Comment: Auto resulted       Gold Top - SST [[id number]] Collected: 06/30/25 0631    Specimen: Blood Updated: 06/30/25 0645     Extra Tube Hold for add-ons.     Comment: Auto resulted.       Light Blue Top [[id number]] Collected: 06/30/25 0631    Specimen: Blood Updated: 06/30/25 0645     Extra Tube Hold for add-ons.     Comment: Auto resulted                I reviewed the patient's new clinical results.    Assessment & Plan       Gastroesophageal reflux disease    Mixed hyperlipidemia    Hypertension    Obesity (BMI 30-39.9)    Disorder of phosphorus metabolism    Arteriovenous fistula    Secondary hyperparathyroidism of renal origin    COPD exacerbation    - Clinically patient has COPD exacerbation.  Trigger is not clear.  She does not have an oxygen requirement but is wheezing fairly extensively on exam.  She has been given IV steroids and bronchodilators.  Will add Mucinex, Pulmicort and continue steroids and DuoNebs.  Do not see any evidence of bacterial infection so we will hold off on antibiotics at this point.  - Dialysis today per her routine schedule  - Metoprolol for hypertension  - Patient refuses a renal diet..  Continue phosphate binders.  - PPI for GERD  - SCDs for DVT prophylaxis        CODE STATUS:  Code status (Patient has no pulse and is not breathing):  CPR (Attempt to Resuscitate)  Medical Interventions (Patient has pulse or is breathing):  Full Support  Level of Support Discussed with:  Patient    Admission Status:  I believe this patient meets inpatient status    Expected length of stay:  2 midnights or greater    I discussed the patient's  findings and my recommendations with patient.     Angelia Taylor MD  06/30/25  09:01 EDT

## 2025-06-30 NOTE — ED PROVIDER NOTES
Subjective   History of Present Illness  74-year-old female with history of diabetes, end-stage renal disease on dialysis with a left upper arm fistula-dialysis Monday Wednesday Friday, COPD presents the ED today with complaints of shortness of breath that began last night and lasted throughout the night along with a dry cough.  Patient reports chronic diarrhea but no changes in this or black or bloody stool.  Reports that sometimes she develops a cough and shortness of breath before dialysis if she is fluid overloaded, she is due for dialysis today at 7:30 AM.  Patient denies sick contacts, chest pain, new abdominal pain, nausea or vomiting, syncope, history of blood clots.  Had hernia surgery repair on 5/15/25 with Dr. Espana and has some ongoing abdominal discomfort but no changes with this today    PCP: Shen Manriquezc: Elie  Nephro: Marcio  Cardio: Ian        Review of Systems   Constitutional:  Negative for fever.   Respiratory:  Positive for cough and shortness of breath.    Cardiovascular:  Negative for chest pain and leg swelling.   Gastrointestinal:  Positive for abdominal pain (ongoing since hernia surgery) and diarrhea (chronic). Negative for blood in stool, nausea and vomiting.       Past Medical History:   Diagnosis Date    Acute upper GI bleeding 08/26/2024    Anemia due to GI blood loss 08/27/2024    Lucas's esophagus     Barretts esophagus 04/10/2012    C. difficile colitis     Carcinoma, renal cell 04/10/2012    Carotid artery disease     -50-74% left, 16-49% right (1/19)    COPD (chronic obstructive pulmonary disease)     DM2 (diabetes mellitus, type 2)     ESRD (end stage renal disease)     on HD    Gastric ulcer     at anastomatic site    Gastroparesis     unknown    GERD (gastroesophageal reflux disease)     Gout     not current    Hemodialysis patient     mwf    Hernia, incisional     abdomen    History of colonoscopy     UTD    History of degenerative disc disease     Hyperlipidemia      "Hypertension     Medicare annual wellness visit, subsequent 11/07/2020    Microscopic colitis     Nephrotic syndrome     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    Neuroendocrine tumor     of stomach    Non-small cell lung cancer 10/26/2023    Orthostatic hypotension 11/07/2017    FABY (obstructive sleep apnea)     not treating    Pedal edema     ressolved    Pneumonia 11/11/2024    Proteinuria     Renal cell cancer, right     Rib pain on right side     chronic    Rotator cuff tear     bilateral    Secondary hyperparathyroidism of renal origin 08/29/2024    Steal syndrome dialysis vascular access 04/16/2024    Stomach cancer 2016    Stroke     Tobacco abuse 07/24/2020    Uterine cancer     Vitamin B 12 deficiency     Vitamin D deficiency     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       Allergies   Allergen Reactions    Atorvastatin Calcium Other (See Comments)     \"felt like I was on fire\"    Gabapentin Dizziness     says was unable to walk    Niacin Other (See Comments)     \"FEELS LIKE SHE IS ON FIRE\"      Oxycodone Hallucinations       Past Surgical History:   Procedure Laterality Date    APPENDECTOMY      ARTERIOVENOUS FISTULA Right     ARTERIOVENOUS FISTULA REPAIR      Clotted off and insert graft    ARTERIOVENOUS FISTULA/SHUNT SURGERY Left 12/30/2020    Procedure: ARTERIOVENOUS FISTULA FORMATION;  Surgeon: Jan Caicedo MD;  Location: [facility] MAIN OR;  Service: Vascular;  Laterality: Left;    ARTERIOVENOUS FISTULA/SHUNT SURGERY Left 04/23/2024    Procedure: FISTULOGRAM WITH SUBCLAVIAN ARTERY ANGIOPLASTY, fistula banding;  Surgeon: Franki Delgadillo II, MD;  Location: [facility] HYBRID OR;  Service: Vascular;  Laterality: Left;    BREAST BIOPSY      right nipple 1981    BRONCHOSCOPY N/A 06/11/2024    Procedure: BRONCHOSCOPY WITH RIGHT LUNG WASHING;  Surgeon: Aramis Barragan MD;  Location: [facility] ENDOSCOPY;  Service: Pulmonary;  Laterality: N/A;  RML atelectasis    COLONOSCOPY N/A 11/24/2020    Procedure: COLONOSCOPY with polypectomy " x 7;  Surgeon: Jeffery White MD;  Location: [facility] ENDOSCOPY;  Service: Gastroenterology;  Laterality: N/A;  post op: polyps, diverticulosis, hemorrhoids    COLONOSCOPY N/A 06/01/2023    Procedure: COLONOSCOPY with polypectomy x 3 biopsy x 1;  Surgeon: Jeffery White MD;  Location: [facility] ENDOSCOPY;  Service: Gastroenterology;  Laterality: N/A;  diverticulosis polyps    COLONOSCOPY N/A 09/04/2024    Procedure: COLONOSCOPY WITH POLYPECTOMY X 1;  Surgeon: Jeffery White MD;  Location: [facility] ENDOSCOPY;  Service: Gastroenterology;  Laterality: N/A;  POST- POLYP, DIVERTICULOSIS, INTERNAL AND EXTERNAL HEMORRHOIDS    ENDOSCOPY N/A 03/12/2022    Procedure: ESOPHAGOGASTRODUODENOSCOPY with biopsy x 1 area;  Surgeon: Javan Augustin MD;  Location: [facility] ENDOSCOPY;  Service: Gastroenterology;  Laterality: N/A;  post op: anastamosis    ENDOSCOPY N/A 10/09/2023    Procedure: ESOPHAGOGASTRODUODENOSCOPY with biopsy x3 areas;  Surgeon: Ace Campbell MD;  Location: [facility] ENDOSCOPY;  Service: Gastroenterology;  Laterality: N/A;  duodenal ulcers;irregular z line    ENDOSCOPY N/A 08/27/2024    Procedure: ESOPHAGOGASTRODUODENOSCOPY with argon plasma coagulation;  Surgeon: HILARIO Blackmon MD;  Location: [facility] ENDOSCOPY;  Service: Gastroenterology;  Laterality: N/A;  post op: AVM    ENTEROSCOPY SMALL BOWEL N/A 09/04/2024    Procedure: ESOPHAGOGASTRODUODENOSCOPY WITH SMALL BOWEL ENTEROSCOPY, ARGON PLASMA COAGULATION AND ENDOSCOPIC CLIPPING X 2 OF GASTRIC ANTRAL VASCULAR ECTASIA;  Surgeon: Jeffery White MD;  Location: [facility] ENDOSCOPY;  Service: Gastroenterology;  Laterality: N/A;  POST- GAVE    GASTRIC RESECTION      cancer    HYSTERECTOMY      LAPAROSCOPIC CHOLECYSTECTOMY      NEPHRECTOMY      right kidney removed     REDUCTION MAMMAPLASTY      TUMOR REMOVAL      boils    VENTRAL HERNIA REPAIR N/A 05/15/2025    Procedure: VENTRAL  HERNIA REPAIR LAPAROSCOPIC WITH DAVINCI ROBOT WITH MESH, EXTENSIVE LYSIS OF ADHESION;   Surgeon: Keyon Espana MD;  Location: [facility] MAIN OR;  Service: Robotics - DaVinci;  Laterality: N/A;    VENTRAL/INCISIONAL HERNIA REPAIR Right 2019    Procedure: VENTRAL/INCISIONAL HERNIA REPAIR;  Surgeon: Nilay Stevens MD;  Location: [facility] MAIN OR;  Service: General    VENTRAL/INCISIONAL HERNIA REPAIR N/A 10/01/2019    Procedure: OPEN VENTRAL/INCISIONAL HERNIA REPAIR;  Surgeon: Nilay Stevens MD;  Location: [facility] MAIN OR;  Service: General    VENTRAL/INCISIONAL HERNIA REPAIR N/A 10/11/2021    Procedure: VENTRAL/INCISIONAL HERNIA REPAIR LAPAROSCOPIC;  Surgeon: Nilay Stevens MD;  Location: [facility] MAIN OR;  Service: General;  Laterality: N/A;    VENTRAL/INCISIONAL HERNIA REPAIR N/A 2023    Procedure: VENTRAL/INCISIONAL HERNIA REPAIR with MESH;  Surgeon: Nilay Stevens MD;  Location: [facility] MAIN OR;  Service: General;  Laterality: N/A;       Family History   Problem Relation Age of Onset    Heart disease Father        Social History     Socioeconomic History    Marital status: Single   Tobacco Use    Smoking status: Former     Current packs/day: 0.00     Average packs/day: 0.3 packs/day for 57.0 years (14.3 ttl pk-yrs)     Types: Cigarettes     Start date: 1964     Quit date: 2021     Years since quittin.1     Passive exposure: Past    Smokeless tobacco: Never   Vaping Use    Vaping status: Never Used   Substance and Sexual Activity    Alcohol use: No    Drug use: Never    Sexual activity: Defer     Partners: Male           Objective   Physical Exam  Vitals reviewed.   HENT:      Head: Normocephalic.   Eyes:      Extraocular Movements: Extraocular movements intact.      Conjunctiva/sclera: Conjunctivae normal.   Cardiovascular:      Rate and Rhythm: Normal rate and regular rhythm.      Pulses: Normal pulses.      Heart sounds: Normal heart sounds.   Pulmonary:      Breath sounds: Wheezing present.      Comments: Patient breathing about 22 to 24 breaths/min.  On room air at this  "time.  Inspiratory and expiratory wheezes bilaterally  Abdominal:      General: Bowel sounds are normal.      Palpations: Abdomen is soft.      Tenderness: There is abdominal tenderness.      Comments: Mild tenderness to the right side of the abdomen with palpation however patient reports has been ongoing since hernia surgery and is unchanged today.  No peritonitis or rigidity   Musculoskeletal:         General: Normal range of motion.      Right lower leg: No edema.      Left lower leg: No edema.   Skin:     General: Skin is warm.   Neurological:      Mental Status: She is alert and oriented to person, place, and time.   Psychiatric:         Mood and Affect: Mood normal.         Behavior: Behavior normal.         Procedures    EKG independently interpreted by Dr. Woodward sinus rhythm at a rate of 93.  Compared to previous from 5/22/2025 that was no significant change, sinus rhythm at a rate of 69           ED Course      /80   Pulse 90   Temp 98.2 °F (36.8 °C) (Oral)   Resp 22   Ht 152.4 cm (60\")   SpO2 92%   BMI 30.47 kg/m²   Labs Reviewed   COMPREHENSIVE METABOLIC PANEL - Abnormal; Notable for the following components:       Result Value    BUN 44.8 (*)     Creatinine 9.70 (*)     Potassium 5.6 (*)     BUN/Creatinine Ratio 4.6 (*)     Anion Gap 17.5 (*)     eGFR 3.9 (*)     All other components within normal limits    Narrative:     GFR Categories in Chronic Kidney Disease (CKD)              GFR Category          GFR (mL/min/1.73)    Interpretation  G1                    90 or greater        Normal or high (1)  G2                    60-89                Mild decrease (1)  G3a                   45-59                Mild to moderate decrease  G3b                   30-44                Moderate to severe decrease  G4                    15-29                Severe decrease  G5                    14 or less           Kidney failure    (1)In the absence of evidence of kidney disease, neither GFR category G1 " or G2 fulfill the criteria for CKD.    eGFR calculation 2021 CKD-EPI creatinine equation, which does not include race as a factor   BNP (IN-HOUSE) - Abnormal; Notable for the following components:    proBNP >70,000.0 (*)     All other components within normal limits    Narrative:     This assay is used as an aid in the diagnosis of individuals suspected of having heart failure. It can be used as an aid in the diagnosis of acute decompensated heart failure (ADHF) in patients presenting with signs and symptoms of ADHF to the emergency department (ED). In addition, NT-proBNP of <300 pg/mL indicates ADHF is not likely.    Age Range Result Interpretation  NT-proBNP Concentration (pg/mL:      <50             Positive            >450                   Gray                 300-450                    Negative             <300    50-75           Positive            >900                  Gray                300-900                  Negative            <300      >75             Positive            >1800                  Gray                300-1800                  Negative            <300   TROPONIN - Abnormal; Notable for the following components:    HS Troponin T 56 (*)     All other components within normal limits    Narrative:     High Sensitive Troponin T Reference Range:  <14.0 ng/L- Negative Female for AMI  <22.0 ng/L- Negative Male for AMI  >=14 - Abnormal Female indicating possible myocardial injury.  >=22 - Abnormal Male indicating possible myocardial injury.   Clinicians would have to utilize clinical acumen, EKG, Troponin, and serial changes to determine if it is an Acute Myocardial Infarction or myocardial injury due to an underlying chronic condition.        CBC WITH AUTO DIFFERENTIAL - Abnormal; Notable for the following components:    RBC 3.42 (*)     Hemoglobin 10.3 (*)     Hematocrit 33.9 (*)     MCV 99.1 (*)     MCHC 30.4 (*)     RDW 15.8 (*)     RDW-SD 57.3 (*)     Neutrophil % 82.5 (*)     Lymphocyte % 6.6  (*)     Neutrophils, Absolute 8.89 (*)     All other components within normal limits   BLOOD GAS, MIXED - Abnormal; Notable for the following components:    Base Excess, Arterial -0.4 (*)     All other components within normal limits   RESPIRATORY PANEL PCR W/ COVID-19 (SARS-COV-2), NP SWAB IN UTM/VTP, 2 HR TAT - Normal    Narrative:     In the setting of a positive respiratory panel with a viral infection PLUS a negative procalcitonin without other underlying concern for bacterial infection, consider observing off antibiotics or discontinuation of antibiotics and continue supportive care. If the respiratory panel is positive for atypical bacterial infection (Bordetella pertussis, Chlamydophila pneumoniae, or Mycoplasma pneumoniae), consider antibiotic de-escalation to target atypical bacterial infection.   MAGNESIUM - Normal   POCT GLUCOSE FINGERSTICK - Normal   RAINBOW DRAW    Narrative:     The following orders were created for panel order [place] Draw.  Procedure                               Abnormality         Status                     ---------                               -----------         ------                     Green Top (Gel)[[id number]]                                  Final result               Lavender Top[[id number]]                                     Final result               Gold Top - SST[[id number]]                                   Final result               Light Blue Top[[id number]]                                   Final result                 Please view results for these tests on the individual orders.   BLOOD GAS, ARTERIAL   HIGH SENSITIVITIY TROPONIN T 1HR   PROCALCITONIN   GREEN TOP   LAVENDER TOP   GOLD TOP - SST   LIGHT BLUE TOP   CBC AND DIFFERENTIAL    Narrative:     The following orders were created for panel order CBC & Differential.  Procedure                               Abnormality         Status                     ---------                               -----------          ------                     CBC Auto Differential[[id number]]        Abnormal            Final result                 Please view results for these tests on the individual orders.     Medications   sodium chloride 0.9 % flush 10 mL (has no administration in time range)   sodium chloride 0.9 % flush 10 mL (has no administration in time range)   ipratropium-albuterol (DUO-NEB) nebulizer solution 3 mL (3 mL Nebulization Given 6/30/25 0707)   methylPREDNISolone sodium succinate (SOLU-Medrol) injection 125 mg (125 mg Intravenous Given 6/30/25 0708)     XR Chest 2 View  Result Date: 6/30/2025  Impression: 1.New airspace opacities in the right lung base with suspected small right pleural effusion. Possible mild left basilar opacities and trace left pleural effusion. Findings could represent pneumonia and/or pulmonary edema. 2.Stable cardiomegaly. Electronically Signed: Moses Bailey  6/30/2025 7:28 AM EDT  Workstation ID: [id number]                                                     Medical Decision Making  Patient was seen for above complaints.  IV was established was obtained to assess for electrolyte abnormalities, cardiac function, infection, kidney function.  Initial troponin 56 with repeat pending at time of admission, creatinine elevated at 9.7-on chart review from 5/22/2025 creatinine was 6.87, BNP greater than 70,000 which is along recent baseline, mag 1.7, hemoglobin 10.3 which is along baseline, white blood cell count 10.78.  Respiratory panel negative.  ABG attempted to be obtained however was mixed with venous blood however pH was 7.42.  Chest x-ray independently interpreted by the radiologist as:  1.New airspace opacities in the right lung base with suspected small right pleural effusion. Possible mild left basilar opacities and trace left pleural effusion. Findings could represent pneumonia and/or pulmonary edema.   2.Stable cardiomegaly.     Believe patient to have pulmonary edema versus pneumonia.  Patient  denies any fever or productive cough, no leukocytosis or significant tachycardia.  Procalcitonin added but pending.  Patient was on room air during ER course.  Initially had inspiratory and expiratory wheezes.  Was started on steroids and given a breathing treatment, patient had improvement of breath sounds along with her shortness of breath on reevaluation.  Patient is due for dialysis this morning, potassium 5.6 with an elevated kidney function.  Will be admitted to the [facility] service for further evaluation and management.  Likely needs dialysis and management of COPD exacerbation.  Discussed with Cindy ISSA with the [facility] service who agrees to admit patient.  Discussed plan of care with patient who verbalized understanding and was agreeable with plan of care at this time.    Based on the clinical findings at this time I anticipate the patient will require a 2 midnight stay      Problems Addressed:  Cardiomegaly: acute illness or injury  Dyspnea, unspecified type: acute illness or injury  ESRD (end stage renal disease) on dialysis: acute illness or injury  Hyperkalemia: acute illness or injury  Nonproductive cough: acute illness or injury  Pleural effusion: acute illness or injury    Amount and/or Complexity of Data Reviewed  Labs: ordered. Decision-making details documented in ED Course.  Radiology: ordered and independent interpretation performed. Decision-making details documented in ED Course.  ECG/medicine tests: ordered and independent interpretation performed. Decision-making details documented in ED Course.    Risk  Prescription drug management.  Decision regarding hospitalization.        Final diagnoses:   Dyspnea, unspecified type   ESRD (end stage renal disease) on dialysis   Hyperkalemia   Nonproductive cough   Pleural effusion   Cardiomegaly       ED Disposition  ED Disposition       ED Disposition   Decision to Admit    Condition   --    Comment   Level of Care: Telemetry [5]   Admitting Physician:  AMY GALINDO [[id number]]   Attending Physician: AMY GALINDO [[id number]]                 No follow-up provider specified.       Medication List      No changes were made to your prescriptions during this visit.            Parul Anaya, MAEGAN  06/30/25 0801

## 2025-06-30 NOTE — SIGNIFICANT NOTE
Pt. Came off 40 minutes early from HD TX d/t abd pain: 5/10 - pt. Requests no pain intervenx. Vss. Fluids reinfused and pt. educacted on HD TX compliance.     2,7 UF  49.5 BVP 2:20 time on HD.     RPT to Ching.

## 2025-07-01 VITALS
DIASTOLIC BLOOD PRESSURE: 80 MMHG | HEART RATE: 100 BPM | RESPIRATION RATE: 18 BRPM | BODY MASS INDEX: 30.63 KG/M2 | WEIGHT: 156 LBS | SYSTOLIC BLOOD PRESSURE: 173 MMHG | TEMPERATURE: 97.7 F | HEIGHT: 60 IN | OXYGEN SATURATION: 98 %

## 2025-07-01 LAB
ALBUMIN SERPL-MCNC: 4 G/DL (ref 3.5–5.2)
ANION GAP SERPL CALCULATED.3IONS-SCNC: 17.1 MMOL/L (ref 5–15)
BASOPHILS # BLD AUTO: 0.01 10*3/MM3 (ref 0–0.2)
BASOPHILS NFR BLD AUTO: 0.1 % (ref 0–1.5)
BUN SERPL-MCNC: 41 MG/DL (ref 8–23)
BUN/CREAT SERPL: 5.2 (ref 7–25)
CALCIUM SPEC-SCNC: 10.2 MG/DL (ref 8.6–10.5)
CHLORIDE SERPL-SCNC: 98 MMOL/L (ref 98–107)
CO2 SERPL-SCNC: 22.9 MMOL/L (ref 22–29)
CREAT SERPL-MCNC: 7.81 MG/DL (ref 0.57–1)
DEPRECATED RDW RBC AUTO: 55.3 FL (ref 37–54)
EGFRCR SERPLBLD CKD-EPI 2021: 5 ML/MIN/1.73
EOSINOPHIL # BLD AUTO: 0 10*3/MM3 (ref 0–0.4)
EOSINOPHIL NFR BLD AUTO: 0 % (ref 0.3–6.2)
ERYTHROCYTE [DISTWIDTH] IN BLOOD BY AUTOMATED COUNT: 15.6 % (ref 12.3–15.4)
GLUCOSE SERPL-MCNC: 190 MG/DL (ref 65–99)
HCT VFR BLD AUTO: 31.9 % (ref 34–46.6)
HGB BLD-MCNC: 9.9 G/DL (ref 12–15.9)
IMM GRANULOCYTES # BLD AUTO: 0.08 10*3/MM3 (ref 0–0.05)
IMM GRANULOCYTES NFR BLD AUTO: 0.9 % (ref 0–0.5)
LYMPHOCYTES # BLD AUTO: 0.36 10*3/MM3 (ref 0.7–3.1)
LYMPHOCYTES NFR BLD AUTO: 4.2 % (ref 19.6–45.3)
MCH RBC QN AUTO: 30.1 PG (ref 26.6–33)
MCHC RBC AUTO-ENTMCNC: 31 G/DL (ref 31.5–35.7)
MCV RBC AUTO: 97 FL (ref 79–97)
MONOCYTES # BLD AUTO: 0.21 10*3/MM3 (ref 0.1–0.9)
MONOCYTES NFR BLD AUTO: 2.4 % (ref 5–12)
NEUTROPHILS NFR BLD AUTO: 8 10*3/MM3 (ref 1.7–7)
NEUTROPHILS NFR BLD AUTO: 92.4 % (ref 42.7–76)
NRBC BLD AUTO-RTO: 0 /100 WBC (ref 0–0.2)
PHOSPHATE SERPL-MCNC: 6 MG/DL (ref 2.5–4.5)
PLATELET # BLD AUTO: 295 10*3/MM3 (ref 140–450)
PMV BLD AUTO: 11.1 FL (ref 6–12)
POTASSIUM SERPL-SCNC: 5.5 MMOL/L (ref 3.5–5.2)
QT INTERVAL: 366 MS
QTC INTERVAL: 456 MS
RBC # BLD AUTO: 3.29 10*6/MM3 (ref 3.77–5.28)
SODIUM SERPL-SCNC: 138 MMOL/L (ref 136–145)
WBC NRBC COR # BLD AUTO: 8.66 10*3/MM3 (ref 3.4–10.8)

## 2025-07-01 PROCEDURE — 94761 N-INVAS EAR/PLS OXIMETRY MLT: CPT

## 2025-07-01 PROCEDURE — 25010000002 METHYLPREDNISOLONE PER 125 MG: Performed by: INTERNAL MEDICINE

## 2025-07-01 PROCEDURE — 85025 COMPLETE CBC W/AUTO DIFF WBC: CPT | Performed by: INTERNAL MEDICINE

## 2025-07-01 PROCEDURE — 80069 RENAL FUNCTION PANEL: CPT | Performed by: INTERNAL MEDICINE

## 2025-07-01 PROCEDURE — 63710000001 PREDNISONE PER 5 MG: Performed by: NURSE PRACTITIONER

## 2025-07-01 PROCEDURE — 94664 DEMO&/EVAL PT USE INHALER: CPT

## 2025-07-01 PROCEDURE — 96376 TX/PRO/DX INJ SAME DRUG ADON: CPT

## 2025-07-01 PROCEDURE — 94799 UNLISTED PULMONARY SVC/PX: CPT

## 2025-07-01 PROCEDURE — 63710000001 PREDNISONE PER 1 MG: Performed by: NURSE PRACTITIONER

## 2025-07-01 PROCEDURE — G0378 HOSPITAL OBSERVATION PER HR: HCPCS

## 2025-07-01 RX ORDER — PREDNISONE 50 MG/1
50 TABLET ORAL
Qty: 3 TABLET | Refills: 0 | Status: SHIPPED | OUTPATIENT
Start: 2025-07-01 | End: 2025-07-04

## 2025-07-01 RX ORDER — METOPROLOL SUCCINATE 50 MG/1
50 TABLET, EXTENDED RELEASE ORAL
Status: DISCONTINUED | OUTPATIENT
Start: 2025-07-02 | End: 2025-07-01 | Stop reason: HOSPADM

## 2025-07-01 RX ORDER — METOPROLOL SUCCINATE 50 MG/1
50 TABLET, EXTENDED RELEASE ORAL
Qty: 30 TABLET | Refills: 0 | Status: SHIPPED | OUTPATIENT
Start: 2025-07-02

## 2025-07-01 RX ORDER — SEVELAMER CARBONATE 800 MG/1
1600 TABLET, FILM COATED ORAL
Qty: 120 TABLET | Refills: 0 | Status: SHIPPED | OUTPATIENT
Start: 2025-07-01

## 2025-07-01 RX ORDER — GUAIFENESIN 600 MG/1
1200 TABLET, EXTENDED RELEASE ORAL EVERY 12 HOURS SCHEDULED
Qty: 20 TABLET | Refills: 0 | Status: SHIPPED | OUTPATIENT
Start: 2025-07-01

## 2025-07-01 RX ORDER — IPRATROPIUM BROMIDE AND ALBUTEROL SULFATE 2.5; .5 MG/3ML; MG/3ML
3 SOLUTION RESPIRATORY (INHALATION)
Qty: 360 ML | Refills: 12 | Status: SHIPPED | OUTPATIENT
Start: 2025-07-01

## 2025-07-01 RX ADMIN — BUDESONIDE 0.5 MG: 0.5 SUSPENSION RESPIRATORY (INHALATION) at 07:50

## 2025-07-01 RX ADMIN — SODIUM ZIRCONIUM CYCLOSILICATE 10 G: 10 POWDER, FOR SUSPENSION ORAL at 12:56

## 2025-07-01 RX ADMIN — SODIUM BICARBONATE 1300 MG: 650 TABLET ORAL at 09:16

## 2025-07-01 RX ADMIN — GUAIFENESIN 1200 MG: 600 TABLET, EXTENDED RELEASE ORAL at 09:16

## 2025-07-01 RX ADMIN — BENZONATATE 200 MG: 100 CAPSULE ORAL at 05:08

## 2025-07-01 RX ADMIN — PREDNISONE 50 MG: 10 TABLET ORAL at 12:55

## 2025-07-01 RX ADMIN — SEVELAMER CARBONATE 1600 MG: 800 TABLET, FILM COATED ORAL at 09:16

## 2025-07-01 RX ADMIN — PANTOPRAZOLE SODIUM 40 MG: 40 TABLET, DELAYED RELEASE ORAL at 05:08

## 2025-07-01 RX ADMIN — Medication 10 ML: at 09:18

## 2025-07-01 RX ADMIN — CALCIUM ACETATE 1334 MG: 667 CAPSULE ORAL at 09:15

## 2025-07-01 RX ADMIN — IPRATROPIUM BROMIDE AND ALBUTEROL SULFATE 3 ML: .5; 3 SOLUTION RESPIRATORY (INHALATION) at 07:50

## 2025-07-01 RX ADMIN — SODIUM ZIRCONIUM CYCLOSILICATE 10 G: 10 POWDER, FOR SUSPENSION ORAL at 09:15

## 2025-07-01 RX ADMIN — METHYLPREDNISOLONE SODIUM SUCCINATE 60 MG: 125 INJECTION, POWDER, FOR SOLUTION INTRAMUSCULAR; INTRAVENOUS at 05:08

## 2025-07-01 RX ADMIN — Medication 10 ML: at 11:46

## 2025-07-01 RX ADMIN — Medication 1000 UNITS: at 09:16

## 2025-07-01 NOTE — PROGRESS NOTES
[facility] Progress Note      Patient Name: Sherry Lobato  : 1950  MRN: [id number]  Primary Care Physician:  Raegan Gotti MD  Date of admission: 2025    Subjective     Interval History:     Patient still has cough and wheezing  Dyspnea better  Denies any chest pain, nausea or vomiting    Review of Systems:   As noted above    Objective     Vitals:   Temp:  [97.2 °F (36.2 °C)-98.6 °F (37 °C)] 97.8 °F (36.6 °C)  Heart Rate:  [75-99] 94  Resp:  [14-24] 16  BP: ()/(56-92) 90/68  Flow (L/min) (Oxygen Therapy):  [2] 2    Intake/Output Summary (Last 24 hours) at 2025 0833  Last data filed at 2025 0831  Gross per 24 hour   Intake 480 ml   Output 2700 ml   Net -2220 ml       Physical Exam:    General Appearance: NAD  HEENT: oral mucosa normal, nonicteric sclera  Neck: supple, no JVD  Lungs: Lateral wheezing  Heart: RRR, normal S1 and S2  Abdomen: soft, nondistended  Extremities: no edema  Neuro: Awake alert and moving all extremities    Scheduled Meds:     budesonide, 0.5 mg, Nebulization, BID - RT  calcium acetate, 1,334 mg, Oral, BID  cholecalciferol, 1,000 Units, Oral, Daily  guaiFENesin, 1,200 mg, Oral, Q12H  ipratropium-albuterol, 3 mL, Nebulization, 4x Daily - RT  methylPREDNISolone sodium succinate, 60 mg, Intravenous, Q12H  metoprolol succinate XL, 50 mg, Oral, BID  pantoprazole, 40 mg, Oral, Q AM  sevelamer, 1,600 mg, Oral, BID AC  sodium bicarbonate, 1,300 mg, Oral, TID  sodium chloride, 10 mL, Intravenous, Q12H  sodium chloride, 10 mL, Intravenous, Q12H      IV Meds:        Results Reviewed:   I have personally reviewed the results from the time of this admission to 2025 08:33 EDT     Results from last 7 days   Lab Units 25  0221 25  0631 25  0000   SODIUM mmol/L 138 143  --    POTASSIUM mmol/L 5.5* 5.6* 5.8*   CHLORIDE mmol/L 98 103  --    CO2 mmol/L 22.9 22.5  --    BUN mg/dL 41.0* 44.8*  --    CREATININE mg/dL 7.81* 9.70*   --    CALCIUM mg/dL 10.2 9.7  --    BILIRUBIN mg/dL  --  0.4  --    ALK PHOS U/L  --  90  --    ALT (SGPT) U/L  --  10  --    AST (SGOT) U/L  --  18  --    GLUCOSE mg/dL 190* 90  --      Estimated Creatinine Clearance: 5.5 mL/min (A) (by C-G formula based on SCr of 7.81 mg/dL (H)).  Results from last 7 days   Lab Units 07/01/25 0221 06/30/25  0631   MAGNESIUM mg/dL  --  1.7   PHOSPHORUS mg/dL 6.0*  --          Results from last 7 days   Lab Units 07/01/25 0221 06/30/25  0631 06/25/25  0000   WBC 10*3/mm3 8.66 10.78  --    HEMOGLOBIN g/dL 9.9* 10.3* 10.9*  32.7*   PLATELETS 10*3/mm3 295 338  --            Assessment / Plan     ASSESSMENT:    ESRD.  Patient gets dialysis Monday Wednesday and Friday as outpatient.  Mild hyperkalemia.  Volume status better   Hyperkalemia  Hypertension with chronic kidney disease.  Blood pressure low this morning  Anemia in chronic kidney disease.  Hemoglobin acceptable  COPD exacerbation.  on IV steroids    PLAN:  Hemodialysis tomorrow  Starting Lokelma for hyperkalemia  Low potassium diet  Decrease metoprolol dose  Surveillance labs    Faustino Buckley MD  07/01/25  08:33 EDT    [facility]  [phone]

## 2025-07-01 NOTE — DISCHARGE SUMMARY
Date of Discharge:  7/1/2025    Discharge Diagnosis:   Gastroesophageal reflux disease  Mixed hyperlipidemia  Hypertension  Obesity (BMI 30-39.9)  Disorder of phosphorus metabolism  Arteriovenous fistula  Secondary hyperparathyroidism of renal origin  COPD exacerbation    Presenting Problem/History of Present Illness  Active Hospital Problems    Diagnosis  POA    **COPD exacerbation [J44.1]  Yes    Secondary hyperparathyroidism of renal origin [N25.81]  Yes    Arteriovenous fistula [I77.0]  Yes    Disorder of phosphorus metabolism [E83.30]  Yes    Obesity (BMI 30-39.9) [E66.9]  Yes    ESRD (end stage renal disease) [N18.6]  Yes    Mixed hyperlipidemia [E78.2]  Yes    Hypertension [I10]  Yes    Gastroesophageal reflux disease [K21.9]  Yes      Resolved Hospital Problems   No resolved problems to display.          Hospital Course    Patient is a 74 y.o. female presented with history of COPD and end-stage renal disease on dialysis who presents with cough, shortness of breath, wheezing that started last night. She was treated with steroids and breathing treatments with improvement overnight. No further wheezing and patient states back to baseline and wants to discharge. She has a nebulizer at home but has been out of treatments.       Procedures Performed         Consults:   Consults       Date and Time Order Name Status Description    6/30/2025  8:50 AM Inpatient Nephrology Consult Completed     6/30/2025  7:49 AM Family Medicine Consult              Pertinent Test Results:    Lab Results (most recent)       Procedure Component Value Units Date/Time    Renal Function Panel [[id number]]  (Abnormal) Collected: 07/01/25 0221    Specimen: Blood Updated: 07/01/25 0259     Glucose 190 mg/dL      BUN 41.0 mg/dL      Creatinine 7.81 mg/dL      Sodium 138 mmol/L      Potassium 5.5 mmol/L      Chloride 98 mmol/L      CO2 22.9 mmol/L      Calcium 10.2 mg/dL      Albumin 4.0 g/dL      Phosphorus 6.0 mg/dL      Anion Gap 17.1  mmol/L      BUN/Creatinine Ratio 5.2     eGFR 5.0 mL/min/1.73     Narrative:      GFR Categories in Chronic Kidney Disease (CKD)              GFR Category          GFR (mL/min/1.73)    Interpretation  G1                    90 or greater        Normal or high (1)  G2                    60-89                Mild decrease (1)  G3a                   45-59                Mild to moderate decrease  G3b                   30-44                Moderate to severe decrease  G4                    15-29                Severe decrease  G5                    14 or less           Kidney failure    (1)In the absence of evidence of kidney disease, neither GFR category G1 or G2 fulfill the criteria for CKD.    eGFR calculation 2021 CKD-EPI creatinine equation, which does not include race as a factor    CBC & Differential [[id number]]  (Abnormal) Collected: 07/01/25 0221    Specimen: Blood Updated: 07/01/25 0238    Narrative:      The following orders were created for panel order CBC & Differential.  Procedure                               Abnormality         Status                     ---------                               -----------         ------                     CBC Auto Differential[[id number]]        Abnormal            Final result                 Please view results for these tests on the individual orders.    CBC Auto Differential [[id number]]  (Abnormal) Collected: 07/01/25 0221    Specimen: Blood Updated: 07/01/25 0238     WBC 8.66 10*3/mm3      RBC 3.29 10*6/mm3      Hemoglobin 9.9 g/dL      Hematocrit 31.9 %      MCV 97.0 fL      MCH 30.1 pg      MCHC 31.0 g/dL      RDW 15.6 %      RDW-SD 55.3 fl      MPV 11.1 fL      Platelets 295 10*3/mm3      Neutrophil % 92.4 %      Lymphocyte % 4.2 %      Monocyte % 2.4 %      Eosinophil % 0.0 %      Basophil % 0.1 %      Immature Grans % 0.9 %      Neutrophils, Absolute 8.00 10*3/mm3      Lymphocytes, Absolute 0.36 10*3/mm3      Monocytes, Absolute 0.21 10*3/mm3      Eosinophils,  Absolute 0.00 10*3/mm3      Basophils, Absolute 0.01 10*3/mm3      Immature Grans, Absolute 0.08 10*3/mm3      nRBC 0.0 /100 WBC     Hepatitis B Surface Antibody [[id number]] Collected: 06/30/25 0631    Specimen: Blood Updated: 06/30/25 1622     Hep B S Ab Reactive    Narrative:      Non-Reactive - Individual is considered to be not immune to infection with HBV.    Equivocal - Unable to determine if anti-HBs is present at levels consistent with immunity. The individual should be further assessed by associated risk factors and the use of additional diagnositc information, or another sample may be collected and tested.    Reactive - Anti-HBs concentration detected at >10 mIU/mL. Individual is considered to be immune to infection with HBV.      Results may be falsely decreased if patient taking Biotin.      Hepatitis B Surface Antigen [[id number]]  (Normal) Collected: 06/30/25 0631    Specimen: Blood Updated: 06/30/25 1016     Hepatitis B Surface Ag Non-Reactive    High Sensitivity Troponin T 1Hr [[id number]]  (Abnormal) Collected: 06/30/25 0750    Specimen: Blood from Arm, Right Updated: 06/30/25 0821     HS Troponin T 53 ng/L      Troponin T Numeric Delta -3 ng/L      Troponin T % Delta -5    Narrative:      High Sensitive Troponin T Reference Range:  <14.0 ng/L- Negative Female for AMI  <22.0 ng/L- Negative Male for AMI  >=14 - Abnormal Female indicating possible myocardial injury.  >=22 - Abnormal Male indicating possible myocardial injury.   Clinicians would have to utilize clinical acumen, EKG, Troponin, and serial changes to determine if it is an Acute Myocardial Infarction or myocardial injury due to an underlying chronic condition.         Procalcitonin [[id number]]  (Abnormal) Collected: 06/30/25 0631    Specimen: Blood Updated: 06/30/25 0819     Procalcitonin 0.26 ng/mL     Narrative:      As a Marker for Sepsis (Non-Neonates):    1. <0.5 ng/mL represents a low risk of severe sepsis and/or septic shock.  2. >2  "ng/mL represents a high risk of severe sepsis and/or septic shock.    As a Marker for Lower Respiratory Tract Infections that require antibiotic therapy:    PCT on Admission    Antibiotic Therapy       6-12 Hrs later    >0.5                Strongly Recommended  >0.25 - <0.5        Recommended   0.1 - 0.25          Discouraged              Remeasure/reassess PCT  <0.1                Strongly Discouraged     Remeasure/reassess PCT    As 28 day mortality risk marker: \"Change in Procalcitonin Result\" (>80% or <=80%) if Day 0 (or Day 1) and Day 4 values are available. Refer to [email or web address]    Change in PCT <=80%  A decrease of PCT levels below or equal to 80% defines a positive change in PCT test result representing a higher risk for 28-day all-cause mortality of patients diagnosed with severe sepsis for septic shock.    Change in PCT >80%  A decrease of PCT levels of more than 80% defines a negative change in PCT result representing a lower risk for 28-day all-cause mortality of patients diagnosed with severe sepsis or septic shock.       Respiratory Panel PCR w/COVID-19(SARS-CoV-2) [facility] In-House, NP Swab in UTM/VTM, 2 HR TAT - Swab, Nasopharynx [[id number]]  (Normal) Collected: 06/30/25 0648    Specimen: Swab from Nasopharynx Updated: 06/30/25 0742     ADENOVIRUS, PCR Not Detected     Coronavirus 229E Not Detected     Coronavirus HKU1 Not Detected     Coronavirus NL63 Not Detected     Coronavirus OC43 Not Detected     COVID19 Not Detected     Human Metapneumovirus Not Detected     Human Rhinovirus/Enterovirus Not Detected     Influenza A PCR Not Detected     Influenza B PCR Not Detected     Parainfluenza Virus 1 Not Detected     Parainfluenza Virus 2 Not Detected     Parainfluenza Virus 3 Not Detected     Parainfluenza Virus 4 Not Detected     RSV, PCR Not Detected     Bordetella pertussis pcr Not Detected     Bordetella parapertussis PCR Not Detected     Chlamydophila " pneumoniae PCR Not Detected     Mycoplasma pneumo by PCR Not Detected    Narrative:      In the setting of a positive respiratory panel with a viral infection PLUS a negative procalcitonin without other underlying concern for bacterial infection, consider observing off antibiotics or discontinuation of antibiotics and continue supportive care. If the respiratory panel is positive for atypical bacterial infection (Bordetella pertussis, Chlamydophila pneumoniae, or Mycoplasma pneumoniae), consider antibiotic de-escalation to target atypical bacterial infection.    BNP [[id number]]  (Abnormal) Collected: 06/30/25 0631    Specimen: Blood Updated: 06/30/25 0721     proBNP >70,000.0 pg/mL     Narrative:      This assay is used as an aid in the diagnosis of individuals suspected of having heart failure. It can be used as an aid in the diagnosis of acute decompensated heart failure (ADHF) in patients presenting with signs and symptoms of ADHF to the emergency department (ED). In addition, NT-proBNP of <300 pg/mL indicates ADHF is not likely.    Age Range Result Interpretation  NT-proBNP Concentration (pg/mL:      <50             Positive            >450                   Gray                 300-450                    Negative             <300    50-75           Positive            >900                  Gray                300-900                  Negative            <300      >75             Positive            >1800                  Gray                300-1800                  Negative            <300    Blood Gas, Mixed [[id number]]  (Abnormal) Collected: 06/30/25 0706    Specimen: Blood Updated: 06/30/25 0712     pH, mixed 7.42 pH units      PCO2 MIXED 37.3 mmHg      PO2 MIXED 39.8 mmHg      HCO3 MIXED 23.9 mmol/L      CO2 Content 25.1 mmol/L      Base Excess, Arterial -0.4 mmol/L      Comment: Serial Number: [id number]:  [id number]        O2 SATURATION MIXED 75.7 %      Hemodilution No     Site Left Radial     Cristóbal's  Test Positive     Modality Room Air     FIO2 21 %      Base Deficit -0.6 mEq/liter     POC Glucose Once [[id number]]  (Normal) Collected: 06/30/25 0706    Specimen: Blood Updated: 06/30/25 0712     Glucose 92 mg/dL      Comment: Serial Number: [id number]:  [id number]       Comprehensive Metabolic Panel [[id number]]  (Abnormal) Collected: 06/30/25 0631    Specimen: Blood Updated: 06/30/25 0709     Glucose 90 mg/dL      BUN 44.8 mg/dL      Creatinine 9.70 mg/dL      Sodium 143 mmol/L      Potassium 5.6 mmol/L      Comment: Specimen hemolyzed.  Result may be falsely elevated.        Chloride 103 mmol/L      CO2 22.5 mmol/L      Calcium 9.7 mg/dL      Total Protein 7.0 g/dL      Albumin 3.9 g/dL      ALT (SGPT) 10 U/L      AST (SGOT) 18 U/L      Alkaline Phosphatase 90 U/L      Total Bilirubin 0.4 mg/dL      Globulin 3.1 gm/dL      A/G Ratio 1.3 g/dL      BUN/Creatinine Ratio 4.6     Anion Gap 17.5 mmol/L      eGFR 3.9 mL/min/1.73     Narrative:      GFR Categories in Chronic Kidney Disease (CKD)              GFR Category          GFR (mL/min/1.73)    Interpretation  G1                    90 or greater        Normal or high (1)  G2                    60-89                Mild decrease (1)  G3a                   45-59                Mild to moderate decrease  G3b                   30-44                Moderate to severe decrease  G4                    15-29                Severe decrease  G5                    14 or less           Kidney failure    (1)In the absence of evidence of kidney disease, neither GFR category G1 or G2 fulfill the criteria for CKD.    eGFR calculation 2021 CKD-EPI creatinine equation, which does not include race as a factor    Magnesium [[id number]]  (Normal) Collected: 06/30/25 0631    Specimen: Blood Updated: 06/30/25 0709     Magnesium 1.7 mg/dL     High Sensitivity Troponin T [[id number]]  (Abnormal) Collected: 06/30/25 0631    Specimen: Blood Updated: 06/30/25 0709     HS Troponin T 56 ng/L      Narrative:      High Sensitive Troponin T Reference Range:  <14.0 ng/L- Negative Female for AMI  <22.0 ng/L- Negative Male for AMI  >=14 - Abnormal Female indicating possible myocardial injury.  >=22 - Abnormal Male indicating possible myocardial injury.   Clinicians would have to utilize clinical acumen, EKG, Troponin, and serial changes to determine if it is an Acute Myocardial Infarction or myocardial injury due to an underlying chronic condition.         CBC & Differential [[id number]]  (Abnormal) Collected: 06/30/25 0631    Specimen: Blood Updated: 06/30/25 0655    Narrative:      The following orders were created for panel order CBC & Differential.  Procedure                               Abnormality         Status                     ---------                               -----------         ------                     CBC Auto Differential[[id number]]        Abnormal            Final result                 Please view results for these tests on the individual orders.    CBC Auto Differential [[id number]]  (Abnormal) Collected: 06/30/25 0631    Specimen: Blood Updated: 06/30/25 0655     WBC 10.78 10*3/mm3      RBC 3.42 10*6/mm3      Hemoglobin 10.3 g/dL      Hematocrit 33.9 %      MCV 99.1 fL      MCH 30.1 pg      MCHC 30.4 g/dL      RDW 15.8 %      RDW-SD 57.3 fl      MPV 10.9 fL      Platelets 338 10*3/mm3      Neutrophil % 82.5 %      Lymphocyte % 6.6 %      Monocyte % 7.5 %      Eosinophil % 2.2 %      Basophil % 0.7 %      Immature Grans % 0.5 %      Neutrophils, Absolute 8.89 10*3/mm3      Lymphocytes, Absolute 0.71 10*3/mm3      Monocytes, Absolute 0.81 10*3/mm3      Eosinophils, Absolute 0.24 10*3/mm3      Basophils, Absolute 0.08 10*3/mm3      Immature Grans, Absolute 0.05 10*3/mm3      nRBC 0.0 /100 WBC     [place] Draw [[id number]] Collected: 06/30/25 0631    Specimen: Blood Updated: 06/30/25 0645    Narrative:      The following orders were created for panel order [place] Draw.  Procedure                                Abnormality         Status                     ---------                               -----------         ------                     Green Top (Gel)[[id number]]                                  Final result               Lavender Top[[id number]]                                     Final result               Gold Top - SST[[id number]]                                   Final result               Light Blue Top[[id number]]                                   Final result                 Please view results for these tests on the individual orders.    Green Top (Gel) [[id number]] Collected: 06/30/25 0631    Specimen: Blood Updated: 06/30/25 0645     Extra Tube Hold for add-ons.     Comment: Auto resulted.       Lavender Top [[id number]] Collected: 06/30/25 0631    Specimen: Blood Updated: 06/30/25 0645     Extra Tube hold for add-on     Comment: Auto resulted       Gold Top - SST [[id number]] Collected: 06/30/25 0631    Specimen: Blood Updated: 06/30/25 0645     Extra Tube Hold for add-ons.     Comment: Auto resulted.       Light Blue Top [[id number]] Collected: 06/30/25 0631    Specimen: Blood Updated: 06/30/25 0645     Extra Tube Hold for add-ons.     Comment: Auto resulted                Results for orders placed during the hospital encounter of 07/25/21    Adult Transthoracic Echo Complete W/ Cont if Necessary Per Protocol 07/26/2021  6:20 PM    Interpretation Summary  Normal LV size and contractility EF of 60%  Normal RV size  Normal atrial size  Aortic valve, mitral valve, tricuspid valve appears structurally normal, mild mitral regurgitation  seen.  No pericardial effusion seen.  Proximal aorta appears normal in size.       Condition on Discharge:  Stable    Vital Signs  Temp:  [97.2 °F (36.2 °C)-98.2 °F (36.8 °C)] 97.7 °F (36.5 °C)  Heart Rate:  [] 100  Resp:  [14-24] 18  BP: ()/(56-90) 173/80      Physical Exam  Vitals reviewed.   Constitutional:       Appearance: She is not ill-appearing.    HENT:      Head: Normocephalic and atraumatic.      Right Ear: External ear normal.      Left Ear: External ear normal.      Nose: Nose normal.      Mouth/Throat:      Mouth: Mucous membranes are moist.   Eyes:      General:         Right eye: No discharge.         Left eye: No discharge.   Cardiovascular:      Rate and Rhythm: Normal rate and regular rhythm.      Pulses: Normal pulses.      Heart sounds: Normal heart sounds.   Pulmonary:      Effort: Pulmonary effort is normal.      Breath sounds: Normal breath sounds.   Abdominal:      General: Bowel sounds are normal.      Palpations: Abdomen is soft.   Musculoskeletal:         General: Normal range of motion.      Cervical back: Normal range of motion.   Skin:     General: Skin is warm and dry.   Neurological:      Mental Status: She is alert and oriented to person, place, and time.   Psychiatric:         Behavior: Behavior normal.              Discharge Disposition      Discharge Medications     Discharge Medications        New Medications        Instructions Start Date   guaiFENesin 600 MG 12 hr tablet  Commonly known as: MUCINEX   1,200 mg, Oral, Every 12 Hours Scheduled      ipratropium-albuterol 0.5-2.5 mg/3 ml nebulizer  Commonly known as: DUO-NEB   3 mL, Nebulization, 4 Times Daily - RT      predniSONE 50 MG tablet  Commonly known as: DELTASONE   50 mg, Oral, Daily With Breakfast      sevelamer 800 MG tablet  Commonly known as: RENVELA  Replaces: sevelamer 800 MG tablet   1,600 mg, Oral, 2 Times Daily Before Meals             Changes to Medications        Instructions Start Date   metoprolol succinate XL 50 MG 24 hr tablet  Commonly known as: TOPROL-XL  What changed:   medication strength  how much to take  when to take this   50 mg, Oral, Every 24 Hours Scheduled   Start Date: July 2, 2025            Continue These Medications        Instructions Start Date   albuterol sulfate  (90 Base) MCG/ACT inhaler  Commonly known as: PROVENTIL HFA;VENTOLIN  HFA;PROAIR HFA   2 puffs, Every 4 Hours PRN      cholecalciferol 25 MCG (1000 UT) tablet  Commonly known as: VITAMIN D3   1,000 Units, Daily      lidocaine-prilocaine 2.5-2.5 % cream  Commonly known as: EMLA   1 Application, Topical, Every 2 Hours PRN      ondansetron 4 MG tablet  Commonly known as: Zofran   4 mg, Oral, Daily PRN      sodium bicarbonate 650 MG tablet   1,300 mg, 3 Times Daily      temazepam 30 MG capsule  Commonly known as: RESTORIL   30 mg, Oral, Nightly PRN      traMADol 50 MG tablet  Commonly known as: ULTRAM   50 mg, Oral, Every 6 Hours PRN             Stop These Medications      RENAL VITAMIN PO     sevelamer 800 MG tablet  Commonly known as: RENAGEL  Replaced by: sevelamer 800 MG tablet              Discharge Diet:   Diet Instructions       Diet: Renal Diets; Low Sodium (2-3g); Regular (IDDSI 7); Thin (IDDSI 0)      Discharge Diet: Renal Diets    Renal Diet: Low Sodium (2-3g)    Texture: Regular (IDDSI 7)    Fluid Consistency: Thin (IDDSI 0)            Activity at Discharge:   Activity Instructions       Activity as Tolerated              Follow-up Appointments  Future Appointments   Date Time Provider Department Center   8/14/2025  8:00 AM [facility]   8/19/2025  8:30 AM Alcides Live MD [facility] None   10/2/2025 11:00 AM Lisa Baca APRN [facility]     Additional Instructions for the Follow-ups that You Need to Schedule       Discharge Follow-up with PCP   As directed       Currently Documented PCP:    Raegan Gotti MD    PCP Phone Number:    [phone]     Follow Up Details: 2 weeks                Test Results Pending at Discharge  Pending Results       None             MAEGAN Jordan  07/01/25  12:25 EDT    Time: Discharge 25 min

## 2025-07-01 NOTE — PLAN OF CARE
Problem: Adult Inpatient Plan of Care  Goal: Absence of Hospital-Acquired Illness or Injury  Intervention: Identify and Manage Fall Risk  Recent Flowsheet Documentation  Taken 6/30/2025 2319 by Ana Conner RN  Safety Promotion/Fall Prevention: safety round/check completed  Taken 6/30/2025 2200 by Ana Conner RN  Safety Promotion/Fall Prevention: safety round/check completed  Taken 6/30/2025 2102 by Ana Conner RN  Safety Promotion/Fall Prevention:   activity supervised   clutter free environment maintained   fall prevention program maintained   lighting adjusted   safety round/check completed  Taken 6/30/2025 1901 by Ana Conner RN  Safety Promotion/Fall Prevention: safety round/check completed  Intervention: Prevent Skin Injury  Recent Flowsheet Documentation  Taken 6/30/2025 2319 by Ana Conner RN  Body Position: position changed independently  Taken 6/30/2025 2102 by Ana Conner RN  Body Position: position changed independently  Intervention: Prevent and Manage VTE (Venous Thromboembolism) Risk  Recent Flowsheet Documentation  Taken 6/30/2025 2102 by Ana Conner RN  VTE Prevention/Management:   patient refused intervention   SCDs (sequential compression devices) off  Goal: Optimal Comfort and Wellbeing  Intervention: Provide Person-Centered Care  Recent Flowsheet Documentation  Taken 6/30/2025 2102 by Ana Conner RN  Trust Relationship/Rapport:   care explained   choices provided  Goal: Absence of Hospital-Acquired Illness or Injury  Intervention: Identify and Manage Fall Risk  Recent Flowsheet Documentation  Taken 6/30/2025 2319 by Ana Conner RN  Safety Promotion/Fall Prevention: safety round/check completed  Taken 6/30/2025 2200 by Ana Conner RN  Safety Promotion/Fall Prevention: safety round/check completed  Taken 6/30/2025 2102 by Ana Conner RN  Safety Promotion/Fall Prevention:   activity supervised   clutter free environment maintained    fall prevention program maintained   lighting adjusted   safety round/check completed  Taken 6/30/2025 1901 by Ana Conner RN  Safety Promotion/Fall Prevention: safety round/check completed  Intervention: Prevent Skin Injury  Recent Flowsheet Documentation  Taken 6/30/2025 2319 by Ana Conner RN  Body Position: position changed independently  Taken 6/30/2025 2102 by Ana Conner RN  Body Position: position changed independently  Intervention: Prevent and Manage VTE (Venous Thromboembolism) Risk  Recent Flowsheet Documentation  Taken 6/30/2025 2102 by Ana Conner RN  VTE Prevention/Management:   patient refused intervention   SCDs (sequential compression devices) off  Goal: Optimal Comfort and Wellbeing  Intervention: Provide Person-Centered Care  Recent Flowsheet Documentation  Taken 6/30/2025 2102 by Ana Conner RN  Trust Relationship/Rapport:   care explained   choices provided   Goal Outcome Evaluation:         Patient resting on and off throughout this shift.  BP soft at the beginning of the shift and lopressor held.  Running NSR to ST on monitor.  Applied 2 L of O2 throughout the night.  Patient stated that her cough is improving.  IV steroids continued.  Call light within reach.

## 2025-07-02 ENCOUNTER — READMISSION MANAGEMENT (OUTPATIENT)
Dept: CALL CENTER | Facility: HOSPITAL | Age: 75
End: 2025-07-02
Payer: MEDICARE

## 2025-07-02 NOTE — OUTREACH NOTE
Prep Survey      Flowsheet Row Responses   [facility] facility patient discharged from? [facility]   Is LACE score < 7 ? No   Eligibility Readm Mgmt   Discharge diagnosis COPD exacerbation   Does the patient have one of the following disease processes/diagnoses(primary or secondary)? COPD   Does the patient have Home health ordered? No   Is there a DME ordered? No   Prep survey completed? Yes            ESTER KEMP - Registered Nurse

## 2025-07-02 NOTE — CASE MANAGEMENT/SOCIAL WORK
Case Management Discharge Note      Final Note: Routine home         Selected Continued Care - Discharged on 7/1/2025 Admission date: 6/30/2025 - Discharge disposition: Home or Self Care         Transportation Services  Transportation: Private Transportation  Private: Car    Final Discharge Disposition Code: 01 - home or self-care

## 2025-07-03 ENCOUNTER — TELEPHONE (OUTPATIENT)
Age: 75
End: 2025-07-03

## 2025-07-03 NOTE — TELEPHONE ENCOUNTER
"  Caller: Cheryle Sherry E \"Jerry\"    Relationship: Self    Best call back number: [phone]     What is the best time to reach you: ANYTIME    Who are you requesting to speak with (clinical staff, provider,  specific staff member): SCHEDULING    Do you know the name of the person who called: PT COULDN'T MAKE THE NAME OUT     What was the call regarding: PT SAID SHE MISSED A CALL AROUND 1PM WITH A VM ABOUT SETTING UP HER FISTULA GRAM PLACEMENT. PT BELIEVED IT SAID SOMETHING ABOUT THE 24TH OF JULY. THERE WAS NO TE DOCUMENTED PT PREFERRED TO JUST SEND A MESSAGE AND HAVE THE OFFICE CALL BACK. THANKS           "

## 2025-07-08 ENCOUNTER — READMISSION MANAGEMENT (OUTPATIENT)
Dept: CALL CENTER | Facility: HOSPITAL | Age: 75
End: 2025-07-08
Payer: MEDICARE

## 2025-07-08 NOTE — OUTREACH NOTE
COPD/PN Week 1 Survey      Flowsheet Row Responses   [facility] patient discharged from? [facility]   Does the patient have one of the following disease processes/diagnoses(primary or secondary)? COPD   Week 1 attempt successful? Yes   Call start time 1113   Call end time 1116   Discharge diagnosis COPD exacerbation   Person spoke with today (if not patient) and relationship pt   Meds reviewed with patient/caregiver? Yes   Is the patient having any side effects they believe may be caused by any medication additions or changes? No   Does the patient have all medications ordered at discharge? Yes   Is the patient taking all medications as directed (includes completed medication regime)? Yes   Does the patient have a primary care provider?  Yes   Does the patient have an appointment with their PCP or specialist within 7 days of discharge? Yes   Has the patient kept scheduled appointments due by today? Yes   What is the Home health agency?  [facility]   Has home health visited the patient within 72 hours of discharge? Call prior to 72 hours   Pulse Ox monitoring None   Psychosocial issues? No   Did the patient receive a copy of their discharge instructions? Yes   Nursing interventions Reviewed instructions with patient   What is the patient's perception of their health status since discharge? Improving   If the patient is a current smoker, are they able to teach back resources for cessation? Not a smoker   Is the patient/caregiver able to teach back the hierarchy of who to call/visit for symptoms/problems? PCP, Specialist, Home health nurse, Urgent Care, ED, 911 Yes   Is the patient able to teach back COPD zones? Yes   Nursing interventions Education provided on various zones   Patient reports what zone on this call? Green Zone   Green Zone Reports doing well, Breathing without shortness of breath, Usual activity and exercise level, Usual amounts of cough and phlegm/mucous, Slept well last night, Appetite is good   Green  Zone interventions: Take daily medications, Continue regular exercise/diet plan, At all times avoid cigarette smoking, vaping and inhaled irritants   Week 1 call completed? Yes   Is the patient interested in additional calls from an ambulatory ? No   Would this patient benefit from a Referral to [facility] Social Work? No   Wrap up additional comments pt stated she is much better  no needs voiced.   Call end time 1116            CHUCHO GRAJEDA - Registered Nurse

## 2025-07-15 ENCOUNTER — HOSPITAL ENCOUNTER (OUTPATIENT)
Facility: HOSPITAL | Age: 75
Setting detail: HOSPITAL OUTPATIENT SURGERY
Discharge: HOME | End: 2025-07-15
Attending: STUDENT IN AN ORGANIZED HEALTH CARE EDUCATION/TRAINING PROGRAM | Admitting: STUDENT IN AN ORGANIZED HEALTH CARE EDUCATION/TRAINING PROGRAM
Payer: MEDICARE

## 2025-07-15 VITALS
RESPIRATION RATE: 14 BRPM | DIASTOLIC BLOOD PRESSURE: 47 MMHG | OXYGEN SATURATION: 96 % | TEMPERATURE: 97.7 F | SYSTOLIC BLOOD PRESSURE: 104 MMHG | HEART RATE: 74 BPM

## 2025-07-15 DIAGNOSIS — T82.590A MALFUNCTION OF ARTERIOVENOUS DIALYSIS FISTULA, INITIAL ENCOUNTER: ICD-10-CM

## 2025-07-15 PROCEDURE — 99152 MOD SED SAME PHYS/QHP 5/>YRS: CPT | Performed by: STUDENT IN AN ORGANIZED HEALTH CARE EDUCATION/TRAINING PROGRAM

## 2025-07-15 PROCEDURE — C1725 CATH, TRANSLUMIN NON-LASER: HCPCS | Performed by: STUDENT IN AN ORGANIZED HEALTH CARE EDUCATION/TRAINING PROGRAM

## 2025-07-15 PROCEDURE — 99153 MOD SED SAME PHYS/QHP EA: CPT | Performed by: STUDENT IN AN ORGANIZED HEALTH CARE EDUCATION/TRAINING PROGRAM

## 2025-07-15 PROCEDURE — C1769 GUIDE WIRE: HCPCS | Performed by: STUDENT IN AN ORGANIZED HEALTH CARE EDUCATION/TRAINING PROGRAM

## 2025-07-15 PROCEDURE — 25010000002 FENTANYL CITRATE (PF) 100 MCG/2ML SOLUTION: Performed by: STUDENT IN AN ORGANIZED HEALTH CARE EDUCATION/TRAINING PROGRAM

## 2025-07-15 PROCEDURE — 25010000002 MIDAZOLAM PER 1 MG: Performed by: STUDENT IN AN ORGANIZED HEALTH CARE EDUCATION/TRAINING PROGRAM

## 2025-07-15 PROCEDURE — 25510000001 IOPAMIDOL PER 1 ML: Performed by: STUDENT IN AN ORGANIZED HEALTH CARE EDUCATION/TRAINING PROGRAM

## 2025-07-15 PROCEDURE — 25010000002 LIDOCAINE 1 % SOLUTION: Performed by: STUDENT IN AN ORGANIZED HEALTH CARE EDUCATION/TRAINING PROGRAM

## 2025-07-15 PROCEDURE — C1894 INTRO/SHEATH, NON-LASER: HCPCS | Performed by: STUDENT IN AN ORGANIZED HEALTH CARE EDUCATION/TRAINING PROGRAM

## 2025-07-15 PROCEDURE — 36902 INTRO CATH DIALYSIS CIRCUIT: CPT | Performed by: STUDENT IN AN ORGANIZED HEALTH CARE EDUCATION/TRAINING PROGRAM

## 2025-07-15 PROCEDURE — 76937 US GUIDE VASCULAR ACCESS: CPT | Performed by: STUDENT IN AN ORGANIZED HEALTH CARE EDUCATION/TRAINING PROGRAM

## 2025-07-15 PROCEDURE — S0260 H&P FOR SURGERY: HCPCS | Performed by: STUDENT IN AN ORGANIZED HEALTH CARE EDUCATION/TRAINING PROGRAM

## 2025-07-15 RX ORDER — LIDOCAINE HYDROCHLORIDE 10 MG/ML
INJECTION, SOLUTION INFILTRATION; PERINEURAL
Status: DISCONTINUED | OUTPATIENT
Start: 2025-07-15 | End: 2025-07-15 | Stop reason: HOSPADM

## 2025-07-15 RX ORDER — FENTANYL CITRATE 50 UG/ML
INJECTION, SOLUTION INTRAMUSCULAR; INTRAVENOUS
Status: DISCONTINUED | OUTPATIENT
Start: 2025-07-15 | End: 2025-07-15 | Stop reason: HOSPADM

## 2025-07-15 RX ORDER — IOPAMIDOL 755 MG/ML
INJECTION, SOLUTION INTRAVASCULAR
Status: DISCONTINUED | OUTPATIENT
Start: 2025-07-15 | End: 2025-07-15 | Stop reason: HOSPADM

## 2025-07-15 RX ORDER — MIDAZOLAM HYDROCHLORIDE 1 MG/ML
INJECTION, SOLUTION INTRAMUSCULAR; INTRAVENOUS
Status: DISCONTINUED | OUTPATIENT
Start: 2025-07-15 | End: 2025-07-15 | Stop reason: HOSPADM

## 2025-07-15 NOTE — H&P
Name: Sherry Lobato ADMIT: 7/15/2025   : 1950  PCP: Raegan Gotti MD    MRN: [id number] LOS: 0 days   AGE/SEX: 74 y.o. female  ROOM: [facility] Pool/Cath     No chief complaint on file.      Subjective   Patient is a 74 y.o. female presents for left arm fistulagram. She has had issues getting it to run well at dialysis. It is a brachialbasilic fistula and she had a banding of the fistula performed a year ago for steal syndrome with non-healing finger wound.     History of Present Illness    Past Medical History:   Diagnosis Date    Acute upper GI bleeding 2024    Anemia due to GI blood loss 2024    Lucas's esophagus     Barretts esophagus 04/10/2012    C. difficile colitis     Carcinoma, renal cell 04/10/2012    Carotid artery disease     -50-74% left, 16-49% right ()    COPD (chronic obstructive pulmonary disease)     DM2 (diabetes mellitus, type 2)     ESRD (end stage renal disease)     on HD    Gastric ulcer     at anastomatic site    Gastroparesis     unknown    GERD (gastroesophageal reflux disease)     Gout     not current    Hemodialysis patient     mwf    Hernia, incisional     abdomen    History of colonoscopy     UTD    History of degenerative disc disease     Hyperlipidemia     Hypertension     Medicare annual wellness visit, subsequent 2020    Microscopic colitis     Nephrotic syndrome     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    Neuroendocrine tumor     of stomach    Non-small cell lung cancer 10/26/2023    Orthostatic hypotension 2017    FABY (obstructive sleep apnea)     not treating    Pedal edema     ressolved    Pneumonia 2024    Proteinuria     Renal cell cancer, right     Rib pain on right side     chronic    Rotator cuff tear     bilateral    Secondary hyperparathyroidism of renal origin 2024    Steal syndrome dialysis vascular access 2024    Stomach cancer 2016    Stroke     Tobacco abuse 2020    Uterine cancer     Vitamin B 12  deficiency     Vitamin D deficiency     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     Past Surgical History:   Procedure Laterality Date    APPENDECTOMY      ARTERIOVENOUS FISTULA Right     ARTERIOVENOUS FISTULA REPAIR      Clotted off and insert graft    ARTERIOVENOUS FISTULA/SHUNT SURGERY Left 12/30/2020    Procedure: ARTERIOVENOUS FISTULA FORMATION;  Surgeon: Jan Caicedo MD;  Location: [facility] MAIN OR;  Service: Vascular;  Laterality: Left;    ARTERIOVENOUS FISTULA/SHUNT SURGERY Left 04/23/2024    Procedure: FISTULOGRAM WITH SUBCLAVIAN ARTERY ANGIOPLASTY, fistula banding;  Surgeon: Franki Delgadillo II, MD;  Location: [facility] HYBRID OR;  Service: Vascular;  Laterality: Left;    BREAST BIOPSY      right nipple 1981    BRONCHOSCOPY N/A 06/11/2024    Procedure: BRONCHOSCOPY WITH RIGHT LUNG WASHING;  Surgeon: Aramis Barragan MD;  Location: [facility] ENDOSCOPY;  Service: Pulmonary;  Laterality: N/A;  RML atelectasis    COLONOSCOPY N/A 11/24/2020    Procedure: COLONOSCOPY with polypectomy x 7;  Surgeon: Jeffery White MD;  Location: [facility] ENDOSCOPY;  Service: Gastroenterology;  Laterality: N/A;  post op: polyps, diverticulosis, hemorrhoids    COLONOSCOPY N/A 06/01/2023    Procedure: COLONOSCOPY with polypectomy x 3 biopsy x 1;  Surgeon: Jeffery White MD;  Location: [facility] ENDOSCOPY;  Service: Gastroenterology;  Laterality: N/A;  diverticulosis polyps    COLONOSCOPY N/A 09/04/2024    Procedure: COLONOSCOPY WITH POLYPECTOMY X 1;  Surgeon: Jeffery White MD;  Location: [facility] ENDOSCOPY;  Service: Gastroenterology;  Laterality: N/A;  POST- POLYP, DIVERTICULOSIS, INTERNAL AND EXTERNAL HEMORRHOIDS    ENDOSCOPY N/A 03/12/2022    Procedure: ESOPHAGOGASTRODUODENOSCOPY with biopsy x 1 area;  Surgeon: Javan Augustin MD;  Location: [facility] ENDOSCOPY;  Service: Gastroenterology;  Laterality: N/A;  post op: anastamosis    ENDOSCOPY N/A 10/09/2023    Procedure: ESOPHAGOGASTRODUODENOSCOPY with biopsy x3 areas;  Surgeon: Ace Campbell MD;   Location: [facility] ENDOSCOPY;  Service: Gastroenterology;  Laterality: N/A;  duodenal ulcers;irregular z line    ENDOSCOPY N/A 08/27/2024    Procedure: ESOPHAGOGASTRODUODENOSCOPY with argon plasma coagulation;  Surgeon: HILARIO Blackmon MD;  Location: [facility] ENDOSCOPY;  Service: Gastroenterology;  Laterality: N/A;  post op: AVM    ENTEROSCOPY SMALL BOWEL N/A 09/04/2024    Procedure: ESOPHAGOGASTRODUODENOSCOPY WITH SMALL BOWEL ENTEROSCOPY, ARGON PLASMA COAGULATION AND ENDOSCOPIC CLIPPING X 2 OF GASTRIC ANTRAL VASCULAR ECTASIA;  Surgeon: Jeffery White MD;  Location: [facility] ENDOSCOPY;  Service: Gastroenterology;  Laterality: N/A;  POST- GAVE    GASTRIC RESECTION      cancer    HYSTERECTOMY      LAPAROSCOPIC CHOLECYSTECTOMY      NEPHRECTOMY      right kidney removed     REDUCTION MAMMAPLASTY      TUMOR REMOVAL      boils    VENTRAL HERNIA REPAIR N/A 05/15/2025    Procedure: VENTRAL  HERNIA REPAIR LAPAROSCOPIC WITH OpSourceINCI ROBOT WITH MESH, EXTENSIVE LYSIS OF ADHESION;  Surgeon: Keyon Espana MD;  Location: [facility] MAIN OR;  Service: Robotics - DaVinci;  Laterality: N/A;    VENTRAL/INCISIONAL HERNIA REPAIR Right 08/28/2019    Procedure: VENTRAL/INCISIONAL HERNIA REPAIR;  Surgeon: Nilay Stevens MD;  Location: [facility] MAIN OR;  Service: General    VENTRAL/INCISIONAL HERNIA REPAIR N/A 10/01/2019    Procedure: OPEN VENTRAL/INCISIONAL HERNIA REPAIR;  Surgeon: Nilay Stevens MD;  Location: [facility] MAIN OR;  Service: General    VENTRAL/INCISIONAL HERNIA REPAIR N/A 10/11/2021    Procedure: VENTRAL/INCISIONAL HERNIA REPAIR LAPAROSCOPIC;  Surgeon: Nilay Stevens MD;  Location: [facility] MAIN OR;  Service: General;  Laterality: N/A;    VENTRAL/INCISIONAL HERNIA REPAIR N/A 01/23/2023    Procedure: VENTRAL/INCISIONAL HERNIA REPAIR with MESH;  Surgeon: Nilay Stevens MD;  Location: [facility] MAIN OR;  Service: General;  Laterality: N/A;     Family History   Problem Relation Age of Onset    Heart disease Father      Social History      Tobacco Use    Smoking status: Former     Current packs/day: 0.00     Average packs/day: 0.3 packs/day for 57.0 years (14.3 ttl pk-yrs)     Types: Cigarettes     Start date: 1964     Quit date: 2021     Years since quittin.2     Passive exposure: Past    Smokeless tobacco: Never   Vaping Use    Vaping status: Never Used   Substance Use Topics    Alcohol use: No    Drug use: Never     Medications Prior to Admission   Medication Sig Dispense Refill Last Dose/Taking    albuterol sulfate  (90 Base) MCG/ACT inhaler Inhale 2 puffs Every 4 (Four) Hours As Needed for Wheezing.   Past Week    Cholecalciferol 25 MCG (1000 UT) tablet Take 1 tablet by mouth Daily.   2025    guaiFENesin (MUCINEX) 600 MG 12 hr tablet Take 2 tablets by mouth Every 12 (Twelve) Hours. 20 tablet 0 2025    ipratropium-albuterol (DUO-NEB) 0.5-2.5 mg/3 ml nebulizer Take 3 mL by nebulization 4 (Four) Times a Day. 360 mL 12 Past Week    metoprolol succinate XL (TOPROL-XL) 50 MG 24 hr tablet Take 1 tablet by mouth Daily. 30 tablet 0 2025    ondansetron (Zofran) 4 MG tablet Take 1 tablet by mouth Daily As Needed for Nausea or Vomiting. 30 tablet 0 Past Month    sevelamer (RENVELA) 800 MG tablet Take 2 tablets by mouth 2 (Two) Times a Day Before Meals. 120 tablet 0 2025    sodium bicarbonate 650 MG tablet Take 2 tablets by mouth 3 (Three) Times a Day.   2025    temazepam (RESTORIL) 30 MG capsule Take 1 capsule by mouth At Night As Needed for Sleep.   2025    traMADol (ULTRAM) 50 MG tablet Take 1 tablet by mouth Every 6 (Six) Hours As Needed for Moderate Pain.   2025    lidocaine-prilocaine (EMLA) 2.5-2.5 % cream Apply 1 Application topically to the appropriate area as directed Every 2 (Two) Hours As Needed for Mild Pain.   More than a month     Allergies:  Atorvastatin calcium, Gabapentin, Niacin, and Oxycodone    Review of Systems     Objective    Vital Signs  Temp:  [97.7 °F (36.5 °C)] 97.7 °F (36.5  °C)  Heart Rate:  [77] 77  Resp:  [18] 18  BP: (144-166)/() 144/114  SpO2:  [92 %] 92 %  on   ;      There is no height or weight on file to calculate BMI.    Physical Exam  NAD  Respirations unlabored  RRR  Left arm fistula pulsatile in perianastomotic segment, no thrill in fistula in upper arm but it is soft and doesn't really feel completely thrombosed. Palpable radial pulse.     Results Review:   I reviewed the patient's new clinical results.  Imaging Results (Last 24 Hours)       ** No results found for the last 24 hours. **            Assessment & Plan       Dialysis AV fistula malfunction      Assessment & Plan  74 year old lady with non-functioning left arm fistula, possibly partially thrombosed.   Fistulagram with intervention today.   R/B/A discussed with patient and she verbalized understanding and agrees to proceed.         I discussed the patients findings and my recommendations with patient, family, and nursing staff.          Franki Delgadillo II, MD  [facility]  [phone]  07/15/25  07:58 EDT

## 2025-07-15 NOTE — Clinical Note
Prepped: Left Wrist. Prepped with: ChloraPrep. The site was clipped. The patient was draped in a sterile fashion. Full Left arm

## 2025-07-15 NOTE — DISCHARGE INSTRUCTIONS
[facility]  Meliton Bassett, Paco Vang Scherrer, [place], [place]  [address]  [place] [address]  [phone]        Discharge Instructions for Fistulogram/ Shuntogram      Go home, rest and take it easy today.       You may experience some dizziness or memory loss from the anesthesia.  This may last for the next 24 hours.  Someone should plan on staying with you for the first 24 hours for your safety.    Do not make any important legal decisions or sign any legal papers for the next 24 hours.      Eat and drink lightly today.  Start off with liquids, jello, soup, crackers or other bland foods at first. You may advance your diet tomorrow as tolerated as long as you do not experience any nausea or vomiting.    No driving for the remainder of the day.  You may resume limited driving tomorrow, if necessary.     You may resume routine medications including blood thinners. However, Glucophage should not be started until 72 hours after the dye is given.      No lifting over 10 pounds and no strenuous activity for the next 2-3 days with the involved arm.        Limit going up and down steps for 2 days.    Leave the dressing on until tomorrow morning.  You may then take this off, as well as the small see through dressing with gauze, tomorrow.  If this does not come off easily, do not pull this off.  If it is stuck, shower and let the warm water loosen it before removal.     Check your groin/ arm dressing regularly for bleeding through the dressing (under the pressure dressing).  A small amount of blood contained by the gauze is normal: the whole dressing should not be filled with blood, or leaking out under the sides.  If the bandage is saturated, you may remove it and replace it with a new dressing.      If you experience bleeding through the gauze/ clear dressing, lay flat and have someone apply direct pressure for 15 minutes.  If bleeding has stopped after this, you may put  a clean gauze and tape over the area.  Continue to lie flat for 1-2 hours.  If bleeding continues after 15 minutes of pressure, call us at [phone]. There is an MD available after hours for urgent matters.            If you experience heavy bleeding continue to hold firm pressure and call 911.    Do not call the MD on call.     If your doctor gave you a prescription for a narcotic pill (Tylenol #3, Vicodin, Hydrocodone, Oxycodone or Percocet), you may not drive a vehicle or operate machinery while taking this type of medication.     13. Severe pain is not expected after this procedure.  If you experience severe pain, please call our office at [phone].          Remember to contact our office for any of the following:    Fever > 101 degrees  Severe pain   Severe nausea or vomiting   Significant bleeding of your incisions  Drainage that has a bad smell or is yellow or green in appearance  Any other questions or concerns

## 2025-07-15 NOTE — Clinical Note
A sheath was successfully inserted using micropuncture technique with ultrasound guidance into the left basilic vein.

## 2025-07-15 NOTE — OP NOTE
[facility]    Date of Admission:  7/15/2025  Today's Date:  07/15/25  Franki Delgadillo II, MD  [facility]    Preoperative Diagnosis:   Arteriovenous fistula malfunction  End-stage renal disease on dialysis    Postoperative Diagnosis:   Same    Procedure Performed:   Ultrasound-guided access left arm fistula  Left arm fistulogram  Central venogram  Balloon angioplasty basilic vein fistula with 5 mm, 6 mm, 8 mm, 10 mm angioplasty balloons    Surgeon:   Franki Delgadillo II, MD    Assistant:    Rolanda VALVERDE, Provided critical assistance in exposure, retraction, and suction that overall decrease blood loss and operative time.    Anesthesia:   MAC with local    Estimated Blood Loss:   Minimal    Findings:    Left arm fistulogram: Anastomosis and perianastomotic fistula widely patent, high-grade stenosis noted at site of previous fistula banding in the proximal fistula, fistula in the mid upper arm is aneurysmal with a focal stenosis noted in the midportion.  Fistula in the proximal upper arm widely patent, axillary vein widely patent.  The area where we had previously banded the fistula was treated with 5 mm and 6 mm angioplasty balloon.  The area in mid arm fistula was treated with 6 mm, 8 mm, and 10 mm angioplasty balloons.  There was still some residual stenosis at the site of previous fistula banding but significantly improved flow through the fistula with return of thrill in the mid upper arm fistula.    Central venogram: Proximal axillary vein widely patent, subclavian vein widely patent, innominate vein widely patent, superior vena cava widely patent no central vein stenosis.    Implants:    Nothing was implanted during the procedure    Staff:   Scrub Person: Minnie Sauceda  Documenter: Cindy Mosher  Invasive Nurse: Zarina Hu RN; Jeffery Mejia RN    Specimen:   none    Complications:   None intraop    Dispo:   to PACU    Indication for procedure:   74 y.o. female with end-stage renal disease on  "dialysis via left arm brachiobasilic fistula.  The patient had fistula banding performed a year ago for steal syndrome with nonhealing wound on her left hand.  The patient has had difficulty using her fistula for dialysis recently and it \"clotted off\" during dialysis the last time they attempted use it.  Plans made for fistulogram today.  Details of this procedure including risks benefits and alternatives discussed with patient she verbalized understanding and agrees to proceed.    Description of procedure:   Patient brought to the Cath Lab placed Lupine on the table.  Left arm was prepped circumferentially with ChloraPrep and she was draped in sterile fashion.  Timeout was performed.  Light conscious sedation was performed with intermittent doses of Versed and fentanyl which patient tolerated well.  I began procedure by performing ultrasound of the fistula showed it to be patent without thrombosis.  1% lidocaine was infiltrated over the perianastomotic fistula proximal to the previous banding site.  Under ultrasound guidance it was accessed with a microneedle microwire was inserted.  Placement fistulous confirmed with ultrasound.  I then made a skin nick and exchanged the microneedle for microsheath and performed fistulogram with the above listed findings.  Safingol guidewire was advanced to the microsheath and this was exchanged for a short 6 Croatian sheath.  We then advanced a Landa catheter over the wire and performed a central venogram from the axillary vein with the above listed findings.  There was no central vein stenosis.  Glidewire was reinserted.  We then obtained a 5 mm angioplasty balloon and performed balloon angioplasty of the previous banding site.  There was a minimal waist.  We then repeated with 6 mm angioplasty and there was a moderate waist that did improve with angioplasty.  Completion angiography showed some improvement in the stow stenosis and improved flow through the fistula but there is " a stenosis in the midportion of the fistula as well.  We ballooned this with 6 mm angioplasty balloon without much of a waist on the balloon.  We exchanged for a 8 Kinyarwanda sheath due to some bleeding around the 6 Kinyarwanda sheath.   8 mm balloon was obtained and repeated balloon angioplasty of the stenosis in the midportion of the fistula.  Completion angiography showed still sluggish flow through that part of the fistula.  We then obtained a 10 mm balloon and performed balloon angioplasty here there was a waist that did release with angioplasty and we were able to get the balloon to profile.  Completion angiography after this showed improvement in the stenosis with significantly improved flow through the fistula.  The fistula had a thrill on exam whereas it did not have 1 before.  Due to her history of steal syndrome I elected not perform any more aggressive balloon angioplasty of the previous banding site or this area of stenosis.  There was no extravasation or any other evidence of complication.  The wire was removed and then a bolster stitch was placed around the access site in the sheath was removed.  Pressure was held until hemostasis was obtained.  Patient tolerated procedure well and there were no intraoperative complications.  I discussed the outcome of the procedure with the patient's significant other in the waiting room.   All wires, catheters, sheaths, and other devices were removed and found to be whole and intact prior to the conclusion of the procedure.     Franki Delgadillo II, MD  07/15/25     Active Hospital Problems    Diagnosis  POA    **Dialysis AV fistula malfunction [T82.590A]  Yes      Resolved Hospital Problems   No resolved problems to display.

## 2025-07-15 NOTE — Clinical Note
Hemostasis started on the left brachial vein. Purse string suturing was used in achieving hemostasis. Closure device deployed in the vessel and manual pressure applied to vessel. Manual pressure was held by Bisi. Manual pressure was held for 5 min. Hemostasis achieved successfully. Closure device additional comment: Michael stiruby

## 2025-07-23 ENCOUNTER — READMISSION MANAGEMENT (OUTPATIENT)
Dept: CALL CENTER | Facility: HOSPITAL | Age: 75
End: 2025-07-23
Payer: MEDICARE

## 2025-07-23 NOTE — OUTREACH NOTE
COPD/PN Week 2 Survey      Flowsheet Row Responses   [facility] facility patient discharged from? [facility]   Does the patient have one of the following disease processes/diagnoses(primary or secondary)? COPD   Week 2 attempt successful? No   Unsuccessful attempts Attempt 1   Revoke Molly Esparza Registered Nurse

## 2025-08-14 ENCOUNTER — HOSPITAL ENCOUNTER (OUTPATIENT)
Dept: PET IMAGING | Facility: HOSPITAL | Age: 75
Discharge: HOME | End: 2025-08-14
Admitting: INTERNAL MEDICINE
Payer: MEDICARE

## 2025-08-14 DIAGNOSIS — C34.11 ADENOCARCINOMA OF UPPER LOBE OF RIGHT LUNG: ICD-10-CM

## 2025-08-14 PROCEDURE — 74176 CT ABD & PELVIS W/O CONTRAST: CPT

## 2025-08-14 PROCEDURE — 71250 CT THORAX DX C-: CPT

## 2025-08-19 ENCOUNTER — OFFICE VISIT (OUTPATIENT)
Dept: RADIATION ONCOLOGY | Facility: HOSPITAL | Age: 75
End: 2025-08-19
Payer: MEDICARE

## 2025-08-19 VITALS
RESPIRATION RATE: 18 BRPM | WEIGHT: 148 LBS | OXYGEN SATURATION: 96 % | SYSTOLIC BLOOD PRESSURE: 146 MMHG | DIASTOLIC BLOOD PRESSURE: 64 MMHG | BODY MASS INDEX: 28.9 KG/M2 | HEART RATE: 92 BPM

## 2025-08-19 DIAGNOSIS — M51.369 DEGENERATION OF INTERVERTEBRAL DISC OF LUMBAR REGION, UNSPECIFIED WHETHER PAIN PRESENT: ICD-10-CM

## 2025-08-19 DIAGNOSIS — C34.11 ADENOCARCINOMA OF UPPER LOBE OF RIGHT LUNG: Primary | ICD-10-CM

## 2025-08-19 DIAGNOSIS — C7A.8 NEUROENDOCRINE CARCINOMA: ICD-10-CM

## 2025-08-19 DIAGNOSIS — N25.81 SECONDARY HYPERPARATHYROIDISM OF RENAL ORIGIN: Chronic | ICD-10-CM

## 2025-08-19 DIAGNOSIS — C80.0: ICD-10-CM

## 2025-08-19 DIAGNOSIS — K43.9 VENTRAL HERNIA WITHOUT OBSTRUCTION OR GANGRENE: ICD-10-CM

## 2025-08-19 PROCEDURE — G0463 HOSPITAL OUTPT CLINIC VISIT: HCPCS | Performed by: INTERNAL MEDICINE

## 2025-08-23 ENCOUNTER — HOSPITAL ENCOUNTER (INPATIENT)
Facility: HOSPITAL | Age: 75
LOS: 5 days | Discharge: HOME | End: 2025-08-28
Attending: EMERGENCY MEDICINE | Admitting: FAMILY MEDICINE
Payer: MEDICARE

## 2025-08-23 ENCOUNTER — APPOINTMENT (OUTPATIENT)
Dept: CT IMAGING | Facility: HOSPITAL | Age: 75
End: 2025-08-23
Payer: MEDICARE

## 2025-08-23 DIAGNOSIS — A41.9 SEPSIS, DUE TO UNSPECIFIED ORGANISM, UNSPECIFIED WHETHER ACUTE ORGAN DYSFUNCTION PRESENT: Primary | ICD-10-CM

## 2025-08-23 DIAGNOSIS — R10.84 GENERALIZED ABDOMINAL PAIN: ICD-10-CM

## 2025-08-23 DIAGNOSIS — K52.9 ENTERITIS: ICD-10-CM

## 2025-08-23 DIAGNOSIS — F51.01 PRIMARY INSOMNIA: ICD-10-CM

## 2025-08-23 DIAGNOSIS — R10.9 ABDOMINAL PAIN, UNSPECIFIED ABDOMINAL LOCATION: ICD-10-CM

## 2025-08-23 LAB
ALBUMIN SERPL-MCNC: 3.9 G/DL (ref 3.5–5.2)
ALBUMIN/GLOB SERPL: 1.3 G/DL
ALP SERPL-CCNC: 102 U/L (ref 39–117)
ALT SERPL W P-5'-P-CCNC: 12 U/L (ref 1–33)
ANION GAP SERPL CALCULATED.3IONS-SCNC: 21 MMOL/L (ref 5–15)
AST SERPL-CCNC: 22 U/L (ref 1–32)
BASOPHILS # BLD AUTO: 0.05 10*3/MM3 (ref 0–0.2)
BASOPHILS NFR BLD AUTO: 0.3 % (ref 0–1.5)
BILIRUB SERPL-MCNC: 0.3 MG/DL (ref 0–1.2)
BUN SERPL-MCNC: 23.7 MG/DL (ref 8–23)
BUN/CREAT SERPL: 4.1 (ref 7–25)
CALCIUM SPEC-SCNC: 10.4 MG/DL (ref 8.6–10.5)
CHLORIDE SERPL-SCNC: 99 MMOL/L (ref 98–107)
CO2 SERPL-SCNC: 15 MMOL/L (ref 22–29)
CREAT SERPL-MCNC: 5.76 MG/DL (ref 0.57–1)
D-LACTATE SERPL-SCNC: 0.8 MMOL/L (ref 0.3–2)
DEPRECATED RDW RBC AUTO: 69.4 FL (ref 37–54)
EGFRCR SERPLBLD CKD-EPI 2021: 7.2 ML/MIN/1.73
EOSINOPHIL # BLD AUTO: 0.12 10*3/MM3 (ref 0–0.4)
EOSINOPHIL NFR BLD AUTO: 0.7 % (ref 0.3–6.2)
ERYTHROCYTE [DISTWIDTH] IN BLOOD BY AUTOMATED COUNT: 18 % (ref 12.3–15.4)
GLOBULIN UR ELPH-MCNC: 2.9 GM/DL
GLUCOSE SERPL-MCNC: 166 MG/DL (ref 65–99)
HCT VFR BLD AUTO: 39.8 % (ref 34–46.6)
HGB BLD-MCNC: 11.5 G/DL (ref 12–15.9)
IMM GRANULOCYTES # BLD AUTO: 0.17 10*3/MM3 (ref 0–0.05)
IMM GRANULOCYTES NFR BLD AUTO: 1 % (ref 0–0.5)
LIPASE SERPL-CCNC: 25 U/L (ref 13–60)
LYMPHOCYTES # BLD AUTO: 1.15 10*3/MM3 (ref 0.7–3.1)
LYMPHOCYTES NFR BLD AUTO: 7 % (ref 19.6–45.3)
MACROCYTES BLD QL SMEAR: NORMAL
MCH RBC QN AUTO: 30.4 PG (ref 26.6–33)
MCHC RBC AUTO-ENTMCNC: 28.9 G/DL (ref 31.5–35.7)
MCV RBC AUTO: 105.3 FL (ref 79–97)
MONOCYTES # BLD AUTO: 0.76 10*3/MM3 (ref 0.1–0.9)
MONOCYTES NFR BLD AUTO: 4.6 % (ref 5–12)
NEUTROPHILS NFR BLD AUTO: 14.11 10*3/MM3 (ref 1.7–7)
NEUTROPHILS NFR BLD AUTO: 86.4 % (ref 42.7–76)
NRBC BLD AUTO-RTO: 0 /100 WBC (ref 0–0.2)
PLATELET # BLD AUTO: 321 10*3/MM3 (ref 140–450)
PMV BLD AUTO: 10.3 FL (ref 6–12)
POTASSIUM SERPL-SCNC: 4.8 MMOL/L (ref 3.5–5.2)
PROT SERPL-MCNC: 6.8 G/DL (ref 6–8.5)
RBC # BLD AUTO: 3.78 10*6/MM3 (ref 3.77–5.28)
SMALL PLATELETS BLD QL SMEAR: ADEQUATE
SODIUM SERPL-SCNC: 135 MMOL/L (ref 136–145)
WBC MORPH BLD: NORMAL
WBC NRBC COR # BLD AUTO: 16.36 10*3/MM3 (ref 3.4–10.8)

## 2025-08-23 PROCEDURE — 85007 BL SMEAR W/DIFF WBC COUNT: CPT | Performed by: EMERGENCY MEDICINE

## 2025-08-23 PROCEDURE — 80053 COMPREHEN METABOLIC PANEL: CPT | Performed by: EMERGENCY MEDICINE

## 2025-08-23 PROCEDURE — 25010000002 HYDROMORPHONE 1 MG/ML SOLUTION: Performed by: EMERGENCY MEDICINE

## 2025-08-23 PROCEDURE — 25010000002 PIPERACILLIN SOD-TAZOBACTAM PER 1 G: Performed by: FAMILY MEDICINE

## 2025-08-23 PROCEDURE — 25010000002 ONDANSETRON PER 1 MG: Performed by: EMERGENCY MEDICINE

## 2025-08-23 PROCEDURE — 83690 ASSAY OF LIPASE: CPT | Performed by: EMERGENCY MEDICINE

## 2025-08-23 PROCEDURE — 87040 BLOOD CULTURE FOR BACTERIA: CPT | Performed by: EMERGENCY MEDICINE

## 2025-08-23 PROCEDURE — 83605 ASSAY OF LACTIC ACID: CPT | Performed by: EMERGENCY MEDICINE

## 2025-08-23 PROCEDURE — 74176 CT ABD & PELVIS W/O CONTRAST: CPT

## 2025-08-23 PROCEDURE — 85025 COMPLETE CBC W/AUTO DIFF WBC: CPT | Performed by: EMERGENCY MEDICINE

## 2025-08-23 RX ORDER — METOPROLOL SUCCINATE 50 MG/1
50 TABLET, EXTENDED RELEASE ORAL
Status: DISCONTINUED | OUTPATIENT
Start: 2025-08-24 | End: 2025-08-26

## 2025-08-23 RX ORDER — SODIUM CHLORIDE 0.9 % (FLUSH) 0.9 %
10 SYRINGE (ML) INJECTION AS NEEDED
Status: DISCONTINUED | OUTPATIENT
Start: 2025-08-23 | End: 2025-08-28 | Stop reason: HOSPADM

## 2025-08-23 RX ORDER — ONDANSETRON 2 MG/ML
4 INJECTION INTRAMUSCULAR; INTRAVENOUS ONCE
Status: COMPLETED | OUTPATIENT
Start: 2025-08-23 | End: 2025-08-23

## 2025-08-23 RX ORDER — SEVELAMER CARBONATE 800 MG/1
1600 TABLET, FILM COATED ORAL
Status: DISCONTINUED | OUTPATIENT
Start: 2025-08-24 | End: 2025-08-28 | Stop reason: HOSPADM

## 2025-08-23 RX ORDER — ONDANSETRON 4 MG/1
4 TABLET, ORALLY DISINTEGRATING ORAL EVERY 12 HOURS PRN
Status: DISCONTINUED | OUTPATIENT
Start: 2025-08-23 | End: 2025-08-28 | Stop reason: HOSPADM

## 2025-08-23 RX ORDER — CHOLECALCIFEROL (VITAMIN D3) 25 MCG
1000 TABLET ORAL DAILY
Status: DISCONTINUED | OUTPATIENT
Start: 2025-08-24 | End: 2025-08-28 | Stop reason: HOSPADM

## 2025-08-23 RX ORDER — PANTOPRAZOLE SODIUM 40 MG/1
40 TABLET, DELAYED RELEASE ORAL
Status: DISCONTINUED | OUTPATIENT
Start: 2025-08-24 | End: 2025-08-28 | Stop reason: HOSPADM

## 2025-08-23 RX ORDER — IPRATROPIUM BROMIDE AND ALBUTEROL SULFATE 2.5; .5 MG/3ML; MG/3ML
3 SOLUTION RESPIRATORY (INHALATION)
Status: DISCONTINUED | OUTPATIENT
Start: 2025-08-23 | End: 2025-08-25

## 2025-08-23 RX ORDER — TRAMADOL HYDROCHLORIDE 50 MG/1
50 TABLET ORAL EVERY 8 HOURS PRN
Status: DISCONTINUED | OUTPATIENT
Start: 2025-08-23 | End: 2025-08-26

## 2025-08-23 RX ORDER — SODIUM BICARBONATE 650 MG/1
1300 TABLET ORAL 3 TIMES DAILY
Status: DISCONTINUED | OUTPATIENT
Start: 2025-08-23 | End: 2025-08-28 | Stop reason: HOSPADM

## 2025-08-23 RX ADMIN — HYDROMORPHONE HYDROCHLORIDE 1 MG: 1 INJECTION, SOLUTION INTRAMUSCULAR; INTRAVENOUS; SUBCUTANEOUS at 22:11

## 2025-08-23 RX ADMIN — HYDROMORPHONE HYDROCHLORIDE 1 MG: 1 INJECTION, SOLUTION INTRAMUSCULAR; INTRAVENOUS; SUBCUTANEOUS at 18:10

## 2025-08-23 RX ADMIN — PIPERACILLIN AND TAZOBACTAM 3.38 G: 3; .375 INJECTION, POWDER, FOR SOLUTION INTRAVENOUS at 22:12

## 2025-08-23 RX ADMIN — ONDANSETRON 4 MG: 2 INJECTION, SOLUTION INTRAMUSCULAR; INTRAVENOUS at 18:10

## 2025-08-24 PROBLEM — R11.2 NAUSEA & VOMITING: Status: ACTIVE | Noted: 2025-08-24

## 2025-08-24 LAB
ANION GAP SERPL CALCULATED.3IONS-SCNC: 15.6 MMOL/L (ref 5–15)
BUN SERPL-MCNC: 30.4 MG/DL (ref 8–23)
BUN/CREAT SERPL: 4.6 (ref 7–25)
CALCIUM SPEC-SCNC: 10.2 MG/DL (ref 8.6–10.5)
CHLORIDE SERPL-SCNC: 100 MMOL/L (ref 98–107)
CO2 SERPL-SCNC: 20.4 MMOL/L (ref 22–29)
CREAT SERPL-MCNC: 6.58 MG/DL (ref 0.57–1)
DEPRECATED RDW RBC AUTO: 69.3 FL (ref 37–54)
EGFRCR SERPLBLD CKD-EPI 2021: 6.1 ML/MIN/1.73
ERYTHROCYTE [DISTWIDTH] IN BLOOD BY AUTOMATED COUNT: 18 % (ref 12.3–15.4)
GLUCOSE SERPL-MCNC: 99 MG/DL (ref 65–99)
HBV SURFACE AG SERPL QL IA: NORMAL
HCT VFR BLD AUTO: 39 % (ref 34–46.6)
HGB BLD-MCNC: 11.5 G/DL (ref 12–15.9)
MCH RBC QN AUTO: 30.7 PG (ref 26.6–33)
MCHC RBC AUTO-ENTMCNC: 29.5 G/DL (ref 31.5–35.7)
MCV RBC AUTO: 104.3 FL (ref 79–97)
PLATELET # BLD AUTO: 356 10*3/MM3 (ref 140–450)
PMV BLD AUTO: 10.4 FL (ref 6–12)
POTASSIUM SERPL-SCNC: 5.4 MMOL/L (ref 3.5–5.2)
RBC # BLD AUTO: 3.74 10*6/MM3 (ref 3.77–5.28)
SODIUM SERPL-SCNC: 136 MMOL/L (ref 136–145)
WBC NRBC COR # BLD AUTO: 18.62 10*3/MM3 (ref 3.4–10.8)

## 2025-08-24 PROCEDURE — 94761 N-INVAS EAR/PLS OXIMETRY MLT: CPT

## 2025-08-24 PROCEDURE — 94799 UNLISTED PULMONARY SVC/PX: CPT

## 2025-08-24 PROCEDURE — 25010000002 PIPERACILLIN SOD-TAZOBACTAM PER 1 G: Performed by: NURSE PRACTITIONER

## 2025-08-24 PROCEDURE — 87340 HEPATITIS B SURFACE AG IA: CPT | Performed by: INTERNAL MEDICINE

## 2025-08-24 PROCEDURE — 85027 COMPLETE CBC AUTOMATED: CPT | Performed by: FAMILY MEDICINE

## 2025-08-24 PROCEDURE — 80048 BASIC METABOLIC PNL TOTAL CA: CPT | Performed by: FAMILY MEDICINE

## 2025-08-24 PROCEDURE — 94640 AIRWAY INHALATION TREATMENT: CPT

## 2025-08-24 PROCEDURE — 25010000002 PIPERACILLIN SOD-TAZOBACTAM PER 1 G: Performed by: FAMILY MEDICINE

## 2025-08-24 RX ORDER — SODIUM CHLORIDE 0.9 % (FLUSH) 0.9 %
10 SYRINGE (ML) INJECTION AS NEEDED
Status: DISCONTINUED | OUTPATIENT
Start: 2025-08-24 | End: 2025-08-28 | Stop reason: HOSPADM

## 2025-08-24 RX ORDER — INDOMETHACIN 25 MG/1
50 CAPSULE ORAL ONCE
Status: COMPLETED | OUTPATIENT
Start: 2025-08-24 | End: 2025-08-24

## 2025-08-24 RX ORDER — GUAIFENESIN 600 MG/1
1200 TABLET, EXTENDED RELEASE ORAL EVERY 12 HOURS SCHEDULED
Status: DISCONTINUED | OUTPATIENT
Start: 2025-08-24 | End: 2025-08-28

## 2025-08-24 RX ORDER — ACETAMINOPHEN 325 MG/1
650 TABLET ORAL EVERY 4 HOURS PRN
Status: DISCONTINUED | OUTPATIENT
Start: 2025-08-24 | End: 2025-08-28 | Stop reason: HOSPADM

## 2025-08-24 RX ORDER — HYDRALAZINE HYDROCHLORIDE 20 MG/ML
10 INJECTION INTRAMUSCULAR; INTRAVENOUS EVERY 6 HOURS PRN
Status: DISCONTINUED | OUTPATIENT
Start: 2025-08-24 | End: 2025-08-28 | Stop reason: HOSPADM

## 2025-08-24 RX ORDER — ACETAMINOPHEN 160 MG/5ML
650 SOLUTION ORAL EVERY 4 HOURS PRN
Status: DISCONTINUED | OUTPATIENT
Start: 2025-08-24 | End: 2025-08-28 | Stop reason: HOSPADM

## 2025-08-24 RX ORDER — SODIUM CHLORIDE 9 MG/ML
40 INJECTION, SOLUTION INTRAVENOUS AS NEEDED
Status: DISCONTINUED | OUTPATIENT
Start: 2025-08-24 | End: 2025-08-28 | Stop reason: HOSPADM

## 2025-08-24 RX ORDER — IPRATROPIUM BROMIDE AND ALBUTEROL SULFATE 2.5; .5 MG/3ML; MG/3ML
3 SOLUTION RESPIRATORY (INHALATION)
Status: DISCONTINUED | OUTPATIENT
Start: 2025-08-24 | End: 2025-08-24

## 2025-08-24 RX ORDER — SODIUM CHLORIDE 0.9 % (FLUSH) 0.9 %
10 SYRINGE (ML) INJECTION EVERY 12 HOURS SCHEDULED
Status: DISCONTINUED | OUTPATIENT
Start: 2025-08-24 | End: 2025-08-28 | Stop reason: HOSPADM

## 2025-08-24 RX ORDER — ACETAMINOPHEN 650 MG/1
650 SUPPOSITORY RECTAL EVERY 4 HOURS PRN
Status: DISCONTINUED | OUTPATIENT
Start: 2025-08-24 | End: 2025-08-28 | Stop reason: HOSPADM

## 2025-08-24 RX ORDER — HYDROCODONE BITARTRATE AND ACETAMINOPHEN 7.5; 325 MG/1; MG/1
1 TABLET ORAL EVERY 6 HOURS PRN
Refills: 0 | Status: DISCONTINUED | OUTPATIENT
Start: 2025-08-24 | End: 2025-08-28 | Stop reason: HOSPADM

## 2025-08-24 RX ADMIN — TRAMADOL HYDROCHLORIDE 50 MG: 50 TABLET, COATED ORAL at 04:52

## 2025-08-24 RX ADMIN — PIPERACILLIN AND TAZOBACTAM 3.38 G: 3; .375 INJECTION, POWDER, FOR SOLUTION INTRAVENOUS at 05:57

## 2025-08-24 RX ADMIN — Medication 1000 UNITS: at 10:03

## 2025-08-24 RX ADMIN — Medication 10 ML: at 20:48

## 2025-08-24 RX ADMIN — GUAIFENESIN 1200 MG: 600 TABLET, EXTENDED RELEASE ORAL at 10:43

## 2025-08-24 RX ADMIN — IPRATROPIUM BROMIDE AND ALBUTEROL SULFATE 3 ML: .5; 3 SOLUTION RESPIRATORY (INHALATION) at 18:44

## 2025-08-24 RX ADMIN — SODIUM BICARBONATE 50 MEQ: 84 INJECTION INTRAVENOUS at 14:14

## 2025-08-24 RX ADMIN — GUAIFENESIN 1200 MG: 600 TABLET, EXTENDED RELEASE ORAL at 20:14

## 2025-08-24 RX ADMIN — PIPERACILLIN AND TAZOBACTAM 3.38 G: 3; .375 INJECTION, POWDER, FOR SOLUTION INTRAVENOUS at 17:16

## 2025-08-24 RX ADMIN — SODIUM BICARBONATE 1300 MG: 650 TABLET ORAL at 20:14

## 2025-08-24 RX ADMIN — PANTOPRAZOLE SODIUM 40 MG: 40 TABLET, DELAYED RELEASE ORAL at 05:24

## 2025-08-24 RX ADMIN — Medication 10 ML: at 17:16

## 2025-08-24 RX ADMIN — SEVELAMER CARBONATE 1600 MG: 800 TABLET, FILM COATED ORAL at 17:16

## 2025-08-24 RX ADMIN — IPRATROPIUM BROMIDE AND ALBUTEROL SULFATE 3 ML: .5; 3 SOLUTION RESPIRATORY (INHALATION) at 15:18

## 2025-08-24 RX ADMIN — Medication 10 ML: at 14:14

## 2025-08-24 RX ADMIN — IPRATROPIUM BROMIDE AND ALBUTEROL SULFATE 3 ML: .5; 3 SOLUTION RESPIRATORY (INHALATION) at 10:45

## 2025-08-24 RX ADMIN — SODIUM BICARBONATE 1300 MG: 650 TABLET ORAL at 17:16

## 2025-08-24 RX ADMIN — SODIUM ZIRCONIUM CYCLOSILICATE 10 G: 10 POWDER, FOR SUSPENSION ORAL at 08:02

## 2025-08-24 RX ADMIN — Medication 10 ML: at 10:22

## 2025-08-24 RX ADMIN — SODIUM BICARBONATE 1300 MG: 650 TABLET ORAL at 10:03

## 2025-08-24 RX ADMIN — SEVELAMER CARBONATE 1600 MG: 800 TABLET, FILM COATED ORAL at 08:02

## 2025-08-25 ENCOUNTER — INPATIENT HOSPITAL (AMBULATORY)
Dept: URBAN - METROPOLITAN AREA HOSPITAL 84 | Facility: HOSPITAL | Age: 75
End: 2025-08-25
Payer: MEDICARE

## 2025-08-25 ENCOUNTER — APPOINTMENT (OUTPATIENT)
Dept: NEPHROLOGY | Facility: HOSPITAL | Age: 75
End: 2025-08-25
Payer: MEDICARE

## 2025-08-25 DIAGNOSIS — R10.9 UNSPECIFIED ABDOMINAL PAIN: ICD-10-CM

## 2025-08-25 DIAGNOSIS — K63.89 OTHER SPECIFIED DISEASES OF INTESTINE: ICD-10-CM

## 2025-08-25 DIAGNOSIS — R19.4 CHANGE IN BOWEL HABIT: ICD-10-CM

## 2025-08-25 DIAGNOSIS — Z85.528 PERSONAL HISTORY OF OTHER MALIGNANT NEOPLASM OF KIDNEY: ICD-10-CM

## 2025-08-25 DIAGNOSIS — D53.9 NUTRITIONAL ANEMIA, UNSPECIFIED: ICD-10-CM

## 2025-08-25 DIAGNOSIS — Z98.84 BARIATRIC SURGERY STATUS: ICD-10-CM

## 2025-08-25 DIAGNOSIS — R93.3 ABNORMAL FINDINGS ON DIAGNOSTIC IMAGING OF OTHER PARTS OF DI: ICD-10-CM

## 2025-08-25 LAB
ANION GAP SERPL CALCULATED.3IONS-SCNC: 17.4 MMOL/L (ref 5–15)
BUN SERPL-MCNC: 47.4 MG/DL (ref 8–23)
BUN/CREAT SERPL: 6.2 (ref 7–25)
CALCIUM SPEC-SCNC: 8.4 MG/DL (ref 8.6–10.5)
CHLORIDE SERPL-SCNC: 99 MMOL/L (ref 98–107)
CO2 SERPL-SCNC: 21.6 MMOL/L (ref 22–29)
CREAT SERPL-MCNC: 7.69 MG/DL (ref 0.57–1)
DEPRECATED RDW RBC AUTO: 64 FL (ref 37–54)
EGFRCR SERPLBLD CKD-EPI 2021: 5.1 ML/MIN/1.73
ERYTHROCYTE [DISTWIDTH] IN BLOOD BY AUTOMATED COUNT: 17.5 % (ref 12.3–15.4)
GLUCOSE SERPL-MCNC: 133 MG/DL (ref 65–99)
HCT VFR BLD AUTO: 29.6 % (ref 34–46.6)
HGB BLD-MCNC: 9.2 G/DL (ref 12–15.9)
MCH RBC QN AUTO: 31.2 PG (ref 26.6–33)
MCHC RBC AUTO-ENTMCNC: 31.1 G/DL (ref 31.5–35.7)
MCV RBC AUTO: 100.3 FL (ref 79–97)
PLATELET # BLD AUTO: 310 10*3/MM3 (ref 140–450)
PMV BLD AUTO: 10.2 FL (ref 6–12)
POTASSIUM SERPL-SCNC: 4.7 MMOL/L (ref 3.5–5.2)
RBC # BLD AUTO: 2.95 10*6/MM3 (ref 3.77–5.28)
SODIUM SERPL-SCNC: 138 MMOL/L (ref 136–145)
WBC NRBC COR # BLD AUTO: 13.37 10*3/MM3 (ref 3.4–10.8)

## 2025-08-25 PROCEDURE — 80048 BASIC METABOLIC PNL TOTAL CA: CPT | Performed by: NURSE PRACTITIONER

## 2025-08-25 PROCEDURE — 99222 1ST HOSP IP/OBS MODERATE 55: CPT | Mod: FS | Performed by: NURSE PRACTITIONER

## 2025-08-25 PROCEDURE — 94761 N-INVAS EAR/PLS OXIMETRY MLT: CPT

## 2025-08-25 PROCEDURE — 94664 DEMO&/EVAL PT USE INHALER: CPT

## 2025-08-25 PROCEDURE — 25010000002 PIPERACILLIN SOD-TAZOBACTAM PER 1 G: Performed by: NURSE PRACTITIONER

## 2025-08-25 PROCEDURE — 25010000002 HYDROMORPHONE 1 MG/ML SOLUTION: Performed by: INTERNAL MEDICINE

## 2025-08-25 PROCEDURE — 94799 UNLISTED PULMONARY SVC/PX: CPT

## 2025-08-25 PROCEDURE — 85027 COMPLETE CBC AUTOMATED: CPT | Performed by: NURSE PRACTITIONER

## 2025-08-25 RX ORDER — DICYCLOMINE HYDROCHLORIDE 10 MG/1
20 CAPSULE ORAL 3 TIMES DAILY
Status: DISCONTINUED | OUTPATIENT
Start: 2025-08-25 | End: 2025-08-28 | Stop reason: HOSPADM

## 2025-08-25 RX ORDER — HYDROMORPHONE HCL/0.9% NACL/PF 50 MG/50ML
0.5 PREFILLED PUMP RESERVOIR INTRAVENOUS AS NEEDED
Refills: 0 | Status: DISCONTINUED | OUTPATIENT
Start: 2025-08-25 | End: 2025-08-25

## 2025-08-25 RX ORDER — IPRATROPIUM BROMIDE AND ALBUTEROL SULFATE 2.5; .5 MG/3ML; MG/3ML
3 SOLUTION RESPIRATORY (INHALATION) EVERY 6 HOURS PRN
Status: DISCONTINUED | OUTPATIENT
Start: 2025-08-25 | End: 2025-08-28 | Stop reason: HOSPADM

## 2025-08-25 RX ADMIN — HYDROCODONE BITARTRATE AND ACETAMINOPHEN 1 TABLET: 7.5; 325 TABLET ORAL at 20:32

## 2025-08-25 RX ADMIN — DICYCLOMINE HYDROCHLORIDE 20 MG: 10 CAPSULE ORAL at 20:25

## 2025-08-25 RX ADMIN — SEVELAMER CARBONATE 1600 MG: 800 TABLET, FILM COATED ORAL at 11:24

## 2025-08-25 RX ADMIN — PANTOPRAZOLE SODIUM 40 MG: 40 TABLET, DELAYED RELEASE ORAL at 05:23

## 2025-08-25 RX ADMIN — GUAIFENESIN 1200 MG: 600 TABLET, EXTENDED RELEASE ORAL at 11:25

## 2025-08-25 RX ADMIN — SODIUM BICARBONATE 1300 MG: 650 TABLET ORAL at 11:24

## 2025-08-25 RX ADMIN — PIPERACILLIN AND TAZOBACTAM 3.38 G: 3; .375 INJECTION, POWDER, FOR SOLUTION INTRAVENOUS at 05:30

## 2025-08-25 RX ADMIN — Medication 1000 UNITS: at 11:25

## 2025-08-25 RX ADMIN — PIPERACILLIN AND TAZOBACTAM 3.38 G: 3; .375 INJECTION, POWDER, FOR SOLUTION INTRAVENOUS at 17:22

## 2025-08-25 RX ADMIN — SODIUM BICARBONATE 1300 MG: 650 TABLET ORAL at 20:25

## 2025-08-25 RX ADMIN — SODIUM BICARBONATE 1300 MG: 650 TABLET ORAL at 17:22

## 2025-08-25 RX ADMIN — Medication 10 ML: at 20:32

## 2025-08-25 RX ADMIN — IPRATROPIUM BROMIDE AND ALBUTEROL SULFATE 3 ML: .5; 3 SOLUTION RESPIRATORY (INHALATION) at 12:24

## 2025-08-25 RX ADMIN — HYDROMORPHONE HYDROCHLORIDE 0.5 MG: 1 INJECTION, SOLUTION INTRAMUSCULAR; INTRAVENOUS; SUBCUTANEOUS at 08:35

## 2025-08-25 RX ADMIN — IPRATROPIUM BROMIDE AND ALBUTEROL SULFATE 3 ML: .5; 3 SOLUTION RESPIRATORY (INHALATION) at 15:42

## 2025-08-25 RX ADMIN — GUAIFENESIN 1200 MG: 600 TABLET, EXTENDED RELEASE ORAL at 20:25

## 2025-08-25 RX ADMIN — SEVELAMER CARBONATE 1600 MG: 800 TABLET, FILM COATED ORAL at 17:22

## 2025-08-26 ENCOUNTER — APPOINTMENT (OUTPATIENT)
Dept: CT IMAGING | Facility: HOSPITAL | Age: 75
End: 2025-08-26
Payer: MEDICARE

## 2025-08-26 ENCOUNTER — INPATIENT HOSPITAL (AMBULATORY)
Dept: URBAN - METROPOLITAN AREA HOSPITAL 84 | Facility: HOSPITAL | Age: 75
End: 2025-08-26
Payer: MEDICARE

## 2025-08-26 ENCOUNTER — PATIENT OUTREACH (OUTPATIENT)
Dept: ONCOLOGY | Facility: CLINIC | Age: 75
End: 2025-08-26
Payer: MEDICARE

## 2025-08-26 ENCOUNTER — APPOINTMENT (OUTPATIENT)
Dept: CARDIOLOGY | Facility: HOSPITAL | Age: 75
End: 2025-08-26
Payer: MEDICARE

## 2025-08-26 DIAGNOSIS — R93.3 ABNORMAL FINDINGS ON DIAGNOSTIC IMAGING OF OTHER PARTS OF DI: ICD-10-CM

## 2025-08-26 DIAGNOSIS — R10.9 UNSPECIFIED ABDOMINAL PAIN: ICD-10-CM

## 2025-08-26 DIAGNOSIS — D53.9 NUTRITIONAL ANEMIA, UNSPECIFIED: ICD-10-CM

## 2025-08-26 DIAGNOSIS — Z85.528 PERSONAL HISTORY OF OTHER MALIGNANT NEOPLASM OF KIDNEY: ICD-10-CM

## 2025-08-26 DIAGNOSIS — K63.89 OTHER SPECIFIED DISEASES OF INTESTINE: ICD-10-CM

## 2025-08-26 DIAGNOSIS — R19.4 CHANGE IN BOWEL HABIT: ICD-10-CM

## 2025-08-26 DIAGNOSIS — Z98.84 BARIATRIC SURGERY STATUS: ICD-10-CM

## 2025-08-26 LAB
ADV 40+41 DNA STL QL NAA+NON-PROBE: NOT DETECTED
ALBUMIN SERPL-MCNC: 3.8 G/DL (ref 3.5–5.2)
ALBUMIN/GLOB SERPL: 1.2 G/DL
ALP SERPL-CCNC: 112 U/L (ref 39–117)
ALT SERPL W P-5'-P-CCNC: 11 U/L (ref 1–33)
ANION GAP SERPL CALCULATED.3IONS-SCNC: 17.1 MMOL/L (ref 5–15)
AST SERPL-CCNC: 24 U/L (ref 1–32)
ASTRO TYP 1-8 RNA STL QL NAA+NON-PROBE: NOT DETECTED
BASOPHILS # BLD AUTO: 0.06 10*3/MM3 (ref 0–0.2)
BASOPHILS NFR BLD AUTO: 0.6 % (ref 0–1.5)
BILIRUB SERPL-MCNC: 0.2 MG/DL (ref 0–1.2)
BUN SERPL-MCNC: 27.3 MG/DL (ref 8–23)
BUN/CREAT SERPL: 5 (ref 7–25)
C CAYETANENSIS DNA STL QL NAA+NON-PROBE: NOT DETECTED
C COLI+JEJ+UPSA DNA STL QL NAA+NON-PROBE: NOT DETECTED
CALCIUM SPEC-SCNC: 9 MG/DL (ref 8.6–10.5)
CHLORIDE SERPL-SCNC: 96 MMOL/L (ref 98–107)
CO2 SERPL-SCNC: 24.9 MMOL/L (ref 22–29)
CREAT SERPL-MCNC: 5.48 MG/DL (ref 0.57–1)
CRYPTOSP DNA STL QL NAA+NON-PROBE: NOT DETECTED
DEPRECATED RDW RBC AUTO: 63.8 FL (ref 37–54)
E HISTOLYT DNA STL QL NAA+NON-PROBE: NOT DETECTED
EAEC PAA PLAS AGGR+AATA ST NAA+NON-PRB: NOT DETECTED
EC STX1+STX2 GENES STL QL NAA+NON-PROBE: NOT DETECTED
EGFRCR SERPLBLD CKD-EPI 2021: 7.6 ML/MIN/1.73
EOSINOPHIL # BLD AUTO: 0.33 10*3/MM3 (ref 0–0.4)
EOSINOPHIL NFR BLD AUTO: 3.3 % (ref 0.3–6.2)
EPEC EAE GENE STL QL NAA+NON-PROBE: NOT DETECTED
ERYTHROCYTE [DISTWIDTH] IN BLOOD BY AUTOMATED COUNT: 17.4 % (ref 12.3–15.4)
ETEC LTA+ST1A+ST1B TOX ST NAA+NON-PROBE: NOT DETECTED
G LAMBLIA DNA STL QL NAA+NON-PROBE: NOT DETECTED
GLOBULIN UR ELPH-MCNC: 3.1 GM/DL
GLUCOSE SERPL-MCNC: 97 MG/DL (ref 65–99)
HCT VFR BLD AUTO: 31.3 % (ref 34–46.6)
HGB BLD-MCNC: 9.6 G/DL (ref 12–15.9)
IMM GRANULOCYTES # BLD AUTO: 0.03 10*3/MM3 (ref 0–0.05)
IMM GRANULOCYTES NFR BLD AUTO: 0.3 % (ref 0–0.5)
LYMPHOCYTES # BLD AUTO: 0.82 10*3/MM3 (ref 0.7–3.1)
LYMPHOCYTES NFR BLD AUTO: 8.3 % (ref 19.6–45.3)
MCH RBC QN AUTO: 30.6 PG (ref 26.6–33)
MCHC RBC AUTO-ENTMCNC: 30.7 G/DL (ref 31.5–35.7)
MCV RBC AUTO: 99.7 FL (ref 79–97)
MONOCYTES # BLD AUTO: 0.9 10*3/MM3 (ref 0.1–0.9)
MONOCYTES NFR BLD AUTO: 9.1 % (ref 5–12)
NEUTROPHILS NFR BLD AUTO: 7.76 10*3/MM3 (ref 1.7–7)
NEUTROPHILS NFR BLD AUTO: 78.4 % (ref 42.7–76)
NOROVIRUS GI+II RNA STL QL NAA+NON-PROBE: NOT DETECTED
NRBC BLD AUTO-RTO: 0 /100 WBC (ref 0–0.2)
P SHIGELLOIDES DNA STL QL NAA+NON-PROBE: NOT DETECTED
PLATELET # BLD AUTO: 322 10*3/MM3 (ref 140–450)
PMV BLD AUTO: 10.6 FL (ref 6–12)
POTASSIUM SERPL-SCNC: 4.4 MMOL/L (ref 3.5–5.2)
PROT SERPL-MCNC: 6.9 G/DL (ref 6–8.5)
RBC # BLD AUTO: 3.14 10*6/MM3 (ref 3.77–5.28)
RVA RNA STL QL NAA+NON-PROBE: NOT DETECTED
S ENT+BONG DNA STL QL NAA+NON-PROBE: NOT DETECTED
SAPO I+II+IV+V RNA STL QL NAA+NON-PROBE: NOT DETECTED
SHIGELLA SP+EIEC IPAH ST NAA+NON-PROBE: NOT DETECTED
SODIUM SERPL-SCNC: 138 MMOL/L (ref 136–145)
V CHOL+PARA+VUL DNA STL QL NAA+NON-PROBE: NOT DETECTED
V CHOLERAE DNA STL QL NAA+NON-PROBE: NOT DETECTED
WBC NRBC COR # BLD AUTO: 9.9 10*3/MM3 (ref 3.4–10.8)
Y ENTEROCOL DNA STL QL NAA+NON-PROBE: NOT DETECTED

## 2025-08-26 PROCEDURE — 25510000001 IOPAMIDOL PER 1 ML: Performed by: INTERNAL MEDICINE

## 2025-08-26 PROCEDURE — 25010000002 CEFTRIAXONE PER 250 MG: Performed by: NURSE PRACTITIONER

## 2025-08-26 PROCEDURE — 80053 COMPREHEN METABOLIC PANEL: CPT | Performed by: NURSE PRACTITIONER

## 2025-08-26 PROCEDURE — 74174 CTA ABD&PLVS W/CONTRAST: CPT

## 2025-08-26 PROCEDURE — 25010000002 PIPERACILLIN SOD-TAZOBACTAM PER 1 G: Performed by: NURSE PRACTITIONER

## 2025-08-26 PROCEDURE — 99232 SBSQ HOSP IP/OBS MODERATE 35: CPT | Mod: FS | Performed by: NURSE PRACTITIONER

## 2025-08-26 PROCEDURE — 85025 COMPLETE CBC W/AUTO DIFF WBC: CPT | Performed by: NURSE PRACTITIONER

## 2025-08-26 PROCEDURE — 87507 IADNA-DNA/RNA PROBE TQ 12-25: CPT | Performed by: INTERNAL MEDICINE

## 2025-08-26 RX ORDER — METRONIDAZOLE 500 MG/100ML
500 INJECTION, SOLUTION INTRAVENOUS EVERY 8 HOURS
Status: DISCONTINUED | OUTPATIENT
Start: 2025-08-26 | End: 2025-08-28

## 2025-08-26 RX ORDER — METOPROLOL SUCCINATE 25 MG/1
25 TABLET, EXTENDED RELEASE ORAL
Status: DISCONTINUED | OUTPATIENT
Start: 2025-08-26 | End: 2025-08-28 | Stop reason: HOSPADM

## 2025-08-26 RX ORDER — IOPAMIDOL 755 MG/ML
100 INJECTION, SOLUTION INTRAVASCULAR
Status: COMPLETED | OUTPATIENT
Start: 2025-08-26 | End: 2025-08-26

## 2025-08-26 RX ADMIN — Medication 10 ML: at 09:49

## 2025-08-26 RX ADMIN — CEFTRIAXONE SODIUM 1000 MG: 1 INJECTION, POWDER, FOR SOLUTION INTRAMUSCULAR; INTRAVENOUS at 18:30

## 2025-08-26 RX ADMIN — SEVELAMER CARBONATE 1600 MG: 800 TABLET, FILM COATED ORAL at 16:47

## 2025-08-26 RX ADMIN — IOPAMIDOL 100 ML: 755 INJECTION, SOLUTION INTRAVENOUS at 13:06

## 2025-08-26 RX ADMIN — DICYCLOMINE HYDROCHLORIDE 20 MG: 10 CAPSULE ORAL at 21:32

## 2025-08-26 RX ADMIN — METOPROLOL SUCCINATE 25 MG: 25 TABLET, EXTENDED RELEASE ORAL at 10:56

## 2025-08-26 RX ADMIN — Medication 10 ML: at 21:33

## 2025-08-26 RX ADMIN — METRONIDAZOLE 500 MG: 500 INJECTION, SOLUTION INTRAVENOUS at 18:31

## 2025-08-26 RX ADMIN — HYDROCODONE BITARTRATE AND ACETAMINOPHEN 1 TABLET: 7.5; 325 TABLET ORAL at 21:32

## 2025-08-26 RX ADMIN — DICYCLOMINE HYDROCHLORIDE 20 MG: 10 CAPSULE ORAL at 16:48

## 2025-08-26 RX ADMIN — SODIUM BICARBONATE 1300 MG: 650 TABLET ORAL at 21:32

## 2025-08-26 RX ADMIN — PIPERACILLIN AND TAZOBACTAM 3.38 G: 3; .375 INJECTION, POWDER, FOR SOLUTION INTRAVENOUS at 05:34

## 2025-08-26 RX ADMIN — GUAIFENESIN 1200 MG: 600 TABLET, EXTENDED RELEASE ORAL at 21:32

## 2025-08-26 RX ADMIN — SODIUM BICARBONATE 1300 MG: 650 TABLET ORAL at 16:47

## 2025-08-27 ENCOUNTER — INPATIENT HOSPITAL (AMBULATORY)
Dept: URBAN - METROPOLITAN AREA HOSPITAL 84 | Facility: HOSPITAL | Age: 75
End: 2025-08-27
Payer: MEDICARE

## 2025-08-27 ENCOUNTER — ANESTHESIA (OUTPATIENT)
Dept: GASTROENTEROLOGY | Facility: HOSPITAL | Age: 75
End: 2025-08-27
Payer: MEDICARE

## 2025-08-27 ENCOUNTER — ANESTHESIA EVENT (OUTPATIENT)
Dept: GASTROENTEROLOGY | Facility: HOSPITAL | Age: 75
End: 2025-08-27
Payer: MEDICARE

## 2025-08-27 ENCOUNTER — APPOINTMENT (OUTPATIENT)
Dept: NEPHROLOGY | Facility: HOSPITAL | Age: 75
End: 2025-08-27
Payer: MEDICARE

## 2025-08-27 DIAGNOSIS — K28.9 GASTROJEJUNAL ULCER, UNSPECIFIED AS ACUTE OR CHRONIC, WITHOU: ICD-10-CM

## 2025-08-27 DIAGNOSIS — K29.70 GASTRITIS, UNSPECIFIED, WITHOUT BLEEDING: ICD-10-CM

## 2025-08-27 DIAGNOSIS — K52.9 NONINFECTIVE GASTROENTERITIS AND COLITIS, UNSPECIFIED: ICD-10-CM

## 2025-08-27 DIAGNOSIS — R93.5 ABNORMAL FINDINGS ON DIAGNOSTIC IMAGING OF OTHER ABDOMINAL R: ICD-10-CM

## 2025-08-27 DIAGNOSIS — R10.9 UNSPECIFIED ABDOMINAL PAIN: ICD-10-CM

## 2025-08-27 DIAGNOSIS — Z90.3 ACQUIRED ABSENCE OF STOMACH [PART OF]: ICD-10-CM

## 2025-08-27 LAB
ALBUMIN SERPL-MCNC: 3.7 G/DL (ref 3.5–5.2)
ALBUMIN/GLOB SERPL: 1.4 G/DL
ALP SERPL-CCNC: 83 U/L (ref 39–117)
ALT SERPL W P-5'-P-CCNC: 13 U/L (ref 1–33)
ANION GAP SERPL CALCULATED.3IONS-SCNC: 16.2 MMOL/L (ref 5–15)
AST SERPL-CCNC: 16 U/L (ref 1–32)
BASOPHILS # BLD AUTO: 0.07 10*3/MM3 (ref 0–0.2)
BASOPHILS NFR BLD AUTO: 0.7 % (ref 0–1.5)
BILIRUB SERPL-MCNC: 0.2 MG/DL (ref 0–1.2)
BUN SERPL-MCNC: 32.5 MG/DL (ref 8–23)
BUN/CREAT SERPL: 5 (ref 7–25)
CALCIUM SPEC-SCNC: 8.3 MG/DL (ref 8.6–10.5)
CHLORIDE SERPL-SCNC: 95 MMOL/L (ref 98–107)
CO2 SERPL-SCNC: 23.8 MMOL/L (ref 22–29)
CREAT SERPL-MCNC: 6.52 MG/DL (ref 0.57–1)
DEPRECATED RDW RBC AUTO: 61.8 FL (ref 37–54)
EGFRCR SERPLBLD CKD-EPI 2021: 6.2 ML/MIN/1.73
EOSINOPHIL # BLD AUTO: 0.44 10*3/MM3 (ref 0–0.4)
EOSINOPHIL NFR BLD AUTO: 4.5 % (ref 0.3–6.2)
ERYTHROCYTE [DISTWIDTH] IN BLOOD BY AUTOMATED COUNT: 16.9 % (ref 12.3–15.4)
GLOBULIN UR ELPH-MCNC: 2.7 GM/DL
GLUCOSE SERPL-MCNC: 89 MG/DL (ref 65–99)
HCT VFR BLD AUTO: 32 % (ref 34–46.6)
HGB BLD-MCNC: 9.9 G/DL (ref 12–15.9)
IMM GRANULOCYTES # BLD AUTO: 0.05 10*3/MM3 (ref 0–0.05)
IMM GRANULOCYTES NFR BLD AUTO: 0.5 % (ref 0–0.5)
LYMPHOCYTES # BLD AUTO: 0.8 10*3/MM3 (ref 0.7–3.1)
LYMPHOCYTES NFR BLD AUTO: 8.2 % (ref 19.6–45.3)
MCH RBC QN AUTO: 30.7 PG (ref 26.6–33)
MCHC RBC AUTO-ENTMCNC: 30.9 G/DL (ref 31.5–35.7)
MCV RBC AUTO: 99.1 FL (ref 79–97)
MONOCYTES # BLD AUTO: 0.87 10*3/MM3 (ref 0.1–0.9)
MONOCYTES NFR BLD AUTO: 8.9 % (ref 5–12)
NEUTROPHILS NFR BLD AUTO: 7.5 10*3/MM3 (ref 1.7–7)
NEUTROPHILS NFR BLD AUTO: 77.2 % (ref 42.7–76)
NRBC BLD AUTO-RTO: 0 /100 WBC (ref 0–0.2)
PHOSPHATE SERPL-MCNC: 4.3 MG/DL (ref 2.5–4.5)
PLATELET # BLD AUTO: 318 10*3/MM3 (ref 140–450)
PMV BLD AUTO: 10.2 FL (ref 6–12)
POTASSIUM SERPL-SCNC: 4.3 MMOL/L (ref 3.5–5.2)
PROT SERPL-MCNC: 6.4 G/DL (ref 6–8.5)
RBC # BLD AUTO: 3.23 10*6/MM3 (ref 3.77–5.28)
SODIUM SERPL-SCNC: 135 MMOL/L (ref 136–145)
WBC NRBC COR # BLD AUTO: 9.73 10*3/MM3 (ref 3.4–10.8)

## 2025-08-27 PROCEDURE — 63710000001 ONDANSETRON ODT 4 MG TABLET DISPERSIBLE: Performed by: FAMILY MEDICINE

## 2025-08-27 PROCEDURE — 25010000002 METRONIDAZOLE 500 MG/100ML SOLUTION: Performed by: STUDENT IN AN ORGANIZED HEALTH CARE EDUCATION/TRAINING PROGRAM

## 2025-08-27 PROCEDURE — 25010000002 METRONIDAZOLE 500 MG/100ML SOLUTION: Performed by: NURSE PRACTITIONER

## 2025-08-27 PROCEDURE — 88305 TISSUE EXAM BY PATHOLOGIST: CPT | Performed by: STUDENT IN AN ORGANIZED HEALTH CARE EDUCATION/TRAINING PROGRAM

## 2025-08-27 PROCEDURE — 43239 EGD BIOPSY SINGLE/MULTIPLE: CPT | Performed by: STUDENT IN AN ORGANIZED HEALTH CARE EDUCATION/TRAINING PROGRAM

## 2025-08-27 PROCEDURE — 84100 ASSAY OF PHOSPHORUS: CPT | Performed by: INTERNAL MEDICINE

## 2025-08-27 PROCEDURE — 25010000002 LIDOCAINE PF 2% 2 % SOLUTION: Performed by: NURSE ANESTHETIST, CERTIFIED REGISTERED

## 2025-08-27 PROCEDURE — 85025 COMPLETE CBC W/AUTO DIFF WBC: CPT | Performed by: NURSE PRACTITIONER

## 2025-08-27 PROCEDURE — 25010000002 CEFTRIAXONE PER 250 MG: Performed by: NURSE PRACTITIONER

## 2025-08-27 PROCEDURE — 25010000002 HYDROMORPHONE 1 MG/ML SOLUTION: Performed by: INTERNAL MEDICINE

## 2025-08-27 PROCEDURE — 25010000002 PROPOFOL 200 MG/20ML EMULSION: Performed by: NURSE ANESTHETIST, CERTIFIED REGISTERED

## 2025-08-27 PROCEDURE — 25810000003 SODIUM CHLORIDE 0.9 % SOLUTION: Performed by: NURSE ANESTHETIST, CERTIFIED REGISTERED

## 2025-08-27 PROCEDURE — 80053 COMPREHEN METABOLIC PANEL: CPT | Performed by: NURSE PRACTITIONER

## 2025-08-27 RX ORDER — PROPOFOL 10 MG/ML
INJECTION, EMULSION INTRAVENOUS AS NEEDED
Status: DISCONTINUED | OUTPATIENT
Start: 2025-08-27 | End: 2025-08-27 | Stop reason: SURG

## 2025-08-27 RX ORDER — PROCHLORPERAZINE EDISYLATE 5 MG/ML
5 INJECTION INTRAMUSCULAR; INTRAVENOUS EVERY 6 HOURS PRN
Status: DISCONTINUED | OUTPATIENT
Start: 2025-08-27 | End: 2025-08-28 | Stop reason: HOSPADM

## 2025-08-27 RX ORDER — LIDOCAINE HYDROCHLORIDE 20 MG/ML
INJECTION, SOLUTION EPIDURAL; INFILTRATION; INTRACAUDAL; PERINEURAL AS NEEDED
Status: DISCONTINUED | OUTPATIENT
Start: 2025-08-27 | End: 2025-08-27 | Stop reason: SURG

## 2025-08-27 RX ORDER — TEMAZEPAM 15 MG/1
15 CAPSULE ORAL NIGHTLY PRN
Status: DISCONTINUED | OUTPATIENT
Start: 2025-08-27 | End: 2025-08-28 | Stop reason: HOSPADM

## 2025-08-27 RX ORDER — SODIUM CHLORIDE 9 MG/ML
INJECTION, SOLUTION INTRAVENOUS CONTINUOUS PRN
Status: DISCONTINUED | OUTPATIENT
Start: 2025-08-27 | End: 2025-08-27 | Stop reason: SURG

## 2025-08-27 RX ADMIN — SODIUM BICARBONATE 1300 MG: 650 TABLET ORAL at 21:54

## 2025-08-27 RX ADMIN — CEFTRIAXONE SODIUM 1000 MG: 1 INJECTION, POWDER, FOR SOLUTION INTRAMUSCULAR; INTRAVENOUS at 18:21

## 2025-08-27 RX ADMIN — METRONIDAZOLE 500 MG: 500 INJECTION, SOLUTION INTRAVENOUS at 14:04

## 2025-08-27 RX ADMIN — GUAIFENESIN 1200 MG: 600 TABLET, EXTENDED RELEASE ORAL at 21:53

## 2025-08-27 RX ADMIN — DICYCLOMINE HYDROCHLORIDE 20 MG: 10 CAPSULE ORAL at 21:54

## 2025-08-27 RX ADMIN — LIDOCAINE HYDROCHLORIDE 80 MG: 20 INJECTION, SOLUTION EPIDURAL; INFILTRATION; INTRACAUDAL; PERINEURAL at 11:57

## 2025-08-27 RX ADMIN — METRONIDAZOLE 500 MG: 500 INJECTION, SOLUTION INTRAVENOUS at 22:03

## 2025-08-27 RX ADMIN — HYDROCODONE BITARTRATE AND ACETAMINOPHEN 1 TABLET: 7.5; 325 TABLET ORAL at 22:02

## 2025-08-27 RX ADMIN — DICYCLOMINE HYDROCHLORIDE 20 MG: 10 CAPSULE ORAL at 16:18

## 2025-08-27 RX ADMIN — SODIUM BICARBONATE 1300 MG: 650 TABLET ORAL at 16:18

## 2025-08-27 RX ADMIN — Medication 10 ML: at 21:54

## 2025-08-27 RX ADMIN — PROPOFOL 50 MG: 10 INJECTION, EMULSION INTRAVENOUS at 12:04

## 2025-08-27 RX ADMIN — METRONIDAZOLE 500 MG: 500 INJECTION, SOLUTION INTRAVENOUS at 02:26

## 2025-08-27 RX ADMIN — ONDANSETRON 4 MG: 4 TABLET, ORALLY DISINTEGRATING ORAL at 14:10

## 2025-08-27 RX ADMIN — HYDROCODONE BITARTRATE AND ACETAMINOPHEN 1 TABLET: 7.5; 325 TABLET ORAL at 14:10

## 2025-08-27 RX ADMIN — PROPOFOL 150 MG: 10 INJECTION, EMULSION INTRAVENOUS at 11:57

## 2025-08-27 RX ADMIN — SEVELAMER CARBONATE 1600 MG: 800 TABLET, FILM COATED ORAL at 18:21

## 2025-08-27 RX ADMIN — SODIUM CHLORIDE: 9 INJECTION, SOLUTION INTRAVENOUS at 11:51

## 2025-08-27 RX ADMIN — HYDROMORPHONE HYDROCHLORIDE 0.5 MG: 1 INJECTION, SOLUTION INTRAMUSCULAR; INTRAVENOUS; SUBCUTANEOUS at 06:49

## 2025-08-28 ENCOUNTER — READMISSION MANAGEMENT (OUTPATIENT)
Dept: CALL CENTER | Facility: HOSPITAL | Age: 75
End: 2025-08-28
Payer: MEDICARE

## 2025-08-28 VITALS
WEIGHT: 145.5 LBS | BODY MASS INDEX: 28.57 KG/M2 | HEART RATE: 92 BPM | RESPIRATION RATE: 17 BRPM | HEIGHT: 60 IN | OXYGEN SATURATION: 93 % | SYSTOLIC BLOOD PRESSURE: 158 MMHG | TEMPERATURE: 98.1 F | DIASTOLIC BLOOD PRESSURE: 81 MMHG

## 2025-08-28 LAB
ALBUMIN SERPL-MCNC: 3.7 G/DL (ref 3.5–5.2)
ALBUMIN/GLOB SERPL: 1.2 G/DL
ALP SERPL-CCNC: 93 U/L (ref 39–117)
ALT SERPL W P-5'-P-CCNC: 12 U/L (ref 1–33)
ANION GAP SERPL CALCULATED.3IONS-SCNC: 20.8 MMOL/L (ref 5–15)
AST SERPL-CCNC: 21 U/L (ref 1–32)
BACTERIA SPEC AEROBE CULT: NORMAL
BACTERIA SPEC AEROBE CULT: NORMAL
BASOPHILS # BLD AUTO: 0.08 10*3/MM3 (ref 0–0.2)
BASOPHILS NFR BLD AUTO: 0.7 % (ref 0–1.5)
BILIRUB SERPL-MCNC: 0.2 MG/DL (ref 0–1.2)
BUN SERPL-MCNC: 22.2 MG/DL (ref 8–23)
BUN/CREAT SERPL: 5 (ref 7–25)
CALCIUM SPEC-SCNC: 8.8 MG/DL (ref 8.6–10.5)
CHLORIDE SERPL-SCNC: 93 MMOL/L (ref 98–107)
CO2 SERPL-SCNC: 21.2 MMOL/L (ref 22–29)
CREAT SERPL-MCNC: 4.42 MG/DL (ref 0.57–1)
DEPRECATED RDW RBC AUTO: 63.8 FL (ref 37–54)
EGFRCR SERPLBLD CKD-EPI 2021: 9.8 ML/MIN/1.73
EOSINOPHIL # BLD AUTO: 0.12 10*3/MM3 (ref 0–0.4)
EOSINOPHIL NFR BLD AUTO: 1 % (ref 0.3–6.2)
ERYTHROCYTE [DISTWIDTH] IN BLOOD BY AUTOMATED COUNT: 16.9 % (ref 12.3–15.4)
GLOBULIN UR ELPH-MCNC: 3.1 GM/DL
GLUCOSE SERPL-MCNC: 80 MG/DL (ref 65–99)
HCT VFR BLD AUTO: 37.3 % (ref 34–46.6)
HGB BLD-MCNC: 11.2 G/DL (ref 12–15.9)
IMM GRANULOCYTES # BLD AUTO: 0.12 10*3/MM3 (ref 0–0.05)
IMM GRANULOCYTES NFR BLD AUTO: 1 % (ref 0–0.5)
LAB AP CASE REPORT: NORMAL
LYMPHOCYTES # BLD AUTO: 0.55 10*3/MM3 (ref 0.7–3.1)
LYMPHOCYTES NFR BLD AUTO: 4.7 % (ref 19.6–45.3)
MCH RBC QN AUTO: 30.5 PG (ref 26.6–33)
MCHC RBC AUTO-ENTMCNC: 30 G/DL (ref 31.5–35.7)
MCV RBC AUTO: 101.6 FL (ref 79–97)
MONOCYTES # BLD AUTO: 0.77 10*3/MM3 (ref 0.1–0.9)
MONOCYTES NFR BLD AUTO: 6.5 % (ref 5–12)
NEUTROPHILS NFR BLD AUTO: 10.17 10*3/MM3 (ref 1.7–7)
NEUTROPHILS NFR BLD AUTO: 86.1 % (ref 42.7–76)
NRBC BLD AUTO-RTO: 0 /100 WBC (ref 0–0.2)
PATH REPORT.FINAL DX SPEC: NORMAL
PATH REPORT.GROSS SPEC: NORMAL
PLATELET # BLD AUTO: 368 10*3/MM3 (ref 140–450)
PMV BLD AUTO: 10.4 FL (ref 6–12)
POTASSIUM SERPL-SCNC: 3.8 MMOL/L (ref 3.5–5.2)
PROT SERPL-MCNC: 6.8 G/DL (ref 6–8.5)
RBC # BLD AUTO: 3.67 10*6/MM3 (ref 3.77–5.28)
SODIUM SERPL-SCNC: 135 MMOL/L (ref 136–145)
WBC NRBC COR # BLD AUTO: 11.81 10*3/MM3 (ref 3.4–10.8)

## 2025-08-28 PROCEDURE — 80053 COMPREHEN METABOLIC PANEL: CPT | Performed by: STUDENT IN AN ORGANIZED HEALTH CARE EDUCATION/TRAINING PROGRAM

## 2025-08-28 PROCEDURE — 25010000002 METRONIDAZOLE 500 MG/100ML SOLUTION: Performed by: STUDENT IN AN ORGANIZED HEALTH CARE EDUCATION/TRAINING PROGRAM

## 2025-08-28 PROCEDURE — 85025 COMPLETE CBC W/AUTO DIFF WBC: CPT | Performed by: STUDENT IN AN ORGANIZED HEALTH CARE EDUCATION/TRAINING PROGRAM

## 2025-08-28 PROCEDURE — 25010000002 HYDROMORPHONE 1 MG/ML SOLUTION: Performed by: STUDENT IN AN ORGANIZED HEALTH CARE EDUCATION/TRAINING PROGRAM

## 2025-08-28 PROCEDURE — 63710000001 ONDANSETRON ODT 4 MG TABLET DISPERSIBLE: Performed by: STUDENT IN AN ORGANIZED HEALTH CARE EDUCATION/TRAINING PROGRAM

## 2025-08-28 RX ORDER — METOPROLOL SUCCINATE 25 MG/1
25 TABLET, EXTENDED RELEASE ORAL
Qty: 30 TABLET | Refills: 1 | Status: SHIPPED | OUTPATIENT
Start: 2025-08-29

## 2025-08-28 RX ORDER — TEMAZEPAM 30 MG/1
30 CAPSULE ORAL NIGHTLY PRN
Qty: 30 CAPSULE | Refills: 0 | Status: SHIPPED | OUTPATIENT
Start: 2025-08-28

## 2025-08-28 RX ORDER — SODIUM BICARBONATE 650 MG/1
1300 TABLET ORAL 3 TIMES DAILY
Qty: 180 TABLET | Refills: 1 | Status: SHIPPED | OUTPATIENT
Start: 2025-08-28

## 2025-08-28 RX ORDER — DICYCLOMINE HYDROCHLORIDE 10 MG/1
20 CAPSULE ORAL 3 TIMES DAILY
Qty: 180 CAPSULE | Refills: 1 | Status: SHIPPED | OUTPATIENT
Start: 2025-08-28

## 2025-08-28 RX ORDER — HYDROCODONE BITARTRATE AND ACETAMINOPHEN 7.5; 325 MG/1; MG/1
1 TABLET ORAL EVERY 6 HOURS PRN
Qty: 30 TABLET | Refills: 0 | Status: SHIPPED | OUTPATIENT
Start: 2025-08-28 | End: 2025-09-05

## 2025-08-28 RX ORDER — ONDANSETRON 4 MG/1
4 TABLET, FILM COATED ORAL DAILY PRN
Qty: 30 TABLET | Refills: 0 | Status: SHIPPED | OUTPATIENT
Start: 2025-08-28 | End: 2026-08-28

## 2025-08-28 RX ORDER — SEVELAMER CARBONATE 800 MG/1
1600 TABLET, FILM COATED ORAL
Qty: 120 TABLET | Refills: 0 | Status: SHIPPED | OUTPATIENT
Start: 2025-08-28

## 2025-08-28 RX ORDER — PANTOPRAZOLE SODIUM 40 MG/1
40 TABLET, DELAYED RELEASE ORAL 2 TIMES DAILY
Qty: 180 TABLET | Refills: 0 | Status: SHIPPED | OUTPATIENT
Start: 2025-08-28

## 2025-08-28 RX ADMIN — GUAIFENESIN 1200 MG: 600 TABLET, EXTENDED RELEASE ORAL at 08:03

## 2025-08-28 RX ADMIN — METOPROLOL SUCCINATE 25 MG: 25 TABLET, EXTENDED RELEASE ORAL at 08:04

## 2025-08-28 RX ADMIN — DICYCLOMINE HYDROCHLORIDE 20 MG: 10 CAPSULE ORAL at 08:03

## 2025-08-28 RX ADMIN — PANTOPRAZOLE SODIUM 40 MG: 40 TABLET, DELAYED RELEASE ORAL at 05:31

## 2025-08-28 RX ADMIN — SEVELAMER CARBONATE 1600 MG: 800 TABLET, FILM COATED ORAL at 07:52

## 2025-08-28 RX ADMIN — HYDROMORPHONE HYDROCHLORIDE 0.5 MG: 1 INJECTION, SOLUTION INTRAMUSCULAR; INTRAVENOUS; SUBCUTANEOUS at 00:38

## 2025-08-28 RX ADMIN — METRONIDAZOLE 500 MG: 500 INJECTION, SOLUTION INTRAVENOUS at 05:31

## 2025-08-28 RX ADMIN — Medication 1000 UNITS: at 08:04

## 2025-08-28 RX ADMIN — ONDANSETRON 4 MG: 4 TABLET, ORALLY DISINTEGRATING ORAL at 08:08

## 2025-08-28 RX ADMIN — SODIUM BICARBONATE 1300 MG: 650 TABLET ORAL at 16:14

## 2025-08-28 RX ADMIN — HYDROCODONE BITARTRATE AND ACETAMINOPHEN 1 TABLET: 7.5; 325 TABLET ORAL at 05:31

## 2025-08-28 RX ADMIN — TEMAZEPAM 15 MG: 15 CAPSULE ORAL at 00:38

## 2025-08-28 RX ADMIN — SODIUM BICARBONATE 1300 MG: 650 TABLET ORAL at 08:04

## 2025-08-28 RX ADMIN — DICYCLOMINE HYDROCHLORIDE 20 MG: 10 CAPSULE ORAL at 16:13

## 2025-08-28 RX ADMIN — Medication 10 ML: at 08:04

## (undated) DEVICE — PROXIMATE RH ROTATING HEAD SKIN STAPLERS (35 WIDE) CONTAINS 35 STAINLESS STEEL STAPLES: Brand: PROXIMATE

## (undated) DEVICE — MICRO TIP WIPE: Brand: DEVON

## (undated) DEVICE — Device

## (undated) DEVICE — ANTIBACTERIAL UNDYED BRAIDED (POLYGLACTIN 910), SYNTHETIC ABSORBABLE SUTURE: Brand: COATED VICRYL

## (undated) DEVICE — BITEBLOCK ENDO W/STRAP 60F A/ LF DISP

## (undated) DEVICE — SOL IRRIG H2O 1000ML STRL

## (undated) DEVICE — PK ENDO GI 50

## (undated) DEVICE — TIP COVER ACCESSORY

## (undated) DEVICE — CATH GUIDE SOFTVU FLUSH HT STR .035 5F 90CM

## (undated) DEVICE — GENERAL LAPAROSCOPY CDS: Brand: MEDLINE INDUSTRIES, INC.

## (undated) DEVICE — SUT VIC 3/0 SH 27IN J416H

## (undated) DEVICE — SUT VIC 2/0 CT1 36IN

## (undated) DEVICE — UNDYED BRAIDED (POLYGLACTIN 910), SYNTHETIC ABSORBABLE SUTURE: Brand: COATED VICRYL

## (undated) DEVICE — PK PROC TURNOVER

## (undated) DEVICE — APC PROBE 2200 A, SINGLE USE OD 2.3MM/6.9FR; L. 2.2M/7.2FT: Brand: ERBE

## (undated) DEVICE — TRAP WIDEEYE POLYP

## (undated) DEVICE — CVR HNDL LT SURG ACCSSRY BLU STRL

## (undated) DEVICE — SUT SILK 3/0 TIES 18IN A184H

## (undated) DEVICE — CATH GUIDE SOFTVU SELECT UT BR BERN .035 4F 65CM

## (undated) DEVICE — SPNG LAP PREWSH SFTPK 18X18IN STRL PK/5

## (undated) DEVICE — PTA BALLOON DILATATION CATHETER: Brand: MUSTANG™

## (undated) DEVICE — UNDERGLV SURG BIOGEL INDICATOR LTX PF 7

## (undated) DEVICE — 9165 UNIVERSAL PATIENT PLATE: Brand: 3M™

## (undated) DEVICE — BLADELESS OBTURATOR, LONG: Brand: WECK VISTA

## (undated) DEVICE — SUT SILK 3/0 SH CR8 18IN C013D

## (undated) DEVICE — STERILE CURV CRILE FORCEP (CF81810): Brand: CENTURION

## (undated) DEVICE — TBG PRESS/MONITOR FIX M/F LL A/ 24IN STRL

## (undated) DEVICE — ABDOMINAL BINDER: Brand: DEROYAL

## (undated) DEVICE — SNAR POLYP CAPTIVATOR2 RND STFF 2.4 25MM 240CM

## (undated) DEVICE — SOLUTION,WATER,IRRIGATION,1000ML,STERILE: Brand: MEDLINE

## (undated) DEVICE — GLV SURG BIOGEL LTX PF 7 1/2

## (undated) DEVICE — ADHS LIQ MASTISOL 2/3ML

## (undated) DEVICE — SNAR POLYP HOTSNARE/BRAIDED OVL/MINI 7F 2.8X10MM 230CM 1P/U

## (undated) DEVICE — UNDRGLV SURG BIOGEL PIMICROINDICATOR SYNTH SZ7.5PF STRL PR/2

## (undated) DEVICE — IR MAJOR VASCULAR PACK: Brand: MEDLINE INDUSTRIES, INC.

## (undated) DEVICE — BG ISOL DRWSTR INVISISHIELD 20X20IN

## (undated) DEVICE — EXTENSION SET, MALE LUER LOCK ADAPTER WITH RETRACTABLE COLLAR

## (undated) DEVICE — VI-DRAPE ISOLATION BAG: Brand: CONVERTORS

## (undated) DEVICE — 3M™ PATIENT PLATE, CORDED, SPLIT, LARGE, 40 PER CASE, 1179: Brand: 3M™

## (undated) DEVICE — DRAPE,HAND,STERILE: Brand: MEDLINE

## (undated) DEVICE — SUT ETHIB 0/0 MO6 I8IN CX45D

## (undated) DEVICE — KT SURG TURNOVER 050

## (undated) DEVICE — SINGLE-USE BIOPSY FORCEPS: Brand: RADIAL JAW 4

## (undated) DEVICE — CUFF SCD HEMOFORCE SEQ CALF STD MD

## (undated) DEVICE — 3M™ STERI-STRIP™ REINFORCED ADHESIVE SKIN CLOSURES, R1547, 1/2 IN X 4 IN (12 MM X 100 MM), 6 STRIPS/ENVELOPE: Brand: 3M™ STERI-STRIP™

## (undated) DEVICE — MAJOR VASCULAR PACK-LF: Brand: MEDLINE INDUSTRIES, INC.

## (undated) DEVICE — PENCL HND ROCKRSWTCH HOLSTR EZ CLEAN TP CRD 10FT

## (undated) DEVICE — SI BRITE TIP SHEATH F6 5.5CM: Brand: BRITE TIP

## (undated) DEVICE — COVER,TABLE,44X90,STERILE: Brand: MEDLINE

## (undated) DEVICE — DRSNG WND BORDR/ADHS NONADHR/GZ LF 4X10IN STRL

## (undated) DEVICE — ENDOPATH XCEL WITH OPTIVIEW TECHNOLOGY BLADELESS TROCARS WITH STABILITY SLEEVES: Brand: ENDOPATH XCEL OPTIVIEW

## (undated) DEVICE — BINDER: Brand: DEROYAL

## (undated) DEVICE — SPNG GZ 2S 2X2 8PLY STRL PK/2

## (undated) DEVICE — 3M™ WARMING BLANKET, UPPER BODY, 10 PER CASE, 42268: Brand: BAIR HUGGER™

## (undated) DEVICE — GLV SURG BIOGEL LTX PF 7

## (undated) DEVICE — ARM DRAPE

## (undated) DEVICE — PK MAJ LAPAROTOMY 50

## (undated) DEVICE — SUT PROLN 3/0 8832H

## (undated) DEVICE — PAPR PRNT PK SONY W RIBN UPC55

## (undated) DEVICE — MICRO HVTSA, 0.5G AND HVTSA SOURCEMARK PRODUCT CODE M1206 AND M1206-01: Brand: EXOFIN MICRO HVTSA, 0.5G

## (undated) DEVICE — TBG PENCL TELESCP MEGADYNE SMOKE EVAC 15FT

## (undated) DEVICE — SUT PERMAHAND SILK 3/0 SH1 18IN

## (undated) DEVICE — SLV SCD CALF HEMOFORCE DVT THERP REPROC MD

## (undated) DEVICE — DRSNG SURESITE WNDW 4X4.5

## (undated) DEVICE — SOL NACL 0.9PCT 1000ML

## (undated) DEVICE — RADIFOCUS GLIDEWIRE: Brand: GLIDEWIRE

## (undated) DEVICE — MARKER SKIN W/RULER DUAL: Brand: MEDLINE INDUSTRIES, INC.

## (undated) DEVICE — RADIFOCUS TORQUE DEVICE MULTI-TORQUE VISE: Brand: RADIFOCUS TORQUE DEVICE

## (undated) DEVICE — BLANKT WARM UPPR/BDY ARM/OUT 57X196CM

## (undated) DEVICE — COLUMN DRAPE

## (undated) DEVICE — ST ACC MICROPUNCTURE STFF/CANN PLAT/TP 4F 21G 40CM

## (undated) DEVICE — SUT PROLN 6/0 BV1 D/A 30IN 8709H

## (undated) DEVICE — PASS SUT PRO BARIATRIC XL W/TROC SWABS

## (undated) DEVICE — DEV INFL COMPAK W/ACCESSPLUS IN4530

## (undated) DEVICE — ST TBG AIRSEAL BIF FLTR W/ACT/CHARCOAL/FLTR

## (undated) DEVICE — ORG INST STRIP/TS ADHS 2X10IN YEL STRL

## (undated) DEVICE — PINNACLE INTRODUCER SHEATH: Brand: PINNACLE

## (undated) DEVICE — FIAPC® PROBE W/ FILTER 2200 A OD 2.3MM/6.9FR; L 2.2M/7.2FT: Brand: ERBE

## (undated) DEVICE — ADHS SKIN PREMIERPRO EXOFIN TOPICAL HI/VISC .5ML

## (undated) DEVICE — CATH ACC CYSTO KUMPE 45DEG 5F 65CM

## (undated) DEVICE — ENDOPATH XCEL BLUNT TIP TROCARS WITH SMOOTH SLEEVES: Brand: ENDOPATH XCEL

## (undated) DEVICE — ENDOPATH XCEL WITH OPTIVIEW TECHNOLOGY UNIVERSAL TROCAR STABILITY SLEEVES: Brand: ENDOPATH XCEL OPTIVIEW

## (undated) DEVICE — C-ARM: Brand: UNBRANDED

## (undated) DEVICE — SOL IRRIG NACL 1000ML

## (undated) DEVICE — CANNULA SEAL

## (undated) DEVICE — LAPAROVUE VISIBILITY SYSTEM LAPAROSCOPIC SOLUTIONS: Brand: LAPAROVUE

## (undated) DEVICE — PK MINOR VASC 50

## (undated) DEVICE — SOL IRR NACL 0.9PCT BO 1000ML

## (undated) DEVICE — GLOVE,SURG,SENSICARE SLT,LF,PF,7.5: Brand: MEDLINE

## (undated) DEVICE — GOWN,REINFRCE,POLY,SIRUS,BREATH SLV,XXLG: Brand: MEDLINE

## (undated) DEVICE — BAPTIST FLOYD BRONCHOSCOPY: Brand: MEDLINE INDUSTRIES, INC.

## (undated) DEVICE — SUT PROLN 3/0 FS2 18IN 8665G

## (undated) DEVICE — SUT PROLN 0 CT1 18IN C821G

## (undated) DEVICE — SOL NS 500ML

## (undated) DEVICE — 40580 - THE PINK PAD - ADVANCED TRENDELENBURG POSITIONING KIT: Brand: 40580 - THE PINK PAD - ADVANCED TRENDELENBURG POSITIONING KIT

## (undated) DEVICE — THE STERILE LIGHT HANDLE COVER IS USED WITH STERIS SURGICAL LIGHTING AND VISUALIZATION SYSTEMS.

## (undated) DEVICE — SUT ETHIB 0 CT1 CR8 18IN CX21D

## (undated) DEVICE — 3M™ IOBAN™ 2 ANTIMICROBIAL INCISE DRAPE 6650EZ: Brand: IOBAN™ 2

## (undated) DEVICE — PROVE COVER: Brand: UNBRANDED

## (undated) DEVICE — LAPAROSCOPIC GAS CONDITIONING DEVICE.: Brand: INSUFLOW

## (undated) DEVICE — SYR LUERLOK 30CC

## (undated) DEVICE — SUT VIC 0/0 UR6 27IN DYED J603H

## (undated) DEVICE — DRSNG WND BORDR/ADHS NONADHR/GZ LF 4X4IN STRL

## (undated) DEVICE — ANTIBACTERIAL VIOLET BRAIDED (POLYGLACTIN 910), SYNTHETIC ABSORBABLE SUTURE: Brand: COATED VICRYL

## (undated) DEVICE — SUT PROLN 0 CT1 30IN 8424H

## (undated) DEVICE — SPNG GZ WOVN 4X4IN 12PLY 10/BX STRL

## (undated) DEVICE — SKIN AFFIX SURG ADHESIVE 72/CS 0.55ML: Brand: MEDLINE

## (undated) DEVICE — THE STERILE CAMERA HANDLE COVER IS FOR USE WITH THE STERIS SURGICAL LIGHTING AND VISUALIZATION SYSTEMS.

## (undated) DEVICE — ENDOPATH 5MM CURVED SCISSORS WITH MONOPOLAR CAUTERY: Brand: ENDOPATH

## (undated) DEVICE — SNAP KAP: Brand: UNBRANDED